# Patient Record
Sex: MALE | Race: WHITE | NOT HISPANIC OR LATINO | Employment: OTHER | URBAN - METROPOLITAN AREA
[De-identification: names, ages, dates, MRNs, and addresses within clinical notes are randomized per-mention and may not be internally consistent; named-entity substitution may affect disease eponyms.]

---

## 2017-01-05 ENCOUNTER — ANTICOAGULATION THERAPY VISIT (OUTPATIENT)
Dept: NURSING | Facility: CLINIC | Age: 72
End: 2017-01-05
Payer: COMMERCIAL

## 2017-01-05 DIAGNOSIS — Z79.01 LONG-TERM (CURRENT) USE OF ANTICOAGULANTS: Primary | ICD-10-CM

## 2017-01-05 LAB — INR POINT OF CARE: 2.3 (ref 0.86–1.14)

## 2017-01-05 PROCEDURE — 99207 ZZC NO CHARGE NURSE ONLY: CPT

## 2017-01-05 PROCEDURE — 36416 COLLJ CAPILLARY BLOOD SPEC: CPT

## 2017-01-05 PROCEDURE — 85610 PROTHROMBIN TIME: CPT | Mod: QW

## 2017-01-05 NOTE — PROGRESS NOTES
ANTICOAGULATION FOLLOW-UP CLINIC VISIT    Patient Name:  Cayetano Lunsford  Date:  1/5/2017  Contact Type:  Face to Face    SUBJECTIVE:     Patient Findings     Positives No Problem Findings           OBJECTIVE    INR PROTIME   Date Value Ref Range Status   01/05/2017 2.3* 0.86 - 1.14 Final       ASSESSMENT / PLAN  INR assessment THER    Recheck INR In: 4 WEEKS    INR Location Clinic      Anticoagulation Summary as of 1/5/2017     INR goal 2.0-3.0   Selected INR 2.3 (1/5/2017)   Maintenance plan 7.5 mg (5 mg x 1.5) on Mon; 5 mg (5 mg x 1) all other days   Full instructions 7.5 mg on Mon; 5 mg all other days   Weekly total 37.5 mg   No change documented Kirstin Meléndez RN   Plan last modified Lorni Wheat RN (11/3/2016)   Next INR check 2/2/2017   Priority INR   Target end date     Indications   Long-term (current) use of anticoagulants [Z79.01] [Z79.01]  Atrial fibrillation  unspecified [I48.91]         Anticoagulation Episode Summary     INR check location     Preferred lab     Send INR reminders to [facility]    Comments       Anticoagulation Care Providers     Provider Role Specialty Phone number    Debbie Lewis MD [facility] [phone]            See the Encounter Report to view Anticoagulation Flowsheet and Dosing Calendar (Go to Encounters tab in chart review, and find the Anticoagulation Therapy Visit)    Patient aware if signs of clotting (pain, tenderness, swelling, color change in leg or arm, SOB) and bleeding occur (blood in stool, urine, large bruising, bleeding gums, nosebleeds) to have INR check sooner. If sx severe report to ER or concerned for stroke call 911. If general questions or concerns arise, call clinic.    Kirstin Meléndez, MANOJ

## 2017-01-09 ENCOUNTER — HOSPITAL ENCOUNTER (OUTPATIENT)
Dept: CT IMAGING | Facility: CLINIC | Age: 72
Discharge: HOME | End: 2017-01-09
Attending: PHYSICIAN ASSISTANT | Admitting: PHYSICIAN ASSISTANT
Payer: COMMERCIAL

## 2017-01-09 DIAGNOSIS — K51.00 ULCERATIVE CHRONIC PANCOLITIS WITHOUT COMPLICATIONS (H): ICD-10-CM

## 2017-01-09 DIAGNOSIS — D50.9 IRON DEFICIENCY ANEMIA, UNSPECIFIED: ICD-10-CM

## 2017-01-09 LAB
CREAT BLD-MCNC: 0.8 MG/DL (ref 0.66–1.25)
GFR SERPL CREATININE-BSD FRML MDRD: >90 ML/MIN/1.7M2

## 2017-01-09 PROCEDURE — 82565 ASSAY OF CREATININE: CPT

## 2017-01-09 PROCEDURE — 25500064 ZZH RX 255 OP 636: Performed by: PHYSICIAN ASSISTANT

## 2017-01-09 PROCEDURE — 25000125 ZZHC RX 250: Performed by: PHYSICIAN ASSISTANT

## 2017-01-09 PROCEDURE — 74177 CT ABD & PELVIS W/CONTRAST: CPT

## 2017-01-09 RX ORDER — IOPAMIDOL 755 MG/ML
101 INJECTION, SOLUTION INTRAVASCULAR ONCE
Status: COMPLETED | OUTPATIENT
Start: 2017-01-09 | End: 2017-01-09

## 2017-01-09 RX ADMIN — IOPAMIDOL 101 ML: 755 INJECTION, SOLUTION INTRAVENOUS at 14:43

## 2017-01-09 RX ADMIN — SODIUM CHLORIDE 69 ML: 9 INJECTION, SOLUTION INTRAVENOUS at 14:44

## 2017-01-10 ENCOUNTER — TRANSFERRED RECORDS (OUTPATIENT)
Dept: HEALTH INFORMATION MANAGEMENT | Facility: CLINIC | Age: 72
End: 2017-01-10

## 2017-01-12 DIAGNOSIS — E11.9 TYPE 2 DIABETES, HBA1C GOAL < 8% (H): Primary | ICD-10-CM

## 2017-01-12 NOTE — TELEPHONE ENCOUNTER
BD Insulin Syringes      Last Written Prescription Date: 8/31/16  Last Fill Quantity: 100,  # refills: 2   Last Office Visit with G, [facility] or [facility] prescribing provider: 9/7/16                                         Next 5 appointments (look out 90 days)     Jan 18, 2017  9:30 AM   Office Visit with Jessica Diaz [facility] ([facility])    [address] [place] [id number] [phone]                      Thank you,  Clary Brown CPhT  On behalf of [facility] [place]

## 2017-01-13 RX ORDER — SYRINGE-NEEDLE,INSULIN,0.5 ML 27GX1/2"
SYRINGE, EMPTY DISPOSABLE MISCELLANEOUS
Qty: 100 EACH | Refills: 2 | Status: SHIPPED | OUTPATIENT
Start: 2017-01-13 | End: 2017-06-05

## 2017-01-13 NOTE — TELEPHONE ENCOUNTER
Prescription approved per [facility] Refill Protocol.    Shayna Roman, PharmD  [facility]  On behalf of [facility]

## 2017-01-18 ENCOUNTER — OFFICE VISIT (OUTPATIENT)
Dept: PHARMACY | Facility: CLINIC | Age: 72
End: 2017-01-18
Payer: COMMERCIAL

## 2017-01-18 VITALS
HEART RATE: 56 BPM | BODY MASS INDEX: 31.18 KG/M2 | DIASTOLIC BLOOD PRESSURE: 69 MMHG | WEIGHT: 205 LBS | SYSTOLIC BLOOD PRESSURE: 124 MMHG

## 2017-01-18 DIAGNOSIS — Z79.4 TYPE 2 DIABETES MELLITUS WITH HYPOGLYCEMIA WITHOUT COMA, WITH LONG-TERM CURRENT USE OF INSULIN (H): Primary | ICD-10-CM

## 2017-01-18 DIAGNOSIS — J44.9 CHRONIC OBSTRUCTIVE PULMONARY DISEASE, UNSPECIFIED (H): ICD-10-CM

## 2017-01-18 DIAGNOSIS — E11.649 TYPE 2 DIABETES MELLITUS WITH HYPOGLYCEMIA WITHOUT COMA, WITH LONG-TERM CURRENT USE OF INSULIN (H): Primary | ICD-10-CM

## 2017-01-18 PROCEDURE — 99605 MTMS BY PHARM NP 15 MIN: CPT | Performed by: PHARMACIST

## 2017-01-18 PROCEDURE — 99607 MTMS BY PHARM ADDL 15 MIN: CPT | Performed by: PHARMACIST

## 2017-01-18 NOTE — Clinical Note
Please see note from today's MTM visit. Slight insulin adjustment and also change his Spiriva from the powdered inhaler to the respimat in hopes he has been effectiveness.   Tanna Diaz, PharmD Medication Therapy Management Pharmacist

## 2017-01-18 NOTE — Clinical Note
My COPD Action Plan   Name: Cayetano Lunsford    YOB: 1945    Date: 1/18/2017    My doctor: Debbie Lewis    My clinic: [address] [address] [phone]   My COPD Medicine:       My Controller Medications: Albuterol (Proair/Ventolin/Proventolin)  Tiotropium (Spiriva)   Albuterol/Ipratropium (Duoneb, Combivent)  Dulera (mometasone/formoterol)     My Rescue Medication:  Albuterol and Albuterol/Ipratropium (DuoNeb/Combivent)     Use of Oxygen:  Oxygen Not Prescribed   My COPD Severity: Severe = FeV1 < 30%-49%        GREEN ZONE       Doing well today      Usual level of activity and exercise    Usual amount of cough and mucus    No shortness of breath    Can think clearly    Sleep well at night    Feel like eating Actions:      Take daily medicines    Use oxygen as prescribed    Follow regular exercise and diet plan    Avoid cigarette smoke and other irritants that harm the lungs             YELLOW ZONE          Having a bad day or flare up      Short of breath more than usual    Change in color or amount of mucus    More coughing or wheezing    Fever or chills    Less energy; trouble completing activities    Trouble thinking or focusing    Using quick relief inhaler or nebulizer more often    Poor sleep; symptoms wake me up    Do not feel like eating Actions:      Take daily medicines    Use quick relief inhaler every 4 hours    If you use oxygen, call you doctor to see if you should adjust your oxygen    Do breathing exercises or other things to help you relax    Let a loved one, friend or neighbor know you are feeling worse    If you have one or more symptoms, consider taking steroids or antibiotics (if they were prescribed)    Call your care team if you have 2 or more symptoms or symptoms don't improve           RED ZONE       Need medical care now      Severe shortness of breath (feel you can't breath)    Not enough breath to do any  activity    Trouble walking or talking    Trouble coughing up mucus or blood in mucus    Frequent coughing    Rescue medicines are not working    Not able to sleep because of breathing    Feel confused or drowsy    Chest pain  Actions:      Call 911 or     Have someone take you to the emergency room if you can't reach your care team        Electronically signed by: Jessica Diaz, January 18, 2017     Annual Reminders:  Meet with Care Team, Flu Shot every Fall and Pneumonia Shot at least once.       [facility] [address] [facility] [address] [place] [address] ([address]

## 2017-01-18 NOTE — PROGRESS NOTES
SUBJECTIVE/OBJECTIVE:                                                    Cayetano Lunsford is a 72 year old male coming in for a follow-up visit for Medication Therapy Management.  He was referred to me from Dr. Debbie Lewis.     Chief Complaint: Follow up from our visit on 12/19/16.  No concerns today  Personal Healthcare Goals: did not discuss today.  Tobacco: No tobacco use   Alcohol: not currently using    Medication Adherence: no issues reported    Diabetes:  Pt currently taking metformin bid, Novolin N 13 units AM before breakfast and 16 units in PM-at dinner., Novolin R-13 units with breakfast, 3 units with lunch and 2 units dinner (decreased from last visit due to hypoglycemia in evening), between 80 and 120, reduce your R insulin by 2 units. If below 70 skip dose. He gives his shots in his arms. He does not have any issues with giving stomach just has been doing arms. He uses less Novolin R if he is going to be active and blood sugars are lower before breakfast.  He is now writing the date on the Novolin R and discarding after 42 days.   SMBG: 3-4 times daily.   Ranges (per pt report):AM fasting: see chart below. Only had 2 lows in the last few weeks and one was avoidable.  Blood sugars seem to be lower before dinner because he has a very light lunch (half sandwich or bowl of soup).  Symptoms of low blood sugar? Fingers and lips tingle, shaky and sweaty. Frequency of hypoglycemia? A few lows since last visit. Treating with small amount of orange juice and candy  Recent symptoms of high blood sugar? none.  Eye exam: up to date  Foot exam: due  Microalbumin is not < 30 mg/g. Pt is taking an ACEi/ARB.  Aspirin: Not taking due to anticoagulation therapy and increased risk of bleeding  Diet/Exercise:  He knows he should get rid of pancakes and sugar cereal but cant find cereal that tastes good with less sugar. At last visit: Reports continues to walk 4-5 days a week, once to twice daily for 20 minutes - but this  varies if it is cold out. He has lost his appetite he states and does not have very large meals. Eats a lot of carbs in the morning, which explains his very high insulin dose compared to other times.  Does not like meats anymore.     Averages: 7 day: 155, 14 day: 154 30 day: 153    Date FBG/ 2hours post Lunch/2hours post Dinner /2hours post    1/18 113 R - 7 units instead of 13      1/17 203 287 184    1/16 173 227 106  R- 1 unit - forgot to give shot and gave later at 10pm without food 54 at 10:42pm - due to giving R and not eating/ 166 1 hr later   1/15 110 R- 7 units 245 136    1/14 184 136 90 R- 1 unit 176 bedtime   1/13 155 211 104 R-1 unit 47 at 10:42 pm and he even had pumpkin pie and cool whip for snack/ at recheck 175 at 2am   1/12 148 166 108 R -1 unit Bedtime 11:30pm was 233 due to pie for after dinner snack   1/11 186 232 82 R - 1 units 77 at 9:43 pm and 73 at 1AM   1/10 134 No check or shot for lunch 186 - higher because no shot at lunch 135 at bedtime   1/9 199 80 - late lunch and fasted after breakfast for CT scan 104 94 at bedtime   1/8 120 236 248    1/7 183 102 212 before dinner and 269 at dinner        Date FBG/ 2hours post Lunch/2hours post Dinner /2hours post Bedtime   12/19 161      12/18 103 233 91 (only 2 units of R) 76   12/17 162 181 186 68   12/16 138/184 207 206 106   12/15 165 238 134 125/40 (knew BG was low woke up at 1 AM)   12/14 141 161 85 (2 units of R) 43/66   12/13 117(11 units of R) 215/116 50, 75 (2 units of R) 132   12/12 167 260 101 (2 units of R) 104   12/11 91 (11 units of R) 172 91 (2 units of R) 217   12/10 142 182 148 93   12/9 161 224 95 (2 U of R)    12/8 191 158 146 89   12/7 176 263 145 55/128   12/6 171 188 180 53/109     COPD: Current medications: Albuterol MDI and (Pt reports using albuterol as needed when out), Tiotropium (Spiriva Handihaler) once daily, Albuterol+Ipratropium Nebs and (Pt reports using albuterol 2-3 times daily depending on activity) and 2 puffs  Mometasone+Formoterol (Dulera) twice daily. Pt rinses their mouth after using steroid inhaler.  He states that sometimes he isn't sure if he gets all the medicine from the Spiriva because the capsule doesn't make that noise.  He thinks all the powder is out of the capsule but cant say for sure.  Pt is not experiencing side effects.   Pt reports the following symptoms: increased SOB upon exertion .  Pt does not have an COPD Action Plan on file.   Has spirometry been completed: Yes     Current labs include:  BP Readings from Last 3 Encounters:   09/07/16 130/76   07/22/16 139/62   06/16/16 140/68     A1C      6.9   10/17/2016  A1C      6.2   7/22/2016  A1C      6.7   3/28/2016  A1C      7.1   11/10/2015  A1C      7.6   5/12/2015    CHOL      128   5/5/2016  TRIG      122   5/5/2016  HDL       47   5/5/2016  LDL       57   5/5/2016    Liver Function Studies -   Recent Labs   Lab Test 10/03/16   02/09/16   1150   PROTTOTAL   --    --   6.7*   ALBUMIN   --    --   3.7   BILITOTAL   --    --   0.3   ALKPHOS   --    --   57   AST  18   < >  15   ALT  13   < >  27    < > = values in this interval not displayed.       Lab Results   Component Value Date    UCRR 91 05/05/2016    MICROL 39 05/05/2016    UMALCR 42.76* 05/05/2016       Last Basic Metabolic Panel:  NA      140   5/5/2016   POTASSIUM      4.6   5/5/2016  CHLORIDE      103   5/5/2016  BUN       14   5/5/2016  CR     0.79   10/3/2016    GFR ESTIMATE   Date Value Ref Range Status   01/09/2017 >90 >60 mL/min/1.7m2 Final   10/03/2016 >60 >60 ml/min/1.73m2 Final   05/05/2016 >90  Non  GFR Calc   >60 mL/min/1.7m2 Final       TSH   Date Value Ref Range Status   10/27/2014 2.90 0.40 - 4.00 mU/L Final     Comment:     Effective 7/30/2014, the reference range for this assay has changed to reflect   new instrumentation/methodology.     ]  /69 mmHg  Pulse 56  Wt 205 lb (92.987 kg)    Most Recent Immunizations   Administered Date(s) Administered      Hepatitis B 08/05/2014     Influenza (H1N1) 01/08/2010     Influenza (High Dose) 3 valent vaccine 09/07/2016     Influenza (IIV3) 09/11/2009     Pneumococcal (PCV 13) 11/10/2015     Pneumococcal 23 valent 07/20/2010     TD (ADULT, 7+) 08/08/2008     TDAP (ADACEL AGES 11-64) 08/05/2014     Zoster vaccine, live 06/13/2008     ASSESSMENT:                                                    Current medications were reviewed today.      Medication Adherence: no issues identified    Diabetes: Needs Improvement. Patient is meeting A1c goal of < 7%. Self monitoring of blood glucose is at goal of fasting  mg/dL but having some hypoglycemia still after dinner and before dinner readings seem to be lower. Pt would benefit from Rapid Acting Insulin (Novolin R) : decrease dose to 2 units at lunch to avoid going too low before dinner and then too low after dinner.  He will continue to adjust his Novolin R dose base on premeal readings ( will decrease dose by 2 units and not give any regular insulin if blood sugar is 80 or less). Due for annual foot exam. Aspirin therapy is not indicated in this patient due to anticoagulant/antiplatelet therapy.     COPD: Needs Improvement. Patient would benefit from the following medication changes: switching from the powder Spiriva to the Respimat mist inhaler to see if he has a better response due to possible improper technique with the powder inhaler.  Demonstrated how to use the new inhaler in clinic today. Completed the COPD Action Plan today.    PLAN:                                                      1. In the future if you miss giving your mixed insulin at dinner and remember later only give the Novolin N.    2. Decrease Novolin R at lunch to 2 units to try to avoid lows before dinner.    3. Try to keep your carb intake similar from meal to meal.    4. Switch from using the Spiriva Handihaler to the new Respimat inhaler.  Let me know if it is not covered.  See if you use the  Duo nebs less after switching.    5. COPD action plan done today.    I spent 60 minutes with this patient today.  All changes were made via collaborative practice agreement with Debbie Lewis. A copy of the visit note was provided to the patient's primary care provider.     Will follow up in 1 month.    The patient was given a summary of these recommendations as an after visit summary.    Tanna Diaz, PharmD  Medication Therapy Management Pharmacist

## 2017-01-18 NOTE — Clinical Note
Switch from spiriva powder inhaler to respimat and adjusted insulin today.  See note for details.   Tanna

## 2017-01-18 NOTE — MR AVS SNAPSHOT
After Visit Summary   1/18/2017    Cayetano Lunsford    MRN: [id number]           Patient Information     Date Of Birth          1945        Visit Information        Provider Department      1/18/2017 9:30 AM Jessica Diaz [facility] MTM        Today's Diagnoses     Type 2 diabetes mellitus with hypoglycemia without coma, with long-term current use of insulin (H)    -  1     Chronic obstructive pulmonary disease, unspecified (H)           Care Instructions    Recommendations from today's MTM visit:                                                      1. In the future if you miss giving your mixed insulin at dinner and remember later only give the Novolin N.    2. Decrease Novolin N at lunch to 2 units to try to avoid lows before dinner.    3. Try to keep your carb intake similar from meal to meal.    4. Switch from using the Spirva Handihaler to the new respimat inhaler.  Let me know if it is not covered.  See if you use the Duonebs less after switching.    Next MTM visit: 2/15/17 at 9:30    To schedule another MTM appointment, please call the clinic directly or you may call the MTM scheduling line at [phone] or toll-free at [phone].     My Clinical Pharmacist's contact information:                                                      It was a pleasure seeing you today!  Please feel free to contact me with any questions or concerns you have.      Tanna Diaz, PharmD  Medication Therapy Management Pharmacist    You may receive a survey about the MTM services you received.  I would appreciate your feedback to help me serve you better in the future. Please fill it out and return it when you can. Your comments will be anonymous.                  Follow-ups after your visit        Your next 10 appointments already scheduled     Feb 02, 2017 10:00 AM   [facility]    [address]  "[facility] [address] [phone]            Feb 15, 2017  9:30 AM   Office Visit with Jessica Diaz RICHY   [facility]    [address] [place] [id number] [phone]           Bring a current list of meds and any records pertaining to this visit.  For Physicals, please bring immunization records and any forms needing to be filled out.  Please arrive 10 minutes early to complete paperwork.              Who to contact     If you have questions or need follow up information about today's clinic visit or your schedule please contact [facility] directly at [phone].  Normal or non-critical lab and imaging results will be communicated to you by MyChart, letter or phone within 4 business days after the clinic has received the results. If you do not hear from us within 7 days, please contact the clinic through MyChart or phone. If you have a critical or abnormal lab result, we will notify you by phone as soon as possible.  Submit refill requests through [employer] or call your pharmacy and they will forward the refill request to us. Please allow 3 business days for your refill to be completed.          Additional Information About Your Visit        [employer] Information     [employer] lets you send messages to your doctor, view your test results, renew your prescriptions, schedule appointments and more. To sign up, go to [email or web address] . Click on \"Log in\" on the left side of the screen, which will take you to the Welcome page. Then click on \"Sign up Now\" on the right side of the page.     You will be asked to enter the access code listed below, as well as some personal information. Please follow the directions to create your username and password.     Your access code is: [email or web address]  Expires: 3/19/2017 10:15 AM     Your access code will  in 90 days. If you need help or a new code, please call your [facility] " clinic or [phone].        Care EveryWhere ID     This is your Care EveryWhere ID. This could be used by other organizations to access your [facility] medical records  [id number]         Blood Pressure from Last 3 Encounters:   09/07/16 130/76   07/22/16 139/62   06/16/16 140/68    Weight from Last 3 Encounters:   09/07/16 206 lb (93.441 kg)   07/22/16 206 lb 8 oz (93.668 kg)   06/16/16 208 lb (94.348 kg)              Today, you had the following     No orders found for display         Today's Medication Changes          These changes are accurate as of: 1/18/17 10:40 AM.  If you have any questions, ask your nurse or doctor.               Start taking these medicines.        Dose/Directions    tiotropium 2.5 MCG/ACT inhalation aerosol   Commonly known as:  SPIRIVA RESPIMAT   Used for:  Chronic obstructive pulmonary disease, unspecified (H)   Replaces:  tiotropium 18 MCG capsule        Dose:  2 puff   Inhale 2 puffs into the lungs daily   Quantity:  4 g   Refills:  1         These medicines have changed or have updated prescriptions.        Dose/Directions    insulin regular 100 UNIT/ML injection   Commonly known as:  NovoLIN R RELION   This may have changed:  additional instructions   Used for:  Type 2 diabetes mellitus with hypoglycemia without coma, with long-term current use of insulin (H)        Inject 13 units with breakfast, 2 units with lunch and 2 units with dinner (when mixing with NPH, draw this insulin up first)   Quantity:  10 mL   Refills:  2       ipratropium - albuterol 0.5 mg/2.5 mg/3 mL 0.5-2.5 (3) MG/3ML neb solution   Commonly known as:  DUONEB   This may have changed:  reasons to take this   Used for:  Chronic obstructive bronchitis (H)        Dose:  3 mL   Inhale 1 vial (3 mLs) into the lungs every 4 hours as needed for shortness of breath / dyspnea   Quantity:  270 mL   Refills:  11         Stop taking these medicines if you haven't already. Please contact your care team if you have  questions.     BREO ELLIPTA 100-25 MCG/INH oral inhaler   Generic drug:  fluticasone-vilanterol           tiotropium 18 MCG capsule   Commonly known as:  SPIRIVA HANDIHALER   Replaced by:  tiotropium 2.5 MCG/ACT inhalation aerosol                Where to get your medicines      These medications were sent to [facility] [address]  [address] [facility] [address]     Phone:  [phone]    - tiotropium 2.5 MCG/ACT inhalation aerosol      These medications were sent to [address]  [address] [place] [address]) [place] [address]     Phone:  [phone]    - insulin regular 100 UNIT/ML injection             Primary Care Provider Office Phone # Fax #    Debbie Lewis [phone]       [facility] [address]  [facility] [address]        Thank you!     Thank you for choosing [facility]  for your care. Our goal is always to provide you with excellent care. Hearing back from our patients is one way we can continue to improve our services. Please take a few minutes to complete the written survey that you may receive in the mail after your visit with us. Thank you!             Your Updated Medication List - Protect others around you: Learn how to safely use, store and throw away your medicines at www.disposemymeds.org.          This list is accurate as of: 1/18/17 10:40 AM.  Always use your most recent med list.                   Brand Name Dispense Instructions for use    albuterol 108 (90 BASE) MCG/ACT Inhaler    PROAIR HFA/PROVENTIL HFA/VENTOLIN HFA    1 Inhaler    Inhale 1-2 puffs into the lungs every 4 hours as needed (for shortness of breath/wheezing)       ASPIRIN NOT PRESCRIBED    INTENTIONAL     Antiplatelet medication not prescribed intentionally due to Current anticoagulant therapy (warfarin/enoxaparin)       azelastine 0.1 % spray    ASTELIN    1  "Bottle    Spray 1-2 sprays into both nostrils 2 times daily       BD ULTRA FINE PEN NEEDLES     100 each    Use to inject insulin twice daily.       blood glucose monitoring lancets     100 Each    Use to test up to four times per day       blood glucose monitoring test strip    no brand specified    360 each    Test 4 times daily or as directed       carbidopa-levodopa  MG per tablet    SINEMET    90 tablet    Take 1 tablet by mouth 3 times daily       FLORAJEN3 Caps     60 capsule    Take 1 capsule by mouth 2 times daily       FOLIC ACID PO      Take 1 mg by mouth daily       insulin  UNIT/ML injection    NovoLIN N RELION    10 mL    Inject 13 units in the morning and 16 units in the evening       insulin regular 100 UNIT/ML injection    NovoLIN R RELION    10 mL    Inject 13 units with breakfast, 2 units with lunch and 2 units with dinner (when mixing with NPH, draw this insulin up first)       insulin syringe-needle U-100 31G X 5/16\" 0.5 ML    BD insulin syringe ultrafine    100 each    Use one syringe 2 daily or as directed.       ipratropium - albuterol 0.5 mg/2.5 mg/3 mL 0.5-2.5 (3) MG/3ML neb solution    DUONEB    270 mL    Inhale 1 vial (3 mLs) into the lungs every 4 hours as needed for shortness of breath / dyspnea       losartan 100 MG tablet    COZAAR    90 tablet    Take 1 tablet (100 mg) by mouth daily for hypertension.       meclizine 25 MG tablet    ANTIVERT    30 tablet    Take 1 tablet (25 mg) by mouth 3 times daily as needed       MELATONIN PO      Take 3 mg by mouth At Bedtime       metFORMIN 1000 MG tablet    GLUCOPHAGE    180 tablet    Take 1 tablet (1,000 mg) by mouth 2 times daily (with meals) with breakfast and dinner.       mometasone-formoterol 100-5 MCG/ACT oral inhaler    DULERA     Inhale 2 puffs into the lungs 2 times daily       order for DME     1 each    Dispense one nebulizer machine with necessary supplies       order for DME     1 each    Equipment being ordered: " Nebulizer machine with mask and tubing       pantoprazole 40 MG EC tablet    PROTONIX     Take 40 mg by mouth daily Started by Dr. Debbie Rico at [facility]       propafenone 150 MG Tabs tablet    RYTHMOL    180 tablet    Take 1 tablet (150 mg) by mouth 2 times daily       propranolol 40 MG tablet    INDERAL    180 tablet    Take 2-3 tablets ( mg) by mouth 2 times daily       simvastatin 80 MG tablet    ZOCOR    30 tablet    Take 0.5 tablets (40 mg) by mouth At Bedtime       sulfaSALAzine 500 MG tablet    AZULFIDINE    90 tablet    TAKE ONE TABLET BY MOUTH THREE TIMES A DAY       tiotropium 2.5 MCG/ACT inhalation aerosol    SPIRIVA RESPIMAT    4 g    Inhale 2 puffs into the lungs daily       warfarin 5 MG tablet    COUMADIN    96 tablet    As directed by Coumadin clinic

## 2017-01-18 NOTE — PATIENT INSTRUCTIONS
Recommendations from today's MTM visit:                                                      1. In the future if you miss giving your mixed insulin at dinner and remember later only give the Novolin N.    2. Decrease Novolin N at lunch to 2 units to try to avoid lows before dinner.    3. Try to keep your carb intake similar from meal to meal.    4. Switch from using the Spirva Handihaler to the new respimat inhaler.  Let me know if it is not covered.  See if you use the Duonebs less after switching.    Next MTM visit: 2/15/17 at 9:30    To schedule another MTM appointment, please call the clinic directly or you may call the MTM scheduling line at [phone] or toll-free at [phone].     My Clinical Pharmacist's contact information:                                                      It was a pleasure seeing you today!  Please feel free to contact me with any questions or concerns you have.      Tanna Diaz, PharmD  Medication Therapy Management Pharmacist    You may receive a survey about the MTM services you received.  I would appreciate your feedback to help me serve you better in the future. Please fill it out and return it when you can. Your comments will be anonymous.

## 2017-02-02 ENCOUNTER — ANTICOAGULATION THERAPY VISIT (OUTPATIENT)
Dept: NURSING | Facility: CLINIC | Age: 72
End: 2017-02-02
Payer: COMMERCIAL

## 2017-02-02 DIAGNOSIS — Z79.01 LONG-TERM (CURRENT) USE OF ANTICOAGULANTS: Primary | ICD-10-CM

## 2017-02-02 LAB — INR POINT OF CARE: 2.1 (ref 0.86–1.14)

## 2017-02-02 PROCEDURE — 36416 COLLJ CAPILLARY BLOOD SPEC: CPT

## 2017-02-02 PROCEDURE — 99207 ZZC NO CHARGE NURSE ONLY: CPT

## 2017-02-02 PROCEDURE — 85610 PROTHROMBIN TIME: CPT | Mod: QW

## 2017-02-14 ENCOUNTER — DOCUMENTATION ONLY (OUTPATIENT)
Dept: OTHER | Facility: CLINIC | Age: 72
End: 2017-02-14

## 2017-02-14 ENCOUNTER — TRANSFERRED RECORDS (OUTPATIENT)
Dept: HEALTH INFORMATION MANAGEMENT | Facility: CLINIC | Age: 72
End: 2017-02-14

## 2017-02-14 DIAGNOSIS — Z71.89 ADVANCED DIRECTIVES, COUNSELING/DISCUSSION: Chronic | ICD-10-CM

## 2017-02-15 ENCOUNTER — OFFICE VISIT (OUTPATIENT)
Dept: PHARMACY | Facility: CLINIC | Age: 72
End: 2017-02-15
Payer: COMMERCIAL

## 2017-02-15 VITALS
SYSTOLIC BLOOD PRESSURE: 109 MMHG | BODY MASS INDEX: 30.56 KG/M2 | HEART RATE: 63 BPM | DIASTOLIC BLOOD PRESSURE: 57 MMHG | WEIGHT: 201 LBS

## 2017-02-15 DIAGNOSIS — E11.649 TYPE 2 DIABETES MELLITUS WITH HYPOGLYCEMIA WITHOUT COMA, WITH LONG-TERM CURRENT USE OF INSULIN (H): Primary | ICD-10-CM

## 2017-02-15 DIAGNOSIS — Z79.4 TYPE 2 DIABETES MELLITUS WITH HYPOGLYCEMIA WITHOUT COMA, WITH LONG-TERM CURRENT USE OF INSULIN (H): Primary | ICD-10-CM

## 2017-02-15 DIAGNOSIS — G25.0 BENIGN FAMILIAL TREMOR: ICD-10-CM

## 2017-02-15 DIAGNOSIS — J44.9 CHRONIC OBSTRUCTIVE PULMONARY DISEASE, UNSPECIFIED (H): ICD-10-CM

## 2017-02-15 PROCEDURE — 99606 MTMS BY PHARM EST 15 MIN: CPT | Performed by: PHARMACIST

## 2017-02-15 PROCEDURE — 99607 MTMS BY PHARM ADDL 15 MIN: CPT | Performed by: PHARMACIST

## 2017-02-15 NOTE — PROGRESS NOTES
SUBJECTIVE/OBJECTIVE:                                                    Cayetano Lunsford is a 72 year old male coming in for a follow-up visit for Medication Therapy Management.  He was referred to me from Dr. Debbie Lewis.     Chief Complaint: Follow up from our visit on 1/18/17.  No concerns today.  Personal Healthcare Goals: He would like to get rid of the tremors but knows it may not be something that will get better.  Tobacco: No tobacco use   Alcohol: not currently using    Medication Adherence: no issues reported    Diabetes:  Pt currently taking metformin bid, Novolin N 13 units AM before breakfast and 16 units in PM-at dinner., Novolin R-13 units with breakfast, 2 units with lunch (decreased from last visit due to hypoglycemia in evening) and 2 units dinner , between 80 and 120, reduce your R insulin by 2 units. If below 70 skip dose. He gives his shots in his arms. He does not have any issues with giving stomach just has been doing arms. He uses less Novolin R if he is going to be active and blood sugars are lower before breakfast.  He is now writing the date on the Novolin R and discarding after 42 days.   SMBG: 3-4 times daily.   Ranges (per pt report): see chart below. Blood sugars seem to be lower before dinner because he has a very light lunch (half sandwich or bowl of soup).  Symptoms of low blood sugar? Fingers and lips tingle, shaky and sweaty. Frequency of hypoglycemia? A few lows since last visit. Treating with small amount of orange juice and candy  Recent symptoms of high blood sugar? none.  Eye exam: up to date  Foot exam: due  Microalbumin is not < 30 mg/g. Pt is taking an ACEi/ARB.  Aspirin: Not taking due to anticoagulation therapy and increased risk of bleeding  Diet/Exercise:  He knows he should get rid of pancakes and sugar cereal but cant find cereal that tastes good with less sugar. At last visit: Reports continues to walk 4-5 days a week, once to twice daily for 20 minutes - but  this varies if it is cold out. He has lost his appetite he states and does not have very large meals. Eats a lot of carbs in the morning, which explains his very high insulin dose compared to other times.  Does not like meats anymore.     Averages: 7 day: 146, 14 day: 145 30 day: 160    Date FBG/ 2hours post Lunch/2hours post Dinner /2hours post Before bed   2/15 199      2/14 115 (R - cut in half gave 7 units) 191 119(R - 1 unit) 152   2/13 143 264 (couple of cookies earlier) then 221 at 3pm 112 at 5:38pm (R- 1 unit) at single serving lasagna (40 gm carbs) and glass of milk. Also had 2 bananas after dinner for a snack 10pm - 58; 10:30pm 70,113 - 11:06pm   2/12 136 60 (no R) half sandwich and chips, may have been a little more active that morning. 64 at 6:37pm (no R) 199   2/11 173 243 (breakfast was cereal with strawberries) 169    2/10 138 178 90 (R - 1 unit)    2/9 123 133 198    2/8 156 160 165/255 (hot chocolate and half sandwich at hockey game)    2/7 151 153 113 (R - 1 unit)    2/6 145 150 174 92   2/5 177 No lunch reading 164    2/4 99 (R - 7 units) 140 118 (R - 1 unit)    2/3 125 127/115 150    2/2 128 129/117 (4 hours after lunch) 116/174    2/1 145 165 104 (R - 1 unit) 82 (drank 4 ounces of juice)   1/31 164 168 162    1/30 100 ( R - 7 units) 197 118 ( R - 1 unit) 111   1/29 167 281 (breakfast 2 small cinnamon rolls) 207  1:04 am was 62 (4 oz orange juice) - unsure of drastic drop   1/28 129 139 182 142   1/27 153 No reading (no R with lunch) 201  223   1/26 135 142 63 ( no R) 160   1/25 145 (cereal for breakfast) 231 144    1/24 183 209 168 98   1/23 194 246 ? 201 (didn't eat as much of an evening snack) 70 (4oz juice)   1/22 163 301 (ate 2 pancakes with syrup for breakfast) 222 208 (ate cake earlier in the day)   1/21 238 (night before had chips and cheese for snack) 249 165    1/20 220 230 (no R because he had eating late breakfast and was worried it would make bs too low) 204    1/19 163 (cereal with  blueberries) 231 183 139                     Last visit:  Date FBG/ 2hours post Lunch/2hours post Dinner /2hours post    1/18 113 R - 7 units instead of 13      1/17 203 287 184    1/16 173 227 106  R- 1 unit - forgot to give shot and gave later at 10pm without food 54 at 10:42pm - due to giving R and not eating/ 166 1 hr later   1/15 110 R- 7 units 245 136    1/14 184 136 90 R- 1 unit 176 bedtime   1/13 155 211 104 R-1 unit 47 at 10:42 pm and he even had pumpkin pie and cool whip for snack/ at recheck 175 at 2am   1/12 148 166 108 R -1 unit Bedtime 11:30pm was 233 due to pie for after dinner snack   1/11 186 232 82 R - 1 units 77 at 9:43 pm and 73 at 1AM   1/10 134 No check or shot for lunch 186 - higher because no shot at lunch 135 at bedtime   1/9 199 80 - late lunch and fasted after breakfast for CT scan 104 94 at bedtime   1/8 120 236 248    1/7 183 102 212 before dinner and 269 at dinner        COPD: Current medications: Albuterol MDI and (Pt reports using albuterol as needed when out), Tiotropium (Spiriva Respimat) once daily - changed from HandiHaler at last visit, Albuterol+Ipratropium Nebs ( Pt reports using 2 times daily now since changing to the Respimat) and 2 puffs Mometasone+Formoterol (Dulera) twice daily. Pt rinses their mouth after using steroid inhaler.    Pt is not experiencing side effects.   Pt reports the following symptoms: increased SOB upon exertion which is unchanged. Stairs are really hard.  Pt does have an COPD Action Plan on file.   Has spirometry been completed: Yes     Tremor:  Current medications include: propranolol  mg twice daily, sinemet  three times daily.  He has not tried primidone but his sister said she didn't like it. He is not sure there is much he can do. He has not seen a neurologist to discuss other options.    Current labs include:  BP Readings from Last 3 Encounters:   01/18/17 124/69   09/07/16 130/76   07/22/16 139/62     Today's Vitals: /57  Pulse  63  Wt 201 lb (91.2 kg)  BMI 30.56 kg/m2     Lab Results   Component Value Date    A1C 6.9 10/17/2016   .  Lab Results   Component Value Date    CHOL 128 05/05/2016     Lab Results   Component Value Date    TRIG 122 05/05/2016     Lab Results   Component Value Date    HDL 47 05/05/2016     Lab Results   Component Value Date    LDL 57 05/05/2016       Liver Function Studies -   Recent Labs   Lab Test 10/03/16   02/09/16   1150   PROTTOTAL   --    --   6.7*   ALBUMIN   --    --   3.7   BILITOTAL   --    --   0.3   ALKPHOS   --    --   57   AST  18   < >  15   ALT  13   < >  27    < > = values in this interval not displayed.       Lab Results   Component Value Date    UCRR 91 05/05/2016    MICROL 39 05/05/2016    UMALCR 42.76 (H) 05/05/2016       Last Basic Metabolic Panel:  Lab Results   Component Value Date     05/05/2016      Lab Results   Component Value Date    POTASSIUM 4.6 05/05/2016     Lab Results   Component Value Date    CHLORIDE 103 05/05/2016     Lab Results   Component Value Date    BUN 14 05/05/2016     Lab Results   Component Value Date    CR 0.79 10/03/2016     GFR Estimate   Date Value Ref Range Status   01/09/2017 >90 >60 mL/min/1.7m2 Final   10/03/2016 >60 >60 ml/min/1.73m2 Final   05/05/2016 >90  Non  GFR Calc   >60 mL/min/1.7m2 Final     GFR Estimate If Black   Date Value Ref Range Status   01/09/2017 >90 >60 mL/min/1.7m2 Final   10/03/2016 >60 >60 ml/min/1.73m2 Final   05/05/2016 >90   GFR Calc   >60 mL/min/1.7m2 Final     TSH   Date Value Ref Range Status   10/27/2014 2.90 0.40 - 4.00 mU/L Final     Comment:     Effective 7/30/2014, the reference range for this assay has changed to reflect   new instrumentation/methodology.     ]    Most Recent Immunizations   Administered Date(s) Administered     Hepatitis B 08/05/2014     Influenza (H1N1) 01/08/2010     Influenza (High Dose) 3 valent vaccine 09/07/2016     Influenza (IIV3) 09/11/2009     Pneumococcal (PCV  13) 11/10/2015     Pneumococcal 23 valent 07/20/2010     TD (ADULT, 7+) 08/08/2008     TDAP (ADACEL AGES 11-64) 08/05/2014     Zoster vaccine, live 06/13/2008     ASSESSMENT:                                                    Current medications were reviewed today as discussed above.      Medication Adherence: no issues identified    Diabetes: Needs Improvement. Patient is meeting A1c goal of < 8%. Self monitoring of blood glucose is not at goal of fasting  mg/dL and post prandial < 180 mg/dL all the time. He continues to have hypoglycemia, mainly in the evening. Pt would benefit from Rapid Acting Insulin (Novolin R) : decrease morning dose by 2 units if he plans to be more active and 2 more if he still gets hypoglycemia after decreasing by 2 units.  He may also be having bedtime/overnight lows due to Novolin N being too high so will decrease evening NPH by 2 units to 14 units. Microalbumin is not at goal < 30 mg/g. Taking an ARB at max dose.. Due for annual foot exam. Aspirin therapy is not indicated in this patient due to anticoagulant/antiplatelet therapy.     COPD: Slightly Improved. Patient would benefit from no changes at this time.     Tremor: Needs Improvement. Patient would benefit from scheduling with neurologist who comes to the clinic Tuesdays and Wednesdays.      PLAN:                                                      1. If you are going to be more active that day cut your morning R down by 2 units and if you still have lows doing that then cut back by 4 units.     2. Decrease Novolin N at dinner to 14 units - see if you have less bedtime/overnight low blood sugars    3. Consider making an appointment with Dr. Yoo to discuss your tremor and see what other options you have.    I spent 60 minutes with this patient today.  All changes were made via collaborative practice agreement with Debbie Lewis. A copy of the visit note was provided to the patient's primary care provider.     Will  follow up in 1 month.    The patient was given a summary of these recommendations as an after visit summary.    Tanna Diaz, JohnnieD  Medication Therapy Management Pharmacist

## 2017-02-15 NOTE — MR AVS SNAPSHOT
After Visit Summary   2/15/2017    Cayetano Lunfsord    MRN: [id number]           Patient Information     Date Of Birth          1945        Visit Information        Provider Department      2/15/2017 9:30 AM Jessica Diaz [facility] MTM        Today's Diagnoses     Type 2 diabetes mellitus with hypoglycemia without coma, with long-term current use of insulin (H)          Care Instructions    Recommendations from today's MTM visit:                                                      1. If you are going to be more active that day cut your morning R down by 2 units and if you still have lows doing that then cut back by 4 units.     2. Decrease Novolin N at dinner to 14 units - see if you have less bedtime/overnight low blood sugars    3. Consider making an appointment with Dr. Yoo to discuss your tremor and see what other options you have.    Next MTM visit: 3/13/17 at 12 I will call you    To schedule another MTM appointment, please call the clinic directly or you may call the MTM scheduling line at [phone] or toll-free at [phone].     My Clinical Pharmacist's contact information:                                                      It was a pleasure seeing you today!  Please feel free to contact me with any questions or concerns you have.      Tanna Diaz, PharmD  Medication Therapy Management Pharmacist  [facility] - Monday and Wednesday 7:30 - 4:00  Phone: [phone] - direct clinic line    You may receive a survey about the MTM services you received.  I would appreciate your feedback to help me serve you better in the future. Please fill it out and return it when you can. Your comments will be anonymous.                  Follow-ups after your visit        Your next 10 appointments already scheduled     Mar 02, 2017 10:15 AM CST   An[facility] Visit with  [facility] CLINIC   [facility]    [address]  "[facility] [place], [place] [address] [phone]            Mar 13, 2017 12:00 PM CDT   TELEMEDICINE with Jessica Diaz [facility] ([facility] [place]    [address] [place] [address] [phone]           Note: this is not an onsite visit; there is no need to come to the facility.              Who to contact     If you have questions or need follow up information about today's clinic visit or your schedule please contact [facility] directly at [phone].  Normal or non-critical lab and imaging results will be communicated to you by MyChart, letter or phone within 4 business days after the clinic has received the results. If you do not hear from us within 7 days, please contact the clinic through MyChart or phone. If you have a critical or abnormal lab result, we will notify you by phone as soon as possible.  Submit refill requests through [employer] or call your pharmacy and they will forward the refill request to us. Please allow 3 business days for your refill to be completed.          Additional Information About Your Visit        MyChart Information     [employer] lets you send messages to your doctor, view your test results, renew your prescriptions, schedule appointments and more. To sign up, go to [email or web address] . Click on \"Log in\" on the left side of the screen, which will take you to the Welcome page. Then click on \"Sign up Now\" on the right side of the page.     You will be asked to enter the access code listed below, as well as some personal information. Please follow the directions to create your username and password.     Your access code is: [email or web address]  Expires: 3/19/2017 10:15 AM     Your access code will  in 90 days. If you need help or a new code, please call your [facility] or [phone].        Care EveryWhere ID     This is your Care EveryWhere ID. This could be used by other organizations to access " your [facility] medical records  [id number]         Blood Pressure from Last 3 Encounters:   01/18/17 124/69   09/07/16 130/76   07/22/16 139/62    Weight from Last 3 Encounters:   01/18/17 205 lb (93 kg)   09/07/16 206 lb (93.4 kg)   07/22/16 206 lb 8 oz (93.7 kg)              Today, you had the following     No orders found for display         Today's Medication Changes          These changes are accurate as of: 2/15/17 10:37 AM.  If you have any questions, ask your nurse or doctor.               These medicines have changed or have updated prescriptions.        Dose/Directions    insulin  UNIT/ML injection   Commonly known as:  NovoLIN N RELION   This may have changed:  additional instructions   Used for:  Type 2 diabetes mellitus with hypoglycemia without coma, with long-term current use of insulin (H)        Inject 13 units in the morning and 14 units in the evening   Quantity:  10 mL   Refills:  2       ipratropium - albuterol 0.5 mg/2.5 mg/3 mL 0.5-2.5 (3) MG/3ML neb solution   Commonly known as:  DUONEB   This may have changed:  reasons to take this   Used for:  Chronic obstructive bronchitis (H)        Dose:  3 mL   Inhale 1 vial (3 mLs) into the lungs every 4 hours as needed for shortness of breath / dyspnea   Quantity:  270 mL   Refills:  11            Where to get your medicines      These medications were sent to [facility] [address] [place] [address]) [place] [address]     Phone:  [phone]     insulin  UNIT/ML injection                Primary Care Provider Office Phone # Fax #    Debbie Lewis [phone]       [facility] [address]  [facility] [address]        Thank you!     Thank you for choosing [facility]  for your care. Our goal is always to provide you with excellent care. Hearing back from our patients is one way we can continue to improve our services.  "Please take a few minutes to complete the written survey that you may receive in the mail after your visit with us. Thank you!             Your Updated Medication List - Protect others around you: Learn how to safely use, store and throw away your medicines at www.disposemymeds.org.          This list is accurate as of: 2/15/17 10:37 AM.  Always use your most recent med list.                   Brand Name Dispense Instructions for use    albuterol 108 (90 BASE) MCG/ACT Inhaler    PROAIR HFA/PROVENTIL HFA/VENTOLIN HFA    1 Inhaler    Inhale 1-2 puffs into the lungs every 4 hours as needed (for shortness of breath/wheezing)       ASPIRIN NOT PRESCRIBED    INTENTIONAL     Reported on 2/15/2017       azelastine 0.1 % spray    ASTELIN    1 Bottle    Spray 1-2 sprays into both nostrils 2 times daily       BD ULTRA FINE PEN NEEDLES     100 each    Use to inject insulin twice daily.       blood glucose monitoring lancets     100 Each    Use to test up to four times per day       blood glucose monitoring test strip    no brand specified    360 each    Test 4 times daily or as directed       carbidopa-levodopa  MG per tablet    SINEMET    90 tablet    Take 1 tablet by mouth 3 times daily       FLORAJEN3 Caps     60 capsule    Take 1 capsule by mouth 2 times daily       FOLIC ACID PO      Take 1 mg by mouth daily       insulin  UNIT/ML injection    NovoLIN N RELION    10 mL    Inject 13 units in the morning and 14 units in the evening       insulin regular 100 UNIT/ML injection    NovoLIN R RELION    10 mL    Inject 13 units with breakfast, 2 units with lunch and 2 units with dinner (when mixing with NPH, draw this insulin up first)       insulin syringe-needle U-100 31G X 5/16\" 0.5 ML    BD insulin syringe ultrafine    100 each    Use one syringe 2 daily or as directed.       ipratropium - albuterol 0.5 mg/2.5 mg/3 mL 0.5-2.5 (3) MG/3ML neb solution    DUONEB    270 mL    Inhale 1 vial (3 mLs) into the lungs every " 4 hours as needed for shortness of breath / dyspnea       losartan 100 MG tablet    COZAAR    90 tablet    Take 1 tablet (100 mg) by mouth daily for hypertension.       meclizine 25 MG tablet    ANTIVERT    30 tablet    Take 1 tablet (25 mg) by mouth 3 times daily as needed       MELATONIN PO      Take 3 mg by mouth At Bedtime       metFORMIN 1000 MG tablet    GLUCOPHAGE    180 tablet    Take 1 tablet (1,000 mg) by mouth 2 times daily (with meals) with breakfast and dinner.       mometasone-formoterol 100-5 MCG/ACT oral inhaler    DULERA     Inhale 2 puffs into the lungs 2 times daily       order for DME     1 each    Dispense one nebulizer machine with necessary supplies       order for DME     1 each    Equipment being ordered: Nebulizer machine with mask and tubing       pantoprazole 40 MG EC tablet    PROTONIX     Take 40 mg by mouth daily Started by Dr. Debbie Rico at [facility]       propafenone 150 MG Tabs tablet    RYTHMOL    180 tablet    Take 1 tablet (150 mg) by mouth 2 times daily       propranolol 40 MG tablet    INDERAL    180 tablet    Take 2-3 tablets ( mg) by mouth 2 times daily       simvastatin 80 MG tablet    ZOCOR    30 tablet    Take 0.5 tablets (40 mg) by mouth At Bedtime       sulfaSALAzine 500 MG tablet    AZULFIDINE    90 tablet    TAKE ONE TABLET BY MOUTH THREE TIMES A DAY       tiotropium 2.5 MCG/ACT inhalation aerosol    SPIRIVA RESPIMAT    4 g    Inhale 2 puffs into the lungs daily       warfarin 5 MG tablet    COUMADIN    96 tablet    As directed by Coumadin clinic

## 2017-02-15 NOTE — PATIENT INSTRUCTIONS
Recommendations from today's MTM visit:                                                      1. If you are going to be more active that day cut your morning R down by 2 units and if you still have lows doing that then cut back by 4 units.     2. Decrease Novolin N at dinner to 14 units - see if you have less bedtime/overnight low blood sugars    3. Consider making an appointment with Dr. Yoo to discuss your tremor and see what other options you have.    Next MTM visit: 3/13/17 at 12 I will call you    To schedule another MTM appointment, please call the clinic directly or you may call the MTM scheduling line at [phone] or toll-free at [phone].     My Clinical Pharmacist's contact information:                                                      It was a pleasure seeing you today!  Please feel free to contact me with any questions or concerns you have.      Tanna Daiz, PharmD  Medication Therapy Management Pharmacist  [facility] - Monday and Wednesday 7:30 - 4:00  Phone: [phone] - direct clinic line    You may receive a survey about the MTM services you received.  I would appreciate your feedback to help me serve you better in the future. Please fill it out and return it when you can. Your comments will be anonymous.

## 2017-02-27 DIAGNOSIS — G25.0 BENIGN FAMILIAL TREMOR: ICD-10-CM

## 2017-02-28 RX ORDER — CARBIDOPA/LEVODOPA 10MG-100MG
TABLET ORAL
Qty: 90 TABLET | Refills: 0 | Status: SHIPPED | OUTPATIENT
Start: 2017-02-28 | End: 2017-03-13

## 2017-03-01 NOTE — TELEPHONE ENCOUNTER
carbidopa-levodopa (SINEMET)  MG per tablet     Last Written Prescription Date: 10/12/2016  Last Fill Quantity: 90, # refills: 3  Last Office Visit with [facility], [facility] or [facility] prescribing provider: 9/7/2016        BP Readings from Last 3 Encounters:   02/15/17 109/57   01/18/17 124/69   09/07/16 130/76       Medication is being filled for 1 time refill only due to:  Patient needs to be seen because Parkisons to be addressed every 6 months in clinic patient is due.     Signed Prescriptions:                        Disp   Refills    carbidopa-levodopa (SINEMET)  MG per*90 tab*0        Sig: TAKE ONE TABLET BY MOUTH THREE TIMES A DAY  Authorizing Provider: MADONNA SPENCE  Ordering User: JARVIS SALAZAR      Routing to  Recdption - please advise F/U office visit for parkinsons    Thank you,  Jarvis Salazar RN

## 2017-03-02 ENCOUNTER — TELEPHONE (OUTPATIENT)
Dept: NURSING | Facility: CLINIC | Age: 72
End: 2017-03-02

## 2017-03-02 ENCOUNTER — ANTICOAGULATION THERAPY VISIT (OUTPATIENT)
Dept: NURSING | Facility: CLINIC | Age: 72
End: 2017-03-02
Payer: COMMERCIAL

## 2017-03-02 DIAGNOSIS — I48.91 ATRIAL FIBRILLATION, UNSPECIFIED TYPE (H): Primary | ICD-10-CM

## 2017-03-02 DIAGNOSIS — Z79.01 LONG-TERM (CURRENT) USE OF ANTICOAGULANTS: ICD-10-CM

## 2017-03-02 LAB — INR POINT OF CARE: 2 (ref 0.86–1.14)

## 2017-03-02 PROCEDURE — 85610 PROTHROMBIN TIME: CPT | Mod: QW

## 2017-03-02 PROCEDURE — 36416 COLLJ CAPILLARY BLOOD SPEC: CPT

## 2017-03-02 PROCEDURE — 99207 ZZC NO CHARGE NURSE ONLY: CPT

## 2017-03-02 NOTE — MR AVS SNAPSHOT
Cayetano Lunsford   3/2/2017 10:15 AM   Anticoagulation Therapy Visit    Description:  72 year old male   Provider:   INR CLINIC   Department:   Nurse           INR as of 3/2/2017     Today's INR 2.0      Anticoagulation Summary as of 3/2/2017     INR goal 2.0-3.0   Today's INR 2.0   Full instructions 7.5 mg on Mon, Fri; 5 mg all other days   Next INR check 3/20/2017    Indications   Long-term (current) use of anticoagulants [Z79.01] [Z79.01]  Atrial fibrillation  unspecified [I48.91]         Your next Anticoagulation Clinic appointment(s)     Mar 20, 2017  9:45 AM CDT   Anticoagulation Visit with  INR CLINIC   [facility] ([facility])    [address] [place] [address] [phone]              Contact Numbers     [facility]  Please call [phone] to cancel and/or reschedule your appointment   Please call [phone] with any problems or questions regarding your therapy.        March 2017 Details    Sun Mon Tue Wed Thu Fri Sat        1               2      5 mg   See details      3      7.5 mg         4      5 mg           5      5 mg         6      7.5 mg         7      5 mg         8      5 mg         9      5 mg         10      7.5 mg         11      5 mg           12      5 mg         13      7.5 mg         14      5 mg         15      5 mg         16      5 mg         17      7.5 mg         18      5 mg           19      5 mg         20            21               22               23               24               25                 26               27               28               29               30               31                 Date Details   03/02 This INR check       Date of next INR:  3/20/2017         How to take your warfarin dose     To take:  5 mg Take 1 of the 5 mg tablets.    To take:  7.5 mg Take 1.5 of the 5 mg tablets.

## 2017-03-02 NOTE — PROGRESS NOTES
ANTICOAGULATION FOLLOW-UP CLINIC VISIT    Patient Name:  Cayetano Lunsford  Date:  3/2/2017  Contact Type:  Face to Face    SUBJECTIVE:     Patient Findings     Positives Activity level change (More active in attempts to pack and move out of current residence by Apil 1st.)           OBJECTIVE    INR Protime   Date Value Ref Range Status   03/02/2017 2.0 (A) 0.86 - 1.14 Final       ASSESSMENT / PLAN  INR assessment THER    Recheck INR In: 3 WEEKS    INR Location Clinic      Anticoagulation Summary as of 3/2/2017     INR goal 2.0-3.0   Today's INR 2.0   Maintenance plan 7.5 mg (5 mg x 1.5) on Mon, Fri; 5 mg (5 mg x 1) all other days   Full instructions 7.5 mg on Mon, Fri; 5 mg all other days   Weekly total 40 mg   Plan last modified Kirstin Meléndez RN (3/2/2017)   Next INR check 3/20/2017   Priority INR   Target end date     Indications   Long-term (current) use of anticoagulants [Z79.01] [Z79.01]  Atrial fibrillation  unspecified [I48.91]         Anticoagulation Episode Summary     INR check location     Preferred lab     Send INR reminders to [facility]    Comments       Anticoagulation Care Providers     Provider Role Specialty Phone number    Debbie Lewis MD [facility] [phone]            See the Encounter Report to view Anticoagulation Flowsheet and Dosing Calendar (Go to Encounters tab in chart review, and find the Anticoagulation Therapy Visit)    Patient advised to trial previous maintenance dose of 40 mg (INR continues to trend down) and recheck in 3 weeks to ensure stable. Patient will call with any s/s of bleeding from now until then.    Patient aware if signs of clotting (pain, tenderness, swelling, color change in leg or arm, SOB) and bleeding occur (blood in stool, urine, large bruising, bleeding gums, nosebleeds) to have INR check sooner. If sx severe report to ER or concerned for stroke call 911. If general questions or concerns arise, call clinic.    Kirstin GOLD  MANOJ Meléndez

## 2017-03-02 NOTE — TELEPHONE ENCOUNTER
Dr Lewis,   Annual order for INR clinic is due. Please complete order or advise if you are no longer primary managing provider.  Goal has been 2-3 for Afib. Ok to close encounter when complete, no need to route back to ACC team unless there are updates.   Thanks! Kirstin Meléndez, BSN, RN

## 2017-03-08 ENCOUNTER — TELEPHONE (OUTPATIENT)
Dept: FAMILY MEDICINE | Facility: CLINIC | Age: 72
End: 2017-03-08

## 2017-03-08 NOTE — TELEPHONE ENCOUNTER
Pt was advised hold today as he thought- reasonable plan. Although his INR on low end last check at 2.0 because INR nurse Kirstin had advised him to bump up weekly dose at last visit he should stay in range. This way he will still have the same weekly dose as advised.  He was advised not to eat any extra greens.   He has next INR check on 3/20.    Lorin Wheat RN BSN  [facility]

## 2017-03-08 NOTE — TELEPHONE ENCOUNTER
Reason for Call:  Other prescription    Detailed comments: Pt states they accidentally took Jantoven 5 mg twice last night instead of once as prescribed. Pt is wondering if they should skip their pill tonight. Please advise.    Phone Number Patient can be reached at: Home number on file [phone] (home)    Best Time: Anytime    Can we leave a detailed message on this number? YES    Call taken on 3/8/2017 at 10:12 AM by iLliana Chu

## 2017-03-13 ENCOUNTER — ALLIED HEALTH/NURSE VISIT (OUTPATIENT)
Dept: PHARMACY | Facility: CLINIC | Age: 72
End: 2017-03-13
Payer: COMMERCIAL

## 2017-03-13 ENCOUNTER — OFFICE VISIT (OUTPATIENT)
Dept: FAMILY MEDICINE | Facility: CLINIC | Age: 72
End: 2017-03-13
Payer: COMMERCIAL

## 2017-03-13 VITALS
TEMPERATURE: 97.5 F | BODY MASS INDEX: 31.66 KG/M2 | HEIGHT: 66 IN | OXYGEN SATURATION: 94 % | RESPIRATION RATE: 12 BRPM | DIASTOLIC BLOOD PRESSURE: 66 MMHG | HEART RATE: 65 BPM | SYSTOLIC BLOOD PRESSURE: 140 MMHG | WEIGHT: 197 LBS

## 2017-03-13 DIAGNOSIS — G25.0 BENIGN FAMILIAL TREMOR: ICD-10-CM

## 2017-03-13 DIAGNOSIS — I48.91 ATRIAL FIBRILLATION, UNSPECIFIED TYPE (H): ICD-10-CM

## 2017-03-13 DIAGNOSIS — Z79.4 TYPE 2 DIABETES MELLITUS WITH HYPOGLYCEMIA WITHOUT COMA, WITH LONG-TERM CURRENT USE OF INSULIN (H): Primary | ICD-10-CM

## 2017-03-13 DIAGNOSIS — I10 HYPERTENSION GOAL BP (BLOOD PRESSURE) < 140/90: ICD-10-CM

## 2017-03-13 DIAGNOSIS — Z79.4 TYPE 2 DIABETES MELLITUS WITH STAGE 1 CHRONIC KIDNEY DISEASE, WITH LONG-TERM CURRENT USE OF INSULIN (H): Primary | ICD-10-CM

## 2017-03-13 DIAGNOSIS — N18.1 TYPE 2 DIABETES MELLITUS WITH STAGE 1 CHRONIC KIDNEY DISEASE, WITH LONG-TERM CURRENT USE OF INSULIN (H): Primary | ICD-10-CM

## 2017-03-13 DIAGNOSIS — E78.5 HYPERLIPIDEMIA LDL GOAL <100: ICD-10-CM

## 2017-03-13 DIAGNOSIS — E11.649 TYPE 2 DIABETES MELLITUS WITH HYPOGLYCEMIA WITHOUT COMA, WITH LONG-TERM CURRENT USE OF INSULIN (H): Primary | ICD-10-CM

## 2017-03-13 DIAGNOSIS — E11.22 TYPE 2 DIABETES MELLITUS WITH STAGE 1 CHRONIC KIDNEY DISEASE, WITH LONG-TERM CURRENT USE OF INSULIN (H): Primary | ICD-10-CM

## 2017-03-13 LAB
ALBUMIN SERPL-MCNC: 3.8 G/DL (ref 3.4–5)
ALP SERPL-CCNC: 60 U/L (ref 40–150)
ALT SERPL W P-5'-P-CCNC: 22 U/L (ref 0–70)
ANION GAP SERPL CALCULATED.3IONS-SCNC: 7 MMOL/L (ref 3–14)
AST SERPL W P-5'-P-CCNC: 20 U/L (ref 0–45)
BILIRUB SERPL-MCNC: 0.4 MG/DL (ref 0.2–1.3)
BUN SERPL-MCNC: 16 MG/DL (ref 7–30)
CALCIUM SERPL-MCNC: 8.7 MG/DL (ref 8.5–10.1)
CHLORIDE SERPL-SCNC: 100 MMOL/L (ref 94–109)
CO2 SERPL-SCNC: 28 MMOL/L (ref 20–32)
CREAT SERPL-MCNC: 0.76 MG/DL (ref 0.66–1.25)
ERYTHROCYTE [DISTWIDTH] IN BLOOD BY AUTOMATED COUNT: 13.9 % (ref 10–15)
GFR SERPL CREATININE-BSD FRML MDRD: ABNORMAL ML/MIN/1.7M2
GLUCOSE SERPL-MCNC: 129 MG/DL (ref 70–99)
HCT VFR BLD AUTO: 39.4 % (ref 40–53)
HGB BLD-MCNC: 12.4 G/DL (ref 13.3–17.7)
MCH RBC QN AUTO: 30.2 PG (ref 26.5–33)
MCHC RBC AUTO-ENTMCNC: 31.5 G/DL (ref 31.5–36.5)
MCV RBC AUTO: 96 FL (ref 78–100)
PLATELET # BLD AUTO: 307 10E9/L (ref 150–450)
POTASSIUM SERPL-SCNC: 4.6 MMOL/L (ref 3.4–5.3)
PROT SERPL-MCNC: 7 G/DL (ref 6.8–8.8)
RBC # BLD AUTO: 4.11 10E12/L (ref 4.4–5.9)
SODIUM SERPL-SCNC: 135 MMOL/L (ref 133–144)
TSH SERPL DL<=0.005 MIU/L-ACNC: 3.25 MU/L (ref 0.4–4)
WBC # BLD AUTO: 7.9 10E9/L (ref 4–11)

## 2017-03-13 PROCEDURE — 85027 COMPLETE CBC AUTOMATED: CPT | Performed by: FAMILY MEDICINE

## 2017-03-13 PROCEDURE — 80053 COMPREHEN METABOLIC PANEL: CPT | Performed by: FAMILY MEDICINE

## 2017-03-13 PROCEDURE — 84443 ASSAY THYROID STIM HORMONE: CPT | Performed by: FAMILY MEDICINE

## 2017-03-13 PROCEDURE — 99606 MTMS BY PHARM EST 15 MIN: CPT | Performed by: PHARMACIST

## 2017-03-13 PROCEDURE — 99207 C FOOT EXAM  NO CHARGE: CPT | Performed by: FAMILY MEDICINE

## 2017-03-13 PROCEDURE — 99214 OFFICE O/P EST MOD 30 MIN: CPT | Performed by: FAMILY MEDICINE

## 2017-03-13 PROCEDURE — 36415 COLL VENOUS BLD VENIPUNCTURE: CPT | Performed by: FAMILY MEDICINE

## 2017-03-13 PROCEDURE — 99607 MTMS BY PHARM ADDL 15 MIN: CPT | Performed by: PHARMACIST

## 2017-03-13 RX ORDER — SIMVASTATIN 80 MG
40 TABLET ORAL AT BEDTIME
Qty: 30 TABLET | Refills: 2 | Status: SHIPPED | OUTPATIENT
Start: 2017-03-13 | End: 2017-11-02

## 2017-03-13 RX ORDER — WARFARIN SODIUM 5 MG/1
TABLET ORAL
Qty: 96 TABLET | Refills: 0 | Status: SHIPPED | OUTPATIENT
Start: 2017-03-13 | End: 2017-04-24

## 2017-03-13 RX ORDER — LOSARTAN POTASSIUM 100 MG/1
100 TABLET ORAL DAILY
Qty: 90 TABLET | Refills: 2 | Status: SHIPPED | OUTPATIENT
Start: 2017-03-13 | End: 2017-11-02

## 2017-03-13 RX ORDER — CARBIDOPA/LEVODOPA 10MG-100MG
1 TABLET ORAL 3 TIMES DAILY
Qty: 90 TABLET | Refills: 0 | Status: SHIPPED | OUTPATIENT
Start: 2017-03-13 | End: 2017-03-15 | Stop reason: ALTCHOICE

## 2017-03-13 NOTE — Clinical Note
Please see MTM note for details. Adjusted insulin today due to hypoglycemia.  Tanna Diaz, PharmD Medication Therapy Management Pharmacist

## 2017-03-13 NOTE — LETTER
[facility]   [address] [place] [address]   [id number]  [phone]    March 30, 2017      Cayetano Lunsford  [address] [address]              Dear Mr. Lunsford,    Your thyroid test was normal.     The testing of your blood sugar, kidney function, liver function and electrolytes was normal.     Your blood counts again show mild anemia, which was the case two months ago. Please schedule a visit with me in clinic to discuss this further.    Results for orders placed or performed in visit on 03/13/17   TSH with free T4 reflex   Result Value Ref Range    TSH 3.25 0.40 - 4.00 mU/L   Comprehensive metabolic panel   Result Value Ref Range    Sodium 135 133 - 144 mmol/L    Potassium 4.6 3.4 - 5.3 mmol/L    Chloride 100 94 - 109 mmol/L    Carbon Dioxide 28 20 - 32 mmol/L    Anion Gap 7 3 - 14 mmol/L    Glucose 129 (H) 70 - 99 mg/dL    Urea Nitrogen 16 7 - 30 mg/dL    Creatinine 0.76 0.66 - 1.25 mg/dL    GFR Estimate >90  Non  GFR Calc   >60 mL/min/1.7m2    GFR Estimate If Black >90   GFR Calc   >60 mL/min/1.7m2    Calcium 8.7 8.5 - 10.1 mg/dL    Bilirubin Total 0.4 0.2 - 1.3 mg/dL    Albumin 3.8 3.4 - 5.0 g/dL    Protein Total 7.0 6.8 - 8.8 g/dL    Alkaline Phosphatase 60 40 - 150 U/L    ALT 22 0 - 70 U/L    AST 20 0 - 45 U/L   CBC with platelets   Result Value Ref Range    WBC 7.9 4.0 - 11.0 10e9/L    RBC Count 4.11 (L) 4.4 - 5.9 10e12/L    Hemoglobin 12.4 (L) 13.3 - 17.7 g/dL    Hematocrit 39.4 (L) 40.0 - 53.0 %    MCV 96 78 - 100 fl    MCH 30.2 26.5 - 33.0 pg    MCHC 31.5 31.5 - 36.5 g/dL    RDW 13.9 10.0 - 15.0 %    Platelet Count 307 150 - 450 10e9/L           Sincerely,    Debbie Lewis MD/nr

## 2017-03-13 NOTE — PATIENT INSTRUCTIONS
Recommendations from today's MTM visit:                                                      1. Decrease Novolin N AM dose to 10 units     2. If you think the albuterol is working continue to use them but as soon as they seem like they are not working you need to replace it with a new one and discard the rest.    Next MTM visit: 3/27/17 at 11am I will call you    To schedule another MTM appointment, please call the clinic directly or you may call the MTM scheduling line at [phone] or toll-free at [phone].     My Clinical Pharmacist's contact information:                                                      It was a pleasure seeing you today!  Please feel free to contact me with any questions or concerns you have.      Tanna Diaz, PharmD  Medication Therapy Management Pharmacist  [facility] - Monday and Wednesday 7:30 - 4:00  Phone: [phone] - direct clinic line    You may receive a survey about the MTM services you received.  I would appreciate your feedback to help me serve you better in the future. Please fill it out and return it when you can. Your comments will be anonymous.

## 2017-03-13 NOTE — PROGRESS NOTES
SUBJECTIVE/OBJECTIVE:                                                    Cayetano Lunsford is a 72 year old male called for a follow-up visit for Medication Therapy Management.  He was referred to me from Dr. Debbie Lewis.     Chief Complaint: Follow up from our visit on 2/15/17.  He is planning to see the neurologist here at the clinic this Wednesday. He is wondering about the albuterol inhaler he has and if he can use them after the expiration date. The expiration date on the inhalers is 3/2016. Can he use these?  Personal Healthcare Goals: Did not discuss today  Tobacco: No tobacco use   Alcohol: not currently using    Medication Adherence: no issues reported    Diabetes:  Pt currently taking metformin bid, Novolin N 13 units AM before breakfast and 14 units in PM-at dinner - decreased by 2 units at last visit, Novolin R-13 units with breakfast (decrease by 2 units if more active), 2 units with lunch (decreased from last visit due to hypoglycemia in evening) and 2 units dinner , between 80 and 120, reduce your R insulin by 2 units. If below 70 skip dose. He gives his shots in his arms. He does not have any issues with giving stomach just has been doing arms.  He is now writing the date on the Novolin R and discarding after 42 days.   SMBG: 3-4 times daily.   Ranges (per pt report): see chart below. Blood sugars seem to be lower before dinner because he has a very light lunch (half sandwich or bowl of soup).  Symptoms of low blood sugar? Fingers and lips tingle, shaky and sweaty. Frequency of hypoglycemia? More lows since last visit mostly due to more activity. Treating with small amount of orange juice  Recent symptoms of high blood sugar? none.  Eye exam: up to date  Foot exam: up to date  Microalbumin is not < 30 mg/g. Pt is taking an ACEi/ARB.  Aspirin: Not taking due to anticoagulation therapy and increased risk of bleeding  Diet/Exercise:  He knows he should get rid of pancakes and sugar cereal but cant find  cereal that tastes good with less sugar. At last visit: Reports continues to walk 4-5 days a week, once to twice daily for 20 minutes - but this varies if it is cold out. He has lost his appetite he states and does not have very large meals. Eats a lot of carbs in the morning, which explains his very high insulin dose compared to other times.  Does not like meats anymore.     Averages: 7 day: 145, 14 day: 142 30 day: 143 - down from last visit but likely due to more lows.    Date FBG/ 2hours post Lunch/2hours post Dinner /2hours post bedtime   3/13 130      3/12 70 when first got up drank 4 ounces orange juice then before breakfast 144 135 60 - drank 4 ounces juice then ate dinner, no R    3/11 151 ate lunch - no shot because out 166    3/10 153 254 - had blueberries and cereal with breakfast 138 139   3/9 167 232 - blueberries with breakfast 158    3/8 130 134 128 123   3/7 204 - 11 units R - more active this day 163 69 - no R and just ate dinner. He thinks he had less to eat that day 69 - drank 4 ounces of orange juice   3/6 126 - R 11 units 113 - R 1 unit 97 - R 1 unit    3/5 198 152 - R 1 because more active 73 - R 1 unit/106 Woke at 2:01 Am and it was 60 - drank 4 ounces of orange juice.   3/4 134 291- at [employer]. May not have given shot 243 - burger and fries with hot chocolate    3/3 164 - R 11 due to increase activity 181/139 155 165   3/2 201 187 114 - R 1 unit 189   3/1 237 - R 11 units more active 160 - R 1 more active 60 - no R, drank 4 ounces orange juice and ate dinner 209   2/29 169 236 - not sure why high, had banana for breakfast with cereal 94 - R 1 Woke up 2:04 AM 36 drank 4 ounces orange juice and pudding then it started coming up 3:07 am 57 - drank 3:13 82, 3:36    2/27 146 - more active but didn't cut R 59 - no R 73     2/26 156 145 140    2/25 155 157 63 -No R    2/24 130 264 - blueberries for breakfast 92 - R 1 157   2/23 166 234 104 - R1  116   2/22 159 196 140    2/21 186 119 97  - R1    2/20 137 241 158 109   2/19 193 172 181    2/18 160 132 289    2/17 125 136 111    2/16 185 190 208 - R 2 units 67 at 11:08 - drank 6 ounces orange juice - thinks he didn't have enough to eat after dinner       Last visit  Date FBG/ 2hours post Lunch/2hours post Dinner /2hours post Before bed   2/15 199 114 - R 1 79 - R 1    2/14 115 (R - cut in half gave 7 units) 191 119(R - 1 unit) 152   2/13 143 264 (couple of cookies earlier) then 221 at 3pm 112 at 5:38pm (R- 1 unit) at single serving lasagna (40 gm carbs) and glass of milk. Also had 2 bananas after dinner for a snack 10pm - 58; 10:30pm 70,113 - 11:06pm   2/12 136 60 (no R) half sandwich and chips, may have been a little more active that morning. 64 at 6:37pm (no R) 199   2/11 173 243 (breakfast was cereal with strawberries) 169    2/10 138 178 90 (R - 1 unit)    2/9 123 133 198    2/8 156 160 165/255 (hot chocolate and half sandwich at hockey game)    2/7 151 153 113 (R - 1 unit)    2/6 145 150 174 92   2/5 177 No lunch reading 164    2/4 99 (R - 7 units) 140 118 (R - 1 unit)    2/3 125 127/115 150    2/2 128 129/117 (4 hours after lunch) 116/174    2/1 145 165 104 (R - 1 unit) 82 (drank 4 ounces of juice)   1/31 164 168 162    1/30 100 ( R - 7 units) 197 118 ( R - 1 unit) 111   1/29 167 281 (breakfast 2 small cinnamon rolls) 207  1:04 am was 62 (4 oz orange juice) - unsure of drastic drop   1/28 129 139 182 142   1/27 153 No reading (no R with lunch) 201  223   1/26 135 142 63 ( no R) 160   1/25 145 (cereal for breakfast) 231 144    1/24 183 209 168 98   1/23 194 246 ? 201 (didn't eat as much of an evening snack) 70 (4oz juice)   1/22 163 301 (ate 2 pancakes with syrup for breakfast) 222 208 (ate cake earlier in the day)   1/21 238 (night before had chips and cheese for snack) 249 165    1/20 220 230 (no R because he had eating late breakfast and was worried it would make bs too low) 204    1/19 163 (cereal with blueberries) 231 183 139                      Current labs include:  BP Readings from Last 3 Encounters:   03/13/17 140/66   02/15/17 109/57   01/18/17 124/69     Today's Vitals: There were no vitals taken for this visit. - phone visit    Lab Results   Component Value Date    A1C 6.9 10/17/2016    A1C 6.2 07/22/2016    A1C 6.7 03/28/2016    A1C 7.1 11/10/2015    A1C 7.6 05/12/2015       Lab Results   Component Value Date    CHOL 128 05/05/2016     Lab Results   Component Value Date    TRIG 122 05/05/2016     Lab Results   Component Value Date    HDL 47 05/05/2016     Lab Results   Component Value Date    LDL 57 05/05/2016       Liver Function Studies -   Recent Labs   Lab Test 10/03/16   02/09/16   1150   PROTTOTAL   --    --   6.7*   ALBUMIN   --    --   3.7   BILITOTAL   --    --   0.3   ALKPHOS   --    --   57   AST  18   < >  15   ALT  13   < >  27    < > = values in this interval not displayed.       Lab Results   Component Value Date    UCRR 91 05/05/2016    MICROL 39 05/05/2016    UMALCR 42.76 (H) 05/05/2016       Last Basic Metabolic Panel:  Lab Results   Component Value Date     05/05/2016      Lab Results   Component Value Date    POTASSIUM 4.6 05/05/2016     Lab Results   Component Value Date    CHLORIDE 103 05/05/2016     Lab Results   Component Value Date    BUN 14 05/05/2016     Lab Results   Component Value Date    CR 0.79 10/03/2016     GFR Estimate   Date Value Ref Range Status   01/09/2017 >90 >60 mL/min/1.7m2 Final   10/03/2016 >60 >60 ml/min/1.73m2 Final   05/05/2016 >90  Non  GFR Calc   >60 mL/min/1.7m2 Final     TSH   Date Value Ref Range Status   10/27/2014 2.90 0.40 - 4.00 mU/L Final     Comment:     Effective 7/30/2014, the reference range for this assay has changed to reflect   new instrumentation/methodology.     ]    Most Recent Immunizations   Administered Date(s) Administered     Hepatitis B 08/05/2014     Influenza (H1N1) 01/08/2010     Influenza (High Dose) 3 valent vaccine 09/07/2016     Influenza  (IIV3) 09/11/2009     Pneumococcal (PCV 13) 11/10/2015     Pneumococcal 23 valent 07/20/2010     TD (ADULT, 7+) 08/08/2008     TDAP (ADACEL AGES 11-64) 08/05/2014     Zoster vaccine, live 06/13/2008     ASSESSMENT:                                                    Current medications were reviewed today as discussed above.      Medication Adherence: no issues identified    Diabetes: Needs Improvement. Patient is meeting A1c goal of < 8%. However, pt is continuing to have frequent lows likely due to increase activity with cleaning out his place to get ready to move and he would benefit from decreasing Novolin N morning dose by 3 units down to 10 units.  I don't think his 1 or 2 units of R at lunch are pushing him low.  The Novolin N has a peak concentration anywhere from 4-12 hours so I think the Novolin N is causing the lose along with increase activity. He will continue to adjust this Novolin R if he is going to have a more active day. Microalbumin is not at goal < 30 mg/g. Taking an ARB at max dose.       PLAN:                                                      1. Decrease Novolin N AM dose to 10 units    2.  If you think the albuterol is working continue to use them but as soon as they seem like they are not working you need to replace it with a new one and discard the rest.    I spent 45 minutes with this patient today.  All changes were made via collaborative practice agreement with Debbie Lewis. A copy of the visit note was provided to the patient's primary care provider.     Will follow up in 2 weeks.    The patient was mailed a summary of these recommendations as an after visit summary.    Tanna Diaz, PharmD  Medication Therapy Management Pharmacist

## 2017-03-13 NOTE — MR AVS SNAPSHOT
After Visit Summary   3/13/2017    Cayetano Lunsford    MRN: [id number]           Patient Information     Date Of Birth          1945        Visit Information        Provider Department      3/13/2017 9:40 AM Debbie Lewis MD [facility]        Today's Diagnoses     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, with long-term current use of insulin (H)    -  1    Hypertension goal BP (blood pressure) < 140/90        Benign familial tremor        Hyperlipidemia LDL goal <100        Atrial fibrillation, unspecified type (H)           Follow-ups after your visit        Your next 10 appointments already scheduled     Mar 15, 2017  9:00 AM CDT   [facility] with Bong Yoo [facility]   [facility]    [address] [place] [id number] [phone]            Mar 20, 2017  9:45 AM CDT   [facility]ticoagulation Visit with  [facility]   [facility]    [address] [place] [id number] [phone]              Who to contact     If you have questions or need follow up information about today's clinic visit or your schedule please contact [facility] directly at [phone].  Normal or non-critical lab and imaging results will be communicated to you by [facility], letter or phone within 4 business days after the clinic has received the results. If you do not hear from us within 7 days, please contact the clinic through [facility] or phone. If you have a critical or abnormal lab result, we will notify you by phone as soon as possible.  Submit refill requests through [employer]) or call your pharmacy and they will forward the refill request to us. Please allow 3 business days for your refill to be completed.          Additional Information About Your Visit        [employer] Information     [employer] lets you send messages to your doctor, view your test  "results, renew your prescriptions, schedule appointments and more. To sign up, go to [email or web address] . Click on \"Log in\" on the left side of the screen, which will take you to the Welcome page. Then click on \"Sign up Now\" on the right side of the page.     You will be asked to enter the access code listed below, as well as some personal information. Please follow the directions to create your username and password.     Your access code is: [email or web address]  Expires: 3/19/2017 11:15 AM     Your access code will  in 90 days. If you need help or a new code, please call your [place] clinic or [phone].        Care EveryWhere ID     This is your Care EveryWhere ID. This could be used by other organizations to access your [place] medical records  [id number]        Your Vitals Were     Pulse Temperature Respirations Height Pulse Oximetry BMI (Body Mass Index)    65 97.5  F (36.4  C) (Oral) 12 5' 6\" (1.676 m) 94% 31.8 kg/m2       Blood Pressure from Last 3 Encounters:   17 140/66   02/15/17 109/57   17 124/69    Weight from Last 3 Encounters:   17 197 lb (89.4 kg)   02/15/17 201 lb (91.2 kg)   17 205 lb (93 kg)              We Performed the Following     CBC with platelets     Comprehensive metabolic panel     FOOT EXAM     TSH with free T4 reflex          Today's Medication Changes          These changes are accurate as of: 3/13/17  1:03 PM.  If you have any questions, ask your nurse or doctor.               These medicines have changed or have updated prescriptions.        Dose/Directions    blood glucose monitoring test strip   Commonly known as:  no brand specified   This may have changed:  additional instructions   Used for:  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, with long-term current use of insulin (H)   Changed by:  Debbie Lewis MD        Test 4 times daily or as directed. Profile Rx: patient will contact pharmacy when needed   Quantity:  360 each "   Refills:  3       carbidopa-levodopa  MG per tablet   Commonly known as:  SINEMET   This may have changed:  See the new instructions.   Used for:  Benign familial tremor   Changed by:  Debbie Lewis MD        Dose:  1 tablet   Take 1 tablet by mouth 3 times daily   Quantity:  90 tablet   Refills:  0       ipratropium - albuterol 0.5 mg/2.5 mg/3 mL 0.5-2.5 (3) MG/3ML neb solution   Commonly known as:  DUONEB   This may have changed:  reasons to take this   Used for:  Chronic obstructive bronchitis (H)        Dose:  3 mL   Inhale 1 vial (3 mLs) into the lungs every 4 hours as needed for shortness of breath / dyspnea   Quantity:  270 mL   Refills:  11       simvastatin 80 MG tablet   Commonly known as:  ZOCOR   This may have changed:  additional instructions   Used for:  Hyperlipidemia LDL goal <100   Changed by:  Debbie Lewis MD        Dose:  40 mg   Take 0.5 tablets (40 mg) by mouth At Bedtime Profile Rx: patient will contact pharmacy when needed   Quantity:  30 tablet   Refills:  2       warfarin 5 MG tablet   Commonly known as:  COUMADIN   This may have changed:  additional instructions   Used for:  Atrial fibrillation, unspecified type (H)   Changed by:  Debbie Lewis MD        As directed by Coumadin clinic. Profile Rx: patient will contact pharmacy when needed   Quantity:  96 tablet   Refills:  0            Where to get your medicines      These medications were sent to [facility] [address]  [address] [facility] [address]     Phone:  [phone]     blood glucose monitoring test strip    carbidopa-levodopa  MG per tablet    losartan 100 MG tablet    metFORMIN 1000 MG tablet    simvastatin 80 MG tablet    warfarin 5 MG tablet                Primary Care Provider Office Phone # Fax #    Debbie Lewis [phone]       [facility] [address]  [facility] [address]        Thank you!     Thank you for choosing  "[facility]  for your care. Our goal is always to provide you with excellent care. Hearing back from our patients is one way we can continue to improve our services. Please take a few minutes to complete the written survey that you may receive in the mail after your visit with us. Thank you!             Your Updated Medication List - Protect others around you: Learn how to safely use, store and throw away your medicines at www.disposemymeds.org.          This list is accurate as of: 3/13/17  1:03 PM.  Always use your most recent med list.                   Brand Name Dispense Instructions for use    albuterol 108 (90 BASE) MCG/ACT Inhaler    PROAIR HFA/PROVENTIL HFA/VENTOLIN HFA    1 Inhaler    Inhale 1-2 puffs into the lungs every 4 hours as needed (for shortness of breath/wheezing)       ASPIRIN NOT PRESCRIBED    INTENTIONAL     Reported on 2/15/2017       azelastine 0.1 % spray    ASTELIN    1 Bottle    Spray 1-2 sprays into both nostrils 2 times daily       BD ULTRA FINE PEN NEEDLES     100 each    Use to inject insulin twice daily.       blood glucose monitoring lancets     100 Each    Use to test up to four times per day       blood glucose monitoring test strip    no brand specified    360 each    Test 4 times daily or as directed. Profile Rx: patient will contact pharmacy when needed       carbidopa-levodopa  MG per tablet    SINEMET    90 tablet    Take 1 tablet by mouth 3 times daily       FLORAJEN3 Caps     60 capsule    Take 1 capsule by mouth 2 times daily       FOLIC ACID PO      Take 1 mg by mouth daily       insulin  UNIT/ML injection    NovoLIN N RELION    10 mL    Inject 13 units in the morning and 14 units in the evening       insulin regular 100 UNIT/ML injection    NovoLIN R RELION    10 mL    Inject 13 units with breakfast, 2 units with lunch and 2 units with dinner (when mixing with NPH, draw this insulin up first)       insulin syringe-needle U-100 31G X 5/16\" 0.5 " ML    BD insulin syringe ultrafine    100 each    Use one syringe 2 daily or as directed.       ipratropium - albuterol 0.5 mg/2.5 mg/3 mL 0.5-2.5 (3) MG/3ML neb solution    DUONEB    270 mL    Inhale 1 vial (3 mLs) into the lungs every 4 hours as needed for shortness of breath / dyspnea       losartan 100 MG tablet    COZAAR    90 tablet    Take 1 tablet (100 mg) by mouth daily for hypertension.       meclizine 25 MG tablet    ANTIVERT    30 tablet    Take 1 tablet (25 mg) by mouth 3 times daily as needed       MELATONIN PO      Take 3 mg by mouth At Bedtime       metFORMIN 1000 MG tablet    GLUCOPHAGE    180 tablet    Take 1 tablet (1,000 mg) by mouth 2 times daily (with meals) with breakfast and dinner.       mometasone-formoterol 100-5 MCG/ACT oral inhaler    DULERA     Inhale 2 puffs into the lungs 2 times daily       order for DME     1 each    Dispense one nebulizer machine with necessary supplies       order for DME     1 each    Equipment being ordered: Nebulizer machine with mask and tubing       pantoprazole 40 MG EC tablet    PROTONIX     Take 40 mg by mouth daily Started by Dr. Debbie Rico at [facility]       propafenone 150 MG Tabs tablet    RYTHMOL    180 tablet    Take 1 tablet (150 mg) by mouth 2 times daily       propranolol 40 MG tablet    INDERAL    180 tablet    Take 2-3 tablets ( mg) by mouth 2 times daily       simvastatin 80 MG tablet    ZOCOR    30 tablet    Take 0.5 tablets (40 mg) by mouth At Bedtime Profile Rx: patient will contact pharmacy when needed       sulfaSALAzine 500 MG tablet    AZULFIDINE    90 tablet    TAKE ONE TABLET BY MOUTH THREE TIMES A DAY       tiotropium 2.5 MCG/ACT inhalation aerosol    SPIRIVA RESPIMAT    4 g    Inhale 2 puffs into the lungs daily       warfarin 5 MG tablet    COUMADIN    96 tablet    As directed by Coumadin clinic. Profile Rx: patient will contact pharmacy when needed

## 2017-03-13 NOTE — Clinical Note
Continues to have hypoglycemia due to increased activity, changed insulin and will fu in a couple weeks  Tanna

## 2017-03-13 NOTE — MR AVS SNAPSHOT
After Visit Summary   3/13/2017    Cayetano Lunsford    MRN: [id number]           Patient Information     Date Of Birth          1945        Visit Information        Provider Department      3/13/2017 12:00 PM Jessica Diaz RPH [facility] MTM        Today's Diagnoses     Type 2 diabetes mellitus with hypoglycemia without coma, with long-term current use of insulin (H)    -  1      Care Instructions    Recommendations from today's MTM visit:                                                      1. Decrease Novolin N AM dose to 10 units     2. If you think the albuterol is working continue to use them but as soon as they seem like they are not working you need to replace it with a new one and discard the rest.    Next MTM visit: 3/27/17 at 11am I will call you    To schedule another MTM appointment, please call the clinic directly or you may call the MTM scheduling line at [phone] or toll-free at [phone].     My Clinical Pharmacist's contact information:                                                      It was a pleasure seeing you today!  Please feel free to contact me with any questions or concerns you have.      Tanna Diaz, PharmD  Medication Therapy Management Pharmacist  [facility] - Monday and Wednesday 7:30 - 4:00  Phone: [phone] - direct clinic line    You may receive a survey about the MT services you received.  I would appreciate your feedback to help me serve you better in the future. Please fill it out and return it when you can. Your comments will be anonymous.                    Follow-ups after your visit        Your next 10 appointments already scheduled     Mar 15, 2017  9:00 AM CDT   New Visit with Bong Yoo MD   [facility] ([facility])    [address] [place] [id number] [phone]            Mar 20, 2017  9:45 AM CDT   Anticoagulation Visit with  [facility] CLINIC  "  [facility] ([facility])    [address] [place] [id number] [phone]              Who to contact     If you have questions or need follow up information about today's clinic visit or your schedule please contact [facility] MT directly at [phone].  Normal or non-critical lab and imaging results will be communicated to you by MyChart, letter or phone within 4 business days after the clinic has received the results. If you do not hear from us within 7 days, please contact the clinic through MyChart or phone. If you have a critical or abnormal lab result, we will notify you by phone as soon as possible.  Submit refill requests through MYFX or call your pharmacy and they will forward the refill request to us. Please allow 3 business days for your refill to be completed.          Additional Information About Your Visit        [employer] Information     MYFX lets you send messages to your doctor, view your test results, renew your prescriptions, schedule appointments and more. To sign up, go to [email or web address] . Click on \"Log in\" on the left side of the screen, which will take you to the Welcome page. Then click on \"Sign up Now\" on the right side of the page.     You will be asked to enter the access code listed below, as well as some personal information. Please follow the directions to create your username and password.     Your access code is: [email or web address]  Expires: 3/19/2017 11:15 AM     Your access code will  in 90 days. If you need help or a new code, please call your [place] clinic or [phone].        Care EveryWhere ID     This is your Care EveryWhere ID. This could be used by other organizations to access your [place] medical records  [id number]         Blood Pressure from Last 3 Encounters:   17 140/66   02/15/17 109/57   17 124/69    Weight from Last 3 Encounters:   17 197 lb (89.4 kg)   02/15/17 " 201 lb (91.2 kg)   01/18/17 205 lb (93 kg)              Today, you had the following     No orders found for display         Today's Medication Changes          These changes are accurate as of: 3/13/17  1:08 PM.  If you have any questions, ask your nurse or doctor.               These medicines have changed or have updated prescriptions.        Dose/Directions    blood glucose monitoring test strip   Commonly known as:  no brand specified   This may have changed:  additional instructions   Used for:  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, with long-term current use of insulin (H)   Changed by:  Debbie Lewis MD        Test 4 times daily or as directed. Profile Rx: patient will contact pharmacy when needed   Quantity:  360 each   Refills:  3       carbidopa-levodopa  MG per tablet   Commonly known as:  SINEMET   This may have changed:  See the new instructions.   Used for:  Benign familial tremor   Changed by:  Debbie Lewis MD        Dose:  1 tablet   Take 1 tablet by mouth 3 times daily   Quantity:  90 tablet   Refills:  0       ipratropium - albuterol 0.5 mg/2.5 mg/3 mL 0.5-2.5 (3) MG/3ML neb solution   Commonly known as:  DUONEB   This may have changed:  reasons to take this   Used for:  Chronic obstructive bronchitis (H)        Dose:  3 mL   Inhale 1 vial (3 mLs) into the lungs every 4 hours as needed for shortness of breath / dyspnea   Quantity:  270 mL   Refills:  11       simvastatin 80 MG tablet   Commonly known as:  ZOCOR   This may have changed:  additional instructions   Used for:  Hyperlipidemia LDL goal <100   Changed by:  Debbie Lewis MD        Dose:  40 mg   Take 0.5 tablets (40 mg) by mouth At Bedtime Profile Rx: patient will contact pharmacy when needed   Quantity:  30 tablet   Refills:  2       warfarin 5 MG tablet   Commonly known as:  COUMADIN   This may have changed:  additional instructions   Used for:  Atrial fibrillation, unspecified type (H)   Changed  by:  Debbie Lewis MD        As directed by Coumadin clinic. Profile Rx: patient will contact pharmacy when needed   Quantity:  96 tablet   Refills:  0            Where to get your medicines      These medications were sent to [place], [place] - [address]  [address], [facility] [address]     Phone:  [phone]     blood glucose monitoring test strip    carbidopa-levodopa  MG per tablet    losartan 100 MG tablet    metFORMIN 1000 MG tablet    simvastatin 80 MG tablet    warfarin 5 MG tablet                Primary Care Provider Office Phone # Fax #    Debbie Lewis [phone]       [facility] [address]  [facility] [address]        Thank you!     Thank you for choosing [facility]  for your care. Our goal is always to provide you with excellent care. Hearing back from our patients is one way we can continue to improve our services. Please take a few minutes to complete the written survey that you may receive in the mail after your visit with us. Thank you!             Your Updated Medication List - Protect others around you: Learn how to safely use, store and throw away your medicines at www.disposemymeds.org.          This list is accurate as of: 3/13/17  1:08 PM.  Always use your most recent med list.                   Brand Name Dispense Instructions for use    albuterol 108 (90 BASE) MCG/ACT Inhaler    PROAIR HFA/PROVENTIL HFA/VENTOLIN HFA    1 Inhaler    Inhale 1-2 puffs into the lungs every 4 hours as needed (for shortness of breath/wheezing)       ASPIRIN NOT PRESCRIBED    INTENTIONAL     Reported on 2/15/2017       azelastine 0.1 % spray    ASTELIN    1 Bottle    Spray 1-2 sprays into both nostrils 2 times daily       BD ULTRA FINE PEN NEEDLES     100 each    Use to inject insulin twice daily.       blood glucose monitoring lancets     100 Each    Use to test up to four times per day       blood glucose  "monitoring test strip    no brand specified    360 each    Test 4 times daily or as directed. Profile Rx: patient will contact pharmacy when needed       carbidopa-levodopa  MG per tablet    SINEMET    90 tablet    Take 1 tablet by mouth 3 times daily       FLORAJEN3 Caps     60 capsule    Take 1 capsule by mouth 2 times daily       FOLIC ACID PO      Take 1 mg by mouth daily       insulin  UNIT/ML injection    NovoLIN N RELION    10 mL    Inject 13 units in the morning and 14 units in the evening       insulin regular 100 UNIT/ML injection    NovoLIN R RELION    10 mL    Inject 13 units with breakfast, 2 units with lunch and 2 units with dinner (when mixing with NPH, draw this insulin up first)       insulin syringe-needle U-100 31G X 5/16\" 0.5 ML    BD insulin syringe ultrafine    100 each    Use one syringe 2 daily or as directed.       ipratropium - albuterol 0.5 mg/2.5 mg/3 mL 0.5-2.5 (3) MG/3ML neb solution    DUONEB    270 mL    Inhale 1 vial (3 mLs) into the lungs every 4 hours as needed for shortness of breath / dyspnea       losartan 100 MG tablet    COZAAR    90 tablet    Take 1 tablet (100 mg) by mouth daily for hypertension.       meclizine 25 MG tablet    ANTIVERT    30 tablet    Take 1 tablet (25 mg) by mouth 3 times daily as needed       MELATONIN PO      Take 3 mg by mouth At Bedtime       metFORMIN 1000 MG tablet    GLUCOPHAGE    180 tablet    Take 1 tablet (1,000 mg) by mouth 2 times daily (with meals) with breakfast and dinner.       mometasone-formoterol 100-5 MCG/ACT oral inhaler    DULERA     Inhale 2 puffs into the lungs 2 times daily       order for DME     1 each    Dispense one nebulizer machine with necessary supplies       order for DME     1 each    Equipment being ordered: Nebulizer machine with mask and tubing       pantoprazole 40 MG EC tablet    PROTONIX     Take 40 mg by mouth daily Started by Dr. Debbie Rico at [facility]       propafenone 150 MG Tabs tablet    RYTHMOL "    180 tablet    Take 1 tablet (150 mg) by mouth 2 times daily       propranolol 40 MG tablet    INDERAL    180 tablet    Take 2-3 tablets ( mg) by mouth 2 times daily       simvastatin 80 MG tablet    ZOCOR    30 tablet    Take 0.5 tablets (40 mg) by mouth At Bedtime Profile Rx: patient will contact pharmacy when needed       sulfaSALAzine 500 MG tablet    AZULFIDINE    90 tablet    TAKE ONE TABLET BY MOUTH THREE TIMES A DAY       tiotropium 2.5 MCG/ACT inhalation aerosol    SPIRIVA RESPIMAT    4 g    Inhale 2 puffs into the lungs daily       warfarin 5 MG tablet    COUMADIN    96 tablet    As directed by Coumadin clinic. Profile Rx: patient will contact pharmacy when needed

## 2017-03-13 NOTE — NURSING NOTE
"Chief Complaint   Patient presents with     Recheck Medication     Siminet     Hypertension     Diabetes       Initial /66 (Cuff Size: Adult Large)  Pulse 65  Temp 97.5  F (36.4  C) (Oral)  Resp 12  Ht 5' 6\" (1.676 m)  Wt 197 lb (89.4 kg)  SpO2 94%  BMI 31.8 kg/m2 Estimated body mass index is 31.8 kg/(m^2) as calculated from the following:    Height as of this encounter: 5' 6\" (1.676 m).    Weight as of this encounter: 197 lb (89.4 kg).  Medication Reconciliation: complete       Amanda Bello MA     "

## 2017-03-13 NOTE — PROGRESS NOTES
SUBJECTIVE:                                                    Cayetano Lunsford is a 72 year old male who presents to clinic today for the following health issues:    Diabetes Follow-up    Patient is checking blood sugars: three times daily.   Blood sugar testing frequency justification: Uncontrolled diabetes  Results are as follows:         am - 130-140         lunchtime - 130-140         suppertime -     Diabetic concerns: None     Symptoms of hypoglycemia (low blood sugar): none     Paresthesias (numbness or burning in feet) or sores: No     Date of last diabetic eye exam: Last August      Hypertension Follow-up      Outpatient blood pressures are being checked at home.      Low Salt Diet: low salt       Amount of exercise or physical activity: 4-5 days/week for an average of 15-30 minutes    Problems taking medications regularly: No    Medication side effects: none  Diet: low salt     Tremor: Cayetano thinks that his tremors haven't improved very much with the Sinemet. It varies from day to day. Some days are very frustrating. He is tolerating the Sinemet well. He has had some trouble sleeping but isn't sure that it is related to the medication. He denies fatigue.    BP: He was shoveling before he came in today so he thinks that's the reason for a higher BP. He denies a change in shortness of breath or irregular chest pain.    Problem list and histories reviewed & adjusted, as indicated.  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 2008- No interventions       Social History   Substance Use Topics     Smoking status: Former Smoker     Packs/day: 1.00     Years: 30.00     Types: Cigarettes     Quit date: 3/19/1992     Smokeless tobacco: Never Used     Alcohol use 0.0 oz/week     0 Standard drinks or equivalent per week      Comment: hardly at all, sometimes at dinner     Family History   Problem Relation Age of Onset     Circulatory Mother      blood clot in leg     DIABETES Mother      type 2     Allergies Mother      Arthritis Mother      GASTROINTESTINAL DISEASE Mother      Neurologic Disorder Mother      Familiar tremors     Neurologic Disorder Father      brain tumor     CEREBROVASCULAR DISEASE Paternal Grandfather      Hypertension Brother      Hypertension Sister      DIABETES Sister      Type 2     Allergies Sister      Lipids Brother      Lipids Sister      Neurologic Disorder Sister      \"     Neurologic Disorder Sister      \"     Neurologic Disorder Sister      \"         Current Outpatient Prescriptions   Medication Sig Dispense Refill     carbidopa-levodopa (SINEMET)  MG per tablet TAKE ONE TABLET BY MOUTH THREE TIMES A DAY 90 tablet 0     insulin NPH (NOVOLIN N RELION) 100 UNIT/ML injection Inject 13 units in the morning and 14 units in the evening 10 mL 2     mometasone-formoterol (DULERA) 100-5 MCG/ACT oral inhaler " "Inhale 2 puffs into the lungs 2 times daily       insulin regular (NOVOLIN R RELION) 100 UNIT/ML injection Inject 13 units with breakfast, 2 units with lunch and 2 units with dinner (when mixing with NPH, draw this insulin up first) 10 mL 2     tiotropium (SPIRIVA RESPIMAT) 2.5 MCG/ACT inhalation aerosol Inhale 2 puffs into the lungs daily 4 g 1     insulin syringe-needle U-100 (BD INSULIN SYRINGE ULTRAFINE) 31G X 5/16\" 0.5 ML Use one syringe 2 daily or as directed. 100 each 2     metFORMIN (GLUCOPHAGE) 1000 MG tablet Take 1 tablet (1,000 mg) by mouth 2 times daily (with meals) with breakfast and dinner. 180 tablet 0     warfarin (COUMADIN) 5 MG tablet As directed by Coumadin clinic 96 tablet 0     simvastatin (ZOCOR) 80 MG tablet Take 0.5 tablets (40 mg) by mouth At Bedtime 30 tablet 2     propafenone (RYTHMOL) 150 MG TABS Take 1 tablet (150 mg) by mouth 2 times daily 180 tablet 3     propranolol (INDERAL) 40 MG tablet Take 2-3 tablets ( mg) by mouth 2 times daily 180 tablet 10     losartan (COZAAR) 100 MG tablet Take 1 tablet (100 mg) by mouth daily for hypertension. 90 tablet 2     blood glucose monitoring (NO BRAND SPECIFIED) test strip Test 4 times daily or as directed 360 each 3     ipratropium - albuterol 0.5 mg/2.5 mg/3 mL (DUONEB) 0.5-2.5 (3) MG/3ML nebulization Inhale 1 vial (3 mLs) into the lungs every 4 hours as needed for shortness of breath / dyspnea (Patient taking differently: Inhale 3 mLs into the lungs every 4 hours as needed for shortness of breath / dyspnea (currently using 2-3 times daily) ) 270 mL 11     FOLIC ACID PO Take 1 mg by mouth daily       Probiotic Product (FLORAJEN3) CAPS Take 1 capsule by mouth 2 times daily 60 capsule 0     order for DME Equipment being ordered: Nebulizer machine with mask and tubing 1 each 0     pantoprazole (PROTONIX) 40 MG enteric coated tablet Take 40 mg by mouth daily Started by Dr. Debbie Rico at [facility]       ASPIRIN NOT PRESCRIBED, INTENTIONAL, " Reported on 2/15/2017       BD ULTRA FINE PEN NEEDLES Use to inject insulin twice daily. 100 each PRN     sulfaSALAzine (AZULFIDINE) 500 MG tablet TAKE ONE TABLET BY MOUTH THREE TIMES A DAY 90 tablet 11     MELATONIN PO Take 3 mg by mouth At Bedtime       meclizine (ANTIVERT) 25 MG tablet Take 1 tablet (25 mg) by mouth 3 times daily as needed 30 tablet 11     albuterol (PROAIR HFA, PROVENTIL HFA, VENTOLIN HFA) 108 (90 BASE) MCG/ACT inhaler Inhale 1-2 puffs into the lungs every 4 hours as needed (for shortness of breath/wheezing) 1 Inhaler 5     azelastine (ASTELIN) 137 MCG/SPRAY nasal spray Spray 1-2 sprays into both nostrils 2 times daily 1 Bottle 1     ORDER FOR DME Dispense one nebulizer machine with necessary supplies 1 each 0     FREESTYLE LANCETS MISC Use to test up to four times per day 100 Each 12     Allergies   Allergen Reactions     Percodan [Oxycodone-Aspirin] Nausea and Vomiting     Recent Labs   Lab Test  01/09/17   1432  10/17/16   0958 10/03/16  07/22/16   0801  05/05/16   0919 04/22/16 03/28/16   1052   02/09/16   1150   05/12/15   0757   10/27/14   1136   04/24/14   1016   04/01/13   0756   A1C   --   6.9*   --   6.2*   --    --   6.7*   --    --    < >  7.6*   < >   --    < >  7.1*   < >  6.5*   LDL   --    --    --    --   57   --    --    --    --    --   46   --    --    --   66   --   53   HDL   --    --    --    --   47   --    --    --    --    --   44   --    --    --   38*   --   41   TRIG   --    --    --    --   122   --    --    --    --    --   255*   --    --    --   220*   --   244*   ALT   --    --   13   --    --   18   --    --   27   < >   --    --    --    < >   --    --   38   CR   --    --   0.79   --   0.70  0.86   --    --   0.74   < >   --    --    --    < >   --    < >  0.62*   GFRESTIMATED  >90   --   >60   --   >90  Non  GFR Calc    >60   --    < >  >90  Non  GFR Calc     < >   --    --    --    < >   --    < >  >90   GFRESTBLACK  >90    "--   >60   --   >90   GFR Calc    >60   --    < >  >90   GFR Calc     < >   --    --    --    < >   --    < >  >90   POTASSIUM   --    --    --    --   4.6   --    --    --   5.2   --    --    --    --    < >   --    < >  3.9   TSH   --    --    --    --    --    --    --    --    --    --    --    --   2.90   --    --    --   6.09*    < > = values in this interval not displayed.      BP Readings from Last 3 Encounters:   03/13/17 140/66   02/15/17 109/57   01/18/17 124/69    Wt Readings from Last 3 Encounters:   03/13/17 89.4 kg (197 lb)   02/15/17 91.2 kg (201 lb)   01/18/17 93 kg (205 lb)                    Reviewed and updated as needed this visit by clinical staff  Tobacco  Allergies  Med Hx  Surg Hx  Fam Hx  Soc Hx      Reviewed and updated as needed this visit by Provider         ROS:  CV: NEGATIVE for chest pain, fatigue  RESP: NEGATIVE for changes in SOB  C: NEGATIVE for fever    This document serves as a record of the services and decisions personally performed and made by Debbie Lewis MD. It was created on her behalf by Wendie Kang, a trained medical scribe. The creation of this document is based on the provider's statements to the medical scribe.  Wendie Kang March 13, 2017 10:10 AM     OBJECTIVE:                                                    /66 (Cuff Size: Adult Large)  Pulse 65  Temp 97.5  F (36.4  C) (Oral)  Resp 12  Ht 1.676 m (5' 6\")  Wt 89.4 kg (197 lb)  SpO2 94%  BMI 31.8 kg/m2   Body mass index is 31.8 kg/(m^2).   GENERAL: healthy, alert and no distress  Diabetic foot exam: normal DP and PT pulses, no trophic changes or ulcerative lesions and normal sensory exam    Diagnostic Test Results:  No results found for this or any previous visit (from the past 24 hour(s)).     ASSESSMENT/PLAN:                                                    1. 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, with long-term current use of insulin " (H)  Foot exam results normal  - FOOT EXAM  - TSH with free T4 reflex  - Comprehensive metabolic panel  - metFORMIN (GLUCOPHAGE) 1000 MG tablet; Take 1 tablet (1,000 mg) by mouth 2 times daily (with meals) with breakfast and dinner.  Dispense: 180 tablet; Refill: 0  - blood glucose monitoring (NO BRAND SPECIFIED) test strip; Test 4 times daily or as directed. Profile Rx: patient will contact pharmacy when needed  Dispense: 360 each; Refill: 3    2. Hypertension goal BP (blood pressure) < 140/90  Controlled  - losartan (COZAAR) 100 MG tablet; Take 1 tablet (100 mg) by mouth daily for hypertension.  Dispense: 90 tablet; Refill: 2    3. Benign familial tremor  He continues to have significant intention tremor with propranolol and with the sinemet that was started by Dr. Fuentes recently. He would like to see if there is anything else that could provide more benefit. He will schedule with Dr. Yoo to discuss this further. Labs done today for medication monitoring.   - Comprehensive metabolic panel  - CBC with platelets  - carbidopa-levodopa (SINEMET)  MG per tablet; Take 1 tablet by mouth 3 times daily  Dispense: 90 tablet; Refill: 0    4. Hyperlipidemia LDL goal <100  - simvastatin (ZOCOR) 80 MG tablet; Take 0.5 tablets (40 mg) by mouth At Bedtime Profile Rx: patient will contact pharmacy when needed  Dispense: 30 tablet; Refill: 2    5. Atrial fibrillation, unspecified type (H)  - warfarin (COUMADIN) 5 MG tablet; As directed by Coumadin clinic. Profile Rx: patient will contact pharmacy when needed  Dispense: 96 tablet; Refill: 0    There are no Patient Instructions on file for this visit.    The information in this document, created by the medical scribe for me, accurately reflects the services I personally performed and the decisions made by me. I have reviewed and approved this document for accuracy prior to leaving the patient care area.  3/13/2017 10:10 AM         Debbie Lewis MD  [facility]  [employer]

## 2017-03-15 ENCOUNTER — TELEPHONE (OUTPATIENT)
Dept: FAMILY MEDICINE | Facility: CLINIC | Age: 72
End: 2017-03-15

## 2017-03-15 ENCOUNTER — OFFICE VISIT (OUTPATIENT)
Dept: NEUROLOGY | Facility: CLINIC | Age: 72
End: 2017-03-15
Payer: COMMERCIAL

## 2017-03-15 VITALS
DIASTOLIC BLOOD PRESSURE: 56 MMHG | SYSTOLIC BLOOD PRESSURE: 135 MMHG | BODY MASS INDEX: 32.1 KG/M2 | HEART RATE: 65 BPM | HEIGHT: 66 IN | TEMPERATURE: 97.7 F | WEIGHT: 199.75 LBS | OXYGEN SATURATION: 98 %

## 2017-03-15 DIAGNOSIS — G25.0 ESSENTIAL TREMOR: Primary | ICD-10-CM

## 2017-03-15 DIAGNOSIS — I48.91 ATRIAL FIBRILLATION, UNSPECIFIED TYPE (H): ICD-10-CM

## 2017-03-15 DIAGNOSIS — E11.42 DIABETIC POLYNEUROPATHY ASSOCIATED WITH TYPE 2 DIABETES MELLITUS (H): ICD-10-CM

## 2017-03-15 PROCEDURE — 99205 OFFICE O/P NEW HI 60 MIN: CPT | Performed by: PSYCHIATRY & NEUROLOGY

## 2017-03-15 RX ORDER — PRIMIDONE 50 MG/1
TABLET ORAL
Qty: 21 TABLET | Refills: 0 | Status: SHIPPED | OUTPATIENT
Start: 2017-03-15 | End: 2017-04-11

## 2017-03-15 NOTE — PATIENT INSTRUCTIONS
AFTER VISIT SUMMARY (AVS):    At today's visit we discussed your diagnosis of essential (familial) tremor and available treatment options. You decided to try Primidone. We reviewed common and serious side effects and possible drug interactions.    The following medications were ordered/changed:  1. Primidone (MYSOLINE) 50 MG tablet: Take 1/2 tablet (25 mg) at bedtime for 2 weeks, then, take 1 tablet at bedtime for 2 weeks.  2. Continue same dose of Propranolol for now.  3. Stop Sinemet (Carbidopa/Levodopa).  4. Check with INR clinic and your PCP to see if Warfarin needs to be adjusted.    Preventive Neurology: Encouraged to stay physically and mentally active with particular emphasis on daily mentally stimulating activities of your choice (such as crosswords, puzzles, sudoku, etc.), stretching exercises, walking, and healthy eating.    Next follow-up appointment is in next 1 month or earlier if needed.    Please do not hesitate to call me with any questions or concerns.    Thanks.

## 2017-03-15 NOTE — NURSING NOTE
"    Chief Complaint   Patient presents with     Consult     tremors       Initial /56 (BP Location: Left arm, Patient Position: Chair, Cuff Size: Adult Large)  Pulse 65  Temp 97.7  F (36.5  C) (Oral)  Ht 1.676 m (5' 6\")  Wt 90.6 kg (199 lb 12 oz)  SpO2 98%  BMI 32.24 kg/m2 Estimated body mass index is 32.24 kg/(m^2) as calculated from the following:    Height as of this encounter: 1.676 m (5' 6\").    Weight as of this encounter: 90.6 kg (199 lb 12 oz).  Medication Reconciliation: complete     COGNITIVE SCREEN  1) Repeat 3 items (Banana, Sunrise, Chair)    2) Clock draw: ABNORMAL   3) 3 item recall: Recalls 2 objects   Results: ABNORMAL clock, 1-2 items recalled: PROBABLE COGNITIVE IMPAIRMENT, **INFORM PROVIDER**    Mini-CogTM Copyright HELIO Caldera. Licensed by the author for use in [facility]; reprinted with permission (marlene@[email or web address]). All rights reserved.        Ana Byers MA          "

## 2017-03-15 NOTE — MR AVS SNAPSHOT
After Visit Summary   3/15/2017    Cayetano Lunsford    MRN: [id number]           Patient Information     Date Of Birth          1945        Visit Information        Provider Department      3/15/2017 9:00 AM Bong Yoo MD [facility]        Today's Diagnoses     Essential tremor    -  1      Care Instructions    AFTER VISIT SUMMARY (AVS):    At today's visit we discussed your diagnosis or essential (familial) tremor and available treatment options. You decided to try Primidone. We reviewed common and serious side effects and possible drug interactions.    The following medications were ordered/changed:  1. Primidone (MYSOLINE) 50 MG tablet: Take 1/2 tablet (25 mg) at bedtime for 2 weeks, then, take 1 tablet at bedtime for 2 weeks.  2. Continue same dose of Propranolol for now.  3. Stop Sinemet (Carbidopa/Levodopa).  4. Check with INR clinic and your PCP to see if Warfarin needs to be adjusted.    Preventive Neurology: Encouraged to stay physically and mentally active with particular emphasis on daily mentally stimulating activities of your choice (such as crosswords, puzzles, sudoku, etc.), stretching exercises, walking, and healthy eating.    Next follow-up appointment is in next 1 month or earlier if needed.    Please do not hesitate to call me with any questions or concerns.    Thanks.          Follow-ups after your visit        Your next 10 appointments already scheduled     Mar 20, 2017  9:45 AM CDT   Anticoagulation Visit with  INR CLINIC   [facility] ([facility])    [address] [place] [id number] [phone]            Mar 27, 2017 11:00 AM CDT   TELEMEDICINE with Jessica Diaz [facility] ([facility])    [address] [place] [id number] [phone]           Note: this is not an onsite visit; there is no need to come to the facility.        "       Who to contact     If you have questions or need follow up information about today's clinic visit or your schedule please contact [facility] directly at [phone].  Normal or non-critical lab and imaging results will be communicated to you by MyChart, letter or phone within 4 business days after the clinic has received the results. If you do not hear from us within 7 days, please contact the clinic through MyChart or phone. If you have a critical or abnormal lab result, we will notify you by phone as soon as possible.  Submit refill requests through [employer] or call your pharmacy and they will forward the refill request to us. Please allow 3 business days for your refill to be completed.          Additional Information About Your Visit        [facility] Information     [employer] lets you send messages to your doctor, view your test results, renew your prescriptions, schedule appointments and more. To sign up, go to [email or web address] . Click on \"Log in\" on the left side of the screen, which will take you to the Welcome page. Then click on \"Sign up Now\" on the right side of the page.     You will be asked to enter the access code listed below, as well as some personal information. Please follow the directions to create your username and password.     Your access code is: [email or web address]  Expires: 3/19/2017 11:15 AM     Your access code will  in 90 days. If you need help or a new code, please call your [place] clinic or [phone].        Care EveryWhere ID     This is your Care EveryWhere ID. This could be used by other organizations to access your [place] medical records  [id number]        Your Vitals Were     Pulse Temperature Height Pulse Oximetry BMI (Body Mass Index)       65 97.7  F (36.5  C) (Oral) 1.676 m (5' 6\") 98% 32.24 kg/m2        Blood Pressure from Last 3 Encounters:   03/15/17 135/56   17 140/66   02/15/17 109/57    Weight from Last 3 Encounters:   03/15/17 90.6 " kg (199 lb 12 oz)   03/13/17 89.4 kg (197 lb)   02/15/17 91.2 kg (201 lb)              Today, you had the following     No orders found for display         Today's Medication Changes          These changes are accurate as of: 3/15/17 10:14 AM.  If you have any questions, ask your nurse or doctor.               Start taking these medicines.        Dose/Directions    primidone 50 MG tablet   Commonly known as:  MYSOLINE   Used for:  Essential tremor   Started by:  Bong Yoo MD        Take 1/2 tablet (25 mg) at bedtime for 2 weeks, 1 tablet at bedtime for 2 weeks at bedtime   Quantity:  21 tablet   Refills:  0         These medicines have changed or have updated prescriptions.        Dose/Directions    ipratropium - albuterol 0.5 mg/2.5 mg/3 mL 0.5-2.5 (3) MG/3ML neb solution   Commonly known as:  DUONEB   This may have changed:  reasons to take this   Used for:  Chronic obstructive bronchitis (H)        Dose:  3 mL   Inhale 1 vial (3 mLs) into the lungs every 4 hours as needed for shortness of breath / dyspnea   Quantity:  270 mL   Refills:  11         Stop taking these medicines if you haven't already. Please contact your care team if you have questions.     carbidopa-levodopa  MG per tablet   Commonly known as:  SINEMET   Stopped by:  Bong Yoo MD                Where to get your medicines      These medications were sent to [facility] [address]  [address] [facility] [address]     Phone:  [phone]     primidone 50 MG tablet                Primary Care Provider Office Phone # Fax #    Debbie Lewis [phone]       [facility] [address]  [facility] [address]        Thank you!     Thank you for choosing [facility]  for your care. Our goal is always to provide you with excellent care. Hearing back from our patients is one way we can continue to improve our services.  "Please take a few minutes to complete the written survey that you may receive in the mail after your visit with us. Thank you!             Your Updated Medication List - Protect others around you: Learn how to safely use, store and throw away your medicines at www.disposemymeds.org.          This list is accurate as of: 3/15/17 10:14 AM.  Always use your most recent med list.                   Brand Name Dispense Instructions for use    albuterol 108 (90 BASE) MCG/ACT Inhaler    PROAIR HFA/PROVENTIL HFA/VENTOLIN HFA    1 Inhaler    Inhale 1-2 puffs into the lungs every 4 hours as needed (for shortness of breath/wheezing)       ASPIRIN NOT PRESCRIBED    INTENTIONAL     Reported on 3/15/2017       azelastine 0.1 % spray    ASTELIN    1 Bottle    Spray 1-2 sprays into both nostrils 2 times daily       BD ULTRA FINE PEN NEEDLES     100 each    Use to inject insulin twice daily.       blood glucose monitoring lancets     100 Each    Use to test up to four times per day       blood glucose monitoring test strip    no brand specified    360 each    Test 4 times daily or as directed. Profile Rx: patient will contact pharmacy when needed       FLORAJEN3 Caps     60 capsule    Take 1 capsule by mouth 2 times daily       FOLIC ACID PO      Take 1 mg by mouth daily       insulin  UNIT/ML injection    NovoLIN N RELION    10 mL    Inject 13 units in the morning and 14 units in the evening       insulin regular 100 UNIT/ML injection    NovoLIN R RELION    10 mL    Inject 13 units with breakfast, 2 units with lunch and 2 units with dinner (when mixing with NPH, draw this insulin up first)       insulin syringe-needle U-100 31G X 5/16\" 0.5 ML    BD insulin syringe ultrafine    100 each    Use one syringe 2 daily or as directed.       ipratropium - albuterol 0.5 mg/2.5 mg/3 mL 0.5-2.5 (3) MG/3ML neb solution    DUONEB    270 mL    Inhale 1 vial (3 mLs) into the lungs every 4 hours as needed for shortness of breath / dyspnea    "    losartan 100 MG tablet    COZAAR    90 tablet    Take 1 tablet (100 mg) by mouth daily for hypertension.       meclizine 25 MG tablet    ANTIVERT    30 tablet    Take 1 tablet (25 mg) by mouth 3 times daily as needed       MELATONIN PO      Take 3 mg by mouth At Bedtime       metFORMIN 1000 MG tablet    GLUCOPHAGE    180 tablet    Take 1 tablet (1,000 mg) by mouth 2 times daily (with meals) with breakfast and dinner.       mometasone-formoterol 100-5 MCG/ACT oral inhaler    DULERA     Inhale 2 puffs into the lungs 2 times daily       order for DME     1 each    Dispense one nebulizer machine with necessary supplies       order for DME     1 each    Equipment being ordered: Nebulizer machine with mask and tubing       pantoprazole 40 MG EC tablet    PROTONIX     Take 40 mg by mouth daily Started by Dr. Debbie Rico at [facility]       primidone 50 MG tablet    MYSOLINE    21 tablet    Take 1/2 tablet (25 mg) at bedtime for 2 weeks, 1 tablet at bedtime for 2 weeks at bedtime       propafenone 150 MG Tabs tablet    RYTHMOL    180 tablet    Take 1 tablet (150 mg) by mouth 2 times daily       propranolol 40 MG tablet    INDERAL    180 tablet    Take 2-3 tablets ( mg) by mouth 2 times daily       simvastatin 80 MG tablet    ZOCOR    30 tablet    Take 0.5 tablets (40 mg) by mouth At Bedtime Profile Rx: patient will contact pharmacy when needed       sulfaSALAzine 500 MG tablet    AZULFIDINE    90 tablet    TAKE ONE TABLET BY MOUTH THREE TIMES A DAY       tiotropium 2.5 MCG/ACT inhalation aerosol    SPIRIVA RESPIMAT    4 g    Inhale 2 puffs into the lungs daily       warfarin 5 MG tablet    COUMADIN    96 tablet    As directed by Coumadin clinic. Profile Rx: patient will contact pharmacy when needed

## 2017-03-15 NOTE — PROGRESS NOTES
INITIAL NEUROLOGY CONSULTATION    DATE OF VISIT: 3/15/2017  CLINIC LOCATION: [facility]  MRN: [id number]  PATIENT NAME: Cayetano Lunsford  YOB: 1945    REFERRING PROVIDER: Debbie Lewis MD, MD     REASON FOR VISIT:   Chief Complaint   Patient presents with     Consult     tremors     HISTORY OF PRESENT ILLNESS:                                                    Mr. Cayetano Lunsford is 72 year old ambidextrous male patient who presents today with bilateral hand tremor.    Per patient's report, bilateral hand tremor started in [id number]. It equally affects both of his arms/hands and gets progressively worse over time. It is not present at rest. It mainly occurs with action and to milder degree with holding hands in certain positions (holding objects or papers). The patient reports difficulty drinking from the cup (he needs to have his half full in order to prevent spilling). He is not able to eat soup with a spoon and has difficulty buttoning his shirt or zipping his pants. It takes him a long time to tie his shoes. The patient also has difficulty with screwdriver. The tremor does not affect any other parts of his body, including his legs or head.    At the same time, patient denies acting out and sleep, micrographia, bradykinesia, postural instability, recent falls, decreased facial expressions, and hypophonia.    Patient has strong family history of similar hand tremors with multiple family members affected, including his mother, sisters, brothers, and extended family members. No family history of Parkinson's disease.    He is currently on propranolol 120 mg twice daily. He also takes Sinemet 10/100 one tablet 3 times daily. He denies any side effects to both medications. He feels that propranolol is somewhat helpful in reducing his tremors because when he is not taking it for 1-2 days (runs out of his prescription), his tremor is notably worse. Sinemet was started last summer (2016),  and he did not notice any effect on his tremor.    Prior neurological history: negative for migraine headaches, stroke, brain neoplasms, seizure disorders, multiple sclerosis, major head injuries, and CNS infections.    Neurologic Review of Systems - no amaurosis, diplopia, abnormal speech, unilateral numbness or weakness. He endorses history of atrial fibrillation (on Warfarin), periodic ear discharge, hearing loss in the right ear, some sinus problems, occasional heartburn, ulcerative colitis, insomnia, shortness of breath and wheezing related to COPD, arthritis, tingling of hands and arms related to diabetes. Otherwise, he denies any other complaints on 14-point comprehensive review of systems.    PAST MEDICAL/SURGICAL HISTORY:                                                    I personally reviewed patient's past medical and surgical history with the patient at today's visit.  Past Medical History   Diagnosis Date     Allergic rhinitis, cause unspecified      Atrial fibrillation (H)      BPH (benign prostatic hyperplasia)      CAD (coronary artery disease)      Chronic airway obstruction, not elsewhere classified      Hyperlipidemia LDL goal <100 5/9/2010     Hypertension goal BP (blood pressure) < 130/80 10/19/2006     Obesity (BMI 30-39.9)      Perforation of tympanic membrane, unspecified      Right TM     Tachycardia, unspecified      Type 2 diabetes, HbA1C goal < 8% (H) 5/2/2010     Unspecified cardiovascular disease      Past Surgical History   Procedure Laterality Date     Surgical history of -   1987     right ear graft     Surgical history of -   1992     right shoulder surgery     Surgical history of -   1979     back surgery     Surgical history of -   1978     vasectomy     Colonoscopy  3/31/2011     Hc removal of nail plate simple single  11/10/2011     Right 2nd Toenail     Coronary angiography adult order  2001 and 2008 2001 at [facility]. 2008- No interventions     MEDICATIONS:                     "                                I personally reviewed patient's medications and allergies with the patient at today's visit.  Current Outpatient Prescriptions on File Prior to Visit:  carbidopa-levodopa (SINEMET)  MG per tablet Take 1 tablet by mouth 3 times daily   losartan (COZAAR) 100 MG tablet Take 1 tablet (100 mg) by mouth daily for hypertension.   metFORMIN (GLUCOPHAGE) 1000 MG tablet Take 1 tablet (1,000 mg) by mouth 2 times daily (with meals) with breakfast and dinner.   warfarin (COUMADIN) 5 MG tablet As directed by Coumadin clinic. Profile Rx: patient will contact pharmacy when needed   simvastatin (ZOCOR) 80 MG tablet Take 0.5 tablets (40 mg) by mouth At Bedtime Profile Rx: patient will contact pharmacy when needed   blood glucose monitoring (NO BRAND SPECIFIED) test strip Test 4 times daily or as directed. Profile Rx: patient will contact pharmacy when needed   insulin NPH (NOVOLIN N RELION) 100 UNIT/ML injection Inject 13 units in the morning and 14 units in the evening   mometasone-formoterol (DULERA) 100-5 MCG/ACT oral inhaler Inhale 2 puffs into the lungs 2 times daily   insulin regular (NOVOLIN R RELION) 100 UNIT/ML injection Inject 13 units with breakfast, 2 units with lunch and 2 units with dinner (when mixing with NPH, draw this insulin up first)   tiotropium (SPIRIVA RESPIMAT) 2.5 MCG/ACT inhalation aerosol Inhale 2 puffs into the lungs daily   insulin syringe-needle U-100 (BD INSULIN SYRINGE ULTRAFINE) 31G X 5/16\" 0.5 ML Use one syringe 2 daily or as directed.   propafenone (RYTHMOL) 150 MG TABS Take 1 tablet (150 mg) by mouth 2 times daily   propranolol (INDERAL) 40 MG tablet Take 2-3 tablets ( mg) by mouth 2 times daily   ipratropium - albuterol 0.5 mg/2.5 mg/3 mL (DUONEB) 0.5-2.5 (3) MG/3ML nebulization Inhale 1 vial (3 mLs) into the lungs every 4 hours as needed for shortness of breath / dyspnea (Patient taking differently: Inhale 3 mLs into the lungs every 4 hours as needed " for shortness of breath / dyspnea (currently using 2-3 times daily) )   FOLIC ACID PO Take 1 mg by mouth daily   Probiotic Product (FLORAJEN3) CAPS Take 1 capsule by mouth 2 times daily   order for DME Equipment being ordered: Nebulizer machine with mask and tubing   pantoprazole (PROTONIX) 40 MG enteric coated tablet Take 40 mg by mouth daily Started by Dr. Debbie Rico at [facility]   BD ULTRA FINE PEN NEEDLES Use to inject insulin twice daily.   sulfaSALAzine (AZULFIDINE) 500 MG tablet TAKE ONE TABLET BY MOUTH THREE TIMES A DAY   MELATONIN PO Take 3 mg by mouth At Bedtime   meclizine (ANTIVERT) 25 MG tablet Take 1 tablet (25 mg) by mouth 3 times daily as needed   albuterol (PROAIR HFA, PROVENTIL HFA, VENTOLIN HFA) 108 (90 BASE) MCG/ACT inhaler Inhale 1-2 puffs into the lungs every 4 hours as needed (for shortness of breath/wheezing)   azelastine (ASTELIN) 137 MCG/SPRAY nasal spray Spray 1-2 sprays into both nostrils 2 times daily   ORDER FOR DME Dispense one nebulizer machine with necessary supplies   FREESTYLE LANCETS MISC Use to test up to four times per day   ASPIRIN NOT PRESCRIBED, INTENTIONAL, Reported on 3/15/2017     No current facility-administered medications on file prior to visit.   ALLERGIES:                                                      Allergies   Allergen Reactions     Percodan [Oxycodone-Aspirin] Nausea and Vomiting     FAMILY/SOCIAL HISTORY:                                                    Family and social history was reviewed with the patient at today's visit. Father had brain tumor. Mother, brothers and sisters had hand tremor.   Problem (# of Occurrences) Relation (Name,Age of Onset)    Allergies (2) Mother, Sister    Arthritis (1) Mother    CEREBROVASCULAR DISEASE (1) Paternal Grandfather    Circulatory (1) Mother: blood clot in leg    DIABETES (2) Mother: type 2, Sister: Type 2    GASTROINTESTINAL DISEASE (1) Mother    Hypertension (2) Brother, Sister    Lipids (2) Brother, Sister     "Neurologic Disorder (5) Mother: Familiar tremors, Father: brain tumor, Sister: \", Sister: \", Sister: \"        , has one grown son, lives alone, former smoker, quit in 1992, denies alcohol and recreational drug use. 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  Social History   Substance Use Topics     Smoking status: Former Smoker     Packs/day: 1.00     Years: 30.00     Types: Cigarettes     Quit date: 3/19/1992     Smokeless tobacco: Never Used     Alcohol use 0.0 oz/week     0 Standard drinks or equivalent per week      Comment: hardly at all, sometimes at dinner     REVIEW OF SYSTEMS:                                                    Patient has completed a [facility] Patient Health History, including a 14-system review which was personally reviewed, and pertinent positives are listed in HPI. He denies any additional problems on the further questioning.    EXAM:                                                    VITAL SIGNS:   Vitals: /56 (BP Location: Left arm, Patient Position: Chair, Cuff Size: Adult Large)  Pulse 65  Temp 97.7  F (36.5  C) (Oral)  Ht 1.676 m (5' 6\")  Wt 90.6 kg (199 lb 12 oz)  SpO2 98%  BMI 32.24 kg/m2    General: pt is in NAD, cooperative.  Skin: normal turgor, moist mucous membranes, no lesions/rashes noticed.  HEENT: ATNC, EOMI, PERRL, white sclera, normal conjunctiva, no nystagmus or ptosis. No carotid bruits bilaterally.  Respiratory: lung sounds clear to auscultation bilaterally, no crackles, wheezes, rhonchi. Symmetric lung excursion, no accessory respiratory muscle use.  Cardiovascular: normal S1/S2, no murmurs/rubs/gallops.  Abdomen: Not distended.  : deferred.    Neurological:  Mental: awake, follows commands, mini-cog is 2/5 with 2/3 on memory recall, no aphasia, mild dysarthria (baseline since childhood). Fund of knowledge is diminished for age.  Cranial Nerves:  CN II: visual acuity - able to accurately count fingers with each eye. Visual fields " intact, fundi: discs sharp, no papilledema and normal vessels bilaterally.  CN III, IV, VI: EOM intact, pupils equal and reactive  CN V: facial sensation nl  CN VII: face symmetric, no facial droop  CN VIII: hearing normal  CN IX: palate elevation symmetric, uvula at midline  CN XI SCM normal, shoulder shrug nl  CN XII: tongue midline  Motor: Strength: 5/5 in all major groups of all extremities. Normal tone. The patient has moderate to severe bilateral postural and action hand tremor of moderate frequency and amplitude. He also completed a spinal sheet that will be scanned into his electronic medical record at the hand of this encounter. No other abnormal movements. No pronator drift b/l.  Reflexes: Triceps, biceps, and brachioradialis reflexes are normal and symmetric, patellar are reduced bilaterally, and achilles reflexes are absent bilaterally. No clonus noted. Toes are down-going b/l.   Sensory: temperature, light touch, pinprick, and vibration symmetrically reduced in his feet. Romberg: positive.  Coordination: FNF and heel-shin tests intact b/l. No dysdiadochokinesia with rapid alternating movements.  Gait:  Normal arm swings and stride length, able to toe and heel walk, but has difficulty with tandem gait.    DATA:     LABS: I personally reviewed the following labs:  Office Visit on 03/13/2017   Component Date Value Ref Range Status     TSH 03/13/2017 3.25  0.40 - 4.00 mU/L Final     Sodium 03/13/2017 135  133 - 144 mmol/L Final     Potassium 03/13/2017 4.6  3.4 - 5.3 mmol/L Final     Chloride 03/13/2017 100  94 - 109 mmol/L Final     Carbon Dioxide 03/13/2017 28  20 - 32 mmol/L Final     Anion Gap 03/13/2017 7  3 - 14 mmol/L Final     Glucose 03/13/2017 129* 70 - 99 mg/dL Final     Urea Nitrogen 03/13/2017 16  7 - 30 mg/dL Final     Creatinine 03/13/2017 0.76  0.66 - 1.25 mg/dL Final     GFR Estimate 03/13/2017   >60 mL/min/1.7m2 Final                    Value:>90  Non  GFR Calc       GFR  Estimate If Black 03/13/2017   >60 mL/min/1.7m2 Final                    Value:>90   GFR Calc       Calcium 03/13/2017 8.7  8.5 - 10.1 mg/dL Final     Bilirubin Total 03/13/2017 0.4  0.2 - 1.3 mg/dL Final     Albumin 03/13/2017 3.8  3.4 - 5.0 g/dL Final     Protein Total 03/13/2017 7.0  6.8 - 8.8 g/dL Final     Alkaline Phosphatase 03/13/2017 60  40 - 150 U/L Final     ALT 03/13/2017 22  0 - 70 U/L Final     AST 03/13/2017 20  0 - 45 U/L Final     WBC 03/13/2017 7.9  4.0 - 11.0 10e9/L Final     RBC Count 03/13/2017 4.11* 4.4 - 5.9 10e12/L Final     Hemoglobin 03/13/2017 12.4* 13.3 - 17.7 g/dL Final     Hematocrit 03/13/2017 39.4* 40.0 - 53.0 % Final     MCV 03/13/2017 96  78 - 100 fl Final     MCH 03/13/2017 30.2  26.5 - 33.0 pg Final     MCHC 03/13/2017 31.5  31.5 - 36.5 g/dL Final     RDW 03/13/2017 13.9  10.0 - 15.0 % Final     Platelet Count 03/13/2017 307  150 - 450 10e9/L Final   Anticoagulation Therapy Visit on 03/02/2017   Component Date Value Ref Range Status     INR Protime 03/02/2017 2.0* 0.86 - 1.14 Final   Anticoagulation Therapy Visit on 02/02/2017   Component Date Value Ref Range Status     INR Protime 02/02/2017 2.1* 0.86 - 1.14 Final     IMAGING: None.    ASSESSMENT and PLAN:      ASSESSMENT: Cayetano Lunsford is a 72 year old ambidextrous male patient with strong family history of essential tremors who presents today with bilateral postural and action hand tremor started in 2009 which is getting progressively worse. Currently, he is on propranolol and Sinemet without noticeable side effects. He feels that his tremor is not optimally treated.    His neurological exam today is notable for bilateral postural and action hand tremor of moderate frequency and amplitude, absent achilles reflexes, and distal symmetric lower extremity sensory loss. There are no parkinsonian features seen.    His clinical presentation is consistent with essential tremor. There are no parkinsonian symptoms  reported and features present during today's visit. For this reason, I do not feel that Sinemet would be beneficial for this patient. I will stop it today.    We thoroughly discussed all available options for essential tremor treatment, including increased dose of propranolol, trial of primidone or second-line medications (gabapentin, Topamax, zonisamide, and others), and surgical interventions (DBS). The patient elected to try primidone. We thoroughly reviewed common and serious side effects (including but not limited to sedation, skin reactions, confusion, decreasing heart rate and breathing, and gastrointestinal side effects) and possible drug interactions (diminished effect of propranolol, losartan, propafenone, and warfarin).    His diabetic polyneuropathy is currently stable. Most recent hemoglobin A1C was 6.9 in October 2016. No additional medications are needed at this time.    DIAGNOSES:    ICD-10-CM    1. Essential tremor G25.0 primidone (MYSOLINE) 50 MG tablet   2. Diabetic polyneuropathy associated with type 2 diabetes mellitus (H) E11.42        PLAN: At today's visit we discussed his diagnosis of essential (familial) tremor and available treatment options.      The following medications were ordered/changed:  1. Primidone (MYSOLINE) 50 MG tablet: Take 1/2 tablet (25 mg) at bedtime for 2 weeks, then, take 1 tablet at bedtime for 2 weeks.  2. Continue same dose of Propranolol for now.  3. Stop Sinemet (Carbidopa/Levodopa).    I recommended to check with INR clinic and his PCP to see if Warfarin dose needs to be adjusted.    Preventive Neurology: Encouraged to stay physically and mentally active with particular emphasis on daily mentally stimulating activities of his choice (such as crosswords, puzzles, sudoku, etc.), stretching exercises, walking, and healthy eating.    Next follow-up appointment is in next 1 month or earlier if needed.    I advised the patient to call me with any questions or  concerns.    Total Time:  60 minutes with > 50% spent counseling the patient on stated above assessment and recommendations, including nature of the diagnosis, needed w/u, proposed plan of treatment, and prognosis.    Bong Yoo MD  / Neurology  [place]  (Chart documentation was completed in part with Dragon voice-recognition software. Even though reviewed, some grammatical, spelling, and word errors may remain.)

## 2017-03-15 NOTE — TELEPHONE ENCOUNTER
Good morning,    I sent a [employer] message yesterday, but never received a response.  For insurance billing and accuracy purposes, we need a new rx for Mr. Lunsford that includes the directions for his Warfarin dosing.    Thank you,  Clary Brown CPhT  On behalf of [facility]

## 2017-03-16 RX ORDER — WARFARIN SODIUM 5 MG/1
TABLET ORAL
Qty: 90 TABLET | Refills: 3 | Status: SHIPPED | OUTPATIENT
Start: 2017-03-16 | End: 2017-06-29

## 2017-03-20 ENCOUNTER — ANTICOAGULATION THERAPY VISIT (OUTPATIENT)
Dept: NURSING | Facility: CLINIC | Age: 72
End: 2017-03-20
Payer: COMMERCIAL

## 2017-03-20 DIAGNOSIS — Z79.01 LONG-TERM (CURRENT) USE OF ANTICOAGULANTS: ICD-10-CM

## 2017-03-20 DIAGNOSIS — I48.91 ATRIAL FIBRILLATION, UNSPECIFIED TYPE (H): ICD-10-CM

## 2017-03-20 LAB — INR POINT OF CARE: 2.1 (ref 0.86–1.14)

## 2017-03-20 PROCEDURE — 99207 ZZC NO CHARGE NURSE ONLY: CPT

## 2017-03-20 PROCEDURE — 85610 PROTHROMBIN TIME: CPT | Mod: QW

## 2017-03-20 PROCEDURE — 36416 COLLJ CAPILLARY BLOOD SPEC: CPT

## 2017-03-20 NOTE — PROGRESS NOTES
"  ANTICOAGULATION FOLLOW-UP CLINIC VISIT    Patient Name:  Cayetano Lunsford  Date:  3/20/2017  Contact Type:  Face to Face    SUBJECTIVE:     Patient Findings     Positives Change in medications (Carbidopa-levodopa dc'd and Neurology team advised Primidone (25 mg daily, dose increases in 2 weeks to 50 mg QD). Per micromedex: \"Concurrent use of PRIMIDONE and WARFARIN may result in decreased anticoagulant effectiveness.\") Missed dose (Skipped 3/3 dose d/t extra on 3/2.) Extra dose (took an extra dose on 3/2).            OBJECTIVE    INR Protime   Date Value Ref Range Status   03/20/2017 2.1 (A) 0.86 - 1.14 Final       ASSESSMENT / PLAN  INR assessment THER    Recheck INR In: 2 WEEKS    INR Location Clinic      Anticoagulation Summary as of 3/20/2017     INR goal 2.0-3.0   Today's INR 2.1   Maintenance plan 7.5 mg (5 mg x 1.5) on Mon, Wed, Fri; 5 mg (5 mg x 1) all other days   Full instructions 7.5 mg on Mon, Wed, Fri; 5 mg all other days   Weekly total 42.5 mg   Plan last modified Kirstin Meléndez RN (3/20/2017)   Next INR check 4/3/2017   Priority INR   Target end date     Indications   Long-term (current) use of anticoagulants [Z79.01] [Z79.01]  Atrial fibrillation (H) [I48.91]         Anticoagulation Episode Summary     INR check location     Preferred lab     Send INR reminders to [facility]    Comments       Anticoagulation Care Providers     Provider Role Specialty Phone number    Debbie Lewis MD [facility] [phone]            See the Encounter Report to view Anticoagulation Flowsheet and Dosing Calendar (Go to Encounters tab in chart review, and find the Anticoagulation Therapy Visit)    Due to possible interactions between meds, patient advised to trial new weekly dose of 42.5 mg and recheck in 2 weeks after next Primidone increase. Monitor closely for any s/s of bleeding.    Patient aware if signs of clotting (pain, tenderness, swelling, color change in leg or arm, SOB) " and bleeding occur (blood in stool, urine, large bruising, bleeding gums, nosebleeds) to have INR check sooner. If sx severe report to ER or concerned for stroke call 911. If general questions or concerns arise, call clinic.    Kirstin Meléndez RN

## 2017-03-20 NOTE — MR AVS SNAPSHOT
Cayetano Lunsford   3/20/2017 9:45 AM   Anticoagulation Therapy Visit    Description:  72 year old male   Provider:   INR CLINIC   Department:   Nurse           INR as of 3/20/2017     Today's INR 2.1      Anticoagulation Summary as of 3/20/2017     INR goal 2.0-3.0   Today's INR 2.1   Full instructions 7.5 mg on Mon, Wed, Fri; 5 mg all other days   Next INR check 4/3/2017    Indications   Long-term (current) use of anticoagulants [Z79.01] [Z79.01]  Atrial fibrillation (H) [I48.91]         Your next Anticoagulation Clinic appointment(s)     Apr 03, 2017 10:00 AM CDT   Anticoagulation Visit with  [facility] CLINIC   [facility]    [address] [place] [id number] [phone]              Contact Numbers     [facility]  Please call [phone] to cancel and/or reschedule your appointment   Please call [phone] with any problems or questions regarding your therapy.        March 2017 Details    Sun Mon Tue Wed Thu Fri Sat        1               2               3               4                 5               6               7               8               9               10               11                 12               13               14               15               16               17               18                 19               20      7.5 mg   See details      21      5 mg         22      7.5 mg         23      5 mg         24      7.5 mg         25      5 mg           26      5 mg         27      7.5 mg         28      5 mg         29      7.5 mg         30      5 mg         31      7.5 mg           Date Details   03/20 This INR check               How to take your warfarin dose     To take:  5 mg Take 1 of the 5 mg tablets.    To take:  7.5 mg Take 1.5 of the 5 mg tablets.           April 2017 Details    Sun Mon Tue Wed Thu Fri Sat           1      5 mg           2      5 mg         3            4               5                6               7               8                 9               10               11               12               13               14               15                 16               17               18               19               20               21               22                 23               24               25               26               27               28               29                 30                      Date Details   No additional details    Date of next INR:  4/3/2017         How to take your warfarin dose     To take:  5 mg Take 1 of the 5 mg tablets.    To take:  7.5 mg Take 1.5 of the 5 mg tablets.

## 2017-03-27 ENCOUNTER — ALLIED HEALTH/NURSE VISIT (OUTPATIENT)
Dept: PHARMACY | Facility: CLINIC | Age: 72
End: 2017-03-27
Payer: COMMERCIAL

## 2017-03-27 DIAGNOSIS — E11.649 TYPE 2 DIABETES MELLITUS WITH HYPOGLYCEMIA WITHOUT COMA, WITH LONG-TERM CURRENT USE OF INSULIN (H): Primary | ICD-10-CM

## 2017-03-27 DIAGNOSIS — Z79.4 TYPE 2 DIABETES MELLITUS WITH HYPOGLYCEMIA WITHOUT COMA, WITH LONG-TERM CURRENT USE OF INSULIN (H): Primary | ICD-10-CM

## 2017-03-27 PROCEDURE — 99607 MTMS BY PHARM ADDL 15 MIN: CPT | Performed by: PHARMACIST

## 2017-03-27 PROCEDURE — 99606 MTMS BY PHARM EST 15 MIN: CPT | Performed by: PHARMACIST

## 2017-03-27 NOTE — MR AVS SNAPSHOT
After Visit Summary   3/27/2017    Cayetano Lunsford    MRN: [id number]           Patient Information     Date Of Birth          1945        Visit Information        Provider Department      3/27/2017 11:00 AM Jessica Diaz [facility]         Follow-ups after your visit        Your next 10 appointments already scheduled     Apr 03, 2017 10:00 AM CDT   [facility] Visit with  [facility]   [facility]    [address] [place] [id number] [phone]            Apr 10, 2017  9:30 AM CDT   TELEMEDICINE with Jessica Diaz [facility] ([facility])    [address] [place] [id number] [phone]           Note: this is not an onsite visit; there is no need to come to the facility.            Apr 18, 2017  9:00 AM CDT   [facility] Visit with Bong Yoo MD   [facility]    [address] [place] [id number] [phone]            Jun 20, 2017 10:20 AM CDT   Office Visit with Debbie Lewis MD   [facility]    [address] [place] [id number] [phone]           Bring a current list of meds and any records pertaining to this visit.  For Physicals, please bring immunization records and any forms needing to be filled out.  Please arrive 10 minutes early to complete paperwork.            Jun 21, 2017 12:45 PM CDT   RETURN 45 with Natanael Dang MD   [facility] [place] ([facility])    [address] [place] [id number] [phone]              Who to contact     If you have questions or need follow up information about today's clinic visit or your schedule please contact [facility] directly at [phone].  Normal or non-critical lab  "and imaging results will be communicated to you by MyChart, letter or phone within 4 business days after the clinic has received the results. If you do not hear from us within 7 days, please contact the clinic through [email or web address] or phone. If you have a critical or abnormal lab result, we will notify you by phone as soon as possible.  Submit refill requests through [employer] or call your pharmacy and they will forward the refill request to us. Please allow 3 business days for your refill to be completed.          Additional Information About Your Visit        [employer]ik[employer] Data Information     [employer] lets you send messages to your doctor, view your test results, renew your prescriptions, schedule appointments and more. To sign up, go to [email or web address]/[employer] . Click on \"Log in\" on the left side of the screen, which will take you to the Welcome page. Then click on \"Sign up Now\" on the right side of the page.     You will be asked to enter the access code listed below, as well as some personal information. Please follow the directions to create your username and password.     Your access code is: [email or web address]  Expires: 2017 10:57 AM     Your access code will  in 90 days. If you need help or a new code, please call your [place] clinic or [phone].        Care EveryWhere ID     This is your Care EveryWhere ID. This could be used by other organizations to access your [place] medical records  [id number]         Blood Pressure from Last 3 Encounters:   03/15/17 135/56   17 140/66   02/15/17 109/57    Weight from Last 3 Encounters:   03/15/17 199 lb 12 oz (90.6 kg)   17 197 lb (89.4 kg)   02/15/17 201 lb (91.2 kg)              Today, you had the following     No orders found for display         Today's Medication Changes          These changes are accurate as of: 3/27/17 11:59 PM.  If you have any questions, ask your nurse or doctor.               These medicines have changed or have updated prescriptions.  "       Dose/Directions    ipratropium - albuterol 0.5 mg/2.5 mg/3 mL 0.5-2.5 (3) MG/3ML neb solution   Commonly known as:  DUONEB   This may have changed:  reasons to take this   Used for:  Chronic obstructive bronchitis (H)        Dose:  3 mL   Inhale 1 vial (3 mLs) into the lungs every 4 hours as needed for shortness of breath / dyspnea   Quantity:  270 mL   Refills:  11                Primary Care Provider Office Phone # Fax #    Debbie Lewis [phone]       [facility] [address]  [facility] [address]        Thank you!     Thank you for choosing [facility]  for your care. Our goal is always to provide you with excellent care. Hearing back from our patients is one way we can continue to improve our services. Please take a few minutes to complete the written survey that you may receive in the mail after your visit with us. Thank you!             Your Updated Medication List - Protect others around you: Learn how to safely use, store and throw away your medicines at www.disposemymeds.org.          This list is accurate as of: 3/27/17 11:59 PM.  Always use your most recent med list.                   Brand Name Dispense Instructions for use    albuterol 108 (90 BASE) MCG/ACT Inhaler    PROAIR HFA/PROVENTIL HFA/VENTOLIN HFA    1 Inhaler    Inhale 1-2 puffs into the lungs every 4 hours as needed (for shortness of breath/wheezing)       ASPIRIN NOT PRESCRIBED    INTENTIONAL     Reported on 3/15/2017       azelastine 0.1 % spray    ASTELIN    1 Bottle    Spray 1-2 sprays into both nostrils 2 times daily       BD ULTRA FINE PEN NEEDLES     100 each    Use to inject insulin twice daily.       blood glucose monitoring lancets     100 Each    Use to test up to four times per day       blood glucose monitoring test strip    no brand specified    360 each    Test 4 times daily or as directed. Profile Rx: patient will contact pharmacy when needed       KAYN3 Caps     60  "capsule    Take 1 capsule by mouth 2 times daily       FOLIC ACID PO      Take 1 mg by mouth daily       insulin  UNIT/ML injection    NovoLIN N RELION    10 mL    Inject 13 units in the morning and 14 units in the evening       insulin regular 100 UNIT/ML injection    NovoLIN R RELION    10 mL    Inject 13 units with breakfast, 2 units with lunch and 2 units with dinner (when mixing with NPH, draw this insulin up first)       insulin syringe-needle U-100 31G X 5/16\" 0.5 ML    BD insulin syringe ultrafine    100 each    Use one syringe 2 daily or as directed.       ipratropium - albuterol 0.5 mg/2.5 mg/3 mL 0.5-2.5 (3) MG/3ML neb solution    DUONEB    270 mL    Inhale 1 vial (3 mLs) into the lungs every 4 hours as needed for shortness of breath / dyspnea       losartan 100 MG tablet    COZAAR    90 tablet    Take 1 tablet (100 mg) by mouth daily for hypertension.       meclizine 25 MG tablet    ANTIVERT    30 tablet    Take 1 tablet (25 mg) by mouth 3 times daily as needed       MELATONIN PO      Take 3 mg by mouth At Bedtime       metFORMIN 1000 MG tablet    GLUCOPHAGE    180 tablet    Take 1 tablet (1,000 mg) by mouth 2 times daily (with meals) with breakfast and dinner.       mometasone-formoterol 100-5 MCG/ACT oral inhaler    DULERA     Inhale 2 puffs into the lungs 2 times daily       order for DME     1 each    Dispense one nebulizer machine with necessary supplies       order for DME     1 each    Equipment being ordered: Nebulizer machine with mask and tubing       pantoprazole 40 MG EC tablet    PROTONIX     Take 40 mg by mouth daily Started by Dr. Debbie Rico at [facility]       primidone 50 MG tablet    MYSOLINE    21 tablet    Take 1/2 tablet (25 mg) at bedtime for 2 weeks, 1 tablet at bedtime for 2 weeks at bedtime       propafenone 150 MG Tabs tablet    RYTHMOL    180 tablet    Take 1 tablet (150 mg) by mouth 2 times daily       propranolol 40 MG tablet    INDERAL    180 tablet    Take 2-3 " tablets ( mg) by mouth 2 times daily       simvastatin 80 MG tablet    ZOCOR    30 tablet    Take 0.5 tablets (40 mg) by mouth At Bedtime Profile Rx: patient will contact pharmacy when needed       sulfaSALAzine 500 MG tablet    AZULFIDINE    90 tablet    TAKE ONE TABLET BY MOUTH THREE TIMES A DAY       tiotropium 2.5 MCG/ACT inhalation aerosol    SPIRIVA RESPIMAT    4 g    Inhale 2 puffs into the lungs daily       * warfarin 5 MG tablet    COUMADIN    96 tablet    As directed by Coumadin clinic. Profile Rx: patient will contact pharmacy when needed       * warfarin 5 MG tablet    COUMADIN    90 tablet    7.5mg M,Th and 5mg ROW As directed by Coumadin clinic       * Notice:  This list has 2 medication(s) that are the same as other medications prescribed for you. Read the directions carefully, and ask your doctor or other care provider to review them with you.

## 2017-03-27 NOTE — Clinical Note
No changes today, see note.  Tanna Diaz, JohnnieD Medication Therapy Management Pharmacist [facility] - Monday and Wednesday 7:30 - 4:00 Phone: [phone] - direct clinic line

## 2017-03-27 NOTE — PROGRESS NOTES
SUBJECTIVE/OBJECTIVE:                                                    Cayetano Lunsford is a 72 year old male called for a follow-up visit for Medication Therapy Management.  He was referred to me from Dr. Debbie Lewis.     Chief Complaint: Follow up from our visit on 3/13/17.  No concerns today.  Personal Healthcare Goals: did not discuss today.  Tobacco: No tobacco use   Alcohol: very little, special occassions.     Medication Adherence: no issues reported    Diabetes:  Pt currently taking metformin bid, Novolin N 10 units AM before breakfast -decreased at last visit and 14 units in PM-at dinner, Novolin R-13 units with breakfast (decrease by 2 units if more active), 2 units with lunch (decreased from last visit due to hypoglycemia in evening) and 2 units dinner , between 80 and 120, reduce your R insulin by 2 units. If below 70 skip dose. He gives his shots in his arms. He does not have any issues with giving stomach just has been doing arms.  He is now writing the date on the Novolin R and discarding after 42 days.   SMBG: 3-4 times daily.   Ranges (per pt report): see chart below. Less lows  Symptoms of low blood sugar? Fingers and lips tingle, shaky and sweaty. Frequency of hypoglycemia? More lows since last visit mostly due to more activity. Treating with small amount of orange juice  Recent symptoms of high blood sugar? none.  Eye exam: up to date  Foot exam: up to date  Microalbumin is not < 30 mg/g. Pt is taking an ACEi/ARB.  Aspirin: Not taking due to anticoagulation therapy and increased risk of bleeding  Diet/Exercise:  No change from last visit: He knows he should get rid of pancakes and sugar cereal but cant find cereal that tastes good with less sugar. At last visit: Reports continues to walk 4-5 days a week, once to twice daily for 20 minutes - but this varies if it is cold out. He has lost his appetite he states and does not have very large meals. Eats a lot of carbs in the morning, which  explains his very high insulin dose compared to other times.  Does not like meats anymore.         Date FBG/ 2hours post Lunch/2hours post Dinner /2hours post    3/27 136      3/26 115 263 ( not sure why, cereal with banana) 196    3/25 199 159 75 - R 1 88   3/24 158 250 ( cereal and blueberries for breakfast) 181 151   3/23 196 - 11 R increased activity planned 176 - 1 R more active 161 9:38pm 53 less of a snack between dinner and bedtime -drank 4 oz OJ ; 9:59 55; 10:58 127   3/22 124 126 143    3/21 151 170 149    3/20 202 170 73 - 1 R    3/19 125 269 (cereal blueberries for breakfast) 135  280 -ate pie and ice cream for dinner   3/18 226 - more active day so 11 u R 179 - 1 R 173    3/17 154 93 - R1 255 (had 2 mixed drinks at lunch) 309   3/16 239 (pie the night before) 264 105 - 1 R 176   3/15 228 128 151 Started primidone   3/14 Early morning (2am) low of 47 - drank 6-8 ounces orange juice then 2:58 am it was 58 then ate pudding at 3:17 it was 59, then 3:32 it was 98, 3:44am 159  205 breakfast 94 - 1 unit R 92 - 1 R        From last visit:  Date FBG/ 2hours post Lunch/2hours post Dinner /2hours post bedtime   3/13 130      3/12 70 when first got up drank 4 ounces orange juice then before breakfast 144 135 60 - drank 4 ounces juice then ate dinner, no R    3/11 151 ate lunch - no shot because out 166    3/10 153 254 - had blueberries and cereal with breakfast 138 139   3/9 167 232 - blueberries with breakfast 158    3/8 130 134 128 123   3/7 204 - 11 units R - more active this day 163 69 - no R and just ate dinner. He thinks he had less to eat that day 69 - drank 4 ounces of orange juice   3/6 126 - R 11 units 113 - R 1 unit 97 - R 1 unit    3/5 198 152 - R 1 because more active 73 - R 1 unit/106 Woke at 2:01 Am and it was 60 - drank 4 ounces of orange juice.   3/4 134 291- at hockey tourn. May not have given shot 243 - burger and fries with hot chocolate    3/3 164 - R 11 due to increase activity 181/139 155 165    3/2 201 187 114 - R 1 unit 189   3/1 237 - R 11 units more active 160 - R 1 more active 60 - no R, drank 4 ounces orange juice and ate dinner 209   2/29 169 236 - not sure why high, had banana for breakfast with cereal 94 - R 1 Woke up 2:04 AM 36 drank 4 ounces orange juice and pudding then it started coming up 3:07 am 57 - drank 3:13 82, 3:36    2/27 146 - more active but didn't cut R 59 - no R 73     2/26 156 145 140    2/25 155 157 63 -No R    2/24 130 264 - blueberries for breakfast 92 - R 1 157   2/23 166 234 104 - R1  116   2/22 159 196 140    2/21 186 119 97 - R1    2/20 137 241 158 109   2/19 193 172 181    2/18 160 132 289    2/17 125 136 111    2/16 185 190 208 - R 2 units 67 at 11:08 - drank 6 ounces orange juice - thinks he didn't have enough to eat after dinner       Essential Tremor:  Current medications include: primidone 25 mg once daily for another week and then will increase to 50 mg once daily after that, started 3/15/17.  Sinemet was also stopped on 3/15/17.  His writing seems better.  He continues propranolol.  He will be following up with Dr. Aly on 4/18/17.      Current labs include:  BP Readings from Last 3 Encounters:   03/15/17 135/56   03/13/17 140/66   02/15/17 109/57     Today's Vitals: There were no vitals taken for this visit. -phone visit    Lab Results   Component Value Date    A1C 6.9 10/17/2016    A1C 6.2 07/22/2016    A1C 6.7 03/28/2016    A1C 7.1 11/10/2015    A1C 7.6 05/12/2015       Lab Results   Component Value Date    CHOL 128 05/05/2016     Lab Results   Component Value Date    TRIG 122 05/05/2016     Lab Results   Component Value Date    HDL 47 05/05/2016     Lab Results   Component Value Date    LDL 57 05/05/2016       Liver Function Studies -   Recent Labs   Lab Test  03/13/17   1050   PROTTOTAL  7.0   ALBUMIN  3.8   BILITOTAL  0.4   ALKPHOS  60   AST  20   ALT  22       Lab Results   Component Value Date    UCRR 91 05/05/2016    MICROL 39 05/05/2016     UMALCR 42.76 (H) 05/05/2016       Last Basic Metabolic Panel:  Lab Results   Component Value Date     03/13/2017      Lab Results   Component Value Date    POTASSIUM 4.6 03/13/2017     Lab Results   Component Value Date    CHLORIDE 100 03/13/2017     Lab Results   Component Value Date    BUN 16 03/13/2017     Lab Results   Component Value Date    CR 0.76 03/13/2017     GFR Estimate   Date Value Ref Range Status   03/13/2017 >90  Non  GFR Calc   >60 mL/min/1.7m2 Final   01/09/2017 >90 >60 mL/min/1.7m2 Final   10/03/2016 >60 >60 ml/min/1.73m2 Final     TSH   Date Value Ref Range Status   03/13/2017 3.25 0.40 - 4.00 mU/L Final   ]    Most Recent Immunizations   Administered Date(s) Administered     Hepatitis B 08/05/2014     Influenza (H1N1) 01/08/2010     Influenza (High Dose) 3 valent vaccine 09/07/2016     Influenza (IIV3) 09/11/2009     Pneumococcal (PCV 13) 11/10/2015     Pneumococcal 23 valent 07/20/2010     TD (ADULT, 7+) 08/08/2008     TDAP Vaccine (Adacel) 08/05/2014     Zoster vaccine, live 06/13/2008     ASSESSMENT:                                                    Current medications were reviewed today as discussed above.      Medication Adherence: no issues identified    Diabetes: Stable. Patient is meeting A1c goal of < 7 or 8%. Self monitoring of blood glucose is not at goal of fasting  mg/dL all the time but occasionally. He is having less hypoglycemia so will keep dose the same and follow up in 2 weeks. Microalbumin is not at goal < 30 mg/g. Taking an ARB at max dose. Aspirin therapy is not indicated in this patient due to anticoagulant/antiplatelet therapy.      PLAN:                                                      1. Keep same dose of insulin, no change today asked him to call if hypoglycemia becomes more frequent.     I spent 30 minutes with this patient today.  All changes were made via collaborative practice agreement with Debbie Lewis A copy of the visit  note was provided to the patient's primary care provider.     Will follow up in 2 weeks.    The patient declined a summary of these recommendations as an after visit summary.    Tanna Diaz, PharmD  Medication Therapy Management Pharmacist

## 2017-03-31 ENCOUNTER — TRANSFERRED RECORDS (OUTPATIENT)
Dept: HEALTH INFORMATION MANAGEMENT | Facility: CLINIC | Age: 72
End: 2017-03-31

## 2017-04-03 ENCOUNTER — ANTICOAGULATION THERAPY VISIT (OUTPATIENT)
Dept: NURSING | Facility: CLINIC | Age: 72
End: 2017-04-03
Payer: COMMERCIAL

## 2017-04-03 DIAGNOSIS — J44.9 CHRONIC OBSTRUCTIVE PULMONARY DISEASE, UNSPECIFIED (H): ICD-10-CM

## 2017-04-03 DIAGNOSIS — Z79.01 LONG-TERM (CURRENT) USE OF ANTICOAGULANTS: ICD-10-CM

## 2017-04-03 DIAGNOSIS — I48.91 ATRIAL FIBRILLATION, UNSPECIFIED TYPE (H): ICD-10-CM

## 2017-04-03 LAB — INR POINT OF CARE: 3 (ref 0.86–1.14)

## 2017-04-03 PROCEDURE — 99207 ZZC NO CHARGE NURSE ONLY: CPT

## 2017-04-03 PROCEDURE — 85610 PROTHROMBIN TIME: CPT | Mod: QW

## 2017-04-03 PROCEDURE — 36416 COLLJ CAPILLARY BLOOD SPEC: CPT

## 2017-04-03 NOTE — MR AVS SNAPSHOT
Cayetano Lunsford   4/3/2017 10:00 AM   Anticoagulation Therapy Visit    Description:  72 year old male   Provider:   INR CLINIC   Department:   Nurse           INR as of 4/3/2017     Today's INR 3.0      Anticoagulation Summary as of 4/3/2017     INR goal 2.0-3.0   Today's INR 3.0   Full instructions 7.5 mg on Mon, Fri; 5 mg all other days   Next INR check 4/20/2017    Indications   Long-term (current) use of anticoagulants [Z79.01] [Z79.01]  Atrial fibrillation (H) [I48.91]         Your next Anticoagulation Clinic appointment(s)     Apr 20, 2017 12:00 PM CDT   Anticoagulation Visit with  [facility] CL[facility]   [facility]    [address] [place] [address] [phone]              Contact Numbers     [facility]  Please call [phone] to cancel and/or reschedule your appointment   Please call [phone] with any problems or questions regarding your therapy.        April 2017 Details    Sun Mon Tue Wed Thu Fri Sat           1                 2               3      7.5 mg   See details      4      5 mg         5      5 mg         6      5 mg         7      7.5 mg         8      5 mg           9      5 mg         10      7.5 mg         11      5 mg         12      5 mg         13      5 mg         14      7.5 mg         15      5 mg           16      5 mg         17      7.5 mg         18      5 mg         19      5 mg         20            21               22                 23               24               25               26               27               28               29                 30                      Date Details   04/03 This INR check       Date of next INR:  4/20/2017         How to take your warfarin dose     To take:  5 mg Take 1 of the 5 mg tablets.    To take:  7.5 mg Take 1.5 of the 5 mg tablets.

## 2017-04-03 NOTE — PROGRESS NOTES
"  ANTICOAGULATION FOLLOW-UP CLINIC VISIT    Patient Name:  Cayetano Lunsford  Date:  4/3/2017  Contact Type:  Face to Face    SUBJECTIVE:     Patient Findings     Positives Change in medications (Primidone increased to 50 mg daily. Per micromedex: \"Concurrent use of PRIMIDONE and WARFARIN may result in decreased anticoagulant effectiveness.\")           OBJECTIVE    INR Protime   Date Value Ref Range Status   04/03/2017 3.0 (A) 0.86 - 1.14 Final       ASSESSMENT / PLAN  INR assessment THER    Recheck INR In: 2 WEEKS    INR Location Clinic      Anticoagulation Summary as of 4/3/2017     INR goal 2.0-3.0   Today's INR 3.0   Maintenance plan 7.5 mg (5 mg x 1.5) on Mon, Fri; 5 mg (5 mg x 1) all other days   Full instructions 7.5 mg on Mon, Fri; 5 mg all other days   Weekly total 40 mg   Plan last modified Kirstin Meléndez RN (4/3/2017)   Next INR check 4/20/2017   Priority INR   Target end date     Indications   Long-term (current) use of anticoagulants [Z79.01] [Z79.01]  Atrial fibrillation (H) [I48.91]         Anticoagulation Episode Summary     INR check location     Preferred lab     Send INR reminders to [facility] CL[facility]IC    Comments       Anticoagulation Care Providers     Provider Role Specialty Phone number    Debbie Lewis MD [facility] [phone]            See the Encounter Report to view Anticoagulation Flowsheet and Dosing Calendar (Go to Encounters tab in chart review, and find the Anticoagulation Therapy Visit)    Patient advised to resume 40 mg weekly dose d/t change in meds and large jump in INR level (2.1>>3.0). Patient advised to monitor closely for any s/s of bleeding.     Patient aware if signs of clotting (pain, tenderness, swelling, color change in leg or arm, SOB) and bleeding occur (blood in stool, urine, large bruising, bleeding gums, nosebleeds) to have INR check sooner. If sx severe report to ER or concerned for stroke call 911. If general questions or concerns arise, " call clinic.    Kirstin Meléndez RN

## 2017-04-03 NOTE — TELEPHONE ENCOUNTER
Medication Detail      Disp Refills Start End SERJIO   tiotropium (SPIRIVA RESPIMAT) 2.5 MCG/ACT inhalation aerosol 4 g 1 1/18/2017  --   Sig: Inhale 2 puffs into the lungs daily        Last Office Visit with [facility] or [facility] prescribing provider:  3/13/17   Future Office Visit:    Next 5 appointments (look out 90 days)     Apr 18, 2017  9:00 AM CDT   Return Visit with Bong Yoo MD   [facility]    [address] [place] [id number] [phone]            Jun 20, 2017 10:20 AM CDT   Office Visit with Debbie Lewis MD   [facility]    [address] [place] [id number] [phone]                   Date of Last Asthma Action Plan Letter:   There are no preventive care reminders to display for this patient.   Asthma Control Test: No flowsheet data found.    Date of Last Spirometry Test:   No results found for this or any previous visit.

## 2017-04-06 RX ORDER — TIOTROPIUM BROMIDE INHALATION SPRAY 3.12 UG/1
SPRAY, METERED RESPIRATORY (INHALATION)
Qty: 4 G | Refills: 1 | Status: SHIPPED | OUTPATIENT
Start: 2017-04-06 | End: 2017-06-26

## 2017-04-10 ENCOUNTER — ALLIED HEALTH/NURSE VISIT (OUTPATIENT)
Dept: PHARMACY | Facility: CLINIC | Age: 72
End: 2017-04-10
Payer: COMMERCIAL

## 2017-04-10 DIAGNOSIS — Z79.4 TYPE 2 DIABETES MELLITUS WITH HYPOGLYCEMIA WITHOUT COMA, WITH LONG-TERM CURRENT USE OF INSULIN (H): Primary | ICD-10-CM

## 2017-04-10 DIAGNOSIS — E11.649 TYPE 2 DIABETES MELLITUS WITH HYPOGLYCEMIA WITHOUT COMA, WITH LONG-TERM CURRENT USE OF INSULIN (H): Primary | ICD-10-CM

## 2017-04-10 PROCEDURE — 99606 MTMS BY PHARM EST 15 MIN: CPT | Performed by: PHARMACIST

## 2017-04-10 PROCEDURE — 99607 MTMS BY PHARM ADDL 15 MIN: CPT | Performed by: PHARMACIST

## 2017-04-10 NOTE — PATIENT INSTRUCTIONS
Recommendations from today's MTM visit:                                                      1. Make sure that if your blood sugars are  in the morning before breakfast AND you are also going to be more active that day to cut your R back by 4 units total.    Next MTM visit: 4/24 at 9:30 I will call you    To schedule another MTM appointment, please call the clinic directly or you may call the MTM scheduling line at [phone] or toll-free at [phone].     My Clinical Pharmacist's contact information:                                                      It was a pleasure seeing you today!  Please feel free to contact me with any questions or concerns you have.      Tanna Diaz, Rebecca  Medication Therapy Management Pharmacist  [facility] - Monday and Wednesday 7:30 - 4:00  Phone: [phone] - direct clinic line    You may receive a survey about the MTM services you received.  I would appreciate your feedback to help me serve you better in the future. Please fill it out and return it when you can. Your comments will be anonymous.

## 2017-04-10 NOTE — MR AVS SNAPSHOT
After Visit Summary   4/10/2017    Cayetano Lunsford    MRN: [id number]           Patient Information     Date Of Birth          1945        Visit Information        Provider Department      4/10/2017 9:30 AM Jessica Diaz RICHY [facility] MTM        Today's Diagnoses     Type 2 diabetes mellitus with hypoglycemia without coma, with long-term current use of insulin (H)    -  1      Care Instructions    Recommendations from today's MTM visit:                                                      1. Make sure that if your blood sugars are  in the morning before breakfast AND you are also going to be more active that day to cut your R back by 4 units total.    Next MTM visit: 4/24 at 9:30 I will call you    To schedule another MTM appointment, please call the clinic directly or you may call the MTM scheduling line at [phone] or toll-free at [phone].     My Clinical Pharmacist's contact information:                                                      It was a pleasure seeing you today!  Please feel free to contact me with any questions or concerns you have.      Tanna Diaz, PharmD  Medication Therapy Management Pharmacist  [facility] - Monday and Wednesday 7:30 - 4:00  Phone: [phone] - direct clinic line    You may receive a survey about the MTM services you received.  I would appreciate your feedback to help me serve you better in the future. Please fill it out and return it when you can. Your comments will be anonymous.                Follow-ups after your visit        Your next 10 appointments already scheduled     Apr 18, 2017  9:00 AM CDT   Return Visit with Bong Yoo MD   [facility] ([facility]    [address] [place] [id number] [phone]            Apr 20, 2017 12:00 PM CDT   Anticoagulation Visit with  [facility]   [facility] ([facility])     [address] [place] [id number] [phone]            Apr 24, 2017  9:30 AM CDT   TELEMEDICINE with Jessica Diaz RPH   [facility] ([facility])    [address] [place] [id number] [phone]           Note: this is not an onsite visit; there is no need to come to the facility.            Jun 20, 2017 10:20 AM CDT   Office Visit with Debbie Lewis MD   [facility] ([facility])    [address] [place] [id number] [phone]           Bring a current list of meds and any records pertaining to this visit.  For Physicals, please bring immunization records and any forms needing to be filled out.  Please arrive 10 minutes early to complete paperwork.            Jun 21, 2017 12:45 PM CDT   RETURN 45 with Natanael Dang MD   [facility] ([facility])    [address] [place] [id number] [phone]              Who to contact     If you have questions or need follow up information about today's clinic visit or your schedule please contact [facility] directly at [phone].  Normal or non-critical lab and imaging results will be communicated to you by Pinguohart, letter or phone within 4 business days after the clinic has received the results. If you do not hear from us within 7 days, please contact the clinic through MyChart or phone. If you have a critical or abnormal lab result, we will notify you by phone as soon as possible.  Submit refill requests through Shopnlist or call your pharmacy and they will forward the refill request to us. Please allow 3 business days for your refill to be completed.          Additional Information About Your Visit        Shopnlist Information     Shopnlist lets you send messages to your doctor, view your test results, renew your prescriptions, schedule appointments and more. To  "sign up, go to [email or web address] . Click on \"Log in\" on the left side of the screen, which will take you to the Welcome page. Then click on \"Sign up Now\" on the right side of the page.     You will be asked to enter the access code listed below, as well as some personal information. Please follow the directions to create your username and password.     Your access code is: [email or web address]  Expires: 2017 10:57 AM     Your access code will  in 90 days. If you need help or a new code, please call your [facility] clinic or [phone].        Care EveryWhere ID     This is your Care EveryWhere ID. This could be used by other organizations to access your [facility] medical records  [id number]         Blood Pressure from Last 3 Encounters:   03/15/17 135/56   17 140/66   02/15/17 109/57    Weight from Last 3 Encounters:   03/15/17 199 lb 12 oz (90.6 kg)   17 197 lb (89.4 kg)   02/15/17 201 lb (91.2 kg)              Today, you had the following     No orders found for display         Today's Medication Changes          These changes are accurate as of: 4/10/17  1:07 PM.  If you have any questions, ask your nurse or doctor.               These medicines have changed or have updated prescriptions.        Dose/Directions    insulin  UNIT/ML injection   Commonly known as:  NovoLIN N RELION   This may have changed:  additional instructions   Used for:  Type 2 diabetes mellitus with hypoglycemia without coma, with long-term current use of insulin (H)        Inject 13 units in the morning and 14 units in the evening   Quantity:  10 mL   Refills:  2       ipratropium - albuterol 0.5 mg/2.5 mg/3 mL 0.5-2.5 (3) MG/3ML neb solution   Commonly known as:  DUONEB   This may have changed:  reasons to take this   Used for:  Chronic obstructive bronchitis (H)        Dose:  3 mL   Inhale 1 vial (3 mLs) into the lungs every 4 hours as needed for shortness of breath / dyspnea   Quantity:  270 mL   Refills:  11 "                Primary Care Provider Office Phone # Fax #    Debbie Lewis [phone]       [facility] [address]  [facility] [address]        Thank you!     Thank you for choosing [facility]  for your care. Our goal is always to provide you with excellent care. Hearing back from our patients is one way we can continue to improve our services. Please take a few minutes to complete the written survey that you may receive in the mail after your visit with us. Thank you!             Your Updated Medication List - Protect others around you: Learn how to safely use, store and throw away your medicines at www.disposemymeds.org.          This list is accurate as of: 4/10/17  1:07 PM.  Always use your most recent med list.                   Brand Name Dispense Instructions for use    albuterol 108 (90 BASE) MCG/ACT Inhaler    PROAIR HFA/PROVENTIL HFA/VENTOLIN HFA    1 Inhaler    Inhale 1-2 puffs into the lungs every 4 hours as needed (for shortness of breath/wheezing)       ASPIRIN NOT PRESCRIBED    INTENTIONAL     Reported on 3/15/2017       azelastine 0.1 % spray    ASTELIN    1 Bottle    Spray 1-2 sprays into both nostrils 2 times daily       BD ULTRA FINE PEN NEEDLES     100 each    Use to inject insulin twice daily.       blood glucose monitoring lancets     100 Each    Use to test up to four times per day       blood glucose monitoring test strip    no brand specified    360 each    Test 4 times daily or as directed. Profile Rx: patient will contact pharmacy when needed       FLORAJEN3 Caps     60 capsule    Take 1 capsule by mouth 2 times daily       FOLIC ACID PO      Take 1 mg by mouth daily       insulin  UNIT/ML injection    NovoLIN N RELION    10 mL    Inject 13 units in the morning and 14 units in the evening       insulin regular 100 UNIT/ML injection    NovoLIN R RELION    10 mL    Inject 13 units with breakfast, 2 units with lunch and 2 units with  "dinner (when mixing with NPH, draw this insulin up first)       insulin syringe-needle U-100 31G X 5/16\" 0.5 ML    BD insulin syringe ultrafine    100 each    Use one syringe 2 daily or as directed.       ipratropium - albuterol 0.5 mg/2.5 mg/3 mL 0.5-2.5 (3) MG/3ML neb solution    DUONEB    270 mL    Inhale 1 vial (3 mLs) into the lungs every 4 hours as needed for shortness of breath / dyspnea       losartan 100 MG tablet    COZAAR    90 tablet    Take 1 tablet (100 mg) by mouth daily for hypertension.       meclizine 25 MG tablet    ANTIVERT    30 tablet    Take 1 tablet (25 mg) by mouth 3 times daily as needed       MELATONIN PO      Take 3 mg by mouth At Bedtime       metFORMIN 1000 MG tablet    GLUCOPHAGE    180 tablet    Take 1 tablet (1,000 mg) by mouth 2 times daily (with meals) with breakfast and dinner.       mometasone-formoterol 100-5 MCG/ACT oral inhaler    DULERA     Inhale 2 puffs into the lungs 2 times daily       order for DME     1 each    Dispense one nebulizer machine with necessary supplies       order for DME     1 each    Equipment being ordered: Nebulizer machine with mask and tubing       pantoprazole 40 MG EC tablet    PROTONIX     Take 40 mg by mouth daily [facility] De[facility]       primidone 50 MG tablet    MYSOLINE    21 tablet    Take 1/2 tablet (25 mg) at bedtime for 2 weeks, 1 tablet at bedtime for 2 weeks at bedtime       propafenone 150 MG Tabs tablet    RYTHMOL    180 tablet    Take 1 tablet (150 mg) by mouth 2 times daily       propranolol 40 MG tablet    INDERAL    180 tablet    Take 2-3 tablets ( mg) by mouth 2 times daily       simvastatin 80 MG tablet    ZOCOR    30 tablet    Take 0.5 tablets (40 mg) by mouth At Bedtime Profile Rx: patient will contact pharmacy when needed       SPIRIVA RESPIMAT 2.5 MCG/ACT inhalation aerosol   Generic drug:  tiotropium     4 g    INHALE 2 PUFFS INTO THE LUNGS DAILY       sulfaSALAzine 500 MG tablet    AZULFIDINE    90 " tablet    TAKE ONE TABLET BY MOUTH THREE TIMES A DAY       * warfarin 5 MG tablet    COUMADIN    96 tablet    As directed by Coumadin clinic. Profile Rx: patient will contact pharmacy when needed       * warfarin 5 MG tablet    COUMADIN    90 tablet    7.5mg M,Th and 5mg ROW As directed by Coumadin clinic       * Notice:  This list has 2 medication(s) that are the same as other medications prescribed for you. Read the directions carefully, and ask your doctor or other care provider to review them with you.

## 2017-04-10 NOTE — PROGRESS NOTES
SUBJECTIVE/OBJECTIVE:                                                    Cayetano Lunsford is a 72 year old male called for a follow-up visit for Medication Therapy Management.  He was referred to me from Dr. Debbie Lewis.     Chief Complaint: Follow up from our visit on 3/27/17.  Wondering if he should cut his R back further at meals if his before meal reading is between  AND he plans to be more active.  Personal Healthcare Goals: Did not discuss today.  Tobacco: No tobacco use   Alcohol: once in a while he will have a drink at dinner, once a month.    Medication Adherence: no issues reported    Diabetes:  Pt currently taking metformin bid, Novolin N 10 units AM before breakfast and 14 units in PM-at dinner, Novolin R-13 units with breakfast (decrease by 2 units if more active), 2 units with lunch and 2 units dinner, between 80 and 120, reduce your R insulin by 2 units. If below 70 skip dose. He gives his shots in his arms. He does not have any issues with giving stomach just has been doing arms.  He is now writing the date on the Novolin R and discarding after 42 days.   SMBG: 3-4 times daily.   Ranges (per pt report): see chart below. About the same number of lows but explainable  Symptoms of low blood sugar? Fingers and lips tingle, shaky and sweaty. Frequency of hypoglycemia? More lows since last visit mostly due to more activity. Treating with small amount of orange juice  Recent symptoms of high blood sugar? none.  Eye exam: up to date  Foot exam: up to date  Microalbumin is not < 30 mg/g. Pt is taking an ACEi/ARB.  Aspirin: Not taking due to anticoagulation therapy and increased risk of bleeding  Diet/Exercise:  No change from last visit: He knows he should get rid of pancakes and sugar cereal but cant find cereal that tastes good with less sugar. At last visit: Reports continues to walk 4-5 days a week, once to twice daily for 20 minutes - but this varies if it is cold out. He has lost his appetite he  states and does not have very large meals. Eats a lot of carbs in the morning, which explains his very high insulin dose compared to other times.  Does not like meats anymore.     Averages 7 day: 131   14 day: 162   30 day: 158    Date FBG/ 2hours post Lunch/2hours post Dinner /2hours post    4/10 118      4/9 193 233 59 - 4 oz orange juice then dinner did not recheck - He was more active in the afternoon but didn't cut back R at lunch or breakfast 11:36pm - 68 4 oz orange juice   4/8 149 109 - R1 89 - R1  11:08pm - 55 4 oz orange juice - more active that day and didn't decrease R in the morning.   4/7 173 (R - 11 because more active) 89 - R1 137 87   4/6 156 (R -11 more active) 182 - R1 more active 124    4/5 195 (R - 11 more activity planned) 176 - R1 more active 151    4/4 172 99 - R1 60 - no R (was more active this day and didn't reduce the R at breakfast)    4/3 202 242 131 112   4/2 239 (blueberries with cereal) 230 242    4/1 230 275 198 (small slice cake before dinner) 219   3/31 169  189 177    3/30 157 no reading or shot he was out 154    3/29 184 - increased primidone to 50 gm  91 - R1    3/28 195 262 163          From Last visit-  Date FBG/ 2hours post Lunch/2hours post Dinner /2hours post    3/27 136 217 185    3/26 115 263 (not sure why, cereal with banana) 196    3/25 199 159 75 - R 1 88   3/24 158 250 ( cereal and blueberries for breakfast) 181 151   3/23 196 - 11 R increased activity planned 176 - 1 R more active 161 9:38pm 53 less of a snack between dinner and bedtime -drank 4 oz OJ ; 9:59 55; 10:58 127   3/22 124 126 143    3/21 151 170 149    3/20 202 170 73 - 1 R    3/19 125 269 (cereal blueberries for breakfast) 135  280 -ate pie and ice cream for dinner   3/18 226 - more active day so 11 u R 179 - 1 R 173    3/17 154 93 - R1 255 (had 2 mixed drinks at lunch) 309   3/16 239 (pie the night before) 264 105 - 1 R 176   3/15 228 128 151 Started primidone   3/14 Early morning (2am) low of 47 -  drank 6-8 ounces orange juice then 2:58 am it was 58 then ate pudding at 3:17 it was 59, then 3:32 it was 98, 3:44am 159  205 breakfast 94 - 1 unit R 92 - 1 R        Current labs include:  BP Readings from Last 3 Encounters:   03/15/17 135/56   03/13/17 140/66   02/15/17 109/57     Today's Vitals: There were no vitals taken for this visit. - phone visit    Lab Results   Component Value Date    A1C 6.9 10/17/2016    A1C 6.2 07/22/2016    A1C 6.7 03/28/2016    A1C 7.1 11/10/2015    A1C 7.6 05/12/2015       Lab Results   Component Value Date    CHOL 128 05/05/2016     Lab Results   Component Value Date    TRIG 122 05/05/2016     Lab Results   Component Value Date    HDL 47 05/05/2016     Lab Results   Component Value Date    LDL 57 05/05/2016       Liver Function Studies -   Recent Labs   Lab Test  03/13/17   1050   PROTTOTAL  7.0   ALBUMIN  3.8   BILITOTAL  0.4   ALKPHOS  60   AST  20   ALT  22       Lab Results   Component Value Date    UCRR 91 05/05/2016    MICROL 39 05/05/2016    UMALCR 42.76 (H) 05/05/2016       Last Basic Metabolic Panel:  Lab Results   Component Value Date     03/13/2017      Lab Results   Component Value Date    POTASSIUM 4.6 03/13/2017     Lab Results   Component Value Date    CHLORIDE 100 03/13/2017     Lab Results   Component Value Date    BUN 16 03/13/2017     Lab Results   Component Value Date    CR 0.76 03/13/2017     GFR Estimate   Date Value Ref Range Status   03/13/2017 >90  Non  GFR Calc   >60 mL/min/1.7m2 Final   01/09/2017 >90 >60 mL/min/1.7m2 Final   10/03/2016 >60 >60 ml/min/1.73m2 Final     TSH   Date Value Ref Range Status   03/13/2017 3.25 0.40 - 4.00 mU/L Final   ]    Most Recent Immunizations   Administered Date(s) Administered     Hepatitis B 08/05/2014     Influenza (H1N1) 01/08/2010     Influenza (High Dose) 3 valent vaccine 09/07/2016     Influenza (IIV3) 09/11/2009     Pneumococcal (PCV 13) 11/10/2015     Pneumococcal 23 valent 07/20/2010     TD  (ADULT, 7+) 08/08/2008     TDAP Vaccine (Adacel) 08/05/2014     Zoster vaccine, live 06/13/2008     ASSESSMENT:                                                    Current medications were reviewed today as discussed above.      Medication Adherence: no issues identified    Diabetes: Stable. Patient is meeting A1c goal of < 7%. Microalbumin is not at goal < 30 mg/g. Taking an ARB at max dose. Aspirin therapy is not indicated in this patient due to anticoagulant/antiplatelet therapy. Discussed reducing Novolin R further if his preprandial reading is between  AND he is going to be more active.  No changes today. There were explainable reasons for his low these past two weeks, see blood glucose chart above.     PLAN:                                                      1. Make sure that if your blood sugars are  in the morning before breakfast and you are also going to be more active that day to cut your R back by 4 units total.    2. Next visit have pt come in as he will be due for A1C, lipid and microalbumin.    I spent 30 minutes with this patient today.  All changes were made via collaborative practice agreement with Debbie Lewis. A copy of the visit note was provided to the patient's primary care provider.     Will follow up in 2 weeks.    The patient was mailed a summary of these recommendations as an after visit summary.    Tanna Diaz, PharmD  Medication Therapy Management Pharmacist

## 2017-04-11 DIAGNOSIS — G25.0 ESSENTIAL TREMOR: ICD-10-CM

## 2017-04-11 NOTE — TELEPHONE ENCOUNTER
primidone (MYSOLINE) 50 MG tablet      Last Written Prescription Date:    Last Fill Quantity: ,   # refills:   Last [facility] Visit with [facility] prescribing provider: 3/13/17  Future Office visit:    Next 5 appointments (look out 90 days)     Apr 18, 2017  9:00 AM CDT   Return Visit with Bong Yoo MD   [facility] ([facility]    [address] [place] [id number] [phone]            Jun 20, 2017 10:20 AM CDT   Office Visit with Debbie Lewis MD   [facility] ([facility]    [address] [place] [id number] [phone]                   Routing refill request to provider for review/approval because:  Drug not on the [facility] refill protocol or controlled substance

## 2017-04-12 RX ORDER — PRIMIDONE 50 MG/1
TABLET ORAL
Qty: 30 TABLET | Refills: 0 | Status: SHIPPED | OUTPATIENT
Start: 2017-04-12 | End: 2017-04-18

## 2017-04-12 NOTE — TELEPHONE ENCOUNTER
This med is not on refill prot.  Ordered by neurology. Routing to ordering provider.  MANOJ Leblanc

## 2017-04-18 ENCOUNTER — OFFICE VISIT (OUTPATIENT)
Dept: NEUROLOGY | Facility: CLINIC | Age: 72
End: 2017-04-18
Payer: COMMERCIAL

## 2017-04-18 VITALS
BODY MASS INDEX: 32.44 KG/M2 | WEIGHT: 201 LBS | SYSTOLIC BLOOD PRESSURE: 118 MMHG | OXYGEN SATURATION: 97 % | RESPIRATION RATE: 16 BRPM | HEART RATE: 72 BPM | DIASTOLIC BLOOD PRESSURE: 58 MMHG | TEMPERATURE: 97.4 F

## 2017-04-18 DIAGNOSIS — G25.0 ESSENTIAL TREMOR: ICD-10-CM

## 2017-04-18 PROCEDURE — 99214 OFFICE O/P EST MOD 30 MIN: CPT | Performed by: PSYCHIATRY & NEUROLOGY

## 2017-04-18 RX ORDER — PRIMIDONE 50 MG/1
TABLET ORAL
Qty: 154 TABLET | Refills: 0 | Status: SHIPPED | OUTPATIENT
Start: 2017-04-18 | End: 2017-06-20

## 2017-04-18 NOTE — PATIENT INSTRUCTIONS
AFTER VISIT SUMMARY (AVS):    At today's visit we discussed your symptoms and plan.    The following medications were changed:  1. Primidone (MYSOLINE) 50 MG tablet: Take 1 tablet twice a day for the next 2 weeks, then go to 1 tablet three times a day.    Next follow-up appointment is in next 2 months or earlier if needed.    Please do not hesitate to call me with any questions or concerns.    Thanks

## 2017-04-18 NOTE — NURSING NOTE
"Chief Complaint   Patient presents with     RECHECK     Tremor       Initial /58 (BP Location: Left arm, Patient Position: Chair, Cuff Size: Adult Large)  Pulse 72  Temp 97.4  F (36.3  C) (Oral)  Resp 16  Wt 91.2 kg (201 lb)  SpO2 97%  BMI 32.44 kg/m2 Estimated body mass index is 32.44 kg/(m^2) as calculated from the following:    Height as of 3/15/17: 1.676 m (5' 6\").    Weight as of this encounter: 91.2 kg (201 lb).  Medication Reconciliation: complete     Catherine Guo CMA      "

## 2017-04-18 NOTE — MR AVS SNAPSHOT
After Visit Summary   4/18/2017    Cayteano Lunsford    MRN: [id number]           Patient Information     Date Of Birth          1945        Visit Information        Provider Department      4/18/2017 9:00 AM Bong Yoo MD [facility]        Today's Diagnoses     Essential tremor          Care Instructions    AFTER VISIT SUMMARY (AVS):    At today's visit we discussed your symptoms and plan.    The following medications were changed:  1. Primidone (MYSOLINE) 50 MG tablet: Take 1 tablet twice a day for the next 2 weeks, then go to 1 tablet three times a day.    Next follow-up appointment is in next 2 months or earlier if needed.    Please do not hesitate to call me with any questions or concerns.    Thanks          Follow-ups after your visit        Follow-up notes from your care team     Return in about 2 months (around 6/18/2017).      Your next 10 appointments already scheduled     Apr 20, 2017 12:00 PM CDT   Anticoagulation Visit with  INR CLINIC   [facility] ([facility])    [address] [place] [id number] [phone]            Apr 24, 2017  9:30 AM CDT   TELEMEDICINE with Jessica Diaz [facility] ([facility])    [address] [place] [id number] [phone]           Note: this is not an onsite visit; there is no need to come to the facility.            Jun 20, 2017  9:00 AM CDT   Return Visit with Bong Yoo MD   [facility] ([facility])    [address] [place] [id number] [phone]            Jun 20, 2017 10:20 AM CDT   Office Visit with Debbie Lewis MD   [facility] ([facility])    [address] [place] [phone]           Bring a current list of meds and any records pertaining to this visit.  For  "Physicals, please bring immunization records and any forms needing to be filled out.  Please arrive 10 minutes early to complete paperwork.            2017 12:45 PM CDT   RETURN 45 with Natanael Dang MD   [facility] ([facility])    [address] [place] [id number] [phone]              Who to contact     If you have questions or need follow up information about today's clinic visit or your schedule please contact [facility] directly at [phone].  Normal or non-critical lab and imaging results will be communicated to you by MyChart, letter or phone within 4 business days after the clinic has received the results. If you do not hear from us within 7 days, please contact the clinic through [employer] or phone. If you have a critical or abnormal lab result, we will notify you by phone as soon as possible.  Submit refill requests through [employer] or call your pharmacy and they will forward the refill request to us. Please allow 3 business days for your refill to be completed.          Additional Information About Your Visit        MyChart Information     [employer] lets you send messages to your doctor, view your test results, renew your prescriptions, schedule appointments and more. To sign up, go to [email or web address] . Click on \"Log in\" on the left side of the screen, which will take you to the Welcome page. Then click on \"Sign up Now\" on the right side of the page.     You will be asked to enter the access code listed below, as well as some personal information. Please follow the directions to create your username and password.     Your access code is: [email or web address]  Expires: 2017 10:57 AM     Your access code will  in 90 days. If you need help or a new code, please call your [facility] clinic or [phone].        Care EveryWhere ID     This is your Care EveryWhere ID. This could be used by other " organizations to access your [facility] medical records  [id number]        Your Vitals Were     Pulse Temperature Respirations Pulse Oximetry BMI (Body Mass Index)       72 97.4  F (36.3  C) (Oral) 16 97% 32.44 kg/m2        Blood Pressure from Last 3 Encounters:   04/18/17 118/58   03/15/17 135/56   03/13/17 140/66    Weight from Last 3 Encounters:   04/18/17 91.2 kg (201 lb)   03/15/17 90.6 kg (199 lb 12 oz)   03/13/17 89.4 kg (197 lb)              Today, you had the following     No orders found for display         Today's Medication Changes          These changes are accurate as of: 4/18/17  9:23 AM.  If you have any questions, ask your nurse or doctor.               These medicines have changed or have updated prescriptions.        Dose/Directions    insulin  UNIT/ML injection   Commonly known as:  NovoLIN N RELION   This may have changed:  additional instructions   Used for:  Type 2 diabetes mellitus with hypoglycemia without coma, with long-term current use of insulin (H)        Inject 13 units in the morning and 14 units in the evening   Quantity:  10 mL   Refills:  2       ipratropium - albuterol 0.5 mg/2.5 mg/3 mL 0.5-2.5 (3) MG/3ML neb solution   Commonly known as:  DUONEB   This may have changed:  reasons to take this   Used for:  Chronic obstructive bronchitis (H)        Dose:  3 mL   Inhale 1 vial (3 mLs) into the lungs every 4 hours as needed for shortness of breath / dyspnea   Quantity:  270 mL   Refills:  11       primidone 50 MG tablet   Commonly known as:  MYSOLINE   This may have changed:  additional instructions   Used for:  Essential tremor   Changed by:  Bong Yoo MD        Take 1 tablet twice a day for the next 2 weeks, then go to 1 tablet three times a day.   Quantity:  154 tablet   Refills:  0            Where to get your medicines      These medications were sent to [facility] [place], [place] - [address]  [address] [facility]  [address]     Phone:  [phone]     primidone 50 MG tablet                Primary Care Provider Office Phone # Fax #    Debbie Lewis [phone]       [facility] [address]  [facility] [address]        Thank you!     Thank you for choosing [facility]  for your care. Our goal is always to provide you with excellent care. Hearing back from our patients is one way we can continue to improve our services. Please take a few minutes to complete the written survey that you may receive in the mail after your visit with us. Thank you!             Your Updated Medication List - Protect others around you: Learn how to safely use, store and throw away your medicines at www.disposemymeds.org.          This list is accurate as of: 4/18/17  9:23 AM.  Always use your most recent med list.                   Brand Name Dispense Instructions for use    albuterol 108 (90 BASE) MCG/ACT Inhaler    PROAIR HFA/PROVENTIL HFA/VENTOLIN HFA    1 Inhaler    Inhale 1-2 puffs into the lungs every 4 hours as needed (for shortness of breath/wheezing)       ASPIRIN NOT PRESCRIBED    INTENTIONAL     Reported on 3/15/2017       azelastine 0.1 % spray    ASTELIN    1 Bottle    Spray 1-2 sprays into both nostrils 2 times daily       BD ULTRA FINE PEN NEEDLES     100 each    Use to inject insulin twice daily.       blood glucose monitoring lancets     100 Each    Use to test up to four times per day       blood glucose monitoring test strip    no brand specified    360 each    Test 4 times daily or as directed. Profile Rx: patient will contact pharmacy when needed       FLORAJEN3 Caps     60 capsule    Take 1 capsule by mouth 2 times daily       FOLIC ACID PO      Take 1 mg by mouth daily       insulin  UNIT/ML injection    NovoLIN N RELION    10 mL    Inject 13 units in the morning and 14 units in the evening       insulin regular 100 UNIT/ML injection    NovoLIN R RELION    10 mL    Inject 13 units  "with breakfast, 2 units with lunch and 2 units with dinner (when mixing with NPH, draw this insulin up first)       insulin syringe-needle U-100 31G X 5/16\" 0.5 ML    BD insulin syringe ultrafine    100 each    Use one syringe 2 daily or as directed.       ipratropium - albuterol 0.5 mg/2.5 mg/3 mL 0.5-2.5 (3) MG/3ML neb solution    DUONEB    270 mL    Inhale 1 vial (3 mLs) into the lungs every 4 hours as needed for shortness of breath / dyspnea       losartan 100 MG tablet    COZAAR    90 tablet    Take 1 tablet (100 mg) by mouth daily for hypertension.       meclizine 25 MG tablet    ANTIVERT    30 tablet    Take 1 tablet (25 mg) by mouth 3 times daily as needed       MELATONIN PO      Take 3 mg by mouth At Bedtime       metFORMIN 1000 MG tablet    GLUCOPHAGE    180 tablet    Take 1 tablet (1,000 mg) by mouth 2 times daily (with meals) with breakfast and dinner.       mometasone-formoterol 100-5 MCG/ACT oral inhaler    DULERA     Inhale 2 puffs into the lungs 2 times daily       order for DME     1 each    Dispense one nebulizer machine with necessary supplies       order for DME     1 each    Equipment being ordered: Nebulizer machine with mask and tubing       pantoprazole 40 MG EC tablet    PROTONIX     Take 40 mg by mouth daily Start[facility]       primidone 50 MG tablet    MYSOLINE    154 tablet    Take 1 tablet twice a day for the next 2 weeks, then go to 1 tablet three times a day.       propafenone 150 MG Tabs tablet    RYTHMOL    180 tablet    Take 1 tablet (150 mg) by mouth 2 times daily       propranolol 40 MG tablet    INDERAL    180 tablet    Take 2-3 tablets ( mg) by mouth 2 times daily       simvastatin 80 MG tablet    ZOCOR    30 tablet    Take 0.5 tablets (40 mg) by mouth At Bedtime Profile Rx: patient will contact pharmacy when needed       SPIRIVA RESPIMAT 2.5 MCG/ACT inhalation aerosol   Generic drug:  tiotropium     4 g    INHALE 2 PUFFS INTO THE LUNGS DAILY    "    sulfaSALAzine 500 MG tablet    AZULFIDINE    90 tablet    TAKE ONE TABLET BY MOUTH THREE TIMES A DAY       * warfarin 5 MG tablet    COUMADIN    96 tablet    As directed by Coumadin clinic. Profile Rx: patient will contact pharmacy when needed       * warfarin 5 MG tablet    COUMADIN    90 tablet    7.5mg M,Th and 5mg ROW As directed by Coumadin clinic       * Notice:  This list has 2 medication(s) that are the same as other medications prescribed for you. Read the directions carefully, and ask your doctor or other care provider to review them with you.

## 2017-04-20 ENCOUNTER — ANTICOAGULATION THERAPY VISIT (OUTPATIENT)
Dept: NURSING | Facility: CLINIC | Age: 72
End: 2017-04-20
Payer: COMMERCIAL

## 2017-04-20 DIAGNOSIS — I48.91 ATRIAL FIBRILLATION, UNSPECIFIED TYPE (H): ICD-10-CM

## 2017-04-20 DIAGNOSIS — Z79.01 LONG-TERM (CURRENT) USE OF ANTICOAGULANTS: ICD-10-CM

## 2017-04-20 LAB — INR POINT OF CARE: 2.5 (ref 0.86–1.14)

## 2017-04-20 PROCEDURE — 99207 ZZC NO CHARGE NURSE ONLY: CPT

## 2017-04-20 PROCEDURE — 85610 PROTHROMBIN TIME: CPT | Mod: QW

## 2017-04-20 PROCEDURE — 36416 COLLJ CAPILLARY BLOOD SPEC: CPT

## 2017-04-20 NOTE — PROGRESS NOTES
ANTICOAGULATION FOLLOW-UP CLINIC VISIT    Patient Name:  Cayetano Lunsford  Date:  4/20/2017  Contact Type:  Face to Face    SUBJECTIVE:     Patient Findings     Positives Change in medications (Primidone increased 4/18 to 50 mg BID for 2 weeks then will increase to three times a day. Known interaction with Warfarin, monitoring INR closely.)    Comments No problem findings today. Patient reports taking 7.5 mg on Thursdays instead of Fridays. Maintenance dose updated to reflect this.            OBJECTIVE    INR Protime   Date Value Ref Range Status   04/20/2017 2.5 (A) 0.86 - 1.14 Final       ASSESSMENT / PLAN  INR assessment THER    Recheck INR In: 10 DAYS    INR Location Clinic      Anticoagulation Summary as of 4/20/2017     INR goal 2.0-3.0   Today's INR 2.5   Maintenance plan 7.5 mg (5 mg x 1.5) on Mon, Thu; 5 mg (5 mg x 1) all other days   Full instructions 7.5 mg on Mon, Thu; 5 mg all other days   Weekly total 40 mg   Plan last modified Kirstin Meléndez RN (4/20/2017)   Next INR check 5/1/2017   Priority INR   Target end date     Indications   Long-term (current) use of anticoagulants [Z79.01] [Z79.01]  Atrial fibrillation (H) [I48.91]         Anticoagulation Episode Summary     INR check location     Preferred lab     Send INR reminders to [facility]    Comments       Anticoagulation Care Providers     Provider Role Specialty Phone number    Debbie Lewis MD [facility] [phone]            See the Encounter Report to view Anticoagulation Flowsheet and Dosing Calendar (Go to Encounters tab in chart review, and find the Anticoagulation Therapy Visit)    Patient aware if signs of clotting (pain, tenderness, swelling, color change in leg or arm, SOB) and bleeding occur (blood in stool, urine, large bruising, bleeding gums, nosebleeds) to have INR check sooner. If sx severe report to ER or concerned for stroke call 911. If general questions or concerns arise, call  clinic.      Kirstin Meléndez RN

## 2017-04-24 ENCOUNTER — ALLIED HEALTH/NURSE VISIT (OUTPATIENT)
Dept: PHARMACY | Facility: CLINIC | Age: 72
End: 2017-04-24
Payer: COMMERCIAL

## 2017-04-24 DIAGNOSIS — E11.649 TYPE 2 DIABETES MELLITUS WITH HYPOGLYCEMIA WITHOUT COMA, WITH LONG-TERM CURRENT USE OF INSULIN (H): Primary | ICD-10-CM

## 2017-04-24 DIAGNOSIS — E78.5 HYPERLIPIDEMIA LDL GOAL <100: ICD-10-CM

## 2017-04-24 DIAGNOSIS — Z79.4 TYPE 2 DIABETES MELLITUS WITH HYPOGLYCEMIA WITHOUT COMA, WITH LONG-TERM CURRENT USE OF INSULIN (H): Primary | ICD-10-CM

## 2017-04-24 PROCEDURE — 99607 MTMS BY PHARM ADDL 15 MIN: CPT | Performed by: PHARMACIST

## 2017-04-24 PROCEDURE — 99606 MTMS BY PHARM EST 15 MIN: CPT | Performed by: PHARMACIST

## 2017-04-24 NOTE — PROGRESS NOTES
SUBJECTIVE/OBJECTIVE:                                                    Cayetano Lunsford is a 72 year old male called for a follow-up visit for Medication Therapy Management.  He was referred to me from Dr. Debbie Lewis.     Chief Complaint: Follow up from our visit on 4/10/17.  Pt reports the increase of primidone has gone fine and a little more sleepy right after he takes it. He has been very active in the last two weeks cleaning out his basement. Activity will start to taper down and he will not be as active.   Personal Healthcare Goals: Did not discuss  Tobacco: No tobacco use   Alcohol: not currently using    Medication Adherence: no issues reported    Diabetes:  Pt currently taking metformin bid, Novolin N 10 units AM before breakfast and 14 units in PM-at dinner, Novolin R-13 units with breakfast (decrease by 2 units if more active), 2 units with lunch and 2 units dinner, between 80 and 120, reduce your R insulin by 2 units. If below 70 skip dose. He gives his shots in his arms. He does not have any issues with giving stomach just has been doing arms.  He is now writing the date on the Novolin R and discarding after 42 days.   SMBG: 3-4 times daily.   Ranges (per pt report): see chart below. About the same number of lows but most are explainable  Symptoms of low blood sugar? Fingers and lips tingle, shaky and sweaty. Frequency of hypoglycemia? More lows since last visit mostly due to more activity. Treating with small amount of orange juice  Recent symptoms of high blood sugar? none.  Eye exam: up to date  Foot exam: up to date  Microalbumin is not < 30 mg/g. Pt is taking an ACEi/ARB.  Aspirin: Not taking due to anticoagulation therapy and increased risk of bleeding  Diet/Exercise:  No change from last visit: He knows he should get rid of pancakes and sugar cereal but cant find cereal that tastes good with less sugar. Activity has been more lately due to cleaning out basement but his will decrease in the  next few days so activity my no longer push him low.     Averages 7 day: 137   14 day: 142   30 day: 154    Date FBG/ 2hours post Lunch/2hours post Dinner /2hours post    4/24/17 90 (10 R)      4/23 122 (9 R - more active) No shot or reading 84 (no R)    4/22 210 (9 R - more active) 202 (R1) 62 - drank orange juice and had lasagna for dinner then at 7:49 - 182    4/21 142 (9 R) more active  213 (R1 - more active) 99 (R1) 56 - 10pm    4/20 192 188 (R1 - more active) 116 (R1)    4/19 176 (9 R) 197 (R1) 126    4/18 102 (10 R) 73 - 4 ounces of juice (No R) 104 (R1) 258 - had lemon cake   4/17 107 (10 R) 66 - No R 166    4/16 121 (R 11) 184 93 (R1)    4/15 190 (R 11) more active 140 (R1 - more active) 146 193   4/14 139 132 232 - big meal at [facility] lunch meal    4/13 163 (R 11) 176 (R1 - more active) 52 - drank orange juice and dinner(no R)    4/12 165 (R11 - more active) 192 (R1) 73 - ate dinner late this night no R, drank orange juice and ate 111   4/11 195 (blueberries with cereal) 196  104 (R1)    4/10 118 145 (R1) 120 (R1) Gave insulin late       From last visit  Date FBG/ 2hours post Lunch/2hours post Dinner /2hours post    4/10 118      4/9 193 233 59 - 4 oz orange juice then dinner did not recheck - He was more active in the afternoon but didn't cut back R at lunch or breakfast 11:36pm - 68 4 oz orange juice   4/8 149 109 - R1 89 - R1  11:08pm - 55 4 oz orange juice - more active that day and didn't decrease R in the morning.   4/7 173 (R - 11 because more active) 89 - R1 137 87   4/6 156 (R -11 more active) 182 - R1 more active 124    4/5 195 (R - 11 more activity planned) 176 - R1 more active 151    4/4 172 99 - R1 60 - no R (was more active this day and didn't reduce the R at breakfast)    4/3 202 242 131 112   4/2 239 (blueberries with cereal) 230 242    4/1 230 275 198 (small slice cake before dinner) 219   3/31 169  189 177    3/30 157 no reading or shot he was out 154    3/29 184 - increased primidone to  50 gm  91 - R1    3/28 195 262 163          Current labs include:  BP Readings from Last 3 Encounters:   04/18/17 118/58   03/15/17 135/56   03/13/17 140/66     Today's Vitals: There were no vitals taken for this visit. - phone visit.    Lab Results   Component Value Date    A1C 6.9 10/17/2016    A1C 6.2 07/22/2016    A1C 6.7 03/28/2016    A1C 7.1 11/10/2015    A1C 7.6 05/12/2015     Lab Results   Component Value Date    CHOL 128 05/05/2016     Lab Results   Component Value Date    TRIG 122 05/05/2016     Lab Results   Component Value Date    HDL 47 05/05/2016     Lab Results   Component Value Date    LDL 57 05/05/2016       Liver Function Studies -   Recent Labs   Lab Test  03/13/17   1050   PROTTOTAL  7.0   ALBUMIN  3.8   BILITOTAL  0.4   ALKPHOS  60   AST  20   ALT  22       Lab Results   Component Value Date    UCRR 91 05/05/2016    MICROL 39 05/05/2016    UMALCR 42.76 (H) 05/05/2016       Last Basic Metabolic Panel:  Lab Results   Component Value Date     03/13/2017      Lab Results   Component Value Date    POTASSIUM 4.6 03/13/2017     Lab Results   Component Value Date    CHLORIDE 100 03/13/2017     Lab Results   Component Value Date    BUN 16 03/13/2017     Lab Results   Component Value Date    CR 0.76 03/13/2017     GFR Estimate   Date Value Ref Range Status   03/13/2017 >90  Non  GFR Calc   >60 mL/min/1.7m2 Final   01/09/2017 >90 >60 mL/min/1.7m2 Final   10/03/2016 >60 >60 ml/min/1.73m2 Final     TSH   Date Value Ref Range Status   03/13/2017 3.25 0.40 - 4.00 mU/L Final   ]    Most Recent Immunizations   Administered Date(s) Administered     Hepatitis B 08/05/2014     Influenza (H1N1) 01/08/2010     Influenza (High Dose) 3 valent vaccine 09/07/2016     Influenza (IIV3) 09/11/2009     Pneumococcal (PCV 13) 11/10/2015     Pneumococcal 23 valent 07/20/2010     TD (ADULT, 7+) 08/08/2008     TDAP Vaccine (Adacel) 08/05/2014     Zoster vaccine, live 06/13/2008     ASSESSMENT:                                                     Current medications were reviewed today as discussed above.      Medication Adherence: no issues identified    Diabetes: Needs Improvement. Patient is meeting A1c goal of < 7%. Self monitoring of blood glucose is at goal of fasting  mg/dL and post prandial < 180 mg/dL most of the time but still having some lows before dinner that may be due to too high of NPH dose in the morning in combination with increased activity. Pt would benefit from decreasing Novolin N AM dose to 9 units as well as remembering to adjust Novolin R based on activity and preprandial readings, see plan. Microalbumin is not at goal < 30 mg/g. Taking an ARB at max dose. Aspirin therapy is not indicated in this patient due to anticoagulant/antiplatelet therapy.      PLAN:                                                      1. Be sure to decrease your R in the morning by 4 units if you are going to be more active AND your blood sugar before breakfast is .    2. Decrease Novolin N to 9 units in the morning.    3. If its been a really active day and your blood sugar before dinner is less than 100 skip the Novolin R.     4. Schedule a lab only visit on 5/5/17 and I will order the lipid, A1C and urine protein test.  Be sure you are fasting for at least 10 hours before your blood draw.    5. Ordered Lipid, A1C and urine albumin and pt will do lab only on or after 5/5/17.    I spent 30 minutes with this patient today.  All changes were made via collaborative practice agreement with Debbie Lewis. A copy of the visit note was provided to the patient's primary care provider.     Will follow up in 2.6 weeks.    The patient was mailed a summary of these recommendations as an after visit summary.    Tanna Diaz, PharmD  Medication Therapy Management Pharmacist

## 2017-04-24 NOTE — Clinical Note
Slight adjustment to Novolin N today, see note for details. Also ordered labs coming due and he will schedule lab only.  Tanna Diaz, PharmD Medication Therapy Management Pharmacist

## 2017-04-24 NOTE — MR AVS SNAPSHOT
After Visit Summary   4/24/2017    Cayetano Lunsford    MRN: [id number]           Patient Information     Date Of Birth          1945        Visit Information        Provider Department      4/24/2017 9:30 AM Jessica Diaz, [facility] MTM        Today's Diagnoses     Hyperlipidemia LDL goal <100    -  1    Type 2 diabetes mellitus with hypoglycemia without coma, with long-term current use of insulin (H)          Care Instructions    Recommendations from today's MTM visit:                                                      1. Be sure to decrease your R in the morning by 4 units if you are going to be more active AND your blood sugar before breakfast is .    2. Decrease Novolin N to 9 units in the morning.    3. If its been a really active day and your blood sugar before dinner is less than 100 skip the Novolin R.     4. Schedule a lab only visit on 5/5/17 and I will order the lipid, A1C and urine protein test.  Be sure you are fasting for at least 10 hours before your blood draw.    Next MTM visit: 5/10/17 at 9:30 I will call you    To schedule another MTM appointment, please call the clinic directly or you may call the MTM scheduling line at [phone] or toll-free at [phone].     My Clinical Pharmacist's contact information:                                                      It was a pleasure seeing you today!  Please feel free to contact me with any questions or concerns you have.      Tanna Diaz, PharmD  Medication Therapy Management Pharmacist  [facility] - Monday and Wednesday 7:30 - 4:00  Phone: [phone] - direct clinic line    You may receive a survey about the MT services you received.  I would appreciate your feedback to help me serve you better in the future. Please fill it out and return it when you can. Your comments will be anonymous.                  Follow-ups after your visit        Your next 10 appointments already scheduled      May 01, 2017 11:30 AM CDT   [facility] with  [facility] CL[facility]IC   [facility]    [address] [place] [id number] [phone]            May 05, 2017 10:00 AM CDT   LAB with  LAB   [facility]    [address] [place] [id number] [phone]           Patient must bring picture ID.  Patient should be prepared to give a urine specimen  Please do not eat 10-12 hours before your appointment if you are coming in fasting for labs on lipids, cholesterol, or glucose (sugar).  Pregnant women should follow their Care Team instructions. Water with medications is okay. Do not drink coffee or other fluids.   If you have concerns about taking  your medications, please ask at office or if scheduling via [employer], send a message by clicking on Secure Messaging, Message Your Care Team.            May 10, 2017  9:30 AM CDT   TELEMEDICINE with Jessica Diaz [facility] ([facility])    [address] [place] [id number] [phone]           Note: this is not an onsite visit; there is no need to come to the facility.            Jun 20, 2017  9:00 AM CDT   [facility] Visit with Bong Yoo MD   [facility]    [address] [place] [address] [phone]            Jun 20, 2017 10:20 AM CDT   [facility] Visit with Debbie Lewis MD   [facility]    [address] [place] [address] [phone]           Bring a current list of meds and any records pertaining to this visit.  For Physicals, please bring immunization records and any forms needing to be filled out.  Please arrive 10 minutes early to complete paperwork.            Jun 21, 2017 12:45 PM CDT   RETURN 45 with Natanael Dang MD   [facility]  [facility] [place] [facility])    [address] [place] [id number] [phone]              Future tests that were ordered for you today     Open Future Orders        Priority Expected Expires Ordered    Hemoglobin A1c Routine  2018    Lipid panel reflex to direct LDL Routine  2018    Albumin Random Urine Quantitative Routine  2018            Who to contact     If you have questions or need follow up information about today's clinic visit or your schedule please contact [facility] MTM directly at [phone].  Normal or non-critical lab and imaging results will be communicated to you by MyChart, letter or phone within 4 business days after the clinic has received the results. If you do not hear from us within 7 days, please contact the clinic through MyChart or phone. If you have a critical or abnormal lab result, we will notify you by phone as soon as possible.  Submit refill requests through [employer] or call your pharmacy and they will forward the refill request to us. Please allow 3 business days for your refill to be completed.          Additional Information About Your Visit        MyChart Information     [employer] lets you send messages to your doctor, view your test results, renew your prescriptions, schedule appointments and more. To sign up, go to [email or web address] . Click on \"Log in\" on the left side of the screen, which will take you to the Welcome page. Then click on \"Sign up Now\" on the right side of the page.     You will be asked to enter the access code listed below, as well as some personal information. Please follow the directions to create your username and password.     Your access code is: [email or web address]  Expires: 2017 10:57 AM     Your access code will  in 90 days. If you need help or a new code, please call your [place] clinic or [phone].        Care EveryWhere ID     This " is your Care EveryWhere ID. This could be used by other organizations to access your [place] medical records  [id number]         Blood Pressure from Last 3 Encounters:   04/18/17 118/58   03/15/17 135/56   03/13/17 140/66    Weight from Last 3 Encounters:   04/18/17 201 lb (91.2 kg)   03/15/17 199 lb 12 oz (90.6 kg)   03/13/17 197 lb (89.4 kg)                 Today's Medication Changes          These changes are accurate as of: 4/24/17  9:56 AM.  If you have any questions, ask your nurse or doctor.               These medicines have changed or have updated prescriptions.        Dose/Directions    insulin  UNIT/ML injection   Commonly known as:  NovoLIN N RELION   This may have changed:  additional instructions   Used for:  Type 2 diabetes mellitus with hypoglycemia without coma, with long-term current use of insulin (H)        Inject 9 units in the morning and 14 units in the evening.   Quantity:  10 mL   Refills:  2       ipratropium - albuterol 0.5 mg/2.5 mg/3 mL 0.5-2.5 (3) MG/3ML neb solution   Commonly known as:  DUONEB   This may have changed:  reasons to take this   Used for:  Chronic obstructive bronchitis (H)        Dose:  3 mL   Inhale 1 vial (3 mLs) into the lungs every 4 hours as needed for shortness of breath / dyspnea   Quantity:  270 mL   Refills:  11            Where to get your medicines      These medications were sent to [facility] [address] [place] [address]) [place] [address]     Phone:  [phone]     insulin  UNIT/ML injection                Primary Care Provider Office Phone # Fax #    Debbie Lewis [phone]       [facility] [address] [facility] [address]        Thank you!     Thank you for choosing [facility]  for your care. Our goal is always to provide you with excellent care. Hearing back from our patients is one way we can continue to  "improve our services. Please take a few minutes to complete the written survey that you may receive in the mail after your visit with us. Thank you!             Your Updated Medication List - Protect others around you: Learn how to safely use, store and throw away your medicines at www.disposemymeds.org.          This list is accurate as of: 4/24/17  9:56 AM.  Always use your most recent med list.                   Brand Name Dispense Instructions for use    albuterol 108 (90 BASE) MCG/ACT Inhaler    PROAIR HFA/PROVENTIL HFA/VENTOLIN HFA    1 Inhaler    Inhale 1-2 puffs into the lungs every 4 hours as needed (for shortness of breath/wheezing)       ASPIRIN NOT PRESCRIBED    INTENTIONAL     Reported on 3/15/2017       azelastine 0.1 % spray    ASTELIN    1 Bottle    Spray 1-2 sprays into both nostrils 2 times daily       BD ULTRA FINE PEN NEEDLES     100 each    Use to inject insulin twice daily.       blood glucose monitoring lancets     100 Each    Use to test up to four times per day       blood glucose monitoring test strip    no brand specified    360 each    Test 4 times daily or as directed. Profile Rx: patient will contact pharmacy when needed       FLORAJEN3 Caps     60 capsule    Take 1 capsule by mouth 2 times daily       FOLIC ACID PO      Take 1 mg by mouth daily       insulin  UNIT/ML injection    NovoLIN N RELION    10 mL    Inject 9 units in the morning and 14 units in the evening.       insulin regular 100 UNIT/ML injection    NovoLIN R RELION    10 mL    Inject 13 units with breakfast, 2 units with lunch and 2 units with dinner (when mixing with NPH, draw this insulin up first)       insulin syringe-needle U-100 31G X 5/16\" 0.5 ML    BD insulin syringe ultrafine    100 each    Use one syringe 2 daily or as directed.       ipratropium - albuterol 0.5 mg/2.5 mg/3 mL 0.5-2.5 (3) MG/3ML neb solution    DUONEB    270 mL    Inhale 1 vial (3 mLs) into the lungs every 4 hours as needed for shortness " of breath / dyspnea       losartan 100 MG tablet    COZAAR    90 tablet    Take 1 tablet (100 mg) by mouth daily for hypertension.       meclizine 25 MG tablet    ANTIVERT    30 tablet    Take 1 tablet (25 mg) by mouth 3 times daily as needed       MELATONIN PO      Take 3 mg by mouth At Bedtime       metFORMIN 1000 MG tablet    GLUCOPHAGE    180 tablet    Take 1 tablet (1,000 mg) by mouth 2 times daily (with meals) with breakfast and dinner.       mometasone-formoterol 100-5 MCG/ACT oral inhaler    DULERA     Inhale 2 puffs into the lungs 2 times daily       order for DME     1 each    Dispense one nebulizer machine with necessary supplies       order for DME     1 each    Equipment being ordered: Nebulizer machine with mask and tubing       pantoprazole 40 MG EC tablet    PROTONIX     Take 40 mg by mouth daily Started by Dr. Debbie Rico at [facility]       primidone 50 MG tablet    MYSOLINE    154 tablet    Take 1 tablet twice a day for the next 2 weeks, then go to 1 tablet three times a day.       propafenone 150 MG Tabs tablet    RYTHMOL    180 tablet    Take 1 tablet (150 mg) by mouth 2 times daily       propranolol 40 MG tablet    INDERAL    180 tablet    Take 2-3 tablets ( mg) by mouth 2 times daily       simvastatin 80 MG tablet    ZOCOR    30 tablet    Take 0.5 tablets (40 mg) by mouth At Bedtime Profile Rx: patient will contact pharmacy when needed       SPIRIVA RESPIMAT 2.5 MCG/ACT inhalation aerosol   Generic drug:  tiotropium     4 g    INHALE 2 PUFFS INTO THE LUNGS DAILY       sulfaSALAzine 500 MG tablet    AZULFIDINE    90 tablet    TAKE ONE TABLET BY MOUTH THREE TIMES A DAY       warfarin 5 MG tablet    COUMADIN    90 tablet    7.5mg M,Th and 5mg ROW As directed by Coumadin clinic

## 2017-05-01 ENCOUNTER — ANTICOAGULATION THERAPY VISIT (OUTPATIENT)
Dept: NURSING | Facility: CLINIC | Age: 72
End: 2017-05-01
Payer: COMMERCIAL

## 2017-05-01 DIAGNOSIS — Z79.01 LONG-TERM (CURRENT) USE OF ANTICOAGULANTS: ICD-10-CM

## 2017-05-01 DIAGNOSIS — I48.91 ATRIAL FIBRILLATION, UNSPECIFIED TYPE (H): ICD-10-CM

## 2017-05-01 LAB — INR POINT OF CARE: 1.8 (ref 0.86–1.14)

## 2017-05-01 PROCEDURE — 36416 COLLJ CAPILLARY BLOOD SPEC: CPT

## 2017-05-01 PROCEDURE — 85610 PROTHROMBIN TIME: CPT | Mod: QW

## 2017-05-01 PROCEDURE — 99207 ZZC NO CHARGE NURSE ONLY: CPT

## 2017-05-01 NOTE — PROGRESS NOTES
"  ANTICOAGULATION FOLLOW-UP CLINIC VISIT    Patient Name:  Cayetano Lunsford  Date:  5/1/2017  Contact Type:  Face to Face    SUBJECTIVE:     Patient Findings     Positives Change in medications (Primidone 50 mg BID increases tomorrow to TID. Per up to date: \"CYP2C9 Inducers (Strong) may increase the metabolism of CYP2C9 Substrates.\" Per micromedex: \"Concurrent use of PRIMIDONE and WARFARIN may result in decreased anticoagulant effectiveness.\")    Comments Patient is confident he missed no doses. Patient denies any history of GI bleeding or rectal bleeding.             OBJECTIVE    INR Protime   Date Value Ref Range Status   05/01/2017 1.8 (A) 0.86 - 1.14 Final       ASSESSMENT / PLAN  INR assessment SUB    Recheck INR In: 1 WEEK    INR Location Clinic      Anticoagulation Summary as of 5/1/2017     INR goal 2.0-3.0   Today's INR 1.8!   Maintenance plan 5 mg (5 mg x 1) on Mon, Wed, Fri; 7.5 mg (5 mg x 1.5) all other days   Full instructions 5/1: 7.5 mg; 5/2: 5 mg; Otherwise 5 mg on Mon, Wed, Fri; 7.5 mg all other days   Weekly total 45 mg   Plan last modified Kirstin Meléndez RN (5/1/2017)   Next INR check 5/8/2017   Priority INR   Target end date     Indications   Long-term (current) use of anticoagulants [Z79.01] [Z79.01]  Atrial fibrillation (H) [I48.91]         Anticoagulation Episode Summary     INR check location     Preferred lab     Send INR reminders to [facility] CLINIC    Comments       Anticoagulation Care Providers     Provider Role Specialty Phone number    Debbie Lewis MD [facility] [phone]            See the Encounter Report to view Anticoagulation Flowsheet and Dosing Calendar (Go to Encounters tab in chart review, and find the Anticoagulation Therapy Visit)    Patient advised 10% increase per protocol given effects of Primidone on warfarin metabolism. Patient will return in one week (as other labs are due) to ensure stable.     Patient aware if signs of clotting (pain, " tenderness, swelling, color change in leg or arm, SOB) and bleeding occur (blood in stool, urine, large bruising, bleeding gums, nosebleeds) to have INR check sooner. If sx severe report to ER or concerned for stroke call 911. If general questions or concerns arise, call clinic.      Kirstin Meléndez RN

## 2017-05-01 NOTE — MR AVS SNAPSHOT
Cayetano Lunsford   5/1/2017 11:30 AM   Anticoagulation Therapy Visit    Description:  72 year old male   Provider:   INR CLINIC   Department:   Nurse           INR as of 5/1/2017     Today's INR 1.8!      Anticoagulation Summary as of 5/1/2017     INR goal 2.0-3.0   Today's INR 1.8!   Full instructions 5/1: 7.5 mg; 5/2: 5 mg; Otherwise 5 mg on Mon, Wed, Fri; 7.5 mg all other days   Next INR check 5/8/2017    Indications   Long-term (current) use of anticoagulants [Z79.01] [Z79.01]  Atrial fibrillation (H) [I48.91]         Your next Anticoagulation Clinic appointment(s)     May 08, 2017 10:30 AM CDT   Anticoagulation Visit with  INR CLINIC   [facility]    [address] [place] [address] [phone]              Contact Numbers     [facility]  Please call [phone] to cancel and/or reschedule your appointment   Please call [phone] with any problems or questions regarding your therapy.        May 2017 Details    Sun Mon Tue Wed Thu Fri Sat      1      7.5 mg   See details      2      5 mg         3      5 mg         4      7.5 mg         5      5 mg         6      7.5 mg           7      7.5 mg         8            9               10               11               12               13                 14               15               16               17               18               19               20                 21               22               23               24               25               26               27                 28               29               30               31                   Date Details   05/01 This INR check       Date of next INR:  5/8/2017         How to take your warfarin dose     To take:  5 mg Take 1 of the 5 mg tablets.    To take:  7.5 mg Take 1.5 of the 5 mg tablets.

## 2017-05-08 ENCOUNTER — ANTICOAGULATION THERAPY VISIT (OUTPATIENT)
Dept: NURSING | Facility: CLINIC | Age: 72
End: 2017-05-08
Payer: COMMERCIAL

## 2017-05-08 DIAGNOSIS — I48.91 ATRIAL FIBRILLATION, UNSPECIFIED TYPE (H): ICD-10-CM

## 2017-05-08 DIAGNOSIS — E78.5 HYPERLIPIDEMIA LDL GOAL <100: ICD-10-CM

## 2017-05-08 DIAGNOSIS — Z79.4 TYPE 2 DIABETES MELLITUS WITH HYPOGLYCEMIA WITHOUT COMA, WITH LONG-TERM CURRENT USE OF INSULIN (H): ICD-10-CM

## 2017-05-08 DIAGNOSIS — E11.649 TYPE 2 DIABETES MELLITUS WITH HYPOGLYCEMIA WITHOUT COMA, WITH LONG-TERM CURRENT USE OF INSULIN (H): ICD-10-CM

## 2017-05-08 DIAGNOSIS — Z79.01 LONG-TERM (CURRENT) USE OF ANTICOAGULANTS: ICD-10-CM

## 2017-05-08 LAB
CHOLEST SERPL-MCNC: 159 MG/DL
HBA1C MFR BLD: 5.8 % (ref 4.3–6)
HDLC SERPL-MCNC: 54 MG/DL
INR POINT OF CARE: 2.8 (ref 0.86–1.14)
LDLC SERPL CALC-MCNC: 83 MG/DL
NONHDLC SERPL-MCNC: 105 MG/DL
TRIGL SERPL-MCNC: 112 MG/DL

## 2017-05-08 PROCEDURE — 99207 ZZC NO CHARGE NURSE ONLY: CPT

## 2017-05-08 PROCEDURE — 83036 HEMOGLOBIN GLYCOSYLATED A1C: CPT | Performed by: FAMILY MEDICINE

## 2017-05-08 PROCEDURE — 80061 LIPID PANEL: CPT | Performed by: FAMILY MEDICINE

## 2017-05-08 PROCEDURE — 36416 COLLJ CAPILLARY BLOOD SPEC: CPT

## 2017-05-08 PROCEDURE — 85610 PROTHROMBIN TIME: CPT | Mod: QW

## 2017-05-08 NOTE — MR AVS SNAPSHOT
Cayetano Lunsford   5/8/2017 10:30 AM   Anticoagulation Therapy Visit    Description:  72 year old male   Provider:   INR CLINIC   Department:   Nurse           INR as of 5/8/2017     Today's INR 2.8      Anticoagulation Summary as of 5/8/2017     INR goal 2.0-3.0   Today's INR 2.8   Full instructions 5 mg on Mon, Wed, Fri; 7.5 mg all other days   Next INR check 5/22/2017    Indications   Long-term (current) use of anticoagulants [Z79.01] [Z79.01]  Atrial fibrillation (H) [I48.91]         Your next Anticoagulation Clinic appointment(s)     May 22, 2017 11:15 AM CDT   Anticoagulation Visit with  INR CLINIC   [facility]    [address] [place] [address] [phone]              Contact Numbers     [facility]  Please call [phone] to cancel and/or reschedule your appointment   Please call [phone] with any problems or questions regarding your therapy.        May 2017 Details    Sun Mon Tue Wed Thu Fri Sat      1               2               3               4               5               6                 7               8      5 mg   See details      9      7.5 mg         10      5 mg         11      7.5 mg         12      5 mg         13      7.5 mg           14      7.5 mg         15      5 mg         16      7.5 mg         17      5 mg         18      7.5 mg         19      5 mg         20      7.5 mg           21      7.5 mg         22            23               24               25               26               27                 28               29               30               31                   Date Details   05/08 This INR check       Date of next INR:  5/22/2017         How to take your warfarin dose     To take:  5 mg Take 1 of the 5 mg tablets.    To take:  7.5 mg Take 1.5 of the 5 mg tablets.

## 2017-05-08 NOTE — PROGRESS NOTES
"  ANTICOAGULATION FOLLOW-UP CLINIC VISIT    Patient Name:  Cayetano Lunsford  Date:  5/8/2017  Contact Type:  Face to Face    SUBJECTIVE:     Patient Findings     Positives Change in medications (Primidone increased to 50 mg TID on 5/2. Per up to date: \"CYP2C9 Inducers (Strong) may increase the metabolism of CYP2C9 Substrates.\" Per micromedex: \"Concurrent use of PRIMIDONE and WARFARIN may result in decreased anticoagulant effectiveness.\")), Inflammation (Pt reports minor bilateral leg soreness. Unclear if d/t recent moving of apartments or d/t a med interaction. Patient will f/u with Tanna CASTELLANO this week and discuss sx. )           OBJECTIVE    INR Protime   Date Value Ref Range Status   05/08/2017 2.8 (A) 0.86 - 1.14 Final       ASSESSMENT / PLAN  INR assessment THER    Recheck INR In: 2 WEEKS    INR Location Clinic      Anticoagulation Summary as of 5/8/2017     INR goal 2.0-3.0   Today's INR 2.8   Maintenance plan 5 mg (5 mg x 1) on Mon, Wed, Fri; 7.5 mg (5 mg x 1.5) all other days   Full instructions 5 mg on Mon, Wed, Fri; 7.5 mg all other days   Weekly total 45 mg   No change documented Kirstin Meléndez RN   Plan last modified Kirstin Meléndez RN (5/1/2017)   Next INR check 5/22/2017   Priority INR   Target end date     Indications   Long-term (current) use of anticoagulants [Z79.01] [Z79.01]  Atrial fibrillation (H) [I48.91]         Anticoagulation Episode Summary     INR check location     Preferred lab     Send INR reminders to [facility] CLINIC    Comments       Anticoagulation Care Providers     Provider Role Specialty Phone number    Debbie Lewis MD [facility] [phone]            See the Encounter Report to view Anticoagulation Flowsheet and Dosing Calendar (Go to Encounters tab in chart review, and find the Anticoagulation Therapy Visit)    Large jump in INR level 1.8>>2.8. Patient advised to continue current weekly dose of 45 mg and monitor very carefully for s/s of bleeding " as INR could continue to climb. Patient instructed to eat greens today and keep diet consistent. Patient verbalized understanding.    Patient aware if signs of clotting (pain, tenderness, swelling, color change in leg or arm, SOB) and bleeding occur (blood in stool, urine, large bruising, bleeding gums, nosebleeds) to have INR check sooner. If sx severe report to ER or concerned for stroke call 911. If general questions or concerns arise, call clinic.     Kirstin Meléndez RN

## 2017-05-10 ENCOUNTER — ALLIED HEALTH/NURSE VISIT (OUTPATIENT)
Dept: PHARMACY | Facility: CLINIC | Age: 72
End: 2017-05-10
Payer: COMMERCIAL

## 2017-05-10 DIAGNOSIS — E11.649 TYPE 2 DIABETES MELLITUS WITH HYPOGLYCEMIA WITHOUT COMA, WITH LONG-TERM CURRENT USE OF INSULIN (H): Primary | ICD-10-CM

## 2017-05-10 DIAGNOSIS — Z79.4 TYPE 2 DIABETES MELLITUS WITH HYPOGLYCEMIA WITHOUT COMA, WITH LONG-TERM CURRENT USE OF INSULIN (H): Primary | ICD-10-CM

## 2017-05-10 PROCEDURE — 99606 MTMS BY PHARM EST 15 MIN: CPT | Performed by: PHARMACIST

## 2017-05-10 PROCEDURE — 99607 MTMS BY PHARM ADDL 15 MIN: CPT | Performed by: PHARMACIST

## 2017-05-10 NOTE — PATIENT INSTRUCTIONS
Recommendations from today's MTM visit:                                                      1. Stop using Novolin R at lunch    2. Decrease Novolin R at breakfast to 9 units.     3. Keep reducing R by 2 units if your blood sugar reading is  and only adjust for activity after a week if you still have low blood sugars.    Next MTM visit: 5/17/17 at 9:30    To schedule another MTM appointment, please call the clinic directly or you may call the MTM scheduling line at [phone] or toll-free at [phone].     My Clinical Pharmacist's contact information:                                                      It was a pleasure seeing you today!  Please feel free to contact me with any questions or concerns you have.      Tanna Diaz, PharmD  Medication Therapy Management Pharmacist  [facility] - Monday and Wednesday 7:30 - 4:00  Phone: [phone] - direct clinic line    You may receive a survey about the MTM services you received.  I would appreciate your feedback to help me serve you better in the future. Please fill it out and return it when you can. Your comments will be anonymous.

## 2017-05-10 NOTE — PROGRESS NOTES
SUBJECTIVE/OBJECTIVE:                                                    Cayetano Lunsford is a 72 year old male called for a follow-up visit for Medication Therapy Management.  He was referred to me from Dr. Debbie Lewis.     Chief Complaint: Follow up from our visit on 4/24/14.  He is still in the process of moving down a level and cleaning his house out.  He has had a few lows since last visit. Reports calf muscle soreness is about the same - been there for a while, cant recall if there before the primidone but thinks it is from more activity. He is now taking primidone 50 mg three times daily and increased this on 5/2/17. No swelling or pain behind the knee.  Personal Healthcare Goals: Did not discuss today.  Tobacco: No tobacco use   Alcohol: not currently using    Medication Adherence: no issues reported    Diabetes:  Pt currently taking metformin bid, Novolin N 9 units AM before breakfast (decreased by 1 unit at last visit) and 14 units in PM-at dinner, Novolin R-13 units with breakfast (decrease by 2 units if more active), 2 units with lunch and 2 units dinner, between 80 and 120, reduce your R insulin by 2 units. If below 70 skip dose. He gives his shots in his arms. He does not have any issues with giving stomach just has been doing arms.  He is now writing the date on the Novolin R and discarding after 42 days.   SMBG: 3-4 times daily.   Ranges (per pt report): see chart below. About the same number of lows but most are explainable  Symptoms of low blood sugar? Fingers and lips tingle, shaky and sweaty. Frequency of hypoglycemia? More lows since last visit mostly due to more activity. Treating with small amount of orange juice  Recent symptoms of high blood sugar? none.  Eye exam: up to date  Foot exam: up to date  Microalbumin is not < 30 mg/g. Pt is taking an ACEi/ARB.  Aspirin: Not taking due to anticoagulation therapy and increased risk of bleeding  Diet/Exercise:  No change from last visit: He knows  he should get rid of pancakes and sugar cereal but cant find cereal that tastes good with less sugar. Activity has been more lately due to cleaning out basement but his will decrease in the next few days so activity my no longer push him low.     Date FBG/ 2hours post Lunch/2hours post Dinner /2hours post Bedtime   5/10 111 (R 9)      5/9 168 (R 9)  89 (No R) 140   5/8 120 (R 10)  76 no R 128   5/7 4AM: sweaty, didn't check but drank some OJ; Breakfast 147  282 (donute and cereal for breakfast) 321   5/6 165 166 130    5/5 151 230 ( R1) 66 (No R, 4oz OJ)    5/4 148 127 ( R1) 168    5/3 159 ( R9) 132 ( R1) later in the afternoon at 4:59 pm it was 57 - drank 4 oz     5/2 105 ( R9) 135 (R1) 109 (R1)    5/1 192 160 92 ( No R)    4/30 196 ( R9) No reading or shot but did eat lunch 127    4/29 206 ( at chips and dip the night before) 216 ( No R) 62 (4 oz OJ and at dinner)    4/28 147 ( R9) 193 ( R1) 163    4/27 162  227 ( R1) 123 120   4/26 244 (R9) 234 ( R1) at cereal and piece of sweet toast for breakfast 5:19 pm 68 drank 4 oz OJ, then 88 before dinner no R then at 9:19 was 92 108   4/25 213 200 (R1) 63 before dinner    4/24 90 ( R10) 105 (R1) 115 ( R)      Last visit  Date FBG/ 2hours post Lunch/2hours post Dinner /2hours post    4/24/17 90 (10 R)      4/23 122 (9 R - more active) No shot or reading 84 (no R)    4/22 210 (9 R - more active) 202 (R1) 62 - drank orange juice and had lasagna for dinner then at 7:49 - 182    4/21 142 (9 R) more active  213 (R1 - more active) 99 (R1) 56 - 10pm    4/20 192 188 (R1 - more active) 116 (R1)    4/19 176 (9 R) 197 (R1) 126    4/18 102 (10 R) 73 - 4 ounces of juice (No R) 104 (R1) 258 - had lemon cake   4/17 107 (10 R) 66 - No R 166    4/16 121 (R 11) 184 93 (R1)    4/15 190 (R 11) more active 140 (R1 - more active) 146 193   4/14 139 132 232 - big meal at [facility] lunch meal    4/13 163 (R 11) 176 (R1 - more active) 52 - drank orange juice and dinner(no R)    4/12 165 (R11 -  more active) 192 (R1) 73 - ate dinner late this night no R, drank orange juice and ate 111   4/11 195 (blueberries with cereal) 196  104 (R1)    4/10 118 145 (R1) 120 (R1) Gave insulin late       Current labs include:  BP Readings from Last 3 Encounters:   04/18/17 118/58   03/15/17 135/56   03/13/17 140/66     Today's Vitals: There were no vitals taken for this visit. - phone visit    Lab Results   Component Value Date    A1C 5.8 05/08/2017    A1C 6.9 10/17/2016    A1C 6.2 07/22/2016    A1C 6.7 03/28/2016    A1C 7.1 11/10/2015       .  Lab Results   Component Value Date    CHOL 159 05/08/2017     Lab Results   Component Value Date    TRIG 112 05/08/2017     Lab Results   Component Value Date    HDL 54 05/08/2017     Lab Results   Component Value Date    LDL 83 05/08/2017       Liver Function Studies -   Recent Labs   Lab Test  03/13/17   1050   PROTTOTAL  7.0   ALBUMIN  3.8   BILITOTAL  0.4   ALKPHOS  60   AST  20   ALT  22       Lab Results   Component Value Date    UCRR 91 05/05/2016    MICROL 39 05/05/2016    UMALCR 42.76 (H) 05/05/2016       Last Basic Metabolic Panel:  Lab Results   Component Value Date     03/13/2017      Lab Results   Component Value Date    POTASSIUM 4.6 03/13/2017     Lab Results   Component Value Date    CHLORIDE 100 03/13/2017     Lab Results   Component Value Date    BUN 16 03/13/2017     Lab Results   Component Value Date    CR 0.76 03/13/2017     GFR Estimate   Date Value Ref Range Status   03/13/2017 >90  Non  GFR Calc   >60 mL/min/1.7m2 Final   01/09/2017 >90 >60 mL/min/1.7m2 Final   10/03/2016 >60 >60 ml/min/1.73m2 Final     TSH   Date Value Ref Range Status   03/13/2017 3.25 0.40 - 4.00 mU/L Final   ]    Most Recent Immunizations   Administered Date(s) Administered     Hepatitis B 08/05/2014     Influenza (H1N1) 01/08/2010     Influenza (High Dose) 3 valent vaccine 09/07/2016     Influenza (IIV3) 09/11/2009     Pneumococcal (PCV 13) 11/10/2015      Pneumococcal 23 valent 07/20/2010     TD (ADULT, 7+) 08/08/2008     TDAP Vaccine (Adacel) 08/05/2014     Zoster vaccine, live 06/13/2008     ASSESSMENT:                                                    Current medications were reviewed today as discussed above.      Medication Adherence: no issues identified    Diabetes: Needs Improvement. Patient is meeting A1c goal of < 7%. He continues to have hypoglycemia. Pt would benefit from Bolus / Rapid Acting Insulin (Novolin N and R) : decrease dose of R to 9 units with breakfast and no units at lunch. Microalbumin is not at goal < 30 mg/g. Taking an ARB at max dose. Aspirin therapy is not indicated in this patient due to anticoagulant/antiplatelet therapy.      PLAN:                                                      1. Stop using Novolin R at lunch    2. Decrease Novolin R at breakfast to 9 units.     3. Keep reducing R by 2 units if your blood sugar reading is  and only adjust for activity after a week if you still have low blood sugars.    I spent 30 minutes with this patient today.  All changes were made via collaborative practice agreement with Debbie Lewis. A copy of the visit note was provided to the patient's primary care provider.     Will follow up in 1 week.    The patient was mailed a summary of these recommendations as an after visit summary.    Tanna Diaz, PharmD  Medication Therapy Management Pharmacist

## 2017-05-10 NOTE — Clinical Note
Insulin dose changed, see note for details.  Tanna Diaz, PharmD Medication Therapy Management Pharmacist

## 2017-05-10 NOTE — MR AVS SNAPSHOT
After Visit Summary   5/10/2017    Cayetano Lunsford    MRN: [id number]           Patient Information     Date Of Birth          1945        Visit Information        Provider Department      5/10/2017 9:30 AM Jessica Diaz RPH [facility]        Today's Diagnoses     Type 2 diabetes mellitus with hypoglycemia without coma, with long-term current use of insulin (H)    -  1      Care Instructions    Recommendations from today's MT visit:                                                      1. Stop using Novolin R at lunch    2. Decrease Novolin R at breakfast to 9 units.     3. Keep reducing R by 2 units if your blood sugar reading is  and only adjust for activity after a week if you still have low blood sugars.    Next MTM visit: 5/17/17 at 9:30    To schedule another MTM appointment, please call the clinic directly or you may call the MTM scheduling line at [phone] or toll-free at [phone].     My Clinical Pharmacist's contact information:                                                      It was a pleasure seeing you today!  Please feel free to contact me with any questions or concerns you have.      Tanna Diaz, PharmD  Medication Therapy Management Pharmacist  [facility] - Monday and Wednesday 7:30 - 4:00  Phone: [phone] - direct clinic line    You may receive a survey about the [facility] services you received.  I would appreciate your feedback to help me serve you better in the future. Please fill it out and return it when you can. Your comments will be anonymous.                  Follow-ups after your visit        Your next 10 appointments already scheduled     May 17, 2017  9:30 AM CDT   TELEMEDICINE with Jessica Diaz RPH   [facility] [place] ([facility])    [address] [place] [id number] [phone]           Note: this is not an onsite visit; there is no need to come to the facility.             May 22, 2017 11:15 AM CDT   Anticoagulation Visit with  INR CLINIC   [facility] ([facility]    [address] [place] [id number] [phone]            Jun 20, 2017  9:00 AM CDT   Return Visit with Bong Yoo MD   [facility] ([facility])    [address] [place] [id number] [phone]            Jun 20, 2017 10:20 AM CDT   Office Visit with Debbie Lewis MD   [facility] ([facility])    [address] [place] [id number] [phone]           Bring a current list of meds and any records pertaining to this visit.  For Physicals, please bring immunization records and any forms needing to be filled out.  Please arrive 10 minutes early to complete paperwork.            Jun 21, 2017 12:45 PM CDT   RETURN 45 with Natanael Dang MD   [facility] [place] ([facility])    [address] [place] [id number] [phone]              Who to contact     If you have questions or need follow up information about today's clinic visit or your schedule please contact [facility] MTM directly at [phone].  Normal or non-critical lab and imaging results will be communicated to you by PhotoTLChart, letter or phone within 4 business days after the clinic has received the results. If you do not hear from us within 7 days, please contact the clinic through PhotoTLChart or phone. If you have a critical or abnormal lab result, we will notify you by phone as soon as possible.  Submit refill requests through [employer] or call your pharmacy and they will forward the refill request to us. Please allow 3 business days for your refill to be completed.          Additional Information About Your Visit        [employer] Information     [employer] lets you send messages to your doctor, view your test results, renew your  "prescriptions, schedule appointments and more. To sign up, go to [email or web address] . Click on \"Log in\" on the left side of the screen, which will take you to the Welcome page. Then click on \"Sign up Now\" on the right side of the page.     You will be asked to enter the access code listed below, as well as some personal information. Please follow the directions to create your username and password.     Your access code is: [email or web address]  Expires: 2017 10:57 AM     Your access code will  in 90 days. If you need help or a new code, please call your [place] clinic or [phone].        Care EveryWhere ID     This is your Care EveryWhere ID. This could be used by other organizations to access your [place] medical records  [id number]         Blood Pressure from Last 3 Encounters:   17 118/58   03/15/17 135/56   17 140/66    Weight from Last 3 Encounters:   17 201 lb (91.2 kg)   03/15/17 199 lb 12 oz (90.6 kg)   17 197 lb (89.4 kg)              Today, you had the following     No orders found for display         Today's Medication Changes          These changes are accurate as of: 5/10/17  4:50 PM.  If you have any questions, ask your nurse or doctor.               These medicines have changed or have updated prescriptions.        Dose/Directions    insulin regular 100 UNIT/ML injection   Commonly known as:  NovoLIN R RELION   This may have changed:  additional instructions   Used for:  Type 2 diabetes mellitus with hypoglycemia without coma, with long-term current use of insulin (H)        Inject 9 units with breakfast and 2 units with dinner (when mixing with NPH, draw this insulin up first)   Quantity:  10 mL   Refills:  2       ipratropium - albuterol 0.5 mg/2.5 mg/3 mL 0.5-2.5 (3) MG/3ML neb solution   Commonly known as:  DUONEB   This may have changed:  reasons to take this   Used for:  Chronic obstructive bronchitis (H)        Dose:  3 mL   Inhale 1 vial (3 mLs) into the " lungs every 4 hours as needed for shortness of breath / dyspnea   Quantity:  270 mL   Refills:  11       primidone 50 MG tablet   Commonly known as:  MYSOLINE   This may have changed:    - how much to take  - when to take this  - additional instructions   Used for:  Essential tremor        Take 1 tablet twice a day for the next 2 weeks, then go to 1 tablet three times a day.   Quantity:  154 tablet   Refills:  0            Where to get your medicines      These medications were sent to [address]) [place] [address]     Phone:  [phone]     insulin regular 100 UNIT/ML injection                Primary Care Provider Office Phone # Fax #    Debbie Lewis [phone]       [facility] [address] [facility] [address]        Thank you!     Thank you for choosing [facility]  for your care. Our goal is always to provide you with excellent care. Hearing back from our patients is one way we can continue to improve our services. Please take a few minutes to complete the written survey that you may receive in the mail after your visit with us. Thank you!             Your Updated Medication List - Protect others around you: Learn how to safely use, store and throw away your medicines at www.disposemymeds.org.          This list is accurate as of: 5/10/17  4:50 PM.  Always use your most recent med list.                   Brand Name Dispense Instructions for use    albuterol 108 (90 BASE) MCG/ACT Inhaler    PROAIR HFA/PROVENTIL HFA/VENTOLIN HFA    1 Inhaler    Inhale 1-2 puffs into the lungs every 4 hours as needed (for shortness of breath/wheezing)       ASPIRIN NOT PRESCRIBED    INTENTIONAL     Reported on 5/10/2017       azelastine 0.1 % spray    ASTELIN    1 Bottle    Spray 1-2 sprays into both nostrils 2 times daily       BD ULTRA FINE PEN NEEDLES     100 each    Use to  "inject insulin twice daily.       blood glucose monitoring lancets     100 Each    Use to test up to four times per day       blood glucose monitoring test strip    no brand specified    360 each    Test 4 times daily or as directed. Profile Rx: patient will contact pharmacy when needed       FLORAJEN3 Caps     60 capsule    Take 1 capsule by mouth 2 times daily       FOLIC ACID PO      Take 1 mg by mouth daily       insulin  UNIT/ML injection    NovoLIN N RELION    10 mL    Inject 9 units in the morning and 14 units in the evening.       insulin regular 100 UNIT/ML injection    NovoLIN R RELION    10 mL    Inject 9 units with breakfast and 2 units with dinner (when mixing with NPH, draw this insulin up first)       insulin syringe-needle U-100 31G X 5/16\" 0.5 ML    BD insulin syringe ultrafine    100 each    Use one syringe 2 daily or as directed.       ipratropium - albuterol 0.5 mg/2.5 mg/3 mL 0.5-2.5 (3) MG/3ML neb solution    DUONEB    270 mL    Inhale 1 vial (3 mLs) into the lungs every 4 hours as needed for shortness of breath / dyspnea       losartan 100 MG tablet    COZAAR    90 tablet    Take 1 tablet (100 mg) by mouth daily for hypertension.       meclizine 25 MG tablet    ANTIVERT    30 tablet    Take 1 tablet (25 mg) by mouth 3 times daily as needed       MELATONIN PO      Take 3 mg by mouth At Bedtime       metFORMIN 1000 MG tablet    GLUCOPHAGE    180 tablet    Take 1 tablet (1,000 mg) by mouth 2 times daily (with meals) with breakfast and dinner.       mometasone-formoterol 100-5 MCG/ACT oral inhaler    DULERA     Inhale 2 puffs into the lungs 2 times daily       order for DME     1 each    Dispense one nebulizer machine with necessary supplies       order for DME     1 each    Equipment being ordered: Nebulizer machine with mask and tubing       pantoprazole 40 MG EC tablet    PROTONIX     Take 40 mg by mouth daily Started by Dr[facility] Debbie Rico at [facility]       primidone 50 MG tablet    " MYSOLINE    154 tablet    Take 1 tablet twice a day for the next 2 weeks, then go to 1 tablet three times a day.       propafenone 150 MG Tabs tablet    RYTHMOL    180 tablet    Take 1 tablet (150 mg) by mouth 2 times daily       propranolol 40 MG tablet    INDERAL    180 tablet    Take 2-3 tablets ( mg) by mouth 2 times daily       simvastatin 80 MG tablet    ZOCOR    30 tablet    Take 0.5 tablets (40 mg) by mouth At Bedtime Profile Rx: patient will contact pharmacy when needed       SPIRIVA RESPIMAT 2.5 MCG/ACT inhalation aerosol   Generic drug:  tiotropium     4 g    INHALE 2 PUFFS INTO THE LUNGS DAILY       sulfaSALAzine 500 MG tablet    AZULFIDINE    90 tablet    TAKE ONE TABLET BY MOUTH THREE TIMES A DAY       warfarin 5 MG tablet    COUMADIN    90 tablet    7.5mg M,Th and 5mg ROW As directed by Coumadin clinic

## 2017-05-17 ENCOUNTER — ALLIED HEALTH/NURSE VISIT (OUTPATIENT)
Dept: PHARMACY | Facility: CLINIC | Age: 72
End: 2017-05-17
Payer: COMMERCIAL

## 2017-05-17 DIAGNOSIS — J44.9 CHRONIC OBSTRUCTIVE PULMONARY DISEASE, UNSPECIFIED (H): ICD-10-CM

## 2017-05-17 DIAGNOSIS — E11.649 TYPE 2 DIABETES MELLITUS WITH HYPOGLYCEMIA WITHOUT COMA, WITH LONG-TERM CURRENT USE OF INSULIN (H): ICD-10-CM

## 2017-05-17 DIAGNOSIS — Z79.4 TYPE 2 DIABETES MELLITUS WITH HYPOGLYCEMIA WITHOUT COMA, WITH LONG-TERM CURRENT USE OF INSULIN (H): ICD-10-CM

## 2017-05-17 DIAGNOSIS — J30.2 SEASONAL ALLERGIC RHINITIS, UNSPECIFIED ALLERGIC RHINITIS TRIGGER: Primary | ICD-10-CM

## 2017-05-17 PROCEDURE — 99607 MTMS BY PHARM ADDL 15 MIN: CPT | Performed by: PHARMACIST

## 2017-05-17 PROCEDURE — 99606 MTMS BY PHARM EST 15 MIN: CPT | Performed by: PHARMACIST

## 2017-05-17 RX ORDER — AZELASTINE 1 MG/ML
1-2 SPRAY, METERED NASAL 2 TIMES DAILY
Qty: 1 BOTTLE | Refills: 11 | Status: SHIPPED | OUTPATIENT
Start: 2017-05-17 | End: 2018-07-23

## 2017-05-17 RX ORDER — DIPHENHYDRAMINE HCL 25 MG
50 CAPSULE ORAL 2 TIMES DAILY PRN
COMMUNITY
End: 2017-05-17 | Stop reason: ALTCHOICE

## 2017-05-17 NOTE — PATIENT INSTRUCTIONS
Recommendations from today's MTM visit:                                                    MTM (medication therapy management) is a service provided by a clinical pharmacist designed to help you get the most of out of your medicines.   Today we reviewed what your medicines are for, how to know if they are working, that your medicines are safe and how to make your medicine regimen as easy as possible.      1. Decrease Novolin N morning to 7 units and keep the rest the same.    2. Stop taking Benadryl     3. Start using the azelastine nasal spray 1-2 sprays each nostril twice daily. May decrease down to 1 spray twice daily if you want and only use when you feel you are having nasal symptoms. I sent a new Rx to the pharmacy.    4. Call and make an appointment with your lung doctor.    Recommended changes for pulmonologist    1. Talk to your [place] lung doctor about these possible changes to your medicines.  Inhaled steroids like the one in Dulera has shown to increase your risk of pneumonia.  Because you have not had any exacerbations in the last year I think it would be worth trying to get you off the inhaled steroid.  My recommendation would be to stop the Dulera and start Anoro instead. With this change you could also stop Spiriva since the Anoro has a long acting albuterol and a long acting anticholinergic like Spiriva in it.  I would call your insurance to see if Anoro is covered.  If Anoro is not covered you could try to just use Spiriva and see how your breathing is. I would also recommend that because you are using Spiriva that the albuterol/ipratropium nebs could be changed to just albuterol nebs. Spiriva is a long acting form of ipratropium.    Next MTM visit: 5/31/17 at 8:30 I will call you    To schedule another MTM appointment, please call the clinic directly or you may call the MTM scheduling line at [phone] or toll-free at [phone].     My Clinical Pharmacist's contact information:                                                       It was a pleasure seeing you today!  Please feel free to contact me with any questions or concerns you have.      Tanna Diaz, Rebecca  Medication Therapy Management Pharmacist  [facility] - Monday and Wednesday 7:30 - 4:00  Phone: [phone] - direct clinic line    You may receive a survey about the MTM services you received.  I would appreciate your feedback to help me serve you better in the future. Please fill it out and return it when you can. Your comments will be anonymous.

## 2017-05-17 NOTE — PROGRESS NOTES
Faxed [place] [facility] this progress note with recommendations.    Tanna Diaz, PharmD  Medication Therapy Management Pharmacist

## 2017-05-17 NOTE — Clinical Note
Please see changes today and recommended changes for pulmonologist. Cayetano is planning to schedule with [place] [facility] ASAP.  Tanna Diaz, PharmD Medication Therapy Management Pharmacist

## 2017-05-17 NOTE — PROGRESS NOTES
SUBJECTIVE/OBJECTIVE:                Cayetano Lunsford is a 72 year old male called for a follow-up visit for Medication Therapy Management.  He was referred to me from Dr. Debbie Lewis.     Chief Complaint: Follow up from our visit on 5/10/17.  Calf muscles are better since he has not been going up and down stairs as much.  Personal Healthcare Goals: Did not discuss today.  Tobacco: No tobacco use   Alcohol: not currently using    Medication Adherence: no issues reported    Diabetes:  Pt currently taking metformin bid, Novolin N 9 units AM before breakfast and 14 units in PM-at dinner, Novolin R-9 units with breakfast - decreased at last visit (decrease by 2 units if more active), no more lunch and 2 units dinner, between 80 and 120, reduce your R insulin by 2 units. If below 70 skip dose. He gives his shots in his arms. He does not have any issues with giving stomach just has been doing arms.  He is now writing the date on the Novolin R and discarding after 42 days.   SMBG: 3-4 times daily.   Ranges (per pt report): see chart below.   Symptoms of low blood sugar? Fingers and lips tingle, shaky and sweaty. Frequency of hypoglycemia? One in the last week. He thinks it was due to having a late dinner. Treating with small amount of orange juice  Recent symptoms of high blood sugar? none.  Eye exam: up to date  Foot exam: up to date  Microalbumin is not < 30 mg/g. Pt is taking an ACEi/ARB.  Aspirin: Not taking due to anticoagulation therapy and increased risk of bleeding  Diet/Exercise:  He repots that his activity has been slowing down. He is done moving now.  He is eating something for lunch.     Ave: 7 day: 128, 14 day: 145, 30 day: 143    Date FBG/ 2hours post Lunch/2hours post Dinner /2hours post    5/17 109 (R7)      5/16 117 (R7)  124 (R1)    5/15 97 (R7)  102 (no R) 142   5/14 141(R9)  Late dinner 62 - drank juice no R and ate dinner    5/13 156 (R9)  76 at 6:04 drank 4 oz juice then at 6:42 150 136   5/12 129  (R9)  230 - unsure why high    5/11 212  5:55 it was 80 - juice then 6:23 it was 121      Last Visit  Date FBG/ 2hours post Lunch/2hours post Dinner /2hours post Bedtime   5/10 111 (R 9)      5/9 168 (R 9)  89 (No R) 140   5/8 120 (R 10)  76 no R 128   5/7 4AM: sweaty, didn't check but drank some OJ; Breakfast 147  282 (donut and cereal for breakfast) 321   5/6 165 166 130    5/5 151 230 ( R1) 66 (No R, 4oz OJ)    5/4 148 127 ( R1) 168    5/3 159 ( R9) 132 ( R1) later in the afternoon at 4:59 pm it was 57 - drank 4 oz     5/2 105 ( R9) 135 (R1) 109 (R1)    5/1 192 160 92 ( No R)    4/30 196 ( R9) No reading or shot but did eat lunch 127    4/29 206 ( at chips and dip the night before) 216 ( No R) 62 (4 oz OJ and at dinner)    4/28 147 ( R9) 193 ( R1) 163    4/27 162  227 ( R1) 123 120   4/26 244 (R9) 234 ( R1) at cereal and piece of sweet toast for breakfast 5:19 pm 68 drank 4 oz OJ, then 88 before dinner no R then at 9:19 was 92 108   4/25 213 200 (R1) 63 before dinner    4/24 90 ( R10) 105 (R1) 115 ( R)        COPD: Current medications: Albuterol MDI and (Pt reports using albuterol 1 times per day lately, nebs twice daily most days), Tiotropium (Spiriva) once daily, Albuterol+Ipratropium Nebs twice daily and Mometasone+Formoterol (Dulera) twice daily. Pt rinses their mouth after using steroid inhaler.    Pt is not experiencing side effects.   Pt reports the following symptoms: increased SOB upon exertion .  Pt does have an COPD Action Plan on file.   Has spirometry been completed: Yes 4/27/16 with FEV1 %pred being 45  Managed by [facility]. The doctor that he was seeing left and he has to establish care with a new doctor. He has not had an exacerbation or hospitalized in the last year.     Allergic rhinitis:  Current medications include: Benadryl 50 mg twice daily everyday for runny nose.  He feels this works well to control symptoms.  He also has azelastine nasal spray that he has not used in a long  time, unsure why.    Current labs include:  BP Readings from Last 3 Encounters:   04/18/17 118/58   03/15/17 135/56   03/13/17 140/66     Today's Vitals: There were no vitals taken for this visit. - phone visit    Lab Results   Component Value Date    A1C 5.8 05/08/2017    A1C 6.9 10/17/2016    A1C 6.2 07/22/2016    A1C 6.7 03/28/2016    A1C 7.1 11/10/2015       Most Recent Immunizations   Administered Date(s) Administered     Hepatitis B 08/05/2014     Influenza (H1N1) 01/08/2010     Influenza (High Dose) 3 valent vaccine 09/07/2016     Influenza (IIV3) 09/11/2009     Pneumococcal (PCV 13) 11/10/2015     Pneumococcal 23 valent 07/20/2010     TD (ADULT, 7+) 08/08/2008     TDAP Vaccine (Adacel) 08/05/2014     Zoster vaccine, live 06/13/2008     ASSESSMENT:              Current medications were reviewed today as discussed above.      Medication Adherence: no issues identified    Diabetes: Needs Improvement. Patient is meeting A1c goal of < 7%. He continues to have hypoglycemia. Pt would benefit from Basal (Novolin N) : decrease dose of N in AM to 7 units. Microalbumin is not at goal < 30 mg/g. Taking an ARB at max dose. Aspirin therapy is not indicated in this patient due to anticoagulant/antiplatelet therapy.     COPD: Needs Improvement. Completed a CAT assessment today and his score was 10. Based on his FEV1 he has severe COPD but minimal symptoms so he falls in Group B per the GOLD guidelines.  Based on these results and no history of exacerbations in the last year, patient would benefit from the following medication changes: stop the Dulera (trying to eliminate inhaled steroid) and Spiriva and start Anoro (LAMA/LABA) as well as change Duonebs to just albuterol as Duonebs is duplicate anticholinergic therapy.    Allergic Rhinitis: Needs Improvement. Patient would benefit from stopping benadryl and using his nasal spray instead. I will send new Rx to pharmacy for Azelastine.     PLAN:                  1. Decreased  Novolin N morning to 7 units and keep the rest the same.    2. Stopped taking Benadryl     3. Started using the azelastine nasal spray 1-2 sprays each nostril twice daily. May decrease down to 1 spray twice daily if you want and only use when you feel you are having nasal symptoms. I sent a new Rx to the pharmacy.      Recommended changes for pulmonologist    1. Inhaled steroids like the one in Dulera has shown to increase risk of pneumonia.  Because he has not had any exacerbations in the last year I think it would be worth trying to get you off the inhaled steroid.  My recommendation would be to stop the Dulera and Spiriva and start Anoro instead. If Anoro is not covered then just try use Spiriva and reassess. I would also recommend changing albuterol/ipratropium nebs just albuterol nebs.    I spent 45 minutes with this patient today.  All changes were made via collaborative practice agreement with Debbie Lewis. A copy of the visit note was provided to the patient's primary care provider.     Will follow up in 2 weeks.    The patient was mailed a summary of these recommendations as an after visit summary.    Tanna Diaz, PharmD  Medication Therapy Management Pharmacist    Medications reconciled during this visit.   Current Outpatient Prescriptions   Medication Sig Dispense Refill     azelastine (ASTELIN) 0.1 % spray Spray 1-2 sprays into both nostrils 2 times daily 1 Bottle 11     insulin NPH (NOVOLIN N RELION) 100 UNIT/ML injection Inject 7 units in the morning and 14 units in the evening. 10 mL 2     insulin regular (NOVOLIN R RELION) 100 UNIT/ML injection Inject 9 units with breakfast and 2 units with dinner (when mixing with NPH, draw this insulin up first) 10 mL 2     primidone (MYSOLINE) 50 MG tablet Take 1 tablet twice a day for the next 2 weeks, then go to 1 tablet three times a day. (Patient taking differently: 50 mg 3 times daily Take 1 tablet twice a day for the next 2 weeks, then go to 1 tablet  "three times a day.) 154 tablet 0     SPIRIVA RESPIMAT 2.5 MCG/ACT inhalation aerosol INHALE 2 PUFFS INTO THE LUNGS DAILY 4 g 1     warfarin (COUMADIN) 5 MG tablet 7.5mg M,Th and 5mg ROW As directed by Coumadin clinic 90 tablet 3     losartan (COZAAR) 100 MG tablet Take 1 tablet (100 mg) by mouth daily for hypertension. 90 tablet 2     metFORMIN (GLUCOPHAGE) 1000 MG tablet Take 1 tablet (1,000 mg) by mouth 2 times daily (with meals) with breakfast and dinner. 180 tablet 0     simvastatin (ZOCOR) 80 MG tablet Take 0.5 tablets (40 mg) by mouth At Bedtime Profile Rx: patient will contact pharmacy when needed 30 tablet 2     blood glucose monitoring (NO BRAND SPECIFIED) test strip Test 4 times daily or as directed. Profile Rx: patient will contact pharmacy when needed 360 each 3     mometasone-formoterol (DULERA) 100-5 MCG/ACT oral inhaler Inhale 2 puffs into the lungs 2 times daily       insulin syringe-needle U-100 (BD INSULIN SYRINGE ULTRAFINE) 31G X 5/16\" 0.5 ML Use one syringe 2 daily or as directed. 100 each 2     propafenone (RYTHMOL) 150 MG TABS Take 1 tablet (150 mg) by mouth 2 times daily 180 tablet 3     propranolol (INDERAL) 40 MG tablet Take 2-3 tablets ( mg) by mouth 2 times daily 180 tablet 10     ipratropium - albuterol 0.5 mg/2.5 mg/3 mL (DUONEB) 0.5-2.5 (3) MG/3ML nebulization Inhale 1 vial (3 mLs) into the lungs every 4 hours as needed for shortness of breath / dyspnea (Patient taking differently: Inhale 3 mLs into the lungs every 4 hours as needed for shortness of breath / dyspnea (currently using 2-3 times daily) ) 270 mL 11     FOLIC ACID PO Take 1 mg by mouth daily       Probiotic Product (FLORAJEN3) CAPS Take 1 capsule by mouth 2 times daily 60 capsule 0     pantoprazole (PROTONIX) 40 MG enteric coated tablet Take 40 mg by mouth daily Started by Dr. Debbie Rico at [facility]       BD ULTRA FINE PEN NEEDLES Use to inject insulin twice daily. 100 each PRN     sulfaSALAzine (AZULFIDINE) 500 MG " tablet TAKE ONE TABLET BY MOUTH THREE TIMES A DAY 90 tablet 11     albuterol (PROAIR HFA, PROVENTIL HFA, VENTOLIN HFA) 108 (90 BASE) MCG/ACT inhaler Inhale 1-2 puffs into the lungs every 4 hours as needed (for shortness of breath/wheezing) 1 Inhaler 5     FREESTYLE LANCETS MISC Use to test up to four times per day 100 Each 12     [DISCONTINUED] insulin NPH (NOVOLIN N RELION) 100 UNIT/ML injection Inject 9 units in the morning and 14 units in the evening. 10 mL 2     order for DME Equipment being ordered: Nebulizer machine with mask and tubing (Patient not taking: Reported on 5/17/2017) 1 each 0     ASPIRIN NOT PRESCRIBED, INTENTIONAL, Reported on 5/17/2017       MELATONIN PO Take 3 mg by mouth At Bedtime Reported on 5/17/2017       meclizine (ANTIVERT) 25 MG tablet Take 1 tablet (25 mg) by mouth 3 times daily as needed (Patient not taking: Reported on 5/17/2017) 30 tablet 11     ORDER FOR DME Dispense one nebulizer machine with necessary supplies (Patient not taking: Reported on 5/17/2017) 1 each 0

## 2017-05-17 NOTE — MR AVS SNAPSHOT
After Visit Summary   5/17/2017    Cayetano Lunsford    MRN: [id number]           Patient Information     Date Of Birth          1945        Visit Information        Provider Department      5/17/2017 9:30 AM Jessica Diaz [facility] MTM        Today's Diagnoses     Seasonal allergic rhinitis, unspecified allergic rhinitis trigger    -  1    Type 2 diabetes mellitus with hypoglycemia without coma, with long-term current use of insulin (H)          Care Instructions    Recommendations from today's MTM visit:                                                    MTM (medication therapy management) is a service provided by a clinical pharmacist designed to help you get the most of out of your medicines.   Today we reviewed what your medicines are for, how to know if they are working, that your medicines are safe and how to make your medicine regimen as easy as possible.      1. Decrease Novolin N morning to 7 units and keep the rest the same.    2. Stop taking Benadryl     3. Start using the azelastine nasal spray 1-2 sprays each nostril twice daily. May decrease down to 1 spray twice daily if you want and only use when you feel you are having nasal symptoms. I sent a new Rx to the pharmacy.    4. Call and make an appointment with your lung doctor.    Recommended changes for pulmonologist    1. Talk to your [facility] doctor about these possible changes to your medicines.  Inhaled steroids like the one in Dulera has shown to increase your risk of pneumonia.  Because you have not had any exacerbations in the last year I think it would be worth trying to get you off the inhaled steroid.  My recommendation would be to stop the Dulera and start Anoro instead. With this change you could also stop Spiriva since the Anoro has a long acting albuterol and a long acting anticholinergic like Spiriva in it.  I would call your insurance to see if Anoro is covered.  If Anoro is not covered you  could try to just use Spiriva and see how your breathing is. I would also recommend that because you are using Spiriva that the albuterol/ipratropium nebs could be changed to just albuterol nebs. Spiriva is a long acting form of ipratropium.    Next MTM visit: 5/31/17 at 8:30 I will call you    To schedule another MTM appointment, please call the clinic directly or you may call the MTM scheduling line at [phone] or toll-free at [phone].     My Clinical Pharmacist's contact information:                                                      It was a pleasure seeing you today!  Please feel free to contact me with any questions or concerns you have.      Tanna Diaz PharmD  Medication Therapy Management Pharmacist  [facility] - Monday and Wednesday 7:30 - 4:00  Phone: [phone] - direct clinic line    You may receive a survey about the MT services you received.  I would appreciate your feedback to help me serve you better in the future. Please fill it out and return it when you can. Your comments will be anonymous.                    Follow-ups after your visit        Your next 10 appointments already scheduled     May 22, 2017 11:15 AM CDT   [facility]    [address] [place] [id number] [phone]            May 31, 2017  8:30 AM CDT   TELEMEDICINE with Jessica Diaz [facility] ([facility]    [address] [place] [address] [phone]           Note: this is not an onsite visit; there is no need to come to the facility.            Jun 20, 2017  9:00 AM CDT   [facility]   [facility]    [address] [place] [id number] [phone]            Jun 20, 2017 10:20 AM CDT   Office Visit with Debbie Lewis MD   [facility]  "[place] ([facility])    [address] [facility] [address] [phone]           Bring a current list of meds and any records pertaining to this visit.  For Physicals, please bring immunization records and any forms needing to be filled out.  Please arrive 10 minutes early to complete paperwork.            Jun 21, 2017 12:45 PM CDT   RETURN 45 with Natanael Dang MD   [facility] [place] ([facility])    [address] [id number]  [facility] [address] [phone]              Who to contact     If you have questions or need follow up information about today's clinic visit or your schedule please contact [facility] MTM directly at [phone].  Normal or non-critical lab and imaging results will be communicated to you by [employer], letter or phone within 4 business days after the clinic has received the results. If you do not hear from us within 7 days, please contact the clinic through MyChart or phone. If you have a critical or abnormal lab result, we will notify you by phone as soon as possible.  Submit refill requests through [employer] or call your pharmacy and they will forward the refill request to us. Please allow 3 business days for your refill to be completed.          Additional Information About Your Visit        MyChart Information     [employer] lets you send messages to your doctor, view your test results, renew your prescriptions, schedule appointments and more. To sign up, go to [email or web address] . Click on \"Log in\" on the left side of the screen, which will take you to the Welcome page. Then click on \"Sign up Now\" on the right side of the page.     You will be asked to enter the access code listed below, as well as some personal information. Please follow the directions to create your username and password.     Your access code is: [email or web address]  Expires: 6/27/2017 10:57 AM     Your access code will "  in 90 days. If you need help or a new code, please call your [facility] clinic or [phone].        Care EveryWhere ID     This is your Care EveryWhere ID. This could be used by other organizations to access your [facility] medical records  [id number]         Blood Pressure from Last 3 Encounters:   17 118/58   03/15/17 135/56   17 140/66    Weight from Last 3 Encounters:   17 201 lb (91.2 kg)   03/15/17 199 lb 12 oz (90.6 kg)   17 197 lb (89.4 kg)              Today, you had the following     No orders found for display         Today's Medication Changes          These changes are accurate as of: 17 10:58 AM.  If you have any questions, ask your nurse or doctor.               These medicines have changed or have updated prescriptions.        Dose/Directions    insulin  UNIT/ML injection   Commonly known as:  NovoLIN N RELION   This may have changed:  additional instructions   Used for:  Type 2 diabetes mellitus with hypoglycemia without coma, with long-term current use of insulin (H)        Inject 7 units in the morning and 14 units in the evening.   Quantity:  10 mL   Refills:  2       ipratropium - albuterol 0.5 mg/2.5 mg/3 mL 0.5-2.5 (3) MG/3ML neb solution   Commonly known as:  DUONEB   This may have changed:  reasons to take this   Used for:  Chronic obstructive bronchitis (H)        Dose:  3 mL   Inhale 1 vial (3 mLs) into the lungs every 4 hours as needed for shortness of breath / dyspnea   Quantity:  270 mL   Refills:  11       primidone 50 MG tablet   Commonly known as:  MYSOLINE   This may have changed:    - how much to take  - when to take this  - additional instructions   Used for:  Essential tremor        Take 1 tablet twice a day for the next 2 weeks, then go to 1 tablet three times a day.   Quantity:  154 tablet   Refills:  0         Stop taking these medicines if you haven't already. Please contact your care team if you have questions.     BENADRYL 25 MG  capsule   Generic drug:  diphenhydrAMINE                Where to get your medicines      These medications were sent to [facility] [address]  [address] [facility] [address]     Phone:  [phone]     azelastine 0.1 % spray         These medications were sent to [facility] [address] [place], [place] [address] [facility]  [address] [place] [address]) [place] [address]     Phone:  [phone]     insulin  UNIT/ML injection                Primary Care Provider Office Phone # Fax #    Debbie Lewis [phone]       [facility] [address]  [facility] [address]        Thank you!     Thank you for choosing [facility]  for your care. Our goal is always to provide you with excellent care. Hearing back from our patients is one way we can continue to improve our services. Please take a few minutes to complete the written survey that you may receive in the mail after your visit with us. Thank you!             Your Updated Medication List - Protect others around you: Learn how to safely use, store and throw away your medicines at www.disposemymeds.org.          This list is accurate as of: 5/17/17 10:58 AM.  Always use your most recent med list.                   Brand Name Dispense Instructions for use    albuterol 108 (90 BASE) MCG/ACT Inhaler    PROAIR HFA/PROVENTIL HFA/VENTOLIN HFA    1 Inhaler    Inhale 1-2 puffs into the lungs every 4 hours as needed (for shortness of breath/wheezing)       ASPIRIN NOT PRESCRIBED    INTENTIONAL     Reported on 5/17/2017       azelastine 0.1 % spray    ASTELIN    1 Bottle    Spray 1-2 sprays into both nostrils 2 times daily       BD ULTRA FINE PEN NEEDLES     100 each    Use to inject insulin twice daily.       blood glucose monitoring lancets     100 Each    Use to test up to four times per day       blood glucose monitoring test strip    no brand specified  "   360 each    Test 4 times daily or as directed. Profile Rx: patient will contact pharmacy when needed       FLORAJEN3 Caps     60 capsule    Take 1 capsule by mouth 2 times daily       FOLIC ACID PO      Take 1 mg by mouth daily       insulin  UNIT/ML injection    NovoLIN N RELION    10 mL    Inject 7 units in the morning and 14 units in the evening.       insulin regular 100 UNIT/ML injection    NovoLIN R RELION    10 mL    Inject 9 units with breakfast and 2 units with dinner (when mixing with NPH, draw this insulin up first)       insulin syringe-needle U-100 31G X 5/16\" 0.5 ML    BD insulin syringe ultrafine    100 each    Use one syringe 2 daily or as directed.       ipratropium - albuterol 0.5 mg/2.5 mg/3 mL 0.5-2.5 (3) MG/3ML neb solution    DUONEB    270 mL    Inhale 1 vial (3 mLs) into the lungs every 4 hours as needed for shortness of breath / dyspnea       losartan 100 MG tablet    COZAAR    90 tablet    Take 1 tablet (100 mg) by mouth daily for hypertension.       meclizine 25 MG tablet    ANTIVERT    30 tablet    Take 1 tablet (25 mg) by mouth 3 times daily as needed       MELATONIN PO      Take 3 mg by mouth At Bedtime Reported on 5/17/2017       metFORMIN 1000 MG tablet    GLUCOPHAGE    180 tablet    Take 1 tablet (1,000 mg) by mouth 2 times daily (with meals) with breakfast and dinner.       mometasone-formoterol 100-5 MCG/ACT oral inhaler    DULERA     Inhale 2 puffs into the lungs 2 times daily       order for DME     1 each    Dispense one nebulizer machine with necessary supplies       order for DME     1 each    Equipment being ordered: Nebulizer machine with mask and tubing       pantoprazole 40 MG EC tablet    PROTONIX     Take 40 mg by mouth daily Started by Dr. Debbie Rico at [facility]       primidone 50 MG tablet    MYSOLINE    154 tablet    Take 1 tablet twice a day for the next 2 weeks, then go to 1 tablet three times a day.       propafenone 150 MG Tabs tablet    RYTHMOL    180 " tablet    Take 1 tablet (150 mg) by mouth 2 times daily       propranolol 40 MG tablet    INDERAL    180 tablet    Take 2-3 tablets ( mg) by mouth 2 times daily       simvastatin 80 MG tablet    ZOCOR    30 tablet    Take 0.5 tablets (40 mg) by mouth At Bedtime Profile Rx: patient will contact pharmacy when needed       SPIRIVA RESPIMAT 2.5 MCG/ACT inhalation aerosol   Generic drug:  tiotropium     4 g    INHALE 2 PUFFS INTO THE LUNGS DAILY       sulfaSALAzine 500 MG tablet    AZULFIDINE    90 tablet    TAKE ONE TABLET BY MOUTH THREE TIMES A DAY       warfarin 5 MG tablet    COUMADIN    90 tablet    7.5mg M,Th and 5mg ROW As directed by Coumadin clinic

## 2017-05-22 ENCOUNTER — ANTICOAGULATION THERAPY VISIT (OUTPATIENT)
Dept: NURSING | Facility: CLINIC | Age: 72
End: 2017-05-22
Payer: COMMERCIAL

## 2017-05-22 DIAGNOSIS — I48.91 ATRIAL FIBRILLATION, UNSPECIFIED TYPE (H): ICD-10-CM

## 2017-05-22 DIAGNOSIS — Z79.01 LONG-TERM (CURRENT) USE OF ANTICOAGULANTS: ICD-10-CM

## 2017-05-22 LAB — INR POINT OF CARE: 2 (ref 0.86–1.14)

## 2017-05-22 PROCEDURE — 85610 PROTHROMBIN TIME: CPT | Mod: QW

## 2017-05-22 PROCEDURE — 99207 ZZC NO CHARGE NURSE ONLY: CPT

## 2017-05-22 PROCEDURE — 36416 COLLJ CAPILLARY BLOOD SPEC: CPT

## 2017-05-22 NOTE — MR AVS SNAPSHOT
Cayetano Lunsford   5/22/2017 11:15 AM   Anticoagulation Therapy Visit    Description:  72 year old male   Provider:   INR CLINIC   Department:   Nurse           INR as of 5/22/2017     Today's INR 2.0      Anticoagulation Summary as of 5/22/2017     INR goal 2.0-3.0   Today's INR 2.0   Full instructions 5 mg on Mon, Wed, Fri; 7.5 mg all other days   Next INR check 6/5/2017    Indications   Long-term (current) use of anticoagulants [Z79.01] [Z79.01]  Atrial fibrillation (H) [I48.91]         Your next Anticoagulation Clinic appointment(s)     Jun 08, 2017  9:45 AM CDT   Anticoagulation Visit with  INR CLINIC   [facility] ([facility])    [address] [place] [id number] [phone]              Contact Numbers     [facility]  Please call [phone] to cancel and/or reschedule your appointment   Please call [phone] with any problems or questions regarding your therapy.        May 2017 Details    Sun Mon Tue Wed Thu Fri Sat      1               2               3               4               5               6                 7               8               9               10               11               12               13                 14               15               16               17               18               19               20                 21               22      5 mg   See details      23      7.5 mg         24      5 mg         25      7.5 mg         26      5 mg         27      7.5 mg           28      7.5 mg         29      5 mg         30      7.5 mg         31      5 mg             Date Details   05/22 This INR check               How to take your warfarin dose     To take:  5 mg Take 1 of the 5 mg tablets.    To take:  7.5 mg Take 1.5 of the 5 mg tablets.           June 2017 Details    Sun Mon Tue Wed Thu Fri Sat         1      7.5 mg         2      5 mg         3      7.5 mg           4      7.5 mg         5             6               7               8               9               10                 11               12               13               14               15               16               17                 18               19               20               21               22               23               24                 25               26               27               28               29               30                 Date Details   No additional details    Date of next INR:  6/5/2017         How to take your warfarin dose     To take:  5 mg Take 1 of the 5 mg tablets.    To take:  7.5 mg Take 1.5 of the 5 mg tablets.

## 2017-05-22 NOTE — PROGRESS NOTES
ANTICOAGULATION FOLLOW-UP CLINIC VISIT    Patient Name:  Cayetano Lunsford  Date:  5/22/2017  Contact Type:  Face to Face    SUBJECTIVE:     Patient Findings     Positives No Problem Findings           OBJECTIVE    INR Protime   Date Value Ref Range Status   05/22/2017 2.0 (A) 0.86 - 1.14 Final       ASSESSMENT / PLAN  INR assessment THER    Recheck INR In: 2 WEEKS    INR Location Clinic      Anticoagulation Summary as of 5/22/2017     INR goal 2.0-3.0   Today's INR 2.0   Maintenance plan 5 mg (5 mg x 1) on Mon, Wed, Fri; 7.5 mg (5 mg x 1.5) all other days   Full instructions 5 mg on Mon, Wed, Fri; 7.5 mg all other days   Weekly total 45 mg   No change documented Estelle Prado   Plan last modified Kirstin Meléndez RN (5/1/2017)   Next INR check 6/5/2017   Priority INR   Target end date     Indications   Long-term (current) use of anticoagulants [Z79.01] [Z79.01]  Atrial fibrillation (H) [I48.91]         Anticoagulation Episode Summary     INR check location     Preferred lab     Send INR reminders to [facility]    Comments       Anticoagulation Care Providers     Provider Role Specialty Phone number    Debbie Lewis MD [facility] [phone]            See the Encounter Report to view Anticoagulation Flowsheet and Dosing Calendar (Go to Encounters tab in chart review, and find the Anticoagulation Therapy Visit)    Patient to be checked in 2 weeks to make sure INR is not trending down.     Patient aware if signs of clotting (pain, tenderness, swelling, color change in leg or arm, SOB) and bleeding occur (blood in stool, urine, large bruising, bleeding gums, nosebleeds) to have INR check sooner. If sx severe report to ER or concerned for stroke call 911. If general questions or concerns arise, call clinic.         Estelle Prado RN

## 2017-05-23 DIAGNOSIS — J44.89 CHRONIC OBSTRUCTIVE BRONCHITIS (H): ICD-10-CM

## 2017-05-24 NOTE — TELEPHONE ENCOUNTER
Medication Detail      Disp Refills Start End SERJIO   ipratropium - albuterol 0.5 mg/2.5 mg/3 mL (DUONEB) 0.5-2.5 (3) MG/3ML nebulization 270 mL 11 5/4/2016  No   Sig: Inhale 1 vial (3 mLs) into the lungs every 4 hours as needed for shortness of breath / dyspnea       Last Office Visit with [facility], [facility] or [facility] prescribing provider: 3/13/17                                         Next 5 appointments (look out 90 days)     Jun 20, 2017  9:00 AM CDT   [facility]    [address] [place] [id number] [phone]            Jun 20, 2017 10:20 AM CDT   Office Visit with Debbie Lewis MD   [facility]    [address] [place] [id number] [phone]

## 2017-05-25 RX ORDER — IPRATROPIUM BROMIDE AND ALBUTEROL SULFATE 2.5; .5 MG/3ML; MG/3ML
SOLUTION RESPIRATORY (INHALATION)
Qty: 270 ML | Refills: 8 | Status: SHIPPED | OUTPATIENT
Start: 2017-05-25 | End: 2018-03-05

## 2017-05-25 NOTE — TELEPHONE ENCOUNTER
Routing refill request to provider for review/approval because:  Drug interaction warning with Propranolol    Covering providers-Please sign if agree.    Thank you!  MARLINE ZhouN, RN

## 2017-05-31 ENCOUNTER — ALLIED HEALTH/NURSE VISIT (OUTPATIENT)
Dept: PHARMACY | Facility: CLINIC | Age: 72
End: 2017-05-31
Payer: COMMERCIAL

## 2017-05-31 DIAGNOSIS — J30.2 SEASONAL ALLERGIC RHINITIS, UNSPECIFIED ALLERGIC RHINITIS TRIGGER: ICD-10-CM

## 2017-05-31 DIAGNOSIS — J44.9 CHRONIC OBSTRUCTIVE PULMONARY DISEASE, UNSPECIFIED (H): ICD-10-CM

## 2017-05-31 DIAGNOSIS — E11.649 TYPE 2 DIABETES MELLITUS WITH HYPOGLYCEMIA WITHOUT COMA, WITH LONG-TERM CURRENT USE OF INSULIN (H): Primary | ICD-10-CM

## 2017-05-31 DIAGNOSIS — Z79.4 TYPE 2 DIABETES MELLITUS WITH HYPOGLYCEMIA WITHOUT COMA, WITH LONG-TERM CURRENT USE OF INSULIN (H): Primary | ICD-10-CM

## 2017-05-31 PROCEDURE — 99606 MTMS BY PHARM EST 15 MIN: CPT | Performed by: PHARMACIST

## 2017-05-31 PROCEDURE — 99607 MTMS BY PHARM ADDL 15 MIN: CPT | Performed by: PHARMACIST

## 2017-05-31 NOTE — Clinical Note
No major changes today, slightly tweaked his Novolin N at dinner if he eats a large meal or has dessert. Let me know if you have any questions.  Tanna Diaz, PharmD Medication Therapy Management Pharmacist

## 2017-05-31 NOTE — MR AVS SNAPSHOT
After Visit Summary   5/31/2017    Cayetano Lunsford    MRN: [id number]           Patient Information     Date Of Birth          1945        Visit Information        Provider Department      5/31/2017 8:30 AM Jessica Diaz [facility] MTM        Today's Diagnoses     Type 2 diabetes mellitus with hypoglycemia without coma, with long-term current use of insulin (H)    -  1    Seasonal allergic rhinitis, unspecified allergic rhinitis trigger        Chronic obstructive pulmonary disease, unspecified (H)          Care Instructions    Recommendations from today's MTM visit:                                                      1. Stay with the 7 units of Novolin R and Novolin N in the morning.     2. If you have a larger carb dinner or dessert you will want to use your normal 2 units of Novolin N even if your blood sugar is between 80 and 120.     3. Call me if you notice your morning blood sugars are more than 200 more frequently or if you start to have more low blood sugars.     Next MTM visit: 7/12/2017 at 8:30 I will call you    To schedule another MTM appointment, please call the clinic directly or you may call the MTM scheduling line at [phone] or toll-free at [phone].     My Clinical Pharmacist's contact information:                                                      It was a pleasure seeing you today!  Please feel free to contact me with any questions or concerns you have.      Tanna Diaz, PharmD  Medication Therapy Management Pharmacist  [facility] - Monday and Wednesday 7:30 - 4:00  Phone: [phone] - direct clinic line    You may receive a survey about the [place] services you received.  I would appreciate your feedback to help me serve you better in the future. Please fill it out and return it when you can. Your comments will be anonymous.                Follow-ups after your visit        Your next 10 appointments already scheduled     Jun 08, 2017   9:45 AM CDT   Anticoagulation Visit with  INR CLINIC   [facility] ([facility])    [address] [place] [id number] [phone]            Jun 20, 2017  9:00 AM CDT   Return Visit with Bong Yoo MD   [facility] ([facility])    [address] [place] [id number] [phone]            Jun 20, 2017 10:20 AM CDT   Office Visit with Debbie Lewis MD   [facility] ([facility])    [address] [place] [phone]           Bring a current list of meds and any records pertaining to this visit.  For Physicals, please bring immunization records and any forms needing to be filled out.  Please arrive 10 minutes early to complete paperwork.            Jun 21, 2017 12:45 PM CDT   RETURN 45 with Natanael Dang MD   [facility] ([facility])    [address] [place] [id number] [phone]            Jul 12, 2017  8:30 AM CDT   TELEMEDICINE with Jessica Diaz RPH   [facility] ([facility])    [address] [place] [phone]           Note: this is not an onsite visit; there is no need to come to the facility.              Who to contact     If you have questions or need follow up information about today's clinic visit or your schedule please contact [facility] directly at [phone].  Normal or non-critical lab and imaging results will be communicated to you by MyChart, letter or phone within 4 business days after the clinic has received the results. If you do not hear from us within 7 days, please contact the clinic through MyChart or phone. If you have a critical or abnormal lab result, we will notify you by phone as soon as possible.  Submit refill requests through MyChart or call your  "pharmacy and they will forward the refill request to us. Please allow 3 business days for your refill to be completed.          Additional Information About Your Visit        MyChart Information     [employer] lets you send messages to your doctor, view your test results, renew your prescriptions, schedule appointments and more. To sign up, go to [email or web address] . Click on \"Log in\" on the left side of the screen, which will take you to the Welcome page. Then click on \"Sign up Now\" on the right side of the page.     You will be asked to enter the access code listed below, as well as some personal information. Please follow the directions to create your username and password.     Your access code is: [email or web address]  Expires: 2017 10:57 AM     Your access code will  in 90 days. If you need help or a new code, please call your [facility] clinic or [phone].        Care EveryWhere ID     This is your Care EveryWhere ID. This could be used by other organizations to access your [facility] medical records  [id number]         Blood Pressure from Last 3 Encounters:   17 118/58   03/15/17 135/56   17 140/66    Weight from Last 3 Encounters:   17 201 lb (91.2 kg)   03/15/17 199 lb 12 oz (90.6 kg)   17 197 lb (89.4 kg)              Today, you had the following     No orders found for display         Today's Medication Changes          These changes are accurate as of: 17  9:56 AM.  If you have any questions, ask your nurse or doctor.               These medicines have changed or have updated prescriptions.        Dose/Directions    insulin regular 100 UNIT/ML injection   Commonly known as:  NovoLIN R RELION   This may have changed:  additional instructions   Used for:  Type 2 diabetes mellitus with hypoglycemia without coma, with long-term current use of insulin (H)        Inject 7 units with breakfast and 2 units with dinner (when mixing with NPH, draw this insulin up first) "   Quantity:  10 mL   Refills:  2       primidone 50 MG tablet   Commonly known as:  MYSOLINE   This may have changed:    - how much to take  - when to take this  - additional instructions   Used for:  Essential tremor        Take 1 tablet twice a day for the next 2 weeks, then go to 1 tablet three times a day.   Quantity:  154 tablet   Refills:  0            Where to get your medicines      These medications were sent to [address], [address]) [place] [address]     Phone:  [phone]     insulin regular 100 UNIT/ML injection                Primary Care Provider Office Phone # Fax #    Debbie Lewis [phone]       [facility] [address] [facility] [address]        Thank you!     Thank you for choosing [facility]  for your care. Our goal is always to provide you with excellent care. Hearing back from our patients is one way we can continue to improve our services. Please take a few minutes to complete the written survey that you may receive in the mail after your visit with us. Thank you!             Your Updated Medication List - Protect others around you: Learn how to safely use, store and throw away your medicines at www.disposemymeds.org.          This list is accurate as of: 5/31/17  9:56 AM.  Always use your most recent med list.                   Brand Name Dispense Instructions for use    albuterol 108 (90 BASE) MCG/ACT Inhaler    PROAIR HFA/PROVENTIL HFA/VENTOLIN HFA    1 Inhaler    Inhale 1-2 puffs into the lungs every 4 hours as needed (for shortness of breath/wheezing)       ASPIRIN NOT PRESCRIBED    INTENTIONAL     Reported on 5/17/2017       azelastine 0.1 % spray    ASTELIN    1 Bottle    Spray 1-2 sprays into both nostrils 2 times daily       BD ULTRA FINE PEN NEEDLES     100 each    Use to inject insulin twice daily.       blood glucose monitoring lancets  "    100 Each    Use to test up to four times per day       blood glucose monitoring test strip    no brand specified    360 each    Test 4 times daily or as directed. Profile Rx: patient will contact pharmacy when needed       FLORAJEN3 Caps     60 capsule    Take 1 capsule by mouth 2 times daily       FOLIC ACID PO      Take 1 mg by mouth daily       insulin  UNIT/ML injection    NovoLIN N RELION    10 mL    Inject 7 units in the morning and 14 units in the evening.       insulin regular 100 UNIT/ML injection    NovoLIN R RELION    10 mL    Inject 7 units with breakfast and 2 units with dinner (when mixing with NPH, draw this insulin up first)       insulin syringe-needle U-100 31G X 5/16\" 0.5 ML    BD insulin syringe ultrafine    100 each    Use one syringe 2 daily or as directed.       ipratropium - albuterol 0.5 mg/2.5 mg/3 mL 0.5-2.5 (3) MG/3ML neb solution    DUONEB    270 mL    NEBULIZE CONTENTS OF ONE VIAL BY MOUTH EVERY 4 HOURS AS NEEDED FOR SHORTNESS OF BREATH /DYSPNEA       losartan 100 MG tablet    COZAAR    90 tablet    Take 1 tablet (100 mg) by mouth daily for hypertension.       meclizine 25 MG tablet    ANTIVERT    30 tablet    Take 1 tablet (25 mg) by mouth 3 times daily as needed       metFORMIN 1000 MG tablet    GLUCOPHAGE    180 tablet    Take 1 tablet (1,000 mg) by mouth 2 times daily (with meals) with breakfast and dinner.       mometasone-formoterol 100-5 MCG/ACT oral inhaler    DULERA     Inhale 2 puffs into the lungs 2 times daily       order for DME     1 each    Dispense one nebulizer machine with necessary supplies       order for DME     1 each    Equipment being ordered: Nebulizer machine with mask and tubing       pantoprazole 40 MG EC tablet    PROTONIX     Take 40 mg by mouth daily Started by Dr. Debbie Rico at [facility]       primidone 50 MG tablet    MYSOLINE    154 tablet    Take 1 tablet twice a day for the next 2 weeks, then go to 1 tablet three times a day.       " propafenone 150 MG Tabs tablet    RYTHMOL    180 tablet    Take 1 tablet (150 mg) by mouth 2 times daily       propranolol 40 MG tablet    INDERAL    180 tablet    Take 2-3 tablets ( mg) by mouth 2 times daily       simvastatin 80 MG tablet    ZOCOR    30 tablet    Take 0.5 tablets (40 mg) by mouth At Bedtime Profile Rx: patient will contact pharmacy when needed       SPIRIVA RESPIMAT 2.5 MCG/ACT inhalation aerosol   Generic drug:  tiotropium     4 g    INHALE 2 PUFFS INTO THE LUNGS DAILY       sulfaSALAzine 500 MG tablet    AZULFIDINE    90 tablet    TAKE ONE TABLET BY MOUTH THREE TIMES A DAY       warfarin 5 MG tablet    COUMADIN    90 tablet    7.5mg M,Th and 5mg ROW As directed by Coumadin clinic

## 2017-05-31 NOTE — PATIENT INSTRUCTIONS
Recommendations from today's MTM visit:                                                      1. Stay with the 7 units of Novolin R and Novolin N in the morning.     2. If you have a larger carb dinner or dessert you will want to use your normal 2 units of Novolin N even if your blood sugar is between 80 and 120.     3. Call me if you notice your morning blood sugars are more than 200 more frequently or if you start to have more low blood sugars.     Next MTM visit: 7/12/2017 at 8:30 I will call you    To schedule another MTM appointment, please call the clinic directly or you may call the MTM scheduling line at [phone] or toll-free at [phone].     My Clinical Pharmacist's contact information:                                                      It was a pleasure seeing you today!  Please feel free to contact me with any questions or concerns you have.      Tanna Diaz, PharmD  Medication Therapy Management Pharmacist  [facility] - Monday and Wednesday 7:30 - 4:00  Phone: [phone] - direct clinic line    You may receive a survey about the MTM services you received.  I would appreciate your feedback to help me serve you better in the future. Please fill it out and return it when you can. Your comments will be anonymous.

## 2017-05-31 NOTE — PROGRESS NOTES
SUBJECTIVE/OBJECTIVE:                Cayetano Lunsford is a 72 year old male called for a follow-up visit for Medication Therapy Management.  He was referred to me from Dr. Debbie fox.     Chief Complaint: Follow up from our visit on 5/17/17.  Wondering if one or two glasses of red wine a day could help with his tremors. I told him that alcohol could help although not recommended for treatment especially with him on warfarin.  Personal Healthcare Goals: Did not discuss today.  Tobacco: No tobacco use   Alcohol: rarely will have a beer    Medication Adherence: no issues reported    Diabetes:  Pt currently taking metformin bid, Novolin N 7 units AM (decreased by 2 units last visit) before breakfast and 14 units in PM-at dinner, Novolin R-9 units with breakfast (mostly using 7 units to see if that helps reduce lows), no more lunch and 2 units dinner, between 80 and 120, reduce your R insulin by 2 units. If below 70 skip dose. He gives his shots in his arms. He does not have any issues with giving stomach just has been doing arms.  He is now writing the date on the Novolin R and discarding after 42 days.   SMBG: 3-4 times daily.   Ranges (per pt report): see chart below.   Symptoms of low blood sugar? Fingers and lips tingle, shaky and sweaty. Frequency of hypoglycemia? Once in the last two weeks. He thinks it was due to having a late dinner. Treating with small amount of orange juice.  Recent symptoms of high blood sugar? none.  Eye exam: up to date  Foot exam: up to date  Microalbumin is not < 30 mg/g. Pt is taking an ACEi/ARB.  Aspirin: Not taking due to anticoagulation therapy and increased risk of bleeding  Diet/Exercise:  He repots that his activity has been slowing down. He is done moving now.  He is eating something for lunch.     Ave: 7 day: 181, 14 day: 164, 30 day: 149    Date FBG/ 2hours post Lunch/2hours post Dinner /2hours post bedtime   5/31/17 140      5/30 177  84 (no R)    5/29 205  206  197   5/28  261 - ate potato salad for dinner and lemonade  199 at 3:40pm before birthday celebration 292   5/27 160  99 (No R)    5/26 202 - unsure why high  99 (No R)    5/25 262 - ate yams, peas, apple pie  113 (No R)    5/24 174  89 (No R)    5/23 126  81 ( No R)    5/22 156  64 (late dinner - drank 4 oz juice and ate)    5/21 158  93 (No R)    5/20 167  163    5/19 122  183    5/18 213  201    5/17   97 (No R)        Last Visit:  Date FBG/ 2hours post Lunch/2hours post Dinner /2hours post    5/17 109 (R7)      5/16 117 (R7)  124 (R1)    5/15 97 (R7)  102 (no R) 142   5/14 141(R9)  Late dinner 62 - drank juice no R and ate dinner    5/13 156 (R9)  76 at 6:04 drank 4 oz juice then at 6:42 150 136   5/12 129 (R9)  230 - unsure why high    5/11 212  5:55 it was 80 - juice then 6:23 it was 121        COPD: Current medications: Albuterol MDI and (Pt reports using albuterol 1 times per day lately, nebs twice daily most days), Tiotropium (Spiriva) once daily, Albuterol+Ipratropium Nebs twice daily and Mometasone+Formoterol (Dulera) twice daily. Pt rinses their mouth after using steroid inhaler.    Pt is not experiencing side effects.   Pt reports the following symptoms: increased SOB upon exertion .  Pt does have an COPD Action Plan on file.   Has spirometry been completed: Yes 4/27/16 with FEV1 %pred being 45  Managed by [facility]. The doctor that he was seeing left and he has to establish care with a new doctor. He has not had an exacerbation or hospitalized in the last year. He scheduled to see a new provider, Dr. Abrams, on July 10th.    Allergic rhinitis:  Current medications include: Azelastine nasal spray 2 sprays in each nostril twice daily.  This seems to work fine, he does notice that about time for his second dose. At last visit stopped the Benadryl 50 mg twice daily. He feels less sleepy and less dry mouth off the Benadryl.      Current labs include:  BP Readings from Last 3 Encounters:   04/18/17 118/58    03/15/17 135/56   03/13/17 140/66     Today's Vitals: There were no vitals taken for this visit. - phone visit    Lab Results   Component Value Date    A1C 5.8 05/08/2017    A1C 6.9 10/17/2016    A1C 6.2 07/22/2016    A1C 6.7 03/28/2016    A1C 7.1 11/10/2015       Lab Results   Component Value Date    CHOL 159 05/08/2017     Lab Results   Component Value Date    TRIG 112 05/08/2017     Lab Results   Component Value Date    HDL 54 05/08/2017     Lab Results   Component Value Date    LDL 83 05/08/2017       Liver Function Studies -   Recent Labs   Lab Test  03/13/17   1050   PROTTOTAL  7.0   ALBUMIN  3.8   BILITOTAL  0.4   ALKPHOS  60   AST  20   ALT  22       Lab Results   Component Value Date    UCRR 91 05/05/2016    MICROL 39 05/05/2016    UMALCR 42.76 (H) 05/05/2016       Last Basic Metabolic Panel:  Lab Results   Component Value Date     03/13/2017      Lab Results   Component Value Date    POTASSIUM 4.6 03/13/2017     Lab Results   Component Value Date    CHLORIDE 100 03/13/2017     Lab Results   Component Value Date    BUN 16 03/13/2017     Lab Results   Component Value Date    CR 0.76 03/13/2017     GFR Estimate   Date Value Ref Range Status   03/13/2017 >90  Non  GFR Calc   >60 mL/min/1.7m2 Final   01/09/2017 >90 >60 mL/min/1.7m2 Final   10/03/2016 >60 >60 ml/min/1.73m2 Final     TSH   Date Value Ref Range Status   03/13/2017 3.25 0.40 - 4.00 mU/L Final   ]    Most Recent Immunizations   Administered Date(s) Administered     Hepatitis B 08/05/2014     Influenza (H1N1) 01/08/2010     Influenza (High Dose) 3 valent vaccine 09/07/2016     Influenza (IIV3) 09/11/2009     Pneumococcal (PCV 13) 11/10/2015     Pneumococcal 23 valent 07/20/2010     TD (ADULT, 7+) 08/08/2008     TDAP Vaccine (Adacel) 08/05/2014     Zoster vaccine, live 06/13/2008       ASSESSMENT:              Current medications were reviewed today as discussed above.      Medication Adherence: no issues  identified    Diabetes: Needs Improvement. Patient is meeting A1c goal of < 7%. Self monitoring of blood glucose is at goal of fasting  mg/dL  and post prandial < 180 mg/dL most of the time unless he eats more carbs at dinner. Recommended he use his Novolin R with dinner even if his blood sugars are between  if he plans to eat a higher carb meal or have dessert. Will keep his Novolin R at breakfast at 7 units. Microalbumin is not at goal < 30 mg/g. Taking an ARB at max dose. Aspirin therapy is not indicated in this patient due to anticoagulant/antiplatelet therapy.     COPD: Stable. Patient would benefit from no changes at this time. Will wait to hear if changes are made at 7/10/17 [facility] appt.    Allergic rhinitis: Stable     PLAN:                  1. Stay with the 7 units of Novolin R and Novolin N in the morning.     2. If you have a larger carb dinner or dessert you will want to use your normal 2 units of Novolin N even if your blood sugar is between 80 and 120.     3. Call me if you notice your morning blood sugars are more than 200 more frequently or if you start to have more low blood sugars.     I spent 30 minutes with this patient today.  All changes were made via collaborative practice agreement with Debbie Lewis. A copy of the visit note was provided to the patient's primary care provider.     Will follow up in 2 months, PCP in 3 weeks.    The patient was mailed a summary of these recommendations as an after visit summary.    Tanna Diaz, PharmD  Medication Therapy Management Pharmacist

## 2017-06-05 DIAGNOSIS — E11.9 TYPE 2 DIABETES, HBA1C GOAL < 8% (H): ICD-10-CM

## 2017-06-07 NOTE — TELEPHONE ENCOUNTER
"insulin syringe-needle U-100 (BD INSULIN SYRINGE ULTRAFINE) 31G X 5/16\" 0.5 ML         Last Written Prescription Date: 01/13/17  Last Fill Quantity: 100, # refills: 2  Last Office Visit with G, P or [facility] prescribing provider:  03/13/17   Next 5 appointments (look out 90 days)     Jun 20, 2017  9:00 AM CDT   Return Visit with Bong Yoo MD   [facility] ([facility]    [address] [place] [id number] [phone]            Jun 20, 2017 10:20 AM CDT   Office Visit with Debbie Lewis MD   [facility] ([facility]    [address] [place] [id number] [phone]                   BP Readings from Last 3 Encounters:   04/18/17 118/58   03/15/17 135/56   03/13/17 140/66     Lab Results   Component Value Date    MICROL 39 05/05/2016     Lab Results   Component Value Date    UMALCR 42.76 05/05/2016     Creatinine   Date Value Ref Range Status   03/13/2017 0.76 0.66 - 1.25 mg/dL Final   ]  GFR Estimate   Date Value Ref Range Status   03/13/2017 >90  Non  GFR Calc   >60 mL/min/1.7m2 Final   01/09/2017 >90 >60 mL/min/1.7m2 Final   10/03/2016 >60 >60 ml/min/1.73m2 Final     GFR Estimate If Black   Date Value Ref Range Status   03/13/2017 >90   GFR Calc   >60 mL/min/1.7m2 Final   01/09/2017 >90 >60 mL/min/1.7m2 Final   10/03/2016 >60 >60 ml/min/1.73m2 Final     Lab Results   Component Value Date    CHOL 159 05/08/2017     Lab Results   Component Value Date    HDL 54 05/08/2017     Lab Results   Component Value Date    LDL 83 05/08/2017     Lab Results   Component Value Date    TRIG 112 05/08/2017     Lab Results   Component Value Date    CHOLHDLRATIO 3.2 05/12/2015     Lab Results   Component Value Date    AST 20 03/13/2017     Lab Results   Component Value Date    ALT 22 03/13/2017     Lab Results   Component Value Date    A1C 5.8 05/08/2017    A1C 6.9 10/17/2016    " A1C 6.2 07/22/2016    A1C 6.7 03/28/2016    A1C 7.1 11/10/2015     Potassium   Date Value Ref Range Status   03/13/2017 4.6 3.4 - 5.3 mmol/L Final

## 2017-06-08 ENCOUNTER — ANTICOAGULATION THERAPY VISIT (OUTPATIENT)
Dept: NURSING | Facility: CLINIC | Age: 72
End: 2017-06-08
Payer: COMMERCIAL

## 2017-06-08 DIAGNOSIS — I48.91 ATRIAL FIBRILLATION, UNSPECIFIED TYPE (H): ICD-10-CM

## 2017-06-08 DIAGNOSIS — Z79.01 LONG-TERM (CURRENT) USE OF ANTICOAGULANTS: ICD-10-CM

## 2017-06-08 LAB — INR POINT OF CARE: 1.8 (ref 0.86–1.14)

## 2017-06-08 PROCEDURE — 99207 ZZC NO CHARGE NURSE ONLY: CPT

## 2017-06-08 PROCEDURE — 85610 PROTHROMBIN TIME: CPT | Mod: QW

## 2017-06-08 PROCEDURE — 36416 COLLJ CAPILLARY BLOOD SPEC: CPT

## 2017-06-08 RX ORDER — SYRINGE-NEEDLE,INSULIN,0.5 ML 31 GX5/16"
SYRINGE, EMPTY DISPOSABLE MISCELLANEOUS
Qty: 100 EACH | Refills: 0 | Status: SHIPPED | OUTPATIENT
Start: 2017-06-08 | End: 2017-07-24

## 2017-06-08 NOTE — TELEPHONE ENCOUNTER
Prescription approved per [facility] [facility].  MICH Jung, BSN, RN

## 2017-06-08 NOTE — PROGRESS NOTES
ANTICOAGULATION FOLLOW-UP CLINIC VISIT    Patient Name:  Cayetano Lunsford  Date:  6/8/2017  Contact Type:  Face to Face    SUBJECTIVE:     Patient Findings     Comments No recent changes. Primidone rx may still be effecting INR stability.            OBJECTIVE    INR Protime   Date Value Ref Range Status   06/08/2017 1.8 (A) 0.86 - 1.14 Final       ASSESSMENT / PLAN  INR assessment SUB    Recheck INR In: 3 WEEKS    INR Location Clinic      Anticoagulation Summary as of 6/8/2017     INR goal 2.0-3.0   Today's INR 1.8!   Maintenance plan 5 mg (5 mg x 1) on Mon, Fri; 7.5 mg (5 mg x 1.5) all other days   Full instructions 6/8: 10 mg; Otherwise 5 mg on Mon, Fri; 7.5 mg all other days   Weekly total 47.5 mg   Plan last modified Kirstin Meléndez RN (6/8/2017)   Next INR check 6/29/2017   Priority INR   Target end date     Indications   Long-term (current) use of anticoagulants [Z79.01] [Z79.01]  Atrial fibrillation (H) [I48.91]         Anticoagulation Episode Summary     INR check location     Preferred lab     Send INR reminders to [facility]    Comments       Anticoagulation Care Providers     Provider Role Specialty Phone number    Debbie Lewis MD [facility] [phone]            See the Encounter Report to view Anticoagulation Flowsheet and Dosing Calendar (Go to Encounters tab in chart review, and find the Anticoagulation Therapy Visit)    Per protocol, patient advised to increase today's warfarin dose by 2.5 mg to account for sub-therapeutic INR level. Then increase weekly dose to 47.5 mg (5% increase) to account for recent low INR trends. Patient instructed to hold green intake today and tomorrow as well. Recheck within 3 weeks to ensure stability.     Patient made aware if signs of clotting (pain, tenderness, swelling, or color change in any extremity) AND/OR bleeding occur (nosebleeds, bleeding gums, bruising, or blood in stool or urine) to notify provider & seek medical  attention. If severe symptoms develop, such as major bleeding, chest pain, shortness of breath, fall, trauma or s/s of stroke, patient to call 911 immediately.     Kirstin Meléndez RN

## 2017-06-08 NOTE — MR AVS SNAPSHOT
Cayetano Lunsford   6/8/2017 9:45 AM   Anticoagulation Therapy Visit    Description:  72 year old male   Provider:   INR CLINIC   Department:   Nurse           INR as of 6/8/2017     Today's INR 1.8!      Anticoagulation Summary as of 6/8/2017     INR goal 2.0-3.0   Today's INR 1.8!   Full instructions 6/8: 10 mg; Otherwise 5 mg on Mon, Fri; 7.5 mg all other days   Next INR check 6/29/2017    Indications   Long-term (current) use of anticoagulants [Z79.01] [Z79.01]  Atrial fibrillation (H) [I48.91]         Your next Anticoagulation Clinic appointment(s)     Jun 29, 2017 11:15 AM CDT   Anticoagulation Visit with  INR CLINIC   [facility] ([facility]    [address] [place] [id number] [phone]              Contact Numbers     [facility]  Please call [phone] to cancel and/or reschedule your appointment   Please call [phone] with any problems or questions regarding your therapy.        June 2017 Details    Sun Mon Tue Wed Thu Fri Sat         1               2               3                 4               5               6               7               8      10 mg   See details      9      5 mg         10      7.5 mg           11      7.5 mg         12      5 mg         13      7.5 mg         14      7.5 mg         15      7.5 mg         16      5 mg         17      7.5 mg           18      7.5 mg         19      5 mg         20      7.5 mg         21      7.5 mg         22      7.5 mg         23      5 mg         24      7.5 mg           25      7.5 mg         26      5 mg         27      7.5 mg         28      7.5 mg         29            30                 Date Details   06/08 This INR check       Date of next INR:  6/29/2017         How to take your warfarin dose     To take:  5 mg Take 1 of the 5 mg tablets.    To take:  7.5 mg Take 1.5 of the 5 mg tablets.    To take:  10 mg Take 2 of the 5 mg tablets.

## 2017-06-20 ENCOUNTER — OFFICE VISIT (OUTPATIENT)
Dept: NEUROLOGY | Facility: CLINIC | Age: 72
End: 2017-06-20
Payer: COMMERCIAL

## 2017-06-20 ENCOUNTER — OFFICE VISIT (OUTPATIENT)
Dept: FAMILY MEDICINE | Facility: CLINIC | Age: 72
End: 2017-06-20
Payer: COMMERCIAL

## 2017-06-20 VITALS
TEMPERATURE: 97.9 F | DIASTOLIC BLOOD PRESSURE: 64 MMHG | SYSTOLIC BLOOD PRESSURE: 140 MMHG | HEART RATE: 67 BPM | BODY MASS INDEX: 32.24 KG/M2 | RESPIRATION RATE: 18 BRPM | WEIGHT: 199.75 LBS | OXYGEN SATURATION: 96 %

## 2017-06-20 VITALS
BODY MASS INDEX: 32.12 KG/M2 | SYSTOLIC BLOOD PRESSURE: 139 MMHG | TEMPERATURE: 98.1 F | WEIGHT: 199 LBS | HEART RATE: 49 BPM | RESPIRATION RATE: 10 BRPM | OXYGEN SATURATION: 95 % | DIASTOLIC BLOOD PRESSURE: 64 MMHG

## 2017-06-20 DIAGNOSIS — J44.9 CHRONIC OBSTRUCTIVE PULMONARY DISEASE, UNSPECIFIED (H): ICD-10-CM

## 2017-06-20 DIAGNOSIS — E11.649 TYPE 2 DIABETES MELLITUS WITH HYPOGLYCEMIA WITHOUT COMA, WITH LONG-TERM CURRENT USE OF INSULIN (H): ICD-10-CM

## 2017-06-20 DIAGNOSIS — E11.22 TYPE 2 DIABETES MELLITUS WITH STAGE 1 CHRONIC KIDNEY DISEASE, WITH LONG-TERM CURRENT USE OF INSULIN (H): Primary | ICD-10-CM

## 2017-06-20 DIAGNOSIS — E78.5 HYPERLIPIDEMIA LDL GOAL <100: ICD-10-CM

## 2017-06-20 DIAGNOSIS — Z79.4 TYPE 2 DIABETES MELLITUS WITH STAGE 1 CHRONIC KIDNEY DISEASE, WITH LONG-TERM CURRENT USE OF INSULIN (H): Primary | ICD-10-CM

## 2017-06-20 DIAGNOSIS — G25.0 BENIGN FAMILIAL TREMOR: ICD-10-CM

## 2017-06-20 DIAGNOSIS — I48.91 ATRIAL FIBRILLATION, UNSPECIFIED TYPE (H): ICD-10-CM

## 2017-06-20 DIAGNOSIS — N18.1 TYPE 2 DIABETES MELLITUS WITH STAGE 1 CHRONIC KIDNEY DISEASE, WITH LONG-TERM CURRENT USE OF INSULIN (H): Primary | ICD-10-CM

## 2017-06-20 DIAGNOSIS — I10 HYPERTENSION GOAL BP (BLOOD PRESSURE) < 140/90: ICD-10-CM

## 2017-06-20 DIAGNOSIS — Z79.4 TYPE 2 DIABETES MELLITUS WITH HYPOGLYCEMIA WITHOUT COMA, WITH LONG-TERM CURRENT USE OF INSULIN (H): ICD-10-CM

## 2017-06-20 DIAGNOSIS — G25.0 ESSENTIAL TREMOR: Primary | ICD-10-CM

## 2017-06-20 PROCEDURE — 82043 UR ALBUMIN QUANTITATIVE: CPT | Performed by: FAMILY MEDICINE

## 2017-06-20 PROCEDURE — 99214 OFFICE O/P EST MOD 30 MIN: CPT | Performed by: FAMILY MEDICINE

## 2017-06-20 PROCEDURE — 99207 C PAF COMPLETED  NO CHARGE: CPT | Performed by: FAMILY MEDICINE

## 2017-06-20 PROCEDURE — 99214 OFFICE O/P EST MOD 30 MIN: CPT | Performed by: PSYCHIATRY & NEUROLOGY

## 2017-06-20 RX ORDER — PROPRANOLOL HYDROCHLORIDE 40 MG/1
80-120 TABLET ORAL 2 TIMES DAILY
Qty: 180 TABLET | Refills: 10 | Status: SHIPPED | OUTPATIENT
Start: 2017-06-20 | End: 2018-05-01

## 2017-06-20 RX ORDER — PRIMIDONE 50 MG/1
TABLET ORAL
Qty: 180 TABLET | Refills: 2 | Status: SHIPPED | OUTPATIENT
Start: 2017-06-20 | End: 2017-09-05

## 2017-06-20 NOTE — LETTER
[facility]   [address] [place] [address]   [id number]  [phone]    June 23, 2017      Cayetano Lunsford  [address] [address]              Dear Cristy Lunsford,    Your lab results are stable. Please let us know if you have any questions.     Results for orders placed or performed in visit on 06/20/17   Albumin Random Urine Quantitative   Result Value Ref Range    Creatinine Urine 40 mg/dL    Albumin Urine mg/L 24 mg/L    Albumin Urine mg/g Cr 61.31 (H) 0 - 17 mg/g Cr           Sincerely,    Debbie Lewis MD/nr

## 2017-06-20 NOTE — PROGRESS NOTES
"  SUBJECTIVE:                                                    Cayetano Lunsford is a 72 year old male who presents to clinic today for the following health issues:    Diabetes Follow-up    Patient is checking blood sugars: twice daily.    Blood sugar testing frequency justification: Uncontrolled diabetes  Results are as follows:         am - 160         suppertime - 149    Diabetic concerns: None     Symptoms of hypoglycemia (low blood sugar): none     Paresthesias (numbness or burning in feet) or sores: No     Date of last diabetic eye exam: August 2016     Hypertension Follow-up      Outpatient blood pressures are not being checked.    Low Salt Diet: no added salt     Clinic on 3/13/17:  \"1. 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, with long-term current use of insulin (H)  Foot exam results normal  - FOOT EXAM  - TSH with free T4 reflex  - Comprehensive metabolic panel  - metFORMIN (GLUCOPHAGE) 1000 MG tablet; Take 1 tablet (1,000 mg) by mouth 2 times daily (with meals) with breakfast and dinner.  Dispense: 180 tablet; Refill: 0  - blood glucose monitoring (NO BRAND SPECIFIED) test strip; Test 4 times daily or as directed. Profile Rx: patient will contact pharmacy when needed  Dispense: 360 each; Refill: 3  2. Hypertension goal BP (blood pressure) < 140/90  Controlled  - losartan (COZAAR) 100 MG tablet; Take 1 tablet (100 mg) by mouth daily for hypertension.  Dispense: 90 tablet; Refill: 2  3. Benign familial tremor  He continues to have significant intention tremor with propranolol and with the sinemet that was started by Dr. Fuentes recently. He would like to see if there is anything else that could provide more benefit. He will schedule with Dr. Yoo to discuss this further. Labs done today for medication monitoring.   - Comprehensive metabolic panel  - CBC with platelets  - carbidopa-levodopa (SINEMET)  MG per tablet; Take 1 tablet by mouth 3 times daily  Dispense: 90 " "tablet; Refill: 0  4. Hyperlipidemia LDL goal <100  - simvastatin (ZOCOR) 80 MG tablet; Take 0.5 tablets (40 mg) by mouth At Bedtime Profile Rx: patient will contact pharmacy when needed  Dispense: 30 tablet; Refill: 2  5. Atrial fibrillation, unspecified type (H)  - warfarin (COUMADIN) 5 MG tablet; As directed by Coumadin clinic. Profile Rx: patient will contact pharmacy when needed  Dispense: 96 tablet; Refill: 0\"    Clinic on 6/20/17 Dr. Yoo:   \"Plan: At today's visit we discussed his symptoms and plan. We will increase primidone. His INR might be affected by this change. I advised the patient to check with his INR nurse if his warfarin needs to be increased.  The following medications were changed: Primidone (MYSOLINE) 50 MG tablet: Take 1 tablet twice a day and 2 tablets in the evening for 1 week, then go to 2 tablets twice a day (morning and evening) and take 1 tablet midday for 1 week, take 2 tablets three times a day, thereafter.  His blood pressure was elevated during today's visit, I recommended the patient to discuss it with his primary care provider.   Next follow-up appointment is in the next 3 months or earlier if needed.\"      Patient saw Dr. Yoo this morning and his primidone dose was increased.    July 12th- phone meeting with Jessica Diaz.   His low BG is generally in the evening prior to dinner. When his readings are high it's because of something he ate.    July 10th- follow up with Pulmonology.       Problem list and histories reviewed & adjusted, as indicated.  Additional history: as documented  Patient Active Problem List   Diagnosis     Ulcerative colitis without complications (HCC)     Atrial fibrillation (H)     Chronic obstructive pulmonary disease, unspecified (H)     Obesity     Eczema     HYPERLIPIDEMIA LDL GOAL <100     Advance Care Planning     Benign familial tremor     Hypertension goal BP (blood pressure) < 140/90     Cramp of limb     BPH (benign prostatic " "hyperplasia)     Pain in joint, lower leg     CAD in native artery     Hard of hearing     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)     Diverticulitis of colon     History of colonic polyps     Redundant colon     Long-term (current) use of anticoagulants [Z79.01]     Past Surgical History:   Procedure Laterality Date     COLONOSCOPY  3/31/2011     CORONARY ANGIOGRAPHY ADULT ORDER  2001 and 2008 2001 at [facility]. 2008- No interventions     HC REMOVAL OF NAIL PLATE SIMPLE SINGLE  11/10/2011    Right 2nd Toenail     SURGICAL HISTORY OF -   1987    right ear graft     SURGICAL HISTORY OF -   1992    right shoulder surgery     SURGICAL HISTORY OF -   1979    back surgery     SURGICAL HISTORY OF -   1978    vasectomy       Social History   Substance Use Topics     Smoking status: Former Smoker     Packs/day: 1.00     Years: 30.00     Types: Cigarettes     Quit date: 3/19/1992     Smokeless tobacco: Never Used     Alcohol use 0.0 oz/week     0 Standard drinks or equivalent per week      Comment: hardly at all, sometimes at dinner     Family History   Problem Relation Age of Onset     Circulatory Mother      blood clot in leg     DIABETES Mother      type 2     Allergies Mother      Arthritis Mother      GASTROINTESTINAL DISEASE Mother      Neurologic Disorder Mother      Familiar tremors     Neurologic Disorder Father      brain tumor     CEREBROVASCULAR DISEASE Paternal Grandfather      Hypertension Brother      Hypertension Sister      DIABETES Sister      Type 2     Allergies Sister      Lipids Brother      Lipids Sister      Neurologic Disorder Sister      \"     Neurologic Disorder Sister      \"     Neurologic Disorder Sister      \"         Current Outpatient Prescriptions   Medication Sig Dispense Refill     primidone (MYSOLINE) 50 MG tablet Take 1 tablet twice a day and 2 tablets in the evening for 1 week, then go to 2 tablets twice a day (morning and evening) and take 1 tablet midday for 1 " "week, take 2 tablets three times a day, thereafter. 180 tablet 2     metFORMIN (GLUCOPHAGE) 1000 MG tablet Take 1 tablet (1,000 mg) by mouth 2 times daily (with meals) with breakfast and dinner. 180 tablet 3     propranolol (INDERAL) 40 MG tablet Take 2-3 tablets ( mg) by mouth 2 times daily 180 tablet 10     B-D INSULIN SYRINGE 31G X 5/16\" 0.5 ML USE 1 SYRINGE TWO TIMES A DAY OR AS DIRECTED 100 each 0     insulin regular (NOVOLIN R RELION) 100 UNIT/ML injection Inject 7 units with breakfast and 2 units with dinner (when mixing with NPH, draw this insulin up first) 10 mL 2     ipratropium - albuterol 0.5 mg/2.5 mg/3 mL (DUONEB) 0.5-2.5 (3) MG/3ML neb solution NEBULIZE CONTENTS OF ONE VIAL BY MOUTH EVERY 4 HOURS AS NEEDED FOR SHORTNESS OF BREATH /DYSPNEA 270 mL 8     azelastine (ASTELIN) 0.1 % spray Spray 1-2 sprays into both nostrils 2 times daily 1 Bottle 11     SPIRIVA RESPIMAT 2.5 MCG/ACT inhalation aerosol INHALE 2 PUFFS INTO THE LUNGS DAILY 4 g 1     warfarin (COUMADIN) 5 MG tablet 7.5mg M,Th and 5mg ROW As directed by Coumadin clinic 90 tablet 3     losartan (COZAAR) 100 MG tablet Take 1 tablet (100 mg) by mouth daily for hypertension. 90 tablet 2     simvastatin (ZOCOR) 80 MG tablet Take 0.5 tablets (40 mg) by mouth At Bedtime Profile Rx: patient will contact pharmacy when needed 30 tablet 2     blood glucose monitoring (NO BRAND SPECIFIED) test strip Test 4 times daily or as directed. Profile Rx: patient will contact pharmacy when needed 360 each 3     mometasone-formoterol (DULERA) 100-5 MCG/ACT oral inhaler Inhale 2 puffs into the lungs 2 times daily       propafenone (RYTHMOL) 150 MG TABS Take 1 tablet (150 mg) by mouth 2 times daily 180 tablet 3     FOLIC ACID PO Take 1 mg by mouth daily       Probiotic Product (FLORAJEN3) CAPS Take 1 capsule by mouth 2 times daily 60 capsule 0     order for DME Equipment being ordered: Nebulizer machine with mask and tubing 1 each 0     pantoprazole (PROTONIX) 40 " MG enteric coated tablet Take 40 mg by mouth daily Started by Dr. Debbie Rico at [facility]       ASPIRIN NOT PRESCRIBED, INTENTIONAL, Reported on 5/17/2017       BD ULTRA FINE PEN NEEDLES Use to inject insulin twice daily. 100 each PRN     sulfaSALAzine (AZULFIDINE) 500 MG tablet TAKE ONE TABLET BY MOUTH THREE TIMES A DAY 90 tablet 11     albuterol (PROAIR HFA, PROVENTIL HFA, VENTOLIN HFA) 108 (90 BASE) MCG/ACT inhaler Inhale 1-2 puffs into the lungs every 4 hours as needed (for shortness of breath/wheezing) 1 Inhaler 5     ORDER FOR DME Dispense one nebulizer machine with necessary supplies 1 each 0     FREESTYLE LANCETS MISC Use to test up to four times per day 100 Each 12     [DISCONTINUED] primidone (MYSOLINE) 50 MG tablet Take 1 tablet twice a day for the next 2 weeks, then go to 1 tablet three times a day. (Patient taking differently: 50 mg 3 times daily Take 1 tablet twice a day for the next 2 weeks, then go to 1 tablet three times a day.) 154 tablet 0     [DISCONTINUED] metFORMIN (GLUCOPHAGE) 1000 MG tablet Take 1 tablet (1,000 mg) by mouth 2 times daily (with meals) with breakfast and dinner. 180 tablet 0     [DISCONTINUED] propranolol (INDERAL) 40 MG tablet Take 2-3 tablets ( mg) by mouth 2 times daily 180 tablet 10     Allergies   Allergen Reactions     Percodan [Oxycodone-Aspirin] Nausea and Vomiting     Recent Labs   Lab Test  05/08/17   1020  03/13/17   1050  01/09/17   1432  10/17/16   0958 10/03/16  07/22/16   0801  05/05/16   0919 04/22/16   05/12/15   0757   10/27/14   1136   A1C  5.8   --    --   6.9*   --   6.2*   --    --    < >  7.6*   < >   --    LDL  83   --    --    --    --    --   57   --    --   46   --    --    HDL  54   --    --    --    --    --   47   --    --   44   --    --    TRIG  112   --    --    --    --    --   122   --    --   255*   --    --    ALT   --   22   --    --   13   --    --   18   < >   --    --    --    CR   --   0.76   --    --   0.79   --   0.70  0.86   <  >   --    --    --    GFRESTIMATED   --   >90  Non  GFR Calc    >90   --   >60   --   >90  Non  GFR Calc    >60   < >   --    --    --    GFRESTBLACK   --   >90   GFR Calc    >90   --   >60   --   >90   GFR Calc    >60   < >   --    --    --    POTASSIUM   --   4.6   --    --    --    --   4.6   --    < >   --    --    --    TSH   --   3.25   --    --    --    --    --    --    --    --    --   2.90    < > = values in this interval not displayed.      BP Readings from Last 3 Encounters:   06/20/17 144/63   06/20/17 140/64   04/18/17 118/58    Wt Readings from Last 3 Encounters:   06/20/17 90.3 kg (199 lb)   06/20/17 90.6 kg (199 lb 12 oz)   04/18/17 91.2 kg (201 lb)        Reviewed and updated as needed this visit by clinical staff  Reviewed and updated as needed this visit by Provider    ROS:  No complaint of chest pain or SOB.    This document serves as a record of the services and decisions personally performed and made by Debbie Lewis MD. It was created on his/her behalf by Camila Vazquez, trained medical scribe. The creation of this document is based the provider's statements to the medical scribes.    Scribe Camila Vazquez, June 20, 2017  OBJECTIVE:                                                    /63  Pulse (!) 49  Temp 98.1  F (36.7  C) (Oral)  Resp 10  Wt 90.3 kg (199 lb)  SpO2 95%  BMI 32.12 kg/m2  Body mass index is 32.12 kg/(m^2).  GENERAL: healthy, alert and no distress    Diagnostic Test Results:  Lab Results   Component Value Date    A1C 5.8 05/08/2017    A1C 6.9 10/17/2016    A1C 6.2 07/22/2016    A1C 6.7 03/28/2016    A1C 7.1 11/10/2015        ASSESSMENT/PLAN:                                                    1. 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, with long-term current use of insulin (H)  Controlled. Pt has appointment with Jessica Diaz on 7/12/17 to adjust medication if needed.  Metformin refilled today. Continues on novolin R   - metFORMIN (GLUCOPHAGE) 1000 MG tablet; Take 1 tablet (1,000 mg) by mouth 2 times daily (with meals) with breakfast and dinner.  Dispense: 180 tablet; Refill: 3    2. Hypertension goal BP (blood pressure) < 140/90  Controlled. Continues on losartan 100 mg daily and take propranolol for tremor 2-3 tablets by mouth twice daily .     3. Hyperlipidemia LDL goal <100  Stable. Continues on simvastatin 80 mg.     4. Atrial fibrillation, unspecified type (H)  Stable. On anticoagulation with warfarin. Continues on propafenone 150mg bid. Also takes propranolol (prescribed for his tremor) Followed by cardiology. Has appointment tomorrow.     5. Benign familial tremor  Stable. Followed by Neuro. Primidone dose increased at appointment with Dr. Yoo this morning.  - propranolol (INDERAL) 40 MG tablet; Take 2-3 tablets ( mg) by mouth 2 times daily  Dispense: 180 tablet; Refill: 10    6. Chronic obstructive pulmonary disease, unspecified (H)  Stable. Pt has follow up with pulmonology on 7/10/17.      Patient Instructions   1. Start checking your blood pressure three times a week and let Jessica Joe know these readings during your phone visit. Goal blood pressure <140/90.  2. Schedule your eye exam for August.  3. See me in December  4. Complete your urine test in the meantime      The information in this document, created by the medical scribe for me, accurately reflects the services I personally performed and the decisions made by me. I have reviewed and approved this document for accuracy. 06/20/17    Debbie Lewis MD  [facility] [place]

## 2017-06-20 NOTE — PATIENT INSTRUCTIONS
AFTER VISIT SUMMARY (AVS):    At today's visit we discussed your symptoms and plan. We will increase your primidone. Your INR might be affected by this change. Please check with your INR nurse if your warfarin needs to be increased.    The following medications were changed: Primidone (MYSOLINE) 50 MG tablet: Take 1 tablet twice a day and 2 tablets in the evening for 1 week, then go to 2 tablets twice a day (morning and evening) and take 1 tablet midday for 1 week, take 2 tablets three times a day, thereafter.    Your blood pressure was elevated during today's visit, please consider discussing with your primary care provider the need to adjust your blood pressure medications.     Next follow-up appointment is in the next 3 months or earlier if needed.    Please do not hesitate to call me with any questions or concerns.    Thanks.

## 2017-06-20 NOTE — PROGRESS NOTES
"ESTABLISHED PATIENT NEUROLOGY NOTE    DATE OF VISIT: 6/20/2017  CLINIC LOCATION: [facility] [place]  MRN: [id number]  PATIENT NAME: Cayetano Lunsford  YOB: 1945    PCP: Debbie Lewis MD.    REASON FOR VISIT:   Chief Complaint   Patient presents with     RECHECK     tremor     SUBJECTIVE:                                                      HISTORY OF PRESENT ILLNESS: Patient is here for follow up regarding essential tremor. Please refer to my initial/other prior notes for further information. He was last seen on 04/18/2017.    The following issues were reviewed today: Current symptoms and plan.    Since the last visit, the patient reports mild improvement of his hand tremor. However, the tremor still interferes with his daily activities. He takes primidone 50 mg 3 times per day and denies any significant side effects. His somnolence has resolved. He also tells me that his Coumadin was increased. He is wondering if daily alcohol consumption will be helpful for his symptoms.    On review of systems, patient endorses no other acute new complaints. Medications, allergies, family and social history were also reviewed. There are no changes reported by patient.    CURRENT MEDICATIONS:   Current Outpatient Prescriptions on File Prior to Visit:  B-D INSULIN SYRINGE 31G X 5/16\" 0.5 ML USE 1 SYRINGE TWO TIMES A DAY OR AS DIRECTED   insulin regular (NOVOLIN R RELION) 100 UNIT/ML injection Inject 7 units with breakfast and 2 units with dinner (when mixing with NPH, draw this insulin up first)   ipratropium - albuterol 0.5 mg/2.5 mg/3 mL (DUONEB) 0.5-2.5 (3) MG/3ML neb solution NEBULIZE CONTENTS OF ONE VIAL BY MOUTH EVERY 4 HOURS AS NEEDED FOR SHORTNESS OF BREATH /DYSPNEA   azelastine (ASTELIN) 0.1 % spray Spray 1-2 sprays into both nostrils 2 times daily   SPIRIVA RESPIMAT 2.5 MCG/ACT inhalation aerosol INHALE 2 PUFFS INTO THE LUNGS DAILY   warfarin (COUMADIN) 5 MG tablet 7.5mg M,Th and 5mg ROW As directed " by Coumadin clinic   losartan (COZAAR) 100 MG tablet Take 1 tablet (100 mg) by mouth daily for hypertension.   metFORMIN (GLUCOPHAGE) 1000 MG tablet Take 1 tablet (1,000 mg) by mouth 2 times daily (with meals) with breakfast and dinner.   simvastatin (ZOCOR) 80 MG tablet Take 0.5 tablets (40 mg) by mouth At Bedtime Profile Rx: patient will contact pharmacy when needed   blood glucose monitoring (NO BRAND SPECIFIED) test strip Test 4 times daily or as directed. Profile Rx: patient will contact pharmacy when needed   mometasone-formoterol (DULERA) 100-5 MCG/ACT oral inhaler Inhale 2 puffs into the lungs 2 times daily   propafenone (RYTHMOL) 150 MG TABS Take 1 tablet (150 mg) by mouth 2 times daily   propranolol (INDERAL) 40 MG tablet Take 2-3 tablets ( mg) by mouth 2 times daily   FOLIC ACID PO Take 1 mg by mouth daily   Probiotic Product (FLORAJEN3) CAPS Take 1 capsule by mouth 2 times daily   order for DME Equipment being ordered: Nebulizer machine with mask and tubing   pantoprazole (PROTONIX) 40 MG enteric coated tablet Take 40 mg by mouth daily Started by Dr. Debbie Rico at [facility]   ASPIRIN NOT PRESCRIBED, INTENTIONAL, Reported on 5/17/2017   BD ULTRA FINE PEN NEEDLES Use to inject insulin twice daily.   sulfaSALAzine (AZULFIDINE) 500 MG tablet TAKE ONE TABLET BY MOUTH THREE TIMES A DAY   albuterol (PROAIR HFA, PROVENTIL HFA, VENTOLIN HFA) 108 (90 BASE) MCG/ACT inhaler Inhale 1-2 puffs into the lungs every 4 hours as needed (for shortness of breath/wheezing)   ORDER FOR DME Dispense one nebulizer machine with necessary supplies   FREESTYLE LANCETS MISC Use to test up to four times per day   [DISCONTINUED] primidone (MYSOLINE) 50 MG tablet Take 1 tablet twice a day for the next 2 weeks, then go to 1 tablet three times a day. (Patient taking differently: 50 mg 3 times daily Take 1 tablet twice a day for the next 2 weeks, then go to 1 tablet three times a day.)     No current facility-administered  medications on file prior to visit.     REVIEW OF SYSTEMS:                                                    10-system review was completed. Pertinent positives are included in HPI. The remainder of ROS is negative.  EXAM:                                                    Physical Exam:   Vitals: /64 (BP Location: Right arm, Patient Position: Chair, Cuff Size: Adult Large)  Pulse 67  Temp 97.9  F (36.6  C) (Tympanic)  Resp 18  Wt 90.6 kg (199 lb 12 oz)  SpO2 96%  BMI 32.24 kg/m2    General: pt is in NAD, cooperative.  Skin: normal turgor, moist mucous membranes, no lesions/rashes noticed.  HEENT: ATNC, white sclera, normal conjunctiva.  Respiratory:Symmetric lung excursion, no accessory respiratory muscle use.  Abdomen: Non distended.  Neurological: awake, cooperative, follows commands, no aphasia noted, cranial nerves II-XII: no ptosis, extraocular motility is full, face is symmetric, mild dysarthria, tongue is midline, equally moves all extremities, the patient has intermittent mild to moderate postural hand tremor of moderate frequency and amplitude, no dysmetria bilaterally, gait is normal.    DATA:     Labs: I personally reviewed the following labs:  Anticoagulation Therapy Visit on 06/08/2017   Component Date Value Ref Range Status     INR Protime 06/08/2017 1.8* 0.86 - 1.14 Final   Anticoagulation Therapy Visit on 05/22/2017   Component Date Value Ref Range Status     INR Protime 05/22/2017 2.0* 0.86 - 1.14 Final   Anticoagulation Therapy Visit on 05/08/2017   Component Date Value Ref Range Status     INR Protime 05/08/2017 2.8* 0.86 - 1.14 Final   Orders Only on 05/08/2017   Component Date Value Ref Range Status     Hemoglobin A1C 05/08/2017 5.8  4.3 - 6.0 % Final     Cholesterol 05/08/2017 159  <200 mg/dL Final     Triglycerides 05/08/2017 112  <150 mg/dL Final    Fasting specimen     HDL Cholesterol 05/08/2017 54  >39 mg/dL Final     LDL Cholesterol Calculated 05/08/2017 83  <100 mg/dL Final     Desirable:       <100 mg/dl     Non HDL Cholesterol 05/08/2017 105  <130 mg/dL Final   Imaging: None.  ASSESSMENT and PLAN:                                                    Assessment: 72-year-old male patient presents for follow-up regarding his essential tremor. He is on primidone 50 mg 3 times per day without significant side effects. He also takes propranolol  mg twice daily. Hand tremors still interfere with his daily activities and he wishes to have better control. We will increase the dose of primidone.    Additionally, I discussed the effect of alcohol on his tremor. It is well known fact that alcohol reduces essential tremor; however, it is not recommended to use it as treatment because of multiple harmful effects and long-term sequelae. In his particular case, it will interfere with primidone, potentiating sedation, and warfarin. I advised the patient against daily alcohol use.    Diagnoses:    ICD-10-CM    1. Essential tremor G25.0 primidone (MYSOLINE) 50 MG tablet       Plan: At today's visit we discussed his symptoms and plan. We will increase primidone. His INR might be affected by this change. I advised the patient to check with his INR nurse if his warfarin needs to be increased.    The following medications were changed: Primidone (MYSOLINE) 50 MG tablet: Take 1 tablet twice a day and 2 tablets in the evening for 1 week, then go to 2 tablets twice a day (morning and evening) and take 1 tablet midday for 1 week, take 2 tablets three times a day, thereafter.    His blood pressure was elevated during today's visit, I recommended the patient to discuss it with his primary care provider.     Next follow-up appointment is in the next 3 months or earlier if needed.    I encouraged the patient to call me with any questions or concerns.    Total Time: 35 minutes with > 50% spent counseling the patient on stated above assessment and recommendations, including nature of the diagnosis, needed w/u,  proposed plan of treatment, and prognosis.    Bong Yoo MD  / Neurology

## 2017-06-20 NOTE — NURSING NOTE
"Chief Complaint   Patient presents with     Diabetes     Hypertension       Initial /63  Pulse (!) 49  Temp 98.1  F (36.7  C) (Oral)  Resp 10  Wt 199 lb (90.3 kg)  SpO2 95%  BMI 32.12 kg/m2 Estimated body mass index is 32.12 kg/(m^2) as calculated from the following:    Height as of 3/15/17: 5' 6\" (1.676 m).    Weight as of this encounter: 199 lb (90.3 kg).  Medication Reconciliation: complete     Amanda Bello MA     "

## 2017-06-20 NOTE — PATIENT INSTRUCTIONS
1. Start checking your blood pressure three times a week and let Jessica Diaz know these readings during your phone visit. Goal blood pressure <140/90.  2. Schedule your eye exam for August.  3. See me in December  4. Complete your urine test in the meantime

## 2017-06-20 NOTE — MR AVS SNAPSHOT
After Visit Summary   6/20/2017    Cayetano Lunsford    MRN: [id number]           Patient Information     Date Of Birth          1945        Visit Information        Provider Department      6/20/2017 9:00 AM Bong Yoo MD [facility]        Today's Diagnoses     Essential tremor    -  1      Care Instructions    AFTER VISIT SUMMARY (AVS):    At today's visit we discussed your symptoms and plan. We will increase your primidone. Your INR might be affected by this change. Please check with your INR nurse if your warfarin needs to be increased.    The following medications were changed: Primidone (MYSOLINE) 50 MG tablet: Take 1 tablet twice a day and 2 tablets in the evening for 1 week, then go to 2 tablets twice a day (morning and evening) and take 1 tablet midday for 1 week, take 2 tablets three times a day, thereafter.    Your blood pressure was elevated during today's visit, please consider discussing with your primary care provider the need to adjust your blood pressure medications.     Next follow-up appointment is in the next 3 months or earlier if needed.    Please do not hesitate to call me with any questions or concerns.    Thanks.            Follow-ups after your visit        Follow-up notes from your care team     Return in about 3 months (around 9/20/2017).      Your next 10 appointments already scheduled     Jun 20, 2017 10:20 AM CDT   Office Visit with Debbie Lewis MD   [facility] ([facility])    [address] [place] [address] [phone]           Bring a current list of meds and any records pertaining to this visit.  For Physicals, please bring immunization records and any forms needing to be filled out.  Please arrive 10 minutes early to complete paperwork.            Jun 21, 2017 12:45 PM CDT   RETURN 45 with Natanael Dang MD   [facility] [place] ([facility]in[facility])    [address]  [facility] [address]   [phone]            Jun 29, 2017 11:15 AM CDT   Anticoagulation Visit with  INR CLINIC   [facility] ([facility])    [address] [place] [address] [phone]            Jul 12, 2017  8:30 AM CDT   TELEMEDICINE with Jessica Diaz RPH   [facility] ([facility])    [address] [place] [address] [phone]           Note: this is not an onsite visit; there is no need to come to the facility.            Sep 05, 2017  9:00 AM CDT   Return Visit with Bong Yoo MD   [facility] ([facility])    [address] [place] [address] [phone]              Who to contact     If you have questions or need follow up information about today's clinic visit or your schedule please contact [facility] directly at [phone].  Normal or non-critical lab and imaging results will be communicated to you by MyChart, letter or phone within 4 business days after the clinic has received the results. If you do not hear from us within 7 days, please contact the clinic through [employer] or phone. If you have a critical or abnormal lab result, we will notify you by phone as soon as possible.  Submit refill requests through [employer]Field or call your pharmacy and they will forward the refill request to us. Please allow 3 business days for your refill to be completed.          Additional Information About Your Visit        DeepField Information     DeepField lets you send messages to your doctor, view your test results, renew your prescriptions, schedule appointments and more. To sign up, go to [email or web address] . Click on \"Log in\" on the left side of the screen, which will take you to the Welcome page. Then click on \"Sign up Now\" on the right side of the page.     You will be " asked to enter the access code listed below, as well as some personal information. Please follow the directions to create your username and password.     Your access code is: [email or web address]  Expires: 2017 10:57 AM     Your access code will  in 90 days. If you need help or a new code, please call your [place] clinic or [phone].        Care EveryWhere ID     This is your Care EveryWhere ID. This could be used by other organizations to access your [place] medical records  [id number]        Your Vitals Were     Pulse Temperature Respirations Pulse Oximetry BMI (Body Mass Index)       67 97.9  F (36.6  C) (Tympanic) 18 96% 32.24 kg/m2        Blood Pressure from Last 3 Encounters:   17 140/64   17 118/58   03/15/17 135/56    Weight from Last 3 Encounters:   17 90.6 kg (199 lb 12 oz)   17 91.2 kg (201 lb)   03/15/17 90.6 kg (199 lb 12 oz)              Today, you had the following     No orders found for display         Today's Medication Changes          These changes are accurate as of: 17  9:38 AM.  If you have any questions, ask your nurse or doctor.               These medicines have changed or have updated prescriptions.        Dose/Directions    primidone 50 MG tablet   Commonly known as:  MYSOLINE   This may have changed:  additional instructions   Used for:  Essential tremor   Changed by:  Bong Yoo MD        Take 1 tablet twice a day and 2 tablets in the evening for 1 week, then go to 2 tablets twice a day (morning and evening) and take 1 tablet midday for 1 week, take 2 tablets three times a day, thereafter.   Quantity:  180 tablet   Refills:  2            Where to get your medicines      Some of these will need a paper prescription and others can be bought over the counter.  Ask your nurse if you have questions.     Bring a paper prescription for each of these medications     primidone 50 MG tablet                Primary Care Provider Office  "Phone # Fax #    Debbie ABHIJIT Lewis [phone]       [facility] [address] [facility] [address]        Thank you!     Thank you for choosing [facility]  for your care. Our goal is always to provide you with excellent care. Hearing back from our patients is one way we can continue to improve our services. Please take a few minutes to complete the written survey that you may receive in the mail after your visit with us. Thank you!             Your Updated Medication List - Protect others around you: Learn how to safely use, store and throw away your medicines at www.disposemymeds.org.          This list is accurate as of: 6/20/17  9:38 AM.  Always use your most recent med list.                   Brand Name Dispense Instructions for use    albuterol 108 (90 BASE) MCG/ACT Inhaler    PROAIR HFA/PROVENTIL HFA/VENTOLIN HFA    1 Inhaler    Inhale 1-2 puffs into the lungs every 4 hours as needed (for shortness of breath/wheezing)       ASPIRIN NOT PRESCRIBED    INTENTIONAL     Reported on 5/17/2017       azelastine 0.1 % spray    ASTELIN    1 Bottle    Spray 1-2 sprays into both nostrils 2 times daily       B-D INSULIN SYRINGE 31G X 5/16\" 0.5 ML   Generic drug:  insulin syringe-needle U-100     100 each    USE 1 SYRINGE TWO TIMES A DAY OR AS DIRECTED       BD ULTRA FINE PEN NEEDLES     100 each    Use to inject insulin twice daily.       blood glucose monitoring lancets     100 Each    Use to test up to four times per day       blood glucose monitoring test strip    no brand specified    360 each    Test 4 times daily or as directed. Profile Rx: patient will contact pharmacy when needed       FLORAJEN3 Caps     60 capsule    Take 1 capsule by mouth 2 times daily       FOLIC ACID PO      Take 1 mg by mouth daily       insulin regular 100 UNIT/ML injection    NovoLIN R RELION    10 mL    Inject 7 units with breakfast and 2 units with dinner (when mixing with NPH, draw this insulin up " first)       ipratropium - albuterol 0.5 mg/2.5 mg/3 mL 0.5-2.5 (3) MG/3ML neb solution    DUONEB    270 mL    NEBULIZE CONTENTS OF ONE VIAL BY MOUTH EVERY 4 HOURS AS NEEDED FOR SHORTNESS OF BREATH /DYSPNEA       losartan 100 MG tablet    COZAAR    90 tablet    Take 1 tablet (100 mg) by mouth daily for hypertension.       metFORMIN 1000 MG tablet    GLUCOPHAGE    180 tablet    Take 1 tablet (1,000 mg) by mouth 2 times daily (with meals) with breakfast and dinner.       mometasone-formoterol 100-5 MCG/ACT oral inhaler    DULERA     Inhale 2 puffs into the lungs 2 times daily       order for DME     1 each    Dispense one nebulizer machine with necessary supplies       order for DME     1 each    Equipment being ordered: Nebulizer machine with mask and tubing       pantoprazole 40 MG EC tablet    PROTONIX     Take 40 mg by mouth daily Started by Dr[facility] Debbie Rico at [facility]       primidone 50 MG tablet    MYSOLINE    180 tablet    Take 1 tablet twice a day and 2 tablets in the evening for 1 week, then go to 2 tablets twice a day (morning and evening) and take 1 tablet midday for 1 week, take 2 tablets three times a day, thereafter.       propafenone 150 MG Tabs tablet    RYTHMOL    180 tablet    Take 1 tablet (150 mg) by mouth 2 times daily       propranolol 40 MG tablet    INDERAL    180 tablet    Take 2-3 tablets ( mg) by mouth 2 times daily       simvastatin 80 MG tablet    ZOCOR    30 tablet    Take 0.5 tablets (40 mg) by mouth At Bedtime Profile Rx: patient will contact pharmacy when needed       SPIRIVA RESPIMAT 2.5 MCG/ACT inhalation aerosol   Generic drug:  tiotropium     4 g    INHALE 2 PUFFS INTO THE LUNGS DAILY       sulfaSALAzine 500 MG tablet    AZULFIDINE    90 tablet    TAKE ONE TABLET BY MOUTH THREE TIMES A DAY       warfarin 5 MG tablet    COUMADIN    90 tablet    7.5mg M,Th and 5mg ROW As directed by Coumadin clinic

## 2017-06-20 NOTE — MR AVS SNAPSHOT
After Visit Summary   6/20/2017    Cayetano Lunsford    MRN: [id number]           Patient Information     Date Of Birth          1945        Visit Information        Provider Department      6/20/2017 10:20 AM Debbie Lewis MD [facility]        Today's Diagnoses     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, with long-term current use of insulin (H)    -  1    Hypertension goal BP (blood pressure) < 140/90        Hyperlipidemia LDL goal <100        Atrial fibrillation, unspecified type (H)        Benign familial tremor        Chronic obstructive pulmonary disease, unspecified (H)          Care Instructions    1. Start checking your blood pressure three times a week and let Jessica Diaz know these readings during your phone visit. Goal blood pressure <140/90.  2. Schedule your eye exam for August.  3. See me in December  4. Complete your urine test in the meantime          Follow-ups after your visit        Your next 10 appointments already scheduled     Jun 21, 2017 12:45 PM CDT   RETURN 45 with Natanael Dang MD   [facility] [place] ([facility])    [address] [place] [address] [phone]            Jun 29, 2017 11:15 AM CDT   Anticoagulation Visit with  INR CLINIC   [facility] ([facility])    [address] [place] [address] [phone]            Jul 12, 2017  8:30 AM CDT   TELEMEDICINE with Jessica Diaz [facility] ([facility])    [address] [place] [address] [phone]           Note: this is not an onsite visit; there is no need to come to the facility.            Sep 05, 2017  9:00 AM CDT   Return Visit with Bong Yoo MD   [facility] ([facility])    [address] [place] [address]  "  [phone]              Who to contact     If you have questions or need follow up information about today's clinic visit or your schedule please contact [facility] directly at [phone].  Normal or non-critical lab and imaging results will be communicated to you by MyChart, letter or phone within 4 business days after the clinic has received the results. If you do not hear from us within 7 days, please contact the clinic through MyChart or phone. If you have a critical or abnormal lab result, we will notify you by phone as soon as possible.  Submit refill requests through OpenX or call your pharmacy and they will forward the refill request to us. Please allow 3 business days for your refill to be completed.          Additional Information About Your Visit        [employer] Information     OpenX lets you send messages to your doctor, view your test results, renew your prescriptions, schedule appointments and more. To sign up, go to [email or web address] . Click on \"Log in\" on the left side of the screen, which will take you to the Welcome page. Then click on \"Sign up Now\" on the right side of the page.     You will be asked to enter the access code listed below, as well as some personal information. Please follow the directions to create your username and password.     Your access code is: [email or web address]  Expires: 2017 10:57 AM     Your access code will  in 90 days. If you need help or a new code, please call your [place] clinic or [phone].        Care EveryWhere ID     This is your Care EveryWhere ID. This could be used by other organizations to access your [place] medical records  [id number]        Your Vitals Were     Pulse Temperature Respirations Pulse Oximetry BMI (Body Mass Index)       49 98.1  F (36.7  C) (Oral) 10 95% 32.12 kg/m2        Blood Pressure from Last 3 Encounters:   17 144/63   17 140/64   17 118/58    Weight from Last 3 Encounters: "   06/20/17 199 lb (90.3 kg)   06/20/17 199 lb 12 oz (90.6 kg)   04/18/17 201 lb (91.2 kg)              Today, you had the following     No orders found for display         Today's Medication Changes          These changes are accurate as of: 6/20/17 10:26 AM.  If you have any questions, ask your nurse or doctor.               These medicines have changed or have updated prescriptions.        Dose/Directions    primidone 50 MG tablet   Commonly known as:  MYSOLINE   This may have changed:  additional instructions   Used for:  Essential tremor   Changed by:  Bong Yoo MD        Take 1 tablet twice a day and 2 tablets in the evening for 1 week, then go to 2 tablets twice a day (morning and evening) and take 1 tablet midday for 1 week, take 2 tablets three times a day, thereafter.   Quantity:  180 tablet   Refills:  2            Where to get your medicines      These medications were sent to [place] [address]  [facility] [address] [address]     Phone:  [phone]     metFORMIN 1000 MG tablet    propranolol 40 MG tablet         Some of these will need a paper prescription and others can be bought over the counter.  Ask your nurse if you have questions.     Bring a paper prescription for each of these medications     primidone 50 MG tablet                Primary Care Provider Office Phone # Fax #    Debbie Lewis [phone]       [place] [address]  [facility] [address]        Thank you!     Thank you for choosing [facility]  for your care. Our goal is always to provide you with excellent care. Hearing back from our patients is one way we can continue to improve our services. Please take a few minutes to complete the written survey that you may receive in the mail after your visit with us. Thank you!             Your Updated Medication List - Protect others around you: Learn how to safely use,  "store and throw away your medicines at www.disposemymeds.org.          This list is accurate as of: 6/20/17 10:26 AM.  Always use your most recent med list.                   Brand Name Dispense Instructions for use    albuterol 108 (90 BASE) MCG/ACT Inhaler    PROAIR HFA/PROVENTIL HFA/VENTOLIN HFA    1 Inhaler    Inhale 1-2 puffs into the lungs every 4 hours as needed (for shortness of breath/wheezing)       ASPIRIN NOT PRESCRIBED    INTENTIONAL     Reported on 5/17/2017       azelastine 0.1 % spray    ASTELIN    1 Bottle    Spray 1-2 sprays into both nostrils 2 times daily       B-D INSULIN SYRINGE 31G X 5/16\" 0.5 ML   Generic drug:  insulin syringe-needle U-100     100 each    USE 1 SYRINGE TWO TIMES A DAY OR AS DIRECTED       BD ULTRA FINE PEN NEEDLES     100 each    Use to inject insulin twice daily.       blood glucose monitoring lancets     100 Each    Use to test up to four times per day       blood glucose monitoring test strip    no brand specified    360 each    Test 4 times daily or as directed. Profile Rx: patient will contact pharmacy when needed       FLORAJEN3 Caps     60 capsule    Take 1 capsule by mouth 2 times daily       FOLIC ACID PO      Take 1 mg by mouth daily       insulin regular 100 UNIT/ML injection    NovoLIN R RELION    10 mL    Inject 7 units with breakfast and 2 units with dinner (when mixing with NPH, draw this insulin up first)       ipratropium - albuterol 0.5 mg/2.5 mg/3 mL 0.5-2.5 (3) MG/3ML neb solution    DUONEB    270 mL    NEBULIZE CONTENTS OF ONE VIAL BY MOUTH EVERY 4 HOURS AS NEEDED FOR SHORTNESS OF BREATH /DYSPNEA       losartan 100 MG tablet    COZAAR    90 tablet    Take 1 tablet (100 mg) by mouth daily for hypertension.       metFORMIN 1000 MG tablet    GLUCOPHAGE    180 tablet    Take 1 tablet (1,000 mg) by mouth 2 times daily (with meals) with breakfast and dinner.       mometasone-formoterol 100-5 MCG/ACT oral inhaler    DULERA     Inhale 2 puffs into the lungs 2 " times daily       order for DME     1 each    Dispense one nebulizer machine with necessary supplies       order for DME     1 each    Equipment being ordered: Nebulizer machine with mask and tubing       pantoprazole 40 MG EC tablet    PROTONIX     Take 40 mg by mouth daily Started by Dr[facility] Debbie Rico at [facility]       primidone 50 MG tablet    MYSOLINE    180 tablet    Take 1 tablet twice a day and 2 tablets in the evening for 1 week, then go to 2 tablets twice a day (morning and evening) and take 1 tablet midday for 1 week, take 2 tablets three times a day, thereafter.       propafenone 150 MG Tabs tablet    RYTHMOL    180 tablet    Take 1 tablet (150 mg) by mouth 2 times daily       propranolol 40 MG tablet    INDERAL    180 tablet    Take 2-3 tablets ( mg) by mouth 2 times daily       simvastatin 80 MG tablet    ZOCOR    30 tablet    Take 0.5 tablets (40 mg) by mouth At Bedtime Profile Rx: patient will contact pharmacy when needed       SPIRIVA RESPIMAT 2.5 MCG/ACT inhalation aerosol   Generic drug:  tiotropium     4 g    INHALE 2 PUFFS INTO THE LUNGS DAILY       sulfaSALAzine 500 MG tablet    AZULFIDINE    90 tablet    TAKE ONE TABLET BY MOUTH THREE TIMES A DAY       warfarin 5 MG tablet    COUMADIN    90 tablet    7.5mg M,Th and 5mg ROW As directed by Coumadin clinic

## 2017-06-20 NOTE — Clinical Note
His blood pressure was mildly elevated at today's visit. Please, consider adjusting his blood pressure medications.

## 2017-06-20 NOTE — NURSING NOTE
"Chief Complaint   Patient presents with     RECHECK     tremor       Initial /64 (BP Location: Right arm, Patient Position: Chair, Cuff Size: Adult Large)  Pulse 67  Temp 97.9  F (36.6  C) (Tympanic)  Resp 18  Wt 90.6 kg (199 lb 12 oz)  SpO2 96%  BMI 32.24 kg/m2 Estimated body mass index is 32.24 kg/(m^2) as calculated from the following:    Height as of 3/15/17: 1.676 m (5' 6\").    Weight as of this encounter: 90.6 kg (199 lb 12 oz).  Medication Reconciliation: get Guo CMA      "

## 2017-06-21 ENCOUNTER — OFFICE VISIT (OUTPATIENT)
Dept: CARDIOLOGY | Facility: CLINIC | Age: 72
End: 2017-06-21
Attending: INTERNAL MEDICINE
Payer: COMMERCIAL

## 2017-06-21 ENCOUNTER — TELEPHONE (OUTPATIENT)
Dept: NURSING | Facility: CLINIC | Age: 72
End: 2017-06-21

## 2017-06-21 VITALS
HEART RATE: 56 BPM | HEIGHT: 66 IN | DIASTOLIC BLOOD PRESSURE: 76 MMHG | WEIGHT: 199 LBS | SYSTOLIC BLOOD PRESSURE: 138 MMHG | BODY MASS INDEX: 31.98 KG/M2

## 2017-06-21 DIAGNOSIS — I48.0 PAROXYSMAL ATRIAL FIBRILLATION (H): ICD-10-CM

## 2017-06-21 LAB
CREAT UR-MCNC: 40 MG/DL
MICROALBUMIN UR-MCNC: 24 MG/L
MICROALBUMIN/CREAT UR: 61.31 MG/G CR (ref 0–17)

## 2017-06-21 PROCEDURE — 99214 OFFICE O/P EST MOD 30 MIN: CPT | Performed by: INTERNAL MEDICINE

## 2017-06-21 RX ORDER — PROPAFENONE HYDROCHLORIDE 150 MG/1
150 TABLET, COATED ORAL 2 TIMES DAILY
Qty: 180 TABLET | Refills: 3 | Status: SHIPPED | OUTPATIENT
Start: 2017-06-21 | End: 2018-06-28

## 2017-06-21 NOTE — MR AVS SNAPSHOT
After Visit Summary   6/21/2017    Cayetano Lunsford    MRN: [id number]           Patient Information     Date Of Birth          1945        Visit Information        Provider Department      6/21/2017 12:45 PM Natanael Dang MD [facility] HEART [facility]        Today's Diagnoses     Paroxysmal atrial fibrillation (H)           Follow-ups after your visit        Additional Services     Follow-Up with Cardiologist                 Your next 10 appointments already scheduled     Jun 29, 2017 11:15 AM CDT   Anticoagulation Visit with  INR CLINIC   [facility] ([facility]    [address] [place] [id number] [phone]            Jul 12, 2017  8:30 AM CDT   TELEMEDICINE with Jessica Diaz [facility] ([facility])    [address] [place] [phone] [phone]           Note: this is not an onsite visit; there is no need to come to the facility.            Sep 05, 2017  9:00 AM CDT   Return Visit with Bong Yoo MD   [facility] ([facility])    [address] [place] [id number] [phone]              Future tests that were ordered for you today     Open Future Orders        Priority Expected Expires Ordered    Follow-Up with Cardiologist Routine 6/22/2018 6/22/2018 6/21/2017            Who to contact     If you have questions or need follow up information about today's clinic visit or your schedule please contact [facility] directly at [phone].  Normal or non-critical lab and imaging results will be communicated to you by MyChart, letter or phone within 4 business days after the clinic has received the results. If you do not hear from us within 7 days, please contact the clinic through MyChart or phone. If you have a critical or abnormal lab  "result, we will notify you by phone as soon as possible.  Submit refill requests through [employer] or call your pharmacy and they will forward the refill request to us. Please allow 3 business days for your refill to be completed.          Additional Information About Your Visit        Publerhart Information     [employer] lets you send messages to your doctor, view your test results, renew your prescriptions, schedule appointments and more. To sign up, go to [email or web address] . Click on \"Log in\" on the left side of the screen, which will take you to the Welcome page. Then click on \"Sign up Now\" on the right side of the page.     You will be asked to enter the access code listed below, as well as some personal information. Please follow the directions to create your username and password.     Your access code is: [email or web address]  Expires: 2017 10:57 AM     Your access code will  in 90 days. If you need help or a new code, please call your [facility] clinic or [phone].        Care EveryWhere ID     This is your Care EveryWhere ID. This could be used by other organizations to access your [facility] medical records  [id number]        Your Vitals Were     Pulse Height BMI (Body Mass Index)             56 1.676 m (5' 6\") 32.12 kg/m2          Blood Pressure from Last 3 Encounters:   17 138/76   17 139/64   17 140/64    Weight from Last 3 Encounters:   17 90.3 kg (199 lb)   17 90.3 kg (199 lb)   17 90.6 kg (199 lb 12 oz)              We Performed the Following     Follow-Up with Cardiologist          Where to get your medicines      These medications were sent to [facility] [place], [place] - [address]  [address] [facility] [address]     Phone:  [phone]     propafenone 150 MG Tabs tablet          Primary Care Provider Office Phone # Fax #    Debbie Lewis [phone]       [facility] [address]  [facility] " "[id number]        Equal Access to Services     [facility] : Hadii efrem sweeney delphine Helms, wageronimoda luqadaha, qaybta kaalmaxavi njmary crystal walter. So [facility] [phone].    ATENCIÓN: Si habla español, tiene a jurado disposición servicios gratuitos de asistencia lingüística. Britta al [phone].    We comply with applicable federal civil rights laws and [place] laws. We do not discriminate on the basis of race, color, national origin, age, disability sex, sexual orientation or gender identity.            Thank you!     Thank you for choosing [facility] [place]  for your care. Our goal is always to provide you with excellent care. Hearing back from our patients is one way we can continue to improve our services. Please take a few minutes to complete the written survey that you may receive in the mail after your visit with us. Thank you!             Your Updated Medication List - Protect others around you: Learn how to safely use, store and throw away your medicines at www.disposemymeds.org.          This list is accurate as of: 6/21/17  1:28 PM.  Always use your most recent med list.                   Brand Name Dispense Instructions for use Diagnosis    albuterol 108 (90 BASE) MCG/ACT Inhaler    PROAIR HFA/PROVENTIL HFA/VENTOLIN HFA    1 Inhaler    Inhale 1-2 puffs into the lungs every 4 hours as needed (for shortness of breath/wheezing)    Chronic airway obstruction, not elsewhere classified       ASPIRIN NOT PRESCRIBED    INTENTIONAL     Reported on 5/17/2017        azelastine 0.1 % spray    ASTELIN    1 Bottle    Spray 1-2 sprays into both nostrils 2 times daily    Seasonal allergic rhinitis, unspecified allergic rhinitis trigger       B-D INSULIN SYRINGE 31G X 5/16\" 0.5 ML   Generic drug:  insulin syringe-needle U-100     100 each    USE 1 SYRINGE TWO TIMES A DAY OR AS DIRECTED    Type 2 diabetes, HbA1C goal < 8% (H)       BD ULTRA FINE PEN NEEDLES     " 100 each    Use to inject insulin twice daily.    Type 2 diabetes, HbA1C goal < 8% (H)       blood glucose monitoring lancets     100 Each    Use to test up to four times per day    Type II or unspecified type diabetes mellitus without mention of complication, not stated as uncontrolled       blood glucose monitoring test strip    no brand specified    360 each    Test 4 times daily or as directed. Profile Rx: patient will contact pharmacy when needed    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, with long-term current use of insulin (H)       FLORAJEN3 Caps     60 capsule    Take 1 capsule by mouth 2 times daily    Acute cystitis without hematuria       FOLIC ACID PO      Take 1 mg by mouth daily        HumuLIN N VIAL 100 UNIT/ML injection   Generic drug:  insulin NPH      Inject Subcutaneous 2 times daily (before meals)        insulin regular 100 UNIT/ML injection    NovoLIN R RELION    10 mL    Inject 7 units with breakfast and 2 units with dinner (when mixing with NPH, draw this insulin up first)    Type 2 diabetes mellitus with hypoglycemia without coma, with long-term current use of insulin (H)       ipratropium - albuterol 0.5 mg/2.5 mg/3 mL 0.5-2.5 (3) MG/3ML neb solution    DUONEB    270 mL    NEBULIZE CONTENTS OF ONE VIAL BY MOUTH EVERY 4 HOURS AS NEEDED FOR SHORTNESS OF BREATH /DYSPNEA    Chronic obstructive bronchitis (H)       losartan 100 MG tablet    COZAAR    90 tablet    Take 1 tablet (100 mg) by mouth daily for hypertension.    Hypertension goal BP (blood pressure) < 140/90       metFORMIN 1000 MG tablet    GLUCOPHAGE    180 tablet    Take 1 tablet (1,000 mg) by mouth 2 times daily (with meals) with breakfast and dinner.    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, with long-term current use of insulin (H)       mometasone-formoterol 100-5 MCG/ACT oral inhaler    DULERA     Inhale 2 puffs into the lungs 2 times daily        order for DME     1 each    Dispense  one nebulizer machine with necessary supplies    Chronic airway obstruction, not elsewhere classified       order for DME     1 each    Equipment being ordered: Nebulizer machine with mask and tubing    Chronic airway obstruction, not elsewhere classified       pantoprazole 40 MG EC tablet    PROTONIX     Take 40 mg by mouth daily Started by Dr. Debbie Rico at [facility]        primidone 50 MG tablet    MYSOLINE    180 tablet    Take 1 tablet twice a day and 2 tablets in the evening for 1 week, then go to 2 tablets twice a day (morning and evening) and take 1 tablet midday for 1 week, take 2 tablets three times a day, thereafter.    Essential tremor       propafenone 150 MG Tabs tablet    RYTHMOL    180 tablet    Take 1 tablet (150 mg) by mouth 2 times daily    Paroxysmal atrial fibrillation (H)       propranolol 40 MG tablet    INDERAL    180 tablet    Take 2-3 tablets ( mg) by mouth 2 times daily    Benign familial tremor       simvastatin 80 MG tablet    ZOCOR    30 tablet    Take 0.5 tablets (40 mg) by mouth At Bedtime Profile Rx: patient will contact pharmacy when needed    Hyperlipidemia LDL goal <100       SPIRIVA RESPIMAT 2.5 MCG/ACT inhalation aerosol   Generic drug:  tiotropium     4 g    INHALE 2 PUFFS INTO THE LUNGS DAILY    Chronic obstructive pulmonary disease, unspecified (H)       sulfaSALAzine 500 MG tablet    AZULFIDINE    90 tablet    TAKE ONE TABLET BY MOUTH THREE TIMES A DAY    Ulcerative colitis, unspecified       warfarin 5 MG tablet    COUMADIN    90 tablet    7.5mg M,Th and 5mg ROW As directed by Coumadin clinic    Atrial fibrillation, unspecified type (H)

## 2017-06-21 NOTE — PROGRESS NOTES
"HPI and Plan:   See dictation  I recommend continuing current treatment plans in the chart.    No orders of the defined types were placed in this encounter.    Orders Placed This Encounter   Medications     insulin NPH (HUMULIN N VIAL) 100 UNIT/ML injection     Sig: Inject Subcutaneous 2 times daily (before meals)     There are no discontinued medications.      No diagnosis found.    CURRENT MEDICATIONS:  Current Outpatient Prescriptions   Medication Sig Dispense Refill     insulin NPH (HUMULIN N VIAL) 100 UNIT/ML injection Inject Subcutaneous 2 times daily (before meals)       primidone (MYSOLINE) 50 MG tablet Take 1 tablet twice a day and 2 tablets in the evening for 1 week, then go to 2 tablets twice a day (morning and evening) and take 1 tablet midday for 1 week, take 2 tablets three times a day, thereafter. 180 tablet 2     metFORMIN (GLUCOPHAGE) 1000 MG tablet Take 1 tablet (1,000 mg) by mouth 2 times daily (with meals) with breakfast and dinner. 180 tablet 3     propranolol (INDERAL) 40 MG tablet Take 2-3 tablets ( mg) by mouth 2 times daily 180 tablet 10     B-D INSULIN SYRINGE 31G X 5/16\" 0.5 ML USE 1 SYRINGE TWO TIMES A DAY OR AS DIRECTED 100 each 0     insulin regular (NOVOLIN R RELION) 100 UNIT/ML injection Inject 7 units with breakfast and 2 units with dinner (when mixing with NPH, draw this insulin up first) 10 mL 2     ipratropium - albuterol 0.5 mg/2.5 mg/3 mL (DUONEB) 0.5-2.5 (3) MG/3ML neb solution NEBULIZE CONTENTS OF ONE VIAL BY MOUTH EVERY 4 HOURS AS NEEDED FOR SHORTNESS OF BREATH /DYSPNEA 270 mL 8     azelastine (ASTELIN) 0.1 % spray Spray 1-2 sprays into both nostrils 2 times daily 1 Bottle 11     SPIRIVA RESPIMAT 2.5 MCG/ACT inhalation aerosol INHALE 2 PUFFS INTO THE LUNGS DAILY 4 g 1     warfarin (COUMADIN) 5 MG tablet 7.5mg M,Th and 5mg ROW As directed by Coumadin clinic 90 tablet 3     losartan (COZAAR) 100 MG tablet Take 1 tablet (100 mg) by mouth daily for hypertension. 90 tablet 2 "     simvastatin (ZOCOR) 80 MG tablet Take 0.5 tablets (40 mg) by mouth At Bedtime Profile Rx: patient will contact pharmacy when needed 30 tablet 2     blood glucose monitoring (NO BRAND SPECIFIED) test strip Test 4 times daily or as directed. Profile Rx: patient will contact pharmacy when needed 360 each 3     mometasone-formoterol (DULERA) 100-5 MCG/ACT oral inhaler Inhale 2 puffs into the lungs 2 times daily       propafenone (RYTHMOL) 150 MG TABS Take 1 tablet (150 mg) by mouth 2 times daily 180 tablet 3     FOLIC ACID PO Take 1 mg by mouth daily       Probiotic Product (FLORAJEN3) CAPS Take 1 capsule by mouth 2 times daily 60 capsule 0     order for DME Equipment being ordered: Nebulizer machine with mask and tubing 1 each 0     pantoprazole (PROTONIX) 40 MG enteric coated tablet Take 40 mg by mouth daily Started by Dr. Debbie Rico at [facility]       ASPIRIN NOT PRESCRIBED, INTENTIONAL, Reported on 5/17/2017       BD ULTRA FINE PEN NEEDLES Use to inject insulin twice daily. 100 each PRN     sulfaSALAzine (AZULFIDINE) 500 MG tablet TAKE ONE TABLET BY MOUTH THREE TIMES A DAY 90 tablet 11     albuterol (PROAIR HFA, PROVENTIL HFA, VENTOLIN HFA) 108 (90 BASE) MCG/ACT inhaler Inhale 1-2 puffs into the lungs every 4 hours as needed (for shortness of breath/wheezing) 1 Inhaler 5     ORDER FOR DME Dispense one nebulizer machine with necessary supplies 1 each 0     FREESTYLE LANCETS MISC Use to test up to four times per day 100 Each 12       ALLERGIES     Allergies   Allergen Reactions     Percodan [Oxycodone-Aspirin] Nausea and Vomiting       PAST MEDICAL HISTORY:  Past Medical History:   Diagnosis Date     Allergic rhinitis, cause unspecified      Atrial fibrillation (H)      BPH (benign prostatic hyperplasia)      CAD (coronary artery disease)      Chronic airway obstruction, not elsewhere classified      Hyperlipidemia LDL goal <100 5/9/2010     Hypertension goal BP (blood pressure) < 130/80 10/19/2006     Obesity  "(BMI 30-39.9)      Perforation of tympanic membrane, unspecified     Right TM     Tachycardia, unspecified      Type 2 diabetes, HbA1C goal < 8% (H) 5/2/2010     Unspecified cardiovascular disease        PAST SURGICAL HISTORY:  Past Surgical History:   Procedure Laterality Date     COLONOSCOPY  3/31/2011     CORONARY ANGIOGRAPHY ADULT ORDER  2001 and 2008 2001 at [facility]. 2008- No interventions     HC REMOVAL OF NAIL PLATE SIMPLE SINGLE  11/10/2011    Right 2nd Toenail     SURGICAL HISTORY OF -   1987    right ear graft     SURGICAL HISTORY OF -   1992    right shoulder surgery     SURGICAL HISTORY OF -   1979    back surgery     SURGICAL HISTORY OF -   1978    vasectomy       FAMILY HISTORY:  Family History   Problem Relation Age of Onset     Circulatory Mother      blood clot in leg     DIABETES Mother      type 2     Allergies Mother      Arthritis Mother      GASTROINTESTINAL DISEASE Mother      Neurologic Disorder Mother      Familiar tremors     Neurologic Disorder Father      brain tumor     CEREBROVASCULAR DISEASE Paternal Grandfather      Hypertension Brother      Hypertension Sister      DIABETES Sister      Type 2     Allergies Sister      Lipids Brother      Lipids Sister      Neurologic Disorder Sister      \"     Neurologic Disorder Sister      \"     Neurologic Disorder Sister      \"       SOCIAL HISTORY:  Social History     Social History     Marital status:      Spouse name: Izabel     Number of children: 2     Years of education: 12     Occupational History            Retired     Social History Main Topics     Smoking status: Former Smoker     Packs/day: 1.00     Years: 30.00     Types: Cigarettes     Quit date: 3/19/1992     Smokeless tobacco: Never Used     Alcohol use 0.0 oz/week     0 Standard drinks or equivalent per week      Comment: hardly at all, sometimes at dinner     Drug use: No     Sexual activity: Yes     Partners: Female     Other Topics Concern     " "Parent/Sibling W/ Cabg, Mi Or Angioplasty Before 65f 55m? No     Caffeine Concern No     No Caffeine     Exercise Yes     20 minutes - Walking- summer 2 x day     Social History Narrative         Review of Systems:  Skin:  Negative       Eyes:  Positive for glasses    ENT:  Negative      Respiratory:  Positive for dyspnea on exertion copd   Cardiovascular:  Negative      Gastroenterology: Negative      Genitourinary:  not assessed      Musculoskeletal:  Positive for arthritis    Neurologic:  Negative      Psychiatric:  Negative      Heme/Lymph/Imm:  Negative      Endocrine:  Positive for diabetes      Physical Exam:  Vitals: /76  Pulse 56  Ht 1.676 m (5' 6\")  Wt 90.3 kg (199 lb)  BMI 32.12 kg/m2    Constitutional:  cooperative;alert and oriented;well developed;well nourished overweight STOUT    Skin:  warm and dry to the touch        Head:  normocephalic        Eyes:  pupils equal and round;sclera white        ENT:  no pallor or cyanosis        Neck:  JVP normal;no carotid bruit        Chest:    prolonged expiration;diminished breath sounds bilaterally        Cardiac: regular rhythm;no murmurs, gallops or rubs detected                  Abdomen:  abdomen soft;no masses;non-tender        Vascular: not assessed this visit                                        Extremities and Back:  no deformities, clubbing, cyanosis, erythema observed   trace          Neurological:  affect appropriate, oriented to time, person and place          Recent Lab Results:  LIPID RESULTS:  Lab Results   Component Value Date    CHOL 159 05/08/2017    HDL 54 05/08/2017    LDL 83 05/08/2017    TRIG 112 05/08/2017    CHOLHDLRATIO 3.2 05/12/2015       LIVER ENZYME RESULTS:  Lab Results   Component Value Date    AST 20 03/13/2017    ALT 22 03/13/2017       CBC RESULTS:  Lab Results   Component Value Date    WBC 7.9 03/13/2017    RBC 4.11 (L) 03/13/2017    HGB 12.4 (L) 03/13/2017    HCT 39.4 (L) 03/13/2017    MCV 96 03/13/2017    MCH 30.2 " 03/13/2017    MCHC 31.5 03/13/2017    RDW 13.9 03/13/2017     03/13/2017       BMP RESULTS:  Lab Results   Component Value Date     03/13/2017    POTASSIUM 4.6 03/13/2017    CHLORIDE 100 03/13/2017    CO2 28 03/13/2017    ANIONGAP 7 03/13/2017     (H) 03/13/2017    BUN 16 03/13/2017    CR 0.76 03/13/2017    GFRESTIMATED >90  Non  GFR Calc   03/13/2017    GFRESTBLACK >90   GFR Calc   03/13/2017    LEBRON 8.7 03/13/2017        A1C RESULTS:  Lab Results   Component Value Date    A1C 5.8 05/08/2017       INR RESULTS:  Lab Results   Component Value Date    INR 1.8 (A) 06/08/2017    INR 2.0 (A) 05/22/2017    INR 3.03 (H) 10/10/2016    INR 2.70 (H) 04/08/2016           CC  Bo Aragon MD   [facility]  [address]  [address]  [place] [address]

## 2017-06-21 NOTE — LETTER
2017             Debbie Lewis MD    [facility]    [address] [place] [address]       RE: Cayetano Lunsford    MRN: [id number]   : 1945      Dear Debbie:       I met Leo Lunsford today, who is 72.  He is a very nice man who was seen by my associate, Dr. Art Aragon, for about 10 years for paroxysmal atrial fibrillation.  Dr. Aragon has subsequently narrowed his practice to lower leg edema, and accordingly I saw Leo in followup for his history of paroxysmal atrial fibrillation.      Mr. Lunsford has done remarkably well on propafenone 150 mg twice a day.  He has not had any palpitations, dizziness or syncope.  He has had no sense of cardiac arrhythmia.      He is on warfarin for stroke prophylaxis with the paroxysmal atrial fibrillation.  He says typically his rhythm feels regular and normal.  Today he is in normal rhythm at 60-70 beats a minute with no irregularities or arrhythmias appreciated.      PAST MEDICAL HISTORY:   1.  Insulin-dependent diabetes mellitus since around the year .   2.  Emphysema/COPD, on multiple medications from a pulmonary standpoint.   3.  Hypertension.   4.  Mixed hyperlipidemia.   5.  Mildly overweight.   6.  Paroxysmal atrial fibrillation.   7.  Previous surgeries in the past, but I will not outline them.      FAMILY HISTORY:  Positive for mother with diabetes and arthritis.  No significant illnesses were noted for the father.      REVIEW OF SYSTEMS:  Currently outlined in Epic.  I reviewed the review of systems with him, and I endorse the findings as listed in Epic, mainly chronic but stable dyspnea on exertion and some mild achiness of his joints.      PHYSICAL EXAMINATION:   GENERAL:  A mildly overweight, pleasant male in no distress.   VITAL SIGNS:  Blood pressure 138/76, heart rate 60 beats a minute and regular.  He weighs 199 pounds, which is remarkably stable.   HEAD:  Normal.   NECK:  He has no neck vein distention  or bruits at 30 degrees.   HEART:  Regular without gallop or murmur.   LUNGS:  Relatively clear with faint expiratory wheezing with forced expiration.   ABDOMEN:  Soft without organomegaly, mass or pain.   EXTREMITIES:  Warm, dry, pink, perfused and free of edema.  He had heavy shoes and socks on, and I did not have him take them off.      Mr. Lunsford seems asymptomatic with regard to his cardiac rhythm.  I continued his propafenone and renewed it.  He showed no evidence of any significant drug side effects.      He does not give any clues to suggest either angina or heart failure, and his lipid profiles have been excellent with an LDL of 83, triglycerides of 112, noted on 05/08/2017.        I reviewed each of his medicines, the implications and benefits.  He is on the bradycardic side with a heart rate of 50-60 beats per minute.  This would be a combination of propafenone and propranolol 40 mg,  mg twice daily.        His blood pressure is normal.  He looks well and feels well.  His lipid profile is normal.  All in all, I addressed mainly treatment of his paroxysmal atrial fibrillation, and I was pleased with his stability.      IMPRESSION:   1.  Seemingly clinically stable tendency to paroxysmal atrial fibrillation, treated with propranolol and more specifically propafenone.   2.  Underlying mild emphysema.   3.  Diabetes mellitus.      PLAN:  As above.      Over 35 minutes was spent doing the consult.  Time was spent reviewing imaging, old records, echoes, EKGs, laboratory work, laboratory data, medications, medication side effects, indications and the multiple medical problems noted above.      Sincerely,      Natanael Dang MD, [facility]

## 2017-06-21 NOTE — TELEPHONE ENCOUNTER
"Routing to Kirstin Meléndez RN for FYI:    Patient called to update ACC team that primidone (MYSOLINE) 50 MG tablet had been increased per Dr. Yoo from office visit on 6/20/2017. ACC team check interactions, Per Up to date: \"CYP2C9 Inducers (Strong) may increase the metabolism of CYP2C9 Substrates.\"     Patient wanted to know if coumadin should be adjusted. After evaluating ACC visit on 6/8/2017, Patient's Maintenance dose was increased to 47.5 mg (5% increase) to account for recent low INR trends. Although Primidone has been shown to increase metabolism and decrease INR results this patient's INR has not been rechecked since the recent increase in dosing. Although an increase in dosing may be warranted, it would be safer to maintain current dosing schedule and  recheck the patient on the 6/29 as scheduled. to see if increase is needed at that time. Primidone will most likely need a couple of weeks to reach a therapeutic level.     Patient in agreement with plan and verbalizes understanding.   Thanks!   Estelle Prado RN    "

## 2017-06-22 NOTE — PROGRESS NOTES
2017             Debbie Lewis MD    [facility]    [address] [place] [address]       RE: Cayetano Lunsford    MRN: [id number]   : 1945      Dear Debbie:       I met Leo Lunsford today, who is 72.  He is a very nice man who was seen by my associate, Dr. Art Aragon, for about 10 years for paroxysmal atrial fibrillation.  Dr. Aragon has subsequently narrowed his practice to lower leg edema, and accordingly I saw Leo in followup for his history of paroxysmal atrial fibrillation.      Mr. Lunsford has done remarkably well on propafenone 150 mg twice a day.  He has not had any palpitations, dizziness or syncope.  He has had no sense of cardiac arrhythmia.      He is on warfarin for stroke prophylaxis with the paroxysmal atrial fibrillation.  He says typically his rhythm feels regular and normal.  Today he is in normal rhythm at 60-70 beats a minute with no irregularities or arrhythmias appreciated.      PAST MEDICAL HISTORY:   1.  Insulin-dependent diabetes mellitus since around the year .   2.  Emphysema/COPD, on multiple medications from a pulmonary standpoint.   3.  Hypertension.   4.  Mixed hyperlipidemia.   5.  Mildly overweight.   6.  Paroxysmal atrial fibrillation.   7.  Previous surgeries in the past, but I will not outline them.      FAMILY HISTORY:  Positive for mother with diabetes and arthritis.  No significant illnesses were noted for the father.      REVIEW OF SYSTEMS:  Currently outlined in Epic.  I reviewed the review of systems with him, and I endorse the findings as listed in Epic, mainly chronic but stable dyspnea on exertion and some mild achiness of his joints.      PHYSICAL EXAMINATION:   GENERAL:  A mildly overweight, pleasant male in no distress.   VITAL SIGNS:  Blood pressure 138/76, heart rate 60 beats a minute and regular.  He weighs 199 pounds, which is remarkably stable.   HEAD:  Normal.   NECK:  He has no neck vein distention  or bruits at 30 degrees.   HEART:  Regular without gallop or murmur.   LUNGS:  Relatively clear with faint expiratory wheezing with forced expiration.   ABDOMEN:  Soft without organomegaly, mass or pain.   EXTREMITIES:  Warm, dry, pink, perfused and free of edema.  He had heavy shoes and socks on, and I did not have him take them off.      Mr. Saunders seems asymptomatic with regard to his cardiac rhythm.  I continued his propafenone and renewed it.  He showed no evidence of any significant drug side effects.      He does not give any clues to suggest either angina or heart failure, and his lipid profiles have been excellent with an LDL of 83, triglycerides of 112, noted on 2017.        I reviewed each of his medicines, the implications and benefits.  He is on the bradycardic side with a heart rate of 50-60 beats per minute.  This would be a combination of propafenone and propranolol 40 mg,  mg twice daily.        His blood pressure is normal.  He looks well and feels well.  His lipid profile is normal.  All in all, I addressed mainly treatment of his paroxysmal atrial fibrillation, and I was pleased with his stability.      IMPRESSION:   1.  Seemingly clinically stable tendency to paroxysmal atrial fibrillation, treated with propranolol and more specifically propafenone.   2.  Underlying mild emphysema.   3.  Diabetes mellitus.      PLAN:  As above.      Over 35 minutes was spent doing the consult.  Time was spent reviewing imaging, old records, echoes, EKGs, laboratory work, laboratory data, medications, medication side effects, indications and the multiple medical problems noted above.      Sincerely,      Cruzito Dang MD, FACC         CRUZITO DANG MD, FACC             D: 2017 13:28   T: 2017 11:21   MT: [email or web address]      Name:     CAROLANN SAUNDERS   MRN:      [id number]        Account:      [id number]   :      1945           Service Date: 2017      Document: [id number]

## 2017-06-22 NOTE — PROGRESS NOTES
"6/21/17 TE UPDATE:     \"Patient called to update ACC team that primidone (MYSOLINE) 50 MG tablet had been increased per Dr. Yoo from office visit on 6/20/2017. ACC team check interactions, Per Up to date: \"CYP2C9 Inducers (Strong) may increase the metabolism of CYP2C9 Substrates.\"      Patient wanted to know if coumadin should be adjusted. After evaluating ACC visit on 6/8/2017, Patient's Maintenance dose was increased to 47.5 mg (5% increase) to account for recent low INR trends. Although Primidone has been shown to increase metabolism and decrease INR results this patient's INR has not been rechecked since the recent increase in dosing. Although an increase in dosing may be warranted, it would be safer to maintain current dosing schedule and  recheck the patient on the 6/29 as scheduled. to see if increase is needed at that time. Primidone will most likely need a couple of weeks to reach a therapeutic level.      Patient in agreement with plan and verbalizes understanding.   Thanks! Estelle Prado RN\"  "

## 2017-06-26 ENCOUNTER — TELEPHONE (OUTPATIENT)
Dept: FAMILY MEDICINE | Facility: CLINIC | Age: 72
End: 2017-06-26

## 2017-06-26 DIAGNOSIS — J44.9 CHRONIC OBSTRUCTIVE PULMONARY DISEASE, UNSPECIFIED (H): ICD-10-CM

## 2017-06-26 NOTE — TELEPHONE ENCOUNTER
Medication Detail      Disp Refills Start End SERJIO   SPIRIVA RESPIMAT 2.5 MCG/ACT inhalation aerosol 4 g 1 4/6/2017  No   Sig: INHALE 2 PUFFS INTO THE LUNGS DAILY        Last Office Visit with G, P or [facility] prescribing provider:  6/20/17   Future Office Visit:    Next 5 appointments (look out 90 days)     Sep 05, 2017  9:00 AM CDT   Return Visit with Bong Yoo MD   [facility] ([facility])    [address] [place] [id number] [phone]                   Date of Last Asthma Action Plan Letter:   There are no preventive care reminders to display for this patient.   Asthma Control Test: No flowsheet data found.    Date of Last Spirometry Test:   No results found for this or any previous visit.

## 2017-06-26 NOTE — TELEPHONE ENCOUNTER
PA needed on Ipratropium-Albuterol 0.5-2.5mg/3ml  Pt insurance is [employer] Part D  Insurance phone number: [phone]  Pt ID number: [id number]  Please let us know when PA is granted/denied.    Brendon Espino  [facility]  [phone]    Thank you.

## 2017-06-27 RX ORDER — TIOTROPIUM BROMIDE INHALATION SPRAY 3.12 UG/1
SPRAY, METERED RESPIRATORY (INHALATION)
Qty: 4 G | Refills: 1 | Status: SHIPPED | OUTPATIENT
Start: 2017-06-27 | End: 2017-07-12

## 2017-06-27 NOTE — TELEPHONE ENCOUNTER
Prescription approved per [facility] Refill Protocol.  Thanks! Estelle Prado RN

## 2017-06-28 NOTE — TELEPHONE ENCOUNTER
See below.     Dr. Lewis- please let us know if you would like to start a PA ?    Inderjit Fernandez MA

## 2017-06-29 ENCOUNTER — ANTICOAGULATION THERAPY VISIT (OUTPATIENT)
Dept: NURSING | Facility: CLINIC | Age: 72
End: 2017-06-29
Payer: COMMERCIAL

## 2017-06-29 DIAGNOSIS — I48.91 ATRIAL FIBRILLATION, UNSPECIFIED TYPE (H): ICD-10-CM

## 2017-06-29 DIAGNOSIS — Z79.01 LONG-TERM (CURRENT) USE OF ANTICOAGULANTS: ICD-10-CM

## 2017-06-29 LAB — INR POINT OF CARE: 2.3 (ref 0.86–1.14)

## 2017-06-29 PROCEDURE — 36416 COLLJ CAPILLARY BLOOD SPEC: CPT

## 2017-06-29 PROCEDURE — 99207 ZZC NO CHARGE NURSE ONLY: CPT

## 2017-06-29 PROCEDURE — 85610 PROTHROMBIN TIME: CPT | Mod: QW

## 2017-06-29 RX ORDER — WARFARIN SODIUM 5 MG/1
TABLET ORAL
Qty: 120 TABLET | Refills: 1 | Status: SHIPPED | OUTPATIENT
Start: 2017-06-29 | End: 2017-11-02

## 2017-06-29 NOTE — MR AVS SNAPSHOT
Cayetano Lunsford   6/29/2017 11:15 AM   Anticoagulation Therapy Visit    Description:  72 year old male   Provider:   INR CLINIC   Department:   Nurse           INR as of 6/29/2017     Today's INR 2.3      Anticoagulation Summary as of 6/29/2017     INR goal 2.0-3.0   Today's INR 2.3   Full instructions 5 mg on Mon, Fri; 7.5 mg all other days   Next INR check 7/12/2017    Indications   Long-term (current) use of anticoagulants [Z79.01] [Z79.01]  Atrial fibrillation (H) [I48.91]         Your next Anticoagulation Clinic appointment(s)     Jul 12, 2017 11:30 AM CDT   Anticoagulation Visit with  INR CLINIC   [facility] ([facility])    [address] [place] [id number] [phone]              Contact Numbers     [facility]  Please call [phone] to cancel and/or reschedule your appointment   Please call [phone] with any problems or questions regarding your therapy.        June 2017 Details    Sun Mon Tue Wed Thu Fri Sat         1               2               3                 4               5               6               7               8               9               10                 11               12               13               14               15               16               17                 18               19               20               21               22               23               24                 25               26               27               28               29      7.5 mg   See details      30      5 mg           Date Details   06/29 This INR check               How to take your warfarin dose     To take:  5 mg Take 1 of the 5 mg tablets.    To take:  7.5 mg Take 1.5 of the 5 mg tablets.           July 2017 Details    Sun Mon Tue Wed Thu Fri Sat           1      7.5 mg           2      7.5 mg         3      5 mg         4      7.5 mg         5      7.5 mg         6      7.5 mg         7      5 mg         8       7.5 mg           9      7.5 mg         10      5 mg         11      7.5 mg         12            13               14               15                 16               17               18               19               20               21               22                 23               24               25               26               27               28               29                 30               31                     Date Details   No additional details    Date of next INR:  7/12/2017         How to take your warfarin dose     To take:  5 mg Take 1 of the 5 mg tablets.    To take:  7.5 mg Take 1.5 of the 5 mg tablets.

## 2017-06-29 NOTE — PROGRESS NOTES
ANTICOAGULATION FOLLOW-UP CLINIC VISIT    Patient Name:  Cayetano Lunsford  Date:  6/29/2017  Contact Type:  Face to Face    SUBJECTIVE:     Patient Findings     Positives Change in medications (Primidone dose continues to taper up: currently at 1 tab BID + 2 tabs at HS. Starting today, pt alvarez be taking 2 tabs BID + 1 tab midday. Again, per micromedex, INR may decrease d/t potential interactions.), Missed doses (Missed 5 mg on 6/16. Likely not affecting today's level.)           OBJECTIVE    INR Protime   Date Value Ref Range Status   06/29/2017 2.3 (A) 0.86 - 1.14 Final       ASSESSMENT / PLAN  INR assessment THER    Recheck INR In: 10 DAYS    INR Location Clinic      Anticoagulation Summary as of 6/29/2017     INR goal 2.0-3.0   Today's INR 2.3   Maintenance plan 5 mg (5 mg x 1) on Mon, Fri; 7.5 mg (5 mg x 1.5) all other days   Full instructions 5 mg on Mon, Fri; 7.5 mg all other days   Weekly total 47.5 mg   No change documented Kirstin Meléndez RN   Plan last modified Kirstin Meléndez RN (6/8/2017)   Next INR check 7/12/2017   Priority INR   Target end date     Indications   Long-term (current) use of anticoagulants [Z79.01] [Z79.01]  Atrial fibrillation (H) [I48.91]         Anticoagulation Episode Summary     INR check location     Preferred lab     Send INR reminders to [facility] CL[facility]    Comments       Anticoagulation Care Providers     Provider Role Specialty Phone number    Debbie Lewis MD [facility] [phone]            See the Encounter Report to view Anticoagulation Flowsheet and Dosing Calendar (Go to Encounters tab in chart review, and find the Anticoagulation Therapy Visit)    No dose change today. Patient advised to continue frequent INR checks until Primidone taper is finished. Primidone goal is 2 tabs (100 mg) TID, which will be initiated on 7/3 if patient continues to tolerate.     Patient made aware if signs of clotting (pain, tenderness, swelling, or color  change in any extremity) AND/OR bleeding occur (nosebleeds, bleeding gums, bruising, or blood in stool or urine) to notify provider & seek medical attention. If severe symptoms develop, such as major bleeding, chest pain, shortness of breath, fall, trauma or s/s of stroke, patient to call 911 immediately.     Kirstin Meléndez RN

## 2017-07-03 NOTE — TELEPHONE ENCOUNTER
Called [facility] turns out that the medication is cover through pt's plan. No need for PA or other alternatives meds. Pt already  their medication  06/29/2017. For future use please use this NDC to get the medication covered :[phone]    Inderjit Fernandez MA

## 2017-07-10 ENCOUNTER — TRANSFERRED RECORDS (OUTPATIENT)
Dept: HEALTH INFORMATION MANAGEMENT | Facility: CLINIC | Age: 72
End: 2017-07-10

## 2017-07-12 ENCOUNTER — ALLIED HEALTH/NURSE VISIT (OUTPATIENT)
Dept: PHARMACY | Facility: CLINIC | Age: 72
End: 2017-07-12
Payer: COMMERCIAL

## 2017-07-12 ENCOUNTER — ANTICOAGULATION THERAPY VISIT (OUTPATIENT)
Dept: NURSING | Facility: CLINIC | Age: 72
End: 2017-07-12
Payer: COMMERCIAL

## 2017-07-12 DIAGNOSIS — I48.91 ATRIAL FIBRILLATION, UNSPECIFIED TYPE (H): ICD-10-CM

## 2017-07-12 DIAGNOSIS — E11.649 TYPE 2 DIABETES MELLITUS WITH HYPOGLYCEMIA WITHOUT COMA, WITH LONG-TERM CURRENT USE OF INSULIN (H): ICD-10-CM

## 2017-07-12 DIAGNOSIS — Z79.01 LONG-TERM (CURRENT) USE OF ANTICOAGULANTS: ICD-10-CM

## 2017-07-12 DIAGNOSIS — J44.9 CHRONIC OBSTRUCTIVE PULMONARY DISEASE, UNSPECIFIED (H): Primary | ICD-10-CM

## 2017-07-12 DIAGNOSIS — Z79.4 TYPE 2 DIABETES MELLITUS WITH HYPOGLYCEMIA WITHOUT COMA, WITH LONG-TERM CURRENT USE OF INSULIN (H): ICD-10-CM

## 2017-07-12 DIAGNOSIS — I10 ESSENTIAL HYPERTENSION WITH GOAL BLOOD PRESSURE LESS THAN 140/90: ICD-10-CM

## 2017-07-12 LAB — INR POINT OF CARE: 1.9 (ref 0.86–1.14)

## 2017-07-12 PROCEDURE — 99607 MTMS BY PHARM ADDL 15 MIN: CPT | Performed by: PHARMACIST

## 2017-07-12 PROCEDURE — 99606 MTMS BY PHARM EST 15 MIN: CPT | Performed by: PHARMACIST

## 2017-07-12 PROCEDURE — 85610 PROTHROMBIN TIME: CPT | Mod: QW

## 2017-07-12 PROCEDURE — 36416 COLLJ CAPILLARY BLOOD SPEC: CPT

## 2017-07-12 PROCEDURE — 99207 ZZC NO CHARGE NURSE ONLY: CPT

## 2017-07-12 NOTE — PROGRESS NOTES
"  ANTICOAGULATION FOLLOW-UP CLINIC VISIT    Patient Name:  Cayetano Lunsford  Date:  7/12/2017  Contact Type:  Face to Face    SUBJECTIVE:     Patient Findings     Positives Change in medications (Primidone increased again on 7/6 to 100 mg (2 tabs) TID. Per micromedex: \"Concurrent use of PRIMIDONE and WARFARIN may result in decreased anticoagulant effectiveness.\"  Inhaler changes: Spiriva & Dulera dc'd, now using Stiolto respimat. ), Inflammation (?? Gout. Left foot pain. Sx are slowly improving. Hx of gout a year ago. Reviewed home treatment since sx are improving. F/u is not.)           OBJECTIVE    INR Protime   Date Value Ref Range Status   07/12/2017 1.9 (A) 0.86 - 1.14 Final       ASSESSMENT / PLAN  INR assessment THER    Recheck INR In: 2 WEEKS    INR Location Clinic      Anticoagulation Summary as of 7/12/2017     INR goal 2.0-3.0   Today's INR 1.9!   Maintenance plan 5 mg (5 mg x 1) on Mon; 7.5 mg (5 mg x 1.5) all other days   Full instructions 5 mg on Mon; 7.5 mg all other days   Weekly total 50 mg   Plan last modified Kirstin Meléndez RN (7/12/2017)   Next INR check 7/27/2017   Priority INR   Target end date     Indications   Long-term (current) use of anticoagulants [Z79.01] [Z79.01]  Atrial fibrillation (H) [I48.91]         Anticoagulation Episode Summary     INR check location     Preferred lab     Send INR reminders to [facility]    Comments       Anticoagulation Care Providers     Provider Role Specialty Phone number    Debbie Lewis MD [facility] [phone]            See the Encounter Report to view Anticoagulation Flowsheet and Dosing Calendar (Go to Encounters tab in chart review, and find the Anticoagulation Therapy Visit)    Patient advised to increase weekly dose to 50 mg given Primidone taper. Patient advised to monitor closely for any s/s of bleeding given continued dose adjustments.     Kirstin Meléndez, MANOJ             "

## 2017-07-12 NOTE — MR AVS SNAPSHOT
Cayetano Lunsford   7/12/2017 11:30 AM   Anticoagulation Therapy Visit    Description:  72 year old male   Provider:   INR CLINIC   Department:   Nurse           INR as of 7/12/2017     Today's INR 1.9!      Anticoagulation Summary as of 7/12/2017     INR goal 2.0-3.0   Today's INR 1.9!   Full instructions 5 mg on Mon; 7.5 mg all other days   Next INR check 7/27/2017    Indications   Long-term (current) use of anticoagulants [Z79.01] [Z79.01]  Atrial fibrillation (H) [I48.91]         Your next Anticoagulation Clinic appointment(s)     Jul 27, 2017  9:15 AM CDT   Anticoagulation Visit with  INR CLINIC   [facility]    [address] [place] [id number] [phone]              Contact Numbers     [facility] [[email or web address]]  Please call [phone] to cancel and/or reschedule your appointment   Please call [phone] with any problems or questions regarding your therapy.        July 2017 Details    Sun Mon Tue Wed Thu Fri Sat           1                 2               3               4               5               6               7               8                 9               10               11               12      7.5 mg   See details      13      7.5 mg         14      7.5 mg         15      7.5 mg           16      7.5 mg         17      5 mg         18      7.5 mg         19      7.5 mg         20      7.5 mg         21      7.5 mg         22      7.5 mg           23      7.5 mg         24      5 mg         25      7.5 mg         26      7.5 mg         27            28               29                 30               31                     Date Details   07/12 This INR check       Date of next INR:  7/27/2017         How to take your warfarin dose     To take:  5 mg Take 1 of the 5 mg tablets.    To take:  7.5 mg Take 1.5 of the 5 mg tablets.

## 2017-07-12 NOTE — PATIENT INSTRUCTIONS
Recommendations from today's MTM visit:                                                      1. Decrease Novolin N in the morning to 6 units and increase the Novolin N in the evening to 15 units.     2. Keep checking your blood pressure at home and if it starts to read over 140 on top and/or over 90 on the bottom.     Next MTM visit: 8/9/17 at 8:30am    To schedule another MTM appointment, please call the clinic directly or you may call the MTM scheduling line at [phone] or toll-free at [phone].     My Clinical Pharmacist's contact information:                                                      It was a pleasure seeing you today!  Please feel free to contact me with any questions or concerns you have.      Tanna Diaz, PharmD  Medication Therapy Management Pharmacist  [facility] - Monday and Wednesday 7:30 - 4:00  Phone: [phone] - direct clinic line    You may receive a survey about the MTM services you received.  I would appreciate your feedback to help me serve you better in the future. Please fill it out and return it when you can. Your comments will be anonymous.

## 2017-07-12 NOTE — PROGRESS NOTES
SUBJECTIVE/OBJECTIVE:                Cayetano Lunsford is a 72 year old male called for a follow-up visit for Medication Therapy Management.  He was referred to me from Dr. Debbie Lewis.     Chief Complaint: Follow up from our visit on 5/31/17.  He saw his lung specialist, his insurance didn't cover Anoro so he put him on Stiolto respimat. He did not change the Duonebs to albuterol which he uses twice daily or three times when humid. No change in breathing since started this new regimen 2 days ago.  Personal Healthcare Goals: did not discuss today  Tobacco: No tobacco use   Alcohol: not currently using    Medication Adherence: no issues reported    Diabetes:  Pt currently taking metformin bid, Novolin N 7 units AM before breakfast and 14 units in PM-at dinner, Novolin R-9 units with breakfast (mostly using 7 units to see if that helps reduce lows), no more lunch and 2 units dinner, between 80 and 120, reduce your R insulin by 2 units. If below 70 skip dose. He gives his shots in his arms. He does not have any issues with giving stomach just has been doing arms.  He is now writing the date on the Novolin R and discarding after 42 days.   SMBG: 3-4 times daily.   Ranges (per pt report): see chart below.   Symptoms of low blood sugar? Fingers and lips tingle, shaky and sweaty. Frequency of hypoglycemia? Once in the last two weeks. He thinks it was due to having a late dinner. Treating with small amount of orange juice.  Recent symptoms of high blood sugar? none.  Eye exam: up to date  Foot exam: up to date  Microalbumin is not < 30 mg/g. Pt is taking an ACEi/ARB.  Aspirin: Not taking due to anticoagulation therapy and increased risk of bleeding  Diet/Exercise:  He repots that his activity has slowed down but about the same as last visit. He is eating something for lunch.     Ave: 7 day: 142, 14 day: 159, 30 day: 151 - lower than last visit    Date FBG/ 2hours post Lunch/2hours post Dinner /2hours post    7/12 147       7/11 116  122    7/10 174  77 - little orange juice before dinner    7/9 113  149    7/8 127  107    7/7 164  103    7/6 192  161    7/5 182  93    7/4 224  118    7/3 226 - had more carbs  109    7/2 196  182    7/1 196  158    6/30 196  133    6/29 221 - had dessert the night before  137    6/28 176  122    6/27 180  65 - at late, drank juice and ate dinner    6/26 177  133      From Last visit:  Date FBG/ 2hours post Lunch/2hours post Dinner /2hours post bedtime   5/31/17 140      5/30 177  84 (no R)    5/29 205  206  197   5/28 261 - ate potato salad for dinner and lemonade  199 at 3:40pm before birthday celebration 292   5/27 160  99 (No R)    5/26 202 - unsure why high  99 (No R)    5/25 262 - ate yams, peas, apple pie  113 (No R)    5/24 174  89 (No R)    5/23 126  81 ( No R)    5/22 156  64 (late dinner - drank 4 oz juice and ate)    5/21 158  93 (No R)    5/20 167  163    5/19 122  183    5/18 213  201    5/17   97 (No R)      COPD: Current medications: Stiolto 2 puffs once daily. This was just changed at lung doctor on 7/10/17. Dulera and Spiriva stopped and Duoneb continued.    Pt is not experiencing side effects.   Pt reports the following symptoms: none.  Pt does have an COPD Action Plan on file.   Has spirometry been completed: Yes 4/27/16 with FEV1 %pred being 45    Hypertension: Current medications include losartan 100 mg daily and propranolol.  Patient does self-monitor BP. Home BP monitoring below.  Patient reports no current medication side effects. Pt want said Dr. Lewis wanted him to report home blood pressure readings. BP readings from home starting from most recent: all but one late afternoon/dinner time  120/60, 62; /60, 59; 124/53, 55; 135/50, 59; 136/66, 63; 128/54, 62; 132/61, 63; 118/62, 67    Current labs include:  BP Readings from Last 3 Encounters:   06/21/17 138/76   06/20/17 139/64   06/20/17 140/64     Today's Vitals: There were no vitals taken for this visit. - phone  visit    Lab Results   Component Value Date    A1C 5.8 05/08/2017   .  Lab Results   Component Value Date    CHOL 159 05/08/2017     Lab Results   Component Value Date    TRIG 112 05/08/2017     Lab Results   Component Value Date    HDL 54 05/08/2017     Lab Results   Component Value Date    LDL 83 05/08/2017       Liver Function Studies -   Recent Labs   Lab Test  03/13/17   1050   PROTTOTAL  7.0   ALBUMIN  3.8   BILITOTAL  0.4   ALKPHOS  60   AST  20   ALT  22       Lab Results   Component Value Date    UCRR 40 06/20/2017    MICROL 24 06/20/2017    UMALCR 61.31 (H) 06/20/2017       Last Basic Metabolic Panel:  Lab Results   Component Value Date     03/13/2017      Lab Results   Component Value Date    POTASSIUM 4.6 03/13/2017     Lab Results   Component Value Date    CHLORIDE 100 03/13/2017     Lab Results   Component Value Date    BUN 16 03/13/2017     Lab Results   Component Value Date    CR 0.76 03/13/2017     GFR Estimate   Date Value Ref Range Status   03/13/2017 >90  Non  GFR Calc   >60 mL/min/1.7m2 Final   01/09/2017 >90 >60 mL/min/1.7m2 Final   10/03/2016 >60 >60 ml/min/1.73m2 Final     TSH   Date Value Ref Range Status   03/13/2017 3.25 0.40 - 4.00 mU/L Final   ]    Most Recent Immunizations   Administered Date(s) Administered     HepB-Peds 08/05/2014     Influenza (H1N1) 01/08/2010     Influenza (High Dose) 3 valent vaccine 09/07/2016     Influenza (IIV3) 09/11/2009     Pneumococcal (PCV 13) 11/10/2015     Pneumococcal 23 valent 07/20/2010     TD (ADULT, 7+) 08/08/2008     TDAP Vaccine (Adacel) 08/05/2014     Zoster vaccine, live 06/13/2008       ASSESSMENT:              Current medications were reviewed today as discussed above.      Medication Adherence: no issues identified    Diabetes: Needs Improvement. Patient is meeting A1c goal of < 7%. Pt would benefit from decreasing Novolin N AM to 6 units and increasing Novolin N PM to 15 units to try to eliminate before evening low and  decrease AM fasting. Microalbumin is not at goal < 30 mg/g. Taking an ARB at max dose. Aspirin therapy is not indicated in this patient due to anticoagulant/antiplatelet therapy.     COPD: Stable. Patient would benefit from no changes at this time. Educated pt to make sure he lets his lung doctor know if he has increase in shortness of breath.    Hypertension: Stable. Patient is meeting BP goal of < 140/90mmHg.   His blood pressures at home seem well controlled, will continue to monitor and asked him to notify us if home readings are above goal.     PLAN:                  1. Decrease Novolin N in the morning to 6 units and increase the Novolin N in the evening to 15 units.     2. Keep checking your blood pressure at home and if it starts to read over 140 on top and/or over 90 on the bottom.     I spent 30 minutes with this patient today.  All changes were made via collaborative practice agreement with Debbie Lewis. A copy of the visit note was provided to the patient's primary care provider.     Will follow up in one month.    The patient was mailed a summary of these recommendations as an after visit summary.    Tanna Diaz, PharmD  Medication Therapy Management Pharmacist

## 2017-07-13 ENCOUNTER — OFFICE VISIT (OUTPATIENT)
Dept: FAMILY MEDICINE | Facility: CLINIC | Age: 72
End: 2017-07-13
Payer: COMMERCIAL

## 2017-07-13 ENCOUNTER — RADIANT APPOINTMENT (OUTPATIENT)
Dept: GENERAL RADIOLOGY | Facility: CLINIC | Age: 72
End: 2017-07-13
Attending: FAMILY MEDICINE
Payer: COMMERCIAL

## 2017-07-13 VITALS
OXYGEN SATURATION: 95 % | HEART RATE: 72 BPM | HEIGHT: 66 IN | TEMPERATURE: 97.7 F | SYSTOLIC BLOOD PRESSURE: 132 MMHG | BODY MASS INDEX: 32.34 KG/M2 | WEIGHT: 201.25 LBS | DIASTOLIC BLOOD PRESSURE: 60 MMHG | RESPIRATION RATE: 16 BRPM

## 2017-07-13 DIAGNOSIS — R60.0 LOWER EXTREMITY EDEMA: ICD-10-CM

## 2017-07-13 DIAGNOSIS — M79.672 LEFT FOOT PAIN: ICD-10-CM

## 2017-07-13 DIAGNOSIS — E11.22 TYPE 2 DIABETES MELLITUS WITH STAGE 1 CHRONIC KIDNEY DISEASE, WITH LONG-TERM CURRENT USE OF INSULIN (H): ICD-10-CM

## 2017-07-13 DIAGNOSIS — N18.1 TYPE 2 DIABETES MELLITUS WITH STAGE 1 CHRONIC KIDNEY DISEASE, WITH LONG-TERM CURRENT USE OF INSULIN (H): ICD-10-CM

## 2017-07-13 DIAGNOSIS — Z79.4 TYPE 2 DIABETES MELLITUS WITH STAGE 1 CHRONIC KIDNEY DISEASE, WITH LONG-TERM CURRENT USE OF INSULIN (H): ICD-10-CM

## 2017-07-13 DIAGNOSIS — M79.672 LEFT FOOT PAIN: Primary | ICD-10-CM

## 2017-07-13 PROCEDURE — 73630 X-RAY EXAM OF FOOT: CPT | Mod: LT

## 2017-07-13 PROCEDURE — 99214 OFFICE O/P EST MOD 30 MIN: CPT | Performed by: FAMILY MEDICINE

## 2017-07-13 RX ORDER — METHYLPREDNISOLONE 4 MG
TABLET, DOSE PACK ORAL
Qty: 21 TABLET | Refills: 0 | Status: SHIPPED | OUTPATIENT
Start: 2017-07-13 | End: 2017-08-09

## 2017-07-13 NOTE — NURSING NOTE
"Chief Complaint   Patient presents with     Arthritis     Gout       Initial /60 (BP Location: Left arm)  Pulse 72  Temp 97.7  F (36.5  C) (Oral)  Resp 16  Ht 5' 6\" (1.676 m)  Wt 201 lb 4 oz (91.3 kg)  SpO2 95%  BMI 32.48 kg/m2 Estimated body mass index is 32.48 kg/(m^2) as calculated from the following:    Height as of this encounter: 5' 6\" (1.676 m).    Weight as of this encounter: 201 lb 4 oz (91.3 kg).  Medication Reconciliation: complete     Marika Harrington, BRAULIO      "

## 2017-07-13 NOTE — PROGRESS NOTES
"UPDATE: patient starting medrol kaur today (6 day course) for foot concern/possible gout flare up. Per micromedex: \"Concurrent use of PREDNISONE and WARFARIN may result in increased risk of bleeding or diminished effects of warfarin.\" Given known interactions with warfarin, patient was advised to resume 5 mg this Friday 7/14 only. Then return to new weekly dose of 50 mg the following week as outlined on calendar. Patient verbalized understanding. MARLINE KingN, RN    "

## 2017-07-13 NOTE — MR AVS SNAPSHOT
After Visit Summary   7/13/2017    Cayetano Lunsford    MRN: [id number]           Patient Information     Date Of Birth          1945        Visit Information        Provider Department      7/13/2017 10:40 AM Debbie Lieberman MD [facility]        Today's Diagnoses     Left foot pain    -  1    Lower extremity edema        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, with long-term current use of insulin (H)          Care Instructions    I'm not sure today if this is gout as your uric acid level last fall was quite low (but that was during an attack of joint pain).  We need to figure out what is causing these episodes of pain.  I'd like to recheck the uric acid level when you are NOT have a joint pain attack.      Schedule a lab-only appointment in 1-2 months when you are NOT having any joint pain.      If the uric acid level is still low this means that you likely do NOT have gout and we'll need to investigate further what is causing these episodes.          Follow-ups after your visit        Your next 10 appointments already scheduled     Jul 27, 2017  9:15 AM CDT   [facility]   [facility]    [address] [place] [id number] [phone]            Aug 09, 2017  8:30 AM CDT   TELEMEDICINE with Jessica Diaz RPH   [facility] ([facility])    [address] [place] [id number] [phone]           Note: this is not an onsite visit; there is no need to come to the facility.            Sep 05, 2017  9:00 AM CDT   Return Visit with Bong Yoo MD   [facility]    [address] [place] [id number] [phone]              Future tests that were ordered for you today     Open Future Orders        Priority Expected Expires Ordered    **Uric acid  "FUTURE 2mo Routine 2017 11/10/2017 2017            Who to contact     If you have questions or need follow up information about today's clinic visit or your schedule please contact [facility] directly at [phone].  Normal or non-critical lab and imaging results will be communicated to you by MyChart, letter or phone within 4 business days after the clinic has received the results. If you do not hear from us within 7 days, please contact the clinic through MyChart or phone. If you have a critical or abnormal lab result, we will notify you by phone as soon as possible.  Submit refill requests through [employer] or call your pharmacy and they will forward the refill request to us. Please allow 3 business days for your refill to be completed.          Additional Information About Your Visit        MyChart Information     [employer] lets you send messages to your doctor, view your test results, renew your prescriptions, schedule appointments and more. To sign up, go to [email or web address] . Click on \"Log in\" on the left side of the screen, which will take you to the Welcome page. Then click on \"Sign up Now\" on the right side of the page.     You will be asked to enter the access code listed below, as well as some personal information. Please follow the directions to create your username and password.     Your access code is: [email or web address]  Expires: 10/10/2017  1:07 PM     Your access code will  in 90 days. If you need help or a new code, please call your [place] clinic or [phone].        Care EveryWhere ID     This is your Care EveryWhere ID. This could be used by other organizations to access your [place] medical records  [id number]        Your Vitals Were     Pulse Temperature Respirations Height Pulse Oximetry BMI (Body Mass Index)    72 97.7  F (36.5  C) (Oral) 16 5' 6\" (1.676 m) 95% 32.48 kg/m2       Blood Pressure from Last 3 Encounters:   17 132/60   17 138/76 "   06/20/17 139/64    Weight from Last 3 Encounters:   07/13/17 201 lb 4 oz (91.3 kg)   06/21/17 199 lb (90.3 kg)   06/20/17 199 lb (90.3 kg)                 Today's Medication Changes          These changes are accurate as of: 7/13/17 11:57 AM.  If you have any questions, ask your nurse or doctor.               Start taking these medicines.        Dose/Directions    methylPREDNISolone 4 MG tablet   Commonly known as:  MEDROL DOSEPAK   Used for:  Left foot pain, Lower extremity edema   Started by:  Debbie Lieberman MD        Follow package instructions   Quantity:  21 tablet   Refills:  0         Stop taking these medicines if you haven't already. Please contact your care team if you have questions.     order for DME   Stopped by:  Debbie Lieberman MD                Where to get your medicines      These medications were sent to [facility] [address]  [address] [facility] [address]     Phone:  [phone]     methylPREDNISolone 4 MG tablet                Primary Care Provider Office Phone # Fax #    Debbie Lewis [phone]       [facility] [address]  [facility] [address]        Equal Access to Services     ZAHIRA ROSS AH: Hadii efrem lynno Soomaali, waaxda luqadaha, qaybta kaalmada adeegyada, erendira walter. So [facility] [phone].    ATENCIÓN: Si habla español, tiene a jurado disposición servicios gratuitos de asistencia lingüística. Llame al [phone].    We comply with applicable federal civil rights laws and [place] laws. We do not discriminate on the basis of race, color, national origin, age, disability sex, sexual orientation or gender identity.            Thank you!     Thank you for choosing [facility]  for your care. Our goal is always to provide you with excellent care. Hearing back from our patients is one way we can continue to improve our services. Please take a few minutes  "to complete the written survey that you may receive in the mail after your visit with us. Thank you!             Your Updated Medication List - Protect others around you: Learn how to safely use, store and throw away your medicines at www.disposemymeds.org.          This list is accurate as of: 7/13/17 11:57 AM.  Always use your most recent med list.                   Brand Name Dispense Instructions for use Diagnosis    albuterol 108 (90 BASE) MCG/ACT Inhaler    PROAIR HFA/PROVENTIL HFA/VENTOLIN HFA    1 Inhaler    Inhale 1-2 puffs into the lungs every 4 hours as needed (for shortness of breath/wheezing)    Chronic airway obstruction, not elsewhere classified       ASPIRIN NOT PRESCRIBED    INTENTIONAL     Reported on 5/17/2017        azelastine 0.1 % spray    ASTELIN    1 Bottle    Spray 1-2 sprays into both nostrils 2 times daily    Seasonal allergic rhinitis, unspecified allergic rhinitis trigger       B-D INSULIN SYRINGE 31G X 5/16\" 0.5 ML   Generic drug:  insulin syringe-needle U-100     100 each    USE 1 SYRINGE TWO TIMES A DAY OR AS DIRECTED    Type 2 diabetes, HbA1C goal < 8% (H)       BD ULTRA FINE PEN NEEDLES     100 each    Use to inject insulin twice daily.    Type 2 diabetes, HbA1C goal < 8% (H)       blood glucose monitoring lancets     100 Each    Use to test up to four times per day    Type II or unspecified type diabetes mellitus without mention of complication, not stated as uncontrolled       blood glucose monitoring test strip    no brand specified    360 each    Test 4 times daily or as directed. Profile Rx: patient will contact pharmacy when needed    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, with long-term current use of insulin (H)       FLORAJEN3 Caps     60 capsule    Take 1 capsule by mouth 2 times daily    Acute cystitis without hematuria       FOLIC ACID PO      Take 1 mg by mouth daily        insulin  UNIT/ML injection    NovoLIN N RELION    3 vial    Inject " 6 units under the skin at breakfast and 15 units at dinner.    Type 2 diabetes mellitus with hypoglycemia without coma, with long-term current use of insulin (H)       insulin regular 100 UNIT/ML injection    NovoLIN R RELION    10 mL    Inject 7 units with breakfast and 2 units with dinner (when mixing with NPH, draw this insulin up first)    Type 2 diabetes mellitus with hypoglycemia without coma, with long-term current use of insulin (H)       ipratropium - albuterol 0.5 mg/2.5 mg/3 mL 0.5-2.5 (3) MG/3ML neb solution    DUONEB    270 mL    NEBULIZE CONTENTS OF ONE VIAL BY MOUTH EVERY 4 HOURS AS NEEDED FOR SHORTNESS OF BREATH /DYSPNEA    Chronic obstructive bronchitis (H)       losartan 100 MG tablet    COZAAR    90 tablet    Take 1 tablet (100 mg) by mouth daily for hypertension.    Hypertension goal BP (blood pressure) < 140/90       metFORMIN 1000 MG tablet    GLUCOPHAGE    180 tablet    Take 1 tablet (1,000 mg) by mouth 2 times daily (with meals) with breakfast and dinner.    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, with long-term current use of insulin (H)       methylPREDNISolone 4 MG tablet    MEDROL DOSEPAK    21 tablet    Follow package instructions    Left foot pain, Lower extremity edema       order for DME     1 each    Equipment being ordered: Nebulizer machine with mask and tubing    Chronic airway obstruction, not elsewhere classified       pantoprazole 40 MG EC tablet    PROTONIX     Take 40 mg by mouth daily Started by Dr. Debbie Rico at [facility]        primidone 50 MG tablet    MYSOLINE    180 tablet    Take 1 tablet twice a day and 2 tablets in the evening for 1 week, then go to 2 tablets twice a day (morning and evening) and take 1 tablet midday for 1 week, take 2 tablets three times a day, thereafter.    Essential tremor       propafenone 150 MG Tabs tablet    RYTHMOL    180 tablet    Take 1 tablet (150 mg) by mouth 2 times daily    Paroxysmal atrial fibrillation (H)        propranolol 40 MG tablet    INDERAL    180 tablet    Take 2-3 tablets ( mg) by mouth 2 times daily    Benign familial tremor       simvastatin 80 MG tablet    ZOCOR    30 tablet    Take 0.5 tablets (40 mg) by mouth At Bedtime Profile Rx: patient will contact pharmacy when needed    Hyperlipidemia LDL goal <100       STIOLTO RESPIMAT 2.5-2.5 MCG/ACT Aers   Generic drug:  tiotropium-olodaterol      Inhale 2 puffs into the lungs daily        sulfaSALAzine 500 MG tablet    AZULFIDINE    90 tablet    TAKE ONE TABLET BY MOUTH THREE TIMES A DAY    Ulcerative colitis, unspecified       warfarin 5 MG tablet    COUMADIN    120 tablet    Take as directed by Coumadin clinic    Atrial fibrillation, unspecified type (H)

## 2017-07-13 NOTE — PROGRESS NOTES
SUBJECTIVE:                                                    Cayetano Lunsford is a 72 year old male who presents to clinic today for the following health issues:      Gout  Duration: Sunday 7/9/17  Description   Location: foot - bilateral  Joint Swelling: YES  Redness: no   Pain intensity:  Moderate-severe  Accompanying signs and symptoms: None  History  Previous history of gout: YES   Trauma to the area: no   Precipitating factors:   Alcohol usage: socially  Diuretic use: no   Recent illness: no   Therapies tried and outcome: None    Awoke Sun am with soreness of lateral left foot.  No injury.  Had developed bilateral foot edema a couple days previously but then the swelling became more pronounced on the left foot.  He felt it was like his prior episode of gout last year.  At that time he had acute monoarthritis of right ankle that responded to medrol dosepak.  However, uric acid level at the time was quite low.    ROS:  CONST: NEGATIVE for fevers.  RESP:  NEGATIVE for shortness of breath  CV:  NEGATIVE for chest pain      Problem list and histories reviewed & adjusted, as indicated.  Additional history: as documented    Past Medical History:   Diagnosis Date     Allergic rhinitis, cause unspecified      Atrial fibrillation (H)      BPH (benign prostatic hyperplasia)      CAD (coronary artery disease)      Chronic airway obstruction, not elsewhere classified      Hyperlipidemia LDL goal <100 5/9/2010     Hypertension goal BP (blood pressure) < 130/80 10/19/2006     Obesity (BMI 30-39.9)      Perforation of tympanic membrane, unspecified     Right TM     Tachycardia, unspecified      Type 2 diabetes, HbA1C goal < 8% (H) 5/2/2010     Unspecified cardiovascular disease      Patient Active Problem List   Diagnosis     Ulcerative colitis without complications (HCC)     Atrial fibrillation (H)     Chronic obstructive pulmonary disease, unspecified (H)     Obesity     Eczema     HYPERLIPIDEMIA LDL GOAL <100      "Advance Care Planning     Benign familial tremor     Hypertension goal BP (blood pressure) < 140/90     Cramp of limb     BPH (benign prostatic hyperplasia)     Pain in joint, lower leg     CAD in native artery     Hard of hearing     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)     Diverticulitis of colon     History of colonic polyps     Redundant colon     Long-term (current) use of anticoagulants [Z79.01]        Reviewed and updated as needed this visit by clinical staff  Tobacco  Allergies  Meds           OBJECTIVE:     /60 (BP Location: Left arm)  Pulse 72  Temp 97.7  F (36.5  C) (Oral)  Resp 16  Ht 5' 6\" (1.676 m)  Wt 201 lb 4 oz (91.3 kg)  SpO2 95%  BMI 32.48 kg/m2  Body mass index is 32.48 kg/(m^2).  GEN:  no apparent distress  LUNGS:  normal respiratory effort, and lungs clear to auscultation bilaterally - no rales, rhonchi or wheezes  CV: regular rate and rhythm, normal S1 S2, no S3 or S4, no murmur, click or rub and bilateral lower extremities with 2+ pitting edema of feet, left somewhat worse than right.  MS: left foot with diffuse swelling and with focal tenderness to palpation and light erythema over 5th MCP.  No tenderness to palpation over left ankle.  He is hesitant to step onto left foot, instead bearing weight on left heel.    SKIN:  Intact and normal to inspection and palpation, no rashes or abnormal-appearing lesions       Diagnostic Test Results:  LEFT FOOT THREE OR MORE VIEWS   7/13/2017 11:59 AM   HISTORY: Pain in left foot. Localized edema.  COMPARISON: None.  IMPRESSION: Hallux valgus deformity noted at the first MTP associated  with mild degenerative change. No acute fracture or dislocation  demonstrated.  URBANO HOWE MD    ASSESSMENT/PLAN:       1. Left foot pain  Unclear etiology.  Discussed that while this could be a second flare of gout the diagnosis is more questionable now given the low uric acid level with his right ankle pain last fall.  I " would like to treat him with Medrol dosepak again and see if he responds as nicely as he did previously.  I did warn him that his blood sugars will temporarily increase but his DM control has been excellent (last A1c = 5.8) so I am not concerned about that.  I would like to recheck a uric acid level when he is not having acute focal joint pain and have instructed him to schedule a lab-only appointment in 1-2 months.  Depending on that result I may refer him to Rheum.  Patient instructed to contact clinic if acute symptoms persist, worsen, or do not resolve as anticipated.    - XR Foot Left G/E 3 Views; Future  - **Uric acid FUTURE 2mo; Future  - methylPREDNISolone (MEDROL DOSEPAK) 4 MG tablet; Follow package instructions  Dispense: 21 tablet; Refill: 0    2. Lower extremity edema  Unclear etiology for the bilateral foot edema.  I recommended elevating legs and notify me if symptoms persist/worsen.  - XR Foot Left G/E 3 Views; Future  - methylPREDNISolone (MEDROL DOSEPAK) 4 MG tablet; Follow package instructions  Dispense: 21 tablet; Refill: 0    3. 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, with long-term current use of insulin (H)  Well-controlled.  He is aware that blood sugars may increase temporarily with systemic steroids.        Debbie Lieberman MD  [facility]

## 2017-07-13 NOTE — PATIENT INSTRUCTIONS
I'm not sure today if this is gout as your uric acid level last fall was quite low (but that was during an attack of joint pain).  We need to figure out what is causing these episodes of pain.  I'd like to recheck the uric acid level when you are NOT have a joint pain attack.      Schedule a lab-only appointment in 1-2 months when you are NOT having any joint pain.      If the uric acid level is still low this means that you likely do NOT have gout and we'll need to investigate further what is causing these episodes.

## 2017-07-24 DIAGNOSIS — E11.9 TYPE 2 DIABETES, HBA1C GOAL < 8% (H): ICD-10-CM

## 2017-07-25 RX ORDER — SYRINGE-NEEDLE,INSULIN,0.5 ML 31 GX5/16"
SYRINGE, EMPTY DISPOSABLE MISCELLANEOUS
Qty: 100 EACH | Refills: 1 | Status: SHIPPED | OUTPATIENT
Start: 2017-07-25 | End: 2017-10-16

## 2017-07-25 NOTE — TELEPHONE ENCOUNTER
"  Prescription approved per [facility] Refill Protocol.  MARLINE ZhouN, RN      B-D INSULIN SYRINGE 31G X 5/16\" 0.5 ML      Last Written Prescription Date: 6/8/17  Last Fill Quantity: 100,  # refills: 0   Last Office Visit with [facility], [facility] or [facility] prescribing provider: 7/13/17 with Dr. Lieberman                                         Next 5 appointments (look out 90 days)     Sep 05, 2017  9:00 AM CDT   Return Visit with Bong Yoo MD   [facility] ([facility])    [address] [place] [address] [phone]                      "

## 2017-07-27 ENCOUNTER — ANTICOAGULATION THERAPY VISIT (OUTPATIENT)
Dept: NURSING | Facility: CLINIC | Age: 72
End: 2017-07-27
Payer: COMMERCIAL

## 2017-07-27 DIAGNOSIS — I48.91 ATRIAL FIBRILLATION, UNSPECIFIED TYPE (H): ICD-10-CM

## 2017-07-27 DIAGNOSIS — Z79.01 LONG-TERM (CURRENT) USE OF ANTICOAGULANTS: ICD-10-CM

## 2017-07-27 LAB — INR POINT OF CARE: 2.5 (ref 0.86–1.14)

## 2017-07-27 PROCEDURE — 36416 COLLJ CAPILLARY BLOOD SPEC: CPT

## 2017-07-27 PROCEDURE — 85610 PROTHROMBIN TIME: CPT | Mod: QW

## 2017-07-27 PROCEDURE — 99207 ZZC NO CHARGE NURSE ONLY: CPT

## 2017-07-27 NOTE — MR AVS SNAPSHOT
Cayetano Lunsford   7/27/2017 9:15 AM   Anticoagulation Therapy Visit    Description:  72 year old male   Provider:   INR CLINIC   Department:   Nurse           INR as of 7/27/2017     Today's INR 2.5      Anticoagulation Summary as of 7/27/2017     INR goal 2.0-3.0   Today's INR 2.5   Full instructions 5 mg on Mon; 7.5 mg all other days   Next INR check 8/10/2017    Indications   Long-term (current) use of anticoagulants [Z79.01] [Z79.01]  Atrial fibrillation (H) [I48.91]         Your next Anticoagulation Clinic appointment(s)     Aug 10, 2017 10:15 AM CDT   Anticoagulation Visit with  [facility] CL[facility]   [facility]    [address] [place] [id number] [phone]              Contact Numbers     [facility]  Please call [phone] to cancel and/or reschedule your appointment   Please call [phone] with any problems or questions regarding your therapy.        July 2017 Details    Sun Mon Tue Wed Thu Fri Sat           1                 2               3               4               5               6               7               8                 9               10               11               12               13               14               15                 16               17               18               19               20               21               22                 23               24               25               26               27      7.5 mg   See details      28      7.5 mg         29      7.5 mg           30      7.5 mg         31      5 mg               Date Details   07/27 This INR check               How to take your warfarin dose     To take:  5 mg Take 1 of the 5 mg tablets.    To take:  7.5 mg Take 1.5 of the 5 mg tablets.           August 2017 Details    Sun Mon Tue Wed Thu Fri Sat       1      7.5 mg         2      7.5 mg         3      7.5 mg         4      7.5 mg         5      7.5 mg           6      7.5  mg         7      5 mg         8      7.5 mg         9      7.5 mg         10            11               12                 13               14               15               16               17               18               19                 20               21               22               23               24               25               26                 27               28               29               30               31                  Date Details   No additional details    Date of next INR:  8/10/2017         How to take your warfarin dose     To take:  5 mg Take 1 of the 5 mg tablets.    To take:  7.5 mg Take 1.5 of the 5 mg tablets.

## 2017-07-27 NOTE — PROGRESS NOTES
ANTICOAGULATION FOLLOW-UP CLINIC VISIT    Patient Name:  Cayetano Lunsford  Date:  7/27/2017  Contact Type:  Face to Face    SUBJECTIVE:     Patient Findings     Positives Change in diet/appetite (Pt was eating differently over the last week & a half while out of town. More fried foods. ), Activity level change (Increased activity while out of town with family.)    Comments Gout sx have resolved. No further med changes.           OBJECTIVE    INR Protime   Date Value Ref Range Status   07/27/2017 2.5 (A) 0.86 - 1.14 Final       ASSESSMENT / PLAN  INR assessment THER    Recheck INR In: 2 WEEKS    INR Location Clinic      Anticoagulation Summary as of 7/27/2017     INR goal 2.0-3.0   Today's INR 2.5   Maintenance plan 5 mg (5 mg x 1) on Mon; 7.5 mg (5 mg x 1.5) all other days   Full instructions 5 mg on Mon; 7.5 mg all other days   Weekly total 50 mg   No change documented Kirstin Meléndez RN   Plan last modified Kirstin Meléndez RN (7/12/2017)   Next INR check 8/10/2017   Priority INR   Target end date     Indications   Long-term (current) use of anticoagulants [Z79.01] [Z79.01]  Atrial fibrillation (H) [I48.91]         Anticoagulation Episode Summary     INR check location     Preferred lab     Send INR reminders to [facility]    Comments       Anticoagulation Care Providers     Provider Role Specialty Phone number    Debbie Lewis MD [facility] [phone]            See the Encounter Report to view Anticoagulation Flowsheet and Dosing Calendar (Go to Encounters tab in chart review, and find the Anticoagulation Therapy Visit)    Patient made aware if signs of clotting (pain, tenderness, swelling, or color change in any extremity) AND/OR bleeding occur (nosebleeds, bleeding gums, bruising, or blood in stool or urine) to notify provider & seek medical attention. If severe symptoms develop, such as major bleeding, chest pain, shortness of breath, fall, trauma or s/s of stroke,  patient to call 911 immediately.       Kirstin Meléndez RN

## 2017-08-08 ENCOUNTER — MEDICAL CORRESPONDENCE (OUTPATIENT)
Dept: HEALTH INFORMATION MANAGEMENT | Facility: CLINIC | Age: 72
End: 2017-08-08

## 2017-08-09 ENCOUNTER — ALLIED HEALTH/NURSE VISIT (OUTPATIENT)
Dept: PHARMACY | Facility: CLINIC | Age: 72
End: 2017-08-09
Payer: COMMERCIAL

## 2017-08-09 DIAGNOSIS — N18.1 TYPE 2 DIABETES MELLITUS WITH STAGE 1 CHRONIC KIDNEY DISEASE, WITH LONG-TERM CURRENT USE OF INSULIN (H): Primary | ICD-10-CM

## 2017-08-09 DIAGNOSIS — Z79.4 TYPE 2 DIABETES MELLITUS WITH STAGE 1 CHRONIC KIDNEY DISEASE, WITH LONG-TERM CURRENT USE OF INSULIN (H): Primary | ICD-10-CM

## 2017-08-09 DIAGNOSIS — E11.22 TYPE 2 DIABETES MELLITUS WITH STAGE 1 CHRONIC KIDNEY DISEASE, WITH LONG-TERM CURRENT USE OF INSULIN (H): Primary | ICD-10-CM

## 2017-08-09 PROCEDURE — 99607 MTMS BY PHARM ADDL 15 MIN: CPT | Performed by: PHARMACIST

## 2017-08-09 PROCEDURE — 99606 MTMS BY PHARM EST 15 MIN: CPT | Performed by: PHARMACIST

## 2017-08-09 NOTE — MR AVS SNAPSHOT
After Visit Summary   8/9/2017    Cayetano Lunsford    MRN: [id number]           Patient Information     Date Of Birth          1945        Visit Information        Provider Department      8/9/2017 8:30 AM Jessica Diaz [facility] MTM        Today's Diagnoses     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, with long-term current use of insulin (H)    -  1      Care Instructions    Recommendations from today's MTM visit:                                                    Today we reviewed what your medicines are for, how to know if they are working, that your medicines are safe and how to make your medicine regimen as easy as possible.     1. Stop using the 2 units of Novolin R at dinner unless your blood sugar is 200 or greater at dinner time. If blood sugar is greater than 200 before dinner, use 2 units of Novolin R.     2. Decrease Novolin N in the AM to 5 units.     3. Continue to check blood sugars before breakfast and dinner as you have been doing.    Next MTM visit: 8/28/17 at 8:30 AM    To schedule another MTM appointment, please call the clinic directly or you may call the MTM scheduling line at [phone] or toll-free at [phone].     My Clinical Pharmacist's contact information:                                                      It was a pleasure seeing you today!  Please feel free to contact me with any questions or concerns you have.      Tanna Diaz, PharmD  Medication Therapy Management Pharmacist  [facility] - Monday and Wednesday 7:30 - 4:00  Phone: [phone] - direct clinic line    You may receive a survey about the MT services you received.  I would appreciate your feedback to help me serve you better in the future. Please fill it out and return it when you can. Your comments will be anonymous.                    Follow-ups after your visit        Your next 10 appointments already scheduled     Aug 10, 2017  1:30 PM CDT    Anticoagulation Visit with  INR CLINIC   [facility] ([facility])    [address] [place] [id number] [phone]            Aug 10, 2017  2:00 PM CDT   LAB with  LAB   [facility] ([facility])    [address] [place] [id number] [phone]           Patient must bring picture ID. Patient should be prepared to give a urine specimen  Please do not eat 10-12 hours before your appointment if you are coming in fasting for labs on lipids, cholesterol, or glucose (sugar). Pregnant women should follow their Care Team instructions. Water with medications is okay. Do not drink coffee or other fluids. If you have concerns about taking  your medications, please ask at office or if scheduling via [employer], send a message by clicking on Secure Messaging, Message Your Care Team.            Aug 28, 2017  8:30 AM CDT   TELEMEDICINE with Jessica Diaz [facility] ([facility])    [address] [place] [address] [phone]           Note: this is not an onsite visit; there is no need to come to the facility.            Sep 05, 2017  9:00 AM CDT   Return Visit with Bong Yoo MD   [facility] ([facility])    [address] [place] [address] [phone]              Who to contact     If you have questions or need follow up information about today's clinic visit or your schedule please contact [facility] directly at [phone].  Normal or non-critical lab and imaging results will be communicated to you by MyChart, letter or phone within 4 business days after the clinic has received the results. If you do not hear from us within 7 days, please contact the clinic through MyChart or phone. If you have a critical or abnormal lab result, we will notify you by phone as soon as  "possible.  Submit refill requests through "[employer] or call your pharmacy and they will forward the refill request to us. Please allow 3 business days for your refill to be completed.          Additional Information About Your Visit        [employer] Information     "[employer] lets you send messages to your doctor, view your test results, renew your prescriptions, schedule appointments and more. To sign up, go to [email or web address] . Click on \"Log in\" on the left side of the screen, which will take you to the Welcome page. Then click on \"Sign up Now\" on the right side of the page.     You will be asked to enter the access code listed below, as well as some personal information. Please follow the directions to create your username and password.     Your access code is: [email or web address]  Expires: 10/10/2017  1:07 PM     Your access code will  in 90 days. If you need help or a new code, please call your [place] clinic or [phone].        Care EveryWhere ID     This is your Care EveryWhere ID. This could be used by other organizations to access your [place] medical records  [id number]         Blood Pressure from Last 3 Encounters:   17 132/60   17 138/76   17 139/64    Weight from Last 3 Encounters:   17 201 lb 4 oz (91.3 kg)   17 199 lb (90.3 kg)   17 199 lb (90.3 kg)              Today, you had the following     No orders found for display       Primary Care Provider Office Phone # Fax #    Debbie Lewis [phone] [address]  [facility] [address]        Equal Access to Services     [facility]: Hadii efrem Helms, wageronimoda lukenyonadaha, qaybta kaalmada erendira avila . So [facility] [phone].    ATENCIÓN: Si habla español, tiene a jurado disposición servicios gratuitos de asistencia lingüística. Llame al [phone].    We comply with applicable federal civil rights laws and [place] laws. We do not " "discriminate on the basis of race, color, national origin, age, disability sex, sexual orientation or gender identity.            Thank you!     Thank you for choosing [facility]  for your care. Our goal is always to provide you with excellent care. Hearing back from our patients is one way we can continue to improve our services. Please take a few minutes to complete the written survey that you may receive in the mail after your visit with us. Thank you!             Your Updated Medication List - Protect others around you: Learn how to safely use, store and throw away your medicines at www.disposemymeds.org.          This list is accurate as of: 8/9/17 12:22 PM.  Always use your most recent med list.                   Brand Name Dispense Instructions for use Diagnosis    albuterol 108 (90 BASE) MCG/ACT Inhaler    PROAIR HFA/PROVENTIL HFA/VENTOLIN HFA    1 Inhaler    Inhale 1-2 puffs into the lungs every 4 hours as needed (for shortness of breath/wheezing)    Chronic airway obstruction, not elsewhere classified       ASPIRIN NOT PRESCRIBED    INTENTIONAL     Reported on 5/17/2017        azelastine 0.1 % spray    ASTELIN    1 Bottle    Spray 1-2 sprays into both nostrils 2 times daily    Seasonal allergic rhinitis, unspecified allergic rhinitis trigger       B-D INSULIN SYRINGE 31G X 5/16\" 0.5 ML   Generic drug:  insulin syringe-needle U-100     100 each    USE 1 SYRINGE TWO TIMES A DAY OR AS DIRECTED    Type 2 diabetes, HbA1C goal < 8% (H)       BD ULTRA FINE PEN NEEDLES     100 each    Use to inject insulin twice daily.    Type 2 diabetes, HbA1C goal < 8% (H)       blood glucose monitoring lancets     100 Each    Use to test up to four times per day    Type II or unspecified type diabetes mellitus without mention of complication, not stated as uncontrolled       blood glucose monitoring test strip    no brand specified    360 each    Test 4 times daily or as directed. Profile Rx: patient will " contact pharmacy when needed    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, with long-term current use of insulin (H)       FLORAJEN3 Caps     60 capsule    Take 1 capsule by mouth 2 times daily    Acute cystitis without hematuria       FOLIC ACID PO      Take 1 mg by mouth daily        insulin  UNIT/ML injection    NovoLIN N RELION    3 vial    Inject 6 units under the skin at breakfast and 15 units at dinner.    Type 2 diabetes mellitus with hypoglycemia without coma, with long-term current use of insulin (H)       insulin regular 100 UNIT/ML injection    NovoLIN R RELION    10 mL    Inject 7 units with breakfast and 2 units with dinner (when mixing with NPH, draw this insulin up first)    Type 2 diabetes mellitus with hypoglycemia without coma, with long-term current use of insulin (H)       ipratropium - albuterol 0.5 mg/2.5 mg/3 mL 0.5-2.5 (3) MG/3ML neb solution    DUONEB    270 mL    NEBULIZE CONTENTS OF ONE VIAL BY MOUTH EVERY 4 HOURS AS NEEDED FOR SHORTNESS OF BREATH /DYSPNEA    Chronic obstructive bronchitis (H)       losartan 100 MG tablet    COZAAR    90 tablet    Take 1 tablet (100 mg) by mouth daily for hypertension.    Hypertension goal BP (blood pressure) < 140/90       metFORMIN 1000 MG tablet    GLUCOPHAGE    180 tablet    Take 1 tablet (1,000 mg) by mouth 2 times daily (with meals) with breakfast and dinner.    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, with long-term current use of insulin (H)       order for DME     1 each    Equipment being ordered: Nebulizer machine with mask and tubing    Chronic airway obstruction, not elsewhere classified       pantoprazole 40 MG EC tablet    PROTONIX     Take 40 mg by mouth daily Started by Dr. Debbie Rico at [facility]        primidone 50 MG tablet    MYSOLINE    180 tablet    Take 1 tablet twice a day and 2 tablets in the evening for 1 week, then go to 2 tablets twice a day (morning and evening) and take 1  tablet midday for 1 week, take 2 tablets three times a day, thereafter.    Essential tremor       propafenone 150 MG Tabs tablet    RYTHMOL    180 tablet    Take 1 tablet (150 mg) by mouth 2 times daily    Paroxysmal atrial fibrillation (H)       propranolol 40 MG tablet    INDERAL    180 tablet    Take 2-3 tablets ( mg) by mouth 2 times daily    Benign familial tremor       simvastatin 80 MG tablet    ZOCOR    30 tablet    Take 0.5 tablets (40 mg) by mouth At Bedtime Profile Rx: patient will contact pharmacy when needed    Hyperlipidemia LDL goal <100       STIOLTO RESPIMAT 2.5-2.5 MCG/ACT Aers   Generic drug:  tiotropium-olodaterol      Inhale 2 puffs into the lungs daily        sulfaSALAzine 500 MG tablet    AZULFIDINE    90 tablet    TAKE ONE TABLET BY MOUTH THREE TIMES A DAY    Ulcerative colitis, unspecified       warfarin 5 MG tablet    COUMADIN    120 tablet    Take as directed by Coumadin clinic    Atrial fibrillation, unspecified type (H)

## 2017-08-09 NOTE — PROGRESS NOTES
SUBJECTIVE/OBJECTIVE:                Cayetano Lunsford is a 72 year old male called for a follow-up visit for Medication Therapy Management.  He was referred to me from Dr. Debbie Lewis.     Chief Complaint: Follow up from our visit on 7/12/17.  Diabetes.  Personal Healthcare Goals: Maintain healthy blood sugars while eliminating lows.  Tobacco: No tobacco use    Alcohol: Less than 1 beverages / week    Medication Adherence: no issues reported    Diabetes:  Pt currently taking metformin bid, Novolin N 6 units AM before breakfast and 15 units in PM-at dinner - changed at last visit, Novolin R-7 units with breakfast, between 80 and 120, reduce your R insulin by 2 units and 2 units of R at dinner. If below 70 skip dose. He gives his shots in his arms. He does not have any issues with giving stomach just has been doing arms.  He is now writing the date on the Novolin R and discarding after 42 days.   SMBG: 3-4 times daily.   Ranges (per pt report): see chart below.   Symptoms of low blood sugar? Fingers and lips tingle, shaky and sweaty. Frequency of hypoglycemia? Usually has them before dinner but had one overnight which was a surprise to him.  He thinks the before dinner lows are potentially due to increased exercise or decreased food intake for lunch. Treating with small amount of orange juice.  Recent symptoms of high blood sugar? none.  Eye exam: up to date  Foot exam: up to date  Microalbumin is not < 30 mg/g. Pt is taking an ACEi/XE22822017/08/28B.  Aspirin: Not taking due to anticoagulation therapy and increased risk of bleeding  Diet/Exercise:  He repots that his activity has slowed down but about the same as last visit. He is eating something for lunch.     Ave: 7 day: 140, 14 day: 160, 30 day: 159 - increased slightly since last visit    Date FBG/ 2hours post Lunch/2hours post Dinner /2hours post    8/9 126      8/8 148  67 -4oz OJ then dinner    8/7 182  99    8/6 162  200    8/5 195  143 -gave 2 units R 4:16am  - 71 - 4 oz OJ, 4:28 AM - 65, 4:46 AM -81,4:57 AM 95   8/4 253 - bowl of ice cream the night before  143    8/3 120  136    8/2 140  178    8/1 225  252 - shortly after eating at block party    7/31 138  132    7/30 182  252 - had cinnamon role for breakfast    7/29 197  182    7/28 221  81 - 4oz OJ then dinner    7/27 162  81 - (R -1 unit with 4 oz OJ) - was more active earlier in the day    7/26 97  75 - not enough to eat for lunch - 4oz orange juice, no R and ate dinner    7/25 142  190    7/24 111  109    7/23 129  138 - lots of travel in a car this day    7/22 172  334    7/21 191  207    7/20 124  270    7/19 117  89    7/18 139  86    7/17 171  98    7/16 116  180    7/15 166  251 -family picnic    7/14 199  96    7/13 149  330 - started prednisone for foot pain    7/12   113        From Last Visit:  Date FBG/ 2hours post Lunch/2hours post Dinner /2hours post    7/12 147      7/11 116  122    7/10 174  77 - little orange juice before dinner    7/9 113  149    7/8 127  107    7/7 164  103    7/6 192  161    7/5 182  93    7/4 224  118    7/3 226 - had more carbs  109    7/2 196  182    7/1 196  158    6/30 196  133    6/29 221 - had dessert the night before  137    6/28 176  122    6/27 180  65 - at late, drank juice and ate dinner    6/26 177  133        Current labs include:  BP Readings from Last 3 Encounters:   07/13/17 132/60   06/21/17 138/76   06/20/17 139/64     Today's Vitals: There were no vitals taken for this visit. - phone visit    Lab Results   Component Value Date    A1C 5.8 05/08/2017    A1C 6.9 10/17/2016    A1C 6.2 07/22/2016    A1C 6.7 03/28/2016    A1C 7.1 11/10/2015     Lab Results   Component Value Date    CHOL 159 05/08/2017     Lab Results   Component Value Date    TRIG 112 05/08/2017     Lab Results   Component Value Date    HDL 54 05/08/2017     Lab Results   Component Value Date    LDL 83 05/08/2017       Liver Function Studies -   Recent Labs   Lab Test  03/13/17   1050   PROTTOTAL   7.0   ALBUMIN  3.8   BILITOTAL  0.4   ALKPHOS  60   AST  20   ALT  22       Lab Results   Component Value Date    UCRR 40 06/20/2017    MICROL 24 06/20/2017    UMALCR 61.31 (H) 06/20/2017       Last Basic Metabolic Panel:  Lab Results   Component Value Date     03/13/2017      Lab Results   Component Value Date    POTASSIUM 4.6 03/13/2017     Lab Results   Component Value Date    CHLORIDE 100 03/13/2017     Lab Results   Component Value Date    BUN 16 03/13/2017     Lab Results   Component Value Date    CR 0.76 03/13/2017     GFR Estimate   Date Value Ref Range Status   03/13/2017 >90  Non  GFR Calc   >60 mL/min/1.7m2 Final   01/09/2017 >90 >60 mL/min/1.7m2 Final   10/03/2016 >60 >60 ml/min/1.73m2 Final     TSH   Date Value Ref Range Status   03/13/2017 3.25 0.40 - 4.00 mU/L Final   ]    Most Recent Immunizations   Administered Date(s) Administered     HepB-Peds 08/05/2014     Influenza (H1N1) 01/08/2010     Influenza (High Dose) 3 valent vaccine 09/07/2016     Influenza (IIV3) 09/11/2009     Pneumococcal (PCV 13) 11/10/2015     Pneumococcal 23 valent 07/20/2010     TD (ADULT, 7+) 08/08/2008     TDAP Vaccine (Adacel) 08/05/2014     Zoster vaccine, live 06/13/2008       ASSESSMENT:              Current medications were reviewed today as discussed above.        Medication Adherence: no issues identified    Diabetes: Needs Improvement. Patient is doing well on his current regimen with no missed doses, but he is experiencing lows, with most of them occurring in the evening and one of them occurring overnight. Patient is able to explain readings greater than 200, either dessert or increased food intake during special occasions. Patient would benefit from a change in insulin regimen to eliminate evening lows. See plan.    Patient is currently on a complicated regimen that requires him to mix Novolin N and R in one syringe and would benefit from a more simplified plan. In the future consider  eliminating the Novolin R and splitting the Novolin N evenly to see if he has good control with less hypoglycemia.       PLAN:                  1. Stop using the 2 units of Novolin R at dinner unless blood sugar is 200 or greater at dinner time.    2. Decrease Novolin N in the AM to 5 units.     Future Considerations  1. Depending on blood sugars during next visit, will consider adjusting Novolin N schedule to 9 units twice daily.     I spent 30 minutes with this patient today. All changes were made via collaborative practice agreement with Debbie Lewis. A copy of the visit note was provided to the patient's primary care provider.     Will follow up with MTM on 8/28/17.    The patient was mailed a summary of these recommendations as an after visit summary.    Rush Rondon, JohnnieD  [facility] Resident     I reviewed this and agree with the plan.  Tanna Diaz, JohnnieD  Medication Therapy Management Pharmacist

## 2017-08-09 NOTE — Clinical Note
Please see note for detail.  Slight adjustment to insulin dose.  Johnnie WrayD Medication Therapy Management Pharmacist [facility] - Monday and Wednesday 7:30 - 4:00 Phone: [phone] - direct clinic line

## 2017-08-09 NOTE — PATIENT INSTRUCTIONS
Recommendations from today's MTM visit:                                                    Today we reviewed what your medicines are for, how to know if they are working, that your medicines are safe and how to make your medicine regimen as easy as possible.     1. Stop using the 2 units of Novolin R at dinner unless your blood sugar is 200 or greater at dinner time. If blood sugar is greater than 200 before dinner, use 2 units of Novolin R.     2. Decrease Novolin N in the AM to 5 units.     3. Continue to check blood sugars before breakfast and dinner as you have been doing.    Next MTM visit: 8/28/17 at 8:30 AM    To schedule another MTM appointment, please call the clinic directly or you may call the MTM scheduling line at [phone] or toll-free at [phone].     My Clinical Pharmacist's contact information:                                                      It was a pleasure seeing you today!  Please feel free to contact me with any questions or concerns you have.      Tanna Diaz, PharmD  Medication Therapy Management Pharmacist  [facility] - Monday and Wednesday 7:30 - 4:00  Phone: [phone] - direct clinic line    You may receive a survey about the MTM services you received.  I would appreciate your feedback to help me serve you better in the future. Please fill it out and return it when you can. Your comments will be anonymous.

## 2017-08-10 ENCOUNTER — TELEPHONE (OUTPATIENT)
Dept: FAMILY MEDICINE | Facility: CLINIC | Age: 72
End: 2017-08-10

## 2017-08-10 ENCOUNTER — ANTICOAGULATION THERAPY VISIT (OUTPATIENT)
Dept: NURSING | Facility: CLINIC | Age: 72
End: 2017-08-10
Payer: COMMERCIAL

## 2017-08-10 ENCOUNTER — TRANSFERRED RECORDS (OUTPATIENT)
Dept: HEALTH INFORMATION MANAGEMENT | Facility: CLINIC | Age: 72
End: 2017-08-10

## 2017-08-10 DIAGNOSIS — M79.672 LEFT FOOT PAIN: ICD-10-CM

## 2017-08-10 DIAGNOSIS — I48.91 ATRIAL FIBRILLATION, UNSPECIFIED TYPE (H): ICD-10-CM

## 2017-08-10 DIAGNOSIS — Z79.01 LONG-TERM (CURRENT) USE OF ANTICOAGULANTS: ICD-10-CM

## 2017-08-10 LAB
INR POINT OF CARE: 1.9 (ref 0.86–1.14)
URATE SERPL-MCNC: 4.8 MG/DL (ref 3.5–7.2)

## 2017-08-10 PROCEDURE — 99207 ZZC NO CHARGE NURSE ONLY: CPT

## 2017-08-10 PROCEDURE — 36416 COLLJ CAPILLARY BLOOD SPEC: CPT

## 2017-08-10 PROCEDURE — 84550 ASSAY OF BLOOD/URIC ACID: CPT | Performed by: FAMILY MEDICINE

## 2017-08-10 PROCEDURE — 85610 PROTHROMBIN TIME: CPT | Mod: QW

## 2017-08-10 NOTE — MR AVS SNAPSHOT
Cayetano Lunsford   8/10/2017 1:30 PM   Anticoagulation Therapy Visit    Description:  72 year old male   Provider:   INR CLINIC   Department:   Nurse           INR as of 8/10/2017     Today's INR 1.9!      Anticoagulation Summary as of 8/10/2017     INR goal 2.0-3.0   Today's INR 1.9!   Full instructions 8/10: 10 mg; Otherwise 5 mg on Mon; 7.5 mg all other days   Next INR check 8/24/2017    Indications   Long-term (current) use of anticoagulants [Z79.01] [Z79.01]  Atrial fibrillation (H) [I48.91]         Your next Anticoagulation Clinic appointment(s)     Aug 24, 2017 11:15 AM CDT   Anticoagulation Visit with  INR CLINIC   [facility]    [address] [place] [id number] [phone]              Contact Numbers     [facility]  Please call [phone] to cancel and/or reschedule your appointment   Please call [phone] with any problems or questions regarding your therapy.        August 2017 Details    Sun Mon Tue Wed Thu Fri Sat       1               2               3               4               5                 6               7               8               9               10      10 mg   See details      11      7.5 mg         12      7.5 mg           13      7.5 mg         14      5 mg         15      7.5 mg         16      7.5 mg         17      7.5 mg         18      7.5 mg         19      7.5 mg           20      7.5 mg         21      5 mg         22      7.5 mg         23      7.5 mg         24            25               26                 27               28               29               30               31                  Date Details   08/10 This INR check       Date of next INR:  8/24/2017         How to take your warfarin dose     To take:  5 mg Take 1 of the 5 mg tablets.    To take:  7.5 mg Take 1.5 of the 5 mg tablets.    To take:  10 mg Take 2 of the 5 mg tablets.

## 2017-08-10 NOTE — TELEPHONE ENCOUNTER
,  --I called and gave patient your msg.  --Cayetano would like to know if you would like him to continue to keep a log and if so when to bring it in.    Bev Tello RN

## 2017-08-10 NOTE — PROGRESS NOTES
ANTICOAGULATION FOLLOW-UP CLINIC VISIT    Patient Name:  Cayetano Lunsford  Date:  8/10/2017  Contact Type:  Face to Face    SUBJECTIVE:     Patient Findings     Positives Change in diet/appetite (Increase in greens (cucumbers, cabbage). ), Missed doses (Missed 8/1 dose. )           OBJECTIVE    INR Protime   Date Value Ref Range Status   08/10/2017 1.9 (A) 0.86 - 1.14 Final       ASSESSMENT / PLAN  INR assessment THER    Recheck INR In: 2 WEEKS    INR Location Clinic      Anticoagulation Summary as of 8/10/2017     INR goal 2.0-3.0   Today's INR 1.9!   Maintenance plan 5 mg (5 mg x 1) on Mon; 7.5 mg (5 mg x 1.5) all other days   Full instructions 8/10: 10 mg; Otherwise 5 mg on Mon; 7.5 mg all other days   Weekly total 50 mg   Plan last modified Kirstin Meléndez RN (7/12/2017)   Next INR check 8/24/2017   Priority INR   Target end date     Indications   Long-term (current) use of anticoagulants [Z79.01] [Z79.01]  Atrial fibrillation (H) [I48.91]         Anticoagulation Episode Summary     INR check location     Preferred lab     Send INR reminders to [facility]    Comments       Anticoagulation Care Providers     Provider Role Specialty Phone number    Debbie Lewis MD [facility] [phone]            See the Encounter Report to view Anticoagulation Flowsheet and Dosing Calendar (Go to Encounters tab in chart review, and find the Anticoagulation Therapy Visit)    Per protocol, patient advised to increase today's warfarin dose by 2.5 mg to account for slightly sub-therapeutic INR level & recent missed dose/change in diet. Patient instructed to hold green intake today as well. Recheck within 2 weeks to ensure stability.     Patient made aware if signs of clotting (pain, tenderness, swelling, or color change in any extremity) AND/OR bleeding occur (nosebleeds, bleeding gums, bruising, or blood in stool or urine) to notify provider & seek medical attention. If severe symptoms develop,  such as major bleeding, chest pain, shortness of breath, fall, trauma or s/s of stroke, patient to call 911 immediately.      Kirstin Meléndez RN

## 2017-08-10 NOTE — TELEPHONE ENCOUNTER
Called patient and reviewed message per below from Dr. Lieberman.    Patient verbalized understanding and in agreement with plan.  MARLINE ZhouN, RN

## 2017-08-10 NOTE — TELEPHONE ENCOUNTER
Please call.  Patient brought in BP log from home BP monitoring.  Looks good!  Thank you.    Debbie Lieberman MD

## 2017-08-10 NOTE — LETTER
Cayetano NORIEGA Jonn  [address] [address]        August 14, 2017          Dear ,    We are writing to inform you of your test results.    Your uric acid level remains low which makes gout quite unlikely as the cause for your severe foot pain episodes.  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  I have put in a referral for you to see Rheumatology at [facility]  [phone].    Resulted Orders   **Uric acid FUTURE 2mo   Result Value Ref Range    Uric Acid 4.8 3.5 - 7.2 mg/dL       If you have any questions or concerns, please call the clinic at the number listed above.       Sincerely,        Debbie Lieberman MD/nr

## 2017-08-11 ENCOUNTER — TELEPHONE (OUTPATIENT)
Dept: FAMILY MEDICINE | Facility: CLINIC | Age: 72
End: 2017-08-11

## 2017-08-11 DIAGNOSIS — M13.179: Primary | ICD-10-CM

## 2017-08-11 NOTE — PROGRESS NOTES
Please send results letter:  Your uric acid level remains low which makes gout quite unlikely as the cause for your severe foot pain episodes.  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  I have put in a referral for you to see Rheumatology at [facility]  [phone].  Debbie Lieberman MD

## 2017-08-24 ENCOUNTER — ANTICOAGULATION THERAPY VISIT (OUTPATIENT)
Dept: NURSING | Facility: CLINIC | Age: 72
End: 2017-08-24
Payer: COMMERCIAL

## 2017-08-24 DIAGNOSIS — I48.91 ATRIAL FIBRILLATION, UNSPECIFIED TYPE (H): ICD-10-CM

## 2017-08-24 DIAGNOSIS — Z79.01 LONG-TERM (CURRENT) USE OF ANTICOAGULANTS: ICD-10-CM

## 2017-08-24 LAB — INR POINT OF CARE: 1.8 (ref 0.86–1.14)

## 2017-08-24 PROCEDURE — 85610 PROTHROMBIN TIME: CPT | Mod: QW

## 2017-08-24 PROCEDURE — 99207 ZZC NO CHARGE NURSE ONLY: CPT

## 2017-08-24 PROCEDURE — 36416 COLLJ CAPILLARY BLOOD SPEC: CPT

## 2017-08-24 NOTE — MR AVS SNAPSHOT
Cayetano Lunsford   8/24/2017 11:15 AM   Anticoagulation Therapy Visit    Description:  72 year old male   Provider:   INR CLINIC   Department:   Nurse           INR as of 8/24/2017     Today's INR 1.8!      Anticoagulation Summary as of 8/24/2017     INR goal 2.0-3.0   Today's INR 1.8!   Full instructions 8/24: 10 mg; Otherwise 7.5 mg every day   Next INR check 9/7/2017    Indications   Long-term (current) use of anticoagulants [Z79.01] [Z79.01]  Atrial fibrillation (H) [I48.91]         Your next Anticoagulation Clinic appointment(s)     Sep 07, 2017  1:15 PM CDT   Anticoagulation Visit with  INR CLINIC   [facility]    [address] [place] [address] [phone]              Contact Numbers     [facility]  Please call [phone] to cancel and/or reschedule your appointment   Please call [phone] with any problems or questions regarding your therapy.        August 2017 Details    Sun Mon Tue Wed Thu Fri Sat       1               2               3               4               5                 6               7               8               9               10               11               12                 13               14               15               16               17               18               19                 20               21               22               23               24      10 mg   See details      25      7.5 mg         26      7.5 mg           27      7.5 mg         28      7.5 mg         29      7.5 mg         30      7.5 mg         31      7.5 mg            Date Details   08/24 This INR check               How to take your warfarin dose     To take:  7.5 mg Take 1.5 of the 5 mg tablets.    To take:  10 mg Take 2 of the 5 mg tablets.           September 2017 Details    Sun Mon Tue Wed Thu Fri Sat          1      7.5 mg         2      7.5 mg           3      7.5 mg         4      7.5 mg         5       7.5 mg         6      7.5 mg         7            8               9                 10               11               12               13               14               15               16                 17               18               19               20               21               22               23                 24               25               26               27               28               29               30                Date Details   No additional details    Date of next INR:  9/7/2017         How to take your warfarin dose     To take:  7.5 mg Take 1.5 of the 5 mg tablets.

## 2017-08-24 NOTE — PROGRESS NOTES
ANTICOAGULATION FOLLOW-UP CLINIC VISIT    Patient Name:  Cayetano Lunsford  Date:  8/24/2017  Contact Type:  Face to Face    SUBJECTIVE:     Patient Findings     Positives Unexplained INR or factor level change (Likely d/t Primidone dose changes over the last few months. Pt is at goal dose. ) Diet changes (Pt reports drinking more cranberry juice.)           OBJECTIVE    INR Protime   Date Value Ref Range Status   08/24/2017 1.8 (A) 0.86 - 1.14 Final       ASSESSMENT / PLAN  INR assessment SUB    Recheck INR In: 2 WEEKS    INR Location Clinic      Anticoagulation Summary as of 8/24/2017     INR goal 2.0-3.0   Today's INR 1.8!   Maintenance plan 7.5 mg (5 mg x 1.5) every day   Full instructions 8/24: 10 mg; Otherwise 7.5 mg every day   Weekly total 52.5 mg   Plan last modified Kirstin Meléndez RN (8/24/2017)   Next INR check 9/7/2017   Priority INR   Target end date     Indications   Long-term (current) use of anticoagulants [Z79.01] [Z79.01]  Atrial fibrillation (H) [I48.91]         Anticoagulation Episode Summary     INR check location     Preferred lab     Send INR reminders to [facility]    Comments       Anticoagulation Care Providers     Provider Role Specialty Phone number    Debbie Lewis MD [facility] [phone]            See the Encounter Report to view Anticoagulation Flowsheet and Dosing Calendar (Go to Encounters tab in chart review, and find the Anticoagulation Therapy Visit)    Per protocol, patient advised to increase today's warfarin dose by 2.5 mg to account for sub-therapeutic INR level. Then increase weekly dose by 5% d/t continued lower INR levels. Recheck within 2 weeks to ensure stability.     Patient made aware if signs of clotting (pain, tenderness, swelling, or color change in any extremity) AND/OR bleeding occur (nosebleeds, bleeding gums, bruising, or blood in stool or urine) to notify provider & seek medical attention. If severe symptoms develop, such  as major bleeding, chest pain, shortness of breath, fall, trauma or s/s of stroke, patient to call 911 immediately.       Kirstin Meléndez RN

## 2017-08-28 ENCOUNTER — ALLIED HEALTH/NURSE VISIT (OUTPATIENT)
Dept: PHARMACY | Facility: CLINIC | Age: 72
End: 2017-08-28
Payer: COMMERCIAL

## 2017-08-28 DIAGNOSIS — E11.649 TYPE 2 DIABETES MELLITUS WITH HYPOGLYCEMIA WITHOUT COMA, WITH LONG-TERM CURRENT USE OF INSULIN (H): ICD-10-CM

## 2017-08-28 DIAGNOSIS — Z79.4 TYPE 2 DIABETES MELLITUS WITH HYPOGLYCEMIA WITHOUT COMA, WITH LONG-TERM CURRENT USE OF INSULIN (H): ICD-10-CM

## 2017-08-28 PROCEDURE — 99606 MTMS BY PHARM EST 15 MIN: CPT | Performed by: PHARMACIST

## 2017-08-28 PROCEDURE — 99607 MTMS BY PHARM ADDL 15 MIN: CPT | Performed by: PHARMACIST

## 2017-08-28 NOTE — MR AVS SNAPSHOT
After Visit Summary   8/28/2017    Cayetano Lunsford    MRN: [id number]           Patient Information     Date Of Birth          1945        Visit Information        Provider Department      8/28/2017 8:30 AM Jessica Diaz [facility] MTM        Today's Diagnoses     Type 2 diabetes mellitus with hypoglycemia without coma, with long-term current use of insulin (H)          Care Instructions    Recommendations from today's MTM visit:                                                      1. If you are going to have a dessert give yourself 2 units with that meal to cover the dessert.     2. Decrease the Novolin R at breakfast to 5 units.    3. Call if your before dinner readings start to be consistently over 150    Next MTM visit: 9/11/17 at 11:30    To schedule another MTM appointment, please call the clinic directly or you may call the MTM scheduling line at [phone] or toll-free at [phone].     My Clinical Pharmacist's contact information:                                                      It was a pleasure seeing you today!  Please feel free to contact me with any questions or concerns you have.      Tanna Diaz, PharmD  Medication Therapy Management Pharmacist  [facility] - Monday and Wednesday 7:30 - 4:00  Phone: [phone] - direct clinic line    You may receive a survey about the [place] services you received.  I would appreciate your feedback to help me serve you better in the future. Please fill it out and return it when you can. Your comments will be anonymous.                    Follow-ups after your visit        Your next 10 appointments already scheduled     Sep 05, 2017  9:00 AM CDT   Return Visit with Bong Yoo MD   [facility] ([facility])    [address] [place] [address] [phone]            Sep 07, 2017  1:15 PM CDT   Anticoagulation Visit with  INR CHARLI   [place]  "[facility] ([facility])    [address] [place] [address] [phone]            Sep 11, 2017 11:30 AM CDT   TELEMEDICINE with Jessica Diaz [facility] ([facility])    [address] [place] [address] [phone]           Note: this is not an onsite visit; there is no need to come to the facility.              Who to contact     If you have questions or need follow up information about today's clinic visit or your schedule please contact [facility] [place] [facility] directly at [phone].  Normal or non-critical lab and imaging results will be communicated to you by [employer], letter or phone within 4 business days after the clinic has received the results. If you do not hear from us within 7 days, please contact the clinic through [employer] or phone. If you have a critical or abnormal lab result, we will notify you by phone as soon as possible.  Submit refill requests through [employer] or call your pharmacy and they will forward the refill request to us. Please allow 3 business days for your refill to be completed.          Additional Information About Your Visit        [employer] Information     [employer] lets you send messages to your doctor, view your test results, renew your prescriptions, schedule appointments and more. To sign up, go to [email or web address] . Click on \"Log in\" on the left side of the screen, which will take you to the Welcome page. Then click on \"Sign up Now\" on the right side of the page.     You will be asked to enter the access code listed below, as well as some personal information. Please follow the directions to create your username and password.     Your access code is: [email or web address]  Expires: 10/10/2017  1:07 PM     Your access code will  in 90 days. If you need help or a new code, please call your [place] clinic or [phone].        Care EveryWhere ID     This is your Care " EveryWhere ID. This could be used by other organizations to access your [facility] medical records  [id number]         Blood Pressure from Last 3 Encounters:   07/13/17 132/60   06/21/17 138/76   06/20/17 139/64    Weight from Last 3 Encounters:   07/13/17 201 lb 4 oz (91.3 kg)   06/21/17 199 lb (90.3 kg)   06/20/17 199 lb (90.3 kg)              Today, you had the following     No orders found for display         Today's Medication Changes          These changes are accurate as of: 8/28/17  9:25 AM.  If you have any questions, ask your nurse or doctor.               These medicines have changed or have updated prescriptions.        Dose/Directions    insulin  UNIT/ML injection   Commonly known as:  NovoLIN N RELION   This may have changed:  additional instructions   Used for:  Type 2 diabetes mellitus with hypoglycemia without coma, with long-term current use of insulin (H)        Inject 6 units under the skin at breakfast and 15 units at dinner.   Quantity:  3 vial   Refills:  3       insulin regular 100 UNIT/ML injection   Commonly known as:  NovoLIN R RELION   This may have changed:  additional instructions   Used for:  Type 2 diabetes mellitus with hypoglycemia without coma, with long-term current use of insulin (H)        Inject 5 units with breakfast and 2 units with dinner (when mixing with NPH, draw this insulin up first)   Quantity:  10 mL   Refills:  2            Where to get your medicines      These medications were sent to [facility] [address] [place] [address]) [place] [address]     Phone:  [phone]     insulin regular 100 UNIT/ML injection                Primary Care Provider Office Phone # Fax #    Debbie Lewis [phone]       [facility] [address] [place] [address]        Equal Access to Services     ZAHIRA ROSS AH: carlos Hernández qaybta kaalmada adeegyada, waxay  "lakshmibetty pugh ah. So [facility] [phone].    ATENCIÓN: Si nereidala kailyn, tiene a jurado disposición servicios gratuitos de asistencia lingüística. Britta al [phone].    We comply with applicable federal civil rights laws and [place] laws. We do not discriminate on the basis of race, color, national origin, age, disability sex, sexual orientation or gender identity.            Thank you!     Thank you for choosing [facility]  for your care. Our goal is always to provide you with excellent care. Hearing back from our patients is one way we can continue to improve our services. Please take a few minutes to complete the written survey that you may receive in the mail after your visit with us. Thank you!             Your Updated Medication List - Protect others around you: Learn how to safely use, store and throw away your medicines at www.disposemymeds.org.          This list is accurate as of: 8/28/17  9:25 AM.  Always use your most recent med list.                   Brand Name Dispense Instructions for use Diagnosis    albuterol 108 (90 BASE) MCG/ACT Inhaler    PROAIR HFA/PROVENTIL HFA/VENTOLIN HFA    1 Inhaler    Inhale 1-2 puffs into the lungs every 4 hours as needed (for shortness of breath/wheezing)    Chronic airway obstruction, not elsewhere classified       ASPIRIN NOT PRESCRIBED    INTENTIONAL     Reported on 5/17/2017        azelastine 0.1 % spray    ASTELIN    1 Bottle    Spray 1-2 sprays into both nostrils 2 times daily    Seasonal allergic rhinitis, unspecified allergic rhinitis trigger       B-D INSULIN SYRINGE 31G X 5/16\" 0.5 ML   Generic drug:  insulin syringe-needle U-100     100 each    USE 1 SYRINGE TWO TIMES A DAY OR AS DIRECTED    Type 2 diabetes, HbA1C goal < 8% (H)       BD ULTRA FINE PEN NEEDLES     100 each    Use to inject insulin twice daily.    Type 2 diabetes, HbA1C goal < 8% (H)       blood glucose monitoring lancets     100 Each    Use to test up to " four times per day    Type II or unspecified type diabetes mellitus without mention of complication, not stated as uncontrolled       blood glucose monitoring test strip    no brand specified    360 each    Test 4 times daily or as directed. Profile Rx: patient will contact pharmacy when needed    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, with long-term current use of insulin (H)       FLORAJEN3 Caps     60 capsule    Take 1 capsule by mouth 2 times daily    Acute cystitis without hematuria       FOLIC ACID PO      Take 1 mg by mouth daily        insulin  UNIT/ML injection    NovoLIN N RELION    3 vial    Inject 6 units under the skin at breakfast and 15 units at dinner.    Type 2 diabetes mellitus with hypoglycemia without coma, with long-term current use of insulin (H)       insulin regular 100 UNIT/ML injection    NovoLIN R RELION    10 mL    Inject 5 units with breakfast and 2 units with dinner (when mixing with NPH, draw this insulin up first)    Type 2 diabetes mellitus with hypoglycemia without coma, with long-term current use of insulin (H)       ipratropium - albuterol 0.5 mg/2.5 mg/3 mL 0.5-2.5 (3) MG/3ML neb solution    DUONEB    270 mL    NEBULIZE CONTENTS OF ONE VIAL BY MOUTH EVERY 4 HOURS AS NEEDED FOR SHORTNESS OF BREATH /DYSPNEA    Chronic obstructive bronchitis (H)       losartan 100 MG tablet    COZAAR    90 tablet    Take 1 tablet (100 mg) by mouth daily for hypertension.    Hypertension goal BP (blood pressure) < 140/90       metFORMIN 1000 MG tablet    GLUCOPHAGE    180 tablet    Take 1 tablet (1,000 mg) by mouth 2 times daily (with meals) with breakfast and dinner.    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, with long-term current use of insulin (H)       order for DME     1 each    Equipment being ordered: Nebulizer machine with mask and tubing    Chronic airway obstruction, not elsewhere classified       pantoprazole 40 MG EC tablet    PROTONIX      Take 40 mg by mouth daily Started by Dr. Debbie Rico at [facility]        primidone 50 MG tablet    MYSOLINE    180 tablet    Take 1 tablet twice a day and 2 tablets in the evening for 1 week, then go to 2 tablets twice a day (morning and evening) and take 1 tablet midday for 1 week, take 2 tablets three times a day, thereafter.    Essential tremor       propafenone 150 MG Tabs tablet    RYTHMOL    180 tablet    Take 1 tablet (150 mg) by mouth 2 times daily    Paroxysmal atrial fibrillation (H)       propranolol 40 MG tablet    INDERAL    180 tablet    Take 2-3 tablets ( mg) by mouth 2 times daily    Benign familial tremor       simvastatin 80 MG tablet    ZOCOR    30 tablet    Take 0.5 tablets (40 mg) by mouth At Bedtime Profile Rx: patient will contact pharmacy when needed    Hyperlipidemia LDL goal <100       STIOLTO RESPIMAT 2.5-2.5 MCG/ACT Aers   Generic drug:  tiotropium-olodaterol      Inhale 2 puffs into the lungs daily        sulfaSALAzine 500 MG tablet    AZULFIDINE    90 tablet    TAKE ONE TABLET BY MOUTH THREE TIMES A DAY    Ulcerative colitis, unspecified       warfarin 5 MG tablet    COUMADIN    120 tablet    Take as directed by Coumadin clinic    Atrial fibrillation, unspecified type (H)

## 2017-08-28 NOTE — PROGRESS NOTES
SUBJECTIVE/OBJECTIVE:                Cayetano Lunsford is a 72 year old male called for a follow-up visit for Medication Therapy Management.  He was referred to me from Dr. Debbie Lewis.     Chief Complaint: Follow up from our visit on 8/9/17.  Reports that he feels he gets a rattle when he breaths more now since changing from Dulera to Stiolto.  Follows up with pulmonologist once a year or sooner if breathing worsens.   Personal Healthcare Goals: did not discuss today.  Tobacco: No tobacco use    Alcohol: not currently using    Medication Adherence: no issues reported    Diabetes:  Pt currently taking metformin bid, Novolin N 5 units AM before breakfast (changed at last visit) and 15 units in PM-at dinner, Novolin R-7 units with breakfast, between 80 and 120, reduce your R insulin by 2 units and 2 units of R at dinner if blood sugars are over 200 - changed at last visit. If below 70 skip dose. He gives his shots in his arms. He does not have any issues with giving stomach just has been doing arms.  He is now writing the date on the Novolin R and discarding after 42 days.   SMBG: 3-4 times daily.   Ranges (per pt report): see chart below.   Symptoms of low blood sugar? Fingers and lips tingle, shaky and sweaty. Frequency of hypoglycemia? Usually has them before dinner but had one overnight which was a surprise to him.  He thinks the before dinner lows are potentially due to increased exercise or decreased food intake for lunch. Treating with small amount of orange juice.  Recent symptoms of high blood sugar? none.  Eye exam: up to date  Foot exam: up to date  Microalbumin is not < 30 mg/g. Pt is taking an ACEi/IU27722017/08/28B.  Aspirin: Not taking due to anticoagulation therapy and increased risk of bleeding  Diet/Exercise:  He repots that his activity has slowed down but about the same as last visit. He is eating something for lunch every day.  Dinner is his largest meal. He will eat a dessert a couple times a week.  Breakfast is usually cereal +/- blueberries or a banana. He sometimes snacks after dinner but it is usually some cheezits. Tries to stay away from high sugar content foods at this time.     Ave: 7 day: 151, 14 day: 152, 30 day: 155 - decreased slightly since last visit    Date FBG/ 2hours post Lunch/2hours post Dinner /2hours post Bedtime (at least 4 hours after meal)   8/28 140      8/27 161  125 205   8/26 157  232 - gave 3 R 193   8/25 143  160    8/24 162  94  228 (has subway for dinner)   8/23 155 Less for lunch this day. 57 at 4:48pm - more active this day then before dinner 83 131   8/22 145  83 207   8/21 166  85 212   8/20 193  226 (ice cream and picnic) -R 3 198   8/19 133  345 - R 3 -birthday cake earlier in day    8/18 142  106    8/17 123  197 148   8/16 142  69 (4 oz OJ then dinner) - unsure why low 111   8/15 130  71 108   8/14 181  102 144   8/13 180  90    8/12 238  162    8/11 216 - DQ the night before  97    8/10 213 - apple pie for dinner night before  115      From Last visit:  Date FBG/ 2hours post Lunch/2hours post Dinner /2hours post bedtime   8/9 126  109    8/8 148  67 -4oz OJ then dinner    8/7 182  99    8/6 162  200    8/5 195  143 -gave 2 units R 4:16am - 71 - 4 oz OJ, 4:28 AM - 65, 4:46 AM -81,4:57 AM 95   8/4 253 - bowl of ice cream the night before  143    8/3 120  136    8/2 140  178    8/1 225  252 - shortly after eating at block party    7/31 138  132    7/30 182  252 - had cinnamon role for breakfast    7/29 197  182    7/28 221  81 - 4oz OJ then dinner    7/27 162  81 - (R -1 unit with 4 oz OJ) - was more active earlier in the day    7/26 97  75 - not enough to eat for lunch - 4oz orange juice, no R and ate dinner    7/25 142  190    7/24 111  109    7/23 129  138 - lots of travel in a car this day    7/22 172  334    7/21 191  207    7/20 124  270    7/19 117  89    7/18 139  86    7/17 171  98    7/16 116  180    7/15 166  251 -family picnic    7/14 199  96    7/13 149  330 -  started prednisone for foot pain    7/12   113        From Last Visit:    Current labs include:  BP Readings from Last 3 Encounters:   07/13/17 132/60   06/21/17 138/76   06/20/17 139/64     Today's Vitals: There were no vitals taken for this visit. - phone visit  Lab Results   Component Value Date    A1C 5.8 05/08/2017    A1C 6.9 10/17/2016    A1C 6.2 07/22/2016    A1C 6.7 03/28/2016    A1C 7.1 11/10/2015     Lab Results   Component Value Date    CHOL 159 05/08/2017     Lab Results   Component Value Date    TRIG 112 05/08/2017     Lab Results   Component Value Date    HDL 54 05/08/2017     Lab Results   Component Value Date    LDL 83 05/08/2017       Liver Function Studies -   Recent Labs   Lab Test  03/13/17   1050   PROTTOTAL  7.0   ALBUMIN  3.8   BILITOTAL  0.4   ALKPHOS  60   AST  20   ALT  22       Lab Results   Component Value Date    UCRR 40 06/20/2017    MICROL 24 06/20/2017    UMALCR 61.31 (H) 06/20/2017       Last Basic Metabolic Panel:  Lab Results   Component Value Date     03/13/2017      Lab Results   Component Value Date    POTASSIUM 4.6 03/13/2017     Lab Results   Component Value Date    CHLORIDE 100 03/13/2017     Lab Results   Component Value Date    BUN 16 03/13/2017     Lab Results   Component Value Date    CR 0.76 03/13/2017     GFR Estimate   Date Value Ref Range Status   03/13/2017 >90  Non  GFR Calc   >60 mL/min/1.7m2 Final   01/09/2017 >90 >60 mL/min/1.7m2 Final   10/03/2016 >60 >60 ml/min/1.73m2 Final     TSH   Date Value Ref Range Status   03/13/2017 3.25 0.40 - 4.00 mU/L Final   ]    Most Recent Immunizations   Administered Date(s) Administered     HepB-Peds 08/05/2014     Influenza (H1N1) 01/08/2010     Influenza (High Dose) 3 valent vaccine 09/07/2016     Influenza (IIV3) 09/11/2009     Pneumococcal (PCV 13) 11/10/2015     Pneumococcal 23 valent 07/20/2010     TD (ADULT, 7+) 08/08/2008     TDAP Vaccine (Adacel) 08/05/2014     Zoster vaccine, live 06/13/2008        ASSESSMENT:              Current medications were reviewed today as discussed above.      Medication Adherence: no issues identified    Diabetes: Needs Improvement. Patient is meeting A1c goal of < 7%. Aspirin therapy is not indicated in this patient due to anticoagulant/antiplatelet therapy. Patient is doing well on his current regimen with no missed doses, but he is experiencing lows, with most of them occurring in the evening before dinner, sometimes he is more active which is the cause and others cant determine cause.  I don't want him to have to eat more to prevent lows so will decrease morning R to 5 units. Patient is able to explain readings greater than 200, either dessert or increased food intake during special occasions.     Patient is currently on a complicated regimen that requires him to mix Novolin N and R in one syringe and would benefit from a more simplified plan. In the future consider eliminating the Novolin R and splitting the Novolin N evenly to see if he has good control with less hypoglycemia.      PLAN:                  1. If you are going to have a dessert give yourself 2 units with that meal to cover the dessert.     2. Decrease the Novolin R at breakfast to 5 units.    I spent 30 minutes with this patient today. All changes were made via collaborative practice agreement with Debbie Lewis. A copy of the visit note was provided to the patient's primary care provider.     Will follow up in 2 weeks.    The patient was mailed a summary of these recommendations as an after visit summary.    Tanna Diaz, PharmD  Medication Therapy Management Pharmacist

## 2017-08-28 NOTE — PATIENT INSTRUCTIONS
Recommendations from today's MTM visit:                                                      1. If you are going to have a dessert give yourself 2 units with that meal to cover the dessert.     2. Decrease the Novolin R at breakfast to 5 units.    3. Call if your before dinner readings start to be consistently over 150    Next MTM visit: 9/11/17 at 11:30    To schedule another MTM appointment, please call the clinic directly or you may call the MTM scheduling line at [phone] or toll-free at [phone].     My Clinical Pharmacist's contact information:                                                      It was a pleasure seeing you today!  Please feel free to contact me with any questions or concerns you have.      Tanna Diaz, PharmD  Medication Therapy Management Pharmacist  [facility] - Monday and Wednesday 7:30 - 4:00  Phone: [phone] - direct clinic line    You may receive a survey about the MTM services you received.  I would appreciate your feedback to help me serve you better in the future. Please fill it out and return it when you can. Your comments will be anonymous.

## 2017-09-05 ENCOUNTER — OFFICE VISIT (OUTPATIENT)
Dept: NEUROLOGY | Facility: CLINIC | Age: 72
End: 2017-09-05
Payer: COMMERCIAL

## 2017-09-05 VITALS
BODY MASS INDEX: 32.52 KG/M2 | TEMPERATURE: 98 F | DIASTOLIC BLOOD PRESSURE: 54 MMHG | SYSTOLIC BLOOD PRESSURE: 110 MMHG | HEART RATE: 72 BPM | WEIGHT: 201.5 LBS | RESPIRATION RATE: 20 BRPM

## 2017-09-05 DIAGNOSIS — G25.0 ESSENTIAL TREMOR: ICD-10-CM

## 2017-09-05 PROCEDURE — 99214 OFFICE O/P EST MOD 30 MIN: CPT | Performed by: PSYCHIATRY & NEUROLOGY

## 2017-09-05 RX ORDER — PRIMIDONE 50 MG/1
TABLET ORAL
Qty: 270 TABLET | Refills: 2 | Status: SHIPPED | OUTPATIENT
Start: 2017-09-05 | End: 2017-12-05

## 2017-09-05 NOTE — NURSING NOTE
"Chief Complaint   Patient presents with     Follow Up For     tremor       Initial /64 (BP Location: Left arm, Patient Position: Chair, Cuff Size: Adult Regular)  Pulse 72  Temp 98  F (36.7  C) (Tympanic)  Resp 20  Wt 91.4 kg (201 lb 8 oz)  BMI 32.52 kg/m2 Estimated body mass index is 32.52 kg/(m^2) as calculated from the following:    Height as of 7/13/17: 1.676 m (5' 6\").    Weight as of this encounter: 91.4 kg (201 lb 8 oz).  Medication Reconciliation: complete     Catherine Guo CMA      "

## 2017-09-05 NOTE — Clinical Note
Patient's blood pressure was elevated again during today's visit. I advised him to discuss it with you. Thanks, Bong Yoo MD

## 2017-09-05 NOTE — MR AVS SNAPSHOT
After Visit Summary   9/5/2017    Cayetano Lunsford    MRN: [id number]           Patient Information     Date Of Birth          1945        Visit Information        Provider Department      9/5/2017 9:00 AM Bong Yoo MD [facility]        Today's Diagnoses     Essential tremor          Care Instructions    AFTER VISIT SUMMARY (AVS):    At today's visit we discussed your symptoms and the plan.    The following medications were changed: Primidone (MYSOLINE) 50 MG tablet: Take 2 tablets twice a day and 3 tablets in the evening for 1 week, then go to 3 tablets twice a day (morning and evening) and take 2 tablets midday for 1 week, take 3 tablets three times a day, thereafter.    In addition, your blood pressure was elevated. Please discuss it with Dr Lewis.    Next follow-up appointment is in next 3 months or earlier if needed.    Please do not hesitate to call me with any questions or concerns.    Thanks.            Follow-ups after your visit        Follow-up notes from your care team     Return in about 3 months (around 12/5/2017).      Your next 10 appointments already scheduled     Sep 07, 2017  1:15 PM CDT   Anticoagulation Visit with  [facility] CL[facility]IC   [facility] ([facility]    [address] [place] [id number] [phone]            Sep 11, 2017 11:30 AM CDT   TELEMEDICINE with Jessica Diaz RPH   [facility] ([facility])    [address] [place] [id number] [phone]           Note: this is not an onsite visit; there is no need to come to the facility.              Who to contact     If you have questions or need follow up information about today's clinic visit or your schedule please contact [facility] directly at [phone].  Normal or non-critical lab and imaging results will be communicated to you by MyChart, letter or phone within 4  "business days after the clinic has received the results. If you do not hear from us within 7 days, please contact the clinic through [employer] or phone. If you have a critical or abnormal lab result, we will notify you by phone as soon as possible.  Submit refill requests through [employer] or call your pharmacy and they will forward the refill request to us. Please allow 3 business days for your refill to be completed.          Additional Information About Your Visit        [employer] Information     [employer] lets you send messages to your doctor, view your test results, renew your prescriptions, schedule appointments and more. To sign up, go to [email or web address] . Click on \"Log in\" on the left side of the screen, which will take you to the Welcome page. Then click on \"Sign up Now\" on the right side of the page.     You will be asked to enter the access code listed below, as well as some personal information. Please follow the directions to create your username and password.     Your access code is: [email or web address]  Expires: 10/10/2017  1:07 PM     Your access code will  in 90 days. If you need help or a new code, please call your [facility] clinic or [phone].        Care EveryWhere ID     This is your Care EveryWhere ID. This could be used by other organizations to access your [facility] medical records  [id number]        Your Vitals Were     Pulse Temperature Respirations BMI (Body Mass Index)          72 98  F (36.7  C) (Tympanic) 20 32.52 kg/m2         Blood Pressure from Last 3 Encounters:   17 144/64   17 132/60   17 138/76    Weight from Last 3 Encounters:   17 91.4 kg (201 lb 8 oz)   17 91.3 kg (201 lb 4 oz)   17 90.3 kg (199 lb)              Today, you had the following     No orders found for display         Today's Medication Changes          These changes are accurate as of: 17  9:16 AM.  If you have any questions, ask your nurse or doctor.               These " medicines have changed or have updated prescriptions.        Dose/Directions    insulin  UNIT/ML injection   Commonly known as:  NovoLIN N RELION   This may have changed:  additional instructions   Used for:  Type 2 diabetes mellitus with hypoglycemia without coma, with long-term current use of insulin (H)        Inject 6 units under the skin at breakfast and 15 units at dinner.   Quantity:  3 vial   Refills:  3       primidone 50 MG tablet   Commonly known as:  MYSOLINE   This may have changed:  additional instructions   Used for:  Essential tremor   Changed by:  Bong Yoo MD        Take 2 tablets twice a day and 3 tablets in the evening for 1 week, then go to 3 tablets twice a day (morning and evening) and take 2 tablets midday for 1 week, take 3 tablets three times a day, thereafter.   Quantity:  270 tablet   Refills:  2            Where to get your medicines      Some of these will need a paper prescription and others can be bought over the counter.  Ask your nurse if you have questions.     Bring a paper prescription for each of these medications     primidone 50 MG tablet                Primary Care Provider Office Phone # Fax #    Debbie Lewis [phone] [address] [facility] [address]        Equal Access to Services     [facility]: Hadii efrem akers Soflorencia, waaxda luqadaha, qaybta kaalmada adestephan, erendira pugh . So [facility] [phone].    ATENCIÓN: Si habla español, tiene a jurado disposición servicios gratuitos de asistencia lingüística. Llame al [phone].    We comply with applicable federal civil rights laws and [place] laws. We do not discriminate on the basis of race, color, national origin, age, disability sex, sexual orientation or gender identity.            Thank you!     Thank you for choosing [facility]  for your care. Our goal is always to provide you with excellent care. Hearing  "back from our patients is one way we can continue to improve our services. Please take a few minutes to complete the written survey that you may receive in the mail after your visit with us. Thank you!             Your Updated Medication List - Protect others around you: Learn how to safely use, store and throw away your medicines at www.disposemymeds.org.          This list is accurate as of: 9/5/17  9:16 AM.  Always use your most recent med list.                   Brand Name Dispense Instructions for use Diagnosis    albuterol 108 (90 BASE) MCG/ACT Inhaler    PROAIR HFA/PROVENTIL HFA/VENTOLIN HFA    1 Inhaler    Inhale 1-2 puffs into the lungs every 4 hours as needed (for shortness of breath/wheezing)    Chronic airway obstruction, not elsewhere classified       ASPIRIN NOT PRESCRIBED    INTENTIONAL     Reported on 5/17/2017        azelastine 0.1 % spray    ASTELIN    1 Bottle    Spray 1-2 sprays into both nostrils 2 times daily    Seasonal allergic rhinitis, unspecified allergic rhinitis trigger       B-D INSULIN SYRINGE 31G X 5/16\" 0.5 ML   Generic drug:  insulin syringe-needle U-100     100 each    USE 1 SYRINGE TWO TIMES A DAY OR AS DIRECTED    Type 2 diabetes, HbA1C goal < 8% (H)       BD ULTRA FINE PEN NEEDLES     100 each    Use to inject insulin twice daily.    Type 2 diabetes, HbA1C goal < 8% (H)       blood glucose monitoring lancets     100 Each    Use to test up to four times per day    Type II or unspecified type diabetes mellitus without mention of complication, not stated as uncontrolled       blood glucose monitoring test strip    no brand specified    360 each    Test 4 times daily or as directed. Profile Rx: patient will contact pharmacy when needed    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, with long-term current use of insulin (H)       FLORAJEN3 Caps     60 capsule    Take 1 capsule by mouth 2 times daily    Acute cystitis without hematuria       FOLIC ACID PO      Take 1 " mg by mouth daily        insulin  UNIT/ML injection    NovoLIN N RELION    3 vial    Inject 6 units under the skin at breakfast and 15 units at dinner.    Type 2 diabetes mellitus with hypoglycemia without coma, with long-term current use of insulin (H)       insulin regular 100 UNIT/ML injection    NovoLIN R RELION    10 mL    Inject 5 units with breakfast and 2 units with dinner (when mixing with NPH, draw this insulin up first)    Type 2 diabetes mellitus with hypoglycemia without coma, with long-term current use of insulin (H)       ipratropium - albuterol 0.5 mg/2.5 mg/3 mL 0.5-2.5 (3) MG/3ML neb solution    DUONEB    270 mL    NEBULIZE CONTENTS OF ONE VIAL BY MOUTH EVERY 4 HOURS AS NEEDED FOR SHORTNESS OF BREATH /DYSPNEA    Chronic obstructive bronchitis (H)       losartan 100 MG tablet    COZAAR    90 tablet    Take 1 tablet (100 mg) by mouth daily for hypertension.    Hypertension goal BP (blood pressure) < 140/90       metFORMIN 1000 MG tablet    GLUCOPHAGE    180 tablet    Take 1 tablet (1,000 mg) by mouth 2 times daily (with meals) with breakfast and dinner.    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, with long-term current use of insulin (H)       order for DME     1 each    Equipment being ordered: Nebulizer machine with mask and tubing    Chronic airway obstruction, not elsewhere classified       pantoprazole 40 MG EC tablet    PROTONIX     Take 40 mg by mouth daily Started by Dr. Debbie Rico at [facility]        primidone 50 MG tablet    MYSOLINE    270 tablet    Take 2 tablets twice a day and 3 tablets in the evening for 1 week, then go to 3 tablets twice a day (morning and evening) and take 2 tablets midday for 1 week, take 3 tablets three times a day, thereafter.    Essential tremor       propafenone 150 MG Tabs tablet    RYTHMOL    180 tablet    Take 1 tablet (150 mg) by mouth 2 times daily    Paroxysmal atrial fibrillation (H)       propranolol 40 MG tablet    INDERAL     180 tablet    Take 2-3 tablets ( mg) by mouth 2 times daily    Benign familial tremor       simvastatin 80 MG tablet    ZOCOR    30 tablet    Take 0.5 tablets (40 mg) by mouth At Bedtime Profile Rx: patient will contact pharmacy when needed    Hyperlipidemia LDL goal <100       STIOLTO RESPIMAT 2.5-2.5 MCG/ACT Aers   Generic drug:  tiotropium-olodaterol      Inhale 2 puffs into the lungs daily        sulfaSALAzine 500 MG tablet    AZULFIDINE    90 tablet    TAKE ONE TABLET BY MOUTH THREE TIMES A DAY    Ulcerative colitis, unspecified       warfarin 5 MG tablet    COUMADIN    120 tablet    Take as directed by Coumadin clinic    Atrial fibrillation, unspecified type (H)

## 2017-09-05 NOTE — PROGRESS NOTES
"ESTABLISHED PATIENT NEUROLOGY NOTE    DATE OF VISIT: 9/5/2017  CLINIC LOCATION: [facility]  MRN: [id number]  PATIENT NAME: Cayetano Lunsford  YOB: 1945    PCP: Debbie Lewis MD.    REASON FOR VISIT:   Chief Complaint   Patient presents with     Follow Up For     tremor     SUBJECTIVE:                                                      HISTORY OF PRESENT ILLNESS: Patient is here for follow up regarding essential tremor. Please refer to my initial/other prior notes for further information. He was last seen on 06/20/2017.    The following issues were reviewed today: Current symptoms and plan.    Since the last visit, the patient reports that primidone works, but does not eliminate his tremor completely. Some days, his tremor is interfering with his everyday activities. He would like to get a better control if possible. He denies any significant side effects. He also denies any new focal neurological symptoms.    On review of systems, patient endorses no other active complaints. Medications, allergies, family and social history were also reviewed. There are no changes reported by patient.    CURRENT MEDICATIONS:   Current Outpatient Prescriptions on File Prior to Visit:  insulin regular (NOVOLIN R RELION) 100 UNIT/ML injection Inject 5 units with breakfast and 2 units with dinner (when mixing with NPH, draw this insulin up first)   B-D INSULIN SYRINGE 31G X 5/16\" 0.5 ML USE 1 SYRINGE TWO TIMES A DAY OR AS DIRECTED   tiotropium-olodaterol (STIOLTO RESPIMAT) 2.5-2.5 MCG/ACT AERS Inhale 2 puffs into the lungs daily   insulin NPH (NOVOLIN N RELION) 100 UNIT/ML injection Inject 6 units under the skin at breakfast and 15 units at dinner. (Patient taking differently: Inject 5 units under the skin at breakfast and 15 units at dinner.)   warfarin (COUMADIN) 5 MG tablet Take as directed by Coumadin clinic   propafenone (RYTHMOL) 150 MG TABS tablet Take 1 tablet (150 mg) by mouth 2 times daily "   metFORMIN (GLUCOPHAGE) 1000 MG tablet Take 1 tablet (1,000 mg) by mouth 2 times daily (with meals) with breakfast and dinner.   propranolol (INDERAL) 40 MG tablet Take 2-3 tablets ( mg) by mouth 2 times daily   ipratropium - albuterol 0.5 mg/2.5 mg/3 mL (DUONEB) 0.5-2.5 (3) MG/3ML neb solution NEBULIZE CONTENTS OF ONE VIAL BY MOUTH EVERY 4 HOURS AS NEEDED FOR SHORTNESS OF BREATH /DYSPNEA   azelastine (ASTELIN) 0.1 % spray Spray 1-2 sprays into both nostrils 2 times daily   losartan (COZAAR) 100 MG tablet Take 1 tablet (100 mg) by mouth daily for hypertension.   simvastatin (ZOCOR) 80 MG tablet Take 0.5 tablets (40 mg) by mouth At Bedtime Profile Rx: patient will contact pharmacy when needed   blood glucose monitoring (NO BRAND SPECIFIED) test strip Test 4 times daily or as directed. Profile Rx: patient will contact pharmacy when needed   FOLIC ACID PO Take 1 mg by mouth daily   Probiotic Product (FLORAJEN3) CAPS Take 1 capsule by mouth 2 times daily   order for DME Equipment being ordered: Nebulizer machine with mask and tubing   pantoprazole (PROTONIX) 40 MG enteric coated tablet Take 40 mg by mouth daily Started by Dr. Debbie Rico at [facility]   ASPIRIN NOT PRESCRIBED, INTENTIONAL, Reported on 5/17/2017   BD ULTRA FINE PEN NEEDLES Use to inject insulin twice daily.   sulfaSALAzine (AZULFIDINE) 500 MG tablet TAKE ONE TABLET BY MOUTH THREE TIMES A DAY   albuterol (PROAIR HFA, PROVENTIL HFA, VENTOLIN HFA) 108 (90 BASE) MCG/ACT inhaler Inhale 1-2 puffs into the lungs every 4 hours as needed (for shortness of breath/wheezing)   FREESTYLE LANCETS MISC Use to test up to four times per day   [DISCONTINUED] primidone (MYSOLINE) 50 MG tablet Take 1 tablet twice a day and 2 tablets in the evening for 1 week, then go to 2 tablets twice a day (morning and evening) and take 1 tablet midday for 1 week, take 2 tablets three times a day, thereafter.     REVIEW OF SYSTEMS:                                                     10-system review was completed. Pertinent positives are included in HPI. The remainder of ROS is negative.  EXAM:                                                    Physical Exam:   Vitals: /64 (BP Location: Left arm, Patient Position: Chair, Cuff Size: Adult Regular)  Pulse 72  Temp 98  F (36.7  C) (Tympanic)  Resp 20  Wt 91.4 kg (201 lb 8 oz)  BMI 32.52 kg/m2    General: pt is in NAD, cooperative.  Skin: normal turgor, moist mucous membranes, no lesions/rashes noticed.  HEENT: ATNC, white sclera, normal conjunctiva.  Respiratory:Symmetric lung excursion, no accessory respiratory muscle use.  Abdomen: Not distended.  Neurological: awake, cooperative, follows commands, no aphasia or dysarthria noted, cranial nerves II-XII: no ptosis, extraocular motility is full, face is symmetric, mild dysarthria, tongue is midline, equally moves all extremities, has mild intermittent postural bilateral hand tremor, no dysmetria, gait is normal.    DATA:     Labs: I personally reviewed the following labs:  Anticoagulation Therapy Visit on 08/24/2017   Component Date Value Ref Range Status     INR Protime 08/24/2017 1.8* 0.86 - 1.14 Final   Orders Only on 08/10/2017   Component Date Value Ref Range Status     Uric Acid 08/10/2017 4.8  3.5 - 7.2 mg/dL Final   Anticoagulation Therapy Visit on 08/10/2017   Component Date Value Ref Range Status     INR Protime 08/10/2017 1.9* 0.86 - 1.14 Final   Anticoagulation Therapy Visit on 07/27/2017   Component Date Value Ref Range Status     INR Protime 07/27/2017 2.5* 0.86 - 1.14 Final   Anticoagulation Therapy Visit on 07/12/2017   Component Date Value Ref Range Status     INR Protime 07/12/2017 1.9* 0.86 - 1.14 Final   Anticoagulation Therapy Visit on 06/29/2017   Component Date Value Ref Range Status     INR Protime 06/29/2017 2.3* 0.86 - 1.14 Final   Office Visit on 06/20/2017   Component Date Value Ref Range Status     Creatinine Urine 06/20/2017 40  mg/dL Final     Albumin  Urine mg/L 06/20/2017 24  mg/L Final     Albumin Urine mg/g Cr 06/20/2017 61.31* 0 - 17 mg/g Cr Final   Anticoagulation Therapy Visit on 06/08/2017   Component Date Value Ref Range Status     INR Protime 06/08/2017 1.8* 0.86 - 1.14 Final   Anticoagulation Therapy Visit on 05/22/2017   Component Date Value Ref Range Status     INR Protime 05/22/2017 2.0* 0.86 - 1.14 Final   Anticoagulation Therapy Visit on 05/08/2017   Component Date Value Ref Range Status     INR Protime 05/08/2017 2.8* 0.86 - 1.14 Final     ASSESSMENT and PLAN:                                                    Assessment: 72-year-old male patient presents for follow-up regarding his essential tremor. He is on primidone 100 mg 3 times daily without significant side effects. He also takes propranolol  mg twice daily. He reports interval improvement of his tremor, but wishes to have even better control. We will gradually increase the dose of primidone further.The patient is aware that it might affect his INR and warfarin dose. He will work closely with INR clinic.    Diagnoses:    ICD-10-CM    1. Essential tremor G25.0 primidone (MYSOLINE) 50 MG tablet     Plan: At today's visit we thoroughly discussed his symptoms and the plan.    The following medications were changed: Primidone (MYSOLINE) 50 MG tablet: Take 2 tablets twice a day and 3 tablets in the evening for 1 week, then go to 3 tablets twice a day (morning and evening) and take 2 tablets midday for 1 week, take 3 tablets three times a day, thereafter.    In addition, his blood pressure was elevated. I advised the patient to discuss it with Dr Lewis.    Next follow-up appointment is in the next 3 months or earlier if needed.    I encouraged the patient to call me with any questions or concerns.    Total Time: 37 minutes with > 50% spent counseling the patient on stated above assessment and recommendations, including nature of the diagnosis, proposed plan of treatment, and prognosis. Extra  time was used to answer patient's questions.    Bong Yoo MD  HP/ Neurology

## 2017-09-05 NOTE — PATIENT INSTRUCTIONS
AFTER VISIT SUMMARY (AVS):    At today's visit we discussed your symptoms and the plan.    The following medications were changed: Primidone (MYSOLINE) 50 MG tablet: Take 2 tablets twice a day and 3 tablets in the evening for 1 week, then go to 3 tablets twice a day (morning and evening) and take 2 tablets midday for 1 week, take 3 tablets three times a day, thereafter.    In addition, your blood pressure was elevated. Please discuss it with Dr Lewis.    Next follow-up appointment is in the next 3 months or earlier if needed.    Please do not hesitate to call me with any questions or concerns.    Thanks.

## 2017-09-07 ENCOUNTER — ANTICOAGULATION THERAPY VISIT (OUTPATIENT)
Dept: NURSING | Facility: CLINIC | Age: 72
End: 2017-09-07
Payer: COMMERCIAL

## 2017-09-07 DIAGNOSIS — Z79.01 LONG-TERM (CURRENT) USE OF ANTICOAGULANTS: ICD-10-CM

## 2017-09-07 DIAGNOSIS — I48.91 ATRIAL FIBRILLATION, UNSPECIFIED TYPE (H): ICD-10-CM

## 2017-09-07 LAB — INR POINT OF CARE: 1.7 (ref 0.86–1.14)

## 2017-09-07 PROCEDURE — 85610 PROTHROMBIN TIME: CPT | Mod: QW

## 2017-09-07 PROCEDURE — 99207 ZZC NO CHARGE NURSE ONLY: CPT

## 2017-09-07 PROCEDURE — 36416 COLLJ CAPILLARY BLOOD SPEC: CPT

## 2017-09-07 NOTE — PROGRESS NOTES
ANTICOAGULATION FOLLOW-UP CLINIC VISIT    Patient Name:  Cayetano Lunsford  Date:  9/7/2017  Contact Type:  Face to Face    SUBJECTIVE:     Patient Findings     Positives Change in medications (Patient reports another Primidone taper plan, currently taking 100 mg AM, Noon and 150 mg PM. GOAL: 150 mg TID. Patient advised tapering instructions over the next three weeks by Dr. Yoo. Frequent INR monitoring required. )           OBJECTIVE    INR Protime   Date Value Ref Range Status   09/07/2017 1.7 (A) 0.86 - 1.14 Final       ASSESSMENT / PLAN  INR assessment THER    Recheck INR In: 1 WEEK    INR Location Clinic      Anticoagulation Summary as of 9/7/2017     INR goal 2.0-3.0   Today's INR 1.7!   Maintenance plan 10 mg (5 mg x 2) on Mon, Thu; 7.5 mg (5 mg x 1.5) all other days   Full instructions 10 mg on Mon, Thu; 7.5 mg all other days   Weekly total 57.5 mg   Plan last modified Kirstin Meléndez RN (9/7/2017)   Next INR check 9/14/2017   Priority INR   Target end date     Indications   Long-term (current) use of anticoagulants [Z79.01] [Z79.01]  Atrial fibrillation (H) [I48.91]         Anticoagulation Episode Summary     INR check location     Preferred lab     Send INR reminders to [facility]    Comments       Anticoagulation Care Providers     Provider Role Specialty Phone number    Debbie Lewis MD [facility] [phone]            See the Encounter Report to view Anticoagulation Flowsheet and Dosing Calendar (Go to Encounters tab in chart review, and find the Anticoagulation Therapy Visit)    Per protocol, patient advised to increase weekly warfarin dose by 10% to account for continued sub-therapeutic INR level & medication changes. Patient instructed to hold green intake today as well. Recheck within 1 weeks to ensure stability.     Patient made aware if signs of clotting (pain, tenderness, swelling, or color change in any extremity) AND/OR bleeding occur (nosebleeds,  bleeding gums, bruising, or blood in stool or urine) to notify provider & seek medical attention. If severe symptoms develop, such as major bleeding, chest pain, shortness of breath, fall, trauma or s/s of stroke, patient to call 911 immediately.       Kirstin Meléndez RN

## 2017-09-07 NOTE — MR AVS SNAPSHOT
Cayetano Lunsford   9/7/2017 1:15 PM   Anticoagulation Therapy Visit    Description:  72 year old male   Provider:   INR CLINIC   Department:   Nurse           INR as of 9/7/2017     Today's INR 1.7!      Anticoagulation Summary as of 9/7/2017     INR goal 2.0-3.0   Today's INR 1.7!   Full instructions 10 mg on Mon, Thu; 7.5 mg all other days   Next INR check 9/14/2017    Indications   Long-term (current) use of anticoagulants [Z79.01] [Z79.01]  Atrial fibrillation (H) [I48.91]         Your next Anticoagulation Clinic appointment(s)     Sep 14, 2017 12:00 PM CDT   Anticoagulation Visit with  INR CLINIC   [facility] ([facility]    [address] [place] [id number] [phone]              Contact Numbers     [facility]  Please call [phone] to cancel and/or reschedule your appointment   Please call [phone] with any problems or questions regarding your therapy.        September 2017 Details    Sun Mon Tue Wed Thu Fri Sat          1               2                 3               4               5               6               7      10 mg   See details      8      7.5 mg         9      7.5 mg           10      7.5 mg         11      10 mg         12      7.5 mg         13      7.5 mg         14            15               16                 17               18               19               20               21               22               23                 24               25               26               27               28               29               30                Date Details   09/07 This INR check       Date of next INR:  9/14/2017         How to take your warfarin dose     To take:  7.5 mg Take 1.5 of the 5 mg tablets.    To take:  10 mg Take 2 of the 5 mg tablets.

## 2017-09-11 ENCOUNTER — ALLIED HEALTH/NURSE VISIT (OUTPATIENT)
Dept: PHARMACY | Facility: CLINIC | Age: 72
End: 2017-09-11
Payer: COMMERCIAL

## 2017-09-11 DIAGNOSIS — E11.649 TYPE 2 DIABETES MELLITUS WITH HYPOGLYCEMIA WITHOUT COMA, WITH LONG-TERM CURRENT USE OF INSULIN (H): Primary | ICD-10-CM

## 2017-09-11 DIAGNOSIS — Z79.4 TYPE 2 DIABETES MELLITUS WITH HYPOGLYCEMIA WITHOUT COMA, WITH LONG-TERM CURRENT USE OF INSULIN (H): Primary | ICD-10-CM

## 2017-09-11 PROCEDURE — 99606 MTMS BY PHARM EST 15 MIN: CPT | Performed by: PHARMACIST

## 2017-09-11 PROCEDURE — 99607 MTMS BY PHARM ADDL 15 MIN: CPT | Performed by: PHARMACIST

## 2017-09-11 NOTE — PATIENT INSTRUCTIONS
Recommendations from today's MTM visit:                                                      1. Always give the insulin in your stomach to avoid any changes in absorption.     2. No insulin changes today.    3. We will refill your insulin at next visit. Remind me.    Next MTM visit: 10/11/17 at 11am    To schedule another MTM appointment, please call the clinic directly or you may call the MTM scheduling line at [phone] or toll-free at [phone].     My Clinical Pharmacist's contact information:                                                      It was a pleasure seeing you today!  Please feel free to contact me with any questions or concerns you have.      Tanna Diaz, PharmD  Medication Therapy Management Pharmacist  [facility] - Monday and Wednesday 7:30 - 4:00  Phone: [phone] - direct clinic line    You may receive a survey about the MTM services you received.  I would appreciate your feedback to help me serve you better in the future. Please fill it out and return it when you can. Your comments will be anonymous.

## 2017-09-11 NOTE — PROGRESS NOTES
SUBJECTIVE/OBJECTIVE:                Cayetano Lunsford is a 72 year old male called for a follow-up visit for Medication Therapy Management.  He was referred to me from Dr. Debbie Lewis.     Chief Complaint: Follow up from our visit on 8/28/17.  He saw the neurologist and he increased his primidone to the 50 mg tablets and he is tapering his dose up.  He has started to notice a difference in his tremor. His INR has been running low and INR nurse has been watching him closely.   He sees his pulmonary doctor on 10/9/17. Some days breathing is better than others.   Personal Healthcare Goals: Did not discuss today.  Tobacco: No tobacco use    Alcohol: not currently using    Medication Adherence: no issues reported    Diabetes:  Pt currently taking metformin bid, Novolin N 5 units AM before breakfast and 15 units in PM-at dinner, Novolin R-5 units with breakfast, between 80 and 120, reduce your R insulin by 2 units and 2 units of R at dinner if blood sugars are over 200 - changed at last visit. If below 70 skip dose. He gives his shots in his arms and stomach but most of the time in the stomach. He does not have any issues with giving stomach just has been doing arms.  He is now writing the date on the Novolin R and discarding after 42 days.   SMBG: 3-4 times daily.   Ranges (per pt report): see chart below.   Symptoms of low blood sugar? Fingers and lips tingle, shaky and sweaty. Frequency of hypoglycemia? Usually has them before dinner but had one overnight which was a surprise to him.  He thinks the before dinner lows are potentially due to increased exercise or decreased food intake for lunch. Treating with small amount of orange juice.  Recent symptoms of high blood sugar? none.  Eye exam: up to date  Foot exam: up to date  Microalbumin is not < 30 mg/g. Pt is taking an ACEi/[id number]/28B.  Aspirin: Not taking due to anticoagulation therapy and increased risk of bleeding  Diet/Exercise:  He repots that his activity  has slowed down but about the same as last visit. He is eating something for lunch every day.  Dinner is his largest meal. He will eat a dessert a couple times a week. Breakfast is usually cereal +/- blueberries or a banana. He sometimes snacks after dinner but it is usually some cheezits. Tries to stay away from high sugar content foods at this time.     Ave: 7 day: 143, 14 day: 142, 30 day: 146- decreased slightly since last visit    Date FBG/ 2hours post Lunch/2hours post Dinner /2hours post bedtime   9/11 126      9/10 145  135    9/9 230 (apple crisp and high carb dinner)  93  113   9/8 140  156 (R-2 units because he at dessert) 101 - he did not eat anything after he checked   9/7 138  210 (lunch was candy bar, chips and diet pop 196   9/6 158  128 74 - 4 oz OJ   9/5 199 - candy bar the day before  95    9/4 154  127 104   9/3 135  107 144   9/2 160  122 125   9/1 160  141    8/31 129  194/118 201 - ate dinner out   8/30 138  99 153   8/29 141  123    8/28   63 - 4 oz OJ then dinner 217 - had baked beans and bratwurst  for dinner.       From last visit  Date FBG/ 2hours post Lunch/2hours post Dinner /2hours post Bedtime (at least 4 hours after meal)   8/28 140      8/27 161  125 205   8/26 157  232 - gave 3 R 193   8/25 143  160    8/24 162  94  228 (has subway for dinner)   8/23 155 Less for lunch this day. 57 at 4:48pm - more active this day then before dinner 83 131   8/22 145  83 207   8/21 166  85 212   8/20 193  226 (ice cream and picnic) -R 3 198   8/19 133  345 - R 3 -birthday cake earlier in day    8/18 142  106    8/17 123  197 148   8/16 142  69 (4 oz OJ then dinner) - unsure why low 111   8/15 130  71 108   8/14 181  102 144   8/13 180  90    8/12 238  162    8/11 216 - DQ the night before  97    8/10 213 - apple pie for dinner night before  115        Current labs include:  BP Readings from Last 3 Encounters:   09/05/17 110/54   07/13/17 132/60   06/21/17 138/76     Today's Vitals: There were no  vitals taken for this visit. - phone visit    Lab Results   Component Value Date    A1C 5.8 05/08/2017    A1C 6.9 10/17/2016    A1C 6.2 07/22/2016    A1C 6.7 03/28/2016    A1C 7.1 11/10/2015     Lab Results   Component Value Date    CHOL 159 05/08/2017     Lab Results   Component Value Date    TRIG 112 05/08/2017     Lab Results   Component Value Date    HDL 54 05/08/2017     Lab Results   Component Value Date    LDL 83 05/08/2017       Liver Function Studies -   Recent Labs   Lab Test  03/13/17   1050   PROTTOTAL  7.0   ALBUMIN  3.8   BILITOTAL  0.4   ALKPHOS  60   AST  20   ALT  22       Lab Results   Component Value Date    UCRR 40 06/20/2017    MICROL 24 06/20/2017    UMALCR 61.31 (H) 06/20/2017       Last Basic Metabolic Panel:  Lab Results   Component Value Date     03/13/2017      Lab Results   Component Value Date    POTASSIUM 4.6 03/13/2017     Lab Results   Component Value Date    CHLORIDE 100 03/13/2017     Lab Results   Component Value Date    BUN 16 03/13/2017     Lab Results   Component Value Date    CR 0.76 03/13/2017     GFR Estimate   Date Value Ref Range Status   03/13/2017 >90  Non  GFR Calc   >60 mL/min/1.7m2 Final   01/09/2017 >90 >60 mL/min/1.7m2 Final   10/03/2016 >60 >60 ml/min/1.73m2 Final     TSH   Date Value Ref Range Status   03/13/2017 3.25 0.40 - 4.00 mU/L Final   ]    Most Recent Immunizations   Administered Date(s) Administered     HepB-Peds 08/05/2014     Influenza (H1N1) 01/08/2010     Influenza (High Dose) 3 valent vaccine 09/07/2016     Influenza (IIV3) 09/11/2009     Pneumococcal (PCV 13) 11/10/2015     Pneumococcal 23 valent 07/20/2010     TD (ADULT, 7+) 08/08/2008     TDAP Vaccine (Adacel) 08/05/2014     Zoster vaccine, live 06/13/2008       ASSESSMENT:              Current medications were reviewed today as discussed above.        Medication Adherence: no issues identified    Diabetes: Stable. Patient is meeting A1c goal of < 7%. Self monitoring of blood  glucose is at goal of fasting  mg/dL and post prandial < 180 mg/dL. Microalbumin is not at goal < 30 mg/g. Taking an ARB at max dose. Discussed being consistent with where he injects insulin to avoid variations in absorbency.       PLAN:                  1. Always give the insulin in your stomach to avoid any changes in absorption.     2. No insulin changes today.    3. We will refill your insulin at next visit. Remind me.    I spent 30 minutes with this patient today. All changes were made via collaborative practice agreement with Debbie Lewis. A copy of the visit note was provided to the patient's primary care provider.     Will follow up in 1 month.    The patient was mailed a summary of these recommendations as an after visit summary.    Tanna Diaz, PharmD  Medication Therapy Management Pharmacist

## 2017-09-11 NOTE — MR AVS SNAPSHOT
After Visit Summary   9/11/2017    Cayetano Lunsford    MRN: [id number]           Patient Information     Date Of Birth          1945        Visit Information        Provider Department      9/11/2017 11:30 AM Jessica Diaz RPH [facility] MTM        Care Instructions    Recommendations from today's MTM visit:                                                      1. Always give the insulin in your stomach to avoid any changes in absorption.     2. No insulin changes today.    3. We will refill your insulin at next visit. Remind me.    Next MTM visit: 10/11/17 at 11am    To schedule another MTM appointment, please call the clinic directly or you may call the MTM scheduling line at [phone] or toll-free at [phone].     My Clinical Pharmacist's contact information:                                                      It was a pleasure seeing you today!  Please feel free to contact me with any questions or concerns you have.      Tanna Diaz PharmD  Medication Therapy Management Pharmacist  [facility] - Monday and Wednesday 7:30 - 4:00  Phone: [phone] - direct clinic line    You may receive a survey about the MT services you received.  I would appreciate your feedback to help me serve you better in the future. Please fill it out and return it when you can. Your comments will be anonymous.                Follow-ups after your visit        Your next 10 appointments already scheduled     Sep 14, 2017 12:00 PM CDT   Anticoagulation Visit with  INR CLINIC   [facility] ([facility])    [address] [place] [id number] [phone]            Oct 11, 2017 11:00 AM CDT   TELEMEDICINE with Jessica Diaz RPH   [facility] ([facility])    [address] [place] [id number] [phone]           Note: this is not an onsite visit; there is no need to come to the facility.  "           Dec 05, 2017  9:00 AM CST   Return Visit with Bong Yoo MD   [facility] ([facility])    [address] [place] [id number] [phone]              Who to contact     If you have questions or need follow up information about today's clinic visit or your schedule please contact [facility] MTM directly at [phone].  Normal or non-critical lab and imaging results will be communicated to you by MyChart, letter or phone within 4 business days after the clinic has received the results. If you do not hear from us within 7 days, please contact the clinic through MyChart or phone. If you have a critical or abnormal lab result, we will notify you by phone as soon as possible.  Submit refill requests through [employer] or call your pharmacy and they will forward the refill request to us. Please allow 3 business days for your refill to be completed.          Additional Information About Your Visit        [email or web address] How To Information     [employer] lets you send messages to your doctor, view your test results, renew your prescriptions, schedule appointments and more. To sign up, go to [email or web address] . Click on \"Log in\" on the left side of the screen, which will take you to the Welcome page. Then click on \"Sign up Now\" on the right side of the page.     You will be asked to enter the access code listed below, as well as some personal information. Please follow the directions to create your username and password.     Your access code is: [email or web address]  Expires: 10/10/2017  1:07 PM     Your access code will  in 90 days. If you need help or a new code, please call your [place] clinic or [phone].        Care EveryWhere ID     This is your Care EveryWhere ID. This could be used by other organizations to access your [place] medical records  [id number]         Blood Pressure from Last 3 Encounters:   17 110/54   17 " 132/60   06/21/17 138/76    Weight from Last 3 Encounters:   09/05/17 201 lb 8 oz (91.4 kg)   07/13/17 201 lb 4 oz (91.3 kg)   06/21/17 199 lb (90.3 kg)              Today, you had the following     No orders found for display         Today's Medication Changes          These changes are accurate as of: 9/11/17 12:14 PM.  If you have any questions, ask your nurse or doctor.               These medicines have changed or have updated prescriptions.        Dose/Directions    insulin  UNIT/ML injection   Commonly known as:  NovoLIN N RELION   This may have changed:  additional instructions   Used for:  Type 2 diabetes mellitus with hypoglycemia without coma, with long-term current use of insulin (H)        Inject 6 units under the skin at breakfast and 15 units at dinner.   Quantity:  3 vial   Refills:  3                Primary Care Provider Office Phone # Fax #    Debbie Lewis [phone] [address] [facility] [address]        Equal Access to Services     ZAHIRA ROSS : Hadii efrem ku hadasho Soomaali, waaxda luqadaha, qaybta kaalmada adeegyada, waxay lakshmiin haydejuan pugh . So [facility] [phone].    ATENCIÓN: Si habla español, tiene a jurado disposición servicios gratuitos de asistencia lingüística. Llame al [phone].    We comply with applicable federal civil rights laws and [place] laws. We do not discriminate on the basis of race, color, national origin, age, disability sex, sexual orientation or gender identity.            Thank you!     Thank you for choosing [facility]  for your care. Our goal is always to provide you with excellent care. Hearing back from our patients is one way we can continue to improve our services. Please take a few minutes to complete the written survey that you may receive in the mail after your visit with us. Thank you!             Your Updated Medication List - Protect others around you: Learn how to safely use, store  "and throw away your medicines at www.disposemymeds.org.          This list is accurate as of: 9/11/17 12:14 PM.  Always use your most recent med list.                   Brand Name Dispense Instructions for use Diagnosis    albuterol 108 (90 BASE) MCG/ACT Inhaler    PROAIR HFA/PROVENTIL HFA/VENTOLIN HFA    1 Inhaler    Inhale 1-2 puffs into the lungs every 4 hours as needed (for shortness of breath/wheezing)    Chronic airway obstruction, not elsewhere classified       ASPIRIN NOT PRESCRIBED    INTENTIONAL     Reported on 5/17/2017        azelastine 0.1 % spray    ASTELIN    1 Bottle    Spray 1-2 sprays into both nostrils 2 times daily    Seasonal allergic rhinitis, unspecified allergic rhinitis trigger       B-D INSULIN SYRINGE 31G X 5/16\" 0.5 ML   Generic drug:  insulin syringe-needle U-100     100 each    USE 1 SYRINGE TWO TIMES A DAY OR AS DIRECTED    Type 2 diabetes, HbA1C goal < 8% (H)       BD ULTRA FINE PEN NEEDLES     100 each    Use to inject insulin twice daily.    Type 2 diabetes, HbA1C goal < 8% (H)       blood glucose monitoring lancets     100 Each    Use to test up to four times per day    Type II or unspecified type diabetes mellitus without mention of complication, not stated as uncontrolled       blood glucose monitoring test strip    no brand specified    360 each    Test 4 times daily or as directed. Profile Rx: patient will contact pharmacy when needed    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, with long-term current use of insulin (H)       FLORAJEN3 Caps     60 capsule    Take 1 capsule by mouth 2 times daily    Acute cystitis without hematuria       FOLIC ACID PO      Take 1 mg by mouth daily        insulin  UNIT/ML injection    NovoLIN N RELION    3 vial    Inject 6 units under the skin at breakfast and 15 units at dinner.    Type 2 diabetes mellitus with hypoglycemia without coma, with long-term current use of insulin (H)       insulin regular 100 UNIT/ML " injection    NovoLIN R RELION    10 mL    Inject 5 units with breakfast and 2 units with dinner (when mixing with NPH, draw this insulin up first)    Type 2 diabetes mellitus with hypoglycemia without coma, with long-term current use of insulin (H)       ipratropium - albuterol 0.5 mg/2.5 mg/3 mL 0.5-2.5 (3) MG/3ML neb solution    DUONEB    270 mL    NEBULIZE CONTENTS OF ONE VIAL BY MOUTH EVERY 4 HOURS AS NEEDED FOR SHORTNESS OF BREATH /DYSPNEA    Chronic obstructive bronchitis (H)       losartan 100 MG tablet    COZAAR    90 tablet    Take 1 tablet (100 mg) by mouth daily for hypertension.    Hypertension goal BP (blood pressure) < 140/90       metFORMIN 1000 MG tablet    GLUCOPHAGE    180 tablet    Take 1 tablet (1,000 mg) by mouth 2 times daily (with meals) with breakfast and dinner.    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, with long-term current use of insulin (H)       order for DME     1 each    Equipment being ordered: Nebulizer machine with mask and tubing    Chronic airway obstruction, not elsewhere classified       pantoprazole 40 MG EC tablet    PROTONIX     Take 40 mg by mouth daily Started by Dr[facility] Debbie Rico at [facility]        primidone 50 MG tablet    MYSOLINE    270 tablet    Take 2 tablets twice a day and 3 tablets in the evening for 1 week, then go to 3 tablets twice a day (morning and evening) and take 2 tablets midday for 1 week, take 3 tablets three times a day, thereafter.    Essential tremor       propafenone 150 MG Tabs tablet    RYTHMOL    180 tablet    Take 1 tablet (150 mg) by mouth 2 times daily    Paroxysmal atrial fibrillation (H)       propranolol 40 MG tablet    INDERAL    180 tablet    Take 2-3 tablets ( mg) by mouth 2 times daily    Benign familial tremor       simvastatin 80 MG tablet    ZOCOR    30 tablet    Take 0.5 tablets (40 mg) by mouth At Bedtime Profile Rx: patient will contact pharmacy when needed    Hyperlipidemia LDL goal <100       STIOLTO  RESPIMAT 2.5-2.5 MCG/ACT Aers   Generic drug:  tiotropium-olodaterol      Inhale 2 puffs into the lungs daily        sulfaSALAzine 500 MG tablet    AZULFIDINE    90 tablet    TAKE ONE TABLET BY MOUTH THREE TIMES A DAY    Ulcerative colitis, unspecified       warfarin 5 MG tablet    COUMADIN    120 tablet    Take as directed by Coumadin clinic    Atrial fibrillation, unspecified type (H)

## 2017-09-13 ENCOUNTER — TRANSFERRED RECORDS (OUTPATIENT)
Dept: HEALTH INFORMATION MANAGEMENT | Facility: CLINIC | Age: 72
End: 2017-09-13

## 2017-09-14 ENCOUNTER — ANTICOAGULATION THERAPY VISIT (OUTPATIENT)
Dept: NURSING | Facility: CLINIC | Age: 72
End: 2017-09-14
Payer: COMMERCIAL

## 2017-09-14 DIAGNOSIS — I48.91 ATRIAL FIBRILLATION, UNSPECIFIED TYPE (H): ICD-10-CM

## 2017-09-14 DIAGNOSIS — Z79.01 LONG-TERM (CURRENT) USE OF ANTICOAGULANTS: ICD-10-CM

## 2017-09-14 LAB — INR POINT OF CARE: 1.8 (ref 0.86–1.14)

## 2017-09-14 PROCEDURE — 85610 PROTHROMBIN TIME: CPT | Mod: QW

## 2017-09-14 PROCEDURE — 99207 ZZC NO CHARGE NURSE ONLY: CPT

## 2017-09-14 PROCEDURE — 36416 COLLJ CAPILLARY BLOOD SPEC: CPT

## 2017-09-14 NOTE — PROGRESS NOTES
"  ANTICOAGULATION FOLLOW-UP CLINIC VISIT    Patient Name:  Cayetano Lunsford  Date:  9/14/2017  Contact Type:  Face to Face    SUBJECTIVE:     Patient Findings     Positives Change in medications (Primidone taper increased on 9/12 to 150 mg AM/PM & 100 mg noon. Next week taper will be complete at 150 mg TID. As previously mentioned, per micromedex, \"Concurrent use of PRIMIDONE and WARFARIN may result in decreased anticoagulant effectiveness.\"), OTC meds (Preparation H used for hemorrhoid.)           OBJECTIVE    INR Protime   Date Value Ref Range Status   09/14/2017 1.8 (A) 0.86 - 1.14 Final       ASSESSMENT / PLAN  INR assessment SUB    Recheck INR In: 6 DAYS    INR Location Clinic      Anticoagulation Summary as of 9/14/2017     INR goal 2.0-3.0   Today's INR 1.8!   Maintenance plan 10 mg (5 mg x 2) on Mon, Thu; 7.5 mg (5 mg x 1.5) all other days   Full instructions 9/15: 10 mg; 9/16: 10 mg; 9/17: 10 mg; Otherwise 10 mg on Mon, Thu; 7.5 mg all other days   Weekly total 57.5 mg   Plan last modified Kirstin Meléndez RN (9/7/2017)   Next INR check 9/20/2017   Priority INR   Target end date     Indications   Long-term (current) use of anticoagulants [Z79.01] [Z79.01]  Atrial fibrillation (H) [I48.91]         Anticoagulation Episode Summary     INR check location     Preferred lab     Send INR reminders to [facility] CLINIC    Comments       Anticoagulation Care Providers     Provider Role Specialty Phone number    Debbie Lewis MD [facility] [phone]            See the Encounter Report to view Anticoagulation Flowsheet and Dosing Calendar (Go to Encounters tab in chart review, and find the Anticoagulation Therapy Visit)    INR climbed only 1 unit with recent 10% increase. Per protocol, patient advised to increase weekly's warfarin dose by another 7.5 mg (13%) to account for continued sub-therapeutic INR level and to adjust for upcoming Primidone increase. Patient instructed to hold green " intake today and tomorrow as well. Recheck within 6 days to ensure INR is climbing, but not too high.     Patient made aware if signs of clotting (pain, tenderness, swelling, or color change in any extremity) AND/OR bleeding occur (nosebleeds, bleeding gums, bruising, or blood in stool or urine) to notify provider & seek medical attention. If severe symptoms develop, such as major bleeding, chest pain, shortness of breath, fall, trauma or s/s of stroke, patient to call 911 immediately.       Kirstin Meléndez RN

## 2017-09-14 NOTE — MR AVS SNAPSHOT
Cayetano Lunsford   9/14/2017 12:00 PM   Anticoagulation Therapy Visit    Description:  72 year old male   Provider:   INR CLINIC   Department:   Nurse           INR as of 9/14/2017     Today's INR 1.8!      Anticoagulation Summary as of 9/14/2017     INR goal 2.0-3.0   Today's INR 1.8!   Full instructions 9/15: 10 mg; 9/16: 10 mg; 9/17: 10 mg; Otherwise 10 mg on Mon, Thu; 7.5 mg all other days   Next INR check 9/20/2017    Indications   Long-term (current) use of anticoagulants [Z79.01] [Z79.01]  Atrial fibrillation (H) [I48.91]         Your next Anticoagulation Clinic appointment(s)     Sep 20, 2017  2:00 PM CDT   [facility]agulation [facility] with  [facility] CL[facility]IC   [facility]    [address] [place] [id number] [phone]              Contact Numbers     [facility]  Please call [phone] to cancel and/or reschedule your appointment   Please call [phone] with any problems or questions regarding your therapy.        September 2017 Details    Sun Mon Tue Wed Thu Fri Sat          1               2                 3               4               5               6               7               8               9                 10               11               12               13               14      10 mg   See details      15      10 mg         16      10 mg           17      10 mg         18      10 mg         19      7.5 mg         20            21               22               23                 24               25               26               27               28               29               30                Date Details   09/14 This INR check       Date of next INR:  9/20/2017         How to take your warfarin dose     To take:  7.5 mg Take 1.5 of the 5 mg tablets.    To take:  10 mg Take 2 of the 5 mg tablets.

## 2017-09-20 ENCOUNTER — ANTICOAGULATION THERAPY VISIT (OUTPATIENT)
Dept: NURSING | Facility: CLINIC | Age: 72
End: 2017-09-20
Payer: COMMERCIAL

## 2017-09-20 DIAGNOSIS — Z79.01 LONG-TERM (CURRENT) USE OF ANTICOAGULANTS: ICD-10-CM

## 2017-09-20 DIAGNOSIS — I48.91 ATRIAL FIBRILLATION, UNSPECIFIED TYPE (H): ICD-10-CM

## 2017-09-20 LAB — INR POINT OF CARE: 3.1 (ref 0.86–1.14)

## 2017-09-20 PROCEDURE — 85610 PROTHROMBIN TIME: CPT | Mod: QW

## 2017-09-20 PROCEDURE — 99207 ZZC NO CHARGE NURSE ONLY: CPT

## 2017-09-20 PROCEDURE — 36416 COLLJ CAPILLARY BLOOD SPEC: CPT

## 2017-09-20 NOTE — MR AVS SNAPSHOT
Cayetano Lunsford   9/20/2017 2:00 PM   Anticoagulation Therapy Visit    Description:  72 year old male   Provider:   INR CLINIC   Department:   Nurse           INR as of 9/20/2017     Today's INR 3.1!      Anticoagulation Summary as of 9/20/2017     INR goal 2.0-3.0   Today's INR 3.1!   Full instructions 10 mg on Mon, Thu, Sat; 7.5 mg all other days   Next INR check 9/28/2017    Indications   Long-term (current) use of anticoagulants [Z79.01] [Z79.01]  Atrial fibrillation (H) [I48.91]         Your next Anticoagulation Clinic appointment(s)     Sep 28, 2017  1:45 PM CDT   Anticoagulation Visit with  INR CLINIC   [facility]    [address] [place] [id number] [phone]              Contact Numbers     [facility]  Please call [phone] to cancel and/or reschedule your appointment   Please call [phone] with any problems or questions regarding your therapy.        September 2017 Details    Sun Mon Tue Wed Thu Fri Sat          1               2                 3               4               5               6               7               8               9                 10               11               12               13               14               15               16                 17               18               19               20      7.5 mg   See details      21      10 mg         22      7.5 mg         23      10 mg           24      7.5 mg         25      10 mg         26      7.5 mg         27      7.5 mg         28            29               30                Date Details   09/20 This INR check       Date of next INR:  9/28/2017         How to take your warfarin dose     To take:  7.5 mg Take 1.5 of the 5 mg tablets.    To take:  10 mg Take 2 of the 5 mg tablets.

## 2017-09-20 NOTE — PROGRESS NOTES
"  ANTICOAGULATION FOLLOW-UP CLINIC VISIT    Patient Name:  Cayetano Lunsford  Date:  9/20/2017  Contact Type:  Face to Face    SUBJECTIVE:     Patient Findings     Positives Change in medications (Primodone increased yesterday to 3 pills TID. Per up to kanchan: \"CYP2C9 Inducers (Strong) may increase the metabolism of CYP2C9 Substrates (High risk with Inducers). \")           OBJECTIVE    INR Protime   Date Value Ref Range Status   09/20/2017 3.1 (A) 0.86 - 1.14 Final       ASSESSMENT / PLAN  INR assessment THER    Recheck INR In: 8 DAYS    INR Location Clinic      Anticoagulation Summary as of 9/20/2017     INR goal 2.0-3.0   Today's INR 3.1!   Maintenance plan 10 mg (5 mg x 2) on Mon, Thu, Sat; 7.5 mg (5 mg x 1.5) all other days   Full instructions 10 mg on Mon, Thu, Sat; 7.5 mg all other days   Weekly total 60 mg   Plan last modified Estelle Prado (9/20/2017)   Next INR check 9/28/2017   Priority INR   Target end date     Indications   Long-term (current) use of anticoagulants [Z79.01] [Z79.01]  Atrial fibrillation (H) [I48.91]         Anticoagulation Episode Summary     INR check location     Preferred lab     Send INR reminders to [facility] CL[facility]    Comments       Anticoagulation Care Providers     Provider Role Specialty Phone number    Debbie Lewis MD [facility] [phone]            See the Encounter Report to view Anticoagulation Flowsheet and Dosing Calendar (Go to Encounters tab in chart review, and find the Anticoagulation Therapy Visit)    Patient Primidone medication increase which historically has caused INR to be sub-therapeutic. Because of this medication causing sub-therapeutic INR and knowing that the med has now been increased to 3 pill TID, ACC team has increased patient maintenance dose by 4.3% in anticipation of this increase causing decrease in INR. Increase conservative due to the inability to know how exactly the Primidone increase will impact patient INR (besides " going off of the recent trends). Patient to return in 8 days to ensure INR stability.    Patient aware if signs of clotting (pain, tenderness, swelling, color change in leg or arm, SOB) and bleeding occur (blood in stool, urine, large bruising, bleeding gums, nosebleeds) to have INR check sooner. If sx severe report to ER or concerned for stroke call 911. If general questions or concerns arise, call clinic.         Estelle Prado RN

## 2017-09-28 ENCOUNTER — TELEPHONE (OUTPATIENT)
Dept: NURSING | Facility: CLINIC | Age: 72
End: 2017-09-28

## 2017-09-28 ENCOUNTER — ANTICOAGULATION THERAPY VISIT (OUTPATIENT)
Dept: NURSING | Facility: CLINIC | Age: 72
End: 2017-09-28
Payer: COMMERCIAL

## 2017-09-28 DIAGNOSIS — I48.91 ATRIAL FIBRILLATION, UNSPECIFIED TYPE (H): ICD-10-CM

## 2017-09-28 DIAGNOSIS — Z79.01 LONG-TERM (CURRENT) USE OF ANTICOAGULANTS: ICD-10-CM

## 2017-09-28 LAB — INR POINT OF CARE: 2.3 (ref 0.86–1.14)

## 2017-09-28 PROCEDURE — 85610 PROTHROMBIN TIME: CPT | Mod: QW

## 2017-09-28 PROCEDURE — 36416 COLLJ CAPILLARY BLOOD SPEC: CPT

## 2017-09-28 PROCEDURE — 99207 ZZC NO CHARGE NURSE ONLY: CPT

## 2017-09-28 NOTE — PROGRESS NOTES
ANTICOAGULATION FOLLOW-UP CLINIC VISIT    Patient Name:  Cayetano Lunsford  Date:  9/28/2017  Contact Type:  Face to Face    SUBJECTIVE:     Patient Findings     Positives No Problem Findings           OBJECTIVE    INR Protime   Date Value Ref Range Status   09/28/2017 2.3 (A) 0.86 - 1.14 Final       ASSESSMENT / PLAN  INR assessment THER    Recheck INR In: 2 WEEKS    INR Location Clinic      Anticoagulation Summary as of 9/28/2017     INR goal 2.0-3.0   Today's INR 2.3   Maintenance plan 10 mg (5 mg x 2) on Mon, Thu, Sat; 7.5 mg (5 mg x 1.5) all other days   Full instructions 10 mg on Mon, Thu, Sat; 7.5 mg all other days   Weekly total 60 mg   No change documented Estelle Prado   Plan last modified Estelle Prado (9/20/2017)   Next INR check 10/12/2017   Priority INR   Target end date     Indications   Long-term (current) use of anticoagulants [Z79.01] [Z79.01]  Atrial fibrillation (H) [I48.91]         Anticoagulation Episode Summary     INR check location     Preferred lab     Send INR reminders to [facility]    Comments       Anticoagulation Care Providers     Provider Role Specialty Phone number    Debbie Lewis MD [facility] [phone]            See the Encounter Report to view Anticoagulation Flowsheet and Dosing Calendar (Go to Encounters tab in chart review, and find the Anticoagulation Therapy Visit)    Patient aware if signs of clotting (pain, tenderness, swelling, color change in leg or arm, SOB) and bleeding occur (blood in stool, urine, large bruising, bleeding gums, nosebleeds) to have INR check sooner. If sx severe report to ER or concerned for stroke call 911. If general questions or concerns arise, call clinic.         Estelle Prado RN

## 2017-09-28 NOTE — MR AVS SNAPSHOT
Cayetano Lunsford   9/28/2017 1:45 PM   Anticoagulation Therapy Visit    Description:  72 year old male   Provider:   INR CLINIC   Department:   Nurse           INR as of 9/28/2017     Today's INR 2.3      Anticoagulation Summary as of 9/28/2017     INR goal 2.0-3.0   Today's INR 2.3   Full instructions 10 mg on Mon, Thu, Sat; 7.5 mg all other days   Next INR check 10/12/2017    Indications   Long-term (current) use of anticoagulants [Z79.01] [Z79.01]  Atrial fibrillation (H) [I48.91]         Your next Anticoagulation Clinic appointment(s)     Oct 12, 2017  1:15 PM CDT   Anticoagulation Visit with  INR CLINIC   [facility]    [address] [place] [address] [phone]              Contact Numbers     [facility]  Please call [phone] to cancel and/or reschedule your appointment   Please call [phone] with any problems or questions regarding your therapy.        September 2017 Details    Sun Mon Tue Wed Thu Fri Sat          1               2                 3               4               5               6               7               8               9                 10               11               12               13               14               15               16                 17               18               19               20               21               22               23                 24               25               26               27               28      10 mg   See details      29      7.5 mg         30      10 mg          Date Details   09/28 This INR check               How to take your warfarin dose     To take:  7.5 mg Take 1.5 of the 5 mg tablets.    To take:  10 mg Take 2 of the 5 mg tablets.           October 2017 Details    Sun Mon Tue Wed Thu Fri Sat     1      7.5 mg         2      10 mg         3      7.5 mg         4      7.5 mg         5      10 mg         6      7.5 mg         7      10 mg            8      7.5 mg         9      10 mg         10      7.5 mg         11      7.5 mg         12            13               14                 15               16               17               18               19               20               21                 22               23               24               25               26               27               28                 29               30               31                    Date Details   No additional details    Date of next INR:  10/12/2017         How to take your warfarin dose     To take:  7.5 mg Take 1.5 of the 5 mg tablets.    To take:  10 mg Take 2 of the 5 mg tablets.

## 2017-10-09 ENCOUNTER — TRANSFERRED RECORDS (OUTPATIENT)
Dept: HEALTH INFORMATION MANAGEMENT | Facility: CLINIC | Age: 72
End: 2017-10-09

## 2017-10-11 ENCOUNTER — ALLIED HEALTH/NURSE VISIT (OUTPATIENT)
Dept: PHARMACY | Facility: CLINIC | Age: 72
End: 2017-10-11
Payer: COMMERCIAL

## 2017-10-11 DIAGNOSIS — Z79.4 TYPE 2 DIABETES MELLITUS WITH HYPOGLYCEMIA WITHOUT COMA, WITH LONG-TERM CURRENT USE OF INSULIN (H): Primary | ICD-10-CM

## 2017-10-11 DIAGNOSIS — E11.649 TYPE 2 DIABETES MELLITUS WITH HYPOGLYCEMIA WITHOUT COMA, WITH LONG-TERM CURRENT USE OF INSULIN (H): Primary | ICD-10-CM

## 2017-10-11 PROCEDURE — 99606 MTMS BY PHARM EST 15 MIN: CPT | Performed by: PHARMACIST

## 2017-10-11 PROCEDURE — 99607 MTMS BY PHARM ADDL 15 MIN: CPT | Performed by: PHARMACIST

## 2017-10-11 NOTE — PROGRESS NOTES
SUBJECTIVE/OBJECTIVE:                Cayetano Lunsford is a 72 year old male called for a follow-up visit for Medication Therapy Management.  He was referred to me from Dr. Debbie Lewis.     Chief Complaint: Follow up from our visit on 9/11/17.  Pt said he was just in to see his pulmonologist on Monday and he said his breathing has improved based on the PFT results.    Tobacco: No tobacco use  Alcohol: not currently using    Medication Adherence: no issues reported    Diabetes:  Pt currently taking metformin bid, Novolin N 5 units AM before breakfast and 15 units in PM-at dinner, Novolin R-5 units with breakfast, between 80 and 120, reduce your R insulin by 2 units and 2 units of R at dinner if blood sugars are over 200. If below 70 skip dose. He gives his shots in his arms and stomach but most of the time in the stomach. He does not have any issues with giving stomach just has been doing arms.  He is now writing the date on the Novolin R and discarding after 42 days.   SMBG: 3-4 times daily.   Ranges (per pt report): see chart below.   Symptoms of low blood sugar? Fingers and lips tingle, shaky and sweaty. Frequency of hypoglycemia? Usually has them before dinner but had one overnight which was a surprise to him.  He thinks the before dinner lows are potentially due to increased exercise or decreased food intake for lunch. Treating with small amount of orange juice.  Recent symptoms of high blood sugar? none.  Eye exam: up to date  Foot exam: up to date  Microalbumin is not < 30 mg/g. Pt is taking an ACEi/ZK57982017/08/28B.  Aspirin: Not taking due to anticoagulation therapy and increased risk of bleeding  Diet/Exercise:  No change from last visit. He repots that his activity has slowed down but about the same as last visit. He is eating something for lunch every day.  Dinner is his largest meal. He will eat a dessert a couple times a week. Breakfast is usually cereal +/- blueberries or a banana. He sometimes snacks  after dinner but it is usually some cheezits. Tries to stay away from high sugar content foods at this time.     Ave: 7 day: 142, 14 day: 146, 30 day: 153 -  Slight increase for 30 day    Date FBG/ 2hours post Lunch/2hours post Dinner /2hours post    9/11 140      9/10 155  273 - sweets at lunch    9/9 108  123    9/8 182  181    9/7 151  107 - had a good amount of carbs. 508 AM low of 50 ??   9/6 143  156    9/5 164  92    9/4 147  112    9/3 134  129    9/2 153  122    9/1 176  106    8/30 176  191    8/29 187 (apple crisp the night before)  111    8/28 112  147    8/27 112   199 (R2)    8/26 148  72 before dinner    8/25 207  88    8/24 171  124      From last visit  Date FBG/ 2hours post Lunch/2hours post Dinner /2hours post bedtime   9/11 126      9/10 145  135    9/9 230 (apple crisp and high carb dinner)  93  113   9/8 140  156 (R-2 units because he at dessert) 101 - he did not eat anything after he checked   9/7 138  210 (lunch was candy bar, chips and diet pop 196   9/6 158  128 74 - 4 oz OJ   9/5 199 - candy bar the day before  95    9/4 154  127 104   9/3 135  107 144   9/2 160  122 125   9/1 160  141    8/31 129  194/118 201 - ate dinner out   8/30 138  99 153   8/29 141  123    8/28   63 - 4 oz OJ then dinner 217 - had baked beans and bratwurst  for dinner.         Current labs include:  BP Readings from Last 3 Encounters:   09/05/17 110/54   07/13/17 132/60   06/21/17 138/76     Today's Vitals: There were no vitals taken for this visit. - phone visit    Lab Results   Component Value Date    A1C 5.8 05/08/2017    A1C 6.9 10/17/2016    A1C 6.2 07/22/2016    A1C 6.7 03/28/2016    A1C 7.1 11/10/2015     Lab Results   Component Value Date    CHOL 159 05/08/2017     Lab Results   Component Value Date    TRIG 112 05/08/2017     Lab Results   Component Value Date    HDL 54 05/08/2017     Lab Results   Component Value Date    LDL 83 05/08/2017       Liver Function Studies -   Recent Labs   Lab Test  03/13/17    1050   PROTTOTAL  7.0   ALBUMIN  3.8   BILITOTAL  0.4   ALKPHOS  60   AST  20   ALT  22       Lab Results   Component Value Date    UCRR 40 06/20/2017    MICROL 24 06/20/2017    UMALCR 61.31 (H) 06/20/2017       Last Basic Metabolic Panel:  Lab Results   Component Value Date     03/13/2017      Lab Results   Component Value Date    POTASSIUM 4.6 03/13/2017     Lab Results   Component Value Date    CHLORIDE 100 03/13/2017     Lab Results   Component Value Date    BUN 16 03/13/2017     Lab Results   Component Value Date    CR 0.76 03/13/2017     GFR Estimate   Date Value Ref Range Status   03/13/2017 >90  Non  GFR Calc   >60 mL/min/1.7m2 Final   01/09/2017 >90 >60 mL/min/1.7m2 Final   10/03/2016 >60 >60 ml/min/1.73m2 Final     GFR Estimate If Black   Date Value Ref Range Status   03/13/2017 >90   GFR Calc   >60 mL/min/1.7m2 Final   01/09/2017 >90 >60 mL/min/1.7m2 Final   10/03/2016 >60 >60 ml/min/1.73m2 Final     TSH   Date Value Ref Range Status   03/13/2017 3.25 0.40 - 4.00 mU/L Final   ]    Most Recent Immunizations   Administered Date(s) Administered     HepB 08/05/2014     Influenza (H1N1) 01/08/2010     Influenza (High Dose) 3 valent vaccine 09/07/2016     Influenza (IIV3) 09/11/2009     Pneumococcal (PCV 13) 11/10/2015     Pneumococcal 23 valent 07/20/2010     TD (ADULT, 7+) 08/08/2008     TDAP Vaccine (Adacel) 08/05/2014     Zoster vaccine, live 06/13/2008       ASSESSMENT:              Current medications were reviewed today as discussed above.     Medication Adherence: no issues identified    Diabetes: Stable. Patient is meeting A1c goal of < 7%. Only one low, will keep dose the same.     PLAN:                  1. If you are going to have a snack before bed you can give 2 units of R with that if you are having at least 30 grams of carbs, especially if your blood sugars are over 200.    2. Schedule an annual exam with Dr. Lewis beginning of November and get you flu shot  and A1C checked.    3. No medicine changes today.    I spent 30 minutes with this patient today. A copy of the visit note was provided to the patient's primary care provider.     Will follow up in 1-2 months.    The patient was mailed a summary of these recommendations as an after visit summary.    Tanna Diaz, PharmD  Medication Therapy Management Pharmacist

## 2017-10-11 NOTE — MR AVS SNAPSHOT
After Visit Summary   10/11/2017    Cayetano Lunsford    MRN: [id number]           Patient Information     Date Of Birth          1945        Visit Information        Provider Department      10/11/2017 11:00 AM Jessica Diaz [facility] MTM        Care Instructions    Recommendations from today's MTM visit:                                                      1. If you are going to have a snack before bed you can give 2 units of R with that if you are having at least 30 grams of carbs, especially if your blood sugars are over 200.    2. Schedule an annual exam with Dr. Lewis beginning of November and get you flu shot and A1C checked.    3. No medicine changes today.    Next MTM visit: 11/29/17 at 9:30 I will call you    To schedule another MTM appointment, please call the clinic directly or you may call the MTM scheduling line at [phone] or toll-free at [phone].     My Clinical Pharmacist's contact information:                                                      It was a pleasure seeing you today!  Please feel free to contact me with any questions or concerns you have.      Tanna Diaz, PharmD  Medication Therapy Management Pharmacist  [facility] - Monday 9:30 - 6:00 and Wednesday 7:30 - 4:00  Phone: [phone] - direct clinic line    You may receive a survey about the MTM services you received.  I would appreciate your feedback to help me serve you better in the future. Please fill it out and return it when you can. Your comments will be anonymous.                    Follow-ups after your visit        Your next 10 appointments already scheduled     Oct 12, 2017  1:15 PM CDT   Anticoagulation Visit with  INR CLINIC   [facility] ([facility])    [address] [place] [id number] [phone]            Nov 02, 2017  9:20 AM CDT   PHYSICAL with Debbie Lewis MD   [facility] ([facility]  "[facility]    [address] [place] [id number] [phone]            Nov 29, 2017  9:30 AM CST   TELEMEDICINE with Jessica Diaz RPH   [facility] ([facility])    [address] [place] [id number] [phone]           Note: this is not an onsite visit; there is no need to come to the facility.            Dec 05, 2017  9:00 AM CST   Return Visit with Bong Yoo MD   [facility] ([facility])    [address] [place] [address] [phone]              Who to contact     If you have questions or need follow up information about today's clinic visit or your schedule please contact [facility] directly at [phone].  Normal or non-critical lab and imaging results will be communicated to you by [employer], letter or phone within 4 business days after the clinic has received the results. If you do not hear from us within 7 days, please contact the clinic through [employer] or phone. If you have a critical or abnormal lab result, we will notify you by phone as soon as possible.  Submit refill requests through [employer] or call your pharmacy and they will forward the refill request to us. Please allow 3 business days for your refill to be completed.          Additional Information About Your Visit        [employer] Information     [employer] lets you send messages to your doctor, view your test results, renew your prescriptions, schedule appointments and more. To sign up, go to [email or web address] . Click on \"Log in\" on the left side of the screen, which will take you to the Welcome page. Then click on \"Sign up Now\" on the right side of the page.     You will be asked to enter the access code listed below, as well as some personal information. Please follow the directions to create your username and password.     Your access code is: [email or web address]  Expires: 1/9/2018  4:18 " PM     Your access code will  in 90 days. If you need help or a new code, please call your [facility] clinic or [phone].        Care EveryWhere ID     This is your Care EveryWhere ID. This could be used by other organizations to access your [facility] medical records  [id number]         Blood Pressure from Last 3 Encounters:   17 110/54   17 132/60   17 138/76    Weight from Last 3 Encounters:   17 201 lb 8 oz (91.4 kg)   17 201 lb 4 oz (91.3 kg)   17 199 lb (90.3 kg)              Today, you had the following     No orders found for display         Today's Medication Changes          These changes are accurate as of: 10/11/17  4:18 PM.  If you have any questions, ask your nurse or doctor.               These medicines have changed or have updated prescriptions.        Dose/Directions    insulin  UNIT/ML injection   Commonly known as:  NovoLIN N RELION   This may have changed:  additional instructions   Used for:  Type 2 diabetes mellitus with hypoglycemia without coma, with long-term current use of insulin (H)        Inject 6 units under the skin at breakfast and 15 units at dinner.   Quantity:  3 vial   Refills:  3                Primary Care Provider Office Phone # Fax #    Debbie Lewis [phone] [address]  [facility] [address]        Equal Access to Services     ZAHIRA ROSS : Hadeverardo akers Soflorencia, waaxda luqadaha, qaybta kaalmaerendira nj. So [facility] [phone].    ATENCIÓN: Si habla español, tiene a jurado disposición servicios gratuitos de asistencia lingüística. Llshannan al [phone].    We comply with applicable federal civil rights laws and [place] laws. We do not discriminate on the basis of race, color, national origin, age, disability, sex, sexual orientation, or gender identity.            Thank you!     Thank you for choosing [facility]  for your  "care. Our goal is always to provide you with excellent care. Hearing back from our patients is one way we can continue to improve our services. Please take a few minutes to complete the written survey that you may receive in the mail after your visit with us. Thank you!             Your Updated Medication List - Protect others around you: Learn how to safely use, store and throw away your medicines at www.disposemymeds.org.          This list is accurate as of: 10/11/17  4:18 PM.  Always use your most recent med list.                   Brand Name Dispense Instructions for use Diagnosis    albuterol 108 (90 BASE) MCG/ACT Inhaler    PROAIR HFA/PROVENTIL HFA/VENTOLIN HFA    1 Inhaler    Inhale 1-2 puffs into the lungs every 4 hours as needed (for shortness of breath/wheezing)    Chronic airway obstruction, not elsewhere classified       ASPIRIN NOT PRESCRIBED    INTENTIONAL     Reported on 5/17/2017        azelastine 0.1 % spray    ASTELIN    1 Bottle    Spray 1-2 sprays into both nostrils 2 times daily    Seasonal allergic rhinitis, unspecified allergic rhinitis trigger       B-D INSULIN SYRINGE 31G X 5/16\" 0.5 ML   Generic drug:  insulin syringe-needle U-100     100 each    USE 1 SYRINGE TWO TIMES A DAY OR AS DIRECTED    Type 2 diabetes, HbA1C goal < 8% (H)       BD ULTRA FINE PEN NEEDLES     100 each    Use to inject insulin twice daily.    Type 2 diabetes, HbA1C goal < 8% (H)       blood glucose monitoring lancets     100 Each    Use to test up to four times per day    Type II or unspecified type diabetes mellitus without mention of complication, not stated as uncontrolled       blood glucose monitoring test strip    no brand specified    360 each    Test 4 times daily or as directed. Profile Rx: patient will contact pharmacy when needed    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, with long-term current use of insulin (H)       FLORAJEN3 Caps     60 capsule    Take 1 capsule by mouth 2 times " daily    Acute cystitis without hematuria       FOLIC ACID PO      Take 1 mg by mouth daily        insulin  UNIT/ML injection    NovoLIN N RELION    3 vial    Inject 6 units under the skin at breakfast and 15 units at dinner.    Type 2 diabetes mellitus with hypoglycemia without coma, with long-term current use of insulin (H)       insulin regular 100 UNIT/ML injection    NovoLIN R RELION    10 mL    Inject 5 units with breakfast and 2 units with dinner (when mixing with NPH, draw this insulin up first)    Type 2 diabetes mellitus with hypoglycemia without coma, with long-term current use of insulin (H)       ipratropium - albuterol 0.5 mg/2.5 mg/3 mL 0.5-2.5 (3) MG/3ML neb solution    DUONEB    270 mL    NEBULIZE CONTENTS OF ONE VIAL BY MOUTH EVERY 4 HOURS AS NEEDED FOR SHORTNESS OF BREATH /DYSPNEA    Chronic obstructive bronchitis (H)       losartan 100 MG tablet    COZAAR    90 tablet    Take 1 tablet (100 mg) by mouth daily for hypertension.    Hypertension goal BP (blood pressure) < 140/90       metFORMIN 1000 MG tablet    GLUCOPHAGE    180 tablet    Take 1 tablet (1,000 mg) by mouth 2 times daily (with meals) with breakfast and dinner.    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, with long-term current use of insulin (H)       order for DME     1 each    Equipment being ordered: Nebulizer machine with mask and tubing    Chronic airway obstruction, not elsewhere classified       pantoprazole 40 MG EC tablet    PROTONIX     Take 40 mg by mouth daily Started by Dr. Debbie Rico at [facility]        primidone 50 MG tablet    MYSOLINE    270 tablet    Take 2 tablets twice a day and 3 tablets in the evening for 1 week, then go to 3 tablets twice a day (morning and evening) and take 2 tablets midday for 1 week, take 3 tablets three times a day, thereafter.    Essential tremor       propafenone 150 MG Tabs tablet    RYTHMOL    180 tablet    Take 1 tablet (150 mg) by mouth 2 times daily     Paroxysmal atrial fibrillation (H)       propranolol 40 MG tablet    INDERAL    180 tablet    Take 2-3 tablets ( mg) by mouth 2 times daily    Benign familial tremor       simvastatin 80 MG tablet    ZOCOR    30 tablet    Take 0.5 tablets (40 mg) by mouth At Bedtime Profile Rx: patient will contact pharmacy when needed    Hyperlipidemia LDL goal <100       STIOLTO RESPIMAT 2.5-2.5 MCG/ACT Aers   Generic drug:  tiotropium-olodaterol      Inhale 2 puffs into the lungs daily        sulfaSALAzine 500 MG tablet    AZULFIDINE    90 tablet    TAKE ONE TABLET BY MOUTH THREE TIMES A DAY    Ulcerative colitis, unspecified       warfarin 5 MG tablet    COUMADIN    120 tablet    Take as directed by Coumadin clinic    Atrial fibrillation, unspecified type (H)

## 2017-10-11 NOTE — PATIENT INSTRUCTIONS
Recommendations from today's MTM visit:                                                      1. If you are going to have a snack before bed you can give 2 units of R with that if you are having at least 30 grams of carbs, especially if your blood sugars are over 200.    2. Schedule an annual exam with Dr. Lewis beginning of November and get you flu shot and A1C checked.    3. No medicine changes today.    Next MTM visit: 11/29/17 at 9:30 I will call you    To schedule another MTM appointment, please call the clinic directly or you may call the MTM scheduling line at [phone] or toll-free at [phone].     My Clinical Pharmacist's contact information:                                                      It was a pleasure seeing you today!  Please feel free to contact me with any questions or concerns you have.      Tanna Diaz, PharmD  Medication Therapy Management Pharmacist  [facility] - Monday 9:30 - 6:00 and Wednesday 7:30 - 4:00  Phone: [phone] - direct clinic line    You may receive a survey about the MTM services you received.  I would appreciate your feedback to help me serve you better in the future. Please fill it out and return it when you can. Your comments will be anonymous.

## 2017-10-12 ENCOUNTER — ANTICOAGULATION THERAPY VISIT (OUTPATIENT)
Dept: NURSING | Facility: CLINIC | Age: 72
End: 2017-10-12
Payer: COMMERCIAL

## 2017-10-12 DIAGNOSIS — Z79.01 LONG-TERM (CURRENT) USE OF ANTICOAGULANTS: ICD-10-CM

## 2017-10-12 DIAGNOSIS — I48.91 ATRIAL FIBRILLATION, UNSPECIFIED TYPE (H): ICD-10-CM

## 2017-10-12 LAB — INR POINT OF CARE: 2.4 (ref 0.86–1.14)

## 2017-10-12 PROCEDURE — 36416 COLLJ CAPILLARY BLOOD SPEC: CPT

## 2017-10-12 PROCEDURE — 99207 ZZC NO CHARGE NURSE ONLY: CPT

## 2017-10-12 PROCEDURE — 85610 PROTHROMBIN TIME: CPT | Mod: QW

## 2017-10-12 NOTE — PROGRESS NOTES
ANTICOAGULATION FOLLOW-UP CLINIC VISIT    Patient Name:  Cayetano Lunsford  Date:  10/12/2017  Contact Type:  Face to Face    SUBJECTIVE:     Patient Findings     Positives No Problem Findings           OBJECTIVE    INR Protime   Date Value Ref Range Status   10/12/2017 2.4 (A) 0.86 - 1.14 Final       ASSESSMENT / PLAN  INR assessment THER    Recheck INR In: 3 WEEKS    INR Location Clinic      Anticoagulation Summary as of 10/12/2017     INR goal 2.0-3.0   Today's INR 2.4   Maintenance plan 10 mg (5 mg x 2) on Mon, Thu, Sat; 7.5 mg (5 mg x 1.5) all other days   Full instructions 10 mg on Mon, Thu, Sat; 7.5 mg all other days   Weekly total 60 mg   No change documented Kirstin Meléndez RN   Plan last modified Estelle Prado (9/20/2017)   Next INR check 11/2/2017   Priority INR   Target end date     Indications   Long-term (current) use of anticoagulants [Z79.01] [Z79.01]  Atrial fibrillation (H) [I48.91]         Anticoagulation Episode Summary     INR check location     Preferred lab     Send INR reminders to [facility]    Comments       Anticoagulation Care Providers     Provider Role Specialty Phone number    Debbie Lewis MD [facility] [phone]            See the Encounter Report to view Anticoagulation Flowsheet and Dosing Calendar (Go to Encounters tab in chart review, and find the Anticoagulation Therapy Visit)    Patient made aware if signs of clotting (pain, tenderness, swelling, or color change in any extremity) AND/OR bleeding occur (nosebleeds, bleeding gums, bruising, or blood in stool or urine) to notify provider & seek medical attention. If severe symptoms develop, such as major bleeding, chest pain, shortness of breath, fall, trauma or s/s of stroke, patient to call 911 immediately.       Kirstin Meléndez, MANOJ

## 2017-10-12 NOTE — MR AVS SNAPSHOT
Cayetano Lunsford   10/12/2017 1:15 PM   Anticoagulation Therapy Visit    Description:  72 year old male   Provider:   INR CLINIC   Department:   Nurse           INR as of 10/12/2017     Today's INR 2.4      Anticoagulation Summary as of 10/12/2017     INR goal 2.0-3.0   Today's INR 2.4   Full instructions 10 mg on Mon, Thu, Sat; 7.5 mg all other days   Next INR check 11/2/2017    Indications   Long-term (current) use of anticoagulants [Z79.01] [Z79.01]  Atrial fibrillation (H) [I48.91]         Your next Anticoagulation Clinic appointment(s)     Nov 02, 2017 10:00 AM CDT   Anticoagulation Visit with  INR CLINIC   [facility] ([facility])    [address] [place] [address] [phone]              Contact Numbers     [facility]  Please call [phone] to cancel and/or reschedule your appointment   Please call [phone] with any problems or questions regarding your therapy.        October 2017 Details    Sun Mon Tue Wed Thu Fri Sat     1               2               3               4               5               6               7                 8               9               10               11               12      10 mg   See details      13      7.5 mg         14      10 mg           15      7.5 mg         16      10 mg         17      7.5 mg         18      7.5 mg         19      10 mg         20      7.5 mg         21      10 mg           22      7.5 mg         23      10 mg         24      7.5 mg         25      7.5 mg         26      10 mg         27      7.5 mg         28      10 mg           29      7.5 mg         30      10 mg         31      7.5 mg              Date Details   10/12 This INR check               How to take your warfarin dose     To take:  7.5 mg Take 1.5 of the 5 mg tablets.    To take:  10 mg Take 2 of the 5 mg tablets.           November 2017 Details    Sun Mon Tue Wed Thu Fri Sat        1      7.5 mg         2             3               4                 5               6               7               8               9               10               11                 12               13               14               15               16               17               18                 19               20               21               22               23               24               25                 26               27               28               29               30                  Date Details   No additional details    Date of next INR:  11/2/2017         How to take your warfarin dose     To take:  7.5 mg Take 1.5 of the 5 mg tablets.    To take:  10 mg Take 2 of the 5 mg tablets.

## 2017-10-16 DIAGNOSIS — E11.9 TYPE 2 DIABETES, HBA1C GOAL < 8% (H): ICD-10-CM

## 2017-10-16 NOTE — TELEPHONE ENCOUNTER
"Medication Detail      Disp Refills Start End SERJIO   B-D INSULIN SYRINGE 31G X 5/16\" 0.5  each 1 7/25/2017  No   Sig: USE 1 SYRINGE TWO TIMES A DAY OR AS DIRECTED       [facility] Office Visit with [facility] prescribing provider: 7/13/17  Future Office visit:    Next 5 appointments (look out 90 days)     Nov 02, 2017  9:20 AM CDT   PHYSICAL with Debbie Lewis MD   [facility] ([facility])    [address] [place] [id number] [phone]            Dec 05, 2017  9:00 AM CST   Return Visit with Bong Yoo MD   [facility] ([facility])    [address] [place] [address] [phone]                   Routing refill request to provider for review/approval because:  Drug not on the [facility] or [facility] refill protocol or controlled substance    "

## 2017-10-18 RX ORDER — SYRINGE-NEEDLE,INSULIN,0.5 ML 31 GX5/16"
SYRINGE, EMPTY DISPOSABLE MISCELLANEOUS
Qty: 100 EACH | Refills: 1 | Status: SHIPPED | OUTPATIENT
Start: 2017-10-18 | End: 2018-01-08

## 2017-11-02 ENCOUNTER — OFFICE VISIT (OUTPATIENT)
Dept: FAMILY MEDICINE | Facility: CLINIC | Age: 72
End: 2017-11-02
Payer: COMMERCIAL

## 2017-11-02 ENCOUNTER — ANTICOAGULATION THERAPY VISIT (OUTPATIENT)
Dept: NURSING | Facility: CLINIC | Age: 72
End: 2017-11-02
Payer: COMMERCIAL

## 2017-11-02 VITALS
SYSTOLIC BLOOD PRESSURE: 144 MMHG | RESPIRATION RATE: 12 BRPM | HEIGHT: 66 IN | OXYGEN SATURATION: 93 % | DIASTOLIC BLOOD PRESSURE: 64 MMHG | BODY MASS INDEX: 31.74 KG/M2 | WEIGHT: 197.5 LBS | HEART RATE: 61 BPM | TEMPERATURE: 98.3 F

## 2017-11-02 DIAGNOSIS — I25.10 CAD IN NATIVE ARTERY: ICD-10-CM

## 2017-11-02 DIAGNOSIS — Z79.4 TYPE 2 DIABETES MELLITUS WITH STAGE 1 CHRONIC KIDNEY DISEASE, WITH LONG-TERM CURRENT USE OF INSULIN (H): ICD-10-CM

## 2017-11-02 DIAGNOSIS — N18.1 TYPE 2 DIABETES MELLITUS WITH STAGE 1 CHRONIC KIDNEY DISEASE, WITH LONG-TERM CURRENT USE OF INSULIN (H): ICD-10-CM

## 2017-11-02 DIAGNOSIS — R30.0 DYSURIA: ICD-10-CM

## 2017-11-02 DIAGNOSIS — Z23 NEED FOR PROPHYLACTIC VACCINATION AND INOCULATION AGAINST INFLUENZA: ICD-10-CM

## 2017-11-02 DIAGNOSIS — Z79.01 LONG-TERM (CURRENT) USE OF ANTICOAGULANTS: ICD-10-CM

## 2017-11-02 DIAGNOSIS — R82.90 NONSPECIFIC FINDING ON EXAMINATION OF URINE: ICD-10-CM

## 2017-11-02 DIAGNOSIS — I48.91 ATRIAL FIBRILLATION, UNSPECIFIED TYPE (H): ICD-10-CM

## 2017-11-02 DIAGNOSIS — J44.9 CHRONIC OBSTRUCTIVE PULMONARY DISEASE, UNSPECIFIED COPD TYPE (H): ICD-10-CM

## 2017-11-02 DIAGNOSIS — E78.5 HYPERLIPIDEMIA LDL GOAL <100: ICD-10-CM

## 2017-11-02 DIAGNOSIS — N30.01 ACUTE CYSTITIS WITH HEMATURIA: ICD-10-CM

## 2017-11-02 DIAGNOSIS — E11.22 TYPE 2 DIABETES MELLITUS WITH STAGE 1 CHRONIC KIDNEY DISEASE, WITH LONG-TERM CURRENT USE OF INSULIN (H): ICD-10-CM

## 2017-11-02 DIAGNOSIS — G25.0 BENIGN FAMILIAL TREMOR: ICD-10-CM

## 2017-11-02 DIAGNOSIS — K51.90 ULCERATIVE COLITIS WITHOUT COMPLICATIONS, UNSPECIFIED LOCATION (H): ICD-10-CM

## 2017-11-02 DIAGNOSIS — H91.90 HEARING LOSS, UNSPECIFIED HEARING LOSS TYPE, UNSPECIFIED LATERALITY: ICD-10-CM

## 2017-11-02 DIAGNOSIS — I10 HYPERTENSION GOAL BP (BLOOD PRESSURE) < 140/90: ICD-10-CM

## 2017-11-02 DIAGNOSIS — Z00.00 MEDICARE ANNUAL WELLNESS VISIT, INITIAL: Primary | ICD-10-CM

## 2017-11-02 LAB
ALBUMIN UR-MCNC: >=300 MG/DL
APPEARANCE UR: CLEAR
BACTERIA #/AREA URNS HPF: ABNORMAL /HPF
BILIRUB UR QL STRIP: NEGATIVE
COLOR UR AUTO: YELLOW
GLUCOSE UR STRIP-MCNC: NEGATIVE MG/DL
HBA1C MFR BLD: 6 % (ref 4.3–6)
HGB UR QL STRIP: ABNORMAL
INR POINT OF CARE: 1.8 (ref 0.86–1.14)
KETONES UR STRIP-MCNC: NEGATIVE MG/DL
LEUKOCYTE ESTERASE UR QL STRIP: ABNORMAL
NITRATE UR QL: POSITIVE
PH UR STRIP: 5.5 PH (ref 5–7)
RBC #/AREA URNS AUTO: ABNORMAL /HPF
SOURCE: ABNORMAL
SP GR UR STRIP: 1.02 (ref 1–1.03)
UROBILINOGEN UR STRIP-ACNC: 0.2 EU/DL (ref 0.2–1)
WBC #/AREA URNS AUTO: >100 /HPF

## 2017-11-02 PROCEDURE — 99213 OFFICE O/P EST LOW 20 MIN: CPT | Mod: 25 | Performed by: FAMILY MEDICINE

## 2017-11-02 PROCEDURE — 87086 URINE CULTURE/COLONY COUNT: CPT | Performed by: FAMILY MEDICINE

## 2017-11-02 PROCEDURE — 83036 HEMOGLOBIN GLYCOSYLATED A1C: CPT | Performed by: FAMILY MEDICINE

## 2017-11-02 PROCEDURE — 81001 URINALYSIS AUTO W/SCOPE: CPT | Performed by: FAMILY MEDICINE

## 2017-11-02 PROCEDURE — 36416 COLLJ CAPILLARY BLOOD SPEC: CPT

## 2017-11-02 PROCEDURE — 99207 ZZC NO CHARGE NURSE ONLY: CPT

## 2017-11-02 PROCEDURE — 85610 PROTHROMBIN TIME: CPT | Mod: QW

## 2017-11-02 PROCEDURE — 90662 IIV NO PRSV INCREASED AG IM: CPT | Performed by: FAMILY MEDICINE

## 2017-11-02 PROCEDURE — G0008 ADMIN INFLUENZA VIRUS VAC: HCPCS | Performed by: FAMILY MEDICINE

## 2017-11-02 PROCEDURE — G0438 PPPS, INITIAL VISIT: HCPCS | Performed by: FAMILY MEDICINE

## 2017-11-02 RX ORDER — SULFAMETHOXAZOLE/TRIMETHOPRIM 800-160 MG
1 TABLET ORAL 2 TIMES DAILY
Qty: 14 TABLET | Refills: 0 | Status: SHIPPED | OUTPATIENT
Start: 2017-11-02 | End: 2017-11-08

## 2017-11-02 RX ORDER — CYANOCOBALAMIN (VITAMIN B-12) 500 MCG
1 TABLET ORAL DAILY
Qty: 90 CAPSULE | Refills: 3 | Status: SHIPPED | OUTPATIENT
Start: 2017-11-02 | End: 2017-11-06

## 2017-11-02 RX ORDER — SIMVASTATIN 80 MG
40 TABLET ORAL AT BEDTIME
Qty: 45 TABLET | Refills: 3 | Status: SHIPPED | OUTPATIENT
Start: 2017-11-02 | End: 2018-11-05

## 2017-11-02 RX ORDER — WARFARIN SODIUM 5 MG/1
TABLET ORAL
Qty: 155 TABLET | Refills: 1 | Status: SHIPPED | OUTPATIENT
Start: 2017-11-02 | End: 2018-04-05

## 2017-11-02 RX ORDER — ALBUTEROL SULFATE 90 UG/1
1-2 AEROSOL, METERED RESPIRATORY (INHALATION) EVERY 4 HOURS PRN
Qty: 1 INHALER | Refills: 5 | Status: SHIPPED | OUTPATIENT
Start: 2017-11-02

## 2017-11-02 RX ORDER — LOSARTAN POTASSIUM 100 MG/1
100 TABLET ORAL DAILY
Qty: 90 TABLET | Refills: 3 | Status: SHIPPED | OUTPATIENT
Start: 2017-11-02 | End: 2019-01-07

## 2017-11-02 NOTE — MR AVS SNAPSHOT
After Visit Summary   11/2/2017    Cayetano Lunsford    MRN: [id number]           Patient Information     Date Of Birth          1945        Visit Information        Provider Department      11/2/2017 9:20 AM Debbie Lewis MD [facility]        Today's Diagnoses Medicare annual wellness visit, initial    -  1    Hypertension goal BP (blood pressure) < 140/90        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, with long-term current use of insulin (H)        Hyperlipidemia LDL goal <100        Ulcerative colitis without complications, unspecified location (H)        Atrial fibrillation, unspecified type (H)        CAD in native artery        Long-term (current) use of anticoagulants [Z79.01]        Chronic obstructive pulmonary disease, unspecified COPD type (H)        Hearing loss, unspecified hearing loss type, unspecified laterality        Benign familial tremor        Dysuria        Acute cystitis with hematuria        Nonspecific finding on examination of urine          Care Instructions    Start the bactrim (antibiotic) for your urinary tract infection. You should take this twice daily for 7 days.   Follow up with INR clinic early next week. The antibiotic can cause an increase in your INR and risk of bleeding.     Preventive Health Recommendations:       Male Ages 65 and over    Yearly exam:             See your health care provider every year in order to  o   Review health changes.   o   Discuss preventive care.    o   Review your medicines if your doctor has prescribed any.    Talk with your health care provider about whether you should have a test to screen for prostate cancer (PSA).    Every 3 years, have a diabetes test (fasting glucose). If you are at risk for diabetes, you should have this test more often.    Every 5 years, have a cholesterol test. Have this test more often if you are at risk for high cholesterol or heart disease.     Every 10  years, have a colonoscopy. Or, have a yearly FIT test (stool test). These exams will check for colon cancer.    Talk to with your health care provider about screening for Abdominal Aortic Aneurysm if you have a family history of AAA or have a history of smoking.  Shots:     Get a flu shot each year.     Get a tetanus shot every 10 years.     Talk to your doctor about your pneumonia vaccines. There are now two you should receive - Pneumovax (PPSV 23) and Prevnar (PCV 13).    Talk to your doctor about a shingles vaccine.     Talk to your doctor about the hepatitis B vaccine.  Nutrition:     Eat at least 5 servings of fruits and vegetables each day.     Eat whole-grain bread, whole-wheat pasta and brown rice instead of white grains and rice.     Talk to your doctor about Calcium and Vitamin D.   Lifestyle    Exercise for at least 150 minutes a week (30 minutes a day, 5 days a week). This will help you control your weight and prevent disease.     Limit alcohol to one drink per day.     No smoking.     Wear sunscreen to prevent skin cancer.     See your dentist every six months for an exam and cleaning.     See your eye doctor every 1 to 2 years to screen for conditions such as glaucoma, macular degeneration and cataracts.          Follow-ups after your visit        Your next 10 appointments already scheduled     Nov 06, 2017 11:15 AM CST   Anticoagulation Visit with  INR CLINIC   [facility] ([facility]    [address] [place] [id number] [phone]            Nov 27, 2017 10:15 AM CST   Anticoagulation Visit with  INR [facility] ([facility])    [address] [place] [id number] [phone]            Nov 29, 2017  9:30 AM CST   TELEMEDICINE with Jessica Diaz [facility] ([facility])    [address] [place] [id number] [phone]           Note:  "this is not an onsite visit; there is no need to come to the facility.            Dec 05, 2017  9:00 AM CST   Return Visit with Bong Yoo MD   [facility]    [address] [place] [id number] [phone]              Who to contact     If you have questions or need follow up information about today's clinic visit or your schedule please contact [facility] directly at [phone].  Normal or non-critical lab and imaging results will be communicated to you by Mobile Multimediahart, letter or phone within 4 business days after the clinic has received the results. If you do not hear from us within 7 days, please contact the clinic through Mobile Multimediahart or phone. If you have a critical or abnormal lab result, we will notify you by phone as soon as possible.  Submit refill requests through Chicory or call your pharmacy and they will forward the refill request to us. Please allow 3 business days for your refill to be completed.          Additional Information About Your Visit        MyChart Information     Chicory lets you send messages to your doctor, view your test results, renew your prescriptions, schedule appointments and more. To sign up, go to [email or web address] . Click on \"Log in\" on the left side of the screen, which will take you to the Welcome page. Then click on \"Sign up Now\" on the right side of the page.     You will be asked to enter the access code listed below, as well as some personal information. Please follow the directions to create your username and password.     Your access code is: [email or web address]  Expires: 2018  4:18 PM     Your access code will  in 90 days. If you need help or a new code, please call your [facility] or [phone].        Care EveryWhere ID     This is your Care EveryWhere ID. This could be used by other organizations to access your [place] medical records  [id number]        Your " "Vitals Were     Pulse Temperature Respirations Height Pulse Oximetry BMI (Body Mass Index)    61 98.3  F (36.8  C) (Oral) 12 5' 6\" (1.676 m) 93% 31.88 kg/m2       Blood Pressure from Last 3 Encounters:   11/02/17 144/64   09/05/17 110/54   07/13/17 132/60    Weight from Last 3 Encounters:   11/02/17 197 lb 8 oz (89.6 kg)   09/05/17 201 lb 8 oz (91.4 kg)   07/13/17 201 lb 4 oz (91.3 kg)              We Performed the Following     Hemoglobin A1c     OFFICE/OUTPT VISIT,EST,LEVL III     UA with Microscopic reflex to Culture     Urine Culture Aerobic Bacterial          Today's Medication Changes          These changes are accurate as of: 11/2/17 10:39 AM.  If you have any questions, ask your nurse or doctor.               Start taking these medicines.        Dose/Directions    sulfamethoxazole-trimethoprim 800-160 MG per tablet   Commonly known as:  BACTRIM DS/SEPTRA DS   Used for:  Acute cystitis with hematuria   Started by:  Debbie Lewis MD        Dose:  1 tablet   Take 1 tablet by mouth 2 times daily for 7 days   Quantity:  14 tablet   Refills:  0         These medicines have changed or have updated prescriptions.        Dose/Directions    folic acid 0.8 MG Caps   This may have changed:  medication strength   Used for:  Ulcerative colitis without complications, unspecified location (H)   Changed by:  Debbie Lewis MD        Dose:  1 mg   Take 1 mg by mouth daily   Quantity:  90 capsule   Refills:  3       insulin  UNIT/ML injection   Commonly known as:  NovoLIN N RELION   This may have changed:  additional instructions   Used for:  Type 2 diabetes mellitus with hypoglycemia without coma, with long-term current use of insulin (H)        Inject 6 units under the skin at breakfast and 15 units at dinner.   Quantity:  3 vial   Refills:  3       warfarin 5 MG tablet   Commonly known as:  COUMADIN   This may have changed:  additional instructions   Used for:  Atrial fibrillation, unspecified type (H)        " Take as directed by Coumadin clinic. Current dose (11/2/17): 7.5 mg on Mon, Wed, Fri + 10 mg all other days.   Quantity:  155 tablet   Refills:  1            Where to get your medicines      These medications were sent to [facility] [place], [place] - [address]  [address], [facility] [address]     Phone:  [phone]     albuterol 108 (90 BASE) MCG/ACT Inhaler    folic acid 0.8 MG Caps    losartan 100 MG tablet    simvastatin 80 MG tablet    sulfamethoxazole-trimethoprim 800-160 MG per tablet    warfarin 5 MG tablet                Primary Care Provider Office Phone # Fax #    Debbie Lewis [phone]       [address]  [facility] [address]        Equal Access to Services     ZAHIRA ROSS : Snow lynno Soflorencia, waaxda luqadaha, qaybta kaalmada adeashkanyacory, erendira pugh . So [facility] [phone].    ATENCIÓN: Si habla español, tiene a jurado disposición servicios gratuitos de asistencia lingüística. Llame al [phone].    We comply with applicable federal civil rights laws and [place] laws. We do not discriminate on the basis of race, color, national origin, age, disability, sex, sexual orientation, or gender identity.            Thank you!     Thank you for choosing [facility]  for your care. Our goal is always to provide you with excellent care. Hearing back from our patients is one way we can continue to improve our services. Please take a few minutes to complete the written survey that you may receive in the mail after your visit with us. Thank you!             Your Updated Medication List - Protect others around you: Learn how to safely use, store and throw away your medicines at www.disposemymeds.org.          This list is accurate as of: 11/2/17 10:39 AM.  Always use your most recent med list.                   Brand Name Dispense Instructions for use Diagnosis    albuterol 108 (90 BASE) MCG/ACT Inhaler     "PROAIR HFA/PROVENTIL HFA/VENTOLIN HFA    1 Inhaler    Inhale 1-2 puffs into the lungs every 4 hours as needed (for shortness of breath/wheezing)    Chronic obstructive pulmonary disease, unspecified COPD type (H)       ASPIRIN NOT PRESCRIBED    INTENTIONAL     Reported on 5/17/2017        azelastine 0.1 % spray    ASTELIN    1 Bottle    Spray 1-2 sprays into both nostrils 2 times daily    Seasonal allergic rhinitis, unspecified allergic rhinitis trigger       B-D INSULIN SYRINGE 31G X 5/16\" 0.5 ML   Generic drug:  insulin syringe-needle U-100     100 each    USE 1 SYRINGE TWO TIMES A DAY OR AS DIRECTED    Type 2 diabetes, HbA1C goal < 8% (H)       BD ULTRA FINE PEN NEEDLES     100 each    Use to inject insulin twice daily.    Type 2 diabetes, HbA1C goal < 8% (H)       blood glucose monitoring lancets     100 Each    Use to test up to four times per day    Type II or unspecified type diabetes mellitus without mention of complication, not stated as uncontrolled       blood glucose monitoring test strip    no brand specified    360 each    Test 4 times daily or as directed. Profile Rx: patient will contact pharmacy when needed    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, with long-term current use of insulin (H)       FLORAJEN3 Caps     60 capsule    Take 1 capsule by mouth 2 times daily    Acute cystitis without hematuria       folic acid 0.8 MG Caps     90 capsule    Take 1 mg by mouth daily    Ulcerative colitis without complications, unspecified location (H)       insulin  UNIT/ML injection    NovoLIN N RELION    3 vial    Inject 6 units under the skin at breakfast and 15 units at dinner.    Type 2 diabetes mellitus with hypoglycemia without coma, with long-term current use of insulin (H)       insulin regular 100 UNIT/ML injection    NovoLIN R RELION    10 mL    Inject 5 units with breakfast and 2 units with dinner (when mixing with NPH, draw this insulin up first)    Type 2 diabetes " mellitus with hypoglycemia without coma, with long-term current use of insulin (H)       ipratropium - albuterol 0.5 mg/2.5 mg/3 mL 0.5-2.5 (3) MG/3ML neb solution    DUONEB    270 mL    NEBULIZE CONTENTS OF ONE VIAL BY MOUTH EVERY 4 HOURS AS NEEDED FOR SHORTNESS OF BREATH /DYSPNEA    Chronic obstructive bronchitis (H)       losartan 100 MG tablet    COZAAR    90 tablet    Take 1 tablet (100 mg) by mouth daily for hypertension.    Hypertension goal BP (blood pressure) < 140/90       metFORMIN 1000 MG tablet    GLUCOPHAGE    180 tablet    Take 1 tablet (1,000 mg) by mouth 2 times daily (with meals) with breakfast and dinner.    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, with long-term current use of insulin (H)       order for DME     1 each    Equipment being ordered: Nebulizer machine with mask and tubing    Chronic airway obstruction, not elsewhere classified       pantoprazole 40 MG EC tablet    PROTONIX     Take 40 mg by mouth daily Started by Dr. Debbie Rico at [facility]        primidone 50 MG tablet    MYSOLINE    270 tablet    Take 2 tablets twice a day and 3 tablets in the evening for 1 week, then go to 3 tablets twice a day (morning and evening) and take 2 tablets midday for 1 week, take 3 tablets three times a day, thereafter.    Essential tremor       propafenone 150 MG Tabs tablet    RYTHMOL    180 tablet    Take 1 tablet (150 mg) by mouth 2 times daily    Paroxysmal atrial fibrillation (H)       propranolol 40 MG tablet    INDERAL    180 tablet    Take 2-3 tablets ( mg) by mouth 2 times daily    Benign familial tremor       simvastatin 80 MG tablet    ZOCOR    45 tablet    Take 0.5 tablets (40 mg) by mouth At Bedtime Profile Rx: patient will contact pharmacy when needed    Hyperlipidemia LDL goal <100       STIOLTO RESPIMAT 2.5-2.5 MCG/ACT Aers   Generic drug:  tiotropium-olodaterol      Inhale 2 puffs into the lungs daily        sulfamethoxazole-trimethoprim 800-160 MG per  tablet    BACTRIM DS/SEPTRA DS    14 tablet    Take 1 tablet by mouth 2 times daily for 7 days    Acute cystitis with hematuria       sulfaSALAzine 500 MG tablet    AZULFIDINE    90 tablet    TAKE ONE TABLET BY MOUTH THREE TIMES A DAY    Ulcerative colitis, unspecified       warfarin 5 MG tablet    COUMADIN    155 tablet    Take as directed by Coumadin clinic. Current dose (11/2/17): 7.5 mg on Mon, Wed, Fri + 10 mg all other days.    Atrial fibrillation, unspecified type (H)

## 2017-11-02 NOTE — PROGRESS NOTES
SUBJECTIVE:   Cayetano Lunsford is a 72 year old male who presents for Preventive Visit.  Are  you in the first 12 months of your Medicare Part B coverage?  No    Healthy Habits:    Do you get at least three servings of calcium containing foods daily (dairy, green leafy vegetables, etc.)? yes    Amount of exercise or daily activities, outside of work: 2 day(s) per week    Problems taking medications regularly No    Medication side effects: No    Have you had an eye exam in the past two years? yes    Do you see a dentist twice per year? no    Do you have sleep apnea, excessive snoring or daytime drowsiness?no    COGNITIVE SCREEN  1) Repeat 3 items (Banana, Sunrise, Chair)    2) Clock draw: NORMAL  3) 3 item recall: Recalls 3 objects  Results: 3 items recalled: COGNITIVE IMPAIRMENT LESS LIKELY  Mini-CogTM Copyright S Verenice. Licensed by the author for use in [facility]; reprinted with permission (marlene@[facility]). All rights reserved.        Urinary symptoms - Last weekend he began having urinary urgency, frequency, and pressure with urination. Denies blood in the urine, redness, swelling, sores, back pain or fevers. His last UTI was over a year ago.    Diabetes - He is seeing Jessica Diaz for medication adjustments. He has had a couple low blood sugars,  as he sometimes forgets to eat lunch. His blood sugar this morning was a little high. He is fasting and had a couple cookies last night around 11 pm.      Breathing - His breathing is stable and he continues to see [facility] for follow up. Last visit was 7/10/17. He is able to take 2- 20 minute walks a day.      Reviewed and updated as needed this visit by clinical staff  Tobacco  Allergies  Meds  Med Hx  Surg Hx  Fam Hx  Soc Hx      Reviewed and updated as needed this visit by Provider        Social History   Substance Use Topics     Smoking status: Former Smoker     Packs/day: 1.00     Years: 30.00     Types: Cigarettes     Quit date: 3/19/1992      Smokeless tobacco: Never Used     Alcohol use 0.0 oz/week     0 Standard drinks or equivalent per week      Comment: hardly at all, sometimes at dinner     The patient does not drink >3 drinks per day nor >7 drinks per week.    Today's PHQ-2 Score:   PHQ-2 ( 1999 [employer]) 11/2/2017 6/20/2017   Q1: Little interest or pleasure in doing things 0 0   Q2: Feeling down, depressed or hopeless 0 0   PHQ-2 Score 0 0     Do you feel safe in your environment - Yes    Do you have a Health Care Directive?: No: Advance care planning reviewed with patient; information given to patient to review.    Current providers sharing in care for this patient include: Patient Care Team:  Debbie Lewis MD as PCP - General (Family Practice)      Hearing impairment: No    Ability to successfully perform activities of daily living: Yes, no assistance needed     Fall risk:  Fallen 2 or more times in the past year?: No  Any fall with injury in the past year?: No    Home safety:  none identified    The following health maintenance items are reviewed in Epic and correct as of today:  Health Maintenance   Topic Date Due     OP ANNUAL INR REFERRAL  02/13/2015     INFLUENZA VACCINE (SYSTEM ASSIGNED)  09/01/2017     A1C Q6 MO  11/08/2017     COPD ACTION PLAN Q1 YR  01/18/2018     FOOT EXAM Q1 YEAR  03/13/2018     CREATININE Q1 YEAR  03/13/2018     FALL RISK ASSESSMENT  03/15/2018     LIPID MONITORING Q1 YEAR  05/08/2018     MICROALBUMIN Q1 YEAR  06/20/2018     EYE EXAM Q1 YEAR  08/10/2018     TSH W/ FREE T4 REFLEX Q2 YEAR  03/13/2019     ADVANCE DIRECTIVE PLANNING Q5 YRS  02/14/2022     TETANUS IMMUNIZATION (SYSTEM ASSIGNED)  08/05/2024     COLON CANCER SCREEN (SYSTEM ASSIGNED)  05/26/2026     SPIROMETRY WITH 1 YR FOLLOW UP  Completed     SPIROMETRY ONETIME  Completed     PNEUMOCOCCAL  Completed     AORTIC ANEURYSM SCREENING (SYSTEM ASSIGNED)  Completed     BP Readings from Last 3 Encounters:   11/02/17 144/64   09/05/17 110/54   07/13/17 132/60     Wt Readings from Last 3 Encounters:   11/02/17 89.6 kg (197 lb 8 oz)   09/05/17 91.4 kg (201 lb 8 oz)   07/13/17 91.3 kg (201 lb 4 oz)         Patient Active Problem List   Diagnosis     Ulcerative colitis without complications (HCC)     Atrial fibrillation (H)     Chronic obstructive pulmonary disease (H)     Obesity     Eczema     HYPERLIPIDEMIA LDL GOAL <100     Advance Care Planning     Benign familial tremor     Hypertension goal BP (blood pressure) < 140/90     Cramp of limb     BPH (benign prostatic hyperplasia)     Pain in joint, lower leg     CAD in native artery     Hard of hearing     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)     Diverticulitis of colon     History of colonic polyps     Redundant colon     Long-term (current) use of anticoagulants [Z79.01]     Past Surgical History:   Procedure Laterality Date     COLONOSCOPY  3/31/2011     CORONARY ANGIOGRAPHY ADULT ORDER  2001 and 2008 2001 at [facility]. 2008- No interventions     HC REMOVAL OF NAIL PLATE SIMPLE SINGLE  11/10/2011    Right 2nd Toenail     SURGICAL HISTORY OF -   1987    right ear graft     SURGICAL HISTORY OF -   1992    right shoulder surgery     SURGICAL HISTORY OF -   1979    back surgery     SURGICAL HISTORY OF -   1978    vasectomy       Social History   Substance Use Topics     Smoking status: Former Smoker     Packs/day: 1.00     Years: 30.00     Types: Cigarettes     Quit date: 3/19/1992     Smokeless tobacco: Never Used     Alcohol use 0.0 oz/week     0 Standard drinks or equivalent per week      Comment: hardly at all, sometimes at dinner     Family History   Problem Relation Age of Onset     Circulatory Mother      blood clot in leg     DIABETES Mother      type 2     Allergies Mother      Arthritis Mother      GASTROINTESTINAL DISEASE Mother      Neurologic Disorder Mother      Familiar tremors     Neurologic Disorder Father      brain tumor     CEREBROVASCULAR DISEASE Paternal Grandfather       "Hypertension Brother      Hypertension Sister      DIABETES Sister      Type 2     Allergies Sister      Lipids Brother      Lipids Sister      Neurologic Disorder Sister      \"     Neurologic Disorder Sister      \"     Neurologic Disorder Sister      \"         Current Outpatient Prescriptions   Medication Sig Dispense Refill     losartan (COZAAR) 100 MG tablet Take 1 tablet (100 mg) by mouth daily for hypertension. 90 tablet 3     simvastatin (ZOCOR) 80 MG tablet Take 0.5 tablets (40 mg) by mouth At Bedtime Profile Rx: patient will contact pharmacy when needed 45 tablet 3     folic acid 0.8 MG CAPS Take 1 mg by mouth daily 90 capsule 3     albuterol (PROAIR HFA/PROVENTIL HFA/VENTOLIN HFA) 108 (90 BASE) MCG/ACT Inhaler Inhale 1-2 puffs into the lungs every 4 hours as needed (for shortness of breath/wheezing) 1 Inhaler 5     B-D INSULIN SYRINGE 31G X 5/16\" 0.5 ML USE 1 SYRINGE TWO TIMES A DAY OR AS DIRECTED 100 each 1     primidone (MYSOLINE) 50 MG tablet Take 2 tablets twice a day and 3 tablets in the evening for 1 week, then go to 3 tablets twice a day (morning and evening) and take 2 tablets midday for 1 week, take 3 tablets three times a day, thereafter. 270 tablet 2     insulin regular (NOVOLIN R RELION) 100 UNIT/ML injection Inject 5 units with breakfast and 2 units with dinner (when mixing with NPH, draw this insulin up first) 10 mL 2     tiotropium-olodaterol (STIOLTO RESPIMAT) 2.5-2.5 MCG/ACT AERS Inhale 2 puffs into the lungs daily       insulin NPH (NOVOLIN N RELION) 100 UNIT/ML injection Inject 6 units under the skin at breakfast and 15 units at dinner. (Patient taking differently: Inject 5 units under the skin at breakfast and 15 units at dinner.) 3 vial 3     warfarin (COUMADIN) 5 MG tablet Take as directed by Coumadin clinic 120 tablet 1     propafenone (RYTHMOL) 150 MG TABS tablet Take 1 tablet (150 mg) by mouth 2 times daily 180 tablet 3     metFORMIN (GLUCOPHAGE) 1000 MG tablet Take 1 tablet (1,000 " mg) by mouth 2 times daily (with meals) with breakfast and dinner. 180 tablet 3     propranolol (INDERAL) 40 MG tablet Take 2-3 tablets ( mg) by mouth 2 times daily 180 tablet 10     ipratropium - albuterol 0.5 mg/2.5 mg/3 mL (DUONEB) 0.5-2.5 (3) MG/3ML neb solution NEBULIZE CONTENTS OF ONE VIAL BY MOUTH EVERY 4 HOURS AS NEEDED FOR SHORTNESS OF BREATH /DYSPNEA 270 mL 8     azelastine (ASTELIN) 0.1 % spray Spray 1-2 sprays into both nostrils 2 times daily 1 Bottle 11     blood glucose monitoring (NO BRAND SPECIFIED) test strip Test 4 times daily or as directed. Profile Rx: patient will contact pharmacy when needed 360 each 3     Probiotic Product (FLORAJEN3) CAPS Take 1 capsule by mouth 2 times daily 60 capsule 0     order for DME Equipment being ordered: Nebulizer machine with mask and tubing 1 each 0     pantoprazole (PROTONIX) 40 MG enteric coated tablet Take 40 mg by mouth daily Started by Dr. Debbie Rico at [facility]       ASPIRIN NOT PRESCRIBED, INTENTIONAL, Reported on 5/17/2017       BD ULTRA FINE PEN NEEDLES Use to inject insulin twice daily. 100 each PRN     sulfaSALAzine (AZULFIDINE) 500 MG tablet TAKE ONE TABLET BY MOUTH THREE TIMES A DAY 90 tablet 11     FREESTYLE LANCETS MISC Use to test up to four times per day 100 Each 12     [DISCONTINUED] losartan (COZAAR) 100 MG tablet Take 1 tablet (100 mg) by mouth daily for hypertension. 90 tablet 2     [DISCONTINUED] simvastatin (ZOCOR) 80 MG tablet Take 0.5 tablets (40 mg) by mouth At Bedtime Profile Rx: patient will contact pharmacy when needed 30 tablet 2     [DISCONTINUED] albuterol (PROAIR HFA, PROVENTIL HFA, VENTOLIN HFA) 108 (90 BASE) MCG/ACT inhaler Inhale 1-2 puffs into the lungs every 4 hours as needed (for shortness of breath/wheezing) 1 Inhaler 5     Allergies   Allergen Reactions     Percodan [Oxycodone-Aspirin] Nausea and Vomiting     Recent Labs   Lab Test  05/08/17   1020  03/13/17   1050  01/09/17   1432  10/17/16   0958 10/03/16   "07/22/16   0801  05/05/16   0919 04/22/16   05/12/15   0757   10/27/14   1136   A1C  5.8   --    --   6.9*   --   6.2*   --    --    < >  7.6*   < >   --    LDL  83   --    --    --    --    --   57   --    --   46   --    --    HDL  54   --    --    --    --    --   47   --    --   44   --    --    TRIG  112   --    --    --    --    --   122   --    --   255*   --    --    ALT   --   22   --    --   13   --    --   18   < >   --    --    --    CR   --   0.76   --    --   0.79   --   0.70  0.86   < >   --    --    --    GFRESTIMATED   --   >90  Non  GFR Calc    >90   --   >60   --   >90  Non  GFR Calc    >60   < >   --    --    --    GFRESTBLACK   --   >90   GFR Calc    >90   --   >60   --   >90   GFR Calc    >60   < >   --    --    --    POTASSIUM   --   4.6   --    --    --    --   4.6   --    < >   --    --    --    TSH   --   3.25   --    --    --    --    --    --    --    --    --   2.90    < > = values in this interval not displayed.      Pneumonia Vaccine: COMPLETED.      ROS:   ROS: 10 point ROS neg other than the symptoms noted above in the HPI.    This document serves as a record of the services and decisions personally performed and made by Debbie Lewis MD. It was created on his/her behalf by Camila Vazquez, trained medical scribe. The creation of this document is based the provider's statements to the medical scribes.    Scribe Camila Vazquez, November 2, 2017  OBJECTIVE:   /64  Pulse 61  Temp 98.3  F (36.8  C) (Oral)  Resp 12  Ht 1.676 m (5' 6\")  Wt 89.6 kg (197 lb 8 oz)  SpO2 93%  BMI 31.88 kg/m2 Estimated body mass index is 31.88 kg/(m^2) as calculated from the following:    Height as of this encounter: 1.676 m (5' 6\").    Weight as of this encounter: 89.6 kg (197 lb 8 oz).  EXAM:   GENERAL: healthy, alert and no distress  EYES: Eyes grossly normal to inspection, PERRL and conjunctivae and sclerae normal  HENT: ear " canals and TM's normal, nose and mouth without ulcers or lesions  NECK: no adenopathy, no asymmetry, masses, or scars and thyroid normal to palpation  RESP: lungs clear to auscultation - no rales, rhonchi or wheezes  CV: regular rate and rhythm, normal S1 S2, no S3 or S4, no murmur, click or rub, no peripheral edema and peripheral pulses strong  ABDOMEN: soft, nontender, no hepatosplenomegaly, no masses and bowel sounds normal  MS: no gross musculoskeletal defects noted, no edema  SKIN: no suspicious lesions or rashes  NEURO: Normal strength and tone, mentation intact and speech normal  PSYCH: mentation appears normal, affect normal/bright    Diagnostic Test Results:  Results for orders placed or performed in visit on 11/02/17 (from the past 24 hour(s))   UA with Microscopic reflex to Culture   Result Value Ref Range    Color Urine Yellow     Appearance Urine Clear     Glucose Urine Negative NEG^Negative mg/dL    Bilirubin Urine Negative NEG^Negative    Ketones Urine Negative NEG^Negative mg/dL    Specific Gravity Urine 1.020 1.003 - 1.035    pH Urine 5.5 5.0 - 7.0 pH    Protein Albumin Urine >=300 (A) NEG^Negative mg/dL    Urobilinogen Urine 0.2 0.2 - 1.0 EU/dL    Nitrite Urine Positive (A) NEG^Negative    Blood Urine Moderate (A) NEG^Negative    Leukocyte Esterase Urine Large (A) NEG^Negative    Source Midstream Urine     WBC Urine >100 (A) OTO2^O - 2 /HPF    RBC Urine 2-5 (A) OTO2^O - 2 /HPF    Bacteria Urine Moderate (A) NEG^Negative /HPF      ASSESSMENT / PLAN:   1. Medicare annual wellness visit, initial  Colonoscopy due next year. Health maintenance utd    2. Hypertension goal BP (blood pressure) < 140/90  Slightly elevated. MA will recheck. Continues on losartan 100 mg daily.   - losartan (COZAAR) 100 MG tablet; Take 1 tablet (100 mg) by mouth daily for hypertension.  Dispense: 90 tablet; Refill: 3    3. 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, with long-term current use of insulin  (H)  Controlled. Continues on Novolin injections and Metformin 1000 mg bid.  - Hemoglobin A1c  Lab Results   Component Value Date    A1C 5.8 05/08/2017    A1C 6.9 10/17/2016    A1C 6.2 07/22/2016    A1C 6.7 03/28/2016    A1C 7.1 11/10/2015     4. Hyperlipidemia LDL goal <100  Stable. Continues on simvastatin 80 mg daily.  - simvastatin (ZOCOR) 80 MG tablet; Take 0.5 tablets (40 mg) by mouth At Bedtime Profile Rx: patient will contact pharmacy when needed  Dispense: 45 tablet; Refill: 3    5. Ulcerative colitis without complications, unspecified location (H)  Stable. Followed by [facility]. Last visit was 9/13/17. Continues on Sulfasalazine 500 mg tid. Colonoscopy due in 2018.   - folic acid 0.8 MG CAPS; Take 1 mg by mouth daily  Dispense: 90 capsule; Refill: 3    6. Atrial fibrillation, unspecified type (H)  Stable. Followed by Cardio. Continues on propafenone 150 mg bid. On coumadin anticoagulation    7. CAD in native artery  Stable. Asymptomatic. ASA not indicated as he is on coumadin.     8. Long-term (current) use of anticoagulants [Z79.01]  Stable.  Continues on Warfarin 5 mg.     9. Chronic obstructive pulmonary disease, unspecified COPD type (H)  Stable. Followed by [facility]. Last appointment was 7/10/17. He continues to walk daily and use his stiolto daily and his albuterol inhaler and duoneb as needed.  - albuterol (PROAIR HFA/PROVENTIL HFA/VENTOLIN HFA) 108 (90 BASE) MCG/ACT Inhaler; Inhale 1-2 puffs into the lungs every 4 hours as needed (for shortness of breath/wheezing)  Dispense: 1 Inhaler; Refill: 5    10. Hearing loss, unspecified hearing loss type, unspecified laterality  unchanged    11. Benign familial tremor  Stable. Followed by Neuro. Continues on propranolol and primidone.     12. Dysuria    - UA with Microscopic reflex to Culture    13. Acute cystitis with hematuria  UA positive for evidence of infection today. He will start Bactrim one tablet bid for seven days. I considered starting nitrofurantoin  "given his previous UC from 6/16 showed enterococcus sensitive to nitrofurantoin, however review of UpToDate recommendations indicated it is not recommended to start nitrofurantoin as it does not reliably achieve adequate tissue concentrations. Will start bactrim and can adjust based on UC result and symptoms. Last renal function 3/13/17 Cr 0.76 and GFR normal, so okay to use bactrim or to use nitrofurantoin if needed.   - sulfamethoxazole-trimethoprim (BACTRIM DS/SEPTRA DS) 800-160 MG per tablet; Take 1 tablet by mouth 2 times daily for 7 days  Dispense: 14 tablet; Refill: 0  - OFFICE/OUTPT VISIT,EST,LEVL III    14. Nonspecific finding on examination of urine     - Urine Culture Aerobic Bacterial      End of Life Planning:  Patient currently has an advanced directive: No.  I have verified the patient's ablity to prepare an advanced directive/make health care decisions.  Literature was provided to assist patient in preparing an advanced directive.    COUNSELING:  Reviewed preventive health counseling, as reflected in patient instructions  Estimated body mass index is 31.88 kg/(m^2) as calculated from the following:    Height as of this encounter: 1.676 m (5' 6\").    Weight as of this encounter: 89.6 kg (197 lb 8 oz).  Weight management plan: Discussed healthy diet and exercise guidelines and patient will follow up in 12 months in clinic to re-evaluate.   reports that he quit smoking about 25 years ago. His smoking use included Cigarettes. He has a 30.00 pack-year smoking history. He has never used smokeless tobacco.    Appropriate preventive services were discussed with this patient, including applicable screening as appropriate for cardiovascular disease, diabetes, osteopenia/osteoporosis, and glaucoma.  As appropriate for age/gender, discussed screening for colorectal cancer, prostate cancer, breast cancer, and cervical cancer. Checklist reviewing preventive services available has been given to the " patient.    Reviewed patients plan of care and provided an AVS. The Basic Care Plan (routine screening as documented in Health Maintenance) for Cayetano meets the Care Plan requirement. This Care Plan has been established and reviewed with the Patient.    Counseling Resources:  ATP IV Guidelines  Pooled Cohorts Equation Calculator  Breast Cancer Risk Calculator  FRAX Risk Assessment  ICSI Preventive Guidelines  Dietary Guidelines for Americans, 2010  USDA's MyPlate  ASA Prophylaxis  Lung CA Screening    The information in this document, created by the medical scribe for me, accurately reflects the services I personally performed and the decisions made by me. I have reviewed and approved this document for accuracy. 11/02/17    Debbie Lewis MD  [facility]      Injectable Influenza Immunization Documentation    1.  Is the person to be vaccinated sick today?   No    2. Does the person to be vaccinated have an allergy to a component   of the vaccine?   No  Egg Allergy Algorithm Link    3. Has the person to be vaccinated ever had a serious reaction   to influenza vaccine in the past?   No    4. Has the person to be vaccinated ever had Guillain-Barré syndrome?   No    Form completed by Amanda Bello MA

## 2017-11-02 NOTE — NURSING NOTE
"Chief Complaint   Patient presents with     Medicare Visit       Initial /64  Pulse 61  Temp 98.3  F (36.8  C) (Oral)  Resp 12  Ht 5' 6\" (1.676 m)  Wt 197 lb 8 oz (89.6 kg)  SpO2 93%  BMI 31.88 kg/m2 Estimated body mass index is 31.88 kg/(m^2) as calculated from the following:    Height as of this encounter: 5' 6\" (1.676 m).    Weight as of this encounter: 197 lb 8 oz (89.6 kg).  Medication Reconciliation: complete     Amanda Bello MA     "

## 2017-11-02 NOTE — PATIENT INSTRUCTIONS
Start the bactrim (antibiotic) for your urinary tract infection. You should take this twice daily for 7 days.   Follow up with INR clinic early next week. The antibiotic can cause an increase in your INR and risk of bleeding.     Preventive Health Recommendations:       Male Ages 65 and over    Yearly exam:             See your health care provider every year in order to  o   Review health changes.   o   Discuss preventive care.    o   Review your medicines if your doctor has prescribed any.    Talk with your health care provider about whether you should have a test to screen for prostate cancer (PSA).    Every 3 years, have a diabetes test (fasting glucose). If you are at risk for diabetes, you should have this test more often.    Every 5 years, have a cholesterol test. Have this test more often if you are at risk for high cholesterol or heart disease.     Every 10 years, have a colonoscopy. Or, have a yearly FIT test (stool test). These exams will check for colon cancer.    Talk to with your health care provider about screening for Abdominal Aortic Aneurysm if you have a family history of AAA or have a history of smoking.  Shots:     Get a flu shot each year.     Get a tetanus shot every 10 years.     Talk to your doctor about your pneumonia vaccines. There are now two you should receive - Pneumovax (PPSV 23) and Prevnar (PCV 13).    Talk to your doctor about a shingles vaccine.     Talk to your doctor about the hepatitis B vaccine.  Nutrition:     Eat at least 5 servings of fruits and vegetables each day.     Eat whole-grain bread, whole-wheat pasta and brown rice instead of white grains and rice.     Talk to your doctor about Calcium and Vitamin D.   Lifestyle    Exercise for at least 150 minutes a week (30 minutes a day, 5 days a week). This will help you control your weight and prevent disease.     Limit alcohol to one drink per day.     No smoking.     Wear sunscreen to prevent skin cancer.     See your  dentist every six months for an exam and cleaning.     See your eye doctor every 1 to 2 years to screen for conditions such as glaucoma, macular degeneration and cataracts.

## 2017-11-02 NOTE — LETTER
November 3, 2017      Cayetano Lunsford  [address] [address]        Dear ,    We are writing to inform you of your test results.    Your hemoglobin A1C looked great!     Your urine showed evidence of urinary tract infection, as we discussed in clinic. If you are not feeling improvement with bactrim over the next few days, please let us know. If you are feeling worse, let us know right away. We will let you know if it looks like you need a different antibiotic based on the urine culture results.     Resulted Orders   Hemoglobin A1c   Result Value Ref Range    Hemoglobin A1C 6.0 4.3 - 6.0 %   UA with Microscopic reflex to Culture   Result Value Ref Range    Color Urine Yellow     Appearance Urine Clear     Glucose Urine Negative NEG^Negative mg/dL    Bilirubin Urine Negative NEG^Negative    Ketones Urine Negative NEG^Negative mg/dL    Specific Gravity Urine 1.020 1.003 - 1.035    pH Urine 5.5 5.0 - 7.0 pH    Protein Albumin Urine >=300 (A) NEG^Negative mg/dL    Urobilinogen Urine 0.2 0.2 - 1.0 EU/dL    Nitrite Urine Positive (A) NEG^Negative    Blood Urine Moderate (A) NEG^Negative    Leukocyte Esterase Urine Large (A) NEG^Negative    Source Midstream Urine     WBC Urine >100 (A) OTO2^O - 2 /HPF    RBC Urine 2-5 (A) OTO2^O - 2 /HPF    Bacteria Urine Moderate (A) NEG^Negative /HPF     If you have any questions or concerns, please call the clinic at the number listed above.   Sincerely,  Debbie Lewis MD/nr

## 2017-11-02 NOTE — PROGRESS NOTES
"  ANTICOAGULATION FOLLOW-UP CLINIC VISIT    Patient Name:  Cayetano Lunsford  Date:  11/2/2017  Contact Type:  Face to Face    SUBJECTIVE:     Patient Findings     Positives Inflammation (UTI sx (urgency and frequency) since last weekend. Pt seeing PCP today.), Antibiotic use or infection (7 day course of Bactrim rx today. Patient advised to monitor for s/s of bleeding closely. Per micromedex: \"Concurrent use of SULFAMETHOXAZOLE and WARFARIN may result in increased warfarin exposure.\")           OBJECTIVE    INR Protime   Date Value Ref Range Status   11/02/2017 1.8 (A) 0.86 - 1.14 Final       ASSESSMENT / PLAN  INR assessment SUB    Recheck INR In: 4 DAYS    INR Location Clinic      Anticoagulation Summary as of 11/2/2017     INR goal 2.0-3.0   Today's INR 1.8!   Maintenance plan 10 mg (5 mg x 2) on Mon, Thu, Sat; 7.5 mg (5 mg x 1.5) all other days   Full instructions 10 mg on Mon, Thu, Sat; 7.5 mg all other days   Weekly total 60 mg   Plan last modified Kirstin Meléndez RN (11/2/2017)   Next INR check 11/6/2017   Priority INR   Target end date     Indications   Long-term (current) use of anticoagulants [Z79.01] [Z79.01]  Atrial fibrillation (H) [I48.91]         Anticoagulation Episode Summary     INR check location     Preferred lab     Send INR reminders to [facility]    Comments       Anticoagulation Care Providers     Provider Role Specialty Phone number    Debbie Lewis MD [facility] [phone]            See the Encounter Report to view Anticoagulation Flowsheet and Dosing Calendar (Go to Encounters tab in chart review, and find the Anticoagulation Therapy Visit)    Per protocol, patient advised no dose change today as Bactrim will likely increase INR level d/t interactions. Patient instructed to hold green intake today only. Recheck in 4 days to ensure stability while on abx treatment.    Patient made aware if signs of clotting (pain, tenderness, swelling, or color change in " any extremity) AND/OR bleeding occur (nosebleeds, bleeding gums, bruising, or blood in stool or urine) to notify provider & seek medical attention. If severe symptoms develop, such as major bleeding, chest pain, shortness of breath, fall, trauma or s/s of stroke, patient to call 911 immediately.       Kirstin Meléndez RN

## 2017-11-02 NOTE — MR AVS SNAPSHOT
Cayetano Lunsford   11/2/2017 10:00 AM   Anticoagulation Therapy Visit    Description:  72 year old male   Provider:   INR CLINIC   Department:   Nurse           INR as of 11/2/2017     Today's INR 1.8!      Anticoagulation Summary as of 11/2/2017     INR goal 2.0-3.0   Today's INR 1.8!   Full instructions 7.5 mg on Mon, Wed, Fri; 10 mg all other days   Next INR check 11/27/2017    Indications   Long-term (current) use of anticoagulants [Z79.01] [Z79.01]  Atrial fibrillation (H) [I48.91]         Your next Anticoagulation Clinic appointment(s)     Nov 02, 2017 10:00 AM CDT   Anticoagulation Visit with  INR CLINIC   [facility] ([facility]    [address] [place] [address] [phone]            Nov 06, 2017 11:15 AM CST   Anticoagulation Visit with  INR CLINIC   [facility] ([facility])    [address] [place] [address] [phone]              Contact Numbers     [facility]  Please call [phone] to cancel and/or reschedule your appointment   Please call [phone] with any problems or questions regarding your therapy.        November 2017 Details    Sun Mon Tue Wed Thu Fri Sat        1               2      10 mg   See details      3      7.5 mg         4      10 mg           5      10 mg         6      7.5 mg         7      10 mg         8      7.5 mg         9      10 mg         10      7.5 mg         11      10 mg           12      10 mg         13      7.5 mg         14      10 mg         15      7.5 mg         16      10 mg         17      7.5 mg         18      10 mg           19      10 mg         20      7.5 mg         21      10 mg         22      7.5 mg         23      10 mg         24      7.5 mg         25      10 mg           26      10 mg         27            28               29               30                  Date Details   11/02 This INR check       Date of next INR:   11/27/2017         How to take your warfarin dose     To take:  7.5 mg Take 1.5 of the 5 mg tablets.    To take:  10 mg Take 2 of the 5 mg tablets.

## 2017-11-03 ENCOUNTER — TELEPHONE (OUTPATIENT)
Dept: FAMILY MEDICINE | Facility: CLINIC | Age: 72
End: 2017-11-03

## 2017-11-03 DIAGNOSIS — K51.90 ULCERATIVE COLITIS WITHOUT COMPLICATIONS, UNSPECIFIED LOCATION (H): ICD-10-CM

## 2017-11-03 LAB
BACTERIA SPEC CULT: NORMAL
SPECIMEN SOURCE: NORMAL

## 2017-11-03 NOTE — TELEPHONE ENCOUNTER
We have a new rx for this patient written for folic acid 0.8 mg but the directions say to take 1mg daily - can you send a new rx to clarify which strength patient should be on?    Thanks so much  Marina Small [facility]   on behalf of [facility]

## 2017-11-06 ENCOUNTER — ANTICOAGULATION THERAPY VISIT (OUTPATIENT)
Dept: NURSING | Facility: CLINIC | Age: 72
End: 2017-11-06
Payer: COMMERCIAL

## 2017-11-06 DIAGNOSIS — Z79.01 LONG-TERM (CURRENT) USE OF ANTICOAGULANTS: ICD-10-CM

## 2017-11-06 DIAGNOSIS — I48.91 ATRIAL FIBRILLATION, UNSPECIFIED TYPE (H): ICD-10-CM

## 2017-11-06 LAB — INR POINT OF CARE: 2.5 (ref 0.86–1.14)

## 2017-11-06 PROCEDURE — 99207 ZZC NO CHARGE NURSE ONLY: CPT

## 2017-11-06 PROCEDURE — 85610 PROTHROMBIN TIME: CPT | Mod: QW

## 2017-11-06 PROCEDURE — 36416 COLLJ CAPILLARY BLOOD SPEC: CPT

## 2017-11-06 RX ORDER — CYANOCOBALAMIN (VITAMIN B-12) 500 MCG
1 TABLET ORAL DAILY
Qty: 90 CAPSULE | Refills: 3 | Status: SHIPPED | OUTPATIENT
Start: 2017-11-06

## 2017-11-06 NOTE — MR AVS SNAPSHOT
Cayetano Lunsford   11/6/2017 11:15 AM   Anticoagulation Therapy Visit    Description:  72 year old male   Provider:   INR CLINIC   Department:   Nurse           INR as of 11/6/2017     Today's INR 2.5      Anticoagulation Summary as of 11/6/2017     INR goal 2.0-3.0   Today's INR 2.5   Full instructions 10 mg on Mon, Thu, Sat; 7.5 mg all other days   Next INR check 11/22/2017    Indications   Long-term (current) use of anticoagulants [Z79.01] [Z79.01]  Atrial fibrillation (H) [I48.91]         Your next Anticoagulation Clinic appointment(s)     Nov 22, 2017 12:00 PM CST   [facility]    [address] [place] [address] [phone]            Nov 27, 2017 10:15 AM CST   [facility]agulation Visit with  [facility]   [facility]    [address] [place] [address] [phone]              Contact Numbers     [facility]  Please call [phone] to cancel and/or reschedule your appointment   Please call [phone] with any problems or questions regarding your therapy.        November 2017 Details    Sun Mon Tue Wed Thu Fri Sat        1               2               3               4                 5               6      10 mg   See details      7      7.5 mg         8      7.5 mg         9      10 mg         10      7.5 mg         11      10 mg           12      7.5 mg         13      10 mg         14      7.5 mg         15      7.5 mg         16      10 mg         17      7.5 mg         18      10 mg           19      7.5 mg         20      10 mg         21      7.5 mg         22            23               24               25                 26               27               28               29               30                  Date Details   11/06 This INR check       Date of next INR:  11/22/2017         How to take your warfarin dose      To take:  7.5 mg Take 1.5 of the 5 mg tablets.    To take:  10 mg Take 2 of the 5 mg tablets.

## 2017-11-06 NOTE — PROGRESS NOTES
ANTICOAGULATION FOLLOW-UP CLINIC VISIT    Patient Name:  Cayetano Lunsford  Date:  11/6/2017  Contact Type:  Face to Face    SUBJECTIVE:     Patient Findings     Positives Inflammation (UTI sx improving. Pt reports minor urine frequency continued. Writer advised pt to f/u with PCP if sx are not completely resolved when abx therapy is complete. ), Antibiotic use or infection (Bactrim abx use. 7 day course finishes 11/8.)           OBJECTIVE    INR Protime   Date Value Ref Range Status   11/06/2017 2.5 (A) 0.86 - 1.14 Final       ASSESSMENT / PLAN  INR assessment THER    Recheck INR In: 2 WEEKS    INR Location Clinic      Anticoagulation Summary as of 11/6/2017     INR goal 2.0-3.0   Today's INR 2.5   Maintenance plan 10 mg (5 mg x 2) on Mon, Thu, Sat; 7.5 mg (5 mg x 1.5) all other days   Full instructions 10 mg on Mon, Thu, Sat; 7.5 mg all other days   Weekly total 60 mg   No change documented Kirstin Meléndez RN   Plan last modified Kirstin Meléndez RN (11/2/2017)   Next INR check 11/22/2017   Priority INR   Target end date     Indications   Long-term (current) use of anticoagulants [Z79.01] [Z79.01]  Atrial fibrillation (H) [I48.91]         Anticoagulation Episode Summary     INR check location     Preferred lab     Send INR reminders to [facility]    Comments       Anticoagulation Care Providers     Provider Role Specialty Phone number    Debbie Lewis MD [facility] [phone]            See the Encounter Report to view Anticoagulation Flowsheet and Dosing Calendar (Go to Encounters tab in chart review, and find the Anticoagulation Therapy Visit)    Patient made aware if signs of clotting (pain, tenderness, swelling, or color change in any extremity) AND/OR bleeding occur (nosebleeds, bleeding gums, bruising, or blood in stool or urine) to notify provider & seek medical attention. If severe symptoms develop, such as major bleeding, chest pain, shortness of breath, fall, trauma  or s/s of stroke, patient to call 911 immediately.     Kirstin Meléndez RN

## 2017-11-07 ENCOUNTER — TELEPHONE (OUTPATIENT)
Dept: FAMILY MEDICINE | Facility: CLINIC | Age: 72
End: 2017-11-07

## 2017-11-07 DIAGNOSIS — N30.01 ACUTE CYSTITIS WITH HEMATURIA: Primary | ICD-10-CM

## 2017-11-07 NOTE — TELEPHONE ENCOUNTER
RN -- Please have him come in for a visit, recheck urine. Last UA strongly suggestive of UTI. UC with mixed johanna as below. For previous UTI in 6/2016, was treated with amoxicillin for enterococcus. Debbie Lewis M.D.             Exam Information   Exam Date Exam Time Accession # Results    11/2/17 10:36 AM [id number]    Component Results   Component Collected Lab   Specimen Description 11/02/2017 10:36    Urine   Culture Micro 11/02/2017 10:36    >100,000 colonies/mL   mixed urogenital johanna

## 2017-11-07 NOTE — TELEPHONE ENCOUNTER
Reason for Call:  Other UTI    Detailed comments: Pt states they have 1 day of antibiotics left, but still have UTI symptoms. Pt wondering if antibiotics should be extended.    Phone Number Patient can be reached at: Home number on file [phone] (home)    Best Time: Anytime    Can we leave a detailed message on this number? YES    Call taken on 11/7/2017 at 2:32 PM by Liliana Chu

## 2017-11-07 NOTE — TELEPHONE ENCOUNTER
Dr. Lewis,   Patient still experiencing urgency, hesitancy and pressure with urination. Patient prescribed Bactrim on 11/2 and antibiotics completed tomorrow. Patient confirms improvement of symptoms but those listed above are still persisting. Patient denies frequency, burning, pain or fever.     Patient wondering if he should come in for a follow up? Should symptoms be completely cleared up by now?    Please advise.     Thanks! Estelle Prado RN

## 2017-11-08 ENCOUNTER — OFFICE VISIT (OUTPATIENT)
Dept: FAMILY MEDICINE | Facility: CLINIC | Age: 72
End: 2017-11-08
Payer: COMMERCIAL

## 2017-11-08 VITALS
TEMPERATURE: 97.5 F | HEIGHT: 66 IN | DIASTOLIC BLOOD PRESSURE: 60 MMHG | HEART RATE: 64 BPM | RESPIRATION RATE: 20 BRPM | SYSTOLIC BLOOD PRESSURE: 100 MMHG | WEIGHT: 201.25 LBS | BODY MASS INDEX: 32.34 KG/M2

## 2017-11-08 DIAGNOSIS — N40.1 BENIGN PROSTATIC HYPERPLASIA WITH URINARY FREQUENCY: Primary | ICD-10-CM

## 2017-11-08 DIAGNOSIS — R82.90 NONSPECIFIC FINDING ON EXAMINATION OF URINE: ICD-10-CM

## 2017-11-08 DIAGNOSIS — R35.0 BENIGN PROSTATIC HYPERPLASIA WITH URINARY FREQUENCY: Primary | ICD-10-CM

## 2017-11-08 LAB
ALBUMIN UR-MCNC: 100 MG/DL
APPEARANCE UR: CLEAR
BACTERIA #/AREA URNS HPF: ABNORMAL /HPF
BILIRUB UR QL STRIP: NEGATIVE
COLOR UR AUTO: YELLOW
GLUCOSE UR STRIP-MCNC: 500 MG/DL
HGB UR QL STRIP: ABNORMAL
KETONES UR STRIP-MCNC: NEGATIVE MG/DL
LEUKOCYTE ESTERASE UR QL STRIP: ABNORMAL
NITRATE UR QL: NEGATIVE
NON-SQ EPI CELLS #/AREA URNS LPF: ABNORMAL /LPF
PH UR STRIP: 6.5 PH (ref 5–7)
RBC #/AREA URNS AUTO: ABNORMAL /HPF
SOURCE: ABNORMAL
SP GR UR STRIP: 1.02 (ref 1–1.03)
UROBILINOGEN UR STRIP-ACNC: 0.2 EU/DL (ref 0.2–1)
WBC #/AREA URNS AUTO: ABNORMAL /HPF

## 2017-11-08 PROCEDURE — 99213 OFFICE O/P EST LOW 20 MIN: CPT | Performed by: FAMILY MEDICINE

## 2017-11-08 PROCEDURE — 87086 URINE CULTURE/COLONY COUNT: CPT | Performed by: FAMILY MEDICINE

## 2017-11-08 PROCEDURE — 81001 URINALYSIS AUTO W/SCOPE: CPT | Performed by: FAMILY MEDICINE

## 2017-11-08 RX ORDER — TAMSULOSIN HYDROCHLORIDE 0.4 MG/1
0.4 CAPSULE ORAL DAILY
Qty: 30 CAPSULE | Refills: 0 | Status: SHIPPED | OUTPATIENT
Start: 2017-11-08 | End: 2017-11-27

## 2017-11-08 NOTE — LETTER
November 13, 2017      Cayetano Lunsford  [address] [address]      Dear ,    We are writing to inform you of your test results.    Your urine did not show any infection as we talked and expected. Try prostate medication as we talked and I am hoping that helps you.    Resulted Orders   UA reflex to Microscopic and Culture   Result Value Ref Range    Color Urine Yellow     Appearance Urine Clear     Glucose Urine 500 (A) NEG^Negative mg/dL    Bilirubin Urine Negative NEG^Negative    Ketones Urine Negative NEG^Negative mg/dL    Specific Gravity Urine 1.020 1.003 - 1.035    Blood Urine Small (A) NEG^Negative    pH Urine 6.5 5.0 - 7.0 pH    Protein Albumin Urine 100 (A) NEG^Negative mg/dL    Urobilinogen Urine 0.2 0.2 - 1.0 EU/dL    Nitrite Urine Negative NEG^Negative    Leukocyte Esterase Urine Small (A) NEG^Negative    Source Midstream Urine    Urine Microscopic   Result Value Ref Range    WBC Urine 25-50 (A) OTO2^O - 2 /HPF    RBC Urine 5-10 (A) OTO2^O - 2 /HPF    Squamous Epithelial /LPF Urine Few FEW^Few /LPF    Bacteria Urine Few (A) NEG^Negative /HPF   Urine Culture Aerobic Bacterial   Result Value Ref Range    Specimen Description Midstream Urine     Culture Micro No growth    If you have any questions or concerns, please call the clinic at the number listed above.   Sincerely,  Gray Rosario MD/nr

## 2017-11-08 NOTE — NURSING NOTE
"Chief Complaint   Patient presents with     UTI       Initial /60 (Cuff Size: Adult Large)  Pulse 64  Temp 97.5  F (36.4  C) (Oral)  Resp 20  Ht 5' 6\" (1.676 m)  Wt 201 lb 4 oz (91.3 kg)  BMI 32.48 kg/m2 Estimated body mass index is 32.48 kg/(m^2) as calculated from the following:    Height as of this encounter: 5' 6\" (1.676 m).    Weight as of this encounter: 201 lb 4 oz (91.3 kg).  Medication Reconciliation: complete     Kirti Islas, CMA      "

## 2017-11-08 NOTE — MR AVS SNAPSHOT
After Visit Summary   11/8/2017    Cayetano Lunsford    MRN: [id number]           Patient Information     Date Of Birth          1945        Visit Information        Provider Department      11/8/2017 1:00 PM Gray Rosario MD [facility]        Today's Diagnoses     Benign prostatic hyperplasia with urinary frequency    -  1    Nonspecific finding on examination of urine           Follow-ups after your visit        Your next 10 appointments already scheduled     Nov 22, 2017 12:00 PM CST   An[facility] Visit with  [facility] CL[facility]IC   [facility]    [address] [place] [address] [phone]            Nov 29, 2017  9:30 AM CST   [facility]LE[facility]ICINE with [facility] ([facility])    [address] [place] [address] [phone]           Note: this is not an onsite visit; there is no need to come to the facility.            Dec 05, 2017  9:00 AM CST   Return Visit with Bong Yoo MD   [facility]    [address] [place] [address] [phone]              Who to contact     If you have questions or need follow up information about today's clinic visit or your schedule please contact [facility] directly at [phone].  Normal or non-critical lab and imaging results will be communicated to you by MyChart, letter or phone within 4 business days after the clinic has received the results. If you do not hear from us within 7 days, please contact the clinic through MyChart or phone. If you have a critical or abnormal lab result, we will notify you by phone as soon as possible.  Submit refill requests through [employer] or call your pharmacy and they will forward the refill request to us. Please allow 3 business days for your refill to be completed.           "Additional Information About Your Visit        MyChart Information     [employer] lets you send messages to your doctor, view your test results, renew your prescriptions, schedule appointments and more. To sign up, go to [email or web address] . Click on \"Log in\" on the left side of the screen, which will take you to the Welcome page. Then click on \"Sign up Now\" on the right side of the page.     You will be asked to enter the access code listed below, as well as some personal information. Please follow the directions to create your username and password.     Your access code is: [email or web address]  Expires: 2018  3:18 PM     Your access code will  in 90 days. If you need help or a new code, please call your [facility] clinic or [phone].        Care EveryWhere ID     This is your Care EveryWhere ID. This could be used by other organizations to access your [facility] medical records  [id number]        Your Vitals Were     Pulse Temperature Respirations Height BMI (Body Mass Index)       64 97.5  F (36.4  C) (Oral) 20 5' 6\" (1.676 m) 32.48 kg/m2        Blood Pressure from Last 3 Encounters:   17 100/60   17 144/64   17 110/54    Weight from Last 3 Encounters:   17 201 lb 4 oz (91.3 kg)   17 197 lb 8 oz (89.6 kg)   17 201 lb 8 oz (91.4 kg)              We Performed the Following     UA reflex to Microscopic and Culture     Urine Culture Aerobic Bacterial     Urine Microscopic          Today's Medication Changes          These changes are accurate as of: 17 11:59 PM.  If you have any questions, ask your nurse or doctor.               Start taking these medicines.        Dose/Directions    tamsulosin 0.4 MG capsule   Commonly known as:  FLOMAX   Used for:  Benign prostatic hyperplasia with urinary frequency   Started by:  Gray Rosario MD        Dose:  0.4 mg   Take 1 capsule (0.4 mg) by mouth daily   Quantity:  30 capsule   Refills:  0         These medicines " have changed or have updated prescriptions.        Dose/Directions    insulin  UNIT/ML injection   Commonly known as:  NovoLIN N RELION   This may have changed:  additional instructions   Used for:  Type 2 diabetes mellitus with hypoglycemia without coma, with long-term current use of insulin (H)        Inject 6 units under the skin at breakfast and 15 units at dinner.   Quantity:  3 vial   Refills:  3         Stop taking these medicines if you haven't already. Please contact your care team if you have questions.     sulfamethoxazole-trimethoprim 800-160 MG per tablet   Commonly known as:  BACTRIM DS/SEPTRA DS   Stopped by:  Gray Rosario MD                Where to get your medicines      These medications were sent to [facility] [address]  [address] [facility] [address]     Phone:  [phone]     tamsulosin 0.4 MG capsule                Primary Care Provider Office Phone # Fax #    Debbie Lewis [phone] [address]  [facility] [address]        Equal Access to Services     [facility]: Hadii efrem ku hadasho Soomaali, waaxda luqadaha, qaybta kaalmada adeegyacory, erendira rojas haydejuan pugh . So [facility] [phone].    ATENCIÓN: Si habla español, tiene a jurdao disposición servicios gratuitos de asistencia lingüística. Llame al [phone].    We comply with applicable federal civil rights laws and [place] laws. We do not discriminate on the basis of race, color, national origin, age, disability, sex, sexual orientation, or gender identity.            Thank you!     Thank you for choosing [facility]  for your care. Our goal is always to provide you with excellent care. Hearing back from our patients is one way we can continue to improve our services. Please take a few minutes to complete the written survey that you may receive in the mail after your visit with us. Thank you!             Your  "Updated Medication List - Protect others around you: Learn how to safely use, store and throw away your medicines at www.disposemymeds.org.          This list is accurate as of: 11/8/17 11:59 PM.  Always use your most recent med list.                   Brand Name Dispense Instructions for use Diagnosis    albuterol 108 (90 BASE) MCG/ACT Inhaler    PROAIR HFA/PROVENTIL HFA/VENTOLIN HFA    1 Inhaler    Inhale 1-2 puffs into the lungs every 4 hours as needed (for shortness of breath/wheezing)    Chronic obstructive pulmonary disease, unspecified COPD type (H)       ASPIRIN NOT PRESCRIBED    INTENTIONAL     Reported on 5/17/2017        azelastine 0.1 % spray    ASTELIN    1 Bottle    Spray 1-2 sprays into both nostrils 2 times daily    Seasonal allergic rhinitis, unspecified allergic rhinitis trigger       B-D INSULIN SYRINGE 31G X 5/16\" 0.5 ML   Generic drug:  insulin syringe-needle U-100     100 each    USE 1 SYRINGE TWO TIMES A DAY OR AS DIRECTED    Type 2 diabetes, HbA1C goal < 8% (H)       BD ULTRA FINE PEN NEEDLES     100 each    Use to inject insulin twice daily.    Type 2 diabetes, HbA1C goal < 8% (H)       blood glucose monitoring lancets     100 Each    Use to test up to four times per day    Type II or unspecified type diabetes mellitus without mention of complication, not stated as uncontrolled       blood glucose monitoring test strip    no brand specified    360 each    Test 4 times daily or as directed. Profile Rx: patient will contact pharmacy when needed    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, with long-term current use of insulin (H)       FLORAJEN3 Caps     60 capsule    Take 1 capsule by mouth 2 times daily    Acute cystitis without hematuria       folic acid 0.8 MG Caps     90 capsule    Take 1 tablet by mouth daily    Ulcerative colitis without complications, unspecified location (H)       insulin  UNIT/ML injection    NovoLIN N RELION    3 vial    Inject 6 units " under the skin at breakfast and 15 units at dinner.    Type 2 diabetes mellitus with hypoglycemia without coma, with long-term current use of insulin (H)       insulin regular 100 UNIT/ML injection    NovoLIN R RELION    10 mL    Inject 5 units with breakfast and 2 units with dinner (when mixing with NPH, draw this insulin up first)    Type 2 diabetes mellitus with hypoglycemia without coma, with long-term current use of insulin (H)       ipratropium - albuterol 0.5 mg/2.5 mg/3 mL 0.5-2.5 (3) MG/3ML neb solution    DUONEB    270 mL    NEBULIZE CONTENTS OF ONE VIAL BY MOUTH EVERY 4 HOURS AS NEEDED FOR SHORTNESS OF BREATH /DYSPNEA    Chronic obstructive bronchitis (H)       losartan 100 MG tablet    COZAAR    90 tablet    Take 1 tablet (100 mg) by mouth daily for hypertension.    Hypertension goal BP (blood pressure) < 140/90       metFORMIN 1000 MG tablet    GLUCOPHAGE    180 tablet    Take 1 tablet (1,000 mg) by mouth 2 times daily (with meals) with breakfast and dinner.    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, with long-term current use of insulin (H)       order for DME     1 each    Equipment being ordered: Nebulizer machine with mask and tubing    Chronic airway obstruction, not elsewhere classified       pantoprazole 40 MG EC tablet    PROTONIX     Take 40 mg by mouth daily Started by Dr. Debbie Rico at [facility]        primidone 50 MG tablet    MYSOLINE    270 tablet    Take 2 tablets twice a day and 3 tablets in the evening for 1 week, then go to 3 tablets twice a day (morning and evening) and take 2 tablets midday for 1 week, take 3 tablets three times a day, thereafter.    Essential tremor       propafenone 150 MG Tabs tablet    RYTHMOL    180 tablet    Take 1 tablet (150 mg) by mouth 2 times daily    Paroxysmal atrial fibrillation (H)       propranolol 40 MG tablet    INDERAL    180 tablet    Take 2-3 tablets ( mg) by mouth 2 times daily    Benign familial tremor        simvastatin 80 MG tablet    ZOCOR    45 tablet    Take 0.5 tablets (40 mg) by mouth At Bedtime Profile Rx: patient will contact pharmacy when needed    Hyperlipidemia LDL goal <100       STIOLTO RESPIMAT 2.5-2.5 MCG/ACT Aers   Generic drug:  tiotropium-olodaterol      Inhale 2 puffs into the lungs daily        sulfaSALAzine 500 MG tablet    AZULFIDINE    90 tablet    TAKE ONE TABLET BY MOUTH THREE TIMES A DAY    Ulcerative colitis, unspecified       tamsulosin 0.4 MG capsule    FLOMAX    30 capsule    Take 1 capsule (0.4 mg) by mouth daily    Benign prostatic hyperplasia with urinary frequency       warfarin 5 MG tablet    COUMADIN    155 tablet    Take as directed by Coumadin clinic. Current dose (11/2/17): 7.5 mg on Mon, Wed, Fri + 10 mg all other days.    Atrial fibrillation, unspecified type (H)

## 2017-11-08 NOTE — PROGRESS NOTES
SUBJECTIVE:   Cayetano Lunsford is a 72 year old male who presents to clinic today for the following health issues:      Genitourinary symptoms      Duration: 11/2/2017    Description:  dysuria, hesitancy, back pain and pressure    Intensity:  mild, moderate    Accompanying signs and symptoms (fever/discharge/nausea/vomiting/back or abdominal pain):  Left side back pain     History (frequent UTI's/kidney stones/prostate problems): None  Sexually active: no     Precipitating or alleviating factors: None    Therapies tried and outcome: course of antibiotics - bactrim    Outcome: helpful    Frequency improved a lot. Still sometime urgency and sometime hard time passing urine. No burning.  No blood in urine.   Was given bactrim recently. Culture showed mixed johanna.   No fever.   Will take last bactrim tonight.     Blood sugars are fine.     Problem list and histories reviewed & adjusted, as indicated.  Additional history: as documented    Labs reviewed in EPIC    Reviewed and updated as needed this visit by clinical staffTobacco  Allergies  Meds  Med Hx  Surg Hx  Fam Hx  Soc Hx      Reviewed and updated as needed this visit by Provider           Social History     Social History     Marital status:      Spouse name: Izabel     Number of children: 2     Years of education: 12     Occupational History            Retired     Social History Main Topics     Smoking status: Former Smoker     Packs/day: 1.00     Years: 30.00     Types: Cigarettes     Quit date: 3/19/1992     Smokeless tobacco: Never Used     Alcohol use 0.0 oz/week     0 Standard drinks or equivalent per week      Comment: hardly at all, sometimes at dinner     Drug use: No     Sexual activity: Yes     Partners: Female     Other Topics Concern     Parent/Sibling W/ Cabg, Mi Or Angioplasty Before 65f 55m? No     Caffeine Concern No     No Caffeine     Exercise Yes     20 minutes - Walking- summer 2 x day     Social  "History Narrative     Allergies   Allergen Reactions     Percodan [Oxycodone-Aspirin] Nausea and Vomiting     Patient Active Problem List   Diagnosis     Ulcerative colitis without complications (HCC)     Atrial fibrillation (H)     Chronic obstructive pulmonary disease (H)     Obesity     Eczema     HYPERLIPIDEMIA LDL GOAL <100     Advance Care Planning     Benign familial tremor     Hypertension goal BP (blood pressure) < 140/90     Cramp of limb     BPH (benign prostatic hyperplasia)     Pain in joint, lower leg     CAD in native artery     Hard of hearing     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    Review of system: for general, respiratory, CVS, GI and psychiatry negative except for noted above.     EXAM:  /60 (Cuff Size: Adult Large)  Pulse 64  Temp 97.5  F (36.4  C) (Oral)  Resp 20  Ht 5' 6\" (1.676 m)  Wt 201 lb 4 oz (91.3 kg)  BMI 32.48 kg/m2  Constitutional: healthy, alert and no distress   Psychiatric: mentation appears normal and affect normal/bright   - mild diffuse prostate enlargement without any palpable nodules. Non tender.     ASSESSMENT / PLAN:  (N40.1,  R35.0) Benign prostatic hyperplasia with urinary frequency  (primary encounter diagnosis)  Comment: I suspect main cause of his above symptoms. He could have prostatitis but he has non tender prostate. Complete antibiotics course and I think we can hold off on further antibiotics for now as his UA is negative today. Will start flomax and if that fails, consider adding proscar and if that fails - see urologist. He agreed. Side effects of flomax discussed.   Plan: UA reflex to Microscopic and Culture,         tamsulosin (FLOMAX) 0.4 MG capsule, Urine         Microscopic             (R82.90) " Nonspecific finding on examination of urine  Comment:    Plan: Urine Culture Aerobic Bacterial

## 2017-11-09 LAB
BACTERIA SPEC CULT: NO GROWTH
SPECIMEN SOURCE: NORMAL

## 2017-11-22 ENCOUNTER — ANTICOAGULATION THERAPY VISIT (OUTPATIENT)
Dept: NURSING | Facility: CLINIC | Age: 72
End: 2017-11-22
Payer: COMMERCIAL

## 2017-11-22 DIAGNOSIS — Z79.01 LONG-TERM (CURRENT) USE OF ANTICOAGULANTS: ICD-10-CM

## 2017-11-22 DIAGNOSIS — I48.91 ATRIAL FIBRILLATION, UNSPECIFIED TYPE (H): ICD-10-CM

## 2017-11-22 LAB — INR POINT OF CARE: 2 (ref 0.86–1.14)

## 2017-11-22 PROCEDURE — 36416 COLLJ CAPILLARY BLOOD SPEC: CPT

## 2017-11-22 PROCEDURE — 85610 PROTHROMBIN TIME: CPT | Mod: QW

## 2017-11-22 PROCEDURE — 99207 ZZC NO CHARGE NURSE ONLY: CPT

## 2017-11-22 NOTE — PROGRESS NOTES
ANTICOAGULATION FOLLOW-UP CLINIC VISIT    Patient Name:  Cayetano Lunsford  Date:  11/22/2017  Contact Type:  Face to Face    SUBJECTIVE:     Patient Findings     Positives Change in medications (Flomax started 11/8. No direct interactions w/ Warfarin therapy per micromedex. ), Change in diet/appetite (Increase protein d/t fish intake. ), Inflammation (Improved urinary sx. Writer will update Dr. Rosario on progress.)           OBJECTIVE    INR Protime   Date Value Ref Range Status   11/22/2017 2.0 (A) 0.86 - 1.14 Final       ASSESSMENT / PLAN  INR assessment THER    Recheck INR In: 2 WEEKS    INR Location Clinic      Anticoagulation Summary as of 11/22/2017     INR goal 2.0-3.0   Today's INR 2.0   Maintenance plan 10 mg (5 mg x 2) on Mon, Thu, Sat; 7.5 mg (5 mg x 1.5) all other days   Full instructions 10 mg on Mon, Thu, Sat; 7.5 mg all other days   Weekly total 60 mg   No change documented Kirstin Meléndez RN   Plan last modified Kirstin Meléndez RN (11/2/2017)   Next INR check 12/7/2017   Priority INR   Target end date     Indications   Long-term (current) use of anticoagulants [Z79.01] [Z79.01]  Atrial fibrillation (H) [I48.91]         Anticoagulation Episode Summary     INR check location     Preferred lab     Send INR reminders to [facility]    Comments       Anticoagulation Care Providers     Provider Role Specialty Phone number    Debbie Lewis MD [facility] [phone]            See the Encounter Report to view Anticoagulation Flowsheet and Dosing Calendar (Go to Encounters tab in chart review, and find the Anticoagulation Therapy Visit)    Resolving inflammation and increase in protein could both be factors in today's slightly lower INR level. Patient advised to continue current maintenance dose and recheck in two weeks after Neurology f/u appt.     Patient made aware if signs of clotting (pain, tenderness, swelling, or color change in any extremity) AND/OR bleeding  occur (nosebleeds, bleeding gums, bruising, or blood in stool or urine) to notify provider & seek medical attention. If severe symptoms develop, such as major bleeding, chest pain, shortness of breath, fall, trauma or s/s of stroke, patient to call 911 immediately.       Kirstin Meléndez RN

## 2017-11-22 NOTE — MR AVS SNAPSHOT
Cayetano Lunsford   11/22/2017 12:00 PM   Anticoagulation Therapy Visit    Description:  72 year old male   Provider:   INR CLINIC   Department:   Nurse           INR as of 11/22/2017     Today's INR 2.0      Anticoagulation Summary as of 11/22/2017     INR goal 2.0-3.0   Today's INR 2.0   Full instructions 10 mg on Mon, Thu, Sat; 7.5 mg all other days   Next INR check 12/7/2017    Indications   Long-term (current) use of anticoagulants [Z79.01] [Z79.01]  Atrial fibrillation (H) [I48.91]         Your next Anticoagulation Clinic appointment(s)     Dec 07, 2017 11:00 AM CST   Anticoagulation Visit with  INR CLINIC   [facility] ([facility])    [address] [place] [id number] [phone]              Contact Numbers     [facility]  Please call [phone] to cancel and/or reschedule your appointment   Please call [phone] with any problems or questions regarding your therapy.        November 2017 Details    Sun Mon Tue Wed Thu Fri Sat        1               2               3               4                 5               6               7               8               9               10               11                 12               13               14               15               16               17               18                 19               20               21               22      7.5 mg   See details      23      10 mg         24      7.5 mg         25      10 mg           26      7.5 mg         27      10 mg         28      7.5 mg         29      7.5 mg         30      10 mg            Date Details   11/22 This INR check               How to take your warfarin dose     To take:  7.5 mg Take 1.5 of the 5 mg tablets.    To take:  10 mg Take 2 of the 5 mg tablets.           December 2017 Details    Sun Mon Tue Wed Thu Fri Sat          1      7.5 mg         2      10 mg           3      7.5 mg         4      10 mg         5      7.5 mg          6      7.5 mg         7            8               9                 10               11               12               13               14               15               16                 17               18               19               20               21               22               23                 24               25               26               27               28               29               30                 31                      Date Details   No additional details    Date of next INR:  12/7/2017         How to take your warfarin dose     To take:  7.5 mg Take 1.5 of the 5 mg tablets.    To take:  10 mg Take 2 of the 5 mg tablets.

## 2017-11-27 DIAGNOSIS — N40.1 BENIGN PROSTATIC HYPERPLASIA WITH URINARY FREQUENCY: ICD-10-CM

## 2017-11-27 DIAGNOSIS — R35.0 BENIGN PROSTATIC HYPERPLASIA WITH URINARY FREQUENCY: ICD-10-CM

## 2017-11-27 RX ORDER — TAMSULOSIN HYDROCHLORIDE 0.4 MG/1
0.4 CAPSULE ORAL DAILY
Qty: 90 CAPSULE | Refills: 3 | Status: SHIPPED | OUTPATIENT
Start: 2017-11-27 | End: 2019-02-25

## 2017-11-29 ENCOUNTER — ALLIED HEALTH/NURSE VISIT (OUTPATIENT)
Dept: PHARMACY | Facility: CLINIC | Age: 72
End: 2017-11-29
Payer: COMMERCIAL

## 2017-11-29 DIAGNOSIS — E11.649 TYPE 2 DIABETES MELLITUS WITH HYPOGLYCEMIA WITHOUT COMA, WITH LONG-TERM CURRENT USE OF INSULIN (H): ICD-10-CM

## 2017-11-29 DIAGNOSIS — Z79.4 TYPE 2 DIABETES MELLITUS WITH HYPOGLYCEMIA WITHOUT COMA, WITH LONG-TERM CURRENT USE OF INSULIN (H): ICD-10-CM

## 2017-11-29 DIAGNOSIS — E11.22 TYPE 2 DIABETES MELLITUS WITH STAGE 1 CHRONIC KIDNEY DISEASE, WITH LONG-TERM CURRENT USE OF INSULIN (H): Primary | ICD-10-CM

## 2017-11-29 DIAGNOSIS — N18.1 TYPE 2 DIABETES MELLITUS WITH STAGE 1 CHRONIC KIDNEY DISEASE, WITH LONG-TERM CURRENT USE OF INSULIN (H): Primary | ICD-10-CM

## 2017-11-29 DIAGNOSIS — Z79.4 TYPE 2 DIABETES MELLITUS WITH STAGE 1 CHRONIC KIDNEY DISEASE, WITH LONG-TERM CURRENT USE OF INSULIN (H): Primary | ICD-10-CM

## 2017-11-29 PROCEDURE — 99606 MTMS BY PHARM EST 15 MIN: CPT | Performed by: PHARMACIST

## 2017-11-29 PROCEDURE — 99607 MTMS BY PHARM ADDL 15 MIN: CPT | Performed by: PHARMACIST

## 2017-11-29 NOTE — PATIENT INSTRUCTIONS
Recommendations from today's MTM visit:                                                      1. No insulin changes today. Keep checking like you have been doing.    2. If you have a high blood sugar over 200 before dinner and only eat a salad only give 1 unit of R to prevent a low.    Next MTM visit: 12/27/17 at 10am - I will call you    To schedule another MTM appointment, please call the clinic directly or you may call the MTM scheduling line at [phone] or toll-free at [phone].     My Clinical Pharmacist's contact information:                                                      It was a pleasure seeing you today!  Please feel free to contact me with any questions or concerns you have.      Tanna Diaz, PharmD  Medication Therapy Management Pharmacist  [facility] - Monday 9:30 - 6:00 and Wednesday 7:30 - 4:00  Phone: [phone] - direct clinic line    You may receive a survey about the MTM services you received.  I would appreciate your feedback to help me serve you better in the future. Please fill it out and return it when you can. Your comments will be anonymous.

## 2017-11-29 NOTE — PROGRESS NOTES
SUBJECTIVE/OBJECTIVE:                Cayetano Lunsford is a 72 year old male called for a follow-up visit for Medication Therapy Management.  He was referred to me from Dr. Debbie Lewis.     Chief Complaint: Follow up from our visit on 10/11/17.  No concerns today    Tobacco: No tobacco use    Alcohol: not currently using    Medication Adherence: no issues reported  Diabetes:  Pt currently taking metformin bid, Novolin N 5 units AM before breakfast and 15 units in PM-at dinner, Novolin R-5 units with breakfast, between 80 and 120, reduce your R insulin by 2 units and 2 units of R at dinner if blood sugars are over 200. If below 70 skip dose. He gives his shots in his arms and stomach but most of the time in the stomach. He does not have any issues with giving stomach just has been doing arms.  He is now writing the date on the Novolin R and discarding after 42 days.   SMBG: 3-4 times daily.   Ranges (per pt report): see chart below.   Symptoms of low blood sugar? Fingers and lips tingle, shaky and sweaty. Frequency of hypoglycemia? Usually has them before dinner but had one overnight which was a surprise to him.  He thinks the before dinner lows are potentially due to increased exercise or decreased food intake for lunch. Treating with small amount of orange juice.  Recent symptoms of high blood sugar? none.  Eye exam: up to date  Foot exam: up to date  Microalbumin is not < 30 mg/g. Pt is taking an ACEi/ARB not at max dose.  Aspirin: Not taking due to anticoagulation therapy and increased risk of bleeding  Diet/Exercise:  No change from last visit. He repots that his activity has slowed down but about the same as last visit. He is eating something for lunch every day.  Dinner is his largest meal. He will eat a dessert a couple times a week. Breakfast is usually cereal +/- blueberries or a banana. He sometimes snacks after dinner but it is usually some cheezits. Tries to stay away from high sugar content foods at  this time.     Ave: 7 day: 175, 14 day: 163, 30 day: 154 -  Increase in all from last visit - feels it is all the good things he has been eating.    Date FBG/ 2hours post Lunch/2hours post Dinner /2hours post Bedtime (4 or 5 hours after dinner)   17 99       238 - 7 R  145 93    182  90 204 - 2 R    231 - apple pie the night before  313 - had two pancakes for bf 186    124  116     152  174     144  260 - thanksgiving     176  86 - 4 oz OJ then dinner     132  277 (had cake and ice cream earlier at bday party) - gave 2 R 76 - 4 oz OJ - had salad for dinner    241 - R 7  97 129    149  277 - 2 R 148    189  123     163  120     125  194 93   11/15 162  141 - braut and baked beans for dinner 207 - 2 R with small snack    100  137 98    154  123 179    171  90 75 - 4 oz OJ    184  201 - six inch sub 216 - 2 R with small snack   11/10 140  255 - went out for lunch 146    150  130 107     181  75 - 4 oz OJ and then dinner 222 - 2 R with snack    101  207 - 2 R     139 3:30 pm 65 - 4 oz OJ. Was more active that day and small lunch 7 PM - 69 - 4 OJ plus dinner 268 - 2 R with snack (crackers and cheese)    168  107 147    255  98 169   11/3 143  115  179      10/25 he had a couple more lows: Dinner readin only ate salad (gave 3 R) 11:17pm 67 - 4 oz OJ then 1AM was 73 - 4 more oz OJ  Ill on 10/27: 55 3:58 AM on 10/28 - drank OJ and a couple of cookies    From last visit:  Date FBG/ 2hours post Lunch/2hours post Dinner /2hours post     140      9/10 155  273 - sweets at lunch     108  123     182  181     151  107 - had a good amount of carbs. 508 AM low of 50 ??    143  156     164  92     147  112    9/3 134  129     153  122     176  106     176  191     187 (apple crisp the night before)  111     112  147     112   199 (R2)     148  72 before dinner      207  88    8/24 171  124        Current labs include:  BP Readings from Last 3 Encounters:   11/08/17 100/60   11/02/17 144/64   09/05/17 110/54     Today's Vitals: There were no vitals taken for this visit. - phone visit    Lab Results   Component Value Date    A1C 6.0 11/02/2017    A1C 5.8 05/08/2017    A1C 6.9 10/17/2016    A1C 6.2 07/22/2016    A1C 6.7 03/28/2016       Lab Results   Component Value Date    CHOL 159 05/08/2017     Lab Results   Component Value Date    TRIG 112 05/08/2017     Lab Results   Component Value Date    HDL 54 05/08/2017     Lab Results   Component Value Date    LDL 83 05/08/2017       Liver Function Studies -   Recent Labs   Lab Test  03/13/17   1050   PROTTOTAL  7.0   ALBUMIN  3.8   BILITOTAL  0.4   ALKPHOS  60   AST  20   ALT  22       Lab Results   Component Value Date    UCRR 40 06/20/2017    MICROL 24 06/20/2017    UMALCR 61.31 (H) 06/20/2017       Last Basic Metabolic Panel:  Lab Results   Component Value Date     03/13/2017      Lab Results   Component Value Date    POTASSIUM 4.6 03/13/2017     Lab Results   Component Value Date    CHLORIDE 100 03/13/2017     Lab Results   Component Value Date    BUN 16 03/13/2017     Lab Results   Component Value Date    CR 0.76 03/13/2017     GFR Estimate   Date Value Ref Range Status   03/13/2017 >90  Non  GFR Calc   >60 mL/min/1.7m2 Final   01/09/2017 >90 >60 mL/min/1.7m2 Final   10/03/2016 >60 >60 ml/min/1.73m2 Final     TSH   Date Value Ref Range Status   03/13/2017 3.25 0.40 - 4.00 mU/L Final   ]    Most Recent Immunizations   Administered Date(s) Administered     HepB 08/05/2014     Influenza (H1N1) 01/08/2010     Influenza (High Dose) 3 valent vaccine 11/02/2017     Influenza (IIV3) PF 09/11/2009     Pneumococcal (PCV 13) 11/10/2015     Pneumococcal 23 valent 07/20/2010     TD (ADULT, 7+) 08/08/2008     TDAP Vaccine (Adacel) 08/05/2014     Zoster vaccine, live 06/13/2008       ASSESSMENT:              Current  medications were reviewed today as discussed above.      Medication Adherence: no issues identified    Diabetes: Stable. Patient is meeting A1c goal of < 7%. Only one low that is unexplainable, will keep dose the same but have him be more conservative with the Novolin R if he only eats a salad for the meal.    PLAN:                1. No insulin changes today. Keep checking like you have been doing.    2. If you have a high blood sugar over 200 before dinner and only eat a salad only give 1 unit of R to prevent a low.    I spent 45 minutes with this patient today. All changes were made via collaborative practice agreement with Debbie Lewis A copy of the visit note was provided to the patient's primary care provider.     Will follow up in 1 month.    The patient was mailed a summary of these recommendations as an after visit summary.    Tanna Diaz, PharmD  Medication Therapy Management Pharmacist

## 2017-11-29 NOTE — MR AVS SNAPSHOT
After Visit Summary   11/29/2017    Cayetano Lunsford    MRN: [id number]           Patient Information     Date Of Birth          1945        Visit Information        Provider Department      11/29/2017 9:30 AM Jessica Diaz [facility] MTM        Today's Diagnoses     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, with long-term current use of insulin (H)    -  1    Type 2 diabetes mellitus with hypoglycemia without coma, with long-term current use of insulin (H)          Care Instructions    Recommendations from today's MTM visit:                                                      1. No insulin changes today. Keep checking like you have been doing.    2. If you have a high blood sugar over 200 before dinner and only eat a salad only give 1 unit of R to prevent a low.    Next MTM visit: 12/27/17 at 10am - I will call you    To schedule another MTM appointment, please call the clinic directly or you may call the MTM scheduling line at [phone] or toll-free at [phone].     My Clinical Pharmacist's contact information:                                                      It was a pleasure seeing you today!  Please feel free to contact me with any questions or concerns you have.      Tanna Diaz, PharmD  Medication Therapy Management Pharmacist  [facility] - Monday 9:30 - 6:00 and Wednesday 7:30 - 4:00  Phone: [phone] - direct clinic line    You may receive a survey about the MT services you received.  I would appreciate your feedback to help me serve you better in the future. Please fill it out and return it when you can. Your comments will be anonymous.                Follow-ups after your visit        Your next 10 appointments already scheduled     Dec 05, 2017  9:00 AM CST   [facility]    [address] [place] [address]  "  [phone]            Dec 07, 2017 11:00 AM CST   Anticoagulation Visit with  INR CLINIC   [facility] ([facility])    [address] [place] [address] [phone]            Dec 27, 2017 10:00 AM CST   TELEMEDICINE with Jessica Diaz [facility] ([facility])    [address] [place] [address] [phone]           Note: this is not an onsite visit; there is no need to come to the facility.              Who to contact     If you have questions or need follow up information about today's clinic visit or your schedule please contact [facility] directly at [phone].  Normal or non-critical lab and imaging results will be communicated to you by MyChart, letter or phone within 4 business days after the clinic has received the results. If you do not hear from us within 7 days, please contact the clinic through MyChart or phone. If you have a critical or abnormal lab result, we will notify you by phone as soon as possible.  Submit refill requests through MeetDoctor or call your pharmacy and they will forward the refill request to us. Please allow 3 business days for your refill to be completed.          Additional Information About Your Visit        MyChart Information     MeetDoctor lets you send messages to your doctor, view your test results, renew your prescriptions, schedule appointments and more. To sign up, go to [email or web address] . Click on \"Log in\" on the left side of the screen, which will take you to the Welcome page. Then click on \"Sign up Now\" on the right side of the page.     You will be asked to enter the access code listed below, as well as some personal information. Please follow the directions to create your username and password.     Your access code is: [email or web address]  Expires: 2018  3:18 PM     Your access code will  in 90 days. If you need help or a new " code, please call your [place] clinic or [phone].        Care EveryWhere ID     This is your Care EveryWhere ID. This could be used by other organizations to access your [place] medical records  [id number]         Blood Pressure from Last 3 Encounters:   11/08/17 100/60   11/02/17 144/64   09/05/17 110/54    Weight from Last 3 Encounters:   11/08/17 201 lb 4 oz (91.3 kg)   11/02/17 197 lb 8 oz (89.6 kg)   09/05/17 201 lb 8 oz (91.4 kg)              Today, you had the following     No orders found for display         Today's Medication Changes          These changes are accurate as of: 11/29/17 10:58 AM.  If you have any questions, ask your nurse or doctor.               These medicines have changed or have updated prescriptions.        Dose/Directions    insulin  UNIT/ML injection   Commonly known as:  NovoLIN N RELION   This may have changed:  additional instructions   Used for:  Type 2 diabetes mellitus with hypoglycemia without coma, with long-term current use of insulin (H)        Inject 5 units under the skin at breakfast and 15 units at dinner.   Quantity:  3 vial   Refills:  3            Where to get your medicines      These medications were sent to [facility] [address] [place] [address]) [place] [address]     Phone:  [phone]     insulin  UNIT/ML injection                Primary Care Provider Office Phone # Fax #    Debbie Lewis [phone]       [facility] [place] [facility] [address]        Equal Access to Services     EMERY ROSS : Hadii aad ku hadasho Soomaali, waaxda luqadaha, qaybta kaalmada kristyyacory, erendiar pugh . So [facility] [phone].    ATENCIÓN: Si habla español, tiene a jurado disposición servicios gratuitos de asistencia lingüística. Llame al [phone].    We comply with applicable federal civil rights laws and [place] laws. We do not  "discriminate on the basis of race, color, national origin, age, disability, sex, sexual orientation, or gender identity.            Thank you!     Thank you for choosing [facility]  for your care. Our goal is always to provide you with excellent care. Hearing back from our patients is one way we can continue to improve our services. Please take a few minutes to complete the written survey that you may receive in the mail after your visit with us. Thank you!             Your Updated Medication List - Protect others around you: Learn how to safely use, store and throw away your medicines at www.disposemymeds.org.          This list is accurate as of: 11/29/17 10:58 AM.  Always use your most recent med list.                   Brand Name Dispense Instructions for use Diagnosis    albuterol 108 (90 BASE) MCG/ACT Inhaler    PROAIR HFA/PROVENTIL HFA/VENTOLIN HFA    1 Inhaler    Inhale 1-2 puffs into the lungs every 4 hours as needed (for shortness of breath/wheezing)    Chronic obstructive pulmonary disease, unspecified COPD type (H)       ASPIRIN NOT PRESCRIBED    INTENTIONAL     Reported on 5/17/2017        azelastine 0.1 % spray    ASTELIN    1 Bottle    Spray 1-2 sprays into both nostrils 2 times daily    Seasonal allergic rhinitis, unspecified allergic rhinitis trigger       B-D INSULIN SYRINGE 31G X 5/16\" 0.5 ML   Generic drug:  insulin syringe-needle U-100     100 each    USE 1 SYRINGE TWO TIMES A DAY OR AS DIRECTED    Type 2 diabetes, HbA1C goal < 8% (H)       BD ULTRA FINE PEN NEEDLES     100 each    Use to inject insulin twice daily.    Type 2 diabetes, HbA1C goal < 8% (H)       blood glucose monitoring lancets     100 Each    Use to test up to four times per day    Type II or unspecified type diabetes mellitus without mention of complication, not stated as uncontrolled       blood glucose monitoring test strip    no brand specified    360 each    Test 4 times daily or as directed. Profile Rx: " patient will contact pharmacy when needed    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, with long-term current use of insulin (H)       FLORAJEN3 Caps     60 capsule    Take 1 capsule by mouth 2 times daily    Acute cystitis without hematuria       folic acid 0.8 MG Caps     90 capsule    Take 1 tablet by mouth daily    Ulcerative colitis without complications, unspecified location (H)       insulin  UNIT/ML injection    NovoLIN N RELION    3 vial    Inject 5 units under the skin at breakfast and 15 units at dinner.    Type 2 diabetes mellitus with hypoglycemia without coma, with long-term current use of insulin (H)       insulin regular 100 UNIT/ML injection    NovoLIN R RELION    10 mL    Inject 5 units with breakfast and 2 units with dinner (when mixing with NPH, draw this insulin up first)    Type 2 diabetes mellitus with hypoglycemia without coma, with long-term current use of insulin (H)       ipratropium - albuterol 0.5 mg/2.5 mg/3 mL 0.5-2.5 (3) MG/3ML neb solution    DUONEB    270 mL    NEBULIZE CONTENTS OF ONE VIAL BY MOUTH EVERY 4 HOURS AS NEEDED FOR SHORTNESS OF BREATH /DYSPNEA    Chronic obstructive bronchitis (H)       losartan 100 MG tablet    COZAAR    90 tablet    Take 1 tablet (100 mg) by mouth daily for hypertension.    Hypertension goal BP (blood pressure) < 140/90       metFORMIN 1000 MG tablet    GLUCOPHAGE    180 tablet    Take 1 tablet (1,000 mg) by mouth 2 times daily (with meals) with breakfast and dinner.    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, with long-term current use of insulin (H)       order for DME     1 each    Equipment being ordered: Nebulizer machine with mask and tubing    Chronic airway obstruction, not elsewhere classified       pantoprazole 40 MG EC tablet    PROTONIX     Take 40 mg by mouth daily Started by Dr. Debbie Rico at [place] [facility]        primidone 50 MG tablet    MYSOLINE    270 tablet    Take 2 tablets twice a day and  3 tablets in the evening for 1 week, then go to 3 tablets twice a day (morning and evening) and take 2 tablets midday for 1 week, take 3 tablets three times a day, thereafter.    Essential tremor       propafenone 150 MG Tabs tablet    RYTHMOL    180 tablet    Take 1 tablet (150 mg) by mouth 2 times daily    Paroxysmal atrial fibrillation (H)       propranolol 40 MG tablet    INDERAL    180 tablet    Take 2-3 tablets ( mg) by mouth 2 times daily    Benign familial tremor       simvastatin 80 MG tablet    ZOCOR    45 tablet    Take 0.5 tablets (40 mg) by mouth At Bedtime Profile Rx: patient will contact pharmacy when needed    Hyperlipidemia LDL goal <100       STIOLTO RESPIMAT 2.5-2.5 MCG/ACT Aers   Generic drug:  tiotropium-olodaterol      Inhale 2 puffs into the lungs daily        sulfaSALAzine 500 MG tablet    AZULFIDINE    90 tablet    TAKE ONE TABLET BY MOUTH THREE TIMES A DAY    Ulcerative colitis, unspecified       tamsulosin 0.4 MG capsule    FLOMAX    90 capsule    Take 1 capsule (0.4 mg) by mouth daily    Benign prostatic hyperplasia with urinary frequency       warfarin 5 MG tablet    COUMADIN    155 tablet    Take as directed by Coumadin clinic. Current dose (11/2/17): 7.5 mg on Mon, Wed, Fri + 10 mg all other days.    Atrial fibrillation, unspecified type (H)

## 2017-12-05 ENCOUNTER — OFFICE VISIT (OUTPATIENT)
Dept: NEUROLOGY | Facility: CLINIC | Age: 72
End: 2017-12-05
Payer: COMMERCIAL

## 2017-12-05 VITALS
HEIGHT: 66 IN | TEMPERATURE: 97.6 F | BODY MASS INDEX: 32.5 KG/M2 | WEIGHT: 202.25 LBS | DIASTOLIC BLOOD PRESSURE: 58 MMHG | HEART RATE: 78 BPM | OXYGEN SATURATION: 91 % | SYSTOLIC BLOOD PRESSURE: 116 MMHG | RESPIRATION RATE: 18 BRPM

## 2017-12-05 DIAGNOSIS — G25.0 ESSENTIAL TREMOR: ICD-10-CM

## 2017-12-05 PROCEDURE — 99213 OFFICE O/P EST LOW 20 MIN: CPT | Performed by: PSYCHIATRY & NEUROLOGY

## 2017-12-05 RX ORDER — PRIMIDONE 50 MG/1
150 TABLET ORAL 3 TIMES DAILY
Qty: 270 TABLET | Refills: 2 | Status: SHIPPED | OUTPATIENT
Start: 2017-12-05 | End: 2018-03-06

## 2017-12-05 NOTE — MR AVS SNAPSHOT
After Visit Summary   12/5/2017    Cayetano Lunsford    MRN: [id number]           Patient Information     Date Of Birth          1945        Visit Information        Provider Department      12/5/2017 9:00 AM Bong Yoo MD [facility]        Today's Diagnoses     Essential tremor          Care Instructions    AFTER VISIT SUMMARY (AVS):    At today's visit we discussed your current symptoms, treatment options, and plan. Please, discuss with your primary care doctor increase in Propranolol dose to 160 mg in morning and 120 mg in the evening every day. You also need to discuss your blood pressure control.    The following medications were refilled: Primidone (MYSOLINE) 50 MG tablet: Take 3 tablets (150 mg) by mouth 3 times daily.    Next follow-up appointment is in the next 3 months or earlier if needed.    Please do not hesitate to call me with any questions or concerns.    Thanks.            Follow-ups after your visit        Follow-up notes from your care team     Return in about 3 months (around 3/5/2018).      Your next 10 appointments already scheduled     Dec 07, 2017 11:00 AM CST   Anticoagulation Visit with  INR CLINIC   [facility] ([facility])    [address] [place] [address] [phone]            Dec 27, 2017 10:00 AM CST   TELEMEDICINE with Jessica Diaz [facility] ([facility])    [address] [place] [address] [phone]           Note: this is not an onsite visit; there is no need to come to the facility.            Mar 06, 2018  9:30 AM CST   Return Visit with Bong Yoo MD   [facility] ([facility])    [address] [place] [address] [phone]              Who to contact     If you have questions or need follow up information about today's clinic visit or  "your schedule please contact [facility] directly at [phone].  Normal or non-critical lab and imaging results will be communicated to you by MyChart, letter or phone within 4 business days after the clinic has received the results. If you do not hear from us within 7 days, please contact the clinic through [facility] or phone. If you have a critical or abnormal lab result, we will notify you by phone as soon as possible.  Submit refill requests through R17 or call your pharmacy and they will forward the refill request to us. Please allow 3 business days for your refill to be completed.          Additional Information About Your Visit        [employer] Resources Information     R17 lets you send messages to your doctor, view your test results, renew your prescriptions, schedule appointments and more. To sign up, go to [email or web address] . Click on \"Log in\" on the left side of the screen, which will take you to the Welcome page. Then click on \"Sign up Now\" on the right side of the page.     You will be asked to enter the access code listed below, as well as some personal information. Please follow the directions to create your username and password.     Your access code is: [email or web address]  Expires: 2018  3:18 PM     Your access code will  in 90 days. If you need help or a new code, please call your [place] clinic or [phone].        Care EveryWhere ID     This is your Care EveryWhere ID. This could be used by other organizations to access your [place] medical records  [id number]        Your Vitals Were     Pulse Temperature Respirations Height Pulse Oximetry BMI (Body Mass Index)    78 97.6  F (36.4  C) (Oral) 18 1.676 m (5' 6\") 91% 32.64 kg/m2       Blood Pressure from Last 3 Encounters:   17 150/72   17 100/60   17 144/64    Weight from Last 3 Encounters:   17 91.7 kg (202 lb 4 oz)   17 91.3 kg (201 lb 4 oz)   17 89.6 kg (197 lb 8 oz)            "   Today, you had the following     No orders found for display         Today's Medication Changes          These changes are accurate as of: 12/5/17  9:36 AM.  If you have any questions, ask your nurse or doctor.               These medicines have changed or have updated prescriptions.        Dose/Directions    primidone 50 MG tablet   Commonly known as:  MYSOLINE   This may have changed:    - how much to take  - how to take this  - when to take this  - additional instructions   Used for:  Essential tremor   Changed by:  Bong Yoo MD        Dose:  150 mg   Take 3 tablets (150 mg) by mouth 3 times daily   Quantity:  270 tablet   Refills:  2            Where to get your medicines      These medications were sent to [facility] [place], [place] - [address]  [address] [facility] [address]     Phone:  [phone]     primidone 50 MG tablet                Primary Care Provider Office Phone # Fax #    Debbie Lewis [phone]       [address]  [facility] [address]        Equal Access to Services     [facility]: Hadii efrem sweeney hadasho Soomaali, waaxda luqadaha, qaybta kaalmada adeegyacory, erendira pugh . So [facility] [phone].    ATENCIÓN: Si habla español, tiene a jurado disposición servicios gratuitos de asistencia lingüística. Llame al [phone].    We comply with applicable federal civil rights laws and [place] laws. We do not discriminate on the basis of race, color, national origin, age, disability, sex, sexual orientation, or gender identity.            Thank you!     Thank you for choosing [facility]  for your care. Our goal is always to provide you with excellent care. Hearing back from our patients is one way we can continue to improve our services. Please take a few minutes to complete the written survey that you may receive in the mail after your visit with us. Thank you!             Your  "Updated Medication List - Protect others around you: Learn how to safely use, store and throw away your medicines at www.disposemymeds.org.          This list is accurate as of: 12/5/17  9:36 AM.  Always use your most recent med list.                   Brand Name Dispense Instructions for use Diagnosis    albuterol 108 (90 BASE) MCG/ACT Inhaler    PROAIR HFA/PROVENTIL HFA/VENTOLIN HFA    1 Inhaler    Inhale 1-2 puffs into the lungs every 4 hours as needed (for shortness of breath/wheezing)    Chronic obstructive pulmonary disease, unspecified COPD type (H)       ASPIRIN NOT PRESCRIBED    INTENTIONAL     Reported on 5/17/2017        azelastine 0.1 % spray    ASTELIN    1 Bottle    Spray 1-2 sprays into both nostrils 2 times daily    Seasonal allergic rhinitis, unspecified allergic rhinitis trigger       B-D INSULIN SYRINGE 31G X 5/16\" 0.5 ML   Generic drug:  insulin syringe-needle U-100     100 each    USE 1 SYRINGE TWO TIMES A DAY OR AS DIRECTED    Type 2 diabetes, HbA1C goal < 8% (H)       BD ULTRA FINE PEN NEEDLES     100 each    Use to inject insulin twice daily.    Type 2 diabetes, HbA1C goal < 8% (H)       blood glucose monitoring lancets     100 Each    Use to test up to four times per day    Type II or unspecified type diabetes mellitus without mention of complication, not stated as uncontrolled       blood glucose monitoring test strip    no brand specified    360 each    Test 4 times daily or as directed. Profile Rx: patient will contact pharmacy when needed    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, with long-term current use of insulin (H)       FLORAJEN3 Caps     60 capsule    Take 1 capsule by mouth 2 times daily    Acute cystitis without hematuria       folic acid 0.8 MG Caps     90 capsule    Take 1 tablet by mouth daily    Ulcerative colitis without complications, unspecified location (H)       insulin  UNIT/ML injection    NovoLIN N RELION    3 vial    Inject 5 units " under the skin at breakfast and 15 units at dinner.    Type 2 diabetes mellitus with hypoglycemia without coma, with long-term current use of insulin (H)       insulin regular 100 UNIT/ML injection    NovoLIN R RELION    10 mL    Inject 5 units with breakfast and 2 units with dinner (when mixing with NPH, draw this insulin up first)    Type 2 diabetes mellitus with hypoglycemia without coma, with long-term current use of insulin (H)       ipratropium - albuterol 0.5 mg/2.5 mg/3 mL 0.5-2.5 (3) MG/3ML neb solution    DUONEB    270 mL    NEBULIZE CONTENTS OF ONE VIAL BY MOUTH EVERY 4 HOURS AS NEEDED FOR SHORTNESS OF BREATH /DYSPNEA    Chronic obstructive bronchitis (H)       losartan 100 MG tablet    COZAAR    90 tablet    Take 1 tablet (100 mg) by mouth daily for hypertension.    Hypertension goal BP (blood pressure) < 140/90       metFORMIN 1000 MG tablet    GLUCOPHAGE    180 tablet    Take 1 tablet (1,000 mg) by mouth 2 times daily (with meals) with breakfast and dinner.    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, with long-term current use of insulin (H)       order for DME     1 each    Equipment being ordered: Nebulizer machine with mask and tubing    Chronic airway obstruction, not elsewhere classified       pantoprazole 40 MG EC tablet    PROTONIX     Take 40 mg by mouth daily Started by [facility] De[facility] at [facility]        primidone 50 MG tablet    MYSOLINE    270 tablet    Take 3 tablets (150 mg) by mouth 3 times daily    Essential tremor       propafenone 150 MG Tabs tablet    RYTHMOL    180 tablet    Take 1 tablet (150 mg) by mouth 2 times daily    Paroxysmal atrial fibrillation (H)       propranolol 40 MG tablet    INDERAL    180 tablet    Take 2-3 tablets ( mg) by mouth 2 times daily    Benign familial tremor       simvastatin 80 MG tablet    ZOCOR    45 tablet    Take 0.5 tablets (40 mg) by mouth At Bedtime Profile Rx: patient will contact pharmacy when needed    Hyperlipidemia  LDL goal <100       STIOLTO RESPIMAT 2.5-2.5 MCG/ACT Aers   Generic drug:  tiotropium-olodaterol      Inhale 2 puffs into the lungs daily        sulfaSALAzine 500 MG tablet    AZULFIDINE    90 tablet    TAKE ONE TABLET BY MOUTH THREE TIMES A DAY    Ulcerative colitis, unspecified       tamsulosin 0.4 MG capsule    FLOMAX    90 capsule    Take 1 capsule (0.4 mg) by mouth daily    Benign prostatic hyperplasia with urinary frequency       warfarin 5 MG tablet    COUMADIN    155 tablet    Take as directed by Coumadin clinic. Current dose (11/2/17): 7.5 mg on Mon, Wed, Fri + 10 mg all other days.    Atrial fibrillation, unspecified type (H)

## 2017-12-05 NOTE — PATIENT INSTRUCTIONS
AFTER VISIT SUMMARY (AVS):    At today's visit we discussed your current symptoms, treatment options, and plan. Please, discuss with your primary care doctor increase in Propranolol dose to 160 mg in morning and 120 mg in the evening every day. You also need to discuss your blood pressure control.    The following medications were refilled: Primidone (MYSOLINE) 50 MG tablet: Take 3 tablets (150 mg) by mouth 3 times daily.    Next follow-up appointment is in the next 3 months or earlier if needed.    Please do not hesitate to call me with any questions or concerns.    Thanks.

## 2017-12-05 NOTE — PROGRESS NOTES
ESTABLISHED PATIENT NEUROLOGY NOTE    DATE OF VISIT: 12/5/2017  CLINIC LOCATION: [facility]  MRN: [id number]  PATIENT NAME: Cayetano Lunsford  YOB: 1945    PCP: Debbie Lewis MD, MD    REASON FOR VISIT:   Chief Complaint   Patient presents with     Follow Up For     tremor     SUBJECTIVE:                                                      HISTORY OF PRESENT ILLNESS: Patient is here for follow up regarding bilateral hand tremor felt to be consistent with essential tremor. He was seen last on 09/05/2017. At that time, he reported worsening of his tremor, and his primidone dose was increased. Please refer to my initial/other prior notes for further information.    Since the last visit, the patient reports worsening of his bilateral hand tremor. He has difficulty eating (spoon, cup) and dressing (buttoning, zipping). He takes 450 mg of primidone and 240 mg of propranolol daily. He denies any significant side effects. He also denies any new focal neurological symptoms.    On review of systems, patient endorses no other active complaints. Medications, allergies, family and social history were also reviewed. There are no changes reported by patient.    CURRENT MEDICATIONS:   Current Outpatient Prescriptions on File Prior to Visit:  insulin NPH (NOVOLIN N RELION) 100 UNIT/ML injection Inject 5 units under the skin at breakfast and 15 units at dinner.   tamsulosin (FLOMAX) 0.4 MG capsule Take 1 capsule (0.4 mg) by mouth daily   folic acid 0.8 MG CAPS Take 1 tablet by mouth daily   losartan (COZAAR) 100 MG tablet Take 1 tablet (100 mg) by mouth daily for hypertension.   simvastatin (ZOCOR) 80 MG tablet Take 0.5 tablets (40 mg) by mouth At Bedtime Profile Rx: patient will contact pharmacy when needed   albuterol (PROAIR HFA/PROVENTIL HFA/VENTOLIN HFA) 108 (90 BASE) MCG/ACT Inhaler Inhale 1-2 puffs into the lungs every 4 hours as needed (for shortness of breath/wheezing)   warfarin (COUMADIN) 5  "MG tablet Take as directed by Coumadin clinic. Current dose (11/2/17): 7.5 mg on Mon, Wed, Fri + 10 mg all other days.   B-D INSULIN SYRINGE 31G X 5/16\" 0.5 ML USE 1 SYRINGE TWO TIMES A DAY OR AS DIRECTED   insulin regular (NOVOLIN R RELION) 100 UNIT/ML injection Inject 5 units with breakfast and 2 units with dinner (when mixing with NPH, draw this insulin up first)   tiotropium-olodaterol (STIOLTO RESPIMAT) 2.5-2.5 MCG/ACT AERS Inhale 2 puffs into the lungs daily   propafenone (RYTHMOL) 150 MG TABS tablet Take 1 tablet (150 mg) by mouth 2 times daily   metFORMIN (GLUCOPHAGE) 1000 MG tablet Take 1 tablet (1,000 mg) by mouth 2 times daily (with meals) with breakfast and dinner.   propranolol (INDERAL) 40 MG tablet Take 2-3 tablets ( mg) by mouth 2 times daily   ipratropium - albuterol 0.5 mg/2.5 mg/3 mL (DUONEB) 0.5-2.5 (3) MG/3ML neb solution NEBULIZE CONTENTS OF ONE VIAL BY MOUTH EVERY 4 HOURS AS NEEDED FOR SHORTNESS OF BREATH /DYSPNEA   azelastine (ASTELIN) 0.1 % spray Spray 1-2 sprays into both nostrils 2 times daily   blood glucose monitoring (NO BRAND SPECIFIED) test strip Test 4 times daily or as directed. Profile Rx: patient will contact pharmacy when needed   Probiotic Product (FLORAJEN3) CAPS Take 1 capsule by mouth 2 times daily   order for DME Equipment being ordered: Nebulizer machine with mask and tubing   pantoprazole (PROTONIX) 40 MG enteric coated tablet Take 40 mg by mouth daily Started by Dr. Debbie Rico at [facility]   ASPIRIN NOT PRESCRIBED, INTENTIONAL, Reported on 5/17/2017   BD ULTRA FINE PEN NEEDLES Use to inject insulin twice daily.   sulfaSALAzine (AZULFIDINE) 500 MG tablet TAKE ONE TABLET BY MOUTH THREE TIMES A DAY   FREESTYLE LANCETS MISC Use to test up to four times per day     REVIEW OF SYSTEMS:                                                    10-system review was completed. Pertinent positives are included in HPI. The remainder of ROS is negative.  EXAM:                          " "                          Physical Exam:   Vitals: /72 (BP Location: Right arm, Patient Position: Sitting, Cuff Size: Adult Large)  Pulse 78  Temp 97.6  F (36.4  C) (Oral)  Resp 18  Ht 1.676 m (5' 6\")  Wt 91.7 kg (202 lb 4 oz)  SpO2 91%  BMI 32.64 kg/m2    Blood pressure recheck: /58 (BP Location: Right arm, Patient Position: Sitting, Cuff Size: Adult Large)  Pulse 78  Temp 97.6  F (36.4  C) (Oral)  Resp 18  Ht 1.676 m (5' 6\")  Wt 91.7 kg (202 lb 4 oz)  SpO2 91%  BMI 32.64 kg/m2    General: pt is in NAD, cooperative.  Skin: normal turgor, moist mucous membranes, no lesions/rashes noticed.  HEENT: ATNC, white sclera, normal conjunctiva.  Respiratory: Symmetric lung excursion, no accessory respiratory muscle use.  Abdomen: Non distended.  Neurological: awake, cooperative, follows commands, no aphasia, mild dysarthria is noted, cranial nerves II-XII: no ptosis, extraocular motility is full, face is symmetric, tongue is midline, equally moves all extremities, has mild to moderate intermittent postural and action bilateral hand tremor, no dysmetria bilaterally, casual gait is normal.    DATA:     Labs: I personally reviewed the following labs:  Anticoagulation Therapy Visit on 11/22/2017   Component Date Value Ref Range Status     INR Protime 11/22/2017 2.0* 0.86 - 1.14 Final     ASSESSMENT and PLAN:                                                    Assessment: 72-year-old male patient presents for a follow-up of his essential tremor. He reports interval worsening of the hand tremor on the current regimen of 240 mg of propranolol and 450 mg of primidone daily. He denies any significant side effects. We discussed in detail the available treatment options, including surgical. The patient was not interested in later. We decided to increase propranolol first, followed by increase in primidone later if the first medication adjustment does not work.    Diagnoses:    ICD-10-CM    1. Essential tremor " G25.0 primidone (MYSOLINE) 50 MG tablet     Plan: At today's visit we discussed patient's current symptoms, treatment options, and plan. I recommended the patient to discuss with his primary care doctor, Dr. Lewis, if she would agree with an increase in Propranolol dose to 160 mg in morning and 120 mg in the evening every day as a first step. If not or it is not effective, will increase primidone dose at the next follow-up visit.    Blood pressure was elevated during today's visit. I advised the patient to discuss it with his primary care provider.    The following medications were refilled: Primidone (MYSOLINE) 50 MG tablet: Take 3 tablets (150 mg) by mouth 3 times daily.    Next follow-up appointment is in the next 3 months or earlier if needed.    I advised the patient to call me with any questions or concerns.    Total Time: 26 minutes with > 50% spent counseling the patient on stated above assessment and recommendations, including current symptoms, available treatment options, proposed plan of treatment, and prognosis. The additional time was used to answer patient's questions.    Bong Yoo MD  / Neurology

## 2017-12-05 NOTE — Clinical Note
Patient's blood pressure was elevated during today's visit. It came down on recheck. I advised him to discuss it further with you. Thanks, Bong Yoo MD

## 2017-12-07 ENCOUNTER — ANTICOAGULATION THERAPY VISIT (OUTPATIENT)
Dept: NURSING | Facility: CLINIC | Age: 72
End: 2017-12-07
Payer: COMMERCIAL

## 2017-12-07 DIAGNOSIS — I48.91 ATRIAL FIBRILLATION, UNSPECIFIED TYPE (H): ICD-10-CM

## 2017-12-07 DIAGNOSIS — Z79.01 LONG-TERM (CURRENT) USE OF ANTICOAGULANTS: ICD-10-CM

## 2017-12-07 LAB — INR POINT OF CARE: 2.1 (ref 0.86–1.14)

## 2017-12-07 PROCEDURE — 85610 PROTHROMBIN TIME: CPT | Mod: QW

## 2017-12-07 PROCEDURE — 36416 COLLJ CAPILLARY BLOOD SPEC: CPT

## 2017-12-07 PROCEDURE — 99207 ZZC NO CHARGE NURSE ONLY: CPT

## 2017-12-07 NOTE — PROGRESS NOTES
ANTICOAGULATION FOLLOW-UP CLINIC VISIT    Patient Name:  Cayetano Lunsford  Date:  12/7/2017  Contact Type:  Face to Face    SUBJECTIVE:     Patient Findings     Positives No Problem Findings           OBJECTIVE    INR Protime   Date Value Ref Range Status   12/07/2017 2.1 (A) 0.86 - 1.14 Final       ASSESSMENT / PLAN  INR assessment THER    Recheck INR In: 4 WEEKS    INR Location Clinic      Anticoagulation Summary as of 12/7/2017     INR goal 2.0-3.0   Today's INR 2.1   Maintenance plan 10 mg (5 mg x 2) on Mon, Thu, Sat; 7.5 mg (5 mg x 1.5) all other days   Full instructions 10 mg on Mon, Thu, Sat; 7.5 mg all other days   Weekly total 60 mg   No change documented Estelle Prado   Plan last modified Kirstin Meléndez RN (11/2/2017)   Next INR check 1/4/2018   Priority INR   Target end date     Indications   Long-term (current) use of anticoagulants [Z79.01] [Z79.01]  Atrial fibrillation (H) [I48.91]         Anticoagulation Episode Summary     INR check location     Preferred lab     Send INR reminders to  ANTICO CLINIC    Comments       Anticoagulation Care Providers     Provider Role Specialty Phone number    Debbie Lewis MD [facility] [phone]            See the Encounter Report to view Anticoagulation Flowsheet and Dosing Calendar (Go to Encounters tab in chart review, and find the Anticoagulation Therapy Visit)    Patient aware if signs of clotting (pain, tenderness, swelling, color change in leg or arm, SOB) and bleeding occur (blood in stool, urine, large bruising, bleeding gums, nosebleeds) to have INR check sooner. If sx severe report to ER or concerned for stroke call 911. If general questions or concerns arise, call clinic.         Estelle Prado RN

## 2017-12-07 NOTE — MR AVS SNAPSHOT
Cayetano Lunsford   12/7/2017 11:00 AM   Anticoagulation Therapy Visit    Description:  72 year old male   Provider:   INR CLINIC   Department:   Nurse           INR as of 12/7/2017     Today's INR 2.1      Anticoagulation Summary as of 12/7/2017     INR goal 2.0-3.0   Today's INR 2.1   Full instructions 10 mg on Mon, Thu, Sat; 7.5 mg all other days   Next INR check 1/4/2018    Indications   Long-term (current) use of anticoagulants [Z79.01] [Z79.01]  Atrial fibrillation (H) [I48.91]         Your next Anticoagulation Clinic appointment(s)     Jan 04, 2018 11:00 AM CST   Anticoagulation Visit with  INR CLINIC   [facility] ([facility])    [address] [place] [id number] [phone]              Contact Numbers     [facility]  Please call [phone] to cancel and/or reschedule your appointment   Please call [phone] with any problems or questions regarding your therapy.        December 2017 Details    Sun Mon Tue Wed Thu Fri Sat          1               2                 3               4               5               6               7      10 mg   See details      8      7.5 mg         9      10 mg           10      7.5 mg         11      10 mg         12      7.5 mg         13      7.5 mg         14      10 mg         15      7.5 mg         16      10 mg           17      7.5 mg         18      10 mg         19      7.5 mg         20      7.5 mg         21      10 mg         22      7.5 mg         23      10 mg           24      7.5 mg         25      10 mg         26      7.5 mg         27      7.5 mg         28      10 mg         29      7.5 mg         30      10 mg           31      7.5 mg                Date Details   12/07 This INR check               How to take your warfarin dose     To take:  7.5 mg Take 1.5 of the 5 mg tablets.    To take:  10 mg Take 2 of the 5 mg tablets.           January 2018 Details    Sun Mon Tue Wed Thu Fri Sat       1      10 mg         2      7.5 mg         3      7.5 mg         4            5               6                 7               8               9               10               11               12               13                 14               15               16               17               18               19               20                 21               22               23               24               25               26               27                 28               29               30               31                   Date Details   No additional details    Date of next INR:  1/4/2018         How to take your warfarin dose     To take:  7.5 mg Take 1.5 of the 5 mg tablets.    To take:  10 mg Take 2 of the 5 mg tablets.

## 2017-12-27 ENCOUNTER — ALLIED HEALTH/NURSE VISIT (OUTPATIENT)
Dept: PHARMACY | Facility: CLINIC | Age: 72
End: 2017-12-27
Payer: COMMERCIAL

## 2017-12-27 DIAGNOSIS — J44.9 CHRONIC OBSTRUCTIVE PULMONARY DISEASE, UNSPECIFIED COPD TYPE (H): ICD-10-CM

## 2017-12-27 DIAGNOSIS — E11.649 TYPE 2 DIABETES MELLITUS WITH HYPOGLYCEMIA WITHOUT COMA, WITH LONG-TERM CURRENT USE OF INSULIN (H): Primary | ICD-10-CM

## 2017-12-27 DIAGNOSIS — Z79.4 TYPE 2 DIABETES MELLITUS WITH HYPOGLYCEMIA WITHOUT COMA, WITH LONG-TERM CURRENT USE OF INSULIN (H): Primary | ICD-10-CM

## 2017-12-27 PROCEDURE — 99606 MTMS BY PHARM EST 15 MIN: CPT | Performed by: PHARMACIST

## 2017-12-27 PROCEDURE — 99607 MTMS BY PHARM ADDL 15 MIN: CPT | Performed by: PHARMACIST

## 2017-12-27 RX ORDER — NEBULIZER ACCESSORIES
KIT MISCELLANEOUS
Qty: 1 KIT | Refills: 0 | Status: SHIPPED | OUTPATIENT
Start: 2017-12-27 | End: 2021-05-06

## 2017-12-27 NOTE — MR AVS SNAPSHOT
After Visit Summary   12/27/2017    Cayetano Lunsford    MRN: [id number]           Patient Information     Date Of Birth          1945        Visit Information        Provider Department      12/27/2017 10:00 AM Jessica Diaz RPH [facility]        Today's Diagnoses     Chronic obstructive pulmonary disease, unspecified COPD type (H)    -  1    Type 2 diabetes mellitus with hypoglycemia without coma, with long-term current use of insulin (H)          Care Instructions    Recommendations from today's MTM visit:                                                      1. Get a new nebulizer machine.     2. Stay on the current dose of insulin.    3. Call me with any lows.    Next MTM visit: 2/21/18 at 10AM I will call you    To schedule another MTM appointment, please call the clinic directly or you may call the [place] scheduling line at [phone] or toll-free at [phone].     My Clinical Pharmacist's contact information:                                                      It was a pleasure seeing you today!  Please feel free to contact me with any questions or concerns you have.      Tanna Diaz, PharmD  Medication Therapy Management Pharmacist  [facility] - Monday 9:30 - 6:00 and Wednesday 7:30 - 4:00  Phone: [phone] - direct clinic line    You may receive a survey about the [facility] services you received.  I would appreciate your feedback to help me serve you better in the future. Please fill it out and return it when you can. Your comments will be anonymous.                    Follow-ups after your visit        Your next 10 appointments already scheduled     Jan 04, 2018 11:00 AM CST   Anticoagulation Visit with  INR CLINIC   [facility]    [address] [place] [id number] [phone]            Feb 21, 2018 10:00 AM CST   TELEMEDICINE with Jessica Diaz RPH   [facility] ([place]  "[facility])    [address] [place] [address] [phone]           Note: this is not an onsite visit; there is no need to come to the facility.            Mar 06, 2018  9:30 AM CST   Return Visit with Bong Yoo MD   [facility]    [address] [place] [id number] [phone]              Who to contact     If you have questions or need follow up information about today's clinic visit or your schedule please contact [facility] [place] directly at [phone].  Normal or non-critical lab and imaging results will be communicated to you by [employer], letter or phone within 4 business days after the clinic has received the results. If you do not hear from us within 7 days, please contact the clinic through [employer] or phone. If you have a critical or abnormal lab result, we will notify you by phone as soon as possible.  Submit refill requests through [employer] or call your pharmacy and they will forward the refill request to us. Please allow 3 business days for your refill to be completed.          Additional Information About Your Visit        [employer] Information     [employer] lets you send messages to your doctor, view your test results, renew your prescriptions, schedule appointments and more. To sign up, go to [email or web address] . Click on \"Log in\" on the left side of the screen, which will take you to the Welcome page. Then click on \"Sign up Now\" on the right side of the page.     You will be asked to enter the access code listed below, as well as some personal information. Please follow the directions to create your username and password.     Your access code is: [email or web address]  Expires: 2018  3:18 PM     Your access code will  in 90 days. If you need help or a new code, please call your [facility] clinic or [phone].        Care EveryWhere ID     This is your Care EveryWhere ID. This " could be used by other organizations to access your [facility] medical records  [id number]         Blood Pressure from Last 3 Encounters:   12/05/17 116/58   11/08/17 100/60   11/02/17 144/64    Weight from Last 3 Encounters:   12/05/17 202 lb 4 oz (91.7 kg)   11/08/17 201 lb 4 oz (91.3 kg)   11/02/17 197 lb 8 oz (89.6 kg)              Today, you had the following     No orders found for display         Today's Medication Changes          These changes are accurate as of: 12/27/17  6:56 PM.  If you have any questions, ask your nurse or doctor.               Start taking these medicines.        Dose/Directions    nebulizer/tubing/mouthpiece Kit   Used for:  Chronic obstructive pulmonary disease, unspecified COPD type (H)        Use to nebulize medications for COPD   Quantity:  1 kit   Refills:  0            Where to get your medicines      These medications were sent to [address] [address]     Phone:  [phone]     insulin regular 100 UNIT/ML injection         Some of these will need a paper prescription and others can be bought over the counter.  Ask your nurse if you have questions.     Bring a paper prescription for each of these medications     nebulizer/tubing/mouthpiece Kit                Primary Care Provider Office Phone # Fax #    Debbie Lewis [phone]       [address]  [facility] [address]        Equal Access to Services     ZAHIRA ROSS : Hadii efrem lynno Soflorencia, waaxda luqadaha, qaybta kaalmada adeegyada, wax crystal walter. So [facility] [phone].    ATENCIÓN: Si habla español, tiene a jurado disposición servicios gratuitos de asistencia lingüística. Llame al [phone].    We comply with applicable federal civil rights laws and [place] laws. We do not discriminate on the basis of race, color, national origin, age, disability, sex,  "sexual orientation, or gender identity.            Thank you!     Thank you for choosing [facility]  for your care. Our goal is always to provide you with excellent care. Hearing back from our patients is one way we can continue to improve our services. Please take a few minutes to complete the written survey that you may receive in the mail after your visit with us. Thank you!             Your Updated Medication List - Protect others around you: Learn how to safely use, store and throw away your medicines at www.disposemymeds.org.          This list is accurate as of: 12/27/17  6:56 PM.  Always use your most recent med list.                   Brand Name Dispense Instructions for use Diagnosis    albuterol 108 (90 BASE) MCG/ACT Inhaler    PROAIR HFA/PROVENTIL HFA/VENTOLIN HFA    1 Inhaler    Inhale 1-2 puffs into the lungs every 4 hours as needed (for shortness of breath/wheezing)    Chronic obstructive pulmonary disease, unspecified COPD type (H)       ASPIRIN NOT PRESCRIBED    INTENTIONAL     Reported on 5/17/2017        azelastine 0.1 % spray    ASTELIN    1 Bottle    Spray 1-2 sprays into both nostrils 2 times daily    Seasonal allergic rhinitis, unspecified allergic rhinitis trigger       B-D INSULIN SYRINGE 31G X 5/16\" 0.5 ML   Generic drug:  insulin syringe-needle U-100     100 each    USE 1 SYRINGE TWO TIMES A DAY OR AS DIRECTED    Type 2 diabetes, HbA1C goal < 8% (H)       BD ULTRA FINE PEN NEEDLES     100 each    Use to inject insulin twice daily.    Type 2 diabetes, HbA1C goal < 8% (H)       blood glucose monitoring lancets     100 Each    Use to test up to four times per day    Type II or unspecified type diabetes mellitus without mention of complication, not stated as uncontrolled       blood glucose monitoring test strip    no brand specified    360 each    Test 4 times daily or as directed. Profile Rx: patient will contact pharmacy when needed    Type 2 diabetes mellitus with stage 1 " chronic kidney disease, with long-term current use of insulin (H)       FLORAJEN3 Caps     60 capsule    Take 1 capsule by mouth 2 times daily    Acute cystitis without hematuria       folic acid 0.8 MG Caps     90 capsule    Take 1 tablet by mouth daily    Ulcerative colitis without complications, unspecified location (H)       insulin  UNIT/ML injection    NovoLIN N RELION    3 vial    Inject 5 units under the skin at breakfast and 15 units at dinner.    Type 2 diabetes mellitus with hypoglycemia without coma, with long-term current use of insulin (H)       insulin regular 100 UNIT/ML injection    NovoLIN R RELION    30 mL    Inject 5 units with breakfast and 2 units with dinner (when mixing with NPH, draw this insulin up first)    Type 2 diabetes mellitus with hypoglycemia without coma, with long-term current use of insulin (H)       ipratropium - albuterol 0.5 mg/2.5 mg/3 mL 0.5-2.5 (3) MG/3ML neb solution    DUONEB    270 mL    NEBULIZE CONTENTS OF ONE VIAL BY MOUTH EVERY 4 HOURS AS NEEDED FOR SHORTNESS OF BREATH /DYSPNEA    Chronic obstructive bronchitis (H)       losartan 100 MG tablet    COZAAR    90 tablet    Take 1 tablet (100 mg) by mouth daily for hypertension.    Hypertension goal BP (blood pressure) < 140/90       metFORMIN 1000 MG tablet    GLUCOPHAGE    180 tablet    Take 1 tablet (1,000 mg) by mouth 2 times daily (with meals) with breakfast and dinner.    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, with long-term current use of insulin (H)       nebulizer/tubing/mouthpiece Kit     1 kit    Use to nebulize medications for COPD    Chronic obstructive pulmonary disease, unspecified COPD type (H)       order for DME     1 each    Equipment being ordered: Nebulizer machine with mask and tubing    Chronic airway obstruction, not elsewhere classified       pantoprazole 40 MG EC tablet    PROTONIX     Take 40 mg by mouth daily Started by Dr. Debbie Rico at [facility]        primidone  50 MG tablet    MYSOLINE    270 tablet    Take 3 tablets (150 mg) by mouth 3 times daily    Essential tremor       propafenone 150 MG Tabs tablet    RYTHMOL    180 tablet    Take 1 tablet (150 mg) by mouth 2 times daily    Paroxysmal atrial fibrillation (H)       propranolol 40 MG tablet    INDERAL    180 tablet    Take 2-3 tablets ( mg) by mouth 2 times daily    Benign familial tremor       simvastatin 80 MG tablet    ZOCOR    45 tablet    Take 0.5 tablets (40 mg) by mouth At Bedtime Profile Rx: patient will contact pharmacy when needed    Hyperlipidemia LDL goal <100       STIOLTO RESPIMAT 2.5-2.5 MCG/ACT Aers   Generic drug:  tiotropium-olodaterol      Inhale 2 puffs into the lungs daily        sulfaSALAzine 500 MG tablet    AZULFIDINE    90 tablet    TAKE ONE TABLET BY MOUTH THREE TIMES A DAY    Ulcerative colitis, unspecified       tamsulosin 0.4 MG capsule    FLOMAX    90 capsule    Take 1 capsule (0.4 mg) by mouth daily    Benign prostatic hyperplasia with urinary frequency       warfarin 5 MG tablet    COUMADIN    155 tablet    Take as directed by Coumadin clinic. Current dose (11/2/17): 7.5 mg on Mon, Wed, Fri + 10 mg all other days.    Atrial fibrillation, unspecified type (H)

## 2017-12-27 NOTE — PROGRESS NOTES
SUBJECTIVE/OBJECTIVE:                Cayetano Lunsford is a 72 year old male called for a follow-up visit for Medication Therapy Management.  He was referred to me from Dr. Debbie Lewis.     Chief Complaint: Follow up from our visit on 11/29/17.  Pt reports his nebulizer machine is not working right and he needs a new one.    Tobacco: No tobacco use    Alcohol: not currently using    Medication Adherence: no issues reported    Diabetes:  Pt currently taking metformin bid, Novolin N 5 units AM before breakfast and 15 units in PM-at dinner, Novolin R-5 units with breakfast, between 80 and 120, reduce your R insulin by 2 units and 2 units of R at dinner if blood sugars are over 200. If below 70 skip dose. He gives his shots in his arms and stomach but most of the time in the arms.  He is now writing the date on the Novolin R and discarding after 42 days.   SMBG: 3-4 times daily. Ranges (per pt report): see chart below.   Symptoms of low blood sugar? Fingers and lips tingle, shaky and sweaty. Frequency of hypoglycemia? Usually has them before dinner but had one overnight which was a surprise to him.  He thinks the before dinner lows are potentially due to increased exercise or decreased food intake for lunch. Treating with small amount of orange juice.  Recent symptoms of high blood sugar? none.  Eye exam: up to date  Foot exam: up to date  Microalbumin is not < 30 mg/g. Pt is taking an ACEi/ARB not at max dose.  Aspirin: Not taking due to anticoagulation therapy and increased risk of bleeding  Diet/Exercise:  No change from last visit. He repots that his activity has slowed down but about the same as last visit. He is eating something for lunch every day.  Dinner is his largest meal. He will eat a dessert a couple times a week. Breakfast is usually cereal +/- blueberries or a banana. He sometimes snacks after dinner but it is usually some cheezits. Tries to stay away from high sugar content foods at this time.     Ave:  7 day: 189, 14 day: 157, 30 day: 157 -  Increase slightly from last time but lots of treats for the holiday.    Date FBG/ 2hours post Lunch/2hours post Dinner /2hours post Before bed (4 or 5 hours after dinner)   12/27/17 205      12/26/17 161  100 176   12/25/17 164  333 (R2) 211   12/24 213 (R6)  212 (R2)    12/23 177  123    12/22 122  148    12/21 221 juan jose cookies the night before (R7)  172 (R1 - high carb meal)    12/20 176  162    12/19 84  92 144   12/18 126  77 (4 oz juice then dinner) 83   12/17 137  55 (lighter lunch - 5 oz OJ) 151   12/16 144  230 (R2) 122   12/15 132  149    12/14 175  84     12/13 114  124    12/12 157  158 119   12/11 191  133 160   12/10 210 (R7)  144 163   12/9 209 - chips and dip the night before (R7)  103      From last visit  Date FBG/ 2hours post Lunch/2hours post Dinner /2hours post Bedtime (4 or 5 hours after dinner)   11/29/17 99      11/28 238 - 7 R  145 93   11/27 182  90 204 - 2 R   11/26 231 - apple pie the night before  313 - had two pancakes for bf 186   11/25 124  116    11/24 152  174    11/23 144  260 - thanksgiving    11/22 176  86 - 4 oz OJ then dinner    11/21 132  277 (had cake and ice cream earlier at bday party) - gave 2 R 76 - 4 oz OJ - had salad for dinner   11/20 241 - R 7  97 129   11/19 149  277 - 2 R 148   11/18 189  123    11/17 163  120    11/16 125  194 93   11/15 162  141 - braut and baked beans for dinner 207 - 2 R with small snack   11/14 100  137 98   11/13 154  123 179   11/12 171  90 75 - 4 oz OJ   11/11 184  201 - six inch sub 216 - 2 R with small snack   11/10 140  255 - went out for lunch 146   11/9 150  130 107   11/8  181  75 - 4 oz OJ and then dinner 222 - 2 R with snack   11/7 101  207 - 2 R    11/6 139 3:30 pm 65 - 4 oz OJ. Was more active that day and small lunch 7 PM - 69 - 4 OJ plus dinner 268 - 2 R with snack (crackers and cheese)   11/5 168  107 147   11/4 255  98 169   11/3 143  115  179      10/25 he had a couple more lows:  Dinner readin only ate salad (gave 3 R) 11:17pm 67 - 4 oz OJ then 1AM was 73 - 4 more oz OJ  Ill on 10/27: 55 3:58 AM on 10/28 - drank OJ and a couple of cookies    Current labs include:  BP Readings from Last 3 Encounters:   17 116/58   17 100/60   17 144/64     Today's Vitals: There were no vitals taken for this visit. - phone visit  Lab Results   Component Value Date    A1C 6.0 2017    A1C 5.8 2017    A1C 6.9 10/17/2016    A1C 6.2 2016    A1C 6.7 2016     Lab Results   Component Value Date    CHOL 159 2017     Lab Results   Component Value Date    TRIG 112 2017     Lab Results   Component Value Date    HDL 54 2017     Lab Results   Component Value Date    LDL 83 2017       Liver Function Studies -   Recent Labs   Lab Test  17   1050   PROTTOTAL  7.0   ALBUMIN  3.8   BILITOTAL  0.4   ALKPHOS  60   AST  20   ALT  22       Lab Results   Component Value Date    UCRR 40 2017    MICROL 24 2017    UMALCR 61.31 (H) 2017       Last Basic Metabolic Panel:  Lab Results   Component Value Date     2017      Lab Results   Component Value Date    POTASSIUM 4.6 2017     Lab Results   Component Value Date    CHLORIDE 100 2017     Lab Results   Component Value Date    BUN 16 2017     Lab Results   Component Value Date    CR 0.76 2017     GFR Estimate   Date Value Ref Range Status   2017 >90  Non  GFR Calc   >60 mL/min/1.7m2 Final   2017 >90 >60 mL/min/1.7m2 Final   10/03/2016 >60 >60 ml/min/1.73m2 Final     TSH   Date Value Ref Range Status   2017 3.25 0.40 - 4.00 mU/L Final   ]    Most Recent Immunizations   Administered Date(s) Administered     HepB 2014     Influenza (H1N1) 2010     Influenza (High Dose) 3 valent vaccine 2017     Influenza (IIV3) PF 2009     Pneumo Conj 13-V (2010&after) 11/10/2015     Pneumococcal 23 valent 2010     TD  (ADULT, 7+) 08/08/2008     TDAP Vaccine (Adacel) 08/05/2014     Zoster vaccine, live 06/13/2008       ASSESSMENT:              Current medications were reviewed today as discussed above.        Medication Adherence: no issues identified    Diabetes: Stable. Patient is meeting A1c goal of < 7%. Self monitoring of blood glucose is not at goal of fasting  mg/dL and post prandial < 180 mg/dL. Patient's A1c is currently at goal, value 6.0% drawn on 11/02/17. Patient is still experiencing fasting blood sugars that are higher than desired and postprandial values that are also higher than desired, with some infrequent low values. Could consider asking the patient to monitor lunch time values in the future to assess if he is experiencing hypoglycemia during the day. Denies any signs or symptoms of blood glucose values going too low, but may be able to explain A1c value that is not consistent with blood glucose meter readings. Decided to not make any changes to his insulin regimen at this time as he has had less hypoglycemia. Do not want to risk hypoglycemia, and would like to work on eliminating low values before working on tighter blood glucose control.        PLAN:                  1. Get a new nebulizer machine.     2. Stay on the current dose of insulin.    3. Call me with any lows.    I spent 30 minutes with this patient today. All changes were made via collaborative practice agreement with Debbie Lewis. A copy of the visit note was provided to the patient's primary care provider.     Will follow up in two months.    The patient will be mailed a summary of these recommendations as an after visit summary.    Kim Sommers, Rebecca  PGY-1 Ambulatory Care Practice Resident    I concur with the note as dictated above which reflects our joint assessment and plan.    Tanna Diaz, JohnnieD  Medication Therapy Management Pharmacist    Tanna Diaz, JohnnieD  Medication Therapy Management Pharmacist

## 2017-12-28 NOTE — PATIENT INSTRUCTIONS
Recommendations from today's MTM visit:                                                      1. Get a new nebulizer machine.     2. Stay on the current dose of insulin.    3. Call me with any lows.    Next MTM visit: 2/21/18 at 10AM I will call you    To schedule another MTM appointment, please call the clinic directly or you may call the MTM scheduling line at [phone] or toll-free at [phone].     My Clinical Pharmacist's contact information:                                                      It was a pleasure seeing you today!  Please feel free to contact me with any questions or concerns you have.      Tanna Diaz, PharmD  Medication Therapy Management Pharmacist  [facility] - Monday 9:30 - 6:00 and Wednesday 7:30 - 4:00  Phone: [phone] - direct clinic line    You may receive a survey about the MTM services you received.  I would appreciate your feedback to help me serve you better in the future. Please fill it out and return it when you can. Your comments will be anonymous.

## 2018-01-04 ENCOUNTER — ANTICOAGULATION THERAPY VISIT (OUTPATIENT)
Dept: NURSING | Facility: CLINIC | Age: 73
End: 2018-01-04
Payer: COMMERCIAL

## 2018-01-04 DIAGNOSIS — I48.91 ATRIAL FIBRILLATION, UNSPECIFIED TYPE (H): ICD-10-CM

## 2018-01-04 DIAGNOSIS — Z79.01 LONG-TERM (CURRENT) USE OF ANTICOAGULANTS: ICD-10-CM

## 2018-01-04 LAB — INR POINT OF CARE: 1.9 (ref 0.86–1.14)

## 2018-01-04 PROCEDURE — 85610 PROTHROMBIN TIME: CPT | Mod: QW

## 2018-01-04 PROCEDURE — 99207 ZZC NO CHARGE NURSE ONLY: CPT

## 2018-01-04 PROCEDURE — 36416 COLLJ CAPILLARY BLOOD SPEC: CPT

## 2018-01-04 NOTE — PROGRESS NOTES
ANTICOAGULATION FOLLOW-UP CLINIC VISIT    Patient Name:  Cayetano Lunsford  Date:  1/4/2018  Contact Type:  Face to Face    SUBJECTIVE:     Patient Findings     Positives Change in diet/appetite (Increase in food intake over the last few weeks d/t holidays.), Activity level change (Less active d/t cold weather.), Missed doses (7.5 mg on Sunday 12/24. )           OBJECTIVE    INR Protime   Date Value Ref Range Status   01/04/2018 1.9 (A) 0.86 - 1.14 Final       ASSESSMENT / PLAN  INR assessment THER    Recheck INR In: 2 WEEKS    INR Location Clinic      Anticoagulation Summary as of 1/4/2018     INR goal 2.0-3.0   Today's INR 1.9!   Maintenance plan 10 mg (5 mg x 2) on Mon, Thu, Sat; 7.5 mg (5 mg x 1.5) all other days   Full instructions 10 mg on Mon, Thu, Sat; 7.5 mg all other days   Weekly total 60 mg   No change documented Kirstin Meléndez RN   Plan last modified Kirstin Meléndez RN (11/2/2017)   Next INR check 1/18/2018   Priority INR   Target end date     Indications   Long-term (current) use of anticoagulants [Z79.01] [Z79.01]  Atrial fibrillation (H) [I48.91]         Anticoagulation Episode Summary     INR check location     Preferred lab     Send INR reminders to [facility]    Comments       Anticoagulation Care Providers     Provider Role Specialty Phone number    Debbie Lewis MD [facility] [phone]            See the Encounter Report to view Anticoagulation Flowsheet and Dosing Calendar (Go to Encounters tab in chart review, and find the Anticoagulation Therapy Visit)    No change. Recheck sooner to see if INR remains low.     Patient made aware if signs of clotting (pain, tenderness, swelling, or color change in any extremity) AND/OR bleeding occur (nosebleeds, bleeding gums, bruising, or blood in stool or urine) to notify provider & seek medical attention. If severe symptoms develop, such as major bleeding, chest pain, shortness of breath, fall, trauma or s/s of  stroke, patient to call 911 immediately.       Kirstin Meléndez RN

## 2018-01-04 NOTE — MR AVS SNAPSHOT
Cayetano Lunsford   1/4/2018 11:00 AM   Anticoagulation Therapy Visit    Description:  73 year old male   Provider:   INR CLINIC   Department:   Nurse           INR as of 1/4/2018     Today's INR 1.9!      Anticoagulation Summary as of 1/4/2018     INR goal 2.0-3.0   Today's INR 1.9!   Full instructions 10 mg on Mon, Thu, Sat; 7.5 mg all other days   Next INR check 1/18/2018    Indications   Long-term (current) use of anticoagulants [Z79.01] [Z79.01]  Atrial fibrillation (H) [I48.91]         Your next Anticoagulation Clinic appointment(s)     Jan 18, 2018 11:00 AM CST   Anticoagulation Visit with  INR CLINIC   [facility]    [address] [place] [id number] [phone]              Contact Numbers     [facility]  Please call [phone] to cancel and/or reschedule your appointment   Please call [phone] with any problems or questions regarding your therapy.        January 2018 Details    Sun Mon Tue Wed Thu Fri Sat      1               2               3               4      10 mg   See details      5      7.5 mg         6      10 mg           7      7.5 mg         8      10 mg         9      7.5 mg         10      7.5 mg         11      10 mg         12      7.5 mg         13      10 mg           14      7.5 mg         15      10 mg         16      7.5 mg         17      7.5 mg         18            19               20                 21               22               23               24               25               26               27                 28               29               30               31                   Date Details   01/04 This INR check       Date of next INR:  1/18/2018         How to take your warfarin dose     To take:  7.5 mg Take 1.5 of the 5 mg tablets.    To take:  10 mg Take 2 of the 5 mg tablets.

## 2018-01-08 DIAGNOSIS — E11.9 TYPE 2 DIABETES, HBA1C GOAL < 8% (H): ICD-10-CM

## 2018-01-08 NOTE — TELEPHONE ENCOUNTER
"Requested Prescriptions   Pending Prescriptions Disp Refills     B-D INSULIN SYRINGE 31G X 5/16\" 0.5 ML [Pharmacy Med Name: BD INS SYR UF 1/2CC SHORT]  Last Written Prescription Date:  10/18/2017  Last Fill Quantity: 100,  # refills: 1   Last Office Visit with [facility], [facility] or [facility] prescribing provider:  11/8/2017   Future Office Visit:    Next 5 appointments (look out 90 days)     Mar 06, 2018  9:30 AM CST   Return Visit with Bong Yoo MD   [facility] ([facility])    [address] [place] [id number] [phone]                100 each 1     Sig: USE 1 SYRINGE TWO TIMES A DAY OR AS DIRECTED    Diabetic Supplies Protocol Passed    1/8/2018 10:13 AM       Passed - Patient is 18 years of age or older       Passed - Patient has had appt within past 6 mos    IV to PO - Antibiotics     None                      "

## 2018-01-10 RX ORDER — SYRINGE-NEEDLE,INSULIN,0.5 ML 31 GX5/16"
SYRINGE, EMPTY DISPOSABLE MISCELLANEOUS
Qty: 100 EACH | Refills: 1 | Status: SHIPPED | OUTPATIENT
Start: 2018-01-10 | End: 2018-02-21

## 2018-01-11 NOTE — TELEPHONE ENCOUNTER
"Prescription approved per [facility] Refill Protocol.    Signed Prescriptions:                        Disp   Refills    B-D INSULIN SYRINGE 31G X 5/16\" 0.5 ML     100 ea*1        Sig: USE 1 SYRINGE TWO TIMES A DAY OR AS DIRECTED  Authorizing Provider: AGNES FERNANDEZ  Ordering User: JARVIS SALAZAR      Closing encounter - no further actions needed at this time    Jarvis Salazar RN    "

## 2018-01-18 ENCOUNTER — ANTICOAGULATION THERAPY VISIT (OUTPATIENT)
Dept: NURSING | Facility: CLINIC | Age: 73
End: 2018-01-18
Payer: COMMERCIAL

## 2018-01-18 DIAGNOSIS — I48.91 ATRIAL FIBRILLATION, UNSPECIFIED TYPE (H): ICD-10-CM

## 2018-01-18 DIAGNOSIS — Z79.01 LONG-TERM (CURRENT) USE OF ANTICOAGULANTS: ICD-10-CM

## 2018-01-18 LAB — INR POINT OF CARE: 3.5 (ref 0.86–1.14)

## 2018-01-18 PROCEDURE — 85610 PROTHROMBIN TIME: CPT | Mod: QW

## 2018-01-18 PROCEDURE — 36416 COLLJ CAPILLARY BLOOD SPEC: CPT

## 2018-01-18 PROCEDURE — 99207 ZZC NO CHARGE NURSE ONLY: CPT

## 2018-01-18 NOTE — PROGRESS NOTES
ANTICOAGULATION FOLLOW-UP CLINIC VISIT    Patient Name:  Cayetano Lunsford  Date:  1/18/2018  Contact Type:  Face to Face    SUBJECTIVE:     Patient Findings     Positives Change in diet/appetite (Pt reports declining appetite over the last few months. ), Inflammation (Pt fell one week ago onto right shoulder and hip. Minimal injury, no LOC or trauma to head. Shoulder & neck was sore for several days. Sx now completely resolved.), Activity level change (Tylenol (<1g/day) used from 1/10- 1/13 for pain control. )    Comments Patient denies ANY s/s of BLEEDING!           OBJECTIVE    INR Protime   Date Value Ref Range Status   01/18/2018 3.5 (A) 0.86 - 1.14 Final       ASSESSMENT / PLAN  INR assessment SUPRA    Recheck INR In: 2 WEEKS    INR Location Clinic      Anticoagulation Summary as of 1/18/2018     INR goal 2.0-3.0   Today's INR 3.5!   Maintenance plan 10 mg (5 mg x 2) on Mon, Thu, Sat; 7.5 mg (5 mg x 1.5) all other days   Full instructions 1/18: 2.5 mg; Otherwise 10 mg on Mon, Thu, Sat; 7.5 mg all other days   Weekly total 60 mg   Plan last modified Kirstin Meléndez RN (11/2/2017)   Next INR check 2/1/2018   Priority INR   Target end date     Indications   Long-term (current) use of anticoagulants [Z79.01] [Z79.01]  Atrial fibrillation (H) [I48.91]         Anticoagulation Episode Summary     INR check location     Preferred lab     Send INR reminders to [facility]    Comments       Anticoagulation Care Providers     Provider Role Specialty Phone number    Debbie Lewis MD [facility] [phone]            See the Encounter Report to view Anticoagulation Flowsheet and Dosing Calendar (Go to Encounters tab in chart review, and find the Anticoagulation Therapy Visit)    Per protocol, patient advised to decrease today's warfarin dose by 7.5 mg to account for supra-therapeutic INR level. Patient instructed to increase green intake today and tomorrow as well. Recheck within 2 weeks  to ensure INR stability. Patient advised to use extra caution with activities to prevent injuries and falls.     Patient made aware if signs of clotting (pain, tenderness, swelling, or color change in any extremity) AND/OR bleeding occur (nosebleeds, bleeding gums, bruising, or blood in stool or urine) to notify provider & seek medical attention. If severe symptoms develop, such as major bleeding, chest pain, shortness of breath, fall, trauma or s/s of stroke, patient to call 911 immediately.       Kirstin Meléndez RN

## 2018-01-18 NOTE — MR AVS SNAPSHOT
Cayetano Lunsford   1/18/2018 11:00 AM   Anticoagulation Therapy Visit    Description:  73 year old male   Provider:   INR CLINIC   Department:   Nurse           INR as of 1/18/2018     Today's INR 3.5!      Anticoagulation Summary as of 1/18/2018     INR goal 2.0-3.0   Today's INR 3.5!   Full instructions 1/18: 2.5 mg; Otherwise 10 mg on Mon, Thu, Sat; 7.5 mg all other days   Next INR check 2/1/2018    Indications   Long-term (current) use of anticoagulants [Z79.01] [Z79.01]  Atrial fibrillation (H) [I48.91]         Instructions    Make sure to get in some broccoli today and tomorrow!         Your next Anticoagulation Clinic appointment(s)     Feb 01, 2018 11:15 AM CST   Anticoagulation Visit with  INR CLINIC   [facility] ([facility])    [address] [place] [id number] [phone]              Contact Numbers     [facility]  Please call [phone] to cancel and/or reschedule your appointment   Please call [phone] with any problems or questions regarding your therapy.        January 2018 Details    Sun Mon Tue Wed Thu Fri Sat      1               2               3               4               5               6                 7               8               9               10               11               12               13                 14               15               16               17               18      2.5 mg   See details      19      7.5 mg         20      10 mg           21      7.5 mg         22      10 mg         23      7.5 mg         24      7.5 mg         25      10 mg         26      7.5 mg         27      10 mg           28      7.5 mg         29      10 mg         30      7.5 mg         31      7.5 mg             Date Details   01/18 This INR check               How to take your warfarin dose     To take:  2.5 mg Take 0.5 of a 5 mg tablet.    To take:  7.5 mg Take 1.5 of the 5 mg tablets.    To take:  10 mg Take 2 of  the 5 mg tablets.           February 2018 Details    Sun Mon Tue Wed Thu Fri Sat         1            2               3                 4               5               6               7               8               9               10                 11               12               13               14               15               16               17                 18               19               20               21               22               23               24                 25               26               27               28                   Date Details   No additional details    Date of next INR:  2/1/2018         How to take your warfarin dose     To take:  10 mg Take 2 of the 5 mg tablets.

## 2018-02-01 ENCOUNTER — ANTICOAGULATION THERAPY VISIT (OUTPATIENT)
Dept: NURSING | Facility: CLINIC | Age: 73
End: 2018-02-01
Payer: COMMERCIAL

## 2018-02-01 DIAGNOSIS — Z79.01 LONG-TERM (CURRENT) USE OF ANTICOAGULANTS: ICD-10-CM

## 2018-02-01 DIAGNOSIS — I48.91 ATRIAL FIBRILLATION, UNSPECIFIED TYPE (H): ICD-10-CM

## 2018-02-01 LAB — INR POINT OF CARE: 2.1 (ref 0.86–1.14)

## 2018-02-01 PROCEDURE — 36416 COLLJ CAPILLARY BLOOD SPEC: CPT

## 2018-02-01 PROCEDURE — 85610 PROTHROMBIN TIME: CPT | Mod: QW

## 2018-02-01 PROCEDURE — 99207 ZZC NO CHARGE NURSE ONLY: CPT

## 2018-02-01 NOTE — PROGRESS NOTES
ANTICOAGULATION FOLLOW-UP CLINIC VISIT    Patient Name:  Cayetano Lunsford  Date:  2/1/2018  Contact Type:  Face to Face    SUBJECTIVE:     Patient Findings     Positives No Problem Findings           OBJECTIVE    INR Protime   Date Value Ref Range Status   02/01/2018 2.1 (A) 0.86 - 1.14 Final       ASSESSMENT / PLAN  INR assessment THER    Recheck INR In: 3 WEEKS    INR Location Clinic      Anticoagulation Summary as of 2/1/2018     INR goal 2.0-3.0   Today's INR 2.1   Maintenance plan 10 mg (5 mg x 2) on Mon, Thu, Sat; 7.5 mg (5 mg x 1.5) all other days   Full instructions 10 mg on Mon, Thu, Sat; 7.5 mg all other days   Weekly total 60 mg   No change documented Kirstin Meléndez RN   Plan last modified Kirstin Meléndez RN (11/2/2017)   Next INR check 2/21/2018   Priority INR   Target end date     Indications   Long-term (current) use of anticoagulants [Z79.01] [Z79.01]  Atrial fibrillation (H) [I48.91]         Anticoagulation Episode Summary     INR check location     Preferred lab     Send INR reminders to [facility]    Comments       Anticoagulation Care Providers     Provider Role Specialty Phone number    Debbie Lewis MD [facility] [phone]            See the Encounter Report to view Anticoagulation Flowsheet and Dosing Calendar (Go to Encounters tab in chart review, and find the Anticoagulation Therapy Visit)    Patient made aware if signs of clotting (pain, tenderness, swelling, or color change in any extremity) AND/OR bleeding occur (nosebleeds, bleeding gums, bruising, or blood in stool or urine) to notify provider & seek medical attention. If severe symptoms develop, such as major bleeding, chest pain, shortness of breath, fall, trauma or s/s of stroke, patient to call 911 immediately.       Kirstin Meléndez RN

## 2018-02-01 NOTE — MR AVS SNAPSHOT
Cayetano Lunsford   2/1/2018 11:15 AM   Anticoagulation Therapy Visit    Description:  73 year old male   Provider:   INR CLINIC   Department:   Nurse           INR as of 2/1/2018     Today's INR 2.1      Anticoagulation Summary as of 2/1/2018     INR goal 2.0-3.0   Today's INR 2.1   Full instructions 10 mg on Mon, Thu, Sat; 7.5 mg all other days   Next INR check 2/21/2018    Indications   Long-term (current) use of anticoagulants [Z79.01] [Z79.01]  Atrial fibrillation (H) [I48.91]         Your next Anticoagulation Clinic appointment(s)     Feb 21, 2018 11:30 AM CST   Anticoagulation Visit with  INR CLINIC   [facility] ([facility])    [address] [place] [id number] [phone]              Contact Numbers     [facility]  Please call [phone] to cancel and/or reschedule your appointment   Please call [phone] with any problems or questions regarding your therapy.        February 2018 Details    Sun Mon Tue Wed Thu Fri Sat         1      10 mg   See details      2      7.5 mg         3      10 mg           4      7.5 mg         5      10 mg         6      7.5 mg         7      7.5 mg         8      10 mg         9      7.5 mg         10      10 mg           11      7.5 mg         12      10 mg         13      7.5 mg         14      7.5 mg         15      10 mg         16      7.5 mg         17      10 mg           18      7.5 mg         19      10 mg         20      7.5 mg         21            22               23               24                 25               26               27               28                   Date Details   02/01 This INR check       Date of next INR:  2/21/2018         How to take your warfarin dose     To take:  7.5 mg Take 1.5 of the 5 mg tablets.    To take:  10 mg Take 2 of the 5 mg tablets.

## 2018-02-02 ENCOUNTER — TRANSFERRED RECORDS (OUTPATIENT)
Dept: HEALTH INFORMATION MANAGEMENT | Facility: CLINIC | Age: 73
End: 2018-02-02

## 2018-02-21 ENCOUNTER — ALLIED HEALTH/NURSE VISIT (OUTPATIENT)
Dept: PHARMACY | Facility: CLINIC | Age: 73
End: 2018-02-21
Payer: COMMERCIAL

## 2018-02-21 DIAGNOSIS — N18.1 TYPE 2 DIABETES MELLITUS WITH STAGE 1 CHRONIC KIDNEY DISEASE, WITH LONG-TERM CURRENT USE OF INSULIN (H): Primary | ICD-10-CM

## 2018-02-21 DIAGNOSIS — J44.9 CHRONIC OBSTRUCTIVE PULMONARY DISEASE, UNSPECIFIED COPD TYPE (H): ICD-10-CM

## 2018-02-21 DIAGNOSIS — Z79.4 TYPE 2 DIABETES MELLITUS WITH STAGE 1 CHRONIC KIDNEY DISEASE, WITH LONG-TERM CURRENT USE OF INSULIN (H): Primary | ICD-10-CM

## 2018-02-21 DIAGNOSIS — E11.22 TYPE 2 DIABETES MELLITUS WITH STAGE 1 CHRONIC KIDNEY DISEASE, WITH LONG-TERM CURRENT USE OF INSULIN (H): Primary | ICD-10-CM

## 2018-02-21 PROCEDURE — 99607 MTMS BY PHARM ADDL 15 MIN: CPT | Performed by: PHARMACIST

## 2018-02-21 PROCEDURE — 99606 MTMS BY PHARM EST 15 MIN: CPT | Performed by: PHARMACIST

## 2018-02-21 RX ORDER — SYRINGE-NEEDLE,INSULIN,0.5 ML 27GX1/2"
SYRINGE, EMPTY DISPOSABLE MISCELLANEOUS
Qty: 100 EACH | Refills: 11 | Status: SHIPPED | OUTPATIENT
Start: 2018-02-21 | End: 2018-04-23

## 2018-02-21 NOTE — PROGRESS NOTES
SUBJECTIVE/OBJECTIVE:                Cayetano Lunsford is a 73 year old male called for a follow-up visit for Medication Therapy Management.  He was referred to me from Dr. Debbie Lewis.     Chief Complaint: Follow up from our visit on 12/27/17.  Needs a refill on his syringes.    Tobacco: No tobacco use  Alcohol: not currently using    Medication Adherence: pt report he has been having trouble with the tamsulosin since he has to wait 30 minutes after meals but been better lately because he is setting it out.    Diabetes:  Pt currently taking metformin bid, Novolin N 5 units AM before breakfast and 15 units in PM-at dinner, Novolin R-5 units with breakfast, between 80 and 120, reduce your R insulin by 2 units and 2 units of R at dinner if blood sugars are over 200. If below 70 skip dose. He gives his shots in his arms and stomach but most of the time in the arms.  He is now writing the date on the Novolin R and discarding after 42 days.   SMBG: 3-4 times daily. Ranges (per pt report): see chart below.   Symptoms of low blood sugar? Fingers and lips tingle, shaky and sweaty. Frequency of hypoglycemia? Usually has them before dinner.  He thinks the before dinner lows are potentially due to increased exercise or decreased food intake for lunch. Treating with small amount of orange juice.  Recent symptoms of high blood sugar? none.  Eye exam: up to date  Foot exam: up to date  Microalbumin is not < 30 mg/g. Pt is taking an ACEi/ARB not at max dose.  Aspirin: Not taking due to anticoagulation therapy and increased risk of bleeding  Diet/Exercise:  No change from last visit. He repots that his activity has slowed down but about the same as last visit. He is eating something for lunch every day.  Dinner is his largest meal. He will eat a dessert a couple times a week. Breakfast is usually cereal +/- blueberries or a banana. He sometimes snacks after dinner but it is usually some cheezits. Tries to stay away from high sugar  content foods at this time.     Ave: 7 day: 140, 14 day: 134, 30 day: 145    Date FBG/ 2hours post Lunch/2hours post Dinner /2hours post Before bed (5 hours after dinner   2/21/18 98      2/20 153  120 (no R) 82   2/19 148  140 163   2/18 116  306 (2 R) - ate out  132   2/17 219 ( 6 R) had chips and dip the night before  76 before dinner at 5:12 had 3 oz OJ then at 6:06pm 151  81 at 10:51 pm, ate a single serve pudding cups, then at 11:39pm 97   2/16 100  138 198 (1 R with snack)   2/15 199 (R 6)  81 167   2/14 153  60 (no R) not enough lunch 71 - didn't feel it, 3-4 oz OJ    2/13 105 ( May or may not have used R)  65 - 3-4 oz OJ then 30 minutes later 87. Make active earlier in the day. 116   2/12 128  114 155   2/11 144  84 143   2/10 132  160 198 - had pizza and beer(2 R with snack)   2/09 97  194 (2 R) - had a couple of fun size candy bars 168   2/8 123  219 (2 R) 113   2/7 189  120 92   2/6 132  222 (2R)/181 118   2/5 153  110 174   2/4 155  201 (2 R) 116   2/3 130  162 89 - ate something before bed   2/2 213 (7 R)  142    2/1 128  5:08pm it was 78, 3-4 oz OJ then 90 212 (2 R and ate snack)    1/31 165  213 (2 R) ate apple pie in afternoon 105     From last visit:    Ave: 7 day: 189, 14 day: 157, 30 day: 157 -  Increase slightly from last time but lots of treats for the holiday.    Date FBG/ 2hours post Lunch/2hours post Dinner /2hours post Before bed (4 or 5 hours after dinner)   12/27/17 205      12/26/17 161  100 176   12/25/17 164  333 (R2) 211   12/24 213 (R6)  212 (R2)    12/23 177  123    12/22 122  148    12/21 221 juan jose cookies the night before (R7)  172 (R1 - high carb meal)    12/20 176  162    12/19 84  92 144   12/18 126  77 (4 oz juice then dinner) 83   12/17 137  55 (lighter lunch - 5 oz OJ) 151   12/16 144  230 (R2) 122   12/15 132  149    12/14 175  84     12/13 114  124    12/12 157  158 119   12/11 191  133 160   12/10 210 (R7)  144 163   12/9 209 - chips and dip the night before (R7)  103         COPD: Current medications: Stiolto 2 puffs daily, Albuterol MDI, and (Pt reports using albuterol 2 times per day) and Albuterol+Ipratropium Nebs and (Pt reports using 3 times per day) 3 times daily.     Pt is not experiencing side effects.   Pt reports the following symptoms: some wheezing he can feel in his chest.  Pt does not have an COPD Action Plan on file.   Has spirometry been completed: Yes     Current labs include:  Today's Vitals: There were no vitals taken for this visit. - phone visit  BP Readings from Last 3 Encounters:   12/05/17 116/58   11/08/17 100/60   11/02/17 144/64     Lab Results   Component Value Date    A1C 6.0 11/02/2017    A1C 5.8 05/08/2017    A1C 6.9 10/17/2016    A1C 6.2 07/22/2016    A1C 6.7 03/28/2016     Lab Results   Component Value Date    CHOL 159 05/08/2017     Lab Results   Component Value Date    TRIG 112 05/08/2017     Lab Results   Component Value Date    HDL 54 05/08/2017     Lab Results   Component Value Date    LDL 83 05/08/2017       Liver Function Studies -   Recent Labs   Lab Test  03/13/17   1050   PROTTOTAL  7.0   ALBUMIN  3.8   BILITOTAL  0.4   ALKPHOS  60   AST  20   ALT  22       Lab Results   Component Value Date    UCRR 40 06/20/2017    MICROL 24 06/20/2017    UMALCR 61.31 (H) 06/20/2017       Last Basic Metabolic Panel:  Lab Results   Component Value Date     03/13/2017      Lab Results   Component Value Date    POTASSIUM 4.6 03/13/2017     Lab Results   Component Value Date    CHLORIDE 100 03/13/2017     Lab Results   Component Value Date    BUN 16 03/13/2017     Lab Results   Component Value Date    CR 0.76 03/13/2017     GFR Estimate   Date Value Ref Range Status   03/13/2017 >90  Non  GFR Calc   >60 mL/min/1.7m2 Final   01/09/2017 >90 >60 mL/min/1.7m2 Final   10/03/2016 >60 >60 ml/min/1.73m2 Final       Most Recent Immunizations   Administered Date(s) Administered     HepB 08/05/2014     Influenza (H1N1) 01/08/2010     Influenza  (High Dose) 3 valent vaccine 11/02/2017     Influenza (IIV3) PF 09/11/2009     Pneumo Conj 13-V (2010&after) 11/10/2015     Pneumococcal 23 valent 07/20/2010     TD (ADULT, 7+) 08/08/2008     TDAP Vaccine (Adacel) 08/05/2014     Zoster vaccine, live 06/13/2008       ASSESSMENT:              Current medications were reviewed today as discussed above.      Medication Adherence: no issues identified    Diabetes: Stable. Patient is meeting A1c goal of < 7%. Asked him to start checking postprandial readings to see if we need to reduce breakfast Novolin R.  May need to consider reducing Novolin R or Novolin N at next visit if he continues to have hypoglycemia before dinner. Microalbumin is not at goal < 30 mg/g. Taking an ARB at max dose.. Due for annual foot exam. Aspirin therapy is not indicated in this patient due to anticoagulant/antiplatelet therapy.     COPD: Needs Improvement. Patient would benefit from the following medication changes: See plan.  With frequent use of albuterol he may benefit from inhale steroid. Will route to [facility] doctor for review.      PLAN:                  1. Start checking a few 2 hour after breakfast readings.    2. No medicine changes today.     3. Sent refills for syringes.    For Dr. Abrasm to review  1. Patient may benefit from being on an inhaled steroid (budesonide 0.5 mg twice daily) to help with preventing shortness of breathing. Being on both Stiolto and Duonebs is a duplicate in therapy because the Stiolto has a long acting anticholinergic medicine and Duonebs has a short acting anticholinergic medicine that works the same way. Consider switching to plain albuterol nebs that can use as needed.      I spent 60 minutes with this patient today. All changes were made via collaborative practice agreement with Debbie Lewis. A copy of the visit note was provided to the patient's primary care provider.     Will follow up in 2 months.  Asked him to schedule with PCP for follow up as  he will be due for labs at that time.    The patient was mailed a summary of these recommendations as an after visit summary.    Tanna Diaz, PharmD  Medication Therapy Management Pharmacist

## 2018-02-21 NOTE — PATIENT INSTRUCTIONS
Recommendations from today's MTM visit:                                                      1. Start checking a few 2 hour after breakfast readings.     2. No medicine changes today.      3. Sent refills for syringes.     For you to review with Dr. Abrams at [facility]  1. You may benefit from being on an inhaled steroid (budesonide 0.5 mg twice daily) to help with preventing shortness of breathing. Being on both Stiolto and Duonebs is a duplicate in therapy because the Stiolto has a long acting anticholinergic medicine and Duonebs has a short acting anticholinergic medicine that works the same way. Consider switching to plain albuterol nebs that can use as needed.      Next MTM visit: 4/23/18 at 10 AM telephone visit.    To schedule another MTM appointment, please call the clinic directly or you may call the MTM scheduling line at [phone] or toll-free at [phone].     My Clinical Pharmacist's contact information:                                                      It was a pleasure seeing you today!  Please feel free to contact me with any questions or concerns you have.      Tanna Diaz, PharmD  Medication Therapy Management Pharmacist  [facility] - Monday 9:30 - 6:00 and Wednesday 7:30 - 4:00  Phone: [phone] - direct clinic line    You may receive a survey about the MTM services you received.  I would appreciate your feedback to help me serve you better in the future. Please fill it out and return it when you can. Your comments will be anonymous.

## 2018-02-21 NOTE — MR AVS SNAPSHOT
After Visit Summary   2/21/2018    Cayetano Lunsford    MRN: [id number]           Patient Information     Date Of Birth          1945        Visit Information        Provider Department      2/21/2018 10:30 AM Jessica Diaz, [facility] MTM        Today's Diagnoses     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, with long-term current use of insulin (H)    -  1    Chronic obstructive pulmonary disease, unspecified COPD type (H)          Care Instructions    Recommendations from today's MTM visit:                                                      1. Start checking a few 2 hour after breakfast readings.     2. No medicine changes today.      3. Sent refills for syringes.     For you to review with Dr. Abrams at [facility]  1. You may benefit from being on an inhaled steroid (budesonide 0.5 mg twice daily) to help with preventing shortness of breathing. Being on both Stiolto and Duonebs is a duplicate in therapy because the Stiolto has a long acting anticholinergic medicine and Duonebs has a short acting anticholinergic medicine that works the same way. Consider switching to plain albuterol nebs that can use as needed.      Next MTM visit: 4/23/18 at 10 AM telephone visit.    To schedule another MTM appointment, please call the clinic directly or you may call the MTM scheduling line at [phone] or toll-free at [phone].     My Clinical Pharmacist's contact information:                                                      It was a pleasure seeing you today!  Please feel free to contact me with any questions or concerns you have.      Tanna Diaz, PharmD  Medication Therapy Management Pharmacist  [facility] - Monday 9:30 - 6:00 and Wednesday 7:30 - 4:00  Phone: [phone] - direct clinic line    You may receive a survey about the MT services you received.  I would appreciate your feedback to help me serve you better in the future. Please fill it  "out and return it when you can. Your comments will be anonymous.              Follow-ups after your visit        Your next 10 appointments already scheduled     Feb 26, 2018 12:30 PM CST   Anticoagulation Visit with  [facility]   [facility] ([facility]    [address] [place] [id number] [phone]            Mar 06, 2018  9:30 AM CST   Return Visit with Bong Yoo MD   [facility] ([facility])    [address] [place] [id number] [phone]            Apr 23, 2018 10:00 AM CDT   TELEMEDICINE with Jessica Diaz RPH   [facility] ([facility])    [address] [place] [id number] [phone]           Note: this is not an onsite visit; there is no need to come to the facility.              Who to contact     If you have questions or need follow up information about today's clinic visit or your schedule please contact [facility] directly at [phone].  Normal or non-critical lab and imaging results will be communicated to you by MyChart, letter or phone within 4 business days after the clinic has received the results. If you do not hear from us within 7 days, please contact the clinic through MyChart or phone. If you have a critical or abnormal lab result, we will notify you by phone as soon as possible.  Submit refill requests through [employer] or call your pharmacy and they will forward the refill request to us. Please allow 3 business days for your refill to be completed.          Additional Information About Your Visit        [employer] Information     [employer] lets you send messages to your doctor, view your test results, renew your prescriptions, schedule appointments and more. To sign up, go to [email or web address] . Click on \"Log in\" on the left side of the screen, which will take you to the Welcome page. Then click " "on \"Sign up Now\" on the right side of the page.     You will be asked to enter the access code listed below, as well as some personal information. Please follow the directions to create your username and password.     Your access code is: [email or web address]  Expires: 2018  1:19 PM     Your access code will  in 90 days. If you need help or a new code, please call your [place] clinic or [phone].        Care EveryWhere ID     This is your Care EveryWhere ID. This could be used by other organizations to access your [place] medical records  [id number]         Blood Pressure from Last 3 Encounters:   17 116/58   17 100/60   17 144/64    Weight from Last 3 Encounters:   17 202 lb 4 oz (91.7 kg)   17 201 lb 4 oz (91.3 kg)   17 197 lb 8 oz (89.6 kg)              Today, you had the following     No orders found for display         Today's Medication Changes          These changes are accurate as of 18  1:19 PM.  If you have any questions, ask your nurse or doctor.               These medicines have changed or have updated prescriptions.        Dose/Directions    insulin syringe-needle U-100 31G X 5/16\" 0.5 ML   Commonly known as:  B-D INSULIN SYRINGE   This may have changed:  See the new instructions.   Used for:  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, with long-term current use of insulin (H)        Use 2 syringes daily or as directed.   Quantity:  100 each   Refills:  11            Where to get your medicines      These medications were sent to [facility] [place], [place] - [address]  [address] [facility] [address]     Phone:  [phone]     insulin syringe-needle U-100 31G X 5/16\" 0.5 ML                Primary Care Provider Office Phone # Fax #    Debbie Lewis [phone]       [address]  [facility] [address]        Equal Access to Services     ZAHIRA ROSS AH: Snow Helms, " wageronimoda fritz, qaybta kasilvia avila, erendira bowersaadev ah. So [facility] [phone].    ATENCIÓN: Si natanael avina, tiene a jurado disposición servicios gratuitos de asistencia lingüística. Britta al [phone].    We comply with applicable federal civil rights laws and [place] laws. We do not discriminate on the basis of race, color, national origin, age, disability, sex, sexual orientation, or gender identity.            Thank you!     Thank you for choosing [facility]  for your care. Our goal is always to provide you with excellent care. Hearing back from our patients is one way we can continue to improve our services. Please take a few minutes to complete the written survey that you may receive in the mail after your visit with us. Thank you!             Your Updated Medication List - Protect others around you: Learn how to safely use, store and throw away your medicines at www.disposemymeds.org.          This list is accurate as of 2/21/18  1:19 PM.  Always use your most recent med list.                   Brand Name Dispense Instructions for use Diagnosis    albuterol 108 (90 BASE) MCG/ACT Inhaler    PROAIR HFA/PROVENTIL HFA/VENTOLIN HFA    1 Inhaler    Inhale 1-2 puffs into the lungs every 4 hours as needed (for shortness of breath/wheezing)    Chronic obstructive pulmonary disease, unspecified COPD type (H)       ASPIRIN NOT PRESCRIBED    INTENTIONAL     Reported on 5/17/2017        azelastine 0.1 % spray    ASTELIN    1 Bottle    Spray 1-2 sprays into both nostrils 2 times daily    Seasonal allergic rhinitis, unspecified allergic rhinitis trigger       BD ULTRA FINE PEN NEEDLES     100 each    Use to inject insulin twice daily.    Type 2 diabetes, HbA1C goal < 8% (H)       blood glucose monitoring lancets     100 Each    Use to test up to four times per day    Type II or unspecified type diabetes mellitus without mention of complication, not stated as uncontrolled     "   blood glucose monitoring test strip    no brand specified    360 each    Test 4 times daily or as directed. Profile Rx: patient will contact pharmacy when needed    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, with long-term current use of insulin (H)       FLORAJEN3 Caps     60 capsule    Take 1 capsule by mouth 2 times daily    Acute cystitis without hematuria       folic acid 0.8 MG Caps     90 capsule    Take 1 tablet by mouth daily    Ulcerative colitis without complications, unspecified location (H)       insulin  UNIT/ML injection    NovoLIN N RELION    3 vial    Inject 5 units under the skin at breakfast and 15 units at dinner.    Type 2 diabetes mellitus with hypoglycemia without coma, with long-term current use of insulin (H)       insulin regular 100 UNIT/ML injection    NovoLIN R RELION    30 mL    Inject 5 units with breakfast and 2 units with dinner (when mixing with NPH, draw this insulin up first)    Type 2 diabetes mellitus with hypoglycemia without coma, with long-term current use of insulin (H)       insulin syringe-needle U-100 31G X 5/16\" 0.5 ML    B-D INSULIN SYRINGE    100 each    Use 2 syringes daily or as directed.    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, with long-term current use of insulin (H)       ipratropium - albuterol 0.5 mg/2.5 mg/3 mL 0.5-2.5 (3) MG/3ML neb solution    DUONEB    270 mL    NEBULIZE CONTENTS OF ONE VIAL BY MOUTH EVERY 4 HOURS AS NEEDED FOR SHORTNESS OF BREATH /DYSPNEA    Chronic obstructive bronchitis (H)       losartan 100 MG tablet    COZAAR    90 tablet    Take 1 tablet (100 mg) by mouth daily for hypertension.    Hypertension goal BP (blood pressure) < 140/90       metFORMIN 1000 MG tablet    GLUCOPHAGE    180 tablet    Take 1 tablet (1,000 mg) by mouth 2 times daily (with meals) with breakfast and dinner.    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, with long-term current use of insulin (H) "       nebulizer/tubing/mouthpiece Kit     1 kit    Use to nebulize medications for COPD    Chronic obstructive pulmonary disease, unspecified COPD type (H)       pantoprazole 40 MG EC tablet    PROTONIX     Take 40 mg by mouth daily Started by Dr. Debbie Rico at [facility]        primidone 50 MG tablet    MYSOLINE    270 tablet    Take 3 tablets (150 mg) by mouth 3 times daily    Essential tremor       propafenone 150 MG Tabs tablet    RYTHMOL    180 tablet    Take 1 tablet (150 mg) by mouth 2 times daily    Paroxysmal atrial fibrillation (H)       propranolol 40 MG tablet    INDERAL    180 tablet    Take 2-3 tablets ( mg) by mouth 2 times daily    Benign familial tremor       simvastatin 80 MG tablet    ZOCOR    45 tablet    Take 0.5 tablets (40 mg) by mouth At Bedtime Profile Rx: patient will contact pharmacy when needed    Hyperlipidemia LDL goal <100       STIOLTO RESPIMAT 2.5-2.5 MCG/ACT Aers   Generic drug:  tiotropium-olodaterol      Inhale 2 puffs into the lungs daily        sulfaSALAzine 500 MG tablet    AZULFIDINE    90 tablet    TAKE ONE TABLET BY MOUTH THREE TIMES A DAY    Ulcerative colitis, unspecified       tamsulosin 0.4 MG capsule    FLOMAX    90 capsule    Take 1 capsule (0.4 mg) by mouth daily    Benign prostatic hyperplasia with urinary frequency       warfarin 5 MG tablet    COUMADIN    155 tablet    Take as directed by Coumadin clinic. Current dose (11/2/17): 7.5 mg on Mon, Wed, Fri + 10 mg all other days.    Atrial fibrillation, unspecified type (H)

## 2018-02-26 ENCOUNTER — ANTICOAGULATION THERAPY VISIT (OUTPATIENT)
Dept: NURSING | Facility: CLINIC | Age: 73
End: 2018-02-26
Payer: COMMERCIAL

## 2018-02-26 DIAGNOSIS — Z79.01 LONG-TERM (CURRENT) USE OF ANTICOAGULANTS: ICD-10-CM

## 2018-02-26 DIAGNOSIS — I48.91 ATRIAL FIBRILLATION, UNSPECIFIED TYPE (H): ICD-10-CM

## 2018-02-26 LAB — INR POINT OF CARE: 2.9 (ref 0.86–1.14)

## 2018-02-26 PROCEDURE — 36416 COLLJ CAPILLARY BLOOD SPEC: CPT

## 2018-02-26 PROCEDURE — 99207 ZZC NO CHARGE NURSE ONLY: CPT

## 2018-02-26 PROCEDURE — 85610 PROTHROMBIN TIME: CPT | Mod: QW

## 2018-02-26 NOTE — MR AVS SNAPSHOT
Cayetano Lunsford   2/26/2018 12:30 PM   Anticoagulation Therapy Visit    Description:  73 year old male   Provider:   [facility] CLIN[facility]   Department:   Nurse           INR as of 2/26/2018     Today's INR 2.9      Anticoagulation Summary as of 2/26/2018     INR goal 2.0-3.0   Today's INR 2.9   Full instructions 10 mg on Mon, Thu, Sat; 7.5 mg all other days   Next INR check 3/19/2018    Indications   Long-term (current) use of anticoagulants [Z79.01] [Z79.01]  Atrial fibrillation (H) [I48.91]         Your next Anticoagulation Clinic appointment(s)     Mar 19, 2018 11:15 AM CDT   Anticoagulation Visit with  [facility] CLINIC   [facility] ([facility]    [address] [place] [id number] [phone]              Contact Numbers     [facility]  Please call [phone] to cancel and/or reschedule your appointment   Please call [phone] with any problems or questions regarding your therapy.        February 2018 Details    Sun Mon Tue Wed Thu Fri Sat         1               2               3                 4               5               6               7               8               9               10                 11               12               13               14               15               16               17                 18               19               20               21               22               23               24                 25               26      10 mg   See details      27      7.5 mg         28      7.5 mg             Date Details   02/26 This INR check               How to take your warfarin dose     To take:  7.5 mg Take 1.5 of the 5 mg tablets.    To take:  10 mg Take 2 of the 5 mg tablets.           March 2018 Details    Sun Mon Tue Wed Thu Fri Sat         1      10 mg         2      7.5 mg         3      10 mg           4      7.5 mg         5      10 mg         6      7.5 mg         7      7.5 mg         8      10 mg          9      7.5 mg         10      10 mg           11      7.5 mg         12      10 mg         13      7.5 mg         14      7.5 mg         15      10 mg         16      7.5 mg         17      10 mg           18      7.5 mg         19            20               21               22               23               24                 25               26               27               28               29               30               31                Date Details   No additional details    Date of next INR:  3/19/2018         How to take your warfarin dose     To take:  7.5 mg Take 1.5 of the 5 mg tablets.    To take:  10 mg Take 2 of the 5 mg tablets.

## 2018-02-26 NOTE — PROGRESS NOTES
ANTICOAGULATION FOLLOW-UP CLINIC VISIT    Patient Name:  Cayetano Lunsford  Date:  2/26/2018  Contact Type:  Face to Face    SUBJECTIVE:     Patient Findings     Positives No Problem Findings           OBJECTIVE    INR Protime   Date Value Ref Range Status   02/26/2018 2.9 (A) 0.86 - 1.14 Final       ASSESSMENT / PLAN  INR assessment THER    Recheck INR In: 3 WEEKS    INR Location Clinic      Anticoagulation Summary as of 2/26/2018     INR goal 2.0-3.0   Today's INR 2.9   Maintenance plan 10 mg (5 mg x 2) on Mon, Thu, Sat; 7.5 mg (5 mg x 1.5) all other days   Full instructions 10 mg on Mon, Thu, Sat; 7.5 mg all other days   Weekly total 60 mg   No change documented Kirstin Meléndez RN   Plan last modified Kirstin Meléndez RN (11/2/2017)   Next INR check 3/19/2018   Priority INR   Target end date     Indications   Long-term (current) use of anticoagulants [Z79.01] [Z79.01]  Atrial fibrillation (H) [I48.91]         Anticoagulation Episode Summary     INR check location     Preferred lab     Send INR reminders to [facility]    Comments       Anticoagulation Care Providers     Provider Role Specialty Phone number    Debbie Lewis MD [facility] [phone]            See the Encounter Report to view Anticoagulation Flowsheet and Dosing Calendar (Go to Encounters tab in chart review, and find the Anticoagulation Therapy Visit)    Patient made aware if signs of clotting (pain, tenderness, swelling, or color change in any extremity) AND/OR bleeding occur (nosebleeds, bleeding gums, bruising, or blood in stool or urine) to notify provider & seek medical attention. If severe symptoms develop, such as major bleeding, chest pain, shortness of breath, fall, trauma or s/s of stroke, patient to call 911 immediately.       Kirstin Meléndez RN

## 2018-03-02 ENCOUNTER — TELEPHONE (OUTPATIENT)
Dept: FAMILY MEDICINE | Facility: CLINIC | Age: 73
End: 2018-03-02

## 2018-03-02 DIAGNOSIS — I48.91 ATRIAL FIBRILLATION, UNSPECIFIED TYPE (H): ICD-10-CM

## 2018-03-02 DIAGNOSIS — Z79.01 LONG-TERM (CURRENT) USE OF ANTICOAGULANTS: Primary | ICD-10-CM

## 2018-03-05 DIAGNOSIS — J44.89 CHRONIC OBSTRUCTIVE BRONCHITIS (H): ICD-10-CM

## 2018-03-06 ENCOUNTER — OFFICE VISIT (OUTPATIENT)
Dept: NEUROLOGY | Facility: CLINIC | Age: 73
End: 2018-03-06
Payer: COMMERCIAL

## 2018-03-06 VITALS
HEIGHT: 66 IN | WEIGHT: 202 LBS | HEART RATE: 72 BPM | SYSTOLIC BLOOD PRESSURE: 122 MMHG | BODY MASS INDEX: 32.47 KG/M2 | DIASTOLIC BLOOD PRESSURE: 60 MMHG | OXYGEN SATURATION: 90 % | TEMPERATURE: 97.8 F | RESPIRATION RATE: 18 BRPM

## 2018-03-06 DIAGNOSIS — G25.0 ESSENTIAL TREMOR: ICD-10-CM

## 2018-03-06 DIAGNOSIS — G25.0 BENIGN FAMILIAL TREMOR: Primary | ICD-10-CM

## 2018-03-06 PROCEDURE — 99214 OFFICE O/P EST MOD 30 MIN: CPT | Performed by: PSYCHIATRY & NEUROLOGY

## 2018-03-06 RX ORDER — PRIMIDONE 50 MG/1
150 TABLET ORAL 3 TIMES DAILY
Qty: 270 TABLET | Refills: 2 | Status: SHIPPED | OUTPATIENT
Start: 2018-03-06 | End: 2018-06-06

## 2018-03-06 NOTE — PATIENT INSTRUCTIONS
AFTER VISIT SUMMARY (AVS):    At today's visit we discussed current symptoms and the plan.  Please discuss with Dr. Lewis if your dose of propranolol could be further increased to 160 mg in the morning and 120 mg in the afternoon (total daily dose of 280 mg).  We will not change the dose of primidone for now, but might do it in the future.    The following medications were refilled: Primidone (MYSOLINE) 50 MG tablet: Take 3 tablets (150 mg) by mouth 3 times daily.    Next follow-up appointment is in the next 3 months or earlier if needed.    Please do not hesitate to call me with any questions or concerns.    Thanks.

## 2018-03-06 NOTE — TELEPHONE ENCOUNTER
"Requested Prescriptions   Pending Prescriptions Disp Refills     ipratropium - albuterol 0.5 mg/2.5 mg/3 mL (DUONEB) 0.5-2.5 (3) MG/3ML neb solution [Pharmacy Med Name: IPRATROPIUM-ALBUTEROL 0.5-2.5 SOLN]  Last Written Prescription Date:  5/25/17  Last Fill Quantity: 270 ML,  # refills: 8   Last office visit: 11/8/2017 with prescribing provider:  LESLIE   Future Office Visit:   Next 5 appointments (look out 90 days)     Mar 06, 2018  9:30 AM CST   Return Visit with Bong Yoo MD   [facility] ([facility])    [address] [place] [id number] [phone]                  270 mL 8     Sig: NEBULIZE CONTENTS OF ONE VIAL BY MOUTH EVERY 4 HOURS AS NEEDED FOR SHORTNESS OF BREATH OR DYSPNEA    Short-Acting Beta Agonist Inhalers Protocol  Failed    3/5/2018 12:29 PM       Failed - Asthma control assessment score within normal limits in last 6 months    Please review ACT score.  No flowsheet data found.            Passed - Patient is age 12 or older       Passed - Recent (6 mo) or future (30 days) visit within the authorizing provider's specialty    Patient had office visit in the last 6 months or has a visit in the next 30 days with authorizing provider.  See \"Patient Info\" tab in inbasket, or \"Choose Columns\" in Meds & Orders section of the refill encounter.              "

## 2018-03-06 NOTE — MR AVS SNAPSHOT
After Visit Summary   3/6/2018    Cayetano Lunsford    MRN: [id number]           Patient Information     Date Of Birth          1945        Visit Information        Provider Department      3/6/2018 9:30 AM Bong Yoo MD [facility]        Today's Diagnoses     Benign familial tremor    -  1    Essential tremor          Care Instructions    AFTER VISIT SUMMARY (AVS):    At today's visit we discussed current symptoms and the plan.  Please discuss with Dr. Lewis if your dose of propranolol could be further increased to 160 mg in the morning and 120 mg in the afternoon (total daily dose of 280 mg).  We will not change the dose of primidone for now, but might do it in the future.    The following medications were refilled: Primidone (MYSOLINE) 50 MG tablet: Take 3 tablets (150 mg) by mouth 3 times daily.    Next follow-up appointment is in the next 3 months or earlier if needed.    Please do not hesitate to call me with any questions or concerns.    Thanks.            Follow-ups after your visit        Follow-up notes from your care team     Return in about 3 months (around 6/6/2018).      Your next 10 appointments already scheduled     Mar 19, 2018 11:15 AM CDT   Anticoagulation Visit with  INR CLINIC   [facility] ([facility]    [address] [place] [id number] [phone]            Apr 23, 2018 10:00 AM CDT   TELEMEDICINE with Jessica Diaz [facility] ([facility])    [address] [place] [id number] [phone]           Note: this is not an onsite visit; there is no need to come to the facility.            Jun 06, 2018  9:00 AM CDT   Return Visit with Bong Yoo MD   [facility] ([facility])    [address] [place] [address] [phone]              Who to contact     If you  "have questions or need follow up information about today's clinic visit or your schedule please contact [facility] directly at [phone].  Normal or non-critical lab and imaging results will be communicated to you by MyChart, letter or phone within 4 business days after the clinic has received the results. If you do not hear from us within 7 days, please contact the clinic through [facility] or phone. If you have a critical or abnormal lab result, we will notify you by phone as soon as possible.  Submit refill requests through [facility] [employer] [facility] or call your pharmacy and they will forward the refill request to us. Please allow 3 business days for your refill to be completed.          Additional Information About Your Visit        [facility] Information     Your [facility] lets you send messages to your doctor, view your test results, renew your prescriptions, schedule appointments and more. To sign up, go to [email or web address] . Click on \"Log in\" on the left side of the screen, which will take you to the Welcome page. Then click on \"Sign up Now\" on the right side of the page.     You will be asked to enter the access code listed below, as well as some personal information. Please follow the directions to create your username and password.     Your access code is: [email or web address]  Expires: 2018  1:19 PM     Your access code will  in 90 days. If you need help or a new code, please call your [facility] clinic or [phone].        Care EveryWhere ID     This is your Care EveryWhere ID. This could be used by other organizations to access your [facility] medical records  [id number]        Your Vitals Were     Pulse Temperature Respirations Height Pulse Oximetry BMI (Body Mass Index)    72 97.8  F (36.6  C) (Oral) 18 1.676 m (5' 6\") 90% 32.6 kg/m2       Blood Pressure from Last 3 Encounters:   18 122/60   17 116/58   17 100/60    Weight from Last 3 Encounters:   18 91.6 kg (202 lb)   17 " 91.7 kg (202 lb 4 oz)   11/08/17 91.3 kg (201 lb 4 oz)              Today, you had the following     No orders found for display         Where to get your medicines      These medications were sent to [facility] [place], [place] - [address]  [address], [facility] [address]     Phone:  [phone]     primidone 50 MG tablet          Primary Care Provider Office Phone # Fax #    Debbie Lewis [phone]       [address]  [facility] [address]        Equal Access to Services     [facility]: Hadii aad ku hadasho Soomaali, waaxda luqadaha, qaybta kaalmada adeashkanyada, erendira pugh . So [facility] [phone].    ATENCIÓN: Si habla español, tiene a jurado disposición servicios gratuitos de asistencia lingüística. Llame al [phone].    We comply with applicable federal civil rights laws and [place] laws. We do not discriminate on the basis of race, color, national origin, age, disability, sex, sexual orientation, or gender identity.            Thank you!     Thank you for choosing [facility]  for your care. Our goal is always to provide you with excellent care. Hearing back from our patients is one way we can continue to improve our services. Please take a few minutes to complete the written survey that you may receive in the mail after your visit with us. Thank you!             Your Updated Medication List - Protect others around you: Learn how to safely use, store and throw away your medicines at www.disposemymeds.org.          This list is accurate as of 3/6/18  9:43 AM.  Always use your most recent med list.                   Brand Name Dispense Instructions for use Diagnosis    albuterol 108 (90 BASE) MCG/ACT Inhaler    PROAIR HFA/PROVENTIL HFA/VENTOLIN HFA    1 Inhaler    Inhale 1-2 puffs into the lungs every 4 hours as needed (for shortness of breath/wheezing)    Chronic obstructive pulmonary disease, unspecified COPD type  "(H)       ASPIRIN NOT PRESCRIBED    INTENTIONAL     Reported on 5/17/2017        azelastine 0.1 % spray    ASTELIN    1 Bottle    Spray 1-2 sprays into both nostrils 2 times daily    Seasonal allergic rhinitis, unspecified allergic rhinitis trigger       BD ULTRA FINE PEN NEEDLES     100 each    Use to inject insulin twice daily.    Type 2 diabetes, HbA1C goal < 8% (H)       blood glucose monitoring lancets     100 Each    Use to test up to four times per day    Type II or unspecified type diabetes mellitus without mention of complication, not stated as uncontrolled       blood glucose monitoring test strip    no brand specified    360 each    Test 4 times daily or as directed. Profile Rx: patient will contact pharmacy when needed    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, with long-term current use of insulin (H)       FLORAJEN3 Caps     60 capsule    Take 1 capsule by mouth 2 times daily    Acute cystitis without hematuria       folic acid 0.8 MG Caps     90 capsule    Take 1 tablet by mouth daily    Ulcerative colitis without complications, unspecified location (H)       insulin  UNIT/ML injection    NovoLIN N RELION    3 vial    Inject 5 units under the skin at breakfast and 15 units at dinner.    Type 2 diabetes mellitus with hypoglycemia without coma, with long-term current use of insulin (H)       insulin regular 100 UNIT/ML injection    NovoLIN R RELION    30 mL    Inject 5 units with breakfast and 2 units with dinner (when mixing with NPH, draw this insulin up first)    Type 2 diabetes mellitus with hypoglycemia without coma, with long-term current use of insulin (H)       insulin syringe-needle U-100 31G X 5/16\" 0.5 ML    B-D INSULIN SYRINGE    100 each    Use 2 syringes daily or as directed.    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, with long-term current use of insulin (H)       ipratropium - albuterol 0.5 mg/2.5 mg/3 mL 0.5-2.5 (3) MG/3ML neb solution "    DUONEB    270 mL    NEBULIZE CONTENTS OF ONE VIAL BY MOUTH EVERY 4 HOURS AS NEEDED FOR SHORTNESS OF BREATH /DYSPNEA    Chronic obstructive bronchitis (H)       losartan 100 MG tablet    COZAAR    90 tablet    Take 1 tablet (100 mg) by mouth daily for hypertension.    Hypertension goal BP (blood pressure) < 140/90       metFORMIN 1000 MG tablet    GLUCOPHAGE    180 tablet    Take 1 tablet (1,000 mg) by mouth 2 times daily (with meals) with breakfast and dinner.    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, with long-term current use of insulin (H)       nebulizer/tubing/mouthpiece Kit     1 kit    Use to nebulize medications for COPD    Chronic obstructive pulmonary disease, unspecified COPD type (H)       pantoprazole 40 MG EC tablet    PROTONIX     Take 40 mg by mouth daily Started by Dr. Debbie Rico at [facility]        primidone 50 MG tablet    MYSOLINE    270 tablet    Take 3 tablets (150 mg) by mouth 3 times daily    Essential tremor       propafenone 150 MG Tabs tablet    RYTHMOL    180 tablet    Take 1 tablet (150 mg) by mouth 2 times daily    Paroxysmal atrial fibrillation (H)       propranolol 40 MG tablet    INDERAL    180 tablet    Take 2-3 tablets ( mg) by mouth 2 times daily    Benign familial tremor       simvastatin 80 MG tablet    ZOCOR    45 tablet    Take 0.5 tablets (40 mg) by mouth At Bedtime Profile Rx: patient will contact pharmacy when needed    Hyperlipidemia LDL goal <100       STIOLTO RESPIMAT 2.5-2.5 MCG/ACT Aers   Generic drug:  tiotropium-olodaterol      Inhale 2 puffs into the lungs daily        sulfaSALAzine 500 MG tablet    AZULFIDINE    90 tablet    TAKE ONE TABLET BY MOUTH THREE TIMES A DAY    Ulcerative colitis, unspecified       tamsulosin 0.4 MG capsule    FLOMAX    90 capsule    Take 1 capsule (0.4 mg) by mouth daily    Benign prostatic hyperplasia with urinary frequency       warfarin 5 MG tablet    COUMADIN    155 tablet    Take as directed by  Coumadin clinic. Current dose (11/2/17): 7.5 mg on Mon, Wed, Fri + 10 mg all other days.    Atrial fibrillation, unspecified type (H)

## 2018-03-06 NOTE — PROGRESS NOTES
"ESTABLISHED PATIENT NEUROLOGY NOTE    DATE OF VISIT: 3/6/2018  CLINIC LOCATION: [facility]  MRN: [id number]  PATIENT NAME: Cayetano Lunsford  YOB: 1945    PCP: Debbie Lewis MD.    REASON FOR VISIT:   Chief Complaint   Patient presents with     Follow Up For     tremor     SUBJECTIVE:                                                      HISTORY OF PRESENT ILLNESS: Patient is here for follow up regarding his essential tremor.  The patient was last seen on 12/05/2017.  At that time, I advised the patient to discuss with his primary care provider the increase of propranolol dose from 240 to 280 mg daily.  Daily dose of 450 mg of primidone was not changed.  Please refer to my initial/other prior notes for further information.    Since the last visit, the patient reports that his hand tremor is stable.  He forgot to ask Dr. Lewis regarding increasing his propranolol dose.  Continues to take 240 mg of propranolol and 450 mg of primidone daily.  He denies any significant side effects.  He also denies interval development of new focal neurological symptoms.    On review of systems, patient endorses no additional active complaints. Medications, allergies, family and social history were also reviewed. There are no changes reported by patient.    CURRENT MEDICATIONS:   Current Outpatient Prescriptions   Medication     insulin syringe-needle U-100 (B-D INSULIN SYRINGE) 31G X 5/16\" 0.5 ML     insulin regular (NOVOLIN R RELION) 100 UNIT/ML injection     Respiratory Therapy Supplies (NEBULIZER/TUBING/MOUTHPIECE) KIT     primidone (MYSOLINE) 50 MG tablet     insulin NPH (NOVOLIN N RELION) 100 UNIT/ML injection     tamsulosin (FLOMAX) 0.4 MG capsule     folic acid 0.8 MG CAPS     losartan (COZAAR) 100 MG tablet     simvastatin (ZOCOR) 80 MG tablet     albuterol (PROAIR HFA/PROVENTIL HFA/VENTOLIN HFA) 108 (90 BASE) MCG/ACT Inhaler     warfarin (COUMADIN) 5 MG tablet     tiotropium-olodaterol (STIOLTO " "RESPIMAT) 2.5-2.5 MCG/ACT AERS     propafenone (RYTHMOL) 150 MG TABS tablet     metFORMIN (GLUCOPHAGE) 1000 MG tablet     propranolol (INDERAL) 40 MG tablet     ipratropium - albuterol 0.5 mg/2.5 mg/3 mL (DUONEB) 0.5-2.5 (3) MG/3ML neb solution     azelastine (ASTELIN) 0.1 % spray     blood glucose monitoring (NO BRAND SPECIFIED) test strip     Probiotic Product (FLORAJEN3) CAPS     pantoprazole (PROTONIX) 40 MG enteric coated tablet     sulfaSALAzine (AZULFIDINE) 500 MG tablet     FREESTYLE LANCETS MISC     ASPIRIN NOT PRESCRIBED, INTENTIONAL,     BD ULTRA FINE PEN NEEDLES     REVIEW OF SYSTEMS:                                                    10-system review was completed. Pertinent positives are included in HPI. The remainder of ROS is negative.  EXAM:                                                    Physical Exam:   Vitals: /60 (BP Location: Right arm, Patient Position: Sitting, Cuff Size: Adult Large)  Pulse 72  Temp 97.8  F (36.6  C) (Oral)  Resp 18  Ht 1.676 m (5' 6\")  Wt 91.6 kg (202 lb)  SpO2 90%  BMI 32.6 kg/m2    General: pt is in NAD, cooperative.  Skin: normal turgor, moist mucous membranes, no lesions/rashes noticed.  HEENT: ATNC, white sclera, normal conjunctiva.  Respiratory: Symmetric lung excursion, no accessory respiratory muscle use.  Abdomen: Non distended.  Neurological: awake, cooperative, follows commands, mild dysarthria, no aphasia, cranial nerves II-XII: no ptosis, extraocular motility is full, face is symmetric, tongue is midline, equally moves all extremities, mild to moderate intermittent postural and action bilateral hand tremor, no dysmetria bilaterally, casual gait is normal.    DATA:     Labs: I personally reviewed the following labs:  Anticoagulation Therapy Visit on 02/26/2018   Component Date Value Ref Range Status     INR Protime 02/26/2018 2.9* 0.86 - 1.14 Final   Anticoagulation Therapy Visit on 02/01/2018   Component Date Value Ref Range Status     INR " Protime 02/01/2018 2.1* 0.86 - 1.14 Final   Anticoagulation Therapy Visit on 01/18/2018   Component Date Value Ref Range Status     INR Protime 01/18/2018 3.5* 0.86 - 1.14 Final   Anticoagulation Therapy Visit on 01/04/2018   Component Date Value Ref Range Status     INR Protime 01/04/2018 1.9* 0.86 - 1.14 Final     ASSESSMENT and PLAN:                                                    Assessment: 73-year-old male patient presents for follow-up of his essential tremor.  He is on 240 mg of propranolol and 450 mg of primidone daily.  He denies any significant side effects.  His tremor is stable, but he wishes if possible to have a better control.  He forgot to ask his primary care provider regarding increasing the dose of propranolol.  He will do it at the next follow-up visit.  If his tremor does not significantly change with propranolol dose increase, we might consider increasing his primidone.    Diagnoses:    ICD-10-CM    1. Benign familial tremor G25.0    2. Essential tremor G25.0 primidone (MYSOLINE) 50 MG tablet     Plan: At today's visit we thoroughly discussed current symptoms and the plan.  I advised the patient to discuss with Dr. Lewis if his dose of propranolol could be further increased to 160 mg in the morning and 120 mg in the afternoon (total daily dose of 280 mg).  We will not change the dose of primidone for now, but might do it in the future.    The following medications were refilled: Primidone (MYSOLINE) 50 MG tablet: Take 3 tablets (150 mg) by mouth 3 times daily.    Next follow-up appointment is in the next 3 months or earlier if needed.    I encouraged the patient to call me with any questions or concerns.    Total Time: 26 minutes with > 50% spent counseling the patient on stated above assessment and recommendations, including current symptoms and proposed plan of treatment.    Bong Yoo MD  HP/ Neurology

## 2018-03-08 RX ORDER — IPRATROPIUM BROMIDE AND ALBUTEROL SULFATE 2.5; .5 MG/3ML; MG/3ML
SOLUTION RESPIRATORY (INHALATION)
Qty: 270 ML | Refills: 8 | Status: SHIPPED | OUTPATIENT
Start: 2018-03-08 | End: 2019-09-09

## 2018-03-08 NOTE — TELEPHONE ENCOUNTER
Routing refill request to provider for review/approval because:  Diagnosis not on the [facility] refill protocol Chronic obstructive bronchitis (H) [J44.9]       Thank you,  Sadie Martinez RN

## 2018-03-19 ENCOUNTER — ANTICOAGULATION THERAPY VISIT (OUTPATIENT)
Dept: NURSING | Facility: CLINIC | Age: 73
End: 2018-03-19
Payer: COMMERCIAL

## 2018-03-19 DIAGNOSIS — I48.91 ATRIAL FIBRILLATION, UNSPECIFIED TYPE (H): ICD-10-CM

## 2018-03-19 DIAGNOSIS — Z79.01 LONG-TERM (CURRENT) USE OF ANTICOAGULANTS: ICD-10-CM

## 2018-03-19 LAB — INR POINT OF CARE: 2.3 (ref 0.86–1.14)

## 2018-03-19 PROCEDURE — 85610 PROTHROMBIN TIME: CPT | Mod: QW

## 2018-03-19 PROCEDURE — 99207 ZZC NO CHARGE NURSE ONLY: CPT

## 2018-03-19 PROCEDURE — 36416 COLLJ CAPILLARY BLOOD SPEC: CPT

## 2018-03-19 NOTE — MR AVS SNAPSHOT
Cayetano Lunsford   3/19/2018 11:15 AM   Anticoagulation Therapy Visit    Description:  73 year old male   Provider:   INR CLINIC   Department:   Nurse           INR as of 3/19/2018     Today's INR 2.3      Anticoagulation Summary as of 3/19/2018     INR goal 2.0-3.0   Today's INR 2.3   Full instructions 10 mg on Mon, Thu, Sat; 7.5 mg all other days   Next INR check 4/16/2018    Indications   Long-term (current) use of anticoagulants [Z79.01] [Z79.01]  Atrial fibrillation (H) [I48.91]         Your next Anticoagulation Clinic appointment(s)     Mar 19, 2018 11:15 AM CDT   Anticoagulation Visit with  INR CLINIC   [facility]    [address] [place] [address] [phone]            Apr 16, 2018 11:15 AM CDT   Anticoagulation Visit with  INR CLINIC   [facility] ([facility])    [address] [place] [address] [phone]              Contact Numbers     [facility]  Please call [phone] to cancel and/or reschedule your appointment   Please call [phone] with any problems or questions regarding your therapy.        March 2018 Details    Sun Mon Tue Wed Thu Fri Sat         1               2               3                 4               5               6               7               8               9               10                 11               12               13               14               15               16               17                 18               19      10 mg   See details      20      7.5 mg         21      7.5 mg         22      10 mg         23      7.5 mg         24      10 mg           25      7.5 mg         26      10 mg         27      7.5 mg         28      7.5 mg         29      10 mg         30      7.5 mg         31      10 mg          Date Details   03/19 This INR check               How to take your warfarin dose     To take:  7.5 mg Take 1.5 of  the 5 mg tablets.    To take:  10 mg Take 2 of the 5 mg tablets.           April 2018 Details    Sun Mon Tue Wed Thu Fri Sat     1      7.5 mg         2      10 mg         3      7.5 mg         4      7.5 mg         5      10 mg         6      7.5 mg         7      10 mg           8      7.5 mg         9      10 mg         10      7.5 mg         11      7.5 mg         12      10 mg         13      7.5 mg         14      10 mg           15      7.5 mg         16            17               18               19               20               21                 22               23               24               25               26               27               28                 29               30                     Date Details   No additional details    Date of next INR:  4/16/2018         How to take your warfarin dose     To take:  7.5 mg Take 1.5 of the 5 mg tablets.    To take:  10 mg Take 2 of the 5 mg tablets.

## 2018-03-19 NOTE — PROGRESS NOTES
ANTICOAGULATION FOLLOW-UP CLINIC VISIT    Patient Name:  Cayetano Lunsford  Date:  3/19/2018  Contact Type:  Face to Face    SUBJECTIVE:     Patient Findings     Positives No Problem Findings           OBJECTIVE    INR Protime   Date Value Ref Range Status   03/19/2018 2.3 (A) 0.86 - 1.14 Final       ASSESSMENT / PLAN  INR assessment THER    Recheck INR In: 4 WEEKS    INR Location Clinic      Anticoagulation Summary as of 3/19/2018     INR goal 2.0-3.0   Today's INR 2.3   Maintenance plan 10 mg (5 mg x 2) on Mon, Thu, Sat; 7.5 mg (5 mg x 1.5) all other days   Full instructions 10 mg on Mon, Thu, Sat; 7.5 mg all other days   Weekly total 60 mg   No change documented Estelle Prado   Plan last modified Kirstin Meléndez RN (11/2/2017)   Next INR check 4/16/2018   Priority INR   Target end date     Indications   Long-term (current) use of anticoagulants [Z79.01] [Z79.01]  Atrial fibrillation (H) [I48.91]         Anticoagulation Episode Summary     INR check location     Preferred lab     Send INR reminders to [facility]    Comments       Anticoagulation Care Providers     Provider Role Specialty Phone number    Debbie Lewis MD [facility] [phone]            See the Encounter Report to view Anticoagulation Flowsheet and Dosing Calendar (Go to Encounters tab in chart review, and find the Anticoagulation Therapy Visit)    Patient aware if signs of clotting (pain, tenderness, swelling, color change in leg or arm, SOB) and bleeding occur (blood in stool, urine, large bruising, bleeding gums, nosebleeds) to have INR check sooner. If sx severe report to ER or concerned for stroke call 911. If general questions or concerns arise, call clinic.         Estelle Prado RN

## 2018-03-20 ENCOUNTER — TRANSFERRED RECORDS (OUTPATIENT)
Dept: HEALTH INFORMATION MANAGEMENT | Facility: CLINIC | Age: 73
End: 2018-03-20

## 2018-03-27 ENCOUNTER — TELEPHONE (OUTPATIENT)
Dept: FAMILY MEDICINE | Facility: CLINIC | Age: 73
End: 2018-03-27

## 2018-03-27 NOTE — TELEPHONE ENCOUNTER
Writer informed [facility] of Dr. Lewis's recommendations: 4 day hold approved w/o bridging. Writer requested [facility] representative to remind patient to notify [facility] when procedure is scheduled and to check INR 1-2 days prior. MARLINE KingN, RN

## 2018-03-27 NOTE — TELEPHONE ENCOUNTER
He does not need Lovenox bridging for his colonoscopy based on his indication for anticoagulation with coumadin and [id number] score. Thanks, Debbie Lewis M.D.

## 2018-03-27 NOTE — TELEPHONE ENCOUNTER
Reason for Call: Request for an order or referral:    Order or referral being requested: Hold coumadin    Date needed: as soon as possible    Has the patient been seen by the PCP for this problem? YES    Additional comments: Celina from [facility] is requesting for orders to hold coumadin for 4 days for procedure. Please let them know if they need to bridge. Please give [facility] a call and can give orders to any . [facility] ph. [phone]. Thanks!    Phone number Patient can be reached at:  Home number on file [phone] (home)    Best Time:  anytime    Can we leave a detailed message on this number?  Not Applicable    Call taken on 3/27/2018 at 8:14 AM by Padmini Hua

## 2018-03-27 NOTE — TELEPHONE ENCOUNTER
Dr. Lewis please review,    Procedure: colonoscopy  Orders: 4 day hold of Coumadin  [id number] score: +3 (age, HTN, DM)  FYI: AC therapy for Afib. No bridging required on 10/1/14 colonoscopy or 5/15/12 right myringoplasty surgery.    Do you recommend Lovenox bridging pre or post procedure at this time?   Kirstin Meléndez, MARLINEN, RN

## 2018-04-02 ENCOUNTER — OFFICE VISIT (OUTPATIENT)
Dept: FAMILY MEDICINE | Facility: CLINIC | Age: 73
End: 2018-04-02
Payer: COMMERCIAL

## 2018-04-02 VITALS
HEART RATE: 60 BPM | WEIGHT: 196.5 LBS | RESPIRATION RATE: 16 BRPM | OXYGEN SATURATION: 95 % | TEMPERATURE: 97.7 F | DIASTOLIC BLOOD PRESSURE: 80 MMHG | SYSTOLIC BLOOD PRESSURE: 126 MMHG | BODY MASS INDEX: 31.72 KG/M2

## 2018-04-02 DIAGNOSIS — Z79.01 LONG-TERM (CURRENT) USE OF ANTICOAGULANTS: ICD-10-CM

## 2018-04-02 DIAGNOSIS — J44.1 CHRONIC OBSTRUCTIVE PULMONARY DISEASE WITH ACUTE EXACERBATION (H): Primary | ICD-10-CM

## 2018-04-02 DIAGNOSIS — E11.9 TYPE 2 DIABETES, HBA1C GOAL < 8% (H): ICD-10-CM

## 2018-04-02 DIAGNOSIS — R05.9 COUGH: ICD-10-CM

## 2018-04-02 PROCEDURE — 99214 OFFICE O/P EST MOD 30 MIN: CPT | Performed by: FAMILY MEDICINE

## 2018-04-02 RX ORDER — PREDNISONE 50 MG/1
50 TABLET ORAL DAILY
Qty: 5 TABLET | Refills: 0 | Status: SHIPPED | OUTPATIENT
Start: 2018-04-02 | End: 2018-04-07

## 2018-04-02 RX ORDER — AZITHROMYCIN 250 MG/1
TABLET, FILM COATED ORAL
Qty: 6 TABLET | Refills: 0 | Status: SHIPPED | OUTPATIENT
Start: 2018-04-02 | End: 2018-04-23

## 2018-04-02 NOTE — MR AVS SNAPSHOT
After Visit Summary   4/2/2018    Cayetano Lunsford    MRN: [id number]           Patient Information     Date Of Birth          1945        Visit Information        Provider Department      4/2/2018 3:20 PM Gray Rosario MD [facility]        Today's Diagnoses     Chronic obstructive pulmonary disease with acute exacerbation (H)    -  1    Cough        Long-term (current) use of anticoagulants [Z79.01]           Follow-ups after your visit        Your next 10 appointments already scheduled     Apr 05, 2018  3:15 PM CDT   Anticoagulation Visit with  INR CLINIC   [facility] ([facility]    [address] [place] [id number] [phone]            Apr 16, 2018 11:15 AM CDT   Anticoagulation Visit with  INR CLINIC   [facility] ([facility])    [address] [place] [id number] [phone]            Apr 23, 2018 10:00 AM CDT   TELEMEDICINE with Jessica Diaz [facility] ([facility])    [address] [place] [id number] [phone]           Note: this is not an onsite visit; there is no need to come to the facility.            May 25, 2018   Procedure with Juan Simon DO   [facility] [place] [facility] ([facility])    [address]  [place] [address]   [phone]           [facility] is located at [address] [place]            Jun 06, 2018  9:00 AM CDT   Return Visit with Bong Yoo MD   [facility] ([facility])    [address] [place] [id number] [phone]              Who to contact     If you have questions or need follow up information about today's clinic visit or your schedule please contact [facility] directly at [phone].  Normal or non-critical lab and imaging  "results will be communicated to you by MyChart, letter or phone within 4 business days after the clinic has received the results. If you do not hear from us within 7 days, please contact the clinic through [employer] or phone. If you have a critical or abnormal lab result, we will notify you by phone as soon as possible.  Submit refill requests through [employer] or call your pharmacy and they will forward the refill request to us. Please allow 3 business days for your refill to be completed.          Additional Information About Your Visit        [employer] Information     [employer] lets you send messages to your doctor, view your test results, renew your prescriptions, schedule appointments and more. To sign up, go to [email or web address] . Click on \"Log in\" on the left side of the screen, which will take you to the Welcome page. Then click on \"Sign up Now\" on the right side of the page.     You will be asked to enter the access code listed below, as well as some personal information. Please follow the directions to create your username and password.     Your access code is: [email or web address]  Expires: 2018  2:19 PM     Your access code will  in 90 days. If you need help or a new code, please call your [place] clinic or [phone].        Care EveryWhere ID     This is your Care EveryWhere ID. This could be used by other organizations to access your [place] medical records  [id number]        Your Vitals Were     Pulse Temperature Respirations Pulse Oximetry BMI (Body Mass Index)       60 97.7  F (36.5  C) (Oral) 16 95% 31.72 kg/m2        Blood Pressure from Last 3 Encounters:   18 126/80   18 122/60   17 116/58    Weight from Last 3 Encounters:   18 196 lb 8 oz (89.1 kg)   18 202 lb (91.6 kg)   17 202 lb 4 oz (91.7 kg)              Today, you had the following     No orders found for display         Today's Medication Changes          These changes are accurate as of 18 " 11:59 PM.  If you have any questions, ask your nurse or doctor.               Start taking these medicines.        Dose/Directions    azithromycin 250 MG tablet   Commonly known as:  ZITHROMAX   Used for:  Chronic obstructive pulmonary disease with acute exacerbation (H), Cough   Started by:  Gray Rosario MD        Two tablets first day, then one tablet daily for four days.   Quantity:  6 tablet   Refills:  0       predniSONE 50 MG tablet   Commonly known as:  DELTASONE   Used for:  Chronic obstructive pulmonary disease with acute exacerbation (H), Cough   Started by:  Gray Rosario MD        Dose:  50 mg   Take 1 tablet (50 mg) by mouth daily for 5 days   Quantity:  5 tablet   Refills:  0            Where to get your medicines      These medications were sent to [facility] [address]  [address] [address]     Phone:  [phone]     azithromycin 250 MG tablet    predniSONE 50 MG tablet                Primary Care Provider Office Phone # Fax #    Debbie Lewis [phone]       [address]  [facility] [address]        Equal Access to Services     [facility]: Hadii efrem akers Soflorencia, waaxda ludavid, qaybta kaalarslan avila, erendira walter. So [facility] [phone].    ATENCIÓN: Si habla español, tiene a jurado disposición servicios gratuitos de asistencia lingüística. Britta al [phone].    We comply with applicable federal civil rights laws and [place] laws. We do not discriminate on the basis of race, color, national origin, age, disability, sex, sexual orientation, or gender identity.            Thank you!     Thank you for choosing [facility]  for your care. Our goal is always to provide you with excellent care. Hearing back from our patients is one way we can continue to improve our services. Please take a few minutes to complete the written survey that you  may receive in the mail after your visit with us. Thank you!             Your Updated Medication List - Protect others around you: Learn how to safely use, store and throw away your medicines at www.disposemymeds.org.          This list is accurate as of 4/2/18 11:59 PM.  Always use your most recent med list.                   Brand Name Dispense Instructions for use Diagnosis    albuterol 108 (90 BASE) MCG/ACT Inhaler    PROAIR HFA/PROVENTIL HFA/VENTOLIN HFA    1 Inhaler    Inhale 1-2 puffs into the lungs every 4 hours as needed (for shortness of breath/wheezing)    Chronic obstructive pulmonary disease, unspecified COPD type (H)       ASPIRIN NOT PRESCRIBED    INTENTIONAL     Reported on 5/17/2017        azelastine 0.1 % spray    ASTELIN    1 Bottle    Spray 1-2 sprays into both nostrils 2 times daily    Seasonal allergic rhinitis, unspecified allergic rhinitis trigger       azithromycin 250 MG tablet    ZITHROMAX    6 tablet    Two tablets first day, then one tablet daily for four days.    Chronic obstructive pulmonary disease with acute exacerbation (H), Cough       BD ULTRA FINE PEN NEEDLES     100 each    Use to inject insulin twice daily.    Type 2 diabetes, HbA1C goal < 8% (H)       blood glucose monitoring lancets     100 Each    Use to test up to four times per day    Type II or unspecified type diabetes mellitus without mention of complication, not stated as uncontrolled       blood glucose monitoring test strip    no brand specified    360 each    Test 4 times daily or as directed. Profile Rx: patient will contact pharmacy when needed    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, with long-term current use of insulin (H)       FLORAJEN3 Caps     60 capsule    Take 1 capsule by mouth 2 times daily    Acute cystitis without hematuria       folic acid 0.8 MG Caps     90 capsule    Take 1 tablet by mouth daily    Ulcerative colitis without complications, unspecified location (H)        "insulin  UNIT/ML injection    NovoLIN N RELION    3 vial    Inject 5 units under the skin at breakfast and 15 units at dinner.    Type 2 diabetes mellitus with hypoglycemia without coma, with long-term current use of insulin (H)       insulin regular 100 UNIT/ML injection    NovoLIN R RELION    30 mL    Inject 5 units with breakfast and 2 units with dinner (when mixing with NPH, draw this insulin up first)    Type 2 diabetes mellitus with hypoglycemia without coma, with long-term current use of insulin (H)       insulin syringe-needle U-100 31G X 5/16\" 0.5 ML    B-D INSULIN SYRINGE    100 each    Use 2 syringes daily or as directed.    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, with long-term current use of insulin (H)       ipratropium - albuterol 0.5 mg/2.5 mg/3 mL 0.5-2.5 (3) MG/3ML neb solution    DUONEB    270 mL    NEBULIZE CONTENTS OF ONE VIAL BY MOUTH EVERY 4 HOURS AS NEEDED FOR SHORTNESS OF BREATH OR DYSPNEA    Chronic obstructive bronchitis (H)       losartan 100 MG tablet    COZAAR    90 tablet    Take 1 tablet (100 mg) by mouth daily for hypertension.    Hypertension goal BP (blood pressure) < 140/90       metFORMIN 1000 MG tablet    GLUCOPHAGE    180 tablet    Take 1 tablet (1,000 mg) by mouth 2 times daily (with meals) with breakfast and dinner.    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, with long-term current use of insulin (H)       nebulizer/tubing/mouthpiece Kit     1 kit    Use to nebulize medications for COPD    Chronic obstructive pulmonary disease, unspecified COPD type (H)       pantoprazole 40 MG EC tablet    PROTONIX     Take 40 mg by mouth daily Started by Dr. Debbie Rico at [facility]        predniSONE 50 MG tablet    DELTASONE    5 tablet    Take 1 tablet (50 mg) by mouth daily for 5 days    Chronic obstructive pulmonary disease with acute exacerbation (H), Cough       primidone 50 MG tablet    MYSOLINE    270 tablet    Take 3 tablets (150 " mg) by mouth 3 times daily    Essential tremor       propafenone 150 MG Tabs tablet    RYTHMOL    180 tablet    Take 1 tablet (150 mg) by mouth 2 times daily    Paroxysmal atrial fibrillation (H)       propranolol 40 MG tablet    INDERAL    180 tablet    Take 2-3 tablets ( mg) by mouth 2 times daily    Benign familial tremor       simvastatin 80 MG tablet    ZOCOR    45 tablet    Take 0.5 tablets (40 mg) by mouth At Bedtime Profile Rx: patient will contact pharmacy when needed    Hyperlipidemia LDL goal <100       STIOLTO RESPIMAT 2.5-2.5 MCG/ACT Aers   Generic drug:  tiotropium-olodaterol      Inhale 2 puffs into the lungs daily        sulfaSALAzine 500 MG tablet    AZULFIDINE    90 tablet    TAKE ONE TABLET BY MOUTH THREE TIMES A DAY    Ulcerative colitis, unspecified       tamsulosin 0.4 MG capsule    FLOMAX    90 capsule    Take 1 capsule (0.4 mg) by mouth daily    Benign prostatic hyperplasia with urinary frequency       warfarin 5 MG tablet    COUMADIN    155 tablet    Take as directed by Coumadin clinic. Current dose (11/2/17): 7.5 mg on Mon, Wed, Fri + 10 mg all other days.    Atrial fibrillation, unspecified type (H)

## 2018-04-02 NOTE — TELEPHONE ENCOUNTER
"Requested Prescriptions   Pending Prescriptions Disp Refills     B-D INSULIN SYRINGE 31G X 5/16\" 0.5 ML [Pharmacy Med Name: BD INS SYR UF 1/2CC SHORT]  Last Written Prescription Date:  2/21/2018  Last Fill Quantity: 100,  # refills: 11   Last Office Visit: 4/2/2018   Future Office Visit:    Next 5 appointments (look out 90 days)     Jun 06, 2018  9:00 AM CDT   Return Visit with Bong Yoo MD   [facility] ([facility])    [address] [place] [id number] [phone]                100 each 1     Sig: USE 1 SYRINGE TWO TIMES A DAY OR AS DIRECTED    Diabetic Supplies Protocol Passed    4/2/2018 10:53 AM       Passed - Patient is 18 years of age or older       Passed - Recent (6 mo) or future (30 days) visit within the authorizing provider's specialty    Patient had office visit in the last 6 months or has a visit in the next 30 days with authorizing provider.  See \"Patient Info\" tab in inbasket, or \"Choose Columns\" in Meds & Orders section of the refill encounter.              "

## 2018-04-02 NOTE — PROGRESS NOTES
SUBJECTIVE:   Cayetano Lunsford is a 73 year old male who presents to clinic today for the following health issues:      Acute Illness   Acute illness concerns: cold  Onset: 9 days     Fever: no    Chills/Sweats: YES- chills and sweats at night    Headache (location?): YES- intermittent temporal    Sinus Pressure:YES- facial pain    Conjunctivitis:  no    Ear Pain: YES: bilateral -plugged     Rhinorrhea: YES    Congestion: YES    Sore Throat: no     Cough: YES-non-productive    Wheeze: YES    Decreased Appetite: YES    Nausea: no    Vomiting: no    Diarrhea:  no    Dysuria/Freq.: no    Fatigue/Achiness: YES- achiness    Sick/Strep Exposure: no     Therapies Tried and outcome: nyquil and vicks and lemon and honey drops       Started with sore throat and moved down in chest. Having cough. Having ribs and chest pain from coughing.   Now having runny nose and head congestion, sinus congestion.   Now cough is not as bad. No fever. Some chills and sweats. Took nyquil at that time.   Has copd.   Using inhaler more frequent. Using duoneb.   Seeing lung specialist next week.     Problem list and histories reviewed & adjusted, as indicated.  Additional history: as documented    Labs reviewed in EPIC    Reviewed and updated as needed this visit by clinical staff       Reviewed and updated as needed this visit by Provider           Social History     Social History     Marital status:      Spouse name: Izabel     Number of children: 2     Years of education: 12     Occupational History            Retired     Social History Main Topics     Smoking status: Former Smoker     Packs/day: 1.00     Years: 30.00     Types: Cigarettes     Quit date: 3/19/1992     Smokeless tobacco: Never Used     Alcohol use 0.0 oz/week     0 Standard drinks or equivalent per week      Comment: hardly at all, sometimes at dinner     Drug use: No     Sexual activity: Yes     Partners: Female     Other Topics Concern      Parent/Sibling W/ Cabg, Mi Or Angioplasty Before 65f 55m? No     Caffeine Concern No     No Caffeine     Exercise Yes     20 minutes - Walking- summer 2 x day     Social History Narrative     Allergies   Allergen Reactions     Percodan [Oxycodone-Aspirin] Nausea and Vomiting     Patient Active Problem List   Diagnosis     Ulcerative colitis without complications (HCC)     Atrial fibrillation (H)     Chronic obstructive pulmonary disease (H)     Obesity     Eczema     HYPERLIPIDEMIA LDL GOAL <100     Advance Care Planning     Benign familial tremor     Hypertension goal BP (blood pressure) < 140/90     Cramp of limb     BPH (benign prostatic hyperplasia)     Pain in joint, lower leg     CAD in native artery     Hard of hearing     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    Review of system: for general, respiratory, CVS, GI and psychiatry negative except for noted above.     EXAM:  /80 (Cuff Size: Adult Large)  Pulse 60  Temp 97.7  F (36.5  C) (Oral)  Resp 16  Wt 196 lb 8 oz (89.1 kg)  SpO2 95%  BMI 31.72 kg/m2  Constitutional: healthy, alert and no distress   Psychiatric: mentation appears normal and affect normal/bright  Respiratory: Mild diffuse expiratory wheezing both lung fields, no crackles, no tachypnea.       ASSESSMENT / PLAN:  (J44.1) Chronic obstructive pulmonary disease with acute exacerbation (H)  (primary encounter diagnosis)  Comment: We discussed about using prednisone and azithromycin for his symptoms.He is currently having flare of his COPD.   We discussed side effects of both medications.  Including raising a blood sugar - he has diabetes  Plan: predniSONE (DELTASONE) 50 MG tablet,         azithromycin (ZITHROMAX) 250 MG tablet            (R05)  Cough  Comment:   Plan: predniSONE (DELTASONE) 50 MG tablet,         azithromycin (ZITHROMAX) 250 MG tablet            (Z79.01) Long-term (current) use of anticoagulants [Z79.01]  Comment:  he is on Coumadin and azithromycin would interfere with Coumadin.  Plan: He is scheduled to see INR nurse in few weeks but we will change his appointments to this week.    We discussed when to seek follow-up appointment and what symptoms to watch for.

## 2018-04-05 ENCOUNTER — ANTICOAGULATION THERAPY VISIT (OUTPATIENT)
Dept: NURSING | Facility: CLINIC | Age: 73
End: 2018-04-05
Payer: COMMERCIAL

## 2018-04-05 DIAGNOSIS — Z79.01 LONG-TERM (CURRENT) USE OF ANTICOAGULANTS: ICD-10-CM

## 2018-04-05 DIAGNOSIS — I48.91 ATRIAL FIBRILLATION, UNSPECIFIED TYPE (H): ICD-10-CM

## 2018-04-05 LAB — INR POINT OF CARE: 3.6 (ref 0.86–1.14)

## 2018-04-05 PROCEDURE — 85610 PROTHROMBIN TIME: CPT | Mod: QW

## 2018-04-05 PROCEDURE — 99207 ZZC NO CHARGE NURSE ONLY: CPT

## 2018-04-05 PROCEDURE — 36416 COLLJ CAPILLARY BLOOD SPEC: CPT

## 2018-04-05 RX ORDER — WARFARIN SODIUM 5 MG/1
TABLET ORAL
Qty: 150 TABLET | Refills: 1 | Status: SHIPPED | OUTPATIENT
Start: 2018-04-05 | End: 2018-09-27

## 2018-04-05 RX ORDER — SYRINGE-NEEDLE,INSULIN,0.5 ML 31 GX5/16"
SYRINGE, EMPTY DISPOSABLE MISCELLANEOUS
Qty: 100 EACH | Refills: 1 | Status: SHIPPED | OUTPATIENT
Start: 2018-04-05 | End: 2018-07-02

## 2018-04-05 NOTE — TELEPHONE ENCOUNTER
Prescription approved per [facility] Refill Protocol.  Paola Madrid RN

## 2018-04-05 NOTE — MR AVS SNAPSHOT
Cayetano Lunsford   4/5/2018 3:15 PM   Anticoagulation Therapy Visit    Description:  73 year old male   Provider:   INR CLINIC   Department:   Nurse           INR as of 4/5/2018     Today's INR 3.6!      Anticoagulation Summary as of 4/5/2018     INR goal 2.0-3.0   Today's INR 3.6!   Full instructions 4/5: 5 mg; Otherwise 10 mg on Mon, Thu, Sat; 7.5 mg all other days   Next INR check 4/16/2018    Indications   Long-term (current) use of anticoagulants [Z79.01] [Z79.01]  Atrial fibrillation (H) [I48.91]         Your next Anticoagulation Clinic appointment(s)     Apr 16, 2018 11:15 AM CDT   Anticoagulation Visit with  INR CLINIC   [facility] ([facility])    [address] [place] [phone] [phone]              Contact Numbers     [facility]  Please call [phone] to cancel and/or reschedule your appointment   Please call [phone] with any problems or questions regarding your therapy.        April 2018 Details    Sun Mon Tue Wed Thu Fri Sat     1               2               3               4               5      5 mg   See details      6      7.5 mg         7      10 mg           8      7.5 mg         9      10 mg         10      7.5 mg         11      7.5 mg         12      10 mg         13      7.5 mg         14      10 mg           15      7.5 mg         16            17               18               19               20               21                 22               23               24               25               26               27               28                 29               30                     Date Details   04/05 This INR check       Date of next INR:  4/16/2018         How to take your warfarin dose     To take:  5 mg Take 1 of the 5 mg tablets.    To take:  7.5 mg Take 1.5 of the 5 mg tablets.    To take:  10 mg Take 2 of the 5 mg tablets.

## 2018-04-05 NOTE — PROGRESS NOTES
"  ANTICOAGULATION FOLLOW-UP CLINIC VISIT    Patient Name:  Cayetano Lunsford  Date:  4/5/2018  Contact Type:  Face to Face    SUBJECTIVE:     Patient Findings     Positives Change in medications (Two doses left of Prednisone treatment. Per up to date, \"Summary Corticosteroids (Systemic) may enhance the anticoagulant effect of Warfarin.\"), Inflammation (URI sx improving. Pt denies SOB.), Antibiotic use or infection (Two more days of Zpak treatment. Per up to date, \"Summary Macrolide Antibiotics may increase the serum concentration of Vitamin K Antagonists.\")           OBJECTIVE    INR Protime   Date Value Ref Range Status   04/05/2018 3.6 (A) 0.86 - 1.14 Final       ASSESSMENT / PLAN  INR assessment SUPRA    Recheck INR In: 10 DAYS    INR Location Clinic      Anticoagulation Summary as of 4/5/2018     INR goal 2.0-3.0   Today's INR 3.6!   Maintenance plan 10 mg (5 mg x 2) on Mon, Thu, Sat; 7.5 mg (5 mg x 1.5) all other days   Full instructions 4/5: 5 mg; Otherwise 10 mg on Mon, Thu, Sat; 7.5 mg all other days   Weekly total 60 mg   Plan last modified Kirstin Meléndez RN (11/2/2017)   Next INR check 4/16/2018   Priority INR   Target end date     Indications   Long-term (current) use of anticoagulants [Z79.01] [Z79.01]  Atrial fibrillation (H) [I48.91]         Anticoagulation Episode Summary     INR check location     Preferred lab     Send INR reminders to [facility]    Comments       Anticoagulation Care Providers     Provider Role Specialty Phone number    Debbie Lewis MD [facility] [phone]            See the Encounter Report to view Anticoagulation Flowsheet and Dosing Calendar (Go to Encounters tab in chart review, and find the Anticoagulation Therapy Visit)    Per protocol, patient advised to decrease today's warfarin dose by 5 mg to account for supra-therapeutic INR level. Patient instructed to increase green intake today and tomorrow as well. Recheck within 2 weeks to ensure " INR stability. Patient advised to use extra caution with activities to prevent injuries and falls.      Patient made aware if signs of clotting (pain, tenderness, swelling, or color change in any extremity) AND/OR bleeding occur (nosebleeds, bleeding gums, bruising, or blood in stool or urine) to notify provider & seek medical attention. If severe symptoms develop, such as major bleeding, chest pain, shortness of breath, fall, trauma or s/s of stroke, patient to call 911 immediately.       Kirstin Meléndez RN

## 2018-04-11 ENCOUNTER — TELEPHONE (OUTPATIENT)
Dept: PHARMACY | Facility: CLINIC | Age: 73
End: 2018-04-11

## 2018-04-11 NOTE — TELEPHONE ENCOUNTER
Patient called saying that his neurologist had asked him to discuss increasing his propranolol in the morning to 4 capsules (160 mg) with his PCP. He called me to see what I thought. The max maintenance dose for tremor is 320 mg/day in divided doses. Looking at his his blood pressure and pulse he would likely be ok with a 40 mg increase in the morning. He would want to monitor his blood pressure and pulse at home to make sure it is not getting too low.  He is due for some labs and a follow up with Dr. Lewis. I recommended he schedule with her at the beginning of May and discuss with her.     He also mentions that his one touch lancing device broke and needs a new one.  I found one that I can give to him and will leave it at the  for him to .    Tanna Diaz, PharmD  Medication Therapy Management Pharmacist

## 2018-04-13 ENCOUNTER — TRANSFERRED RECORDS (OUTPATIENT)
Dept: HEALTH INFORMATION MANAGEMENT | Facility: CLINIC | Age: 73
End: 2018-04-13

## 2018-04-16 ENCOUNTER — ANTICOAGULATION THERAPY VISIT (OUTPATIENT)
Dept: NURSING | Facility: CLINIC | Age: 73
End: 2018-04-16
Payer: COMMERCIAL

## 2018-04-16 DIAGNOSIS — I48.91 ATRIAL FIBRILLATION, UNSPECIFIED TYPE (H): ICD-10-CM

## 2018-04-16 DIAGNOSIS — Z79.01 LONG-TERM (CURRENT) USE OF ANTICOAGULANTS: ICD-10-CM

## 2018-04-16 LAB
INR POINT OF CARE: 2.4 (ref 0.86–1.14)
INR PPP: 2.4

## 2018-04-16 PROCEDURE — 36416 COLLJ CAPILLARY BLOOD SPEC: CPT

## 2018-04-16 PROCEDURE — 85610 PROTHROMBIN TIME: CPT | Mod: QW

## 2018-04-16 PROCEDURE — 99207 ZZC NO CHARGE NURSE ONLY: CPT

## 2018-04-16 NOTE — TELEPHONE ENCOUNTER
Colonoscopy procedure scheduled for 5/25. Patient will start Wafarin hold on 5/21-5/24. Pre-colonoscopy INR check scheduled for 5/24. Kirstin Meléndez, MARLINEN, RN

## 2018-04-16 NOTE — MR AVS SNAPSHOT
Cayetano Lunsford   4/16/2018 11:15 AM   Anticoagulation Therapy Visit    Description:  73 year old male   Provider:   INR CLINIC   Department:   Nurse           INR as of 4/16/2018     Today's INR 2.4      Anticoagulation Summary as of 4/16/2018     INR goal 2.0-3.0   Today's INR 2.4   Full instructions 10 mg on Mon, Thu, Sat; 7.5 mg all other days   Next INR check 5/7/2018    Indications   Long-term (current) use of anticoagulants [Z79.01] [Z79.01]  Atrial fibrillation (H) [I48.91]         Your next Anticoagulation Clinic appointment(s)     May 07, 2018 11:45 AM CDT   Anticoagulation Visit with  INR CLINIC   [facility] ([facility]    [address] [place] [id number] [phone]            May 24, 2018 11:00 AM CDT   Anticoagulation Visit with  INR CLINIC   [facility] ([facility])    [address] [place] [address] [phone]              Contact Numbers     [facility]  Please call [phone] to cancel and/or reschedule your appointment   Please call [phone] with any problems or questions regarding your therapy.        April 2018 Details    Sun Mon Tue Wed Thu Fri Sat     1               2               3               4               5               6               7                 8               9               10               11               12               13               14                 15               16      10 mg   See details      17      7.5 mg         18      7.5 mg         19      10 mg         20      7.5 mg         21      10 mg           22      7.5 mg         23      10 mg         24      7.5 mg         25      7.5 mg         26      10 mg         27      7.5 mg         28      10 mg           29      7.5 mg         30      10 mg               Date Details   04/16 This INR check               How to take your warfarin dose     To take:  7.5 mg Take 1.5 of the 5 mg  tablets.    To take:  10 mg Take 2 of the 5 mg tablets.           May 2018 Details    Sun Mon Tue Wed Thu Fri Sat       1      7.5 mg         2      7.5 mg         3      10 mg         4      7.5 mg         5      10 mg           6      7.5 mg         7            8               9               10               11               12                 13               14               15               16               17               18               19                 20               21               22               23               24               25               26                 27               28               29               30               31                  Date Details   No additional details    Date of next INR:  5/7/2018         How to take your warfarin dose     To take:  7.5 mg Take 1.5 of the 5 mg tablets.    To take:  10 mg Take 2 of the 5 mg tablets.

## 2018-04-16 NOTE — PROGRESS NOTES
ANTICOAGULATION FOLLOW-UP CLINIC VISIT    Patient Name:  Cayetano Lunsford  Date:  4/16/2018  Contact Type:  Face to Face    SUBJECTIVE:     Patient Findings     Positives No Problem Findings    Comments Colonoscopy scheduled for 5/25/18. 5 day hold required. Patient has f/u appt with PCP on 5/1/18. TE sent to PCP for coordination. Standing orders placed and patient has pre-colonoscopy INR appt on 5/24.            OBJECTIVE    INR   Date Value Ref Range Status   04/16/2018 2.4  Final       ASSESSMENT / PLAN  INR assessment THER    Recheck INR In: 3 WEEKS    INR Location Clinic      Anticoagulation Summary as of 4/16/2018     INR goal 2.0-3.0   Today's INR 2.4   Maintenance plan 10 mg (5 mg x 2) on Mon, Thu, Sat; 7.5 mg (5 mg x 1.5) all other days   Full instructions 10 mg on Mon, Thu, Sat; 7.5 mg all other days   Weekly total 60 mg   No change documented Kirstin Meléndez RN   Plan last modified Kirstin Meléndez RN (11/2/2017)   Next INR check 5/7/2018   Priority INR   Target end date     Indications   Long-term (current) use of anticoagulants [Z79.01] [Z79.01]  Atrial fibrillation (H) [I48.91]         Anticoagulation Episode Summary     INR check location     Preferred lab     Send INR reminders to [facility]    Comments       Anticoagulation Care Providers     Provider Role Specialty Phone number    Debbie Lewis MD [facility] [phone]            See the Encounter Report to view Anticoagulation Flowsheet and Dosing Calendar (Go to Encounters tab in chart review, and find the Anticoagulation Therapy Visit)    Patient made aware if signs of clotting (pain, tenderness, swelling, or color change in any extremity) AND/OR bleeding occur (nosebleeds, bleeding gums, bruising, or blood in stool or urine) to notify provider & seek medical attention. If severe symptoms develop, such as major bleeding, chest pain, shortness of breath, fall, trauma or s/s of stroke, patient to call [phone]  immediately.       Kirstin Meléndez RN

## 2018-04-23 ENCOUNTER — ALLIED HEALTH/NURSE VISIT (OUTPATIENT)
Dept: PHARMACY | Facility: CLINIC | Age: 73
End: 2018-04-23
Payer: COMMERCIAL

## 2018-04-23 DIAGNOSIS — J31.0 CHRONIC RHINITIS: ICD-10-CM

## 2018-04-23 DIAGNOSIS — Z79.4 TYPE 2 DIABETES MELLITUS WITH STAGE 1 CHRONIC KIDNEY DISEASE, WITH LONG-TERM CURRENT USE OF INSULIN (H): Primary | ICD-10-CM

## 2018-04-23 DIAGNOSIS — I10 ESSENTIAL HYPERTENSION WITH GOAL BLOOD PRESSURE LESS THAN 140/90: ICD-10-CM

## 2018-04-23 DIAGNOSIS — G25.0 BENIGN FAMILIAL TREMOR: ICD-10-CM

## 2018-04-23 DIAGNOSIS — N18.1 TYPE 2 DIABETES MELLITUS WITH STAGE 1 CHRONIC KIDNEY DISEASE, WITH LONG-TERM CURRENT USE OF INSULIN (H): Primary | ICD-10-CM

## 2018-04-23 DIAGNOSIS — I48.91 ATRIAL FIBRILLATION, UNSPECIFIED TYPE (H): ICD-10-CM

## 2018-04-23 DIAGNOSIS — J44.9 CHRONIC OBSTRUCTIVE PULMONARY DISEASE, UNSPECIFIED COPD TYPE (H): ICD-10-CM

## 2018-04-23 DIAGNOSIS — K51.90 ULCERATIVE COLITIS WITHOUT COMPLICATIONS, UNSPECIFIED LOCATION (H): ICD-10-CM

## 2018-04-23 DIAGNOSIS — E11.22 TYPE 2 DIABETES MELLITUS WITH STAGE 1 CHRONIC KIDNEY DISEASE, WITH LONG-TERM CURRENT USE OF INSULIN (H): Primary | ICD-10-CM

## 2018-04-23 PROCEDURE — 99606 MTMS BY PHARM EST 15 MIN: CPT | Performed by: PHARMACIST

## 2018-04-23 PROCEDURE — 99607 MTMS BY PHARM ADDL 15 MIN: CPT | Performed by: PHARMACIST

## 2018-04-23 NOTE — MR AVS SNAPSHOT
After Visit Summary   4/23/2018    Cayetano Lunsford    MRN: [id number]           Patient Information     Date Of Birth          1945        Visit Information        Provider Department      4/23/2018 10:00 AM Jessica Diaz RPH [facility] MTM        Care Instructions    Recommendations from today's MTM visit:                                                      1. Try taking loratadine 10 mg once daily in place of benadryl. It is safer for you.    2. If Dr. Lewis does say it is ok to increase the propranolol just watch your blood pressure and any worsening in breathing.    3. No medicine changes today.    Next MTM visit: 7/23/18 at 10am I will call you    To schedule another MTM appointment, please call the clinic directly or you may call the MTM scheduling line at [phone] or toll-free at [phone].     My Clinical Pharmacist's contact information:                                                      It was a pleasure seeing you today!  Please feel free to contact me with any questions or concerns you have.      Tanna Diaz, PharmD  Medication Therapy Management Pharmacist  [facility] - Monday 9:30 - 6:00 and Wednesday 7:30 - 4:00  Phone: [phone] - direct clinic line    You may receive a survey about the MT services you received.  I would appreciate your feedback to help me serve you better in the future. Please fill it out and return it when you can. Your comments will be anonymous.                    Follow-ups after your visit        Your next 10 appointments already scheduled     May 01, 2018  7:40 AM CDT   Pre-Op physical with Debbie Lewis MD   [facility]    [address] [place] [address] [phone]            May 01, 2018  8:30 AM CDT   Anticoagulation Visit with  INR CLINIC   [facility] ([facility])    [address] [place] [address]    [phone]            May 07, 2018 11:45 AM CDT   [facility]    [address] [id number] [phone]            May 24, 2018 11:00 AM CDT   [facility]agulation Visit with  [facility] CLINIC   [facility]    [address] [place] [id number] [phone]            May 25, 2018   Procedure with Juan Simon DO   [facility] ([facility])    [address]  [facility] [address] [phone]           [facility] is located at [address] [place]            Jun 06, 2018  9:00 AM CDT   Return Visit with Bong Yoo MD   [facility]    [address] [place] [id number] [phone]            Jun 28, 2018  9:45 AM CDT   Return Visit with Natanael Dang MD   [facility] ([facility])    [address] [facility] [address]  [facility] [address] [phone] OPT 2            Jul 23, 2018 10:00 AM CDT   TELEMEDICINE with Jessica Diaz RPH   [facility] ([facility])    [address] [place] [id number] [phone]           Note: this is not an onsite visit; there is no need to come to the facility.              Who to contact     If you have questions or need follow up information about today's clinic visit or your schedule please contact [facility] directly at [phone].  Normal or non-critical lab and imaging results will be communicated to you by MyChart, letter or phone within 4 business days after the clinic has received the results. If you do not hear from us within 7 days, please contact the clinic through MyChart or phone. If you have a critical or abnormal lab result, we will notify you by  "phone as soon as possible.  Submit refill requests through [email or web address] or call your pharmacy and they will forward the refill request to us. Please allow 3 business days for your refill to be completed.          Additional Information About Your Visit        [facility] Information     [email or web address] lets you send messages to your doctor, view your test results, renew your prescriptions, schedule appointments and more. To sign up, go to [email or web address] . Click on \"Log in\" on the left side of the screen, which will take you to the Welcome page. Then click on \"Sign up Now\" on the right side of the page.     You will be asked to enter the access code listed below, as well as some personal information. Please follow the directions to create your username and password.     Your access code is: [email or web address]  Expires: 2018  2:19 PM     Your access code will  in 90 days. If you need help or a new code, please call your [facility] clinic or [phone].        Care EveryWhere ID     This is your Care EveryWhere ID. This could be used by other organizations to access your [facility] medical records  [id number]         Blood Pressure from Last 3 Encounters:   18 126/80   18 122/60   17 116/58    Weight from Last 3 Encounters:   18 196 lb 8 oz (89.1 kg)   18 202 lb (91.6 kg)   17 202 lb 4 oz (91.7 kg)              Today, you had the following     No orders found for display       Primary Care Provider Office Phone # Fax #    Debbie Lewis [phone] [address]  [facility] [address]        Equal Access to Services     [facility] : Hadii efrem Helms, marlonda fritz, qaybta kaalmada erendira avila. So [facility] [phone].    ATENCIÓN: Si habla español, tiene a jurado disposición servicios gratuitos de asistencia lingüística. Llame al [phone].    We comply with applicable federal civil rights laws and [place] " "laws. We do not discriminate on the basis of race, color, national origin, age, disability, sex, sexual orientation, or gender identity.            Thank you!     Thank you for choosing [facility]  for your care. Our goal is always to provide you with excellent care. Hearing back from our patients is one way we can continue to improve our services. Please take a few minutes to complete the written survey that you may receive in the mail after your visit with us. Thank you!             Your Updated Medication List - Protect others around you: Learn how to safely use, store and throw away your medicines at www.disposemymeds.org.          This list is accurate as of 4/23/18 10:54 AM.  Always use your most recent med list.                   Brand Name Dispense Instructions for use Diagnosis    albuterol 108 (90 Base) MCG/ACT Inhaler    PROAIR HFA/PROVENTIL HFA/VENTOLIN HFA    1 Inhaler    Inhale 1-2 puffs into the lungs every 4 hours as needed (for shortness of breath/wheezing)    Chronic obstructive pulmonary disease, unspecified COPD type (H)       ASPIRIN NOT PRESCRIBED    INTENTIONAL     Reported on 5/17/2017        azelastine 0.1 % spray    ASTELIN    1 Bottle    Spray 1-2 sprays into both nostrils 2 times daily    Seasonal allergic rhinitis, unspecified allergic rhinitis trigger       B-D INSULIN SYRINGE 31G X 5/16\" 0.5 ML   Generic drug:  insulin syringe-needle U-100     100 each    USE 1 SYRINGE TWO TIMES A DAY OR AS DIRECTED    Type 2 diabetes, HbA1C goal < 8% (H)       BD ULTRA FINE PEN NEEDLES     100 each    Use to inject insulin twice daily.    Type 2 diabetes, HbA1C goal < 8% (H)       blood glucose monitoring lancets     100 Each    Use to test up to four times per day    Type II or unspecified type diabetes mellitus without mention of complication, not stated as uncontrolled       blood glucose monitoring test strip    no brand specified    360 each    Test 4 times daily or as directed. " Profile Rx: patient will contact pharmacy when needed    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, with long-term current use of insulin (H)       FLORAJEN3 Caps     60 capsule    Take 1 capsule by mouth 2 times daily    Acute cystitis without hematuria       folic acid 0.8 MG Caps     90 capsule    Take 1 tablet by mouth daily    Ulcerative colitis without complications, unspecified location (H)       insulin  UNIT/ML injection    NovoLIN N RELION    3 vial    Inject 5 units under the skin at breakfast and 15 units at dinner.    Type 2 diabetes mellitus with hypoglycemia without coma, with long-term current use of insulin (H)       insulin regular 100 UNIT/ML injection    NovoLIN R RELION    30 mL    Inject 5 units with breakfast and 2 units with dinner (when mixing with NPH, draw this insulin up first)    Type 2 diabetes mellitus with hypoglycemia without coma, with long-term current use of insulin (H)       ipratropium - albuterol 0.5 mg/2.5 mg/3 mL 0.5-2.5 (3) MG/3ML neb solution    DUONEB    270 mL    NEBULIZE CONTENTS OF ONE VIAL BY MOUTH EVERY 4 HOURS AS NEEDED FOR SHORTNESS OF BREATH OR DYSPNEA    Chronic obstructive bronchitis (H)       losartan 100 MG tablet    COZAAR    90 tablet    Take 1 tablet (100 mg) by mouth daily for hypertension.    Hypertension goal BP (blood pressure) < 140/90       metFORMIN 1000 MG tablet    GLUCOPHAGE    180 tablet    Take 1 tablet (1,000 mg) by mouth 2 times daily (with meals) with breakfast and dinner.    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, with long-term current use of insulin (H)       nebulizer/tubing/mouthpiece Kit     1 kit    Use to nebulize medications for COPD    Chronic obstructive pulmonary disease, unspecified COPD type (H)       pantoprazole 40 MG EC tablet    PROTONIX     Take 40 mg by mouth daily Started by Dr. Debbie Rico at [facility]        primidone 50 MG tablet    MYSOLINE    270 tablet    Take 3 tablets  (150 mg) by mouth 3 times daily    Essential tremor       propafenone 150 MG Tabs tablet    RYTHMOL    180 tablet    Take 1 tablet (150 mg) by mouth 2 times daily    Paroxysmal atrial fibrillation (H)       propranolol 40 MG tablet    INDERAL    180 tablet    Take 2-3 tablets ( mg) by mouth 2 times daily    Benign familial tremor       simvastatin 80 MG tablet    ZOCOR    45 tablet    Take 0.5 tablets (40 mg) by mouth At Bedtime Profile Rx: patient will contact pharmacy when needed    Hyperlipidemia LDL goal <100       STIOLTO RESPIMAT 2.5-2.5 MCG/ACT Aers   Generic drug:  tiotropium-olodaterol      Inhale 2 puffs into the lungs daily        sulfaSALAzine 500 MG tablet    AZULFIDINE    90 tablet    TAKE ONE TABLET BY MOUTH THREE TIMES A DAY    Ulcerative colitis, unspecified       tamsulosin 0.4 MG capsule    FLOMAX    90 capsule    Take 1 capsule (0.4 mg) by mouth daily    Benign prostatic hyperplasia with urinary frequency       warfarin 5 MG tablet    COUMADIN    150 tablet    Take as directed by Coumadin clinic. Current dose (4/5/18): 10 mg on Mon, Thu, Sat + 7.5 mg all other days    Atrial fibrillation, unspecified type (H)

## 2018-04-23 NOTE — PATIENT INSTRUCTIONS
Recommendations from today's MTM visit:                                                      1. Try taking loratadine 10 mg once daily in place of benadryl. It is safer for you.    2. If Dr. Lewis does say it is ok to increase the propranolol just watch your blood pressure and any worsening in breathing.    3. No medicine changes today.    Next MTM visit: 7/23/18 at 10am I will call you    To schedule another MTM appointment, please call the clinic directly or you may call the MTM scheduling line at [phone] or toll-free at [phone].     My Clinical Pharmacist's contact information:                                                      It was a pleasure seeing you today!  Please feel free to contact me with any questions or concerns you have.      Tanna Diaz, PharmD  Medication Therapy Management Pharmacist  [facility] - Monday 9:30 - 6:00 and Wednesday 7:30 - 4:00  Phone: [phone] - direct clinic line    You may receive a survey about the MTM services you received.  I would appreciate your feedback to help me serve you better in the future. Please fill it out and return it when you can. Your comments will be anonymous.

## 2018-04-23 NOTE — PATIENT INSTRUCTIONS
Recommendations from today's MTM visit:                                                      1. Be sure to decrease your R in the morning by 4 units if you are going to be more active AND your blood sugar before breakfast is .    2. Decrease Novolin N to 9 units in the morning.    3. If its been a really active day and your blood sugar before dinner is less than 100 skip the Novolin R.     4. Schedule a lab only visit on 5/5/17 and I will order the lipid, A1C and urine protein test.  Be sure you are fasting for at least 10 hours before your blood draw.    Next MTM visit: 5/10/17 at 9:30 I will call you    To schedule another MTM appointment, please call the clinic directly or you may call the MTM scheduling line at [phone] or toll-free at [phone].     My Clinical Pharmacist's contact information:                                                      It was a pleasure seeing you today!  Please feel free to contact me with any questions or concerns you have.      Tanna Diaz, PharmD  Medication Therapy Management Pharmacist  [facility] - Monday and Wednesday 7:30 - 4:00  Phone: [phone] - direct clinic line    You may receive a survey about the MTM services you received.  I would appreciate your feedback to help me serve you better in the future. Please fill it out and return it when you can. Your comments will be anonymous.            
NECK PAIN

## 2018-04-23 NOTE — LETTER
My COPD Action Plan     Name: Cayetano Lunsford    YOB: 1945   Date: 4/23/2018    My doctor: Jessica Diaz [facility]   My clinic: [address] [address] [phone]  My Controller Medicine: Albuterol/Ipratropium (Duoneb, Combivent)  Olodaterol/tiotropium (Stiolto Respimat)   Dose: 2 puffs once daily Stiolto and Duonebs 4 times daily     My Rescue Medicine: Albuterol (Proair/Ventolin/Proventil) inhaler   Dose: 1-2 puffs every 4-6 hours as needed     My Flare Up Medicine: none   Dose:      My COPD Severity: Severe = FeV1 < 30%-49%      Use of Oxygen: Oxygen Not Prescribed      Make sure you've had your pneumonia   vaccines.          GREEN ZONE       Doing well today      Usual level of activity and exercise    Usual amount of cough and mucus    No shortness of breath    Usual level of health (thinking clearly, sleeping well, feel like eating) Actions:      Take daily medicines    Use oxygen as prescribed    Follow regular exercise and diet plan    Avoid cigarette smoke and other irritants that harm the lungs           YELLOW ZONE          Having a bad day or flare up      Short of breath more than usual    A lot more sputum (mucus) than usual    Sputum looks yellow, green, tan, brown or bloody    More coughing or wheezing    Fever or chills    Less energy; trouble completing activities    Trouble thinking or focusing    Using quick relief inhaler or nebulizer more often    Poor sleep; symptoms wake me up    Do not feel like eating Actions:      Get plenty of rest    Take daily medicines    Use quick relief inhaler every 4-6 hours    If you use oxygen, call you doctor to see if you should adjust your oxygen    Do breathing exercises or other things to help you relax    Let a loved one, friend or neighbor know you are feeling worse    Call your care team if you have 2 or more symptoms.  Start taking steroids or antibiotics if directed by your  care team           RED ZONE       Need medical care now      Severe shortness of breath (feel you can't breathe)    Fever, chills    Not enough breath to do any activity    Trouble coughing up mucus, walking or talking    Blood in mucus    Frequent coughing   Rescue medicines are not working    Not able to sleep because of breathing    Feel confused or drowsy    Chest pain    Actions:      Call your health care team.  If you cannot reach your care team, call 911 or go to the emergency room.        Annual Reminders:  Meet with Care Team, Flu Shot every Fall  Pharmacy:    [facility] [place], [place] - [address] [place] [address] PHARMACY [facility] ([place], [place] - [address] [place] [address]

## 2018-04-30 NOTE — TELEPHONE ENCOUNTER
Dr Lewis,   Annual order for INR clinic is due. Please complete order or advise.   Goal has been 2-3 for Afib. Compliance: Great. INR: Fairly stable.  Ok to close encounter when complete, unless changes to therapy are advised.  Kirstin Meléndez, BSN, RN

## 2018-05-01 ENCOUNTER — OFFICE VISIT (OUTPATIENT)
Dept: FAMILY MEDICINE | Facility: CLINIC | Age: 73
End: 2018-05-01
Payer: COMMERCIAL

## 2018-05-01 ENCOUNTER — ANTICOAGULATION THERAPY VISIT (OUTPATIENT)
Dept: NURSING | Facility: CLINIC | Age: 73
End: 2018-05-01
Payer: COMMERCIAL

## 2018-05-01 VITALS
DIASTOLIC BLOOD PRESSURE: 56 MMHG | BODY MASS INDEX: 32.04 KG/M2 | HEART RATE: 60 BPM | WEIGHT: 198.5 LBS | RESPIRATION RATE: 22 BRPM | SYSTOLIC BLOOD PRESSURE: 144 MMHG | TEMPERATURE: 97.4 F

## 2018-05-01 DIAGNOSIS — Z79.4 TYPE 2 DIABETES MELLITUS WITH STAGE 1 CHRONIC KIDNEY DISEASE, WITH LONG-TERM CURRENT USE OF INSULIN (H): ICD-10-CM

## 2018-05-01 DIAGNOSIS — Z86.0100 HISTORY OF COLONIC POLYPS: ICD-10-CM

## 2018-05-01 DIAGNOSIS — G25.0 BENIGN FAMILIAL TREMOR: ICD-10-CM

## 2018-05-01 DIAGNOSIS — Z01.818 PREOP GENERAL PHYSICAL EXAM: ICD-10-CM

## 2018-05-01 DIAGNOSIS — N40.0 BENIGN PROSTATIC HYPERPLASIA, UNSPECIFIED WHETHER LOWER URINARY TRACT SYMPTOMS PRESENT: ICD-10-CM

## 2018-05-01 DIAGNOSIS — J44.1 CHRONIC OBSTRUCTIVE PULMONARY DISEASE WITH ACUTE EXACERBATION (H): ICD-10-CM

## 2018-05-01 DIAGNOSIS — I10 HYPERTENSION GOAL BP (BLOOD PRESSURE) < 140/90: ICD-10-CM

## 2018-05-01 DIAGNOSIS — H91.90 HEARING LOSS, UNSPECIFIED HEARING LOSS TYPE, UNSPECIFIED LATERALITY: ICD-10-CM

## 2018-05-01 DIAGNOSIS — I48.91 ATRIAL FIBRILLATION, UNSPECIFIED TYPE (H): ICD-10-CM

## 2018-05-01 DIAGNOSIS — N18.1 TYPE 2 DIABETES MELLITUS WITH STAGE 1 CHRONIC KIDNEY DISEASE, WITH LONG-TERM CURRENT USE OF INSULIN (H): ICD-10-CM

## 2018-05-01 DIAGNOSIS — I25.10 CAD IN NATIVE ARTERY: ICD-10-CM

## 2018-05-01 DIAGNOSIS — E78.5 HYPERLIPIDEMIA LDL GOAL <100: ICD-10-CM

## 2018-05-01 DIAGNOSIS — K51.90 ULCERATIVE COLITIS WITHOUT COMPLICATIONS, UNSPECIFIED LOCATION (H): ICD-10-CM

## 2018-05-01 DIAGNOSIS — Z01.818 PRE-OP EXAM: Primary | ICD-10-CM

## 2018-05-01 DIAGNOSIS — Z79.01 LONG-TERM (CURRENT) USE OF ANTICOAGULANTS: ICD-10-CM

## 2018-05-01 DIAGNOSIS — E11.22 TYPE 2 DIABETES MELLITUS WITH STAGE 1 CHRONIC KIDNEY DISEASE, WITH LONG-TERM CURRENT USE OF INSULIN (H): ICD-10-CM

## 2018-05-01 LAB
ANION GAP SERPL CALCULATED.3IONS-SCNC: 10 MMOL/L (ref 3–14)
BUN SERPL-MCNC: 13 MG/DL (ref 7–30)
CALCIUM SERPL-MCNC: 8.8 MG/DL (ref 8.5–10.1)
CHLORIDE SERPL-SCNC: 103 MMOL/L (ref 94–109)
CHOLEST SERPL-MCNC: 163 MG/DL
CO2 SERPL-SCNC: 26 MMOL/L (ref 20–32)
CREAT SERPL-MCNC: 0.77 MG/DL (ref 0.66–1.25)
CREAT UR-MCNC: 73 MG/DL
GFR SERPL CREATININE-BSD FRML MDRD: >90 ML/MIN/1.7M2
GLUCOSE SERPL-MCNC: 113 MG/DL (ref 70–99)
HBA1C MFR BLD: 5.3 % (ref 0–5.6)
HDLC SERPL-MCNC: 42 MG/DL
INR PPP: 3.2 (ref 0.86–1.14)
LDLC SERPL CALC-MCNC: 89 MG/DL
MICROALBUMIN UR-MCNC: 40 MG/L
MICROALBUMIN/CREAT UR: 55.49 MG/G CR (ref 0–17)
NONHDLC SERPL-MCNC: 121 MG/DL
POTASSIUM SERPL-SCNC: 4.2 MMOL/L (ref 3.4–5.3)
SODIUM SERPL-SCNC: 139 MMOL/L (ref 133–144)
TRIGL SERPL-MCNC: 159 MG/DL

## 2018-05-01 PROCEDURE — 80061 LIPID PANEL: CPT | Performed by: FAMILY MEDICINE

## 2018-05-01 PROCEDURE — 99207 ZZC NO CHARGE NURSE ONLY: CPT

## 2018-05-01 PROCEDURE — 99214 OFFICE O/P EST MOD 30 MIN: CPT | Performed by: FAMILY MEDICINE

## 2018-05-01 PROCEDURE — 80048 BASIC METABOLIC PNL TOTAL CA: CPT | Performed by: FAMILY MEDICINE

## 2018-05-01 PROCEDURE — 82043 UR ALBUMIN QUANTITATIVE: CPT | Performed by: FAMILY MEDICINE

## 2018-05-01 PROCEDURE — 85610 PROTHROMBIN TIME: CPT | Performed by: FAMILY MEDICINE

## 2018-05-01 PROCEDURE — 36415 COLL VENOUS BLD VENIPUNCTURE: CPT | Performed by: FAMILY MEDICINE

## 2018-05-01 PROCEDURE — 83036 HEMOGLOBIN GLYCOSYLATED A1C: CPT | Performed by: FAMILY MEDICINE

## 2018-05-01 RX ORDER — PROPRANOLOL HYDROCHLORIDE 40 MG/1
80-120 TABLET ORAL 2 TIMES DAILY
Qty: 180 TABLET | Refills: 10 | Status: SHIPPED | OUTPATIENT
Start: 2018-05-01 | End: 2019-04-22

## 2018-05-01 NOTE — Clinical Note
Kevyn Cisse, Can you call and make/review medication recommendations with Cayetano for his upcoming colonoscopy? I recommended holding his morning metformin and insulin on the day of the procedure. He also has a plan for holding coumadin for 4 days prior to his procedure. Please let him know what his NPH dose should be before and on the day of his colonoscopy. Thanks! --Debbie

## 2018-05-01 NOTE — PATIENT INSTRUCTIONS
Before Your Surgery      Call your surgeon if there is any change in your health. This includes signs of a cold or flu (such as a sore throat, runny nose, cough, rash or fever).    Do not smoke, drink alcohol or take over the counter medicine (unless your surgeon or primary care doctor tells you to) for the 24 hours before and after surgery.    If you take prescribed drugs: Follow your doctor s orders about which medicines to take and which to stop until after surgery.    Eating and drinking prior to surgery: follow the instructions from your surgeon    Take a shower or bath the night before surgery. Use the soap your surgeon gave you to gently clean your skin. If you do not have soap from your surgeon, use your regular soap. Do not shave or scrub the surgery site.  Wear clean pajamas and have clean sheets on your bed.     Before Your Surgery      Call your surgeon if there is any change in your health. This includes signs of a cold or flu (such as a sore throat, runny nose, cough, rash or fever).    Do not smoke, drink alcohol or take over the counter medicine (unless your surgeon or primary care doctor tells you to) for the 24 hours before and after surgery.    If you take prescribed drugs: Follow your doctor s orders about which medicines to take and which to stop until after surgery.    Eating and drinking prior to surgery: follow the instructions from your surgeon    Take a shower or bath the night before surgery. Use the soap your surgeon gave you to gently clean your skin. If you do not have soap from your surgeon, use your regular soap. Do not shave or scrub the surgery site.  Wear clean pajamas and have clean sheets on your bed.     1) Four days prior to your procedure, stop your coumadin as planned with anticoagulation clinic.   2) On the morning of your procedure, do not take your metformin.   3) On the morning of your procedure, do not take your insulin.   4) Jessica Diaz, Pharm D, will be calling  you to review your insulin instructions prior to your procedure.

## 2018-05-01 NOTE — LETTER
May 2, 2018      Cayetano Lunsford  [address] [address]        Dear ,    We are writing to inform you of your test results.    Your lab results are stable. Please let us know if you have any questions.     Resulted Orders   Albumin Random Urine Quantitative with Creat Ratio   Result Value Ref Range    Creatinine Urine 73 mg/dL    Albumin Urine mg/L 40 mg/L    Albumin Urine mg/g Cr 55.49 (H) 0 - 17 mg/g Cr   Lipid panel reflex to direct LDL Fasting   Result Value Ref Range    Cholesterol 163 <200 mg/dL    Triglycerides 159 (H) <150 mg/dL      Comment:      Borderline high:  150-199 mg/dl  High:             200-499 mg/dl  Very high:       >499 mg/dl  Non Fasting      HDL Cholesterol 42 >39 mg/dL    LDL Cholesterol Calculated 89 <100 mg/dL      Comment:      Desirable:       <100 mg/dl    Non HDL Cholesterol 121 <130 mg/dL   Hemoglobin A1c   Result Value Ref Range    Hemoglobin A1C 5.3 0 - 5.6 %      Comment:      Normal <5.7% Prediabetes 5.7-6.4%  Diabetes 6.5% or higher - adopted from ADA   consensus guidelines.     Basic metabolic panel   Result Value Ref Range    Sodium 139 133 - 144 mmol/L    Potassium 4.2 3.4 - 5.3 mmol/L    Chloride 103 94 - 109 mmol/L    Carbon Dioxide 26 20 - 32 mmol/L    Anion Gap 10 3 - 14 mmol/L    Glucose 113 (H) 70 - 99 mg/dL      Comment:      Non Fasting    Urea Nitrogen 13 7 - 30 mg/dL    Creatinine 0.77 0.66 - 1.25 mg/dL    GFR Estimate >90 >60 mL/min/1.7m2      Comment:      Non  GFR Calc    GFR Estimate If Black >90 >60 mL/min/1.7m2      Comment:       GFR Calc    Calcium 8.8 8.5 - 10.1 mg/dL       If you have any questions or concerns, please call the clinic at the number listed above.       Sincerely,        Debbie Lewis MD/nr

## 2018-05-01 NOTE — PROGRESS NOTES
[facility]  [address] [place] [id number] [phone]  Dept: [phone]    PRE-OP EVALUATION:  Today's date: 2018    Cayetano Lunsford (: 1945) presents for pre-operative evaluation assessment as requested by Juan Finnegan, .  He requires evaluation and anesthesia risk assessment prior to undergoing surgery/procedure for treatment of Colonoscopy .    Proposed Surgery/ Procedure: Colonoscopy   Date of Surgery/ Procedure: 2018  Time of Surgery/ Procedure: 11am  Hospital/Surgical Facility: [facility]  Fax number for surgical facility: [phone]  Primary Physician: Debbie Lewis  Type of Anesthesia Anticipated: General    Patient has a Health Care Directive or Living Will:  NO    1. NO - Do you have a history of heart attack, stroke, stent, bypass or surgery on an artery in the head, neck, heart or legs?  2. NO - Do you ever have any pain or discomfort in your chest?  3. NO - Do you have a history of  Heart Failure?  4. YES - ARE YOUR TROUBLED BY SHORTNESS OF BREATH WHEN WALKING ON THE LEVEL, UP A SLIGHT HILL OR AT NIGHT? Stable COPD  5. NO - Do you currently have a cold, bronchitis or other respiratory infection?  6. NO - Do you have a cough, shortness of breath or wheezing?  7. NO - Do you sometimes get pains in the calves of your legs when you walk?  8. NO - Do you or anyone in your family have previous history of blood clots?  9. NO - Do you or does anyone in your family have a serious bleeding problem such as prolonged bleeding following surgeries or cuts?  10. NO - Have you ever had problems with anemia or been told to take iron pills?  11. NO - Have you had any abnormal blood loss such as black, tarry or bloody stools, or abnormal vaginal bleeding?  12. NO - Have you ever had a blood transfusion?  13. NO - Have you or any of your relatives ever had problems with anesthesia?  14. NO - Do you have sleep apnea, excessive  snoring or daytime drowsiness?  15. NO - Do you have any prosthetic heart valves?  16. NO - Do you have prosthetic joints?  17. NO - Is there any chance that you may be pregnant? n/a    HPI:     HPI related to upcoming procedure: Cayetano Lunsford presents for pre-operative evaluation and anesthesia risk assessment as requested by Juan Finnegan, D.OCristy  prior to undergoing surgery/procedure for treatment of Colonoscopy. He has ulcerative colitis and a history of colon polyps.    Patient reports feeling well.      Ulcerative colitis -- has been stable. He is followed by GI and is on sulfasalazine.     COPD -- has been stable. Followed by pulmonology. He is on stiolto respimat.     DIABETES - Patient has a longstanding history of DiabetesType Type II . Patient is being treated with diet, oral agents and insulin injections and denies significant side effects. Control has been good. Complicating factors include but are not limited to: hypertension, hyperlipidemia and CAD/PVD.                                                                                                                              .  HYPERLIPIDEMIA - Patient has a long history of significant Hyperlipidemia requiring medication for treatment with recent good control. Patient reports no problems or side effects with the medication.                                                                                                                                                       .  HYPERTENSION - Patient has longstanding history of HTN , currently denies any symptoms referable to elevated blood pressure. Specifically denies chest pain, palpitations, dyspnea, orthopnea, PND or peripheral edema. Blood pressure readings have been in normal range. Current medication regimen is as listed below. Patient denies any side effects of medication.          Atrial fibrillation -- on chronic anticoagulation with coumadin and has plan with anticoagulation clinic  for holding his coumadin prior to colonoscopy without bridging.     CAD -- stable                                                                                                                                                                                     .  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) 10/05/2015     Priority: Medium     Hard of hearing 07/31/2015     Priority: Medium     CAD in native artery      Priority: Medium     Pain in joint, lower leg 10/17/2013     Priority: Medium     Cramp of limb 11/01/2011     Priority: Medium     BPH (benign prostatic hyperplasia) 11/01/2011     Priority: Medium     Hypertension goal BP (blood pressure) < 140/90 06/16/2011     Priority: Medium     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 He was accompanied by no one. [facility] Cho[employer] information provided and resources reviewed. He currently wishes to give additional consideration to ACP  He currently has the following questions or concerns about Advance Care Planning: none known.  Confirmed/documented legally designated decision maker(s).  Added by Tessa Randhawa RN, Advance Care Planning Liaison.  Advance Care Planning 2/10/11:  I gave the Health care Directive form to patient .  Patient will fill out the form and then he will make an appointment  with me to review.  Ofelia Martin RN, Chronic Care Coordinator          HYPERLIPIDEMIA LDL GOAL <100 05/09/2010     Priority: Medium     Eczema 09/24/2009     Priority: Medium     Obesity 11/04/2008     Priority: Medium     Chronic obstructive pulmonary disease (H) 05/05/2006     Priority: Medium             Atrial fibrillation (H) 01/06/2006     Priority: Medium     Ulcerative colitis without complications (HCC) 11/26/2004     Priority: Medium     Diet controlled. Colonoscopy q3 years           Past Medical History:   Diagnosis Date     Allergic rhinitis, cause unspecified      Atrial fibrillation (H)      BPH (benign prostatic hyperplasia)      CAD (coronary artery disease)      Chronic airway obstruction, not elsewhere classified      Hyperlipidemia LDL goal <100 5/9/2010     Hypertension goal BP (blood pressure) < 130/80 10/19/2006     Obesity (BMI 30-39.9)      Perforation of tympanic membrane, unspecified     Right TM     Tachycardia, unspecified      Type 2 diabetes, HbA1C goal < 8% (H) 5/2/2010     Unspecified cardiovascular disease      Past Surgical History:   Procedure Laterality Date     COLONOSCOPY  3/31/2011     CORONARY ANGIOGRAPHY ADULT ORDER  2001 and 2008 2001 at [facility]. 2008- No interventions     HC REMOVAL OF NAIL PLATE SIMPLE SINGLE  11/10/2011    Right 2nd Toenail     SURGICAL HISTORY OF -   1987    right ear graft     SURGICAL HISTORY OF -   1992    right shoulder surgery     SURGICAL HISTORY OF -   1979    back surgery     SURGICAL HISTORY OF -   1978    vasectomy     Current Outpatient Prescriptions   Medication Sig Dispense Refill     albuterol (PROAIR HFA/PROVENTIL HFA/VENTOLIN HFA) 108 (90 BASE) MCG/ACT Inhaler Inhale 1-2 puffs into the lungs every 4 hours as needed (for shortness of breath/wheezing) 1 Inhaler 5     ASPIRIN NOT PRESCRIBED, INTENTIONAL, Reported on 5/17/2017       azelastine (ASTELIN) 0.1 % spray Spray 1-2 sprays into both nostrils 2 times daily 1 Bottle 11     B-D INSULIN SYRINGE 31G X  "5/16\" 0.5 ML USE 1 SYRINGE TWO TIMES A DAY OR AS DIRECTED 100 each 1     BD ULTRA FINE PEN NEEDLES Use to inject insulin twice daily. 100 each PRN     blood glucose monitoring (NO BRAND SPECIFIED) test strip Test 4 times daily or as directed. Profile Rx: patient will contact pharmacy when needed 360 each 3     folic acid 0.8 MG CAPS Take 1 tablet by mouth daily 90 capsule 3     FREESTYLE LANCETS MISC Use to test up to four times per day 100 Each 12     insulin NPH (NOVOLIN N RELION) 100 UNIT/ML injection Inject 5 units under the skin at breakfast and 15 units at dinner. 3 vial 3     insulin regular (NOVOLIN R RELION) 100 UNIT/ML injection Inject 5 units with breakfast and 2 units with dinner (when mixing with NPH, draw this insulin up first) 30 mL 2     ipratropium - albuterol 0.5 mg/2.5 mg/3 mL (DUONEB) 0.5-2.5 (3) MG/3ML neb solution NEBULIZE CONTENTS OF ONE VIAL BY MOUTH EVERY 4 HOURS AS NEEDED FOR SHORTNESS OF BREATH OR DYSPNEA 270 mL 8     losartan (COZAAR) 100 MG tablet Take 1 tablet (100 mg) by mouth daily for hypertension. 90 tablet 3     metFORMIN (GLUCOPHAGE) 1000 MG tablet Take 1 tablet (1,000 mg) by mouth 2 times daily (with meals) with breakfast and dinner. 180 tablet 3     pantoprazole (PROTONIX) 40 MG enteric coated tablet Take 40 mg by mouth daily Started by Dr. Debbie Rico at [facility]       primidone (MYSOLINE) 50 MG tablet Take 3 tablets (150 mg) by mouth 3 times daily 270 tablet 2     Probiotic Product (FLORAJEN3) CAPS Take 1 capsule by mouth 2 times daily 60 capsule 0     propafenone (RYTHMOL) 150 MG TABS tablet Take 1 tablet (150 mg) by mouth 2 times daily 180 tablet 3     propranolol (INDERAL) 40 MG tablet Take 2-3 tablets ( mg) by mouth 2 times daily 180 tablet 10     Respiratory Therapy Supplies (NEBULIZER/TUBING/MOUTHPIECE) KIT Use to nebulize medications for COPD 1 kit 0     simvastatin (ZOCOR) 80 MG tablet Take 0.5 tablets (40 mg) by mouth At Bedtime Profile Rx: patient will contact " pharmacy when needed 45 tablet 3     sulfaSALAzine (AZULFIDINE) 500 MG tablet TAKE ONE TABLET BY MOUTH THREE TIMES A DAY 90 tablet 11     tamsulosin (FLOMAX) 0.4 MG capsule Take 1 capsule (0.4 mg) by mouth daily 90 capsule 3     tiotropium-olodaterol (STIOLTO RESPIMAT) 2.5-2.5 MCG/ACT AERS Inhale 2 puffs into the lungs daily       warfarin (COUMADIN) 5 MG tablet Take as directed by Coumadin clinic. Current dose (4/5/18): 10 mg on Mon, Thu, Sat + 7.5 mg all other days 150 tablet 1     [DISCONTINUED] propranolol (INDERAL) 40 MG tablet Take 2-3 tablets ( mg) by mouth 2 times daily 180 tablet 10     OTC products: None, except as noted above    Allergies   Allergen Reactions     Percodan [Oxycodone-Aspirin] Nausea and Vomiting      Latex Allergy: NO    Social History   Substance Use Topics     Smoking status: Former Smoker     Packs/day: 1.00     Years: 30.00     Types: Cigarettes     Quit date: 3/19/1992     Smokeless tobacco: Never Used     Alcohol use 0.0 oz/week     0 Standard drinks or equivalent per week      Comment: hardly at all, sometimes at dinner     History   Drug Use No       REVIEW OF SYSTEMS:   ROS: 10 point ROS neg other than the symptoms noted above in the HPI.    This document serves as a record of the services and decisions personally performed and made by Debbie Lewis MD. It was created on his/her behalf by Kezia Davila, trained medical scribe. The creation of this document is based the provider's statements to the medical scribes.    Scribe Kezia Davila, May 1, 2018  EXAM:   /56 (BP Location: Left arm, Patient Position: Chair, Cuff Size: Adult Large)  Pulse 60  Temp 97.4  F (36.3  C) (Oral)  Resp 22  Wt 90 kg (198 lb 8 oz)  BMI 32.04 kg/m2       GENERAL APPEARANCE: healthy, alert and no distress     EYES: EOMI,  PERRL     HENT: ear canals and TM's normal and nose and mouth without ulcers or lesions     NECK: no adenopathy, no asymmetry, masses, or scars and thyroid normal to  palpation     RESP: lungs clear to auscultation - no rales, rhonchi or wheezes     CV: regular rates and rhythm, normal S1 S2, no S3 or S4 and no murmur, click or rub     ABDOMEN:  soft, nontender, no HSM or masses and bowel sounds normal     MS: extremities normal- no gross deformities noted, no evidence of inflammation in joints      SKIN: no suspicious lesions or rashes     NEURO: Grossly normal strength and tone, sensory exam grossly normal     PSYCH: mentation appears normal. and affect normal/bright     LYMPHATICS: No cervical adenopathy    DIAGNOSTICS:       Labs Resulted Today:   Results for orders placed or performed in visit on 05/01/18   Hemoglobin A1c   Result Value Ref Range    Hemoglobin A1C 5.3 0 - 5.6 %   INR   Result Value Ref Range    INR 3.20 (H) 0.86 - 1.14       Recent Labs   Lab Test 04/16/18 11/02/17   1104   05/08/17   1020   03/13/17   1050  10/03/16   09/07/16   1301   05/05/16   0919   HGB   --    --    --    --    --    --   12.4*   --    --    --   12.7*   --    --    PLT   --    --    --    --    --    --   307   --    --    --   286   --    --    INR  2.4  2.4*   < >   --    < >   --    < >   --    < >   --    < >   --    < >   --    NA   --    --    --    --    --    --   135   --    --    --    --    --   140   POTASSIUM   --    --    --    --    --    --   4.6   --    --    --    --    --   4.6   CR   --    --    --    --    --    --   0.76   --   0.79   --    --    --   0.70   A1C   --    --   6.0   --   5.8   --    --    < >   --    --    --    < >   --     < > = values in this interval not displayed.      IMPRESSION:   Reason for surgery/procedure: history of colon polyps, ulcerative colitis   Diagnosis/reason for consult: preop for colonoscopy for above     The proposed surgical procedure is considered LOW risk.    REVISED CARDIAC RISK INDEX  The patient has the following serious cardiovascular risks for perioperative complications such as (MI, PE, VFib and 3  AV  Block):  Coronary Artery Disease (MI, positive stress test, angina, Qs on EKG)  Diabetes Mellitus (on Insulin)  INTERPRETATION: 2 risks: Class III (moderate risk - 6.6% complication rate)    The patient has the following additional risks for perioperative complications:  No identified additional risks      ICD-10-CM    1. Pre-op exam Z01.818    2. Ulcerative colitis without complications, unspecified location (H) K51.90    3. History of colonic polyps Z86.010    4. 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, with long-term current use of insulin (H) E11.22 Albumin Random Urine Quantitative with Creat Ratio    N18.1 Hemoglobin A1c    Z79.4 Basic metabolic panel   5. Hypertension goal BP (blood pressure) < 140/90 I10    6. Hyperlipidemia LDL goal <100 E78.5 Lipid panel reflex to direct LDL Fasting   7. Atrial fibrillation, unspecified type (H) I48.91    8. Chronic obstructive pulmonary disease with acute exacerbation (H) J44.1    9. CAD in native artery I25.10    10. Long-term (current) use of anticoagulants [Z79.01] Z79.01 INR   11. Benign familial tremor G25.0 propranolol (INDERAL) 40 MG tablet   12. Benign prostatic hyperplasia, unspecified whether lower urinary tract symptoms present N40.0    13. Hearing loss, unspecified hearing loss type, unspecified laterality H91.90    14. Preop general physical exam Z01.818          RECOMMENDATIONS:     --Consult hospital rounder / IM to assist post-op medical management if needed    Pulmonary Risk  Incentive spirometry post op  Respiratory Therapy (Respiratory Care IP Consult)  post op  NG tube decompression if abdominal distension or significant vomiting       --Patient is to take all scheduled medications on the day of surgery EXCEPT for modifications listed below.    Diabetes Medication Use  Medications will be reviewed with MTM prior to his procedure.   -----Hold usual oral and non-insulin diabetic meds (e.g. Metformin, Actos, Glipizide) while NPO.    -----Take 80% of long acting insulin (e.g. Lantus, NPH) while NPO (fasting)  -----Hold mixed insulins (e.g. Insulin 70/30) while NPO (fasting)  -----Hold short acting insulin (e.g. Novolog, Humalog) while NPO (fasting)  -----Use usual sliding scale correction of insulin while NPO (fasting)      Anticoagulant or Antiplatelet Medication Use  WARFARIN: Thromboembolic risk is low (e.g. Single VTE >12 months ago, A fib and CHADS<3 without prior CVA/TIA) and warfarin may be discontinued in the perioperative period  He has a plan to stop his coumadin 4 days prior to procedure and will continue follow up with anticoagulation clinic.        APPROVAL GIVEN to proceed with proposed procedure, without further diagnostic evaluation       The information in this document, created by the medical scribe for me, accurately reflects the services I personally performed and the decisions made by me. I have reviewed and approved this document for accuracy. 05/01/18    Signed Electronically by: Debbie Lewis MD    Copy of this evaluation report is provided to requesting physician.    [facility] Preop Guidelines    Revised Cardiac Risk Index

## 2018-05-01 NOTE — MR AVS SNAPSHOT
After Visit Summary   5/1/2018    Cayetano Lunsford    MRN: [id number]           Patient Information     Date Of Birth          1945        Visit Information        Provider Department      5/1/2018 7:40 AM Debbie Lewis MD [facility]        Today's Diagnoses     Pre-op exam    -  1    Ulcerative colitis without complications, unspecified location (H)        History of colonic polyps        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, with long-term current use of insulin (H)        Hypertension goal BP (blood pressure) < 140/90        Hyperlipidemia LDL goal <100        Atrial fibrillation, unspecified type (H)        Chronic obstructive pulmonary disease with acute exacerbation (H)        CAD in native artery        Long-term (current) use of anticoagulants [Z79.01]        Benign familial tremor        Benign prostatic hyperplasia, unspecified whether lower urinary tract symptoms present        Hearing loss, unspecified hearing loss type, unspecified laterality        Preop general physical exam          Care Instructions      Before Your Surgery      Call your surgeon if there is any change in your health. This includes signs of a cold or flu (such as a sore throat, runny nose, cough, rash or fever).    Do not smoke, drink alcohol or take over the counter medicine (unless your surgeon or primary care doctor tells you to) for the 24 hours before and after surgery.    If you take prescribed drugs: Follow your doctor s orders about which medicines to take and which to stop until after surgery.    Eating and drinking prior to surgery: follow the instructions from your surgeon    Take a shower or bath the night before surgery. Use the soap your surgeon gave you to gently clean your skin. If you do not have soap from your surgeon, use your regular soap. Do not shave or scrub the surgery site.  Wear clean pajamas and have clean sheets on your bed.     Before Your  Surgery      Call your surgeon if there is any change in your health. This includes signs of a cold or flu (such as a sore throat, runny nose, cough, rash or fever).    Do not smoke, drink alcohol or take over the counter medicine (unless your surgeon or primary care doctor tells you to) for the 24 hours before and after surgery.    If you take prescribed drugs: Follow your doctor s orders about which medicines to take and which to stop until after surgery.    Eating and drinking prior to surgery: follow the instructions from your surgeon    Take a shower or bath the night before surgery. Use the soap your surgeon gave you to gently clean your skin. If you do not have soap from your surgeon, use your regular soap. Do not shave or scrub the surgery site.  Wear clean pajamas and have clean sheets on your bed.     1) Four days prior to your procedure, stop your coumadin as planned with anticoagulation clinic.   2) On the morning of your procedure, do not take your metformin.   3) On the morning of your procedure, do not take your insulin.   4) Jessica Diaz, Pharm D, will be calling you to review your insulin instructions prior to your procedure.              Follow-ups after your visit        Your next 10 appointments already scheduled     May 24, 2018 11:00 AM CDT   Anticoagulation Visit with  INR CLINIC   [facility] ([facility])    [address] [place] [id number] [phone]            May 25, 2018   Procedure with Juan Simon DO   [facility] ([facility])    [address]  [place] [address]   [phone]           [facility] is located at [address] [place]            Jun 06, 2018  9:00 AM CDT   Return Visit with Bong Yoo MD   [facility] ([facility])    [address] [place] [id number] [phone]            Jun 28,  "  9:45 AM CDT   Return Visit with Natanael Dang MD   [facility] ([facility])    [address] [facility] [address]  [facility] [id number] [phone] OPT 2            2018 10:00 AM CDT   TELEMEDICINE with Jessica Diaz RPH   [facility] ([facility])    [address] [place] [address] [phone]           Note: this is not an onsite visit; there is no need to come to the facility.              Who to contact     If you have questions or need follow up information about today's clinic visit or your schedule please contact [facility] directly at [phone].  Normal or non-critical lab and imaging results will be communicated to you by Vestmarkhart, letter or phone within 4 business days after the clinic has received the results. If you do not hear from us within 7 days, please contact the clinic through MyChart or phone. If you have a critical or abnormal lab result, we will notify you by phone as soon as possible.  Submit refill requests through [employer] or call your pharmacy and they will forward the refill request to us. Please allow 3 business days for your refill to be completed.          Additional Information About Your Visit        My[employer] Information     [employer] lets you send messages to your doctor, view your test results, renew your prescriptions, schedule appointments and more. To sign up, go to [email or web address] . Click on \"Log in\" on the left side of the screen, which will take you to the Welcome page. Then click on \"Sign up Now\" on the right side of the page.     You will be asked to enter the access code listed below, as well as some personal information. Please follow the directions to create your username and password.     Your access code is: [email or web address]  Expires: 2018  2:19 PM     Your access code will  in 90 days. If you need help or a new code, please " call your [facility] clinic or [phone].        Care EveryWhere ID     This is your Care EveryWhere ID. This could be used by other organizations to access your [facility] medical records  [id number]        Your Vitals Were     Pulse Temperature Respirations BMI (Body Mass Index)          60 97.4  F (36.3  C) (Oral) 22 32.04 kg/m2         Blood Pressure from Last 3 Encounters:   05/01/18 152/61   04/02/18 126/80   03/06/18 122/60    Weight from Last 3 Encounters:   05/01/18 198 lb 8 oz (90 kg)   04/02/18 196 lb 8 oz (89.1 kg)   03/06/18 202 lb (91.6 kg)              We Performed the Following     Albumin Random Urine Quantitative with Creat Ratio     Basic metabolic panel     Hemoglobin A1c     INR     Lipid panel reflex to direct LDL Fasting          Where to get your medicines      These medications were sent to [facility] [address]  [address], [facility] [address]     Phone:  [phone]     propranolol 40 MG tablet          Primary Care Provider Office Phone # Fax #    Debbie Lewis [phone]       [address]  [facility] [address]        Equal Access to Services     ZAHIRA ROSS : Hadii efrem sweeney hadasho Soomaali, waaxda luqadaha, qaybta kaalmada adeegyada, erendira walter. So [facility] [phone].    ATENCIÓN: Si habla español, tiene a jurado disposición servicios gratuitos de asistencia lingüística. Llame al [phone].    We comply with applicable federal civil rights laws and [place] laws. We do not discriminate on the basis of race, color, national origin, age, disability, sex, sexual orientation, or gender identity.            Thank you!     Thank you for choosing [facility]  for your care. Our goal is always to provide you with excellent care. Hearing back from our patients is one way we can continue to improve our services. Please take a few minutes to complete the written survey that you may  "receive in the mail after your visit with us. Thank you!             Your Updated Medication List - Protect others around you: Learn how to safely use, store and throw away your medicines at www.disposemymeds.org.          This list is accurate as of 5/1/18  8:45 AM.  Always use your most recent med list.                   Brand Name Dispense Instructions for use Diagnosis    albuterol 108 (90 Base) MCG/ACT Inhaler    PROAIR HFA/PROVENTIL HFA/VENTOLIN HFA    1 Inhaler    Inhale 1-2 puffs into the lungs every 4 hours as needed (for shortness of breath/wheezing)    Chronic obstructive pulmonary disease, unspecified COPD type (H)       ASPIRIN NOT PRESCRIBED    INTENTIONAL     Reported on 5/17/2017        azelastine 0.1 % spray    ASTELIN    1 Bottle    Spray 1-2 sprays into both nostrils 2 times daily    Seasonal allergic rhinitis, unspecified allergic rhinitis trigger       B-D INSULIN SYRINGE 31G X 5/16\" 0.5 ML   Generic drug:  insulin syringe-needle U-100     100 each    USE 1 SYRINGE TWO TIMES A DAY OR AS DIRECTED    Type 2 diabetes, HbA1C goal < 8% (H)       BD ULTRA FINE PEN NEEDLES     100 each    Use to inject insulin twice daily.    Type 2 diabetes, HbA1C goal < 8% (H)       blood glucose monitoring lancets     100 Each    Use to test up to four times per day    Type II or unspecified type diabetes mellitus without mention of complication, not stated as uncontrolled       blood glucose monitoring test strip    no brand specified    360 each    Test 4 times daily or as directed. Profile Rx: patient will contact pharmacy when needed    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, with long-term current use of insulin (H)       FLORAJEN3 Caps     60 capsule    Take 1 capsule by mouth 2 times daily    Acute cystitis without hematuria       folic acid 0.8 MG Caps     90 capsule    Take 1 tablet by mouth daily    Ulcerative colitis without complications, unspecified location (H)       insulin NPH " 100 UNIT/ML injection    NovoLIN N RELION    3 vial    Inject 5 units under the skin at breakfast and 15 units at dinner.    Type 2 diabetes mellitus with hypoglycemia without coma, with long-term current use of insulin (H)       insulin regular 100 UNIT/ML injection    NovoLIN R RELION    30 mL    Inject 5 units with breakfast and 2 units with dinner (when mixing with NPH, draw this insulin up first)    Type 2 diabetes mellitus with hypoglycemia without coma, with long-term current use of insulin (H)       ipratropium - albuterol 0.5 mg/2.5 mg/3 mL 0.5-2.5 (3) MG/3ML neb solution    DUONEB    270 mL    NEBULIZE CONTENTS OF ONE VIAL BY MOUTH EVERY 4 HOURS AS NEEDED FOR SHORTNESS OF BREATH OR DYSPNEA    Chronic obstructive bronchitis (H)       losartan 100 MG tablet    COZAAR    90 tablet    Take 1 tablet (100 mg) by mouth daily for hypertension.    Hypertension goal BP (blood pressure) < 140/90       metFORMIN 1000 MG tablet    GLUCOPHAGE    180 tablet    Take 1 tablet (1,000 mg) by mouth 2 times daily (with meals) with breakfast and dinner.    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, with long-term current use of insulin (H)       nebulizer/tubing/mouthpiece Kit     1 kit    Use to nebulize medications for COPD    Chronic obstructive pulmonary disease, unspecified COPD type (H)       pantoprazole 40 MG EC tablet    PROTONIX     Take 40 mg by mouth daily Started by Dr. Debbie Rico at [facility]        primidone 50 MG tablet    MYSOLINE    270 tablet    Take 3 tablets (150 mg) by mouth 3 times daily    Essential tremor       propafenone 150 MG Tabs tablet    RYTHMOL    180 tablet    Take 1 tablet (150 mg) by mouth 2 times daily    Paroxysmal atrial fibrillation (H)       propranolol 40 MG tablet    INDERAL    180 tablet    Take 2-3 tablets ( mg) by mouth 2 times daily    Benign familial tremor       simvastatin 80 MG tablet    ZOCOR    45 tablet    Take 0.5 tablets (40 mg) by mouth At  Bedtime Profile Rx: patient will contact pharmacy when needed    Hyperlipidemia LDL goal <100       STIOLTO RESPIMAT 2.5-2.5 MCG/ACT Aers   Generic drug:  tiotropium-olodaterol      Inhale 2 puffs into the lungs daily        sulfaSALAzine 500 MG tablet    AZULFIDINE    90 tablet    TAKE ONE TABLET BY MOUTH THREE TIMES A DAY    Ulcerative colitis, unspecified       tamsulosin 0.4 MG capsule    FLOMAX    90 capsule    Take 1 capsule (0.4 mg) by mouth daily    Benign prostatic hyperplasia with urinary frequency       warfarin 5 MG tablet    COUMADIN    150 tablet    Take as directed by Coumadin clinic. Current dose (4/5/18): 10 mg on Mon, Thu, Sat + 7.5 mg all other days    Atrial fibrillation, unspecified type (H)

## 2018-05-01 NOTE — MR AVS SNAPSHOT
Cayetano Lunsford   5/1/2018 8:30 AM   Anticoagulation Therapy Visit    Description:  73 year old male   Provider:   INR CLINIC   Department:  [facility]           INR as of 5/1/2018     Today's INR 3.20!      Anticoagulation Summary as of 5/1/2018     INR goal 2.0-3.0   Today's INR 3.20!   Full instructions 5/3: 7.5 mg; 5/21: Hold; 5/22: Hold; 5/23: Hold; 5/24: Hold; Otherwise 10 mg on Mon, Thu, Sat; 7.5 mg all other days   Next INR check 5/24/2018    Indications   Long-term (current) use of anticoagulants [Z79.01] [Z79.01]  Atrial fibrillation (H) [I48.91]         Your next Anticoagulation Clinic appointment(s)     May 24, 2018 11:00 AM CDT   Anticoagulation Visit with  INR CLINIC   [facility] ([facility]    [address] [place] [address] [phone]              Contact Numbers     [facility]  Please call [phone] to cancel and/or reschedule your appointment   Please call [phone] with any problems or questions regarding your therapy.        May 2018 Details    Sun Mon Tue Wed Thu Fri Sat       1      7.5 mg   See details      2      7.5 mg         3      7.5 mg         4      7.5 mg         5      10 mg           6      7.5 mg         7      10 mg         8      7.5 mg         9      7.5 mg         10      10 mg         11      7.5 mg         12      10 mg           13      7.5 mg         14      10 mg         15      7.5 mg         16      7.5 mg         17      10 mg         18      7.5 mg         19      10 mg           20      7.5 mg         21      Hold         22      Hold         23      Hold         24            25               26                 27               28               29               30               31                  Date Details   05/01 This INR check       Date of next INR:  5/24/2018         How to take your warfarin dose     To take:  7.5 mg Take 1.5 of the 5 mg tablets.    To take:  10 mg Take 2 of the 5  mg tablets.    Hold Do not take your warfarin dose. See the Details table to the right for additional instructions.

## 2018-05-01 NOTE — PROGRESS NOTES
ANTICOAGULATION FOLLOW-UP CLINIC VISIT    Patient Name:  Cayetano Lunsford  Date:  5/1/2018  Contact Type:  Telephone    SUBJECTIVE:     Patient Findings     Positives Change in diet/appetite (Pt reports having broccoli late last week, however, this is not a great change in his diet. ), Dental/Other procedures (Colonoscopy procedure scheduled for 5/25. No Lovenox bridging required per PCP. See TE dated 3/27/18. ), Intentional hold of therapy (Start 4 day hold on 5/21-5/24.), Activity level change (Increased in activity d/t warmer weather.), Unexplained INR or factor level change           OBJECTIVE    INR   Date Value Ref Range Status   05/01/2018 3.20 (H) 0.86 - 1.14 Final     Comment:     This test is intended for monitoring Coumadin therapy.  Results are not   accurate in patients with prolonged INR due to factor deficiency.         ASSESSMENT / PLAN  INR assessment THER    Recheck INR In: 3 WEEKS    INR Location Clinic      Anticoagulation Summary as of 5/1/2018     INR goal 2.0-3.0   Today's INR 3.20!   Maintenance plan 10 mg (5 mg x 2) on Mon, Thu, Sat; 7.5 mg (5 mg x 1.5) all other days   Full instructions 5/3: 7.5 mg; 5/21: Hold; 5/22: Hold; 5/23: Hold; 5/24: Hold; Otherwise 10 mg on Mon, Thu, Sat; 7.5 mg all other days   Weekly total 60 mg   Plan last modified Kirstin Meléndez RN (11/2/2017)   Next INR check 5/24/2018   Priority INR   Target end date     Indications   Long-term (current) use of anticoagulants [Z79.01] [Z79.01]  Atrial fibrillation (H) [I48.91]         Anticoagulation Episode Summary     INR check location     Preferred lab     Send INR reminders to [facility]    Comments       Anticoagulation Care Providers     Provider Role Specialty Phone number    Debbie Lewis MD [facility] [phone]            See the Encounter Report to view Anticoagulation Flowsheet and Dosing Calendar (Go to Encounters tab in chart review, and find the Anticoagulation Therapy  Visit)    Per protocol, patient advised to decrease this week's dose by 2.5 mg to account for supra-therapeutic INR level and labile levels. Patient instructed to increase green intake today and tomorrow as well. Recheck within 3 weeks prior to colonoscopy procedure.    Patient made aware if signs of clotting (pain, tenderness, swelling, or color change in any extremity) AND/OR bleeding occur (nosebleeds, bleeding gums, bruising, or blood in stool or urine) to notify provider & seek medical attention. If severe symptoms develop, such as major bleeding, chest pain, shortness of breath, fall, trauma or s/s of stroke, patient to call 911 immediately.       Kirstin Meléndez RN

## 2018-05-02 ENCOUNTER — TELEPHONE (OUTPATIENT)
Dept: PHARMACY | Facility: CLINIC | Age: 73
End: 2018-05-02

## 2018-05-11 NOTE — PATIENT INSTRUCTIONS
UPDATE 5/11/18:  Patient called today stating he accidentally took today's dose (of 7.5 mg) yesterday, should he take 10 mg tonight? Writer approved this plan and encouraged patient to monitor closely for s/s of bleeding. Writer offered to reschedule next INR appt, but patient was comfortable waiting until 5/24. Kirstin Meléndez, MARLINEN, RN

## 2018-05-18 ENCOUNTER — OFFICE VISIT (OUTPATIENT)
Dept: FAMILY MEDICINE | Facility: CLINIC | Age: 73
End: 2018-05-18
Payer: COMMERCIAL

## 2018-05-18 VITALS
TEMPERATURE: 97.6 F | OXYGEN SATURATION: 97 % | SYSTOLIC BLOOD PRESSURE: 118 MMHG | DIASTOLIC BLOOD PRESSURE: 71 MMHG | BODY MASS INDEX: 31.72 KG/M2 | RESPIRATION RATE: 16 BRPM | WEIGHT: 196.5 LBS | HEART RATE: 54 BPM

## 2018-05-18 DIAGNOSIS — L30.9 ECZEMA, UNSPECIFIED TYPE: ICD-10-CM

## 2018-05-18 DIAGNOSIS — J34.89 DRY NARES: Primary | ICD-10-CM

## 2018-05-18 PROCEDURE — 99213 OFFICE O/P EST LOW 20 MIN: CPT | Performed by: PHYSICIAN ASSISTANT

## 2018-05-18 NOTE — MR AVS SNAPSHOT
After Visit Summary   5/18/2018    Cayetano Lunsford    MRN: [id number]           Patient Information     Date Of Birth          1945        Visit Information        Provider Department      5/18/2018 1:20 PM Shiela Guerrero PA-C [facility]        Care Instructions    Ok to decrease ASTELIN prescription nasal spray to 1 spray two times a day.  Continue with NASOGEL as needed to daily.  Add over the counter AQUAPHOR more local to outside/just inside nose.    NO need for topical antibiotic at this time.    Return to clinic with any worsening or changes in symptoms and follow up with PCP for routine care.             Follow-ups after your visit        Your next 10 appointments already scheduled     May 24, 2018 11:00 AM CDT   Anticoagulation Visit with  INR CLINIC   [facility] ([facility])    [address] [place] [id number] [phone]            May 25, 2018   Procedure with Juan Simon DO   [facility] ([facility])    [address] [place] [address]   [phone]           [facility] is located at [address] [place] [address]            Jun 06, 2018  9:00 AM CDT   Return Visit with Bong Yoo MD   [facility] ([facility])    [address] [place] [id number] [phone]            Jun 28, 2018  9:45 AM CDT   Return Visit with Natanael Dang MD   [facility] ([facility])    [address] [place] [address] [address]  [facility] [address]   [phone] OPT 2            Jul 23, 2018 10:00 AM CDT   TELEMEDICINE with Jessica Diaz RICHY   [facility] ([facility])    [address] [place] [id number] [phone]           Note: this is not an onsite visit; there is no need to come to the facility.             "  Who to contact     If you have questions or need follow up information about today's clinic visit or your schedule please contact [facility] directly at [phone].  Normal or non-critical lab and imaging results will be communicated to you by MyChart, letter or phone within 4 business days after the clinic has received the results. If you do not hear from us within 7 days, please contact the clinic through MyChart or phone. If you have a critical or abnormal lab result, we will notify you by phone as soon as possible.  Submit refill requests through [employer] or call your pharmacy and they will forward the refill request to us. Please allow 3 business days for your refill to be completed.          Additional Information About Your Visit        [facility] Information     [employer] lets you send messages to your doctor, view your test results, renew your prescriptions, schedule appointments and more. To sign up, go to [email or web address] . Click on \"Log in\" on the left side of the screen, which will take you to the Welcome page. Then click on \"Sign up Now\" on the right side of the page.     You will be asked to enter the access code listed below, as well as some personal information. Please follow the directions to create your username and password.     Your access code is: [email or web address]  Expires: 2018  2:19 PM     Your access code will  in 90 days. If you need help or a new code, please call your [facility] clinic or [phone].        Care EveryWhere ID     This is your Care EveryWhere ID. This could be used by other organizations to access your [facility] medical records  [id number]        Your Vitals Were     Pulse Temperature Respirations Pulse Oximetry BMI (Body Mass Index)       54 97.6  F (36.4  C) (Oral) 16 97% 31.72 kg/m2        Blood Pressure from Last 3 Encounters:   18 155/79   18 144/56   18 126/80    Weight from Last 3 Encounters:   18 196 lb 8 oz (89.1 " kg)   05/01/18 198 lb 8 oz (90 kg)   04/02/18 196 lb 8 oz (89.1 kg)              Today, you had the following     No orders found for display       Primary Care Provider Office Phone # Fax #    Debbie Lewis [phone] [address]  [facility] [address]        Equal Access to Services     [facility]: Hadii aad ku hadasho Soomaali, waaxda luqadaha, qaybta kaalmada adeegyada, waxay idiin hayaan adeeg kharash la'aan . So [facility] [phone].    ATENCIÓN: Si habla español, tiene a jurado disposición servicios gratuitos de asistencia lingüística. Llame al [phone].    We comply with applicable federal civil rights laws and [place] laws. We do not discriminate on the basis of race, color, national origin, age, disability, sex, sexual orientation, or gender identity.            Thank you!     Thank you for choosing [facility]  for your care. Our goal is always to provide you with excellent care. Hearing back from our patients is one way we can continue to improve our services. Please take a few minutes to complete the written survey that you may receive in the mail after your visit with us. Thank you!             Your Updated Medication List - Protect others around you: Learn how to safely use, store and throw away your medicines at www.disposemymeds.org.          This list is accurate as of 5/18/18  1:36 PM.  Always use your most recent med list.                   Brand Name Dispense Instructions for use Diagnosis    albuterol 108 (90 Base) MCG/ACT Inhaler    PROAIR HFA/PROVENTIL HFA/VENTOLIN HFA    1 Inhaler    Inhale 1-2 puffs into the lungs every 4 hours as needed (for shortness of breath/wheezing)    Chronic obstructive pulmonary disease, unspecified COPD type (H)       ASPIRIN NOT PRESCRIBED    INTENTIONAL     Reported on 5/17/2017        azelastine 0.1 % spray    ASTELIN    1 Bottle    Spray 1-2 sprays into both nostrils 2 times daily    Seasonal allergic rhinitis,  "unspecified allergic rhinitis trigger       B-D INSULIN SYRINGE 31G X 5/16\" 0.5 ML   Generic drug:  insulin syringe-needle U-100     100 each    USE 1 SYRINGE TWO TIMES A DAY OR AS DIRECTED    Type 2 diabetes, HbA1C goal < 8% (H)       BD ULTRA FINE PEN NEEDLES     100 each    Use to inject insulin twice daily.    Type 2 diabetes, HbA1C goal < 8% (H)       blood glucose monitoring lancets     100 Each    Use to test up to four times per day    Type II or unspecified type diabetes mellitus without mention of complication, not stated as uncontrolled       blood glucose monitoring test strip    no brand specified    360 each    Test 4 times daily or as directed. Profile Rx: patient will contact pharmacy when needed    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, with long-term current use of insulin (H)       FLORAJEN3 Caps     60 capsule    Take 1 capsule by mouth 2 times daily    Acute cystitis without hematuria       folic acid 0.8 MG Caps     90 capsule    Take 1 tablet by mouth daily    Ulcerative colitis without complications, unspecified location (H)       insulin  UNIT/ML injection    NovoLIN N RELION    3 vial    Inject 5 units under the skin at breakfast and 15 units at dinner.    Type 2 diabetes mellitus with hypoglycemia without coma, with long-term current use of insulin (H)       insulin regular 100 UNIT/ML injection    NovoLIN R RELION    30 mL    Inject 5 units with breakfast and 2 units with dinner (when mixing with NPH, draw this insulin up first)    Type 2 diabetes mellitus with hypoglycemia without coma, with long-term current use of insulin (H)       ipratropium - albuterol 0.5 mg/2.5 mg/3 mL 0.5-2.5 (3) MG/3ML neb solution    DUONEB    270 mL    NEBULIZE CONTENTS OF ONE VIAL BY MOUTH EVERY 4 HOURS AS NEEDED FOR SHORTNESS OF BREATH OR DYSPNEA    Chronic obstructive bronchitis (H)       losartan 100 MG tablet    COZAAR    90 tablet    Take 1 tablet (100 mg) by mouth daily for " hypertension.    Hypertension goal BP (blood pressure) < 140/90       metFORMIN 1000 MG tablet    GLUCOPHAGE    180 tablet    Take 1 tablet (1,000 mg) by mouth 2 times daily (with meals) with breakfast and dinner.    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, with long-term current use of insulin (H)       nebulizer/tubing/mouthpiece Kit     1 kit    Use to nebulize medications for COPD    Chronic obstructive pulmonary disease, unspecified COPD type (H)       pantoprazole 40 MG EC tablet    PROTONIX     Take 40 mg by mouth daily Started by Dr[facility] at [facility]        primidone 50 MG tablet    MYSOLINE    270 tablet    Take 3 tablets (150 mg) by mouth 3 times daily    Essential tremor       propafenone 150 MG Tabs tablet    RYTHMOL    180 tablet    Take 1 tablet (150 mg) by mouth 2 times daily    Paroxysmal atrial fibrillation (H)       propranolol 40 MG tablet    INDERAL    180 tablet    Take 2-3 tablets ( mg) by mouth 2 times daily    Benign familial tremor       simvastatin 80 MG tablet    ZOCOR    45 tablet    Take 0.5 tablets (40 mg) by mouth At Bedtime Profile Rx: patient will contact pharmacy when needed    Hyperlipidemia LDL goal <100       STIOLTO RESPIMAT 2.5-2.5 MCG/ACT Aers   Generic drug:  tiotropium-olodaterol      Inhale 2 puffs into the lungs daily        sulfaSALAzine 500 MG tablet    AZULFIDINE    90 tablet    TAKE ONE TABLET BY MOUTH THREE TIMES A DAY    Ulcerative colitis, unspecified       tamsulosin 0.4 MG capsule    FLOMAX    90 capsule    Take 1 capsule (0.4 mg) by mouth daily    Benign prostatic hyperplasia with urinary frequency       warfarin 5 MG tablet    COUMADIN    150 tablet    Take as directed by Coumadin clinic. Current dose (4/5/18): 10 mg on Mon, Thu, Sat + 7.5 mg all other days    Atrial fibrillation, unspecified type (H)

## 2018-05-18 NOTE — PATIENT INSTRUCTIONS
Ok to decrease ASTELIN prescription nasal spray to 1 spray two times a day.  Continue with NASOGEL as needed to daily.  Add over the counter AQUAPHOR more local to outside/just inside nose.    NO need for topical antibiotic at this time.    Return to clinic with any worsening or changes in symptoms and follow up with PCP for routine care.

## 2018-05-18 NOTE — PROGRESS NOTES
SUBJECTIVE:   Cayetano Lunsford is a 73 year old male who presents to clinic today for the following health issues:    NOSE ISSUE      Duration: x 1 week    Description (location/character/radiation): Left nostril is crusty and has some blood on tissue    Intensity:  moderate    Accompanying signs and symptoms: none    History (similar episodes/previous evaluation): None    Precipitating or alleviating factors: Possibly from nasal spray    Therapies tried and outcome: Nasogel - using it a few times a day for the past week - (right side improved, left side not as much)    Patient has afib and is on warfarin/coumadin.    He uses Astelin, an antihistamine nasal spray, typically 2 sprays two times a day, has recently decreased per MTM.           Problem list and histories reviewed & adjusted, as indicated.  Additional history: as documented    Patient Active Problem List   Diagnosis     Ulcerative colitis without complications (HCC)     Atrial fibrillation (H)     Chronic obstructive pulmonary disease (H)     Obesity     Eczema     HYPERLIPIDEMIA LDL GOAL <100     Advance Care Planning     Benign familial tremor     Hypertension goal BP (blood pressure) < 140/90     Cramp of limb     BPH (benign prostatic hyperplasia)     Pain in joint, lower leg     CAD in native artery     Hard of hearing     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)     Diverticulitis of colon     History of colonic polyps     Redundant colon     Long-term (current) use of anticoagulants [Z79.01]     Past Surgical History:   Procedure Laterality Date     COLONOSCOPY  3/31/2011     CORONARY ANGIOGRAPHY ADULT ORDER  2001 and 2008 2001 at [facility]. 2008- No interventions     HC REMOVAL OF NAIL PLATE SIMPLE SINGLE  11/10/2011    Right 2nd Toenail     SURGICAL HISTORY OF -   1987    right ear graft     SURGICAL HISTORY OF -   1992    right shoulder surgery     SURGICAL HISTORY OF -   1979    back surgery     SURGICAL HISTORY OF -    "1978    vasectomy       Social History   Substance Use Topics     Smoking status: Former Smoker     Packs/day: 1.00     Years: 30.00     Types: Cigarettes     Quit date: 3/19/1992     Smokeless tobacco: Never Used     Alcohol use 0.0 oz/week     0 Standard drinks or equivalent per week      Comment: hardly at all, sometimes at dinner     Family History   Problem Relation Age of Onset     Circulatory Mother      blood clot in leg     DIABETES Mother      type 2     Allergies Mother      Arthritis Mother      GASTROINTESTINAL DISEASE Mother      Neurologic Disorder Mother      Familiar tremors     Neurologic Disorder Father      brain tumor     CEREBROVASCULAR DISEASE Paternal Grandfather      Hypertension Brother      Hypertension Sister      DIABETES Sister      Type 2     Allergies Sister      Lipids Brother      Lipids Sister      Neurologic Disorder Sister      \"     Neurologic Disorder Sister      \"     Neurologic Disorder Sister      \"         Current Outpatient Prescriptions   Medication Sig Dispense Refill     albuterol (PROAIR HFA/PROVENTIL HFA/VENTOLIN HFA) 108 (90 BASE) MCG/ACT Inhaler Inhale 1-2 puffs into the lungs every 4 hours as needed (for shortness of breath/wheezing) 1 Inhaler 5     ASPIRIN NOT PRESCRIBED, INTENTIONAL, Reported on 5/17/2017       azelastine (ASTELIN) 0.1 % spray Spray 1-2 sprays into both nostrils 2 times daily 1 Bottle 11     B-D INSULIN SYRINGE 31G X 5/16\" 0.5 ML USE 1 SYRINGE TWO TIMES A DAY OR AS DIRECTED 100 each 1     BD ULTRA FINE PEN NEEDLES Use to inject insulin twice daily. 100 each PRN     blood glucose monitoring (NO BRAND SPECIFIED) test strip Test 4 times daily or as directed. Profile Rx: patient will contact pharmacy when needed 360 each 3     folic acid 0.8 MG CAPS Take 1 tablet by mouth daily 90 capsule 3     FREESTYLE LANCETS MISC Use to test up to four times per day 100 Each 12     insulin NPH (NOVOLIN N RELION) 100 UNIT/ML injection Inject 5 units under the skin " at breakfast and 15 units at dinner. 3 vial 3     insulin regular (NOVOLIN R RELION) 100 UNIT/ML injection Inject 5 units with breakfast and 2 units with dinner (when mixing with NPH, draw this insulin up first) 30 mL 2     ipratropium - albuterol 0.5 mg/2.5 mg/3 mL (DUONEB) 0.5-2.5 (3) MG/3ML neb solution NEBULIZE CONTENTS OF ONE VIAL BY MOUTH EVERY 4 HOURS AS NEEDED FOR SHORTNESS OF BREATH OR DYSPNEA 270 mL 8     losartan (COZAAR) 100 MG tablet Take 1 tablet (100 mg) by mouth daily for hypertension. 90 tablet 3     metFORMIN (GLUCOPHAGE) 1000 MG tablet Take 1 tablet (1,000 mg) by mouth 2 times daily (with meals) with breakfast and dinner. 180 tablet 3     pantoprazole (PROTONIX) 40 MG enteric coated tablet Take 40 mg by mouth daily Started by Dr. Debbie Rico at [facility]       primidone (MYSOLINE) 50 MG tablet Take 3 tablets (150 mg) by mouth 3 times daily 270 tablet 2     Probiotic Product (FLORAJEN3) CAPS Take 1 capsule by mouth 2 times daily 60 capsule 0     propafenone (RYTHMOL) 150 MG TABS tablet Take 1 tablet (150 mg) by mouth 2 times daily 180 tablet 3     propranolol (INDERAL) 40 MG tablet Take 2-3 tablets ( mg) by mouth 2 times daily 180 tablet 10     Respiratory Therapy Supplies (NEBULIZER/TUBING/MOUTHPIECE) KIT Use to nebulize medications for COPD 1 kit 0     simvastatin (ZOCOR) 80 MG tablet Take 0.5 tablets (40 mg) by mouth At Bedtime Profile Rx: patient will contact pharmacy when needed 45 tablet 3     sulfaSALAzine (AZULFIDINE) 500 MG tablet TAKE ONE TABLET BY MOUTH THREE TIMES A DAY 90 tablet 11     tamsulosin (FLOMAX) 0.4 MG capsule Take 1 capsule (0.4 mg) by mouth daily 90 capsule 3     tiotropium-olodaterol (STIOLTO RESPIMAT) 2.5-2.5 MCG/ACT AERS Inhale 2 puffs into the lungs daily       warfarin (COUMADIN) 5 MG tablet Take as directed by Coumadin clinic. Current dose (4/5/18): 10 mg on Mon, Thu, Sat + 7.5 mg all other days 150 tablet 1     Allergies   Allergen Reactions     Percodan  [Oxycodone-Aspirin] Nausea and Vomiting       Reviewed and updated as needed this visit by clinical staff  Tobacco  Allergies  Meds  Problems  Med Hx  Surg Hx  Fam Hx  Soc Hx        Reviewed and updated as needed this visit by Provider  Allergies  Meds  Problems         ROS:  Constitutional, HEENT, cardiovascular, pulmonary, GI, , musculoskeletal, neuro, skin, endocrine and psych systems are negative, except as otherwise noted.    OBJECTIVE:     /71  Pulse 54  Temp 97.6  F (36.4  C) (Oral)  Resp 16  Wt 196 lb 8 oz (89.1 kg)  SpO2 97%  BMI 31.72 kg/m2  Body mass index is 31.72 kg/(m^2).  GENERAL: healthy, alert and no distress  EYES: Eyes grossly normal to inspection, PERRL and conjunctivae and sclerae normal  HENT: ear canals and TM's normal, nose and mouth without ulcers or lesions  NECK: no adenopathy, no asymmetry, masses, or scars and thyroid normal to palpation  RESP: lungs clear to auscultation - no rales, rhonchi or wheezes  CV: regular rate and rhythm, normal S1 S2, no S3 or S4, no murmur, click or rub, no peripheral edema and peripheral pulses strong  SKIN: small erythematous patch with slight superficial fissure at base of left nostril/nare.    Diagnostic Test Results:  none     ASSESSMENT/PLAN:       ICD-10-CM    1. Dry nares J34.89    2. Eczema, unspecified type L30.9        Patient Instructions   Ok to decrease ASTELIN prescription nasal spray to 1 spray two times a day.  Continue with NASOGEL as needed to daily.  Add over the counter AQUAPHOR more local to outside/just inside nose.    NO need for topical antibiotic at this time.    Return to clinic with any worsening or changes in symptoms and follow up with PCP for routine care.         Shiela Guerrero PA-C  [facility]

## 2018-05-24 ENCOUNTER — ANTICOAGULATION THERAPY VISIT (OUTPATIENT)
Dept: NURSING | Facility: CLINIC | Age: 73
End: 2018-05-24
Payer: COMMERCIAL

## 2018-05-24 DIAGNOSIS — Z79.01 LONG-TERM (CURRENT) USE OF ANTICOAGULANTS: ICD-10-CM

## 2018-05-24 DIAGNOSIS — I48.91 ATRIAL FIBRILLATION, UNSPECIFIED TYPE (H): ICD-10-CM

## 2018-05-24 LAB — INR POINT OF CARE: 1.2 (ref 0.86–1.14)

## 2018-05-24 PROCEDURE — 36416 COLLJ CAPILLARY BLOOD SPEC: CPT

## 2018-05-24 PROCEDURE — 85610 PROTHROMBIN TIME: CPT | Mod: QW

## 2018-05-24 PROCEDURE — 99207 ZZC NO CHARGE NURSE ONLY: CPT

## 2018-05-24 NOTE — MR AVS SNAPSHOT
Cayetano Lunsford   5/24/2018 11:00 AM   Anticoagulation Therapy Visit    Description:  73 year old male   Provider:   INR CLINIC   Department:   Nurse           INR as of 5/24/2018     Today's INR 1.2!      Anticoagulation Summary as of 5/24/2018     INR goal 2.0-3.0   Today's INR 1.2!   Full warfarin instructions 5/24: Hold; 5/25: 10 mg; 5/27: 10 mg; 5/28: 7.5 mg; Otherwise 10 mg on Mon, Thu, Sat; 7.5 mg all other days   Next INR check 5/30/2018    Indications   Long-term (current) use of anticoagulants [Z79.01] [Z79.01]  Atrial fibrillation (H) [I48.91]         Your next Anticoagulation Clinic appointment(s)     May 30, 2018 11:15 AM CDT   Anticoagulation Visit with  INR CLINIC   [facility] ([facility])    [address] [place] [address] [phone]              Contact Numbers     [facility]  Please call [phone] to cancel and/or reschedule your appointment   Please call [phone] with any problems or questions regarding your therapy.        May 2018 Details    Sun Mon Tue Wed Thu Fri Sat       1               2               3               4               5                 6               7               8               9               10               11               12                 13               14               15               16               17               18               19                 20               21               22               23               24      Hold   See details      25      10 mg         26      10 mg           27      10 mg         28      7.5 mg         29      7.5 mg         30            31                  Date Details   05/24 This INR check       Date of next INR:  5/30/2018         How to take your warfarin dose     To take:  7.5 mg Take 1.5 of the 5 mg tablets.    To take:  10 mg Take 2 of the 5 mg tablets.    Hold Do not take your warfarin dose. See the Details table to the right for  additional instructions.

## 2018-05-24 NOTE — PROGRESS NOTES
ANTICOAGULATION FOLLOW-UP CLINIC VISIT    Patient Name:  Cayetano Lunsford  Date:  5/24/2018  Contact Type:  Face to Face    SUBJECTIVE:     Patient Findings     Positives Change in diet/appetite (Limited diet d/t GI prep.), Dental/Other procedures (Colonoscopy procedure tomorrow. GI team to advise when pt can safely restart Warfarin after procedure. ), Intentional hold of therapy (4 day hold started 5/21.)           OBJECTIVE    INR Protime   Date Value Ref Range Status   05/24/2018 1.2 (A) 0.86 - 1.14 Final       ASSESSMENT / PLAN  INR assessment SUB    Recheck INR In: 6 DAYS    INR Location Clinic      Anticoagulation Summary as of 5/24/2018     INR goal 2.0-3.0   Today's INR 1.2!   Warfarin maintenance plan 10 mg (5 mg x 2) on Mon, Thu, Sat; 7.5 mg (5 mg x 1.5) all other days   Full warfarin instructions 5/24: Hold; 5/25: 10 mg; 5/27: 10 mg; 5/28: 7.5 mg; Otherwise 10 mg on Mon, Thu, Sat; 7.5 mg all other days   Weekly warfarin total 60 mg   Plan last modified Kirstin Meléndez RN (11/2/2017)   Next INR check 5/30/2018   Priority INR   Target end date     Indications   Long-term (current) use of anticoagulants [Z79.01] [Z79.01]  Atrial fibrillation (H) [I48.91]         Anticoagulation Episode Summary     INR check location     Preferred lab     Send INR reminders to [facility]    Comments       Anticoagulation Care Providers     Provider Role Specialty Phone number    Debibe Lewis MD [facility] [phone]            See the Encounter Report to view Anticoagulation Flowsheet and Dosing Calendar (Go to Encounters tab in chart review, and find the Anticoagulation Therapy Visit)    Writer provided patient with post-procedure dosing plan: slight increase advised for 2 days in order to get patient back into therapeutic range as fast as safely possible. Patient understands to start dosing plan when approved by GI team.    Patient made aware if signs of clotting (pain, tenderness,  swelling, or color change in any extremity) AND/OR bleeding occur (nosebleeds, bleeding gums, bruising, or blood in stool or urine) to notify provider & seek medical attention. If severe symptoms develop, such as major bleeding, chest pain, shortness of breath, fall, trauma or s/s of stroke, patient to call 911 immediately.       Kirstin Meléndez RN

## 2018-05-25 ENCOUNTER — ANESTHESIA EVENT (OUTPATIENT)
Dept: GASTROENTEROLOGY | Facility: CLINIC | Age: 73
End: 2018-05-25
Payer: COMMERCIAL

## 2018-05-25 ENCOUNTER — HOSPITAL ENCOUNTER (OUTPATIENT)
Facility: CLINIC | Age: 73
Discharge: HOME | End: 2018-05-25
Attending: INTERNAL MEDICINE | Admitting: INTERNAL MEDICINE
Payer: COMMERCIAL

## 2018-05-25 ENCOUNTER — TRANSFERRED RECORDS (OUTPATIENT)
Dept: HEALTH INFORMATION MANAGEMENT | Facility: CLINIC | Age: 73
End: 2018-05-25

## 2018-05-25 ENCOUNTER — ANESTHESIA (OUTPATIENT)
Dept: GASTROENTEROLOGY | Facility: CLINIC | Age: 73
End: 2018-05-25
Payer: COMMERCIAL

## 2018-05-25 ENCOUNTER — SURGERY (OUTPATIENT)
Age: 73
End: 2018-05-25

## 2018-05-25 VITALS
RESPIRATION RATE: 20 BRPM | WEIGHT: 196 LBS | BODY MASS INDEX: 29.7 KG/M2 | DIASTOLIC BLOOD PRESSURE: 89 MMHG | SYSTOLIC BLOOD PRESSURE: 117 MMHG | OXYGEN SATURATION: 94 % | HEIGHT: 68 IN

## 2018-05-25 LAB
COLONOSCOPY: NORMAL
GLUCOSE BLDC GLUCOMTR-MCNC: 147 MG/DL (ref 70–99)

## 2018-05-25 PROCEDURE — 88305 TISSUE EXAM BY PATHOLOGIST: CPT | Performed by: INTERNAL MEDICINE

## 2018-05-25 PROCEDURE — 37000008 ZZH ANESTHESIA TECHNICAL FEE, 1ST 30 MIN: Performed by: INTERNAL MEDICINE

## 2018-05-25 PROCEDURE — 45380 COLONOSCOPY AND BIOPSY: CPT | Performed by: INTERNAL MEDICINE

## 2018-05-25 PROCEDURE — 45385 COLONOSCOPY W/LESION REMOVAL: CPT | Performed by: INTERNAL MEDICINE

## 2018-05-25 PROCEDURE — 40000010 ZZH STATISTIC ANES STAT CODE-CRNA PER MINUTE: Performed by: INTERNAL MEDICINE

## 2018-05-25 PROCEDURE — 37000009 ZZH ANESTHESIA TECHNICAL FEE, EACH ADDTL 15 MIN: Performed by: INTERNAL MEDICINE

## 2018-05-25 PROCEDURE — 82962 GLUCOSE BLOOD TEST: CPT

## 2018-05-25 PROCEDURE — 25000128 H RX IP 250 OP 636: Performed by: NURSE ANESTHETIST, CERTIFIED REGISTERED

## 2018-05-25 PROCEDURE — 88305 TISSUE EXAM BY PATHOLOGIST: CPT | Mod: 26 | Performed by: INTERNAL MEDICINE

## 2018-05-25 PROCEDURE — 25000125 ZZHC RX 250: Performed by: NURSE ANESTHETIST, CERTIFIED REGISTERED

## 2018-05-25 RX ORDER — PROPOFOL 10 MG/ML
INJECTION, EMULSION INTRAVENOUS PRN
Status: DISCONTINUED | OUTPATIENT
Start: 2018-05-25 | End: 2018-05-25

## 2018-05-25 RX ORDER — PROPOFOL 10 MG/ML
INJECTION, EMULSION INTRAVENOUS CONTINUOUS PRN
Status: DISCONTINUED | OUTPATIENT
Start: 2018-05-25 | End: 2018-05-25

## 2018-05-25 RX ORDER — SODIUM CHLORIDE, SODIUM LACTATE, POTASSIUM CHLORIDE, CALCIUM CHLORIDE 600; 310; 30; 20 MG/100ML; MG/100ML; MG/100ML; MG/100ML
INJECTION, SOLUTION INTRAVENOUS CONTINUOUS PRN
Status: DISCONTINUED | OUTPATIENT
Start: 2018-05-25 | End: 2018-05-25

## 2018-05-25 RX ORDER — LIDOCAINE HYDROCHLORIDE 20 MG/ML
INJECTION, SOLUTION INFILTRATION; PERINEURAL PRN
Status: DISCONTINUED | OUTPATIENT
Start: 2018-05-25 | End: 2018-05-25

## 2018-05-25 RX ADMIN — LIDOCAINE HYDROCHLORIDE 60 MG: 20 INJECTION, SOLUTION INFILTRATION; PERINEURAL at 11:12

## 2018-05-25 RX ADMIN — PROPOFOL 150 MCG/KG/MIN: 10 INJECTION, EMULSION INTRAVENOUS at 11:12

## 2018-05-25 RX ADMIN — SODIUM CHLORIDE, POTASSIUM CHLORIDE, SODIUM LACTATE AND CALCIUM CHLORIDE: 600; 310; 30; 20 INJECTION, SOLUTION INTRAVENOUS at 11:09

## 2018-05-25 RX ADMIN — MIDAZOLAM 1 MG: 1 INJECTION INTRAMUSCULAR; INTRAVENOUS at 11:10

## 2018-05-25 RX ADMIN — MIDAZOLAM 1 MG: 1 INJECTION INTRAMUSCULAR; INTRAVENOUS at 11:09

## 2018-05-25 RX ADMIN — PROPOFOL 20 MG: 10 INJECTION, EMULSION INTRAVENOUS at 11:13

## 2018-05-25 ASSESSMENT — COPD QUESTIONNAIRES
CAT_SEVERITY: MILD
COPD: 1

## 2018-05-25 ASSESSMENT — ENCOUNTER SYMPTOMS: DYSRHYTHMIAS: 1

## 2018-05-25 ASSESSMENT — LIFESTYLE VARIABLES: TOBACCO_USE: 1

## 2018-05-25 NOTE — ANESTHESIA PREPROCEDURE EVALUATION
Anesthesia Evaluation     .             ROS/MED HX    ENT/Pulmonary:     (+)tobacco use, Past use mild COPD, , . .   (-) sleep apnea   Neurologic:       Cardiovascular:     (+) hypertension--CAD, --. Taking blood thinners : . . . :. dysrhythmias a-fib, .       METS/Exercise Tolerance:     Hematologic:         Musculoskeletal:         GI/Hepatic:     (+) GERD Asymptomatic on medication, Inflammatory bowel disease,       Renal/Genitourinary:     (+) chronic renal disease, type: CRI, Pt does not require dialysis, BPH,       Endo:     (+) type II DM Using insulin Obesity, .      Psychiatric:         Infectious Disease:         Malignancy:         Other:                     Physical Exam  Normal systems: pulmonary    Airway   Mallampati: II  TM distance: >3 FB  Neck ROM: full    Dental   (+) upper dentures and lower dentures    Cardiovascular   Rhythm and rate: irregular      Pulmonary                     Anesthesia Plan      History & Physical Review  History and physical reviewed and following examination; no interval change.    ASA Status:  3 .    NPO Status:  > 8 hours    Plan for MAC Reason for MAC:  Deep or markedly invasive procedure (G8)  PONV prophylaxis:  Ondansetron (or other 5HT-3)       Postoperative Care      Consents  Anesthetic plan, risks, benefits and alternatives discussed with:  Patient..                          .

## 2018-05-25 NOTE — ANESTHESIA POSTPROCEDURE EVALUATION
Patient: Cayetano Lunsford    Procedure(s):  COLONOSCOPY (MAC)  - Wound Class: II-Clean Contaminated    Diagnosis:ULCERATIVE PANCOLITIS   Diagnosis Additional Information: No value filed.    Anesthesia Type:  MAC    Note:  Anesthesia Post Evaluation    Patient location during evaluation: PACU  Patient participation: Able to fully participate in evaluation  Level of consciousness: awake and alert  Pain management: adequate  Airway patency: patent  Cardiovascular status: acceptable  Respiratory status: acceptable and unassisted  Hydration status: acceptable  PONV: none             Last vitals:  Vitals:    05/25/18 1022 05/25/18 1200 05/25/18 1210   BP: 150/90 132/85 117/89   Resp: 16 24 20   SpO2: 95% 95% 94%         Electronically Signed By: Jose De Jesus Rankin MD  May 25, 2018  1:07 PM

## 2018-05-25 NOTE — ANESTHESIA CARE TRANSFER NOTE
Patient: Cayetano Lunsford    Procedure(s):  COLONOSCOPY (MAC)  - Wound Class: II-Clean Contaminated    Diagnosis: ULCERATIVE PANCOLITIS   Diagnosis Additional Information: No value filed.    Anesthesia Type:   MAC     Note:  Airway :Room Air  Patient transferred to:PACU  Comments: Transferred to PACU, spontaneous RR, on room air.  Monitors and alarms on and functioning, VSS, patient awake and comfortable.  Report to  PACU RN.Handoff Report: Identifed the Patient, Identified the Reponsible Provider, Reviewed the pertinent medical history, Discussed the surgical course, Reviewed Intra-OP anesthesia mangement and issues during anesthesia, Set expectations for post-procedure period and Allowed opportunity for questions and acknowledgement of understanding      Vitals: (Last set prior to Anesthesia Care Transfer)    CRNA VITALS  5/25/2018 1112 - 5/25/2018 1148      5/25/2018             Pulse: (!)  49    Ht Rate: (!)  47    SpO2: 98 %    Resp Rate (set): 10                Electronically Signed By: RAFFI Rees CRNA  May 25, 2018  11:48 AM

## 2018-05-29 LAB — COPATH REPORT: NORMAL

## 2018-05-30 ENCOUNTER — ANTICOAGULATION THERAPY VISIT (OUTPATIENT)
Dept: NURSING | Facility: CLINIC | Age: 73
End: 2018-05-30
Payer: COMMERCIAL

## 2018-05-30 DIAGNOSIS — Z79.01 LONG-TERM (CURRENT) USE OF ANTICOAGULANTS: ICD-10-CM

## 2018-05-30 DIAGNOSIS — I48.91 ATRIAL FIBRILLATION, UNSPECIFIED TYPE (H): ICD-10-CM

## 2018-05-30 LAB — INR POINT OF CARE: 2.8 (ref 0.86–1.14)

## 2018-05-30 PROCEDURE — 99207 ZZC NO CHARGE NURSE ONLY: CPT

## 2018-05-30 PROCEDURE — 36416 COLLJ CAPILLARY BLOOD SPEC: CPT

## 2018-05-30 PROCEDURE — 85610 PROTHROMBIN TIME: CPT | Mod: QW

## 2018-05-30 NOTE — PROGRESS NOTES
ANTICOAGULATION FOLLOW-UP CLINIC VISIT    Patient Name:  Cayetano Lunsford  Date:  5/30/2018  Contact Type:  Face to Face    SUBJECTIVE:     Patient Findings     Positives Dental/Other procedures (Pt tolerated colonoscopy well. Two polyps removed. GI rec repeat procedure within 2 years. ), No Problem Findings (Pt was able to resume Warfarin the evening of Colonosocpy procedure, no major bleeding occured. )           OBJECTIVE    INR Protime   Date Value Ref Range Status   05/30/2018 2.8 (A) 0.86 - 1.14 Final       ASSESSMENT / PLAN  INR assessment THER    Recheck INR In: 1 WEEK    INR Location Clinic      Anticoagulation Summary as of 5/30/2018     INR goal 2.0-3.0   Today's INR 2.8   Warfarin maintenance plan 10 mg (5 mg x 2) on Mon, Thu, Sat; 7.5 mg (5 mg x 1.5) all other days   Full warfarin instructions 10 mg on Mon, Thu, Sat; 7.5 mg all other days   Weekly warfarin total 60 mg   No change documented Kirstin Meléndez RN   Plan last modified Kirstin Meléndez RN (11/2/2017)   Next INR check 6/6/2018   Priority INR   Target end date     Indications   Long-term (current) use of anticoagulants [Z79.01] [Z79.01]  Atrial fibrillation (H) [I48.91]         Anticoagulation Episode Summary     INR check location     Preferred lab     Send INR reminders to [facility]    Comments       Anticoagulation Care Providers     Provider Role Specialty Phone number    Debbie Lewis MD [facility] [phone]            See the Encounter Report to view Anticoagulation Flowsheet and Dosing Calendar (Go to Encounters tab in chart review, and find the Anticoagulation Therapy Visit)    Patient made aware if signs of clotting (pain, tenderness, swelling, or color change in any extremity) AND/OR bleeding occur (nosebleeds, bleeding gums, bruising, or blood in stool or urine) to notify provider & seek medical attention. If severe symptoms develop, such as major bleeding, chest pain, shortness of breath,  fall, trauma or s/s of stroke, patient to call 911 immediately.       Kirstin Meléndez RN

## 2018-05-30 NOTE — MR AVS SNAPSHOT
Cayetano Lunsford   5/30/2018 11:15 AM   Anticoagulation Therapy Visit    Description:  73 year old male   Provider:   INR CLINIC   Department:   Nurse           INR as of 5/30/2018     Today's INR 2.8      Anticoagulation Summary as of 5/30/2018     INR goal 2.0-3.0   Today's INR 2.8   Full warfarin instructions 10 mg on Mon, Thu, Sat; 7.5 mg all other days   Next INR check 6/6/2018    Indications   Long-term (current) use of anticoagulants [Z79.01] [Z79.01]  Atrial fibrillation (H) [I48.91]         Your next Anticoagulation Clinic appointment(s)     Jun 06, 2018 10:00 AM CDT   Anticoagulation Visit with  INR CLINIC   [facility] ([facility])    [address] [place] [id number] [phone]              Contact Numbers     [facility]  Please call [phone] to cancel and/or reschedule your appointment   Please call [phone] with any problems or questions regarding your therapy.        May 2018 Details    Sun Mon Tue Wed Thu Fri Sat       1               2               3               4               5                 6               7               8               9               10               11               12                 13               14               15               16               17               18               19                 20               21               22               23               24               25               26                 27               28               29               30      7.5 mg   See details      31      10 mg            Date Details   05/30 This INR check               How to take your warfarin dose     To take:  7.5 mg Take 1.5 of the 5 mg tablets.    To take:  10 mg Take 2 of the 5 mg tablets.           June 2018 Details    Sun Mon Tue Wed Thu Fri Sat          1      7.5 mg         2      10 mg           3      7.5 mg         4      10 mg         5      7.5 mg         6            7                8               9                 10               11               12               13               14               15               16                 17               18               19               20               21               22               23                 24               25               26               27               28               29               30                Date Details   No additional details    Date of next INR:  6/6/2018         How to take your warfarin dose     To take:  7.5 mg Take 1.5 of the 5 mg tablets.    To take:  10 mg Take 2 of the 5 mg tablets.

## 2018-06-06 ENCOUNTER — ANTICOAGULATION THERAPY VISIT (OUTPATIENT)
Dept: NURSING | Facility: CLINIC | Age: 73
End: 2018-06-06
Payer: COMMERCIAL

## 2018-06-06 ENCOUNTER — OFFICE VISIT (OUTPATIENT)
Dept: NEUROLOGY | Facility: CLINIC | Age: 73
End: 2018-06-06
Payer: COMMERCIAL

## 2018-06-06 VITALS
TEMPERATURE: 97.6 F | BODY MASS INDEX: 29.5 KG/M2 | RESPIRATION RATE: 12 BRPM | DIASTOLIC BLOOD PRESSURE: 66 MMHG | SYSTOLIC BLOOD PRESSURE: 126 MMHG | WEIGHT: 194 LBS | HEART RATE: 60 BPM | OXYGEN SATURATION: 90 %

## 2018-06-06 DIAGNOSIS — G25.0 BENIGN FAMILIAL TREMOR: Primary | ICD-10-CM

## 2018-06-06 DIAGNOSIS — Z79.01 LONG-TERM (CURRENT) USE OF ANTICOAGULANTS: ICD-10-CM

## 2018-06-06 DIAGNOSIS — I48.91 ATRIAL FIBRILLATION, UNSPECIFIED TYPE (H): ICD-10-CM

## 2018-06-06 LAB — INR POINT OF CARE: 2.7 (ref 0.86–1.14)

## 2018-06-06 PROCEDURE — 85610 PROTHROMBIN TIME: CPT | Mod: QW

## 2018-06-06 PROCEDURE — 99207 ZZC NO CHARGE NURSE ONLY: CPT

## 2018-06-06 PROCEDURE — 99215 OFFICE O/P EST HI 40 MIN: CPT | Performed by: PSYCHIATRY & NEUROLOGY

## 2018-06-06 PROCEDURE — 36416 COLLJ CAPILLARY BLOOD SPEC: CPT

## 2018-06-06 RX ORDER — PRIMIDONE 50 MG/1
TABLET ORAL
Qty: 300 TABLET | Refills: 2 | Status: SHIPPED | OUTPATIENT
Start: 2018-06-06 | End: 2018-09-05

## 2018-06-06 NOTE — PROGRESS NOTES
"ESTABLISHED PATIENT NEUROLOGY NOTE    DATE OF VISIT: 6/6/2018  CLINIC LOCATION: [facility]  MRN: [id number]  PATIENT NAME: Cayetano Lunsford  YOB: 1945    PCP: Debbie Lewis MD.    REASON FOR VISIT:   Chief Complaint   Patient presents with     Follow Up For     tremors ae still the same.     SUBJECTIVE:                                                      HISTORY OF PRESENT ILLNESS: Patient is here for follow up regarding essential tremor.  He was last seen on 03/06/2018.  At that time, no medication changes were made.  Please refer to my initial/other prior notes for further information.    Since the last visit, the patient reports no significant changes in his tremor.  Some days are better, and some days are worse.  Writing is a challenge.  The patient uses the other hand to stabilize his tremor while writing.  He is on propranolol 120 mg twice daily and primidone 450 mg daily without noticeable side effects.  He denies interval development of new focal neurological symptoms.  I reach out to patient's primary care provider who would be okay with increasing the dose of propranolol if needed as long as he does not have significant bradycardia (pulse less than 50).    On review of systems, patient endorses recent colonoscopy, but no active new complaints. Medications, allergies, family and social history were also reviewed. There are no changes reported by patient.    CURRENT MEDICATIONS:   Current Outpatient Prescriptions   Medication     albuterol (PROAIR HFA/PROVENTIL HFA/VENTOLIN HFA) 108 (90 BASE) MCG/ACT Inhaler     ASPIRIN NOT PRESCRIBED, INTENTIONAL,     azelastine (ASTELIN) 0.1 % spray     B-D INSULIN SYRINGE 31G X 5/16\" 0.5 ML     BD ULTRA FINE PEN NEEDLES     blood glucose monitoring (NO BRAND SPECIFIED) test strip     folic acid 0.8 MG CAPS     FREESTYLE LANCETS MISC     insulin NPH (NOVOLIN N RELION) 100 UNIT/ML injection     insulin regular (NOVOLIN R RELION) 100 UNIT/ML " injection     ipratropium - albuterol 0.5 mg/2.5 mg/3 mL (DUONEB) 0.5-2.5 (3) MG/3ML neb solution     losartan (COZAAR) 100 MG tablet     metFORMIN (GLUCOPHAGE) 1000 MG tablet     pantoprazole (PROTONIX) 40 MG enteric coated tablet     primidone (MYSOLINE) 50 MG tablet     Probiotic Product (FLORAJEN3) CAPS     propafenone (RYTHMOL) 150 MG TABS tablet     propranolol (INDERAL) 40 MG tablet     Respiratory Therapy Supplies (NEBULIZER/TUBING/MOUTHPIECE) KIT     simvastatin (ZOCOR) 80 MG tablet     sulfaSALAzine (AZULFIDINE) 500 MG tablet     tamsulosin (FLOMAX) 0.4 MG capsule     tiotropium-olodaterol (STIOLTO RESPIMAT) 2.5-2.5 MCG/ACT AERS     warfarin (COUMADIN) 5 MG tablet     REVIEW OF SYSTEMS:                                                    10-system review was completed. Pertinent positives are included in HPI. The remainder of ROS is negative.  EXAM:                                                    Physical Exam:   Vitals: /66 (BP Location: Left arm, Patient Position: Sitting, Cuff Size: Adult Regular)  Pulse 60  Temp 97.6  F (36.4  C) (Oral)  Resp 12  Wt 88 kg (194 lb)  SpO2 90%  BMI 29.5 kg/m2    General: pt is in NAD, cooperative.  Skin: normal turgor, moist mucous membranes, no lesions/rashes noticed.  HEENT: ATNC, white sclera, normal conjunctiva.  Respiratory: Symmetric lung excursion, no accessory respiratory muscle use.  Abdomen: Non distended.  Neurological: awake, cooperative, follows commands, mild dysarthria noted, cranial nerves II-XII: no ptosis, extraocular motility is full, face is symmetric, tongue is midline, equally moves all extremities, intermittent mild to moderate postural and action bilateral hand tremor, no dysmetria bilaterally, casual gait is normal.  DATA:     Labs: I personally reviewed the following labs:  Anticoagulation Therapy Visit on 05/30/2018   Component Date Value Ref Range Status     INR Protime 05/30/2018 2.8* 0.86 - 1.14 Final   Anticoagulation Therapy  Visit on 05/24/2018   Component Date Value Ref Range Status     INR Protime 05/24/2018 1.2* 0.86 - 1.14 Final   Office Visit on 05/01/2018   Component Date Value Ref Range Status     Creatinine Urine 05/01/2018 73  mg/dL Final     Albumin Urine mg/L 05/01/2018 40  mg/L Final     Albumin Urine mg/g Cr 05/01/2018 55.49* 0 - 17 mg/g Cr Final     Cholesterol 05/01/2018 163  <200 mg/dL Final     Triglycerides 05/01/2018 159* <150 mg/dL Final    Comment: Borderline high:  150-199 mg/dl  High:             200-499 mg/dl  Very high:       >499 mg/dl  Non Fasting       HDL Cholesterol 05/01/2018 42  >39 mg/dL Final     LDL Cholesterol Calculated 05/01/2018 89  <100 mg/dL Final    Desirable:       <100 mg/dl     Non HDL Cholesterol 05/01/2018 121  <130 mg/dL Final     Hemoglobin A1C 05/01/2018 5.3  0 - 5.6 % Final    Comment: Normal <5.7% Prediabetes 5.7-6.4%  Diabetes 6.5% or higher - adopted from ADA   consensus guidelines.       Sodium 05/01/2018 139  133 - 144 mmol/L Final     Potassium 05/01/2018 4.2  3.4 - 5.3 mmol/L Final     Chloride 05/01/2018 103  94 - 109 mmol/L Final     Carbon Dioxide 05/01/2018 26  20 - 32 mmol/L Final     Anion Gap 05/01/2018 10  3 - 14 mmol/L Final     Glucose 05/01/2018 113* 70 - 99 mg/dL Final    Non Fasting     Urea Nitrogen 05/01/2018 13  7 - 30 mg/dL Final     Creatinine 05/01/2018 0.77  0.66 - 1.25 mg/dL Final     GFR Estimate 05/01/2018 >90  >60 mL/min/1.7m2 Final    Non  GFR Calc     GFR Estimate If Black 05/01/2018 >90  >60 mL/min/1.7m2 Final    African American GFR Calc     Calcium 05/01/2018 8.8  8.5 - 10.1 mg/dL Final     ASSESSMENT and PLAN:                                                    Assessment: 73-year-old male patient with essential tremor presents for follow-up.  He reports that his symptoms are stable.  He is on 120 mg of propranolol twice daily and 450 mg of primidone daily without noticeable side effects.  He would like to have a better control of his  hand tremor because it affects his writing.  We reviewed in detail available options of increasing propranolol versus increasing primidone.  We decided to increase primidone gradually to 500 mg daily.  The rest of our discussion and the plan are summarized below.    Diagnoses:    ICD-10-CM    1. 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    The following medications were changed/discussed:  1. Primidone (MYSOLINE) 50 MG tablet: I counseled the patient to take 3 tablets 2 times a day and 3.5 tablets at the bedtime for 6 days, then increase the dose to 3 tablets 2 times a day and 4 tablets at the bedtime.  I advised the patient that his warfarin dose might need to be adjusted, and INR should be followed closely.  Side effects were discussed.  I recommended the patient contact my clinic if he develops any of intolerable side effects.  2.  He will continue propranolol without changes.    Next follow-up appointment is in the next 3 months or earlier if needed.    Encouraged the patient to call me with any questions or concerns.    Total Time: 41 minutes with > 50% spent counseling the patient on stated above assessment and recommendations, including review of current symptoms, available treatment options, and proposed plan.  Extra time was needed to review treatment options and answer questions regarding the plan.    Bong Yoo MD  / Neurology

## 2018-06-06 NOTE — MR AVS SNAPSHOT
After Visit Summary   6/6/2018    Cayetano Lunsford    MRN: [id number]           Patient Information     Date Of Birth          1945        Visit Information        Provider Department      6/6/2018 9:00 AM Bong Yoo MD [facility]        Today's Diagnoses     Benign familial tremor    -  1      Care Instructions    AFTER VISIT SUMMARY (AVS):    At today's visit we discussed current symptoms, available treatment options, and the plan, which includes:  No orders of the defined types were placed in this encounter.    The following medications were ordered/refilled:  No prescriptions requested or ordered in this encounter    Next follow-up appointment is in the next 3 months or earlier if needed.    Please do not hesitate to call me with any questions or concerns.    Thanks.           Follow-ups after your visit        Follow-up notes from your care team     Return in about 3 months (around 9/6/2018).      Your next 10 appointments already scheduled     Jun 06, 2018 10:00 AM CDT   Anticoagulation Visit with  INR CLINIC   [facility] ([facility])    [address] [place] [address] [phone]            Jun 28, 2018  9:45 AM CDT   Return Visit with Natanael Dang MD   [facility] ([facility])    [address] [place] [address] [address]  [facility] [id number] [phone] OPT 2            Jul 23, 2018 10:00 AM CDT   TELEMEDICINE with Jessica Diaz RPH   [facility] ([facility])    [address] [place] [address] [phone]           Note: this is not an onsite visit; there is no need to come to the facility.            Sep 05, 2018 10:30 AM CDT   Return Visit with Bong Yoo MD   [facility] ([facility])    [address] [place] [address]    [phone]              Who to contact     If you have questions or need follow up information about today's clinic visit or your schedule please contact [facility] directly at [phone].  Normal or non-critical lab and imaging results will be communicated to you by MyChart, letter or phone within 4 business days after the clinic has received the results. If you do not hear from us within 7 days, please contact the clinic through MyChart or phone. If you have a critical or abnormal lab result, we will notify you by phone as soon as possible.  Submit refill requests through [employer] or call your pharmacy and they will forward the refill request to us. Please allow 3 business days for your refill to be completed.          Additional Information About Your Visit        Care EveryWhere ID     This is your Care EveryWhere ID. This could be used by other organizations to access your [place] medical records  [id number]        Your Vitals Were     Pulse Temperature Respirations Pulse Oximetry BMI (Body Mass Index)       60 97.6  F (36.4  C) (Oral) 12 90% 29.5 kg/m2        Blood Pressure from Last 3 Encounters:   06/06/18 126/66   05/25/18 117/89   05/18/18 118/71    Weight from Last 3 Encounters:   06/06/18 88 kg (194 lb)   05/25/18 88.9 kg (196 lb)   05/18/18 89.1 kg (196 lb 8 oz)              Today, you had the following     No orders found for display         Today's Medication Changes          These changes are accurate as of 6/6/18  9:18 AM.  If you have any questions, ask your nurse or doctor.               These medicines have changed or have updated prescriptions.        Dose/Directions    primidone 50 MG tablet   Commonly known as:  MYSOLINE   This may have changed:    - how much to take  - how to take this  - when to take this  - additional instructions   Used for:  Benign familial tremor   Changed by:  Bong Yoo MD        3 tablets 2 times a day and 3.5 tablets at  the bedtime for 6 days, then increase to 3 tablets 2 times a day and 4 tablets at the bedtime.   Quantity:  300 tablet   Refills:  2            Where to get your medicines      These medications were sent to [facility] [place], [place] - [address]  [address], [facility] [address]     Phone:  [phone]     primidone 50 MG tablet                Primary Care Provider Office Phone # Fax #    Debbie Lewis [phone]       [address]  [facility] [address]        Equal Access to Services     [facility]: Hadii aad ku hadasho Soomaali, waaxda luqadaha, qaybta kaalmada adeegyacory, erendira pugh . So [facility] [phone].    ATENCIÓN: Si habla español, tiene a jurado disposición servicios gratuitos de asistencia lingüística. [facility] [phone].    We comply with applicable federal civil rights laws and [place] laws. We do not discriminate on the basis of race, color, national origin, age, disability, sex, sexual orientation, or gender identity.            Thank you!     Thank you for choosing [facility]  for your care. Our goal is always to provide you with excellent care. Hearing back from our patients is one way we can continue to improve our services. Please take a few minutes to complete the written survey that you may receive in the mail after your visit with us. Thank you!             Your Updated Medication List - Protect others around you: Learn how to safely use, store and throw away your medicines at www.disposemymeds.org.          This list is accurate as of 6/6/18  9:18 AM.  Always use your most recent med list.                   Brand Name Dispense Instructions for use Diagnosis    albuterol 108 (90 Base) MCG/ACT Inhaler    PROAIR HFA/PROVENTIL HFA/VENTOLIN HFA    1 Inhaler    Inhale 1-2 puffs into the lungs every 4 hours as needed (for shortness of breath/wheezing)    Chronic obstructive pulmonary disease,  "unspecified COPD type (H)       ASPIRIN NOT PRESCRIBED    INTENTIONAL     Reported on 5/17/2017        azelastine 0.1 % spray    ASTELIN    1 Bottle    Spray 1-2 sprays into both nostrils 2 times daily    Seasonal allergic rhinitis, unspecified allergic rhinitis trigger       B-D INSULIN SYRINGE 31G X 5/16\" 0.5 ML   Generic drug:  insulin syringe-needle U-100     100 each    USE 1 SYRINGE TWO TIMES A DAY OR AS DIRECTED    Type 2 diabetes, HbA1C goal < 8% (H)       BD ULTRA FINE PEN NEEDLES     100 each    Use to inject insulin twice daily.    Type 2 diabetes, HbA1C goal < 8% (H)       blood glucose monitoring lancets     100 Each    Use to test up to four times per day    Type II or unspecified type diabetes mellitus without mention of complication, not stated as uncontrolled       blood glucose monitoring test strip    no brand specified    360 each    Test 4 times daily or as directed. Profile Rx: patient will contact pharmacy when needed    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, with long-term current use of insulin (H)       FLORAJEN3 Caps     60 capsule    Take 1 capsule by mouth 2 times daily    Acute cystitis without hematuria       folic acid 0.8 MG Caps     90 capsule    Take 1 tablet by mouth daily    Ulcerative colitis without complications, unspecified location (H)       insulin  UNIT/ML injection    NovoLIN N RELION    3 vial    Inject 5 units under the skin at breakfast and 15 units at dinner.    Type 2 diabetes mellitus with hypoglycemia without coma, with long-term current use of insulin (H)       insulin regular 100 UNIT/ML injection    NovoLIN R RELION    30 mL    Inject 5 units with breakfast and 2 units with dinner (when mixing with NPH, draw this insulin up first)    Type 2 diabetes mellitus with hypoglycemia without coma, with long-term current use of insulin (H)       ipratropium - albuterol 0.5 mg/2.5 mg/3 mL 0.5-2.5 (3) MG/3ML neb solution    DUONEB    270 mL    " NEBULIZE CONTENTS OF ONE VIAL BY MOUTH EVERY 4 HOURS AS NEEDED FOR SHORTNESS OF BREATH OR DYSPNEA    Chronic obstructive bronchitis (H)       losartan 100 MG tablet    COZAAR    90 tablet    Take 1 tablet (100 mg) by mouth daily for hypertension.    Hypertension goal BP (blood pressure) < 140/90       metFORMIN 1000 MG tablet    GLUCOPHAGE    180 tablet    Take 1 tablet (1,000 mg) by mouth 2 times daily (with meals) with breakfast and dinner.    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, with long-term current use of insulin (H)       nebulizer/tubing/mouthpiece Kit     1 kit    Use to nebulize medications for COPD    Chronic obstructive pulmonary disease, unspecified COPD type (H)       pantoprazole 40 MG EC tablet    PROTONIX     Take 40 mg by mouth daily Started by Dr. Debbie Rico at [facility]        primidone 50 MG tablet    MYSOLINE    300 tablet    3 tablets 2 times a day and 3.5 tablets at the bedtime for 6 days, then increase to 3 tablets 2 times a day and 4 tablets at the bedtime.    Benign familial tremor       propafenone 150 MG Tabs tablet    RYTHMOL    180 tablet    Take 1 tablet (150 mg) by mouth 2 times daily    Paroxysmal atrial fibrillation (H)       propranolol 40 MG tablet    INDERAL    180 tablet    Take 2-3 tablets ( mg) by mouth 2 times daily    Benign familial tremor       simvastatin 80 MG tablet    ZOCOR    45 tablet    Take 0.5 tablets (40 mg) by mouth At Bedtime Profile Rx: patient will contact pharmacy when needed    Hyperlipidemia LDL goal <100       STIOLTO RESPIMAT 2.5-2.5 MCG/ACT Aers   Generic drug:  tiotropium-olodaterol      Inhale 2 puffs into the lungs daily        sulfaSALAzine 500 MG tablet    AZULFIDINE    90 tablet    TAKE ONE TABLET BY MOUTH THREE TIMES A DAY    Ulcerative colitis, unspecified       tamsulosin 0.4 MG capsule    FLOMAX    90 capsule    Take 1 capsule (0.4 mg) by mouth daily    Benign prostatic hyperplasia with urinary frequency        warfarin 5 MG tablet    COUMADIN    150 tablet    Take as directed by Coumadin clinic. Current dose (4/5/18): 10 mg on Mon, Thu, Sat + 7.5 mg all other days    Atrial fibrillation, unspecified type (H)

## 2018-06-06 NOTE — MR AVS SNAPSHOT
Cayetano Lunsford   6/6/2018 10:00 AM   Anticoagulation Therapy Visit    Description:  73 year old male   Provider:   INR CLINIC   Department:   Nurse           INR as of 6/6/2018     Today's INR       Anticoagulation Summary as of 6/6/2018     INR goal 2.0-3.0   Today's INR    Full warfarin instructions 10 mg on Mon, Thu, Sat; 7.5 mg all other days   Next INR check 6/20/2018    Indications   Long-term (current) use of anticoagulants [Z79.01] [Z79.01]  Atrial fibrillation (H) [I48.91]         Your next Anticoagulation Clinic appointment(s)     Jun 20, 2018 10:00 AM CDT   Anticoagulation Visit with  INR CLINIC   [facility]    [address] [place] [address] [phone]              Contact Numbers     [facility]  Please call [phone] to cancel and/or reschedule your appointment   Please call [phone] with any problems or questions regarding your therapy.        June 2018 Details    Sun Mon Tue Wed Thu Fri Sat          1               2                 3               4               5               6      7.5 mg   See details      7      10 mg         8      7.5 mg         9      10 mg           10      7.5 mg         11      10 mg         12      7.5 mg         13      7.5 mg         14      10 mg         15      7.5 mg         16      10 mg           17      7.5 mg         18      10 mg         19      7.5 mg         20            21               22               23                 24               25               26               27               28               29               30                Date Details   06/06 This INR check       Date of next INR:  6/20/2018         How to take your warfarin dose     To take:  7.5 mg Take 1.5 of the 5 mg tablets.    To take:  10 mg Take 2 of the 5 mg tablets.

## 2018-06-06 NOTE — PROGRESS NOTES
"  ANTICOAGULATION FOLLOW-UP CLINIC VISIT    Patient Name:  Cayetano Lunsford  Date:  6/6/2018  Contact Type:  Face to Face    SUBJECTIVE:     Patient Findings     Positives Change in medications (Primidone increased at Neuro ov today. Per up to  date-Monitor for decreased therapeutic effects of oral anticoagulants if a barbiturate is initiated/dose increased (anticoagulant dose increases of 30% to 60% may be needed, depending on INR or PT results).)    Comments Colonoscopy completed on 5/25 findings: \"One 4 to 5 mm polyp in the ascending colon,                             removed with a cold snare. Resected and retrieved.                             - The entire examined colon is normal, Hernandez score                             0. Biopsied.   Recommendation:           - Await biopsy results.                             - Follow up in clinic in the next 6-12 months,                             sooner if symptoms warrant.                             - Repeat colonoscopy in 2 years.\"           OBJECTIVE    INR Protime   Date Value Ref Range Status   06/06/2018 2.7 (A) 0.86 - 1.14 Final       ASSESSMENT / PLAN  INR assessment THER    Recheck INR In: 2 WEEKS    INR Location Clinic      Anticoagulation Summary as of 6/6/2018     INR goal 2.0-3.0   Today's INR 2.7   Warfarin maintenance plan 10 mg (5 mg x 2) on Mon, Thu, Sat; 7.5 mg (5 mg x 1.5) all other days   Full warfarin instructions 10 mg on Mon, Thu, Sat; 7.5 mg all other days   Weekly warfarin total 60 mg   No change documented Estelle Prado   Plan last modified Kirstin Meléndez RN (11/2/2017)   Next INR check 6/20/2018   Priority INR   Target end date     Indications   Long-term (current) use of anticoagulants [Z79.01] [Z79.01]  Atrial fibrillation (H) [I48.91]         Anticoagulation Episode Summary     INR check location     Preferred lab     Send INR reminders to [facility]    Comments       Anticoagulation Care Providers     Provider Role Specialty " Phone number    Debbie Lewis MD [facility] [phone]            See the Encounter Report to view Anticoagulation Flowsheet and Dosing Calendar (Go to Encounters tab in chart review, and find the Anticoagulation Therapy Visit)    ACC team to recheck INR sooner to monitor possible effects of Primidone increase on INR.     Patient aware if signs of clotting (pain, tenderness, swelling, color change in leg or arm, SOB) and bleeding occur (blood in stool, urine, large bruising, bleeding gums, nosebleeds) to have INR check sooner. If sx severe report to ER or concerned for stroke call 911. If general questions or concerns arise, call clinic.         Estelle Prado RN

## 2018-06-06 NOTE — PATIENT INSTRUCTIONS
AFTER VISIT SUMMARY (AVS):    At today's visit we discussed current symptoms, available treatment options, and the plan.    The following medications were changed/discussed:  1. Primidone (MYSOLINE) 50 MG tablet: 3 tablets 2 times a day and 3.5 tablets at the bedtime for 6 days, then increase to 3 tablets 2 times a day and 4 tablets at the bedtime.  Keep in mind that your warfarin dose might need to be adjusted, and INR should be followed closely.  Side effects were discussed.  Please contact my clinic if you develop any of intolerable side effects.  2.  Continue propranolol without changes.    Next follow-up appointment is in the next 3 months or earlier if needed.    Please do not hesitate to call me with any questions or concerns.    Thanks.

## 2018-06-18 DIAGNOSIS — N18.1 TYPE 2 DIABETES MELLITUS WITH STAGE 1 CHRONIC KIDNEY DISEASE, WITH LONG-TERM CURRENT USE OF INSULIN (H): ICD-10-CM

## 2018-06-18 DIAGNOSIS — Z79.4 TYPE 2 DIABETES MELLITUS WITH STAGE 1 CHRONIC KIDNEY DISEASE, WITH LONG-TERM CURRENT USE OF INSULIN (H): ICD-10-CM

## 2018-06-18 DIAGNOSIS — E11.22 TYPE 2 DIABETES MELLITUS WITH STAGE 1 CHRONIC KIDNEY DISEASE, WITH LONG-TERM CURRENT USE OF INSULIN (H): ICD-10-CM

## 2018-06-18 NOTE — TELEPHONE ENCOUNTER
Requested Prescriptions   Pending Prescriptions Disp Refills     metFORMIN (GLUCOPHAGE) 1000 MG tablet [Pharmacy Med Name: METFORMIN HCL 1000MG TABS]  Last Written Prescription Date:  6/20/2017  Last Fill Quantity: 180 TABLET,  # refills: 3   Last Office Visit: 5/18/2018   Future Office Visit:    Next 5 appointments (look out 90 days)     Jun 28, 2018  9:45 AM CDT   Return Visit with Natanael Dang MD   [facility] ([facility])    [address] [facility] [address]  [facility] [address] [phone] OPT 2            Sep 05, 2018 10:30 AM CDT   Return Visit with Bong Yoo MD   [facility] ([facility])    [address] [place] [address] [phone]                180 tablet 3     Sig: TAKE ONE TABLET BY MOUTH TWICE A DAY WITH BREAKFAST AND DINNER    Biguanide Agents Passed    6/18/2018  9:30 AM       Passed - Blood pressure less than 140/90 in past 6 months    BP Readings from Last 3 Encounters:   06/06/18 126/66   05/25/18 117/89   05/18/18 118/71          Passed - Patient has documented LDL within the past 12 mos.    Recent Labs   Lab Test  05/01/18   0736   LDL  89          Passed - Patient has had a Microalbumin in the past 12 mos.    Recent Labs   Lab Test  05/01/18   0736   MICROL  40   UMALCR  55.49*          Passed - Patient is age 10 or older       Passed - Patient has documented A1c within the specified period of time.    If HgbA1C is 8 or greater, it needs to be on file within the past 3 months.  If less than 8, must be on file within the past 6 months.     Recent Labs   Lab Test  05/01/18   0736   A1C  5.3          Passed - Patient's CR is NOT>1.4 OR Patient's EGFR is NOT<45 within past 12 mos.    Recent Labs   Lab Test  05/01/18   0736   GFRESTIMATED  >90   GFRESTBLACK  >90     Recent Labs   Lab Test  05/01/18   0736   CR  0.77          Passed - Patient does NOT have a diagnosis of CHF.  "      Passed - Recent (6 mo) or future (30 days) visit within the authorizing provider's specialty    Patient had office visit in the last 6 months or has a visit in the next 30 days with authorizing provider or within the authorizing provider's specialty.  See \"Patient Info\" tab in inbasket, or \"Choose Columns\" in Meds & Orders section of the refill encounter.         _________________________________________________________________________________________________________________________       ONETOUCH ULTRA test strip [Pharmacy Med Name: ONETOUCH ULTRA BLUE  STRP]  Last Written Prescription Date:  3/13/2017  Last Fill Quantity: 360 ,  # refills: 3   Last Office Visit: 5/18/2018   Future Office Visit:    Next 5 appointments (look out 90 days)     Jun 28, 2018  9:45 AM CDT   Return Visit with Natanael Dang MD   [facility] ([facility])    [address] [id number] [phone] OPT 2            Sep 05, 2018 10:30 AM CDT   Return Visit with Bong Yoo MD   [facility] ([facility])    [address] [place] [address] [phone]                400 each 3     Sig: USE TO TEST BLOOD SUGAR FOUR TIMES A DAY OR AS DIRECTED    Diabetic Supplies Protocol Passed    6/18/2018  9:30 AM       Passed - Patient is 18 years of age or older       Passed - Recent (6 mo) or future (30 days) visit within the authorizing provider's specialty    Patient had office visit in the last 6 months or has a visit in the next 30 days with authorizing provider.  See \"Patient Info\" tab in inbasket, or \"Choose Columns\" in Meds & Orders section of the refill encounter.              "

## 2018-06-20 ENCOUNTER — ANTICOAGULATION THERAPY VISIT (OUTPATIENT)
Dept: NURSING | Facility: CLINIC | Age: 73
End: 2018-06-20
Payer: COMMERCIAL

## 2018-06-20 DIAGNOSIS — I48.91 ATRIAL FIBRILLATION, UNSPECIFIED TYPE (H): ICD-10-CM

## 2018-06-20 DIAGNOSIS — Z79.01 LONG-TERM (CURRENT) USE OF ANTICOAGULANTS: ICD-10-CM

## 2018-06-20 LAB — INR POINT OF CARE: 2.7 (ref 0.86–1.14)

## 2018-06-20 PROCEDURE — 36416 COLLJ CAPILLARY BLOOD SPEC: CPT

## 2018-06-20 PROCEDURE — 85610 PROTHROMBIN TIME: CPT | Mod: QW

## 2018-06-20 PROCEDURE — 99207 ZZC NO CHARGE NURSE ONLY: CPT

## 2018-06-20 NOTE — PROGRESS NOTES
ANTICOAGULATION FOLLOW-UP CLINIC VISIT    Patient Name:  Cayetano Lunsford  Date:  6/20/2018  Contact Type:  Face to Face    SUBJECTIVE:     Patient Findings     Positives No Problem Findings    Comments Patient is moving to [place] within the next couple of weeks.            OBJECTIVE    INR Protime   Date Value Ref Range Status   06/20/2018 2.7 (A) 0.86 - 1.14 Final       ASSESSMENT / PLAN  INR assessment THER    Recheck INR In: 3 WEEKS    INR Location Clinic      Anticoagulation Summary as of 6/20/2018     INR goal 2.0-3.0   Today's INR 2.7   Warfarin maintenance plan 10 mg (5 mg x 2) on Mon, Thu, Sat; 7.5 mg (5 mg x 1.5) all other days   Full warfarin instructions 10 mg on Mon, Thu, Sat; 7.5 mg all other days   Weekly warfarin total 60 mg   No change documented Estelle Prado   Plan last modified Kirstin Meléndez RN (11/2/2017)   Next INR check 7/16/2018   Priority INR   Target end date     Indications   Long-term (current) use of anticoagulants [Z79.01] [Z79.01]  Atrial fibrillation (H) [I48.91]         Anticoagulation Episode Summary     INR check location     Preferred lab     Send INR reminders to  [facility]    Comments       Anticoagulation Care Providers     Provider Role Specialty Phone number    Debbie Lewis MD [facility] [phone]            See the Encounter Report to view Anticoagulation Flowsheet and Dosing Calendar (Go to Encounters tab in chart review, and find the Anticoagulation Therapy Visit)    Patient aware if signs of clotting (pain, tenderness, swelling, color change in leg or arm, SOB) and bleeding occur (blood in stool, urine, large bruising, bleeding gums, nosebleeds) to have INR check sooner. If sx severe report to ER or concerned for stroke call 911. If general questions or concerns arise, call clinic.         Estelle Prado RN

## 2018-06-20 NOTE — MR AVS SNAPSHOT
Cayetano Lunsford   6/20/2018 10:00 AM   Anticoagulation Therapy Visit    Description:  73 year old male   Provider:   INR CLINIC   Department:   Nurse           INR as of 6/20/2018     Today's INR 2.7      Anticoagulation Summary as of 6/20/2018     INR goal 2.0-3.0   Today's INR 2.7   Full warfarin instructions 10 mg on Mon, Thu, Sat; 7.5 mg all other days   Next INR check 7/16/2018    Indications   Long-term (current) use of anticoagulants [Z79.01] [Z79.01]  Atrial fibrillation (H) [I48.91]         Your next Anticoagulation Clinic appointment(s)     Jul 16, 2018 11:30 AM CDT   Anticoagulation Visit with  INR CLINIC   [facility] ([facility])    [address] [place] [address] [phone]              Contact Numbers     [facility]  Please call [phone] to cancel and/or reschedule your appointment   Please call [phone] with any problems or questions regarding your therapy.        June 2018 Details    Sun Mon Tue Wed Thu Fri Sat          1               2                 3               4               5               6               7               8               9                 10               11               12               13               14               15               16                 17               18               19               20      7.5 mg   See details      21      10 mg         22      7.5 mg         23      10 mg           24      7.5 mg         25      10 mg         26      7.5 mg         27      7.5 mg         28      10 mg         29      7.5 mg         30      10 mg          Date Details   06/20 This INR check               How to take your warfarin dose     To take:  7.5 mg Take 1.5 of the 5 mg tablets.    To take:  10 mg Take 2 of the 5 mg tablets.           July 2018 Details    Sun Mon Tue Wed Thu Fri Sat     1      7.5 mg         2      10 mg         3      7.5 mg         4      7.5 mg         5      10  mg         6      7.5 mg         7      10 mg           8      7.5 mg         9      10 mg         10      7.5 mg         11      7.5 mg         12      10 mg         13      7.5 mg         14      10 mg           15      7.5 mg         16            17               18               19               20               21                 22               23               24               25               26               27               28                 29               30               31                    Date Details   No additional details    Date of next INR:  7/16/2018         How to take your warfarin dose     To take:  7.5 mg Take 1.5 of the 5 mg tablets.    To take:  10 mg Take 2 of the 5 mg tablets.

## 2018-06-22 NOTE — TELEPHONE ENCOUNTER
Patient came in to check on this he states that he is out. Please address asap and send to our pharmacy.

## 2018-06-28 ENCOUNTER — OFFICE VISIT (OUTPATIENT)
Dept: CARDIOLOGY | Facility: CLINIC | Age: 73
End: 2018-06-28
Payer: COMMERCIAL

## 2018-06-28 VITALS
WEIGHT: 190.1 LBS | HEART RATE: 56 BPM | SYSTOLIC BLOOD PRESSURE: 106 MMHG | DIASTOLIC BLOOD PRESSURE: 48 MMHG | BODY MASS INDEX: 30.55 KG/M2 | HEIGHT: 66 IN

## 2018-06-28 DIAGNOSIS — I25.10 CAD IN NATIVE ARTERY: ICD-10-CM

## 2018-06-28 DIAGNOSIS — I48.0 PAROXYSMAL ATRIAL FIBRILLATION (H): ICD-10-CM

## 2018-06-28 DIAGNOSIS — I10 HYPERTENSION GOAL BP (BLOOD PRESSURE) < 140/90: ICD-10-CM

## 2018-06-28 DIAGNOSIS — I48.91 ATRIAL FIBRILLATION, UNSPECIFIED TYPE (H): Primary | ICD-10-CM

## 2018-06-28 DIAGNOSIS — E78.5 HYPERLIPIDEMIA LDL GOAL <100: ICD-10-CM

## 2018-06-28 PROCEDURE — 99214 OFFICE O/P EST MOD 30 MIN: CPT | Performed by: INTERNAL MEDICINE

## 2018-06-28 RX ORDER — PROPAFENONE HYDROCHLORIDE 150 MG/1
150 TABLET, COATED ORAL 2 TIMES DAILY
Qty: 180 TABLET | Refills: 3 | Status: SHIPPED | OUTPATIENT
Start: 2018-06-28 | End: 2019-07-29

## 2018-06-28 NOTE — LETTER
"6/28/2018    Debbie Lewis MD, MD  [address]  [facility] [address]    RE: Cayetano LEANN Lunsford       Dear Colleague,    I had the pleasure of seeing Cayetano Lunsford in the [facility].    HPI and Plan:   See dictation        No orders of the defined types were placed in this encounter.    No orders of the defined types were placed in this encounter.    There are no discontinued medications.      Encounter Diagnoses   Name Primary?     Atrial fibrillation, unspecified type (H) Yes     Hyperlipidemia LDL goal <100      Hypertension goal BP (blood pressure) < 140/90      CAD in native artery        CURRENT MEDICATIONS:  Current Outpatient Prescriptions   Medication Sig Dispense Refill     albuterol (PROAIR HFA/PROVENTIL HFA/VENTOLIN HFA) 108 (90 BASE) MCG/ACT Inhaler Inhale 1-2 puffs into the lungs every 4 hours as needed (for shortness of breath/wheezing) 1 Inhaler 5     azelastine (ASTELIN) 0.1 % spray Spray 1-2 sprays into both nostrils 2 times daily 1 Bottle 11     B-D INSULIN SYRINGE 31G X 5/16\" 0.5 ML USE 1 SYRINGE TWO TIMES A DAY OR AS DIRECTED 100 each 1     BD ULTRA FINE PEN NEEDLES Use to inject insulin twice daily. 100 each PRN     folic acid 0.8 MG CAPS Take 1 tablet by mouth daily 90 capsule 3     FREESTYLE LANCETS MISC Use to test up to four times per day 100 Each 12     insulin NPH (NOVOLIN N RELION) 100 UNIT/ML injection Inject 5 units under the skin at breakfast and 15 units at dinner. 3 vial 3     insulin regular (NOVOLIN R RELION) 100 UNIT/ML injection Inject 5 units with breakfast and 2 units with dinner (when mixing with NPH, draw this insulin up first) 30 mL 2     ipratropium - albuterol 0.5 mg/2.5 mg/3 mL (DUONEB) 0.5-2.5 (3) MG/3ML neb solution NEBULIZE CONTENTS OF ONE VIAL BY MOUTH EVERY 4 HOURS AS NEEDED FOR SHORTNESS OF BREATH OR DYSPNEA 270 mL 8     losartan (COZAAR) 100 MG tablet Take 1 tablet (100 mg) by mouth daily for hypertension. 90 tablet 3     " metFORMIN (GLUCOPHAGE) 1000 MG tablet TAKE ONE TABLET BY MOUTH TWICE A DAY WITH BREAKFAST AND DINNER 180 tablet 1     ONETOUCH ULTRA test strip USE TO TEST BLOOD SUGAR FOUR TIMES A DAY OR AS DIRECTED 400 each 1     pantoprazole (PROTONIX) 40 MG enteric coated tablet Take 40 mg by mouth daily Started by Dr. Debbie Rico at [facility]       primidone (MYSOLINE) 50 MG tablet 3 tablets 2 times a day and 3.5 tablets at the bedtime for 6 days, then increase to 3 tablets 2 times a day and 4 tablets at the bedtime. (Patient taking differently: 3 tablets 2 times a day and 4 tablets at the bedtime.) 300 tablet 2     Probiotic Product (FLORAJEN3) CAPS Take 1 capsule by mouth 2 times daily 60 capsule 0     propafenone (RYTHMOL) 150 MG TABS tablet Take 1 tablet (150 mg) by mouth 2 times daily 180 tablet 3     propranolol (INDERAL) 40 MG tablet Take 2-3 tablets ( mg) by mouth 2 times daily 180 tablet 10     Respiratory Therapy Supplies (NEBULIZER/TUBING/MOUTHPIECE) KIT Use to nebulize medications for COPD 1 kit 0     simvastatin (ZOCOR) 80 MG tablet Take 0.5 tablets (40 mg) by mouth At Bedtime Profile Rx: patient will contact pharmacy when needed 45 tablet 3     sulfaSALAzine (AZULFIDINE) 500 MG tablet TAKE ONE TABLET BY MOUTH THREE TIMES A DAY 90 tablet 11     tamsulosin (FLOMAX) 0.4 MG capsule Take 1 capsule (0.4 mg) by mouth daily 90 capsule 3     tiotropium-olodaterol (STIOLTO RESPIMAT) 2.5-2.5 MCG/ACT AERS Inhale 2 puffs into the lungs daily       warfarin (COUMADIN) 5 MG tablet Take as directed by Coumadin clinic. Current dose (4/5/18): 10 mg on Mon, Thu, Sat + 7.5 mg all other days 150 tablet 1     ASPIRIN NOT PRESCRIBED, INTENTIONAL, Reported on 5/17/2017         ALLERGIES     Allergies   Allergen Reactions     Percodan [Oxycodone-Aspirin] Nausea and Vomiting       PAST MEDICAL HISTORY:  Past Medical History:   Diagnosis Date     Allergic rhinitis, cause unspecified      Atrial fibrillation (H)      BPH (benign  "prostatic hyperplasia)      CAD (coronary artery disease)      Chronic airway obstruction, not elsewhere classified      Hyperlipidemia LDL goal <100 5/9/2010     Hypertension goal BP (blood pressure) < 130/80 10/19/2006     Obesity (BMI 30-39.9)      Perforation of tympanic membrane, unspecified     Right TM     Tachycardia, unspecified      Type 2 diabetes, HbA1C goal < 8% (H) 5/2/2010     Unspecified cardiovascular disease        PAST SURGICAL HISTORY:  Past Surgical History:   Procedure Laterality Date     CARDIAC SURGERY       COLONOSCOPY  3/31/2011     COLONOSCOPY N/A 5/25/2018    Procedure: COMBINED COLONOSCOPY, SINGLE OR MULTIPLE BIOPSY/POLYPECTOMY BY BIOPSY;;  Surgeon: Juan Simon DO;  Location:  GI     CORONARY ANGIOGRAPHY ADULT ORDER  2001 and 2008 2001 at [facility]. 2008- No interventions     HC REMOVAL OF NAIL PLATE SIMPLE SINGLE  11/10/2011    Right 2nd Toenail     HERNIA REPAIR      left inguinal     SURGICAL HISTORY OF -   1987    right ear graft     SURGICAL HISTORY OF -   1992    right shoulder surgery     SURGICAL HISTORY OF -   1979    back surgery     SURGICAL HISTORY OF -   1978    vasectomy       FAMILY HISTORY:  Family History   Problem Relation Age of Onset     Circulatory Mother      blood clot in leg     Diabetes Mother      type 2     Allergies Mother      Arthritis Mother      GASTROINTESTINAL DISEASE Mother      Neurologic Disorder Mother      Familiar tremors     Neurologic Disorder Father      brain tumor     Cerebrovascular Disease Paternal Grandfather      Hypertension Brother      Hypertension Sister      Diabetes Sister      Type 2     Allergies Sister      Lipids Brother      Lipids Sister      Neurologic Disorder Sister      \"     Neurologic Disorder Sister      \"     Neurologic Disorder Sister      \"       SOCIAL HISTORY:  Social History     Social History     Marital status:      Spouse name: Izabel     Number of children: 2     Years of education: 12 " "    Occupational History            Retired     Social History Main Topics     Smoking status: Former Smoker     Packs/day: 1.00     Years: 30.00     Types: Cigarettes     Quit date: 3/19/1992     Smokeless tobacco: Never Used     Alcohol use 0.0 oz/week     0 Standard drinks or equivalent per week      Comment: hardly at all, sometimes at dinner     Drug use: No     Sexual activity: Yes     Partners: Female     Other Topics Concern     Parent/Sibling W/ Cabg, Mi Or Angioplasty Before 65f 55m? No     Caffeine Concern No     No Caffeine     Exercise Yes     20 minutes - Walking- summer 2 x day     Social History Narrative         Review of Systems:  Skin:  Negative       Eyes:  Positive for glasses    ENT:  Negative      Respiratory:  Positive for dyspnea on exertion     Cardiovascular:  Negative      Gastroenterology: Negative      Genitourinary:  Negative      Musculoskeletal:  Positive for arthritis    Neurologic:  Negative      Psychiatric:  Negative      Heme/Lymph/Imm:  Positive for allergies seasonal  Endocrine:  Positive for diabetes      Physical Exam:  Vitals: /48  Pulse 56  Ht 1.676 m (5' 6\")  Wt 86.2 kg (190 lb 1.6 oz)  BMI 30.68 kg/m2    Constitutional:  cooperative, alert and oriented, well developed, well nourished, in no acute distress overweight mildly    Skin:  warm and dry to the touch          Head:  normocephalic        Eyes:  pupils equal and round;sclera white        Lymph:      ENT:  no pallor or cyanosis        Neck:  JVP normal;no carotid bruit        Respiratory:    prolonged expiration;diminished breath sounds bilaterally       Cardiac: regular rhythm;no murmurs, gallops or rubs detected                not assessed this visit                                        GI:  abdomen soft;no masses;non-tender        Extremities and Muscular Skeletal:  no deformities, clubbing, cyanosis, erythema observed   trace          Neurological:  affect appropriate    "     Psych:  oriented to time, person and place        Recent Lab Results:  LIPID RESULTS:  Lab Results   Component Value Date    CHOL 163 05/01/2018    HDL 42 05/01/2018    LDL 89 05/01/2018    TRIG 159 (H) 05/01/2018    CHOLHDLRATIO 3.2 05/12/2015       LIVER ENZYME RESULTS:  Lab Results   Component Value Date    AST 20 03/13/2017    ALT 22 03/13/2017       CBC RESULTS:  Lab Results   Component Value Date    WBC 7.9 03/13/2017    RBC 4.11 (L) 03/13/2017    HGB 12.4 (L) 03/13/2017    HCT 39.4 (L) 03/13/2017    MCV 96 03/13/2017    MCH 30.2 03/13/2017    MCHC 31.5 03/13/2017    RDW 13.9 03/13/2017     03/13/2017       BMP RESULTS:  Lab Results   Component Value Date     05/01/2018    POTASSIUM 4.2 05/01/2018    CHLORIDE 103 05/01/2018    CO2 26 05/01/2018    ANIONGAP 10 05/01/2018     (H) 05/01/2018    BUN 13 05/01/2018    CR 0.77 05/01/2018    GFRESTIMATED >90 05/01/2018    GFRESTBLACK >90 05/01/2018    LEBRON 8.8 05/01/2018        A1C RESULTS:  Lab Results   Component Value Date    A1C 5.3 05/01/2018       INR RESULTS:  Lab Results   Component Value Date    INR 2.7 (A) 06/20/2018    INR 2.7 (A) 06/06/2018    INR 3.20 (H) 05/01/2018    INR 2.4 04/16/2018           CC  Debbie Lewis MD  [address] [place] [address]                    Thank you for allowing me to participate in the care of your patient.      Sincerely,     CRUZITO ALVARADO MD     [facility]    cc:   Debbie Lewis MD  [address] [place] [address]

## 2018-06-28 NOTE — PROGRESS NOTES
Service Date: 06/28/2018      HISTORY OF PRESENT ILLNESS:  A quick note on Cayetano Lunsford.  He is a very sweet 73-year-old man who has had paroxysmal atrial fibrillation that was first noted in 2007.  Since that time he has essentially been asymptomatic and is clinically in sinus rhythm while on propafenone 150 mg b.i.d.  He is also on warfarin for stroke prophylaxis.  Happily, he has not had any obvious signs of complications with the propafenone denying chest pain, palpitations, shortness of breath, dizziness or syncope.        He recently moved into an [facility] senior facility for efficiency reasons, and he has appreciated the change.  He is short of breath on exertion, but he relates that to age, being overweight and some emphysema/COPD.      He has a history of ulcerative colitis, but that has been relatively quiet.        He has had chronic diabetes and hyperlipidemia, both have been nicely controlled medically.      REVIEW OF SYSTEMS:  Outlined in Epic.  I endorse the findings, but basically it is only positive for shortness of breath on exertion.      SOCIAL HISTORY:  He lives alone.  He drives a car.  He is retired.  He does not smoke.  He is a little overweight, and he is limited by his breathing so he cannot really push himself in terms of calorie-burning activities.      CURRENT MEDICATIONS:  His current medication program is rather extensive.  Purely from a cardiac standpoint, he is on:   1.  Warfarin to an INR of 2.0-3.0, which has been very stable.   2.  Losartan 100 mg a day.   3.  Propafenone 150 mg b.i.d.   4.  Simvastatin 80 mg at bedtime with an LDL at goal.        He is not on aspirin, as we have not had to treat him for significant arterial disease and he is on warfarin.      He also takes Flomax, Azulfidine, Inderal 80 mg b.i.d., probiotic, Mysoline daily, Protonix daily, metformin, albuterol p.r.n., insulin, folic acid.      PHYSICAL EXAMINATION:   GENERAL:  Reveals a slightly rotund  gentleman with a blood pressure of 106/50, heart rate 60 and regular.  He weighs 190 pounds.   HEAD:  Normal.   NECK:  He has no neck vein distention or bruit at 30 degrees.   HEART:  Regular without gallop or murmur.   LUNGS:  Clear.   ABDOMEN:  Soft, rotund, nontender without guarding or mass.   EXTREMITIES:  Free of edema.      I had a long discussion about the options of stopping propafenone and seeing how he does.  He says he has been so very happy not to be troubled by the arrhythmia that after discussion we agreed that he would stay on it.  If I make any choices when I treat atrial fibrillation, I tend overtreat it because people are so much happier when they have a quiet stable rhythm.  I reviewed the possibility of stopping propafenone should he go into fixed but controlled atrial fibrillation.  He understands.      Warfarin, I think is appropriate.  He has not had any complications with it, and his INRs have been happily very stable.  I hope that continues.  I did not get into the novel anticoagulant issue.      IMPRESSION:   1.  Remarkably stable normal sinus rhythm clinically.   2.  Paroxysmal atrial fibrillation.   3.  Warfarin anticoagulation.   4.  Treated hypertension.   5.  Treated hyperlipidemia.   6.  Treated diabetes mellitus.      PLAN:  I renewed his propafenone.  I encouraged him to stay on warfarin.  We agreed to get together in a year.  If he has problems, let me know.  Warm regards.      Thanks for the privilege.      cc:   [place] [address]         CRUZITO ALVARADO MD, [facility]             D: 2018   T: 2018   MT: YEMI      Name:     CAROLANN SAUNDERS   MRN:      [id number]        Account:      [id number]   :      1945           Service Date: 2018      Document: [id number]

## 2018-06-28 NOTE — MR AVS SNAPSHOT
After Visit Summary   6/28/2018    Cayetano Lunsford    MRN: [id number]           Patient Information     Date Of Birth          1945        Visit Information        Provider Department      6/28/2018 9:45 AM Natanael Dang MD [facility]        Today's Diagnoses     Atrial fibrillation, unspecified type (H)    -  1    Hyperlipidemia LDL goal <100        Hypertension goal BP (blood pressure) < 140/90        CAD in native artery        Paroxysmal atrial fibrillation (H)           Follow-ups after your visit        Your next 10 appointments already scheduled     Jul 16, 2018 11:30 AM CDT   Anticoagulation Visit with  INR CLINIC   [facility] ([facility])    [address] [place] [id number] [phone]            Jul 23, 2018 10:00 AM CDT   TELEMEDICINE with Jessica Diaz [facility] ([facility])    [address] [place] [address] [phone]           Note: this is not an onsite visit; there is no need to come to the facility.            Sep 05, 2018 10:30 AM CDT   Return Visit with Bong Yoo MD   [facility] ([facility])    [address] [place] [address] [phone]              Who to contact     If you have questions or need follow up information about today's clinic visit or your schedule please contact [facility] directly at [phone].  Normal or non-critical lab and imaging results will be communicated to you by MyChart, letter or phone within 4 business days after the clinic has received the results. If you do not hear from us within 7 days, please contact the clinic through MyChart or phone. If you have a critical or abnormal lab result, we will notify you by phone as soon as possible.  Submit refill requests through [employer] or  "call your pharmacy and they will forward the refill request to us. Please allow 3 business days for your refill to be completed.          Additional Information About Your Visit        Care EveryWhere ID     This is your Care EveryWhere ID. This could be used by other organizations to access your [facility] medical records  [id number]        Your Vitals Were     Pulse Height BMI (Body Mass Index)             56 1.676 m (5' 6\") 30.68 kg/m2          Blood Pressure from Last 3 Encounters:   06/28/18 106/48   06/06/18 126/66   05/25/18 117/89    Weight from Last 3 Encounters:   06/28/18 86.2 kg (190 lb 1.6 oz)   06/06/18 88 kg (194 lb)   05/25/18 88.9 kg (196 lb)              Today, you had the following     No orders found for display         Today's Medication Changes          These changes are accurate as of 6/28/18 10:16 AM.  If you have any questions, ask your nurse or doctor.               These medicines have changed or have updated prescriptions.        Dose/Directions    primidone 50 MG tablet   Commonly known as:  MYSOLINE   This may have changed:  additional instructions   Used for:  Benign familial tremor        3 tablets 2 times a day and 3.5 tablets at the bedtime for 6 days, then increase to 3 tablets 2 times a day and 4 tablets at the bedtime.   Quantity:  300 tablet   Refills:  2            Where to get your medicines      These medications were sent to [facility] [address]  [address], [facility] [address]     Phone:  [phone]     propafenone 150 MG Tabs tablet                Primary Care Provider Office Phone # Fax #    Debbie Lewis [phone]       [address]  [facility] [address]        Equal Access to Services     [facility] : Snow Helms, carlos hu, erendira burnette. So [facility] [phone].    ATENCIÓN: Si habla español, tiene a jurado disposición " "servicios gratuitos de asistencia lingüística. Britta felix [phone].    We comply with applicable federal civil rights laws and [place] laws. We do not discriminate on the basis of race, color, national origin, age, disability, sex, sexual orientation, or gender identity.            Thank you!     Thank you for choosing [facility]  for your care. Our goal is always to provide you with excellent care. Hearing back from our patients is one way we can continue to improve our services. Please take a few minutes to complete the written survey that you may receive in the mail after your visit with us. Thank you!             Your Updated Medication List - Protect others around you: Learn how to safely use, store and throw away your medicines at www.disposemymeds.org.          This list is accurate as of 6/28/18 10:16 AM.  Always use your most recent med list.                   Brand Name Dispense Instructions for use Diagnosis    albuterol 108 (90 Base) MCG/ACT Inhaler    PROAIR HFA/PROVENTIL HFA/VENTOLIN HFA    1 Inhaler    Inhale 1-2 puffs into the lungs every 4 hours as needed (for shortness of breath/wheezing)    Chronic obstructive pulmonary disease, unspecified COPD type (H)       ASPIRIN NOT PRESCRIBED    INTENTIONAL     Reported on 5/17/2017        azelastine 0.1 % spray    ASTELIN    1 Bottle    Spray 1-2 sprays into both nostrils 2 times daily    Seasonal allergic rhinitis, unspecified allergic rhinitis trigger       B-D INSULIN SYRINGE 31G X 5/16\" 0.5 ML   Generic drug:  insulin syringe-needle U-100     100 each    USE 1 SYRINGE TWO TIMES A DAY OR AS DIRECTED    Type 2 diabetes, HbA1C goal < 8% (H)       BD ULTRA FINE PEN NEEDLES     100 each    Use to inject insulin twice daily.    Type 2 diabetes, HbA1C goal < 8% (H)       blood glucose monitoring lancets     100 Each    Use to test up to four times per day    Type II or unspecified type diabetes mellitus without " mention of complication, not stated as uncontrolled       FLORAJEN3 Caps     60 capsule    Take 1 capsule by mouth 2 times daily    Acute cystitis without hematuria       folic acid 0.8 MG Caps     90 capsule    Take 1 tablet by mouth daily    Ulcerative colitis without complications, unspecified location (H)       insulin  UNIT/ML injection    NovoLIN N RELION    3 vial    Inject 5 units under the skin at breakfast and 15 units at dinner.    Type 2 diabetes mellitus with hypoglycemia without coma, with long-term current use of insulin (H)       insulin regular 100 UNIT/ML injection    NovoLIN R RELION    30 mL    Inject 5 units with breakfast and 2 units with dinner (when mixing with NPH, draw this insulin up first)    Type 2 diabetes mellitus with hypoglycemia without coma, with long-term current use of insulin (H)       ipratropium - albuterol 0.5 mg/2.5 mg/3 mL 0.5-2.5 (3) MG/3ML neb solution    DUONEB    270 mL    NEBULIZE CONTENTS OF ONE VIAL BY MOUTH EVERY 4 HOURS AS NEEDED FOR SHORTNESS OF BREATH OR DYSPNEA    Chronic obstructive bronchitis (H)       losartan 100 MG tablet    COZAAR    90 tablet    Take 1 tablet (100 mg) by mouth daily for hypertension.    Hypertension goal BP (blood pressure) < 140/90       metFORMIN 1000 MG tablet    GLUCOPHAGE    180 tablet    TAKE ONE TABLET BY MOUTH TWICE A DAY WITH BREAKFAST AND DINNER    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, with long-term current use of insulin (H)       nebulizer/tubing/mouthpiece Kit     1 kit    Use to nebulize medications for COPD    Chronic obstructive pulmonary disease, unspecified COPD type (H)       ONETOUCH ULTRA test strip   Generic drug:  blood glucose monitoring     400 each    USE TO TEST BLOOD SUGAR FOUR TIMES A DAY OR AS DIRECTED    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, with long-term current use of insulin (H)       pantoprazole 40 MG EC tablet    PROTONIX     Take 40 mg by mouth  daily Started by Dr. Debbie Rico at [facility]        primidone 50 MG tablet    MYSOLINE    300 tablet    3 tablets 2 times a day and 3.5 tablets at the bedtime for 6 days, then increase to 3 tablets 2 times a day and 4 tablets at the bedtime.    Benign familial tremor       propafenone 150 MG Tabs tablet    RYTHMOL    180 tablet    Take 1 tablet (150 mg) by mouth 2 times daily    Paroxysmal atrial fibrillation (H)       propranolol 40 MG tablet    INDERAL    180 tablet    Take 2-3 tablets ( mg) by mouth 2 times daily    Benign familial tremor       simvastatin 80 MG tablet    ZOCOR    45 tablet    Take 0.5 tablets (40 mg) by mouth At Bedtime Profile Rx: patient will contact pharmacy when needed    Hyperlipidemia LDL goal <100       STIOLTO RESPIMAT 2.5-2.5 MCG/ACT Aers   Generic drug:  tiotropium-olodaterol      Inhale 2 puffs into the lungs daily        sulfaSALAzine 500 MG tablet    AZULFIDINE    90 tablet    TAKE ONE TABLET BY MOUTH THREE TIMES A DAY    Ulcerative colitis, unspecified       tamsulosin 0.4 MG capsule    FLOMAX    90 capsule    Take 1 capsule (0.4 mg) by mouth daily    Benign prostatic hyperplasia with urinary frequency       warfarin 5 MG tablet    COUMADIN    150 tablet    Take as directed by Coumadin clinic. Current dose (4/5/18): 10 mg on Mon, Thu, Sat + 7.5 mg all other days    Atrial fibrillation, unspecified type (H)

## 2018-06-28 NOTE — PROGRESS NOTES
"HPI and Plan:   See dictation        No orders of the defined types were placed in this encounter.    No orders of the defined types were placed in this encounter.    There are no discontinued medications.      Encounter Diagnoses   Name Primary?     Atrial fibrillation, unspecified type (H) Yes     Hyperlipidemia LDL goal <100      Hypertension goal BP (blood pressure) < 140/90      CAD in native artery        CURRENT MEDICATIONS:  Current Outpatient Prescriptions   Medication Sig Dispense Refill     albuterol (PROAIR HFA/PROVENTIL HFA/VENTOLIN HFA) 108 (90 BASE) MCG/ACT Inhaler Inhale 1-2 puffs into the lungs every 4 hours as needed (for shortness of breath/wheezing) 1 Inhaler 5     azelastine (ASTELIN) 0.1 % spray Spray 1-2 sprays into both nostrils 2 times daily 1 Bottle 11     B-D INSULIN SYRINGE 31G X 5/16\" 0.5 ML USE 1 SYRINGE TWO TIMES A DAY OR AS DIRECTED 100 each 1     BD ULTRA FINE PEN NEEDLES Use to inject insulin twice daily. 100 each PRN     folic acid 0.8 MG CAPS Take 1 tablet by mouth daily 90 capsule 3     FREESTYLE LANCETS MISC Use to test up to four times per day 100 Each 12     insulin NPH (NOVOLIN N RELION) 100 UNIT/ML injection Inject 5 units under the skin at breakfast and 15 units at dinner. 3 vial 3     insulin regular (NOVOLIN R RELION) 100 UNIT/ML injection Inject 5 units with breakfast and 2 units with dinner (when mixing with NPH, draw this insulin up first) 30 mL 2     ipratropium - albuterol 0.5 mg/2.5 mg/3 mL (DUONEB) 0.5-2.5 (3) MG/3ML neb solution NEBULIZE CONTENTS OF ONE VIAL BY MOUTH EVERY 4 HOURS AS NEEDED FOR SHORTNESS OF BREATH OR DYSPNEA 270 mL 8     losartan (COZAAR) 100 MG tablet Take 1 tablet (100 mg) by mouth daily for hypertension. 90 tablet 3     metFORMIN (GLUCOPHAGE) 1000 MG tablet TAKE ONE TABLET BY MOUTH TWICE A DAY WITH BREAKFAST AND DINNER 180 tablet 1     ONETOUCH ULTRA test strip USE TO TEST BLOOD SUGAR FOUR TIMES A DAY OR AS DIRECTED 400 each 1     pantoprazole " (PROTONIX) 40 MG enteric coated tablet Take 40 mg by mouth daily Started by Dr. Debbie Rico at [facility]       primidone (MYSOLINE) 50 MG tablet 3 tablets 2 times a day and 3.5 tablets at the bedtime for 6 days, then increase to 3 tablets 2 times a day and 4 tablets at the bedtime. (Patient taking differently: 3 tablets 2 times a day and 4 tablets at the bedtime.) 300 tablet 2     Probiotic Product (FLORAJEN3) CAPS Take 1 capsule by mouth 2 times daily 60 capsule 0     propafenone (RYTHMOL) 150 MG TABS tablet Take 1 tablet (150 mg) by mouth 2 times daily 180 tablet 3     propranolol (INDERAL) 40 MG tablet Take 2-3 tablets ( mg) by mouth 2 times daily 180 tablet 10     Respiratory Therapy Supplies (NEBULIZER/TUBING/MOUTHPIECE) KIT Use to nebulize medications for COPD 1 kit 0     simvastatin (ZOCOR) 80 MG tablet Take 0.5 tablets (40 mg) by mouth At Bedtime Profile Rx: patient will contact pharmacy when needed 45 tablet 3     sulfaSALAzine (AZULFIDINE) 500 MG tablet TAKE ONE TABLET BY MOUTH THREE TIMES A DAY 90 tablet 11     tamsulosin (FLOMAX) 0.4 MG capsule Take 1 capsule (0.4 mg) by mouth daily 90 capsule 3     tiotropium-olodaterol (STIOLTO RESPIMAT) 2.5-2.5 MCG/ACT AERS Inhale 2 puffs into the lungs daily       warfarin (COUMADIN) 5 MG tablet Take as directed by Coumadin clinic. Current dose (4/5/18): 10 mg on Mon, Thu, Sat + 7.5 mg all other days 150 tablet 1     ASPIRIN NOT PRESCRIBED, INTENTIONAL, Reported on 5/17/2017         ALLERGIES     Allergies   Allergen Reactions     Percodan [Oxycodone-Aspirin] Nausea and Vomiting       PAST MEDICAL HISTORY:  Past Medical History:   Diagnosis Date     Allergic rhinitis, cause unspecified      Atrial fibrillation (H)      BPH (benign prostatic hyperplasia)      CAD (coronary artery disease)      Chronic airway obstruction, not elsewhere classified      Hyperlipidemia LDL goal <100 5/9/2010     Hypertension goal BP (blood pressure) < 130/80 10/19/2006     Obesity  "(BMI 30-39.9)      Perforation of tympanic membrane, unspecified     Right TM     Tachycardia, unspecified      Type 2 diabetes, HbA1C goal < 8% (H) 5/2/2010     Unspecified cardiovascular disease        PAST SURGICAL HISTORY:  Past Surgical History:   Procedure Laterality Date     CARDIAC SURGERY       COLONOSCOPY  3/31/2011     COLONOSCOPY N/A 5/25/2018    Procedure: COMBINED COLONOSCOPY, SINGLE OR MULTIPLE BIOPSY/POLYPECTOMY BY BIOPSY;;  Surgeon: Juan Simon DO;  Location:  GI     CORONARY ANGIOGRAPHY ADULT ORDER  2001 and 2008 2001 at [facility]. 2008- No interventions     HC REMOVAL OF NAIL PLATE SIMPLE SINGLE  11/10/2011    Right 2nd Toenail     HERNIA REPAIR      left inguinal     SURGICAL HISTORY OF -   1987    right ear graft     SURGICAL HISTORY OF -   1992    right shoulder surgery     SURGICAL HISTORY OF -   1979    back surgery     SURGICAL HISTORY OF -   1978    vasectomy       FAMILY HISTORY:  Family History   Problem Relation Age of Onset     Circulatory Mother      blood clot in leg     Diabetes Mother      type 2     Allergies Mother      Arthritis Mother      GASTROINTESTINAL DISEASE Mother      Neurologic Disorder Mother      Familiar tremors     Neurologic Disorder Father      brain tumor     Cerebrovascular Disease Paternal Grandfather      Hypertension Brother      Hypertension Sister      Diabetes Sister      Type 2     Allergies Sister      Lipids Brother      Lipids Sister      Neurologic Disorder Sister      \"     Neurologic Disorder Sister      \"     Neurologic Disorder Sister      \"       SOCIAL HISTORY:  Social History     Social History     Marital status:      Spouse name: Izabel     Number of children: 2     Years of education: 12     Occupational History            Retired     Social History Main Topics     Smoking status: Former Smoker     Packs/day: 1.00     Years: 30.00     Types: Cigarettes     Quit date: 3/19/1992     Smokeless " "tobacco: Never Used     Alcohol use 0.0 oz/week     0 Standard drinks or equivalent per week      Comment: hardly at all, sometimes at dinner     Drug use: No     Sexual activity: Yes     Partners: Female     Other Topics Concern     Parent/Sibling W/ Cabg, Mi Or Angioplasty Before 65f 55m? No     Caffeine Concern No     No Caffeine     Exercise Yes     20 minutes - Walking- summer 2 x day     Social History Narrative         Review of Systems:  Skin:  Negative       Eyes:  Positive for glasses    ENT:  Negative      Respiratory:  Positive for dyspnea on exertion     Cardiovascular:  Negative      Gastroenterology: Negative      Genitourinary:  Negative      Musculoskeletal:  Positive for arthritis    Neurologic:  Negative      Psychiatric:  Negative      Heme/Lymph/Imm:  Positive for allergies seasonal  Endocrine:  Positive for diabetes      Physical Exam:  Vitals: /48  Pulse 56  Ht 1.676 m (5' 6\")  Wt 86.2 kg (190 lb 1.6 oz)  BMI 30.68 kg/m2    Constitutional:  cooperative, alert and oriented, well developed, well nourished, in no acute distress overweight mildly    Skin:  warm and dry to the touch          Head:  normocephalic        Eyes:  pupils equal and round;sclera white        Lymph:      ENT:  no pallor or cyanosis        Neck:  JVP normal;no carotid bruit        Respiratory:    prolonged expiration;diminished breath sounds bilaterally       Cardiac: regular rhythm;no murmurs, gallops or rubs detected                not assessed this visit                                        GI:  abdomen soft;no masses;non-tender        Extremities and Muscular Skeletal:  no deformities, clubbing, cyanosis, erythema observed   trace          Neurological:  affect appropriate        Psych:  oriented to time, person and place        Recent Lab Results:  LIPID RESULTS:  Lab Results   Component Value Date    CHOL 163 05/01/2018    HDL 42 05/01/2018    LDL 89 05/01/2018    TRIG 159 (H) 05/01/2018    CHOLHDLRATIO " 3.2 05/12/2015       LIVER ENZYME RESULTS:  Lab Results   Component Value Date    AST 20 03/13/2017    ALT 22 03/13/2017       CBC RESULTS:  Lab Results   Component Value Date    WBC 7.9 03/13/2017    RBC 4.11 (L) 03/13/2017    HGB 12.4 (L) 03/13/2017    HCT 39.4 (L) 03/13/2017    MCV 96 03/13/2017    MCH 30.2 03/13/2017    MCHC 31.5 03/13/2017    RDW 13.9 03/13/2017     03/13/2017       BMP RESULTS:  Lab Results   Component Value Date     05/01/2018    POTASSIUM 4.2 05/01/2018    CHLORIDE 103 05/01/2018    CO2 26 05/01/2018    ANIONGAP 10 05/01/2018     (H) 05/01/2018    BUN 13 05/01/2018    CR 0.77 05/01/2018    GFRESTIMATED >90 05/01/2018    GFRESTBLACK >90 05/01/2018    LEBRON 8.8 05/01/2018        A1C RESULTS:  Lab Results   Component Value Date    A1C 5.3 05/01/2018       INR RESULTS:  Lab Results   Component Value Date    INR 2.7 (A) 06/20/2018    INR 2.7 (A) 06/06/2018    INR 3.20 (H) 05/01/2018    INR 2.4 04/16/2018           CC  Debbie Lewis MD  [address] [place] [address]

## 2018-06-28 NOTE — LETTER
6/28/2018      Debbie Lewis MD, MD  [address]  [facility] [address]      RE: Cayetano Lunsford       Dear Colleague,    I had the pleasure of seeing Cayetano Lunsford in the [facility].    Service Date: 06/28/2018      HISTORY OF PRESENT ILLNESS:  A quick note on Cayetano Lunsford.  He is a very sweet 73-year-old man who has had paroxysmal atrial fibrillation that was first noted in 2007.  Since that time he has essentially been asymptomatic and is clinically in sinus rhythm while on propafenone 150 mg b.i.d.  He is also on warfarin for stroke prophylaxis.  Happily, he has not had any obvious signs of complications with the propafenone denying chest pain, palpitations, shortness of breath, dizziness or syncope.        He recently moved into an [facility] facility for efficiency reasons, and he has appreciated the change.  He is short of breath on exertion, but he relates that to age, being overweight and some emphysema/COPD.      He has a history of ulcerative colitis, but that has been relatively quiet.        He has had chronic diabetes and hyperlipidemia, both have been nicely controlled medically.      REVIEW OF SYSTEMS:  Outlined in Epic.  I endorse the findings, but basically it is only positive for shortness of breath on exertion.      SOCIAL HISTORY:  He lives alone.  He drives a car.  He is retired.  He does not smoke.  He is a little overweight, and he is limited by his breathing so he cannot really push himself in terms of calorie-burning activities.      CURRENT MEDICATIONS:  His current medication program is rather extensive.  Purely from a cardiac standpoint, he is on:   1.  Warfarin to an INR of 2.0-3.0, which has been very stable.   2.  Losartan 100 mg a day.   3.  Propafenone 150 mg b.i.d.   4.  Simvastatin 80 mg at bedtime with an LDL at goal.        He is not on aspirin, as we have not had to treat him for significant arterial disease and he is on  warfarin.      He also takes Flomax, Azulfidine, Inderal 80 mg b.i.d., probiotic, Mysoline daily, Protonix daily, metformin, albuterol p.r.n., insulin, folic acid.      PHYSICAL EXAMINATION:   GENERAL:  Reveals a slightly rotund gentleman with a blood pressure of 106/50, heart rate 60 and regular.  He weighs 190 pounds.   HEAD:  Normal.   NECK:  He has no neck vein distention or bruit at 30 degrees.   HEART:  Regular without gallop or murmur.   LUNGS:  Clear.   ABDOMEN:  Soft, rotund, nontender without guarding or mass.   EXTREMITIES:  Free of edema.      I had a long discussion about the options of stopping propafenone and seeing how he does.  He says he has been so very happy not to be troubled by the arrhythmia that after discussion we agreed that he would stay on it.  If I make any choices when I treat atrial fibrillation, I tend overtreat it because people are so much happier when they have a quiet stable rhythm.  I reviewed the possibility of stopping propafenone should he go into fixed but controlled atrial fibrillation.  He understands.      Warfarin, I think is appropriate.  He has not had any complications with it, and his INRs have been happily very stable.  I hope that continues.  I did not get into the novel anticoagulant issue.      IMPRESSION:   1.  Remarkably stable normal sinus rhythm clinically.   2.  Paroxysmal atrial fibrillation.   3.  Warfarin anticoagulation.   4.  Treated hypertension.   5.  Treated hyperlipidemia.   6.  Treated diabetes mellitus.      PLAN:  I renewed his propafenone.  I encouraged him to stay on warfarin.  We agreed to get together in a year.  If he has problems, let me know.  Warm regards.      Thanks for the privilege.      cc:   [place] [address]         CRUZITO ALVARADO MD, [facility]             D: 06/28/2018   T: 06/28/2018   MT: [id number]      Name:     CAROLANN SAUNDERS   MRN:      [id number]    "     Account:      [id number]   :      1945           Service Date: 2018      Document: [id number]         Outpatient Encounter Prescriptions as of 2018   Medication Sig Dispense Refill     albuterol (PROAIR HFA/PROVENTIL HFA/VENTOLIN HFA) 108 (90 BASE) MCG/ACT Inhaler Inhale 1-2 puffs into the lungs every 4 hours as needed (for shortness of breath/wheezing) 1 Inhaler 5     azelastine (ASTELIN) 0.1 % spray Spray 1-2 sprays into both nostrils 2 times daily 1 Bottle 11     B-D INSULIN SYRINGE 31G X 5/16\" 0.5 ML USE 1 SYRINGE TWO TIMES A DAY OR AS DIRECTED 100 each 1     BD ULTRA FINE PEN NEEDLES Use to inject insulin twice daily. 100 each PRN     folic acid 0.8 MG CAPS Take 1 tablet by mouth daily 90 capsule 3     FREESTYLE LANCETS MISC Use to test up to four times per day 100 Each 12     insulin NPH (NOVOLIN N RELION) 100 UNIT/ML injection Inject 5 units under the skin at breakfast and 15 units at dinner. 3 vial 3     insulin regular (NOVOLIN R RELION) 100 UNIT/ML injection Inject 5 units with breakfast and 2 units with dinner (when mixing with NPH, draw this insulin up first) 30 mL 2     ipratropium - albuterol 0.5 mg/2.5 mg/3 mL (DUONEB) 0.5-2.5 (3) MG/3ML neb solution NEBULIZE CONTENTS OF ONE VIAL BY MOUTH EVERY 4 HOURS AS NEEDED FOR SHORTNESS OF BREATH OR DYSPNEA 270 mL 8     losartan (COZAAR) 100 MG tablet Take 1 tablet (100 mg) by mouth daily for hypertension. 90 tablet 3     metFORMIN (GLUCOPHAGE) 1000 MG tablet TAKE ONE TABLET BY MOUTH TWICE A DAY WITH BREAKFAST AND DINNER 180 tablet 1     ONETOUCH ULTRA test strip USE TO TEST BLOOD SUGAR FOUR TIMES A DAY OR AS DIRECTED 400 each 1     pantoprazole (PROTONIX) 40 MG enteric coated tablet Take 40 mg by mouth daily Started by Dr. Debbie Rico at [facility]       primidone (MYSOLINE) 50 MG tablet 3 tablets 2 times a day and 3.5 tablets at the bedtime for 6 days, then increase to 3 tablets 2 times a day and 4 tablets at the bedtime. (Patient taking " differently: 3 tablets 2 times a day and 4 tablets at the bedtime.) 300 tablet 2     Probiotic Product (FLORAJEN3) CAPS Take 1 capsule by mouth 2 times daily 60 capsule 0     propafenone (RYTHMOL) 150 MG TABS tablet Take 1 tablet (150 mg) by mouth 2 times daily 180 tablet 3     propranolol (INDERAL) 40 MG tablet Take 2-3 tablets ( mg) by mouth 2 times daily 180 tablet 10     Respiratory Therapy Supplies (NEBULIZER/TUBING/MOUTHPIECE) KIT Use to nebulize medications for COPD 1 kit 0     simvastatin (ZOCOR) 80 MG tablet Take 0.5 tablets (40 mg) by mouth At Bedtime Profile Rx: patient will contact pharmacy when needed 45 tablet 3     sulfaSALAzine (AZULFIDINE) 500 MG tablet TAKE ONE TABLET BY MOUTH THREE TIMES A DAY 90 tablet 11     tamsulosin (FLOMAX) 0.4 MG capsule Take 1 capsule (0.4 mg) by mouth daily 90 capsule 3     tiotropium-olodaterol (STIOLTO RESPIMAT) 2.5-2.5 MCG/ACT AERS Inhale 2 puffs into the lungs daily       warfarin (COUMADIN) 5 MG tablet Take as directed by Coumadin clinic. Current dose (4/5/18): 10 mg on Mon, Thu, Sat + 7.5 mg all other days 150 tablet 1     ASPIRIN NOT PRESCRIBED, INTENTIONAL, Reported on 5/17/2017       [DISCONTINUED] propafenone (RYTHMOL) 150 MG TABS tablet Take 1 tablet (150 mg) by mouth 2 times daily 180 tablet 3     No facility-administered encounter medications on file as of 6/28/2018.        Again, thank you for allowing me to participate in the care of your patient.      Sincerely,    CRUZITO ALVARADO MD     [facility]

## 2018-07-02 DIAGNOSIS — E11.9 TYPE 2 DIABETES, HBA1C GOAL < 8% (H): ICD-10-CM

## 2018-07-02 NOTE — TELEPHONE ENCOUNTER
"Requested Prescriptions   Pending Prescriptions Disp Refills     B-D INSULIN SYRINGE 31G X 5/16\" 0.5 ML [Pharmacy Med Name: BD INS SYR UF 1/2CC SHORT]  Last Written Prescription Date:  4/5/2018  Last Fill Quantity: 100,  # refills: 1   Last Office Visit: 5/18/2018   Future Office Visit:    Next 5 appointments (look out 90 days)     Sep 05, 2018 10:30 AM CDT   [facility]   [facility]    [address] [place] [id number] [phone]                100 each 1     Sig: USE 1 SYRINGE TWO TIMES A DAY OR AS DIRECTED    Diabetic Supplies Protocol Passed    7/2/2018  1:23 PM       Passed - Patient is 18 years of age or older       Passed - Recent (6 mo) or future (30 days) visit within the authorizing provider's specialty    Patient had office visit in the last 6 months or has a visit in the next 30 days with authorizing provider.  See \"Patient Info\" tab in inbasket, or \"Choose Columns\" in Meds & Orders section of the refill encounter.              "

## 2018-07-09 RX ORDER — SYRINGE-NEEDLE,INSULIN,0.5 ML 31 GX5/16"
SYRINGE, EMPTY DISPOSABLE MISCELLANEOUS
Qty: 100 EACH | Refills: 2 | Status: SHIPPED | OUTPATIENT
Start: 2018-07-09 | End: 2018-11-19

## 2018-07-09 NOTE — TELEPHONE ENCOUNTER
Prescription approved per [facility] Refill Protocol.    Frances Cunha RN   [facility]

## 2018-07-16 ENCOUNTER — ANTICOAGULATION THERAPY VISIT (OUTPATIENT)
Dept: NURSING | Facility: CLINIC | Age: 73
End: 2018-07-16
Payer: COMMERCIAL

## 2018-07-16 DIAGNOSIS — Z79.01 LONG-TERM (CURRENT) USE OF ANTICOAGULANTS: ICD-10-CM

## 2018-07-16 DIAGNOSIS — I48.91 ATRIAL FIBRILLATION, UNSPECIFIED TYPE (H): ICD-10-CM

## 2018-07-16 LAB — INR POINT OF CARE: 3.6 (ref 0.86–1.14)

## 2018-07-16 PROCEDURE — 85610 PROTHROMBIN TIME: CPT | Mod: QW

## 2018-07-16 PROCEDURE — 36416 COLLJ CAPILLARY BLOOD SPEC: CPT

## 2018-07-16 PROCEDURE — 99207 ZZC NO CHARGE NURSE ONLY: CPT

## 2018-07-16 NOTE — PROGRESS NOTES
ANTICOAGULATION FOLLOW-UP CLINIC VISIT    Patient Name:  Cayetano Lunsford  Date:  7/16/2018  Contact Type:  Face to Face    SUBJECTIVE:     Patient Findings     Positives Change in medications (As discussed with Tanna Diaz, pt has stopped azelastine (ASTELIN) 0.1 % spray d/t excessive dryness and returning nasal scabs. ), Change in diet/appetite (Continued lower appetite and less greens in the last week or two per pt. ), OTC meds (Tylenol prn for shoulder discomfort.), Inflammation (Flare up at right shoulder (previous rotator cuff surgery site), significant soreness for two days.), Activity level change (Less active with recent hot weather.)           OBJECTIVE    INR Protime   Date Value Ref Range Status   07/16/2018 3.6 (A) 0.86 - 1.14 Final       ASSESSMENT / PLAN  INR assessment SUPRA    Recheck INR In: 2 WEEKS    INR Location Clinic      Anticoagulation Summary as of 7/16/2018     INR goal 2.0-3.0   Today's INR 3.6!   Warfarin maintenance plan 10 mg (5 mg x 2) on Mon, Thu; 7.5 mg (5 mg x 1.5) all other days   Full warfarin instructions 7/16: 7.5 mg; Otherwise 10 mg on Mon, Thu; 7.5 mg all other days   Weekly warfarin total 57.5 mg   Plan last modified Kirstin Meléndez RN (7/16/2018)   Next INR check 7/30/2018   Priority INR   Target end date     Indications   Long-term (current) use of anticoagulants [Z79.01] [Z79.01]  Atrial fibrillation (H) [I48.91]         Anticoagulation Episode Summary     INR check location     Preferred lab     Send INR reminders to [facility]    Comments       Anticoagulation Care Providers     Provider Role Specialty Phone number    Debbie Lewis MD [facility] [phone]            See the Encounter Report to view Anticoagulation Flowsheet and Dosing Calendar (Go to Encounters tab in chart review, and find the Anticoagulation Therapy Visit)    Per protocol, patient advised to decrease today's warfarin dose by 2.5 mg to account for supra-therapeutic  INR level. Patient instructed to increase green intake today and tomorrow as well. Then reduce weekly dose by another 2.5 mg given ongoing lower appetite. Recheck within 2 weeks to ensure INR stability.       **[facility] and will be very close to the [facility]. Patient set up for initial appt with JAZZ Camargo RN on 7/30 and patient will establish care with a new provider in the coming months. Dr. Lewis made aware and said goodbye and well wishes to patient as well.        Patient made aware if signs of clotting (pain, tenderness, swelling, or color change in any extremity) AND/OR bleeding occur (nosebleeds, bleeding gums, bruising, or blood in stool or urine) to notify provider & seek medical attention. If severe symptoms develop, such as major bleeding, chest pain, shortness of breath, fall, trauma or s/s of stroke, patient to call 911 immediately.       Kirstin Meléndez RN

## 2018-07-16 NOTE — MR AVS SNAPSHOT
Cayetano Lunsford   7/16/2018 11:30 AM   Anticoagulation Therapy Visit    Description:  73 year old male   Provider:   INR CLINIC   Department:   Nurse           INR as of 7/16/2018     Today's INR 3.6!      Anticoagulation Summary as of 7/16/2018     INR goal 2.0-3.0   Today's INR 3.6!   Full warfarin instructions 7/16: 7.5 mg; Otherwise 10 mg on Mon, Thu; 7.5 mg all other days   Next INR check 7/30/2018    Indications   Long-term (current) use of anticoagulants [Z79.01] [Z79.01]  Atrial fibrillation (H) [I48.91]         Contact Numbers     [facility]  Please call [phone] to cancel and/or reschedule your appointment   Please call [phone] with any problems or questions regarding your therapy.        July 2018 Details    Sun Mon Tue Wed Thu Fri Sat     1               2               3               4               5               6               7                 8               9               10               11               12               13               14                 15               16      7.5 mg   See details      17      7.5 mg         18      7.5 mg         19      10 mg         20      7.5 mg         21      7.5 mg           22      7.5 mg         23      10 mg         24      7.5 mg         25      7.5 mg         26      10 mg         27      7.5 mg         28      7.5 mg           29      7.5 mg         30            31                    Date Details   07/16 This INR check       Date of next INR:  7/30/2018         How to take your warfarin dose     To take:  7.5 mg Take 1.5 of the 5 mg tablets.    To take:  10 mg Take 2 of the 5 mg tablets.

## 2018-07-23 ENCOUNTER — ALLIED HEALTH/NURSE VISIT (OUTPATIENT)
Dept: PHARMACY | Facility: CLINIC | Age: 73
End: 2018-07-23
Payer: COMMERCIAL

## 2018-07-23 DIAGNOSIS — N18.1 TYPE 2 DIABETES MELLITUS WITH STAGE 1 CHRONIC KIDNEY DISEASE, WITH LONG-TERM CURRENT USE OF INSULIN (H): Primary | ICD-10-CM

## 2018-07-23 DIAGNOSIS — E11.22 TYPE 2 DIABETES MELLITUS WITH STAGE 1 CHRONIC KIDNEY DISEASE, WITH LONG-TERM CURRENT USE OF INSULIN (H): Primary | ICD-10-CM

## 2018-07-23 DIAGNOSIS — Z79.4 TYPE 2 DIABETES MELLITUS WITH STAGE 1 CHRONIC KIDNEY DISEASE, WITH LONG-TERM CURRENT USE OF INSULIN (H): Primary | ICD-10-CM

## 2018-07-23 PROCEDURE — 99606 MTMS BY PHARM EST 15 MIN: CPT | Performed by: PHARMACIST

## 2018-07-23 NOTE — MR AVS SNAPSHOT
After Visit Summary   7/23/2018    Cayetano Lunsford    MRN: [id number]           Patient Information     Date Of Birth          1945        Visit Information        Provider Department      7/23/2018 10:00 AM Jessica Diaz RPH [facility] MT        Today's Diagnoses     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, with long-term current use of insulin (H)    -  1      Care Instructions    Recommendations from today's MT visit:                                                      1. No changes today.    Next MTM visit: 8/6/18    To schedule another MT appointment, please call the clinic directly or you may call the MT scheduling line at [phone] or toll-free at [phone].     My Clinical Pharmacist's contact information:                                                      It was a pleasure seeing you today!  Please feel free to contact me with any questions or concerns you have.      Tanna Diaz PharmD  Medication Therapy Management Pharmacist  [facility] - Monday 9:30 - 6:00 and Wednesday 7:30 - 4:00  Phone: [phone] - direct clinic line                  Follow-ups after your visit        Your next 10 appointments already scheduled     Aug 06, 2018 11:00 AM CDT   TELEMEDICINE with Jessica Diaz RPH   [facility]    [address] [place] [address] [phone]           Note: this is not an onsite visit; there is no need to come to the facility.            Aug 20, 2018 11:15 AM CDT   [facility]    [address] [facility] [address] [phone]            Sep 05, 2018 10:30 AM CDT   Return Visit with Bong Yoo MD   [facility]    [address] [place] [id number]    [phone]              Who to contact     If you have questions or need follow up information about today's clinic visit or your schedule please contact [facility] MT directly at [phone].  Normal or non-critical lab and imaging results will be communicated to you by MyChart, letter or phone within 4 business days after the clinic has received the results. If you do not hear from us within 7 days, please contact the clinic through MyChart or phone. If you have a critical or abnormal lab result, we will notify you by phone as soon as possible.  Submit refill requests through [employer] or call your pharmacy and they will forward the refill request to us. Please allow 3 business days for your refill to be completed.          Additional Information About Your Visit        Care EveryWhere ID     This is your Care EveryWhere ID. This could be used by other organizations to access your [facility] medical records  [id number]         Blood Pressure from Last 3 Encounters:   06/28/18 106/48   06/06/18 126/66   05/25/18 117/89    Weight from Last 3 Encounters:   06/28/18 190 lb 1.6 oz (86.2 kg)   06/06/18 194 lb (88 kg)   05/25/18 196 lb (88.9 kg)              Today, you had the following     No orders found for display         Today's Medication Changes          These changes are accurate as of 7/23/18 11:59 PM.  If you have any questions, ask your nurse or doctor.               These medicines have changed or have updated prescriptions.        Dose/Directions    primidone 50 MG tablet   Commonly known as:  MYSOLINE   This may have changed:  additional instructions   Used for:  Benign familial tremor        3 tablets 2 times a day and 3.5 tablets at the bedtime for 6 days, then increase to 3 tablets 2 times a day and 4 tablets at the bedtime.   Quantity:  300 tablet   Refills:  2                Primary Care Provider Office Phone # Fax #    Debbie Lewis [phone] [address]  "[address]  [facility] [address]        Equal Access to Services     ZAHIRA ROSS : Hadii aad ku hadhamiltonyobani Sopakoali, wageronimoda luqadaha, qaybta kaalmaerendira nj. So [facility] [phone].    ATENCIÓN: Si habla español, tiene a jurado disposición servicios gratuitos de asistencia lingüística. Daviame al [phone].    We comply with applicable federal civil rights laws and [place] laws. We do not discriminate on the basis of race, color, national origin, age, disability, sex, sexual orientation, or gender identity.            Thank you!     Thank you for choosing [facility]  for your care. Our goal is always to provide you with excellent care. Hearing back from our patients is one way we can continue to improve our services. Please take a few minutes to complete the written survey that you may receive in the mail after your visit with us. Thank you!             Your Updated Medication List - Protect others around you: Learn how to safely use, store and throw away your medicines at www.disposemymeds.org.          This list is accurate as of 7/23/18 11:59 PM.  Always use your most recent med list.                   Brand Name Dispense Instructions for use Diagnosis    albuterol 108 (90 Base) MCG/ACT Inhaler    PROAIR HFA/PROVENTIL HFA/VENTOLIN HFA    1 Inhaler    Inhale 1-2 puffs into the lungs every 4 hours as needed (for shortness of breath/wheezing)    Chronic obstructive pulmonary disease, unspecified COPD type (H)       ASPIRIN NOT PRESCRIBED    INTENTIONAL     Reported on 5/17/2017        B-D INSULIN SYRINGE 31G X 5/16\" 0.5 ML   Generic drug:  insulin syringe-needle U-100     100 each    USE 1 SYRINGE TWO TIMES A DAY OR AS DIRECTED    Type 2 diabetes, HbA1C goal < 8% (H)       BD ULTRA FINE PEN NEEDLES     100 each    Use to inject insulin twice daily.    Type 2 diabetes, HbA1C goal < 8% (H)       blood glucose monitoring lancets     100 Each    Use to test up to four " times per day    Type II or unspecified type diabetes mellitus without mention of complication, not stated as uncontrolled       FLORAJEN3 Caps     60 capsule    Take 1 capsule by mouth 2 times daily    Acute cystitis without hematuria       folic acid 0.8 MG Caps     90 capsule    Take 1 tablet by mouth daily    Ulcerative colitis without complications, unspecified location (H)       insulin  UNIT/ML injection    NovoLIN N RELION    3 vial    Inject 5 units under the skin at breakfast and 15 units at dinner.    Type 2 diabetes mellitus with hypoglycemia without coma, with long-term current use of insulin (H)       insulin regular 100 UNIT/ML injection    NovoLIN R RELION    30 mL    Inject 5 units with breakfast and 2 units with dinner (when mixing with NPH, draw this insulin up first)    Type 2 diabetes mellitus with hypoglycemia without coma, with long-term current use of insulin (H)       ipratropium - albuterol 0.5 mg/2.5 mg/3 mL 0.5-2.5 (3) MG/3ML neb solution    DUONEB    270 mL    NEBULIZE CONTENTS OF ONE VIAL BY MOUTH EVERY 4 HOURS AS NEEDED FOR SHORTNESS OF BREATH OR DYSPNEA    Chronic obstructive bronchitis (H)       losartan 100 MG tablet    COZAAR    90 tablet    Take 1 tablet (100 mg) by mouth daily for hypertension.    Hypertension goal BP (blood pressure) < 140/90       metFORMIN 1000 MG tablet    GLUCOPHAGE    180 tablet    TAKE ONE TABLET BY MOUTH TWICE A DAY WITH BREAKFAST AND DINNER    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, with long-term current use of insulin (H)       nebulizer/tubing/mouthpiece Kit     1 kit    Use to nebulize medications for COPD    Chronic obstructive pulmonary disease, unspecified COPD type (H)       ONETOUCH ULTRA test strip   Generic drug:  blood glucose monitoring     400 each    USE TO TEST BLOOD SUGAR FOUR TIMES A DAY OR AS DIRECTED    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, with long-term current use of insulin (H)        pantoprazole 40 MG EC tablet    PROTONIX     Take 40 mg by mouth daily Started by Dr[facility] Debbie Rico [facility]        primidone 50 MG tablet    MYSOLINE    300 tablet    3 tablets 2 times a day and 3.5 tablets at the bedtime for 6 days, then increase to 3 tablets 2 times a day and 4 tablets at the bedtime.    Benign familial tremor       propafenone 150 MG Tabs tablet    RYTHMOL    180 tablet    Take 1 tablet (150 mg) by mouth 2 times daily    Paroxysmal atrial fibrillation (H)       propranolol 40 MG tablet    INDERAL    180 tablet    Take 2-3 tablets ( mg) by mouth 2 times daily    Benign familial tremor       simvastatin 80 MG tablet    ZOCOR    45 tablet    Take 0.5 tablets (40 mg) by mouth At Bedtime Profile Rx: patient will contact pharmacy when needed    Hyperlipidemia LDL goal <100       STIOLTO RESPIMAT 2.5-2.5 MCG/ACT Aers   Generic drug:  tiotropium-olodaterol      Inhale 2 puffs into the lungs daily        sulfaSALAzine 500 MG tablet    AZULFIDINE    90 tablet    TAKE ONE TABLET BY MOUTH THREE TIMES A DAY    Ulcerative colitis, unspecified       tamsulosin 0.4 MG capsule    FLOMAX    90 capsule    Take 1 capsule (0.4 mg) by mouth daily    Benign prostatic hyperplasia with urinary frequency       warfarin 5 MG tablet    COUMADIN    150 tablet    Take as directed by Coumadin clinic. Current dose (4/5/18): 10 mg on Mon, Thu, Sat + 7.5 mg all other days    Atrial fibrillation, unspecified type (H)

## 2018-07-23 NOTE — PROGRESS NOTES
SUBJECTIVE/OBJECTIVE:                Cayetano Lunsford is a 73 year old male called for a follow-up visit for Medication Therapy Management.  He was referred to me from Dr. Debbie Lewis.     Chief Complaint: Follow up from our visit on 4/23/18.  Some lows but he has been moving around more due to him moving to a senior center.    Tobacco: No tobacco use  Alcohol: not currently using    Medication Adherence/Access:  no issues reported    Diabetes:  Pt currently taking metformin bid, Novolin N 5 units AM before breakfast and 15 units in PM-at dinner, Novolin R-5 units with breakfast, between 80 and 120, reduce your R insulin by 2 units and 2 units of R at dinner if blood sugars are over 200. If below 70 skip dose. He gives his shots in his arms and stomach but most of the time in the arms.  He is now writing the date on the Novolin R and discarding after 42 days.   SMBG: 3-4 times daily. Ranges (per pt report): Did not review in detail today.  Symptoms of low blood sugar? Fingers and lips tingle, shaky and sweaty. Frequency of hypoglycemia? Has not had one in a while - reports last one being July 7th it was down to 48 before dinner and July 16th was 60 early in the morning. He also had some in June (3 readings in the 60's). Treating with small amount of orange juice. He thinks these are due to the more activity of packing and unpacking.  Recent symptoms of high blood sugar? none.  Eye exam: up to date  Foot exam: up to date  Microalbumin is not < 30 mg/g. Pt is taking an ACEi/ARB not at max dose.  Aspirin: Not taking due to anticoagulation therapy and increased risk of bleeding  Diet/Exercise:  No change from last visit. He repots he has been busy packing and unpa.  Diet is about the same from last time. He is eating something for lunch every day.  Dinner is his largest meal. He will eat a dessert a couple times a week. Breakfast is usually cereal +/- blueberries or a banana. He sometimes snacks after dinner but it is  usually some cheezits. Tries to stay away from high sugar content foods at this time.     Ave: 7 day ave:148; 14 day: 141; 30 day: 147    From last Visit:  Date FBG/ 2hours post Lunch/2hours post Dinner /2hours post Before bed   4/23/18 100/102      4/22 80/107  88 92   4/21 147/161  208 (2 R) 124   4/20 200/172 (R 7)  105 173   4/19 87/105 (no R)  134 123   4/18 178/161  209 (2 R) 75 (3 oz juice)   4/17 127/130  111 103   4/16 149/132  103 182   4/15 120/122  174 140   4/14 104  151 178   4/13 120/107  108 108   4/12 163  174 188   4/11 153  187 160   4/10 176  152 125   4/9 201 (R 7)  151 164   4/8 169  130    4/7 144  206 (2 R) birthday party this day 141     Ave:  7 day: 130, 14 day: 138, 30 day: 150    Lab Results   Component Value Date    A1C 5.3 05/01/2018    A1C 6.0 11/02/2017    A1C 5.8 05/08/2017    A1C 6.9 10/17/2016    A1C 6.2 07/22/2016     Lab Results   Component Value Date    UMALCR 55.49 (H) 05/01/2018     GFR Estimate   Date Value Ref Range Status   05/01/2018 >90 >60 mL/min/1.7m2 Final     Comment:     Non  GFR Calc     Creatinine   Date Value Ref Range Status   05/01/2018 0.77 0.66 - 1.25 mg/dL Final     Today's Vitals: There were no vitals taken for this visit. - phone visit      ASSESSMENT:              Current medications were reviewed today as discussed above.      Medication Adherence: excellent, no issues identified    Diabetes: Stable with a few lows. Patient is meeting A1c goal of < 7%. Pt would benefit from no changes at this time but will have him check his blood sugars.  Hoping his blood sugars will level back out after he gets settled in his new place. Microalbumin is not at goal < 30 mg/g. Taking an ARB at max dose. Aspirin therapy is safe and appropriate.     PLAN:                  1. No changes today.    2. Continue to monitor and asked that the patient call with more frequent lows.    I spent 15 minutes with this patient today. A copy of the visit note was provided  to the patient's primary care provider.     Will follow up in 2 weeks.    The patient declined a summary of these recommendations as an after visit summary.    Tanna Diaz PharmD  Medication Therapy Management Pharmacist    Chart documentation was completed in part with Dragon voice-recognition software. Even though reviewed, some grammatical, spelling, and word errors may remain.

## 2018-07-30 ENCOUNTER — ANTICOAGULATION THERAPY VISIT (OUTPATIENT)
Dept: NURSING | Facility: CLINIC | Age: 73
End: 2018-07-30
Payer: COMMERCIAL

## 2018-07-30 DIAGNOSIS — I48.91 ATRIAL FIBRILLATION, UNSPECIFIED TYPE (H): ICD-10-CM

## 2018-07-30 DIAGNOSIS — Z79.01 LONG-TERM (CURRENT) USE OF ANTICOAGULANTS: ICD-10-CM

## 2018-07-30 LAB — INR POINT OF CARE: 2.3 (ref 0.86–1.14)

## 2018-07-30 PROCEDURE — 99207 ZZC NO CHARGE NURSE ONLY: CPT

## 2018-07-30 PROCEDURE — 36416 COLLJ CAPILLARY BLOOD SPEC: CPT

## 2018-07-30 PROCEDURE — 85610 PROTHROMBIN TIME: CPT | Mod: QW

## 2018-07-30 NOTE — PROGRESS NOTES
ANTICOAGULATION FOLLOW-UP CLINIC VISIT    Patient Name:  Cayetano Lunsford  Date:  7/30/2018  Contact Type:  Face to Face    SUBJECTIVE:     Patient Findings     Positives No Problem Findings    Comments Patient moved to  recently, transferring his INR care to AV INR and eventually he will choose a PCP at , as well.           OBJECTIVE    INR Protime   Date Value Ref Range Status   07/30/2018 2.3 (A) 0.86 - 1.14 Final       ASSESSMENT / PLAN  INR assessment THER    Recheck INR In: 3 WEEKS    INR Location Clinic      Anticoagulation Summary as of 7/30/2018     INR goal 2.0-3.0   Today's INR 2.3   Warfarin maintenance plan 10 mg (5 mg x 2) on Mon, Thu; 7.5 mg (5 mg x 1.5) all other days   Full warfarin instructions 10 mg on Mon, Thu; 7.5 mg all other days   Weekly warfarin total 57.5 mg   No change documented Tatyana Akers RN   Plan last modified Kirstin Meléndez RN (7/16/2018)   Next INR check 8/20/2018   Priority INR   Target end date     Indications   Long-term (current) use of anticoagulants [Z79.01] [Z79.01]  Atrial fibrillation (H) [I48.91]         Anticoagulation Episode Summary     INR check location     Preferred lab     Send INR reminders to  ANTICOAG CLINIC    Comments [place]. Prefers AVS printed. Transferred to [facility] 7/30/18. Dr. Lewis previous PCP.      Anticoagulation Care Providers     Provider Role Specialty Phone number    Debbie Lewis MD [facility] [phone]            See the Encounter Report to view Anticoagulation Flowsheet and Dosing Calendar (Go to Encounters tab in chart review, and find the Anticoagulation Therapy Visit)        Tatyana Akers, RN

## 2018-07-30 NOTE — MR AVS SNAPSHOT
Cayetano Lunsford   7/30/2018 11:00 AM   Anticoagulation Therapy Visit    Description:  73 year old male   Provider:  [facility]ATION [facility]   Department:  Cr Nurse           INR as of 7/30/2018     Today's INR 2.3      Anticoagulation Summary as of 7/30/2018     INR goal 2.0-3.0   Today's INR 2.3   Full warfarin instructions 10 mg on Mon, Thu; 7.5 mg all other days   Next INR check 8/20/2018    Indications   Long-term (current) use of anticoagulants [Z79.01] [Z79.01]  Atrial fibrillation (H) [I48.91]         Your next Anticoagulation Clinic appointment(s)     Aug 20, 2018 11:15 AM CDT   [facility]ticoagulation [facility]  [facility]    [address] [place] [address] [phone]              Contact Numbers     Clinic Number:         July 2018 Details    Sun Mon Tue Wed Thu Fri Sat     1               2               3               4               5               6               7                 8               9               10               11               12               13               14                 15               16               17               18               19               20               21                 22               23               24               25               26               27               28                 29               30      10 mg   See details      31      7.5 mg              Date Details   07/30 This INR check               How to take your warfarin dose     To take:  7.5 mg Take 1.5 of the 5 mg tablets.    To take:  10 mg Take 2 of the 5 mg tablets.           August 2018 Details    Sun Mon Tue Wed Thu Fri Sat        1      7.5 mg         2      10 mg         3      7.5 mg         4      7.5 mg           5      7.5 mg         6      10 mg         7      7.5 mg         8      7.5 mg         9      10 mg         10      7.5 mg         11      7.5 mg           12       7.5 mg         13      10 mg         14      7.5 mg         15      7.5 mg         16      10 mg         17      7.5 mg         18      7.5 mg           19      7.5 mg         20            21               22               23               24               25                 26               27               28               29               30               31                 Date Details   No additional details    Date of next INR:  8/20/2018         How to take your warfarin dose     To take:  7.5 mg Take 1.5 of the 5 mg tablets.    To take:  10 mg Take 2 of the 5 mg tablets.

## 2018-08-06 ENCOUNTER — ALLIED HEALTH/NURSE VISIT (OUTPATIENT)
Dept: PHARMACY | Facility: CLINIC | Age: 73
End: 2018-08-06
Payer: COMMERCIAL

## 2018-08-06 DIAGNOSIS — E11.22 TYPE 2 DIABETES MELLITUS WITH STAGE 1 CHRONIC KIDNEY DISEASE, WITH LONG-TERM CURRENT USE OF INSULIN (H): Primary | ICD-10-CM

## 2018-08-06 DIAGNOSIS — N18.1 TYPE 2 DIABETES MELLITUS WITH STAGE 1 CHRONIC KIDNEY DISEASE, WITH LONG-TERM CURRENT USE OF INSULIN (H): Primary | ICD-10-CM

## 2018-08-06 DIAGNOSIS — Z79.4 TYPE 2 DIABETES MELLITUS WITH STAGE 1 CHRONIC KIDNEY DISEASE, WITH LONG-TERM CURRENT USE OF INSULIN (H): Primary | ICD-10-CM

## 2018-08-06 PROCEDURE — 99606 MTMS BY PHARM EST 15 MIN: CPT | Performed by: PHARMACIST

## 2018-08-06 NOTE — MR AVS SNAPSHOT
After Visit Summary   8/6/2018    Cayetano Lunsford    MRN: [id number]           Patient Information     Date Of Birth          1945        Visit Information        Provider Department      8/6/2018 11:00 AM Jessica Diaz RPH [facility]        Care Instructions    Recommendations from today's MTM visit:                                                       1. Keep your insulin doses the same. Call me if you start to have more low blood sugars less than 70.    Next MTM visit: 11/5/18 at 11am    To schedule another MTM appointment, please call the clinic directly or you may call the MTM scheduling line at [phone] or toll-free at [phone].     My Clinical Pharmacist's contact information:                                                      It was a pleasure seeing you today!  Please feel free to contact me with any questions or concerns you have.      Tanna Diaz PharmD  Medication Therapy Management Pharmacist    You may receive a survey about the MTM services you received.  I would appreciate your feedback to help me serve you better in the future. Please fill it out and return it when you can. Your comments will be anonymous.                    Follow-ups after your visit        Your next 10 appointments already scheduled     Aug 20, 2018 11:15 AM CDT   Anticoagulation [facility] with [facility]   [facility]    [id number] [place] [id number] [phone]            Sep 05, 2018 10:30 AM CDT   Return Visit with Bong Yoo MD   [facility] ([facility])    [address] [place] [address] [phone]            Nov 05, 2018 11:00 AM CST   TELEMEDICINE with Jessica Diaz RPH   [facility] ([facility])    [address] [place] [address] [phone]            Note: this is not an onsite visit; there is no need to come to the facility.              Who to contact     If you have questions or need follow up information about today's clinic visit or your schedule please contact [facility] directly at [phone].  Normal or non-critical lab and imaging results will be communicated to you by MyChart, letter or phone within 4 business days after the clinic has received the results. If you do not hear from us within 7 days, please contact the clinic through MyChart or phone. If you have a critical or abnormal lab result, we will notify you by phone as soon as possible.  Submit refill requests through [employer] or call your pharmacy and they will forward the refill request to us. Please allow 3 business days for your refill to be completed.          Additional Information About Your Visit        Care EveryWhere ID     This is your Care EveryWhere ID. This could be used by other organizations to access your [facility] medical records  [id number]         Blood Pressure from Last 3 Encounters:   06/28/18 106/48   06/06/18 126/66   05/25/18 117/89    Weight from Last 3 Encounters:   06/28/18 190 lb 1.6 oz (86.2 kg)   06/06/18 194 lb (88 kg)   05/25/18 196 lb (88.9 kg)              Today, you had the following     No orders found for display         Today's Medication Changes          These changes are accurate as of 8/6/18 11:41 AM.  If you have any questions, ask your nurse or doctor.               These medicines have changed or have updated prescriptions.        Dose/Directions    primidone 50 MG tablet   Commonly known as:  MYSOLINE   This may have changed:  additional instructions   Used for:  Benign familial tremor        3 tablets 2 times a day and 3.5 tablets at the bedtime for 6 days, then increase to 3 tablets 2 times a day and 4 tablets at the bedtime.   Quantity:  300 tablet   Refills:  2                Primary Care Provider Office Phone # Fax #     "Debbie Lewis [phone]       [address]  [facility] [address]        Equal Access to Services     ZAHIRA ROSS : Hadii efrem sweeney delphine Helms, wageronimoda noryqjonathan, qaminita kaarielda austin, erendira jordandev saba. So [facility] [phone].    ATENCIÓN: Si habla español, tiene a jurado disposición servicios gratuitos de asistencia lingüística. Llame al [phone].    We comply with applicable federal civil rights laws and [place] laws. We do not discriminate on the basis of race, color, national origin, age, disability, sex, sexual orientation, or gender identity.            Thank you!     Thank you for choosing [facility]  for your care. Our goal is always to provide you with excellent care. Hearing back from our patients is one way we can continue to improve our services. Please take a few minutes to complete the written survey that you may receive in the mail after your visit with us. Thank you!             Your Updated Medication List - Protect others around you: Learn how to safely use, store and throw away your medicines at www.disposemymeds.org.          This list is accurate as of 8/6/18 11:41 AM.  Always use your most recent med list.                   Brand Name Dispense Instructions for use Diagnosis    albuterol 108 (90 Base) MCG/ACT Inhaler    PROAIR HFA/PROVENTIL HFA/VENTOLIN HFA    1 Inhaler    Inhale 1-2 puffs into the lungs every 4 hours as needed (for shortness of breath/wheezing)    Chronic obstructive pulmonary disease, unspecified COPD type (H)       ASPIRIN NOT PRESCRIBED    INTENTIONAL     Reported on 5/17/2017        B-D INSULIN SYRINGE 31G X 5/16\" 0.5 ML   Generic drug:  insulin syringe-needle U-100     100 each    USE 1 SYRINGE TWO TIMES A DAY OR AS DIRECTED    Type 2 diabetes, HbA1C goal < 8% (H)       BD ULTRA FINE PEN NEEDLES     100 each    Use to inject insulin twice daily.    Type 2 diabetes, HbA1C goal < 8% (H)       blood " glucose monitoring lancets     100 Each    Use to test up to four times per day    Type II or unspecified type diabetes mellitus without mention of complication, not stated as uncontrolled       FLORAJEN3 Caps     60 capsule    Take 1 capsule by mouth 2 times daily    Acute cystitis without hematuria       folic acid 0.8 MG Caps     90 capsule    Take 1 tablet by mouth daily    Ulcerative colitis without complications, unspecified location (H)       insulin  UNIT/ML injection    NovoLIN N RELION    3 vial    Inject 5 units under the skin at breakfast and 15 units at dinner.    Type 2 diabetes mellitus with hypoglycemia without coma, with long-term current use of insulin (H)       insulin regular 100 UNIT/ML injection    NovoLIN R RELION    30 mL    Inject 5 units with breakfast and 2 units with dinner (when mixing with NPH, draw this insulin up first)    Type 2 diabetes mellitus with hypoglycemia without coma, with long-term current use of insulin (H)       ipratropium - albuterol 0.5 mg/2.5 mg/3 mL 0.5-2.5 (3) MG/3ML neb solution    DUONEB    270 mL    NEBULIZE CONTENTS OF ONE VIAL BY MOUTH EVERY 4 HOURS AS NEEDED FOR SHORTNESS OF BREATH OR DYSPNEA    Chronic obstructive bronchitis (H)       losartan 100 MG tablet    COZAAR    90 tablet    Take 1 tablet (100 mg) by mouth daily for hypertension.    Hypertension goal BP (blood pressure) < 140/90       metFORMIN 1000 MG tablet    GLUCOPHAGE    180 tablet    TAKE ONE TABLET BY MOUTH TWICE A DAY WITH BREAKFAST AND DINNER    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, with long-term current use of insulin (H)       nebulizer/tubing/mouthpiece Kit     1 kit    Use to nebulize medications for COPD    Chronic obstructive pulmonary disease, unspecified COPD type (H)       ONETOUCH ULTRA test strip   Generic drug:  blood glucose monitoring     400 each    USE TO TEST BLOOD SUGAR FOUR TIMES A DAY OR AS DIRECTED    Type 2 diabetes mellitus with stage 1  chronic kidney disease, with long-term current use of insulin (H)       pantoprazole 40 MG EC tablet    PROTONIX     Take 40 mg by mouth daily Started by Dr[facility] Debbie Rico at [facility]        primidone 50 MG tablet    MYSOLINE    300 tablet    3 tablets 2 times a day and 3.5 tablets at the bedtime for 6 days, then increase to 3 tablets 2 times a day and 4 tablets at the bedtime.    Benign familial tremor       propafenone 150 MG Tabs tablet    RYTHMOL    180 tablet    Take 1 tablet (150 mg) by mouth 2 times daily    Paroxysmal atrial fibrillation (H)       propranolol 40 MG tablet    INDERAL    180 tablet    Take 2-3 tablets ( mg) by mouth 2 times daily    Benign familial tremor       simvastatin 80 MG tablet    ZOCOR    45 tablet    Take 0.5 tablets (40 mg) by mouth At Bedtime Profile Rx: patient will contact pharmacy when needed    Hyperlipidemia LDL goal <100       STIOLTO RESPIMAT 2.5-2.5 MCG/ACT Aers   Generic drug:  tiotropium-olodaterol      Inhale 2 puffs into the lungs daily        sulfaSALAzine 500 MG tablet    AZULFIDINE    90 tablet    TAKE ONE TABLET BY MOUTH THREE TIMES A DAY    Ulcerative colitis, unspecified       tamsulosin 0.4 MG capsule    FLOMAX    90 capsule    Take 1 capsule (0.4 mg) by mouth daily    Benign prostatic hyperplasia with urinary frequency       warfarin 5 MG tablet    COUMADIN    150 tablet    Take as directed by Coumadin clinic. Current dose (4/5/18): 10 mg on Mon, Thu, Sat + 7.5 mg all other days    Atrial fibrillation, unspecified type (H)

## 2018-08-06 NOTE — PROGRESS NOTES
SUBJECTIVE/OBJECTIVE:                Cayetano Lunsford is a 73 year old male called for a follow-up visit for Medication Therapy Management.  He was referred to me from Dr. Debbie Lewis.     Chief Complaint: Follow up from our visit on 7/23/18.  No concerns today.    Tobacco: No tobacco use  Alcohol: not currently using    Medication Adherence/Access:  no issues reported    Diabetes:  Pt currently taking metformin bid, Novolin N 5 units AM before breakfast and 15 units in PM-at dinner, Novolin R-5 units with breakfast, between 80 and 120, reduce your R insulin by 2 units and 2 units of R at dinner if blood sugars are over 200. If below 70 skip dose. He gives his shots in his arms and stomach but most of the time in the arms.  He is now writing the date on the Novolin R and discarding after 42 days.   SMBG: 3-4 times daily. Ranges (per pt report): see chart below  Symptoms of low blood sugar? Fingers and lips tingle, shaky and sweaty. Frequency of hypoglycemia? Once less than 70 in the last 2 weeks. Treating with small amount of orange juice. He thinks these are due to the more activity of packing and unpacking.  Recent symptoms of high blood sugar? none.  Eye exam: up to date - has next appointment next Monday.  Foot exam:  due  Microalbumin is not < 30 mg/g. Pt is taking an ACEi/ARB not at max dose.  Aspirin: Not taking due to anticoagulation therapy and increased risk of bleeding  Diet/Exercise:  No change from last visit. He repots he has been busy packing and unpa.  Diet is about the same from last time. He is eating something for lunch every day.  Dinner is his largest meal. He will eat a dessert a couple times a week. Breakfast is usually cereal +/- blueberries or a banana. He sometimes snacks after dinner but it is usually some cheezits. Tries to stay away from high sugar content foods at this time.     Ave: 7 day ave:155; 14 day: 145; 30 day: 145    Date FBG/ 2hours post Lunch/2hours post Dinner /2hours post     8/6/18 179      8/5/18 188 - apple pie night before  184 103 - ate some pretzels   8/4/18 121  112 122   8/3 186  83 101   8/2 247 - ate more carbs for dinner night before  97 97   8/1 133  82 174   7/31 188  108 237 - high carb dinner 2 units of R and ate something   7/30 110  104 158   7/29 192 - munch on something the night before  101 69 - 4 oz OJ   7/28 131   176 - BBQ for dinner   7/27 168  309 - 2 units R - ate DQ chocolate malt 152   7/26 170  77 - drank 3 oz OJ then recheck 114 137   7/25 169  85 130   7/24 134  107 71 - drank some juice   7/23         Lab Results   Component Value Date    A1C 5.3 05/01/2018    A1C 6.0 11/02/2017    A1C 5.8 05/08/2017    A1C 6.9 10/17/2016    A1C 6.2 07/22/2016     Creatinine   Date Value Ref Range Status   05/01/2018 0.77 0.66 - 1.25 mg/dL Final     GFR Estimate   Date Value Ref Range Status   05/01/2018 >90 >60 mL/min/1.7m2 Final     Comment:     Non  GFR Calc     Lab Results   Component Value Date    UMALCR 55.49 (H) 05/01/2018     Today's Vitals: There were no vitals taken for this visit. - telephone visit.     BP Readings from Last 3 Encounters:   06/28/18 106/48   06/06/18 126/66   05/25/18 117/89       ASSESSMENT:              Current medications were reviewed today as discussed above.      Medication Adherence: good, no issues identified    Diabetes: Stable. Patient is meeting A1c goal of < 7%. Due for annual foot exam. Microalbumin is not at goal < 30 mg/g. Taking an ARB at max dose. Aspirin therapy is not indicated in this patient due to anticoagulant/antiplatelet therapy.      PLAN:                  1. Keep your insulin doses the same. Call me if you start to have more low blood sugars less than 70.    I spent 15 minutes with this patient today. A copy of the visit note was provided to the patient's primary care provider.     Will follow up in 3 months.    The patient was mailed a summary of these recommendations as an after visit  summary.    Tanna Diaz PharmD  Medication Therapy Management Pharmacist    Chart documentation was completed in part with Dragon voice-recognition software. Even though reviewed, some grammatical, spelling, and word errors may remain.

## 2018-08-20 ENCOUNTER — ANTICOAGULATION THERAPY VISIT (OUTPATIENT)
Dept: NURSING | Facility: CLINIC | Age: 73
End: 2018-08-20
Payer: COMMERCIAL

## 2018-08-20 DIAGNOSIS — I48.91 ATRIAL FIBRILLATION, UNSPECIFIED TYPE (H): ICD-10-CM

## 2018-08-20 DIAGNOSIS — Z79.01 LONG-TERM (CURRENT) USE OF ANTICOAGULANTS: ICD-10-CM

## 2018-08-20 LAB — INR POINT OF CARE: 1.8 (ref 0.86–1.14)

## 2018-08-20 PROCEDURE — 99207 ZZC NO CHARGE NURSE ONLY: CPT

## 2018-08-20 PROCEDURE — 36416 COLLJ CAPILLARY BLOOD SPEC: CPT

## 2018-08-20 PROCEDURE — 85610 PROTHROMBIN TIME: CPT | Mod: QW

## 2018-08-20 NOTE — MR AVS SNAPSHOT
Cayetano Lunsford   8/20/2018 11:15 AM   Anticoagulation Therapy Visit    Description:  73 year old male   Provider:  [facility] ANTICOAGULATION CLINIC   Department:  Cr Nurse           INR as of 8/20/2018     Today's INR 1.8!      Anticoagulation Summary as of 8/20/2018     INR goal 2.0-3.0   Today's INR 1.8!   Full warfarin instructions 8/20: 15 mg; Otherwise 10 mg on Mon, Thu; 7.5 mg all other days   Next INR check 9/6/2018    Indications   Long-term (current) use of anticoagulants [Z79.01] [Z79.01]  Atrial fibrillation (H) [I48.91]         Your next Anticoagulation Clinic appointment(s)     Aug 20, 2018 11:15 AM CDT   Anticoagulation Visit with [facility]   [facility]    [address] [facility] [address]   [phone]            Sep 06, 2018 11:15 AM CDT   Anticoagulation Visit with [facility] ANTICOAGULATION CLINIC   [facility]    [address] [facility] [address]   [phone]              Contact Numbers     Clinic Number:         August 2018 Details    Sun Mon Tue Wed Thu Fri Sat        1               2               3               4                 5               6               7               8               9               10               11                 12               13               14               15               16               17               18                 19               20      15 mg   See details      21      7.5 mg         22      7.5 mg         23      10 mg         24      7.5 mg         25      7.5 mg           26      7.5 mg         27      10 mg         28      7.5 mg         29      7.5 mg         30      10 mg         31      7.5 mg           Date Details   08/20 This INR check               How to take your warfarin dose     To take:  7.5 mg Take 1.5 of the 5 mg tablets.    To take:  10 mg Take 2 of the 5 mg tablets.    To  take:  15 mg Take 3 of the 5 mg tablets.           September 2018 Details    Sun Mon Tue Wed Thu Fri Sat           1      7.5 mg           2      7.5 mg         3      10 mg         4      7.5 mg         5      7.5 mg         6            7               8                 9               10               11               12               13               14               15                 16               17               18               19               20               21               22                 23               24               25               26               27               28               29                 30                      Date Details   No additional details    Date of next INR:  9/6/2018         How to take your warfarin dose     To take:  7.5 mg Take 1.5 of the 5 mg tablets.    To take:  10 mg Take 2 of the 5 mg tablets.

## 2018-08-20 NOTE — PROGRESS NOTES
ANTICOAGULATION FOLLOW-UP CLINIC VISIT    Patient Name:  Cayetano Lunsford  Date:  8/20/2018  Contact Type:  Face to Face    SUBJECTIVE:     Patient Findings     Positives Change in diet/appetite (Ate a whole bag of broccoli. I encouraged consistency and small portions.)           OBJECTIVE    INR Protime   Date Value Ref Range Status   08/20/2018 1.8 (A) 0.86 - 1.14 Final       ASSESSMENT / PLAN  INR assessment SUB    Recheck INR In: 2 WEEKS    INR Location Clinic      Anticoagulation Summary as of 8/20/2018     INR goal 2.0-3.0   Today's INR 1.8!   Warfarin maintenance plan 10 mg (5 mg x 2) on Mon, Thu; 7.5 mg (5 mg x 1.5) all other days   Full warfarin instructions 8/20: 15 mg; Otherwise 10 mg on Mon, Thu; 7.5 mg all other days   Weekly warfarin total 57.5 mg   Plan last modified Kirstin Meléndez RN (7/16/2018)   Next INR check 9/6/2018   Priority INR   Target end date     Indications   Long-term (current) use of anticoagulants [Z79.01] [Z79.01]  Atrial fibrillation (H) [I48.91]         Anticoagulation Episode Summary     INR check location     Preferred lab     Send INR reminders to [facility]    Comments Teller. Prefers AVS printed. Transferred to [facility] 7/30/18. Dr. Lewis previous PCP.      Anticoagulation Care Providers     Provider Role Specialty Phone number    Debbie Lewis MD [facility] [phone]            See the Encounter Report to view Anticoagulation Flowsheet and Dosing Calendar (Go to Encounters tab in chart review, and find the Anticoagulation Therapy Visit)        Tatyana Akers, RN

## 2018-09-05 ENCOUNTER — OFFICE VISIT (OUTPATIENT)
Dept: NEUROLOGY | Facility: CLINIC | Age: 73
End: 2018-09-05
Payer: COMMERCIAL

## 2018-09-05 VITALS
RESPIRATION RATE: 16 BRPM | BODY MASS INDEX: 31.31 KG/M2 | SYSTOLIC BLOOD PRESSURE: 132 MMHG | TEMPERATURE: 98.1 F | OXYGEN SATURATION: 95 % | WEIGHT: 194 LBS | HEART RATE: 57 BPM | DIASTOLIC BLOOD PRESSURE: 66 MMHG

## 2018-09-05 DIAGNOSIS — G25.0 BENIGN FAMILIAL TREMOR: ICD-10-CM

## 2018-09-05 PROCEDURE — 99215 OFFICE O/P EST HI 40 MIN: CPT | Performed by: PSYCHIATRY & NEUROLOGY

## 2018-09-05 RX ORDER — PRIMIDONE 50 MG/1
TABLET ORAL
Qty: 900 TABLET | Refills: 1 | Status: SHIPPED | OUTPATIENT
Start: 2018-09-05 | End: 2019-03-06

## 2018-09-05 NOTE — PATIENT INSTRUCTIONS
AFTER VISIT SUMMARY (AVS):    At today's visit we thoroughly discussed your current symptoms, treatment options, and the treatment plan. We decided not to make any changes to your current medications.    Next follow-up appointment is in the next 6 months or earlier if needed.    Please do not hesitate to call me with any questions or concerns.    Thanks.

## 2018-09-05 NOTE — PROGRESS NOTES
ESTABLISHED PATIENT NEUROLOGY NOTE    DATE OF VISIT: 9/5/2018  CLINIC LOCATION: [facility]  MRN: [id number]  PATIENT NAME: Cayetano Lunsford  YOB: 1945    PCP: Debbie Lewis MD, MD    REASON FOR VISIT:   Chief Complaint   Patient presents with     Follow Up For     tremor-still the same.     SUBJECTIVE:                                                      HISTORY OF PRESENT ILLNESS: Patient is here for follow up regarding bilateral hand tremor.  The patient was last seen on 06/06/2018.  At that time, his primidone dose was gradually increased to 500 mg daily.  Please refer to my initial/other prior notes for further information.    Since the last visit, the patient denies any significant changes in his symptoms.  He is not sure if increase in primidone dose was helpful.  He denies any noticeable side effects.  He is also on propranolol 120 mg twice daily.  He feels that on some days his tremor is slightly better, but then it worsens again on other days.  Right hand is slightly more affected than the left.  It is hard for him to eat with a fork compared to a spoon.  No other notable changes or interval development of new neurological symptoms.    On review of systems, patient endorses no additional active complaints. Medications, allergies, family and social history were also reviewed. There are no changes reported by patient.  REVIEW OF SYSTEMS:                                                    10-system review was completed. Pertinent positives are included in HPI. The remainder of ROS is negative.  EXAM:                                                    Physical Exam:   Vitals: /66 (BP Location: Left arm, Patient Position: Sitting, Cuff Size: Adult Regular)  Pulse 57  Temp 98.1  F (36.7  C) (Oral)  Resp 16  Wt 88 kg (194 lb)  SpO2 95%  BMI 31.31 kg/m2    General: pt is in NAD, cooperative.  Skin: normal turgor, moist mucous membranes, no lesions/rashes noticed.  HEENT:  ATNC, white sclera, normal conjunctiva.  Respiratory: Symmetric lung excursion, no accessory respiratory muscle use.  Abdomen: Non distended.  Neurological: awake, cooperative, follows commands, no aphasia or dysarthria noted, cranial nerves II-XII: no ptosis, extraocular motility is full, face is symmetric, hearing is reduced bilaterally, tongue is midline, equally moves all extremities, intermittent mild to moderate postural and action bilateral hand tremor, casual gait is normal.  ASSESSMENT and PLAN:                                                    Assessment: 73-year-old male patient with essential tremor presents for follow-up.  He is on 120 mg of propranolol twice daily and 500 mg of primidone daily without noticeable side effects.  He did not notice significant benefit after slight increase in primidone dose.  We reviewed in detail available treatment options.  On one hand, given the stability of his symptoms we could continue the same doses of the medications without changes.  On the other hand, we could further increase primidone (up to 750 mg daily based on tolerance), though higher doses are associated with increased chance of side effects without additional treatment benefit.  We could also consider increasing propranolol, but the patient is hesitant to do so because of his bradycardia.  Alternatively, we could add low dose of gabapentin or topiramate to his regimen.  After prolonged discussion, the patient decided to continue his current medications without any changes.  His primidone was refilled.    Diagnoses:    ICD-10-CM    1. 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 We decided not to make any changes to his current medications at this time.    Next follow-up appointment is in the next 6 months or earlier if needed.    I advised the patient to contact me  with any questions or concerns.    Total Time: 40 minutes with > 50% spent counseling the patient on stated above assessment and recommendations, including review of current symptoms, available treatment options, and the proposed plan.    Bong Yoo MD  HP/ Neurology

## 2018-09-05 NOTE — MR AVS SNAPSHOT
After Visit Summary   9/5/2018    Cayetano Lunsford    MRN: [id number]           Patient Information     Date Of Birth          1945        Visit Information        Provider Department      9/5/2018 10:30 AM Bong Yoo MD [facility][note 3]        Today's Diagnoses     Benign familial tremor          Care Instructions    AFTER VISIT SUMMARY (AVS):    At today's visit we thoroughly discussed your current symptoms, treatment options, and the treatment plan. We decided not to make any changes to your current medications.    Next follow-up appointment is in the next 6 months or earlier if needed.    Please do not hesitate to call me with any questions or concerns.    Thanks.            Follow-ups after your visit        Follow-up notes from your care team     Return in about 6 months (around 3/5/2019).      Your next 10 appointments already scheduled     Sep 06, 2018 11:15 AM CDT   [facility]    [address] [place] [address] [phone]            Nov 05, 2018 11:00 AM CST   TELEMEDICINE with Jessica Diaz [facility] ([facility][note 3])    [address] [place] [address] [phone]           Note: this is not an onsite visit; there is no need to come to the facility.            Mar 06, 2019 10:30 AM CST   Return Visit with Bong Yoo MD   [facility][note 3] ([facility][note 3])    [address] [place] [address] [phone]              Who to contact     If you have questions or need follow up information about today's clinic visit or your schedule please contact [facility] directly at [phone].  Normal or non-critical lab and imaging results will be communicated to you by MyChart, letter or phone within 4  business days after the clinic has received the results. If you do not hear from us within 7 days, please contact the clinic through [employer] or phone. If you have a critical or abnormal lab result, we will notify you by phone as soon as possible.  Submit refill requests through [employer] or call your pharmacy and they will forward the refill request to us. Please allow 3 business days for your refill to be completed.          Additional Information About Your Visit        Care EveryWhere ID     This is your Care EveryWhere ID. This could be used by other organizations to access your [facility] medical records  [id number]        Your Vitals Were     Pulse Temperature Respirations Pulse Oximetry BMI (Body Mass Index)       57 98.1  F (36.7  C) (Oral) 16 95% 31.31 kg/m2        Blood Pressure from Last 3 Encounters:   09/05/18 132/66   06/28/18 106/48   06/06/18 126/66    Weight from Last 3 Encounters:   09/05/18 88 kg (194 lb)   06/28/18 86.2 kg (190 lb 1.6 oz)   06/06/18 88 kg (194 lb)              Today, you had the following     No orders found for display         Today's Medication Changes          These changes are accurate as of 9/5/18 11:06 AM.  If you have any questions, ask your nurse or doctor.               These medicines have changed or have updated prescriptions.        Dose/Directions    primidone 50 MG tablet   Commonly known as:  MYSOLINE   This may have changed:  additional instructions   Used for:  Benign familial tremor   Changed by:  Bong Yoo MD        3 tablets 2 times a day and 4 tablets at the bedtime.   Quantity:  900 tablet   Refills:  1            Where to get your medicines      These medications were sent to [facility] [address]  [address] [facility] [address]     Phone:  [phone]     primidone 50 MG tablet                Primary Care Provider Office Phone # Fax #    Debbie Lewis [phone]  "      [address]  [facility] [address]        Equal Access to Services     DAVIANEMERY VANDANA : Hadii efrem ku hadwhit Sopakoali, waaxda luqadaha, qaybta kaarielda elizabethranchocory, erendira rojas jacobdev yuenbillymaira walter. So [facility] [phone].    ATENCIÓN: Si habla español, tiene a jurado disposición servicios gratuitos de asistencia lingüística. Llame al [phone].    We comply with applicable federal civil rights laws and [place] laws. We do not discriminate on the basis of race, color, national origin, age, disability, sex, sexual orientation, or gender identity.            Thank you!     Thank you for choosing [facility]  for your care. Our goal is always to provide you with excellent care. Hearing back from our patients is one way we can continue to improve our services. Please take a few minutes to complete the written survey that you may receive in the mail after your visit with us. Thank you!             Your Updated Medication List - Protect others around you: Learn how to safely use, store and throw away your medicines at www.disposemymeds.org.          This list is accurate as of 9/5/18 11:06 AM.  Always use your most recent med list.                   Brand Name Dispense Instructions for use Diagnosis    albuterol 108 (90 Base) MCG/ACT inhaler    PROAIR HFA/PROVENTIL HFA/VENTOLIN HFA    1 Inhaler    Inhale 1-2 puffs into the lungs every 4 hours as needed (for shortness of breath/wheezing)    Chronic obstructive pulmonary disease, unspecified COPD type (H)       ASPIRIN NOT PRESCRIBED    INTENTIONAL     Reported on 5/17/2017        B-D INSULIN SYRINGE 31G X 5/16\" 0.5 ML   Generic drug:  insulin syringe-needle U-100     100 each    USE 1 SYRINGE TWO TIMES A DAY OR AS DIRECTED    Type 2 diabetes, HbA1C goal < 8% (H)       BD ULTRA FINE PEN NEEDLES     100 each    Use to inject insulin twice daily.    Type 2 diabetes, HbA1C goal < 8% (H)       blood glucose monitoring lancets     100 Each    Use to " test up to four times per day    Type II or unspecified type diabetes mellitus without mention of complication, not stated as uncontrolled       FLORAJEN3 Caps     60 capsule    Take 1 capsule by mouth 2 times daily    Acute cystitis without hematuria       folic acid 0.8 MG Caps     90 capsule    Take 1 tablet by mouth daily    Ulcerative colitis without complications, unspecified location (H)       insulin  UNIT/ML injection    NovoLIN N RELION    3 vial    Inject 5 units under the skin at breakfast and 15 units at dinner.    Type 2 diabetes mellitus with hypoglycemia without coma, with long-term current use of insulin (H)       insulin regular 100 UNIT/ML injection    NovoLIN R RELION    30 mL    Inject 5 units with breakfast and 2 units with dinner (when mixing with NPH, draw this insulin up first)    Type 2 diabetes mellitus with hypoglycemia without coma, with long-term current use of insulin (H)       ipratropium - albuterol 0.5 mg/2.5 mg/3 mL 0.5-2.5 (3) MG/3ML neb solution    DUONEB    270 mL    NEBULIZE CONTENTS OF ONE VIAL BY MOUTH EVERY 4 HOURS AS NEEDED FOR SHORTNESS OF BREATH OR DYSPNEA    Chronic obstructive bronchitis (H)       losartan 100 MG tablet    COZAAR    90 tablet    Take 1 tablet (100 mg) by mouth daily for hypertension.    Hypertension goal BP (blood pressure) < 140/90       metFORMIN 1000 MG tablet    GLUCOPHAGE    180 tablet    TAKE ONE TABLET BY MOUTH TWICE A DAY WITH BREAKFAST AND DINNER    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, with long-term current use of insulin (H)       nebulizer/tubing/mouthpiece Kit     1 kit    Use to nebulize medications for COPD    Chronic obstructive pulmonary disease, unspecified COPD type (H)       ONETOUCH ULTRA test strip   Generic drug:  blood glucose monitoring     400 each    USE TO TEST BLOOD SUGAR FOUR TIMES A DAY OR AS DIRECTED    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, with long-term current use  of insulin (H)       pantoprazole 40 MG EC tablet    PROTONIX     Take 40 mg by mouth daily Started by Dr[facility] Debbie Rico at [facility]        primidone 50 MG tablet    MYSOLINE    900 tablet    3 tablets 2 times a day and 4 tablets at the bedtime.    Benign familial tremor       propafenone 150 MG Tabs tablet    RYTHMOL    180 tablet    Take 1 tablet (150 mg) by mouth 2 times daily    Paroxysmal atrial fibrillation (H)       propranolol 40 MG tablet    INDERAL    180 tablet    Take 2-3 tablets ( mg) by mouth 2 times daily    Benign familial tremor       simvastatin 80 MG tablet    ZOCOR    45 tablet    Take 0.5 tablets (40 mg) by mouth At Bedtime Profile Rx: patient will contact pharmacy when needed    Hyperlipidemia LDL goal <100       STIOLTO RESPIMAT 2.5-2.5 MCG/ACT Aers   Generic drug:  tiotropium-olodaterol      Inhale 2 puffs into the lungs daily        sulfaSALAzine 500 MG tablet    AZULFIDINE    90 tablet    TAKE ONE TABLET BY MOUTH THREE TIMES A DAY    Ulcerative colitis, unspecified       tamsulosin 0.4 MG capsule    FLOMAX    90 capsule    Take 1 capsule (0.4 mg) by mouth daily    Benign prostatic hyperplasia with urinary frequency       warfarin 5 MG tablet    COUMADIN    150 tablet    Take as directed by Coumadin clinic. Current dose (4/5/18): 10 mg on Mon, Thu, Sat + 7.5 mg all other days    Atrial fibrillation, unspecified type (H)

## 2018-09-05 NOTE — LETTER
9/5/2018         RE: Cayetano Lunsford  [address]  [place] [address]        Dear Colleague,    Thank you for referring your patient, Cayetano Lunsford, to the [facility]. Please see a copy of my visit note below.    ESTABLISHED PATIENT NEUROLOGY NOTE    DATE OF VISIT: 9/5/2018  CLINIC LOCATION: [facility]  MRN: [id number]  PATIENT NAME: Cayetano Lunsford  YOB: 1945    PCP: Debbie Lewis MD, MD    REASON FOR VISIT:   Chief Complaint   Patient presents with     Follow Up For     tremor-still the same.     SUBJECTIVE:                                                      HISTORY OF PRESENT ILLNESS: Patient is here for follow up regarding bilateral hand tremor.  The patient was last seen on 06/06/2018.  At that time, his primidone dose was gradually increased to 500 mg daily.  Please refer to my initial/other prior notes for further information.    Since the last visit, the patient denies any significant changes in his symptoms.  He is not sure if increase in primidone dose was helpful.  He denies any noticeable side effects.  He is also on propranolol 120 mg twice daily.  He feels that on some days his tremor is slightly better, but then it worsens again on other days.  Right hand is slightly more affected than the left.  It is hard for him to eat with a fork compared to a spoon.  No other notable changes or interval development of new neurological symptoms.    On review of systems, patient endorses no additional active complaints. Medications, allergies, family and social history were also reviewed. There are no changes reported by patient.  REVIEW OF SYSTEMS:                                                    10-system review was completed. Pertinent positives are included in HPI. The remainder of ROS is negative.  EXAM:                                                    Physical Exam:   Vitals: /66 (BP Location: Left arm, Patient Position:  Sitting, Cuff Size: Adult Regular)  Pulse 57  Temp 98.1  F (36.7  C) (Oral)  Resp 16  Wt 88 kg (194 lb)  SpO2 95%  BMI 31.31 kg/m2    General: pt is in NAD, cooperative.  Skin: normal turgor, moist mucous membranes, no lesions/rashes noticed.  HEENT: ATNC, white sclera, normal conjunctiva.  Respiratory: Symmetric lung excursion, no accessory respiratory muscle use.  Abdomen: Non distended.  Neurological: awake, cooperative, follows commands, no aphasia or dysarthria noted, cranial nerves II-XII: no ptosis, extraocular motility is full, face is symmetric, hearing is reduced bilaterally, tongue is midline, equally moves all extremities, intermittent mild to moderate postural and action bilateral hand tremor, casual gait is normal.  ASSESSMENT and PLAN:                                                    Assessment: 73-year-old male patient with essential tremor presents for follow-up.  He is on 120 mg of propranolol twice daily and 500 mg of primidone daily without noticeable side effects.  He did not notice significant benefit after slight increase in primidone dose.  We reviewed in detail available treatment options.  On one hand, given the stability of his symptoms we could continue the same doses of the medications without changes.  On the other hand, we could further increase primidone (up to 750 mg daily based on tolerance), though higher doses are associated with increased chance of side effects without additional treatment benefit.  We could also consider increasing propranolol, but the patient is hesitant to do so because of his bradycardia.  Alternatively, we could add low dose of gabapentin or topiramate to his regimen.  After prolonged discussion, the patient decided to continue his current medications without any changes.  His primidone was refilled.    Diagnoses:    ICD-10-CM    1. 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 We decided not to make any changes to his current medications at this time.    Next follow-up appointment is in the next 6 months or earlier if needed.    I advised the patient to contact me with any questions or concerns.    Total Time: 40 minutes with > 50% spent counseling the patient on stated above assessment and recommendations, including review of current symptoms, available treatment options, and the proposed plan.    Bong Yoo MD  / Neurology         Again, thank you for allowing me to participate in the care of your patient.        Sincerely,        Bong Yoo MD

## 2018-09-06 ENCOUNTER — ANTICOAGULATION THERAPY VISIT (OUTPATIENT)
Dept: NURSING | Facility: CLINIC | Age: 73
End: 2018-09-06
Payer: COMMERCIAL

## 2018-09-06 DIAGNOSIS — Z79.01 LONG-TERM (CURRENT) USE OF ANTICOAGULANTS: ICD-10-CM

## 2018-09-06 DIAGNOSIS — I48.91 ATRIAL FIBRILLATION, UNSPECIFIED TYPE (H): ICD-10-CM

## 2018-09-06 LAB — INR POINT OF CARE: 2.2 (ref 0.86–1.14)

## 2018-09-06 PROCEDURE — 36416 COLLJ CAPILLARY BLOOD SPEC: CPT

## 2018-09-06 PROCEDURE — 85610 PROTHROMBIN TIME: CPT | Mod: QW

## 2018-09-06 PROCEDURE — 99207 ZZC NO CHARGE NURSE ONLY: CPT

## 2018-09-06 NOTE — PROGRESS NOTES
ANTICOAGULATION FOLLOW-UP CLINIC VISIT    Patient Name:  Cayetano Lunsford  Date:  9/6/2018  Contact Type:  Face to Face    SUBJECTIVE:     Patient Findings     Positives Missed doses (Missed 7.5mg on 08/26. I encouraged pt to make up for any missed doses in the future.)           OBJECTIVE    INR Protime   Date Value Ref Range Status   09/06/2018 2.2 (A) 0.86 - 1.14 Final       ASSESSMENT / PLAN  INR assessment THER    Recheck INR In: 3 WEEKS    INR Location Clinic      Anticoagulation Summary as of 9/6/2018     INR goal 2.0-3.0   Today's INR 2.2   Warfarin maintenance plan 10 mg (5 mg x 2) on Mon, Thu; 7.5 mg (5 mg x 1.5) all other days   Full warfarin instructions 10 mg on Mon, Thu; 7.5 mg all other days   Weekly warfarin total 57.5 mg   No change documented Tatyana Akers RN   Plan last modified Kirstin Meléndez RN (7/16/2018)   Next INR check 9/27/2018   Priority INR   Target end date     Indications   Long-term (current) use of anticoagulants [Z79.01] [Z79.01]  Atrial fibrillation (H) [I48.91]         Anticoagulation Episode Summary     INR check location     Preferred lab     Send INR reminders to [facility]    Comments [place]. Prefers AVS printed. Transferred to [facility] 7/30/18. Dr. Lewis previous PCP.      Anticoagulation Care Providers     Provider Role Specialty Phone number    Debbie Lewis MD [facility] [phone]            See the Encounter Report to view Anticoagulation Flowsheet and Dosing Calendar (Go to Encounters tab in chart review, and find the Anticoagulation Therapy Visit)        Tatyana Akers, RN

## 2018-09-06 NOTE — MR AVS SNAPSHOT
Cayetano Lunsford   9/6/2018 11:15 AM   Anticoagulation Therapy Visit    Description:  73 year old male   Provider:  [facility]   Department:  Cr Nurse           INR as of 9/6/2018     Today's INR 2.2      Anticoagulation Summary as of 9/6/2018     INR goal 2.0-3.0   Today's INR 2.2   Full warfarin instructions 10 mg on Mon, Thu; 7.5 mg all other days   Next INR check 9/27/2018    Indications   Long-term (current) use of anticoagulants [Z79.01] [Z79.01]  Atrial fibrillation (H) [I48.91]         Your next Anticoagulation Clinic appointment(s)     Sep 27, 2018 11:15 AM CDT   [facility]    [address] [place] [address] [phone]              Contact Numbers     Clinic Number:         September 2018 Details    Sun Mon Tue Wed Thu Fri Sat           1                 2               3               4               5               6      10 mg   See details      7      7.5 mg         8      7.5 mg           9      7.5 mg         10      10 mg         11      7.5 mg         12      7.5 mg         13      10 mg         14      7.5 mg         15      7.5 mg           16      7.5 mg         17      10 mg         18      7.5 mg         19      7.5 mg         20      10 mg         21      7.5 mg         22      7.5 mg           23      7.5 mg         24      10 mg         25      7.5 mg         26      7.5 mg         27            28               29                 30                      Date Details   09/06 This INR check       Date of next INR:  9/27/2018         How to take your warfarin dose     To take:  7.5 mg Take 1.5 of the 5 mg tablets.    To take:  10 mg Take 2 of the 5 mg tablets.

## 2018-09-25 ENCOUNTER — TRANSFERRED RECORDS (OUTPATIENT)
Dept: HEALTH INFORMATION MANAGEMENT | Facility: CLINIC | Age: 73
End: 2018-09-25

## 2018-09-27 ENCOUNTER — ANTICOAGULATION THERAPY VISIT (OUTPATIENT)
Dept: NURSING | Facility: CLINIC | Age: 73
End: 2018-09-27
Payer: COMMERCIAL

## 2018-09-27 DIAGNOSIS — I48.91 ATRIAL FIBRILLATION, UNSPECIFIED TYPE (H): ICD-10-CM

## 2018-09-27 DIAGNOSIS — Z79.01 LONG-TERM (CURRENT) USE OF ANTICOAGULANTS: ICD-10-CM

## 2018-09-27 LAB — INR POINT OF CARE: 2.5 (ref 0.86–1.14)

## 2018-09-27 PROCEDURE — 36416 COLLJ CAPILLARY BLOOD SPEC: CPT

## 2018-09-27 PROCEDURE — 99207 ZZC NO CHARGE NURSE ONLY: CPT

## 2018-09-27 PROCEDURE — 85610 PROTHROMBIN TIME: CPT | Mod: QW

## 2018-09-27 RX ORDER — WARFARIN SODIUM 5 MG/1
TABLET ORAL
Qty: 150 TABLET | Refills: 1 | COMMUNITY
Start: 2018-09-27 | End: 2018-12-13

## 2018-09-27 NOTE — PROGRESS NOTES
ANTICOAGULATION FOLLOW-UP CLINIC VISIT    Patient Name:  Cayetano Lunsford  Date:  9/27/2018  Contact Type:  Face to Face    SUBJECTIVE:     Patient Findings     Positives No Problem Findings           OBJECTIVE    INR Protime   Date Value Ref Range Status   09/27/2018 2.5 (A) 0.86 - 1.14 Final       ASSESSMENT / PLAN  INR assessment THER    Recheck INR In: 3 WEEKS    INR Location Clinic      Anticoagulation Summary as of 9/27/2018     INR goal 2.0-3.0   Today's INR 2.5   Warfarin maintenance plan 10 mg (5 mg x 2) on Mon, Thu; 7.5 mg (5 mg x 1.5) all other days   Full warfarin instructions 10 mg on Mon, Thu; 7.5 mg all other days   Weekly warfarin total 57.5 mg   No change documented Tatyana Akers RN   Plan last modified Kirstin Meléndez RN (7/16/2018)   Next INR check 10/18/2018   Priority INR   Target end date     Indications   Long-term (current) use of anticoagulants [Z79.01] [Z79.01]  Atrial fibrillation (H) [I48.91]         Anticoagulation Episode Summary     INR check location     Preferred lab     Send INR reminders to [facility]    Comments [place]. Prefers AVS printed. Transferred to [facility] 7/30/18. Dr. Lewis previous PCP.      Anticoagulation Care Providers     Provider Role Specialty Phone number    Debbie Lewis MD [facility] [phone]            See the Encounter Report to view Anticoagulation Flowsheet and Dosing Calendar (Go to Encounters tab in chart review, and find the Anticoagulation Therapy Visit)        Tatyana Akers, RN

## 2018-09-27 NOTE — MR AVS SNAPSHOT
Cayetano Lunsford   9/27/2018 11:15 AM   Anticoagulation Therapy Visit    Description:  73 year old male   Provider:  [facility]   Department:  Cr Nurse           INR as of 9/27/2018     Today's INR 2.5      Anticoagulation Summary as of 9/27/2018     INR goal 2.0-3.0   Today's INR 2.5   Full warfarin instructions 10 mg on Mon, Thu; 7.5 mg all other days   Next INR check 10/18/2018    Indications   Long-term (current) use of anticoagulants [Z79.01] [Z79.01]  Atrial fibrillation (H) [I48.91]         Your next Anticoagulation Clinic appointment(s)     Sep 27, 2018 11:15 AM CDT   Anticoagulation Visit with [facility]   [facility]    [address] [facility] [address]   [phone]            Oct 18, 2018 11:30 AM CDT   Anticoagulation Visit with [facility] CL[facility]   [facility] ([facility]    [address] [facility] [address]   [phone]              Contact Numbers     Clinic Number:         September 2018 Details    Sun Mon Tue Wed Thu Fri Sat           1                 2               3               4               5               6               7               8                 9               10               11               12               13               14               15                 16               17               18               19               20               21               22                 23               24               25               26               27      10 mg   See details      28      7.5 mg         29      7.5 mg           30      7.5 mg                Date Details   09/27 This INR check               How to take your warfarin dose     To take:  7.5 mg Take 1.5 of the 5 mg tablets.    To take:  10 mg Take 2 of the 5 mg tablets.           October 2018 Details    Sun Mon Tue Wed Thu Fri Sat      1      10 mg          2      7.5 mg         3      7.5 mg         4      10 mg         5      7.5 mg         6      7.5 mg           7      7.5 mg         8      10 mg         9      7.5 mg         10      7.5 mg         11      10 mg         12      7.5 mg         13      7.5 mg           14      7.5 mg         15      10 mg         16      7.5 mg         17      7.5 mg         18            19               20                 21               22               23               24               25               26               27                 28               29               30               31                   Date Details   No additional details    Date of next INR:  10/18/2018         How to take your warfarin dose     To take:  7.5 mg Take 1.5 of the 5 mg tablets.    To take:  10 mg Take 2 of the 5 mg tablets.

## 2018-10-19 ENCOUNTER — ANTICOAGULATION THERAPY VISIT (OUTPATIENT)
Dept: NURSING | Facility: CLINIC | Age: 73
End: 2018-10-19
Payer: COMMERCIAL

## 2018-10-19 DIAGNOSIS — I48.91 ATRIAL FIBRILLATION, UNSPECIFIED TYPE (H): ICD-10-CM

## 2018-10-19 LAB — INR POINT OF CARE: 2 (ref 0.86–1.14)

## 2018-10-19 PROCEDURE — 36416 COLLJ CAPILLARY BLOOD SPEC: CPT

## 2018-10-19 PROCEDURE — 99207 ZZC NO CHARGE NURSE ONLY: CPT

## 2018-10-19 PROCEDURE — 85610 PROTHROMBIN TIME: CPT | Mod: QW

## 2018-10-19 NOTE — MR AVS SNAPSHOT
Cayetano Lunsford   10/19/2018 9:15 AM   Anticoagulation Therapy Visit    Description:  73 year old male   Provider:   ANTICOAGULATION CLINIC   Department:  Cr Nurse           INR as of 10/19/2018     Today's INR 2.0      Anticoagulation Summary as of 10/19/2018     INR goal 2.0-3.0   Today's INR 2.0   Full warfarin instructions 10 mg on Mon, Thu; 7.5 mg all other days   Next INR check 11/16/2018    Indications   Long-term (current) use of anticoagulants [Z79.01] [Z79.01]  Atrial fibrillation (H) [I48.91]         Your next Anticoagulation Clinic appointment(s)     Oct 19, 2018  9:15 AM CDT   Anticoagulation Visit with  ANTICOAGULATION CLINIC   [facility] ([facility])    [address] [facility] [address]   [phone]            Nov 16, 2018 11:30 AM CST   Anticoagulation Visit with  ANTICOAGULATION CLINIC   [facility] ([facility])    [address] [facility] [address]   [phone]              Contact Numbers     Clinic Number:         October 2018 Details    Sun Mon Tue Wed Thu Fri Sat      1               2               3               4               5               6                 7               8               9               10               11               12               13                 14               15               16               17               18               19      7.5 mg   See details      20      7.5 mg           21      7.5 mg         22      10 mg         23      7.5 mg         24      7.5 mg         25      10 mg         26      7.5 mg         27      7.5 mg           28      7.5 mg         29      10 mg         30      7.5 mg         31      7.5 mg             Date Details   10/19 This INR check               How to take your warfarin dose     To take:  7.5 mg Take 1.5 of the 5 mg tablets.    To take:  10 mg Take 2 of the 5 mg tablets.           November  2018 Details    Sun Mon Tue Wed Thu Fri Sat         1      10 mg         2      7.5 mg         3      7.5 mg           4      7.5 mg         5      10 mg         6      7.5 mg         7      7.5 mg         8      10 mg         9      7.5 mg         10      7.5 mg           11      7.5 mg         12      10 mg         13      7.5 mg         14      7.5 mg         15      10 mg         16            17                 18               19               20               21               22               23               24                 25               26               27               28               29               30                 Date Details   No additional details    Date of next INR:  11/16/2018         How to take your warfarin dose     To take:  7.5 mg Take 1.5 of the 5 mg tablets.    To take:  10 mg Take 2 of the 5 mg tablets.

## 2018-10-19 NOTE — PROGRESS NOTES
ANTICOAGULATION FOLLOW-UP CLINIC VISIT    Patient Name:  Cayetano Lunsford  Date:  10/19/2018  Contact Type:  Face to Face    SUBJECTIVE:     Patient Findings     Positives No Problem Findings           OBJECTIVE    INR Protime   Date Value Ref Range Status   10/19/2018 2.0 (A) 0.86 - 1.14 Final       ASSESSMENT / PLAN  INR assessment THER    Recheck INR In: 4 WEEKS    INR Location Clinic      Anticoagulation Summary as of 10/19/2018     INR goal 2.0-3.0   Today's INR 2.0   Warfarin maintenance plan 10 mg (5 mg x 2) on Mon, Thu; 7.5 mg (5 mg x 1.5) all other days   Full warfarin instructions 10 mg on Mon, Thu; 7.5 mg all other days   Weekly warfarin total 57.5 mg   No change documented Tatyana Akers RN   Plan last modified Kirstin Meléndez RN (7/16/2018)   Next INR check 11/16/2018   Priority INR   Target end date     Indications   Long-term (current) use of anticoagulants [Z79.01] [Z79.01]  Atrial fibrillation (H) [I48.91]         Anticoagulation Episode Summary     INR check location     Preferred lab     Send INR reminders to [facility]    Comments Tribe. Prefers AVS printed. Transferred to [facility] 7/30/18. Dr. Lewis previous PCP.      Anticoagulation Care Providers     Provider Role Specialty Phone number    Debbie Lewis MD [facility] [phone]            See the Encounter Report to view Anticoagulation Flowsheet and Dosing Calendar (Go to Encounters tab in chart review, and find the Anticoagulation Therapy Visit)        Tatyana Akers, RN

## 2018-10-31 ENCOUNTER — OFFICE VISIT (OUTPATIENT)
Dept: FAMILY MEDICINE | Facility: CLINIC | Age: 73
End: 2018-10-31
Payer: COMMERCIAL

## 2018-10-31 VITALS
DIASTOLIC BLOOD PRESSURE: 70 MMHG | HEART RATE: 58 BPM | BODY MASS INDEX: 31.47 KG/M2 | SYSTOLIC BLOOD PRESSURE: 120 MMHG | OXYGEN SATURATION: 93 % | WEIGHT: 195 LBS | RESPIRATION RATE: 12 BRPM | TEMPERATURE: 98.1 F

## 2018-10-31 DIAGNOSIS — K51.90 ULCERATIVE COLITIS WITHOUT COMPLICATIONS, UNSPECIFIED LOCATION (H): ICD-10-CM

## 2018-10-31 DIAGNOSIS — N18.1 TYPE 2 DIABETES MELLITUS WITH STAGE 1 CHRONIC KIDNEY DISEASE, WITH LONG-TERM CURRENT USE OF INSULIN (H): ICD-10-CM

## 2018-10-31 DIAGNOSIS — Z79.4 TYPE 2 DIABETES MELLITUS WITH STAGE 1 CHRONIC KIDNEY DISEASE, WITH LONG-TERM CURRENT USE OF INSULIN (H): ICD-10-CM

## 2018-10-31 DIAGNOSIS — E11.22 TYPE 2 DIABETES MELLITUS WITH STAGE 1 CHRONIC KIDNEY DISEASE, WITH LONG-TERM CURRENT USE OF INSULIN (H): ICD-10-CM

## 2018-10-31 DIAGNOSIS — I48.91 ATRIAL FIBRILLATION, UNSPECIFIED TYPE (H): Primary | ICD-10-CM

## 2018-10-31 DIAGNOSIS — M21.612 BUNION, LEFT: ICD-10-CM

## 2018-10-31 DIAGNOSIS — Z23 NEED FOR PROPHYLACTIC VACCINATION AND INOCULATION AGAINST INFLUENZA: ICD-10-CM

## 2018-10-31 DIAGNOSIS — I10 HYPERTENSION GOAL BP (BLOOD PRESSURE) < 140/90: ICD-10-CM

## 2018-10-31 DIAGNOSIS — E66.01 MORBID OBESITY (H): ICD-10-CM

## 2018-10-31 LAB — HBA1C MFR BLD: 5.5 % (ref 0–5.6)

## 2018-10-31 PROCEDURE — 99214 OFFICE O/P EST MOD 30 MIN: CPT | Performed by: FAMILY MEDICINE

## 2018-10-31 PROCEDURE — 83036 HEMOGLOBIN GLYCOSYLATED A1C: CPT | Performed by: FAMILY MEDICINE

## 2018-10-31 PROCEDURE — 36415 COLL VENOUS BLD VENIPUNCTURE: CPT | Performed by: FAMILY MEDICINE

## 2018-10-31 NOTE — LETTER
November 2, 2018      Cayetano NORIEGA Jonn  [address]   [facility] [address]        Dear ,    We are writing to inform you of your test results. I think this is a good results.      Resulted Orders   Hemoglobin A1c   Result Value Ref Range    Hemoglobin A1C 5.5 0 - 5.6 %      Comment:      Normal <5.7% Prediabetes 5.7-6.4%  Diabetes 6.5% or higher - adopted from ADA   consensus guidelines.         If you have any questions or concerns, please call the clinic at the number listed above.       Sincerely,        Dmitri Andres MD

## 2018-10-31 NOTE — PROGRESS NOTES
SUBJECTIVE:   Cayetano Lunsford is a 73 year old male who presents to clinic today for the following health issues:    Body mass index is 31.47 kg/(m^2).    Diabetes Follow-up and COPD [facility]    Patient is checking blood sugars: three times daily.   Blood sugar testing frequency justification: Type 2 diabetes  Results are as follows:         am - 154         suppertime - 109         bedtime - 150    Diabetic concerns: None     Symptoms of hypoglycemia (low blood sugar): fingers tingle     Paresthesias (numbness or burning in feet) or sores: No     Date of last diabetic eye exam: August 2018    BP Readings from Last 2 Encounters:   09/05/18 132/66   06/28/18 106/48     Hemoglobin A1C (%)   Date Value   05/01/2018 5.3   11/02/2017 6.0     LDL Cholesterol Calculated (mg/dL)   Date Value   05/01/2018 89   05/08/2017 83       Diabetes Management Resources    Amount of exercise or physical activity: walking     Problems taking medications regularly: No    Medication side effects: none    Diet: regular (no restrictions)    Past Medical History:   Diagnosis Date     Allergic rhinitis, cause unspecified      Atrial fibrillation (H)      BPH (benign prostatic hyperplasia)      CAD (coronary artery disease)      Chronic airway obstruction, not elsewhere classified      Hyperlipidemia LDL goal <100 5/9/2010     Hypertension goal BP (blood pressure) < 130/80 10/19/2006     Obesity (BMI 30-39.9)      Perforation of tympanic membrane, unspecified     Right TM     Tachycardia, unspecified      Type 2 diabetes, HbA1C goal < 8% (H) 5/2/2010     Unspecified cardiovascular disease        Past Surgical History:   Procedure Laterality Date     CARDIAC SURGERY       COLONOSCOPY  3/31/2011     COLONOSCOPY N/A 5/25/2018    Procedure: COMBINED COLONOSCOPY, SINGLE OR MULTIPLE BIOPSY/POLYPECTOMY BY BIOPSY;;  Surgeon: Juan Simon DO;  Location:  GI     CORONARY ANGIOGRAPHY ADULT ORDER  2001 and 2008 2001 at [facility]. 2008-  "No interventions     HC REMOVAL OF NAIL PLATE SIMPLE SINGLE  11/10/2011    Right 2nd Toenail     HERNIA REPAIR      left inguinal     SURGICAL HISTORY OF -   1987    right ear graft     SURGICAL HISTORY OF -   1992    right shoulder surgery     SURGICAL HISTORY OF -   1979    back surgery     SURGICAL HISTORY OF -   1978    vasectomy       Family History   Problem Relation Age of Onset     Circulatory Mother      blood clot in leg     Diabetes Mother      type 2     Allergies Mother      Arthritis Mother      GASTROINTESTINAL DISEASE Mother      Neurologic Disorder Mother      Familiar tremors     Neurologic Disorder Father      brain tumor     Cerebrovascular Disease Paternal Grandfather      Hypertension Brother      Hypertension Sister      Diabetes Sister      Type 2     Allergies Sister      Lipids Brother      Lipids Sister      Neurologic Disorder Sister      \"     Neurologic Disorder Sister      \"     Neurologic Disorder Sister      \"       Social History   Substance Use Topics     Smoking status: Former Smoker     Packs/day: 1.00     Years: 30.00     Types: Cigarettes     Quit date: 3/19/1992     Smokeless tobacco: Never Used     Alcohol use 0.0 oz/week     0 Standard drinks or equivalent per week      Comment: hardly at all, sometimes at dinner     On exam the vital signs are stable  Weight is Body mass index is 31.47 kg/(m^2).   Eyes show stephanie  No neck masses or thyromegaly.Ear nose and throat shows normal   No bruits, murmers, rubs or extrasounds. No cardiomegaly or chest wall tenderness. Lungs clear, no abdominal masses or organomegaly. No CVA tenderness.  Skin eval no rash   No hernias, good range of motion neck, back and extremities. No abnormal skin lesions. Normal genitalia. Good peripheral pulses. No adenopathy.  Normal gait and stance. Neck is supple.  Back exam shows good rom     (I48.91) Atrial fibrillation, unspecified type (H)  (primary encounter diagnosis)  Comment:   Plan: stable vitals, " "feels well    (K51.90) Ulcerative colitis without complications, unspecified location (H)  Comment: no flareups for years   Plan: Colorectal follows him, negative last endo    (I10) Hypertension goal BP (blood pressure) < 140/90  Comment:   Plan:   Current Outpatient Prescriptions   Medication     albuterol (PROAIR HFA/PROVENTIL HFA/VENTOLIN HFA) 108 (90 BASE) MCG/ACT Inhaler     ASPIRIN NOT PRESCRIBED, INTENTIONAL,     BD ULTRA FINE PEN NEEDLES     folic acid 0.8 MG CAPS     FREESTYLE LANCETS MISC     insulin NPH (NOVOLIN N RELION) 100 UNIT/ML injection     insulin regular (NOVOLIN R RELION) 100 UNIT/ML injection     ipratropium - albuterol 0.5 mg/2.5 mg/3 mL (DUONEB) 0.5-2.5 (3) MG/3ML neb solution     losartan (COZAAR) 100 MG tablet     metFORMIN (GLUCOPHAGE) 1000 MG tablet     ONETOUCH ULTRA test strip     pantoprazole (PROTONIX) 40 MG enteric coated tablet     primidone (MYSOLINE) 50 MG tablet     Probiotic Product (FLORAJEN3) CAPS     propafenone (RYTHMOL) 150 MG TABS tablet     propranolol (INDERAL) 40 MG tablet     Respiratory Therapy Supplies (NEBULIZER/TUBING/MOUTHPIECE) KIT     sulfaSALAzine (AZULFIDINE) 500 MG tablet     tamsulosin (FLOMAX) 0.4 MG capsule     tiotropium-olodaterol (STIOLTO RESPIMAT) 2.5-2.5 MCG/ACT AERS     warfarin (COUMADIN) 5 MG tablet     B-D INSULIN SYRINGE 31G X 5/16\" 0.5 ML miscellaneous     simvastatin (ZOCOR) 80 MG tablet     No current facility-administered medications for this visit.          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

## 2018-10-31 NOTE — MR AVS SNAPSHOT
After Visit Summary   10/31/2018    Cayetano Lunsford    MRN: [id number]           Patient Information     Date Of Birth          1945        Visit Information        Provider Department      10/31/2018 11:00 AM Dmitri Andres MD [facility]        Today's Diagnoses     Atrial fibrillation, unspecified type (H)    -  1    Ulcerative colitis without complications, unspecified location (H)        Hypertension goal BP (blood pressure) < 140/90        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, with long-term current use of insulin (H)        Bunion, left        BMI OVER 30 WITH DM         Need for prophylactic vaccination and inoculation against influenza           Follow-ups after your visit        Follow-up notes from your care team     Return in about 3 months (around 1/31/2019) for Lab Work, Routine Visit Pharm D, Routine Visit.      Your next 10 appointments already scheduled     Dec 14, 2018 11:00 AM CST   Anticoagulation Visit with [facility] ANTICOAGULATION CLINIC   [facility] ([facility])    [id number] [place] [address] [phone]            Feb 08, 2019 11:00 AM CST   TELEMEDICINE with Cheko Pereira [facility] ([facility])    [address] [place] [address] [phone]           Note: this is not an onsite visit; there is no need to come to the facility.            Mar 06, 2019 10:30 AM CST   Return Visit with Bong Yoo MD   [facility] ([facility])    [address] [place] [address] [phone]              Who to contact     If you have questions or need follow up information about today's clinic visit or your schedule please contact [facility] directly at [phone].  Normal or non-critical lab and imaging results will be communicated  to you by MyChart, letter or phone within 4 business days after the clinic has received the results. If you do not hear from us within 7 days, please contact the clinic through MyChart or phone. If you have a critical or abnormal lab result, we will notify you by phone as soon as possible.  Submit refill requests through [employer] or call your pharmacy and they will forward the refill request to us. Please allow 3 business days for your refill to be completed.          Additional Information About Your Visit        Care EveryWhere ID     This is your Care EveryWhere ID. This could be used by other organizations to access your [facility] medical records  [id number]        Your Vitals Were     Pulse Temperature Respirations Pulse Oximetry BMI (Body Mass Index)       58 98.1  F (36.7  C) (Oral) 12 93% 31.47 kg/m2        Blood Pressure from Last 3 Encounters:   10/31/18 120/70   09/05/18 132/66   06/28/18 106/48    Weight from Last 3 Encounters:   10/31/18 195 lb (88.5 kg)   09/05/18 194 lb (88 kg)   06/28/18 190 lb 1.6 oz (86.2 kg)              We Performed the Following     Hemoglobin A1c        Primary Care Provider Office Phone # Fax #    Debbie Lewis [phone]       [address]  [facility] [address]        Equal Access to Services     EMERY ROSS : Hadii efrem ku hadasho Soomaali, waaxda luqadaha, qaybta kaalmada adeegyada, erendira pugh . So [facility] [phone].    ATENCIÓN: Si habla español, tiene a jurado disposición servicios gratuitos de asistencia lingüística. Llame al [phone].    We comply with applicable federal civil rights laws and [place] laws. We do not discriminate on the basis of race, color, national origin, age, disability, sex, sexual orientation, or gender identity.            Thank you!     Thank you for choosing [facility]  for your care. Our goal is always to provide you with excellent care. Hearing back from our patients is  one way we can continue to improve our services. Please take a few minutes to complete the written survey that you may receive in the mail after your visit with us. Thank you!             Your Updated Medication List - Protect others around you: Learn how to safely use, store and throw away your medicines at www.disposemymeds.org.          This list is accurate as of 10/31/18 11:59 PM.  Always use your most recent med list.                   Brand Name Dispense Instructions for use Diagnosis    albuterol 108 (90 Base) MCG/ACT inhaler    PROAIR HFA/PROVENTIL HFA/VENTOLIN HFA    1 Inhaler    Inhale 1-2 puffs into the lungs every 4 hours as needed (for shortness of breath/wheezing)    Chronic obstructive pulmonary disease, unspecified COPD type (H)       ASPIRIN NOT PRESCRIBED    INTENTIONAL     Reported on 5/17/2017        BD ULTRA FINE PEN NEEDLES     100 each    Use to inject insulin twice daily.    Type 2 diabetes, HbA1C goal < 8% (H)       blood glucose monitoring lancets     100 Each    Use to test up to four times per day    Type II or unspecified type diabetes mellitus without mention of complication, not stated as uncontrolled       FLORAJEN3 Caps     60 capsule    Take 1 capsule by mouth 2 times daily    Acute cystitis without hematuria       folic acid 0.8 MG Caps     90 capsule    Take 1 tablet by mouth daily    Ulcerative colitis without complications, unspecified location (H)       insulin  UNIT/ML vial    NovoLIN N RELION    3 vial    Inject 5 units under the skin at breakfast and 15 units at dinner.    Type 2 diabetes mellitus with hypoglycemia without coma, with long-term current use of insulin (H)       insulin regular 100 UNIT/ML vial    NovoLIN R RELION    30 mL    Inject 5 units with breakfast and 2 units with dinner (when mixing with NPH, draw this insulin up first)    Type 2 diabetes mellitus with hypoglycemia without coma, with long-term current use of insulin (H)       ipratropium -  albuterol 0.5 mg/2.5 mg/3 mL 0.5-2.5 (3) MG/3ML neb solution    DUONEB    270 mL    NEBULIZE CONTENTS OF ONE VIAL BY MOUTH EVERY 4 HOURS AS NEEDED FOR SHORTNESS OF BREATH OR DYSPNEA    Chronic obstructive bronchitis (H)       losartan 100 MG tablet    COZAAR    90 tablet    Take 1 tablet (100 mg) by mouth daily for hypertension.    Hypertension goal BP (blood pressure) < 140/90       metFORMIN 1000 MG tablet    GLUCOPHAGE    180 tablet    TAKE ONE TABLET BY MOUTH TWICE A DAY WITH BREAKFAST AND DINNER    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, with long-term current use of insulin (H)       nebulizer/tubing/mouthpiece Kit     1 kit    Use to nebulize medications for COPD    Chronic obstructive pulmonary disease, unspecified COPD type (H)       ONETOUCH ULTRA test strip   Generic drug:  blood glucose monitoring     400 each    USE TO TEST BLOOD SUGAR FOUR TIMES A DAY OR AS DIRECTED    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, with long-term current use of insulin (H)       pantoprazole 40 MG EC tablet    PROTONIX     Take 40 mg by mouth daily Started by Dr[facility] Debbie Rico at [facility]        primidone 50 MG tablet    MYSOLINE    900 tablet    3 tablets 2 times a day and 4 tablets at the bedtime.    Benign familial tremor       propafenone 150 MG Tabs tablet    RYTHMOL    180 tablet    Take 1 tablet (150 mg) by mouth 2 times daily    Paroxysmal atrial fibrillation (H)       propranolol 40 MG tablet    INDERAL    180 tablet    Take 2-3 tablets ( mg) by mouth 2 times daily    Benign familial tremor       STIOLTO RESPIMAT 2.5-2.5 MCG/ACT Aers   Generic drug:  tiotropium-olodaterol      Inhale 2 puffs into the lungs daily        sulfaSALAzine 500 MG tablet    AZULFIDINE    90 tablet    TAKE ONE TABLET BY MOUTH THREE TIMES A DAY    Ulcerative colitis, unspecified       tamsulosin 0.4 MG capsule    FLOMAX    90 capsule    Take 1 capsule (0.4 mg) by mouth daily    Benign prostatic  hyperplasia with urinary frequency       warfarin 5 MG tablet    COUMADIN    150 tablet    Take as directed by Coumadin clinic-- 10 mg on Mon & Th / Take 7.5 mg all other days    Atrial fibrillation, unspecified type (H)

## 2018-10-31 NOTE — PROGRESS NOTES

## 2018-11-05 ENCOUNTER — ALLIED HEALTH/NURSE VISIT (OUTPATIENT)
Dept: PHARMACY | Facility: CLINIC | Age: 73
End: 2018-11-05
Payer: COMMERCIAL

## 2018-11-05 DIAGNOSIS — Z79.4 TYPE 2 DIABETES MELLITUS WITH STAGE 1 CHRONIC KIDNEY DISEASE, WITH LONG-TERM CURRENT USE OF INSULIN (H): Primary | ICD-10-CM

## 2018-11-05 DIAGNOSIS — E78.5 HYPERLIPIDEMIA LDL GOAL <100: ICD-10-CM

## 2018-11-05 DIAGNOSIS — N18.1 TYPE 2 DIABETES MELLITUS WITH STAGE 1 CHRONIC KIDNEY DISEASE, WITH LONG-TERM CURRENT USE OF INSULIN (H): Primary | ICD-10-CM

## 2018-11-05 DIAGNOSIS — E11.22 TYPE 2 DIABETES MELLITUS WITH STAGE 1 CHRONIC KIDNEY DISEASE, WITH LONG-TERM CURRENT USE OF INSULIN (H): Primary | ICD-10-CM

## 2018-11-05 PROCEDURE — 99606 MTMS BY PHARM EST 15 MIN: CPT | Performed by: PHARMACIST

## 2018-11-05 NOTE — TELEPHONE ENCOUNTER
"Requested Prescriptions   Pending Prescriptions Disp Refills     simvastatin (ZOCOR) 80 MG tablet [Pharmacy Med Name: SIMVASTATIN 80MG TABS]  Last Written Prescription Date:  11/2/2017  Last Fill Quantity: 45 tablet,  # refills: 3   Last Office Visit: 10/31/2018   Future Office Visit:      45 tablet 0     Sig: TAKE ONE-HALF TABLET BY MOUTH AT BEDTIME    Statins Protocol Passed    11/5/2018  9:39 AM       Passed - LDL on file in past 12 months    Recent Labs   Lab Test  05/01/18   0736   LDL  89          Passed - No abnormal creatine kinase in past 12 months    Recent Labs   Lab Test  01/29/14   1357   CKT  68           Passed - Recent (12 mo) or future (30 days) visit within the authorizing provider's specialty    Patient had office visit in the last 12 months or has a visit in the next 30 days with authorizing provider or within the authorizing provider's specialty.  See \"Patient Info\" tab in inbasket, or \"Choose Columns\" in Meds & Orders section of the refill encounter.           Passed - Patient is age 18 or older          "

## 2018-11-05 NOTE — PROGRESS NOTES
SUBJECTIVE/OBJECTIVE:                Cayetano Lunsford is a 73 year old male called for a follow-up visit for Medication Therapy Management.  He was referred to me from anuel Lewis.     Chief Complaint: Follow up from our visit on 8/6/18.  No concerns today. Tremors are stable.    Tobacco: No tobacco use  Alcohol: not currently using    Medication Adherence/Access:  no issues reported    Diabetes:  Pt currently taking metformin bid, Novolin N 5 units AM before breakfast and 15 units in PM-at dinner, Novolin R-5 units with breakfast and 2 units of R at dinner.   SMBG: 3-4 times daily. Ranges (per pt report): see chart below  Symptoms of low blood sugar? Fingers and lips tingle, shaky and sweaty. Frequency of hypoglycemia? Once less than 70 in the last 2 weeks. Treating with small amount of orange juice.   Recent symptoms of high blood sugar? none.  Eye exam: up to date   Foot exam:  due  Microalbumin is not < 30 mg/g. Pt is taking an ACEi/ARB not at max dose.  Aspirin: Not taking due to anticoagulation therapy and increased risk of bleeding    Ave: 7 day ave: 134; 14 day: 132; 30 day: 135    Date FBG Dinner  Bedtime   11/5/18 127     11/4/18 104 97 155   11/3/18 150 80 167   11/2 128 112 118   11/1 128 98 151   10/31 145 186 had apple crisp 135   10/30 154 109 150   10/29 112 92 111     Hemoglobin A1C   Date Value Ref Range Status   10/31/2018 5.5 0 - 5.6 % Final     Comment:     Normal <5.7% Prediabetes 5.7-6.4%  Diabetes 6.5% or higher - adopted from ADA   consensus guidelines.       Today's Vitals: There were no vitals taken for this visit. - telephone visit.     BP Readings from Last 3 Encounters:   10/31/18 120/70   09/05/18 132/66   06/28/18 106/48     ASSESSMENT:              Current medications were reviewed today as discussed above.      Medication Adherence: good, no issues identified    Diabetes: A1c continues to be lower, slightly higher than last A1c lab - blood sugars are at goal. Patient had  hypoglycemia once in 2 weeks.       PLAN:                  1. Continue current drug therapy.    I spent 15 minutes with this patient today. All changes were made via collaborative practice agreement with Debbie Lewis. A copy of the visit note was provided to the patient's primary care provider.     Will follow up in 3 months.    The patient declined a summary of these recommendations as an after visit summary.    Dara Stratton, PharmD  Medication Therapy Management

## 2018-11-05 NOTE — PATIENT INSTRUCTIONS
1. Continue current drug therapy.    Your next phone visit is in 3 months.    Keep up the good work!!      Dara Stratton, PharmD  [phone] in clinic on Tuesday and Wednesday    You may receive a survey about the MT services you received.  I would appreciate your feedback to help me serve you better in the future. Please fill it out and return it when you can. Your comments will be anonymous.

## 2018-11-06 RX ORDER — SIMVASTATIN 80 MG
TABLET ORAL
Qty: 45 TABLET | Refills: 1 | Status: SHIPPED | OUTPATIENT
Start: 2018-11-06 | End: 2019-04-29

## 2018-11-06 NOTE — TELEPHONE ENCOUNTER
Prescription approved per [facility] Refill Protocol.    Signed Prescriptions:                        Disp   Refills    simvastatin (ZOCOR) 80 MG tablet           45 tab*1        Sig: TAKE ONE-HALF TABLET BY MOUTH AT BEDTIME  Authorizing Provider: AGNES FERNANDEZ  Ordering User: JARVIS SALAZAR      Closing encounter - no further actions needed at this time    Jarvis Salazar RN

## 2018-11-16 ENCOUNTER — ANTICOAGULATION THERAPY VISIT (OUTPATIENT)
Dept: NURSING | Facility: CLINIC | Age: 73
End: 2018-11-16
Payer: COMMERCIAL

## 2018-11-16 DIAGNOSIS — I48.91 ATRIAL FIBRILLATION, UNSPECIFIED TYPE (H): ICD-10-CM

## 2018-11-16 LAB — INR POINT OF CARE: 2.3 (ref 0.86–1.14)

## 2018-11-16 PROCEDURE — 99207 ZZC NO CHARGE NURSE ONLY: CPT

## 2018-11-16 PROCEDURE — 36416 COLLJ CAPILLARY BLOOD SPEC: CPT

## 2018-11-16 PROCEDURE — 85610 PROTHROMBIN TIME: CPT | Mod: QW

## 2018-11-16 NOTE — MR AVS SNAPSHOT
Cayetano Lunsford   11/16/2018 11:30 AM   Anticoagulation Therapy Visit    Description:  73 year old male   Provider:  [facility]   Department:  Cr Nurse           INR as of 11/16/2018     Today's INR 2.3      Anticoagulation Summary as of 11/16/2018     INR goal 2.0-3.0   Today's INR 2.3   Full warfarin instructions 10 mg on Mon, Thu; 7.5 mg all other days   Next INR check 12/14/2018    Indications   Long-term (current) use of anticoagulants [Z79.01] [Z79.01]  Atrial fibrillation (H) [I48.91]         Your next Anticoagulation Clinic appointment(s)     Nov 16, 2018 11:30 AM CST   Anticoagulation Visit with [facility]   [facility]    [address] [address]   [phone]            Dec 14, 2018 11:00 AM CST   Anticoagulation Visit with [facility] CL[facility]   [facility] ([facility])    [address] [address]   [phone]              Contact Numbers     Clinic Number:         November 2018 Details    Sun Mon Tue Wed Thu Fri Sat         1               2               3                 4               5               6               7               8               9               10                 11               12               13               14               15               16      7.5 mg   See details      17      7.5 mg           18      7.5 mg         19      10 mg         20      7.5 mg         21      7.5 mg         22      10 mg         23      7.5 mg         24      7.5 mg           25      7.5 mg         26      10 mg         27      7.5 mg         28      7.5 mg         29      10 mg         30      7.5 mg           Date Details   11/16 This INR check               How to take your warfarin dose     To take:  7.5 mg Take 1.5 of the 5 mg tablets.    To take:  10 mg Take 2 of the 5 mg tablets.           December 2018  Details    Sun Mon Tue Wed Thu Fri Sat           1      7.5 mg           2      7.5 mg         3      10 mg         4      7.5 mg         5      7.5 mg         6      10 mg         7      7.5 mg         8      7.5 mg           9      7.5 mg         10      10 mg         11      7.5 mg         12      7.5 mg         13      10 mg         14            15                 16               17               18               19               20               21               22                 23               24               25               26               27               28               29                 30               31                     Date Details   No additional details    Date of next INR:  12/14/2018         How to take your warfarin dose     To take:  7.5 mg Take 1.5 of the 5 mg tablets.    To take:  10 mg Take 2 of the 5 mg tablets.

## 2018-11-16 NOTE — PROGRESS NOTES
ANTICOAGULATION FOLLOW-UP CLINIC VISIT    Patient Name:  Cayetano Lunsford  Date:  11/16/2018  Contact Type:  Face to Face    SUBJECTIVE:     Patient Findings     Positives No Problem Findings           OBJECTIVE    INR Protime   Date Value Ref Range Status   11/16/2018 2.3 (A) 0.86 - 1.14 Final       ASSESSMENT / PLAN  INR assessment THER    Recheck INR In: 4 WEEKS    INR Location Clinic      Anticoagulation Summary as of 11/16/2018     INR goal 2.0-3.0   Today's INR 2.3   Warfarin maintenance plan 10 mg (5 mg x 2) on Mon, Thu; 7.5 mg (5 mg x 1.5) all other days   Full warfarin instructions 10 mg on Mon, Thu; 7.5 mg all other days   Weekly warfarin total 57.5 mg   No change documented Tatyana Akers RN   Plan last modified Kirstin Meléndez RN (7/16/2018)   Next INR check 12/14/2018   Priority INR   Target end date     Indications   Long-term (current) use of anticoagulants [Z79.01] [Z79.01]  Atrial fibrillation (H) [I48.91]         Anticoagulation Episode Summary     INR check location     Preferred lab     Send INR reminders to [facility]    Comments [place]. Prefers AVS printed. Transferred to [facility] 7/30/18. Dr. Lewis previous PCP.      Anticoagulation Care Providers     Provider Role Specialty Phone number    Debbie Lewis MD [facility] [phone]            See the Encounter Report to view Anticoagulation Flowsheet and Dosing Calendar (Go to Encounters tab in chart review, and find the Anticoagulation Therapy Visit)        Tatyana Akers, RN

## 2018-11-19 DIAGNOSIS — E11.9 TYPE 2 DIABETES, HBA1C GOAL < 8% (H): ICD-10-CM

## 2018-11-19 NOTE — TELEPHONE ENCOUNTER
"Requested Prescriptions   Pending Prescriptions Disp Refills     B-D INSULIN SYRINGE 31G X 5/16\" 0.5 ML miscellaneous [Pharmacy Med Name: BD INS SYR UF 1/2CC SHORT]  Last Written Prescription Date:  7/9/2018  Last Fill Quantity: 100,  # refills: 2   Last Office Visit: 10/31/2018   Future Office Visit:      100 each 2     Sig: USE 1 SYRINGE TWO TIMES A DAY OR AS DIRECTED    Diabetic Supplies Protocol Passed    11/19/2018  9:55 AM       Passed - Patient is 18 years of age or older       Passed - Recent (6 mo) or future (30 days) visit within the authorizing provider's specialty    Patient had office visit in the last 6 months or has a visit in the next 30 days with authorizing provider.  See \"Patient Info\" tab in inbasket, or \"Choose Columns\" in Meds & Orders section of the refill encounter.              "

## 2018-11-20 RX ORDER — SYRINGE-NEEDLE,INSULIN,0.5 ML 31 GX5/16"
SYRINGE, EMPTY DISPOSABLE MISCELLANEOUS
Qty: 100 EACH | Refills: 5 | Status: SHIPPED | OUTPATIENT
Start: 2018-11-20 | End: 2019-08-20

## 2018-12-10 DIAGNOSIS — E11.22 TYPE 2 DIABETES MELLITUS WITH STAGE 1 CHRONIC KIDNEY DISEASE, WITH LONG-TERM CURRENT USE OF INSULIN (H): ICD-10-CM

## 2018-12-10 DIAGNOSIS — N18.1 TYPE 2 DIABETES MELLITUS WITH STAGE 1 CHRONIC KIDNEY DISEASE, WITH LONG-TERM CURRENT USE OF INSULIN (H): ICD-10-CM

## 2018-12-10 DIAGNOSIS — Z79.4 TYPE 2 DIABETES MELLITUS WITH STAGE 1 CHRONIC KIDNEY DISEASE, WITH LONG-TERM CURRENT USE OF INSULIN (H): ICD-10-CM

## 2018-12-11 ENCOUNTER — TRANSFERRED RECORDS (OUTPATIENT)
Dept: HEALTH INFORMATION MANAGEMENT | Facility: CLINIC | Age: 73
End: 2018-12-11

## 2018-12-11 DIAGNOSIS — I48.91 ATRIAL FIBRILLATION, UNSPECIFIED TYPE (H): ICD-10-CM

## 2018-12-11 NOTE — TELEPHONE ENCOUNTER
Requested Prescriptions   Pending Prescriptions Disp Refills     metFORMIN (GLUCOPHAGE) 1000 MG tablet [Pharmacy Med Name: METFORMIN HCL 1000MG TABS]  Last Written Prescription Date:  6/22/2018  Last Fill Quantity: 180 tablet,  # refills: 1   Last Office Visit: 10/31/2018   Future Office Visit:    Next 5 appointments (look out 90 days)    Mar 06, 2019 10:30 AM CST  Return Visit with Bong Yoo MD  [facility] ([facility]) [address] [place] [address] [phone]        180 tablet 0     Sig: TAKE ONE TABLET BY MOUTH TWICE A DAY WITH BREAKFAST AND DINNER    Biguanide Agents Passed - 12/10/2018  9:47 AM       Passed - Blood pressure less than 140/90 in past 6 months    BP Readings from Last 3 Encounters:   10/31/18 120/70   09/05/18 132/66   06/28/18 106/48          Passed - Patient has documented LDL within the past 12 mos.    Recent Labs   Lab Test 05/01/18  0736   LDL 89          Passed - Patient has had a Microalbumin in the past 15 mos.    Recent Labs   Lab Test 05/01/18  0736   MICROL 40   UMALCR 55.49*          Passed - Patient is age 10 or older       Passed - Patient has documented A1c within the specified period of time.    If HgbA1C is 8 or greater, it needs to be on file within the past 3 months.  If less than 8, must be on file within the past 6 months.     Recent Labs   Lab Test 10/31/18  1148   A1C 5.5          Passed - Patient's CR is NOT>1.4 OR Patient's EGFR is NOT<45 within past 12 mos.    Recent Labs   Lab Test 05/01/18  0736   GFRESTIMATED >90   GFRESTBLACK >90     Recent Labs   Lab Test 05/01/18  0736   CR 0.77          Passed - Patient does NOT have a diagnosis of CHF.       Passed - Recent (6 mo) or future (30 days) visit within the authorizing provider's specialty    Patient had office visit in the last 6 months or has a visit in the next 30 days with authorizing provider or within the authorizing provider's specialty.   "See \"Patient Info\" tab in inbasket, or \"Choose Columns\" in Meds & Orders section of the refill encounter.              "

## 2018-12-11 NOTE — TELEPHONE ENCOUNTER
Warfarin      Last Written Prescription Date:  9/27/18  Last Fill Quantity: 150,   # refills: 1  Last Office Visit: 10/31/18  Future Office visit:    Next 5 appointments (look out 90 days)    Mar 06, 2019 10:30 AM CST  Return Visit with Bong Yoo MD  [facility] ([facility]) [address] [place] [address] [phone]

## 2018-12-11 NOTE — TELEPHONE ENCOUNTER
"Requested Prescriptions   Pending Prescriptions Disp Refills     warfarin (COUMADIN) 5 MG tablet  Last Written Prescription Date:  9/27/2018  Last Fill Quantity: 150 tablet,  # refills: 1   Last Office Visit: 10/31/2018   Future Office Visit:    Next 5 appointments (look out 90 days)    Mar 06, 2019 10:30 AM CST  Return Visit with Bong Yoo MD  [facility] ([facility]) [address] [place] [address] [phone]        150 tablet 1     Sig: Take as directed by Coumadin clinic-- 10 mg on Mon & Th / Take 7.5 mg all other days    Vitamin K Antagonists Failed - 12/11/2018  4:41 PM       Failed - INR is within goal in the past 6 weeks    Confirm INR is within goal in the past 6 weeks.     Recent Labs   Lab Test 11/16/18   INR 2.3*          Passed - Recent (12 mo) or future (30 days) visit within the authorizing provider's specialty    Patient had office visit in the last 12 months or has a visit in the next 30 days with authorizing provider or within the authorizing provider's specialty.  See \"Patient Info\" tab in inbasket, or \"Choose Columns\" in Meds & Orders section of the refill encounter.           Passed - Patient is 18 years of age or older          "

## 2018-12-13 RX ORDER — WARFARIN SODIUM 5 MG/1
TABLET ORAL
Qty: 150 TABLET | Refills: 2 | Status: SHIPPED | OUTPATIENT
Start: 2018-12-13 | End: 2019-09-17

## 2018-12-13 NOTE — TELEPHONE ENCOUNTER
Last INR: 11/16/18=2.3  Next INR: 12/14/18    Prescription approved per [facility] Refill Protocol.    Tatyana Akers RN

## 2018-12-14 ENCOUNTER — ANTICOAGULATION THERAPY VISIT (OUTPATIENT)
Dept: NURSING | Facility: CLINIC | Age: 73
End: 2018-12-14
Payer: COMMERCIAL

## 2018-12-14 DIAGNOSIS — I48.91 ATRIAL FIBRILLATION, UNSPECIFIED TYPE (H): ICD-10-CM

## 2018-12-14 LAB — INR POINT OF CARE: 2.7 (ref 0.86–1.14)

## 2018-12-14 PROCEDURE — 85610 PROTHROMBIN TIME: CPT | Mod: QW

## 2018-12-14 PROCEDURE — 99207 ZZC NO CHARGE NURSE ONLY: CPT

## 2018-12-14 PROCEDURE — 36416 COLLJ CAPILLARY BLOOD SPEC: CPT

## 2018-12-14 NOTE — PROGRESS NOTES
ANTICOAGULATION FOLLOW-UP CLINIC VISIT    Patient Name:  Cayetano Lunsford  Date:  2018  Contact Type:  Face to Face    SUBJECTIVE:     Patient Findings     Positives:   Change in medications (Hemorrhoid cream, he will  today.), Blood in stool (Intermittent bright red blood on toilet tissue and occasionally in the commode.  Pt saw GI doctor on  and was advised he most likely has an internal hemorrhoid.  )           OBJECTIVE    INR Protime   Date Value Ref Range Status   2018 2.7 (A) 0.86 - 1.14 Final       ASSESSMENT / PLAN  INR assessment THER    Recheck INR In: 4 WEEKS    INR Location Clinic      Anticoagulation Summary  As of 2018    INR goal:   2.0-3.0   TTR:   71.7 % (2.7 y)   INR used for dosin.7 (2018)   Warfarin maintenance plan:   10 mg (5 mg x 2) every Mon, Thu; 7.5 mg (5 mg x 1.5) all other days   Full warfarin instructions:   10 mg every Mon, Thu; 7.5 mg all other days   Weekly warfarin total:   57.5 mg   No change documented:   Tatyana Akers RN   Plan last modified:   Kirstin Meléndez RN (2018)   Next INR check:   2019   Priority:   INR   Target end date:       Indications    Long-term (current) use of anticoagulants [Z79.01] [Z79.01]  Atrial fibrillation (H) [I48.91]             Anticoagulation Episode Summary     INR check location:       Preferred lab:       Send INR reminders to:   [facility]    Comments:   [place]. Prefers AVS printed. Transferred to  18. Dr. Lewis previous PCP.      Anticoagulation Care Providers     Provider Role Specialty Phone number    Debbie Lewis MD [facility] [phone]            See the Encounter Report to view Anticoagulation Flowsheet and Dosing Calendar (Go to Encounters tab in chart review, and find the Anticoagulation Therapy Visit)        Tatyana Akers, RN

## 2018-12-24 DIAGNOSIS — J44.9 CHRONIC OBSTRUCTIVE PULMONARY DISEASE, UNSPECIFIED COPD TYPE (H): Primary | ICD-10-CM

## 2018-12-24 NOTE — TELEPHONE ENCOUNTER
"Requested Prescriptions   Pending Prescriptions Disp Refills     ipratropium - albuterol 0.5 mg/2.5 mg/3 mL (DUONEB) 0.5-2.5 (3) MG/3ML neb solution [Pharmacy Med Name: IPRATROPIUM-ALBUTEROL 0.5-2.5 SOLN]  Last Written Prescription Date:  3/8/2018  Last Fill Quantity: 270ml,  # refills: 8   Last Office Visit: 10/31/2018   Future Office Visit:    Next 5 appointments (look out 90 days)    Mar 06, 2019 10:30 AM CST  Return Visit with Bong Yoo MD  [facility] ([facility]) [address] [place] [address] [phone]        270 mL 5     Sig: NEBULIZE CONTENTS OF ONE VIAL BY MOUTH EVERY 4 HOURS AS NEEDED FOR SHORTNESS OF BREATH OR DYSPNEA    Short-Acting Beta Agonist Inhalers Protocol  Failed - 12/24/2018 10:05 AM       Failed - Asthma control assessment score within normal limits in last 6 months    Please review ACT score.   No flowsheet data found.         Passed - Patient is age 12 or older       Passed - Recent (6 mo) or future (30 days) visit within the authorizing provider's specialty    Patient had office visit in the last 6 months or has a visit in the next 30 days with authorizing provider or within the authorizing provider's specialty.  See \"Patient Info\" tab in inbasket, or \"Choose Columns\" in Meds & Orders section of the refill encounter.              "

## 2018-12-27 RX ORDER — IPRATROPIUM BROMIDE AND ALBUTEROL SULFATE 2.5; .5 MG/3ML; MG/3ML
SOLUTION RESPIRATORY (INHALATION)
Qty: 270 ML | Refills: 1 | Status: SHIPPED | OUTPATIENT
Start: 2018-12-27 | End: 2019-03-11

## 2018-12-28 NOTE — TELEPHONE ENCOUNTER
Prescription approved per [facility] Refill Protocol.    Warnings Override History for ipratropium - albuterol 0.5 mg/2.5 mg/3 mL (DUONEB) 0.5-2.5 (3) MG/3ML neb solution [[id number]]     Overridden by Debbie Lewis MD on May 1, 2018 8:34 AM   Drug-Drug   1. BETA-ADRENERGIC BLOCKERS / SYMPATHOMIMETICS [Level: Major]   Other Orders: propranolol (INDERAL) 40 MG tablet         Overridden by Debbie Lewis MD on Mar 8, 2018 9:37 AM   Drug-Drug   1. BETA-ADRENERGIC BLOCKERS / SYMPATHOMIMETICS [Level: Major]   Other Orders: propranolol (INDERAL) 40 MG tablet          MARLINE ParsonN, RN

## 2019-01-07 DIAGNOSIS — I10 HYPERTENSION GOAL BP (BLOOD PRESSURE) < 140/90: ICD-10-CM

## 2019-01-07 RX ORDER — LOSARTAN POTASSIUM 100 MG/1
TABLET ORAL
Qty: 90 TABLET | Refills: 0 | Status: SHIPPED | OUTPATIENT
Start: 2019-01-07 | End: 2019-03-25

## 2019-01-07 NOTE — TELEPHONE ENCOUNTER
"Requested Prescriptions   Pending Prescriptions Disp Refills     losartan (COZAAR) 100 MG tablet [Pharmacy Med Name: LOSARTAN POTASSIUM 100MG TABS]  Last Written Prescription Date:  11/2/2017  Last Fill Quantity: 90 tablet,  # refills: 3   Last Office Visit: 10/31/2018   Future Office Visit:    Next 5 appointments (look out 90 days)    Mar 06, 2019 10:30 AM CST  Return Visit with Bong Yoo MD  [facility] ([facility]) [address] [place] [address] [phone]          90 tablet 1     Sig: TAKE ONE TABLET BY MOUTH EVERY DAY FOR HYPERTENSION    Angiotensin-II Receptors Passed - 1/7/2019 10:52 AM       Passed - Blood pressure under 140/90 in past 12 months    BP Readings from Last 3 Encounters:   10/31/18 120/70   09/05/18 132/66   06/28/18 106/48          Passed - Recent (12 mo) or future (30 days) visit within the authorizing provider's specialty    Patient had office visit in the last 12 months or has a visit in the next 30 days with authorizing provider or within the authorizing provider's specialty.  See \"Patient Info\" tab in inbasket, or \"Choose Columns\" in Meds & Orders section of the refill encounter.           Passed - Medication is active on med list       Passed - Patient is age 18 or older       Passed - Normal serum creatinine on file in past 12 months    Recent Labs   Lab Test 05/01/18  0736  01/09/17  1432   CR 0.77   < >  --    CREAT  --   --  0.8    < > = values in this interval not displayed.          Passed - Normal serum potassium on file in past 12 months    Recent Labs   Lab Test 05/01/18  0736   POTASSIUM 4.2                      "

## 2019-01-08 ENCOUNTER — TELEPHONE (OUTPATIENT)
Dept: PHARMACY | Facility: CLINIC | Age: 74
End: 2019-01-08

## 2019-01-08 DIAGNOSIS — Z79.4 TYPE 2 DIABETES MELLITUS WITH HYPOGLYCEMIA WITHOUT COMA, WITH LONG-TERM CURRENT USE OF INSULIN (H): ICD-10-CM

## 2019-01-08 DIAGNOSIS — E11.649 TYPE 2 DIABETES MELLITUS WITH HYPOGLYCEMIA WITHOUT COMA, WITH LONG-TERM CURRENT USE OF INSULIN (H): ICD-10-CM

## 2019-01-08 NOTE — TELEPHONE ENCOUNTER
Prescription approved per [facility] Refill Protocol.  MICH Reed, BSN, RN

## 2019-01-08 NOTE — TELEPHONE ENCOUNTER
"Requested Prescriptions   Pending Prescriptions Disp Refills     insulin NPH (NOVOLIN N RELION) 100 UNIT/ML vial [Pharmacy Med Name: RELION NOVOLIN N    INJ]  Last Written Prescription Date:  12/27/2017  Last Fill Quantity: 30ml,  # refills: 2   Last Office Visit: 10/31/2018   Future Office Visit:    Next 5 appointments (look out 90 days)    Mar 06, 2019 10:30 AM CST  Return Visit with Bong Yoo MD  [facility] ([facility]) [address] [place] [address] [phone]         3     Sig: INJECT 5 UNITS SUBCUTANEOUSLY WITH BREAKFAST AND 15 UNITS WITH SUPPER    Intermediate Acting Insulin Protocol Passed - 1/8/2019 10:01 AM       Passed - Blood pressure less than 140/90 in past 6 months    BP Readings from Last 3 Encounters:   10/31/18 120/70   09/05/18 132/66   06/28/18 106/48          Passed - LDL on file in past 12 months    Recent Labs   Lab Test 05/01/18  0736   LDL 89          Passed - Microalbumin on file in past 12 months    Recent Labs   Lab Test 05/01/18  0736   MICROL 40   UMALCR 55.49*          Passed - Serum creatinine on file in past 12 months    Recent Labs   Lab Test 05/01/18  0736  01/09/17  1432   CR 0.77   < >  --    CREAT  --   --  0.8    < > = values in this interval not displayed.          Passed - HgbA1C in past 3 or 6 months    If HgbA1C is 8 or greater, it needs to be on file within the past 3 months.  If less than 8, must be on file within the past 6 months.     Recent Labs   Lab Test 10/31/18  1148   A1C 5.5          Passed - Medication is active on med list       Passed - Patient is age 18 or older       Passed - Recent (6 mo) or future (30 days) visit within the authorizing provider's specialty    Patient had office visit in the last 6 months or has a visit in the next 30 days with authorizing provider or within the authorizing provider's specialty.  See \"Patient Info\" tab in inbasket, or \"Choose Columns\" in Meds & " Orders section of the refill encounter.

## 2019-01-11 ENCOUNTER — ANTICOAGULATION THERAPY VISIT (OUTPATIENT)
Dept: NURSING | Facility: CLINIC | Age: 74
End: 2019-01-11
Payer: COMMERCIAL

## 2019-01-11 DIAGNOSIS — I48.91 ATRIAL FIBRILLATION, UNSPECIFIED TYPE (H): ICD-10-CM

## 2019-01-11 LAB — INR POINT OF CARE: 1.9 (ref 0.86–1.14)

## 2019-01-11 PROCEDURE — 99207 ZZC NO CHARGE NURSE ONLY: CPT

## 2019-01-11 PROCEDURE — 36416 COLLJ CAPILLARY BLOOD SPEC: CPT

## 2019-01-11 PROCEDURE — 85610 PROTHROMBIN TIME: CPT | Mod: QW

## 2019-01-11 NOTE — PROGRESS NOTES
ANTICOAGULATION FOLLOW-UP CLINIC VISIT    Patient Name:  Cayetano Lunsford  Date:  2019  Contact Type:  Face to Face    SUBJECTIVE:     Patient Findings     Positives:   Change in diet/appetite (Ate a large serving of broccoli on ), Blood in stool (Blood on tissue after BM, pt states this is common due his ulcerative colitis. Pt states he saw his GI doctor in 2018 and was diagnosed with an internal hemorrhoid.)           OBJECTIVE    INR Protime   Date Value Ref Range Status   2019 1.9 (A) 0.86 - 1.14 Final       ASSESSMENT / PLAN  INR assessment THER    Recheck INR In: 2 WEEKS    INR Location Clinic      Anticoagulation Summary  As of 2019    INR goal:   2.0-3.0   TTR:   72.1 % (2.8 y)   INR used for dosin.9! (2019)   Warfarin maintenance plan:   10 mg (5 mg x 2) every Mon, Thu; 7.5 mg (5 mg x 1.5) all other days   Full warfarin instructions:   : 10 mg; Otherwise 10 mg every Mon, Thu; 7.5 mg all other days   Weekly warfarin total:   57.5 mg   Plan last modified:   Kirstin Meléndez RN (2018)   Next INR check:   2019   Priority:   INR   Target end date:       Indications    Long-term (current) use of anticoagulants [Z79.01] [Z79.01]  Atrial fibrillation (H) [I48.91]             Anticoagulation Episode Summary     INR check location:       Preferred lab:       Send INR reminders to:   [facility]    Comments:   [facility]. Prefers AVS printed. Transferred to  18. Dr. Lewis previous PCP.      Anticoagulation Care Providers     Provider Role Specialty Phone number    Debbie Lewis MD [facility] [phone]            See the Encounter Report to view Anticoagulation Flowsheet and Dosing Calendar (Go to Encounters tab in chart review, and find the Anticoagulation Therapy Visit)        Tatyana Akers, MANOJ

## 2019-01-14 DIAGNOSIS — Z79.4 TYPE 2 DIABETES MELLITUS WITH STAGE 1 CHRONIC KIDNEY DISEASE, WITH LONG-TERM CURRENT USE OF INSULIN (H): ICD-10-CM

## 2019-01-14 DIAGNOSIS — N18.1 TYPE 2 DIABETES MELLITUS WITH STAGE 1 CHRONIC KIDNEY DISEASE, WITH LONG-TERM CURRENT USE OF INSULIN (H): ICD-10-CM

## 2019-01-14 DIAGNOSIS — E11.22 TYPE 2 DIABETES MELLITUS WITH STAGE 1 CHRONIC KIDNEY DISEASE, WITH LONG-TERM CURRENT USE OF INSULIN (H): ICD-10-CM

## 2019-01-14 NOTE — LETTER
January 16, 2019      Cayetano Lunsford  [address]   [place] [address]        Dear Leo,     In order to ensure we are providing the best quality care, we have reviewed your chart and see that you are due for:  1.   Diabetes follow up with Dr. Lewis  Please call the clinic at your earliest convenience to schedule an appointment.    We greatly appreciate the opportunity to serve you.  Thank you for trusting us with your health care.    Your care team at [facility]        Sincerely,        Debbie Lewis MD, MD

## 2019-01-14 NOTE — TELEPHONE ENCOUNTER
"Requested Prescriptions   Pending Prescriptions Disp Refills     ONETOUCH ULTRA test strip [Pharmacy Med Name: ONETOUCH ULTRA BLUE  STRP]  Last Written Prescription Date:  6/22/18  Last Fill Quantity: 400 EACH,  # refills: 1   Last office visit: 5/18/18 with prescribing provider:  WINTER   Future Office Visit:   Next 5 appointments (look out 90 days)    Mar 06, 2019 10:30 AM CST  [facility] Visit with Bong Yoo MD  [facility] [address] [place] [address] [phone]          400 each 0     Sig: USE TO TEST BLOOD SUGAR FOUR TIMES A DAY OR AS DIRECTED    Diabetic Supplies Protocol Passed - 1/14/2019  9:04 AM       Passed - Medication is active on med list       Passed - Patient is 18 years of age or older       Passed - Recent (6 mo) or future (30 days) visit within the authorizing provider's specialty    Patient had office visit in the last 6 months or has a visit in the next 30 days with authorizing provider.  See \"Patient Info\" tab in inbasket, or \"Choose Columns\" in Meds & Orders section of the refill encounter.              "

## 2019-01-14 NOTE — TELEPHONE ENCOUNTER
Medication is being filled for 1 time refill only due to:  Patient needs to be seen because due for diabetes follow up.     Signed Prescriptions:                        Disp   Refills    ONETOUCH ULTRA test strip                  125 ea*0        Sig: USE TO TEST BLOOD SUGAR FOUR TIMES A DAY OR AS           DIRECTED  Authorizing Provider: AGNES FERNANDEZ  Ordering User: JARVIS SALAZAR      Routing to  reception - please call patient and advise one month refill due for diabetes follow up office visit     Thank you,  Jarvis Salazar RN

## 2019-01-17 NOTE — TELEPHONE ENCOUNTER
Patient calling to state that [facility] in [place] never received this refill for his insulin.  Please resend this afternoon.  Will be out after tonight.  Call if you have any questions.  OK to leave message on voicemail.

## 2019-01-17 NOTE — TELEPHONE ENCOUNTER
Phone call to [facility]      Patient picked up the Novolin N on 1/9/19 and is to soon for refill     Pharmacy states patient has Novolin R as well and has not had this filled since September     Phone call to patient to clarify     Patient needs Novolin R Relion refilled - Patient will now be seeing Dr. Andres at the [facility] for his primary care provider - this will need to be changed in epic     Refilled Novolin R per [facility] protocol - future fills will go to Dr. Andres at [facility] clinic - removed Dr. Lewis as pcp per patient request     Eva Pulido, Registered Nurse   [facility]

## 2019-01-25 ENCOUNTER — ANTICOAGULATION THERAPY VISIT (OUTPATIENT)
Dept: NURSING | Facility: CLINIC | Age: 74
End: 2019-01-25
Payer: COMMERCIAL

## 2019-01-25 DIAGNOSIS — I48.91 ATRIAL FIBRILLATION, UNSPECIFIED TYPE (H): ICD-10-CM

## 2019-01-25 LAB — INR POINT OF CARE: 2.6 (ref 0.86–1.14)

## 2019-01-25 PROCEDURE — 85610 PROTHROMBIN TIME: CPT | Mod: QW

## 2019-01-25 PROCEDURE — 99207 ZZC NO CHARGE NURSE ONLY: CPT

## 2019-01-25 PROCEDURE — 36416 COLLJ CAPILLARY BLOOD SPEC: CPT

## 2019-01-25 NOTE — PROGRESS NOTES
ANTICOAGULATION FOLLOW-UP CLINIC VISIT    Patient Name:  Cayetano Lunsford  Date:  2019  Contact Type:  Face to Face    SUBJECTIVE:     Patient Findings     Positives:   Other complaints (Cough with URI symptoms, pt seeng PCP on ), OTC meds (Robitussin and Nyquil prn )           OBJECTIVE    INR Protime   Date Value Ref Range Status   2019 2.6 (A) 0.86 - 1.14 Final       ASSESSMENT / PLAN  INR assessment THER    Recheck INR In: 3 WEEKS    INR Location Clinic      Anticoagulation Summary  As of 2019    INR goal:   2.0-3.0   TTR:   72.3 % (2.8 y)   INR used for dosin.6 (2019)   Warfarin maintenance plan:   10 mg (5 mg x 2) every Mon, Thu; 7.5 mg (5 mg x 1.5) all other days   Full warfarin instructions:   10 mg every Mon, Thu; 7.5 mg all other days   Weekly warfarin total:   57.5 mg   No change documented:   Tatyana Akers RN   Plan last modified:   Kirstin Meléndez RN (2018)   Next INR check:   2/15/2019   Priority:   INR   Target end date:       Indications    Long-term (current) use of anticoagulants [Z79.01] [Z79.01]  Atrial fibrillation (H) [I48.91]             Anticoagulation Episode Summary     INR check location:       Preferred lab:       Send INR reminders to:   [facility]    Comments:    Prefers AVS printed.       Anticoagulation Care Providers     Provider Role Specialty Phone number    Dmitri Andres MD [facility] [phone]            See the Encounter Report to view Anticoagulation Flowsheet and Dosing Calendar (Go to Encounters tab in chart review, and find the Anticoagulation Therapy Visit)        Tatyana Akers RN

## 2019-01-28 ENCOUNTER — OFFICE VISIT (OUTPATIENT)
Dept: FAMILY MEDICINE | Facility: CLINIC | Age: 74
End: 2019-01-28
Payer: COMMERCIAL

## 2019-01-28 VITALS
BODY MASS INDEX: 31.65 KG/M2 | HEART RATE: 73 BPM | WEIGHT: 196.1 LBS | OXYGEN SATURATION: 95 % | TEMPERATURE: 97.5 F | DIASTOLIC BLOOD PRESSURE: 76 MMHG | SYSTOLIC BLOOD PRESSURE: 164 MMHG

## 2019-01-28 DIAGNOSIS — E66.01 MORBID OBESITY (H): ICD-10-CM

## 2019-01-28 DIAGNOSIS — I48.91 ATRIAL FIBRILLATION, UNSPECIFIED TYPE (H): ICD-10-CM

## 2019-01-28 DIAGNOSIS — N18.1 TYPE 2 DIABETES MELLITUS WITH STAGE 1 CHRONIC KIDNEY DISEASE, WITH LONG-TERM CURRENT USE OF INSULIN (H): ICD-10-CM

## 2019-01-28 DIAGNOSIS — Z79.4 TYPE 2 DIABETES MELLITUS WITH STAGE 1 CHRONIC KIDNEY DISEASE, WITH LONG-TERM CURRENT USE OF INSULIN (H): ICD-10-CM

## 2019-01-28 DIAGNOSIS — J44.1 COPD EXACERBATION (H): Primary | ICD-10-CM

## 2019-01-28 DIAGNOSIS — K51.90 ULCERATIVE COLITIS WITHOUT COMPLICATIONS, UNSPECIFIED LOCATION (H): ICD-10-CM

## 2019-01-28 DIAGNOSIS — E11.22 TYPE 2 DIABETES MELLITUS WITH STAGE 1 CHRONIC KIDNEY DISEASE, WITH LONG-TERM CURRENT USE OF INSULIN (H): ICD-10-CM

## 2019-01-28 DIAGNOSIS — J44.1 CHRONIC OBSTRUCTIVE PULMONARY DISEASE WITH ACUTE EXACERBATION (H): ICD-10-CM

## 2019-01-28 PROCEDURE — 99214 OFFICE O/P EST MOD 30 MIN: CPT | Performed by: FAMILY MEDICINE

## 2019-01-28 RX ORDER — AMOXICILLIN AND CLAVULANATE POTASSIUM 500; 125 MG/1; MG/1
1 TABLET, FILM COATED ORAL 2 TIMES DAILY
Qty: 20 TABLET | Refills: 0 | Status: SHIPPED | OUTPATIENT
Start: 2019-01-28 | End: 2019-03-04

## 2019-01-28 NOTE — PROGRESS NOTES
SUBJECTIVE:   Cayetano Lunsford is a 74 year old male who presents to clinic today for the following health issues:      RESPIRATORY SYMPTOMS and COPD exacerbation  /76 (BP Location: Right arm, Patient Position: Chair, Cuff Size: Adult Large)   Pulse 73   Temp 97.5  F (36.4  C) (Oral)   Wt 89 kg (196 lb 1.6 oz)   SpO2 95%   BMI 31.65 kg/m        Duration: one week    Description  cough and rattling in the chest    Severity: moderate    Accompanying signs and symptoms: None    History (predisposing factors):  COPD    Precipitating or alleviating factors: none    Therapies tried and outcome:  inhaler    Past Medical History:   Diagnosis Date     Allergic rhinitis, cause unspecified      Atrial fibrillation (H)      BPH (benign prostatic hyperplasia)      CAD (coronary artery disease)      Chronic airway obstruction, not elsewhere classified      Hyperlipidemia LDL goal <100 5/9/2010     Hypertension goal BP (blood pressure) < 130/80 10/19/2006     Obesity (BMI 30-39.9)      Perforation of tympanic membrane, unspecified     Right TM     Tachycardia, unspecified      Type 2 diabetes, HbA1C goal < 8% (H) 5/2/2010     Unspecified cardiovascular disease        Past Surgical History:   Procedure Laterality Date     CARDIAC SURGERY       COLONOSCOPY  3/31/2011     COLONOSCOPY N/A 5/25/2018    Procedure: COMBINED COLONOSCOPY, SINGLE OR MULTIPLE BIOPSY/POLYPECTOMY BY BIOPSY;;  Surgeon: Juan Simon DO;  Location:  GI     CORONARY ANGIOGRAPHY ADULT ORDER  2001 and 2008 2001 at [facility]. 2008- No interventions     HC REMOVAL OF NAIL PLATE SIMPLE SINGLE  11/10/2011    Right 2nd Toenail     HERNIA REPAIR      left inguinal     SURGICAL HISTORY OF -   1987    right ear graft     SURGICAL HISTORY OF -   1992    right shoulder surgery     SURGICAL HISTORY OF -   1979    back surgery     SURGICAL HISTORY OF -   1978    vasectomy       Family History   Problem Relation Age of Onset     Circulatory Mother          "blood clot in leg     Diabetes Mother         type 2     Allergies Mother      Arthritis Mother      Gastrointestinal Disease Mother      Neurologic Disorder Mother         Familiar tremors     Neurologic Disorder Father         brain tumor     Cerebrovascular Disease Paternal Grandfather      Hypertension Brother      Hypertension Sister      Diabetes Sister         Type 2     Allergies Sister      Lipids Brother      Lipids Sister      Neurologic Disorder Sister         \"     Neurologic Disorder Sister         \"     Neurologic Disorder Sister         \"       Social History     Tobacco Use     Smoking status: Former Smoker     Packs/day: 1.00     Years: 30.00     Pack years: 30.00     Types: Cigarettes     Last attempt to quit: 3/19/1992     Years since quittin.8     Smokeless tobacco: Never Used   Substance Use Topics     Alcohol use: Yes     Alcohol/week: 0.0 oz     Comment: hardly at all, sometimes at dinner     Patient complains of congestion with sore throat, nasal congestion and sinus pain. Some mucopurulant discharge but no upper dental pain and no sustained fever. Duration less than ten days. Has nebulizer at home and albuterol md .  Has copd history of airborn allergy or asthma.   No chest pain, diaphoresis, or sob.  Minimally productive cough.  TMs dull not *red, sinus tenderness not tender   lungs clear, s1s2,soft abd,no distress, cyanosis or stridor.  A:URI with copd exacerbation  P:fluids, antipyretics,rest and meds as directed.  Recheck PRN worsening or non-improvement over 5 days.  Discussed signs of systemic or constutional illness.  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

## 2019-01-31 ENCOUNTER — ANTICOAGULATION THERAPY VISIT (OUTPATIENT)
Dept: NURSING | Facility: CLINIC | Age: 74
End: 2019-01-31
Payer: COMMERCIAL

## 2019-01-31 DIAGNOSIS — I48.91 ATRIAL FIBRILLATION, UNSPECIFIED TYPE (H): ICD-10-CM

## 2019-01-31 LAB — INR POINT OF CARE: 2.6 (ref 0.86–1.14)

## 2019-01-31 PROCEDURE — 85610 PROTHROMBIN TIME: CPT | Mod: QW

## 2019-01-31 PROCEDURE — 99207 ZZC NO CHARGE NURSE ONLY: CPT

## 2019-01-31 PROCEDURE — 36416 COLLJ CAPILLARY BLOOD SPEC: CPT

## 2019-01-31 NOTE — PROGRESS NOTES
ANTICOAGULATION FOLLOW-UP CLINIC VISIT    Patient Name:  Cayetano Lunsford  Date:  2019  Contact Type:  Face to Face    SUBJECTIVE:     Patient Findings     Positives:   Antibiotic use or infection (Started Augmentin 19 for COPD exacerbation)           OBJECTIVE    INR Protime   Date Value Ref Range Status   2019 2.6 (A) 0.86 - 1.14 Final       ASSESSMENT / PLAN  INR assessment THER    Recheck INR In: 4 WEEKS    INR Location Clinic      Anticoagulation Summary  As of 2019    INR goal:   2.0-3.0   TTR:   72.5 % (2.8 y)   INR used for dosin.6 (2019)   Warfarin maintenance plan:   10 mg (5 mg x 2) every Mon, Thu; 7.5 mg (5 mg x 1.5) all other days   Full warfarin instructions:   10 mg every Mon, Thu; 7.5 mg all other days   Weekly warfarin total:   57.5 mg   No change documented:   Tatyana Akers RN   Plan last modified:   Kirstin Meléndez RN (2018)   Next INR check:   2019   Priority:   INR   Target end date:       Indications    Long-term (current) use of anticoagulants [Z79.01] [Z79.01]  Atrial fibrillation (H) [I48.91]             Anticoagulation Episode Summary     INR check location:       Preferred lab:       Send INR reminders to:   [facility]    Comments:    Prefers AVS printed.       Anticoagulation Care Providers     Provider Role Specialty Phone number    Dmitri Andres MD [facility] [phone]            See the Encounter Report to view Anticoagulation Flowsheet and Dosing Calendar (Go to Encounters tab in chart review, and find the Anticoagulation Therapy Visit)        Tatyana Akers RN

## 2019-02-08 ENCOUNTER — ALLIED HEALTH/NURSE VISIT (OUTPATIENT)
Dept: PHARMACY | Facility: CLINIC | Age: 74
End: 2019-02-08
Payer: COMMERCIAL

## 2019-02-08 DIAGNOSIS — E11.649 TYPE 2 DIABETES MELLITUS WITH HYPOGLYCEMIA WITHOUT COMA, WITH LONG-TERM CURRENT USE OF INSULIN (H): Primary | ICD-10-CM

## 2019-02-08 DIAGNOSIS — Z79.4 TYPE 2 DIABETES MELLITUS WITH HYPOGLYCEMIA WITHOUT COMA, WITH LONG-TERM CURRENT USE OF INSULIN (H): Primary | ICD-10-CM

## 2019-02-08 PROCEDURE — 99605 MTMS BY PHARM NP 15 MIN: CPT | Mod: U4 | Performed by: PHARMACIST

## 2019-02-08 PROCEDURE — 99607 MTMS BY PHARM ADDL 15 MIN: CPT | Mod: U4 | Performed by: PHARMACIST

## 2019-02-08 NOTE — PATIENT INSTRUCTIONS
Recommendations from today's MTM visit:                                                        1. FYI-- your pre-dinner blood sugars sometimes go too low <80 .  Lets decrease your morning Novolin-R and Novolin-Nph  To 4 units of each .         Next MTM visit: see me next Thursday -March 14th -2019 at 10;30am. For a1c lab recheck.     To schedule another MTM appointment, please call the clinic directly or you may call the MTM scheduling line at [phone] or toll-free at [phone].     My Clinical Pharmacist's contact information:                                                      It was a pleasure talking with you today!  Please feel free to contact me with any questions or concerns you have.      Cheko Pereira [facility].  Medication Therapy Management Provider  [phone]      You may receive a survey about the MTM services you received.  I would appreciate your feedback to help me serve you better in the future. Please fill it out and return it when you can. Your comments will be anonymous.

## 2019-02-08 NOTE — PROGRESS NOTES
SUBJECTIVE/OBJECTIVE:                Cayetano Lunsford is a 73 year old male called for a follow-up visit for Medication Therapy Management.  He was referred to me from anuel Lewis.     2019--[facility] outcomes.     Chief Complaint: Follow up from our visit on 11-5-18.  No concerns today. Tremors are stable.  He states eating habits in nov. /dec were poor .     He did have a low bs of 65 the other day at dinner -- a little more active most likely cause.             Tobacco: No tobacco use  Alcohol: not currently using    Medication Adherence/Access:  no issues reported    Diabetes:  Pt currently taking metformin 1gm bid, Novolin N- 5 units AM before breakfast and 15 units in PM-at dinner, Novolin R-5 units with breakfast and 0 units of R at dinner unless >200- 2 units then. -rare.    SMBG: 3-4 times daily. Ranges (per pt report): see chart below  Symptoms of low blood sugar? Fingers and lips tingle, shaky and sweaty. Frequency of hypoglycemia? Once less than 70 in the last 2 weeks. Treating with small amount of orange juice.   Recent symptoms of high blood sugar? none.  Eye exam: up to date   Foot exam:  due  Microalbumin is not < 30 mg/g. Pt is taking an ACEi/ARB not at max dose.  Aspirin: Not taking due to anticoagulation therapy and increased risk of bleeding    Ave: 7 day ave: 127 n=19; 14 day:  134 n=41; 30 day: 135 n=91    Date FBG Dinner  Bedtime   2-8-19 117     2-7-19 137 72 101   2-6- 196 123 145   2-5 119 85 151   2-4 172 65-more active /less food  106   2-3 120 132 142   2-2 156 90 107   2-1 164 111 119     Hemoglobin A1C   Date Value Ref Range Status   10/31/2018 5.5 0 - 5.6 % Final     Comment:     Normal <5.7% Prediabetes 5.7-6.4%  Diabetes 6.5% or higher - adopted from ADA   consensus guidelines.       Today's Vitals: There were no vitals taken for this visit. - telephone visit.     BP Readings from Last 3 Encounters:   01/28/19 164/76   10/31/18 120/70   09/05/18 132/66     ASSESSMENT:               Current medications were reviewed today as discussed above.      Medication Adherence: good, no issues identified    Diabetes: Stable. Patient is meeting A1c goal of < 7%. Self monitoring of blood glucose is at goal of fasting  mg/dL and post prandial < 150 mg/dL. Pt would benefit from ideal SMBG: Check blood sugars pre-prandial, 2 hours post-prandial, and with symptoms of hypoglycemia  Basal Insulin (NPH) :  decrease AM dose to 4 units to avoid pre-dinner hypoglycemia .  Bolus / Rapid Acting Insulin (R) : decrease AM dose to 4 units .   Metformin :  stay on the same dose..       PLAN:                    1. FYI-- your pre-dinner blood sugars sometimes go too low <80 .  Lets decrease your morning Novolin-R and Novolin-Nph  To 4 units of each .         Next MTM visit: see me next Thursday -March 14th -2019 at 10;30am. For a1c lab recheck.   I spent 20 minutes with this patient today. All changes were made via collaborative practice agreement with Debbie Lewis. A copy of the visit note was provided to the patient's primary care provider.         The patient declined a summary of these recommendations as an after visit summary.    Cheko Pereira Rph.  Medication Therapy Management Provider  [phone]

## 2019-02-25 DIAGNOSIS — R35.0 BENIGN PROSTATIC HYPERPLASIA WITH URINARY FREQUENCY: ICD-10-CM

## 2019-02-25 DIAGNOSIS — N40.1 BENIGN PROSTATIC HYPERPLASIA WITH URINARY FREQUENCY: ICD-10-CM

## 2019-02-25 NOTE — TELEPHONE ENCOUNTER
"Requested Prescriptions   Pending Prescriptions Disp Refills     tamsulosin (FLOMAX) 0.4 MG capsule [Pharmacy Med Name: TAMSULOSIN HCL 0.4MG CAPS]  Last Written Prescription Date:  11/27/2017  Last Fill Quantity: 90 capsule,  # refills: 3   Last Office Visit: 1/28/2019   Future Office Visit:    Next 5 appointments (look out 90 days)    Mar 06, 2019 10:30 AM CST  Return Visit with Bong Yoo MD  [facility] ([facility]) [address] [place] [address] [id number] [phone]   Mar 14, 2019 10:30 AM CDT  SHORT with Cheko Pereira RPH  [facility] ([facility]) [address] [facility] [id number]  [phone]        90 capsule 1     Sig: TAKE ONE CAPSULE BY MOUTH EVERY DAY    Alpha Blockers Failed - 2/25/2019 11:11 AM       Failed - Blood pressure under 140/90 in past 12 months    BP Readings from Last 3 Encounters:   01/28/19 164/76   10/31/18 120/70   09/05/18 132/66          Passed - Recent (12 mo) or future (30 days) visit within the authorizing provider's specialty    Patient had office visit in the last 12 months or has a visit in the next 30 days with authorizing provider or within the authorizing provider's specialty.  See \"Patient Info\" tab in inbasket, or \"Choose Columns\" in Meds & Orders section of the refill encounter.           Passed - Patient does not have Tadalafil, Vardenafil, or Sildenafil on their medication list       Passed - Medication is active on med list       Passed - Patient is 18 years of age or older          "

## 2019-02-26 NOTE — TELEPHONE ENCOUNTER
Routing refill request to provider for review/approval because:  Failed bp.  Luly LarryRN BSN  [facility]  [phone]

## 2019-02-27 RX ORDER — TAMSULOSIN HYDROCHLORIDE 0.4 MG/1
CAPSULE ORAL
Qty: 90 CAPSULE | Refills: 1 | Status: SHIPPED | OUTPATIENT
Start: 2019-02-27 | End: 2019-09-16

## 2019-03-04 ENCOUNTER — ANTICOAGULATION THERAPY VISIT (OUTPATIENT)
Dept: NURSING | Facility: CLINIC | Age: 74
End: 2019-03-04
Payer: COMMERCIAL

## 2019-03-04 DIAGNOSIS — I48.91 ATRIAL FIBRILLATION, UNSPECIFIED TYPE (H): ICD-10-CM

## 2019-03-04 LAB — INR POINT OF CARE: 3.4 (ref 0.86–1.14)

## 2019-03-04 PROCEDURE — 85610 PROTHROMBIN TIME: CPT | Mod: QW

## 2019-03-04 PROCEDURE — 99207 ZZC NO CHARGE NURSE ONLY: CPT

## 2019-03-04 PROCEDURE — 36416 COLLJ CAPILLARY BLOOD SPEC: CPT

## 2019-03-04 NOTE — PROGRESS NOTES
ANTICOAGULATION FOLLOW-UP CLINIC VISIT    Patient Name:  Cayetano Lunsford  Date:  3/4/2019  Contact Type:  Face to Face    SUBJECTIVE:     Patient Findings     Positives:   Change in diet/appetite (1 serving of greens in the past week, pt will try to eat 2-3 servings per week.), Unexplained INR or factor level change           OBJECTIVE    INR Protime   Date Value Ref Range Status   03/04/2019 3.4 (A) 0.86 - 1.14 Final       ASSESSMENT / PLAN  INR assessment SUPRA    Recheck INR In: 8 DAYS    INR Location Clinic      Anticoagulation Summary  As of 3/4/2019    INR goal:   2.0-3.0   TTR:   71.8 % (2.9 y)   INR used for dosing:   3.4! (3/4/2019)   Warfarin maintenance plan:   10 mg (5 mg x 2) every Mon, Thu; 7.5 mg (5 mg x 1.5) all other days   Full warfarin instructions:   10 mg every Mon, Thu; 7.5 mg all other days   Weekly warfarin total:   57.5 mg   No change documented:   Tatyana Akers RN   Plan last modified:   Kirstin Meléndez RN (7/16/2018)   Next INR check:   3/12/2019   Priority:   INR   Target end date:       Indications    Long-term (current) use of anticoagulants [Z79.01] [Z79.01]  Atrial fibrillation (H) [I48.91]             Anticoagulation Episode Summary     INR check location:       Preferred lab:       Send INR reminders to:   [facility]    Comments:    Prefers AVS printed.       Anticoagulation Care Providers     Provider Role Specialty Phone number    Dmitri Andres MD [facility] [phone]            See the Encounter Report to view Anticoagulation Flowsheet and Dosing Calendar (Go to Encounters tab in chart review, and find the Anticoagulation Therapy Visit)        Tatyana Akers, RN

## 2019-03-05 NOTE — PROGRESS NOTES
ESTABLISHED PATIENT NEUROLOGY NOTE    DATE OF VISIT: 3/6/2019  CLINIC LOCATION: [facility]  MRN: [id number]  PATIENT NAME: Cayetano Lunsford  YOB: 1945    PCP: Dmitri Andres MD    REASON FOR VISIT:   Chief Complaint   Patient presents with     Tremors     no side effects. Tremor is doing well     SUBJECTIVE:                                                      HISTORY OF PRESENT ILLNESS: Patient is here for follow up regarding essential tremor.  His last visit was on 09/05/2018.  No medication changes were made at that time.  Please refer to my initial/other prior notes for further information.    Since the last visit, the patient reports that his hand tremor is stable.  His daughter brought him weighted silverware and a pen, which he finds helpful.  He is on propranolol 120 mg twice daily and 150/150/200 mg of primidone daily without noticeable side effects.  He denies interval development of new focal neurological symptoms.    On review of systems, patient endorses no additional active complaints. Medications, allergies, family and social history were also reviewed. There are no changes reported by patient.  REVIEW OF SYSTEMS:                                                    10-system review was completed. Pertinent positives are included in HPI. The remainder of ROS is negative.  EXAM:                                                    Physical Exam:   Vitals: /78 (BP Location: Left arm, Patient Position: Sitting, Cuff Size: Adult Regular)   Pulse 63   Temp 97.3  F (36.3  C) (Oral)   Resp 14   Wt 89.4 kg (197 lb)   SpO2 95%   BMI 31.80 kg/m      General: pt is in NAD, cooperative.  Skin: normal turgor, moist mucous membranes, no lesions/rashes noticed.  HEENT: ATNC, white sclera, normal conjunctiva.  Respiratory: Symmetric lung excursion, no accessory respiratory muscle use.  Abdomen: Non distended.  Neurological: awake, cooperative, follows commands, no  significant exam changes compared to the last visit.  ASSESSMENT and PLAN:                                                    Assessment: 74-year-old male patient with essential tremor presents for follow-up.  He takes 120 mg of propranolol twice daily and 500 mg of primidone daily without noticeable side effects.  His tremor remains stable on the current regimen.  We discussed additional future treatment options, including adding low-dose of gabapentin or topiramate versus increasing further his primidone dose.  Increasing propranolol would be less preferred given the increased chance of bradycardia.    Diagnoses:    ICD-10-CM    1. 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  We will not make any medication changes.  I refilled his primidone.    Next follow-up appointment is in the next 6 months or earlier if needed.    Total Time: 25 minutes with > 50% spent counseling the patient on stated above assessment and recommendations.    Bong Yoo MD  / Neurology

## 2019-03-06 ENCOUNTER — OFFICE VISIT (OUTPATIENT)
Dept: NEUROLOGY | Facility: CLINIC | Age: 74
End: 2019-03-06
Payer: COMMERCIAL

## 2019-03-06 VITALS
BODY MASS INDEX: 31.8 KG/M2 | SYSTOLIC BLOOD PRESSURE: 138 MMHG | DIASTOLIC BLOOD PRESSURE: 78 MMHG | WEIGHT: 197 LBS | TEMPERATURE: 97.3 F | HEART RATE: 63 BPM | OXYGEN SATURATION: 95 % | RESPIRATION RATE: 14 BRPM

## 2019-03-06 DIAGNOSIS — G25.0 BENIGN FAMILIAL TREMOR: ICD-10-CM

## 2019-03-06 PROCEDURE — 99214 OFFICE O/P EST MOD 30 MIN: CPT | Performed by: PSYCHIATRY & NEUROLOGY

## 2019-03-06 RX ORDER — PRIMIDONE 50 MG/1
TABLET ORAL
Qty: 900 TABLET | Refills: 1 | Status: SHIPPED | OUTPATIENT
Start: 2019-03-06 | End: 2019-09-10

## 2019-03-06 NOTE — LETTER
3/6/2019         RE: Cayetano Lunsford  [address]  [place] [id number]        Dear Colleague,    Thank you for referring your patient, Cayetano Lunsford, to the [facility]. Please see a copy of my visit note below.    ESTABLISHED PATIENT NEUROLOGY NOTE    DATE OF VISIT: 3/6/2019  CLINIC LOCATION: [facility]  MRN: [id number]  PATIENT NAME: Cayetano Lunsford  YOB: 1945    PCP: Dmitri Andres MD    REASON FOR VISIT:   Chief Complaint   Patient presents with     Tremors     no side effects. Tremor is doing well     SUBJECTIVE:                                                      HISTORY OF PRESENT ILLNESS: Patient is here for follow up regarding essential tremor.  His last visit was on 09/05/2018.  No medication changes were made at that time.  Please refer to my initial/other prior notes for further information.    Since the last visit, the patient reports that his hand tremor is stable.  His daughter brought him weighted silverware and a pen, which he finds helpful.  He is on propranolol 120 mg twice daily and 150/150/200 mg of primidone daily without noticeable side effects.  He denies interval development of new focal neurological symptoms.    On review of systems, patient endorses no additional active complaints. Medications, allergies, family and social history were also reviewed. There are no changes reported by patient.  REVIEW OF SYSTEMS:                                                    10-system review was completed. Pertinent positives are included in HPI. The remainder of ROS is negative.  EXAM:                                                    Physical Exam:   Vitals: /78 (BP Location: Left arm, Patient Position: Sitting, Cuff Size: Adult Regular)   Pulse 63   Temp 97.3  F (36.3  C) (Oral)   Resp 14   Wt 89.4 kg (197 lb)   SpO2 95%   BMI 31.80 kg/m       General: pt is in NAD, cooperative.  Skin: normal turgor,  moist mucous membranes, no lesions/rashes noticed.  HEENT: ATNC, white sclera, normal conjunctiva.  Respiratory: Symmetric lung excursion, no accessory respiratory muscle use.  Abdomen: Non distended.  Neurological: awake, cooperative, follows commands, no significant exam changes compared to the last visit.  ASSESSMENT and PLAN:                                                    Assessment: 74-year-old male patient with essential tremor presents for follow-up.  He takes 120 mg of propranolol twice daily and 500 mg of primidone daily without noticeable side effects.  His tremor remains stable on the current regimen.  We discussed additional future treatment options, including adding low-dose of gabapentin or topiramate versus increasing further his primidone dose.  Increasing propranolol would be less preferred given the increased chance of bradycardia.    Diagnoses:    ICD-10-CM    1. 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  We will not make any medication changes.  I refilled his primidone.    Next follow-up appointment is in the next 6 months or earlier if needed.    Total Time: 25 minutes with > 50% spent counseling the patient on stated above assessment and recommendations.    Bong Yoo MD  / Neurology         Again, thank you for allowing me to participate in the care of your patient.        Sincerely,        Bong Yoo MD

## 2019-03-06 NOTE — PATIENT INSTRUCTIONS
AFTER VISIT SUMMARY (AVS):    At today's visit we thoroughly discussed current symptoms, available treatment options, and the plan.  We will not make any medication changes.  I refilled your primidone.    Next follow-up appointment is in the next 6 months or earlier if needed.    Please do not hesitate to call me with any questions or concerns.    Thanks.

## 2019-03-10 NOTE — PROGRESS NOTES
SUBJECTIVE/OBJECTIVE:                Cayetano Lunsford is a 74 year old male coming in for a follow-up visit(2-8-19) for Medication Therapy Management.  He was referred to me from previous pcp Dr. Lewis.     2019--cece [facility] outcomes.     Chief Complaint:   Here for A1c lab recheck, bs meter review. He wants to know if he can correct a high bedtime blood sugar ?               Tobacco: No tobacco use  Alcohol: not currently using    Medication Adherence/Access:  no issues reported    Diabetes:  Pt currently taking metformin 1gm bid, Novolin N- 4 units AM before breakfast and 15 units in PM-at dinner, Novolin 4 units with breakfast and 0 units of R at dinner unless >200- 2 units then. -rare.    SMBG: 3-4 times daily. Ranges (per pt report): see chart below  Symptoms of low blood sugar? Fingers and lips tingle, shaky and sweaty. Frequency of hypoglycemia? Twice less than 70 in the last 4 weeks. Treating with small amount of orange juice.   Recent symptoms of high blood sugar? none.  Eye exam: up to date   Foot exam:  due  Microalbumin is not < 30 mg/g. Pt is taking an ACEi/ARB not at max dose.  Aspirin: Not taking due to anticoagulation therapy and increased risk of bleeding    7 days avg. = 127, 14 days=134, 30 days = 139.           He notes when he had the 53 bs --he stated not enough to eat at dinner that night .               Ave: 7 day ave: 127 n=19; 14 day:  134 n=41; 30 day: 135 n=91    Date FBG Dinner  Bedtime   2-8-19 117     2-7-19 137 72 101   2-6- 196 123 145   2-5 119 85 151   2-4 172 65-more active /less food  106   2-3 120 132 142   2-2 156 90 107   2-1 164 111 119     Lab Results   Component Value Date    A1C 5.3 03/11/2019    A1C 5.5 10/31/2018    A1C 5.3 05/01/2018    A1C 6.0 11/02/2017    A1C 5.8 05/08/2017       COPD: Current medications: Short-Acting Bronchodilator: Albuterol MDI and pt reports using 2 times per day (am and pm ) but cold weather triggers more use.Ipratropium/Albuterol Nebs and pt  "reports using 3 times per day .Seeing [facility]n. [facility] today for f/up.   LAMA/LABA- Stiolto Respimat 2 puff(s) once daily.     Pt is not experiencing side effects.   Pt reports the following symptoms: none.  Pt does have an COPD Action Plan on file.   Has spirometry been completed: Yes   PIF was completed today: No              Hypertension: Current medications include Losartan 100mg ./day .  Patient does not self-monitor BP.  Patient reports no current medication side effects.    BP Readings from Last 3 Encounters:   03/11/19 136/60   03/06/19 138/78   01/28/19 164/76           BP Readings from Last 1 Encounters:   03/11/19 136/60     Pulse Readings from Last 1 Encounters:   03/11/19 84     Wt Readings from Last 1 Encounters:   03/11/19 196 lb 8 oz (89.1 kg)     Ht Readings from Last 1 Encounters:   06/28/18 5' 6\" (1.676 m)     Estimated body mass index is 31.72 kg/m  as calculated from the following:    Height as of 6/28/18: 5' 6\" (1.676 m).    Weight as of this encounter: 196 lb 8 oz (89.1 kg).    Temp Readings from Last 1 Encounters:   03/06/19 97.3  F (36.3  C) (Oral)           ASSESSMENT:              Current medications were reviewed today as discussed above.      Medication Adherence: good, no issues identified    Diabetes: Stable. Patient is meeting A1c goal of < 7%. Self monitoring of blood glucose is at goal of fasting  mg/dL and post prandial < 150 mg/dL. Pt would benefit from ideal SMBG: Check blood sugars pre-prandial, 2 hours post-prandial, and with symptoms of hypoglycemia  Basal Insulin (NPH) :  Decrease PM dose to 12 units and call if anymore bs's <70.   Bolus / Rapid Acting Insulin (R) : stay on the same dose. BUT donot inject at hs to correct a high bs.   Metformin :  stay on the same dose.  Updated Hemoglobin A1c-5.3% -excellent.     COPD: Stable. Patient would benefit from : no med changes today .      Hypertension: Stable. Patient is meeting BP goal of < 140/90mmHg.        PLAN:              "       1. A1c today = 5.3% --fantastic!     fyi--#1 Goal for you is NO blood sugars <70.   Lets decrease your dinner time NPH dose back to 12 units .  Also --Donot take any R insulin at dinner or bedtime  Even if your >200.         Next MTM visit:  See me in 6 months(September 9th -2019 at 9:30 AM) for med review and A1c lab recheck . Call me if any blood sugars <70 so we can adjust your insulin doses.     I spent 40 minutes with this patient today. All changes were made via collaborative practice agreement with .Dmitri Andres   A copy of the visit note was provided to the patient's primary care provider.         The patient declined a summary of these recommendations as an after visit summary.    Cheko Pereira Rph.  Medication Therapy Management Provider  [phone]

## 2019-03-11 ENCOUNTER — OFFICE VISIT (OUTPATIENT)
Dept: PHARMACY | Facility: CLINIC | Age: 74
End: 2019-03-11
Payer: COMMERCIAL

## 2019-03-11 ENCOUNTER — TRANSFERRED RECORDS (OUTPATIENT)
Dept: HEALTH INFORMATION MANAGEMENT | Facility: CLINIC | Age: 74
End: 2019-03-11

## 2019-03-11 VITALS
BODY MASS INDEX: 31.72 KG/M2 | SYSTOLIC BLOOD PRESSURE: 136 MMHG | DIASTOLIC BLOOD PRESSURE: 60 MMHG | HEART RATE: 84 BPM | OXYGEN SATURATION: 92 % | WEIGHT: 196.5 LBS

## 2019-03-11 DIAGNOSIS — E11.22 TYPE 2 DIABETES MELLITUS WITH STAGE 1 CHRONIC KIDNEY DISEASE, WITH LONG-TERM CURRENT USE OF INSULIN (H): ICD-10-CM

## 2019-03-11 DIAGNOSIS — Z79.4 TYPE 2 DIABETES MELLITUS WITH STAGE 1 CHRONIC KIDNEY DISEASE, WITH LONG-TERM CURRENT USE OF INSULIN (H): ICD-10-CM

## 2019-03-11 DIAGNOSIS — E11.22 TYPE 2 DIABETES MELLITUS WITH STAGE 1 CHRONIC KIDNEY DISEASE, WITH LONG-TERM CURRENT USE OF INSULIN (H): Primary | ICD-10-CM

## 2019-03-11 DIAGNOSIS — J44.9 CHRONIC OBSTRUCTIVE PULMONARY DISEASE, UNSPECIFIED COPD TYPE (H): ICD-10-CM

## 2019-03-11 DIAGNOSIS — Z79.4 TYPE 2 DIABETES MELLITUS WITH STAGE 1 CHRONIC KIDNEY DISEASE, WITH LONG-TERM CURRENT USE OF INSULIN (H): Primary | ICD-10-CM

## 2019-03-11 DIAGNOSIS — N18.1 TYPE 2 DIABETES MELLITUS WITH STAGE 1 CHRONIC KIDNEY DISEASE, WITH LONG-TERM CURRENT USE OF INSULIN (H): ICD-10-CM

## 2019-03-11 DIAGNOSIS — I10 ESSENTIAL HYPERTENSION WITH GOAL BLOOD PRESSURE LESS THAN 140/90: ICD-10-CM

## 2019-03-11 DIAGNOSIS — N18.1 TYPE 2 DIABETES MELLITUS WITH STAGE 1 CHRONIC KIDNEY DISEASE, WITH LONG-TERM CURRENT USE OF INSULIN (H): Primary | ICD-10-CM

## 2019-03-11 LAB — HBA1C MFR BLD: 5.3 % (ref 0–5.6)

## 2019-03-11 PROCEDURE — 36415 COLL VENOUS BLD VENIPUNCTURE: CPT | Performed by: FAMILY MEDICINE

## 2019-03-11 PROCEDURE — 83036 HEMOGLOBIN GLYCOSYLATED A1C: CPT | Performed by: FAMILY MEDICINE

## 2019-03-11 PROCEDURE — 99207 ZZC NO CHARGE LOS: CPT | Performed by: PHARMACIST

## 2019-03-11 NOTE — PATIENT INSTRUCTIONS
Recommendations from today's MTM visit:                                                        1. A1c today = 5.3% --fantastic!     fyi--#1 Goal for you is NO blood sugars <70.   Lets decrease your dinner time NPH dose back to 12 units .  Also --Donot take any R insulin at dinner or bedtime  Even if your >200.         Next MTM visit:  See me in 6 months(September 9th -2019 at 9:30 AM) for med review and A1c lab recheck . Call me if any blood sugars <70 so we can adjust your insulin doses.     To schedule another MTM appointment, please call the clinic directly or you may call the MTM scheduling line at [phone] or toll-free at [phone].     My Clinical Pharmacist's contact information:                                                      It was a pleasure talking with you today!  Please feel free to contact me with any questions or concerns you have.      Cheko Pereira [facility].  Medication Therapy Management Provider  [phone]      You may receive a survey about the MTM services you received by email and/or US Mail.  I would appreciate your feedback to help me serve you better in the future. Your comments will be anonymous.

## 2019-03-11 NOTE — Clinical Note
All is good with rich -a1c 5.3% .Cheko Pereira Rph.Medication Therapy Management [phone]

## 2019-03-12 ENCOUNTER — TELEPHONE (OUTPATIENT)
Dept: FAMILY MEDICINE | Facility: CLINIC | Age: 74
End: 2019-03-12

## 2019-03-12 ENCOUNTER — ANTICOAGULATION THERAPY VISIT (OUTPATIENT)
Dept: NURSING | Facility: CLINIC | Age: 74
End: 2019-03-12
Payer: COMMERCIAL

## 2019-03-12 DIAGNOSIS — I48.91 ATRIAL FIBRILLATION, UNSPECIFIED TYPE (H): Primary | ICD-10-CM

## 2019-03-12 DIAGNOSIS — I48.91 ATRIAL FIBRILLATION, UNSPECIFIED TYPE (H): ICD-10-CM

## 2019-03-12 DIAGNOSIS — Z79.01 LONG TERM CURRENT USE OF ANTICOAGULANTS WITH INR GOAL OF 2.0-3.0: ICD-10-CM

## 2019-03-12 LAB — INR POINT OF CARE: 3 (ref 0.86–1.14)

## 2019-03-12 PROCEDURE — 85610 PROTHROMBIN TIME: CPT | Mod: QW

## 2019-03-12 PROCEDURE — 36416 COLLJ CAPILLARY BLOOD SPEC: CPT

## 2019-03-12 PROCEDURE — 99207 ZZC NO CHARGE NURSE ONLY: CPT

## 2019-03-12 NOTE — TELEPHONE ENCOUNTER
"Requested Prescriptions   Pending Prescriptions Disp Refills     ONETOUCH ULTRA test strip [Pharmacy Med Name: ONETOUCH ULTRA BLUE  STRP] 125 each 0     Sig: USE TO TEST BLOOD SUGARS FOUR TIMES A DAY OR AS DIRECTED (NEED TO BE SEEN IN CLINIC FOR FURTHER REFILLS)    Diabetic Supplies Protocol Passed - 3/11/2019  8:59 AM       Passed - Medication is active on med list       Passed - Patient is 18 years of age or older       Passed - Recent (6 mo) or future (30 days) visit within the authorizing provider's specialty    Patient had office visit in the last 6 months or has a visit in the next 30 days with authorizing provider.  See \"Patient Info\" tab in inbasket, or \"Choose Columns\" in Meds & Orders section of the refill encounter.            Patient changed PCP to Mckenna and last discussed diabetes with him     Refused Prescriptions:                       Disp   Refills    ONETOUCH ULTRA test strip [Pharmacy Med Na*0.01 e*0        Sig: USE TO TEST BLOOD SUGARS FOUR TIMES A DAY OR AS           DIRECTED (NEED TO BE SEEN IN CLINIC FOR FURTHER           REFILLS)  Refused By: JARVIS SALAZAR  Reason for Refusal: Patient no longer under provider care      Closing encounter - no further actions needed at this time    Jarvis Salazar RN    "

## 2019-03-12 NOTE — PROGRESS NOTES
ANTICOAGULATION FOLLOW-UP CLINIC VISIT    Patient Name:  Cayetano Lunsford  Date:  3/12/2019  Contact Type:  Face to Face    SUBJECTIVE:     Patient Findings     Positives:   Change in medications (Insulin dose decreased), Change in diet/appetite (Eating less red meat and trying to eat 2 servings of greens per week. Pt's INR hovering at high end so he will try to eat 3 servings per week.)    Comments:   Patient saw [facility]Ph[facility] on 03/12 for DM check--A1C is < 6!  Patient saw Neurologist on 03/06/19--NO change in Propranolol or Primidone dose.           OBJECTIVE    INR Protime   Date Value Ref Range Status   03/12/2019 3.0 (A) 0.86 - 1.14 Final       ASSESSMENT / PLAN  INR assessment THER    Recheck INR In: 3 WEEKS    INR Location Clinic      Anticoagulation Summary  As of 3/12/2019    INR goal:   2.0-3.0   TTR:   71.3 % (2.9 y)   INR used for dosing:   3.0 (3/12/2019)   Warfarin maintenance plan:   10 mg (5 mg x 2) every Mon, Thu; 7.5 mg (5 mg x 1.5) all other days   Full warfarin instructions:   10 mg every Mon, Thu; 7.5 mg all other days   Weekly warfarin total:   57.5 mg   No change documented:   Tatyana Akers RN   Plan last modified:   Kirstin Meléndez RN (7/16/2018)   Next INR check:   4/2/2019   Priority:   INR   Target end date:       Indications    Long-term (current) use of anticoagulants [Z79.01] [Z79.01]  Atrial fibrillation (H) [I48.91]             Anticoagulation Episode Summary     INR check location:       Preferred lab:       Send INR reminders to:   [facility]    Comments:    Prefers AVS printed.       Anticoagulation Care Providers     Provider Role Specialty Phone number    Dmitri Andres MD [facility] [phone]            See the Encounter Report to view Anticoagulation Flowsheet and Dosing Calendar (Go to Encounters tab in chart review, and find the Anticoagulation Therapy Visit)        Tatyana Akers RN

## 2019-03-12 NOTE — TELEPHONE ENCOUNTER
Has the patient previously taken warfarin? yes  If yes, for what indication? Atrial Fibrillation    Does the patient have any of the following indications for a higher range of 2.5-3.5:    Mitral position mechanical valve? no    Yovani-Shiley, Ball and Cage or Monoleaflet valve (regardless of position) no    Other (if yes, please explain) no      New insurance and medicare reimbursement rules require that all of our INR patients have an INR Clinic referral order in Epic, and that it be renewed annually.     We have entered this initial order for you and your signature is required once you review it.       You may close this encounter once signed.     Thank you,     Tatyana Akers RN

## 2019-03-12 NOTE — TELEPHONE ENCOUNTER
Looks like pt has changed PCP to Dr. Andres; would you like to continue therapy?    Thanks!  Jolynn Wynn, Pharmacy [facility]  [phone]

## 2019-03-18 DIAGNOSIS — J44.9 CHRONIC OBSTRUCTIVE PULMONARY DISEASE, UNSPECIFIED COPD TYPE (H): Primary | ICD-10-CM

## 2019-03-18 NOTE — TELEPHONE ENCOUNTER
"Requested Prescriptions   Pending Prescriptions Disp Refills     ipratropium - albuterol 0.5 mg/2.5 mg/3 mL (DUONEB) 0.5-2.5 (3) MG/3ML neb solution [Pharmacy Med Name: IPRATROPIUM-ALBUTEROL 0.5-2.5 SOLN]  Last Written Prescription Date:  3/8/2018  Last Fill Quantity: 270ml,  # refills: 8   Last Office Visit: 1/28/2019   Future Office Visit:      360 mL 1     Sig: NEBULIZE CONTENTS OF ONE VIAL BY MOUTH EVERY 4 HOURS AS NEEDED FOR SHORTNESS OF BREATH OR DYSPNEA    Short-Acting Beta Agonist Inhalers Protocol  Failed - 3/18/2019  8:58 AM       Failed - Asthma control assessment score within normal limits in last 6 months    Please review ACT score.   No flowsheet data found.         Passed - Patient is age 12 or older       Passed - Medication is active on med list       Passed - Recent (6 mo) or future (30 days) visit within the authorizing provider's specialty    Patient had office visit in the last 6 months or has a visit in the next 30 days with authorizing provider or within the authorizing provider's specialty.  See \"Patient Info\" tab in inbasket, or \"Choose Columns\" in Meds & Orders section of the refill encounter.              "

## 2019-03-20 RX ORDER — IPRATROPIUM BROMIDE AND ALBUTEROL SULFATE 2.5; .5 MG/3ML; MG/3ML
SOLUTION RESPIRATORY (INHALATION)
Qty: 360 ML | Refills: 1 | Status: SHIPPED | OUTPATIENT
Start: 2019-03-20 | End: 2019-06-10

## 2019-03-25 DIAGNOSIS — I10 HYPERTENSION GOAL BP (BLOOD PRESSURE) < 140/90: ICD-10-CM

## 2019-03-27 RX ORDER — LOSARTAN POTASSIUM 100 MG/1
TABLET ORAL
Qty: 90 TABLET | Refills: 0 | Status: SHIPPED | OUTPATIENT
Start: 2019-03-27 | End: 2019-06-24

## 2019-03-27 NOTE — TELEPHONE ENCOUNTER
Prescription approved per [facility] Refill Protocol.    Signed Prescriptions:                        Disp   Refills    losartan (COZAAR) 100 MG tablet            90 tab*0        Sig: TAKE ONE TABLET BY MOUTH ONCE DAILY FOR HYPERTENSION  Authorizing Provider: AGNES FERNANDEZ  Ordering User: JARVIS SALAZAR      Closing encounter - no further actions needed at this time    Jarvis Salazar RN

## 2019-03-27 NOTE — TELEPHONE ENCOUNTER
"Requested Prescriptions   Pending Prescriptions Disp Refills     losartan (COZAAR) 100 MG tablet [Pharmacy Med Name: LOSARTAN POTASSIUM 100MG TABS]  Last Written Prescription Date:  1/7/2019  Last Fill Quantity: 90 tablet,  # refills: 0   Last Office Visit: 1/28/2019   Future Office Visit:      90 tablet 0     Sig: TAKE ONE TABLET BY MOUTH ONCE DAILY FOR HYPERTENSION    Angiotensin-II Receptors Passed - 3/25/2019 10:26 AM       Passed - Blood pressure under 140/90 in past 12 months    BP Readings from Last 3 Encounters:   03/11/19 136/60   03/06/19 138/78   01/28/19 164/76          Passed - Recent (12 mo) or future (30 days) visit within the authorizing provider's specialty    Patient had office visit in the last 12 months or has a visit in the next 30 days with authorizing provider or within the authorizing provider's specialty.  See \"Patient Info\" tab in inbasket, or \"Choose Columns\" in Meds & Orders section of the refill encounter.           Passed - Medication is active on med list       Passed - Patient is age 18 or older       Passed - Normal serum creatinine on file in past 12 months    Recent Labs   Lab Test 05/01/18  0736  01/09/17  1432   CR 0.77   < >  --    CREAT  --   --  0.8    < > = values in this interval not displayed.          Passed - Normal serum potassium on file in past 12 months    Recent Labs   Lab Test 05/01/18  0736   POTASSIUM 4.2                      "

## 2019-04-02 ENCOUNTER — ANTICOAGULATION THERAPY VISIT (OUTPATIENT)
Dept: NURSING | Facility: CLINIC | Age: 74
End: 2019-04-02
Payer: COMMERCIAL

## 2019-04-02 DIAGNOSIS — I48.91 ATRIAL FIBRILLATION, UNSPECIFIED TYPE (H): ICD-10-CM

## 2019-04-02 LAB — INR POINT OF CARE: 2.4 (ref 0.86–1.14)

## 2019-04-02 PROCEDURE — 36416 COLLJ CAPILLARY BLOOD SPEC: CPT

## 2019-04-02 PROCEDURE — 99207 ZZC NO CHARGE NURSE ONLY: CPT

## 2019-04-02 PROCEDURE — 85610 PROTHROMBIN TIME: CPT | Mod: QW

## 2019-04-02 NOTE — PROGRESS NOTES
ANTICOAGULATION FOLLOW-UP CLINIC VISIT    Patient Name:  Cayetano Lunsford  Date:  2019  Contact Type:  Face to Face    SUBJECTIVE:     Patient Findings     Comments:   The patient was assessed for diet, medication, and activity level changes, missed or extra doses, bruising or bleeding, with no problem findings.             OBJECTIVE    INR Protime   Date Value Ref Range Status   2019 2.4 (A) 0.86 - 1.14 Final       ASSESSMENT / PLAN  INR assessment THER    Recheck INR In: 4 WEEKS    INR Location Clinic      Anticoagulation Summary  As of 2019    INR goal:   2.0-3.0   TTR:   71.8 % (3 y)   INR used for dosin.4 (2019)   Warfarin maintenance plan:   10 mg (5 mg x 2) every Mon, Thu; 7.5 mg (5 mg x 1.5) all other days   Full warfarin instructions:   10 mg every Mon, Thu; 7.5 mg all other days   Weekly warfarin total:   57.5 mg   No change documented:   Tatyana Akers RN   Plan last modified:   Kirstin Meléndez RN (2018)   Next INR check:   2019   Priority:   INR   Target end date:       Indications    Long-term (current) use of anticoagulants [Z79.01] [Z79.01]  Atrial fibrillation (H) [I48.91]             Anticoagulation Episode Summary     INR check location:       Preferred lab:       Send INR reminders to:   [facility]    Comments:    Prefers AVS printed.       Anticoagulation Care Providers     Provider Role Specialty Phone number    Dmitri Andres MD [facility] [phone]            See the Encounter Report to view Anticoagulation Flowsheet and Dosing Calendar (Go to Encounters tab in chart review, and find the Anticoagulation Therapy Visit)        Tatyana Akers RN

## 2019-04-22 DIAGNOSIS — G25.0 BENIGN FAMILIAL TREMOR: ICD-10-CM

## 2019-04-22 NOTE — TELEPHONE ENCOUNTER
"Requested Prescriptions   Pending Prescriptions Disp Refills     propranolol (INDERAL) 40 MG tablet [Pharmacy Med Name: PROPRANOLOL HCL 40MG TABS] 180 tablet 8     Sig: TAKE TWO TO THREE TABLETS BY MOUTH TWO TIMES A DAY  Last Written Prescription Date:  5/1/2018  Last Fill Quantity: 180 tablet,  # refills: 10   Last Office Visit: 1/28/2019   Future Office Visit:            Beta-Blockers Protocol Passed - 4/22/2019 10:23 AM        Passed - Blood pressure under 140/90 in past 12 months     BP Readings from Last 3 Encounters:   03/11/19 136/60   03/06/19 138/78   01/28/19 164/76           Passed - Patient is age 6 or older        Passed - Recent (12 mo) or future (30 days) visit within the authorizing provider's specialty     Patient had office visit in the last 12 months or has a visit in the next 30 days with authorizing provider or within the authorizing provider's specialty.  See \"Patient Info\" tab in inbasket, or \"Choose Columns\" in Meds & Orders section of the refill encounter.            Passed - Medication is active on med list          "

## 2019-04-23 RX ORDER — PROPRANOLOL HYDROCHLORIDE 40 MG/1
TABLET ORAL
Qty: 180 TABLET | Refills: 8 | Status: SHIPPED | OUTPATIENT
Start: 2019-04-23 | End: 2020-01-08

## 2019-04-23 NOTE — TELEPHONE ENCOUNTER
Per chart review, patient's PCP is now Dr. Andres.    Routing to patient's PCP.    Refill request received by [facility].    Thank you!  MICH Reed BSN, RN

## 2019-04-30 ENCOUNTER — ANTICOAGULATION THERAPY VISIT (OUTPATIENT)
Dept: NURSING | Facility: CLINIC | Age: 74
End: 2019-04-30
Payer: COMMERCIAL

## 2019-04-30 DIAGNOSIS — I48.91 ATRIAL FIBRILLATION, UNSPECIFIED TYPE (H): ICD-10-CM

## 2019-04-30 LAB — INR POINT OF CARE: 3.1 (ref 0.86–1.14)

## 2019-04-30 PROCEDURE — 36416 COLLJ CAPILLARY BLOOD SPEC: CPT

## 2019-04-30 PROCEDURE — 85610 PROTHROMBIN TIME: CPT | Mod: QW

## 2019-04-30 PROCEDURE — 99207 ZZC NO CHARGE NURSE ONLY: CPT

## 2019-04-30 NOTE — PROGRESS NOTES
ANTICOAGULATION FOLLOW-UP CLINIC VISIT    Patient Name:  Cayetano Lunsford  Date:  4/30/2019  Contact Type:  Face to Face    SUBJECTIVE:     Patient Findings     Positives:   Change in diet/appetite (Has had 2 servings of greens in the past week, pt will try for 3 servings per week.)           OBJECTIVE    INR Protime   Date Value Ref Range Status   04/30/2019 3.1 (A) 0.86 - 1.14 Final       ASSESSMENT / PLAN  INR assessment THER    Recheck INR In: 2 WEEKS    INR Location Clinic      Anticoagulation Summary  As of 4/30/2019    INR goal:   2.0-3.0   TTR:   72.2 % (3.1 y)   INR used for dosing:   3.1! (4/30/2019)   Warfarin maintenance plan:   10 mg (5 mg x 2) every Mon, Thu; 7.5 mg (5 mg x 1.5) all other days   Full warfarin instructions:   10 mg every Mon, Thu; 7.5 mg all other days   Weekly warfarin total:   57.5 mg   No change documented:   Tatyana Akers RN   Plan last modified:   Kirstin Meléndez RN (7/16/2018)   Next INR check:   5/14/2019   Priority:   INR   Target end date:       Indications    Long-term (current) use of anticoagulants [Z79.01] [Z79.01]  Atrial fibrillation (H) [I48.91]             Anticoagulation Episode Summary     INR check location:       Preferred lab:       Send INR reminders to:    [facility]    Comments:    Prefers AVS printed.       Anticoagulation Care Providers     Provider Role Specialty Phone number    Dmitri Andres MD [facility] [phone]            See the Encounter Report to view Anticoagulation Flowsheet and Dosing Calendar (Go to Encounters tab in chart review, and find the Anticoagulation Therapy Visit)        Tatyana Akers, MANOJ

## 2019-05-14 ENCOUNTER — ANTICOAGULATION THERAPY VISIT (OUTPATIENT)
Dept: NURSING | Facility: CLINIC | Age: 74
End: 2019-05-14
Payer: COMMERCIAL

## 2019-05-14 DIAGNOSIS — I48.91 ATRIAL FIBRILLATION, UNSPECIFIED TYPE (H): ICD-10-CM

## 2019-05-14 LAB — INR POINT OF CARE: 2.5 (ref 0.86–1.14)

## 2019-05-14 PROCEDURE — 36416 COLLJ CAPILLARY BLOOD SPEC: CPT

## 2019-05-14 PROCEDURE — 99207 ZZC NO CHARGE NURSE ONLY: CPT

## 2019-05-14 PROCEDURE — 85610 PROTHROMBIN TIME: CPT | Mod: QW

## 2019-05-14 NOTE — PROGRESS NOTES
ANTICOAGULATION FOLLOW-UP CLINIC VISIT    Patient Name:  Cayetano Lunsford  Date:  2019  Contact Type:  Face to Face    SUBJECTIVE:  Patient Findings     Comments:   The patient was assessed for diet, medication, and activity level changes, missed or extra doses, bruising or bleeding, with no problem findings.          Clinical Outcomes     Negatives:   Major bleeding event, Thromboembolic event, Anticoagulation-related hospital admission, Anticoagulation-related ED visit, Anticoagulation-related fatality    Comments:   The patient was assessed for diet, medication, and activity level changes, missed or extra doses, bruising or bleeding, with no problem findings.             OBJECTIVE    INR Protime   Date Value Ref Range Status   2019 2.5 (A) 0.86 - 1.14 Final       ASSESSMENT / PLAN  INR assessment THER    Recheck INR In: 4 WEEKS    INR Location Clinic      Anticoagulation Summary  As of 2019    INR goal:   2.0-3.0   TTR:   72.3 % (3.1 y)   INR used for dosin.5 (2019)   Warfarin maintenance plan:   10 mg (5 mg x 2) every Mon, Thu; 7.5 mg (5 mg x 1.5) all other days   Full warfarin instructions:   10 mg every Mon, Thu; 7.5 mg all other days   Weekly warfarin total:   57.5 mg   No change documented:   Tatyana Akers RN   Plan last modified:   Kirstin Meléndez RN (2018)   Next INR check:   2019   Priority:   INR   Target end date:       Indications    Long-term (current) use of anticoagulants [Z79.01] [Z79.01]  Atrial fibrillation (H) [I48.91]             Anticoagulation Episode Summary     INR check location:       Preferred lab:       Send INR reminders to:   [facility]    Comments:    Prefers AVS printed.       Anticoagulation Care Providers     Provider Role Specialty Phone number    Dmitri Andres MD [facility] [phone]            See the Encounter Report to view Anticoagulation Flowsheet and Dosing Calendar (Go to Encounters tab in  chart review, and find the Anticoagulation Therapy Visit)        Tatyana Akers RN

## 2019-06-10 DIAGNOSIS — J44.9 CHRONIC OBSTRUCTIVE PULMONARY DISEASE, UNSPECIFIED COPD TYPE (H): ICD-10-CM

## 2019-06-10 DIAGNOSIS — N18.1 TYPE 2 DIABETES MELLITUS WITH STAGE 1 CHRONIC KIDNEY DISEASE, WITH LONG-TERM CURRENT USE OF INSULIN (H): ICD-10-CM

## 2019-06-10 DIAGNOSIS — E11.22 TYPE 2 DIABETES MELLITUS WITH STAGE 1 CHRONIC KIDNEY DISEASE, WITH LONG-TERM CURRENT USE OF INSULIN (H): ICD-10-CM

## 2019-06-10 DIAGNOSIS — Z79.4 TYPE 2 DIABETES MELLITUS WITH STAGE 1 CHRONIC KIDNEY DISEASE, WITH LONG-TERM CURRENT USE OF INSULIN (H): ICD-10-CM

## 2019-06-10 NOTE — TELEPHONE ENCOUNTER
"Requested Prescriptions   Pending Prescriptions Disp Refills     ipratropium - albuterol 0.5 mg/2.5 mg/3 mL (DUONEB) 0.5-2.5 (3) MG/3ML neb solution [Pharmacy Med Name: IPRATROPIUM-ALBUTEROL 0.5-2.5 SOLN]  Last Written Prescription Date:  3/20/2019  Last Fill Quantity: 360 mL,  # refills: 1   Last office visit: 1/28/2019 with prescribing provider:  Mckenna   Future Office Visit:   Next 5 appointments (look out 90 days)    Sep 04, 2019 11:00 AM CDT  [facility] [address] [place] [id number] [phone]          360 mL 1     Sig: NEBULIZE CONTENTS OF ONE VIAL EVERY 4 HOURS AS NEEDED FOR SHORTNESS OF BREATH OR DYSPNEA       Short-Acting Beta Agonist Inhalers Protocol  Failed - 6/10/2019  9:12 AM        Failed - Asthma control assessment score within normal limits in last 6 months     No flowsheet data found.            Passed - Patient is age 12 or older        Passed - Medication is active on med list        Passed - Recent (6 mo) or future (30 days) visit within the authorizing provider's specialty     Patient had office visit in the last 6 months or has a visit in the next 30 days with authorizing provider or within the authorizing provider's specialty.  See \"Patient Info\" tab in inbasket, or \"Choose Columns\" in Meds & Orders section of the refill encounter.              "

## 2019-06-11 ENCOUNTER — ANTICOAGULATION THERAPY VISIT (OUTPATIENT)
Dept: NURSING | Facility: CLINIC | Age: 74
End: 2019-06-11
Payer: COMMERCIAL

## 2019-06-11 DIAGNOSIS — I48.91 ATRIAL FIBRILLATION, UNSPECIFIED TYPE (H): ICD-10-CM

## 2019-06-11 LAB — INR POINT OF CARE: 3.2 (ref 0.86–1.14)

## 2019-06-11 PROCEDURE — 85610 PROTHROMBIN TIME: CPT | Mod: QW

## 2019-06-11 PROCEDURE — 36416 COLLJ CAPILLARY BLOOD SPEC: CPT

## 2019-06-11 NOTE — PROGRESS NOTES
ANTICOAGULATION FOLLOW-UP CLINIC VISIT    Patient Name:  Cayetano Lunsford  Date:  6/11/2019  Contact Type:  Face to Face    SUBJECTIVE:  Patient Findings     Positives:   Missed doses (Missed 7.5mg on 06/05), Change in diet/appetite (Less greens in the past week, pt will resume today.)        Clinical Outcomes     Negatives:   Major bleeding event, Thromboembolic event, Anticoagulation-related hospital admission, Anticoagulation-related ED visit, Anticoagulation-related fatality           OBJECTIVE    INR Protime   Date Value Ref Range Status   06/11/2019 3.2 (A) 0.86 - 1.14 Final       ASSESSMENT / PLAN  INR assessment SUPRA    Recheck INR In: 2 WEEKS    INR Location Clinic      Anticoagulation Summary  As of 6/11/2019    INR goal:   2.0-3.0   TTR:   72.3 % (3.2 y)   INR used for dosing:   3.2! (6/11/2019)   Warfarin maintenance plan:   10 mg (5 mg x 2) every Mon, Thu; 7.5 mg (5 mg x 1.5) all other days   Full warfarin instructions:   10 mg every Mon, Thu; 7.5 mg all other days   Weekly warfarin total:   57.5 mg   No change documented:   Tatyana Akers RN   Plan last modified:   Kirstin Meléndez RN (7/16/2018)   Next INR check:   6/25/2019   Priority:   INR   Target end date:       Indications    Long-term (current) use of anticoagulants [Z79.01] [Z79.01]  Atrial fibrillation (H) [I48.91]             Anticoagulation Episode Summary     INR check location:       Preferred lab:       Send INR reminders to:    [facility]    Comments:    Prefers AVS printed.       Anticoagulation Care Providers     Provider Role Specialty Phone number    Dmitri Andres MD [facility] [phone]            See the Encounter Report to view Anticoagulation Flowsheet and Dosing Calendar (Go to Encounters tab in chart review, and find the Anticoagulation Therapy Visit)        Tatyana Akers, RN

## 2019-06-11 NOTE — TELEPHONE ENCOUNTER
Requested Prescriptions   Pending Prescriptions Disp Refills     metFORMIN (GLUCOPHAGE) 1000 MG tablet [Pharmacy Med Name: METFORMIN HCL 1000MG TABS]  Last Written Prescription Date:  12/12/2018  Last Fill Quantity: 180 tablet,  # refills: 1   Last Office Visit: 1/28/2019   Future Office Visit:    Next 5 appointments (look out 90 days)    Sep 04, 2019 11:00 AM CDT  Return Visit with Bong Yoo MD  [facility] ([facility]) [address] [place] [id number] [phone]   Sep 09, 2019  9:30 AM CDT  SHORT with Cheko Pereira RPH  [facility] ([facility]) [address] [facility] [id number] [phone]          180 tablet 1     Sig: TAKE ONE TABLET BY MOUTH TWICE A DAY WITH BREAKFAST AND DINNER       Biguanide Agents Failed - 6/10/2019  9:10 AM        Failed - Patient has documented LDL within the past 12 mos.     Recent Labs   Lab Test 05/01/18  0736   LDL 89           Failed - Patient's CR is NOT>1.4 OR Patient's EGFR is NOT<45 within past 12 mos.     Recent Labs   Lab Test 05/01/18  0736   GFRESTIMATED >90   GFRESTBLACK >90       Recent Labs   Lab Test 05/01/18  0736   CR 0.77           Passed - Blood pressure less than 140/90 in past 6 months     BP Readings from Last 3 Encounters:   03/11/19 136/60   03/06/19 138/78   01/28/19 164/76             Passed - Patient has had a Microalbumin in the past 15 mos.     Recent Labs   Lab Test 05/01/18  0736   MICROL 40   UMALCR 55.49*           Passed - Patient is age 10 or older        Passed - Patient has documented A1c within the specified period of time.     If HgbA1C is 8 or greater, it needs to be on file within the past 3 months.  If less than 8, must be on file within the past 6 months.     Recent Labs   Lab Test 03/11/19  0930   A1C 5.3           Passed - Patient does NOT have a diagnosis of CHF.        Passed - Medication is active on med list        " Passed - Recent (6 mo) or future (30 days) visit within the authorizing provider's specialty     Patient had office visit in the last 6 months or has a visit in the next 30 days with authorizing provider or within the authorizing provider's specialty.  See \"Patient Info\" tab in inbasket, or \"Choose Columns\" in Meds & Orders section of the refill encounter.              "

## 2019-06-12 RX ORDER — IPRATROPIUM BROMIDE AND ALBUTEROL SULFATE 2.5; .5 MG/3ML; MG/3ML
SOLUTION RESPIRATORY (INHALATION)
Qty: 360 ML | Refills: 3 | Status: SHIPPED | OUTPATIENT
Start: 2019-06-12 | End: 2019-12-02

## 2019-06-12 NOTE — TELEPHONE ENCOUNTER
Duoneb  Prescription approved per [facility] Refill Protocol.    Ashley [place], RN, BSN

## 2019-06-13 NOTE — TELEPHONE ENCOUNTER
I am routing to new primary provider/clinic - Dmitri Andres MD.   --Plan in last office visit 1/28/19 with  was to RTC in 3 weeks.  --Overdue for annual labs.    Cristóbal ARANDA  [facility].

## 2019-06-21 ENCOUNTER — OFFICE VISIT (OUTPATIENT)
Dept: CARDIOLOGY | Facility: CLINIC | Age: 74
End: 2019-06-21
Attending: INTERNAL MEDICINE
Payer: COMMERCIAL

## 2019-06-21 VITALS
WEIGHT: 200 LBS | SYSTOLIC BLOOD PRESSURE: 120 MMHG | BODY MASS INDEX: 30.31 KG/M2 | DIASTOLIC BLOOD PRESSURE: 58 MMHG | HEART RATE: 80 BPM | HEIGHT: 68 IN

## 2019-06-21 DIAGNOSIS — I48.91 ATRIAL FIBRILLATION, UNSPECIFIED TYPE (H): ICD-10-CM

## 2019-06-21 DIAGNOSIS — E78.5 HYPERLIPIDEMIA LDL GOAL <100: ICD-10-CM

## 2019-06-21 DIAGNOSIS — I25.10 CAD IN NATIVE ARTERY: ICD-10-CM

## 2019-06-21 DIAGNOSIS — I10 HYPERTENSION GOAL BP (BLOOD PRESSURE) < 140/90: ICD-10-CM

## 2019-06-21 DIAGNOSIS — I48.0 PAROXYSMAL ATRIAL FIBRILLATION (H): ICD-10-CM

## 2019-06-21 PROCEDURE — 93000 ELECTROCARDIOGRAM COMPLETE: CPT | Performed by: INTERNAL MEDICINE

## 2019-06-21 PROCEDURE — 99214 OFFICE O/P EST MOD 30 MIN: CPT | Performed by: INTERNAL MEDICINE

## 2019-06-21 ASSESSMENT — MIFFLIN-ST. JEOR: SCORE: 1616.69

## 2019-06-21 NOTE — PROGRESS NOTES
Service Date: 06/21/2019      REASON FOR VISIT:  Atrial fibrillation.      HISTORY OF PRESENT ILLNESS:  This is a very delightful 74-year-old gentleman who is a patient of Dr. Aragon in the past and then was seen by Dr. 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        The patient does not have any major cardiovascular issues except for paroxysmal atrial fibrillation in 2007, which has been since maintained on propranolol and propafenone.  He has not had any further recurrences, takes warfarin.  Last year, he was seen by Dr. Dang for routine followup and was doing very well.  It looks like there is a mention of CAD in his chart, but his coronary angiography from 2008 showed minimal luminal irregularities without clinical evidence of CAD.        He denies any heart failure anginal symptoms and is here for routine followup.  Overall, he continues to feel well without syncope, presyncope.  He says he has slowed down a little bit because of COPD, but no classic symptoms of heart failure or angina.      PHYSICAL EXAMINATION:   VITAL SIGNS:  Blood pressure is 120/58 with a pulse of 80.   GENERAL:  Alert and oriented x3, in no acute distress.   NECK:  Supple.  JVP is normal.   LUNGS:  Clear to auscultation bilaterally.   CARDIAC:  Heart sounds are regular with a soft ejection systolic murmur without rubs or gallops.   EXTREMITIES:  Warm without edema.   ABDOMEN:  Soft, nontender, nondistended.      ASSESSMENT AND PLAN:  Mr. Lunsford is a 74-year-old gentleman with history of atrial fibrillation in 2007 since being maintained on propafenone and propranolol and warfarin.  Clinically, asymptomatic, doing well except for mild shortness of breath which he attributes to COPD.  Given that he is on a class I antiarrhythmic agent, we will go ahead and get an echocardiogram and a nuclear Lexiscan stress test just to ensure he does not have any contraindications to  continuing propafenone therapy.  Of course while he is on propafenone, I would not recommend stopping his beta blocker.        Otherwise, I will see him back in a year.  If of course, his testing looks abnormal or anything needs to be addressed on that, we will see him sooner.  Continue following up with PCP and please do not hesitate to contact me with questions.        cc:      Dmitri Andres MD    [facility]   [id number] [place] [id number]         SULY BOSE MD             D: 2019   T: 2019   MT: CRISTOPHER      Name:     CAROLANN SAUNDERS   MRN:      [id number]        Account:      [id number]   :      1945           Service Date: 2019      Document: [id number]

## 2019-06-21 NOTE — LETTER
6/21/2019      Dmitri Andres MD  [address] [facility] [address]      RE: Cayetano Lunsford       Dear Colleague,    I had the pleasure of seeing Cayetano Lunsford in the [facility].    Service Date: 06/21/2019      REASON FOR VISIT:  Atrial fibrillation.      HISTORY OF PRESENT ILLNESS:  This is a very delightful 74-year-old gentleman who is a patient of Dr. Aragon in the past and then was seen by Dr. 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        The patient does not have any major cardiovascular issues except for paroxysmal atrial fibrillation in 2007, which has been since maintained on propranolol and propafenone.  He has not had any further recurrences, takes warfarin.  Last year, he was seen by Dr. Dang for routine followup and was doing very well.  It looks like there is a mention of CAD in his chart, but his coronary angiography from 2008 showed minimal luminal irregularities without clinical evidence of CAD.        He denies any heart failure anginal symptoms and is here for routine followup.  Overall, he continues to feel well without syncope, presyncope.  He says he has slowed down a little bit because of COPD, but no classic symptoms of heart failure or angina.      PHYSICAL EXAMINATION:   VITAL SIGNS:  Blood pressure is 120/58 with a pulse of 80.   GENERAL:  Alert and oriented x3, in no acute distress.   NECK:  Supple.  JVP is normal.   LUNGS:  Clear to auscultation bilaterally.   CARDIAC:  Heart sounds are regular with a soft ejection systolic murmur without rubs or gallops.   EXTREMITIES:  Warm without edema.   ABDOMEN:  Soft, nontender, nondistended.      ASSESSMENT AND PLAN:  Mr. Lunsford is a 74-year-old gentleman with history of atrial fibrillation in 2007 since being maintained on propafenone and propranolol and warfarin.  Clinically, asymptomatic, doing well except for mild  "shortness of breath which he attributes to COPD.  Given that he is on a class I antiarrhythmic agent, we will go ahead and get an echocardiogram and a nuclear Lexiscan stress test just to ensure he does not have any contraindications to continuing propafenone therapy.  Of course while he is on propafenone, I would not recommend stopping his beta blocker.        Otherwise, I will see him back in a year.  If of course, his testing looks abnormal or anything needs to be addressed on that, we will see him sooner.  Continue following up with PCP and please do not hesitate to contact me with questions.        cc:      Dmitri Andres MD    [facility]   [address] [place] [address]         SULY BOSE MD             D: 2019   T: 2019   MT: CRISTOPHER      Name:     CAROLANN SAUNDRES   MRN:      [id number]        Account:      [id number]   :      1945           Service Date: 2019      Document: [id number]         Outpatient Encounter Medications as of 2019   Medication Sig Dispense Refill     albuterol (PROAIR HFA/PROVENTIL HFA/VENTOLIN HFA) 108 (90 BASE) MCG/ACT Inhaler Inhale 1-2 puffs into the lungs every 4 hours as needed (for shortness of breath/wheezing) 1 Inhaler 5     B-D INSULIN SYRINGE 31G X 5/16\" 0.5 ML miscellaneous USE 1 SYRINGE TWO TIMES A DAY OR AS DIRECTED 100 each 5     BD ULTRA FINE PEN NEEDLES Use to inject insulin twice daily. 100 each PRN     blood glucose (NO BRAND SPECIFIED) test strip Use to test blood sugar 4 times daily or as directed. 120 each 11     folic acid 0.8 MG CAPS Take 1 tablet by mouth daily 90 capsule 3     FREESTYLE LANCETS MISC Use to test up to four times per day 100 Each 12     insulin NPH (NOVOLIN N RELION) 100 UNIT/ML vial INJECT 5 UNITS SUBCUTANEOUSLY WITH BREAKFAST AND 15 UNITS WITH SUPPER 3 vial 3     insulin regular (NOVOLIN R RELION) 100 UNIT/ML vial Inject 5 units with breakfast and 2 units with dinner if blood sugars " over 200 (when mixing with NPH, draw this insulin up first) 30 mL 1     ipratropium - albuterol 0.5 mg/2.5 mg/3 mL (DUONEB) 0.5-2.5 (3) MG/3ML neb solution NEBULIZE CONTENTS OF ONE VIAL BY MOUTH EVERY 4 HOURS AS NEEDED FOR SHORTNESS OF BREATH OR DYSPNEA 270 mL 8     metFORMIN (GLUCOPHAGE) 1000 MG tablet TAKE ONE TABLET BY MOUTH TWICE A DAY WITH BREAKFAST AND DINNER 180 tablet 1     ONETOUCH ULTRA test strip USE TO TEST BLOOD SUGAR FOUR TIMES A DAY OR AS DIRECTED 125 each 0     pantoprazole (PROTONIX) 40 MG enteric coated tablet Take 40 mg by mouth daily Started by Dr. Debbie Rico at [facility]       primidone (MYSOLINE) 50 MG tablet 3 tablets 2 times a day and 4 tablets at the bedtime. 900 tablet 1     Probiotic Product (FLORAJEN3) CAPS Take 1 capsule by mouth 2 times daily 60 capsule 0     propafenone (RYTHMOL) 150 MG TABS tablet Take 1 tablet (150 mg) by mouth 2 times daily 180 tablet 3     propranolol (INDERAL) 40 MG tablet TAKE TWO TO THREE TABLETS BY MOUTH TWO TIMES A  tablet 8     Respiratory Therapy Supplies (NEBULIZER/TUBING/MOUTHPIECE) KIT Use to nebulize medications for COPD 1 kit 0     simvastatin (ZOCOR) 80 MG tablet TAKE ONE-HALF TABLET BY MOUTH AT BEDTIME 90 tablet 0     sulfaSALAzine (AZULFIDINE) 500 MG tablet TAKE ONE TABLET BY MOUTH THREE TIMES A DAY 90 tablet 11     tamsulosin (FLOMAX) 0.4 MG capsule TAKE ONE CAPSULE BY MOUTH EVERY DAY 90 capsule 1     tiotropium-olodaterol (STIOLTO RESPIMAT) 2.5-2.5 MCG/ACT AERS Inhale 2 puffs into the lungs daily       warfarin (COUMADIN) 5 MG tablet Take as directed by Coumadin clinic-- 10 mg on Mon & Th / Take 7.5 mg all other days 150 tablet 2     [DISCONTINUED] losartan (COZAAR) 100 MG tablet TAKE ONE TABLET BY MOUTH ONCE DAILY FOR HYPERTENSION 90 tablet 0     ASPIRIN NOT PRESCRIBED, INTENTIONAL, Reported on 5/17/2017       ipratropium - albuterol 0.5 mg/2.5 mg/3 mL (DUONEB) 0.5-2.5 (3) MG/3ML neb solution NEBULIZE CONTENTS OF ONE VIAL EVERY 4 HOURS AS  NEEDED FOR SHORTNESS OF BREATH OR DYSPNEA 360 mL 3     No facility-administered encounter medications on file as of 6/21/2019.        Again, thank you for allowing me to participate in the care of your patient.      Sincerely,    Daniel Marroquin MD     [facility]

## 2019-06-21 NOTE — PROGRESS NOTES
"HPI and Plan:   See dictation [id number]    Orders Placed This Encounter   Procedures     NM Lexiscan stress test (nuc card)     Follow-Up with Cardiologist     EKG 12-lead complete w/read - Clinics (performed today)     Echocardiogram Complete     No orders of the defined types were placed in this encounter.    There are no discontinued medications.      Encounter Diagnoses   Name Primary?     Atrial fibrillation, unspecified type (H)      Hyperlipidemia LDL goal <100      Hypertension goal BP (blood pressure) < 140/90      CAD in native artery      Paroxysmal atrial fibrillation (H)        CURRENT MEDICATIONS:  Current Outpatient Medications   Medication Sig Dispense Refill     albuterol (PROAIR HFA/PROVENTIL HFA/VENTOLIN HFA) 108 (90 BASE) MCG/ACT Inhaler Inhale 1-2 puffs into the lungs every 4 hours as needed (for shortness of breath/wheezing) 1 Inhaler 5     B-D INSULIN SYRINGE 31G X 5/16\" 0.5 ML miscellaneous USE 1 SYRINGE TWO TIMES A DAY OR AS DIRECTED 100 each 5     BD ULTRA FINE PEN NEEDLES Use to inject insulin twice daily. 100 each PRN     blood glucose (NO BRAND SPECIFIED) test strip Use to test blood sugar 4 times daily or as directed. 120 each 11     folic acid 0.8 MG CAPS Take 1 tablet by mouth daily 90 capsule 3     FREESTYLE LANCETS MISC Use to test up to four times per day 100 Each 12     insulin NPH (NOVOLIN N RELION) 100 UNIT/ML vial INJECT 5 UNITS SUBCUTANEOUSLY WITH BREAKFAST AND 15 UNITS WITH SUPPER 3 vial 3     insulin regular (NOVOLIN R RELION) 100 UNIT/ML vial Inject 5 units with breakfast and 2 units with dinner if blood sugars over 200 (when mixing with NPH, draw this insulin up first) 30 mL 1     ipratropium - albuterol 0.5 mg/2.5 mg/3 mL (DUONEB) 0.5-2.5 (3) MG/3ML neb solution NEBULIZE CONTENTS OF ONE VIAL BY MOUTH EVERY 4 HOURS AS NEEDED FOR SHORTNESS OF BREATH OR DYSPNEA 270 mL 8     losartan (COZAAR) 100 MG tablet TAKE ONE TABLET BY MOUTH ONCE DAILY FOR HYPERTENSION 90 tablet 0     " metFORMIN (GLUCOPHAGE) 1000 MG tablet TAKE ONE TABLET BY MOUTH TWICE A DAY WITH BREAKFAST AND DINNER 180 tablet 1     ONETOUCH ULTRA test strip USE TO TEST BLOOD SUGAR FOUR TIMES A DAY OR AS DIRECTED 125 each 0     pantoprazole (PROTONIX) 40 MG enteric coated tablet Take 40 mg by mouth daily Started by Dr. Debbie Rico at [facility]       primidone (MYSOLINE) 50 MG tablet 3 tablets 2 times a day and 4 tablets at the bedtime. 900 tablet 1     Probiotic Product (FLORAJEN3) CAPS Take 1 capsule by mouth 2 times daily 60 capsule 0     propafenone (RYTHMOL) 150 MG TABS tablet Take 1 tablet (150 mg) by mouth 2 times daily 180 tablet 3     propranolol (INDERAL) 40 MG tablet TAKE TWO TO THREE TABLETS BY MOUTH TWO TIMES A  tablet 8     Respiratory Therapy Supplies (NEBULIZER/TUBING/MOUTHPIECE) KIT Use to nebulize medications for COPD 1 kit 0     simvastatin (ZOCOR) 80 MG tablet TAKE ONE-HALF TABLET BY MOUTH AT BEDTIME 90 tablet 0     sulfaSALAzine (AZULFIDINE) 500 MG tablet TAKE ONE TABLET BY MOUTH THREE TIMES A DAY 90 tablet 11     tamsulosin (FLOMAX) 0.4 MG capsule TAKE ONE CAPSULE BY MOUTH EVERY DAY 90 capsule 1     tiotropium-olodaterol (STIOLTO RESPIMAT) 2.5-2.5 MCG/ACT AERS Inhale 2 puffs into the lungs daily       warfarin (COUMADIN) 5 MG tablet Take as directed by Coumadin clinic-- 10 mg on Mon & Th / Take 7.5 mg all other days 150 tablet 2     ASPIRIN NOT PRESCRIBED, INTENTIONAL, Reported on 5/17/2017       ipratropium - albuterol 0.5 mg/2.5 mg/3 mL (DUONEB) 0.5-2.5 (3) MG/3ML neb solution NEBULIZE CONTENTS OF ONE VIAL EVERY 4 HOURS AS NEEDED FOR SHORTNESS OF BREATH OR DYSPNEA 360 mL 3       ALLERGIES     Allergies   Allergen Reactions     Percodan [Oxycodone-Aspirin] Nausea and Vomiting       PAST MEDICAL HISTORY:  Past Medical History:   Diagnosis Date     Allergic rhinitis, cause unspecified      Atrial fibrillation (H)      BPH (benign prostatic hyperplasia)      CAD (coronary artery disease)      Chronic  "airway obstruction, not elsewhere classified      Hyperlipidemia LDL goal <100 5/9/2010     Hypertension goal BP (blood pressure) < 130/80 10/19/2006     Obesity (BMI 30-39.9)      Perforation of tympanic membrane, unspecified     Right TM     Tachycardia, unspecified      Type 2 diabetes, HbA1C goal < 8% (H) 5/2/2010     Unspecified cardiovascular disease        PAST SURGICAL HISTORY:  Past Surgical History:   Procedure Laterality Date     CARDIAC SURGERY       COLONOSCOPY  3/31/2011     COLONOSCOPY N/A 5/25/2018    Procedure: COMBINED COLONOSCOPY, SINGLE OR MULTIPLE BIOPSY/POLYPECTOMY BY BIOPSY;;  Surgeon: Juan Simon DO;  Location:  GI     CORONARY ANGIOGRAPHY ADULT ORDER  2001 and 2008 2001 at [facility]. 2008- No interventions     HC REMOVAL OF NAIL PLATE SIMPLE SINGLE  11/10/2011    Right 2nd Toenail     HERNIA REPAIR      left inguinal     SURGICAL HISTORY OF -   1987    right ear graft     SURGICAL HISTORY OF -   1992    right shoulder surgery     SURGICAL HISTORY OF -   1979    back surgery     SURGICAL HISTORY OF -   1978    vasectomy       FAMILY HISTORY:  Family History   Problem Relation Age of Onset     Circulatory Mother         blood clot in leg     Diabetes Mother         type 2     Allergies Mother      Arthritis Mother      Gastrointestinal Disease Mother      Neurologic Disorder Mother         Familiar tremors     Neurologic Disorder Father         brain tumor     Cerebrovascular Disease Paternal Grandfather      Hypertension Brother      Hypertension Sister      Diabetes Sister         Type 2     Allergies Sister      Lipids Brother      Lipids Sister      Neurologic Disorder Sister         \"     Neurologic Disorder Sister         \"     Neurologic Disorder Sister         \"       SOCIAL HISTORY:  Social History     Socioeconomic History     Marital status:      Spouse name: Izabel     Number of children: 2     Years of education: 12     Highest education level: None   Occupational " History     Occupation:      Employer: RETIRED   Social Needs     Financial resource strain: None     Food insecurity:     Worry: None     Inability: None     Transportation needs:     Medical: None     Non-medical: None   Tobacco Use     Smoking status: Former Smoker     Packs/day: 1.00     Years: 30.00     Pack years: 30.00     Types: Cigarettes     Last attempt to quit: 3/19/1992     Years since quittin.2     Smokeless tobacco: Never Used   Substance and Sexual Activity     Alcohol use: Yes     Alcohol/week: 0.0 oz     Comment: hardly at all, sometimes at dinner     Drug use: No     Sexual activity: Yes     Partners: Female   Lifestyle     Physical activity:     Days per week: None     Minutes per session: None     Stress: None   Relationships     Social connections:     Talks on phone: None     Gets together: None     Attends Synagogue service: None     Active member of club or organization: None     Attends meetings of clubs or organizations: None     Relationship status: None     Intimate partner violence:     Fear of current or ex partner: None     Emotionally abused: None     Physically abused: None     Forced sexual activity: None   Other Topics Concern     Parent/sibling w/ CABG, MI or angioplasty before 65F 55M? No      Service Not Asked     Blood Transfusions Not Asked     Caffeine Concern No     Comment: No Caffeine     Occupational Exposure Not Asked     Hobby Hazards Not Asked     Sleep Concern Not Asked     Stress Concern Not Asked     Weight Concern Not Asked     Special Diet Not Asked     Back Care Not Asked     Exercise Yes     Comment: 20 minutes - Walking- summer 2 x day     Bike Helmet Not Asked     Seat Belt Not Asked     Self-Exams Not Asked   Social History Narrative     None       Review of Systems:  Skin:  Negative for     Eyes:  Positive for glasses  ENT:  Positive for hearing loss  Respiratory:  Positive for dyspnea on  "exertion;wheezing  Cardiovascular:  Negative    Gastroenterology: Negative for    Genitourinary:  not assessed    Musculoskeletal:  Positive for back pain  Neurologic:  Negative for    Psychiatric:  Negative for    Heme/Lymph/Imm:  Positive for allergies  Endocrine:  Positive for diabetes    Physical Exam:  Vitals: /58   Pulse 80   Ht 1.727 m (5' 8\")   Wt 90.7 kg (200 lb)   BMI 30.41 kg/m      Recent Lab Results:  LIPID RESULTS:  Lab Results   Component Value Date    CHOL 163 05/01/2018    HDL 42 05/01/2018    LDL 89 05/01/2018    TRIG 159 (H) 05/01/2018    CHOLHDLRATIO 3.2 05/12/2015       LIVER ENZYME RESULTS:  Lab Results   Component Value Date    AST 20 03/13/2017    ALT 22 03/13/2017       CBC RESULTS:  Lab Results   Component Value Date    WBC 7.9 03/13/2017    RBC 4.11 (L) 03/13/2017    HGB 12.4 (L) 03/13/2017    HCT 39.4 (L) 03/13/2017    MCV 96 03/13/2017    MCH 30.2 03/13/2017    MCHC 31.5 03/13/2017    RDW 13.9 03/13/2017     03/13/2017       BMP RESULTS:  Lab Results   Component Value Date     05/01/2018    POTASSIUM 4.2 05/01/2018    CHLORIDE 103 05/01/2018    CO2 26 05/01/2018    ANIONGAP 10 05/01/2018     (H) 05/01/2018    BUN 13 05/01/2018    CR 0.77 05/01/2018    GFRESTIMATED >90 05/01/2018    GFRESTBLACK >90 05/01/2018    LEBRON 8.8 05/01/2018        A1C RESULTS:  Lab Results   Component Value Date    A1C 5.3 03/11/2019       INR RESULTS:  Lab Results   Component Value Date    INR 3.2 (A) 06/11/2019    INR 2.5 (A) 05/14/2019    INR 3.20 (H) 05/01/2018    INR 2.4 04/16/2018           CC  Natanael Dang MD  [address]  [place] [id number]                  "

## 2019-06-21 NOTE — LETTER
"6/21/2019    Dmitri Andres MD  [address]  [facility] [address]    RE: Cayetano Lunsford       Dear Colleague,    I had the pleasure of seeing Cayetano Lunsford in the [facility].    HPI and Plan:   See dictation [id number]    Orders Placed This Encounter   Procedures     NM Lexiscan stress test (nuc card)     Follow-Up with Cardiologist     EKG 12-lead complete w/read - Clinics (performed today)     Echocardiogram Complete     No orders of the defined types were placed in this encounter.    There are no discontinued medications.      Encounter Diagnoses   Name Primary?     Atrial fibrillation, unspecified type (H)      Hyperlipidemia LDL goal <100      Hypertension goal BP (blood pressure) < 140/90      CAD in native artery      Paroxysmal atrial fibrillation (H)        CURRENT MEDICATIONS:  Current Outpatient Medications   Medication Sig Dispense Refill     albuterol (PROAIR HFA/PROVENTIL HFA/VENTOLIN HFA) 108 (90 BASE) MCG/ACT Inhaler Inhale 1-2 puffs into the lungs every 4 hours as needed (for shortness of breath/wheezing) 1 Inhaler 5     B-D INSULIN SYRINGE 31G X 5/16\" 0.5 ML miscellaneous USE 1 SYRINGE TWO TIMES A DAY OR AS DIRECTED 100 each 5     BD ULTRA FINE PEN NEEDLES Use to inject insulin twice daily. 100 each PRN     blood glucose (NO BRAND SPECIFIED) test strip Use to test blood sugar 4 times daily or as directed. 120 each 11     folic acid 0.8 MG CAPS Take 1 tablet by mouth daily 90 capsule 3     FREESTYLE LANCETS MISC Use to test up to four times per day 100 Each 12     insulin NPH (NOVOLIN N RELION) 100 UNIT/ML vial INJECT 5 UNITS SUBCUTANEOUSLY WITH BREAKFAST AND 15 UNITS WITH SUPPER 3 vial 3     insulin regular (NOVOLIN R RELION) 100 UNIT/ML vial Inject 5 units with breakfast and 2 units with dinner if blood sugars over 200 (when mixing with NPH, draw this insulin up first) 30 mL 1     ipratropium - albuterol 0.5 mg/2.5 mg/3 mL (DUONEB) 0.5-2.5 (3) MG/3ML " neb solution NEBULIZE CONTENTS OF ONE VIAL BY MOUTH EVERY 4 HOURS AS NEEDED FOR SHORTNESS OF BREATH OR DYSPNEA 270 mL 8     losartan (COZAAR) 100 MG tablet TAKE ONE TABLET BY MOUTH ONCE DAILY FOR HYPERTENSION 90 tablet 0     metFORMIN (GLUCOPHAGE) 1000 MG tablet TAKE ONE TABLET BY MOUTH TWICE A DAY WITH BREAKFAST AND DINNER 180 tablet 1     ONETOUCH ULTRA test strip USE TO TEST BLOOD SUGAR FOUR TIMES A DAY OR AS DIRECTED 125 each 0     pantoprazole (PROTONIX) 40 MG enteric coated tablet Take 40 mg by mouth daily Started by Dr. Debbie Rico at [facility]       primidone (MYSOLINE) 50 MG tablet 3 tablets 2 times a day and 4 tablets at the bedtime. 900 tablet 1     Probiotic Product (FLORAJEN3) CAPS Take 1 capsule by mouth 2 times daily 60 capsule 0     propafenone (RYTHMOL) 150 MG TABS tablet Take 1 tablet (150 mg) by mouth 2 times daily 180 tablet 3     propranolol (INDERAL) 40 MG tablet TAKE TWO TO THREE TABLETS BY MOUTH TWO TIMES A  tablet 8     Respiratory Therapy Supplies (NEBULIZER/TUBING/MOUTHPIECE) KIT Use to nebulize medications for COPD 1 kit 0     simvastatin (ZOCOR) 80 MG tablet TAKE ONE-HALF TABLET BY MOUTH AT BEDTIME 90 tablet 0     sulfaSALAzine (AZULFIDINE) 500 MG tablet TAKE ONE TABLET BY MOUTH THREE TIMES A DAY 90 tablet 11     tamsulosin (FLOMAX) 0.4 MG capsule TAKE ONE CAPSULE BY MOUTH EVERY DAY 90 capsule 1     tiotropium-olodaterol (STIOLTO RESPIMAT) 2.5-2.5 MCG/ACT AERS Inhale 2 puffs into the lungs daily       warfarin (COUMADIN) 5 MG tablet Take as directed by Coumadin clinic-- 10 mg on Mon & Th / Take 7.5 mg all other days 150 tablet 2     ASPIRIN NOT PRESCRIBED, INTENTIONAL, Reported on 5/17/2017       ipratropium - albuterol 0.5 mg/2.5 mg/3 mL (DUONEB) 0.5-2.5 (3) MG/3ML neb solution NEBULIZE CONTENTS OF ONE VIAL EVERY 4 HOURS AS NEEDED FOR SHORTNESS OF BREATH OR DYSPNEA 360 mL 3       ALLERGIES     Allergies   Allergen Reactions     Percodan [Oxycodone-Aspirin] Nausea and Vomiting  "      PAST MEDICAL HISTORY:  Past Medical History:   Diagnosis Date     Allergic rhinitis, cause unspecified      Atrial fibrillation (H)      BPH (benign prostatic hyperplasia)      CAD (coronary artery disease)      Chronic airway obstruction, not elsewhere classified      Hyperlipidemia LDL goal <100 5/9/2010     Hypertension goal BP (blood pressure) < 130/80 10/19/2006     Obesity (BMI 30-39.9)      Perforation of tympanic membrane, unspecified     Right TM     Tachycardia, unspecified      Type 2 diabetes, HbA1C goal < 8% (H) 5/2/2010     Unspecified cardiovascular disease        PAST SURGICAL HISTORY:  Past Surgical History:   Procedure Laterality Date     CARDIAC SURGERY       COLONOSCOPY  3/31/2011     COLONOSCOPY N/A 5/25/2018    Procedure: COMBINED COLONOSCOPY, SINGLE OR MULTIPLE BIOPSY/POLYPECTOMY BY BIOPSY;;  Surgeon: Juan Simon DO;  Location:  GI     CORONARY ANGIOGRAPHY ADULT ORDER  2001 and 2008 2001 at [facility]. 2008- No interventions     HC REMOVAL OF NAIL PLATE SIMPLE SINGLE  11/10/2011    Right 2nd Toenail     HERNIA REPAIR      left inguinal     SURGICAL HISTORY OF -   1987    right ear graft     SURGICAL HISTORY OF -   1992    right shoulder surgery     SURGICAL HISTORY OF -   1979    back surgery     SURGICAL HISTORY OF -   1978    vasectomy       FAMILY HISTORY:  Family History   Problem Relation Age of Onset     Circulatory Mother         blood clot in leg     Diabetes Mother         type 2     Allergies Mother      Arthritis Mother      Gastrointestinal Disease Mother      Neurologic Disorder Mother         Familiar tremors     Neurologic Disorder Father         brain tumor     Cerebrovascular Disease Paternal Grandfather      Hypertension Brother      Hypertension Sister      Diabetes Sister         Type 2     Allergies Sister      Lipids Brother      Lipids Sister      Neurologic Disorder Sister         \"     Neurologic Disorder Sister         \"     Neurologic Disorder Sister  " "       \"       SOCIAL HISTORY:  Social History     Socioeconomic History     Marital status:      Spouse name: Izabel     Number of children: 2     Years of education: 12     Highest education level: None   Occupational History     Occupation:      Employer: RETIRED   Social Needs     Financial resource strain: None     Food insecurity:     Worry: None     Inability: None     Transportation needs:     Medical: None     Non-medical: None   Tobacco Use     Smoking status: Former Smoker     Packs/day: 1.00     Years: 30.00     Pack years: 30.00     Types: Cigarettes     Last attempt to quit: 3/19/1992     Years since quittin.2     Smokeless tobacco: Never Used   Substance and Sexual Activity     Alcohol use: Yes     Alcohol/week: 0.0 oz     Comment: hardly at all, sometimes at dinner     Drug use: No     Sexual activity: Yes     Partners: Female   Lifestyle     Physical activity:     Days per week: None     Minutes per session: None     Stress: None   Relationships     Social connections:     Talks on phone: None     Gets together: None     Attends Bahai service: None     Active member of club or organization: None     Attends meetings of clubs or organizations: None     Relationship status: None     Intimate partner violence:     Fear of current or ex partner: None     Emotionally abused: None     Physically abused: None     Forced sexual activity: None   Other Topics Concern     Parent/sibling w/ CABG, MI or angioplasty before 65F 55M? No      Service Not Asked     Blood Transfusions Not Asked     Caffeine Concern No     Comment: No Caffeine     Occupational Exposure Not Asked     Hobby Hazards Not Asked     Sleep Concern Not Asked     Stress Concern Not Asked     Weight Concern Not Asked     Special Diet Not Asked     Back Care Not Asked     Exercise Yes     Comment: 20 minutes - Walking- summer 2 x day     Bike Helmet Not Asked     Seat Belt Not Asked     " "Self-Exams Not Asked   Social History Narrative     None       Review of Systems:  Skin:  Negative for     Eyes:  Positive for glasses  ENT:  Positive for hearing loss  Respiratory:  Positive for dyspnea on exertion;wheezing  Cardiovascular:  Negative    Gastroenterology: Negative for    Genitourinary:  not assessed    Musculoskeletal:  Positive for back pain  Neurologic:  Negative for    Psychiatric:  Negative for    Heme/Lymph/Imm:  Positive for allergies  Endocrine:  Positive for diabetes    Physical Exam:  Vitals: /58   Pulse 80   Ht 1.727 m (5' 8\")   Wt 90.7 kg (200 lb)   BMI 30.41 kg/m       Recent Lab Results:  LIPID RESULTS:  Lab Results   Component Value Date    CHOL 163 05/01/2018    HDL 42 05/01/2018    LDL 89 05/01/2018    TRIG 159 (H) 05/01/2018    CHOLHDLRATIO 3.2 05/12/2015       LIVER ENZYME RESULTS:  Lab Results   Component Value Date    AST 20 03/13/2017    ALT 22 03/13/2017       CBC RESULTS:  Lab Results   Component Value Date    WBC 7.9 03/13/2017    RBC 4.11 (L) 03/13/2017    HGB 12.4 (L) 03/13/2017    HCT 39.4 (L) 03/13/2017    MCV 96 03/13/2017    MCH 30.2 03/13/2017    MCHC 31.5 03/13/2017    RDW 13.9 03/13/2017     03/13/2017       BMP RESULTS:  Lab Results   Component Value Date     05/01/2018    POTASSIUM 4.2 05/01/2018    CHLORIDE 103 05/01/2018    CO2 26 05/01/2018    ANIONGAP 10 05/01/2018     (H) 05/01/2018    BUN 13 05/01/2018    CR 0.77 05/01/2018    GFRESTIMATED >90 05/01/2018    GFRESTBLACK >90 05/01/2018    LEBRON 8.8 05/01/2018        A1C RESULTS:  Lab Results   Component Value Date    A1C 5.3 03/11/2019       INR RESULTS:  Lab Results   Component Value Date    INR 3.2 (A) 06/11/2019    INR 2.5 (A) 05/14/2019    INR 3.20 (H) 05/01/2018    INR 2.4 04/16/2018           CC  Natanael Dang MD  [address]  [place] [id number]                    Thank you for allowing me to participate in the care of your patient.      Sincerely,     Daniel" MD Cara     [facility] [place]    cc:   Natanael Dang MD  [address]  [place] [address]

## 2019-06-24 ENCOUNTER — CARE COORDINATION (OUTPATIENT)
Dept: CARDIOLOGY | Facility: CLINIC | Age: 74
End: 2019-06-24

## 2019-06-24 DIAGNOSIS — I10 HYPERTENSION GOAL BP (BLOOD PRESSURE) < 140/90: ICD-10-CM

## 2019-06-24 NOTE — PROGRESS NOTES
Pt left message asking if he needs to hold blood thinner for stress test. I left message for pt to let him know he doesn't need to hold. Po ARANDA June 24, 2019, 10:40 AM

## 2019-06-24 NOTE — TELEPHONE ENCOUNTER
"Requested Prescriptions   Pending Prescriptions Disp Refills     losartan (COZAAR) 100 MG tablet [Pharmacy Med Name: LOSARTAN POTASSIUM 100MG TABS] 90 tablet 0     Sig: TAKE ONE TABLET BY MOUTH ONCE DAILY FOR HYPERTENSION       Angiotensin-II Receptors Failed - 6/24/2019  9:44 AM        Failed - Recent (12 mo) or future (30 days) visit within the authorizing provider's specialty     Patient had office visit in the last 12 months or has a visit in the next 30 days with authorizing provider or within the authorizing provider's specialty.  See \"Patient Info\" tab in inbasket, or \"Choose Columns\" in Meds & Orders section of the refill encounter.              Failed - Normal serum creatinine on file in past 12 months     Recent Labs   Lab Test 05/01/18  0736  01/09/17  1432   CR 0.77   < >  --    CREAT  --   --  0.8    < > = values in this interval not displayed.             Failed - Normal serum potassium on file in past 12 months     Recent Labs   Lab Test 05/01/18  0736   POTASSIUM 4.2                    Passed - Blood pressure under 140/90 in past 12 months     BP Readings from Last 3 Encounters:   06/21/19 120/58   03/11/19 136/60   03/06/19 138/78                 Passed - Medication is active on med list        Passed - Patient is age 18 or older          "

## 2019-06-25 ENCOUNTER — ANTICOAGULATION THERAPY VISIT (OUTPATIENT)
Dept: NURSING | Facility: CLINIC | Age: 74
End: 2019-06-25
Payer: COMMERCIAL

## 2019-06-25 ENCOUNTER — CARE COORDINATION (OUTPATIENT)
Dept: CARDIOLOGY | Facility: CLINIC | Age: 74
End: 2019-06-25

## 2019-06-25 DIAGNOSIS — I48.91 ATRIAL FIBRILLATION, UNSPECIFIED TYPE (H): ICD-10-CM

## 2019-06-25 DIAGNOSIS — Z79.01 LONG TERM CURRENT USE OF ANTICOAGULANT THERAPY: ICD-10-CM

## 2019-06-25 LAB — INR POINT OF CARE: 3.6 (ref 0.86–1.14)

## 2019-06-25 PROCEDURE — 99207 ZZC NO CHARGE NURSE ONLY: CPT

## 2019-06-25 PROCEDURE — 36416 COLLJ CAPILLARY BLOOD SPEC: CPT

## 2019-06-25 PROCEDURE — 85610 PROTHROMBIN TIME: CPT | Mod: QW

## 2019-06-25 RX ORDER — LOSARTAN POTASSIUM 100 MG/1
TABLET ORAL
Qty: 30 TABLET | Refills: 0 | Status: SHIPPED | OUTPATIENT
Start: 2019-06-25 | End: 2019-07-18

## 2019-06-25 NOTE — TELEPHONE ENCOUNTER
Routing refill request to provider for review/approval because:  Labs not current:    Potassium   Date Value Ref Range Status   05/01/2018 4.2 3.4 - 5.3 mmol/L Final     BP Readings from Last 3 Encounters:   06/21/19 120/58   03/11/19 136/60   03/06/19 138/78

## 2019-06-25 NOTE — TELEPHONE ENCOUNTER
Medication is being filled for 1 time refill only due to:  Patient needs to be seen because it has been more than one year since last visit.     Signed Prescriptions:                        Disp   Refills    losartan (COZAAR) 100 MG tablet            30 tab*0        Sig: TAKE ONE TABLET BY MOUTH ONCE DAILY FOR HYPERTENSION  Authorizing Provider: AGNES FERNANDEZ  Ordering User: JARVIS SALAZAR       Reception - has patient changed PCP's? - one month due for office visit with  provider he sees or needs to discuss future refills with current PCP listed

## 2019-06-25 NOTE — PROGRESS NOTES
Pt called to find out if he needs to hold his propanolol for lexiscan nuclear stress test tomorrow. I reviewed order and pt does not need to hold. Pt updated and had no further questions. Po ARANDA June 25, 2019, 2:53 PM

## 2019-06-25 NOTE — PROGRESS NOTES
ANTICOAGULATION FOLLOW-UP CLINIC VISIT    Patient Name:  Cayetano Lunsford  Date:  6/25/2019  Contact Type:  Face to Face    SUBJECTIVE:  Patient Findings     Positives:   Upcoming invasive procedure (Cardiac stress test scheduled for 06/26 and cardiac echo on 07/01)    Comments:   Patient denies any identifiable changes that caused the supra-therapeutic INR.   2nd supra INR so will continue to monitor closely.        Clinical Outcomes     Negatives:   Major bleeding event, Thromboembolic event, Anticoagulation-related hospital admission, Anticoagulation-related ED visit, Anticoagulation-related fatality    Comments:   Patient denies any identifiable changes that caused the supra-therapeutic INR.   2nd supra INR so will continue to monitor closely.           OBJECTIVE    INR Protime   Date Value Ref Range Status   06/25/2019 3.6 (A) 0.86 - 1.14 Final       ASSESSMENT / PLAN  INR assessment SUPRA    Recheck INR In: 2 WEEKS    INR Location Clinic      Anticoagulation Summary  As of 6/25/2019    INR goal:   2.0-3.0   TTR:   71.4 % (3.2 y)   INR used for dosing:   3.6! (6/25/2019)   Warfarin maintenance plan:   10 mg (5 mg x 2) every Mon, Thu; 7.5 mg (5 mg x 1.5) all other days   Full warfarin instructions:   6/25: 5 mg; Otherwise 10 mg every Mon, Thu; 7.5 mg all other days   Weekly warfarin total:   57.5 mg   Plan last modified:   Kirstin Meléndez RN (7/16/2018)   Next INR check:   7/9/2019   Priority:   INR   Target end date:       Indications    Long-term (current) use of anticoagulants [Z79.01] [Z79.01]  Atrial fibrillation (H) [I48.91]             Anticoagulation Episode Summary     INR check location:       Preferred lab:       Send INR reminders to:   [employer]    Comments:    Prefers AVS printed.       Anticoagulation Care Providers     Provider Role Specialty Phone number    Dmitri Andres MD [facility] [phone]            See the Encounter Report to view  Anticoagulation Flowsheet and Dosing Calendar (Go to Encounters tab in chart review, and find the Anticoagulation Therapy Visit)        Tatyana Akers RN

## 2019-06-26 ENCOUNTER — HOSPITAL ENCOUNTER (OUTPATIENT)
Dept: NUCLEAR MEDICINE | Facility: CLINIC | Age: 74
Setting detail: NUCLEAR MEDICINE
End: 2019-06-26
Attending: INTERNAL MEDICINE
Payer: COMMERCIAL

## 2019-06-26 ENCOUNTER — HOSPITAL ENCOUNTER (OUTPATIENT)
Dept: CARDIOLOGY | Facility: CLINIC | Age: 74
Discharge: HOME | End: 2019-06-26
Attending: INTERNAL MEDICINE | Admitting: INTERNAL MEDICINE
Payer: COMMERCIAL

## 2019-06-26 VITALS — SYSTOLIC BLOOD PRESSURE: 158 MMHG | DIASTOLIC BLOOD PRESSURE: 78 MMHG

## 2019-06-26 DIAGNOSIS — I48.0 PAROXYSMAL ATRIAL FIBRILLATION (H): ICD-10-CM

## 2019-06-26 DIAGNOSIS — I10 HYPERTENSION GOAL BP (BLOOD PRESSURE) < 140/90: ICD-10-CM

## 2019-06-26 DIAGNOSIS — I25.10 CAD IN NATIVE ARTERY: ICD-10-CM

## 2019-06-26 DIAGNOSIS — I48.91 ATRIAL FIBRILLATION, UNSPECIFIED TYPE (H): ICD-10-CM

## 2019-06-26 DIAGNOSIS — E78.5 HYPERLIPIDEMIA LDL GOAL <100: ICD-10-CM

## 2019-06-26 PROCEDURE — 78452 HT MUSCLE IMAGE SPECT MULT: CPT

## 2019-06-26 PROCEDURE — 34300033 ZZH RX 343: Performed by: INTERNAL MEDICINE

## 2019-06-26 PROCEDURE — 93017 CV STRESS TEST TRACING ONLY: CPT

## 2019-06-26 PROCEDURE — A9502 TC99M TETROFOSMIN: HCPCS | Performed by: INTERNAL MEDICINE

## 2019-06-26 PROCEDURE — 93018 CV STRESS TEST I&R ONLY: CPT | Performed by: INTERNAL MEDICINE

## 2019-06-26 PROCEDURE — 93016 CV STRESS TEST SUPVJ ONLY: CPT | Performed by: INTERNAL MEDICINE

## 2019-06-26 PROCEDURE — 25000128 H RX IP 250 OP 636: Performed by: INTERNAL MEDICINE

## 2019-06-26 PROCEDURE — 78452 HT MUSCLE IMAGE SPECT MULT: CPT | Mod: 26 | Performed by: INTERNAL MEDICINE

## 2019-06-26 RX ORDER — REGADENOSON 0.08 MG/ML
0.4 INJECTION, SOLUTION INTRAVENOUS ONCE
Status: COMPLETED | OUTPATIENT
Start: 2019-06-26 | End: 2019-06-26

## 2019-06-26 RX ORDER — REGADENOSON 0.08 MG/ML
INJECTION, SOLUTION INTRAVENOUS
Status: DISCONTINUED
Start: 2019-06-26 | End: 2019-06-27 | Stop reason: HOSPADM

## 2019-06-26 RX ADMIN — TETROFOSMIN 10.4 MCI.: 1.38 INJECTION, POWDER, LYOPHILIZED, FOR SOLUTION INTRAVENOUS at 12:11

## 2019-06-26 RX ADMIN — TETROFOSMIN 32 MCI.: 1.38 INJECTION, POWDER, LYOPHILIZED, FOR SOLUTION INTRAVENOUS at 13:40

## 2019-06-26 RX ADMIN — REGADENOSON 0.4 MG: 0.08 INJECTION, SOLUTION INTRAVENOUS at 13:38

## 2019-06-26 NOTE — PROGRESS NOTES
Pre-procedure:  Are you having any pain or shortness of breath (prior to starting)? denies  Initial vital signs: /78, HR 69, RR 20  Allergies reviewed: yes percodan   Rhythm: Sinus  Medications taken within 48 hours of procedure: denies   Any nitrates within the last 48 hours:no  Last Caffeine: 3 days  Lung sounds: CTA, no wheezing, crackles or rtx but dminished  Health History (COPD, Asthma, etc): copd but forgot to bring inhalder           Procedure: Lexiscan  Reaction/symptoms after receiving Vaishali injection: Shortness of breath  Intensity of Pain: none  Rhythm: sinus  1. Vital Signs:/66, HR 85, RR 20  2. Vital Signs:/68, HR 86, RR 20     Reversal agent: cola    Post:   Resolution of symptoms?: YES  Vital signs: /70, HR 83, RR 20  Rhythm: sinus  Walk: NO  Comment: baseline post  Return to Radiology

## 2019-06-26 NOTE — TELEPHONE ENCOUNTER
Patient called back stating he has switched to the [facility] and has told the pharmacy to switch all prescriptions to Dr Flowers    Patient will contact the pharmacy again to address this

## 2019-06-27 ENCOUNTER — CARE COORDINATION (OUTPATIENT)
Dept: CARDIOLOGY | Facility: CLINIC | Age: 74
End: 2019-06-27

## 2019-06-27 NOTE — PROGRESS NOTES
Call to pt to review Nuclear stress MPI results from 6/26/19. Reviewed that the report suggests no evidence of ischemia or concerning EKG findings so he can continue on propafenone (RBBB also noted on EKG done with 6/21/19 OV). Pt has Echo scheduled for 7/1/19. Will call pt with results of the Echo once received. Renetta Saldivar RN on 6/27/2019 at 2:14 PM      GATED MYOCARDIAL PERFUSION SCINTIGRAPHY WITH INTRAVENOUS PHARMACOLOGIC  VASODILATATION LEXISCAN -ONE DAY STUDY      6/26/2019 2:42 PM  CAROLANN SAUNDERS  74 years  Male  1945.  Impression  1.  Myocardial perfusion imaging using single isotope technique  demonstrated normal myocardial perfusion.   2. Gated images demonstrated normal wall motion.  The left ventricular  systolic function is normal with a calculated ejection fraction of  75%.    EKG Findings  The resting EKG demonstrated sinus rhythm with right bundle branch  block. The stress EKG demonstrated no significant ST segment changes.     Tomographic Findings  Overall, the study quality is adequate. Body mass index 30.41 . On the  stress images, no significant perfusion defects were noted. On the  rest images, myocardial perfusion appeared normal . Gated images  demonstrated normal wall motion. The left ventricular ejection  fraction was calculated to be 75%. TID was absent.     LYNN MARSHALL MD

## 2019-06-27 NOTE — PATIENT INSTRUCTIONS
Patient did miss 7.5mg on 06/23; however, he added that missed dose into 06/24 Warfarin dose so unclear why INR was supra on 06/25.    Tatyana Akers RN

## 2019-07-01 ENCOUNTER — HOSPITAL ENCOUNTER (OUTPATIENT)
Dept: CARDIOLOGY | Facility: CLINIC | Age: 74
Discharge: HOME | End: 2019-07-01
Attending: INTERNAL MEDICINE | Admitting: INTERNAL MEDICINE
Payer: COMMERCIAL

## 2019-07-01 DIAGNOSIS — I48.0 PAROXYSMAL ATRIAL FIBRILLATION (H): ICD-10-CM

## 2019-07-01 DIAGNOSIS — E78.5 HYPERLIPIDEMIA LDL GOAL <100: ICD-10-CM

## 2019-07-01 DIAGNOSIS — I48.91 ATRIAL FIBRILLATION, UNSPECIFIED TYPE (H): ICD-10-CM

## 2019-07-01 DIAGNOSIS — I10 HYPERTENSION GOAL BP (BLOOD PRESSURE) < 140/90: ICD-10-CM

## 2019-07-01 DIAGNOSIS — I25.10 CAD IN NATIVE ARTERY: ICD-10-CM

## 2019-07-01 PROCEDURE — 93306 TTE W/DOPPLER COMPLETE: CPT | Mod: 26 | Performed by: INTERNAL MEDICINE

## 2019-07-01 PROCEDURE — 25500064 ZZH RX 255 OP 636: Performed by: INTERNAL MEDICINE

## 2019-07-01 PROCEDURE — 40000264 ECHOCARDIOGRAM COMPLETE

## 2019-07-01 RX ADMIN — HUMAN ALBUMIN MICROSPHERES AND PERFLUTREN 3 ML: 10; .22 INJECTION, SOLUTION INTRAVENOUS at 13:04

## 2019-07-09 ENCOUNTER — ANTICOAGULATION THERAPY VISIT (OUTPATIENT)
Dept: NURSING | Facility: CLINIC | Age: 74
End: 2019-07-09
Payer: COMMERCIAL

## 2019-07-09 DIAGNOSIS — Z79.01 LONG TERM CURRENT USE OF ANTICOAGULANT THERAPY: ICD-10-CM

## 2019-07-09 DIAGNOSIS — I48.91 ATRIAL FIBRILLATION, UNSPECIFIED TYPE (H): ICD-10-CM

## 2019-07-09 LAB — INR POINT OF CARE: 2.7 (ref 0.86–1.14)

## 2019-07-09 PROCEDURE — 99207 ZZC NO CHARGE NURSE ONLY: CPT

## 2019-07-09 PROCEDURE — 85610 PROTHROMBIN TIME: CPT | Mod: QW

## 2019-07-09 PROCEDURE — 36416 COLLJ CAPILLARY BLOOD SPEC: CPT

## 2019-07-09 NOTE — PROGRESS NOTES
ANTICOAGULATION FOLLOW-UP CLINIC VISIT    Patient Name:  Cayetano Lunsford  Date:  2019  Contact Type:  Face to Face    SUBJECTIVE:  Patient Findings     Positives:   Change in health (Pt states cardiac echo and stress test results were normal.), Change in diet/appetite (Eating greens 3 times per week.)        Clinical Outcomes     Negatives:   Major bleeding event, Thromboembolic event, Anticoagulation-related hospital admission, Anticoagulation-related ED visit, Anticoagulation-related fatality           OBJECTIVE    INR Protime   Date Value Ref Range Status   2019 2.7 (A) 0.86 - 1.14 Final       ASSESSMENT / PLAN  INR assessment THER    Recheck INR In: 3 WEEKS    INR Location Clinic      Anticoagulation Summary  As of 2019    INR goal:   2.0-3.0   TTR:   71.0 % (3.2 y)   INR used for dosin.7 (2019)   Warfarin maintenance plan:   10 mg (5 mg x 2) every Mon, Thu; 7.5 mg (5 mg x 1.5) all other days   Full warfarin instructions:   10 mg every Mon, Thu; 7.5 mg all other days   Weekly warfarin total:   57.5 mg   No change documented:   Tatyana Akers RN   Plan last modified:   Kirstin Meléndez RN (2018)   Next INR check:   2019   Priority:   INR   Target end date:       Indications    Long-term (current) use of anticoagulants [Z79.01] [Z79.01]  Atrial fibrillation (H) [I48.91]             Anticoagulation Episode Summary     INR check location:       Preferred lab:       Send INR reminders to:   [facility]    Comments:    Prefers AVS printed.       Anticoagulation Care Providers     Provider Role Specialty Phone number    Dmitri Andres MD [facility] [phone]            See the Encounter Report to view Anticoagulation Flowsheet and Dosing Calendar (Go to Encounters tab in chart review, and find the Anticoagulation Therapy Visit)        Tatyana Akers RN

## 2019-07-18 ENCOUNTER — OFFICE VISIT (OUTPATIENT)
Dept: FAMILY MEDICINE | Facility: CLINIC | Age: 74
End: 2019-07-18
Payer: COMMERCIAL

## 2019-07-18 VITALS
TEMPERATURE: 97.6 F | SYSTOLIC BLOOD PRESSURE: 134 MMHG | HEART RATE: 76 BPM | RESPIRATION RATE: 18 BRPM | DIASTOLIC BLOOD PRESSURE: 62 MMHG | WEIGHT: 199 LBS | BODY MASS INDEX: 30.26 KG/M2

## 2019-07-18 DIAGNOSIS — I10 HYPERTENSION GOAL BP (BLOOD PRESSURE) < 140/90: ICD-10-CM

## 2019-07-18 DIAGNOSIS — E11.9 TYPE 2 DIABETES, HBA1C GOAL < 7% (H): Primary | ICD-10-CM

## 2019-07-18 DIAGNOSIS — M20.11 HALLUX VALGUS, ACQUIRED, RIGHT: ICD-10-CM

## 2019-07-18 PROCEDURE — 99214 OFFICE O/P EST MOD 30 MIN: CPT | Performed by: FAMILY MEDICINE

## 2019-07-18 RX ORDER — LOSARTAN POTASSIUM 100 MG/1
TABLET ORAL
Qty: 90 TABLET | Refills: 1 | Status: SHIPPED | OUTPATIENT
Start: 2019-07-18 | End: 2020-01-15

## 2019-07-18 NOTE — PROGRESS NOTES
Subjective     Cayetano Lunsford is a 74 year old male who presents to clinic today for the following health issues:    HPI   Long history cardiac issues, he follows with our Consulting Pharmacist and has done well with his Diabetes Melllitus  Diabetes Follow-up  His insulin needs vary 4 R and 4 N am then 12 N  In the evening  Plus R for correction only     How often are you checking your blood sugar? Three times daily    What time of day are you checking your blood sugars (select all that apply)?  Before meals and at bed time    Have you had any blood sugars above 200?  No    Have you had any blood sugars below 70?  No    What symptoms do you notice when your blood sugar is low?  Shaky and Dizzy at times-rare    What concerns do you have today about your diabetes? None     Do you have any of these symptoms? (Select all that apply)  No numbness or tingling in feet.  No redness, sores or blisters on feet.  No complaints of excessive thirst.  No reports of blurry vision.  No significant changes to weight.     Have you had a diabetic eye exam in the last 12 months? Yes- Date of last eye exam: 08/2018    BP Readings from Last 2 Encounters:   07/18/19 134/62   06/26/19 158/78     Hemoglobin A1C (%)   Date Value   03/11/2019 5.3   10/31/2018 5.5     LDL Cholesterol Calculated (mg/dL)   Date Value   05/01/2018 89   05/08/2017 83       Diabetes Management Resources    Hypertension Follow-up      Do you check your blood pressure regularly outside of the clinic? No     Are you following a low salt diet? Yes    Are your blood pressures ever more than 140 on the top number (systolic) OR more   than 90 on the bottom number (diastolic), for example 140/90? unknown    BP Readings from Last 3 Encounters:   07/18/19 134/62   06/26/19 158/78   06/21/19 120/58    Wt Readings from Last 3 Encounters:   07/18/19 90.3 kg (199 lb)   06/21/19 90.7 kg (200 lb)   03/11/19 89.1 kg (196 lb 8 oz)            Past Medical History:   Diagnosis Date  "    Allergic rhinitis, cause unspecified      Atrial fibrillation (H)      BPH (benign prostatic hyperplasia)      CAD (coronary artery disease)      Chronic airway obstruction, not elsewhere classified      Hyperlipidemia LDL goal <100 5/9/2010     Hypertension goal BP (blood pressure) < 130/80 10/19/2006     Obesity (BMI 30-39.9)      Perforation of tympanic membrane, unspecified     Right TM     Tachycardia, unspecified      Type 2 diabetes, HbA1C goal < 8% (H) 5/2/2010     Unspecified cardiovascular disease        Past Surgical History:   Procedure Laterality Date     CARDIAC SURGERY       COLONOSCOPY  3/31/2011     COLONOSCOPY N/A 5/25/2018    Procedure: COMBINED COLONOSCOPY, SINGLE OR MULTIPLE BIOPSY/POLYPECTOMY BY BIOPSY;;  Surgeon: Juan Simon DO;  Location:  GI     CORONARY ANGIOGRAPHY ADULT ORDER  2001 and 2008 2001 at [facility]. 2008- No interventions     HC REMOVAL OF NAIL PLATE SIMPLE SINGLE  11/10/2011    Right 2nd Toenail     HERNIA REPAIR      left inguinal     SURGICAL HISTORY OF -   1987    right ear graft     SURGICAL HISTORY OF -   1992    right shoulder surgery     SURGICAL HISTORY OF -   1979    back surgery     SURGICAL HISTORY OF -   1978    vasectomy       Family History   Problem Relation Age of Onset     Circulatory Mother         blood clot in leg     Diabetes Mother         type 2     Allergies Mother      Arthritis Mother      Gastrointestinal Disease Mother      Neurologic Disorder Mother         Familiar tremors     Neurologic Disorder Father         brain tumor     Cerebrovascular Disease Paternal Grandfather      Hypertension Brother      Hypertension Sister      Diabetes Sister         Type 2     Allergies Sister      Lipids Brother      Lipids Sister      Neurologic Disorder Sister         \"     Neurologic Disorder Sister         \"     Neurologic Disorder Sister         \"       Social History     Tobacco Use     Smoking status: Former Smoker     Packs/day: 1.00     " Years: 30.00     Pack years: 30.00     Types: Cigarettes     Last attempt to quit: 3/19/1992     Years since quittin.3     Smokeless tobacco: Never Used   Substance Use Topics     Alcohol use: Yes     Alcohol/week: 0.0 oz     Comment: hardly at all, sometimes at dinner               Reviewed and updated as needed this visit by Provider         Review of Systems  Had cardiac echo and stress in follow up and both were good   ROS COMP: Constitutional, HEENT, cardiovascular, pulmonary, GI, , musculoskeletal, neuro, skin, endocrine and psych systems are negative, except as otherwise noted.      Objective    /62 (BP Location: Right arm, Patient Position: Chair, Cuff Size: Adult Large)   Pulse 76   Resp 18   Wt 90.3 kg (199 lb)   BMI 30.26 kg/m    Body mass index is 30.26 kg/m .  Physical Exam   GENERAL: healthy, alert and no distress  EYES: Eyes grossly normal to inspection, PERRL and conjunctivae and sclerae normal  HENT: ear canals and TM's normal, nose and mouth without ulcers or lesions  NECK: no adenopathy, no asymmetry, masses, or scars and thyroid normal to palpation  RESP: lungs clear to auscultation - no rales, rhonchi or wheezes  CV: regular rate and rhythm, normal S1 S2, no S3 or S4, no murmur, click or rub, no peripheral edema and peripheral pulses strong  ABDOMEN: soft, nontender, no hepatosplenomegaly, no masses and bowel sounds normal  MS: no gross musculoskeletal defects noted, no edema  SKIN: no suspicious lesions or rashes  NEURO: Normal strength and tone, mentation intact and speech normal  PSYCH: mentation appears normal, affect normal/bright    Diagnostic Test Results:  Labs reviewed in Epic        Assessment & Plan     (E11.9) Type 2 diabetes, HbA1c goal < 7% (H)  (primary encounter diagnosis)  Comment:   Plan: Comprehensive metabolic panel, Hemoglobin A1c            (I10) Hypertension goal BP (blood pressure) < 140/90  Comment:   Plan: losartan (COZAAR) 100 MG tablet, Comprehensive  "        metabolic panel, Hemoglobin A1c, Lipid panel         reflex to direct LDL Fasting            (M20.11) Hallux valgus, acquired, right  Comment:   Plan: PODIATRY/FOOT & ANKLE SURGERY REFERRAL        Longstanding , bilateral, wonders about options, pain isn't disabling as of yet         BMI:   Estimated body mass index is 30.26 kg/m  as calculated from the following:    Height as of 6/21/19: 1.727 m (5' 8\").    Weight as of this encounter: 90.3 kg (199 lb).     .      Work on weight loss, see Podiatry consider options        Dmitri Andres MD  [facility]    "

## 2019-07-23 NOTE — ADDENDUM NOTE
Addended by: CASSANDRA TREJO on: 6/8/2018 02:02 PM     Modules accepted: Orders     Patient was notified that their INR result was 1.8 on .  Provided instructions to take 2 mg that evening.  Will continue 2mg po qhs.  Recheck INR 7-25.   Patient was instructed to contact us with any unusual bleeding, bruising, any changes in medications, diet or health status and if there are any other questions or concerns.   Patient verbalized understanding of above.  Anticoagulation Summary  As of 2019    INR goal:   2.0-3.0   TTR:   50.0 % (1 d)   INR used for dosin.8! (2019)   Warfarin maintenance plan:   2 mg (2 mg x 1) every day   Weekly warfarin total:   14 mg   Plan last modified:   Adeola Coelho RN (2019)   Next INR check:   2019   Target end date:   2019         Anticoagulation Episode Summary     INR check location:       Preferred lab:       Send INR reminders to:   [facility] N[facility]SE MESSAGE POOL    Comments:

## 2019-07-26 ENCOUNTER — TRANSFERRED RECORDS (OUTPATIENT)
Dept: HEALTH INFORMATION MANAGEMENT | Facility: CLINIC | Age: 74
End: 2019-07-26

## 2019-07-29 DIAGNOSIS — I48.0 PAROXYSMAL ATRIAL FIBRILLATION (H): ICD-10-CM

## 2019-07-29 RX ORDER — PROPAFENONE HYDROCHLORIDE 150 MG/1
150 TABLET, COATED ORAL 2 TIMES DAILY
Qty: 180 TABLET | Refills: 3 | Status: SHIPPED | OUTPATIENT
Start: 2019-07-29 | End: 2020-07-16

## 2019-07-30 ENCOUNTER — ANTICOAGULATION THERAPY VISIT (OUTPATIENT)
Dept: NURSING | Facility: CLINIC | Age: 74
End: 2019-07-30
Payer: COMMERCIAL

## 2019-07-30 DIAGNOSIS — Z79.01 LONG TERM CURRENT USE OF ANTICOAGULANT THERAPY: ICD-10-CM

## 2019-07-30 DIAGNOSIS — I48.91 ATRIAL FIBRILLATION, UNSPECIFIED TYPE (H): ICD-10-CM

## 2019-07-30 LAB — INR POINT OF CARE: 3 (ref 0.86–1.14)

## 2019-07-30 PROCEDURE — 36416 COLLJ CAPILLARY BLOOD SPEC: CPT

## 2019-07-30 PROCEDURE — 99207 ZZC NO CHARGE NURSE ONLY: CPT

## 2019-07-30 PROCEDURE — 85610 PROTHROMBIN TIME: CPT | Mod: QW

## 2019-07-30 NOTE — PROGRESS NOTES
ANTICOAGULATION FOLLOW-UP CLINIC VISIT    Patient Name:  Cayetano Lunsford  Date:  7/30/2019  Contact Type:  Face to Face    SUBJECTIVE:  Patient Findings     Positives:   Change in diet/appetite (Less greens while traveling over the weekend.  Pt trying to eat 3 servings per week.)        Clinical Outcomes     Negatives:   Major bleeding event, Thromboembolic event, Anticoagulation-related hospital admission, Anticoagulation-related ED visit, Anticoagulation-related fatality           OBJECTIVE    INR Protime   Date Value Ref Range Status   07/30/2019 3.0 (A) 0.86 - 1.14 Final       ASSESSMENT / PLAN  INR assessment THER    Recheck INR In: 4 WEEKS    INR Location Clinic      Anticoagulation Summary  As of 7/30/2019    INR goal:   2.0-3.0   TTR:   71.5 % (3.3 y)   INR used for dosing:   3.0 (7/30/2019)   Warfarin maintenance plan:   10 mg (5 mg x 2) every Mon, Thu; 7.5 mg (5 mg x 1.5) all other days   Full warfarin instructions:   10 mg every Mon, Thu; 7.5 mg all other days   Weekly warfarin total:   57.5 mg   No change documented:   Tatyana Akers RN   Plan last modified:   Kirstin Meléndez RN (7/16/2018)   Next INR check:   8/27/2019   Priority:   INR   Target end date:       Indications    Long-term (current) use of anticoagulants [Z79.01] [Z79.01]  Atrial fibrillation (H) [I48.91]             Anticoagulation Episode Summary     INR check location:       Preferred lab:       Send INR reminders to:   [facility]    Comments:    Prefers AVS printed.       Anticoagulation Care Providers     Provider Role Specialty Phone number    Dmitri Andres MD [facility] [phone]            See the Encounter Report to view Anticoagulation Flowsheet and Dosing Calendar (Go to Encounters tab in chart review, and find the Anticoagulation Therapy Visit)        Tatyana Akers RN

## 2019-08-06 ENCOUNTER — ANCILLARY PROCEDURE (OUTPATIENT)
Dept: GENERAL RADIOLOGY | Facility: CLINIC | Age: 74
End: 2019-08-06
Attending: PODIATRIST
Payer: COMMERCIAL

## 2019-08-06 ENCOUNTER — OFFICE VISIT (OUTPATIENT)
Dept: PODIATRY | Facility: CLINIC | Age: 74
End: 2019-08-06
Payer: COMMERCIAL

## 2019-08-06 VITALS
WEIGHT: 199 LBS | DIASTOLIC BLOOD PRESSURE: 66 MMHG | HEIGHT: 68 IN | BODY MASS INDEX: 30.16 KG/M2 | SYSTOLIC BLOOD PRESSURE: 144 MMHG

## 2019-08-06 DIAGNOSIS — M21.42 PES PLANUS OF BOTH FEET: ICD-10-CM

## 2019-08-06 DIAGNOSIS — M20.11 HAV (HALLUX ABDUCTO VALGUS), RIGHT: ICD-10-CM

## 2019-08-06 DIAGNOSIS — M79.671 BILATERAL FOOT PAIN: ICD-10-CM

## 2019-08-06 DIAGNOSIS — M20.12 HAV (HALLUX ABDUCTO VALGUS), LEFT: ICD-10-CM

## 2019-08-06 DIAGNOSIS — M79.672 BILATERAL FOOT PAIN: ICD-10-CM

## 2019-08-06 DIAGNOSIS — M79.671 BILATERAL FOOT PAIN: Primary | ICD-10-CM

## 2019-08-06 DIAGNOSIS — M79.672 BILATERAL FOOT PAIN: Primary | ICD-10-CM

## 2019-08-06 DIAGNOSIS — M21.41 PES PLANUS OF BOTH FEET: ICD-10-CM

## 2019-08-06 PROCEDURE — 99203 OFFICE O/P NEW LOW 30 MIN: CPT | Performed by: PODIATRIST

## 2019-08-06 PROCEDURE — 73630 X-RAY EXAM OF FOOT: CPT | Mod: LT

## 2019-08-06 ASSESSMENT — MIFFLIN-ST. JEOR: SCORE: 1612.16

## 2019-08-06 NOTE — PROGRESS NOTES
PATIENT HISTORY:  Dr. Andres requested I see this patient for their foot issue.  Cayetano Lunsford is a 74 year old male who presents to clinic for bunions both feet. He notes he has had them forever. Not painful unless in too tight of shoes. Wondering if he needs surgery.     Review of Systems:  Patient denies fever, chills, rash, wound, stiffness, limping, numbness, weakness, heart burn, blood in stool, chest pain with activity, calf pain when walking, shortness of breath with activity, chronic cough, easy bleeding/bruising, swelling of ankles, excessive thirst, fatigue, depression, anxiety.       PAST MEDICAL HISTORY:   Past Medical History:   Diagnosis Date     Allergic rhinitis, cause unspecified      Atrial fibrillation (H)      BPH (benign prostatic hyperplasia)      CAD (coronary artery disease)      Chronic airway obstruction, not elsewhere classified      Hyperlipidemia LDL goal <100 5/9/2010     Hypertension goal BP (blood pressure) < 130/80 10/19/2006     Obesity (BMI 30-39.9)      Perforation of tympanic membrane, unspecified     Right TM     Tachycardia, unspecified      Type 2 diabetes, HbA1C goal < 8% (H) 5/2/2010     Unspecified cardiovascular disease         PAST SURGICAL HISTORY:   Past Surgical History:   Procedure Laterality Date     CARDIAC SURGERY       COLONOSCOPY  3/31/2011     COLONOSCOPY N/A 5/25/2018    Procedure: COMBINED COLONOSCOPY, SINGLE OR MULTIPLE BIOPSY/POLYPECTOMY BY BIOPSY;;  Surgeon: Juan Siomn DO;  Location:  GI     CORONARY ANGIOGRAPHY ADULT ORDER  2001 and 2008 2001 at [facility]. 2008- No interventions     HC REMOVAL OF NAIL PLATE SIMPLE SINGLE  11/10/2011    Right 2nd Toenail     HERNIA REPAIR      left inguinal     SURGICAL HISTORY OF -   1987    right ear graft     SURGICAL HISTORY OF -   1992    right shoulder surgery     SURGICAL HISTORY OF -   1979    back surgery     SURGICAL HISTORY OF -   1978    vasectomy        MEDICATIONS:   Current Outpatient  "Medications:      albuterol (PROAIR HFA/PROVENTIL HFA/VENTOLIN HFA) 108 (90 BASE) MCG/ACT Inhaler, Inhale 1-2 puffs into the lungs every 4 hours as needed (for shortness of breath/wheezing), Disp: 1 Inhaler, Rfl: 5     ASPIRIN NOT PRESCRIBED, INTENTIONAL,, Reported on 5/17/2017, Disp: , Rfl:      B-D INSULIN SYRINGE 31G X 5/16\" 0.5 ML miscellaneous, USE 1 SYRINGE TWO TIMES A DAY OR AS DIRECTED, Disp: 100 each, Rfl: 5     BD ULTRA FINE PEN NEEDLES, Use to inject insulin twice daily., Disp: 100 each, Rfl: PRN     blood glucose (NO BRAND SPECIFIED) test strip, Use to test blood sugar 4 times daily or as directed., Disp: 120 each, Rfl: 11     folic acid 0.8 MG CAPS, Take 1 tablet by mouth daily, Disp: 90 capsule, Rfl: 3     FREESTYLE LANCETS MISC, Use to test up to four times per day, Disp: 100 Each, Rfl: 12     insulin NPH (NOVOLIN N RELION) 100 UNIT/ML vial, INJECT 5 UNITS SUBCUTANEOUSLY WITH BREAKFAST AND 15 UNITS WITH SUPPER, Disp: 3 vial, Rfl: 3     insulin regular (NOVOLIN R RELION) 100 UNIT/ML vial, Inject 5 units with breakfast and 2 units with dinner if blood sugars over 200 (when mixing with NPH, draw this insulin up first), Disp: 30 mL, Rfl: 1     ipratropium - albuterol 0.5 mg/2.5 mg/3 mL (DUONEB) 0.5-2.5 (3) MG/3ML neb solution, NEBULIZE CONTENTS OF ONE VIAL EVERY 4 HOURS AS NEEDED FOR SHORTNESS OF BREATH OR DYSPNEA, Disp: 360 mL, Rfl: 3     ipratropium - albuterol 0.5 mg/2.5 mg/3 mL (DUONEB) 0.5-2.5 (3) MG/3ML neb solution, NEBULIZE CONTENTS OF ONE VIAL BY MOUTH EVERY 4 HOURS AS NEEDED FOR SHORTNESS OF BREATH OR DYSPNEA, Disp: 270 mL, Rfl: 8     losartan (COZAAR) 100 MG tablet, TAKE ONE TABLET BY MOUTH ONCE DAILY FOR HYPERTENSION, Disp: 90 tablet, Rfl: 1     metFORMIN (GLUCOPHAGE) 1000 MG tablet, TAKE ONE TABLET BY MOUTH TWICE A DAY WITH BREAKFAST AND DINNER, Disp: 180 tablet, Rfl: 1     ONETOUCH ULTRA test strip, USE TO TEST BLOOD SUGAR FOUR TIMES A DAY OR AS DIRECTED, Disp: 125 each, Rfl: 0     " pantoprazole (PROTONIX) 40 MG enteric coated tablet, Take 40 mg by mouth daily Started by Dr. Debbie Rico at [facility], Disp: , Rfl:      primidone (MYSOLINE) 50 MG tablet, 3 tablets 2 times a day and 4 tablets at the bedtime., Disp: 900 tablet, Rfl: 1     Probiotic Product (FLORAJEN3) CAPS, Take 1 capsule by mouth 2 times daily, Disp: 60 capsule, Rfl: 0     propafenone (RYTHMOL) 150 MG TABS tablet, Take 1 tablet (150 mg) by mouth 2 times daily, Disp: 180 tablet, Rfl: 3     propranolol (INDERAL) 40 MG tablet, TAKE TWO TO THREE TABLETS BY MOUTH TWO TIMES A DAY, Disp: 180 tablet, Rfl: 8     Respiratory Therapy Supplies (NEBULIZER/TUBING/MOUTHPIECE) KIT, Use to nebulize medications for COPD, Disp: 1 kit, Rfl: 0     simvastatin (ZOCOR) 80 MG tablet, TAKE ONE-HALF TABLET BY MOUTH AT BEDTIME, Disp: 90 tablet, Rfl: 0     sulfaSALAzine (AZULFIDINE) 500 MG tablet, TAKE ONE TABLET BY MOUTH THREE TIMES A DAY, Disp: 90 tablet, Rfl: 11     tamsulosin (FLOMAX) 0.4 MG capsule, TAKE ONE CAPSULE BY MOUTH EVERY DAY, Disp: 90 capsule, Rfl: 1     tiotropium-olodaterol (STIOLTO RESPIMAT) 2.5-2.5 MCG/ACT AERS, Inhale 2 puffs into the lungs daily, Disp: , Rfl:      warfarin (COUMADIN) 5 MG tablet, Take as directed by Coumadin clinic-- 10 mg on Mon & Th / Take 7.5 mg all other days, Disp: 150 tablet, Rfl: 2     ALLERGIES:    Allergies   Allergen Reactions     Percodan [Oxycodone-Aspirin] Nausea and Vomiting        SOCIAL HISTORY:   Social History     Socioeconomic History     Marital status:      Spouse name: Izabel     Number of children: 2     Years of education: 12     Highest education level: Not on file   Occupational History     Occupation:      Employer: RETIRED   Social Needs     Financial resource strain: Not on file     Food insecurity:     Worry: Not on file     Inability: Not on file     Transportation needs:     Medical: Not on file     Non-medical: Not on file   Tobacco Use     Smoking  status: Former Smoker     Packs/day: 1.00     Years: 30.00     Pack years: 30.00     Types: Cigarettes     Last attempt to quit: 3/19/1992     Years since quittin.4     Smokeless tobacco: Never Used   Substance and Sexual Activity     Alcohol use: Yes     Alcohol/week: 0.0 oz     Comment: hardly at all, sometimes at dinner     Drug use: No     Sexual activity: Yes     Partners: Female   Lifestyle     Physical activity:     Days per week: Not on file     Minutes per session: Not on file     Stress: Not on file   Relationships     Social connections:     Talks on phone: Not on file     Gets together: Not on file     Attends [employer] service: Not on file     Active member of club or organization: Not on file     Attends meetings of clubs or organizations: Not on file     Relationship status: Not on file     Intimate partner violence:     Fear of current or ex partner: Not on file     Emotionally abused: Not on file     Physically abused: Not on file     Forced sexual activity: Not on file   Other Topics Concern     Parent/sibling w/ CABG, MI or angioplasty before 65F 55M? No      Service Not Asked     Blood Transfusions Not Asked     Caffeine Concern No     Comment: No Caffeine     Occupational Exposure Not Asked     Hobby Hazards Not Asked     Sleep Concern Not Asked     Stress Concern Not Asked     Weight Concern Not Asked     Special Diet Not Asked     Back Care Not Asked     Exercise Yes     Comment: 20 minutes - Walking- summer 2 x day     Bike Helmet Not Asked     Seat Belt Not Asked     Self-Exams Not Asked   Social History Narrative     Not on file        FAMILY HISTORY:   Family History   Problem Relation Age of Onset     Circulatory Mother         blood clot in leg     Diabetes Mother         type 2     Allergies Mother      Arthritis Mother      Gastrointestinal Disease Mother      Neurologic Disorder Mother         Familiar tremors     Neurologic Disorder Father         brain tumor      "Cerebrovascular Disease Paternal Grandfather      Hypertension Brother      Hypertension Sister      Diabetes Sister         Type 2     Allergies Sister      Lipids Brother      Lipids Sister      Neurologic Disorder Sister         \"     Neurologic Disorder Sister         \"     Neurologic Disorder Sister         \"        EXAM:Vitals: BP (!) 144/66   Ht 1.727 m (5' 8\")   Wt 90.3 kg (199 lb)   BMI 30.26 kg/m    BMI= Body mass index is 30.26 kg/m .    General appearance: Patient is alert and fully cooperative with history & exam.  No sign of distress is noted during the visit.     Psychiatric: Affect is pleasant & appropriate.  Patient appears motivated to improve health.     Respiratory: Breathing is regular & unlabored while sitting.     HEENT: Hearing is intact to spoken word.  Speech is clear.  No gross evidence of visual impairment that would impact ambulation.     Dermatologic: Skin is intact to both lower extremities without significant lesions, rash or abrasion.  No paronychia or evidence of soft tissue infection is noted.     Vascular: DP & PT pulses are intact & regular bilaterally.  No significant edema or varicosities noted.  CFT and skin temperature is normal to both lower extremities.     Neurologic: Lower extremity sensation is intact to light touch.  No evidence of weakness or contracture in the lower extremities.  No evidence of neuropathy.     Musculoskeletal: Patient is ambulatory without assistive device or brace.  Lateral deviation of the halluxes with prominent 1st metatarsal heads medially bilaterally.     Radiographs:  bilateral xrays - increase in 1st and 2nd intermetatarsal angle bilateral with tibial sesamoid position of 6 for both feet. Minimal degenerative changes to midfeet.      ASSESSMENT:    Bilateral foot pain  Pes planus of both feet  Hav (hallux abducto valgus), left  Hav (hallux abducto valgus), right     PLAN:  Reviewed patient's chart in [facility]. Reviewed xrays. Reviewed and " discussed causes of bunion with patient.  Explained that it is in large part due to a patients foot type and how they walk.   Discussed treatment options with patient including orthotics, splints, pads, and shoe gear.  We discussed that sometimes cortisone injections can help with the pain or physical therapy treatments such as ultrasound to help with pain but does not fix the problem.  Discussed that this is normally a structural issue in the foot and if conservative therapy doesn't work, surgery is considered.  Distal osteotomy versus fusion.  With a distal osteotomy procedure, patient is normally minimally weight bearing in a cam boot for 6 weeks.  With fusion, patient is normally non weight bearing for 6 weeks.  With any surgery, patient needs to take the first 2 weeks off work for icing and elevating and could possible due seated work only for the remaining 4 weeks after that.  We discussed risks of surgery including infection, neuritis, non union, need for further surgery.    Based on xrays, he would need midfoot fusion to correct bunions. Talked about risks including infection, numbness, continued pain, non union, need for further surgery, blood loss, blood clotting. You will scar.    He is not interested in surgery. Will continue with wider shoes.        Iza Biswas DPM, Podiatry/Foot and Ankle Surgery    Weight management plan: Patient was referred to their PCP to discuss a diet and exercise plan.    Recommended to Cayetano Lunsford to follow up with Primary Care provider regarding elevated blood pressure.

## 2019-08-06 NOTE — LETTER
8/6/2019         RE: Cayetano Lunsford  [address]  [facility] [address]        Dear Colleague,    Thank you for referring your patient, Cayetano Lunsford, to the [facility]. Please see a copy of my visit note below.    PATIENT HISTORY:  Dr. Andres requested I see this patient for their foot issue.  Cayetano Lunsford is a 74 year old male who presents to clinic for bunions both feet. He notes he has had them forever. Not painful unless in too tight of shoes. Wondering if he needs surgery.     Review of Systems:  Patient denies fever, chills, rash, wound, stiffness, limping, numbness, weakness, heart burn, blood in stool, chest pain with activity, calf pain when walking, shortness of breath with activity, chronic cough, easy bleeding/bruising, swelling of ankles, excessive thirst, fatigue, depression, anxiety.       PAST MEDICAL HISTORY:   Past Medical History:   Diagnosis Date     Allergic rhinitis, cause unspecified      Atrial fibrillation (H)      BPH (benign prostatic hyperplasia)      CAD (coronary artery disease)      Chronic airway obstruction, not elsewhere classified      Hyperlipidemia LDL goal <100 5/9/2010     Hypertension goal BP (blood pressure) < 130/80 10/19/2006     Obesity (BMI 30-39.9)      Perforation of tympanic membrane, unspecified     Right TM     Tachycardia, unspecified      Type 2 diabetes, HbA1C goal < 8% (H) 5/2/2010     Unspecified cardiovascular disease         PAST SURGICAL HISTORY:   Past Surgical History:   Procedure Laterality Date     CARDIAC SURGERY       COLONOSCOPY  3/31/2011     COLONOSCOPY N/A 5/25/2018    Procedure: COMBINED COLONOSCOPY, SINGLE OR MULTIPLE BIOPSY/POLYPECTOMY BY BIOPSY;;  Surgeon: Juan Simon DO;  Location:  GI     CORONARY ANGIOGRAPHY ADULT ORDER  2001 and 2008 2001 at [facility]. 2008- No interventions     HC REMOVAL OF NAIL PLATE SIMPLE SINGLE  11/10/2011    Right 2nd Toenail     HERNIA REPAIR      left  "inguinal     SURGICAL HISTORY OF -   1987    right ear graft     SURGICAL HISTORY OF -   1992    right shoulder surgery     SURGICAL HISTORY OF -   1979    back surgery     SURGICAL HISTORY OF -   1978    vasectomy        MEDICATIONS:   Current Outpatient Medications:      albuterol (PROAIR HFA/PROVENTIL HFA/VENTOLIN HFA) 108 (90 BASE) MCG/ACT Inhaler, Inhale 1-2 puffs into the lungs every 4 hours as needed (for shortness of breath/wheezing), Disp: 1 Inhaler, Rfl: 5     ASPIRIN NOT PRESCRIBED, INTENTIONAL,, Reported on 5/17/2017, Disp: , Rfl:      B-D INSULIN SYRINGE 31G X 5/16\" 0.5 ML miscellaneous, USE 1 SYRINGE TWO TIMES A DAY OR AS DIRECTED, Disp: 100 each, Rfl: 5     BD ULTRA FINE PEN NEEDLES, Use to inject insulin twice daily., Disp: 100 each, Rfl: PRN     blood glucose (NO BRAND SPECIFIED) test strip, Use to test blood sugar 4 times daily or as directed., Disp: 120 each, Rfl: 11     folic acid 0.8 MG CAPS, Take 1 tablet by mouth daily, Disp: 90 capsule, Rfl: 3     FREESTYLE LANCETS MISC, Use to test up to four times per day, Disp: 100 Each, Rfl: 12     insulin NPH (NOVOLIN N RELION) 100 UNIT/ML vial, INJECT 5 UNITS SUBCUTANEOUSLY WITH BREAKFAST AND 15 UNITS WITH SUPPER, Disp: 3 vial, Rfl: 3     insulin regular (NOVOLIN R RELION) 100 UNIT/ML vial, Inject 5 units with breakfast and 2 units with dinner if blood sugars over 200 (when mixing with NPH, draw this insulin up first), Disp: 30 mL, Rfl: 1     ipratropium - albuterol 0.5 mg/2.5 mg/3 mL (DUONEB) 0.5-2.5 (3) MG/3ML neb solution, NEBULIZE CONTENTS OF ONE VIAL EVERY 4 HOURS AS NEEDED FOR SHORTNESS OF BREATH OR DYSPNEA, Disp: 360 mL, Rfl: 3     ipratropium - albuterol 0.5 mg/2.5 mg/3 mL (DUONEB) 0.5-2.5 (3) MG/3ML neb solution, NEBULIZE CONTENTS OF ONE VIAL BY MOUTH EVERY 4 HOURS AS NEEDED FOR SHORTNESS OF BREATH OR DYSPNEA, Disp: 270 mL, Rfl: 8     losartan (COZAAR) 100 MG tablet, TAKE ONE TABLET BY MOUTH ONCE DAILY FOR HYPERTENSION, Disp: 90 tablet, Rfl: " 1     metFORMIN (GLUCOPHAGE) 1000 MG tablet, TAKE ONE TABLET BY MOUTH TWICE A DAY WITH BREAKFAST AND DINNER, Disp: 180 tablet, Rfl: 1     ONETOUCH ULTRA test strip, USE TO TEST BLOOD SUGAR FOUR TIMES A DAY OR AS DIRECTED, Disp: 125 each, Rfl: 0     pantoprazole (PROTONIX) 40 MG enteric coated tablet, Take 40 mg by mouth daily Started by Dr. Debbie Rico at [facility], Disp: , Rfl:      primidone (MYSOLINE) 50 MG tablet, 3 tablets 2 times a day and 4 tablets at the bedtime., Disp: 900 tablet, Rfl: 1     Probiotic Product (FLORAJEN3) CAPS, Take 1 capsule by mouth 2 times daily, Disp: 60 capsule, Rfl: 0     propafenone (RYTHMOL) 150 MG TABS tablet, Take 1 tablet (150 mg) by mouth 2 times daily, Disp: 180 tablet, Rfl: 3     propranolol (INDERAL) 40 MG tablet, TAKE TWO TO THREE TABLETS BY MOUTH TWO TIMES A DAY, Disp: 180 tablet, Rfl: 8     Respiratory Therapy Supplies (NEBULIZER/TUBING/MOUTHPIECE) KIT, Use to nebulize medications for COPD, Disp: 1 kit, Rfl: 0     simvastatin (ZOCOR) 80 MG tablet, TAKE ONE-HALF TABLET BY MOUTH AT BEDTIME, Disp: 90 tablet, Rfl: 0     sulfaSALAzine (AZULFIDINE) 500 MG tablet, TAKE ONE TABLET BY MOUTH THREE TIMES A DAY, Disp: 90 tablet, Rfl: 11     tamsulosin (FLOMAX) 0.4 MG capsule, TAKE ONE CAPSULE BY MOUTH EVERY DAY, Disp: 90 capsule, Rfl: 1     tiotropium-olodaterol (STIOLTO RESPIMAT) 2.5-2.5 MCG/ACT AERS, Inhale 2 puffs into the lungs daily, Disp: , Rfl:      warfarin (COUMADIN) 5 MG tablet, Take as directed by Coumadin clinic-- 10 mg on Mon & Th / Take 7.5 mg all other days, Disp: 150 tablet, Rfl: 2     ALLERGIES:    Allergies   Allergen Reactions     Percodan [Oxycodone-Aspirin] Nausea and Vomiting        SOCIAL HISTORY:   Social History     Socioeconomic History     Marital status:      Spouse name: Izabel     Number of children: 2     Years of education: 12     Highest education level: Not on file   Occupational History     Occupation:      Employer:  RETIRED   Social Needs     Financial resource strain: Not on file     Food insecurity:     Worry: Not on file     Inability: Not on file     Transportation needs:     Medical: Not on file     Non-medical: Not on file   Tobacco Use     Smoking status: Former Smoker     Packs/day: 1.00     Years: 30.00     Pack years: 30.00     Types: Cigarettes     Last attempt to quit: 3/19/1992     Years since quittin.4     Smokeless tobacco: Never Used   Substance and Sexual Activity     Alcohol use: Yes     Alcohol/week: 0.0 oz     Comment: hardly at all, sometimes at dinner     Drug use: No     Sexual activity: Yes     Partners: Female   Lifestyle     Physical activity:     Days per week: Not on file     Minutes per session: Not on file     Stress: Not on file   Relationships     Social connections:     Talks on phone: Not on file     Gets together: Not on file     Attends Church service: Not on file     Active member of club or organization: Not on file     Attends meetings of clubs or organizations: Not on file     Relationship status: Not on file     Intimate partner violence:     Fear of current or ex partner: Not on file     Emotionally abused: Not on file     Physically abused: Not on file     Forced sexual activity: Not on file   Other Topics Concern     Parent/sibling w/ CABG, MI or angioplasty before 65F 55M? No      Service Not Asked     Blood Transfusions Not Asked     Caffeine Concern No     Comment: No Caffeine     Occupational Exposure Not Asked     Hobby Hazards Not Asked     Sleep Concern Not Asked     Stress Concern Not Asked     Weight Concern Not Asked     Special Diet Not Asked     Back Care Not Asked     Exercise Yes     Comment: 20 minutes - Walking- summer 2 x day     Bike Helmet Not Asked     Seat Belt Not Asked     Self-Exams Not Asked   Social History Narrative     Not on file        FAMILY HISTORY:   Family History   Problem Relation Age of Onset     Circulatory Mother         blood  "clot in leg     Diabetes Mother         type 2     Allergies Mother      Arthritis Mother      Gastrointestinal Disease Mother      Neurologic Disorder Mother         Familiar tremors     Neurologic Disorder Father         brain tumor     Cerebrovascular Disease Paternal Grandfather      Hypertension Brother      Hypertension Sister      Diabetes Sister         Type 2     Allergies Sister      Lipids Brother      Lipids Sister      Neurologic Disorder Sister         \"     Neurologic Disorder Sister         \"     Neurologic Disorder Sister         \"        EXAM:Vitals: BP (!) 144/66   Ht 1.727 m (5' 8\")   Wt 90.3 kg (199 lb)   BMI 30.26 kg/m     BMI= Body mass index is 30.26 kg/m .    General appearance: Patient is alert and fully cooperative with history & exam.  No sign of distress is noted during the visit.     Psychiatric: Affect is pleasant & appropriate.  Patient appears motivated to improve health.     Respiratory: Breathing is regular & unlabored while sitting.     HEENT: Hearing is intact to spoken word.  Speech is clear.  No gross evidence of visual impairment that would impact ambulation.     Dermatologic: Skin is intact to both lower extremities without significant lesions, rash or abrasion.  No paronychia or evidence of soft tissue infection is noted.     Vascular: DP & PT pulses are intact & regular bilaterally.  No significant edema or varicosities noted.  CFT and skin temperature is normal to both lower extremities.     Neurologic: Lower extremity sensation is intact to light touch.  No evidence of weakness or contracture in the lower extremities.  No evidence of neuropathy.     Musculoskeletal: Patient is ambulatory without assistive device or brace.  Lateral deviation of the halluxes with prominent 1st metatarsal heads medially bilaterally.     Radiographs:  bilateral xrays - increase in 1st and 2nd intermetatarsal angle bilateral with tibial sesamoid position of 6 for both feet. Minimal " degenerative changes to midfeet.      ASSESSMENT:    Bilateral foot pain  Pes planus of both feet  Hav (hallux abducto valgus), left  Hav (hallux abducto valgus), right     PLAN:  Reviewed patient's chart in [facility]. Reviewed xrays. Reviewed and discussed causes of bunion with patient.  Explained that it is in large part due to a patients foot type and how they walk.   Discussed treatment options with patient including orthotics, splints, pads, and shoe gear.  We discussed that sometimes cortisone injections can help with the pain or physical therapy treatments such as ultrasound to help with pain but does not fix the problem.  Discussed that this is normally a structural issue in the foot and if conservative therapy doesn't work, surgery is considered.  Distal osteotomy versus fusion.  With a distal osteotomy procedure, patient is normally minimally weight bearing in a cam boot for 6 weeks.  With fusion, patient is normally non weight bearing for 6 weeks.  With any surgery, patient needs to take the first 2 weeks off work for icing and elevating and could possible due seated work only for the remaining 4 weeks after that.  We discussed risks of surgery including infection, neuritis, non union, need for further surgery.    Based on xrays, he would need midfoot fusion to correct bunions. Talked about risks including infection, numbness, continued pain, non union, need for further surgery, blood loss, blood clotting. You will scar.    He is not interested in surgery. Will continue with wider shoes.        Iza Biswas DPM, Podiatry/Foot and Ankle Surgery    Weight management plan: Patient was referred to their PCP to discuss a diet and exercise plan.    Recommended to Cayetano Lunsford to follow up with Primary Care provider regarding elevated blood pressure.        Again, thank you for allowing me to participate in the care of your patient.        Sincerely,        Iza Biswas DPM, Podiatry/Foot and Ankle  Surgery

## 2019-08-12 ENCOUNTER — TELEPHONE (OUTPATIENT)
Dept: FAMILY MEDICINE | Facility: CLINIC | Age: 74
End: 2019-08-12

## 2019-08-12 NOTE — TELEPHONE ENCOUNTER
[facility] informed.  I will call patient on 08/13 with holding instructions.    Tatyana Akers RN

## 2019-08-12 NOTE — TELEPHONE ENCOUNTER
Leo would like to schedule a colonoscopy,  he will need to hold Warfarin x 5 days.    Patient is currently on Warfarin for A.Fib, NO history of clotting.    The bridging guidelines have changed.  The current CHEST guidelines suggest considering bridging for only those with high thrombotic risk.    While the patient is off of Warfarin, would you recommend Lovenox bridging?  If so, would you like the prophylactic dose (40mg QD) or the therapeutic dose (1mg/kg BID)?    ChadsVasc = 5 (there should be a place in the chart that calculates Chads2 but I have not been able to locate it).  HasBled = 2      Thank you,  Tatyana Akers RN

## 2019-08-13 NOTE — TELEPHONE ENCOUNTER
I spoke to patient, I informed him of 5 day Warfarin hold prior to colonoscopy.  I requested patient call me back when he gets a date for the procedure so I can give him specific HOLD and restart instructions.    Patient verbalized understanding.    Tatyana Akers RN

## 2019-08-19 ENCOUNTER — TRANSFERRED RECORDS (OUTPATIENT)
Dept: HEALTH INFORMATION MANAGEMENT | Facility: CLINIC | Age: 74
End: 2019-08-19
Payer: COMMERCIAL

## 2019-08-19 LAB — RETINOPATHY: NEGATIVE

## 2019-08-20 DIAGNOSIS — E11.9 TYPE 2 DIABETES, HBA1C GOAL < 8% (H): ICD-10-CM

## 2019-08-20 NOTE — TELEPHONE ENCOUNTER
"Requested Prescriptions   Pending Prescriptions Disp Refills     BD INSULIN SYRINGE U/F 31G X 5/16\" 0.5 ML miscellaneous [Pharmacy Med Name: BD INS SYR UF 1/2CC SHORT] 100 each 5     Sig: USE 1 SYRINGE TWO TIMES A DAY OR AS DIRECTED  Last Written Prescription Date:  11/20/2018  Last Fill Quantity: 100,  # refills: 5   Last Office Visit: 7/18/2019   Future Office Visit:    Next 5 appointments (look out 90 days)    Aug 28, 2019  2:45 PM CDT  Pre-Operative Physical with Dmitri Andres MD, CR EXAM ROOM 29  [facility] ([facility]) [address] [place] [id number]  [phone]   Sep 04, 2019 11:00 AM CDT  Return Visit with Bong Yoo MD  [facility] ([facility]) [address] [place] [id number] [phone]   Sep 09, 2019  9:30 AM CDT  SHORT with Cheko Pereira RPH  [facility] ([facility]) [address] [place] [id number]  [phone]              Diabetic Supplies Protocol Passed - 8/20/2019 10:50 AM        Passed - Medication is active on med list        Passed - Patient is 18 years of age or older        Passed - Recent (6 mo) or future (30 days) visit within the authorizing provider's specialty     Patient had office visit in the last 6 months or has a visit in the next 30 days with authorizing provider.  See \"Patient Info\" tab in inbasket, or \"Choose Columns\" in Meds & Orders section of the refill encounter.              "

## 2019-08-21 RX ORDER — PEN NEEDLE, DIABETIC 29 G X1/2"
NEEDLE, DISPOSABLE MISCELLANEOUS
Qty: 100 EACH | Refills: 5 | Status: SHIPPED | OUTPATIENT
Start: 2019-08-21 | End: 2020-05-15

## 2019-08-21 NOTE — TELEPHONE ENCOUNTER
LOV: 7/18/2019    Diabetic supplies    Prescription approved per [facility] Refill Protocol.  Thanks! Estelle Prado RN

## 2019-08-26 NOTE — TELEPHONE ENCOUNTER
Colonoscopy scheduled for 09/06/19.  Patient scheduled for INR in clinic on 08/27, I will review Warfarin hold instructions with him then.    Hold: 09/01-09/05/19    Tatyana Akers RN

## 2019-08-27 ENCOUNTER — ANTICOAGULATION THERAPY VISIT (OUTPATIENT)
Dept: NURSING | Facility: CLINIC | Age: 74
End: 2019-08-27
Payer: COMMERCIAL

## 2019-08-27 DIAGNOSIS — I48.91 ATRIAL FIBRILLATION, UNSPECIFIED TYPE (H): ICD-10-CM

## 2019-08-27 DIAGNOSIS — Z79.01 LONG TERM CURRENT USE OF ANTICOAGULANT THERAPY: ICD-10-CM

## 2019-08-27 LAB — INR POINT OF CARE: 2.7 (ref 0.86–1.14)

## 2019-08-27 PROCEDURE — 85610 PROTHROMBIN TIME: CPT | Mod: QW

## 2019-08-27 PROCEDURE — 36416 COLLJ CAPILLARY BLOOD SPEC: CPT

## 2019-08-27 PROCEDURE — 99207 ZZC NO CHARGE NURSE ONLY: CPT

## 2019-08-27 NOTE — PROGRESS NOTES
ANTICOAGULATION FOLLOW-UP CLINIC VISIT    Patient Name:  Cayetano Lunsford  Date:  2019  Contact Type:  Face to Face    SUBJECTIVE:  Patient Findings     Positives:   Upcoming invasive procedure (Colonoscopy scheduled for 19--writtne holding orders reviewed and given to pt, he will HOLD Warfarin -19 without Lovenox bridging per PCP.), Missed doses (Missed 7.5mg on , pt did not realize this until .  Pt also missed Primidone and Simvastatin on ), Change in diet/appetite (Eating 3 greens per week.)        Clinical Outcomes     Negatives:   Major bleeding event, Thromboembolic event, Anticoagulation-related hospital admission, Anticoagulation-related ED visit, Anticoagulation-related fatality           OBJECTIVE    INR Protime   Date Value Ref Range Status   2019 2.7 (A) 0.86 - 1.14 Final       ASSESSMENT / PLAN  INR assessment THER    Recheck INR In: 3 WEEKS    INR Location Clinic      Anticoagulation Summary  As of 2019    INR goal:   2.0-3.0   TTR:   72.1 % (3.4 y)   INR used for dosin.7 (2019)   Warfarin maintenance plan:   10 mg (5 mg x 2) every Mon, Thu; 7.5 mg (5 mg x 1.5) all other days   Full warfarin instructions:   : Hold; 2: Hold; 3: Hold; 4: Hold; 5: Hold; 6: 10 mg; 7: 10 mg; Otherwise 10 mg every Mon, Thu; 7.5 mg all other days   Weekly warfarin total:   57.5 mg   Plan last modified:   Kirstin Meléndez RN (2018)   Next INR check:   2019   Priority:   INR   Target end date:       Indications    Long-term (current) use of anticoagulants [Z79.01] [Z79.01]  Atrial fibrillation (H) [I48.91]             Anticoagulation Episode Summary     INR check location:       Preferred lab:       Send INR reminders to:   [facility]    Comments:    Prefers AVS printed.       Anticoagulation Care Providers     Provider Role Specialty Phone number    Dmitri Andres MD [facility] [phone]             See the Encounter Report to view Anticoagulation Flowsheet and Dosing Calendar (Go to Encounters tab in chart review, and find the Anticoagulation Therapy Visit)        Tatyana Akers RN

## 2019-08-28 ENCOUNTER — OFFICE VISIT (OUTPATIENT)
Dept: FAMILY MEDICINE | Facility: CLINIC | Age: 74
End: 2019-08-28
Payer: COMMERCIAL

## 2019-08-28 VITALS
WEIGHT: 200.6 LBS | SYSTOLIC BLOOD PRESSURE: 144 MMHG | HEART RATE: 66 BPM | DIASTOLIC BLOOD PRESSURE: 78 MMHG | TEMPERATURE: 97.9 F | BODY MASS INDEX: 30.5 KG/M2

## 2019-08-28 DIAGNOSIS — K51.811 OTHER ULCERATIVE COLITIS WITH RECTAL BLEEDING (H): ICD-10-CM

## 2019-08-28 DIAGNOSIS — Z01.818 PREOP GENERAL PHYSICAL EXAM: Primary | ICD-10-CM

## 2019-08-28 DIAGNOSIS — I48.0 PAROXYSMAL ATRIAL FIBRILLATION (H): ICD-10-CM

## 2019-08-28 PROCEDURE — 99214 OFFICE O/P EST MOD 30 MIN: CPT | Performed by: FAMILY MEDICINE

## 2019-08-28 NOTE — PROGRESS NOTES
[facility]  [address] [facility] [address]  [phone]  Dept: [phone]    PRE-OP EVALUATION:  Today's date: 2019    Cayetano Lunsford (: 1945) presents for pre-operative evaluation assessment as requested by Dr. Mims.  He requires evaluation and anesthesia risk assessment prior to undergoing surgery/procedure for treatment of the colon .    Proposed Surgery/ Procedure: colonoscopy  Date of Surgery/ Procedure: 19  Time of Surgery/ Procedure: 10:30 am  Hospital/Surgical Facility: [facility]  [phone]  Fax number for surgical facility: Endoscopy  Primary Physician: Dmitri Andres  Type of Anesthesia Anticipated: Choice    Patient has a Health Care Directive or Living Will:  NO    1. NO - Do you have a history of heart attack, stroke, stent, bypass or surgery on an artery in the head, neck, heart or legs?  2. NO - Do you ever have any pain or discomfort in your chest?  3. NO - Do you have a history of  Heart Failure?  4. YES - ARE YOUR TROUBLED BY SHORTNESS OF BREATH WHEN WALKING ON THE LEVEL, UP A SLIGHT HILL OR AT NIGHT?   5. NO - Do you currently have a cold, bronchitis or other respiratory infection?  6. NO - Do you have a cough, shortness of breath or wheezing?  7. NO - Do you sometimes get pains in the calves of your legs when you walk?  8. NO - Do you or anyone in your family have previous history of blood clots?  9. NO - Do you or does anyone in your family have a serious bleeding problem such as prolonged bleeding following surgeries or cuts?  10. NO - Have you ever had problems with anemia or been told to take iron pills?  11. NO - Have you had any abnormal blood loss such as black, tarry or bloody stools, or abnormal vaginal bleeding?  12. NO - Have you ever had a blood transfusion?  13. NO - Have you or any of your relatives ever had problems with anesthesia?  14. NO - Do you have sleep apnea, excessive snoring or daytime  drowsiness?  15. NO - Do you have any prosthetic heart valves?  16. NO - Do you have prosthetic joints?  17. NO - Is there any chance that you may be pregnant?      HPI:     HPI related to upcoming procedure: rectal bleeding and hx ulcerative colitis       A-FIB - Patient has a longstanding history of chronic A-fib currently on rate control. Current treatment regimen includes Warfarin for stroke prevention and denies significant symptoms of lightheadedness, palpitations or dyspnea.     COPD - Patient has a longstanding history of moderate-severe COPD . Patient has been doing well overall noting SOB and continues on medication regimen consisting of aerosol  without adverse reactions or side effects.      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) 10/05/2015     Priority: Medium     Hard of hearing 07/31/2015     Priority: Medium     CAD in native artery      Priority: Medium     Pain in joint, lower leg 10/17/2013     Priority: Medium     Cramp of limb 11/01/2011     Priority: Medium     BPH (benign prostatic hyperplasia) 11/01/2011     Priority: Medium     Hypertension goal BP (blood pressure) < 140/90 06/16/2011     Priority: Medium     Benign familial tremor 03/24/2011     Priority: Medium     Takes propranolol       Advance Care Planning 02/10/2011     Priority: Medium     Advance Care Planning 2/14/2017: ACP Facilitation Session:    Cayetano Lunsford presented for ACP Facilitation session at a group session. He was accompanied by no one. [facility] Choices  information provided and resources reviewed. He currently wishes to give additional consideration to ACP  He currently has the following questions or concerns about Advance Care Planning: none known.  Confirmed/documented legally designated decision maker(s).  Added by Tessa Randhawa RN, Advance Care Planning Liaison.  Advance Care Planning 2/10/11:  I gave the Health care Directive form to patient .  Patient will fill out the form and then he will make an appointment  with me to review. Ofelia Martin RN, Chronic Care Coordinator          HYPERLIPIDEMIA LDL GOAL <100 05/09/2010     Priority: Medium     Eczema 09/24/2009     Priority: Medium     BMI OVER 30 WITH DM  11/04/2008     Priority: Medium     Chronic obstructive pulmonary disease (H) 05/05/2006     Priority: Medium             Atrial fibrillation (H) 01/06/2006     Priority: Medium     Ulcerative colitis without complications (HCC) 11/26/2004     Priority: Medium     Diet controlled. Colonoscopy q3 years           Past Medical History:   Diagnosis Date     Allergic rhinitis, cause unspecified      Atrial fibrillation (H)      BPH (benign prostatic hyperplasia)      CAD (coronary artery disease)      Chronic airway obstruction, not elsewhere classified      Hyperlipidemia LDL goal <100 5/9/2010     Hypertension goal BP (blood pressure) < 130/80 10/19/2006     Obesity (BMI 30-39.9)      Perforation of tympanic membrane, unspecified     Right TM     Tachycardia, unspecified      Type 2 diabetes, HbA1C goal < 8% (H) 5/2/2010     Unspecified cardiovascular disease      Past Surgical History:   Procedure Laterality Date     CARDIAC SURGERY       COLONOSCOPY  3/31/2011     COLONOSCOPY N/A 5/25/2018    Procedure: COMBINED COLONOSCOPY, SINGLE OR MULTIPLE BIOPSY/POLYPECTOMY BY BIOPSY;;  Surgeon: Juan Simon DO;  Location:  GI     CORONARY ANGIOGRAPHY ADULT ORDER  2001 and 2008 2001 at [facility]. 2008- No interventions     HC REMOVAL OF NAIL PLATE SIMPLE  "SINGLE  11/10/2011    Right 2nd Toenail     HERNIA REPAIR      left inguinal     SURGICAL HISTORY OF -   1987    right ear graft     SURGICAL HISTORY OF -   1992    right shoulder surgery     SURGICAL HISTORY OF -   1979    back surgery     SURGICAL HISTORY OF -   1978    vasectomy     Current Outpatient Medications   Medication Sig Dispense Refill     albuterol (PROAIR HFA/PROVENTIL HFA/VENTOLIN HFA) 108 (90 BASE) MCG/ACT Inhaler Inhale 1-2 puffs into the lungs every 4 hours as needed (for shortness of breath/wheezing) 1 Inhaler 5     ASPIRIN NOT PRESCRIBED, INTENTIONAL, Reported on 5/17/2017       BD INSULIN SYRINGE U/F 31G X 5/16\" 0.5 ML miscellaneous USE 1 SYRINGE TWO TIMES A DAY OR AS DIRECTED 100 each 5     BD ULTRA FINE PEN NEEDLES Use to inject insulin twice daily. 100 each PRN     blood glucose (NO BRAND SPECIFIED) test strip Use to test blood sugar 4 times daily or as directed. 120 each 11     folic acid 0.8 MG CAPS Take 1 tablet by mouth daily 90 capsule 3     FREESTYLE LANCETS MISC Use to test up to four times per day 100 Each 12     insulin NPH (NOVOLIN N RELION) 100 UNIT/ML vial INJECT 5 UNITS SUBCUTANEOUSLY WITH BREAKFAST AND 15 UNITS WITH SUPPER 3 vial 3     insulin regular (NOVOLIN R RELION) 100 UNIT/ML vial Inject 5 units with breakfast and 2 units with dinner if blood sugars over 200 (when mixing with NPH, draw this insulin up first) 30 mL 1     ipratropium - albuterol 0.5 mg/2.5 mg/3 mL (DUONEB) 0.5-2.5 (3) MG/3ML neb solution NEBULIZE CONTENTS OF ONE VIAL EVERY 4 HOURS AS NEEDED FOR SHORTNESS OF BREATH OR DYSPNEA 360 mL 3     ipratropium - albuterol 0.5 mg/2.5 mg/3 mL (DUONEB) 0.5-2.5 (3) MG/3ML neb solution NEBULIZE CONTENTS OF ONE VIAL BY MOUTH EVERY 4 HOURS AS NEEDED FOR SHORTNESS OF BREATH OR DYSPNEA 270 mL 8     losartan (COZAAR) 100 MG tablet TAKE ONE TABLET BY MOUTH ONCE DAILY FOR HYPERTENSION 90 tablet 1     metFORMIN (GLUCOPHAGE) 1000 MG tablet TAKE ONE TABLET BY MOUTH TWICE A DAY WITH " BREAKFAST AND DINNER 180 tablet 1     ONETOUCH ULTRA test strip USE TO TEST BLOOD SUGAR FOUR TIMES A DAY OR AS DIRECTED 125 each 0     pantoprazole (PROTONIX) 40 MG enteric coated tablet Take 40 mg by mouth daily Started by Dr. Debbie Rico at [facility]       primidone (MYSOLINE) 50 MG tablet 3 tablets 2 times a day and 4 tablets at the bedtime. 900 tablet 1     Probiotic Product (FLORAJEN3) CAPS Take 1 capsule by mouth 2 times daily 60 capsule 0     propafenone (RYTHMOL) 150 MG TABS tablet Take 1 tablet (150 mg) by mouth 2 times daily 180 tablet 3     propranolol (INDERAL) 40 MG tablet TAKE TWO TO THREE TABLETS BY MOUTH TWO TIMES A  tablet 8     Respiratory Therapy Supplies (NEBULIZER/TUBING/MOUTHPIECE) KIT Use to nebulize medications for COPD 1 kit 0     simvastatin (ZOCOR) 80 MG tablet TAKE ONE-HALF TABLET BY MOUTH AT BEDTIME 90 tablet 0     sulfaSALAzine (AZULFIDINE) 500 MG tablet TAKE ONE TABLET BY MOUTH THREE TIMES A DAY 90 tablet 11     tamsulosin (FLOMAX) 0.4 MG capsule TAKE ONE CAPSULE BY MOUTH EVERY DAY 90 capsule 1     tiotropium-olodaterol (STIOLTO RESPIMAT) 2.5-2.5 MCG/ACT AERS Inhale 2 puffs into the lungs daily       warfarin (COUMADIN) 5 MG tablet Take as directed by Coumadin clinic-- 10 mg on Mon &  / Take 7.5 mg all other days 150 tablet 2     OTC products: None, except as noted above    Allergies   Allergen Reactions     Percodan [Oxycodone-Aspirin] Nausea and Vomiting      Latex Allergy: NO    Social History     Tobacco Use     Smoking status: Former Smoker     Packs/day: 1.00     Years: 30.00     Pack years: 30.00     Types: Cigarettes     Last attempt to quit: 3/19/1992     Years since quittin.4     Smokeless tobacco: Never Used   Substance Use Topics     Alcohol use: Yes     Alcohol/week: 0.0 oz     Comment: hardly at all, sometimes at dinner     History   Drug Use No       REVIEW OF SYSTEMS:   Constitutional, neuro, ENT, endocrine, pulmonary, cardiac, gastrointestinal,  genitourinary, musculoskeletal, integument and psychiatric systems are negative, except as otherwise noted.    EXAM:   BP (!) 144/78 (BP Location: Right arm, Patient Position: Chair, Cuff Size: Adult Large)   Pulse 66   Temp 97.9  F (36.6  C) (Oral)   Wt 91 kg (200 lb 9.6 oz)   BMI 30.50 kg/m        GENERAL APPEARANCE: healthy, alert and no distress     EYES: EOMI,  PERRL     HENT: ear canals and TM's normal and nose and mouth without ulcers or lesions     NECK: no adenopathy, no asymmetry, masses, or scars and thyroid normal to palpation     RESP: lungs clear to auscultation - no rales, rhonchi or wheezes     CV: regular rates and rhythm, normal S1 S2, no S3 or S4 and no murmur, click or rub     ABDOMEN:  soft, nontender, no HSM or masses and bowel sounds normal     MS: extremities normal- no gross deformities noted, no evidence of inflammation in joints, FROM in all extremities.     SKIN: no suspicious lesions or rashes     NEURO: Normal strength and tone, sensory exam grossly normal, mentation intact and speech normal     PSYCH: mentation appears normal. and affect normal/bright     LYMPHATICS: No cervical adenopathy    DIAGNOSTICS:   EKG: appears normal, NSR, normal axis, normal intervals, no acute ST/T changes c/w ischemia, no LVH by voltage criteria, unchanged from previous tracings    Recent Labs   Lab Test 08/27/19 07/30/19 03/11/19  0930  10/31/18  1148  05/01/18  0736  03/13/17  1050  09/07/16  1301   HGB  --   --   --   --   --   --   --   --   --  12.4*  --  12.7*   PLT  --   --   --   --   --   --   --   --   --  307  --  286   INR 2.7* 3.0*   < >  --    < >  --    < >  --    < >  --    < >  --    NA  --   --   --   --   --   --   --  139  --  135  --   --    POTASSIUM  --   --   --   --   --   --   --  4.2  --  4.6  --   --    CR  --   --   --   --   --   --   --  0.77  --  0.76   < >  --    A1C  --   --   --  5.3  --  5.5  --  5.3   < >  --    < >  --     < > = values in this interval not  displayed.        IMPRESSION:   EKG: imcomplete RBBB    The proposed surgical procedure is considered LOW risk.    REVISED CARDIAC RISK INDEX  The patient has the following serious cardiovascular risks for perioperative complications such as (MI, PE, VFib and 3  AV Block):  No serious cardiac risks  INTERPRETATION: 1 risks: Class II (low risk - 0.9% complication rate)    The patient has the following additional risks for perioperative complications:  No identified additional risks  Copd history of hypoxemia BP (!) 160/70 (BP Location: Right arm, Patient Position: Chair, Cuff Size: Adult Large)   Pulse 66   Temp 97.9  F (36.6  C) (Oral)   Wt 91 kg (200 lb 9.6 oz)   BMI 30.50 kg/m          ICD-10-CM    1. Preop general physical exam Z01.818        RECOMMENDATIONS:     Hemoglobin at [facility] 7/30/2019 was 11.4, creatinine was also FILED IN EPIC  (Z01.818) Preop general physical exam  (primary encounter diagnosis)  Comment:   Plan: fit for procedure    (I48.0) Paroxysmal atrial fibrillation (H)  Comment: episodic, EKG and Lexiscan this year  Plan:     (K51.811) Other ulcerative colitis with rectal bleeding (H)  Comment:   Plan: blood on the paper, no large bleed.                 Signed Electronically by: Dmitri Andres MD    Copy of this evaluation report is provided to requesting physician.    [place] Preop Guidelines    Revised Cardiac Risk Index

## 2019-09-05 ENCOUNTER — TELEPHONE (OUTPATIENT)
Dept: PHARMACY | Facility: CLINIC | Age: 74
End: 2019-09-05

## 2019-09-05 NOTE — TELEPHONE ENCOUNTER
9-5-19      Pt. Calls --he has questions about colonoscopy --what to do with his insulins?    Today is his clear liquid day . He injects 12 units NPH at night  and 4 NPH with breakfast and 4 units R insulin at b-fast only.    bs this am it was 150, hes been drinking a lot of gatorade to clean him out .    He has been taking metformin twice daily today .     bs at 4;45pm today = 148.     Colonoscopy is at 10;30am .  Friday morning.     Plan:    1.  Lets plan on reducing Pm NPH dose to 6 units tonight , check blood sugar tomorrow am - if >200 -- inject 3 units NPH  But skip all R insulin. And also skip NPH if bs <200.   Ok to take AM pills like metformin, etc.     Cheko Pereira [facility].  Medication Therapy Management Provider  [phone]

## 2019-09-06 ENCOUNTER — ANESTHESIA EVENT (OUTPATIENT)
Dept: GASTROENTEROLOGY | Facility: CLINIC | Age: 74
End: 2019-09-06
Payer: COMMERCIAL

## 2019-09-06 ENCOUNTER — HOSPITAL ENCOUNTER (OUTPATIENT)
Facility: CLINIC | Age: 74
Discharge: HOME | End: 2019-09-06
Attending: INTERNAL MEDICINE | Admitting: INTERNAL MEDICINE
Payer: COMMERCIAL

## 2019-09-06 ENCOUNTER — ANESTHESIA (OUTPATIENT)
Dept: GASTROENTEROLOGY | Facility: CLINIC | Age: 74
End: 2019-09-06
Payer: COMMERCIAL

## 2019-09-06 VITALS
BODY MASS INDEX: 30.31 KG/M2 | HEIGHT: 68 IN | WEIGHT: 200 LBS | DIASTOLIC BLOOD PRESSURE: 61 MMHG | SYSTOLIC BLOOD PRESSURE: 153 MMHG | HEART RATE: 69 BPM | RESPIRATION RATE: 16 BRPM | OXYGEN SATURATION: 95 %

## 2019-09-06 LAB
COLONOSCOPY: NORMAL
GLUCOSE BLDC GLUCOMTR-MCNC: 157 MG/DL (ref 70–99)

## 2019-09-06 PROCEDURE — 37000008 ZZH ANESTHESIA TECHNICAL FEE, 1ST 30 MIN: Performed by: INTERNAL MEDICINE

## 2019-09-06 PROCEDURE — 37000009 ZZH ANESTHESIA TECHNICAL FEE, EACH ADDTL 15 MIN: Performed by: INTERNAL MEDICINE

## 2019-09-06 PROCEDURE — 88305 TISSUE EXAM BY PATHOLOGIST: CPT | Mod: 26 | Performed by: INTERNAL MEDICINE

## 2019-09-06 PROCEDURE — 82962 GLUCOSE BLOOD TEST: CPT

## 2019-09-06 PROCEDURE — 25800030 ZZH RX IP 258 OP 636: Performed by: NURSE ANESTHETIST, CERTIFIED REGISTERED

## 2019-09-06 PROCEDURE — 25000128 H RX IP 250 OP 636: Performed by: NURSE ANESTHETIST, CERTIFIED REGISTERED

## 2019-09-06 PROCEDURE — 40000010 ZZH STATISTIC ANES STAT CODE-CRNA PER MINUTE: Performed by: INTERNAL MEDICINE

## 2019-09-06 PROCEDURE — 45380 COLONOSCOPY AND BIOPSY: CPT | Mod: PT | Performed by: INTERNAL MEDICINE

## 2019-09-06 PROCEDURE — 25000125 ZZHC RX 250: Performed by: NURSE ANESTHETIST, CERTIFIED REGISTERED

## 2019-09-06 PROCEDURE — 88305 TISSUE EXAM BY PATHOLOGIST: CPT | Performed by: INTERNAL MEDICINE

## 2019-09-06 RX ORDER — PROPOFOL 10 MG/ML
INJECTION, EMULSION INTRAVENOUS PRN
Status: DISCONTINUED | OUTPATIENT
Start: 2019-09-06 | End: 2019-09-06

## 2019-09-06 RX ORDER — ONDANSETRON 2 MG/ML
4 INJECTION INTRAMUSCULAR; INTRAVENOUS
Status: DISCONTINUED | OUTPATIENT
Start: 2019-09-06 | End: 2019-09-06 | Stop reason: HOSPADM

## 2019-09-06 RX ORDER — LIDOCAINE HYDROCHLORIDE 20 MG/ML
INJECTION, SOLUTION INFILTRATION; PERINEURAL PRN
Status: DISCONTINUED | OUTPATIENT
Start: 2019-09-06 | End: 2019-09-06

## 2019-09-06 RX ORDER — SODIUM CHLORIDE, SODIUM LACTATE, POTASSIUM CHLORIDE, CALCIUM CHLORIDE 600; 310; 30; 20 MG/100ML; MG/100ML; MG/100ML; MG/100ML
INJECTION, SOLUTION INTRAVENOUS CONTINUOUS PRN
Status: DISCONTINUED | OUTPATIENT
Start: 2019-09-06 | End: 2019-09-06

## 2019-09-06 RX ORDER — LIDOCAINE 40 MG/G
CREAM TOPICAL
Status: DISCONTINUED | OUTPATIENT
Start: 2019-09-06 | End: 2019-09-06 | Stop reason: HOSPADM

## 2019-09-06 RX ORDER — ONDANSETRON 4 MG/1
4 TABLET, ORALLY DISINTEGRATING ORAL EVERY 6 HOURS PRN
Status: DISCONTINUED | OUTPATIENT
Start: 2019-09-06 | End: 2019-09-06 | Stop reason: HOSPADM

## 2019-09-06 RX ORDER — FLUMAZENIL 0.1 MG/ML
0.2 INJECTION, SOLUTION INTRAVENOUS
Status: DISCONTINUED | OUTPATIENT
Start: 2019-09-06 | End: 2019-09-06 | Stop reason: HOSPADM

## 2019-09-06 RX ORDER — ONDANSETRON 2 MG/ML
4 INJECTION INTRAMUSCULAR; INTRAVENOUS EVERY 6 HOURS PRN
Status: DISCONTINUED | OUTPATIENT
Start: 2019-09-06 | End: 2019-09-06 | Stop reason: HOSPADM

## 2019-09-06 RX ORDER — NALOXONE HYDROCHLORIDE 0.4 MG/ML
.1-.4 INJECTION, SOLUTION INTRAMUSCULAR; INTRAVENOUS; SUBCUTANEOUS
Status: DISCONTINUED | OUTPATIENT
Start: 2019-09-06 | End: 2019-09-06 | Stop reason: HOSPADM

## 2019-09-06 RX ADMIN — PROPOFOL 20 MG: 10 INJECTION, EMULSION INTRAVENOUS at 10:59

## 2019-09-06 RX ADMIN — DEXMEDETOMIDINE HYDROCHLORIDE 8 MCG: 100 INJECTION, SOLUTION INTRAVENOUS at 10:48

## 2019-09-06 RX ADMIN — PROPOFOL 10 MG: 10 INJECTION, EMULSION INTRAVENOUS at 11:08

## 2019-09-06 RX ADMIN — PROPOFOL 10 MG: 10 INJECTION, EMULSION INTRAVENOUS at 10:54

## 2019-09-06 RX ADMIN — LIDOCAINE HYDROCHLORIDE 50 MG: 20 INJECTION, SOLUTION INFILTRATION; PERINEURAL at 10:48

## 2019-09-06 RX ADMIN — DEXMEDETOMIDINE HYDROCHLORIDE 4 MCG: 100 INJECTION, SOLUTION INTRAVENOUS at 10:55

## 2019-09-06 RX ADMIN — PROPOFOL 20 MG: 10 INJECTION, EMULSION INTRAVENOUS at 11:02

## 2019-09-06 RX ADMIN — PROPOFOL 20 MG: 10 INJECTION, EMULSION INTRAVENOUS at 10:55

## 2019-09-06 RX ADMIN — SODIUM CHLORIDE, POTASSIUM CHLORIDE, SODIUM LACTATE AND CALCIUM CHLORIDE: 600; 310; 30; 20 INJECTION, SOLUTION INTRAVENOUS at 10:44

## 2019-09-06 RX ADMIN — PROPOFOL 10 MG: 10 INJECTION, EMULSION INTRAVENOUS at 11:07

## 2019-09-06 RX ADMIN — PROPOFOL 40 MG: 10 INJECTION, EMULSION INTRAVENOUS at 10:48

## 2019-09-06 RX ADMIN — PROPOFOL 10 MG: 10 INJECTION, EMULSION INTRAVENOUS at 11:05

## 2019-09-06 ASSESSMENT — LIFESTYLE VARIABLES: TOBACCO_USE: 0

## 2019-09-06 ASSESSMENT — ENCOUNTER SYMPTOMS: DYSRHYTHMIAS: 1

## 2019-09-06 ASSESSMENT — MIFFLIN-ST. JEOR: SCORE: 1616.69

## 2019-09-06 ASSESSMENT — COPD QUESTIONNAIRES: COPD: 1

## 2019-09-06 NOTE — PROGRESS NOTES
"ESTABLISHED PATIENT NEUROLOGY NOTE    DATE OF VISIT: 9/10/2019  CLINIC LOCATION: [facility]  MRN: [id number]  PATIENT NAME: Cayetano Lunsford  YOB: 1945    PCP: Dmitri Andres MD    REASON FOR VISIT:   Chief Complaint   Patient presents with     Follow Up     Pt states that the right hand is a little worse now other than that about the same.      SUBJECTIVE:                                                      HISTORY OF PRESENT ILLNESS: Patient is here for follow up regarding essential tremor.  He was last seen on 03/6/2019.  At that time no medication changes were made due to reported stability of the tremor. Please refer to my initial/other prior notes for further information.    Since the last visit, the patient reports that overall his hand tremor is stable, though possibly slightly worse in the right hand.  He is on propranolol 120 mg twice daily and 150/150/200 mg of primidone daily without noticeable side effects.  He wonders if the dose of primidone could be increased.  He denies interval development of new neurological symptoms.    On review of systems, patient endorses no additional active complaints.  He reports that diabetes remains under good control with recent hemoglobin A1C of 5.5.  Medications, allergies, family and social history were also reviewed. There are no changes reported by patient.  REVIEW OF SYSTEMS:                                                    10-system review was completed. Pertinent positives are included in HPI. The remainder of ROS is negative.  EXAM:                                                    Physical Exam:   Vitals: BP (!) 150/68 (BP Location: Right arm, Patient Position: Sitting, Cuff Size: Adult Regular)   Pulse 76   Temp 97.5  F (36.4  C) (Oral)   Ht 1.727 m (5' 8\")   Wt 90.7 kg (200 lb)   SpO2 95%   BMI 30.41 kg/m    Recheck blood pressure: /64 (BP Location: Left arm, Patient Position: Sitting, Cuff Size: Adult " "Regular)   Pulse 76   Temp 97.5  F (36.4  C) (Oral)   Ht 1.727 m (5' 8\")   Wt 90.7 kg (200 lb)   SpO2 95%   BMI 30.41 kg/m  .  General: pt is in NAD, cooperative.  Skin: normal turgor, moist mucous membranes, no lesions/rashes noticed.  HEENT: ATNC, white sclera, normal conjunctiva.  Respiratory: Symmetric lung excursion, no accessory respiratory muscle use.  Abdomen: Non distended.  Neurological: awake, cooperative, follows commands, mild dysarthria (chronic), no aphasia noted, cranial nerves II-XII: no ptosis, extraocular motility is full, face is symmetric, tongue is midline, equally moves all extremities, intermittent mild to moderate postural and action bilateral hand tremor, no dysmetria bilaterally, casual gait is normal.  ASSESSMENT AND PLAN:                                                    Assessment: 74-year-old male patient with essential tremor presents for follow-up.  He reports that his symptoms are stable, but right hand tremor is slightly worse.  He is on 120 mg of propranolol twice daily and 500 mg of primidone daily without noticeable side effects.  He wonders if dose of primidone could be further increased.    We had a detailed discussion regarding current symptoms, available treatment options, and the plan.  We reviewed additional treatment options that include low-dose of gabapentin or topiramate versus increasing the primidone dose up to 750 mg daily.  We might also consider increasing propranolol dose, but it should be done as caution due to possibility of bradycardia, especially in light of concurrent diabetes mellitus.  We decided to increase the dose of primidone.    Rich to follow up with Primary Care provider regarding elevated blood pressure.     Diagnoses:    ICD-10-CM    1. 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 We decided to increase the dose " of Primidone and continue Propranolol without changes.    Next follow-up appointment is in the next 6 months or earlier if needed.    Total Time: 26 minutes with > 50% spent counseling the patient on stated above assessment and recommendations.    Bong Yoo MD  HP/ Neurology

## 2019-09-06 NOTE — ANESTHESIA POSTPROCEDURE EVALUATION
Patient: Cayetano Lunsford    Procedure(s):  COLONOSCOPY, WITH POLYPECTOMY AND BIOPSY    Diagnosis:Unknown  Diagnosis Additional Information: No value filed.    Anesthesia Type:  MAC    Note:  Anesthesia Post Evaluation    Patient location during evaluation: PACU  Patient participation: Able to fully participate in evaluation  Level of consciousness: awake and alert  Pain management: adequate  Airway patency: patent  Cardiovascular status: acceptable and stable  Respiratory status: acceptable, spontaneous ventilation, unassisted and nonlabored ventilation  Hydration status: acceptable  PONV: none             Last vitals:  Vitals:    09/06/19 1130 09/06/19 1140 09/06/19 1150   BP: 126/66 (!) 148/68 (!) 153/61   Pulse: 68 67 69   Resp:      SpO2: 94% 93% 95%         Electronically Signed By: Lux Roberts MD  September 6, 2019  1:42 PM

## 2019-09-06 NOTE — ANESTHESIA CARE TRANSFER NOTE
Patient: Cayetano Lunsford    Procedure(s):  COLONOSCOPY, WITH POLYPECTOMY AND BIOPSY    Diagnosis: Unknown  Diagnosis Additional Information: No value filed.    Anesthesia Type:   MAC     Note:  Airway :Face Mask  Patient transferred to:PACU  Comments: To endo PACU on 8L/min O2 via face mask, VSS in recovery. Left in care of RNHandoff Report: Identifed the Patient, Identified the Reponsible Provider, Reviewed the pertinent medical history, Discussed the surgical course, Reviewed Intra-OP anesthesia mangement and issues during anesthesia, Set expectations for post-procedure period and Allowed opportunity for questions and acknowledgement of understanding      Vitals: (Last set prior to Anesthesia Care Transfer)    CRNA VITALS  9/6/2019 1043 - 9/6/2019 1119      9/6/2019             NIBP:  100/55    Pulse:  61    NIBP Mean:  76    Ht Rate:  61    SpO2:  99 %    Resp Rate (set):  10                Electronically Signed By: RAFFI Dillon CRNA  September 6, 2019  11:19 AM

## 2019-09-06 NOTE — PROCEDURES
"PRE-PROCEDURE H&P    CHIEF COMPLAINT / REASON FOR PROCEDURE:  UC surveillance, occ. Rectal bleeding    PERTINENT HISTORY :    Past Medical History:   Diagnosis Date     Allergic rhinitis, cause unspecified      Atrial fibrillation (H)      BPH (benign prostatic hyperplasia)      CAD (coronary artery disease)      Chronic airway obstruction, not elsewhere classified      Hyperlipidemia LDL goal <100 5/9/2010     Hypertension goal BP (blood pressure) < 130/80 10/19/2006     Obesity (BMI 30-39.9)      Perforation of tympanic membrane, unspecified     Right TM     Tachycardia, unspecified      Type 2 diabetes, HbA1C goal < 8% (H) 5/2/2010     Unspecified cardiovascular disease       Past Surgical History:   Procedure Laterality Date     CARDIAC SURGERY       COLONOSCOPY  3/31/2011     COLONOSCOPY N/A 5/25/2018    Procedure: COMBINED COLONOSCOPY, SINGLE OR MULTIPLE BIOPSY/POLYPECTOMY BY BIOPSY;;  Surgeon: Juan Simon DO;  Location:  GI     CORONARY ANGIOGRAPHY ADULT ORDER  2001 and 2008 2001 at [facility]. 2008- No interventions     HC REMOVAL OF NAIL PLATE SIMPLE SINGLE  11/10/2011    Right 2nd Toenail     HERNIA REPAIR      left inguinal     SURGICAL HISTORY OF -   1987    right ear graft     SURGICAL HISTORY OF -   1992    right shoulder surgery     SURGICAL HISTORY OF -   1979    back surgery     SURGICAL HISTORY OF -   1978    vasectomy         Bleeding tendencies:  occasional    Relevant Family History:  NONE     Relevant Social History:  NONE      A relevant review of systems was performed and was negative    Current symptoms include: none    ALLERGIES/SENSITIVITIES:   Allergies   Allergen Reactions     Percodan [Oxycodone-Aspirin] Nausea and Vomiting       CURRENT MEDICATIONS:   Prior to Admission Medications   Prescriptions Last Dose Informant Patient Reported? Taking?   ASPIRIN NOT PRESCRIBED, INTENTIONAL,   Yes No   Sig: Reported on 5/17/2017   BD INSULIN SYRINGE U/F 31G X 5/16\" 0.5 ML miscellaneous   " No No   Sig: USE 1 SYRINGE TWO TIMES A DAY OR AS DIRECTED   BD ULTRA FINE PEN NEEDLES   No No   Sig: Use to inject insulin twice daily.   FREESTYLE LANCETS MISC   No No   Sig: Use to test up to four times per day   ONETOUCH ULTRA test strip   No No   Sig: USE TO TEST BLOOD SUGAR FOUR TIMES A DAY OR AS DIRECTED   Probiotic Product (FLORAJEN3) CAPS 9/5/2019 at Unknown time  No Yes   Sig: Take 1 capsule by mouth 2 times daily   Respiratory Therapy Supplies (NEBULIZER/TUBING/MOUTHPIECE) KIT   No No   Sig: Use to nebulize medications for COPD   albuterol (PROAIR HFA/PROVENTIL HFA/VENTOLIN HFA) 108 (90 BASE) MCG/ACT Inhaler 9/6/2019 at Unknown time  No Yes   Sig: Inhale 1-2 puffs into the lungs every 4 hours as needed (for shortness of breath/wheezing)   blood glucose (NO BRAND SPECIFIED) test strip   No No   Sig: Use to test blood sugar 4 times daily or as directed.   folic acid 0.8 MG CAPS 9/6/2019 at Unknown time  No Yes   Sig: Take 1 tablet by mouth daily   insulin NPH (NOVOLIN N RELION) 100 UNIT/ML vial 9/5/2019 at Unknown time  No Yes   Sig: INJECT 5 UNITS SUBCUTANEOUSLY WITH BREAKFAST AND 15 UNITS WITH SUPPER   insulin regular (NOVOLIN R RELION) 100 UNIT/ML vial 9/5/2019 at Unknown time  No Yes   Sig: Inject 5 units with breakfast and 2 units with dinner if blood sugars over 200 (when mixing with NPH, draw this insulin up first)   ipratropium - albuterol 0.5 mg/2.5 mg/3 mL (DUONEB) 0.5-2.5 (3) MG/3ML neb solution 9/6/2019 at Unknown time  No Yes   Sig: NEBULIZE CONTENTS OF ONE VIAL BY MOUTH EVERY 4 HOURS AS NEEDED FOR SHORTNESS OF BREATH OR DYSPNEA   ipratropium - albuterol 0.5 mg/2.5 mg/3 mL (DUONEB) 0.5-2.5 (3) MG/3ML neb solution 9/6/2019 at Unknown time  No Yes   Sig: NEBULIZE CONTENTS OF ONE VIAL EVERY 4 HOURS AS NEEDED FOR SHORTNESS OF BREATH OR DYSPNEA   losartan (COZAAR) 100 MG tablet 9/6/2019 at Unknown time  No Yes   Sig: TAKE ONE TABLET BY MOUTH ONCE DAILY FOR HYPERTENSION   metFORMIN (GLUCOPHAGE) 1000  MG tablet 9/6/2019 at Unknown time  No Yes   Sig: TAKE ONE TABLET BY MOUTH TWICE A DAY WITH BREAKFAST AND DINNER   pantoprazole (PROTONIX) 40 MG enteric coated tablet 9/6/2019 at Unknown time  Yes Yes   Sig: Take 40 mg by mouth daily Started by Dr. Debbie Rico at [facility]   primidone (MYSOLINE) 50 MG tablet 9/6/2019 at Unknown time  No Yes   Sig: 3 tablets 2 times a day and 4 tablets at the bedtime.   propafenone (RYTHMOL) 150 MG TABS tablet 9/6/2019 at Unknown time  No Yes   Sig: Take 1 tablet (150 mg) by mouth 2 times daily   propranolol (INDERAL) 40 MG tablet 9/6/2019 at Unknown time  No Yes   Sig: TAKE TWO TO THREE TABLETS BY MOUTH TWO TIMES A DAY   simvastatin (ZOCOR) 80 MG tablet 9/5/2019 at Unknown time  No Yes   Sig: TAKE ONE-HALF TABLET BY MOUTH AT BEDTIME   sulfaSALAzine (AZULFIDINE) 500 MG tablet 9/6/2019 at Unknown time  No Yes   Sig: TAKE ONE TABLET BY MOUTH THREE TIMES A DAY   tamsulosin (FLOMAX) 0.4 MG capsule 9/5/2019 at Unknown time  No Yes   Sig: TAKE ONE CAPSULE BY MOUTH EVERY DAY   tiotropium-olodaterol (STIOLTO RESPIMAT) 2.5-2.5 MCG/ACT AERS 9/6/2019 at Unknown time  Yes Yes   Sig: Inhale 2 puffs into the lungs daily   warfarin (COUMADIN) 5 MG tablet 8/31/2019  No No   Sig: Take as directed by Coumadin clinic-- 10 mg on Mon & Th / Take 7.5 mg all other days      Facility-Administered Medications: None        PRE-SEDATION ASSESSMENT:    Lung Exam:  normal  Heart Exam:  normal  Airway Exam: normal  Previous reaction to anesthesia/sedation:   No  Sedation plan based on assessment: MAC  ASA Classification:  3 - Severe systemic disease, but not incapacitating    Comments: patient has UC, due for surveillance, also intermittent bleeding possibly hemorrhoidal. Risks of the procedure explained.    IMPRESSION:  Ulcerative colitis with occasional blood in stool    PLAN:  Colonoscopy with likely biopsy and polypectomy     Paradise Mims MD, MD  [facility]  Office: [phone]

## 2019-09-06 NOTE — ANESTHESIA PREPROCEDURE EVALUATION
Anesthesia Pre-Procedure Evaluation    Patient: Cayetano Lunsford   MRN: [id number] : 1945          Preoperative Diagnosis: Unknown    Procedure(s):  COLONOSCOPY    Past Medical History:   Diagnosis Date     Allergic rhinitis, cause unspecified      Atrial fibrillation (H)      BPH (benign prostatic hyperplasia)      CAD (coronary artery disease)      Chronic airway obstruction, not elsewhere classified      Hyperlipidemia LDL goal <100 2010     Hypertension goal BP (blood pressure) < 130/80 10/19/2006     Obesity (BMI 30-39.9)      Perforation of tympanic membrane, unspecified     Right TM     Tachycardia, unspecified      Type 2 diabetes, HbA1C goal < 8% (H) 2010     Unspecified cardiovascular disease      Past Surgical History:   Procedure Laterality Date     CARDIAC SURGERY       COLONOSCOPY  3/31/2011     COLONOSCOPY N/A 2018    Procedure: COMBINED COLONOSCOPY, SINGLE OR MULTIPLE BIOPSY/POLYPECTOMY BY BIOPSY;;  Surgeon: Juan Simon DO;  Location:  GI     CORONARY ANGIOGRAPHY ADULT ORDER   and 2008 at [facility]. 2008- No interventions     HC REMOVAL OF NAIL PLATE SIMPLE SINGLE  11/10/2011    Right 2nd Toenail     HERNIA REPAIR      left inguinal     SURGICAL HISTORY OF -       right ear graft     SURGICAL HISTORY OF -       right shoulder surgery     SURGICAL HISTORY OF -       back surgery     SURGICAL HISTORY OF -       vasectomy       Anesthesia Evaluation     .             ROS/MED HX    ENT/Pulmonary:     (+)COPD, , . .   (-) tobacco use   Neurologic:       Cardiovascular:     (+) Dyslipidemia, hypertension--CAD, --. : . . . :. dysrhythmias a-fib, .       METS/Exercise Tolerance:     Hematologic:         Musculoskeletal:         GI/Hepatic:         Renal/Genitourinary:     (+) chronic renal disease,       Endo:     (+) type II DM Using insulin - not using insulin pump Obesity, .   (-) Type I DM   Psychiatric:         Infectious Disease:        "  Malignancy:         Other:                          Physical Exam      Airway   Mallampati: II  TM distance: >3 FB  Neck ROM: limited    Dental   (+) upper dentures and lower dentures    Cardiovascular   Rhythm and rate: regular  (-) no murmur    Pulmonary    breath sounds clear to auscultation            Lab Results   Component Value Date    WBC 7.9 03/13/2017    HGB 12.4 (L) 03/13/2017    HCT 39.4 (L) 03/13/2017     03/13/2017    SED 12 03/24/2011     05/01/2018    POTASSIUM 4.2 05/01/2018    CHLORIDE 103 05/01/2018    CO2 26 05/01/2018    BUN 13 05/01/2018    CR 0.77 05/01/2018     (H) 05/01/2018    LEBRON 8.8 05/01/2018    PHOS 3.8 12/29/2005    MAG 2.0 12/29/2005    ALBUMIN 3.8 03/13/2017    PROTTOTAL 7.0 03/13/2017    ALT 22 03/13/2017    AST 20 03/13/2017    GGT 14 02/09/2016    ALKPHOS 60 03/13/2017    BILITOTAL 0.4 03/13/2017    PTT 29 04/30/2008    INR 2.7 (A) 08/27/2019    TSH 3.25 03/13/2017    T4 0.96 04/01/2013       Preop Vitals  BP Readings from Last 3 Encounters:   09/06/19 (!) 176/107   08/28/19 (!) 144/78   08/06/19 (!) 144/66    Pulse Readings from Last 3 Encounters:   08/28/19 66   07/18/19 76   06/21/19 80      Resp Readings from Last 3 Encounters:   09/06/19 16   07/18/19 18   03/06/19 14    SpO2 Readings from Last 3 Encounters:   09/06/19 95%   03/11/19 92%   03/06/19 95%      Temp Readings from Last 1 Encounters:   08/28/19 36.6  C (97.9  F) (Oral)    Ht Readings from Last 1 Encounters:   09/06/19 1.727 m (5' 8\")      Wt Readings from Last 1 Encounters:   09/06/19 90.7 kg (200 lb)    Estimated body mass index is 30.41 kg/m  as calculated from the following:    Height as of this encounter: 1.727 m (5' 8\").    Weight as of this encounter: 90.7 kg (200 lb).       Anesthesia Plan      History & Physical Review  History and physical reviewed and following examination; no interval change.    ASA Status:  3 .    NPO Status:  > 8 hours    Plan for MAC with Intravenous induction. " Maintenance will be TIVA.  Reason for MAC:  Difficulty with conscious sedation (QS)         Postoperative Care      Consents  Anesthetic plan, risks, benefits and alternatives discussed with:  Patient.  Use of blood products discussed: No .   .                 Lux Roberts MD

## 2019-09-08 NOTE — PROGRESS NOTES
SUBJECTIVE/OBJECTIVE:                Cayetano Lunsford is a 74 year old male coming in for a follow-up visit(3-11-19) for Medication Therapy Management.  He was referred to me from previous pcp Dr. Lewis.     2019--cece [facility] outcomes.     Chief Complaint:   Here for yearly labs .     1 small polyp from last week colonoscopy.         Tobacco: No tobacco use  Alcohol: not currently using    Medication Adherence/Access:  no issues reported    Diabetes:  Pt currently taking metformin 1gm bid, Novolin N- 4 units AM before breakfast and 12 units in PM-at dinner, Novolin-R 4 units with breakfast and 0 units of R at dinner unless >200- 2 units then. -rare.    Usually has a bedtime snack-light  To hold him overnight .   SMBG: 3-4 times daily. Ranges (per pt report): see chart below  Symptoms of low blood sugar? Fingers and lips tingle, shaky and sweaty. Frequency of hypoglycemia? Twice less than 70 in the last 4 weeks. Treating with small amount of orange juice.   Recent symptoms of high blood sugar? none.  Eye exam: up to date   Foot exam:  due  Microalbumin is not < 30 mg/g. Pt is taking an ACEi/ARB not at max dose.  Aspirin: Not taking due to anticoagulation therapy and increased risk of bleeding      7 days =133, 14 days =139, 30 days =139.     Date FBG/ 2hours post Lunch/2hours post Dinner /2hours post    9-9-19 156  8am      9-8 161  9am 117 622pm 137 1132pm    9-7 201 815am 161 629pm 129 11pm    9-6 130 814am 186 2pm 115 610pm    9-6- colonoscopy prep day 116 631am  92 225am  85 211am  61 149am    9-5 150 834am 148 446pm                Lab Results   Component Value Date    A1C 5.5 09/09/2019    A1C 5.3 03/11/2019    A1C 5.5 10/31/2018    A1C 5.3 05/01/2018    A1C 6.0 11/02/2017           COPD: Current medications: Short-Acting Bronchodilator: Albuterol MDI and pt reports using the duo-nebs 3 times per day (am, mid day,and pm-he feels better when he does this tid  ) but cold weather triggers more use.  LAMA/LABA-  "Stiolto Respimat 2 puff(s) once daily.     Pt is not experiencing side effects.   Pt reports the following symptoms: none.  Pt does have an COPD Action Plan on file.   Has spirometry been completed: Yes   PIF was completed today: No    Oxygen 92% today in clinic.       Hypertension: Current medications include Losartan 100mg ./day .  Patient does not self-monitor BP.  Patient reports no current medication side effects.    BP Readings from Last 3 Encounters:   09/09/19 (!) 142/62   09/06/19 (!) 153/61   08/28/19 (!) 144/78       BP Readings from Last 1 Encounters:   09/09/19 (!) 142/62     Pulse Readings from Last 1 Encounters:   09/09/19 63     Wt Readings from Last 1 Encounters:   09/09/19 90.3 kg (199 lb)     Ht Readings from Last 1 Encounters:   09/06/19 1.727 m (5' 8\")     Estimated body mass index is 30.26 kg/m  as calculated from the following:    Height as of 9/6/19: 1.727 m (5' 8\").    Weight as of this encounter: 90.3 kg (199 lb).    Temp Readings from Last 1 Encounters:   09/09/19 98.4  F (36.9  C) (Oral)           ASSESSMENT:              Current medications were reviewed today as discussed above.      Medication Adherence: good, no issues identified    Diabetes: Stable. Patient is meeting A1c goal of < 7%. Self monitoring of blood glucose is at goal of fasting  mg/dL and post prandial < 150 mg/dL. Pt would benefit from ideal SMBG: Check blood sugars pre-prandial, 2 hours post-prandial, and with symptoms of hypoglycemia  Basal Insulin (NPH) :  Decrease PM dose to 10 units and call if anymore bs's <70.   Bolus / Rapid Acting Insulin (R) : Decrease am dose to 2 units , donot use to correct any high bs's now.   Metformin :  stay on the same dose.  Updated Hemoglobin A1c-5.5% -excellent.     COPD: Stable. Patient would benefit from : no med changes today .      Hypertension: Stable. Patient is meeting BP goal of < 140/90mmHg.        PLAN:                    1. A1c today is 5.5 % --congrats excellent " control.    Stay on metformin twice daily , due to low blood sugars before dinner --lets reduce your morning NPh insulin to 2 units and Novolin-R -2 units with breakfast , Pm NPh--reduce to 10 units with dinner. donot use extra R insulin to correct a higher blood sugar.    Call me if any blood sugars <70.         It was great to speak with you today.  I value your experience and would be very thankful for your time with providing feedback on our clinic survey. You may receive a survey via email or text message in the next few days.     Next MTM visit:  See me in 3 months - Monday 12-9-19 at 11;10am.  Please bring blood sugar meter to the visit.     I spent 30 minutes with this patient today. All changes were made via collaborative practice agreement with .Dmitri Andres   A copy of the visit note was provided to the patient's primary care provider.         The patient declined a summary of these recommendations as an after visit summary.    Cheko Pereira Rph.  Medication Therapy Management Provider  [phone]

## 2019-09-09 ENCOUNTER — OFFICE VISIT (OUTPATIENT)
Dept: PHARMACY | Facility: CLINIC | Age: 74
End: 2019-09-09
Payer: COMMERCIAL

## 2019-09-09 VITALS
DIASTOLIC BLOOD PRESSURE: 62 MMHG | TEMPERATURE: 98.4 F | OXYGEN SATURATION: 92 % | WEIGHT: 199 LBS | SYSTOLIC BLOOD PRESSURE: 142 MMHG | RESPIRATION RATE: 14 BRPM | HEART RATE: 63 BPM | BODY MASS INDEX: 30.26 KG/M2

## 2019-09-09 DIAGNOSIS — E11.22 TYPE 2 DIABETES MELLITUS WITH STAGE 1 CHRONIC KIDNEY DISEASE, WITH LONG-TERM CURRENT USE OF INSULIN (H): Primary | ICD-10-CM

## 2019-09-09 DIAGNOSIS — J44.9 CHRONIC OBSTRUCTIVE PULMONARY DISEASE, UNSPECIFIED COPD TYPE (H): ICD-10-CM

## 2019-09-09 DIAGNOSIS — N18.1 TYPE 2 DIABETES MELLITUS WITH STAGE 1 CHRONIC KIDNEY DISEASE, WITH LONG-TERM CURRENT USE OF INSULIN (H): ICD-10-CM

## 2019-09-09 DIAGNOSIS — Z79.4 TYPE 2 DIABETES MELLITUS WITH STAGE 1 CHRONIC KIDNEY DISEASE, WITH LONG-TERM CURRENT USE OF INSULIN (H): ICD-10-CM

## 2019-09-09 DIAGNOSIS — E11.9 TYPE 2 DIABETES, HBA1C GOAL < 7% (H): ICD-10-CM

## 2019-09-09 DIAGNOSIS — I10 ESSENTIAL HYPERTENSION WITH GOAL BLOOD PRESSURE LESS THAN 140/90: ICD-10-CM

## 2019-09-09 DIAGNOSIS — I10 HYPERTENSION GOAL BP (BLOOD PRESSURE) < 140/90: ICD-10-CM

## 2019-09-09 DIAGNOSIS — Z79.4 TYPE 2 DIABETES MELLITUS WITH STAGE 1 CHRONIC KIDNEY DISEASE, WITH LONG-TERM CURRENT USE OF INSULIN (H): Primary | ICD-10-CM

## 2019-09-09 DIAGNOSIS — E11.22 TYPE 2 DIABETES MELLITUS WITH STAGE 1 CHRONIC KIDNEY DISEASE, WITH LONG-TERM CURRENT USE OF INSULIN (H): ICD-10-CM

## 2019-09-09 DIAGNOSIS — N18.1 TYPE 2 DIABETES MELLITUS WITH STAGE 1 CHRONIC KIDNEY DISEASE, WITH LONG-TERM CURRENT USE OF INSULIN (H): Primary | ICD-10-CM

## 2019-09-09 LAB
ALBUMIN SERPL-MCNC: 3.5 G/DL (ref 3.4–5)
ALP SERPL-CCNC: 73 U/L (ref 40–150)
ALT SERPL W P-5'-P-CCNC: 34 U/L (ref 0–70)
ANION GAP SERPL CALCULATED.3IONS-SCNC: 6 MMOL/L (ref 3–14)
AST SERPL W P-5'-P-CCNC: 32 U/L (ref 0–45)
BILIRUB SERPL-MCNC: 0.2 MG/DL (ref 0.2–1.3)
BUN SERPL-MCNC: 17 MG/DL (ref 7–30)
CALCIUM SERPL-MCNC: 8.2 MG/DL (ref 8.5–10.1)
CHLORIDE SERPL-SCNC: 103 MMOL/L (ref 94–109)
CHOLEST SERPL-MCNC: 159 MG/DL
CO2 SERPL-SCNC: 27 MMOL/L (ref 20–32)
COPATH REPORT: NORMAL
CREAT SERPL-MCNC: 0.7 MG/DL (ref 0.66–1.25)
CREAT UR-MCNC: 38 MG/DL
GFR SERPL CREATININE-BSD FRML MDRD: >90 ML/MIN/{1.73_M2}
GLUCOSE SERPL-MCNC: 170 MG/DL (ref 70–99)
HBA1C MFR BLD: 5.5 % (ref 0–5.6)
HDLC SERPL-MCNC: 38 MG/DL
LDLC SERPL CALC-MCNC: 68 MG/DL
MICROALBUMIN UR-MCNC: 25 MG/L
MICROALBUMIN/CREAT UR: 65.97 MG/G CR (ref 0–17)
NONHDLC SERPL-MCNC: 121 MG/DL
POTASSIUM SERPL-SCNC: 4.8 MMOL/L (ref 3.4–5.3)
PROT SERPL-MCNC: 6.7 G/DL (ref 6.8–8.8)
SODIUM SERPL-SCNC: 136 MMOL/L (ref 133–144)
T4 FREE SERPL-MCNC: 0.91 NG/DL (ref 0.76–1.46)
TRIGL SERPL-MCNC: 264 MG/DL
TSH SERPL DL<=0.005 MIU/L-ACNC: 4.48 MU/L (ref 0.4–4)

## 2019-09-09 PROCEDURE — 82043 UR ALBUMIN QUANTITATIVE: CPT | Performed by: FAMILY MEDICINE

## 2019-09-09 PROCEDURE — 83036 HEMOGLOBIN GLYCOSYLATED A1C: CPT | Performed by: FAMILY MEDICINE

## 2019-09-09 PROCEDURE — 84443 ASSAY THYROID STIM HORMONE: CPT | Performed by: FAMILY MEDICINE

## 2019-09-09 PROCEDURE — 36415 COLL VENOUS BLD VENIPUNCTURE: CPT | Performed by: FAMILY MEDICINE

## 2019-09-09 PROCEDURE — 84439 ASSAY OF FREE THYROXINE: CPT | Performed by: FAMILY MEDICINE

## 2019-09-09 PROCEDURE — 80053 COMPREHEN METABOLIC PANEL: CPT | Performed by: FAMILY MEDICINE

## 2019-09-09 PROCEDURE — 80061 LIPID PANEL: CPT | Performed by: FAMILY MEDICINE

## 2019-09-09 PROCEDURE — 99207 ZZC NO CHARGE LOS: CPT | Performed by: PHARMACIST

## 2019-09-09 NOTE — PATIENT INSTRUCTIONS
Recommendations from today's MTM visit:                                                      1. A1c today is 5.5 % --congrats excellent control.    Stay on metformin twice daily , due to low blood sugars before dinner --lets reduce your morning NPh insulin to 2 units and Novolin-R -2 units with breakfast , Pm NPh--reduce to 10 units with dinner. donot use extra R insulin to correct a higher blood sugar.    Call me if any blood sugars <70.         It was great to speak with you today.  I value your experience and would be very thankful for your time with providing feedback on our clinic survey. You may receive a survey via email or text message in the next few days.     Next MTM visit:  See me in 3 months - Monday 12-9-19 at 11;10am.  Please bring blood sugar meter to the visit.     To schedule another MTM appointment, please call the clinic directly or you may call the MTM scheduling line at [phone] or toll-free at [phone].     My Clinical Pharmacist's contact information:                                                      It was a pleasure talking with you today!  Please feel free to contact me with any questions or concerns you have.      Cheko Pereira Rph.  Medication Therapy Management Provider  [phone]

## 2019-09-10 ENCOUNTER — TELEPHONE (OUTPATIENT)
Dept: FAMILY MEDICINE | Facility: CLINIC | Age: 74
End: 2019-09-10

## 2019-09-10 ENCOUNTER — OFFICE VISIT (OUTPATIENT)
Dept: NEUROLOGY | Facility: CLINIC | Age: 74
End: 2019-09-10
Payer: COMMERCIAL

## 2019-09-10 VITALS
SYSTOLIC BLOOD PRESSURE: 129 MMHG | TEMPERATURE: 97.5 F | BODY MASS INDEX: 30.31 KG/M2 | WEIGHT: 200 LBS | HEIGHT: 68 IN | OXYGEN SATURATION: 95 % | HEART RATE: 76 BPM | DIASTOLIC BLOOD PRESSURE: 64 MMHG

## 2019-09-10 DIAGNOSIS — G25.0 BENIGN FAMILIAL TREMOR: Primary | ICD-10-CM

## 2019-09-10 PROCEDURE — 99214 OFFICE O/P EST MOD 30 MIN: CPT | Performed by: PSYCHIATRY & NEUROLOGY

## 2019-09-10 RX ORDER — PRIMIDONE 50 MG/1
TABLET ORAL
Qty: 990 TABLET | Refills: 1 | Status: SHIPPED | OUTPATIENT
Start: 2019-09-10 | End: 2019-11-18

## 2019-09-10 ASSESSMENT — MIFFLIN-ST. JEOR: SCORE: 1616.69

## 2019-09-10 NOTE — TELEPHONE ENCOUNTER
Pt returned call    Pt had eye exam done August 19, 2019 at [facility] by Dr Masha Sherwood RN, BS  Clinical Nurse Triage.

## 2019-09-10 NOTE — TELEPHONE ENCOUNTER
Summary:    Patient is due/failing the following:   Eye exam    Action needed:   Eye exam-per pts chart patient is up to date-can we get records or date and place when patient had last and route to abstract.    Type of outreach:    routed to panel pool    Questions for provider review:    None                                                                                                                                    BRUCE Guthrie       Chart routed to Care Team .

## 2019-09-10 NOTE — LETTER
9/10/2019         RE: Cayetano Lunsford  [address]  [facility] [address]        Dear Colleague,    Thank you for referring your patient, Cayetano Lunsford, to the [facility]. Please see a copy of my visit note below.    ESTABLISHED PATIENT NEUROLOGY NOTE    DATE OF VISIT: 9/10/2019  CLINIC LOCATION: [facility]  MRN: [id number]  PATIENT NAME: Cayetano Lunsford  YOB: 1945    PCP: Dmitri Andres MD    REASON FOR VISIT:   Chief Complaint   Patient presents with     Follow Up     Pt states that the right hand is a little worse now other than that about the same.      SUBJECTIVE:                                                      HISTORY OF PRESENT ILLNESS: Patient is here for follow up regarding essential tremor.  He was last seen on 03/6/2019.  At that time no medication changes were made due to reported stability of the tremor. Please refer to my initial/other prior notes for further information.    Since the last visit, the patient reports that overall his hand tremor is stable, though possibly slightly worse in the right hand.  He is on propranolol 120 mg twice daily and 150/150/200 mg of primidone daily without noticeable side effects.  He wonders if the dose of primidone could be increased.  He denies interval development of new neurological symptoms.    On review of systems, patient endorses no additional active complaints.  He reports that diabetes remains under good control with recent hemoglobin A1C of 5.5.  Medications, allergies, family and social history were also reviewed. There are no changes reported by patient.  REVIEW OF SYSTEMS:                                                    10-system review was completed. Pertinent positives are included in HPI. The remainder of ROS is negative.  EXAM:                                                    Physical Exam:   Vitals: BP (!) 150/68 (BP Location: Right arm, Patient Position:  "Sitting, Cuff Size: Adult Regular)   Pulse 76   Temp 97.5  F (36.4  C) (Oral)   Ht 1.727 m (5' 8\")   Wt 90.7 kg (200 lb)   SpO2 95%   BMI 30.41 kg/m     Recheck blood pressure: /64 (BP Location: Left arm, Patient Position: Sitting, Cuff Size: Adult Regular)   Pulse 76   Temp 97.5  F (36.4  C) (Oral)   Ht 1.727 m (5' 8\")   Wt 90.7 kg (200 lb)   SpO2 95%   BMI 30.41 kg/m   .  General: pt is in NAD, cooperative.  Skin: normal turgor, moist mucous membranes, no lesions/rashes noticed.  HEENT: ATNC, white sclera, normal conjunctiva.  Respiratory: Symmetric lung excursion, no accessory respiratory muscle use.  Abdomen: Non distended.  Neurological: awake, cooperative, follows commands, mild dysarthria (chronic), no aphasia noted, cranial nerves II-XII: no ptosis, extraocular motility is full, face is symmetric, tongue is midline, equally moves all extremities, intermittent mild to moderate postural and action bilateral hand tremor, no dysmetria bilaterally, casual gait is normal.  ASSESSMENT AND PLAN:                                                    Assessment: 74-year-old male patient with essential tremor presents for follow-up.  He reports that his symptoms are stable, but right hand tremor is slightly worse.  He is on 120 mg of propranolol twice daily and 500 mg of primidone daily without noticeable side effects.  He wonders if dose of primidone could be further increased.    We had a detailed discussion regarding current symptoms, available treatment options, and the plan.  We reviewed additional treatment options that include low-dose of gabapentin or topiramate versus increasing the primidone dose up to 750 mg daily.  We might also consider increasing propranolol dose, but it should be done as caution due to possibility of bradycardia, especially in light of concurrent diabetes mellitus.  We decided to increase the dose of primidone.    Rich to follow up with Primary Care provider regarding " elevated blood pressure.     Diagnoses:    ICD-10-CM    1. 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 We decided to increase the dose of Primidone and continue Propranolol without changes.    Next follow-up appointment is in the next 6 months or earlier if needed.    Total Time: 26 minutes with > 50% spent counseling the patient on stated above assessment and recommendations.    Bong Yoo MD  / Neurology      Again, thank you for allowing me to participate in the care of your patient.        Sincerely,        Bong Yoo MD

## 2019-09-10 NOTE — PATIENT INSTRUCTIONS
AFTER VISIT SUMMARY (AVS):    At today's visit we thoroughly reviewed current symptoms, available treatment options, and the plan. We decided to increase the dose of Primidone and continue Propranolol without changes.    Next follow-up appointment is in the next 6 months or earlier if needed.    Please do not hesitate to call me with any questions or concerns.    Thanks.

## 2019-09-16 DIAGNOSIS — I48.91 ATRIAL FIBRILLATION, UNSPECIFIED TYPE (H): ICD-10-CM

## 2019-09-16 DIAGNOSIS — R35.0 BENIGN PROSTATIC HYPERPLASIA WITH URINARY FREQUENCY: ICD-10-CM

## 2019-09-16 DIAGNOSIS — N40.1 BENIGN PROSTATIC HYPERPLASIA WITH URINARY FREQUENCY: ICD-10-CM

## 2019-09-16 NOTE — TELEPHONE ENCOUNTER
PT SEE'S DR. ANDRES NOW; (no longer Dr. Debbie Lewis) please send refill request to Dr. Dmitri Andres in AV.    Thanks!  Jolynn Wynn, Pharmacy [facility]  [phone]

## 2019-09-16 NOTE — TELEPHONE ENCOUNTER
"Requested Prescriptions   Pending Prescriptions Disp Refills     tamsulosin (FLOMAX) 0.4 MG capsule [Pharmacy Med Name: TAMSULOSIN HCL 0.4MG CAPS] 90 capsule 1     Sig: TAKE ONE CAPSULE BY MOUTH EVERY DAY  Last Written Prescription Date:  2/27/2019  Last Fill Quantity: 90 capsule,  # refills: 1   Last Office Visit: 8/28/2019   Future Office Visit:    Next 5 appointments (look out 90 days)    Dec 09, 2019 11:10 AM CST  Pharmacist visit with Cheko Pereira RPH, CR EXAM ROOM 33  [facility]) [address] [facility] [address]  [phone]              Alpha Blockers Failed - 9/16/2019  2:22 PM        Failed - Recent (12 mo) or future (30 days) visit within the authorizing provider's specialty     Patient had office visit in the last 12 months or has a visit in the next 30 days with authorizing provider or within the authorizing provider's specialty.  See \"Patient Info\" tab in inbasket, or \"Choose Columns\" in Meds & Orders section of the refill encounter.            Passed - Blood pressure under 140/90 in past 12 months     BP Readings from Last 3 Encounters:   09/10/19 129/64   09/09/19 (!) 142/62   09/06/19 (!) 153/61           Passed - Patient does not have Tadalafil, Vardenafil, or Sildenafil on their medication list        Passed - Medication is active on med list        Passed - Patient is 18 years of age or older     _________________________________________________________________       warfarin ANTICOAGULANT (COUMADIN) 5 MG tablet    Last Written Prescription Date:  12/13/2018  Last Fill Quantity: 150 tablet,   # refills: 2  Last Office Visit: 8/28/2019  Future Office visit:    Next 5 appointments (look out 90 days)    Dec 09, 2019 11:10 AM CST  Pharmacist visit with Cheko Pereira RPH, CR EXAM ROOM 33  [facility] ([facility]) [address] [facility] [place] [address] [phone]         Routing refill " request to provider for review/approval because:  Drug not on the [facility] or [facility] refill protocol or controlled substance

## 2019-09-17 RX ORDER — TAMSULOSIN HYDROCHLORIDE 0.4 MG/1
CAPSULE ORAL
Qty: 90 CAPSULE | Refills: 1 | Status: SHIPPED | OUTPATIENT
Start: 2019-09-17 | End: 2020-03-09

## 2019-09-17 RX ORDER — WARFARIN SODIUM 5 MG/1
TABLET ORAL
Qty: 150 TABLET | Refills: 1 | Status: SHIPPED | OUTPATIENT
Start: 2019-09-17 | End: 2019-09-19

## 2019-09-17 NOTE — TELEPHONE ENCOUNTER
LOV with Dr. Andres 7/18/2019   51 y/o M w/ recent CVA, hyperlipidemia , iv drug abuse came in with weakness and diarrhea.      - Weakness.    Deconditioning.  secondary to persistent diarrhea and deconditioning due to recent stroke .   PT/OT   continue with iv fluids.   tolerating po lactose free diet.      -Diarrhea, unspecified type.  Plan: Follow up Stool studies   C.diff -neg    HIV negative.  Continue with  IV fluids .   - continue with   imodium  and lomitil   Gi follow up appreciated. Wednesday planned for colonoscopy.     # Herpes Zoster right upper back - T1 / T2 dermatome  - sp precautions  -  continue with Valacyclovir.     # Hyperlipemia.   Plan: continue wih statin.     #CVA (cerebral vascular accident).  Plan: continue with statin and ASA .  Continue with supportive care.  Needs ELIAZAR.     #CKD (chronic kidney disease), stage IV.  Cr slightly improved.    with CT imaging showing left atrophic kidney.    rhabdomyolysis -recently in last admission last month  + hemodynamic injury from cocaine abuse.   -monitor creatinine trend and urine output  -avoid nephrotoxins, ace/arb, imaging contrast.   Renal following the patient    - Thrush in the throat.    Plan: Continue with fluconazole.     - Headache.   Plan: Likely tension headache - continue with Tylenol PRN.    - Prophylactic measure.    Plan: DVT - px- Heparin s/q.

## 2019-09-19 ENCOUNTER — ANTICOAGULATION THERAPY VISIT (OUTPATIENT)
Dept: NURSING | Facility: CLINIC | Age: 74
End: 2019-09-19
Payer: COMMERCIAL

## 2019-09-19 DIAGNOSIS — I48.0 PAROXYSMAL ATRIAL FIBRILLATION (H): ICD-10-CM

## 2019-09-19 DIAGNOSIS — I48.91 ATRIAL FIBRILLATION, UNSPECIFIED TYPE (H): ICD-10-CM

## 2019-09-19 DIAGNOSIS — Z79.01 LONG TERM CURRENT USE OF ANTICOAGULANT THERAPY: ICD-10-CM

## 2019-09-19 DIAGNOSIS — Z79.01 LONG TERM CURRENT USE OF ANTICOAGULANTS WITH INR GOAL OF 2.0-3.0: Primary | ICD-10-CM

## 2019-09-19 LAB — INR POINT OF CARE: 1.9 (ref 0.86–1.14)

## 2019-09-19 PROCEDURE — 36416 COLLJ CAPILLARY BLOOD SPEC: CPT

## 2019-09-19 PROCEDURE — 85610 PROTHROMBIN TIME: CPT | Mod: QW

## 2019-09-19 PROCEDURE — 99207 ZZC NO CHARGE NURSE ONLY: CPT

## 2019-09-19 RX ORDER — WARFARIN SODIUM 5 MG/1
TABLET ORAL
Qty: 150 TABLET | Refills: 1 | Status: SHIPPED | OUTPATIENT
Start: 2019-09-19 | End: 2019-10-24

## 2019-09-19 NOTE — PROGRESS NOTES
"ANTICOAGULATION FOLLOW-UP CLINIC VISIT    Patient Name:  Cayetano Lunsford  Date:  2019  Contact Type:  Face to Face    SUBJECTIVE:  Patient Findings     Positives:   Change in health (Pt states 1 \"small\" polyp was removed with colonoscopy on ), Missed doses (Intentional hold - for colonoscopy), Change in medications (Primidone dose was increased on 09/10 and again on --see med list.  )    Comments:   I increased patient's weekly Warfarin dose 8% since Primidone dose has been increased x 2.  Per Micromedex:Concurrent use of PRIMIDONE and WARFARIN may result in decreased anticoagulant effectiveness.         Clinical Outcomes     Negatives:   Major bleeding event, Thromboembolic event, Anticoagulation-related hospital admission, Anticoagulation-related ED visit, Anticoagulation-related fatality    Comments:   I increased patient's weekly Warfarin dose 8% since Primidone dose has been increased x 2.  Per Micromedex:Concurrent use of PRIMIDONE and WARFARIN may result in decreased anticoagulant effectiveness.            OBJECTIVE    INR Protime   Date Value Ref Range Status   2019 1.9 (A) 0.86 - 1.14 Final       ASSESSMENT / PLAN  INR assessment SUB    Recheck INR In: 1 WEEK    INR Location Clinic      Anticoagulation Summary  As of 2019    INR goal:   2.0-3.0   TTR:   72.4 % (3.4 y)   INR used for dosin.9! (2019)   Warfarin maintenance plan:   7.5 mg (5 mg x 1.5) every Tue, Thu, Sat; 10 mg (5 mg x 2) all other days   Full warfarin instructions:   : 10 mg; : 7.5 mg; : 10 mg; : 7.5 mg; Otherwise 7.5 mg every Tue, Thu, Sat; 10 mg all other days   Weekly warfarin total:   62.5 mg   Plan last modified:   Tatyana Akers RN (2019)   Next INR check:   2019   Priority:   INR   Target end date:       Indications    Long-term (current) use of anticoagulants [Z79.01] [Z79.01]  Atrial fibrillation (H) [I48.91]             Anticoagulation Episode Summary     INR " check location:       Preferred lab:       Send INR reminders to:   [employer] [place]    Comments:    Prefers AVS printed.       Anticoagulation Care Providers     Provider Role Specialty Phone number    Dmitri Andres MD [facility] [phone]            See the Encounter Report to view Anticoagulation Flowsheet and Dosing Calendar (Go to Encounters tab in chart review, and find the Anticoagulation Therapy Visit)        Tatyana Akers RN

## 2019-09-26 ENCOUNTER — ANTICOAGULATION THERAPY VISIT (OUTPATIENT)
Dept: NURSING | Facility: CLINIC | Age: 74
End: 2019-09-26
Payer: COMMERCIAL

## 2019-09-26 DIAGNOSIS — I48.0 PAROXYSMAL ATRIAL FIBRILLATION (H): ICD-10-CM

## 2019-09-26 DIAGNOSIS — Z79.01 LONG TERM CURRENT USE OF ANTICOAGULANTS WITH INR GOAL OF 2.0-3.0: ICD-10-CM

## 2019-09-26 DIAGNOSIS — Z79.01 LONG TERM CURRENT USE OF ANTICOAGULANT THERAPY: ICD-10-CM

## 2019-09-26 LAB
CAPILLARY BLOOD COLLECTION: NORMAL
INR PPP: 2.3 (ref 0.86–1.14)

## 2019-09-26 PROCEDURE — 36416 COLLJ CAPILLARY BLOOD SPEC: CPT | Performed by: FAMILY MEDICINE

## 2019-09-26 PROCEDURE — 99207 ZZC NO CHARGE NURSE ONLY: CPT

## 2019-09-26 PROCEDURE — 85610 PROTHROMBIN TIME: CPT | Performed by: FAMILY MEDICINE

## 2019-09-26 NOTE — PROGRESS NOTES
ANTICOAGULATION FOLLOW-UP CLINIC VISIT    Patient Name:  Cayetano Lunsford  Date:  2019  Contact Type:  Face to Face--patient went to lab for POC INR due to my schedule being full; however, I saw patient in hallway so I scheduled his next INR and gave him the printed Warfarin calendar.    SUBJECTIVE:  Patient Findings     Comments:   The patient was assessed for diet, medication, and activity level changes, missed or extra doses, bruising or bleeding, with no problem findings.          Clinical Outcomes     Negatives:   Major bleeding event, Thromboembolic event, Anticoagulation-related hospital admission, Anticoagulation-related ED visit, Anticoagulation-related fatality    Comments:   The patient was assessed for diet, medication, and activity level changes, missed or extra doses, bruising or bleeding, with no problem findings.             OBJECTIVE    INR   Date Value Ref Range Status   2019 2.30 (H) 0.86 - 1.14 Final       ASSESSMENT / PLAN  INR assessment THER    Recheck INR In: 2 WEEKS    INR Location Clinic      Anticoagulation Summary  As of 2019    INR goal:   2.0-3.0   TTR:   72.4 % (3.5 y)   INR used for dosin.30 (2019)   Warfarin maintenance plan:   7.5 mg (5 mg x 1.5) every Tue, Thu, Sat; 10 mg (5 mg x 2) all other days   Full warfarin instructions:   7.5 mg every Tue, Thu, Sat; 10 mg all other days   Weekly warfarin total:   62.5 mg   No change documented:   Tatyana Akers, RN   Plan last modified:   Tatyana Akers RN (2019)   Next INR check:   10/10/2019   Priority:   INR   Target end date:       Indications    Long-term (current) use of anticoagulants [Z79.01] [Z79.01]  Atrial fibrillation (H) [I48.91]             Anticoagulation Episode Summary     INR check location:       Preferred lab:       Send INR reminders to:   [facility] [place]    Comments:    Prefers AVS printed.       Anticoagulation Care Providers     Provider Role Specialty Phone number    Mckenna  Dmitri Palmer MD [facility] [phone]            See the Encounter Report to view Anticoagulation Flowsheet and Dosing Calendar (Go to Encounters tab in chart review, and find the Anticoagulation Therapy Visit)        Tatyana Akers RN

## 2019-10-07 DIAGNOSIS — Z79.4 TYPE 2 DIABETES MELLITUS WITH HYPOGLYCEMIA WITHOUT COMA, WITH LONG-TERM CURRENT USE OF INSULIN (H): ICD-10-CM

## 2019-10-07 DIAGNOSIS — E11.649 TYPE 2 DIABETES MELLITUS WITH HYPOGLYCEMIA WITHOUT COMA, WITH LONG-TERM CURRENT USE OF INSULIN (H): ICD-10-CM

## 2019-10-07 NOTE — TELEPHONE ENCOUNTER
RELION NOVOLIN R INJ      Last Written Prescription Date:  1/17/2019  Last Fill Quantity: 30ml,   # refills: 1  Last Office Visit: 8/28/2019  Future Office visit:    Next 5 appointments (look out 90 days)    Dec 09, 2019 11:10 AM CST  Pharmacist visit with Cheko Pereira RPH, CR EXAM ROOM 33  [facility] ([facility]) [address] [place] [address]  [phone]           Routing refill request to provider for review/approval because:  Drug not on the [facility], [facility] or  [facility] refill protocol or controlled substance

## 2019-10-10 ENCOUNTER — ANTICOAGULATION THERAPY VISIT (OUTPATIENT)
Dept: NURSING | Facility: CLINIC | Age: 74
End: 2019-10-10
Payer: COMMERCIAL

## 2019-10-10 DIAGNOSIS — I48.0 PAROXYSMAL ATRIAL FIBRILLATION (H): ICD-10-CM

## 2019-10-10 DIAGNOSIS — Z79.01 LONG TERM CURRENT USE OF ANTICOAGULANT THERAPY: ICD-10-CM

## 2019-10-10 LAB — INR POINT OF CARE: 3.2 (ref 0.86–1.14)

## 2019-10-10 PROCEDURE — 36416 COLLJ CAPILLARY BLOOD SPEC: CPT

## 2019-10-10 PROCEDURE — 85610 PROTHROMBIN TIME: CPT | Mod: QW

## 2019-10-10 PROCEDURE — 99207 ZZC NO CHARGE NURSE ONLY: CPT

## 2019-10-10 NOTE — PROGRESS NOTES
ANTICOAGULATION FOLLOW-UP CLINIC VISIT    Patient Name:  Cayetano Lunsford  Date:  10/10/2019  Contact Type:  Face to Face    SUBJECTIVE:  Patient Findings     Positives:   Change in activity (Possibly less active)        Clinical Outcomes     Negatives:   Major bleeding event, Thromboembolic event, Anticoagulation-related hospital admission, Anticoagulation-related ED visit, Anticoagulation-related fatality           OBJECTIVE    INR Protime   Date Value Ref Range Status   10/10/2019 3.2 (A) 0.86 - 1.14 Final       ASSESSMENT / PLAN  INR assessment SUPRA    Recheck INR In: 2 WEEKS    INR Location Clinic      Anticoagulation Summary  As of 10/10/2019    INR goal:   2.0-3.0   TTR:   72.5 % (3.5 y)   INR used for dosing:   3.2! (10/10/2019)   Warfarin maintenance plan:   7.5 mg (5 mg x 1.5) every Tue, Thu, Sat; 10 mg (5 mg x 2) all other days   Full warfarin instructions:   7.5 mg every Tue, Thu, Sat; 10 mg all other days   Weekly warfarin total:   62.5 mg   No change documented:   Tatyana Akers RN   Plan last modified:   Tatyana Akers RN (9/19/2019)   Next INR check:   10/24/2019   Priority:   INR   Target end date:       Indications    Long-term (current) use of anticoagulants [Z79.01] [Z79.01]  Atrial fibrillation (H) [I48.91]             Anticoagulation Episode Summary     INR check location:       Preferred lab:       Send INR reminders to:   [employer] [place]    Comments:    Prefers AVS printed.       Anticoagulation Care Providers     Provider Role Specialty Phone number    Dmitri Andres MD [facility] [phone]            See the Encounter Report to view Anticoagulation Flowsheet and Dosing Calendar (Go to Encounters tab in chart review, and find the Anticoagulation Therapy Visit)        Tatyana Akers RN

## 2019-10-24 ENCOUNTER — ANTICOAGULATION THERAPY VISIT (OUTPATIENT)
Dept: NURSING | Facility: CLINIC | Age: 74
End: 2019-10-24
Payer: COMMERCIAL

## 2019-10-24 DIAGNOSIS — Z79.01 LONG TERM CURRENT USE OF ANTICOAGULANT THERAPY: ICD-10-CM

## 2019-10-24 DIAGNOSIS — I48.0 PAROXYSMAL ATRIAL FIBRILLATION (H): ICD-10-CM

## 2019-10-24 DIAGNOSIS — Z79.01 LONG TERM CURRENT USE OF ANTICOAGULANTS WITH INR GOAL OF 2.0-3.0: ICD-10-CM

## 2019-10-24 LAB — INR POINT OF CARE: 3.5 (ref 0.86–1.14)

## 2019-10-24 PROCEDURE — 99207 ZZC NO CHARGE NURSE ONLY: CPT

## 2019-10-24 PROCEDURE — 85610 PROTHROMBIN TIME: CPT | Mod: QW

## 2019-10-24 PROCEDURE — 36416 COLLJ CAPILLARY BLOOD SPEC: CPT

## 2019-10-24 RX ORDER — WARFARIN SODIUM 5 MG/1
TABLET ORAL
Qty: 150 TABLET | Refills: 1 | Status: SHIPPED | OUTPATIENT
Start: 2019-10-24 | End: 2019-11-18

## 2019-10-24 NOTE — PROGRESS NOTES
ANTICOAGULATION FOLLOW-UP CLINIC VISIT    Patient Name:  Cayetano Lunsford  Date:  10/24/2019  Contact Type:  Face to Face    SUBJECTIVE:  Patient Findings     Positives:   Change in health (Rhinorrhea which pt contributes to seasonal allergies; however, he states OTC antihistamine keeps symptoms at bay.), Change in activity (Walks the hallway in his apartment building twice per day; however, he feels he is more active in the summer.), Change in diet/appetite (Eating 4 greens per week.)    Comments:   2nd supra-therapeutic INR so I decreased patient's weekly Warfarin dose 1 step=4%.        Clinical Outcomes     Negatives:   Major bleeding event, Thromboembolic event, Anticoagulation-related hospital admission, Anticoagulation-related ED visit, Anticoagulation-related fatality    Comments:   2nd supra-therapeutic INR so I decreased patient's weekly Warfarin dose 1 step=4%.           OBJECTIVE    INR Protime   Date Value Ref Range Status   10/24/2019 3.5 (A) 0.86 - 1.14 Final       ASSESSMENT / PLAN  INR assessment SUPRA    Recheck INR In: 2 WEEKS    INR Location Clinic      Anticoagulation Summary  As of 10/24/2019    INR goal:   2.0-3.0   TTR:   71.7 % (3.5 y)   INR used for dosing:   3.5! (10/24/2019)   Warfarin maintenance plan:   10 mg (5 mg x 2) every Mon, Wed, Fri; 7.5 mg (5 mg x 1.5) all other days   Full warfarin instructions:   10 mg every Mon, Wed, Fri; 7.5 mg all other days   Weekly warfarin total:   60 mg   Plan last modified:   Tatyana Akers RN (10/24/2019)   Next INR check:   11/7/2019   Priority:   INR   Target end date:       Indications    Long-term (current) use of anticoagulants [Z79.01] [Z79.01]  Atrial fibrillation (H) [I48.91]             Anticoagulation Episode Summary     INR check location:       Preferred lab:       Send INR reminders to:   [facility] [place]    Comments:    Prefers AVS printed.       Anticoagulation Care Providers     Provider Role Specialty Phone number    Mckenna  Dmitri Palmer MD [facility] [phone]            See the Encounter Report to view Anticoagulation Flowsheet and Dosing Calendar (Go to Encounters tab in chart review, and find the Anticoagulation Therapy Visit)        Tatyana Akers RN

## 2019-10-28 ENCOUNTER — TELEPHONE (OUTPATIENT)
Dept: NEUROLOGY | Facility: CLINIC | Age: 74
End: 2019-10-28

## 2019-10-28 NOTE — TELEPHONE ENCOUNTER
I talked to pt.  He is taking 550 mg of primidone daily. 4 tabs in am, 3 tabs with dinner, and 4 tabs at HS.    PT felt tremors worsened after 2.5 weeks on the med. Started mid sept.  Eating can be a challenge d/t tremors.    Pt asked- can you go too high on dose?  Should pt back off on med to see if it helps tremors?    MANOJ Leblanc

## 2019-10-28 NOTE — TELEPHONE ENCOUNTER
Reason for Call:  Other call back and prescription - primidone (MYSOLINE) 50 MG tablet    Detailed comments: Patient would like a call back to discuss medication. Patient states that his tremors have gotten worse. Please advise, thank you!    Phone Number Patient can be reached at: Home number on file [phone] (home)    Best Time: anytime    Can we leave a detailed message on this number? YES    Call taken on 10/28/2019 at 1:26 PM by Padmini Hua

## 2019-10-29 NOTE — TELEPHONE ENCOUNTER
This was routing comment.    Please advise the patient to reduce his primidone dose to 3.5 tablet in am while staying on 3 tablets with dinner and 4 tablets at HS for 1 week. Then reduce it to 3 tablets in am and dinner, and 4 tablets at HS.   Thanks,   Bong Yoo MD         This will decrease pt from 550 mg daily to 525 daily for one week to 500 daily. (Pt seems to like the total daily dose. We had an extensive conversation about his daily dose.)    I left message for pt to call [facility] clinic triage to discuss this med change.  MANOJ Leblanc

## 2019-11-04 DIAGNOSIS — E78.5 HYPERLIPIDEMIA LDL GOAL <100: ICD-10-CM

## 2019-11-04 RX ORDER — SIMVASTATIN 80 MG
TABLET ORAL
Qty: 90 TABLET | Refills: 2 | Status: SHIPPED | OUTPATIENT
Start: 2019-11-04 | End: 2020-12-02

## 2019-11-04 NOTE — TELEPHONE ENCOUNTER
"Requested Prescriptions   Pending Prescriptions Disp Refills     simvastatin (ZOCOR) 80 MG tablet [Pharmacy Med Name: SIMVASTATIN 80MG TABS]  Last Written Prescription Date:  5/2/2019  Last Fill Quantity: 90 tablet,  # refills: 0   Last office visit: 8/28/2019 with prescribing provider:  Mckenna   Future Office Visit:   Next 5 appointments (look out 90 days)    Dec 09, 2019 11:10 AM CST  Pharmacist visit with Cheko Pereira RPH, CR EXAM ROOM 33  [facility] [address] [place] [address]  [phone]          90 tablet 0     Sig: TAKE ONE-HALF TABLET BY MOUTH EVERY NIGHT AT BEDTIME       Statins Protocol Passed - 11/4/2019 11:14 AM        Passed - LDL on file in past 12 months     Recent Labs   Lab Test 09/09/19  0947   LDL 68             Passed - No abnormal creatine kinase in past 12 months     Recent Labs   Lab Test 01/29/14  1357   CKT 68                Passed - Recent (12 mo) or future (30 days) visit within the authorizing provider's specialty     Patient has had an office visit with the authorizing provider or a provider within the authorizing providers department within the previous 12 mos or has a future within next 30 days. See \"Patient Info\" tab in inbasket, or \"Choose Columns\" in Meds & Orders section of the refill encounter.              Passed - Medication is active on med list        Passed - Patient is age 18 or older          " coffee

## 2019-11-04 NOTE — TELEPHONE ENCOUNTER
Prescription approved per [facility], [facility] or [facility] refill protocol.  Mora GARCIA - Registered Nurse  [facility]  Acute and Diagnostic Services

## 2019-11-07 ENCOUNTER — ANTICOAGULATION THERAPY VISIT (OUTPATIENT)
Dept: NURSING | Facility: CLINIC | Age: 74
End: 2019-11-07
Payer: COMMERCIAL

## 2019-11-07 DIAGNOSIS — I48.91 ATRIAL FIBRILLATION (H): ICD-10-CM

## 2019-11-07 DIAGNOSIS — Z79.01 LONG TERM CURRENT USE OF ANTICOAGULANT THERAPY: ICD-10-CM

## 2019-11-07 LAB — INR POINT OF CARE: 3.7 (ref 0.86–1.14)

## 2019-11-07 PROCEDURE — 85610 PROTHROMBIN TIME: CPT | Mod: QW

## 2019-11-07 PROCEDURE — 99207 ZZC NO CHARGE NURSE ONLY: CPT

## 2019-11-07 PROCEDURE — 36416 COLLJ CAPILLARY BLOOD SPEC: CPT

## 2019-11-07 NOTE — PROGRESS NOTES
ANTICOAGULATION FOLLOW-UP CLINIC VISIT    Patient Name:  Cayetano Lunsford  Date:  11/7/2019  Contact Type:  Face to Face    SUBJECTIVE:  Patient Findings     Positives:   Change in medications (Primidone now at 500mg/day)    Comments:   Assessed for S/S bleeding, clotting, medication, diet, health, activity and alcohol changes.          Clinical Outcomes     Negatives:   Major bleeding event, Thromboembolic event, Anticoagulation-related hospital admission, Anticoagulation-related ED visit, Anticoagulation-related fatality    Comments:   Assessed for S/S bleeding, clotting, medication, diet, health, activity and alcohol changes.             OBJECTIVE    INR Protime   Date Value Ref Range Status   11/07/2019 3.7 (A) 0.86 - 1.14 Final       ASSESSMENT / PLAN  INR assessment SUPRA    Recheck INR In: 10 DAYS    INR Location Clinic      Anticoagulation Summary  As of 11/7/2019    INR goal:   2.0-3.0   TTR:   70.9 % (3.6 y)   INR used for dosing:   3.7! (11/7/2019)   Warfarin maintenance plan:   7.5 mg (5 mg x 1.5) every day   Full warfarin instructions:   11/7: 5 mg; Otherwise 7.5 mg every day   Weekly warfarin total:   52.5 mg   Plan last modified:   Diana Cuevas [facility] (11/7/2019)   Next INR check:   11/18/2019   Priority:   INR   Target end date:       Indications    Long-term (current) use of anticoagulants [Z79.01] [Z79.01]  Atrial fibrillation (H) [I48.91]             Anticoagulation Episode Summary     INR check location:       Preferred lab:       Send INR reminders to:   [facility] [employer] [place]    Comments:    Prefers AVS printed.       Anticoagulation Care Providers     Provider Role Specialty Phone number    Dmitri Andres MD [facility] [phone]            See the Encounter Report to view Anticoagulation Flowsheet and Dosing Calendar (Go to Encounters tab in chart review, and find the Anticoagulation Therapy Visit)    Discussed that continued weaning of Primidone dose will be  a challenge that diet alone will not be able overcome due to drug interaction.  Will decrease ~12% since climbed with dose decrease (due to drug interaction effect).  Now has dropped Primidone to 500mg/day.  Will also do a dose adjustment today of a partial hold.  Encouraged to continue greens, can drink a serving of V8 instead of greens if desired.      Diana Cuevas, [facility]

## 2019-11-18 ENCOUNTER — ANTICOAGULATION THERAPY VISIT (OUTPATIENT)
Dept: NURSING | Facility: CLINIC | Age: 74
End: 2019-11-18
Payer: COMMERCIAL

## 2019-11-18 DIAGNOSIS — I48.91 ATRIAL FIBRILLATION (H): ICD-10-CM

## 2019-11-18 DIAGNOSIS — Z79.01 LONG TERM CURRENT USE OF ANTICOAGULANT THERAPY: ICD-10-CM

## 2019-11-18 DIAGNOSIS — Z79.01 LONG TERM CURRENT USE OF ANTICOAGULANTS WITH INR GOAL OF 2.0-3.0: ICD-10-CM

## 2019-11-18 DIAGNOSIS — I48.0 PAROXYSMAL ATRIAL FIBRILLATION (H): ICD-10-CM

## 2019-11-18 DIAGNOSIS — G25.0 BENIGN FAMILIAL TREMOR: ICD-10-CM

## 2019-11-18 LAB — INR POINT OF CARE: 2.8 (ref 0.86–1.14)

## 2019-11-18 PROCEDURE — 36416 COLLJ CAPILLARY BLOOD SPEC: CPT

## 2019-11-18 PROCEDURE — 99207 ZZC NO CHARGE NURSE ONLY: CPT

## 2019-11-18 PROCEDURE — 85610 PROTHROMBIN TIME: CPT | Mod: QW

## 2019-11-18 RX ORDER — WARFARIN SODIUM 5 MG/1
TABLET ORAL
Qty: 126 TABLET | Refills: 1 | Status: SHIPPED | OUTPATIENT
Start: 2019-11-18 | End: 2020-05-18

## 2019-11-18 RX ORDER — PRIMIDONE 50 MG/1
TABLET ORAL
Qty: 990 TABLET | Refills: 1 | COMMUNITY
Start: 2019-11-18 | End: 2020-03-07

## 2019-11-18 NOTE — PROGRESS NOTES
ANTICOAGULATION FOLLOW-UP CLINIC VISIT    Patient Name:  Cayetano Lunsford  Date:  2019  Contact Type:  Face to Face    SUBJECTIVE:  Patient Findings     Positives:   Change in medications (Primidone decreased to 500mg per day effective 19), Change in diet/appetite (Eating greens 3 days per week and drinking 8oz. of V8 a few times per week.)        Clinical Outcomes     Negatives:   Major bleeding event, Thromboembolic event, Anticoagulation-related hospital admission, Anticoagulation-related ED visit, Anticoagulation-related fatality           OBJECTIVE    INR Protime   Date Value Ref Range Status   2019 2.8 (A) 0.86 - 1.14 Final       ASSESSMENT / PLAN  INR assessment THER    Recheck INR In: 2 WEEKS    INR Location Clinic      Anticoagulation Summary  As of 2019    INR goal:   2.0-3.0   TTR:   70.5 % (3.6 y)   INR used for dosin.8 (2019)   Warfarin maintenance plan:   7.5 mg (5 mg x 1.5) every day   Full warfarin instructions:   7.5 mg every day   Weekly warfarin total:   52.5 mg   No change documented:   Tatyana Akers RN   Plan last modified:   Diana Cuevas [facility] (2019)   Next INR check:   2019   Priority:   Maintenance   Target end date:       Indications    Long-term (current) use of anticoagulants [Z79.01] [Z79.01]  Atrial fibrillation (H) [I48.91]             Anticoagulation Episode Summary     INR check location:       Preferred lab:       Send INR reminders to:   [facility] [place]    Comments:    Prefers AVS printed.       Anticoagulation Care Providers     Provider Role Specialty Phone number    Dmitri Andres MD [facility] [phone]            See the Encounter Report to view Anticoagulation Flowsheet and Dosing Calendar (Go to Encounters tab in chart review, and find the Anticoagulation Therapy Visit)        Tatyana Akers RN

## 2019-12-02 ENCOUNTER — ANTICOAGULATION THERAPY VISIT (OUTPATIENT)
Dept: NURSING | Facility: CLINIC | Age: 74
End: 2019-12-02
Payer: COMMERCIAL

## 2019-12-02 DIAGNOSIS — J44.9 CHRONIC OBSTRUCTIVE PULMONARY DISEASE, UNSPECIFIED COPD TYPE (H): ICD-10-CM

## 2019-12-02 DIAGNOSIS — Z79.01 LONG TERM CURRENT USE OF ANTICOAGULANT THERAPY: ICD-10-CM

## 2019-12-02 DIAGNOSIS — I48.91 ATRIAL FIBRILLATION (H): ICD-10-CM

## 2019-12-02 LAB — INR POINT OF CARE: 2.2 (ref 0.86–1.14)

## 2019-12-02 PROCEDURE — 99207 ZZC NO CHARGE NURSE ONLY: CPT

## 2019-12-02 PROCEDURE — 85610 PROTHROMBIN TIME: CPT | Mod: QW

## 2019-12-02 PROCEDURE — 36416 COLLJ CAPILLARY BLOOD SPEC: CPT

## 2019-12-02 NOTE — PROGRESS NOTES
ANTICOAGULATION FOLLOW-UP CLINIC VISIT    Patient Name:  Cayetano Lunsford  Date:  2019  Contact Type:  Face to Face    SUBJECTIVE:  Patient Findings     Comments:   The patient was assessed for diet, medication, and activity level changes, missed or extra doses, bruising or bleeding, with no problem findings.          Clinical Outcomes     Negatives:   Major bleeding event, Thromboembolic event, Anticoagulation-related hospital admission, Anticoagulation-related ED visit, Anticoagulation-related fatality    Comments:   The patient was assessed for diet, medication, and activity level changes, missed or extra doses, bruising or bleeding, with no problem findings.             OBJECTIVE    INR Protime   Date Value Ref Range Status   2019 2.2 (A) 0.86 - 1.14 Final       ASSESSMENT / PLAN  INR assessment THER    Recheck INR In: 3 WEEKS    INR Location Clinic      Anticoagulation Summary  As of 2019    INR goal:   2.0-3.0   TTR:   69.5 % (1 y)   INR used for dosin.2 (2019)   Warfarin maintenance plan:   7.5 mg (5 mg x 1.5) every day   Full warfarin instructions:   7.5 mg every day   Weekly warfarin total:   52.5 mg   No change documented:   Tatyana Akers RN   Plan last modified:   Diana Cuevas [facility] (2019)   Next INR check:   2019   Priority:   Maintenance   Target end date:       Indications    Long-term (current) use of anticoagulants [Z79.01] [Z79.01]  Atrial fibrillation (H) [I48.91]             Anticoagulation Episode Summary     INR check location:       Preferred lab:       Send INR reminders to:   [facility] [place]    Comments:    Prefers AVS printed.       Anticoagulation Care Providers     Provider Role Specialty Phone number    Dmitri Andres MD [facility] [phone]            See the Encounter Report to view Anticoagulation Flowsheet and Dosing Calendar (Go to Encounters tab in chart review, and find the Anticoagulation Therapy  Visit)        Tatyana Akers RN

## 2019-12-02 NOTE — PATIENT INSTRUCTIONS
Patient aware to watch his greens intake, including V8 consumption, and to keep portion sizes small.    Tatyana Akers RN

## 2019-12-03 RX ORDER — IPRATROPIUM BROMIDE AND ALBUTEROL SULFATE 2.5; .5 MG/3ML; MG/3ML
SOLUTION RESPIRATORY (INHALATION)
Qty: 360 ML | Refills: 1 | Status: SHIPPED | OUTPATIENT
Start: 2019-12-03 | End: 2020-03-03

## 2019-12-03 NOTE — TELEPHONE ENCOUNTER
"Requested Prescriptions   Pending Prescriptions Disp Refills     ipratropium - albuterol 0.5 mg/2.5 mg/3 mL (DUONEB) 0.5-2.5 (3) MG/3ML neb solution [Pharmacy Med Name: IPRATROPIUM-ALBUTEROL 0.5-2.5 SOLN] 360 mL 3     Sig: NEBULIZE CONTENTS OF ONE VIAL EVERY 4 HOURS AS NEEDED FOR SHORTNESS OF BREATH OR DYSPNEA       Short-Acting Beta Agonist Inhalers Protocol  Failed - 12/2/2019 10:21 AM        Failed - Asthma control assessment score within normal limits in last 6 months     Please review ACT score.           Passed - Patient is age 12 or older        Passed - Medication is active on med list        Passed - Recent (6 mo) or future (30 days) visit within the authorizing provider's specialty     Patient had office visit in the last 6 months or has a visit in the next 30 days with authorizing provider or within the authorizing provider's specialty.  See \"Patient Info\" tab in inbasket, or \"Choose Columns\" in Meds & Orders section of the refill encounter.              "

## 2019-12-06 NOTE — PROGRESS NOTES
SUBJECTIVE/OBJECTIVE:                Cayetano Lunsford is a 74 year old male coming in for a follow-up visit(9-9-19) for Medication Therapy Management.  He was referred to me from previous pcp Dr. Lewis.     2019--[facility] outcomes.     Chief Complaint:  a1c lab recheck today .          Tobacco: No tobacco use  Alcohol: not currently using    Medication Adherence/Access:  no issues reported    Diabetes:  Pt currently taking metformin 1gm bid, Novolin N- 2 units AM before breakfast and 10 units in PM-at dinner, Novolin-R -2 units with breakfast and 0 units of R at dinner unless >200- 2 units then. -rare.    Usually has a bedtime snack-light  To hold him overnight .   SMBG: 3-4 times daily. Ranges (per pt report): see chart below  Symptoms of low blood sugar? Fingers and lips tingle, shaky and sweaty. Frequency of hypoglycemia? Twice less than 70 in the last 4 weeks. Treating with small amount of orange juice.   Recent symptoms of high blood sugar? none.  Eye exam: up to date   Foot exam:  due  Microalbumin is not < 30 mg/g. Pt is taking an ACEi/ARB not at max dose.  Aspirin: Not taking due to anticoagulation therapy and increased risk of bleeding    7 days = 151 n=19, 14 days = 156, 30 days = 155 n=58.    Date FBG/ 2hours post Lunch/2hours post Dinner /2hours post    12-9-19 135 833am      12-8 177 821am 92 638pm 210 11;32pm    12-7 107 8;10am 148 543pm 151 11;35pm    12-6- 140 830am 117 620pm 124 11;17pm                  78  Is lowest bs's last 30 days . 11-23-19 before bedtime.               Lab Results   Component Value Date    A1C 6.1 12/09/2019    A1C 5.5 09/09/2019    A1C 5.3 03/11/2019    A1C 5.5 10/31/2018    A1C 5.3 05/01/2018         COPD: Current medications: Short-Acting Bronchodilator: Albuterol MDI and pt reports using the duo-nebs 3 times per day (am, mid day,and pm-he feels better when he does this tid  ) but cold weather triggers more use.  LAMA/LABA- Stiolto Respimat 2 puff(s) once daily.     Pt is  "not experiencing side effects.   Pt reports the following symptoms: none.  Pt does have an COPD Action Plan on file.   Has spirometry been completed: Yes   PIF was completed today: No    Oxygen 91% today in clinic.       Hypertension: Current medications include Losartan 100mg ./day .  Patient does not self-monitor BP.  Patient reports no current medication side effects.  BP Readings from Last 3 Encounters:   12/09/19 136/68   09/10/19 129/64   09/09/19 (!) 142/62       Hyperlipidemia: Current therapy includes : 1/2 tab of Simvastatin 80mg once daily.  Pt reports no significant myalgias or other side effects.  The 10-year ASCVD risk score (Kaitlin CONTRERAS Jr., et al., 2013) is: 51.8%    Values used to calculate the score:      Age: 74 years      Sex: Male      Is Non- : No      Diabetic: Yes      Tobacco smoker: No      Systolic Blood Pressure: 136 mmHg      Is BP treated: Yes      HDL Cholesterol: 38 mg/dL      Total Cholesterol: 159 mg/dL  Recent Labs   Lab Test 09/09/19  0947 05/01/18  0736  05/12/15  0757 04/24/14  1016   CHOL 159 163   < > 141 148   HDL 38* 42   < > 44 38*   LDL 68 89   < > 46 66   TRIG 264* 159*   < > 255* 220*   CHOLHDLRATIO  --   --   --  3.2 3.9    < > = values in this interval not displayed.           BP Readings from Last 1 Encounters:   12/09/19 136/68     Pulse Readings from Last 1 Encounters:   12/09/19 61     Wt Readings from Last 1 Encounters:   12/09/19 201 lb 1.6 oz (91.2 kg)     Ht Readings from Last 1 Encounters:   09/10/19 5' 8\" (1.727 m)     Estimated body mass index is 30.58 kg/m  as calculated from the following:    Height as of 9/10/19: 5' 8\" (1.727 m).    Weight as of this encounter: 201 lb 1.6 oz (91.2 kg).    Temp Readings from Last 1 Encounters:   09/10/19 97.5  F (36.4  C) (Oral)           ASSESSMENT:              Current medications were reviewed today as discussed above.      Medication Adherence: good, no issues identified    Diabetes: Stable. Patient " is meeting A1c goal of < 7%. Self monitoring of blood glucose is at goal of fasting  mg/dL and post prandial < 150 mg/dL. Pt would benefit from ideal SMBG: Check blood sugars pre-prandial, 2 hours post-prandial, and with symptoms of hypoglycemia  Basal Insulin (NPH) : stay on same dose , change insulin bottle q 30 days .  Bolus / Rapid Acting Insulin (R) : stay on same dose , change insulin bottle q 30 days.   Metformin :  stay on the same dose.  Updated Hemoglobin A1c-6.1% -excellent control!    COPD: Stable. Patient would benefit from : no med changes today .      Hypertension: Stable. Patient is meeting BP goal of < 140/90mmHg.      Hyperlipidemia: Stable. Pt is on moderate intensity statin which is indicated based on 2019 ACC/AHA guidelines for lipid management.      PLAN:                    1. A1c = 6.1% --excellent --with no low blood sugars <75 --that is our goal!  Remember--change your insulin vials every 30 days .         It was great to speak with you today.  I value your experience and would be very thankful for your time with providing feedback on our clinic survey. You may receive a survey via email or text message in the next few days.     Next MTM visit:  See Dr. Andres in April/may 2020--see me in the fall 2020- 11-9-2020 at 11am.     I spent 30 minutes with this patient today. All changes were made via collaborative practice agreement with Dmitri Koehler   A copy of the visit note was provided to the patient's primary care provider.         The patient declined a summary of these recommendations as an after visit summary.    Cheko Pereira [facility].  Medication Therapy Management Provider  [phone]

## 2019-12-09 ENCOUNTER — OFFICE VISIT (OUTPATIENT)
Dept: PHARMACY | Facility: CLINIC | Age: 74
End: 2019-12-09
Payer: COMMERCIAL

## 2019-12-09 VITALS
DIASTOLIC BLOOD PRESSURE: 68 MMHG | WEIGHT: 201.1 LBS | BODY MASS INDEX: 30.58 KG/M2 | HEART RATE: 61 BPM | OXYGEN SATURATION: 91 % | SYSTOLIC BLOOD PRESSURE: 136 MMHG

## 2019-12-09 DIAGNOSIS — I10 ESSENTIAL HYPERTENSION WITH GOAL BLOOD PRESSURE LESS THAN 140/90: ICD-10-CM

## 2019-12-09 DIAGNOSIS — E78.5 HYPERLIPIDEMIA LDL GOAL <100: ICD-10-CM

## 2019-12-09 DIAGNOSIS — E11.649 TYPE 2 DIABETES MELLITUS WITH HYPOGLYCEMIA WITHOUT COMA, WITH LONG-TERM CURRENT USE OF INSULIN (H): Primary | ICD-10-CM

## 2019-12-09 DIAGNOSIS — Z79.4 TYPE 2 DIABETES MELLITUS WITH HYPOGLYCEMIA WITHOUT COMA, WITH LONG-TERM CURRENT USE OF INSULIN (H): Primary | ICD-10-CM

## 2019-12-09 DIAGNOSIS — Z79.4 TYPE 2 DIABETES MELLITUS WITH STAGE 1 CHRONIC KIDNEY DISEASE, WITH LONG-TERM CURRENT USE OF INSULIN (H): ICD-10-CM

## 2019-12-09 DIAGNOSIS — N18.1 TYPE 2 DIABETES MELLITUS WITH STAGE 1 CHRONIC KIDNEY DISEASE, WITH LONG-TERM CURRENT USE OF INSULIN (H): ICD-10-CM

## 2019-12-09 DIAGNOSIS — J44.9 CHRONIC OBSTRUCTIVE PULMONARY DISEASE, UNSPECIFIED COPD TYPE (H): ICD-10-CM

## 2019-12-09 DIAGNOSIS — E11.22 TYPE 2 DIABETES MELLITUS WITH STAGE 1 CHRONIC KIDNEY DISEASE, WITH LONG-TERM CURRENT USE OF INSULIN (H): ICD-10-CM

## 2019-12-09 DIAGNOSIS — Z79.4 TYPE 2 DIABETES MELLITUS WITH HYPOGLYCEMIA WITHOUT COMA, WITH LONG-TERM CURRENT USE OF INSULIN (H): ICD-10-CM

## 2019-12-09 DIAGNOSIS — E11.649 TYPE 2 DIABETES MELLITUS WITH HYPOGLYCEMIA WITHOUT COMA, WITH LONG-TERM CURRENT USE OF INSULIN (H): ICD-10-CM

## 2019-12-09 LAB — HBA1C MFR BLD: 6.1 % (ref 0–5.6)

## 2019-12-09 PROCEDURE — 99207 ZZC NO CHARGE LOS: CPT | Performed by: PHARMACIST

## 2019-12-09 PROCEDURE — 83036 HEMOGLOBIN GLYCOSYLATED A1C: CPT | Performed by: FAMILY MEDICINE

## 2019-12-09 PROCEDURE — 36415 COLL VENOUS BLD VENIPUNCTURE: CPT | Performed by: FAMILY MEDICINE

## 2019-12-09 NOTE — PATIENT INSTRUCTIONS
Recommendations from today's MTM visit:                                                      1. A1c = 6.1% --excellent --with no low blood sugars <75 --that is our goal!  Remember--change your insulin vials every 30 days .         It was great to speak with you today.  I value your experience and would be very thankful for your time with providing feedback on our clinic survey. You may receive a survey via email or text message in the next few days.     Next MTM visit:  See Dr. Andres in April/may 2020--see me in the fall 2020- 11-9-2020 at 11am.     To schedule another MTM appointment, please call the clinic directly or you may call the MTM scheduling line at [phone] or toll-free at [phone].     My Clinical Pharmacist's contact information:                                                      It was a pleasure talking with you today!  Please feel free to contact me with any questions or concerns you have.      Cheko Pereira [facility].  Medication Therapy Management Provider  [phone]

## 2019-12-11 NOTE — TELEPHONE ENCOUNTER
"Prescription approved per [facility] [place].  Requested Prescriptions   Pending Prescriptions Disp Refills     metFORMIN (GLUCOPHAGE) 1000 MG tablet [Pharmacy Med Name: METFORMIN HCL 1000MG TABS] 180 tablet 1     Sig: TAKE ONE TABLET BY MOUTH TWICE A DAY WITH BREAKFAST AND DINNER       Biguanide Agents Passed - 12/11/2019 11:32 AM        Passed - Blood pressure less than 140/90 in past 6 months     BP Readings from Last 3 Encounters:   12/09/19 136/68   09/10/19 129/64   09/09/19 (!) 142/62                 Passed - Patient has documented LDL within the past 12 mos.     Recent Labs   Lab Test 09/09/19  0947   LDL 68             Passed - Patient has had a Microalbumin in the past 15 mos.     Recent Labs   Lab Test 09/09/19  0948   MICROL 25   UMALCR 65.97*             Passed - Patient is age 10 or older        Passed - Patient has documented A1c within the specified period of time.     If HgbA1C is 8 or greater, it needs to be on file within the past 3 months.  If less than 8, must be on file within the past 6 months.     Recent Labs   Lab Test 12/09/19  1115   A1C 6.1*             Passed - Patient's CR is NOT>1.4 OR Patient's EGFR is NOT<45 within past 12 mos.     Recent Labs   Lab Test 09/09/19  0947   GFRESTIMATED >90   GFRESTBLACK >90       Recent Labs   Lab Test 09/09/19  0947   CR 0.70             Passed - Patient does NOT have a diagnosis of CHF.        Passed - Medication is active on med list        Passed - Recent (6 mo) or future (30 days) visit within the authorizing provider's specialty     Patient had office visit in the last 6 months or has a visit in the next 30 days with authorizing provider or within the authorizing provider's specialty.  See \"Patient Info\" tab in inbasket, or \"Choose Columns\" in Meds & Orders section of the refill encounter.            Candelaria Pa RN on 12/11/2019 at 12:13 PM    "

## 2019-12-19 ENCOUNTER — ANTICOAGULATION THERAPY VISIT (OUTPATIENT)
Dept: NURSING | Facility: CLINIC | Age: 74
End: 2019-12-19
Payer: COMMERCIAL

## 2019-12-19 DIAGNOSIS — I48.91 ATRIAL FIBRILLATION (H): ICD-10-CM

## 2019-12-19 DIAGNOSIS — Z79.01 LONG TERM CURRENT USE OF ANTICOAGULANT THERAPY: ICD-10-CM

## 2019-12-19 LAB — INR POINT OF CARE: 3.3 (ref 0.86–1.14)

## 2019-12-19 PROCEDURE — 85610 PROTHROMBIN TIME: CPT | Mod: QW

## 2019-12-19 PROCEDURE — 36416 COLLJ CAPILLARY BLOOD SPEC: CPT

## 2019-12-19 PROCEDURE — 99207 ZZC NO CHARGE NURSE ONLY: CPT

## 2019-12-19 NOTE — PROGRESS NOTES
ANTICOAGULATION FOLLOW-UP CLINIC VISIT    Patient Name:  Cayetano Lunsford  Date:  12/19/2019  Contact Type:  Face to Face    SUBJECTIVE:  Patient Findings     Positives:   Change in medications (Primidone dose decreased in November)    Comments:   Patient saw Rebecca Still on 12/09, no medication changes or concerns noted.  Patient denies any identifiable changes that caused the supra-therapeutic INR. If next INR supra, as well, I will decrease weekly Warfarin dose again.  We may be seeing late affect of Primidone dose decrease from November, 2019.          Clinical Outcomes     Negatives:   Major bleeding event, Thromboembolic event, Anticoagulation-related hospital admission, Anticoagulation-related ED visit, Anticoagulation-related fatality    Comments:   Patient saw Rebecca Still on 12/09, no medication changes or concerns noted.  Patient denies any identifiable changes that caused the supra-therapeutic INR. If next INR supra, as well, I will decrease weekly Warfarin dose again.  We may be seeing late affect of Primidone dose decrease from November, 2019.             OBJECTIVE    INR Protime   Date Value Ref Range Status   12/19/2019 3.3 (A) 0.86 - 1.14 Final       ASSESSMENT / PLAN  INR assessment SUPRA    Recheck INR In: 2 WEEKS    INR Location Clinic      Anticoagulation Summary  As of 12/19/2019    INR goal:   2.0-3.0   TTR:   68.2 % (1 y)   INR used for dosing:   3.3! (12/19/2019)   Warfarin maintenance plan:   7.5 mg (5 mg x 1.5) every day   Full warfarin instructions:   7.5 mg every day   Weekly warfarin total:   52.5 mg   No change documented:   Tatyana Akers RN   Plan last modified:   Diana Cuevas [facility] (11/7/2019)   Next INR check:   1/2/2020   Priority:   Maintenance   Target end date:       Indications    Long-term (current) use of anticoagulants [Z79.01] [Z79.01]  Atrial fibrillation (H) [I48.91]             Anticoagulation Episode Summary     INR check location:       Preferred lab:        Send INR reminders to:   [facility]    Comments:    Prefers AVS printed.       Anticoagulation Care Providers     Provider Role Specialty Phone number    Dmitri Andres MD [facility] [phone]            See the Encounter Report to view Anticoagulation Flowsheet and Dosing Calendar (Go to Encounters tab in chart review, and find the Anticoagulation Therapy Visit)        Tatyana Akers RN

## 2020-01-02 ENCOUNTER — ANTICOAGULATION THERAPY VISIT (OUTPATIENT)
Dept: NURSING | Facility: CLINIC | Age: 75
End: 2020-01-02
Payer: COMMERCIAL

## 2020-01-02 DIAGNOSIS — I48.91 ATRIAL FIBRILLATION (H): ICD-10-CM

## 2020-01-02 DIAGNOSIS — Z79.01 LONG TERM CURRENT USE OF ANTICOAGULANT THERAPY: ICD-10-CM

## 2020-01-02 LAB — INR POINT OF CARE: 1.9 (ref 0.86–1.14)

## 2020-01-02 PROCEDURE — 85610 PROTHROMBIN TIME: CPT | Mod: QW

## 2020-01-02 PROCEDURE — 36416 COLLJ CAPILLARY BLOOD SPEC: CPT

## 2020-01-02 PROCEDURE — 99207 ZZC NO CHARGE NURSE ONLY: CPT

## 2020-01-02 NOTE — PROGRESS NOTES
ANTICOAGULATION FOLLOW-UP CLINIC VISIT    Patient Name:  Cayetano Lunsford  Date:  2020  Contact Type:  Face to Face    SUBJECTIVE:  Patient Findings     Positives:   Change in diet/appetite (2-3 8 oz. glasses of V8 per week and about 3 servings of greens per week.)    Comments:   Patient denies any identifiable changes that caused the supra-therapeutic INR.         Clinical Outcomes     Negatives:   Major bleeding event, Thromboembolic event, Anticoagulation-related hospital admission, Anticoagulation-related ED visit, Anticoagulation-related fatality    Comments:   Patient denies any identifiable changes that caused the supra-therapeutic INR.            OBJECTIVE    INR Protime   Date Value Ref Range Status   2020 1.9 (A) 0.86 - 1.14 Final       ASSESSMENT / PLAN  INR assessment SUB    Recheck INR In: 2 WEEKS    INR Location Clinic      Anticoagulation Summary  As of 2020    INR goal:   2.0-3.0   TTR:   67.1 % (1 y)   INR used for dosin.9! (2020)   Warfarin maintenance plan:   7.5 mg (5 mg x 1.5) every day   Full warfarin instructions:   : 10 mg; Otherwise 7.5 mg every day   Weekly warfarin total:   52.5 mg   Plan last modified:   Diana Cuevas [facility] (2019)   Next INR check:   2020   Priority:   High   Target end date:       Indications    Long-term (current) use of anticoagulants [Z79.01] [Z79.01]  Atrial fibrillation (H) [I48.91]             Anticoagulation Episode Summary     INR check location:       Preferred lab:       Send INR reminders to:   [facility]    Comments:    Prefers AVS printed.       Anticoagulation Care Providers     Provider Role Specialty Phone number    Dmitri Andres MD [facility] [phone]            See the Encounter Report to view Anticoagulation Flowsheet and Dosing Calendar (Go to Encounters tab in chart review, and find the Anticoagulation Therapy Visit)        Tatyana Akers RN

## 2020-01-08 DIAGNOSIS — G25.0 BENIGN FAMILIAL TREMOR: ICD-10-CM

## 2020-01-08 RX ORDER — PROPRANOLOL HYDROCHLORIDE 40 MG/1
TABLET ORAL
Qty: 180 TABLET | Refills: 2 | Status: SHIPPED | OUTPATIENT
Start: 2020-01-08 | End: 2020-04-01

## 2020-01-08 NOTE — TELEPHONE ENCOUNTER
"propranolol (INDERAL) 40 MG tablet  Last Written Prescription Date:  4/23/19  Last Fill Quantity: 180,  # refills: 8   Last office visit: 8/28/19 with prescribing provider:  LUDMILA Andres   Future Office Visit:   Next 5 appointments (look out 90 days)    Mar 10, 2020 10:30 AM CDT  [facility] Visit with Bong Yoo MD  [facility] [address] [place] [id number] [phone]         Requested Prescriptions   Pending Prescriptions Disp Refills     propranolol (INDERAL) 40 MG tablet [Pharmacy Med Name: PROPRANOLOL HCL 40MG TABS] 180 tablet 8     Sig: TAKE TWO TO THREE TABLETS BY MOUTH TWICE A DAY       Beta-Blockers Protocol Passed - 1/8/2020 12:00 PM        Passed - Blood pressure under 140/90 in past 12 months     BP Readings from Last 3 Encounters:   12/09/19 136/68   09/10/19 129/64   09/09/19 (!) 142/62                 Passed - Patient is age 6 or older        Passed - Recent (12 mo) or future (30 days) visit within the authorizing provider's specialty     Patient has had an office visit with the authorizing provider or a provider within the authorizing providers department within the previous 12 mos or has a future within next 30 days. See \"Patient Info\" tab in inbasket, or \"Choose Columns\" in Meds & Orders section of the refill encounter.              Passed - Medication is active on med list        Routing refill request to provider for review/approval because:  Drug interaction warning          "

## 2020-01-15 DIAGNOSIS — I10 HYPERTENSION GOAL BP (BLOOD PRESSURE) < 140/90: ICD-10-CM

## 2020-01-15 RX ORDER — LOSARTAN POTASSIUM 100 MG/1
TABLET ORAL
Qty: 90 TABLET | Refills: 1 | Status: SHIPPED | OUTPATIENT
Start: 2020-01-15 | End: 2020-07-06

## 2020-01-15 NOTE — TELEPHONE ENCOUNTER
"Requested Prescriptions   Pending Prescriptions Disp Refills     losartan (COZAAR) 100 MG tablet [Pharmacy Med Name: LOSARTAN POTASSIUM 100MG TABS] 90 tablet 1     Sig: TAKE ONE TABLET BY MOUTH ONCE DAILY       Angiotensin-II Receptors Passed - 1/15/2020  5:02 AM        Passed - Last blood pressure under 140/90 in past 12 months     BP Readings from Last 3 Encounters:   12/09/19 136/68   09/10/19 129/64   09/09/19 (!) 142/62                 Passed - Recent (12 mo) or future (30 days) visit within the authorizing provider's specialty     Patient has had an office visit with the authorizing provider or a provider within the authorizing providers department within the previous 12 mos or has a future within next 30 days. See \"Patient Info\" tab in inbasket, or \"Choose Columns\" in Meds & Orders section of the refill encounter.              Passed - Medication is active on med list        Passed - Patient is age 18 or older        Passed - Normal serum creatinine on file in past 12 months     Recent Labs   Lab Test 09/09/19  0947  01/09/17  1432   CR 0.70   < >  --    CREAT  --   --  0.8    < > = values in this interval not displayed.             Passed - Normal serum potassium on file in past 12 months     Recent Labs   Lab Test 09/09/19  0947   POTASSIUM 4.8                  Seen by MTM on 12/9/19.  Prescription approved per [facility] Refill Protocol.  Candelaria Pa RN on 1/15/2020 at 3:27 PM      "

## 2020-01-16 ENCOUNTER — ANTICOAGULATION THERAPY VISIT (OUTPATIENT)
Dept: NURSING | Facility: CLINIC | Age: 75
End: 2020-01-16
Payer: COMMERCIAL

## 2020-01-16 DIAGNOSIS — Z79.01 LONG TERM CURRENT USE OF ANTICOAGULANT THERAPY: ICD-10-CM

## 2020-01-16 DIAGNOSIS — I48.91 ATRIAL FIBRILLATION (H): ICD-10-CM

## 2020-01-16 LAB — INR POINT OF CARE: 3.1 (ref 0.86–1.14)

## 2020-01-16 PROCEDURE — 85610 PROTHROMBIN TIME: CPT | Mod: QW

## 2020-01-16 PROCEDURE — 99207 ZZC NO CHARGE NURSE ONLY: CPT

## 2020-01-16 PROCEDURE — 36416 COLLJ CAPILLARY BLOOD SPEC: CPT

## 2020-01-16 NOTE — PROGRESS NOTES
ANTICOAGULATION FOLLOW-UP CLINIC VISIT    Patient Name:  Cayetano Lunsford  Date:  1/16/2020  Contact Type:  Face to Face    SUBJECTIVE:  Patient Findings     Positives:   Change in health (Had URI x 1 week, no cough or fever), Change in medications (Was taking Nyquil x 1 week, last dose was on 01/13), Change in diet/appetite (Drank V8 last week but did not eat many greens while he was ill, pt will resume greens today.)        Clinical Outcomes     Negatives:   Major bleeding event, Thromboembolic event, Anticoagulation-related hospital admission, Anticoagulation-related ED visit, Anticoagulation-related fatality           OBJECTIVE    INR Protime   Date Value Ref Range Status   01/16/2020 3.1 (A) 0.86 - 1.14 Final       ASSESSMENT / PLAN  INR assessment SUPRA    Recheck INR In: 2 WEEKS    INR Location Clinic      Anticoagulation Summary  As of 1/16/2020    INR goal:   2.0-3.0   TTR:   68.0 % (1 y)   INR used for dosing:   3.1! (1/16/2020)   Warfarin maintenance plan:   7.5 mg (5 mg x 1.5) every day   Full warfarin instructions:   7.5 mg every day   Weekly warfarin total:   52.5 mg   No change documented:   Tatyana Akers RN   Plan last modified:   Diana Cuevas [facility] (11/7/2019)   Next INR check:   1/30/2020   Priority:   High   Target end date:       Indications    Long-term (current) use of anticoagulants [Z79.01] [Z79.01]  Atrial fibrillation (H) [I48.91]             Anticoagulation Episode Summary     INR check location:       Preferred lab:       Send INR reminders to:   [facility]    Comments:    Prefers AVS printed.       Anticoagulation Care Providers     Provider Role Specialty Phone number    Dmitri Andres MD [facility] [phone]            See the Encounter Report to view Anticoagulation Flowsheet and Dosing Calendar (Go to Encounters tab in chart review, and find the Anticoagulation Therapy Visit)        Tatyana Akers RN

## 2020-01-30 ENCOUNTER — ANTICOAGULATION THERAPY VISIT (OUTPATIENT)
Dept: NURSING | Facility: CLINIC | Age: 75
End: 2020-01-30
Payer: COMMERCIAL

## 2020-01-30 DIAGNOSIS — Z79.01 LONG TERM CURRENT USE OF ANTICOAGULANT THERAPY: ICD-10-CM

## 2020-01-30 DIAGNOSIS — I48.91 ATRIAL FIBRILLATION (H): ICD-10-CM

## 2020-01-30 LAB — INR POINT OF CARE: 2.8 (ref 0.86–1.14)

## 2020-01-30 PROCEDURE — 85610 PROTHROMBIN TIME: CPT | Mod: QW

## 2020-01-30 PROCEDURE — 36416 COLLJ CAPILLARY BLOOD SPEC: CPT

## 2020-01-30 PROCEDURE — 99207 ZZC NO CHARGE NURSE ONLY: CPT

## 2020-01-30 NOTE — PROGRESS NOTES
ANTICOAGULATION FOLLOW-UP CLINIC VISIT    Patient Name:  Cayetano Lunsford  Date:  2020  Contact Type:  Face to Face    SUBJECTIVE:  Patient Findings     Positives:   Change in diet/appetite (Drinking can of V8 a few times per week and has eaten greens every other day.)        Clinical Outcomes     Negatives:   Major bleeding event, Thromboembolic event, Anticoagulation-related hospital admission, Anticoagulation-related ED visit, Anticoagulation-related fatality           OBJECTIVE    INR Protime   Date Value Ref Range Status   2020 2.8 (A) 0.86 - 1.14 Final       ASSESSMENT / PLAN  INR assessment THER    Recheck INR In: 3 WEEKS    INR Location Clinic      Anticoagulation Summary  As of 2020    INR goal:   2.0-3.0   TTR:   66.7 % (1 y)   INR used for dosin.8 (2020)   Warfarin maintenance plan:   7.5 mg (5 mg x 1.5) every day   Full warfarin instructions:   7.5 mg every day   Weekly warfarin total:   52.5 mg   No change documented:   Tatyana Akers RN   Plan last modified:   Diana Cuevas [facility] (2019)   Next INR check:   2020   Priority:   High   Target end date:       Indications    Long-term (current) use of anticoagulants [Z79.01] [Z79.01]  Atrial fibrillation (H) [I48.91]             Anticoagulation Episode Summary     INR check location:       Preferred lab:       Send INR reminders to:   [facility]    Comments:    Prefers AVS printed.       Anticoagulation Care Providers     Provider Role Specialty Phone number    Dmitri Andres MD [facility] [phone]            See the Encounter Report to view Anticoagulation Flowsheet and Dosing Calendar (Go to Encounters tab in chart review, and find the Anticoagulation Therapy Visit)        Tatyana Akers RN

## 2020-02-05 ENCOUNTER — TRANSFERRED RECORDS (OUTPATIENT)
Dept: HEALTH INFORMATION MANAGEMENT | Facility: CLINIC | Age: 75
End: 2020-02-05

## 2020-02-05 LAB
ALT SERPL-CCNC: 38 U/L (ref 0–78)
AST SERPL-CCNC: 26 U/L (ref 0–37)
CREAT SERPL-MCNC: 0.92 MG/DL (ref 0.7–1.3)

## 2020-02-12 DIAGNOSIS — E11.649 TYPE 2 DIABETES MELLITUS WITH HYPOGLYCEMIA WITHOUT COMA, WITH LONG-TERM CURRENT USE OF INSULIN (H): ICD-10-CM

## 2020-02-12 DIAGNOSIS — Z79.4 TYPE 2 DIABETES MELLITUS WITH HYPOGLYCEMIA WITHOUT COMA, WITH LONG-TERM CURRENT USE OF INSULIN (H): ICD-10-CM

## 2020-02-12 NOTE — TELEPHONE ENCOUNTER
Requested Prescriptions   Pending Prescriptions Disp Refills     insulin NPH (NOVOLIN N RELION) 100 UNIT/ML vial [Pharmacy Med Name: NovoLIN N ReliOn 100 UNIT/ML Subcutaneous Suspension] 30 mL 0     Sig: INJECT 5 UNITS SUBCUTANEOUSLY WITH BREAKFAST AND  15  UNITS  WITH  SUPPER  Last Written Prescription Date:  10/10/2019  Last Fill Quantity: 30ml,  # refills: 1   Last Office Visit: 8/28/2019   Future Office Visit:    Next 5 appointments (look out 90 days)    Mar 10, 2020 10:30 AM CDT  [facility] [address] [place] [id number] [phone]              Intermediate Acting Insulin Protocol Passed - 2/12/2020 12:08 PM        Passed - Blood pressure less than 140/90 in past 6 months     BP Readings from Last 3 Encounters:   12/09/19 136/68   09/10/19 129/64   09/09/19 (!) 142/62           Passed - LDL on file in past 12 months     Recent Labs   Lab Test 09/09/19  0947   LDL 68           Passed - Microalbumin on file in past 12 months     Recent Labs   Lab Test 09/09/19  0948   MICROL 25   UMALCR 65.97*           Passed - Serum creatinine on file in past 12 months     Recent Labs   Lab Test 09/09/19  0947  01/09/17  1432   CR 0.70   < >  --    CREAT  --   --  0.8    < > = values in this interval not displayed.           Passed - HgbA1C in past 3 or 6 months     If HgbA1C is 8 or greater, it needs to be on file within the past 3 months.  If less than 8, must be on file within the past 6 months.     Recent Labs   Lab Test 12/09/19  1115   A1C 6.1*           Passed - Medication is active on med list        Passed - Patient is age 18 or older        Passed - Recent (6 mo) or future (30 days) visit within the authorizing provider's specialty     Patient had office visit in the last 6 months or has a visit in the next 30 days with authorizing provider or within the authorizing provider's specialty.  See  "\"Patient Info\" tab in inbasket, or \"Choose Columns\" in Meds & Orders section of the refill encounter.              "

## 2020-02-13 DIAGNOSIS — Z79.4 TYPE 2 DIABETES MELLITUS WITH HYPOGLYCEMIA WITHOUT COMA, WITH LONG-TERM CURRENT USE OF INSULIN (H): ICD-10-CM

## 2020-02-13 DIAGNOSIS — E11.649 TYPE 2 DIABETES MELLITUS WITH HYPOGLYCEMIA WITHOUT COMA, WITH LONG-TERM CURRENT USE OF INSULIN (H): ICD-10-CM

## 2020-02-17 NOTE — TELEPHONE ENCOUNTER
Fax from [facility]. Dr. Lewis's name in [place] crossed out on the fax and re-addressed to Dr. Andres.

## 2020-02-17 NOTE — TELEPHONE ENCOUNTER
Prescription approved per [facility] Refill Protocol.  Tatyana Drake RN

## 2020-02-20 ENCOUNTER — ANTICOAGULATION THERAPY VISIT (OUTPATIENT)
Dept: NURSING | Facility: CLINIC | Age: 75
End: 2020-02-20
Payer: COMMERCIAL

## 2020-02-20 DIAGNOSIS — I48.91 ATRIAL FIBRILLATION (H): ICD-10-CM

## 2020-02-20 DIAGNOSIS — Z79.01 LONG TERM CURRENT USE OF ANTICOAGULANT THERAPY: ICD-10-CM

## 2020-02-20 LAB — INR POINT OF CARE: 3 (ref 0.86–1.14)

## 2020-02-20 PROCEDURE — 99207 ZZC NO CHARGE NURSE ONLY: CPT

## 2020-02-20 PROCEDURE — 36416 COLLJ CAPILLARY BLOOD SPEC: CPT

## 2020-02-20 PROCEDURE — 85610 PROTHROMBIN TIME: CPT | Mod: QW

## 2020-02-20 NOTE — PROGRESS NOTES
ANTICOAGULATION FOLLOW-UP CLINIC VISIT    Patient Name:  Cayetano Lunsford  Date:  2/20/2020  Contact Type:  Face to Face    SUBJECTIVE:  Patient Findings     Positives:   Missed doses (Missed on 02/05, pt did not discover his mistake until 02/08)        Clinical Outcomes     Negatives:   Major bleeding event, Thromboembolic event, Anticoagulation-related hospital admission, Anticoagulation-related ED visit, Anticoagulation-related fatality           OBJECTIVE    INR Protime   Date Value Ref Range Status   02/20/2020 3.0 (A) 0.86 - 1.14 Final       ASSESSMENT / PLAN  INR assessment THER    Recheck INR In: 4 WEEKS    INR Location Clinic      Anticoagulation Summary  As of 2/20/2020    INR goal:   2.0-3.0   TTR:   67.8 % (1 y)   INR used for dosing:   3.0 (2/20/2020)   Warfarin maintenance plan:   7.5 mg (5 mg x 1.5) every day   Full warfarin instructions:   7.5 mg every day   Weekly warfarin total:   52.5 mg   Plan last modified:   Tatyana Akers RN (2/20/2020)   Next INR check:   3/19/2020   Priority:   Maintenance   Target end date:       Indications    Long-term (current) use of anticoagulants [Z79.01] [Z79.01]  Atrial fibrillation (H) [I48.91]             Anticoagulation Episode Summary     INR check location:       Preferred lab:       Send INR reminders to:   [facility]    Comments:    Prefers AVS printed.       Anticoagulation Care Providers     Provider Role Specialty Phone number    Dmitri Andres MD [facility] [phone]            See the Encounter Report to view Anticoagulation Flowsheet and Dosing Calendar (Go to Encounters tab in chart review, and find the Anticoagulation Therapy Visit)        Tatyana Akers RN

## 2020-03-02 ENCOUNTER — TELEPHONE (OUTPATIENT)
Dept: FAMILY MEDICINE | Facility: CLINIC | Age: 75
End: 2020-03-02

## 2020-03-02 DIAGNOSIS — I48.11 LONGSTANDING PERSISTENT ATRIAL FIBRILLATION (H): ICD-10-CM

## 2020-03-02 DIAGNOSIS — I48.91 ATRIAL FIBRILLATION (H): Primary | ICD-10-CM

## 2020-03-02 NOTE — TELEPHONE ENCOUNTER
Has the patient previously taken warfarin? yes  If yes, for what indication? Atrial Fibrillation    Does the patient have any of the following indications for a higher range of 2.5-3.5:    Mitral position mechanical valve? no    Yovani-Shiley, Ball and Cage or Monoleaflet valve (regardless of position) no    Other (if yes, please explain) no      Thank you,  Tatyana Akers RN

## 2020-03-03 DIAGNOSIS — J44.9 CHRONIC OBSTRUCTIVE PULMONARY DISEASE, UNSPECIFIED COPD TYPE (H): ICD-10-CM

## 2020-03-03 RX ORDER — IPRATROPIUM BROMIDE AND ALBUTEROL SULFATE 2.5; .5 MG/3ML; MG/3ML
SOLUTION RESPIRATORY (INHALATION)
Qty: 360 ML | Refills: 1 | Status: SHIPPED | OUTPATIENT
Start: 2020-03-03 | End: 2020-05-19

## 2020-03-03 NOTE — TELEPHONE ENCOUNTER
Routing refill request to provider for review/approval because:  Drug interaction warning  Dusty Munoz RN, BSN

## 2020-03-07 NOTE — PROGRESS NOTES
ESTABLISHED PATIENT NEUROLOGY NOTE    DATE OF VISIT: 3/10/2020  CLINIC LOCATION: [facility]  MRN: [id number]  PATIENT NAME: Cayetano Lunsford  YOB: 1945    PCP: Dmitri Andres MD    REASON FOR VISIT:   Chief Complaint   Patient presents with     Follow Up     tremor- stable      SUBJECTIVE:                                                      HISTORY OF PRESENT ILLNESS: Patient is here for follow up regarding essential tremor.  The last visit was on 9/10/2019.  At that time we increased his dose of primidone due to perceived mild worsening. Please refer to my initial/other prior notes for further information.    Since the last visit, the patient reports that his symptoms are stable.  He called the clinic in October reporting that his tremor worsened several weeks after he increased the dose of primidone, and I advised him to go back to his usual dose of 150/150/200 mg of primidone, which is his previous well-tolerated dose.  He also takes propranolol 120 mg twice daily without noticeable side effects.  Denies interval development of new focal neurological symptoms.    On review of systems, patient endorses no other active complaints. Medications, allergies, family and social history were also reviewed. There are no changes reported by patient.  REVIEW OF SYSTEMS:                                                    10-system review was completed. Pertinent positives are included in HPI. The remainder of ROS is negative.  EXAM:                                                    Physical Exam:   Vitals: /60 (BP Location: Left arm, Patient Position: Sitting, Cuff Size: Adult Regular)   Pulse 56   Temp 97.6  F (36.4  C) (Oral)   Wt 91.2 kg (201 lb)   SpO2 90%   BMI 30.56 kg/m      General: pt is in NAD, cooperative.  Skin: normal turgor, moist mucous membranes, no lesions/rashes noticed.  HEENT: ATNC, white sclera, normal conjunctiva.  Respiratory: Symmetric lung excursion,  no accessory respiratory muscle use.  Abdomen: Non distended.  Neurological: awake, cooperative, follows commands, no exam changes compared to the previous visit.  ASSESSMENT AND PLAN:                                                    Assessment: 75-year-old male patient with essential tremor presents for follow-up.  He is on 120 mg of propranolol twice daily and 500 mg of primidone daily without noticeable side effects.  He reports that his tremor is stable.    We had a detailed discussion with the patient regarding his symptoms, treatment options, and the proposed plan.  He is on combination of propranolol and primidone, but was not able to tolerate higher doses of primidone.  The patient wondered if 4 tablets of primidone could be taken in the morning as a first dose instead of evening dose, and we discussed that it would be okay.  I will leave his prescription as is for now to see if he tolerates this change, but in the future we will change the signature to reflect that he takes 4 tablets in the morning, not in the evening.    The other option would be to add low-dose of gabapentin or topiramate.  The patient tells me that his brother is taking propranolol, which is controlling the symptoms well, but we discussed that I do not think that we should further increase his propranolol due to side effect of bradycardia and hypotension.  We also discussed occupational therapy role to help his tremor.    Diagnoses:    ICD-10-CM    1. Benign familial tremor  G25.0 primidone (MYSOLINE) 50 MG tablet     Plan:  At today's visit we thoroughly discussed current symptoms, available treatment options, and the plan.    We decided not to make any medication changes, though discussed that he could take 4 tablets in the morning instead of evening.    We discussed occupational therapy option, but the patient did not want to pursue it at the present time.    Next follow-up appointment is in the next 6 months or earlier if  needed.    Total Time: 27 minutes with > 50% spent counseling the patient on stated above assessment and recommendations.    Bong Yoo MD  HP/HW Neurology

## 2020-03-07 NOTE — PATIENT INSTRUCTIONS
AFTER VISIT SUMMARY (AVS):    At today's visit we thoroughly discussed current symptoms, available treatment options, and the plan.    We decided not to make any medication changes.    We discussed occupational therapy option, please let me know if you are willing to do it in the future.    Next follow-up appointment is in the next 6 months or earlier if needed.    Please do not hesitate to call me with any questions or concerns.    Thanks.

## 2020-03-08 DIAGNOSIS — R35.0 BENIGN PROSTATIC HYPERPLASIA WITH URINARY FREQUENCY: ICD-10-CM

## 2020-03-08 DIAGNOSIS — N40.1 BENIGN PROSTATIC HYPERPLASIA WITH URINARY FREQUENCY: ICD-10-CM

## 2020-03-09 RX ORDER — TAMSULOSIN HYDROCHLORIDE 0.4 MG/1
CAPSULE ORAL
Qty: 90 CAPSULE | Refills: 1 | Status: SHIPPED | OUTPATIENT
Start: 2020-03-09 | End: 2020-12-10

## 2020-03-09 NOTE — TELEPHONE ENCOUNTER
"Flomax  Last Written Prescription Date:  09/17/2019  Last Fill Quantity: 90,  # refills: 1   Last office visit:  08/28/2019 with prescribing provider:  Mckenna   Future Office Visit:   Next 5 appointments (look out 90 days)    Mar 10, 2020 10:30 AM CDT  Return Visit with Bong Yoo MD  [facility] ([facility]) [address] [place] [id number] [phone]      Prescription approved per [facility] Refill Protocol.    Requested Prescriptions   Pending Prescriptions Disp Refills     tamsulosin (FLOMAX) 0.4 MG capsule [Pharmacy Med Name: TAMSULOSIN HCL 0.4MG CAPS] 90 capsule 1     Sig: TAKE ONE CAPSULE BY MOUTH ONCE DAILY       Alpha Blockers Passed - 3/8/2020  5:03 AM        Passed - Blood pressure under 140/90 in past 12 months     BP Readings from Last 3 Encounters:   12/09/19 136/68   09/10/19 129/64   09/09/19 (!) 142/62           Passed - Recent (12 mo) or future (30 days) visit within the authorizing provider's specialty     Patient has had an office visit with the authorizing provider or a provider within the authorizing providers department within the previous 12 mos or has a future within next 30 days. See \"Patient Info\" tab in inbasket, or \"Choose Columns\" in Meds & Orders section of the refill encounter.          Passed - Patient does not have Tadalafil, Vardenafil, or Sildenafil on their medication list        Passed - Medication is active on med list        Passed - Patient is 18 years of age or older         Kristen Miles RN   "

## 2020-03-10 ENCOUNTER — OFFICE VISIT (OUTPATIENT)
Dept: NEUROLOGY | Facility: CLINIC | Age: 75
End: 2020-03-10
Payer: COMMERCIAL

## 2020-03-10 VITALS
SYSTOLIC BLOOD PRESSURE: 124 MMHG | DIASTOLIC BLOOD PRESSURE: 60 MMHG | OXYGEN SATURATION: 90 % | BODY MASS INDEX: 30.56 KG/M2 | HEART RATE: 56 BPM | WEIGHT: 201 LBS | TEMPERATURE: 97.6 F

## 2020-03-10 DIAGNOSIS — G25.0 BENIGN FAMILIAL TREMOR: Primary | ICD-10-CM

## 2020-03-10 PROCEDURE — 99214 OFFICE O/P EST MOD 30 MIN: CPT | Performed by: PSYCHIATRY & NEUROLOGY

## 2020-03-10 RX ORDER — PRIMIDONE 50 MG/1
TABLET ORAL
Qty: 910 TABLET | Refills: 1 | Status: SHIPPED | OUTPATIENT
Start: 2020-03-10 | End: 2020-09-04

## 2020-03-10 NOTE — LETTER
3/10/2020         RE: Cayetano Lunsford  [address]  [place] [address]        Dear Colleague,    Thank you for referring your patient, Cayetano Lunsford, to the [facility]. Please see a copy of my visit note below.    ESTABLISHED PATIENT NEUROLOGY NOTE    DATE OF VISIT: 3/10/2020  CLINIC LOCATION: [facility]  MRN: [id number]  PATIENT NAME: Cayetano Lunsford  YOB: 1945    PCP: Dmitri Andres MD    REASON FOR VISIT:   Chief Complaint   Patient presents with     Follow Up     tremor- stable      SUBJECTIVE:                                                      HISTORY OF PRESENT ILLNESS: Patient is here for follow up regarding essential tremor.  The last visit was on 9/10/2019.  At that time we increased his dose of primidone due to perceived mild worsening. Please refer to my initial/other prior notes for further information.    Since the last visit, the patient reports that his symptoms are stable.  He called the clinic in October reporting that his tremor worsened several weeks after he increased the dose of primidone, and I advised him to go back to his usual dose of 150/150/200 mg of primidone, which is his previous well-tolerated dose.  He also takes propranolol 120 mg twice daily without noticeable side effects.  Denies interval development of new focal neurological symptoms.    On review of systems, patient endorses no other active complaints. Medications, allergies, family and social history were also reviewed. There are no changes reported by patient.  REVIEW OF SYSTEMS:                                                    10-system review was completed. Pertinent positives are included in HPI. The remainder of ROS is negative.  EXAM:                                                    Physical Exam:   Vitals: /60 (BP Location: Left arm, Patient Position: Sitting, Cuff Size: Adult Regular)   Pulse 56   Temp 97.6  F (36.4  C)  (Oral)   Wt 91.2 kg (201 lb)   SpO2 90%   BMI 30.56 kg/m      General: pt is in NAD, cooperative.  Skin: normal turgor, moist mucous membranes, no lesions/rashes noticed.  HEENT: ATNC, white sclera, normal conjunctiva.  Respiratory: Symmetric lung excursion, no accessory respiratory muscle use.  Abdomen: Non distended.  Neurological: awake, cooperative, follows commands, no exam changes compared to the previous visit.  ASSESSMENT AND PLAN:                                                    Assessment: 75-year-old male patient with essential tremor presents for follow-up.  He is on 120 mg of propranolol twice daily and 500 mg of primidone daily without noticeable side effects.  He reports that his tremor is stable.    We had a detailed discussion with the patient regarding his symptoms, treatment options, and the proposed plan.  He is on combination of propranolol and primidone, but was not able to tolerate higher doses of primidone.  The patient wondered if 4 tablets of primidone could be taken in the morning as a first dose instead of evening dose, and we discussed that it would be okay.  I will leave his prescription as is for now to see if he tolerates this change, but in the future we will change the signature to reflect that he takes 4 tablets in the morning, not in the evening.    The other option would be to add low-dose of gabapentin or topiramate.  The patient tells me that his brother is taking propranolol, which is controlling the symptoms well, but we discussed that I do not think that we should further increase his propranolol due to side effect of bradycardia and hypotension.  We also discussed occupational therapy role to help his tremor.    Diagnoses:    ICD-10-CM    1. Benign familial tremor  G25.0 primidone (MYSOLINE) 50 MG tablet     Plan:  At today's visit we thoroughly discussed current symptoms, available treatment options, and the plan.    We decided not to make any medication changes,  though discussed that he could take 4 tablets in the morning instead of evening.    We discussed occupational therapy option, but the patient did not want to pursue it at the present time.    Next follow-up appointment is in the next 6 months or earlier if needed.    Total Time: 27 minutes with > 50% spent counseling the patient on stated above assessment and recommendations.    Bong Yoo MD  / Neurology      Again, thank you for allowing me to participate in the care of your patient.        Sincerely,        Bong Yoo MD

## 2020-03-15 DIAGNOSIS — Z79.4 TYPE 2 DIABETES MELLITUS WITH STAGE 1 CHRONIC KIDNEY DISEASE, WITH LONG-TERM CURRENT USE OF INSULIN (H): ICD-10-CM

## 2020-03-15 DIAGNOSIS — N18.1 TYPE 2 DIABETES MELLITUS WITH STAGE 1 CHRONIC KIDNEY DISEASE, WITH LONG-TERM CURRENT USE OF INSULIN (H): ICD-10-CM

## 2020-03-15 DIAGNOSIS — E11.22 TYPE 2 DIABETES MELLITUS WITH STAGE 1 CHRONIC KIDNEY DISEASE, WITH LONG-TERM CURRENT USE OF INSULIN (H): ICD-10-CM

## 2020-03-18 ENCOUNTER — TELEPHONE (OUTPATIENT)
Dept: FAMILY MEDICINE | Facility: CLINIC | Age: 75
End: 2020-03-18

## 2020-03-18 DIAGNOSIS — Z79.01 LONG TERM CURRENT USE OF ANTICOAGULANT THERAPY: ICD-10-CM

## 2020-03-18 DIAGNOSIS — I48.91 ATRIAL FIBRILLATION (H): Primary | ICD-10-CM

## 2020-03-18 DIAGNOSIS — I48.11 LONGSTANDING PERSISTENT ATRIAL FIBRILLATION (H): ICD-10-CM

## 2020-03-18 DIAGNOSIS — I48.91 ATRIAL FIBRILLATION (H): ICD-10-CM

## 2020-03-18 LAB
CAPILLARY BLOOD COLLECTION: NORMAL
INR PPP: 3.9 (ref 0.86–1.14)

## 2020-03-18 PROCEDURE — 85610 PROTHROMBIN TIME: CPT | Performed by: FAMILY MEDICINE

## 2020-03-18 PROCEDURE — 36416 COLLJ CAPILLARY BLOOD SPEC: CPT | Performed by: FAMILY MEDICINE

## 2020-03-18 NOTE — TELEPHONE ENCOUNTER
INR nurse not available, moving to lab only, orders placed.    Leo will come 03/18/2020 for lab.    .Diana Cuevsa, PharmD BCACP  Anticoagulation Clinical Pharmacist

## 2020-03-18 NOTE — TELEPHONE ENCOUNTER
ANTICOAGULATION MANAGEMENT     Patient Name:  Cayetano Lunsford  Date:  3/18/2020    ASSESSMENT /SUBJECTIVE:      Today's INR result of 3.90 is supratherapeutic. Goal INR of 2.0-3.0      Warfarin dose taken: Warfarin taken as previously instructed    Diet: Decreased greens/vitamin K intake may be affecting INR    Medication changes/ interactions: No new medications/supplements affecting INR    Previous INR: Therapeutic     S/S of bleeding or thromboembolism: No    New injury or illness:  No    Upcoming surgery, procedure or cardioversion:  No    Additional findings: none      PLAN:    Spoke with Cayetano regarding INR result and instructed:     Warfarin Dosing Instructions: hold tonight then continue your current warfarin dose of 7.5 daily    Instructed patient to follow up no later than: 2 weeks  ACC RN visit scheduled    Education provided: Yes: monitor for bleeding, affect of greens on INR      Rich verbalizes understanding and agrees to warfarin dosing plan.    Instructed to call the Anticoagulation Clinic for any changes, questions or concerns. (#[phone])        OBJECTIVE:  INR   Date Value Ref Range Status   03/18/2020 3.90 (H) 0.86 - 1.14 Final     Comment:     This test is intended for monitoring Coumadin therapy.  Results are not   accurate in patients with prolonged INR due to factor deficiency.               Anticoagulation Summary  As of 3/18/2020    INR goal:   2.0-3.0   TTR:   65.8 % (1 y)   INR used for dosing:   3.90! (3/18/2020)   Warfarin maintenance plan:   7.5 mg (5 mg x 1.5) every day   Full warfarin instructions:   3/18: Hold; Otherwise 7.5 mg every day   Weekly warfarin total:   52.5 mg   Plan last modified:   Tatyana Akers RN (2/20/2020)   Next INR check:   4/1/2020   Priority:   Maintenance   Target end date:   Indefinite    Indications    Long-term (current) use of anticoagulants [Z79.01] [Z79.01]  Atrial fibrillation (H) [I48.91]  Longstanding persistent atrial fibrillation  [I48.11]             Anticoagulation Episode Summary     INR check location:       Preferred lab:       [facility] [place]    Comments:    Prefers AVS printed.       Anticoagulation Care Providers     Provider Role Specialty Phone number    Dmitri Andres MD Referring [facility] [phone]

## 2020-04-01 DIAGNOSIS — G25.0 BENIGN FAMILIAL TREMOR: ICD-10-CM

## 2020-04-01 RX ORDER — PROPRANOLOL HYDROCHLORIDE 40 MG/1
TABLET ORAL
Qty: 180 TABLET | Refills: 2 | Status: SHIPPED | OUTPATIENT
Start: 2020-04-01 | End: 2020-06-19

## 2020-04-01 NOTE — TELEPHONE ENCOUNTER
"Routing refill request to provider to review approval because:  Has interactions, need to sign off    Last Written Prescription Date:  1/8/20   Last Fill Quantity: 180 (1 month supply),  # refills: 2   Last office visit: 12/9/2019 with prescribing provider:     Future Office Visit:    Requested Prescriptions   Pending Prescriptions Disp Refills     propranolol (INDERAL) 40 MG tablet [Pharmacy Med Name: PROPRANOLOL HCL 40MG TABS] 180 tablet 2     Sig: TAKE TWO TO THREE TABLETS BY MOUTH TWO TIMES A DAY       Beta-Blockers Protocol Passed - 4/1/2020  5:02 AM        Passed - Blood pressure under 140/90 in past 12 months     BP Readings from Last 3 Encounters:   03/10/20 124/60   12/09/19 136/68   09/10/19 129/64                 Passed - Patient is age 6 or older        Passed - Recent (12 mo) or future (30 days) visit within the authorizing provider's specialty     Patient has had an office visit with the authorizing provider or a provider within the authorizing providers department within the previous 12 mos or has a future within next 30 days. See \"Patient Info\" tab in inbasket, or \"Choose Columns\" in Meds & Orders section of the refill encounter.              Passed - Medication is active on med list           Nany Dudley RN, BSN  Message handled by Nurse Triage.      "

## 2020-04-03 ENCOUNTER — ANTICOAGULATION THERAPY VISIT (OUTPATIENT)
Dept: NURSING | Facility: CLINIC | Age: 75
End: 2020-04-03

## 2020-04-03 ENCOUNTER — TELEPHONE (OUTPATIENT)
Dept: FAMILY MEDICINE | Facility: CLINIC | Age: 75
End: 2020-04-03

## 2020-04-03 DIAGNOSIS — I48.91 ATRIAL FIBRILLATION (H): ICD-10-CM

## 2020-04-03 DIAGNOSIS — I48.11 LONGSTANDING PERSISTENT ATRIAL FIBRILLATION (H): ICD-10-CM

## 2020-04-03 DIAGNOSIS — Z53.9 ERRONEOUS ENCOUNTER--DISREGARD: Primary | ICD-10-CM

## 2020-04-03 DIAGNOSIS — Z79.01 LONG TERM CURRENT USE OF ANTICOAGULANT THERAPY: ICD-10-CM

## 2020-04-03 LAB
CAPILLARY BLOOD COLLECTION: NORMAL
INR POINT OF CARE: 2.7 (ref 0.86–1.14)
INR PPP: 2.7 (ref 0.86–1.14)

## 2020-04-03 PROCEDURE — 85610 PROTHROMBIN TIME: CPT | Performed by: FAMILY MEDICINE

## 2020-04-03 PROCEDURE — 36416 COLLJ CAPILLARY BLOOD SPEC: CPT | Performed by: FAMILY MEDICINE

## 2020-04-03 PROCEDURE — 99207 ZZC NO CHARGE NURSE ONLY: CPT

## 2020-04-13 DIAGNOSIS — Z79.01 LONG TERM CURRENT USE OF ANTICOAGULANTS WITH INR GOAL OF 2.0-3.0: ICD-10-CM

## 2020-04-13 DIAGNOSIS — I48.91 ATRIAL FIBRILLATION (H): Primary | ICD-10-CM

## 2020-05-01 ENCOUNTER — ANTICOAGULATION THERAPY VISIT (OUTPATIENT)
Dept: NURSING | Facility: CLINIC | Age: 75
End: 2020-05-01

## 2020-05-01 DIAGNOSIS — I48.91 ATRIAL FIBRILLATION (H): ICD-10-CM

## 2020-05-01 DIAGNOSIS — Z79.01 LONG TERM CURRENT USE OF ANTICOAGULANTS WITH INR GOAL OF 2.0-3.0: ICD-10-CM

## 2020-05-01 DIAGNOSIS — I48.11 LONGSTANDING PERSISTENT ATRIAL FIBRILLATION (H): ICD-10-CM

## 2020-05-01 DIAGNOSIS — Z79.01 LONG TERM CURRENT USE OF ANTICOAGULANT THERAPY: ICD-10-CM

## 2020-05-01 LAB
CAPILLARY BLOOD COLLECTION: NORMAL
INR PPP: 4 (ref 0.86–1.14)

## 2020-05-01 PROCEDURE — 85610 PROTHROMBIN TIME: CPT | Performed by: FAMILY MEDICINE

## 2020-05-01 PROCEDURE — 36416 COLLJ CAPILLARY BLOOD SPEC: CPT | Performed by: FAMILY MEDICINE

## 2020-05-01 PROCEDURE — 99207 ZZC NO CHARGE NURSE ONLY: CPT

## 2020-05-01 NOTE — PROGRESS NOTES
Anticoagulation Management    Unable to reach Rich today.    Today's INR result of 4.0 is supratherapeutic (goal INR of 2.0-3.0).  Result received from: Clinic Lab    Follow up required to confirm warfarin dose taken and assess for changes. Any change in Primidone dose?    Left message to call Tatyana to discuss      Anticoagulation clinic to follow up    Tatyana Akers RN    ANTICOAGULATION FOLLOW-UP CLINIC VISIT    Patient Name:  Cayetano Lunsford  Date:  2020  Contact Type:  Telephone    SUBJECTIVE:  Patient Findings     Positives:   Change in activity (Started going for walks periodically), Change in diet/appetite (Less greens recently due to running out of groceries.  Pt went shopping so he will resume 3 servings of greens per week.)        Clinical Outcomes     Negatives:   Major bleeding event, Thromboembolic event, Anticoagulation-related hospital admission, Anticoagulation-related ED visit, Anticoagulation-related fatality           OBJECTIVE    INR   Date Value Ref Range Status   2020 4.00 (H) 0.86 - 1.14 Final     Comment:     This test is intended for monitoring Coumadin therapy.  Results are not   accurate in patients with prolonged INR due to factor deficiency.         ASSESSMENT / PLAN  INR assessment SUPRA    Recheck INR In: 10 DAYS    INR Location Clinic      Anticoagulation Summary  As of 2020    INR goal:   2.0-3.0   TTR:   58.2 % (1 y)   INR used for dosin.00! (2020)   Warfarin maintenance plan:   7.5 mg (5 mg x 1.5) every day   Full warfarin instructions:   : Hold; Otherwise 7.5 mg every day   Weekly warfarin total:   52.5 mg   Plan last modified:   Tatyana Akers RN (2020)   Next INR check:   2020   Priority:   Maintenance   Target end date:   Indefinite    Indications    Long-term (current) use of anticoagulants [Z79.01] [Z79.01]  Atrial fibrillation (H) [I48.91]  Longstanding persistent atrial fibrillation [I48.11]             Anticoagulation Episode Summary      INR check location:       Preferred lab:       Send IN[facility] reminders [facility]:   [facility]    Comments:    Prefers AVS printed.       Anticoagulation Care Providers     Provider Role Specialty Phone number    Dmitri Andres MD Referring [facility] [phone]            See the Encounter Report to view Anticoagulation Flowsheet and Dosing Calendar (Go to Encounters tab in chart review, and find the Anticoagulation Therapy Visit)        Tatyana Akers RN

## 2020-05-11 ENCOUNTER — ANTICOAGULATION THERAPY VISIT (OUTPATIENT)
Dept: NURSING | Facility: CLINIC | Age: 75
End: 2020-05-11

## 2020-05-11 DIAGNOSIS — Z79.01 LONG TERM CURRENT USE OF ANTICOAGULANTS WITH INR GOAL OF 2.0-3.0: ICD-10-CM

## 2020-05-11 DIAGNOSIS — I48.91 ATRIAL FIBRILLATION (H): ICD-10-CM

## 2020-05-11 DIAGNOSIS — Z79.01 LONG TERM CURRENT USE OF ANTICOAGULANT THERAPY: ICD-10-CM

## 2020-05-11 DIAGNOSIS — I48.11 LONGSTANDING PERSISTENT ATRIAL FIBRILLATION (H): ICD-10-CM

## 2020-05-11 LAB
CAPILLARY BLOOD COLLECTION: NORMAL
INR PPP: 2.7 (ref 0.86–1.14)

## 2020-05-11 PROCEDURE — 85610 PROTHROMBIN TIME: CPT | Performed by: FAMILY MEDICINE

## 2020-05-11 PROCEDURE — 99207 ZZC NO CHARGE NURSE ONLY: CPT

## 2020-05-11 PROCEDURE — 36416 COLLJ CAPILLARY BLOOD SPEC: CPT | Performed by: FAMILY MEDICINE

## 2020-05-11 NOTE — PROGRESS NOTES
ANTICOAGULATION FOLLOW-UP CLINIC VISIT    Patient Name:  Cayetano Lunsford  Date:  2020  Contact Type:  Telephone    SUBJECTIVE:  Patient Findings     Comments:   The patient was assessed for diet, medication, and activity level changes, missed or extra doses, bruising or bleeding, with no problem findings.          Clinical Outcomes     Negatives:   Major bleeding event, Thromboembolic event, Anticoagulation-related hospital admission, Anticoagulation-related ED visit, Anticoagulation-related fatality    Comments:   The patient was assessed for diet, medication, and activity level changes, missed or extra doses, bruising or bleeding, with no problem findings.             OBJECTIVE    Recent labs: (last 7 days)     20  1319   INR 2.70*       ASSESSMENT / PLAN  INR assessment THER    Recheck INR In: 3 WEEKS    INR Location Clinic      Anticoagulation Summary  As of 2020    INR goal:   2.0-3.0   TTR:   56.2 % (1 y)   INR used for dosin.70 (2020)   Warfarin maintenance plan:   7.5 mg (5 mg x 1.5) every day   Full warfarin instructions:   7.5 mg every day   Weekly warfarin total:   52.5 mg   No change documented:   Tatyana Akers RN   Plan last modified:   Tatyana Akers RN (2020)   Next INR check:   2020   Priority:   High   Target end date:   Indefinite    Indications    Long-term (current) use of anticoagulants [Z79.01] [Z79.01]  Atrial fibrillation (H) [I48.91]  Longstanding persistent atrial fibrillation [I48.11]             Anticoagulation Episode Summary     INR check location:       Preferred lab:       Send INR reminders to:   [facility] [place]    Comments:    Prefers AVS printed.       Anticoagulation Care Providers     Provider Role Specialty Phone number    Dmitri Andres MD Referring [facility] [phone]            See the Encounter Report to view Anticoagulation Flowsheet and Dosing Calendar (Go to Encounters tab in chart review, and find the  Anticoagulation Therapy Visit)        Tatyana Akers RN

## 2020-05-12 ENCOUNTER — TRANSFERRED RECORDS (OUTPATIENT)
Dept: HEALTH INFORMATION MANAGEMENT | Facility: CLINIC | Age: 75
End: 2020-05-12

## 2020-05-16 DIAGNOSIS — Z79.01 LONG TERM CURRENT USE OF ANTICOAGULANTS WITH INR GOAL OF 2.0-3.0: ICD-10-CM

## 2020-05-16 DIAGNOSIS — I48.0 PAROXYSMAL ATRIAL FIBRILLATION (H): ICD-10-CM

## 2020-05-18 DIAGNOSIS — J44.9 CHRONIC OBSTRUCTIVE PULMONARY DISEASE, UNSPECIFIED COPD TYPE (H): ICD-10-CM

## 2020-05-18 RX ORDER — WARFARIN SODIUM 5 MG/1
TABLET ORAL
Qty: 126 TABLET | Refills: 1 | Status: SHIPPED | OUTPATIENT
Start: 2020-05-18 | End: 2020-08-17

## 2020-05-18 NOTE — TELEPHONE ENCOUNTER
Last INR: 05/11/20=2.7  Next INR: 06/01/20    Prescription approved per [facility] Refill Protocol.    Tatyana Akers RN

## 2020-05-19 RX ORDER — IPRATROPIUM BROMIDE AND ALBUTEROL SULFATE 2.5; .5 MG/3ML; MG/3ML
SOLUTION RESPIRATORY (INHALATION)
Qty: 360 ML | Refills: 1 | Status: SHIPPED | OUTPATIENT
Start: 2020-05-19 | End: 2020-08-26

## 2020-05-19 NOTE — TELEPHONE ENCOUNTER
Routing refill request to provider for review/approval because:  Labs not current:  Pt needs ACT on file    Debbie Conner RN

## 2020-05-26 DIAGNOSIS — Z79.4 TYPE 2 DIABETES MELLITUS WITH STAGE 1 CHRONIC KIDNEY DISEASE, WITH LONG-TERM CURRENT USE OF INSULIN (H): ICD-10-CM

## 2020-05-26 DIAGNOSIS — N18.1 TYPE 2 DIABETES MELLITUS WITH STAGE 1 CHRONIC KIDNEY DISEASE, WITH LONG-TERM CURRENT USE OF INSULIN (H): ICD-10-CM

## 2020-05-26 DIAGNOSIS — E11.22 TYPE 2 DIABETES MELLITUS WITH STAGE 1 CHRONIC KIDNEY DISEASE, WITH LONG-TERM CURRENT USE OF INSULIN (H): ICD-10-CM

## 2020-05-26 NOTE — TELEPHONE ENCOUNTER
Prescription approved per [facility] Refill Protocol.  Paulino Caal RN

## 2020-06-01 ENCOUNTER — ANTICOAGULATION THERAPY VISIT (OUTPATIENT)
Dept: NURSING | Facility: CLINIC | Age: 75
End: 2020-06-01

## 2020-06-01 DIAGNOSIS — I48.91 ATRIAL FIBRILLATION (H): ICD-10-CM

## 2020-06-01 DIAGNOSIS — I48.11 LONGSTANDING PERSISTENT ATRIAL FIBRILLATION (H): ICD-10-CM

## 2020-06-01 DIAGNOSIS — Z79.01 LONG TERM CURRENT USE OF ANTICOAGULANT THERAPY: ICD-10-CM

## 2020-06-01 DIAGNOSIS — Z79.01 LONG TERM CURRENT USE OF ANTICOAGULANTS WITH INR GOAL OF 2.0-3.0: ICD-10-CM

## 2020-06-01 LAB
CAPILLARY BLOOD COLLECTION: NORMAL
INR PPP: 2.9 (ref 0.86–1.14)

## 2020-06-01 PROCEDURE — 99207 ZZC NO CHARGE NURSE ONLY: CPT

## 2020-06-01 PROCEDURE — 85610 PROTHROMBIN TIME: CPT | Performed by: FAMILY MEDICINE

## 2020-06-01 PROCEDURE — 36416 COLLJ CAPILLARY BLOOD SPEC: CPT | Performed by: FAMILY MEDICINE

## 2020-06-01 NOTE — PROGRESS NOTES
ANTICOAGULATION FOLLOW-UP CLINIC VISIT    Patient Name:  Cayetano Lunsford  Date:  2020  Contact Type:  Telephone    SUBJECTIVE:  Patient Findings     Comments:   The patient was assessed for diet, medication, and activity level changes, missed or extra doses, bruising or bleeding, with no problem findings.          Clinical Outcomes     Negatives:   Major bleeding event, Thromboembolic event, Anticoagulation-related hospital admission, Anticoagulation-related ED visit, Anticoagulation-related fatality    Comments:   The patient was assessed for diet, medication, and activity level changes, missed or extra doses, bruising or bleeding, with no problem findings.             OBJECTIVE    Recent labs: (last 7 days)     20  1334   INR 2.90*       ASSESSMENT / PLAN  INR assessment THER    Recheck INR In: 4 WEEKS    INR Location Clinic      Anticoagulation Summary  As of 2020    INR goal:   2.0-3.0   TTR:   56.2 % (1 y)   INR used for dosin.90 (2020)   Warfarin maintenance plan:   7.5 mg (5 mg x 1.5) every day   Full warfarin instructions:   7.5 mg every day   Weekly warfarin total:   52.5 mg   No change documented:   Tatyana Akers RN   Plan last modified:   Tatyana Akers RN (2020)   Next INR check:   2020   Priority:   Maintenance   Target end date:   Indefinite    Indications    Long-term (current) use of anticoagulants [Z79.01] [Z79.01]  Atrial fibrillation (H) [I48.91]  Longstanding persistent atrial fibrillation [I48.11]             Anticoagulation Episode Summary     INR check location:       Preferred lab:       Send INR reminders to:   [facility] [place]    Comments:    Prefers AVS printed.       Anticoagulation Care Providers     Provider Role Specialty Phone number    Dmitri Andres MD Referring [facility] [phone]            See the Encounter Report to view Anticoagulation Flowsheet and Dosing Calendar (Go to Encounters tab in chart review, and find the  Anticoagulation Therapy Visit)        Tatyana Akers RN

## 2020-06-26 ENCOUNTER — ANTICOAGULATION THERAPY VISIT (OUTPATIENT)
Dept: NURSING | Facility: CLINIC | Age: 75
End: 2020-06-26

## 2020-06-26 DIAGNOSIS — I48.91 ATRIAL FIBRILLATION (H): ICD-10-CM

## 2020-06-26 DIAGNOSIS — Z79.01 LONG TERM CURRENT USE OF ANTICOAGULANTS WITH INR GOAL OF 2.0-3.0: ICD-10-CM

## 2020-06-26 DIAGNOSIS — I48.11 LONGSTANDING PERSISTENT ATRIAL FIBRILLATION (H): ICD-10-CM

## 2020-06-26 DIAGNOSIS — Z79.01 LONG TERM CURRENT USE OF ANTICOAGULANT THERAPY: ICD-10-CM

## 2020-06-26 LAB
CAPILLARY BLOOD COLLECTION: NORMAL
INR PPP: 2 (ref 0.86–1.14)

## 2020-06-26 PROCEDURE — 36416 COLLJ CAPILLARY BLOOD SPEC: CPT | Performed by: FAMILY MEDICINE

## 2020-06-26 PROCEDURE — 99207 ZZC NO CHARGE NURSE ONLY: CPT

## 2020-06-26 PROCEDURE — 85610 PROTHROMBIN TIME: CPT | Performed by: FAMILY MEDICINE

## 2020-06-26 NOTE — PROGRESS NOTES
ANTICOAGULATION FOLLOW-UP CLINIC VISIT    Patient Name:  Cayetano Lunsford  Date:  2020  Contact Type:  Telephone    SUBJECTIVE:  Patient Findings     Positives:   Change in diet/appetite (Staying with 3 servings of greens per week and but did drink 2 10oz glasses of V8 a few times last week (pt typically drinks only 1 glass of V8 a few days per week).  I emphasized the importance of consistency, pt verbalized understanding.)        Clinical Outcomes     Negatives:   Major bleeding event, Thromboembolic event, Anticoagulation-related hospital admission, Anticoagulation-related ED visit, Anticoagulation-related fatality           OBJECTIVE    Recent labs: (last 7 days)     20  1301   INR 2.00*       ASSESSMENT / PLAN  INR assessment THER    Recheck INR In: 4 WEEKS    INR Location Clinic      Anticoagulation Summary  As of 2020    INR goal:   2.0-3.0   TTR:   62.8 % (1 y)   INR used for dosin.00 (2020)   Warfarin maintenance plan:   7.5 mg (5 mg x 1.5) every day   Full warfarin instructions:   7.5 mg every day   Weekly warfarin total:   52.5 mg   No change documented:   Tatyana Akers, RN   Plan last modified:   Tatyana Akers RN (2020)   Next INR check:   2020   Priority:   Maintenance   Target end date:   Indefinite    Indications    Long-term (current) use of anticoagulants [Z79.01] [Z79.01]  Atrial fibrillation (H) [I48.91]  Longstanding persistent atrial fibrillation (H) [I48.11]             Anticoagulation Episode Summary     INR check location:       Preferred lab:       Send INR reminders to:   [facility]    Comments:    Prefers AVS printed.       Anticoagulation Care Providers     Provider Role Specialty Phone number    Dmitri Andres MD Referring [facility] [phone]            See the Encounter Report to view Anticoagulation Flowsheet and Dosing Calendar (Go to Encounters tab in chart review, and find the Anticoagulation Therapy  Visit)        Tatyana Akers RN

## 2020-06-30 DIAGNOSIS — E11.9 TYPE 2 DIABETES, HBA1C GOAL < 8% (H): ICD-10-CM

## 2020-06-30 RX ORDER — SYRINGE-NEEDLE,INSULIN,0.5 ML 27GX1/2"
SYRINGE, EMPTY DISPOSABLE MISCELLANEOUS
Qty: 100 EACH | Refills: 0 | Status: SHIPPED | OUTPATIENT
Start: 2020-06-30 | End: 2020-08-13

## 2020-06-30 NOTE — TELEPHONE ENCOUNTER
Patient does not have enough syringes to get him through until his appointment.  Would you be willing to give 1 refill on these?    Thanks,  Clara Davidson CPhT  [facility]  [phone]

## 2020-07-01 RX ORDER — PEN NEEDLE, DIABETIC 29 G X1/2"
NEEDLE, DISPOSABLE MISCELLANEOUS
Qty: 100 EACH | Refills: 0 | OUTPATIENT
Start: 2020-07-01

## 2020-07-05 DIAGNOSIS — I10 HYPERTENSION GOAL BP (BLOOD PRESSURE) < 140/90: ICD-10-CM

## 2020-07-06 RX ORDER — LOSARTAN POTASSIUM 100 MG/1
TABLET ORAL
Qty: 90 TABLET | Refills: 1 | Status: SHIPPED | OUTPATIENT
Start: 2020-07-06 | End: 2020-12-02

## 2020-07-06 NOTE — TELEPHONE ENCOUNTER
Prescription approved per [facility] Refill Protocol.  Dusty Munoz RN, BSN

## 2020-07-07 ENCOUNTER — OFFICE VISIT (OUTPATIENT)
Dept: FAMILY MEDICINE | Facility: CLINIC | Age: 75
End: 2020-07-07
Payer: COMMERCIAL

## 2020-07-07 VITALS
OXYGEN SATURATION: 95 % | WEIGHT: 193.4 LBS | HEART RATE: 68 BPM | BODY MASS INDEX: 29.41 KG/M2 | SYSTOLIC BLOOD PRESSURE: 136 MMHG | DIASTOLIC BLOOD PRESSURE: 72 MMHG | TEMPERATURE: 97.5 F

## 2020-07-07 DIAGNOSIS — E11.69 TYPE 2 DIABETES MELLITUS WITH OTHER SPECIFIED COMPLICATION, WITH LONG-TERM CURRENT USE OF INSULIN (H): ICD-10-CM

## 2020-07-07 DIAGNOSIS — J44.9 CHRONIC OBSTRUCTIVE PULMONARY DISEASE, UNSPECIFIED COPD TYPE (H): Primary | ICD-10-CM

## 2020-07-07 DIAGNOSIS — K51.90 ULCERATIVE COLITIS WITHOUT COMPLICATIONS, UNSPECIFIED LOCATION (H): ICD-10-CM

## 2020-07-07 DIAGNOSIS — Z79.4 TYPE 2 DIABETES MELLITUS WITH OTHER SPECIFIED COMPLICATION, WITH LONG-TERM CURRENT USE OF INSULIN (H): ICD-10-CM

## 2020-07-07 LAB — HBA1C MFR BLD: 5.8 % (ref 0–5.6)

## 2020-07-07 PROCEDURE — 36415 COLL VENOUS BLD VENIPUNCTURE: CPT | Performed by: FAMILY MEDICINE

## 2020-07-07 PROCEDURE — 83036 HEMOGLOBIN GLYCOSYLATED A1C: CPT | Performed by: FAMILY MEDICINE

## 2020-07-07 PROCEDURE — 80053 COMPREHEN METABOLIC PANEL: CPT | Performed by: FAMILY MEDICINE

## 2020-07-07 PROCEDURE — 87389 HIV-1 AG W/HIV-1&-2 AB AG IA: CPT | Performed by: FAMILY MEDICINE

## 2020-07-07 PROCEDURE — 99214 OFFICE O/P EST MOD 30 MIN: CPT | Performed by: FAMILY MEDICINE

## 2020-07-07 NOTE — PROGRESS NOTES
"Subjective     Cayetano Lunsford is a 75 year old male who presents to clinic today for the following health issues:    HPI       Patient is here for a medication follow up.      BP Readings from Last 3 Encounters:   07/07/20 136/72   03/10/20 124/60   12/09/19 136/68    Wt Readings from Last 3 Encounters:   07/07/20 87.7 kg (193 lb 6.4 oz)   03/10/20 91.2 kg (201 lb)   12/09/19 91.2 kg (201 lb 1.6 oz)                    Reviewed and updated as needed this visit by Provider         Review of Systems   Constitutional, HEENT, cardiovascular, pulmonary, GI, , musculoskeletal, neuro, skin, endocrine and psych systems are negative, except as otherwise noted.      Objective    There were no vitals taken for this visit.  There is no height or weight on file to calculate BMI.  Physical Exam   GENERAL: healthy, alert and no distress  EYES: Eyes grossly normal to inspection, PERRL and conjunctivae and sclerae normal  HENT: ear canals and TM's normal, nose and mouth without ulcers or lesions  NECK: no adenopathy, no asymmetry, masses, or scars and thyroid normal to palpation  RESP: lungs clear to auscultation - no rales, rhonchi or wheezes  CV: regular rate and rhythm, normal S1 S2, no S3 or S4, no murmur, click or rub, no peripheral edema and peripheral pulses strong  ABDOMEN: soft, nontender, no hepatosplenomegaly, no masses and bowel sounds normal  MS: no gross musculoskeletal defects noted, no edema  SKIN: no suspicious lesions or rashes  NEURO: Normal strength and tone, mentation intact and speech normal  PSYCH: mentation appears normal, affect normal/bright    Diagnostic Test Results:  Labs reviewed in Epic        Assessment & Plan     There are no diagnoses linked to this encounter.     BMI:   Estimated body mass index is 29.41 kg/m  as calculated from the following:    Height as of 9/10/19: 1.727 m (5' 8\").    Weight as of this encounter: 87.7 kg (193 lb 6.4 oz).           Regular exercise  Lives in senior " apartment      No follow-ups on file.    Dimtri Andres MD  [facility]

## 2020-07-08 LAB
ALBUMIN SERPL-MCNC: 3.4 G/DL (ref 3.4–5)
ALP SERPL-CCNC: 69 U/L (ref 40–150)
ALT SERPL W P-5'-P-CCNC: 43 U/L (ref 0–70)
ANION GAP SERPL CALCULATED.3IONS-SCNC: 5 MMOL/L (ref 3–14)
AST SERPL W P-5'-P-CCNC: 25 U/L (ref 0–45)
BILIRUB SERPL-MCNC: 0.3 MG/DL (ref 0.2–1.3)
BUN SERPL-MCNC: 12 MG/DL (ref 7–30)
CALCIUM SERPL-MCNC: 8 MG/DL (ref 8.5–10.1)
CHLORIDE SERPL-SCNC: 102 MMOL/L (ref 94–109)
CO2 SERPL-SCNC: 27 MMOL/L (ref 20–32)
CREAT SERPL-MCNC: 0.66 MG/DL (ref 0.66–1.25)
GFR SERPL CREATININE-BSD FRML MDRD: >90 ML/MIN/{1.73_M2}
GLUCOSE SERPL-MCNC: 227 MG/DL (ref 70–99)
HIV 1+2 AB+HIV1 P24 AG SERPL QL IA: NONREACTIVE
POTASSIUM SERPL-SCNC: 4.8 MMOL/L (ref 3.4–5.3)
PROT SERPL-MCNC: 6.6 G/DL (ref 6.8–8.8)
SODIUM SERPL-SCNC: 134 MMOL/L (ref 133–144)

## 2020-07-16 DIAGNOSIS — I48.0 PAROXYSMAL ATRIAL FIBRILLATION (H): ICD-10-CM

## 2020-07-16 RX ORDER — PROPAFENONE HYDROCHLORIDE 150 MG/1
150 TABLET, COATED ORAL 2 TIMES DAILY
Qty: 180 TABLET | Refills: 0 | Status: SHIPPED | OUTPATIENT
Start: 2020-07-16 | End: 2020-10-08

## 2020-07-24 ENCOUNTER — ANTICOAGULATION THERAPY VISIT (OUTPATIENT)
Dept: NURSING | Facility: CLINIC | Age: 75
End: 2020-07-24

## 2020-07-24 DIAGNOSIS — Z79.01 LONG TERM CURRENT USE OF ANTICOAGULANTS WITH INR GOAL OF 2.0-3.0: ICD-10-CM

## 2020-07-24 DIAGNOSIS — I48.11 LONGSTANDING PERSISTENT ATRIAL FIBRILLATION (H): ICD-10-CM

## 2020-07-24 DIAGNOSIS — I48.91 ATRIAL FIBRILLATION (H): ICD-10-CM

## 2020-07-24 DIAGNOSIS — Z79.01 LONG TERM CURRENT USE OF ANTICOAGULANT THERAPY: ICD-10-CM

## 2020-07-24 LAB
CAPILLARY BLOOD COLLECTION: NORMAL
INR PPP: 3.1 (ref 0.86–1.14)

## 2020-07-24 PROCEDURE — 85610 PROTHROMBIN TIME: CPT | Performed by: FAMILY MEDICINE

## 2020-07-24 PROCEDURE — 36416 COLLJ CAPILLARY BLOOD SPEC: CPT | Performed by: FAMILY MEDICINE

## 2020-07-24 PROCEDURE — 99207 ZZC NO CHARGE NURSE ONLY: CPT

## 2020-08-04 ENCOUNTER — TRANSFERRED RECORDS (OUTPATIENT)
Dept: HEALTH INFORMATION MANAGEMENT | Facility: CLINIC | Age: 75
End: 2020-08-04

## 2020-08-07 ENCOUNTER — ANTICOAGULATION THERAPY VISIT (OUTPATIENT)
Dept: NURSING | Facility: CLINIC | Age: 75
End: 2020-08-07

## 2020-08-07 DIAGNOSIS — Z79.01 LONG TERM CURRENT USE OF ANTICOAGULANT THERAPY: ICD-10-CM

## 2020-08-07 DIAGNOSIS — I48.91 ATRIAL FIBRILLATION (H): ICD-10-CM

## 2020-08-07 DIAGNOSIS — Z79.01 LONG TERM CURRENT USE OF ANTICOAGULANTS WITH INR GOAL OF 2.0-3.0: ICD-10-CM

## 2020-08-07 DIAGNOSIS — I48.11 LONGSTANDING PERSISTENT ATRIAL FIBRILLATION (H): ICD-10-CM

## 2020-08-07 LAB
CAPILLARY BLOOD COLLECTION: NORMAL
INR PPP: 3.5 (ref 0.86–1.14)

## 2020-08-07 PROCEDURE — 99207 ZZC NO CHARGE NURSE ONLY: CPT

## 2020-08-07 PROCEDURE — 36416 COLLJ CAPILLARY BLOOD SPEC: CPT | Performed by: FAMILY MEDICINE

## 2020-08-07 PROCEDURE — 85610 PROTHROMBIN TIME: CPT | Performed by: FAMILY MEDICINE

## 2020-08-07 NOTE — PROGRESS NOTES
ANTICOAGULATION FOLLOW-UP CLINIC VISIT    Patient Name:  Cayetano Lunsford  Date:  8/7/2020  Contact Type:  Telephone    SUBJECTIVE:  Patient Findings     Positives:   Change in health (Intermittent back pain, this is chronic and NOT a new symptom), Change in diet/appetite (NO change, has been eating 3 greens per week--green beans or broccoli, sometimes salad)    Comments:   Patient denies any identifiable changes that caused the supra-therapeutic INR.   2nd supra INR so I decreased patient's warfarin maintenance dose ~ 5%          Clinical Outcomes     Negatives:   Major bleeding event, Thromboembolic event, Anticoagulation-related hospital admission, Anticoagulation-related ED visit, Anticoagulation-related fatality    Comments:   Patient denies any identifiable changes that caused the supra-therapeutic INR.   2nd supra INR so I decreased patient's warfarin maintenance dose ~ 5%             OBJECTIVE    Recent labs: (last 7 days)     08/07/20  1338   INR 3.50*       ASSESSMENT / PLAN  INR assessment SUPRA    Recheck INR In: 10 DAYS    INR Location Clinic      Anticoagulation Summary  As of 8/7/2020    INR goal:   2.0-3.0   TTR:   60.3 % (1 y)   INR used for dosing:   3.50! (8/7/2020)   Warfarin maintenance plan:   5 mg (5 mg x 1) every Fri; 7.5 mg (5 mg x 1.5) all other days   Full warfarin instructions:   5 mg every Fri; 7.5 mg all other days   Weekly warfarin total:   50 mg   Plan last modified:   Tatyana Akers RN (8/7/2020)   Next INR check:   8/17/2020   Priority:   High   Target end date:   Indefinite    Indications    Long-term (current) use of anticoagulants [Z79.01] [Z79.01]  Atrial fibrillation (H) [I48.91]  Longstanding persistent atrial fibrillation (H) [I48.11]             Anticoagulation Episode Summary     INR check location:       Preferred lab:       Send INR reminders to:   [facility]    Comments:    Prefers AVS printed.       Anticoagulation Care Providers     Provider Role Specialty  Phone number    Dmitri Andres MD Referring Family Practice [phone]            See the Encounter Report to view Anticoagulation Flowsheet and Dosing Calendar (Go to Encounters tab in chart review, and find the Anticoagulation Therapy Visit)        Tatyana Akers RN

## 2020-08-14 DIAGNOSIS — E11.9 TYPE 2 DIABETES, HBA1C GOAL < 8% (H): ICD-10-CM

## 2020-08-14 RX ORDER — PEN NEEDLE, DIABETIC 29 G X1/2"
NEEDLE, DISPOSABLE MISCELLANEOUS
Qty: 100 EACH | Refills: 0 | OUTPATIENT
Start: 2020-08-14

## 2020-08-17 ENCOUNTER — ANTICOAGULATION THERAPY VISIT (OUTPATIENT)
Dept: NURSING | Facility: CLINIC | Age: 75
End: 2020-08-17

## 2020-08-17 DIAGNOSIS — Z79.01 LONG TERM CURRENT USE OF ANTICOAGULANT THERAPY: ICD-10-CM

## 2020-08-17 DIAGNOSIS — Z79.01 LONG TERM CURRENT USE OF ANTICOAGULANTS WITH INR GOAL OF 2.0-3.0: ICD-10-CM

## 2020-08-17 DIAGNOSIS — I48.11 LONGSTANDING PERSISTENT ATRIAL FIBRILLATION (H): ICD-10-CM

## 2020-08-17 DIAGNOSIS — I48.91 ATRIAL FIBRILLATION (H): ICD-10-CM

## 2020-08-17 DIAGNOSIS — I48.0 PAROXYSMAL ATRIAL FIBRILLATION (H): ICD-10-CM

## 2020-08-17 LAB
CAPILLARY BLOOD COLLECTION: NORMAL
INR PPP: 2.4 (ref 0.86–1.14)

## 2020-08-17 PROCEDURE — 85610 PROTHROMBIN TIME: CPT | Performed by: FAMILY MEDICINE

## 2020-08-17 PROCEDURE — 36416 COLLJ CAPILLARY BLOOD SPEC: CPT | Performed by: FAMILY MEDICINE

## 2020-08-17 PROCEDURE — 99207 ZZC NO CHARGE NURSE ONLY: CPT

## 2020-08-17 RX ORDER — WARFARIN SODIUM 5 MG/1
TABLET ORAL
Qty: 126 TABLET | Refills: 1
Start: 2020-08-17 | End: 2020-10-27

## 2020-08-17 NOTE — PROGRESS NOTES
ANTICOAGULATION FOLLOW-UP CLINIC VISIT    Patient Name:  Cayetano Lunsford  Date:  2020  Contact Type:  Telephone    SUBJECTIVE:  Patient Findings     Positives:   Change in diet/appetite (NO change, will continue with 3 greens per week)    Comments:   The patient was assessed for diet, medication, and activity level changes, missed or extra doses, bruising or bleeding, with no problem findings.          Clinical Outcomes     Negatives:   Major bleeding event, Thromboembolic event, Anticoagulation-related hospital admission, Anticoagulation-related ED visit, Anticoagulation-related fatality    Comments:   The patient was assessed for diet, medication, and activity level changes, missed or extra doses, bruising or bleeding, with no problem findings.             OBJECTIVE    Recent labs: (last 7 days)     20  1349   INR 2.40*       ASSESSMENT / PLAN  INR assessment THER    Recheck INR In: 2 WEEKS    INR Location Clinic      Anticoagulation Summary  As of 2020    INR goal:   2.0-3.0   TTR:   59.0 % (1 y)   INR used for dosin.40 (2020)   Warfarin maintenance plan:   5 mg (5 mg x 1) every Fri; 7.5 mg (5 mg x 1.5) all other days   Full warfarin instructions:   5 mg every Fri; 7.5 mg all other days   Weekly warfarin total:   50 mg   Plan last modified:   Tatyana Akers RN (2020)   Next INR check:   2020   Priority:   High   Target end date:   Indefinite    Indications    Long-term (current) use of anticoagulants [Z79.01] [Z79.01]  Atrial fibrillation (H) [I48.91]  Longstanding persistent atrial fibrillation (H) [I48.11]             Anticoagulation Episode Summary     INR check location:       Preferred lab:       Send INR reminders to:   [facility]    Comments:    Prefers AVS printed.       Anticoagulation Care Providers     Provider Role Specialty Phone number    Dmitri Andres MD Referring [facility] [phone]            See the Encounter Report to view  Anticoagulation Flowsheet and Dosing Calendar (Go to Encounters tab in chart review, and find the Anticoagulation Therapy Visit)        Tatyana Akers RN

## 2020-08-24 DIAGNOSIS — J44.9 CHRONIC OBSTRUCTIVE PULMONARY DISEASE, UNSPECIFIED COPD TYPE (H): ICD-10-CM

## 2020-08-26 RX ORDER — IPRATROPIUM BROMIDE AND ALBUTEROL SULFATE 2.5; .5 MG/3ML; MG/3ML
SOLUTION RESPIRATORY (INHALATION)
Qty: 360 ML | Refills: 1 | Status: SHIPPED | OUTPATIENT
Start: 2020-08-26 | End: 2020-11-19

## 2020-08-26 NOTE — TELEPHONE ENCOUNTER
Routing refill request to provider for review/approval because:  Labs not current:  Pt needs ACT on file     Arline Luis RN Flex

## 2020-08-27 ENCOUNTER — OFFICE VISIT (OUTPATIENT)
Dept: CARDIOLOGY | Facility: CLINIC | Age: 75
End: 2020-08-27
Payer: COMMERCIAL

## 2020-08-27 VITALS
BODY MASS INDEX: 29.3 KG/M2 | WEIGHT: 193.3 LBS | SYSTOLIC BLOOD PRESSURE: 136 MMHG | HEART RATE: 64 BPM | DIASTOLIC BLOOD PRESSURE: 64 MMHG | HEIGHT: 68 IN

## 2020-08-27 DIAGNOSIS — I48.11 LONGSTANDING PERSISTENT ATRIAL FIBRILLATION (H): Primary | ICD-10-CM

## 2020-08-27 PROCEDURE — 99214 OFFICE O/P EST MOD 30 MIN: CPT | Performed by: INTERNAL MEDICINE

## 2020-08-27 ASSESSMENT — MIFFLIN-ST. JEOR: SCORE: 1581.3

## 2020-08-27 NOTE — LETTER
8/27/2020      Dmitri Andres MD  [id number] [address] [facility] [address]      RE: Cayetano Lunsford       Dear Colleague,    I had the pleasure of seeing Cayetano Lunsford in the [facility].    Service Date: 08/27/2020      HISTORY OF PRESENT ILLNESS:  A very pleasant 75-year-old gentleman who transferred care to me in 2019 after Dr. Dang retired from practice.  He has a history of paroxysmal atrial fibrillation on anticoagulation with Coumadin and rhythm control strategy with propafenone and propranolol for many years.  No history of obstructive coronary disease.  No MI, but he is reporting.  No heart failure symptoms.  Last nuclear stress test and echocardiogram showed normal LV function and no ischemia or infarction.  He is here for routine 1-year followup.        Denies any cardiovascular symptoms.  No chest pain, shortness of breath.  No bleeding problems.  No edema, orthopnea or PND.      PHYSICAL EXAMINATION:   VITAL SIGNS:  Blood pressure is 136/64 with a pulse of 64 and regular.   GENERAL:  Alert and oriented x3, in no acute distress.   NECK:  Supple.  JVP is normal.   LUNGS:  Clear.   CARDIAC:  Sounds are regular, very soft systolic murmur, if any, without any rubs or gallops.   EXTREMITIES:  Warm without edema.   PSYCHIATRIC:  Appropriate affect.      ASSESSMENT AND PLAN:  Mr. Cayetano Lunsford is a 75-year-old gentleman with a history of longstanding persistent atrial fibrillation on a rhythm control strategy with propafenone and propranolol.  He has a history of diabetes and hypertension as well.  He is on anticoagulation and seems to be doing okay without any bleeding problems.  No cardiac symptoms at this point.  I recommend conservative management.        I will see him back in 1 year in clinic for routine followup, sooner if anything changes.  If he has fluctuations with his INR, which are bothersome for him, I have given him the option of switching him to  "newer oral anticoagulant agent; however, at this point he wants to remain on warfarin.      cc:      Dmitri Andres MD    [facility]   [address] [place] [address]         SULY BOSE MD             D: 2020   T: 2020   MT: CRISTOPHER      Name:     CAROLANN SAUNDERS   MRN:      [id number]        Account:      [id number]   :      1945           Service Date: 2020      Document: [id number]         Outpatient Encounter Medications as of 2020   Medication Sig Dispense Refill     albuterol (PROAIR HFA/PROVENTIL HFA/VENTOLIN HFA) 108 (90 BASE) MCG/ACT Inhaler Inhale 1-2 puffs into the lungs every 4 hours as needed (for shortness of breath/wheezing) 1 Inhaler 5     BD INSULIN SYRINGE U/F 31G X 5/16\" 0.5 ML miscellaneous USE 1 SYRINGE TWO TIMES A DAY OR AS DIRECTED 100 each 3     BD ULTRA FINE PEN NEEDLES Use to inject insulin twice daily. 100 each PRN     blood glucose (ONETOUCH ULTRA) test strip USE TO TEST BLOOD SUGARS FOUR TIMES A DAY OR AS DIRECTED 120 each 6     folic acid 0.8 MG CAPS Take 1 tablet by mouth daily 90 capsule 3     FREESTYLE LANCETS MISC Use to test up to four times per day 100 Each 12     insulin NPH (NOVOLIN N RELION) 100 UNIT/ML vial INJECT 5 UNITS SUBCUTANEOUSLY WITH BREAKFAST AND  15  UNITS  WITH  SUPPER 30 mL 0     insulin regular (NOVOLIN R RELION) 100 UNIT/ML vial INJECT 5 UNITS WITH BREAKFAST AND 2 UNITS WITH DINNER  IF BLOOD SUGARS OVER 200 (WHEN MIXING  WITH NPH DRAW THIS INSULIN UP FIRST) 30 mL 1     ipratropium - albuterol 0.5 mg/2.5 mg/3 mL (DUONEB) 0.5-2.5 (3) MG/3ML neb solution NEBULIZE CONTENTS OF ONE VIAL EVERY 4 HOURS AS NEEDED FOR SHORTNESS OF BREATH OR DYSPNEA 360 mL 1     losartan (COZAAR) 100 MG tablet TAKE ONE TABLET BY MOUTH ONCE DAILY 90 tablet 1     metFORMIN (GLUCOPHAGE) 1000 MG tablet TAKE ONE TABLET BY MOUTH TWICE A DAY WITH BREAKFAST AND DINNER 180 tablet 1     ONETOUCH ULTRA test strip USE TO TEST BLOOD SUGAR FOUR " TIMES A DAY OR AS DIRECTED 125 each 0     pantoprazole (PROTONIX) 40 MG enteric coated tablet Take 40 mg by mouth daily Started by Dr. Debbie Rico at [facility]       primidone (MYSOLINE) 50 MG tablet 3 tablets in am, 3 tablets midday, and 4 tablets at bedtime 910 tablet 1     Probiotic Product (FLORAJEN3) CAPS Take 1 capsule by mouth 2 times daily 60 capsule 0     propafenone (RYTHMOL) 150 MG TABS tablet Take 1 tablet (150 mg) by mouth 2 times daily 180 tablet 0     propranolol (INDERAL) 40 MG tablet TAKE TWO TO THREE TABLETS BY MOUTH TWICE A  tablet 2     Respiratory Therapy Supplies (NEBULIZER/TUBING/MOUTHPIECE) KIT Use to nebulize medications for COPD 1 kit 0     simvastatin (ZOCOR) 80 MG tablet TAKE ONE-HALF TABLET BY MOUTH EVERY NIGHT AT BEDTIME 90 tablet 2     sulfaSALAzine (AZULFIDINE) 500 MG tablet TAKE ONE TABLET BY MOUTH THREE TIMES A DAY 90 tablet 11     tamsulosin (FLOMAX) 0.4 MG capsule TAKE ONE CAPSULE BY MOUTH ONCE DAILY 90 capsule 1     tiotropium-olodaterol (STIOLTO RESPIMAT) 2.5-2.5 MCG/ACT AERS Inhale 2 puffs into the lungs daily       warfarin ANTICOAGULANT (JANTOVEN ANTICOAGULANT) 5 MG tablet Take 5mg every Friday / Take 7.5mg all other days OR AS DIRECTED BY INR CLINIC 126 tablet 1     ASPIRIN NOT PRESCRIBED, INTENTIONAL, Reported on 5/17/2017       No facility-administered encounter medications on file as of 8/27/2020.        Again, thank you for allowing me to participate in the care of your patient.      Sincerely,    Daniel Marroquin MD     [facility]

## 2020-08-27 NOTE — LETTER
"8/27/2020    Dmitri Andres MD  [address] [facility] [address]    RE: Cayetano Lunsford       Dear Colleague,    I had the pleasure of seeing Cayetano Lunsford in the [facility].    HPI and Plan:   See dictation [id number]    No orders of the defined types were placed in this encounter.    No orders of the defined types were placed in this encounter.    There are no discontinued medications.      No diagnosis found.    CURRENT MEDICATIONS:  Current Outpatient Medications   Medication Sig Dispense Refill     albuterol (PROAIR HFA/PROVENTIL HFA/VENTOLIN HFA) 108 (90 BASE) MCG/ACT Inhaler Inhale 1-2 puffs into the lungs every 4 hours as needed (for shortness of breath/wheezing) 1 Inhaler 5     BD INSULIN SYRINGE U/F 31G X 5/16\" 0.5 ML miscellaneous USE 1 SYRINGE TWO TIMES A DAY OR AS DIRECTED 100 each 3     BD ULTRA FINE PEN NEEDLES Use to inject insulin twice daily. 100 each PRN     blood glucose (ONETOUCH ULTRA) test strip USE TO TEST BLOOD SUGARS FOUR TIMES A DAY OR AS DIRECTED 120 each 6     folic acid 0.8 MG CAPS Take 1 tablet by mouth daily 90 capsule 3     FREESTYLE LANCETS MISC Use to test up to four times per day 100 Each 12     insulin NPH (NOVOLIN N RELION) 100 UNIT/ML vial INJECT 5 UNITS SUBCUTANEOUSLY WITH BREAKFAST AND  15  UNITS  WITH  SUPPER 30 mL 0     insulin regular (NOVOLIN R RELION) 100 UNIT/ML vial INJECT 5 UNITS WITH BREAKFAST AND 2 UNITS WITH DINNER  IF BLOOD SUGARS OVER 200 (WHEN MIXING  WITH NPH DRAW THIS INSULIN UP FIRST) 30 mL 1     ipratropium - albuterol 0.5 mg/2.5 mg/3 mL (DUONEB) 0.5-2.5 (3) MG/3ML neb solution NEBULIZE CONTENTS OF ONE VIAL EVERY 4 HOURS AS NEEDED FOR SHORTNESS OF BREATH OR DYSPNEA 360 mL 1     losartan (COZAAR) 100 MG tablet TAKE ONE TABLET BY MOUTH ONCE DAILY 90 tablet 1     metFORMIN (GLUCOPHAGE) 1000 MG tablet TAKE ONE TABLET BY MOUTH TWICE A DAY WITH BREAKFAST AND DINNER 180 tablet 1     ONETOUCH ULTRA test strip USE TO " TEST BLOOD SUGAR FOUR TIMES A DAY OR AS DIRECTED 125 each 0     pantoprazole (PROTONIX) 40 MG enteric coated tablet Take 40 mg by mouth daily Started by Dr. Debbie Rico at [facility]       primidone (MYSOLINE) 50 MG tablet 3 tablets in am, 3 tablets midday, and 4 tablets at bedtime 910 tablet 1     Probiotic Product (FLORAJEN3) CAPS Take 1 capsule by mouth 2 times daily 60 capsule 0     propafenone (RYTHMOL) 150 MG TABS tablet Take 1 tablet (150 mg) by mouth 2 times daily 180 tablet 0     propranolol (INDERAL) 40 MG tablet TAKE TWO TO THREE TABLETS BY MOUTH TWICE A  tablet 2     Respiratory Therapy Supplies (NEBULIZER/TUBING/MOUTHPIECE) KIT Use to nebulize medications for COPD 1 kit 0     simvastatin (ZOCOR) 80 MG tablet TAKE ONE-HALF TABLET BY MOUTH EVERY NIGHT AT BEDTIME 90 tablet 2     sulfaSALAzine (AZULFIDINE) 500 MG tablet TAKE ONE TABLET BY MOUTH THREE TIMES A DAY 90 tablet 11     tamsulosin (FLOMAX) 0.4 MG capsule TAKE ONE CAPSULE BY MOUTH ONCE DAILY 90 capsule 1     tiotropium-olodaterol (STIOLTO RESPIMAT) 2.5-2.5 MCG/ACT AERS Inhale 2 puffs into the lungs daily       warfarin ANTICOAGULANT (JANTOVEN ANTICOAGULANT) 5 MG tablet Take 5mg every Friday / Take 7.5mg all other days OR AS DIRECTED BY INR CLINIC 126 tablet 1     ASPIRIN NOT PRESCRIBED, INTENTIONAL, Reported on 5/17/2017         ALLERGIES     Allergies   Allergen Reactions     Percodan [Oxycodone-Aspirin] Nausea and Vomiting       PAST MEDICAL HISTORY:  Past Medical History:   Diagnosis Date     Allergic rhinitis, cause unspecified      Atrial fibrillation (H)      BPH (benign prostatic hyperplasia)      Chronic airway obstruction, not elsewhere classified      COPD (chronic obstructive pulmonary disease) (H)      Hyperlipidemia LDL goal <100 5/9/2010     Hypertension goal BP (blood pressure) < 130/80 10/19/2006     Obesity (BMI 30-39.9)      Perforation of tympanic membrane, unspecified     Right TM     Tachycardia, unspecified      Type 2  diabetes, HbA1C goal < 8% (H) 5/2/2010     Ulcerative colitis (H)      Unspecified cardiovascular disease        PAST SURGICAL HISTORY:  Past Surgical History:   Procedure Laterality Date     CARDIAC SURGERY       COLONOSCOPY  3/31/2011     COLONOSCOPY N/A 5/25/2018    Procedure: COMBINED COLONOSCOPY, SINGLE OR MULTIPLE BIOPSY/POLYPECTOMY BY BIOPSY;;  Surgeon: Juan Simon DO;  Location:  GI     COLONOSCOPY N/A 9/6/2019    Procedure: COLONOSCOPY, WITH POLYPECTOMY AND BIOPSY;  Surgeon: Paradise Mims MD;  Location:  GI     CORONARY ANGIOGRAPHY ADULT ORDER  2001 and 2008 2001 at [facility]. 2008- No interventions     HC REMOVAL OF NAIL PLATE SIMPLE SINGLE  11/10/2011    Right 2nd Toenail     HERNIA REPAIR      left inguinal     SURGICAL HISTORY OF -   1987    right ear graft     SURGICAL HISTORY OF -   1992    right shoulder surgery     SURGICAL HISTORY OF -   1979    back surgery     SURGICAL HISTORY OF -   1978    vasectomy       FAMILY HISTORY:  Family History   Problem Relation Age of Onset     Circulatory Mother         blood clot in leg     Diabetes Mother         type 2     Allergies Mother      Arthritis Mother      Gastrointestinal Disease Mother      Neurologic Disorder Mother         Familiar tremors     Neurologic Disorder Father         brain tumor     Cerebrovascular Disease Paternal Grandfather      Hypertension Brother      Hypertension Sister      Diabetes Sister         Type 2     Allergies Sister      Lipids Brother      Lipids Sister        SOCIAL HISTORY:  Social History     Socioeconomic History     Marital status:      Spouse name: Izabel     Number of children: 2     Years of education: 12     Highest education level: None   Occupational History     Occupation:      Employer: RETIRED   Social Needs     Financial resource strain: None     Food insecurity     Worry: None     Inability: None     Transportation needs     Medical: None      Non-medical: None   Tobacco Use     Smoking status: Former Smoker     Packs/day: 1.00     Years: 30.00     Pack years: 30.00     Types: Cigarettes     Last attempt to quit: 3/19/1992     Years since quittin.4     Smokeless tobacco: Never Used   Substance and Sexual Activity     Alcohol use: Not Currently     Alcohol/week: 0.0 standard drinks     Drug use: No     Sexual activity: Yes     Partners: Female   Lifestyle     Physical activity     Days per week: None     Minutes per session: None     Stress: None   Relationships     Social connections     Talks on phone: None     Gets together: None     Attends [employer] service: None     Active member of club or organization: None     Attends meetings of clubs or organizations: None     Relationship status: None     Intimate partner violence     Fear of current or ex partner: None     Emotionally abused: None     Physically abused: None     Forced sexual activity: None   Other Topics Concern     Parent/sibling w/ CABG, MI or angioplasty before 65F 55M? No      Service Not Asked     Blood Transfusions Not Asked     Caffeine Concern No     Comment: No Caffeine     Occupational Exposure Not Asked     Hobby Hazards Not Asked     Sleep Concern Not Asked     Stress Concern Not Asked     Weight Concern Not Asked     Special Diet Not Asked     Back Care Not Asked     Exercise Yes     Comment: 20 minutes - Walking- summer 2 x day     Bike Helmet Not Asked     Seat Belt Not Asked     Self-Exams Not Asked   Social History Narrative     None       Review of Systems:  Skin:  Negative     Eyes:  Positive for glasses  ENT:  Positive for hearing loss;vertigo  Respiratory:  Positive for dyspnea on exertion;wheezing  Cardiovascular:    Positive for;edema  Gastroenterology: Negative    Genitourinary:  Negative    Musculoskeletal:  Positive for back pain  Neurologic:  Negative    Psychiatric:  Negative    Heme/Lymph/Imm:  Positive for allergies  Endocrine:  Positive for  "diabetes    Physical Exam:  Vitals: /64   Pulse 64   Ht 1.727 m (5' 8\")   Wt 87.7 kg (193 lb 4.8 oz)   BMI 29.39 kg/m      Recent Lab Results:  LIPID RESULTS:  Lab Results   Component Value Date    CHOL 159 09/09/2019    HDL 38 (L) 09/09/2019    LDL 68 09/09/2019    TRIG 264 (H) 09/09/2019    CHOLHDLRATIO 3.2 05/12/2015       LIVER ENZYME RESULTS:  Lab Results   Component Value Date    AST 25 07/07/2020    ALT 43 07/07/2020       CBC RESULTS:  Lab Results   Component Value Date    WBC 7.9 03/13/2017    RBC 4.11 (L) 03/13/2017    HGB 12.4 (L) 03/13/2017    HCT 39.4 (L) 03/13/2017    MCV 96 03/13/2017    MCH 30.2 03/13/2017    MCHC 31.5 03/13/2017    RDW 13.9 03/13/2017     03/13/2017       BMP RESULTS:  Lab Results   Component Value Date     07/07/2020    POTASSIUM 4.8 07/07/2020    CHLORIDE 102 07/07/2020    CO2 27 07/07/2020    ANIONGAP 5 07/07/2020     (H) 07/07/2020    BUN 12 07/07/2020    CR 0.66 07/07/2020    GFRESTIMATED >90 07/07/2020    GFRESTBLACK >90 07/07/2020    LEBRON 8.0 (L) 07/07/2020        A1C RESULTS:  Lab Results   Component Value Date    A1C 5.8 (H) 07/07/2020       INR RESULTS:  Lab Results   Component Value Date    INR 2.40 (H) 08/17/2020    INR 3.50 (H) 08/07/2020           CC  No referring provider defined for this encounter.                    Thank you for allowing me to participate in the care of your patient.      Sincerely,     Daniel Marroquin MD     [facility]    cc:   No referring provider defined for this encounter.        "

## 2020-08-27 NOTE — PROGRESS NOTES
"HPI and Plan:   See dictation [id number]    No orders of the defined types were placed in this encounter.    No orders of the defined types were placed in this encounter.    There are no discontinued medications.      No diagnosis found.    CURRENT MEDICATIONS:  Current Outpatient Medications   Medication Sig Dispense Refill     albuterol (PROAIR HFA/PROVENTIL HFA/VENTOLIN HFA) 108 (90 BASE) MCG/ACT Inhaler Inhale 1-2 puffs into the lungs every 4 hours as needed (for shortness of breath/wheezing) 1 Inhaler 5     BD INSULIN SYRINGE U/F 31G X 5/16\" 0.5 ML miscellaneous USE 1 SYRINGE TWO TIMES A DAY OR AS DIRECTED 100 each 3     BD ULTRA FINE PEN NEEDLES Use to inject insulin twice daily. 100 each PRN     blood glucose (ONETOUCH ULTRA) test strip USE TO TEST BLOOD SUGARS FOUR TIMES A DAY OR AS DIRECTED 120 each 6     folic acid 0.8 MG CAPS Take 1 tablet by mouth daily 90 capsule 3     FREESTYLE LANCETS MISC Use to test up to four times per day 100 Each 12     insulin NPH (NOVOLIN N RELION) 100 UNIT/ML vial INJECT 5 UNITS SUBCUTANEOUSLY WITH BREAKFAST AND  15  UNITS  WITH  SUPPER 30 mL 0     insulin regular (NOVOLIN R RELION) 100 UNIT/ML vial INJECT 5 UNITS WITH BREAKFAST AND 2 UNITS WITH DINNER  IF BLOOD SUGARS OVER 200 (WHEN MIXING  WITH NPH DRAW THIS INSULIN UP FIRST) 30 mL 1     ipratropium - albuterol 0.5 mg/2.5 mg/3 mL (DUONEB) 0.5-2.5 (3) MG/3ML neb solution NEBULIZE CONTENTS OF ONE VIAL EVERY 4 HOURS AS NEEDED FOR SHORTNESS OF BREATH OR DYSPNEA 360 mL 1     losartan (COZAAR) 100 MG tablet TAKE ONE TABLET BY MOUTH ONCE DAILY 90 tablet 1     metFORMIN (GLUCOPHAGE) 1000 MG tablet TAKE ONE TABLET BY MOUTH TWICE A DAY WITH BREAKFAST AND DINNER 180 tablet 1     ONETOUCH ULTRA test strip USE TO TEST BLOOD SUGAR FOUR TIMES A DAY OR AS DIRECTED 125 each 0     pantoprazole (PROTONIX) 40 MG enteric coated tablet Take 40 mg by mouth daily Started by Dr. Debbie Rico at [facility]       primidone (MYSOLINE) 50 MG tablet 3 tablets " in am, 3 tablets midday, and 4 tablets at bedtime 910 tablet 1     Probiotic Product (FLORAJEN3) CAPS Take 1 capsule by mouth 2 times daily 60 capsule 0     propafenone (RYTHMOL) 150 MG TABS tablet Take 1 tablet (150 mg) by mouth 2 times daily 180 tablet 0     propranolol (INDERAL) 40 MG tablet TAKE TWO TO THREE TABLETS BY MOUTH TWICE A  tablet 2     Respiratory Therapy Supplies (NEBULIZER/TUBING/MOUTHPIECE) KIT Use to nebulize medications for COPD 1 kit 0     simvastatin (ZOCOR) 80 MG tablet TAKE ONE-HALF TABLET BY MOUTH EVERY NIGHT AT BEDTIME 90 tablet 2     sulfaSALAzine (AZULFIDINE) 500 MG tablet TAKE ONE TABLET BY MOUTH THREE TIMES A DAY 90 tablet 11     tamsulosin (FLOMAX) 0.4 MG capsule TAKE ONE CAPSULE BY MOUTH ONCE DAILY 90 capsule 1     tiotropium-olodaterol (STIOLTO RESPIMAT) 2.5-2.5 MCG/ACT AERS Inhale 2 puffs into the lungs daily       warfarin ANTICOAGULANT (JANTOVEN ANTICOAGULANT) 5 MG tablet Take 5mg every Friday / Take 7.5mg all other days OR AS DIRECTED BY INR CLINIC 126 tablet 1     ASPIRIN NOT PRESCRIBED, INTENTIONAL, Reported on 5/17/2017         ALLERGIES     Allergies   Allergen Reactions     Percodan [Oxycodone-Aspirin] Nausea and Vomiting       PAST MEDICAL HISTORY:  Past Medical History:   Diagnosis Date     Allergic rhinitis, cause unspecified      Atrial fibrillation (H)      BPH (benign prostatic hyperplasia)      Chronic airway obstruction, not elsewhere classified      COPD (chronic obstructive pulmonary disease) (H)      Hyperlipidemia LDL goal <100 5/9/2010     Hypertension goal BP (blood pressure) < 130/80 10/19/2006     Obesity (BMI 30-39.9)      Perforation of tympanic membrane, unspecified     Right TM     Tachycardia, unspecified      Type 2 diabetes, HbA1C goal < 8% (H) 5/2/2010     Ulcerative colitis (H)      Unspecified cardiovascular disease        PAST SURGICAL HISTORY:  Past Surgical History:   Procedure Laterality Date     CARDIAC SURGERY       COLONOSCOPY   3/31/2011     COLONOSCOPY N/A 2018    Procedure: COMBINED COLONOSCOPY, SINGLE OR MULTIPLE BIOPSY/POLYPECTOMY BY BIOPSY;;  Surgeon: Juan Simon DO;  Location:  GI     COLONOSCOPY N/A 2019    Procedure: COLONOSCOPY, WITH POLYPECTOMY AND BIOPSY;  Surgeon: Paradise Mims MD;  Location:  GI     CORONARY ANGIOGRAPHY ADULT ORDER   and 2008 at [facility]. - No interventions     HC REMOVAL OF NAIL PLATE SIMPLE SINGLE  11/10/2011    Right 2nd Toenail     HERNIA REPAIR      left inguinal     SURGICAL HISTORY OF -       right ear graft     SURGICAL HISTORY OF -       right shoulder surgery     SURGICAL HISTORY OF -       back surgery     SURGICAL HISTORY OF -       vasectomy       FAMILY HISTORY:  Family History   Problem Relation Age of Onset     Circulatory Mother         blood clot in leg     Diabetes Mother         type 2     Allergies Mother      Arthritis Mother      Gastrointestinal Disease Mother      Neurologic Disorder Mother         Familiar tremors     Neurologic Disorder Father         brain tumor     Cerebrovascular Disease Paternal Grandfather      Hypertension Brother      Hypertension Sister      Diabetes Sister         Type 2     Allergies Sister      Lipids Brother      Lipids Sister        SOCIAL HISTORY:  Social History     Socioeconomic History     Marital status:      Spouse name: Izabel     Number of children: 2     Years of education: 12     Highest education level: None   Occupational History     Occupation:      Employer: RETIRED   Social Needs     Financial resource strain: None     Food insecurity     Worry: None     Inability: None     Transportation needs     Medical: None     Non-medical: None   Tobacco Use     Smoking status: Former Smoker     Packs/day: 1.00     Years: 30.00     Pack years: 30.00     Types: Cigarettes     Last attempt to quit: 3/19/1992     Years since quittin.4     Smokeless  "tobacco: Never Used   Substance and Sexual Activity     Alcohol use: Not Currently     Alcohol/week: 0.0 standard drinks     Drug use: No     Sexual activity: Yes     Partners: Female   Lifestyle     Physical activity     Days per week: None     Minutes per session: None     Stress: None   Relationships     Social connections     Talks on phone: None     Gets together: None     Attends [employer] service: None     Active member of club or organization: None     Attends meetings of clubs or organizations: None     Relationship status: None     Intimate partner violence     Fear of current or ex partner: None     Emotionally abused: None     Physically abused: None     Forced sexual activity: None   Other Topics Concern     Parent/sibling w/ CABG, MI or angioplasty before 65F 55M? No      Service Not Asked     Blood Transfusions Not Asked     Caffeine Concern No     Comment: No Caffeine     Occupational Exposure Not Asked     Hobby Hazards Not Asked     Sleep Concern Not Asked     Stress Concern Not Asked     Weight Concern Not Asked     Special Diet Not Asked     Back Care Not Asked     Exercise Yes     Comment: 20 minutes - Walking- summer 2 x day     Bike Helmet Not Asked     Seat Belt Not Asked     Self-Exams Not Asked   Social History Narrative     None       Review of Systems:  Skin:  Negative     Eyes:  Positive for glasses  ENT:  Positive for hearing loss;vertigo  Respiratory:  Positive for dyspnea on exertion;wheezing  Cardiovascular:    Positive for;edema  Gastroenterology: Negative    Genitourinary:  Negative    Musculoskeletal:  Positive for back pain  Neurologic:  Negative    Psychiatric:  Negative    Heme/Lymph/Imm:  Positive for allergies  Endocrine:  Positive for diabetes    Physical Exam:  Vitals: /64   Pulse 64   Ht 1.727 m (5' 8\")   Wt 87.7 kg (193 lb 4.8 oz)   BMI 29.39 kg/m      Recent Lab Results:  LIPID RESULTS:  Lab Results   Component Value Date    CHOL 159 09/09/2019    HDL " 38 (L) 09/09/2019    LDL 68 09/09/2019    TRIG 264 (H) 09/09/2019    CHOLHDLRATIO 3.2 05/12/2015       LIVER ENZYME RESULTS:  Lab Results   Component Value Date    AST 25 07/07/2020    ALT 43 07/07/2020       CBC RESULTS:  Lab Results   Component Value Date    WBC 7.9 03/13/2017    RBC 4.11 (L) 03/13/2017    HGB 12.4 (L) 03/13/2017    HCT 39.4 (L) 03/13/2017    MCV 96 03/13/2017    MCH 30.2 03/13/2017    MCHC 31.5 03/13/2017    RDW 13.9 03/13/2017     03/13/2017       BMP RESULTS:  Lab Results   Component Value Date     07/07/2020    POTASSIUM 4.8 07/07/2020    CHLORIDE 102 07/07/2020    CO2 27 07/07/2020    ANIONGAP 5 07/07/2020     (H) 07/07/2020    BUN 12 07/07/2020    CR 0.66 07/07/2020    GFRESTIMATED >90 07/07/2020    GFRESTBLACK >90 07/07/2020    LEBRON 8.0 (L) 07/07/2020        A1C RESULTS:  Lab Results   Component Value Date    A1C 5.8 (H) 07/07/2020       INR RESULTS:  Lab Results   Component Value Date    INR 2.40 (H) 08/17/2020    INR 3.50 (H) 08/07/2020           CC  No referring provider defined for this encounter.

## 2020-08-27 NOTE — PROGRESS NOTES
Service Date: 08/27/2020      HISTORY OF PRESENT ILLNESS:  A very pleasant 75-year-old gentleman who transferred care to me in 2019 after Dr. Dang retired from practice.  He has a history of paroxysmal atrial fibrillation on anticoagulation with Coumadin and rhythm control strategy with propafenone and propranolol for many years.  No history of obstructive coronary disease.  No MI, but he is reporting.  No heart failure symptoms.  Last nuclear stress test and echocardiogram showed normal LV function and no ischemia or infarction.  He is here for routine 1-year followup.        Denies any cardiovascular symptoms.  No chest pain, shortness of breath.  No bleeding problems.  No edema, orthopnea or PND.      PHYSICAL EXAMINATION:   VITAL SIGNS:  Blood pressure is 136/64 with a pulse of 64 and regular.   GENERAL:  Alert and oriented x3, in no acute distress.   NECK:  Supple.  JVP is normal.   LUNGS:  Clear.   CARDIAC:  Sounds are regular, very soft systolic murmur, if any, without any rubs or gallops.   EXTREMITIES:  Warm without edema.   PSYCHIATRIC:  Appropriate affect.      ASSESSMENT AND PLAN:  Mr. Cayetano Lunsford is a 75-year-old gentleman with a history of longstanding persistent atrial fibrillation on a rhythm control strategy with propafenone and propranolol.  He has a history of diabetes and hypertension as well.  He is on anticoagulation and seems to be doing okay without any bleeding problems.  No cardiac symptoms at this point.  I recommend conservative management.        I will see him back in 1 year in clinic for routine followup, sooner if anything changes.  If he has fluctuations with his INR, which are bothersome for him, I have given him the option of switching him to newer oral anticoagulant agent; however, at this point he wants to remain on warfarin.      cc:      Dmitri Andres MD    [facility]   [address] [place] [address]         SULY BOSE MD             D:  2020   T: 2020   MT: CRISTOPHER      Name:     CAROLANN SAUNDERS   MRN:      [id number]        Account:      [id number]   :      1945           Service Date: 2020      Document: [id number]

## 2020-08-31 ENCOUNTER — ANTICOAGULATION THERAPY VISIT (OUTPATIENT)
Dept: NURSING | Facility: CLINIC | Age: 75
End: 2020-08-31

## 2020-08-31 DIAGNOSIS — I48.11 LONGSTANDING PERSISTENT ATRIAL FIBRILLATION (H): ICD-10-CM

## 2020-08-31 DIAGNOSIS — I48.91 ATRIAL FIBRILLATION (H): ICD-10-CM

## 2020-08-31 DIAGNOSIS — Z79.01 LONG TERM CURRENT USE OF ANTICOAGULANTS WITH INR GOAL OF 2.0-3.0: ICD-10-CM

## 2020-08-31 DIAGNOSIS — Z79.01 LONG TERM CURRENT USE OF ANTICOAGULANT THERAPY: ICD-10-CM

## 2020-08-31 LAB
CAPILLARY BLOOD COLLECTION: NORMAL
INR PPP: 3.6 (ref 0.86–1.14)

## 2020-08-31 PROCEDURE — 85610 PROTHROMBIN TIME: CPT | Performed by: FAMILY MEDICINE

## 2020-08-31 PROCEDURE — 36416 COLLJ CAPILLARY BLOOD SPEC: CPT | Performed by: FAMILY MEDICINE

## 2020-08-31 PROCEDURE — 99207 ZZC NO CHARGE NURSE ONLY: CPT

## 2020-08-31 NOTE — PROGRESS NOTES
ANTICOAGULATION FOLLOW-UP CLINIC VISIT    Patient Name:  Cayetano Lunsford  Date:  8/31/2020  Contact Type:  Telephone    SUBJECTIVE:  Patient Findings     Positives:   Change in health (08/27 visit with cardiology, cardiologist mentioned DOAC to pt but Leo is not interested at this time, no other changes or concerns noted.), Change in diet/appetite (Still eating 3 greens per week but did cut back on V8, he had been drinking 8 ounces several days per week)    Comments:   Patient agrees to resume V8 versus decreasing his weekly warfarin dose again        Clinical Outcomes     Negatives:   Major bleeding event, Thromboembolic event, Anticoagulation-related hospital admission, Anticoagulation-related ED visit, Anticoagulation-related fatality    Comments:   Patient agrees to resume V8 versus decreasing his weekly warfarin dose again           OBJECTIVE    Recent labs: (last 7 days)     08/31/20  1339   INR 3.60*       ASSESSMENT / PLAN  INR assessment SUPRA    Recheck INR In: 2 WEEKS    INR Location Clinic      Anticoagulation Summary  As of 8/31/2020    INR goal:   2.0-3.0   TTR:   57.1 % (1 y)   INR used for dosing:   3.60! (8/31/2020)   Warfarin maintenance plan:   5 mg (5 mg x 1) every Fri; 7.5 mg (5 mg x 1.5) all other days   Full warfarin instructions:   5 mg every Fri; 7.5 mg all other days   Weekly warfarin total:   50 mg   Plan last modified:   Tatyana Akers RN (8/31/2020)   Next INR check:   9/14/2020   Priority:   High   Target end date:   Indefinite    Indications    Long-term (current) use of anticoagulants [Z79.01] [Z79.01]  Atrial fibrillation (H) [I48.91]  Longstanding persistent atrial fibrillation (H) [I48.11]             Anticoagulation Episode Summary     INR check location:       Preferred lab:       Send INR reminders to:   [facility]    Comments:    Prefers AVS printed.       Anticoagulation Care Providers     Provider Role Specialty Phone number    Dmitri Andres MD  Referring Family Practice [phone]            See the Encounter Report to view Anticoagulation Flowsheet and Dosing Calendar (Go to Encounters tab in chart review, and find the Anticoagulation Therapy Visit)        Tatyana Akers RN

## 2020-09-04 DIAGNOSIS — G25.0 BENIGN FAMILIAL TREMOR: ICD-10-CM

## 2020-09-04 RX ORDER — PRIMIDONE 50 MG/1
TABLET ORAL
Qty: 910 TABLET | Refills: 1 | Status: SHIPPED | OUTPATIENT
Start: 2020-09-04 | End: 2020-09-07

## 2020-09-07 DIAGNOSIS — G25.0 BENIGN FAMILIAL TREMOR: ICD-10-CM

## 2020-09-07 NOTE — PATIENT INSTRUCTIONS
AFTER VISIT SUMMARY (AVS):    At today's visit we thoroughly discussed current symptoms, available treatment options, and the plan.    We decided not to make any medication changes.    Next follow-up appointment is in the next 6 months or earlier if needed.    Please do not hesitate to call me with any questions or concerns.    Thanks.

## 2020-09-08 ENCOUNTER — VIRTUAL VISIT (OUTPATIENT)
Dept: NEUROLOGY | Facility: CLINIC | Age: 75
End: 2020-09-08
Payer: COMMERCIAL

## 2020-09-08 DIAGNOSIS — G25.0 BENIGN FAMILIAL TREMOR: Primary | ICD-10-CM

## 2020-09-08 PROCEDURE — 99212 OFFICE O/P EST SF 10 MIN: CPT | Mod: 95 | Performed by: PSYCHIATRY & NEUROLOGY

## 2020-09-08 RX ORDER — PROPRANOLOL HYDROCHLORIDE 40 MG/1
TABLET ORAL
Qty: 180 TABLET | Refills: 2 | Status: SHIPPED | OUTPATIENT
Start: 2020-09-08 | End: 2020-12-03

## 2020-09-08 RX ORDER — PRIMIDONE 50 MG/1
TABLET ORAL
Qty: 910 TABLET | Refills: 1 | COMMUNITY
Start: 2020-09-07 | End: 2021-03-09

## 2020-09-08 NOTE — TELEPHONE ENCOUNTER
Prescription approved per [facility] according to the [facility] Refill Protocol.    Liu Espinoza RN

## 2020-09-08 NOTE — LETTER
"    9/8/2020         RE: Cayetano Lunsford  [address]  [facility] [address]        Dear Colleague,    Thank you for referring your patient, Cayetano Lunsford, to the [facility]. Please see a copy of my visit note below.    TELEPHONE ENCOUNTER NOTE    DATE OF VISIT: 9/8/2020  CLINIC LOCATION: [facility]  MRN: [id number]  PATIENT NAME: Cayetano Lunsford  YOB: 1945  PCP: Dmitri Andres MD    Cayetano Lunsford is a 75 year old male who is being evaluated via a billable telephone visit due to COVID-19 precautions.      The patient has been notified of following:     \"This telephone visit will be conducted via a call between you and your physician/provider. We have found that certain health care needs can be provided without the need for a physical exam.  This service lets us provide the care you need with a short phone conversation.  If a prescription is necessary we can send it directly to your pharmacy.  If lab work is needed we can place an order for that and you can then stop by our lab to have the test done at a later time.    If during the course of the call the physician/provider feels a telephone visit is not appropriate, you will not be charged for this service.\"     Cayetano Lunsford complains of    Chief Complaint   Patient presents with     Follow Up     tremor-some days are worse      I have reviewed and updated the patient's Past Medical History, Social History, Family History and Medication List.    ALLERGIES  Percodan [oxycodone-aspirin]    The consent for this service was obtained by:  Netta Cornejo ([place] signature)    Additional provider notes:    REASON FOR VISIT:   Chief Complaint   Patient presents with     Follow Up     tremor-some days are worse      SUBJECTIVE:                                                      HISTORY OF PRESENT ILLNESS: This telephone encounter is regarding essential tremor.  The last visit " was on 3/10/2020.  No medication changes were made.  Please refer to my initial/other prior notes for further information.    Since the last visit, the patient reports that his tremor is stable. Right hand is slightly worse than the left. Coffee/chocolate make it worse.  He is on 200/150/150 mg of primidone daily and 120 mg of propranolol twice daily without noticeable side effects.  He takes 200 mg of primidone in the morning instead of evening and feels that this change is beneficial.  He denies interval development of new focal neurological symptoms.    On review of systems, patient endorses no other active complaints.  ASSESSMENT AND PLAN:                                                    Assessment: 75 year old male patient, who due to COVID-19 precautions is evaluated via a telephone visit for essential tremor.  We had a detailed discussion with the patient regarding his current symptoms, available treatment options, and the plan.  He was not able to tolerate increased dose of primidone.  We reviewed that potentially we could increase propranolol dose, but should be very closely monitoring side effects of bradycardia and hypotension.  He will discuss it with his primary care provider.  He also reviewed that topiramate or gabapentin are other less effective options (these 2 medications in combination with primidone might affect the level of alertness).    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    We decided not to make any medication changes.    Next follow-up appointment is in the next 6 months or earlier if needed.    I have reviewed the note as documented above.  This accurately captures the substance of my conversation with the patient.    Phone call contact time: 9 minutes.    Bong Yoo MD  / Neurology.      Again, thank you for allowing me to participate  in the care of your patient.        Sincerely,        Bong Yoo MD

## 2020-09-14 ENCOUNTER — ANTICOAGULATION THERAPY VISIT (OUTPATIENT)
Dept: NURSING | Facility: CLINIC | Age: 75
End: 2020-09-14

## 2020-09-14 DIAGNOSIS — I48.11 LONGSTANDING PERSISTENT ATRIAL FIBRILLATION (H): ICD-10-CM

## 2020-09-14 DIAGNOSIS — Z79.01 LONG TERM CURRENT USE OF ANTICOAGULANT THERAPY: ICD-10-CM

## 2020-09-14 DIAGNOSIS — Z79.01 LONG TERM CURRENT USE OF ANTICOAGULANTS WITH INR GOAL OF 2.0-3.0: ICD-10-CM

## 2020-09-14 DIAGNOSIS — I48.91 ATRIAL FIBRILLATION (H): ICD-10-CM

## 2020-09-14 LAB
CAPILLARY BLOOD COLLECTION: NORMAL
INR PPP: 2.9 (ref 0.86–1.14)

## 2020-09-14 PROCEDURE — 85610 PROTHROMBIN TIME: CPT | Performed by: FAMILY MEDICINE

## 2020-09-14 PROCEDURE — 36416 COLLJ CAPILLARY BLOOD SPEC: CPT | Performed by: FAMILY MEDICINE

## 2020-09-14 PROCEDURE — 99207 ZZC NO CHARGE NURSE ONLY: CPT

## 2020-09-14 NOTE — PROGRESS NOTES
ANTICOAGULATION FOLLOW-UP CLINIC VISIT    Patient Name:  Cayetano Lunsford  Date:  2020  Contact Type:  Telephone    SUBJECTIVE:  Patient Findings     Comments:   The patient was assessed for diet, medication, and activity level changes, missed or extra doses, bruising or bleeding, with no problem findings.          Clinical Outcomes     Negatives:   Major bleeding event, Thromboembolic event, Anticoagulation-related hospital admission, Anticoagulation-related ED visit, Anticoagulation-related fatality    Comments:   The patient was assessed for diet, medication, and activity level changes, missed or extra doses, bruising or bleeding, with no problem findings.             OBJECTIVE    Recent labs: (last 7 days)     20  1258   INR 2.90*       ASSESSMENT / PLAN  INR assessment THER    Recheck INR In: 2 WEEKS    INR Location Clinic      Anticoagulation Summary  As of 2020    INR goal:   2.0-3.0   TTR:   53.8 % (1 y)   INR used for dosin.90 (2020)   Warfarin maintenance plan:   5 mg (5 mg x 1) every Fri; 7.5 mg (5 mg x 1.5) all other days   Full warfarin instructions:   5 mg every Fri; 7.5 mg all other days   Weekly warfarin total:   50 mg   Plan last modified:   Tatyana Akers RN (2020)   Next INR check:   2020   Priority:   High   Target end date:   Indefinite    Indications    Long-term (current) use of anticoagulants [Z79.01] [Z79.01]  Atrial fibrillation (H) [I48.91]  Longstanding persistent atrial fibrillation (H) [I48.11]             Anticoagulation Episode Summary     INR check location:       Preferred lab:       Send INR reminders to:   [facility]    Comments:    Prefers AVS printed.       Anticoagulation Care Providers     Provider Role Specialty Phone number    Dmitri Andres MD Referring [facility] [phone]            See the Encounter Report to view Anticoagulation Flowsheet and Dosing Calendar (Go to Encounters tab in chart review, and find  the Anticoagulation Therapy Visit)        Tatyana Akers RN

## 2020-09-28 ENCOUNTER — ANTICOAGULATION THERAPY VISIT (OUTPATIENT)
Dept: NURSING | Facility: CLINIC | Age: 75
End: 2020-09-28

## 2020-09-28 DIAGNOSIS — I48.11 LONGSTANDING PERSISTENT ATRIAL FIBRILLATION (H): ICD-10-CM

## 2020-09-28 DIAGNOSIS — Z79.01 LONG TERM CURRENT USE OF ANTICOAGULANT THERAPY: ICD-10-CM

## 2020-09-28 DIAGNOSIS — I48.91 ATRIAL FIBRILLATION (H): ICD-10-CM

## 2020-09-28 DIAGNOSIS — Z79.01 LONG TERM CURRENT USE OF ANTICOAGULANTS WITH INR GOAL OF 2.0-3.0: ICD-10-CM

## 2020-09-28 LAB
CAPILLARY BLOOD COLLECTION: NORMAL
INR PPP: 2.6 (ref 0.86–1.14)

## 2020-09-28 PROCEDURE — 99207 ZZC NO CHARGE NURSE ONLY: CPT

## 2020-09-28 PROCEDURE — 36416 COLLJ CAPILLARY BLOOD SPEC: CPT | Performed by: FAMILY MEDICINE

## 2020-09-28 PROCEDURE — 85610 PROTHROMBIN TIME: CPT | Performed by: FAMILY MEDICINE

## 2020-09-28 NOTE — PROGRESS NOTES
Anticoagulation Management    Unable to reach Cayetano today.    Today's INR result of 2.6 is therapeutic (goal INR of 2.0-3.0).  Result received from: Clinic Lab    Follow up required to confirm warfarin dose taken and assess for changes    Pt to call  INR at [phone]; if no answer then call Central INR at [phone]. Left message to continue current dose of warfarin 7.5 mg tonight.    Anticoagulation clinic to follow up    Mora Berg RN    ANTICOAGULATION FOLLOW-UP CLINIC VISIT    Patient Name:  Cayetano Lunsford  Date:  9/28/2020  Contact Type:  Telephone    SUBJECTIVE:  Patient Findings     Comments:   Patient returned call. The patient was assessed for diet, medication, and activity level changes, missed or extra doses, bruising or bleeding, with no problem findings. He continues with a green veggie about 3 days per week and V8 several days per week. Reviewed maintenance warfarin dosing with patient. Patient will remain on the same dose until next INR check. No other questions or concerns. Scheduled next lab-only INR in 4 weeks. Offered to mail printed AVS but patient declined.  Mora GIL RN  Anticoagulation Clinic  [place]          Clinical Outcomes     Negatives:   Major bleeding event, Thromboembolic event, Anticoagulation-related hospital admission, Anticoagulation-related ED visit, Anticoagulation-related fatality    Comments:   Patient returned call. The patient was assessed for diet, medication, and activity level changes, missed or extra doses, bruising or bleeding, with no problem findings. He continues with a green veggie about 3 days per week and V8 several days per week. Reviewed maintenance warfarin dosing with patient. Patient will remain on the same dose until next INR check. No other questions or concerns. Scheduled next lab-only INR in 4 weeks. Offered to mail printed AVS but patient declined.  Mora GIL RN  Anticoagulation Clinic  [place]             OBJECTIVE    Recent  labs: (last 7 days)     20  1106   INR 2.60*       ASSESSMENT / PLAN  INR assessment THER    Recheck INR In: 4 WEEKS    INR Location Clinic      Anticoagulation Summary  As of 2020    INR goal:   2.0-3.0   TTR:   55.1 % (1 y)   INR used for dosin.60 (2020)   Warfarin maintenance plan:   5 mg (5 mg x 1) every Fri; 7.5 mg (5 mg x 1.5) all other days   Full warfarin instructions:   5 mg every Fri; 7.5 mg all other days   Weekly warfarin total:   50 mg   No change documented:   Mora Berg RN   Plan last modified:   Tatyana Akers RN (2020)   Next INR check:   10/26/2020   Priority:   Maintenance   Target end date:   Indefinite    Indications    Long-term (current) use of anticoagulants [Z79.01] [Z79.01]  Atrial fibrillation (H) [I48.91]  Longstanding persistent atrial fibrillation (H) [I48.11]             Anticoagulation Episode Summary     INR check location:       Preferred lab:       Send INR reminders to:   [facility]    Comments:    Prefers AVS printed.       Anticoagulation Care Providers     Provider Role Specialty Phone number    Dmitri Andres MD Referring [facility] [phone]            See the Encounter Report to view Anticoagulation Flowsheet and Dosing Calendar (Go to Encounters tab in chart review, and find the Anticoagulation Therapy Visit)    Mora Berg, RN

## 2020-10-08 DIAGNOSIS — I48.0 PAROXYSMAL ATRIAL FIBRILLATION (H): ICD-10-CM

## 2020-10-08 RX ORDER — PROPAFENONE HYDROCHLORIDE 150 MG/1
150 TABLET, COATED ORAL 2 TIMES DAILY
Qty: 180 TABLET | Refills: 2 | Status: SHIPPED | OUTPATIENT
Start: 2020-10-08 | End: 2021-07-15

## 2020-10-26 ENCOUNTER — ANTICOAGULATION THERAPY VISIT (OUTPATIENT)
Dept: NURSING | Facility: CLINIC | Age: 75
End: 2020-10-26

## 2020-10-26 DIAGNOSIS — Z79.01 LONG TERM CURRENT USE OF ANTICOAGULANTS WITH INR GOAL OF 2.0-3.0: ICD-10-CM

## 2020-10-26 DIAGNOSIS — I48.11 LONGSTANDING PERSISTENT ATRIAL FIBRILLATION (H): ICD-10-CM

## 2020-10-26 DIAGNOSIS — I48.91 ATRIAL FIBRILLATION (H): ICD-10-CM

## 2020-10-26 DIAGNOSIS — Z79.01 LONG TERM CURRENT USE OF ANTICOAGULANT THERAPY: ICD-10-CM

## 2020-10-26 LAB
CAPILLARY BLOOD COLLECTION: NORMAL
INR PPP: 3.3 (ref 0.86–1.14)

## 2020-10-26 PROCEDURE — 85610 PROTHROMBIN TIME: CPT | Performed by: FAMILY MEDICINE

## 2020-10-26 PROCEDURE — 36416 COLLJ CAPILLARY BLOOD SPEC: CPT | Performed by: FAMILY MEDICINE

## 2020-10-26 PROCEDURE — 99207 PR NO CHARGE NURSE ONLY: CPT

## 2020-10-26 NOTE — PROGRESS NOTES
"ANTICOAGULATION FOLLOW-UP CLINIC VISIT    Patient Name:  Cayetano Lunsford  Date:  10/26/2020  Contact Type:  Telephone    SUBJECTIVE:  Patient Findings     Positives:  Change in health, Change in diet/appetite    Comments:  Patient returned call. The patient was assessed for medication, and activity level changes, missed or extra doses, bruising or bleeding, with no problem findings. He states he had a couple days he didn't feel well last week and he was eating pretty \"light\". He usually eats about 3servings of green veggies/week and in between has a glass of V8 juice. He has already returned this regular intake and plans to continue. He understands he can double up on his greens over the next couple of days to help INR back to therapeutic range, but should then just continue with his regular intake. Recheck INR in 2 weeks when he goes into clinic for MTM visit.  Mora GIL RN  Anticoagulation Clinic  [place]          Clinical Outcomes     Negatives:  Major bleeding event, Thromboembolic event, Anticoagulation-related hospital admission, Anticoagulation-related ED visit, Anticoagulation-related fatality    Comments:  Patient returned call. The patient was assessed for medication, and activity level changes, missed or extra doses, bruising or bleeding, with no problem findings. He states he had a couple days he didn't feel well last week and he was eating pretty \"light\". He usually eats about 3servings of green veggies/week and in between has a glass of V8 juice. He has already returned this regular intake and plans to continue. He understands he can double up on his greens over the next couple of days to help INR back to therapeutic range, but should then just continue with his regular intake. Recheck INR in 2 weeks when he goes into clinic for MTM visit.  Mora GIL RN  Anticoagulation Clinic  [place]             OBJECTIVE    Recent labs: (last 7 days)     10/26/20  1303   INR 3.30*       ASSESSMENT / " PLAN  INR assessment SUPRA    Recheck INR In: 2 WEEKS    INR Location Clinic      Anticoagulation Summary  As of 10/26/2020    INR goal:  2.0-3.0   TTR:  57.3 % (1 y)   INR used for dosing:  3.30 (10/26/2020)   Warfarin maintenance plan:  5 mg (5 mg x 1) every Fri; 7.5 mg (5 mg x 1.5) all other days   Full warfarin instructions:  5 mg every Fri; 7.5 mg all other days   Weekly warfarin total:  50 mg   No change documented:  Mora Berg RN   Plan last modified:  Tatyana Akers RN (9/14/2020)   Next INR check:  11/9/2020   Priority:  Maintenance   Target end date:  Indefinite    Indications    Long-term (current) use of anticoagulants [Z79.01] [Z79.01]  Atrial fibrillation (H) [I48.91]  Longstanding persistent atrial fibrillation (H) [I48.11]             Anticoagulation Episode Summary     INR check location:      Preferred lab:      Send INR reminders to:  [facility]    Comments:   Prefers AVS printed.       Anticoagulation Care Providers     Provider Role Specialty Phone number    Dmitri Andres MD Referring [facility] [phone]            See the Encounter Report to view Anticoagulation Flowsheet and Dosing Calendar (Go to Encounters tab in chart review, and find the Anticoagulation Therapy Visit)    Mroa Berg, MANOJ

## 2020-10-26 NOTE — PROGRESS NOTES
Anticoagulation Management    Unable to reach Rich today.    Today's INR result of 3.3 is supratherapeutic (goal INR of 2.0-3.0).  Result received from: Clinic Lab    Follow up required to confirm warfarin dose taken and assess for changes    Left VM to call Rodger with transfer to Tatyana at: [phone]      Anticoagulation clinic to follow up    Tatyana Akers RN

## 2020-10-27 DIAGNOSIS — I48.0 PAROXYSMAL ATRIAL FIBRILLATION (H): ICD-10-CM

## 2020-10-27 DIAGNOSIS — Z79.01 LONG TERM CURRENT USE OF ANTICOAGULANTS WITH INR GOAL OF 2.0-3.0: ICD-10-CM

## 2020-10-27 RX ORDER — WARFARIN SODIUM 5 MG/1
TABLET ORAL
Qty: 130 TABLET | Refills: 1 | Status: SHIPPED | OUTPATIENT
Start: 2020-10-27 | End: 2020-12-02

## 2020-10-27 NOTE — TELEPHONE ENCOUNTER
Medication requested:   warfarin 5 mg tablets    Last Written Prescription Date: 5/18/2020  Last Fill Qty: 126, # refills: 1  Last Office Visit with [facility], P or [facility] prescribing provider: 7/7/2020     Lab Results   Component Value Date    INR 3.30 10/26/2020    INR 2.60 09/28/2020     Refilled per nurse protocol standing orders.  Kriss Bernstein RN

## 2020-11-05 NOTE — PROGRESS NOTES
SUBJECTIVE/OBJECTIVE:                Cayetano Lunsford is a 75 year old male coming in for a follow-up visit (12-9-19) for Medication Therapy Management.  He was referred to me from previous pcp Dr. Lewis.       Chief Complaint:  a1c lab recheck, medication review, INR lab.      Tobacco: No tobacco use  Alcohol: not currently using    Medication Adherence/Access:  no issues reported    Diabetes:  Pt currently taking metformin 1gm bid, Novolin N-- 2 units at breakfast and 10 units in PM-at dinner, Novolin-R -2 units with breakfast and 0 units of R at dinner unless >200- 2 units then. -rare.    Usually has a bedtime snack-light  To hold him overnight .   SMBG: 3-4 times daily. Ranges (per pt report): see chart below  Symptoms of low blood sugar? Fingers and lips tingle, shaky and sweaty. Frequency of hypoglycemia? 3 low readings in last 4 weeks, 48 on 10/3, drank orange juice and then got 59. Usually treat with small amount of orange juice.   Recent symptoms of high blood sugar? none.  Eye exam: due   Foot exam:  due  Microalbumin is not < 30 mg/g. Pt is taking an ACEi/ARB not at max dose.  Aspirin: Not taking due to anticoagulation therapy and increased risk of bleeding    7 days = 139 n=19, 14 days = 151 n=42, 30 days = 156 n=95.    Date FBG/ 2hours post Lunch/2hours post Dinner /2hours post Bedtime   11-9 151@0821      11-8 106@0845  150@1825 109   11-7 108@0808  228@1812 113   11-6 144@0807  151@1831 84   11-5 150@0837  176@1816 186   11-4 101@0850  163@1816 111                Lab Results   Component Value Date    A1C 5.9 11/09/2020    A1C 5.8 07/07/2020    A1C 6.1 12/09/2019    A1C 5.5 09/09/2019    A1C 5.3 03/11/2019           COPD: Current medications: Short-Acting Bronchodilator: Albuterol MDI and pt reports using the duo-nebs 3 times per day (am, mid day,and pm-he feels better when he does this tid  ) but cold weather triggers more use.  LAMA/LABA- Stiolto Respimat 2 puff(s) once daily.     Pt is not  "experiencing side effects.   Pt reports the following symptoms: none.  Pt does have an COPD Action Plan on file.   Has spirometry been completed: Yes   PIF was completed today: No    Oxygen 90% today in clinic.       Hypertension: Current medications include Losartan 100mg ./day .  Patient does not self-monitor BP.  Patient reports no current medication side effects.  BP Readings from Last 3 Encounters:   11/09/20 122/55   08/27/20 136/64   07/07/20 136/72       Hyperlipidemia: Current therapy includes : 1/2 tab of Simvastatin 80mg once daily.  Pt reports no significant myalgias or other side effects.  The 10-year ASCVD risk score (Kaitlin CONTRERAS Jr., et al., 2013) is: 47.5%    Values used to calculate the score:      Age: 75 years      Sex: Male      Is Non- : No      Diabetic: Yes      Tobacco smoker: No      Systolic Blood Pressure: 122 mmHg      Is BP treated: Yes      HDL Cholesterol: 38 mg/dL      Total Cholesterol: 159 mg/dL  Recent Labs   Lab Test 09/09/19  0947 05/01/18  0736 05/12/15  0757 05/12/15  0757 04/24/14  1016   CHOL 159 163   < > 141 148   HDL 38* 42   < > 44 38*   LDL 68 89   < > 46 66   TRIG 264* 159*   < > 255* 220*   CHOLHDLRATIO  --   --   --  3.2 3.9    < > = values in this interval not displayed.     Fasting lipids today .     Vitamin D3: level has not been drawn.  Baseline lab today .    Vitamin B12: level has not been drawn --baseline lab today .               BP Readings from Last 1 Encounters:   11/09/20 122/55     Pulse Readings from Last 1 Encounters:   11/09/20 67     Wt Readings from Last 1 Encounters:   11/09/20 189 lb 3.2 oz (85.8 kg)     Ht Readings from Last 1 Encounters:   08/27/20 5' 8\" (1.727 m)     Estimated body mass index is 29.39 kg/m  as calculated from the following:    Height as of 8/27/20: 5' 8\" (1.727 m).    Weight as of 8/27/20: 193 lb 4.8 oz (87.7 kg).    Temp Readings from Last 1 Encounters:   07/07/20 97.5  F (36.4  C) (Oral) "           ASSESSMENT:              Current medications were reviewed today as discussed above.      Medication Adherence: good, no issues identified    Diabetes: Stable. Patient is meeting A1c goal of < 7%. Self monitoring of blood glucose is at goal of fasting  mg/dL and post prandial < 150 mg/dL. Pt would benefit from ideal SMBG: Check blood sugars pre-prandial, 2 hours post-prandial, and with symptoms of hypoglycemia  Basal Insulin (NPH) : Due to occasional hypoglycemia attacks, reduce dinner insulin from 10 units to 5 units, change insulin bottle q 30 days .  Bolus / Rapid Acting Insulin (R) : stay on same dose , change insulin bottle q 30 days.   Metformin :  stay on the same dose.  A1c today 5.9%, good control.       COPD: Stable. Patient would benefit from : no med changes today .      Hypertension: Stable. Patient is meeting BP goal of < 140/90mmHg.      Hyperlipidemia: Stable. Pt is on moderate intensity statin which is indicated based on 2019 ACC/AHA guidelines for lipid management.      Vitamin D3: baseline lab result is pending.    Vitamin B12: baseline lab result is pending.      PLAN:                    1. FYI - Your A1c today is 5.9%. Good job on getting your weight down!  2. FYI - Your clinic oxygen level today was 90%, this is at the lowest we consider acceptable. Make sure you use your nebulizer and inhalers as directed everyday.   2. Reduce your dinner time insulin N to 5 Units at dinner (OK to inject after the meal), continue taking your other current medications as is.  3. For sake of safety, NO blood sugar lower than 70.  4. Look for a phone call from Tatyana Akers for instructions on your warfarin (your INR today was 3.6, your goal is 2-3).       It was great to speak with you today.  I value your experience and would be very thankful for your time with providing feedback on our clinic survey. You may receive a survey via email or text message in the next few days.     Next MTM visit:  Follow-up with Diana Johnson on 5/5/2021 @1:00 PM.     I spent 30 minutes with this patient today. All changes were made via collaborative practice agreement with .Dmitri Andres   A copy of the visit note was provided to the patient's primary care provider.         The patient declined a summary of these recommendations as an after visit summary.    Cheko Pereira Rph.  Medication Therapy Management Provider  [phone]    Columba Hernandez, PharmD4

## 2020-11-07 DIAGNOSIS — Z79.4 TYPE 2 DIABETES MELLITUS WITH STAGE 1 CHRONIC KIDNEY DISEASE, WITH LONG-TERM CURRENT USE OF INSULIN (H): ICD-10-CM

## 2020-11-07 DIAGNOSIS — E11.22 TYPE 2 DIABETES MELLITUS WITH STAGE 1 CHRONIC KIDNEY DISEASE, WITH LONG-TERM CURRENT USE OF INSULIN (H): ICD-10-CM

## 2020-11-07 DIAGNOSIS — N18.1 TYPE 2 DIABETES MELLITUS WITH STAGE 1 CHRONIC KIDNEY DISEASE, WITH LONG-TERM CURRENT USE OF INSULIN (H): ICD-10-CM

## 2020-11-09 ENCOUNTER — ANTICOAGULATION THERAPY VISIT (OUTPATIENT)
Dept: NURSING | Facility: CLINIC | Age: 75
End: 2020-11-09

## 2020-11-09 ENCOUNTER — OFFICE VISIT (OUTPATIENT)
Dept: PHARMACY | Facility: CLINIC | Age: 75
End: 2020-11-09
Payer: COMMERCIAL

## 2020-11-09 VITALS
OXYGEN SATURATION: 90 % | SYSTOLIC BLOOD PRESSURE: 122 MMHG | DIASTOLIC BLOOD PRESSURE: 55 MMHG | WEIGHT: 189.2 LBS | BODY MASS INDEX: 28.77 KG/M2 | HEART RATE: 67 BPM

## 2020-11-09 DIAGNOSIS — E53.8 VITAMIN B12 DEFICIENCY (NON ANEMIC): ICD-10-CM

## 2020-11-09 DIAGNOSIS — Z79.4 TYPE 2 DIABETES MELLITUS WITH HYPOGLYCEMIA WITHOUT COMA, WITH LONG-TERM CURRENT USE OF INSULIN (H): Primary | ICD-10-CM

## 2020-11-09 DIAGNOSIS — E55.9 VITAMIN D DEFICIENCY: ICD-10-CM

## 2020-11-09 DIAGNOSIS — E66.01 MORBID OBESITY (H): ICD-10-CM

## 2020-11-09 DIAGNOSIS — E78.5 HYPERLIPIDEMIA LDL GOAL <100: ICD-10-CM

## 2020-11-09 DIAGNOSIS — E11.649 TYPE 2 DIABETES MELLITUS WITH HYPOGLYCEMIA WITHOUT COMA, WITH LONG-TERM CURRENT USE OF INSULIN (H): ICD-10-CM

## 2020-11-09 DIAGNOSIS — E11.649 TYPE 2 DIABETES MELLITUS WITH HYPOGLYCEMIA WITHOUT COMA, WITH LONG-TERM CURRENT USE OF INSULIN (H): Primary | ICD-10-CM

## 2020-11-09 DIAGNOSIS — Z79.01 LONG TERM CURRENT USE OF ANTICOAGULANT THERAPY: ICD-10-CM

## 2020-11-09 DIAGNOSIS — I48.91 ATRIAL FIBRILLATION (H): ICD-10-CM

## 2020-11-09 DIAGNOSIS — I48.11 LONGSTANDING PERSISTENT ATRIAL FIBRILLATION (H): ICD-10-CM

## 2020-11-09 DIAGNOSIS — Z79.01 LONG TERM CURRENT USE OF ANTICOAGULANTS WITH INR GOAL OF 2.0-3.0: ICD-10-CM

## 2020-11-09 DIAGNOSIS — Z79.4 TYPE 2 DIABETES MELLITUS WITH HYPOGLYCEMIA WITHOUT COMA, WITH LONG-TERM CURRENT USE OF INSULIN (H): ICD-10-CM

## 2020-11-09 DIAGNOSIS — J44.9 CHRONIC OBSTRUCTIVE PULMONARY DISEASE, UNSPECIFIED COPD TYPE (H): ICD-10-CM

## 2020-11-09 DIAGNOSIS — I10 HYPERTENSION GOAL BP (BLOOD PRESSURE) < 140/90: ICD-10-CM

## 2020-11-09 LAB
HBA1C MFR BLD: 5.9 % (ref 0–5.6)
INR PPP: 3.6 (ref 0.86–1.14)
VIT B12 SERPL-MCNC: 374 PG/ML (ref 193–986)

## 2020-11-09 PROCEDURE — 80053 COMPREHEN METABOLIC PANEL: CPT | Performed by: FAMILY MEDICINE

## 2020-11-09 PROCEDURE — 99207 PR NO CHARGE NURSE ONLY: CPT

## 2020-11-09 PROCEDURE — 36415 COLL VENOUS BLD VENIPUNCTURE: CPT | Performed by: FAMILY MEDICINE

## 2020-11-09 PROCEDURE — 82607 VITAMIN B-12: CPT | Performed by: FAMILY MEDICINE

## 2020-11-09 PROCEDURE — 85610 PROTHROMBIN TIME: CPT | Performed by: FAMILY MEDICINE

## 2020-11-09 PROCEDURE — 83036 HEMOGLOBIN GLYCOSYLATED A1C: CPT | Performed by: FAMILY MEDICINE

## 2020-11-09 PROCEDURE — 99207 PR NO CHARGE LOS: CPT | Performed by: PHARMACIST

## 2020-11-09 PROCEDURE — 82043 UR ALBUMIN QUANTITATIVE: CPT | Performed by: FAMILY MEDICINE

## 2020-11-09 PROCEDURE — 82306 VITAMIN D 25 HYDROXY: CPT | Performed by: FAMILY MEDICINE

## 2020-11-09 PROCEDURE — 84443 ASSAY THYROID STIM HORMONE: CPT | Performed by: FAMILY MEDICINE

## 2020-11-09 PROCEDURE — 80061 LIPID PANEL: CPT | Performed by: FAMILY MEDICINE

## 2020-11-09 RX ORDER — MAGNESIUM 200 MG
1000 TABLET ORAL DAILY
COMMUNITY
End: 2020-11-09

## 2020-11-09 RX ORDER — FAMOTIDINE 20 MG
TABLET ORAL
COMMUNITY
End: 2020-11-09

## 2020-11-09 NOTE — PATIENT INSTRUCTIONS
Recommendations from today's MTM visit:                                                      1. FYI - Your A1c today is 5.9%. Good job on getting your weight down!  2. FYI - Your clinic oxygen level today was 90%, this is at the lowest we consider acceptable. Make sure you use your nebulizer and inhalers as directed everyday.   2. Reduce your dinner time insulin N to 5 Units at dinner (OK to inject after the meal), continue taking your other current medications as is.  3. For sake of safety, NO blood sugar lower than 70.  4. Look for a phone call from Tatyana Akers for instructions on your warfarin (your INR today was 3.6, your goal is 2-3).       It was great to speak with you today.  I value your experience and would be very thankful for your time with providing feedback on our clinic survey. You may receive a survey via email or text message in the next few days.     Next MTM visit: Follow-up with Diana Johnson on 5/5/2021 @1:00 PM.     To schedule another MTM appointment, please call the clinic directly or you may call the MTM scheduling line at [phone] or toll-free at [phone].     My Clinical Pharmacist's contact information:                                                      It was a pleasure talking with you today!  Please feel free to contact me with any questions or concerns you have.     Cheko Pereira noris.  Medication Therapy Management Provider  [phone]    Columba Hernandez, PharmD4

## 2020-11-09 NOTE — PROGRESS NOTES
"ANTICOAGULATION FOLLOW-UP CLINIC VISIT    Patient Name:  Cayetano Lunsford  Date:  11/9/2020  Contact Type:  Telephone    SUBJECTIVE:  Patient Findings     Positives:  Change in activity (Less active, not doing much walking), Change in medications, Change in diet/appetite (NO change, pt eating 2-3 greens per week and drinking V8 on the \"off\" days)    Comments:  2nd supra INR so I reduced warfarin maintenance dose 5%, AVS mailed.        Clinical Outcomes     Negatives:  Major bleeding event, Thromboembolic event, Anticoagulation-related hospital admission, Anticoagulation-related ED visit, Anticoagulation-related fatality    Comments:  2nd supra INR so I reduced warfarin maintenance dose 5%, AVS mailed.           OBJECTIVE    Recent labs: (last 7 days)     11/09/20  1108   INR 3.60*       ASSESSMENT / PLAN  INR assessment SUPRA    Recheck INR In: 10 DAYS    INR Location Clinic      Anticoagulation Summary  As of 11/9/2020    INR goal:  2.0-3.0   TTR:  57.3 % (1 y)   INR used for dosing:  3.60 (11/9/2020)   Warfarin maintenance plan:  5 mg (5 mg x 1) every Mon, Fri; 7.5 mg (5 mg x 1.5) all other days   Full warfarin instructions:  5 mg every Mon, Fri; 7.5 mg all other days   Weekly warfarin total:  47.5 mg   Plan last modified:  Tatyana Akers RN (11/9/2020)   Next INR check:  11/19/2020   Priority:  High   Target end date:  Indefinite    Indications    Long-term (current) use of anticoagulants [Z79.01] [Z79.01]  Atrial fibrillation (H) [I48.91]  Longstanding persistent atrial fibrillation (H) [I48.11]             Anticoagulation Episode Summary     INR check location:      Preferred lab:      Send INR reminders to:  [facility]    Comments:   Prefers AVS printed.       Anticoagulation Care Providers     Provider Role Specialty Phone number    Dmitri Andres MD Referring Family Medicine [phone]            See the Encounter Report to view Anticoagulation Flowsheet and Dosing Calendar (Go to " Encounters tab in chart review, and find the Anticoagulation Therapy Visit)        Tatyana Akers RN

## 2020-11-09 NOTE — TELEPHONE ENCOUNTER
Prescription approved per [facility] Refill Protocol.  Dusty Munoz RN, BSN

## 2020-11-10 LAB
ALBUMIN SERPL-MCNC: 3.3 G/DL (ref 3.4–5)
ALP SERPL-CCNC: 84 U/L (ref 40–150)
ALT SERPL W P-5'-P-CCNC: 32 U/L (ref 0–70)
ANION GAP SERPL CALCULATED.3IONS-SCNC: 5 MMOL/L (ref 3–14)
AST SERPL W P-5'-P-CCNC: 32 U/L (ref 0–45)
BILIRUB SERPL-MCNC: 0.4 MG/DL (ref 0.2–1.3)
BUN SERPL-MCNC: 16 MG/DL (ref 7–30)
CALCIUM SERPL-MCNC: 8 MG/DL (ref 8.5–10.1)
CHLORIDE SERPL-SCNC: 99 MMOL/L (ref 94–109)
CHOLEST SERPL-MCNC: 140 MG/DL
CO2 SERPL-SCNC: 29 MMOL/L (ref 20–32)
CREAT SERPL-MCNC: 0.72 MG/DL (ref 0.66–1.25)
CREAT UR-MCNC: 85 MG/DL
DEPRECATED CALCIDIOL+CALCIFEROL SERPL-MC: 21 UG/L (ref 20–75)
GFR SERPL CREATININE-BSD FRML MDRD: >90 ML/MIN/{1.73_M2}
GLUCOSE SERPL-MCNC: 222 MG/DL (ref 70–99)
HDLC SERPL-MCNC: 30 MG/DL
LDLC SERPL CALC-MCNC: 59 MG/DL
MICROALBUMIN UR-MCNC: 44 MG/L
MICROALBUMIN/CREAT UR: 51.83 MG/G CR (ref 0–17)
NONHDLC SERPL-MCNC: 110 MG/DL
POTASSIUM SERPL-SCNC: 5.1 MMOL/L (ref 3.4–5.3)
PROT SERPL-MCNC: 6.5 G/DL (ref 6.8–8.8)
SODIUM SERPL-SCNC: 133 MMOL/L (ref 133–144)
TRIGL SERPL-MCNC: 253 MG/DL
TSH SERPL DL<=0.005 MIU/L-ACNC: 3.62 MU/L (ref 0.4–4)

## 2020-11-11 NOTE — PROGRESS NOTES
11-    mtm called and discussed his calcium level was 8.0 and vitamin D is 21 --lets have rich start OTC-- calcium citrate 630mg/vitamin -d -800 units --take 1 tablet twice daily.    Recheck labs with giovanni in may -2020.      Cheko Pereira [facility].  Medication Therapy Management Provider  [phone]

## 2020-11-12 DIAGNOSIS — N18.1 TYPE 2 DIABETES MELLITUS WITH STAGE 1 CHRONIC KIDNEY DISEASE, WITH LONG-TERM CURRENT USE OF INSULIN (H): ICD-10-CM

## 2020-11-12 DIAGNOSIS — Z79.4 TYPE 2 DIABETES MELLITUS WITH STAGE 1 CHRONIC KIDNEY DISEASE, WITH LONG-TERM CURRENT USE OF INSULIN (H): ICD-10-CM

## 2020-11-12 DIAGNOSIS — E11.22 TYPE 2 DIABETES MELLITUS WITH STAGE 1 CHRONIC KIDNEY DISEASE, WITH LONG-TERM CURRENT USE OF INSULIN (H): ICD-10-CM

## 2020-11-12 RX ORDER — BLOOD SUGAR DIAGNOSTIC
STRIP MISCELLANEOUS
Qty: 120 EACH | Refills: 0 | Status: SHIPPED | OUTPATIENT
Start: 2020-11-12 | End: 2021-12-23

## 2020-11-12 NOTE — TELEPHONE ENCOUNTER
Medication is being filled for 1 time refill only due to:  Patient needs to be seen because DM visit .  Routed to TC for scheduling  Prescription approved per [facility] Refill Protocol.  Nany Dudley RN, BSN  Message handled by CLINIC NURSE.

## 2020-11-19 ENCOUNTER — ANTICOAGULATION THERAPY VISIT (OUTPATIENT)
Dept: FAMILY MEDICINE | Facility: CLINIC | Age: 75
End: 2020-11-19

## 2020-11-19 DIAGNOSIS — I48.91 ATRIAL FIBRILLATION (H): ICD-10-CM

## 2020-11-19 DIAGNOSIS — Z79.01 LONG TERM CURRENT USE OF ANTICOAGULANT THERAPY: ICD-10-CM

## 2020-11-19 DIAGNOSIS — J44.9 CHRONIC OBSTRUCTIVE PULMONARY DISEASE, UNSPECIFIED COPD TYPE (H): ICD-10-CM

## 2020-11-19 DIAGNOSIS — I48.11 LONGSTANDING PERSISTENT ATRIAL FIBRILLATION (H): ICD-10-CM

## 2020-11-19 DIAGNOSIS — Z79.01 LONG TERM CURRENT USE OF ANTICOAGULANTS WITH INR GOAL OF 2.0-3.0: ICD-10-CM

## 2020-11-19 LAB
CAPILLARY BLOOD COLLECTION: NORMAL
INR PPP: 3.8 (ref 0.86–1.14)

## 2020-11-19 PROCEDURE — 36416 COLLJ CAPILLARY BLOOD SPEC: CPT | Performed by: FAMILY MEDICINE

## 2020-11-19 PROCEDURE — 85610 PROTHROMBIN TIME: CPT | Performed by: FAMILY MEDICINE

## 2020-11-19 RX ORDER — IPRATROPIUM BROMIDE AND ALBUTEROL SULFATE 2.5; .5 MG/3ML; MG/3ML
SOLUTION RESPIRATORY (INHALATION)
Qty: 360 ML | Refills: 0 | Status: SHIPPED | OUTPATIENT
Start: 2020-11-19 | End: 2021-01-14

## 2020-11-19 NOTE — PROGRESS NOTES
Anticoagulation Management    Unable to reach Rich today.    Today's INR result of 3.8 is supratherapeutic (goal INR of 2.0-3.0).  Result received from: Clinic Lab    Follow up required to discuss out of range INR     Left message to take reduced dose of warfarin, 2.5 mg tonight.       Anticoagulation clinic to follow up    Morteza Loza RN

## 2020-11-19 NOTE — PROGRESS NOTES
ANTICOAGULATION MANAGEMENT     Patient Name:  Cayeatno Lunsford  Date:  11/19/2020    ASSESSMENT /SUBJECTIVE:    Today's INR result of 3.8 is supratherapeutic. Goal INR of 2.0-3.0      Warfarin dose taken: Warfarin taken as instructed    Diet: No new diet changes affecting INR    Medication changes/ interactions: No new medications/supplements affecting INR    Previous INR: Supratherapeutic     S/S of bleeding or thromboembolism: No    New injury or illness: No    Upcoming surgery, procedure or cardioversion: No    Additional findings: None      PLAN:    Telephone call with Cayetano regarding INR result and instructed:     Warfarin Dosing Instructions: 2.5 mg tonight then change your warfarin dose to 5 mg every Mon, Wed, Fro; 7.5 mg all other days    Instructed patient to follow up no later than: 12/2/20  Lab visit scheduled    Education provided: Target INR goal and significance of current INR result      Rich verbalizes understanding and agrees to warfarin dosing plan.    Instructed to call the Anticoagulation Clinic for any changes, questions or concerns. (#[phone])        Morteza Loza RN      OBJECTIVE:  Recent labs: (last 7 days)     11/19/20  1402   INR 3.80*         No question data found.  Anticoagulation Summary  As of 11/19/2020    INR goal:  2.0-3.0   TTR:  56.1 % (1 y)   INR used for dosing:  3.80 (11/19/2020)   Warfarin maintenance plan:  5 mg (5 mg x 1) every Mon, Wed, Fri; 7.5 mg (5 mg x 1.5) all other days   Full warfarin instructions:  11/19: 2.5 mg; Otherwise 5 mg every Mon, Wed, Fri; 7.5 mg all other days   Weekly warfarin total:  45 mg   Plan last modified:  Morteza Loza, RN (11/19/2020)   Next INR check:  11/26/2020   Priority:  High   Target end date:  Indefinite    Indications    Long-term (current) use of anticoagulants [Z79.01] [Z79.01]  Atrial fibrillation (H) [I48.91]  Longstanding persistent atrial fibrillation (H) [I48.11]             Anticoagulation Episode Summary     INR check  location:      Preferred lab:      Send INR reminders to:  [facility]    Comments:   Prefers AVS printed.       Anticoagulation Care Providers     Provider Role Specialty Phone number    Dmitri Andres MD Referring Family Medicine [phone]

## 2020-11-19 NOTE — TELEPHONE ENCOUNTER
Routing refill request to provider for review/approval because:  Drug interaction warning    Mindi Agrawal, RN

## 2020-11-25 DIAGNOSIS — Z79.4 TYPE 2 DIABETES MELLITUS WITH HYPOGLYCEMIA WITHOUT COMA, WITH LONG-TERM CURRENT USE OF INSULIN (H): ICD-10-CM

## 2020-11-25 DIAGNOSIS — E11.649 TYPE 2 DIABETES MELLITUS WITH HYPOGLYCEMIA WITHOUT COMA, WITH LONG-TERM CURRENT USE OF INSULIN (H): ICD-10-CM

## 2020-11-25 NOTE — TELEPHONE ENCOUNTER
Pt looking for a refill of Novolin N vials; there are no refills are current pharmacy ([facility]) and pt would like to  here at [facility] now    Thanks!  Jolynn Wynn, Pharmacy [facility]  [phone]

## 2020-11-27 RX ORDER — HUMAN INSULIN 100 [IU]/ML
INJECTION, SUSPENSION SUBCUTANEOUS
Qty: 30 ML | Refills: 0 | Status: SHIPPED | OUTPATIENT
Start: 2020-11-27 | End: 2021-02-11

## 2020-11-27 NOTE — TELEPHONE ENCOUNTER
Routing refill request to provider for review/approval because:  Pharmacy requesting alternate. Please review.     Alecia Bernal RN   [facility] -- Triage Nurse

## 2020-12-02 ENCOUNTER — OFFICE VISIT (OUTPATIENT)
Dept: FAMILY MEDICINE | Facility: CLINIC | Age: 75
End: 2020-12-02
Payer: COMMERCIAL

## 2020-12-02 ENCOUNTER — ANTICOAGULATION THERAPY VISIT (OUTPATIENT)
Dept: ANTICOAGULATION | Facility: CLINIC | Age: 75
End: 2020-12-02

## 2020-12-02 VITALS
TEMPERATURE: 97.5 F | OXYGEN SATURATION: 95 % | DIASTOLIC BLOOD PRESSURE: 56 MMHG | WEIGHT: 189.1 LBS | SYSTOLIC BLOOD PRESSURE: 142 MMHG | BODY MASS INDEX: 28.75 KG/M2 | HEART RATE: 71 BPM

## 2020-12-02 DIAGNOSIS — I48.11 LONGSTANDING PERSISTENT ATRIAL FIBRILLATION (H): ICD-10-CM

## 2020-12-02 DIAGNOSIS — I48.0 PAROXYSMAL ATRIAL FIBRILLATION (H): ICD-10-CM

## 2020-12-02 DIAGNOSIS — I10 HYPERTENSION GOAL BP (BLOOD PRESSURE) < 140/90: ICD-10-CM

## 2020-12-02 DIAGNOSIS — Z79.01 LONG TERM CURRENT USE OF ANTICOAGULANTS WITH INR GOAL OF 2.0-3.0: ICD-10-CM

## 2020-12-02 DIAGNOSIS — Z79.4 TYPE 2 DIABETES MELLITUS WITH STAGE 1 CHRONIC KIDNEY DISEASE, WITH LONG-TERM CURRENT USE OF INSULIN (H): ICD-10-CM

## 2020-12-02 DIAGNOSIS — E78.5 HYPERLIPIDEMIA LDL GOAL <100: ICD-10-CM

## 2020-12-02 DIAGNOSIS — N18.1 TYPE 2 DIABETES MELLITUS WITH STAGE 1 CHRONIC KIDNEY DISEASE, WITH LONG-TERM CURRENT USE OF INSULIN (H): ICD-10-CM

## 2020-12-02 DIAGNOSIS — Z79.01 LONG TERM CURRENT USE OF ANTICOAGULANT THERAPY: ICD-10-CM

## 2020-12-02 DIAGNOSIS — E11.22 TYPE 2 DIABETES MELLITUS WITH STAGE 1 CHRONIC KIDNEY DISEASE, WITH LONG-TERM CURRENT USE OF INSULIN (H): ICD-10-CM

## 2020-12-02 LAB
CAPILLARY BLOOD COLLECTION: NORMAL
INR PPP: 3.8 (ref 0.86–1.14)

## 2020-12-02 PROCEDURE — 85610 PROTHROMBIN TIME: CPT | Performed by: FAMILY MEDICINE

## 2020-12-02 PROCEDURE — 36416 COLLJ CAPILLARY BLOOD SPEC: CPT | Performed by: FAMILY MEDICINE

## 2020-12-02 PROCEDURE — 99207 PR NO CHARGE NURSE ONLY: CPT

## 2020-12-02 PROCEDURE — 99397 PER PM REEVAL EST PAT 65+ YR: CPT | Performed by: FAMILY MEDICINE

## 2020-12-02 RX ORDER — SIMVASTATIN 80 MG
TABLET ORAL
Qty: 90 TABLET | Refills: 2 | Status: SHIPPED | OUTPATIENT
Start: 2020-12-02 | End: 2020-12-03

## 2020-12-02 RX ORDER — WARFARIN SODIUM 5 MG/1
TABLET ORAL
Qty: 130 TABLET | Refills: 1 | Status: SHIPPED | OUTPATIENT
Start: 2020-12-02 | End: 2021-03-22

## 2020-12-02 RX ORDER — LOSARTAN POTASSIUM 100 MG/1
100 TABLET ORAL DAILY
Qty: 90 TABLET | Refills: 3 | Status: SHIPPED | OUTPATIENT
Start: 2020-12-02 | End: 2021-10-18

## 2020-12-02 NOTE — PROGRESS NOTES
ANTICOAGULATION FOLLOW-UP CLINIC VISIT    Patient Name:  Cayetano Lunsford  Date:  12/2/2020  Contact Type:  Telephone/ Patient returned call, he thinks he may have had a change in his diet, he denies any other changes. Orders discussed with the patient, he agrees with the plan. Lab INR appointment scheduled on 12/16/20.     SUBJECTIVE:  Patient Findings     Positives:  Change in diet/appetite (Patient reports he does not think he had enough green veggies, will try to increase this. )    Comments:  The patient was assessed for diet, medication, and activity level changes, missed or extra doses, bruising or bleeding, with no problem findings. INR has been supratherapeutic the last couple INR checks. Will consider decreasing weekly warfarin maintenance dose if next INR is elevated.         Clinical Outcomes     Comments:  The patient was assessed for diet, medication, and activity level changes, missed or extra doses, bruising or bleeding, with no problem findings. INR has been supratherapeutic the last couple INR checks. Will consider decreasing weekly warfarin maintenance dose if next INR is elevated.            OBJECTIVE    Recent labs: (last 7 days)     12/02/20  1418   INR 3.80*       ASSESSMENT / PLAN  INR assessment SUPRA    Recheck INR In: 2 WEEKS    INR Location Outside lab      Anticoagulation Summary  As of 12/2/2020    INR goal:  2.0-3.0   TTR:  52.5 % (1 y)   INR used for dosing:  3.80 (12/2/2020)   Warfarin maintenance plan:  5 mg (5 mg x 1) every Mon, Wed, Fri; 7.5 mg (5 mg x 1.5) all other days   Full warfarin instructions:  12/2: 2.5 mg; Otherwise 5 mg every Mon, Wed, Fri; 7.5 mg all other days   Weekly warfarin total:  45 mg   Plan last modified:  Morteza Loza RN (11/19/2020)   Next INR check:  12/16/2020   Priority:  High   Target end date:  Indefinite    Indications    Long-term (current) use of anticoagulants [Z79.01] [Z79.01]  Atrial fibrillation (H) [I48.91]  Longstanding persistent atrial  fibrillation (H) [I48.11]             Anticoagulation Episode Summary     INR check location:      Preferred lab:      Send INR reminders to:  [facility]    Comments:   Prefers AVS printed.       Anticoagulation Care Providers     Provider Role Specialty Phone number    Dmitri Andres MD Referring Family Medicine [phone]            See the Encounter Report to view Anticoagulation Flowsheet and Dosing Calendar (Go to Encounters tab in chart review, and find the Anticoagulation Therapy Visit)    Dosage adjustment made based on physician directed care plan.    Dayanara Cuevas RN

## 2020-12-02 NOTE — PROGRESS NOTES
Subjective     Cayetano Lunsford is a 75 year old male who presents to clinic today for the following health issues: his INR has been labile recently Needs a new Optometrist: refer Nakul, review and renew all his meds     HPI         Patient is here for a medication check and needs some refills on file. Medicare Wellness exam   Longtime diabetes, balance not what it was:  Additional fall risk     Review of Systems   Constitutional, HEENT, cardiovascular, pulmonary, GI, , musculoskeletal, neuro, skin, endocrine and psych systems are negative, except as otherwise noted.      Objective    Physical Exam   BP (!) 156/56 (BP Location: Right arm, Patient Position: Chair, Cuff Size: Adult Large)   Pulse 71   Temp 97.5  F (36.4  C) (Oral)   Wt 85.8 kg (189 lb 1.6 oz)   SpO2 95%   BMI 28.75 kg/m    Sees [facility] and warafarin clinic   142/56 repeat  GENERAL: healthy, alert and no distress  EYES: Eyes grossly normal to inspection, PERRL and conjunctivae and sclerae normal  HENT: ear canals and TM's normal, nose and mouth without ulcers or lesions  NECK: no adenopathy, no asymmetry, masses, or scars and thyroid normal to palpation  RESP: lungs clear to auscultation - no rales, rhonchi or wheezes  CV: regular rate and rhythm, normal S1 S2, no S3 or S4, no murmur, click or rub, no peripheral edema and peripheral pulses strong  ABDOMEN: soft, nontender, no hepatosplenomegaly, no masses and bowel sounds normal  MS: no gross musculoskeletal defects noted, no edema  SKIN: no suspicious lesions or rashes  NEURO: Normal strength and tone, mentation intact and speech normal  PSYCH: mentation appears normal, affect normal/bright            Assessment & Plan     Paroxysmal atrial fibrillation (H)    - warfarin ANTICOAGULANT (JANTOVEN ANTICOAGULANT) 5 MG tablet; TAKE ONE TABLET (5MG) ON FRIDAYS; ONE AND ONE-HALF TABLETS (7.5MG) BY MOUTH ALL OTHER DAYS OR AS DIRECTED BY INR CLINIC    Long term current use of anticoagulants with  INR goal of 2.0-3.0  - warfarin ANTICOAGULANT (JANTOVEN ANTICOAGULANT) 5 MG tablet; TAKE ONE TABLET (5MG) ON FRIDAYS; ONE AND ONE-HALF TABLETS (7.5MG) BY MOUTH ALL OTHER DAYS OR AS DIRECTED BY INR CLINIC    Hyperlipidemia LDL goal <100  meds effective   - simvastatin (ZOCOR) 80 MG tablet; TAKE ONE-HALF TABLET BY MOUTH EVERY NIGHT AT BEDTIME    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, with long-term current use of insulin (H)    - metFORMIN (GLUCOPHAGE) 1000 MG tablet; TAKE ONE TABLET BY MOUTH TWICE A DAY WITH BREAKFAST  AND DINNER  - blood glucose (NO BRAND SPECIFIED) test strip; Use to test blood sugar 3 times daily or as directed.    Hypertension goal BP (blood pressure) < 140/90  systollic labile   - losartan (COZAAR) 100 MG tablet; Take 1 tablet (100 mg) by mouth daily        Work on weight loss  Regular exercise  Fall precautions     See MTM in the next 6 months   Dmitri Andres MD  [facility]

## 2020-12-02 NOTE — PROGRESS NOTES
Left message to call [phone]. Please transfer call to Dayanara at [phone].    Anticoagulation Management    Unable to reach Rich today.    Today's INR result of 3.8 is supratherapeutic (goal INR of 2.0-3.0).  Result received from: Clinic Lab    Follow up required to confirm warfarin dose taken and assess for changes    Left message to take reduced dose of warfarin, 2.5 mg tonight.      Anticoagulation clinic to follow up    Dayanara Cuevas RN

## 2020-12-03 RX ORDER — SIMVASTATIN 80 MG
TABLET ORAL
Qty: 90 TABLET | Refills: 2 | Status: SHIPPED | OUTPATIENT
Start: 2020-12-03 | End: 2021-10-18

## 2020-12-03 NOTE — PROGRESS NOTES
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  Nany Dudley RN, BSN  Message handled by CLINIC NURSE.

## 2020-12-16 ENCOUNTER — ANTICOAGULATION THERAPY VISIT (OUTPATIENT)
Dept: ANTICOAGULATION | Facility: CLINIC | Age: 75
End: 2020-12-16

## 2020-12-16 DIAGNOSIS — I48.91 ATRIAL FIBRILLATION (H): ICD-10-CM

## 2020-12-16 DIAGNOSIS — Z79.01 LONG TERM CURRENT USE OF ANTICOAGULANTS WITH INR GOAL OF 2.0-3.0: ICD-10-CM

## 2020-12-16 DIAGNOSIS — Z79.01 LONG TERM CURRENT USE OF ANTICOAGULANT THERAPY: ICD-10-CM

## 2020-12-16 DIAGNOSIS — I48.11 LONGSTANDING PERSISTENT ATRIAL FIBRILLATION (H): ICD-10-CM

## 2020-12-16 LAB
CAPILLARY BLOOD COLLECTION: NORMAL
INR PPP: 3.2 (ref 0.86–1.14)

## 2020-12-16 PROCEDURE — 36416 COLLJ CAPILLARY BLOOD SPEC: CPT | Performed by: FAMILY MEDICINE

## 2020-12-16 PROCEDURE — 99207 PR NO CHARGE NURSE ONLY: CPT

## 2020-12-16 PROCEDURE — 85610 PROTHROMBIN TIME: CPT | Performed by: FAMILY MEDICINE

## 2020-12-16 NOTE — PROGRESS NOTES
Patient returned call, can be reached at [phone].    Deborah Gooden RN   [facility]  [place], [place], [place]

## 2020-12-16 NOTE — PROGRESS NOTES
Anticoagulation Management    Unable to reach Cayetano today.    Today's INR result of 3.2 is supratherapeutic (goal INR of 2.0-3.0).  Result received from: Clinic Lab    Follow up required to confirm warfarin dose taken and assess for changes    Left message to continue current dose of warfarin 5 mg tonight.     INR continues to be consistently elevated, would consider maintenance dose adjustment if patient has not had any changes since his last check. (Maybe 5 mg Mon, Wed, Fri, Sa and 7.5 mg Tu, Th, Su=5.6% decrease).      Anticoagulation clinic to follow up    Estelle Starr RN    ANTICOAGULATION FOLLOW-UP CLINIC VISIT    Patient Name:  Cayetano Lunsford  Date:  12/16/2020  Contact Type:  Telephone/ discussed with patient    SUBJECTIVE:  Patient Findings     Comments:  Patient has been having frozen green beans or broccoli about 3 times a week.  On the days that he has not had the veggies, he has V8 juice.  Recommended him to continue this regimen and lowered warfarin maintenance dose by 5.6%.  The patient was assessed for medication, and activity level changes, missed or extra doses, bruising or bleeding, with no problem findings.            Clinical Outcomes     Negatives:  Major bleeding event, Thromboembolic event, Anticoagulation-related hospital admission, Anticoagulation-related ED visit, Anticoagulation-related fatality    Comments:  Patient has been having frozen green beans or broccoli about 3 times a week.  On the days that he has not had the veggies, he has V8 juice.  Recommended him to continue this regimen and lowered warfarin maintenance dose by 5.6%.  The patient was assessed for medication, and activity level changes, missed or extra doses, bruising or bleeding, with no problem findings.               OBJECTIVE    Recent labs: (last 7 days)     12/16/20  1308   INR 3.20*       ASSESSMENT / PLAN  INR assessment SUPRA    Recheck INR In: 2 WEEKS    INR Location Clinic      Anticoagulation Summary  As  of 12/16/2020    INR goal:  2.0-3.0   TTR:  49.4 % (1 y)   INR used for dosing:  3.20 (12/16/2020)   Warfarin maintenance plan:  7.5 mg (5 mg x 1.5) every Sun, Tue, Thu; 5 mg (5 mg x 1) all other days   Full warfarin instructions:  7.5 mg every Sun, Tue, Thu; 5 mg all other days   Weekly warfarin total:  42.5 mg   Plan last modified:  Estelle Starr RN (12/16/2020)   Next INR check:  12/30/2020   Priority:  High   Target end date:  Indefinite    Indications    Long-term (current) use of anticoagulants [Z79.01] [Z79.01]  Atrial fibrillation (H) [I48.91]  Longstanding persistent atrial fibrillation (H) [I48.11]             Anticoagulation Episode Summary     INR check location:      Preferred lab:      Send INR reminders to:  [facility]    Comments:   Prefers AVS printed.       Anticoagulation Care Providers     Provider Role Specialty Phone number    Dmitri Andres MD Referring Family Medicine [phone]            See the Encounter Report to view Anticoagulation Flowsheet and Dosing Calendar (Go to Encounters tab in chart review, and find the Anticoagulation Therapy Visit)    Dosage adjustment made based on physician directed care plan.    Estelle Starr RN

## 2020-12-30 ENCOUNTER — ANTICOAGULATION THERAPY VISIT (OUTPATIENT)
Dept: ANTICOAGULATION | Facility: CLINIC | Age: 75
End: 2020-12-30

## 2020-12-30 DIAGNOSIS — Z79.01 LONG TERM CURRENT USE OF ANTICOAGULANTS WITH INR GOAL OF 2.0-3.0: ICD-10-CM

## 2020-12-30 DIAGNOSIS — Z79.01 LONG TERM CURRENT USE OF ANTICOAGULANT THERAPY: ICD-10-CM

## 2020-12-30 DIAGNOSIS — I48.11 LONGSTANDING PERSISTENT ATRIAL FIBRILLATION (H): ICD-10-CM

## 2020-12-30 LAB
CAPILLARY BLOOD COLLECTION: NORMAL
INR PPP: 3.2 (ref 0.86–1.14)

## 2020-12-30 PROCEDURE — 36416 COLLJ CAPILLARY BLOOD SPEC: CPT | Performed by: FAMILY MEDICINE

## 2020-12-30 PROCEDURE — 85610 PROTHROMBIN TIME: CPT | Performed by: FAMILY MEDICINE

## 2020-12-30 PROCEDURE — 99207 PR NO CHARGE NURSE ONLY: CPT

## 2020-12-30 NOTE — PROGRESS NOTES
ANTICOAGULATION FOLLOW-UP CLINIC VISIT    Patient Name:  Cayetano Lunsford  Date:  12/30/2020  Contact Type:  Telephone/ Called patient, he reports diet change, he denies any other changes. Orders discussed with the patient, he agrees with the plan. Lab INR appointment scheduled on 1/13/21.    SUBJECTIVE:  Patient Findings     Positives:  Change in diet/appetite (Patient thinks he had less green veggies. )    Comments:  The patient was assessed for diet and activity level changes, missed or extra doses, bruising or bleeding, with no problem findings. Warfarin dose was decreased 12/16/20 and INR stayed the same. Will decrease warfarin maintenance dose again by 5.9% (2.5 mg).        Clinical Outcomes     Comments:  The patient was assessed for diet and activity level changes, missed or extra doses, bruising or bleeding, with no problem findings. Warfarin dose was decreased 12/16/20 and INR stayed the same. Will decrease warfarin maintenance dose again by 5.9% (2.5 mg).           OBJECTIVE    Recent labs: (last 7 days)     12/30/20  1525   INR 3.20*       ASSESSMENT / PLAN  INR assessment SUPRA    Recheck INR In: 2 WEEKS    INR Location Outside lab      Anticoagulation Summary  As of 12/30/2020    INR goal:  2.0-3.0   TTR:  46.9 % (1 y)   INR used for dosing:  3.20 (12/30/2020)   Warfarin maintenance plan:  7.5 mg (5 mg x 1.5) every Sun, Thu; 5 mg (5 mg x 1) all other days   Full warfarin instructions:  7.5 mg every Sun, Thu; 5 mg all other days   Weekly warfarin total:  40 mg   Plan last modified:  Dayanara Cuevas RN (12/30/2020)   Next INR check:  1/13/2021   Priority:  High   Target end date:  Indefinite    Indications    Long-term (current) use of anticoagulants [Z79.01] [Z79.01]  Atrial fibrillation (H) [I48.91]  Longstanding persistent atrial fibrillation (H) [I48.11]             Anticoagulation Episode Summary     INR check location:      Preferred lab:      Send INR reminders to:  [facility]     Comments:   Prefers AVS printed.       Anticoagulation Care Providers     Provider Role Specialty Phone number    Dmitri Andres MD Referring Family Medicine [phone]            See the Encounter Report to view Anticoagulation Flowsheet and Dosing Calendar (Go to Encounters tab in chart review, and find the Anticoagulation Therapy Visit)    Dosage adjustment made based on physician directed care plan.    Dayanara Cuevas RN

## 2021-01-13 ENCOUNTER — ANTICOAGULATION THERAPY VISIT (OUTPATIENT)
Dept: ANTICOAGULATION | Facility: CLINIC | Age: 76
End: 2021-01-13

## 2021-01-13 DIAGNOSIS — I48.91 ATRIAL FIBRILLATION (H): ICD-10-CM

## 2021-01-13 DIAGNOSIS — Z79.01 LONG TERM CURRENT USE OF ANTICOAGULANTS WITH INR GOAL OF 2.0-3.0: ICD-10-CM

## 2021-01-13 DIAGNOSIS — I48.11 LONGSTANDING PERSISTENT ATRIAL FIBRILLATION (H): ICD-10-CM

## 2021-01-13 DIAGNOSIS — Z79.01 LONG TERM CURRENT USE OF ANTICOAGULANT THERAPY: ICD-10-CM

## 2021-01-13 LAB — INR PPP: 2.2 (ref 0.86–1.14)

## 2021-01-13 PROCEDURE — 99207 PR NO CHARGE NURSE ONLY: CPT

## 2021-01-13 PROCEDURE — 36416 COLLJ CAPILLARY BLOOD SPEC: CPT | Performed by: FAMILY MEDICINE

## 2021-01-13 PROCEDURE — 85610 PROTHROMBIN TIME: CPT | Performed by: FAMILY MEDICINE

## 2021-01-13 NOTE — PROGRESS NOTES
ANTICOAGULATION FOLLOW-UP CLINIC VISIT    Patient Name:  Cayetano Lunsford  Date:  2021  Contact Type:  Telephone/ discussed with patient    SUBJECTIVE:  Patient Findings     Comments:  The patient was assessed for diet, medication, and activity level changes, missed or extra doses, bruising or bleeding, with no problem findings.          Clinical Outcomes     Negatives:  Major bleeding event, Thromboembolic event, Anticoagulation-related hospital admission, Anticoagulation-related ED visit, Anticoagulation-related fatality    Comments:  The patient was assessed for diet, medication, and activity level changes, missed or extra doses, bruising or bleeding, with no problem findings.             OBJECTIVE    Recent labs: (last 7 days)     21  1332   INR 2.20*       ASSESSMENT / PLAN  INR assessment THER    Recheck INR In: 3 WEEKS    INR Location Clinic      Anticoagulation Summary  As of 2021    INR goal:  2.0-3.0   TTR:  46.8 % (1 y)   INR used for dosin.20 (2021)   Warfarin maintenance plan:  7.5 mg (5 mg x 1.5) every Sun, Thu; 5 mg (5 mg x 1) all other days   Full warfarin instructions:  7.5 mg every Sun, Thu; 5 mg all other days   Weekly warfarin total:  40 mg   No change documented:  Estelle Starr RN   Plan last modified:  Dayanara Cuevas RN (2020)   Next INR check:  2/3/2021   Priority:  High   Target end date:  Indefinite    Indications    Long-term (current) use of anticoagulants [Z79.01] [Z79.01]  Atrial fibrillation (H) [I48.91]  Longstanding persistent atrial fibrillation (H) [I48.11]             Anticoagulation Episode Summary     INR check location:      Preferred lab:      Send INR reminders to:  [facility] [employer] [facility] [place]    Comments:   Prefers AVS printed.       Anticoagulation Care Providers     Provider Role Specialty Phone number    Dmitri Andres MD Referring Family Medicine [phone]            See the Encounter Report to view Anticoagulation Flowsheet and  Dosing Calendar (Go to Encounters tab in chart review, and find the Anticoagulation Therapy Visit)    Dosage adjustment made based on physician directed care plan.    Estelle Starr RN

## 2021-02-02 ENCOUNTER — TRANSFERRED RECORDS (OUTPATIENT)
Dept: HEALTH INFORMATION MANAGEMENT | Facility: CLINIC | Age: 76
End: 2021-02-02

## 2021-02-02 LAB
ALT SERPL-CCNC: 32 U/L (ref 0–78)
AST SERPL-CCNC: 26 U/L (ref 0–37)
CREAT SERPL-MCNC: 0.91 MG/DL (ref 0.7–1.3)
GLUCOSE SERPL-MCNC: 214 MG/DL (ref 74–100)
POTASSIUM SERPL-SCNC: 5.4 MMOL/L (ref 3.5–5.1)

## 2021-02-03 ENCOUNTER — ANTICOAGULATION THERAPY VISIT (OUTPATIENT)
Dept: ANTICOAGULATION | Facility: CLINIC | Age: 76
End: 2021-02-03

## 2021-02-03 DIAGNOSIS — Z79.01 LONG TERM CURRENT USE OF ANTICOAGULANTS WITH INR GOAL OF 2.0-3.0: ICD-10-CM

## 2021-02-03 DIAGNOSIS — I48.11 LONGSTANDING PERSISTENT ATRIAL FIBRILLATION (H): ICD-10-CM

## 2021-02-03 DIAGNOSIS — I48.91 ATRIAL FIBRILLATION (H): ICD-10-CM

## 2021-02-03 DIAGNOSIS — Z79.01 LONG TERM CURRENT USE OF ANTICOAGULANT THERAPY: ICD-10-CM

## 2021-02-03 LAB
CAPILLARY BLOOD COLLECTION: NORMAL
INR PPP: 3.1 (ref 0.86–1.14)

## 2021-02-03 PROCEDURE — 85610 PROTHROMBIN TIME: CPT | Performed by: FAMILY MEDICINE

## 2021-02-03 PROCEDURE — 99207 PR NO CHARGE NURSE ONLY: CPT

## 2021-02-03 PROCEDURE — 36416 COLLJ CAPILLARY BLOOD SPEC: CPT | Performed by: FAMILY MEDICINE

## 2021-02-03 NOTE — PROGRESS NOTES
ANTICOAGULATION FOLLOW-UP CLINIC VISIT    Patient Name:  Cayetano Lunsford  Date:  2/3/2021  Contact Type:  Telephone/ discussed with patient    SUBJECTIVE:  Patient Findings     Comments:  The patient was assessed for medication, and activity level changes, missed or extra doses, bruising or bleeding, with no problem findings.          Clinical Outcomes     Negatives:  Major bleeding event, Thromboembolic event, Anticoagulation-related hospital admission, Anticoagulation-related ED visit, Anticoagulation-related fatality    Comments:  The patient was assessed for medication, and activity level changes, missed or extra doses, bruising or bleeding, with no problem findings.             OBJECTIVE    Recent labs: (last 7 days)     02/03/21  1335   INR 3.10*       ASSESSMENT / PLAN  INR assessment SUPRA    Recheck INR In: 2 WEEKS    INR Location Clinic      Anticoagulation Summary  As of 2/3/2021    INR goal:  2.0-3.0   TTR:  47.7 % (1 y)   INR used for dosing:  3.10 (2/3/2021)   Warfarin maintenance plan:  7.5 mg (5 mg x 1.5) every Sun, Thu; 5 mg (5 mg x 1) all other days   Full warfarin instructions:  7.5 mg every Sun, Thu; 5 mg all other days   Weekly warfarin total:  40 mg   No change documented:  Estelle Starr RN   Plan last modified:  Dayanara Cuevas, RN (12/30/2020)   Next INR check:  2/18/2021   Priority:  High   Target end date:  Indefinite    Indications    Long-term (current) use of anticoagulants [Z79.01] [Z79.01]  Atrial fibrillation (H) [I48.91]  Longstanding persistent atrial fibrillation (H) [I48.11]             Anticoagulation Episode Summary     INR check location:      Preferred lab:      Send INR reminders to:  [facility]    Comments:   Prefers AVS printed.       Anticoagulation Care Providers     Provider Role Specialty Phone number    Dmitri Andres MD Referring Family Medicine [phone]            See the Encounter Report to view Anticoagulation Flowsheet and Dosing  Calendar (Go to Encounters tab in chart review, and find the Anticoagulation Therapy Visit)    Dosage adjustment made based on physician directed care plan.    Estelle Starr RN

## 2021-02-12 DIAGNOSIS — Z79.4 TYPE 2 DIABETES MELLITUS WITH HYPOGLYCEMIA WITHOUT COMA, WITH LONG-TERM CURRENT USE OF INSULIN (H): ICD-10-CM

## 2021-02-12 DIAGNOSIS — E11.649 TYPE 2 DIABETES MELLITUS WITH HYPOGLYCEMIA WITHOUT COMA, WITH LONG-TERM CURRENT USE OF INSULIN (H): ICD-10-CM

## 2021-02-12 RX ORDER — HUMAN INSULIN 100 [IU]/ML
INJECTION, SUSPENSION SUBCUTANEOUS
Refills: 0 | OUTPATIENT
Start: 2021-02-12

## 2021-02-12 NOTE — TELEPHONE ENCOUNTER
Patient given refill of medication on 2/11/21. Refusing refill request and closing encounter. Rayna Estrella RN on 2/12/2021 at 9:44 AM

## 2021-02-13 DIAGNOSIS — Z79.4 TYPE 2 DIABETES MELLITUS WITH HYPOGLYCEMIA WITHOUT COMA, WITH LONG-TERM CURRENT USE OF INSULIN (H): ICD-10-CM

## 2021-02-13 DIAGNOSIS — E11.649 TYPE 2 DIABETES MELLITUS WITH HYPOGLYCEMIA WITHOUT COMA, WITH LONG-TERM CURRENT USE OF INSULIN (H): ICD-10-CM

## 2021-02-15 RX ORDER — HUMAN INSULIN 100 [IU]/ML
INJECTION, SUSPENSION SUBCUTANEOUS
Qty: 30 ML | Refills: 0 | Status: SHIPPED | OUTPATIENT
Start: 2021-02-15 | End: 2021-05-06

## 2021-02-15 NOTE — TELEPHONE ENCOUNTER
Routing refill request to provider for review/approval because:  Cynthia given x1 and patient did not follow up, please advise  Drug interaction warning    Rayna Estrella RN on 2/15/2021 at 11:23 AM

## 2021-02-18 ENCOUNTER — DOCUMENTATION ONLY (OUTPATIENT)
Dept: NURSING | Facility: CLINIC | Age: 76
End: 2021-02-18

## 2021-02-18 ENCOUNTER — ANTICOAGULATION THERAPY VISIT (OUTPATIENT)
Dept: NURSING | Facility: CLINIC | Age: 76
End: 2021-02-18

## 2021-02-18 DIAGNOSIS — Z79.01 LONG TERM CURRENT USE OF ANTICOAGULANTS WITH INR GOAL OF 2.0-3.0: ICD-10-CM

## 2021-02-18 DIAGNOSIS — I48.11 LONGSTANDING PERSISTENT ATRIAL FIBRILLATION (H): ICD-10-CM

## 2021-02-18 DIAGNOSIS — I48.91 ATRIAL FIBRILLATION (H): ICD-10-CM

## 2021-02-18 DIAGNOSIS — Z79.01 LONG TERM CURRENT USE OF ANTICOAGULANT THERAPY: ICD-10-CM

## 2021-02-18 DIAGNOSIS — I48.91 ATRIAL FIBRILLATION (H): Primary | ICD-10-CM

## 2021-02-18 LAB
CAPILLARY BLOOD COLLECTION: NORMAL
INR PPP: 1.8 (ref 0.86–1.14)

## 2021-02-18 PROCEDURE — 36416 COLLJ CAPILLARY BLOOD SPEC: CPT | Performed by: FAMILY MEDICINE

## 2021-02-18 PROCEDURE — 85610 PROTHROMBIN TIME: CPT | Performed by: FAMILY MEDICINE

## 2021-02-18 PROCEDURE — 99207 PR NO CHARGE NURSE ONLY: CPT

## 2021-02-18 NOTE — PROGRESS NOTES
ANTICOAGULATION FOLLOW-UP CLINIC VISIT    Patient Name:  Cayetano Lunsford  Date:  2021  Contact Type:  Telephone    SUBJECTIVE:  Patient Findings     Positives:  Change in health (Pt states she has been losing weight slowly since Covid started.), Change in diet/appetite (Decreased appetite overall but vitamin K intake has NOT changed, pt still eating 3 greens per week and V8 juice on the other days during the week (not on weekends).)    Comments:  Warfarin maintenance dose was reduced 5-6% on ,,, and 20, will need to continue to monitor INR closely as INR is now sub-therapeutic.  Patient denies any identifiable changes that caused the sub-therapeutic INR.   Patient will get his 1st Covid-19 vaccine on 21  Annual INR referral renewal routed to PCP          Clinical Outcomes     Negatives:  Major bleeding event, Thromboembolic event, Anticoagulation-related hospital admission, Anticoagulation-related ED visit, Anticoagulation-related fatality    Comments:  Warfarin maintenance dose was reduced 5-6% on ,,, and 20, will need to continue to monitor INR closely as INR is now sub-therapeutic.  Patient denies any identifiable changes that caused the sub-therapeutic INR.   Patient will get his 1st Covid-19 vaccine on 21  Annual INR referral renewal routed to PCP             OBJECTIVE    Recent labs: (last 7 days)     21  1332   INR 1.80*       ASSESSMENT / PLAN  INR assessment SUB    Recheck INR In: 2 WEEKS    INR Location Clinic      Anticoagulation Summary  As of 2021    INR goal:  2.0-3.0   TTR:  46.8 % (1 y)   INR used for dosin.80 (2021)   Warfarin maintenance plan:  7.5 mg (5 mg x 1.5) every Sun, Thu; 5 mg (5 mg x 1) all other days   Full warfarin instructions:  : 10 mg; Otherwise 7.5 mg every Sun, Thu; 5 mg all other days   Weekly warfarin total:  40 mg   Plan last modified:  Tatyana Akers RN (2021)   Next INR check:  3/4/2021    Priority:  High   Target end date:  Indefinite    Indications    Long-term (current) use of anticoagulants [Z79.01] [Z79.01]  Atrial fibrillation (H) [I48.91]  Longstanding persistent atrial fibrillation (H) [I48.11]             Anticoagulation Episode Summary     INR check location:      Preferred lab:      Send INR reminders to:  [facility] [employer] [place]    Comments:   Prefers AVS printed.       Anticoagulation Care Providers     Provider Role Specialty Phone number    Dmitri Andres MD Referring Family Medicine [phone]            See the Encounter Report to view Anticoagulation Flowsheet and Dosing Calendar (Go to Encounters tab in chart review, and find the Anticoagulation Therapy Visit)        Tatyana Akers RN

## 2021-02-18 NOTE — PROGRESS NOTES
ANTICOAGULATION MANAGEMENT      Cayetano Lunsford due for annual renewal of referral to anticoagulation monitoring. Order pended for your review and signature.      ANTICOAGULATION SUMMARY      Warfarin indication(s)     Atrial fibrillation    Heart valve present?  NO       Current goal range   INR: 2.0-3.0     Goal appropriate for indication? Yes, INR 2-3 appropriate for hx of DVT, PE, hypercoagulable state, Afib, LVAD, or bileaflet AVR without risk factors     Current duration of therapy Indefinite/long term therapy   Time in Therapeutic Range (TTR)  (Goal > 60%) 47 %       Office visit with referring provider's group within last year yes on 12/02/2020       Tatyana Akers RN

## 2021-02-19 ENCOUNTER — IMMUNIZATION (OUTPATIENT)
Dept: NURSING | Facility: CLINIC | Age: 76
End: 2021-02-19
Payer: COMMERCIAL

## 2021-02-19 PROCEDURE — 0001A PR COVID VAC PFIZER DIL RECON 30 MCG/0.3 ML IM: CPT

## 2021-02-19 PROCEDURE — 91300 PR COVID VAC PFIZER DIL RECON 30 MCG/0.3 ML IM: CPT

## 2021-02-25 ENCOUNTER — TRANSFERRED RECORDS (OUTPATIENT)
Dept: HEALTH INFORMATION MANAGEMENT | Facility: CLINIC | Age: 76
End: 2021-02-25

## 2021-02-25 LAB — RETINOPATHY: NEGATIVE

## 2021-02-26 DIAGNOSIS — G25.0 BENIGN FAMILIAL TREMOR: ICD-10-CM

## 2021-02-27 NOTE — TELEPHONE ENCOUNTER
Requested Prescriptions   Pending Prescriptions Disp Refills     primidone (MYSOLINE) 50 MG tablet  Last Written Prescription Date:    Last Fill Quantity: 910,  # refills: 0  Last office visit: Visit date not found with prescribing provider:    Future Office Visit:   Next 5 appointments (look out 90 days)    Mar 09, 2021  1:00 PM  Return Visit with Bong Yoo MD  [facility] [place] ([facility] ) [address] [address]  [phone]   May 05, 2021  1:00 PM  Pharmacist Visit with [facility] ([facility] ) [address] [facility] [id number] [phone]                913 tablet 1     Si tablets in am, 3 tablets dinner, and 3 tablets at bedtime       There is no refill protocol information for this order

## 2021-03-03 DIAGNOSIS — G25.0 BENIGN FAMILIAL TREMOR: ICD-10-CM

## 2021-03-03 RX ORDER — PRIMIDONE 50 MG/1
TABLET ORAL
Qty: 910 TABLET | Refills: 3 | OUTPATIENT
Start: 2021-03-03

## 2021-03-03 NOTE — TELEPHONE ENCOUNTER
Patient states that he does have enough medication to get him to his appointment on 03/09/2021.     Marcus Lal, CMA on 3/3/2021 at 9:46 AM

## 2021-03-04 RX ORDER — PROPRANOLOL HYDROCHLORIDE 40 MG/1
TABLET ORAL
Qty: 180 TABLET | Refills: 2 | Status: SHIPPED | OUTPATIENT
Start: 2021-03-04 | End: 2021-05-24

## 2021-03-04 NOTE — TELEPHONE ENCOUNTER
Routing refill request to provider for review/approval because:  Drug interaction warning  Labs out of range:    Blood pressure under 140/90 in past 12 months        BP Readings from Last 3 Encounters:   12/02/20 (!) 142/56   11/09/20 122/55   08/27/20 136/64           Arline Luis RN Flex

## 2021-03-09 ENCOUNTER — OFFICE VISIT (OUTPATIENT)
Dept: NEUROLOGY | Facility: CLINIC | Age: 76
End: 2021-03-09
Attending: PSYCHIATRY & NEUROLOGY
Payer: COMMERCIAL

## 2021-03-09 VITALS
OXYGEN SATURATION: 95 % | SYSTOLIC BLOOD PRESSURE: 105 MMHG | TEMPERATURE: 98.3 F | WEIGHT: 182 LBS | BODY MASS INDEX: 27.58 KG/M2 | DIASTOLIC BLOOD PRESSURE: 63 MMHG | HEART RATE: 63 BPM | HEIGHT: 68 IN

## 2021-03-09 DIAGNOSIS — G25.0 BENIGN FAMILIAL TREMOR: Primary | ICD-10-CM

## 2021-03-09 PROCEDURE — G0463 HOSPITAL OUTPT CLINIC VISIT: HCPCS

## 2021-03-09 PROCEDURE — 99214 OFFICE O/P EST MOD 30 MIN: CPT | Performed by: PSYCHIATRY & NEUROLOGY

## 2021-03-09 RX ORDER — PRIMIDONE 50 MG/1
TABLET ORAL
Qty: 1001 TABLET | Refills: 1 | Status: SHIPPED | OUTPATIENT
Start: 2021-03-09 | End: 2021-09-10

## 2021-03-09 ASSESSMENT — MIFFLIN-ST. JEOR: SCORE: 1525.05

## 2021-03-09 NOTE — PROGRESS NOTES
"ESTABLISHED PATIENT NEUROLOGY NOTE    DATE OF VISIT: 3/9/2021  CLINIC LOCATION: [facility]  MRN: [id number]  PATIENT NAME: Cayetano Lunsford  YOB: 1945    PCP: Dmitri Andres MD    REASON FOR VISIT:   Chief Complaint   Patient presents with     Tremors     SUBJECTIVE:                                                      HISTORY OF PRESENT ILLNESS: Patient is here to follow up regarding essential tremor.  Last visit was done virtually (telephone) on 9/8/2020 due to COVID-19 precautions.  No medication changes were made.  Please refer to my initial/other prior notes for further information.    Since the last visit, the patient reports that his tremor is worsening, but fluctuates on day-to-day basis.  Right hand is slightly worse than left.  It affects his ability to write and eat/drink.  He is on 200/150/150 mg of primidone daily and 120 mg of propranolol twice daily without noticeable side effects.  No interval development of new focal neurological symptoms.  Several of his family members also have various degrees of tremor.    On review of systems, patient endorses no additional active complaints. Medications, allergies, family and social history were also reviewed. There are no changes reported by patient.  REVIEW OF SYSTEMS:                                                    10-system review was completed. Pertinent positives are included in HPI. The remainder of ROS is negative.  EXAM:                                                    Physical Exam:   Vitals: BP (!) 163/89 (BP Location: Left arm, Patient Position: Sitting, Cuff Size: Adult Large)   Pulse 63   Temp 98.3  F (36.8  C) (Oral)   Ht 1.727 m (5' 8\")   Wt 82.6 kg (182 lb)   SpO2 95%   BMI 27.67 kg/m    Repeat vitals: /63 (BP Location: Left arm, Patient Position: Sitting, Cuff Size: Adult Regular)   Pulse 63   Temp 98.3  F (36.8  C) (Oral)   Ht 1.727 m (5' 8\")   Wt 82.6 kg (182 lb)   SpO2 " 95%   BMI 27.67 kg/m  .  General: pt is in NAD, cooperative.  Skin: normal turgor, moist mucous membranes, no lesions/rashes noticed.  HEENT: ATNC, white sclera, normal conjunctiva.  Respiratory: Symmetric lung excursion, no accessory respiratory muscle use.  Abdomen: Non distended.  Neurological: awake, cooperative, follows commands, no aphasia, moderate dysarthria, cranial nerves II-XII: no ptosis, face is symmetric, tongue is midline, equally moves all extremities, intermittent moderate postural and action bilateral hand tremor, the patient completed tremor spiral worksheet that will be scanned into his chart, no dysmetria bilaterally, casual gait is normal.  ASSESSMENT AND PLAN:                                                    Assessment: 76-year-old male patient with essential tremor presents for follow-up.  He reports tremor worsening on current therapy with primidone and propranolol.  Historically, he was not able to tolerate increased dose of primidone due to increased somnolence, but is willing to try it again.  We also discussed that increasing propranolol might lead to bradycardia and hypotension.  We discussed increasing propranolol dose with his primary care provider, but he was concerned regarding possibility of these side effects.  We reviewed alternative options of topiramate and gabapentin and decided to increase slightly the dose of primidone.  The updated prescription was sent to the pharmacy.    Rich to follow up with Primary Care provider regarding elevated blood pressure.     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    We decided to increase his primidone dose. Updated prescription was sent to his pharmacy.  The patient was advised to contact my clinic with any intolerable side effects (as discussed during today's visit).  I also reviewed with him that  increasing the dose of Primidone might require increase in dose of Warfarin.    Next follow-up appointment is in the next 6 months or earlier if needed.    Total Time: 31 minutes spent on the date of the encounter doing chart review, history and exam, documentation and further activities as noted above.    Bong Yoo MD  [facility] Neurology  (Chart documentation was completed in part with Dragon voice-recognition software. Even though reviewed, some grammatical, spelling, and word errors may remain.)

## 2021-03-09 NOTE — PATIENT INSTRUCTIONS
AFTER VISIT SUMMARY (AVS):    At today's visit we thoroughly discussed current symptoms, available treatment options, and the plan.    We decided to increase your primidone dose. Updated prescription was sent to your pharmacy.  Please contact my clinic with any intolerable side effects (as discussed during today's visit). Please keep in mind that increasing the dose of Primidone might require increase in dose of Warfarin.    Next follow-up appointment is in the next 6 months or earlier if needed.    Please do not hesitate to call me with any questions or concerns.    Thanks.

## 2021-03-09 NOTE — LETTER
3/9/2021         RE: Cayetano Lunsford  [id number]  [facility] [address]        Dear Colleague,    Thank you for referring your patient, Cayetano Lunsford, to the [facility] [place]. Please see a copy of my visit note below.    ESTABLISHED PATIENT NEUROLOGY NOTE    DATE OF VISIT: 3/9/2021  CLINIC LOCATION: [facility]  MRN: [id number]  PATIENT NAME: Cayetano Lunsford  YOB: 1945    PCP: Dmitri Andres MD    REASON FOR VISIT:   Chief Complaint   Patient presents with     Tremors     SUBJECTIVE:                                                      HISTORY OF PRESENT ILLNESS: Patient is here to follow up regarding essential tremor.  Last visit was done virtually (telephone) on 9/8/2020 due to COVID-19 precautions.  No medication changes were made.  Please refer to my initial/other prior notes for further information.    Since the last visit, the patient reports that his tremor is worsening, but fluctuates on day-to-day basis.  Right hand is slightly worse than left.  It affects his ability to write and eat/drink.  He is on 200/150/150 mg of primidone daily and 120 mg of propranolol twice daily without noticeable side effects.  No interval development of new focal neurological symptoms.  Several of his family members also have various degrees of tremor.    On review of systems, patient endorses no additional active complaints. Medications, allergies, family and social history were also reviewed. There are no changes reported by patient.  REVIEW OF SYSTEMS:                                                    10-system review was completed. Pertinent positives are included in HPI. The remainder of ROS is negative.  EXAM:                                                    Physical Exam:   Vitals: BP (!) 163/89 (BP Location: Left arm, Patient Position: Sitting, Cuff Size: Adult Large)   Pulse 63   Temp 98.3  F (36.8  C) (Oral)    "Ht 1.727 m (5' 8\")   Wt 82.6 kg (182 lb)   SpO2 95%   BMI 27.67 kg/m    Repeat vitals: /63 (BP Location: Left arm, Patient Position: Sitting, Cuff Size: Adult Regular)   Pulse 63   Temp 98.3  F (36.8  C) (Oral)   Ht 1.727 m (5' 8\")   Wt 82.6 kg (182 lb)   SpO2 95%   BMI 27.67 kg/m  .  General: pt is in NAD, cooperative.  Skin: normal turgor, moist mucous membranes, no lesions/rashes noticed.  HEENT: ATNC, white sclera, normal conjunctiva.  Respiratory: Symmetric lung excursion, no accessory respiratory muscle use.  Abdomen: Non distended.  Neurological: awake, cooperative, follows commands, no aphasia, moderate dysarthria, cranial nerves II-XII: no ptosis, face is symmetric, tongue is midline, equally moves all extremities, intermittent moderate postural and action bilateral hand tremor, the patient completed tremor spiral worksheet that will be scanned into his chart, no dysmetria bilaterally, casual gait is normal.  ASSESSMENT AND PLAN:                                                    Assessment: 76-year-old male patient with essential tremor presents for follow-up.  He reports tremor worsening on current therapy with primidone and propranolol.  Historically, he was not able to tolerate increased dose of primidone due to increased somnolence, but is willing to try it again.  We also discussed that increasing propranolol might lead to bradycardia and hypotension.  We discussed increasing propranolol dose with his primary care provider, but he was concerned regarding possibility of these side effects.  We reviewed alternative options of topiramate and gabapentin and decided to increase slightly the dose of primidone.  The updated prescription was sent to the pharmacy.    Leo to follow up with Primary Care provider regarding elevated blood pressure.     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    We decided to increase his primidone dose. Updated prescription was sent to his pharmacy.  The patient was advised to contact my clinic with any intolerable side effects (as discussed during today's visit).  I also reviewed with him that increasing the dose of Primidone might require increase in dose of Warfarin.    Next follow-up appointment is in the next 6 months or earlier if needed.    Total Time: 31 minutes spent on the date of the encounter doing chart review, history and exam, documentation and further activities as noted above.    Bong Yoo MD  [facility] Neurology  (Chart documentation was completed in part with Dragon voice-recognition software. Even though reviewed, some grammatical, spelling, and word errors may remain.)      Again, thank you for allowing me to participate in the care of your patient.        Sincerely,        Bong Yoo MD

## 2021-03-12 ENCOUNTER — IMMUNIZATION (OUTPATIENT)
Dept: NURSING | Facility: CLINIC | Age: 76
End: 2021-03-12
Attending: INTERNAL MEDICINE
Payer: COMMERCIAL

## 2021-03-12 PROCEDURE — 91300 PR COVID VAC PFIZER DIL RECON 30 MCG/0.3 ML IM: CPT

## 2021-03-12 PROCEDURE — 0002A PR COVID VAC PFIZER DIL RECON 30 MCG/0.3 ML IM: CPT

## 2021-03-15 ENCOUNTER — ANTICOAGULATION THERAPY VISIT (OUTPATIENT)
Dept: NURSING | Facility: CLINIC | Age: 76
End: 2021-03-15

## 2021-03-15 DIAGNOSIS — I48.91 ATRIAL FIBRILLATION (H): ICD-10-CM

## 2021-03-15 DIAGNOSIS — Z79.01 LONG TERM CURRENT USE OF ANTICOAGULANTS WITH INR GOAL OF 2.0-3.0: ICD-10-CM

## 2021-03-15 DIAGNOSIS — Z79.01 LONG TERM CURRENT USE OF ANTICOAGULANT THERAPY: ICD-10-CM

## 2021-03-15 DIAGNOSIS — I48.11 LONGSTANDING PERSISTENT ATRIAL FIBRILLATION (H): ICD-10-CM

## 2021-03-15 LAB
CAPILLARY BLOOD COLLECTION: NORMAL
INR PPP: 2.4 (ref 0.86–1.14)

## 2021-03-15 PROCEDURE — 99207 PR NO CHARGE NURSE ONLY: CPT

## 2021-03-15 PROCEDURE — 85610 PROTHROMBIN TIME: CPT | Performed by: FAMILY MEDICINE

## 2021-03-15 PROCEDURE — 36416 COLLJ CAPILLARY BLOOD SPEC: CPT | Performed by: FAMILY MEDICINE

## 2021-03-15 NOTE — PROGRESS NOTES
Anticoagulation Management    Unable to reach Rich today.    Today's INR result of 2.4 is therapeutic (goal INR of 2.0-3.0).  Result received from: Clinic Lab    Follow up required to Discuss monitoring INR closely due to increase in Primidone dose    Left VM to continue SAME dose and to call Rodger with transfer to [facility] at: [phone]      Anticoagulation clinic to follow up    Tatyana Akers RN    ANTICOAGULATION FOLLOW-UP CLINIC VISIT    Patient Name:  Cayetano Lunsford  Date:  3/15/2021  Contact Type:  Telephone--Spoke to patient      SUBJECTIVE:  Patient Findings     Positives:  Change in health (Pt saw Neurologist on 21 for worsening tremors. Pt got his 2nd Covid-19 vaccine on 21), Change in medications (Neurologist increased Primidone dose effective 21--see med list; however, pt did not start the increased dose until 21 We will need to monitor INR very closely due to this med change.)    Comments:  Concurrent use of PRIMIDONE and WARFARIN may result in decreased anticoagulant effectiveness.         Clinical Outcomes     Negatives:  Major bleeding event, Thromboembolic event, Anticoagulation-related hospital admission, Anticoagulation-related ED visit, Anticoagulation-related fatality    Comments:  Concurrent use of PRIMIDONE and WARFARIN may result in decreased anticoagulant effectiveness.            OBJECTIVE    Recent labs: (last 7 days)     03/15/21  1252   INR 2.40*       ASSESSMENT / PLAN  INR assessment THER    Recheck INR In: 1 WEEK    INR Location Clinic      Anticoagulation Summary  As of 3/15/2021    INR goal:  2.0-3.0   TTR:  51.1 % (1 y)   INR used for dosin.40 (3/15/2021)   Warfarin maintenance plan:  7.5 mg (5 mg x 1.5) every Sun, Thu; 5 mg (5 mg x 1) all other days   Full warfarin instructions:  7.5 mg every Sun, Thu; 5 mg all other days   Weekly warfarin total:  40 mg   Plan last modified:  Tatyana Akers RN (3/15/2021)   Next INR check:  3/22/2021   Priority:   High   Target end date:  Indefinite    Indications    Long-term (current) use of anticoagulants [Z79.01] [Z79.01]  Atrial fibrillation (H) [I48.91]  Longstanding persistent atrial fibrillation (H) [I48.11]  Long term current use of anticoagulants with INR goal of 2.0-3.0 [Z79.01]             Anticoagulation Episode Summary     INR check location:      Preferred lab:      Send INR reminders to:  [facility] [place]    Comments:   Prefers AVS printed.       Anticoagulation Care Providers     Provider Role Specialty Phone number    Dmitri Andres MD Referring Family Medicine [phone]            See the Encounter Report to view Anticoagulation Flowsheet and Dosing Calendar (Go to Encounters tab in chart review, and find the Anticoagulation Therapy Visit)        Tatyana Akers RN

## 2021-03-22 ENCOUNTER — ANTICOAGULATION THERAPY VISIT (OUTPATIENT)
Dept: NURSING | Facility: CLINIC | Age: 76
End: 2021-03-22

## 2021-03-22 DIAGNOSIS — Z79.01 LONG TERM CURRENT USE OF ANTICOAGULANTS WITH INR GOAL OF 2.0-3.0: ICD-10-CM

## 2021-03-22 DIAGNOSIS — I48.0 PAROXYSMAL ATRIAL FIBRILLATION (H): ICD-10-CM

## 2021-03-22 DIAGNOSIS — I48.11 LONGSTANDING PERSISTENT ATRIAL FIBRILLATION (H): ICD-10-CM

## 2021-03-22 DIAGNOSIS — Z79.01 LONG TERM CURRENT USE OF ANTICOAGULANT THERAPY: ICD-10-CM

## 2021-03-22 DIAGNOSIS — I48.91 ATRIAL FIBRILLATION (H): ICD-10-CM

## 2021-03-22 LAB
CAPILLARY BLOOD COLLECTION: NORMAL
INR PPP: 2.4 (ref 0.86–1.14)

## 2021-03-22 PROCEDURE — 85610 PROTHROMBIN TIME: CPT | Performed by: FAMILY MEDICINE

## 2021-03-22 PROCEDURE — 36416 COLLJ CAPILLARY BLOOD SPEC: CPT | Performed by: FAMILY MEDICINE

## 2021-03-22 PROCEDURE — 99207 PR NO CHARGE NURSE ONLY: CPT

## 2021-03-22 RX ORDER — WARFARIN SODIUM 5 MG/1
TABLET ORAL
Qty: 96 TABLET | Refills: 1 | Status: SHIPPED | OUTPATIENT
Start: 2021-03-22 | End: 2021-09-01

## 2021-03-22 NOTE — PROGRESS NOTES
ANTICOAGULATION FOLLOW-UP CLINIC VISIT    Patient Name:  Cayetano Lunsford  Date:  3/22/2021  Contact Type:  Telephone    SUBJECTIVE:  Patient Findings     Comments:  The patient was assessed for diet, medication, and activity level changes, missed or extra doses, bruising or bleeding, with no problem findings.  Will continue to monitor INR closely due to Primidone dose increase that patient started on 3/14/21.          Clinical Outcomes     Negatives:  Major bleeding event, Thromboembolic event, Anticoagulation-related hospital admission, Anticoagulation-related ED visit, Anticoagulation-related fatality    Comments:  The patient was assessed for diet, medication, and activity level changes, missed or extra doses, bruising or bleeding, with no problem findings.  Will continue to monitor INR closely due to Primidone dose increase that patient started on 3/14/21.             OBJECTIVE    Recent labs: (last 7 days)     21  1355   INR 2.40*       ASSESSMENT / PLAN  INR assessment THER    Recheck INR In: 10 DAYS    INR Location Clinic      Anticoagulation Summary  As of 3/22/2021    INR goal:  2.0-3.0   TTR:  53.0 % (1 y)   INR used for dosin.40 (3/22/2021)   Warfarin maintenance plan:  7.5 mg (5 mg x 1.5) every Sun, Thu; 5 mg (5 mg x 1) all other days   Full warfarin instructions:  7.5 mg every Sun, Thu; 5 mg all other days   Weekly warfarin total:  40 mg   Plan last modified:  Tatyana Akers RN (3/22/2021)   Next INR check:  2021   Priority:  High   Target end date:  Indefinite    Indications    Long-term (current) use of anticoagulants [Z79.01] [Z79.01]  Atrial fibrillation (H) [I48.91]  Longstanding persistent atrial fibrillation (H) [I48.11]  Long term current use of anticoagulants with INR goal of 2.0-3.0 [Z79.01]             Anticoagulation Episode Summary     INR check location:      Preferred lab:      Send INR reminders to:  [facility] [facility] [place]    Comments:   Prefers AVS printed.        Anticoagulation Care Providers     Provider Role Specialty Phone number    Dmitri Andres MD Referring Family Medicine [phone]            See the Encounter Report to view Anticoagulation Flowsheet and Dosing Calendar (Go to Encounters tab in chart review, and find the Anticoagulation Therapy Visit)        Tatyana Akers RN

## 2021-04-01 ENCOUNTER — ANTICOAGULATION THERAPY VISIT (OUTPATIENT)
Dept: NURSING | Facility: CLINIC | Age: 76
End: 2021-04-01

## 2021-04-01 DIAGNOSIS — Z79.01 LONG TERM CURRENT USE OF ANTICOAGULANT THERAPY: ICD-10-CM

## 2021-04-01 DIAGNOSIS — Z79.01 LONG TERM CURRENT USE OF ANTICOAGULANTS WITH INR GOAL OF 2.0-3.0: ICD-10-CM

## 2021-04-01 DIAGNOSIS — I48.91 ATRIAL FIBRILLATION (H): ICD-10-CM

## 2021-04-01 DIAGNOSIS — I48.11 LONGSTANDING PERSISTENT ATRIAL FIBRILLATION (H): ICD-10-CM

## 2021-04-01 LAB
CAPILLARY BLOOD COLLECTION: NORMAL
INR PPP: 2.3 (ref 0.86–1.14)

## 2021-04-01 PROCEDURE — 85610 PROTHROMBIN TIME: CPT | Performed by: FAMILY MEDICINE

## 2021-04-01 PROCEDURE — 36416 COLLJ CAPILLARY BLOOD SPEC: CPT | Performed by: FAMILY MEDICINE

## 2021-04-01 PROCEDURE — 99207 PR NO CHARGE NURSE ONLY: CPT

## 2021-04-01 NOTE — PROGRESS NOTES
"ANTICOAGULATION FOLLOW-UP CLINIC VISIT    Patient Name:  Cayetano Lunsford  Date:  2021  Contact Type:  Telephone    SUBJECTIVE:  Patient Findings     Positives:  Change in health (Pt states the increase in Primidone \"maybe\" has improved his hand tremors but states it's hard to tell.  Pt also states that occasionally his feet swell up; however, he denies SOB and will keep track of when this occurs.), Change in medications (Pt started increased Primidone dose on 21)        Clinical Outcomes     Negatives:  Major bleeding event, Thromboembolic event, Anticoagulation-related hospital admission, Anticoagulation-related ED visit, Anticoagulation-related fatality           OBJECTIVE    Recent labs: (last 7 days)     21  1251   INR 2.30*       ASSESSMENT / PLAN  INR assessment THER    Recheck INR In: 2 WEEKS    INR Location Clinic      Anticoagulation Summary  As of 2021    INR goal:  2.0-3.0   TTR:  55.2 % (1 y)   INR used for dosin.30 (2021)   Warfarin maintenance plan:  7.5 mg (5 mg x 1.5) every Sun, Thu; 5 mg (5 mg x 1) all other days   Full warfarin instructions:  7.5 mg every Sun, Thu; 5 mg all other days   Weekly warfarin total:  40 mg   Plan last modified:  Tatyana Akers RN (2021)   Next INR check:  4/15/2021   Priority:  High   Target end date:  Indefinite    Indications    Long-term (current) use of anticoagulants [Z79.01] [Z79.01]  Atrial fibrillation (H) [I48.91]  Longstanding persistent atrial fibrillation (H) [I48.11]  Long term current use of anticoagulants with INR goal of 2.0-3.0 [Z79.01]             Anticoagulation Episode Summary     INR check location:      Preferred lab:      Send INR reminders to:  [facility] [place]    Comments:   Prefers AVS printed.       Anticoagulation Care Providers     Provider Role Specialty Phone number    Dmitri Andres MD Referring Family Medicine [phone]            See the Encounter Report to view Anticoagulation Flowsheet " and Dosing Calendar (Go to Encounters tab in chart review, and find the Anticoagulation Therapy Visit)        Tatyana Akers RN

## 2021-04-15 ENCOUNTER — ANTICOAGULATION THERAPY VISIT (OUTPATIENT)
Dept: NURSING | Facility: CLINIC | Age: 76
End: 2021-04-15

## 2021-04-15 DIAGNOSIS — I48.11 LONGSTANDING PERSISTENT ATRIAL FIBRILLATION (H): ICD-10-CM

## 2021-04-15 DIAGNOSIS — Z79.01 LONG TERM CURRENT USE OF ANTICOAGULANTS WITH INR GOAL OF 2.0-3.0: ICD-10-CM

## 2021-04-15 DIAGNOSIS — I48.91 ATRIAL FIBRILLATION (H): ICD-10-CM

## 2021-04-15 DIAGNOSIS — Z79.01 LONG TERM CURRENT USE OF ANTICOAGULANT THERAPY: ICD-10-CM

## 2021-04-15 LAB
CAPILLARY BLOOD COLLECTION: NORMAL
INR PPP: 3.1 (ref 0.86–1.14)

## 2021-04-15 PROCEDURE — 36416 COLLJ CAPILLARY BLOOD SPEC: CPT | Performed by: FAMILY MEDICINE

## 2021-04-15 PROCEDURE — 99207 PR NO CHARGE NURSE ONLY: CPT

## 2021-04-15 PROCEDURE — 85610 PROTHROMBIN TIME: CPT | Performed by: FAMILY MEDICINE

## 2021-04-15 NOTE — PROGRESS NOTES
"ANTICOAGULATION FOLLOW-UP CLINIC VISIT    Patient Name:  Cayetano Lunsford  Date:  4/15/2021  Contact Type:  Telephone    SUBJECTIVE:  Patient Findings     Positives:  Change in health (Pt states he didn't feel well x 2 days over the weekend, he states he felt \"off\" but did not have vomiting or diarrhea.), Change in diet/appetite (Decreased appetite while feeling ill over the weekend, pt has resumed his usual diet and agrees to eat greens tonight.)        Clinical Outcomes     Negatives:  Major bleeding event, Thromboembolic event, Anticoagulation-related hospital admission, Anticoagulation-related ED visit, Anticoagulation-related fatality           OBJECTIVE    Recent labs: (last 7 days)     04/15/21  1246   INR 3.10*       ASSESSMENT / PLAN  INR assessment SUPRA    Recheck INR In: 2 WEEKS    INR Location Clinic      Anticoagulation Summary  As of 4/15/2021    INR goal:  2.0-3.0   TTR:  56.2 % (1 y)   INR used for dosing:  3.10 (4/15/2021)   Warfarin maintenance plan:  7.5 mg (5 mg x 1.5) every Sun, Thu; 5 mg (5 mg x 1) all other days   Full warfarin instructions:  7.5 mg every Sun, Thu; 5 mg all other days   Weekly warfarin total:  40 mg   Plan last modified:  Tatyana Akers RN (4/15/2021)   Next INR check:  4/29/2021   Priority:  High   Target end date:  Indefinite    Indications    Long-term (current) use of anticoagulants [Z79.01] [Z79.01]  Atrial fibrillation (H) [I48.91]  Longstanding persistent atrial fibrillation (H) [I48.11]  Long term current use of anticoagulants with INR goal of 2.0-3.0 [Z79.01]             Anticoagulation Episode Summary     INR check location:      Preferred lab:      Send INR reminders to:  [facility] [facility] [place]    Comments:   Prefers AVS printed.       Anticoagulation Care Providers     Provider Role Specialty Phone number    Dmitri Andres MD Referring Family Medicine [phone]            See the Encounter Report to view Anticoagulation Flowsheet and Dosing Calendar (Go " to Encounters tab in chart review, and find the Anticoagulation Therapy Visit)        Tatyana Akers RN

## 2021-04-29 ENCOUNTER — ANTICOAGULATION THERAPY VISIT (OUTPATIENT)
Dept: NURSING | Facility: CLINIC | Age: 76
End: 2021-04-29

## 2021-04-29 DIAGNOSIS — Z79.01 LONG TERM CURRENT USE OF ANTICOAGULANTS WITH INR GOAL OF 2.0-3.0: ICD-10-CM

## 2021-04-29 DIAGNOSIS — I48.91 ATRIAL FIBRILLATION (H): ICD-10-CM

## 2021-04-29 DIAGNOSIS — I48.11 LONGSTANDING PERSISTENT ATRIAL FIBRILLATION (H): ICD-10-CM

## 2021-04-29 DIAGNOSIS — Z79.01 LONG TERM CURRENT USE OF ANTICOAGULANT THERAPY: ICD-10-CM

## 2021-04-29 LAB
CAPILLARY BLOOD COLLECTION: NORMAL
INR PPP: 2.1 (ref 0.86–1.14)

## 2021-04-29 PROCEDURE — 85610 PROTHROMBIN TIME: CPT | Performed by: FAMILY MEDICINE

## 2021-04-29 PROCEDURE — 36416 COLLJ CAPILLARY BLOOD SPEC: CPT | Performed by: FAMILY MEDICINE

## 2021-04-29 PROCEDURE — 99207 PR NO CHARGE NURSE ONLY: CPT

## 2021-04-29 NOTE — PROGRESS NOTES
Patient left a VM returning a call. Please call back when able.  Thank you.  Paola Madrid RN  Anticoagulation Nurse - [facility]

## 2021-04-29 NOTE — PROGRESS NOTES
"Anticoagulation Management    Unable to reach Rich today x 2    Today's INR result of 2.1 is therapeutic (goal INR of 2.0-3.0).  Result received from: Clinic Lab    Follow up required to confirm warfarin dose taken and assess for changes    Left VM to call Rodger with transfer to [facility] at: [phone]      Anticoagulation clinic to follow up    Tatyana Akers RN    ANTICOAGULATION FOLLOW-UP CLINIC VISIT    Patient Name:  Cayetano Lunsford  Date:  2021  Contact Type:  Telephone-Spoke to patient      SUBJECTIVE:  Patient Findings     Positives:  Signs/symptoms of bleeding ( 1 abrasion on forehead and 1 bloody nose after hitting his head on shower stall about 1 week ago, pt states his nose bled about 5 minutes with applied pressure.), Change in health (Intermittent swelling in feet, pt denies any change in SOB (uses nebulizer and inhaler for COPD) or fatigue.  Also, pt fell about 1 week ago, he hit his forehead on the shower stall--he did have 2 \"bumps\" that have resolved.)    Comments:  Patient denies N/V, headache for visual changes from fall last week.        Clinical Outcomes     Negatives:  Major bleeding event, Thromboembolic event, Anticoagulation-related hospital admission, Anticoagulation-related ED visit, Anticoagulation-related fatality    Comments:  Patient denies N/V, headache for visual changes from fall last week.           OBJECTIVE    Recent labs: (last 7 days)     21  1139   INR 2.10*       ASSESSMENT / PLAN  INR assessment THER    Recheck INR In: 3 WEEKS    INR Location Clinic      Anticoagulation Summary  As of 2021    INR goal:  2.0-3.0   TTR:  59.6 % (1 y)   INR used for dosin.10 (2021)   Warfarin maintenance plan:  7.5 mg (5 mg x 1.5) every Sun, Thu; 5 mg (5 mg x 1) all other days   Full warfarin instructions:  7.5 mg every Sun, Thu; 5 mg all other days   Weekly warfarin total:  40 mg   Plan last modified:  Tatyana Akers RN (2021)   Next INR check:  2021 "   Priority:  Maintenance   Target end date:  Indefinite    Indications    Long-term (current) use of anticoagulants [Z79.01] [Z79.01]  Atrial fibrillation (H) [I48.91]  Longstanding persistent atrial fibrillation (H) [I48.11]  Long term current use of anticoagulants with INR goal of 2.0-3.0 [Z79.01]             Anticoagulation Episode Summary     INR check location:      Preferred lab:      Send INR reminders to:  [facility] [place]    Comments:   Prefers AVS printed.       Anticoagulation Care Providers     Provider Role Specialty Phone number    Dmitri Andres MD Referring Family Medicine [phone]            See the Encounter Report to view Anticoagulation Flowsheet and Dosing Calendar (Go to Encounters tab in chart review, and find the Anticoagulation Therapy Visit)        Tatyana Akers RN

## 2021-05-03 NOTE — Clinical Note
Adjusted insulin again today.  See note for details.  Tanna
See note for details of visit today. Thanks  Johnnie WrayD Medication Therapy Management Pharmacist [facility] - Monday and Wednesday 7:30 - 4:00 Phone: [phone] - direct clinic line 
30

## 2021-05-06 ENCOUNTER — OFFICE VISIT (OUTPATIENT)
Dept: PHARMACY | Facility: CLINIC | Age: 76
End: 2021-05-06
Payer: COMMERCIAL

## 2021-05-06 VITALS — DIASTOLIC BLOOD PRESSURE: 52 MMHG | WEIGHT: 183.5 LBS | SYSTOLIC BLOOD PRESSURE: 124 MMHG | BODY MASS INDEX: 27.9 KG/M2

## 2021-05-06 DIAGNOSIS — I10 HYPERTENSION GOAL BP (BLOOD PRESSURE) < 140/90: ICD-10-CM

## 2021-05-06 DIAGNOSIS — G25.0 BENIGN FAMILIAL TREMOR: ICD-10-CM

## 2021-05-06 DIAGNOSIS — R35.0 BENIGN PROSTATIC HYPERPLASIA WITH URINARY FREQUENCY: ICD-10-CM

## 2021-05-06 DIAGNOSIS — E55.9 VITAMIN D DEFICIENCY: ICD-10-CM

## 2021-05-06 DIAGNOSIS — K21.9 GASTROESOPHAGEAL REFLUX DISEASE, UNSPECIFIED WHETHER ESOPHAGITIS PRESENT: ICD-10-CM

## 2021-05-06 DIAGNOSIS — K51.90 ULCERATIVE COLITIS WITHOUT COMPLICATIONS, UNSPECIFIED LOCATION (H): ICD-10-CM

## 2021-05-06 DIAGNOSIS — J44.9 CHRONIC OBSTRUCTIVE PULMONARY DISEASE, UNSPECIFIED COPD TYPE (H): ICD-10-CM

## 2021-05-06 DIAGNOSIS — E11.649 TYPE 2 DIABETES MELLITUS WITH HYPOGLYCEMIA WITHOUT COMA, WITH LONG-TERM CURRENT USE OF INSULIN (H): Primary | ICD-10-CM

## 2021-05-06 DIAGNOSIS — E78.5 HYPERLIPIDEMIA LDL GOAL <100: ICD-10-CM

## 2021-05-06 DIAGNOSIS — N40.1 BENIGN PROSTATIC HYPERPLASIA WITH URINARY FREQUENCY: ICD-10-CM

## 2021-05-06 DIAGNOSIS — Z79.4 TYPE 2 DIABETES MELLITUS WITH HYPOGLYCEMIA WITHOUT COMA, WITH LONG-TERM CURRENT USE OF INSULIN (H): Primary | ICD-10-CM

## 2021-05-06 PROCEDURE — 99605 MTMS BY PHARM NP 15 MIN: CPT | Performed by: PHARMACIST

## 2021-05-06 PROCEDURE — 99607 MTMS BY PHARM ADDL 15 MIN: CPT | Performed by: PHARMACIST

## 2021-05-06 NOTE — LETTER
"        Date: 2021    Cayetano Lunsford  [address]   [facility] [address]    Dear Mr. Lunsford,    Thank you for talking with me on 21 about your health and medications. Medicare s MTM (Medication Therapy Management) program helps you understand your medications and use them safely.      This letter includes an action plan (Medication Action Plan) and medication list (Personal Medication List). The action plan has steps you should take to help you get the best results from your medications. The medication list will help you keep track of your medications and how to use them the right way.       Have your action plan and medication list with you when you talk with your doctors, pharmacists, and other healthcare providers in your care team.     Ask your doctors, pharmacists, and other healthcare providers to update the action plan and medication list at every visit.     Take your medication list with you if you go to the hospital or emergency room.     Give a copy of the action plan and medication list to your family or caregivers.     If you want to talk about this letter or any of the papers with it, please call   [phone].We look forward to working with you, your doctors, and other healthcare providers to help you stay healthy through the [facility] MTM program.    Sincerely,  Diana Johnson [facility]    Enclosed: Medication Action Plan and Personal Medication List    MEDICATION ACTION PLAN FOR Cayetano Lunsford,  1945     This action plan will help you get the best results from your medications if you:   1. Read \"What we talked about.\"   2. Take the steps listed in the \"What I need to do\" boxes.   3. Fill in \"What I did and when I did it.\"   4. Fill in \"My follow-up plan\" and \"Questions I want to ask.\"     Have this action plan with you when you talk with your doctors, pharmacists, and other healthcare providers in your care team. Share this with your family or caregivers too.  DATE " PREPARED: 2021  What we talked about: stopping insulin R                                                  What I need to do: Stop taking insulin regular.     What I did and when I did it:                                              What we talked about: vitamin D lab                                                  What I need to do: Will recheck your vitamin D lab in 2 weeks with your other labs. If low again, we will restart a vitamin D supplement.     What I did and when I did it:                                               My follow-up plan:                 Questions I want to ask:              If you have any questions about your action plan, call Diana Johnson [facility], Phone: [phone] , Monday-Friday 8-4:30pm.           PERSONAL MEDICATION LIST FOR Cayetano Lunsford,  1945     This medication list was made for you after we talked. We also used information from your doctor's chart.      Use blank rows to add new medications. Then fill in the dates you started using them.    Cross out medications when you no longer use them. Then write the date and why you stopped using them.    Ask your doctors, pharmacists, and other healthcare providers to update this list at every visit. Keep this list up-to-date with:       Prescription medications    Over the counter drugs     Herbals    Vitamins    Minerals      If you go to the hospital or emergency room, take this list with you. Share this with your family or caregivers too.     DATE PREPARED: 2021  Allergies or side effects: Percodan [oxycodone-aspirin]     Medication:  ALBUTEROL SULFATE  (90 BASE) MCG/ACT IN AERS      How I use it:  Inhale 1-2 puffs into the lungs every 4 hours as needed (for shortness of breath/wheezing)      Why I use it: Chronic obstructive pulmonary disease, unspecified COPD type (H)    Prescriber:  Debbie Lewis MD      Date I started using it:       Date I stopped using it:         Why I stopped using it:             Medication:  FLORAJEN3 PO CAPS      How I use it:  Take 1 capsule by mouth 2 times daily      Why I use it: Acute cystitis without hematuria    Prescriber:  Shiela Guerrero PA-C      Date I started using it:       Date I stopped using it:         Why I stopped using it:            Medication:  FOLIC ACID 0.8 MG PO CAPS      How I use it:  Take 1 tablet by mouth daily      Why I use it: Ulcerative colitis without complications, unspecified location (H)    Prescriber:  Debbie Lewis MD      Date I started using it:       Date I stopped using it:         Why I stopped using it:            Medication:  INSULIN ISOPHANE HUMAN VIAL 100 UNIT/ML SC SUSP      How I use it:  Inject 2-5 Units Subcutaneous 2 times daily      Why I use it:  diabetes    Prescriber:  Patient Reported      Date I started using it:       Date I stopped using it:         Why I stopped using it:            Medication:  IPRATROPIUM-ALBUTEROL 0.5-2.5 (3) MG/3ML IN SOLN      How I use it:  NEBULIZE CONTENTS OF ONE VIAL EVERY 4 HOURS AS NEEDED FOR SHORTNESS OF BREATH OR DYSPNEA      Why I use it: Chronic obstructive pulmonary disease, unspecified COPD type (H)    Prescriber:  Dmitri Andres MD      Date I started using it:       Date I stopped using it:         Why I stopped using it:            Medication:  LOSARTAN POTASSIUM 100 MG PO TABS      How I use it:  Take 1 tablet (100 mg) by mouth daily      Why I use it: Hypertension goal BP (blood pressure) < 140/90    Prescriber:  Dmitri Andres MD      Date I started using it:       Date I stopped using it:         Why I stopped using it:            Medication:  METFORMIN HCL 1000 MG PO TABS      How I use it:  TAKE ONE TABLET BY MOUTH TWICE A DAY WITH BREAKFAST  AND DINNER      Why I use it: 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, with long-term current use of insulin (H)    Prescriber:  Dmitri Andres MD      Date I started using it:       Date I  stopped using it:         Why I stopped using it:            Medication:  PANTOPRAZOLE SODIUM 40 MG PO TBEC      How I use it:  Take 40 mg by mouth daily      Why I use it:  GERD    Prescriber:  Patient Reported      Date I started using it:       Date I stopped using it:         Why I stopped using it:            Medication:  PRIMIDONE 50 MG PO TABS      How I use it:  4 tablets in am, 4 tablets afternoon, and 3 tablets every evening      Why I use it: Benign familial tremor    Prescriber:  Bong Yoo MD      Date I started using it:       Date I stopped using it:         Why I stopped using it:            Medication:  PROPAFENONE  MG PO TABS      How I use it:  Take 1 tablet (150 mg) by mouth 2 times daily      Why I use it: Paroxysmal atrial fibrillation (H)    Prescriber:  Daniel Marroquin MD      Date I started using it:       Date I stopped using it:         Why I stopped using it:            Medication:  PROPRANOLOL HCL 40 MG PO TABS      How I use it:  TAKE THREE TABLETS BY MOUTH TWO TIMES A DAY      Why I use it: Benign familial tremor    Prescriber:  Dmitri Andres MD      Date I started using it:       Date I stopped using it:         Why I stopped using it:            Medication:  SIMVASTATIN 80 MG PO TABS      How I use it:  TAKE ONE-HALF TABLET BY MOUTH EVERY NIGHT AT BEDTIME      Why I use it: Hyperlipidemia LDL goal <100    Prescriber:  Dmitri Andres MD      Date I started using it:       Date I stopped using it:         Why I stopped using it:            Medication:  SULFASALAZINE 500 MG PO TABS      How I use it:  TAKE ONE TABLET BY MOUTH THREE TIMES A DAY      Why I use it: Ulcerative colitis, unspecified    Prescriber:  Debbie Lewis MD      Date I started using it:       Date I stopped using it:         Why I stopped using it:            Medication:  TAMSULOSIN HCL 0.4 MG PO CAPS      How I use it:  TAKE ONE CAPSULE BY MOUTH ONCE DAILY      Why I use  it: Benign prostatic hyperplasia with urinary frequency    Prescriber:  Dmitri Andres MD      Date I started using it:       Date I stopped using it:         Why I stopped using it:            Medication:  TIOTROPIUM BROMIDE-OLODATEROL 2.5-2.5 MCG/ACT IN AERS      How I use it:  Inhale 2 puffs into the lungs daily      Why I use it:  COPD    Prescriber:  Patient Reported      Date I started using it:       Date I stopped using it:         Why I stopped using it:            Medication:  WARFARIN SODIUM 5 MG PO TABS      How I use it:  Take 7.5mg every Sun & Thur / Take 5mg all other days OR as instructed by INR clinic      Why I use it: Paroxysmal atrial fibrillation (H); Long term current use of anticoagulants with INR goal of 2.0-3.0    Prescriber:  Dmitri Andres MD      Date I started using it:       Date I stopped using it:         Why I stopped using it:            Medication:         How I use it:         Why I use it:      Prescriber:         Date I started using it:       Date I stopped using it:         Why I stopped using it:            Medication:         How I use it:         Why I use it:      Prescriber:         Date I started using it:       Date I stopped using it:         Why I stopped using it:            Medication:         How I use it:         Why I use it:      Prescriber:         Date I started using it:       Date I stopped using it:         Why I stopped using it:              Other Information:     If you have any questions about your medication list, call Diana Johnson [facility], Phone: [phone] , Monday-Friday 8-4:30pm.    According to the Paperwork Reduction Act of 1995, no persons are required to respond to a collection of information unless it displays a valid OMB control number. The valid OMB number for this information collection is [id number]. The time required to complete this information collection is estimated to average 40 minutes per response, including the  time to review instructions, searching existing data resources, gather the data needed, and complete and review the information collection. If you have any comments concerning the accuracy of the time estimate(s) or suggestions for improving this form, please write to: CMS, Attn: HERBER Reports Clearance Officer, [address] [place] [address], [place], [place] [address].

## 2021-05-06 NOTE — PROGRESS NOTES
Medication Therapy Management (MTM) Encounter    ASSESSMENT:                            Medication Adherence/Access: No issues identified    Type 2 Diabetes: Patient is meeting A1c goal of < 7%. Self monitoring of blood glucose is usually at goal of fasting  mg/dL and post prandial < 180 mg/dL. Because blood glucose are very well controlled and A1c well below goal, recommend discontinuing insulin R.     Hypertension/Afib: stable  Hyperlipidemia: stable  COPD: stable  BPH: stable  UC: stable  GERD: stable  Tremors: Following neurologist.   Supplements: Recommend rechecking vitamin D and if still low, will restart vitamin D.     PLAN:                            1. Stop insulin regular.     Follow-up: Return in about 6 months (around 2021) for Medication Therapy Management Pharmacist.      SUBJECTIVE/OBJECTIVE:                          Cayetano Lunsford is a 76 year old male coming in for a follow-up visit. He was referred to me from Dr. Andres.  Today's visit is a follow-up MTM visit from  with Cheko Pereira Rph.     Reason for visit: blood glucose review, need vitamin D?    Tobacco: He reports that he quit smoking about 29 years ago. His smoking use included cigarettes. He has a 30.00 pack-year smoking history. He has never used smokeless tobacco.  Alcohol: not currently using  Past Medical History: Reviewed in chart      Medication Adherence/Access: no issues reported    Type 2 Diabetes:  Currently taking metformin 1000mg twice daily, NPH 2 units AM and 5 units PM, and insulin regular 2 units with breakfast (usually no dinner dose). Patient is not experiencing side effects.  Blood sugar monitorin-3 time(s) daily. Ranges (patient reported):   Date FBG/ 2hours post Before dinner Bedtime     137     5/5 164 137 99   5/4 143 172 156   5/3 154 120 117   5/2 103 182 155   5/1 165 89 139    171 147 93     Symptoms of low blood sugar? shaky, weak about once a month  Symptoms of high blood sugar?  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   Aspirin: Not taking due to warfarin  Statin: Yes: simvastatin   ACEi/ARB: Yes: losartan.   Urine Albumin:   Lab Results   Component Value Date    UMALCR 51.83 (H) 11/09/2020      Lab Results   Component Value Date    A1C 5.9 11/09/2020    A1C 5.8 07/07/2020    A1C 6.1 12/09/2019    A1C 5.5 09/09/2019    A1C 5.3 03/11/2019       Hypertension/Afib: Current medications include warfarin 7.5mg Sun,Thu and 5mg all other days, propranolol 120mg two times daily, propafenone 150mg twice daily, and losartan 100mg daily.  Patient does not self-monitor blood pressure.  Patient reports no current medication side effects. Did hit head recently when he got up from going to the bathroom. No dizziness usually.  BP Readings from Last 3 Encounters:   05/06/21 124/52   03/09/21 105/63   12/02/20 (!) 142/56     Lab Results   Component Value Date    INR 2.10 04/29/2021    INR 3.10 04/15/2021    INR 2.30 04/01/2021    INR 2.40 03/22/2021    INR 2.40 03/15/2021       Hyperlipidemia: Current therapy includes simvastatin 40mg daily.  Patient reports no significant myalgias or other side effects.  The 10-year ASCVD risk score ([place] CONTRERAS Jr., et al., 2013) is: 52.2%    Values used to calculate the score:      Age: 76 years      Sex: Male      Is Non- : No      Diabetic: Yes      Tobacco smoker: No      Systolic Blood Pressure: 124 mmHg      Is BP treated: Yes      HDL Cholesterol: 30 mg/dL      Total Cholesterol: 140 mg/dL  Recent Labs   Lab Test 11/09/20  1108 09/09/19  0947 05/12/15  0757 05/12/15  0757 04/24/14  1016   CHOL 140 159   < > 141 148   HDL 30* 38*   < > 44 38*   LDL 59 68   < > 46 66   TRIG 253* 264*   < > 255* 220*   CHOLHDLRATIO  --   --   --  3.2 3.9    < > = values in this interval not displayed.       COPD: Current medications: albuterol MDI as needed (every night), Duonebs as needed (uses " "three times daily), LAMA/LABA- Stiolto Respimat 2 puff(s) once daily.     Patient is not experiencing side effects.   Patient reports the following symptoms: none.  Patient does not have an COPD Action Plan on file.     BPH: Patient taking tamsulosin 0.4mg daily and reports no issues.     UC: Patient taking sulfasalazine 1000mg AM and 500 mg daily, probiotic daily, and folic acid 0.8mg daily. No issues reported. Follows gastroenterologist.     GERD: Current medications include: Protonix (pantoprazole) 40mg once daily. Pt reports no current symptoms.  Patient feels that current regimen is effective. \"Best medicine for his heartburn and acid.\"    Tremors: Patient taking primidone 200mg AM, 200mg PM and 150 mg bedtime. Does also take propranolol 120mg twice daily and reports no issues. Finds these do work for him. Follows neurologist.     Supplements: Patient wondering about vitamin D. No longer taking calcium citrate/vit D. Drinks milk every day, gal/week. Greens 3 times a week. Only took calcium and vitamin D for a couple months.   Vitamin D Deficiency Screening Results:  Lab Results   Component Value Date    VITDT 21 11/09/2020       Today's Vitals: /52   Wt 183 lb 8 oz (83.2 kg)   BMI 27.90 kg/m    ----------------      I spent 45 minutes with this patient today. All changes were made via collaborative practice agreement with Dmitri Andres. A copy of the visit note was provided to the patient's primary care provider.    The patient was given a summary of these recommendations.     Diana Johnson, PharmD  Medication Therapy Management Provider, [facility]  Pager: [phone]        Medication Therapy Recommendations  Type 2 diabetes mellitus with hypoglycemia without coma, with long-term current use of insulin (H)    Rationale: Nonmedication therapy more appropriate - Unnecessary medication therapy - Indication   Recommendation: Discontinue Medication - INSULIN REGULAR - " Stop insulin regular.   Status: Accepted per CPA

## 2021-05-06 NOTE — PATIENT INSTRUCTIONS
Recommendations from today's MTM visit:                                                       1. Stop taking insulin R. You can use as needed if blood glucose >200 mg/dL, may use 2 units. No low blood sugars below 80!!      Follow-up: Return in about 6 months (around 11/6/2021) for Medication Therapy Management Pharmacist.    It was great to speak with you today.  I value your experience and would be very thankful for your time with providing feedback on our clinic survey. You may receive a survey via email or text message in the next few days.     To schedule another MTM appointment, please call the clinic directly or you may call the MTM scheduling line at [phone] or toll-free at [phone].     My Clinical Pharmacist's contact information:                                                      Please feel free to contact me with any questions or concerns you have.      Diana Johnson, PharmD  Medication Therapy Management Provider, [facility]  [phone]

## 2021-05-06 NOTE — LETTER
My COPD Action Plan     Name: Cayetano Lunsford    YOB: 1945   Date: 5/6/2021    My doctor: Diana Johnson [facility]   My clinic: [address] [address] [phone]  My Controller Medicine: Olodaterol/tiotropium (Stiolto Respimat)   Dose: 2 puffs every day       My Rescue Medicine: Albuterol (Proair/Ventolin/Proventil) inhaler   Dose: as needed      My COPD Severity: Moderate = FeV1 < 79% -50%      Use of Oxygen: Oxygen Not Prescribed      Make sure you've had your pneumonia   vaccines.          GREEN ZONE       Doing well today      Usual level of activity and exercise    Usual amount of cough and mucus    No shortness of breath    Usual level of health (thinking clearly, sleeping well, feel like eating) Actions:      Take daily medicines    Use oxygen as prescribed    Follow regular exercise and diet plan    Avoid cigarette smoke and other irritants that harm the lungs           YELLOW ZONE          Having a bad day or flare up      Short of breath more than usual    A lot more sputum (mucus) than usual    Sputum looks yellow, green, tan, brown or bloody    More coughing or wheezing    Fever or chills    Less energy; trouble completing activities    Trouble thinking or focusing    Using quick relief inhaler or nebulizer more often    Poor sleep; symptoms wake me up    Do not feel like eating Actions:      Get plenty of rest    Take daily medicines    Use quick relief inhaler every 4 hours    If you use oxygen, call you doctor to see if you should adjust your oxygen    Do breathing exercises or other things to help you relax    Let a loved one, friend or neighbor know you are feeling worse    Call your care team if you have 2 or more symptoms.  Start taking steroids or antibiotics if directed by your care team           RED ZONE       Need medical care now      Severe shortness of breath (feel you can't breathe)    Fever, chills    Not  enough breath to do any activity    Trouble coughing up mucus, walking or talking    Blood in mucus    Frequent coughing   Rescue medicines are not working    Not able to sleep because of breathing    Feel confused or drowsy    Chest pain    Actions:      Call your health care team.  If you cannot reach your care team, call 911 or go to the emergency room.        Annual Reminders:  Meet with Care Team, Flu Shot every Fall  Pharmacy:    [place], [place] - [address] [facility] [address] [place] [address] [address] [place] [address] PHARMACY [address] [place] [address] [place] [address]

## 2021-05-11 ENCOUNTER — TELEPHONE (OUTPATIENT)
Dept: FAMILY MEDICINE | Facility: CLINIC | Age: 76
End: 2021-05-11

## 2021-05-11 DIAGNOSIS — E11.649 TYPE 2 DIABETES MELLITUS WITH HYPOGLYCEMIA WITHOUT COMA, WITH LONG-TERM CURRENT USE OF INSULIN (H): Primary | ICD-10-CM

## 2021-05-11 DIAGNOSIS — Z79.4 TYPE 2 DIABETES MELLITUS WITH HYPOGLYCEMIA WITHOUT COMA, WITH LONG-TERM CURRENT USE OF INSULIN (H): Primary | ICD-10-CM

## 2021-05-11 NOTE — TELEPHONE ENCOUNTER
Patient last had an appointment with Diana Johnson and was told to keep Novolin R as a backup for if his blood glucose numbers are above 200.  His current vial is .    If approved, please send a new prescription for the Novolin R.    Thanks!  Clara Davidson Santos  [facility]  [phone]

## 2021-05-20 DIAGNOSIS — E11.22 TYPE 2 DIABETES MELLITUS WITH STAGE 1 CHRONIC KIDNEY DISEASE, WITH LONG-TERM CURRENT USE OF INSULIN (H): ICD-10-CM

## 2021-05-20 DIAGNOSIS — Z79.01 LONG TERM CURRENT USE OF ANTICOAGULANTS WITH INR GOAL OF 2.0-3.0: ICD-10-CM

## 2021-05-20 DIAGNOSIS — Z79.4 TYPE 2 DIABETES MELLITUS WITH HYPOGLYCEMIA WITHOUT COMA, WITH LONG-TERM CURRENT USE OF INSULIN (H): ICD-10-CM

## 2021-05-20 DIAGNOSIS — I48.11 LONGSTANDING PERSISTENT ATRIAL FIBRILLATION (H): ICD-10-CM

## 2021-05-20 DIAGNOSIS — E11.649 TYPE 2 DIABETES MELLITUS WITH HYPOGLYCEMIA WITHOUT COMA, WITH LONG-TERM CURRENT USE OF INSULIN (H): ICD-10-CM

## 2021-05-20 DIAGNOSIS — Z79.4 TYPE 2 DIABETES MELLITUS WITH STAGE 1 CHRONIC KIDNEY DISEASE, WITH LONG-TERM CURRENT USE OF INSULIN (H): ICD-10-CM

## 2021-05-20 DIAGNOSIS — E55.9 VITAMIN D DEFICIENCY: ICD-10-CM

## 2021-05-20 DIAGNOSIS — N18.1 TYPE 2 DIABETES MELLITUS WITH STAGE 1 CHRONIC KIDNEY DISEASE, WITH LONG-TERM CURRENT USE OF INSULIN (H): ICD-10-CM

## 2021-05-20 LAB — HBA1C MFR BLD: 5.3 % (ref 0–5.6)

## 2021-05-20 PROCEDURE — 36415 COLL VENOUS BLD VENIPUNCTURE: CPT | Performed by: FAMILY MEDICINE

## 2021-05-20 PROCEDURE — 83036 HEMOGLOBIN GLYCOSYLATED A1C: CPT | Performed by: FAMILY MEDICINE

## 2021-05-20 PROCEDURE — 85610 PROTHROMBIN TIME: CPT | Performed by: FAMILY MEDICINE

## 2021-05-20 PROCEDURE — 82306 VITAMIN D 25 HYDROXY: CPT | Performed by: FAMILY MEDICINE

## 2021-05-21 ENCOUNTER — ANTICOAGULATION THERAPY VISIT (OUTPATIENT)
Dept: FAMILY MEDICINE | Facility: CLINIC | Age: 76
End: 2021-05-21
Payer: COMMERCIAL

## 2021-05-21 ENCOUNTER — NURSE TRIAGE (OUTPATIENT)
Dept: NURSING | Facility: CLINIC | Age: 76
End: 2021-05-21

## 2021-05-21 DIAGNOSIS — Z79.01 LONG TERM CURRENT USE OF ANTICOAGULANTS WITH INR GOAL OF 2.0-3.0: ICD-10-CM

## 2021-05-21 DIAGNOSIS — Z79.01 LONG TERM CURRENT USE OF ANTICOAGULANT THERAPY: ICD-10-CM

## 2021-05-21 DIAGNOSIS — I48.11 LONGSTANDING PERSISTENT ATRIAL FIBRILLATION (H): ICD-10-CM

## 2021-05-21 DIAGNOSIS — I48.91 ATRIAL FIBRILLATION (H): ICD-10-CM

## 2021-05-21 LAB
DEPRECATED CALCIDIOL+CALCIFEROL SERPL-MC: 20 UG/L (ref 20–75)
INR PPP: 1.8 (ref 0.86–1.14)

## 2021-05-21 PROCEDURE — 99207 PR NO CHARGE NURSE ONLY: CPT | Performed by: FAMILY MEDICINE

## 2021-05-21 NOTE — TELEPHONE ENCOUNTER
Spoke to patient by phone.  Result managed by [facility]. See 5/21/2021 Anticoagulation encounter.    [facility] ([facility]

## 2021-05-21 NOTE — PROGRESS NOTES
ANTICOAGULATION FOLLOW-UP     Patient Name:  Cayetano Lunsford  Date:  2021  Contact Type:  Telephone    SUBJECTIVE:  Patient Findings     Comments:  Reviewed previous warfarin dosing with patient.  He took warfarin as instructed.  No missed doses,   No increased intake of green vegetables,   No medication changes,   No bleeding/bruising,   No signs/symptoms of a clot.  Patient denies any identifiable changes that caused the INR to be out of therapeutic range.    INR was subtherapeutic yesterday.  Instructed him to call us if he hasn't heard from us by 5 pm the same day he has his INR done.  Lab did not result his POCT INR until today   Patient will take 10 mg today.   Follow up in 2 weeks.     AVS calendar mailed to home address.          Clinical Outcomes     Comments:  Reviewed previous warfarin dosing with patient.  He took warfarin as instructed.  No missed doses,   No increased intake of green vegetables,   No medication changes,   No bleeding/bruising,   No signs/symptoms of a clot.  Patient denies any identifiable changes that caused the INR to be out of therapeutic range.    INR was subtherapeutic yesterday.  Instructed him to call us if he hasn't heard from us by 5 pm the same day he has his INR done.  Lab did not result his POCT INR until today   Patient will take 10 mg today.   Follow up in 2 weeks.     AVS calendar mailed to home address.             OBJECTIVE    Recent labs: (last 7 days)     21  1332   INR 1.80*       ASSESSMENT / PLAN  INR assessment SUB    Recheck INR In: 2 WEEKS    INR Location Clinic lab     Anticoagulation Summary  As of 2021    INR goal:  2.0-3.0   TTR:  58.5 % (1 y)   INR used for dosin.80 (2021)   Warfarin maintenance plan:  7.5 mg (5 mg x 1.5) every Sun, Thu; 5 mg (5 mg x 1) all other days   Full warfarin instructions:  : 10 mg; Otherwise 7.5 mg every Sun, Thu; 5 mg all other days   Weekly warfarin total:  40 mg   Plan last modified:  Tatyana Akers  MANOJ LEOS (4/29/2021)   Next INR check:  6/3/2021   Priority:  Maintenance   Target end date:  Indefinite    Indications    Long-term (current) use of anticoagulants [Z79.01] [Z79.01]  Atrial fibrillation (H) [I48.91]  Longstanding persistent atrial fibrillation (H) [I48.11]  Long term current use of anticoagulants with INR goal of 2.0-3.0 [Z79.01]             Anticoagulation Episode Summary     INR check location:      Preferred lab:      Send INR reminders to:  [email or web address] [place]    Comments:   Prefers AVS printed.       Anticoagulation Care Providers     Provider Role Specialty Phone number    Dmitri Andres MD Referring Family Medicine [phone]            See the Encounter Report to view Anticoagulation Flowsheet and Dosing Calendar (Go to Encounters tab in chart review, and find the Anticoagulation Therapy Visit)        Kriss Bernstein RN

## 2021-05-21 NOTE — TELEPHONE ENCOUNTER
"INR reading as \"In process\" from 1:32 pm yesterday.  Called [facility] lab.  They will put in result.    Kriss Bernstein RN    "

## 2021-05-21 NOTE — TELEPHONE ENCOUNTER
Had his labs drawn yesterday.   INR was 1.8 and has not received any dosing instructions.   NO results in the chart.     Please call patient.     Pauline Sage RN on 5/21/2021 at 1:40 PM      Reason for Disposition    Caller requesting lab results    Protocols used: PCP CALL - NO TRIAGE-A-

## 2021-05-22 DIAGNOSIS — G25.0 BENIGN FAMILIAL TREMOR: ICD-10-CM

## 2021-05-24 RX ORDER — PROPRANOLOL HYDROCHLORIDE 40 MG/1
TABLET ORAL
Qty: 180 TABLET | Refills: 2 | Status: SHIPPED | OUTPATIENT
Start: 2021-05-24 | End: 2021-08-12

## 2021-05-25 RX ORDER — VITAMIN B COMPLEX
1 TABLET ORAL DAILY
COMMUNITY
End: 2021-06-15

## 2021-06-03 ENCOUNTER — ANTICOAGULATION THERAPY VISIT (OUTPATIENT)
Dept: NURSING | Facility: CLINIC | Age: 76
End: 2021-06-03

## 2021-06-03 DIAGNOSIS — I48.91 ATRIAL FIBRILLATION (H): ICD-10-CM

## 2021-06-03 DIAGNOSIS — Z79.01 LONG TERM CURRENT USE OF ANTICOAGULANTS WITH INR GOAL OF 2.0-3.0: ICD-10-CM

## 2021-06-03 DIAGNOSIS — I48.11 LONGSTANDING PERSISTENT ATRIAL FIBRILLATION (H): ICD-10-CM

## 2021-06-03 DIAGNOSIS — Z79.01 LONG TERM CURRENT USE OF ANTICOAGULANT THERAPY: ICD-10-CM

## 2021-06-03 LAB
CAPILLARY BLOOD COLLECTION: NORMAL
INR PPP: 1.6 (ref 0.86–1.14)

## 2021-06-03 PROCEDURE — 85610 PROTHROMBIN TIME: CPT | Performed by: FAMILY MEDICINE

## 2021-06-03 PROCEDURE — 99207 PR NO CHARGE NURSE ONLY: CPT

## 2021-06-03 PROCEDURE — 36416 COLLJ CAPILLARY BLOOD SPEC: CPT | Performed by: FAMILY MEDICINE

## 2021-06-03 NOTE — PROGRESS NOTES
ANTICOAGULATION FOLLOW-UP CLINIC VISIT    Patient Name:  Cayetano Lunsford  Date:  6/3/2021  Contact Type:  Telephone    SUBJECTIVE:  Patient Findings     Positives:  Change in activity (No real change, pt walks outside only when weather permits and is not too hot.), Extra doses (Booster dose given on 21 due to sub INR on 21.), Change in medications (Regular insulin was discontinued by MTM PharmD on 21 and vitamin D was resumed.  Pt believes he is taking 2000 units of D3 QD.), Change in diet/appetite (NO change, eating greens every MWF and 2 glasses of V8 on the other days.)    Comments:  Patient denies any identifiable changes that caused the sub-therapeutic INR.   INR has been trending down since the Primidone was increased on 21 (with the exception of 3.1 on 4/15/21) so warfarin maintenance dose was increased .  Patient has appointment with PCP on 06/15 so next INR will be then, as well.          Clinical Outcomes     Negatives:  Major bleeding event, Thromboembolic event, Anticoagulation-related hospital admission, Anticoagulation-related ED visit, Anticoagulation-related fatality    Comments:  Patient denies any identifiable changes that caused the sub-therapeutic INR.   INR has been trending down since the Primidone was increased on 21 (with the exception of 3.1 on 4/15/21) so warfarin maintenance dose was increased .  Patient has appointment with PCP on 06/15 so next INR will be then, as well.             OBJECTIVE    Recent labs: (last 7 days)     21  1120   INR 1.60*       ASSESSMENT / PLAN  INR assessment SUB    Recheck INR In: 10 DAYS    INR Location Clinic      Anticoagulation Summary  As of 6/3/2021    INR goal:  2.0-3.0   TTR:  54.8 % (1 y)   INR used for dosin.60 (6/3/2021)   Warfarin maintenance plan:  5 mg (5 mg x 1) every Mon, Wed, Fri; 7.5 mg (5 mg x 1.5) all other days   Full warfarin instructions:  6/3: 10 mg; Otherwise 5 mg every Mon, Wed, Fri; 7.5 mg  all other days   Weekly warfarin total:  45 mg   Plan last modified:  Tatyana Akers RN (6/3/2021)   Next INR check:  6/15/2021   Priority:  High   Target end date:  Indefinite    Indications    Long-term (current) use of anticoagulants [Z79.01] [Z79.01]  Atrial fibrillation (H) [I48.91]  Longstanding persistent atrial fibrillation (H) [I48.11]  Long term current use of anticoagulants with INR goal of 2.0-3.0 [Z79.01]             Anticoagulation Episode Summary     INR check location:      Preferred lab:      Send INR reminders to:  [facility]    Comments:   Prefers AVS printed.       Anticoagulation Care Providers     Provider Role Specialty Phone number    Dmitri Andres MD Referring Family Medicine [phone]            See the Encounter Report to view Anticoagulation Flowsheet and Dosing Calendar (Go to Encounters tab in chart review, and find the Anticoagulation Therapy Visit)        Tatyana Akers RN

## 2021-06-15 ENCOUNTER — ANTICOAGULATION THERAPY VISIT (OUTPATIENT)
Dept: NURSING | Facility: CLINIC | Age: 76
End: 2021-06-15

## 2021-06-15 DIAGNOSIS — I48.91 ATRIAL FIBRILLATION (H): ICD-10-CM

## 2021-06-15 DIAGNOSIS — Z79.01 LONG TERM CURRENT USE OF ANTICOAGULANTS WITH INR GOAL OF 2.0-3.0: ICD-10-CM

## 2021-06-15 DIAGNOSIS — I48.11 LONGSTANDING PERSISTENT ATRIAL FIBRILLATION (H): ICD-10-CM

## 2021-06-15 DIAGNOSIS — Z79.01 LONG TERM CURRENT USE OF ANTICOAGULANT THERAPY: ICD-10-CM

## 2021-06-15 LAB
CAPILLARY BLOOD COLLECTION: NORMAL
INR PPP: 3.7 (ref 0.86–1.14)

## 2021-06-15 PROCEDURE — 36416 COLLJ CAPILLARY BLOOD SPEC: CPT | Performed by: FAMILY MEDICINE

## 2021-06-15 PROCEDURE — 99207 PR NO CHARGE NURSE ONLY: CPT

## 2021-06-15 PROCEDURE — 85610 PROTHROMBIN TIME: CPT | Performed by: FAMILY MEDICINE

## 2021-06-15 RX ORDER — VITAMIN B COMPLEX
2 TABLET ORAL DAILY
COMMUNITY
Start: 2021-06-15

## 2021-06-15 NOTE — PROGRESS NOTES
3:14 PM    Patient left a VM at 3:04. Please call back at [phone].    Tito Carter RN, BSN, PHN  Anticoagulation Clinic   [phone]

## 2021-06-15 NOTE — PROGRESS NOTES
Anticoagulation Management    Unable to reach Rich today.    Today's INR result of 3.7 is supratherapeutic (goal INR of 2.0-3.0).  Result received from: Clinic Lab    Follow up required to confirm warfarin dose taken and assess for changes    Left message to take reduced dose of warfarin, 5 mg tonight.   Left VM to call Rodger with transfer to [facility] at: [phone]        Anticoagulation clinic to follow up    Tatyana Akers RN    ANTICOAGULATION FOLLOW-UP CLINIC VISIT    Patient Name:  Cayetano Lunsford  Date:  6/15/2021  Contact Type:  Telephone    SUBJECTIVE:  Patient Findings     Positives:  Change in medications (Pt informed me that he has been taking 2000 units of D3--med list updated)    Comments:  Patient denies any identifiable changes that caused the supra-therapeutic INR so warfarin maintenance dose was reduced ~6%.  (warfarin maintenance dose was increased 12.5% on 06/03/21).          Clinical Outcomes     Negatives:  Major bleeding event, Thromboembolic event, Anticoagulation-related hospital admission, Anticoagulation-related ED visit, Anticoagulation-related fatality    Comments:  Patient denies any identifiable changes that caused the supra-therapeutic INR so warfarin maintenance dose was reduced ~6%.  (warfarin maintenance dose was increased 12.5% on 06/03/21).             OBJECTIVE    Recent labs: (last 7 days)     06/15/21  1026   INR 3.70*       ASSESSMENT / PLAN  INR assessment SUPRA    Recheck INR In: 10 DAYS    INR Location Clinic      Anticoagulation Summary  As of 6/15/2021    INR goal:  2.0-3.0   TTR:  53.1 % (1 y)   INR used for dosing:  3.70 (6/15/2021)   Warfarin maintenance plan:  7.5 mg (5 mg x 1.5) every Sun, Wed, Fri; 5 mg (5 mg x 1) all other days   Full warfarin instructions:  6/15: 5 mg; Otherwise 7.5 mg every Sun, Wed, Fri; 5 mg all other days   Weekly warfarin total:  42.5 mg   Plan last modified:  Tatyana Akers RN (6/15/2021)   Next INR check:  6/25/2021   Priority:  High    Target end date:  Indefinite    Indications    Long-term (current) use of anticoagulants [Z79.01] [Z79.01]  Atrial fibrillation (H) [I48.91]  Longstanding persistent atrial fibrillation (H) [I48.11]  Long term current use of anticoagulants with INR goal of 2.0-3.0 [Z79.01]             Anticoagulation Episode Summary     INR check location:      Preferred lab:      Send INR reminders to:  [employer] [facility] [employer] [facility]    Comments:   Prefers AVS printed.       Anticoagulation Care Providers     Provider Role Specialty Phone number    Dmitri Andres MD Referring Family Medicine [phone]            See the Encounter Report to view Anticoagulation Flowsheet and Dosing Calendar (Go to Encounters tab in chart review, and find the Anticoagulation Therapy Visit)        Tatyana Akers RN

## 2021-06-16 ENCOUNTER — TRANSFERRED RECORDS (OUTPATIENT)
Dept: HEALTH INFORMATION MANAGEMENT | Facility: CLINIC | Age: 76
End: 2021-06-16

## 2021-06-19 DIAGNOSIS — Z79.4 TYPE 2 DIABETES MELLITUS WITH HYPOGLYCEMIA WITHOUT COMA, WITH LONG-TERM CURRENT USE OF INSULIN (H): ICD-10-CM

## 2021-06-19 DIAGNOSIS — E11.649 TYPE 2 DIABETES MELLITUS WITH HYPOGLYCEMIA WITHOUT COMA, WITH LONG-TERM CURRENT USE OF INSULIN (H): ICD-10-CM

## 2021-06-21 RX ORDER — HUMAN INSULIN 100 [IU]/ML
INJECTION, SOLUTION SUBCUTANEOUS
Qty: 10 ML | Refills: 3 | Status: SHIPPED | OUTPATIENT
Start: 2021-06-21 | End: 2022-02-15

## 2021-06-25 ENCOUNTER — ANTICOAGULATION THERAPY VISIT (OUTPATIENT)
Dept: NURSING | Facility: CLINIC | Age: 76
End: 2021-06-25

## 2021-06-25 DIAGNOSIS — Z79.01 LONG TERM CURRENT USE OF ANTICOAGULANT THERAPY: ICD-10-CM

## 2021-06-25 DIAGNOSIS — Z79.01 LONG TERM CURRENT USE OF ANTICOAGULANTS WITH INR GOAL OF 2.0-3.0: ICD-10-CM

## 2021-06-25 DIAGNOSIS — I48.91 ATRIAL FIBRILLATION (H): ICD-10-CM

## 2021-06-25 DIAGNOSIS — I48.11 LONGSTANDING PERSISTENT ATRIAL FIBRILLATION (H): ICD-10-CM

## 2021-06-25 LAB — INR PPP: 2.3 (ref 0.86–1.14)

## 2021-06-25 PROCEDURE — 36416 COLLJ CAPILLARY BLOOD SPEC: CPT | Performed by: FAMILY MEDICINE

## 2021-06-25 PROCEDURE — 85610 PROTHROMBIN TIME: CPT | Performed by: FAMILY MEDICINE

## 2021-06-25 NOTE — PROGRESS NOTES
ANTICOAGULATION MANAGEMENT     Cayetano Lunsford 76 year old male is on warfarin with therapeutic INR result. (Goal INR 2.0-3.0)    Recent labs: (last 7 days)     06/25/21  1333   INR 2.30*       ASSESSMENT     Source(s): Patient/Caregiver Call       Warfarin doses taken: Intentional partial hold on 06/15/21 and MD dose was decreased 6%.    Diet: No new diet changes identified    New illness, injury, or hospitalization: No    Medication/supplement changes: None noted    Signs or symptoms of bleeding or clotting: No    Previous INR: Supratherapeutic    Additional findings: None     PLAN     Recommended plan for no diet, medication or health factor changes affecting INR     Dosing Instructions: Continue your current warfarin dose with next INR in 2 weeks       Summary  As of 6/25/2021    Full warfarin instructions:  7.5 mg every Sun, Wed, Fri; 5 mg all other days   Next INR check:  7/5/2021             Telephone call with Cayetano whom verbalizes understanding and agrees to plan    Check at provider office visit on 7/9/21     Education provided: None required    Plan made per [facility] anticoagulation protocol    Tatyana Akers, RN  Anticoagulation Clinic  6/25/2021    _______________________________________________________________________     Anticoagulation Episode Summary     Current INR goal:  2.0-3.0   TTR:  51.7 % (1 y)   Target end date:  Indefinite   Send INR reminders to:  [facility]    Indications    Long-term (current) use of anticoagulants [Z79.01] [Z79.01]  Atrial fibrillation (H) [I48.91]  Longstanding persistent atrial fibrillation (H) [I48.11]  Long term current use of anticoagulants with INR goal of 2.0-3.0 [Z79.01]           Comments:   Prefers AVS printed.          Anticoagulation Care Providers     Provider Role Specialty Phone number    Dmitri Andres MD Referring Family Medicine [phone]

## 2021-07-06 DIAGNOSIS — I48.0 PAROXYSMAL ATRIAL FIBRILLATION (H): ICD-10-CM

## 2021-07-06 RX ORDER — PROPAFENONE HYDROCHLORIDE 150 MG/1
150 TABLET, COATED ORAL 2 TIMES DAILY
Qty: 180 TABLET | Refills: 2 | OUTPATIENT
Start: 2021-07-06

## 2021-07-09 ENCOUNTER — OFFICE VISIT (OUTPATIENT)
Dept: FAMILY MEDICINE | Facility: CLINIC | Age: 76
End: 2021-07-09
Payer: COMMERCIAL

## 2021-07-09 ENCOUNTER — ANTICOAGULATION THERAPY VISIT (OUTPATIENT)
Dept: NURSING | Facility: CLINIC | Age: 76
End: 2021-07-09

## 2021-07-09 VITALS
HEART RATE: 68 BPM | SYSTOLIC BLOOD PRESSURE: 122 MMHG | OXYGEN SATURATION: 92 % | WEIGHT: 175.9 LBS | DIASTOLIC BLOOD PRESSURE: 58 MMHG | HEIGHT: 66 IN | BODY MASS INDEX: 28.27 KG/M2 | TEMPERATURE: 98.2 F

## 2021-07-09 DIAGNOSIS — I48.11 LONGSTANDING PERSISTENT ATRIAL FIBRILLATION (H): ICD-10-CM

## 2021-07-09 DIAGNOSIS — Z79.4 TYPE 2 DIABETES MELLITUS WITH STAGE 1 CHRONIC KIDNEY DISEASE, WITH LONG-TERM CURRENT USE OF INSULIN (H): ICD-10-CM

## 2021-07-09 DIAGNOSIS — N18.1 TYPE 2 DIABETES MELLITUS WITH STAGE 1 CHRONIC KIDNEY DISEASE, WITH LONG-TERM CURRENT USE OF INSULIN (H): ICD-10-CM

## 2021-07-09 DIAGNOSIS — Z00.00 ENCOUNTER FOR MEDICARE ANNUAL WELLNESS EXAM: Primary | ICD-10-CM

## 2021-07-09 DIAGNOSIS — Z79.01 LONG TERM CURRENT USE OF ANTICOAGULANTS WITH INR GOAL OF 2.0-3.0: ICD-10-CM

## 2021-07-09 DIAGNOSIS — Z11.59 NEED FOR HEPATITIS C SCREENING TEST: ICD-10-CM

## 2021-07-09 DIAGNOSIS — E11.22 TYPE 2 DIABETES MELLITUS WITH STAGE 1 CHRONIC KIDNEY DISEASE, WITH LONG-TERM CURRENT USE OF INSULIN (H): ICD-10-CM

## 2021-07-09 DIAGNOSIS — I48.91 ATRIAL FIBRILLATION (H): ICD-10-CM

## 2021-07-09 DIAGNOSIS — Z79.01 LONG TERM CURRENT USE OF ANTICOAGULANT THERAPY: ICD-10-CM

## 2021-07-09 LAB
ANION GAP SERPL CALCULATED.3IONS-SCNC: <1 MMOL/L (ref 3–14)
BUN SERPL-MCNC: 15 MG/DL (ref 7–30)
CALCIUM SERPL-MCNC: 8.9 MG/DL (ref 8.5–10.1)
CHLORIDE SERPL-SCNC: 105 MMOL/L (ref 94–109)
CO2 SERPL-SCNC: 32 MMOL/L (ref 20–32)
CREAT SERPL-MCNC: 0.76 MG/DL (ref 0.66–1.25)
GFR SERPL CREATININE-BSD FRML MDRD: 88 ML/MIN/{1.73_M2}
GLUCOSE SERPL-MCNC: 144 MG/DL (ref 70–99)
HBA1C MFR BLD: 5.4 % (ref 0–5.6)
INR PPP: 1.7 (ref 0.86–1.14)
POTASSIUM SERPL-SCNC: 5.5 MMOL/L (ref 3.4–5.3)
SODIUM SERPL-SCNC: 137 MMOL/L (ref 133–144)

## 2021-07-09 PROCEDURE — 36416 COLLJ CAPILLARY BLOOD SPEC: CPT | Performed by: FAMILY MEDICINE

## 2021-07-09 PROCEDURE — 83036 HEMOGLOBIN GLYCOSYLATED A1C: CPT | Performed by: FAMILY MEDICINE

## 2021-07-09 PROCEDURE — 80048 BASIC METABOLIC PNL TOTAL CA: CPT | Performed by: FAMILY MEDICINE

## 2021-07-09 PROCEDURE — 85610 PROTHROMBIN TIME: CPT | Performed by: FAMILY MEDICINE

## 2021-07-09 PROCEDURE — 99397 PER PM REEVAL EST PAT 65+ YR: CPT | Performed by: FAMILY MEDICINE

## 2021-07-09 PROCEDURE — 86803 HEPATITIS C AB TEST: CPT | Performed by: FAMILY MEDICINE

## 2021-07-09 ASSESSMENT — MIFFLIN-ST. JEOR: SCORE: 1467.88

## 2021-07-09 NOTE — PROGRESS NOTES
ANTICOAGULATION MANAGEMENT     Cayetano Lunsford 76 year old male is on warfarin with subtherapeutic INR result. (Goal INR 2.0-3.0)    Recent labs: (last 7 days)     07/09/21  1244   INR 1.70*       ASSESSMENT     Source(s): Patient/Caregiver Call       Warfarin doses taken: Warfarin taken as instructed    Diet: Increased greens/vitamin K in diet; plans to resume previous intake    New illness, injury, or hospitalization: No    Medication/supplement changes: None noted    Signs or symptoms of bleeding or clotting: No    Previous INR: Therapeutic last visit; previously outside of goal range    Additional findings: increased activity over the holiday weekend     PLAN     Recommended plan for temporary change(s) affecting INR     Dosing Instructions: Booster dose then continue your current warfarin dose with next INR in 2 weeks       Summary  As of 7/9/2021    Full warfarin instructions:  7/9: 12.5 mg; Otherwise 7.5 mg every Sun, Wed, Fri; 5 mg all other days   Next INR check:  7/23/2021             Telephone call with Cayetano who verbalizes understanding and agrees to plan    Lab visit scheduled    Education provided: Importance of consistent vitamin K intake and explained how increased activity affects INR    Plan made per [facility] anticoagulation protocol    Tatyana Akers, RN  Anticoagulation Clinic  7/9/2021    _______________________________________________________________________     Anticoagulation Episode Summary     Current INR goal:  2.0-3.0   TTR:  49.8 % (1 y)   Target end date:  Indefinite   Send INR reminders to:  [facility]    Indications    Long-term (current) use of anticoagulants [Z79.01] [Z79.01]  Atrial fibrillation (H) [I48.91]  Longstanding persistent atrial fibrillation (H) [I48.11]  Long term current use of anticoagulants with INR goal of 2.0-3.0 [Z79.01]           Comments:   Prefers AVS printed.          Anticoagulation Care Providers     Provider Role Specialty Phone number    Mckenna  Dmitri Palmer MD [facility] [phone]

## 2021-07-09 NOTE — PATIENT INSTRUCTIONS
Patient Education   Personalized Prevention Plan  You are due for the preventive services outlined below.  Your care team is available to assist you in scheduling these services.  If you have already completed any of these items, please share that information with your care team to update in your medical record.  Health Maintenance Due   Topic Date Due     ANNUAL REVIEW OF HM ORDERS  Never done     Hepatitis C Screening  Never done     FALL RISK ASSESSMENT  05/01/2019     Diabetic Foot Exam  10/31/2019     PHQ-2  01/01/2021

## 2021-07-09 NOTE — PROGRESS NOTES
"  SUBJECTIVE:   Cayetano Lunsford is a 76 year old male who presents for Preventive Visit.    {  Patient has been advised of split billing requirements and indicates understanding: Yes  Are you in the first 12 months of your Medicare Part B coverage?  No    Physical Health:    In general, how would you rate your overall physical health? fair    Outside of work, how many days during the week do you exercise? 6-7 days/week    Outside of work, approximately how many minutes a day do you exercise?15-30 minutes    If you drink alcohol do you typically have >3 drinks per day or >7 drinks per week? Not Applicable    Do you usually eat at least 4 servings of fruit and vegetables a day, include whole grains & fiber and avoid regularly eating high fat or \"junk\" foods? Yes    Do you have any problems taking medications regularly?  No    Do you have any side effects from medications? none    Needs assistance for the following daily activities: no assistance needed    Which of the following safety concerns are present in your home?  none identified     Hearing impairment: Yes, Feel that people are mumbling or not speaking clearly.    Need to ask people to speak up or repeat themselves.    In the past 6 months, have you been bothered by leaking of urine? no    Mental Health:    In general, how would you rate your overall mental or emotional health? good  PHQ-2 Score:      Do you feel safe in your environment? Yes    Have you ever done Advance Care Planning? (For example, a Health Directive, POLST, or a discussion with a medical provider or your loved ones about your wishes): No, advance care planning information given to patient to review.  Patient plans to discuss their wishes with loved ones or provider.      Additional concerns to address?  Sees KAYKAY for his diabetes, improved with fewer low reading on nph    Fall risk:  Fallen 2 or more times in the past year?: Yes  Any fall with injury in the past year?: No  {If any of the " above assessments are answered yes, consider ordering appropriate referrals   Cognitive Screenin) Repeat 3 items (Leader, Season, Table)    2) Clock draw: NORMAL  3) 3 item recall: Recalls 1 object   Results: NORMAL clock, 1-2 items recalled: COGNITIVE IMPAIRMENT LESS LIKELY    Mini-CogTM Copyright HELIO Caldera. Licensed by the author for use in [facility]; reprinted with permission (marlene@[facility]). All rights reserved.      Do you have sleep apnea, excessive snoring or daytime drowsiness?: no            Reviewed and updated as needed this visit by clinical staff  Tobacco  Allergies  Meds   Med Hx  Surg Hx  Fam Hx  Soc Hx        Reviewed and updated as needed this visit by Provider                Social History     Tobacco Use     Smoking status: Former Smoker     Packs/day: 1.00     Years: 30.00     Pack years: 30.00     Types: Cigarettes     Quit date: 3/19/1992     Years since quittin.3     Smokeless tobacco: Never Used   Substance Use Topics     Alcohol use: Not Currently     Alcohol/week: 0.0 standard drinks                           Current providers sharing in care for this patient include: MTDAFNE Johnson   Patient Care Team:  Dmitri Andres MD as PCP - General (Family Practice)  Dmitri Andres MD as Assigned PCP  Bong Yoo MD as Assigned Neuroscience Provider  Daniel Marroquin MD as Assigned Heart and Vascular Provider  Diana Johnson RPH as Pharmacist (Pharmacist)  Diana Johnson RPH as Pharmacist (Pharmacist)    The following health maintenance items are reviewed in Epic and correct as of today:  Health Maintenance   Topic Date Due     ANNUAL REVIEW OF  ORDERS  Never done     HEPATITIS C SCREENING  Never done     FALL RISK ASSESSMENT  2019     DIABETIC FOOT EXAM  10/31/2019     INFLUENZA VACCINE (1) 2021     COLORECTAL CANCER SCREENING  2021     BMP  2021     LIPID  2021     MICROALBUMIN  2021  "    A1C  11/20/2021     ADVANCE CARE PLANNING  02/14/2022     EYE EXAM  02/25/2022     COPD ACTION PLAN  05/06/2022     MEDICARE ANNUAL WELLNESS VISIT  07/09/2022     DTAP/TDAP/TD IMMUNIZATION (3 - Td) 08/05/2024     SPIROMETRY  Completed     PHQ-2  Completed     Pneumococcal Vaccine: 65+ Years  Completed     ZOSTER IMMUNIZATION  Completed     COVID-19 Vaccine  Completed     IPV IMMUNIZATION  Aged Out     MENINGITIS IMMUNIZATION  Aged Out     BP Readings from Last 3 Encounters:   07/09/21 122/58   05/06/21 124/52   03/09/21 105/63    Wt Readings from Last 3 Encounters:   07/09/21 79.8 kg (175 lb 14.4 oz)   05/06/21 83.2 kg (183 lb 8 oz)   03/09/21 82.6 kg (182 lb)                  Pneumonia Vaccine:Adults age 65+ who received Pneumovax (PPSV23) at 65 years or older: Should be given PCV13 > 1 year after their most recent PPSV23    ROS:  Constitutional, HEENT, cardiovascular, pulmonary, GI, , musculoskeletal, neuro, skin, endocrine and psych systems are negative, except as otherwise noted.    OBJECTIVE:   /58 (BP Location: Right arm, Patient Position: Sitting, Cuff Size: Adult Regular)   Pulse 68   Temp 98.2  F (36.8  C) (Oral)   Ht 1.68 m (5' 6.14\")   Wt 79.8 kg (175 lb 14.4 oz)   SpO2 92%   BMI 28.27 kg/m   Estimated body mass index is 28.27 kg/m  as calculated from the following:    Height as of this encounter: 1.68 m (5' 6.14\").    Weight as of this encounter: 79.8 kg (175 lb 14.4 oz).  EXAM:   GENERAL: healthy, alert and no distress  EYES: Eyes grossly normal to inspection, PERRL and conjunctivae and sclerae normal  HENT: ear canals and TM's normal, nose and mouth without ulcers or lesions  NECK: no adenopathy, no asymmetry, masses, or scars and thyroid normal to palpation  RESP: lungs clear to auscultation - no rales, rhonchi or wheezes  CV: regular rate and rhythm, normal S1 S2, no S3 or S4, no murmur, click or rub, no peripheral edema and peripheral pulses strong  ABDOMEN: soft, nontender, no " "hepatosplenomegaly, no masses and bowel sounds normal  MS: no gross musculoskeletal defects noted, no edema  SKIN: no suspicious lesions or rashes  NEURO: Normal strength and tone, mentation intact and speech normal  PSYCH: mentation appears normal, affect normal/bright    Diagnostic Test Results:  Labs reviewed in Epic    ASSESSMENT / PLAN:   1. Encounter for Medicare annual wellness exam  Living successfully independent     2. Need for hepatitis C screening test    - Hepatitis C Screen Reflex to HCV RNA Quant and Genotype    3. Longstanding persistent atrial fibrillation (H)    - INR    4. Long term current use of anticoagulants with INR goal of 2.0-3.0    - INR    5. 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    Weight as of this encounter: 79.8 kg (175 lb 14.4 oz).    Weight management plan: Discussed healthy diet and exercise guidelines    He reports that he quit smoking about 29 years ago. His smoking use included cigarettes. He has a 30.00 pack-year smoking history. He has never used smokeless tobacco.    Appropriate preventive services were discussed with this patient, including applicable screening as appropriate for cardiovascular disease, diabetes, osteopenia/osteoporosis, and glaucoma.  As appropriate for age/gender, discussed screening for colorectal cancer, prostate cancer, breast cancer, and cervical cancer. Checklist reviewing preventive " services available has been given to the patient.    Reviewed patients plan of care and provided an AVS. The Basic Care Plan (routine screening as documented in Health Maintenance) for Cayetano meets the Care Plan requirement. This Care Plan has been established and reviewed with the Patient.    Counseling Resources:  ATP IV Guidelines  Pooled Cohorts Equation Calculator  Breast Cancer Risk Calculator  BRCA-Related Cancer Risk Assessment: FHS-7 Tool  FRAX Risk Assessment  ICSI Preventive Guidelines  Dietary Guidelines for Americans, 2010  USDA's MyPlate  ASA Prophylaxis  Lung CA Screening    Dmitri Andres MD  [facility]

## 2021-07-10 LAB — HCV AB SERPL QL IA: NONREACTIVE

## 2021-07-15 DIAGNOSIS — I48.0 PAROXYSMAL ATRIAL FIBRILLATION (H): ICD-10-CM

## 2021-07-15 RX ORDER — PROPAFENONE HYDROCHLORIDE 150 MG/1
150 TABLET, COATED ORAL 2 TIMES DAILY
Qty: 180 TABLET | Refills: 0 | Status: SHIPPED | OUTPATIENT
Start: 2021-07-15 | End: 2021-10-18

## 2021-07-23 ENCOUNTER — LAB (OUTPATIENT)
Dept: LAB | Facility: CLINIC | Age: 76
End: 2021-07-23
Payer: COMMERCIAL

## 2021-07-23 ENCOUNTER — ANTICOAGULATION THERAPY VISIT (OUTPATIENT)
Dept: ANTICOAGULATION | Facility: CLINIC | Age: 76
End: 2021-07-23

## 2021-07-23 DIAGNOSIS — I48.91 ATRIAL FIBRILLATION (H): ICD-10-CM

## 2021-07-23 DIAGNOSIS — Z79.01 LONG TERM CURRENT USE OF ANTICOAGULANT THERAPY: Primary | ICD-10-CM

## 2021-07-23 DIAGNOSIS — Z79.01 LONG TERM CURRENT USE OF ANTICOAGULANTS WITH INR GOAL OF 2.0-3.0: ICD-10-CM

## 2021-07-23 DIAGNOSIS — I48.11 LONGSTANDING PERSISTENT ATRIAL FIBRILLATION (H): ICD-10-CM

## 2021-07-23 LAB — INR BLD: 2.3 (ref 0.9–1.1)

## 2021-07-23 PROCEDURE — 85610 PROTHROMBIN TIME: CPT

## 2021-07-23 PROCEDURE — 36415 COLL VENOUS BLD VENIPUNCTURE: CPT

## 2021-07-23 NOTE — PROGRESS NOTES
ANTICOAGULATION MANAGEMENT     Cayetano Lunsford 76 year old male is on warfarin with therapeutic INR result. (Goal INR 2.0-3.0)    Recent labs: (last 7 days)     07/23/21  1300   INR 2.3*       ASSESSMENT     Source(s): Patient/Caregiver Call       Warfarin doses taken: Warfarin taken as instructed    Diet: No new diet changes identified    New illness, injury, or hospitalization: No    Medication/supplement changes: None noted    Signs or symptoms of bleeding or clotting: No    Previous INR: Subtherapeutic    Additional findings: None     PLAN     Recommended plan for no diet, medication or health factor changes affecting INR     Dosing Instructions: Continue your current warfarin dose with next INR in 3 weeks       Summary  As of 7/23/2021    Full warfarin instructions:  7.5 mg every Sun, Wed, Fri; 5 mg all other days   Next INR check:  8/13/2021             Telephone call with Cayetano who verbalizes understanding and agrees to plan    Lab visit scheduled    Education provided: None required    Plan made per [facility] anticoagulation protocol    Tatyana Akers RN  Anticoagulation Clinic  7/23/2021    _______________________________________________________________________     Anticoagulation Episode Summary     Current INR goal:  2.0-3.0   TTR:  48.1 % (1 y)   Target end date:  Indefinite   Send INR reminders to:  [facility]    Indications    Long-term (current) use of anticoagulants [Z79.01] [Z79.01]  Atrial fibrillation (H) [I48.91]  Longstanding persistent atrial fibrillation (H) [I48.11]  Long term current use of anticoagulants with INR goal of 2.0-3.0 [Z79.01]           Comments:   Prefers AVS printed.          Anticoagulation Care Providers     Provider Role Specialty Phone number    Dmitri Andres MD Referring Family Medicine [phone]

## 2021-07-23 NOTE — PROGRESS NOTES
Anticoagulation Management    Unable to reach Rich today.    Today's INR result of 2.3 is therapeutic (goal INR of 2.0-3.0).  Result received from: Clinic Lab    Follow up required to assess for changes     Left VM to continue SAME dose and to call Rodger with transfer to [facility]       Anticoagulation clinic to follow up    Tatyana Akers RN

## 2021-08-03 ENCOUNTER — TRANSFERRED RECORDS (OUTPATIENT)
Dept: HEALTH INFORMATION MANAGEMENT | Facility: CLINIC | Age: 76
End: 2021-08-03

## 2021-08-03 LAB
ALT SERPL-CCNC: 31 U/L (ref 0–78)
AST SERPL-CCNC: 24 U/L (ref 0–37)
CREATININE (EXTERNAL): 0.95 MG/DL (ref 0.7–1.3)
GFR ESTIMATED (EXTERNAL): >60 ML/MIN/1.73M2
GFR ESTIMATED (IF AFRICAN AMERICAN) (EXTERNAL): >60 ML/MIN/1.73M2

## 2021-08-11 ENCOUNTER — OFFICE VISIT (OUTPATIENT)
Dept: FAMILY MEDICINE | Facility: CLINIC | Age: 76
End: 2021-08-11
Payer: COMMERCIAL

## 2021-08-11 VITALS
BODY MASS INDEX: 28.08 KG/M2 | HEART RATE: 73 BPM | SYSTOLIC BLOOD PRESSURE: 122 MMHG | OXYGEN SATURATION: 93 % | DIASTOLIC BLOOD PRESSURE: 72 MMHG | TEMPERATURE: 97.7 F | WEIGHT: 174.7 LBS | RESPIRATION RATE: 16 BRPM

## 2021-08-11 DIAGNOSIS — L05.01 PILONIDAL CYST WITH ABSCESS: Primary | ICD-10-CM

## 2021-08-11 PROCEDURE — 99213 OFFICE O/P EST LOW 20 MIN: CPT | Performed by: PHYSICIAN ASSISTANT

## 2021-08-11 RX ORDER — SULFAMETHOXAZOLE/TRIMETHOPRIM 800-160 MG
1 TABLET ORAL 2 TIMES DAILY
Qty: 20 TABLET | Refills: 0 | Status: SHIPPED | OUTPATIENT
Start: 2021-08-11 | End: 2021-08-21

## 2021-08-11 RX ORDER — MUPIROCIN 20 MG/G
OINTMENT TOPICAL 2 TIMES DAILY
Qty: 30 G | Refills: 0 | Status: SHIPPED | OUTPATIENT
Start: 2021-08-11 | End: 2021-10-06

## 2021-08-11 NOTE — PATIENT INSTRUCTIONS
Take the complete course of the antibiotic.    Apply antibiotic ointment twice a day.    Follow-up if not improving as expected.

## 2021-08-11 NOTE — PROGRESS NOTES
Assessment & Plan     Pilonidal cyst with abscess    Treat with oral antibiotics as well as topical to help with discomfort and healing.    - mupirocin (BACTROBAN) 2 % external ointment; Apply topically 2 times daily  - sulfamethoxazole-trimethoprim (BACTRIM DS) 800-160 MG tablet; Take 1 tablet by mouth 2 times daily for 10 days             Patient Instructions   Take the complete course of the antibiotic.    Apply antibiotic ointment twice a day.    Follow-up if not improving as expected.      No follow-ups on file.    [facility] TIM Bailey is a 76 year old who presents for the following health issues     HPI     Concern - bump   Onset: 10 days ago   Description: 1-2 lumps above anus and a possible cut- lump is small and not painful  Intensity: mild  Progression of Symptoms: Comes and goes when sitting   Accompanying Signs & Symptoms: none   Previous history of similar problem: history of ulcerative colitis  Precipitating factors:        Worsened by: sitting   Alleviating factors:        Improved by: none   Therapies tried and outcome: used preparation h with some improvement        Review of Systems   Constitutional, HEENT, cardiovascular, pulmonary, gi and gu systems are negative, except as otherwise noted.        Objective    /72 (BP Location: Right arm, Patient Position: Chair, Cuff Size: Adult Regular)   Pulse 73   Temp 97.7  F (36.5  C) (Oral)   Resp 16   Wt 79.2 kg (174 lb 11.2 oz)   SpO2 93%   BMI 28.08 kg/m    Body mass index is 28.08 kg/m .         Physical Exam   GENERAL: healthy, alert and no distress  EYES: Eyes grossly normal to inspection, PERRL and conjunctivae and sclerae normal  RECTAL (male): normal sphincter tone, no rectal masses and an apparent cyst or fissure in pilonidal area of buttock. Evidence of previous purulent drainage, none current.    MS: no gross musculoskeletal defects noted, no edema  SKIN: no suspicious  lesions or rashes  NEURO: Normal strength and tone, mentation intact and speech normal  PSYCH: mentation appears normal, affect normal/bright

## 2021-08-12 ENCOUNTER — DOCUMENTATION ONLY (OUTPATIENT)
Dept: ANTICOAGULATION | Facility: CLINIC | Age: 76
End: 2021-08-12

## 2021-08-12 NOTE — PROGRESS NOTES
ANTICOAGULATION  MANAGEMENT     Interacting Medication Review    Interacting medication(s): Sulfamethoxazole-Trimethoprim (Bactrim) with warfarin.    Duration: 10 days  (8/11 to 8/21/21)    Indication: Pilonidal cyst with abscess    New medication?: Yes, interaction may increase INR and risk of bleeding       PLAN     Continue current warfarin dose. Recommend to check INR on 8/13/21    Patient was not contacted, INR already scheduled for 8/13/21    No adjustment to Anticoagulation Calendar was required    Tatyana Akers RN

## 2021-08-13 ENCOUNTER — ANTICOAGULATION THERAPY VISIT (OUTPATIENT)
Dept: ANTICOAGULATION | Facility: CLINIC | Age: 76
End: 2021-08-13

## 2021-08-13 ENCOUNTER — LAB (OUTPATIENT)
Dept: LAB | Facility: CLINIC | Age: 76
End: 2021-08-13
Payer: COMMERCIAL

## 2021-08-13 DIAGNOSIS — Z79.01 LONG TERM CURRENT USE OF ANTICOAGULANTS WITH INR GOAL OF 2.0-3.0: ICD-10-CM

## 2021-08-13 DIAGNOSIS — Z79.01 LONG TERM CURRENT USE OF ANTICOAGULANT THERAPY: Primary | ICD-10-CM

## 2021-08-13 DIAGNOSIS — I48.11 LONGSTANDING PERSISTENT ATRIAL FIBRILLATION (H): ICD-10-CM

## 2021-08-13 LAB — INR BLD: 2 (ref 0.9–1.1)

## 2021-08-13 PROCEDURE — 36416 COLLJ CAPILLARY BLOOD SPEC: CPT

## 2021-08-13 PROCEDURE — 85610 PROTHROMBIN TIME: CPT

## 2021-08-13 NOTE — PROGRESS NOTES
ANTICOAGULATION MANAGEMENT     Cayetano Lunsford 76 year old male is on warfarin with therapeutic INR result. (Goal INR 2.0-3.0)    Recent labs: (last 7 days)     08/13/21  1314   INR 2.0*       ASSESSMENT     Source(s): Chart Review and Patient/Caregiver Call       Warfarin doses taken: Warfarin taken as instructed    Diet: No new diet changes identified    New illness, injury, or hospitalization: Yes: Pilonidal cyst with abscess    Medication/supplement changes: Bactrim 10 day course (dates: 8/11/21 - 8/21/21) which has potential for interaction; increasing INR    Signs or symptoms of bleeding or clotting: No    Previous INR: Therapeutic last visit; previously outside of goal range    Additional findings: None     PLAN     Recommended plan for no diet, medication or health factor changes affecting INR     Dosing Instructions: Continue your current warfarin dose with next INR in 4 days       Summary  As of 8/13/2021    Full warfarin instructions:  7.5 mg every Sun, Wed, Fri; 5 mg all other days   Next INR check:  8/17/2021             Telephone call with Cayetano who verbalizes understanding and agrees to plan    Lab visit scheduled    Education provided: Please call back if any changes to your diet, medications or how you've been taking warfarin and Contact [phone]  with any changes, questions or concerns.     Plan made per [facility] anticoagulation protocol    Dayanara Cuevas RN  Anticoagulation Clinic  8/13/2021    _______________________________________________________________________     Anticoagulation Episode Summary     Current INR goal:  2.0-3.0   TTR:  53.6 % (1 y)   Target end date:  Indefinite   Send INR reminders to:  [facility]    Indications    Long-term (current) use of anticoagulants [Z79.01] [Z79.01]  Atrial fibrillation (H) [I48.91]  Longstanding persistent atrial fibrillation (H) [I48.11]  Long term current use of anticoagulants with INR goal of 2.0-3.0 [Z79.01]           Comments:    Prefers AVS printed.          Anticoagulation Care Providers     Provider Role Specialty Phone number    Dmitri Andres MD Referring Family Medicine [phone]

## 2021-08-14 DIAGNOSIS — Z79.4 TYPE 2 DIABETES MELLITUS WITH STAGE 1 CHRONIC KIDNEY DISEASE, WITH LONG-TERM CURRENT USE OF INSULIN (H): ICD-10-CM

## 2021-08-14 DIAGNOSIS — N18.1 TYPE 2 DIABETES MELLITUS WITH STAGE 1 CHRONIC KIDNEY DISEASE, WITH LONG-TERM CURRENT USE OF INSULIN (H): ICD-10-CM

## 2021-08-14 DIAGNOSIS — E11.22 TYPE 2 DIABETES MELLITUS WITH STAGE 1 CHRONIC KIDNEY DISEASE, WITH LONG-TERM CURRENT USE OF INSULIN (H): ICD-10-CM

## 2021-08-16 NOTE — TELEPHONE ENCOUNTER
Prescription approved per [facility] Refill Protocol.  Cady Cuevas RN

## 2021-08-17 ENCOUNTER — LAB (OUTPATIENT)
Dept: LAB | Facility: CLINIC | Age: 76
End: 2021-08-17
Payer: COMMERCIAL

## 2021-08-17 ENCOUNTER — ANTICOAGULATION THERAPY VISIT (OUTPATIENT)
Dept: ANTICOAGULATION | Facility: CLINIC | Age: 76
End: 2021-08-17

## 2021-08-17 DIAGNOSIS — Z79.01 LONG TERM CURRENT USE OF ANTICOAGULANT THERAPY: Primary | ICD-10-CM

## 2021-08-17 DIAGNOSIS — I48.11 LONGSTANDING PERSISTENT ATRIAL FIBRILLATION (H): ICD-10-CM

## 2021-08-17 DIAGNOSIS — Z79.01 LONG TERM CURRENT USE OF ANTICOAGULANTS WITH INR GOAL OF 2.0-3.0: ICD-10-CM

## 2021-08-17 LAB — INR BLD: 2.6 (ref 0.9–1.1)

## 2021-08-17 PROCEDURE — 36416 COLLJ CAPILLARY BLOOD SPEC: CPT

## 2021-08-17 PROCEDURE — 85610 PROTHROMBIN TIME: CPT

## 2021-08-17 NOTE — PROGRESS NOTES
Left message to call [phone].       Anticoagulation Management    Unable to reach Rich today.    Today's INR result of 2.6 is therapeutic (goal INR of 2.0-3.0).  Result received from: Clinic Lab    Follow up required to confirm warfarin dose taken and assess for changes    Left message to take reduced dose of warfarin, 2.5 mg tonight.   Bactrim 10 day course (dates: 8/11/21 - 8/21/21) which has potential for interaction; increasing INR    INR on 8/13 was 2.0    Anticoagulation clinic to follow up    Dayanara Cuevas RN

## 2021-08-17 NOTE — PROGRESS NOTES
ANTICOAGULATION MANAGEMENT     Cayetano Lunsford 76 year old male is on warfarin with therapeutic INR result. (Goal INR 2.0-3.0)    Recent labs: (last 7 days)     08/17/21  1256   INR 2.6*       ASSESSMENT     Source(s): Chart Review and Patient/Caregiver Call       Warfarin doses taken: Warfarin taken as instructed    Diet: No new diet changes identified    New illness, injury, or hospitalization: No    Medication/supplement changes: Bactrim 10 day course (dates: 8/11/21 - 8/21/21) which has potential for interaction; increasing INR    Signs or symptoms of bleeding or clotting: No    Previous INR: Therapeutic last 2(+) visits    Additional findings: None     PLAN     Recommended plan for temporary change(s) affecting INR     Dosing Instructions: take 2.5 mg warfarin 8/17/21 and 5 mg warfarin 8/18/21 then continue your current warfarin dose with next INR in 1 week       Summary  As of 8/17/2021    Full warfarin instructions:  8/17: 2.5 mg; 8/18: 5 mg; Otherwise 7.5 mg every Sun, Wed, Fri; 5 mg all other days   Next INR check:  8/24/2021             Telephone call with Cayetano who verbalizes understanding and agrees to plan    Lab visit scheduled    Education provided: Please call back if any changes to your diet, medications or how you've been taking warfarin and Contact [phone]  with any changes, questions or concerns.     Plan made per [facility] anticoagulation protocol    Dayanara Cuevas RN  Anticoagulation Clinic  8/17/2021    _______________________________________________________________________     Anticoagulation Episode Summary     Current INR goal:  2.0-3.0   TTR:  53.5 % (1 y)   Target end date:  Indefinite   Send INR reminders to:  [facility]    Indications    Long-term (current) use of anticoagulants [Z79.01] [Z79.01]  Atrial fibrillation (H) [I48.91]  Longstanding persistent atrial fibrillation (H) [I48.11]  Long term current use of anticoagulants with INR goal of 2.0-3.0 [Z79.01]            Comments:   Prefers AVS printed.          Anticoagulation Care Providers     Provider Role Specialty Phone number    Dmitri Andres MD Referring Family Medicine [phone]

## 2021-08-24 ENCOUNTER — ANTICOAGULATION THERAPY VISIT (OUTPATIENT)
Dept: ANTICOAGULATION | Facility: CLINIC | Age: 76
End: 2021-08-24

## 2021-08-24 ENCOUNTER — LAB (OUTPATIENT)
Dept: LAB | Facility: CLINIC | Age: 76
End: 2021-08-24
Payer: COMMERCIAL

## 2021-08-24 DIAGNOSIS — I48.91 ATRIAL FIBRILLATION (H): ICD-10-CM

## 2021-08-24 DIAGNOSIS — I48.11 LONGSTANDING PERSISTENT ATRIAL FIBRILLATION (H): ICD-10-CM

## 2021-08-24 DIAGNOSIS — Z79.01 LONG TERM CURRENT USE OF ANTICOAGULANTS WITH INR GOAL OF 2.0-3.0: ICD-10-CM

## 2021-08-24 DIAGNOSIS — Z79.01 LONG TERM CURRENT USE OF ANTICOAGULANT THERAPY: Primary | ICD-10-CM

## 2021-08-24 LAB — INR BLD: 3.5 (ref 0.9–1.1)

## 2021-08-24 PROCEDURE — 85610 PROTHROMBIN TIME: CPT

## 2021-08-24 PROCEDURE — 36416 COLLJ CAPILLARY BLOOD SPEC: CPT

## 2021-08-24 NOTE — PROGRESS NOTES
ANTICOAGULATION MANAGEMENT     Cayetano Lunsford 76 year old male is on warfarin with supratherapeutic INR result. (Goal INR 2.0-3.0)    Recent labs: (last 7 days)     08/24/21  1253   INR 3.5*       ASSESSMENT     Source(s): Patient/Caregiver Call       Warfarin doses taken: Warfarin taken as instructed    Diet: Decreased greens/vitamin K in diet; plans to resume previous intake--patient was out of town so didn't have access to greens and/or V8.    New illness, injury, or hospitalization: No; however, patient does not feel that the pilonidal cyst has totally cleared, he will call for appt if irritation persists.    Medication/supplement changes: Pt finished Bactrim on 8/21/21    Signs or symptoms of bleeding or clotting: No    Previous INR: Therapeutic last 2(+) visits    Additional findings: None     PLAN     Recommended plan for temporary change(s) affecting INR     Dosing Instructions: Partial hold then continue your current warfarin dose with next INR in 2 weeks       Summary  As of 8/24/2021    Full warfarin instructions:  8/24: 2.5 mg; Otherwise 7.5 mg every Sun, Wed, Fri; 5 mg all other days   Next INR check:               Telephone call with Cayetano who verbalizes understanding and agrees to plan    Lab visit scheduled    Education provided: Importance of notifying clinic for changes in medications; a sooner lab recheck maybe needed. and Contact [phone]  with any changes, questions or concerns.     Plan made per [facility] anticoagulation protocol    Tatyana Akers, RN  Anticoagulation Clinic  8/24/2021    _______________________________________________________________________     Anticoagulation Episode Summary     Current INR goal:  2.0-3.0   TTR:  52.6 % (1 y)   Target end date:  Indefinite   Send INR reminders to:  [facility]    Indications    Long-term (current) use of anticoagulants [Z79.01] [Z79.01]  Atrial fibrillation (H) [I48.91]  Longstanding persistent atrial fibrillation (H)  [I48.11]  Long term current use of anticoagulants with INR goal of 2.0-3.0 [Z79.01]           Comments:   Prefers AVS printed.          Anticoagulation Care Providers     Provider Role Specialty Phone number    Dmitri Andres MD Referring Family Medicine [phone]

## 2021-08-30 DIAGNOSIS — Z79.01 LONG TERM CURRENT USE OF ANTICOAGULANTS WITH INR GOAL OF 2.0-3.0: ICD-10-CM

## 2021-08-30 DIAGNOSIS — I48.0 PAROXYSMAL ATRIAL FIBRILLATION (H): ICD-10-CM

## 2021-08-31 DIAGNOSIS — E11.9 TYPE 2 DIABETES, HBA1C GOAL < 8% (H): ICD-10-CM

## 2021-09-01 RX ORDER — WARFARIN SODIUM 5 MG/1
TABLET ORAL
Qty: 105 TABLET | Refills: 0 | Status: SHIPPED | OUTPATIENT
Start: 2021-09-01 | End: 2021-09-14

## 2021-09-01 NOTE — TELEPHONE ENCOUNTER
"Requested Prescriptions   Pending Prescriptions Disp Refills     warfarin ANTICOAGULANT (JANTOVEN ANTICOAGULANT) 5 MG tablet [Pharmacy Med Name: JANTOVEN 5MG TABS] 96 tablet 1     Sig: TAKE ONE AND ONE-HALF TABLETS (7.5MG) BY MOUTH EVERY SUNDAY,  THURSDAY, TAKE ONE TABLET (5MG) BY MOUTH ALL OTHER DAYS OR AS INSTRUCTED BY INR CLINIC       Vitamin K Antagonists Failed - 8/30/2021 10:59 AM        Failed - INR is within goal in the past 6 weeks     Confirm INR is within goal in the past 6 weeks.     Recent Labs   Lab Test 08/24/21  1253   INR 3.5*                       Passed - Recent (12 mo) or future (30 days) visit within the authorizing provider's specialty     Patient has had an office visit with the authorizing provider or a provider within the authorizing providers department within the previous 12 mos or has a future within next 30 days. See \"Patient Info\" tab in inbasket, or \"Choose Columns\" in Meds & Orders section of the refill encounter.              Passed - Medication is active on med list        Passed - Patient is 18 years of age or older           Last office visit 8/11/21.  Warfarin dosing is managed by INR Clinic.  Prescription approved per [facility] Refill Protocol.  Estelle Starr RN    "

## 2021-09-02 RX ORDER — PEN NEEDLE, DIABETIC 29 G X1/2"
NEEDLE, DISPOSABLE MISCELLANEOUS
Qty: 100 EACH | Refills: 3 | Status: SHIPPED | OUTPATIENT
Start: 2021-09-02 | End: 2022-02-17

## 2021-09-02 NOTE — TELEPHONE ENCOUNTER
Prescription approved per [facility] Refill Protocol.    Rayna Estrella RN on 9/2/2021 at 9:39 AM

## 2021-09-07 ENCOUNTER — TELEPHONE (OUTPATIENT)
Dept: NEUROLOGY | Facility: CLINIC | Age: 76
End: 2021-09-07

## 2021-09-07 ENCOUNTER — ANTICOAGULATION THERAPY VISIT (OUTPATIENT)
Dept: ANTICOAGULATION | Facility: CLINIC | Age: 76
End: 2021-09-07

## 2021-09-07 ENCOUNTER — TELEPHONE (OUTPATIENT)
Dept: FAMILY MEDICINE | Facility: CLINIC | Age: 76
End: 2021-09-07

## 2021-09-07 ENCOUNTER — LAB (OUTPATIENT)
Dept: LAB | Facility: CLINIC | Age: 76
End: 2021-09-07
Payer: COMMERCIAL

## 2021-09-07 DIAGNOSIS — Z79.01 LONG TERM CURRENT USE OF ANTICOAGULANTS WITH INR GOAL OF 2.0-3.0: ICD-10-CM

## 2021-09-07 DIAGNOSIS — I48.91 ATRIAL FIBRILLATION (H): ICD-10-CM

## 2021-09-07 DIAGNOSIS — I48.11 LONGSTANDING PERSISTENT ATRIAL FIBRILLATION (H): ICD-10-CM

## 2021-09-07 DIAGNOSIS — Z79.01 LONG TERM CURRENT USE OF ANTICOAGULANT THERAPY: Primary | ICD-10-CM

## 2021-09-07 LAB — INR BLD: 1.8 (ref 0.9–1.1)

## 2021-09-07 PROCEDURE — 36416 COLLJ CAPILLARY BLOOD SPEC: CPT

## 2021-09-07 PROCEDURE — 85610 PROTHROMBIN TIME: CPT

## 2021-09-07 NOTE — TELEPHONE ENCOUNTER
Patient is returning Tatyana call regarding dosing instructions for his INR that he had done today. Please call patient at [phone].  Karlene Monge   [facility]  Central Scheduler

## 2021-09-07 NOTE — PROGRESS NOTES
ANTICOAGULATION MANAGEMENT     Cayetano Lunsford 76 year old male is on warfarin with subtherapeutic INR result. (Goal INR 2.0-3.0)    Recent labs: (last 7 days)     09/07/21  1244   INR 1.8*       ASSESSMENT     Source(s): Patient/Caregiver Call       Warfarin doses taken: Warfarin taken as instructed    Diet: Greens have been inconsistent--patient has been eating broccoli or green beans every MWF and V8-up to 2 glasses per day-on TThSSun.  Patient agrees to decrease the V8 to TThS, NONE on Sundays.  If INR still sub-therapeutic at next visit, patient may need to cut back to 1 glass of V8 per sitting.    New illness, injury, or hospitalization: No    Medication/supplement changes: None noted    Signs or symptoms of bleeding or clotting: No    Previous INR: Supratherapeutic    Additional findings: Patient is scheduled to get booster Covid-19 vaccine on 9/8/21     PLAN     Recommended plan for temporary change(s) affecting INR     Dosing Instructions: Booster dose then continue your current warfarin dose with next INR in 10 days       Summary  As of 9/7/2021    Full warfarin instructions:  9/7: 7.5 mg; Otherwise 7.5 mg every Sun, Wed, Fri; 5 mg all other days   Next INR check:               Telephone call with Cayetano who verbalizes understanding and agrees to plan    Lab visit scheduled    Education provided: Contact [phone]  with any changes, questions or concerns.     Plan made per [facility] anticoagulation protocol    Tatyana Akers, RN  Anticoagulation Clinic  9/7/2021    _______________________________________________________________________     Anticoagulation Episode Summary     Current INR goal:  2.0-3.0   TTR:  54.7 % (1 y)   Target end date:  Indefinite   Send INR reminders to:  [facility]    Indications    Long-term (current) use of anticoagulants [Z79.01] [Z79.01]  Atrial fibrillation (H) [I48.91]  Longstanding persistent atrial fibrillation (H) [I48.11]  Long term current use of anticoagulants  with INR goal of 2.0-3.0 [Z79.01]           Comments:   Prefers AVS printed.          Anticoagulation Care Providers     Provider Role Specialty Phone number    Dmitri Andres MD Referring Family Medicine [phone]

## 2021-09-07 NOTE — PROGRESS NOTES
Anticoagulation Management    Unable to reach Rich today.    Today's INR result of 1.8 is subtherapeutic (goal INR of 2.0-3.0).  Result received from: Clinic Lab    Follow up required to confirm warfarin dose taken and assess for changes    Left message to take a booster dose of warfarin,  7.5 mg tonight. and to call Rodger with transfer to [facility] OR route to:   [facility]      Anticoagulation clinic to follow up    Tatyana Akers RN

## 2021-09-10 ENCOUNTER — OFFICE VISIT (OUTPATIENT)
Dept: NEUROLOGY | Facility: CLINIC | Age: 76
End: 2021-09-10
Attending: PSYCHIATRY & NEUROLOGY
Payer: COMMERCIAL

## 2021-09-10 VITALS
BODY MASS INDEX: 28.38 KG/M2 | WEIGHT: 176.6 LBS | OXYGEN SATURATION: 95 % | DIASTOLIC BLOOD PRESSURE: 76 MMHG | SYSTOLIC BLOOD PRESSURE: 137 MMHG | HEART RATE: 67 BPM

## 2021-09-10 DIAGNOSIS — G25.0 BENIGN FAMILIAL TREMOR: Primary | ICD-10-CM

## 2021-09-10 PROCEDURE — 99214 OFFICE O/P EST MOD 30 MIN: CPT | Performed by: PSYCHIATRY & NEUROLOGY

## 2021-09-10 PROCEDURE — G0463 HOSPITAL OUTPT CLINIC VISIT: HCPCS

## 2021-09-10 RX ORDER — PRIMIDONE 50 MG/1
TABLET ORAL
Qty: 1001 TABLET | Refills: 1 | Status: SHIPPED | OUTPATIENT
Start: 2021-09-10 | End: 2022-03-10

## 2021-09-10 NOTE — PATIENT INSTRUCTIONS
AFTER VISIT SUMMARY (AVS):    At today's visit we thoroughly discussed current symptoms, available treatment options, and the plan.    We decided not to make any medication changes.  Primidone prescription was refilled.    We discussed Abiola Trio medical device. Please review information about it at home to see if it is something you are willing to try.    Next follow-up appointment is in the next 6 months or earlier if needed.    Please do not hesitate to call me with any questions or concerns.    Thanks.

## 2021-09-10 NOTE — PROGRESS NOTES
ESTABLISHED PATIENT NEUROLOGY NOTE    DATE OF VISIT: 9/10/2021  CLINIC LOCATION: [facility]  MRN: [id number]  PATIENT NAME: Cayetano Lunsford  YOB: 1945    PCP: Dmitri Andres MD    REASON FOR VISIT:   Chief Complaint   Patient presents with     Tremors     follow up      SUBJECTIVE:                                                      HISTORY OF PRESENT ILLNESS: Patient is here to follow up regarding essential tremor.  The last visit was on 3/9/2021.  At that time primidone dose was slightly increased.  Please refer to my initial/other prior notes for further information.    Since the last visit, the patient reports that his tremor is stable.  He did not notice significant improvement after increasing primidone dose.  No significant worsening compared to the previous appointment.  Tremor fluctuates day-to-day.  It is worse after caffeine.  He is on 200/200/150 mg of primidone daily and 120 mg of propranolol twice daily without noticeable side effects.  He denies interval development of new focal neurological symptoms.    On review of systems, patient endorses no other active complaints. Medications, allergies, family and social history were also reviewed. There are no changes reported by patient.  REVIEW OF SYSTEMS:                                                    10-system review was completed. Pertinent positives are included in HPI. The remainder of ROS is negative.  EXAM:                                                    Physical Exam:   Vitals: BP (!) 157/71 (BP Location: Left arm, Patient Position: Chair)   Pulse 63   Wt 80.1 kg (176 lb 9.6 oz)   SpO2 95%   BMI 28.38 kg/m    Repeat vitals: /76 (BP Location: Left arm, Patient Position: Chair)   Pulse 67   Wt 80.1 kg (176 lb 9.6 oz)   SpO2 95%   BMI 28.38 kg/m      General: pt is in NAD, cooperative.  Skin: normal turgor, moist mucous membranes, no lesions/rashes noticed.  HEENT: ATNC, white  sclera, normal conjunctiva.  Respiratory: Symmetric lung excursion, no accessory respiratory muscle use.  Abdomen: Non distended.  Neurological: awake, cooperative, follows commands, no aphasia, moderate chronic dysarthria, mild to moderate bilateral postural and action hand tremor, no other exam changes compared to the previous visit.    ASSESSMENT AND PLAN:                                                    Assessment: 76-year-old male patient with essential tremor presents for follow-up.  He reports no noticeable improvement after increasing the dose of primidone.  He is also on 120 mg of propranolol twice daily.  Previously we discussed that increasing the dose of propranolol might lead to bradycardia and hypotension.  This concern was shared by his primary care provider, and that is why we decided to increase primidone.  We also discussed additional treatment options of topiramate and gabapentin.  Today we reviewed all of these options again as well as Abiola Trio device.  We decided not to make any medication changes at the present time.    Rich to follow up with Primary Care provider regarding elevated blood pressure.    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    We decided not to make any medication changes.  Primidone prescription was refilled.    We discussed Abiola Trio medical device.  The patient will review information about it at home to see if it is something he is willing to try.    Next follow-up appointment is in the next 6 months or earlier if needed.    Total Time: 31 minutes spent on the date of the encounter doing chart review, history and exam, documentation and further activities per the note.    Bong Yoo MD  [facility] Neurology  (Chart documentation was completed in part with Dragon voice-recognition software. Even though reviewed, some  grammatical, spelling, and word errors may remain.)

## 2021-09-10 NOTE — LETTER
9/10/2021         RE: Cayetano Lunsford  [id number]  [place] [id number]        Dear Colleague,    Thank you for referring your patient, Cayetano Lunsford, to the [facility] [place]. Please see a copy of my visit note below.    ESTABLISHED PATIENT NEUROLOGY NOTE    DATE OF VISIT: 9/10/2021  CLINIC LOCATION: [facility]  MRN: [id number]  PATIENT NAME: Cayetano Lunsford  YOB: 1945    PCP: Dmitri Andres MD    REASON FOR VISIT:   Chief Complaint   Patient presents with     Tremors     follow up      SUBJECTIVE:                                                      HISTORY OF PRESENT ILLNESS: Patient is here to follow up regarding essential tremor.  The last visit was on 3/9/2021.  At that time primidone dose was slightly increased.  Please refer to my initial/other prior notes for further information.    Since the last visit, the patient reports that his tremor is stable.  He did not notice significant improvement after increasing primidone dose.  No significant worsening compared to the previous appointment.  Tremor fluctuates day-to-day.  It is worse after caffeine.  He is on 200/200/150 mg of primidone daily and 120 mg of propranolol twice daily without noticeable side effects.  He denies interval development of new focal neurological symptoms.    On review of systems, patient endorses no other active complaints. Medications, allergies, family and social history were also reviewed. There are no changes reported by patient.  REVIEW OF SYSTEMS:                                                    10-system review was completed. Pertinent positives are included in HPI. The remainder of ROS is negative.  EXAM:                                                    Physical Exam:   Vitals: BP (!) 157/71 (BP Location: Left arm, Patient Position: Chair)   Pulse 63   Wt 80.1 kg (176 lb 9.6 oz)   SpO2 95%   BMI 28.38 kg/m    Repeat  vitals: /76 (BP Location: Left arm, Patient Position: Chair)   Pulse 67   Wt 80.1 kg (176 lb 9.6 oz)   SpO2 95%   BMI 28.38 kg/m      General: pt is in NAD, cooperative.  Skin: normal turgor, moist mucous membranes, no lesions/rashes noticed.  HEENT: ATNC, white sclera, normal conjunctiva.  Respiratory: Symmetric lung excursion, no accessory respiratory muscle use.  Abdomen: Non distended.  Neurological: awake, cooperative, follows commands, no aphasia, moderate chronic dysarthria, mild to moderate bilateral postural and action hand tremor, no other exam changes compared to the previous visit.    ASSESSMENT AND PLAN:                                                    Assessment: 76-year-old male patient with essential tremor presents for follow-up.  He reports no noticeable improvement after increasing the dose of primidone.  He is also on 120 mg of propranolol twice daily.  Previously we discussed that increasing the dose of propranolol might lead to bradycardia and hypotension.  This concern was shared by his primary care provider, and that is why we decided to increase primidone.  We also discussed additional treatment options of topiramate and gabapentin.  Today we reviewed all of these options again as well as Abiola Trio device.  We decided not to make any medication changes at the present time.    Rich to follow up with Primary Care provider regarding elevated blood pressure.    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    We decided not to make any medication changes.  Primidone prescription was refilled.    We discussed Ab[employer]ola Trio medical device.  The patient will review information about it at home to see if it is something he is willing to try.    Next follow-up appointment is in the next 6 months or earlier if needed.    Total Time: 31 minutes spent on the date of the  encounter doing chart review, history and exam, documentation and further activities per the note.    Bong Yoo MD  [facility]  (Chart documentation was completed in part with Dragon voice-recognition software. Even though reviewed, some grammatical, spelling, and word errors may remain.)      Again, thank you for allowing me to participate in the care of your patient.        Sincerely,        Bong Yoo MD

## 2021-09-13 DIAGNOSIS — I48.0 PAROXYSMAL ATRIAL FIBRILLATION (H): ICD-10-CM

## 2021-09-13 DIAGNOSIS — Z79.01 LONG TERM CURRENT USE OF ANTICOAGULANTS WITH INR GOAL OF 2.0-3.0: ICD-10-CM

## 2021-09-14 RX ORDER — WARFARIN SODIUM 5 MG/1
TABLET ORAL
Qty: 105 TABLET | Refills: 0 | Status: SHIPPED | OUTPATIENT
Start: 2021-09-14 | End: 2021-11-24

## 2021-09-14 NOTE — TELEPHONE ENCOUNTER
"Requested Prescriptions   Pending Prescriptions Disp Refills     warfarin ANTICOAGULANT (JANTOVEN ANTICOAGULANT) 5 MG tablet [Pharmacy Med Name: JANTOVEN 5MG TABS] 105 tablet 0     Sig: TAKE ONE AND ONE-HALF TABLETS (7.5MG) BY MOUTH EVERY SUNDAY, WEDNESDAY, FRIDAY AND TAKE ONE TABLET (5MG) BY MOUTH ALL OTHER DAYS OR AS INSTRUCTED BY INR CLINIC       Vitamin K Antagonists Failed - 9/13/2021 11:01 AM        Failed - INR is within goal in the past 6 weeks     Confirm INR is within goal in the past 6 weeks.     Recent Labs   Lab Test 09/07/21  1244   INR 1.8*                       Passed - Recent (12 mo) or future (30 days) visit within the authorizing provider's specialty     Patient has had an office visit with the authorizing provider or a provider within the authorizing providers department within the previous 12 mos or has a future within next 30 days. See \"Patient Info\" tab in inbasket, or \"Choose Columns\" in Meds & Orders section of the refill encounter.              Passed - Medication is active on med list        Passed - Patient is 18 years of age or older           Last office visit 8/11/21.  Warfarin dosing is managed by INR Clinic.  Prescription approved per [facility] Refill Protocol.    "

## 2021-09-20 ENCOUNTER — ANTICOAGULATION THERAPY VISIT (OUTPATIENT)
Dept: ANTICOAGULATION | Facility: CLINIC | Age: 76
End: 2021-09-20

## 2021-09-20 ENCOUNTER — LAB (OUTPATIENT)
Dept: LAB | Facility: CLINIC | Age: 76
End: 2021-09-20
Payer: COMMERCIAL

## 2021-09-20 DIAGNOSIS — Z79.01 LONG TERM CURRENT USE OF ANTICOAGULANT THERAPY: Primary | ICD-10-CM

## 2021-09-20 DIAGNOSIS — Z79.01 LONG TERM CURRENT USE OF ANTICOAGULANTS WITH INR GOAL OF 2.0-3.0: ICD-10-CM

## 2021-09-20 DIAGNOSIS — I48.11 LONGSTANDING PERSISTENT ATRIAL FIBRILLATION (H): ICD-10-CM

## 2021-09-20 DIAGNOSIS — I48.91 ATRIAL FIBRILLATION (H): ICD-10-CM

## 2021-09-20 LAB — INR BLD: 1.6 (ref 0.9–1.1)

## 2021-09-20 PROCEDURE — 85610 PROTHROMBIN TIME: CPT

## 2021-09-20 PROCEDURE — 36416 COLLJ CAPILLARY BLOOD SPEC: CPT

## 2021-09-20 NOTE — PROGRESS NOTES
ANTICOAGULATION MANAGEMENT     Cayetano Lunsford 76 year old male is on warfarin with subtherapeutic INR result. (Goal INR 2.0-3.0)    Recent labs: (last 7 days)     09/20/21  1319   INR 1.6*       ASSESSMENT     Source(s): Patient/Caregiver Call       Warfarin doses taken: Warfarin taken as instructed    Diet: No new diet changes identified--patient has been eating broccoli or green beans every MWF and V8-up to 2 glasses per day-on TThSSun.  Patient did decrease the V8 to TThS, NONE on Sundays.         New illness, injury, or hospitalization: No    Medication/supplement changes: None noted    Signs or symptoms of bleeding or clotting: No    Previous INR: Subtherapeutic    Additional findings: Neurology visit on 9/10/21--no medication changes or concerns noted     PLAN     Recommended plan for ongoing change(s) affecting INR     Dosing Instructions:  Increase your warfarin dose (12% change) with next INR in 10 days       Summary  As of 9/20/2021    Full warfarin instructions:  9/20: 10 mg; Otherwise 5 mg every Wed, Sat; 7.5 mg all other days   Next INR check:  9/30/2021             Telephone call with Cayetano who agrees to plan and repeated back plan correctly    Lab visit scheduled    Education provided: Importance of consistent vitamin K intake, Impact of vitamin K foods on INR, Vitamin K content of foods and Contact [phone]  with any changes, questions or concerns.     Plan made per [facility] anticoagulation protocol    Tatyana Akers RN  Anticoagulation Clinic  9/20/2021    _______________________________________________________________________     Anticoagulation Episode Summary     Current INR goal:  2.0-3.0   TTR:  52.6 % (1 y)   Target end date:  Indefinite   Send INR reminders to:  [facility]    Indications    Long-term (current) use of anticoagulants [Z79.01] [Z79.01]  Atrial fibrillation (H) [I48.91]  Longstanding persistent atrial fibrillation (H) [I48.11]  Long term current use of  anticoagulants with INR goal of 2.0-3.0 [Z79.01]           Comments:   Prefers AVS printed.          Anticoagulation Care Providers     Provider Role Specialty Phone number    Dmitri Andres MD Referring Family Medicine [phone]

## 2021-09-30 ENCOUNTER — TELEPHONE (OUTPATIENT)
Dept: FAMILY MEDICINE | Facility: CLINIC | Age: 76
End: 2021-09-30

## 2021-09-30 ENCOUNTER — LAB (OUTPATIENT)
Dept: LAB | Facility: CLINIC | Age: 76
End: 2021-09-30
Payer: COMMERCIAL

## 2021-09-30 ENCOUNTER — ANTICOAGULATION THERAPY VISIT (OUTPATIENT)
Dept: ANTICOAGULATION | Facility: CLINIC | Age: 76
End: 2021-09-30

## 2021-09-30 DIAGNOSIS — I48.11 LONGSTANDING PERSISTENT ATRIAL FIBRILLATION (H): ICD-10-CM

## 2021-09-30 DIAGNOSIS — Z79.01 LONG TERM CURRENT USE OF ANTICOAGULANTS WITH INR GOAL OF 2.0-3.0: ICD-10-CM

## 2021-09-30 DIAGNOSIS — Z79.01 LONG TERM CURRENT USE OF ANTICOAGULANT THERAPY: Primary | ICD-10-CM

## 2021-09-30 DIAGNOSIS — I48.91 ATRIAL FIBRILLATION (H): ICD-10-CM

## 2021-09-30 LAB — INR BLD: 2.2 (ref 0.9–1.1)

## 2021-09-30 PROCEDURE — 36416 COLLJ CAPILLARY BLOOD SPEC: CPT

## 2021-09-30 PROCEDURE — 85610 PROTHROMBIN TIME: CPT

## 2021-09-30 NOTE — PROGRESS NOTES
Anticoagulation Management    Unable to reach Rich today.    Today's INR result of 2.2 is therapeutic (goal INR of 2.0-3.0).  Result received from: Clinic Lab    Follow up required to confirm warfarin dose taken and assess for changes    Left VM to call [place] with transfer to Tatyana Joe:[phone] OR transfer to:[facility]      Anticoagulation clinic to follow up    Tatyana Akers RN

## 2021-09-30 NOTE — TELEPHONE ENCOUNTER
Reason for Call:  Other INR Callback    Detailed comments: Calling back for INR results   Please call patient after 4pm    Phone Number Patient can be reached at: Cell number on file:    Telephone Information:   Mobile [phone]       Best Time: Anytime after 4pm    Can we leave a detailed message on this number? YES    Call taken on 9/30/2021 at 3:18 PM by Erika Rondon

## 2021-09-30 NOTE — PROGRESS NOTES
ANTICOAGULATION MANAGEMENT     Cayetano Lunsford 76 year old male is on warfarin with therapeutic INR result. (Goal INR 2.0-3.0)    Recent labs: (last 7 days)     09/30/21  1248   INR 2.2*       ASSESSMENT     Source(s): Patient/Caregiver Call       Warfarin doses taken: Warfarin taken as instructed    Diet: Decreased greens/vitamin K in diet; ongoing change--patient decreased V8 to 1 glass 3 times per week and will continue with this (also will continue with 3 greens per week, MW).    New illness, injury, or hospitalization: No    Medication/supplement changes: None noted    Signs or symptoms of bleeding or clotting: No    Previous INR: Subtherapeutic    Additional findings: Booster Covid-19 injection received today     PLAN     Recommended plan for ongoing change(s) affecting INR     Dosing Instructions: Continue your current warfarin dose with next INR in 10 days       Summary  As of 9/30/2021    Full warfarin instructions:  5 mg every Wed, Sat; 7.5 mg all other days   Next INR check:  10/11/2021             Telephone call with Cayetano who agrees to plan and repeated back plan correctly    Lab visit scheduled    Education provided: Impact of vitamin K foods on INR and Vitamin K content of foods    Plan made per [facility] anticoagulation protocol    Tatyana Akers, RN  Anticoagulation Clinic  9/30/2021    _______________________________________________________________________     Anticoagulation Episode Summary     Current INR goal:  2.0-3.0   TTR:  50.8 % (1 y)   Target end date:  Indefinite   Send INR reminders to:  [facility]    Indications    Long-term (current) use of anticoagulants [Z79.01] [Z79.01]  Atrial fibrillation (H) [I48.91]  Longstanding persistent atrial fibrillation (H) [I48.11]  Long term current use of anticoagulants with INR goal of 2.0-3.0 [Z79.01]           Comments:   Prefers AVS printed.          Anticoagulation Care Providers     Provider Role Specialty Phone number    Dmitri Andres  MD [facility] Medicine [phone]

## 2021-10-06 ENCOUNTER — TELEPHONE (OUTPATIENT)
Dept: ANTICOAGULATION | Facility: CLINIC | Age: 76
End: 2021-10-06

## 2021-10-06 ENCOUNTER — OFFICE VISIT (OUTPATIENT)
Dept: FAMILY MEDICINE | Facility: CLINIC | Age: 76
End: 2021-10-06
Payer: COMMERCIAL

## 2021-10-06 VITALS
RESPIRATION RATE: 18 BRPM | HEART RATE: 72 BPM | WEIGHT: 176 LBS | DIASTOLIC BLOOD PRESSURE: 78 MMHG | BODY MASS INDEX: 28.29 KG/M2 | SYSTOLIC BLOOD PRESSURE: 132 MMHG | TEMPERATURE: 98.2 F | OXYGEN SATURATION: 94 %

## 2021-10-06 DIAGNOSIS — Z79.4 TYPE 2 DIABETES MELLITUS WITH STAGE 1 CHRONIC KIDNEY DISEASE, WITH LONG-TERM CURRENT USE OF INSULIN (H): ICD-10-CM

## 2021-10-06 DIAGNOSIS — N18.1 TYPE 2 DIABETES MELLITUS WITH STAGE 1 CHRONIC KIDNEY DISEASE, WITH LONG-TERM CURRENT USE OF INSULIN (H): ICD-10-CM

## 2021-10-06 DIAGNOSIS — E11.22 TYPE 2 DIABETES MELLITUS WITH STAGE 1 CHRONIC KIDNEY DISEASE, WITH LONG-TERM CURRENT USE OF INSULIN (H): ICD-10-CM

## 2021-10-06 DIAGNOSIS — L05.91 PILONIDAL CYST: Primary | ICD-10-CM

## 2021-10-06 DIAGNOSIS — Z12.11 COLON CANCER SCREENING: ICD-10-CM

## 2021-10-06 PROBLEM — E66.3 OVERWEIGHT (BMI 25.0-29.9): Status: ACTIVE | Noted: 2021-10-06

## 2021-10-06 PROCEDURE — 99214 OFFICE O/P EST MOD 30 MIN: CPT | Performed by: PHYSICIAN ASSISTANT

## 2021-10-06 PROCEDURE — 99207 PR FOOT EXAM NO CHARGE: CPT | Mod: 25 | Performed by: PHYSICIAN ASSISTANT

## 2021-10-06 RX ORDER — SULFAMETHOXAZOLE/TRIMETHOPRIM 800-160 MG
1 TABLET ORAL 2 TIMES DAILY
Qty: 14 TABLET | Refills: 0 | Status: SHIPPED | OUTPATIENT
Start: 2021-10-06 | End: 2021-10-13

## 2021-10-06 RX ORDER — MUPIROCIN 20 MG/G
OINTMENT TOPICAL 2 TIMES DAILY PRN
Qty: 30 G | Refills: 1 | Status: SHIPPED | OUTPATIENT
Start: 2021-10-06 | End: 2022-02-15

## 2021-10-06 ASSESSMENT — PAIN SCALES - GENERAL: PAINLEVEL: NO PAIN (0)

## 2021-10-06 NOTE — PATIENT INSTRUCTIONS
Patient Education     Pilonidal Cyst  A pilonidal cyst is found near the base of the spine (tailbone) or top of the buttocks crease. It may look like a pit or small depression. In some cases, it may have a hollow tunnel (sinus tract) that connects it to the surface of the skin. Normally, a pilonidal cyst does not cause symptoms. But if it becomes infected, it can cause pain and swelling.     What causes a pilonidal cyst and who gets them?   Two main causes are:    Ingrown hairs. This happens when a hair is forced under the skin or when a hair follicle ruptures.    Injury to the area. This can happen from sitting for long periods of time.  These cysts are often diagnosed in people between ages 16 and 26. But people of any age can have a pilonidal cyst. They affect both men and women, but they are more common in men.   Symptoms of a pilonidal cyst infection   A pilonidal cyst may not cause symptoms unless it becomes infected or inflamed. Once a pilonidal cyst becomes infected, it is called a pilonidal abscess. Infection or inflammation from irritation may cause the following symptoms:     Pain, redness, and swelling of the cyst and area around it    Foul-smelling drainage from the cyst    Fever  Diagnosing a pilonidal cyst  A pilonidal cyst can be diagnosed by how it looks and by its location. Your healthcare provider will examine the suspected cyst to confirm a diagnosis. You will be told if any tests are needed.   Treating a pilonidal cyst infection   Most pilonidal cysts are left alone. But if a cyst becomes infected or inflamed, you need treatment. It may include the following:     Incision and drainage. If needed, the cyst is cut open, and pus and other infected material is allowed to drain.    Antibiotic medicines for the infection.  Know that medicines don't make the cyst go away, and antibiotics have limited use in treating an abscess. They also won t keep a cyst from becoming infected again.    Hot water  soaks. These can help draw out the infection and ease pain and itching.    Surgery to remove the cyst (excision).  This may be done if the infection is severe, does not respond to medicine, or keeps coming back. A surgeon cuts and removes the cyst and the tissue around it. Your healthcare provider can tell you more if this is needed.    Laser hair removalaround the area. This may decrease the frequency of flare-ups.  Preventing infection  A pilonidal cyst can easily become infected. The following to help prevent infections:     Keep the cyst and surrounding skin area clean.    Remove hair from the area of the cyst regularly. Ask your healthcare provider about safe hair removal products or procedures.    Don't sit in one position for long periods of time. This helps to reduce weight and pressure on your tailbone area. Sitting on a special cushion to relieve pressure on the tailbone may also help. Ask your healthcare provider about where to purchase these cushions.    Don't wear tight-fitting clothing to reduce skin irritation around the cyst.  [employer] last reviewed this educational content on 8/1/2019 2000-2021 [employer]. All rights reserved. This information is not intended as a substitute for professional medical care. Always follow your healthcare professional's instructions.

## 2021-10-06 NOTE — TELEPHONE ENCOUNTER
ANTICOAGULATION  MANAGEMENT     Interacting Medication Review    Interacting medication(s): Sulfamethoxazole-Trimethoprim (Bactrim) with warfarin.    Duration: 7 days (10/6 to 10/12)    Indication: Cellulitis, pilonidal cyst    New medication?: Yes, interaction may increase INR and risk of bleeding       PLAN     Continue current warfarin dose. Recommend to check INR on 10/11/21 (already scheduled)    Left a detailed message for Cayetano.  Advised him that it is important for him to keep the appointment on 10/11 and to be watchful for signs or symptoms of bleeding and to notify the INR clinic if he is having any bleeding problems.    No adjustment to Anticoagulation Calendar was required    Estelle Starr RN

## 2021-10-06 NOTE — PROGRESS NOTES
"    Assessment & Plan     Pilonidal cyst  Perhaps some cellulitis starting given the erythema, no drainage or cyst externally noted. Continue topical mupirocin, start Bactrim. Will notify INR to know patient has been started on new antibiotic.  - mupirocin (BACTROBAN) 2 % external ointment; Apply topically 2 times daily as needed (pilonidal cyst)  - sulfamethoxazole-trimethoprim (BACTRIM DS) 800-160 MG tablet; Take 1 tablet by mouth 2 times daily for 7 days    Colon cancer screening  Patient working with GI to determine if should repeat colonoscopy.    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, with long-term current use of insulin (H)  Performed today.  - FOOT EXAM    Prescription drug management  26 minutes spent on the date of the encounter doing chart review, history and exam, documentation and further activities per the note       See Patient Instructions    Return if symptoms worsen or fail to improve.    Sarita Gunter PA-C  [facility] TIM Bailey is a 76 year old who presents for the following health issues    HPI     Concern - cyst   Onset: follow up  Description: painful seitting, \"feels a lot of pressure.\"     Patient seen any evaluated on 8/11/21 for evaluation of pilonidal cyst. Treated with Bactrim and topical mupirocin. He had improvement with this.      Review of Systems   GENERAL:  No fevers  MUSCULOSKELETAL: As noted in HPI          Objective    /78 (BP Location: Right arm, Patient Position: Sitting, Cuff Size: Adult Large)   Pulse 72   Temp 98.2  F (36.8  C) (Oral)   Resp 18   Wt 79.8 kg (176 lb)   SpO2 94%   BMI 28.29 kg/m    Body mass index is 28.29 kg/m .  Physical Exam   GENERAL: No acute distress  HEENT: Normocephalic  VASCULAR: 2+ DP and PT pulses bilaterally  EXTREMITIES:   Right lower extremity: No obvious deformity, no edema or ecchymosis. Sensation present with monofilament testing. No ulcers, skin breakdown or open " wounds.  Left lower extremity: No obvious deformity, no edema or ecchymosis. Sensation present with monofilament testing. No ulcers, skin breakdown or open wounds.  SKIN: Erythema at the top of the gluteal cleft, no open sores or drainage, no swelling or cyst noted on exam.   NEURO: Alert, non-focal

## 2021-10-11 ENCOUNTER — ANTICOAGULATION THERAPY VISIT (OUTPATIENT)
Dept: ANTICOAGULATION | Facility: CLINIC | Age: 76
End: 2021-10-11

## 2021-10-11 ENCOUNTER — LAB (OUTPATIENT)
Dept: LAB | Facility: CLINIC | Age: 76
End: 2021-10-11
Payer: COMMERCIAL

## 2021-10-11 DIAGNOSIS — I48.91 ATRIAL FIBRILLATION (H): ICD-10-CM

## 2021-10-11 DIAGNOSIS — I48.11 LONGSTANDING PERSISTENT ATRIAL FIBRILLATION (H): ICD-10-CM

## 2021-10-11 DIAGNOSIS — Z79.01 LONG TERM CURRENT USE OF ANTICOAGULANTS WITH INR GOAL OF 2.0-3.0: ICD-10-CM

## 2021-10-11 DIAGNOSIS — Z79.01 LONG TERM CURRENT USE OF ANTICOAGULANT THERAPY: Primary | ICD-10-CM

## 2021-10-11 LAB — INR BLD: 2.5 (ref 0.9–1.1)

## 2021-10-11 PROCEDURE — 36416 COLLJ CAPILLARY BLOOD SPEC: CPT

## 2021-10-11 PROCEDURE — 85610 PROTHROMBIN TIME: CPT

## 2021-10-11 NOTE — LETTER
[facility]            Cayetano Lunsford  [id number]   [facility] [address]

## 2021-10-11 NOTE — PROGRESS NOTES
Attempted to reach patient by phone.  Left message to call INR Clinic. [phone].    Kriss Bernstein RN  [facility] INR [facility]  [phone]  P MIYA CHAMPAGNE [[id number]]

## 2021-10-11 NOTE — PROGRESS NOTES
ANTICOAGULATION MANAGEMENT     Cayetano Lunsford 76 year old male is on warfarin with therapeutic INR result. (Goal INR 2.0-3.0)    Recent labs: (last 7 days)     10/11/21  1337   INR 2.5*       ASSESSMENT     Source(s): Chart Review and Patient/Caregiver Call       Warfarin doses taken: Warfarin taken as instructed    Diet: No new diet changes identified    New illness, injury, or hospitalization: Yes: Cellulitis, pilonidal cyst. He says it is feeling better, less discomfort.    Medication/supplement changes: Bactim. Is on day 5 of 7.    Signs or symptoms of bleeding or clotting: No    Previous INR: Therapeutic last visit; previously outside of goal range    Additional findings: None     PLAN     Recommended plan for temporary change(s) affecting INR     Dosing Instructions: Continue your current warfarin dose with next INR in 3 weeks       Summary  As of 10/11/2021    Full warfarin instructions:  5 mg every Wed, Sat; 7.5 mg all other days   Next INR check:  11/2/2021             Telephone call with Cayetano who agrees to plan and repeated back plan correctly    Lab visit scheduled    Education provided: No interaction anticipated between warfarin and Bactrim, as he has been on it for 5 days already, and Written instructions provided.  AVS calendar mailed to home address.      Plan made per [facility] anticoagulation protocol    Kriss Bernstein RN  Anticoagulation Clinic  10/11/2021    _______________________________________________________________________     Anticoagulation Episode Summary     Current INR goal:  2.0-3.0   TTR:  50.8 % (1 y)   Target end date:  Indefinite   Send INR reminders to:  [facility]    Indications    Long-term (current) use of anticoagulants [Z79.01] [Z79.01]  Atrial fibrillation (H) [I48.91]  Longstanding persistent atrial fibrillation (H) [I48.11]  Long term current use of anticoagulants with INR goal of 2.0-3.0 [Z79.01]           Comments:   Prefers AVS printed.           Anticoagulation Care Providers     Provider Role Specialty Phone number    Dmitri Andres MD Referring Family Medicine [phone]

## 2021-10-12 ENCOUNTER — CARE COORDINATION (OUTPATIENT)
Dept: CARDIOLOGY | Facility: CLINIC | Age: 76
End: 2021-10-12

## 2021-10-12 DIAGNOSIS — I48.0 PAROXYSMAL ATRIAL FIBRILLATION (H): Primary | ICD-10-CM

## 2021-10-12 DIAGNOSIS — E87.5 HYPERKALEMIA: ICD-10-CM

## 2021-10-12 NOTE — PROGRESS NOTES
Received refill request for propafenone. Pt was due to see  in August for annual visit, but no order was placed so he wasn't scheduled. Next general return  Opening with  is February. Will message  to see if  ok to refill med until that time. Of note, it looks like pt has had recent issues with hyperkalemia (last check was 7/9/21 and K was 5.5).

## 2021-10-14 NOTE — PROGRESS NOTES
I left message for pt to update him that he needs a bmp lab to reassess potassium prior to refilling propafenone. Will find out how much medication pt has left and help him set up his lab when he calls back. Po ARANDA October 14, 2021, 3:16 PM

## 2021-10-14 NOTE — TELEPHONE ENCOUNTER
Can you please check BMP again, if K is stable then we can refill propafenone. Having said that, please alert PCP and see if his losartan needs to/ can be changed to alternative HTN medication since his K stays high like this. Thanks, K

## 2021-10-15 DIAGNOSIS — Z79.01 CHRONIC ANTICOAGULATION: ICD-10-CM

## 2021-10-15 DIAGNOSIS — E78.5 HYPERLIPIDEMIA LDL GOAL <100: Primary | ICD-10-CM

## 2021-10-15 NOTE — PROGRESS NOTES
Pt called back and I reviewed recommendation from  for bmp lab to reassess for hyperkalemia prior to refilling propafenone. Pt said he has a one week supply left. Pt is also due for his yearly follow up with . I scheduled pt for lab (3:00) and visit with  (3:45) on 10/18/21 in .  can adjust meds if needed and refill propafenone at that time. Po ARANDA October 15, 2021, 12:37 PM

## 2021-10-18 ENCOUNTER — OFFICE VISIT (OUTPATIENT)
Dept: CARDIOLOGY | Facility: CLINIC | Age: 76
End: 2021-10-18
Payer: COMMERCIAL

## 2021-10-18 VITALS
HEIGHT: 66 IN | BODY MASS INDEX: 27.79 KG/M2 | OXYGEN SATURATION: 93 % | DIASTOLIC BLOOD PRESSURE: 60 MMHG | HEART RATE: 67 BPM | SYSTOLIC BLOOD PRESSURE: 138 MMHG | WEIGHT: 172.9 LBS

## 2021-10-18 DIAGNOSIS — I48.0 PAROXYSMAL ATRIAL FIBRILLATION (H): Primary | ICD-10-CM

## 2021-10-18 PROCEDURE — 99214 OFFICE O/P EST MOD 30 MIN: CPT | Performed by: INTERNAL MEDICINE

## 2021-10-18 PROCEDURE — 93000 ELECTROCARDIOGRAM COMPLETE: CPT | Performed by: INTERNAL MEDICINE

## 2021-10-18 RX ORDER — LOSARTAN POTASSIUM 25 MG/1
12.5 TABLET ORAL DAILY
Qty: 60 TABLET | Refills: 3 | Status: SHIPPED | OUTPATIENT
Start: 2021-10-18 | End: 2022-10-04

## 2021-10-18 RX ORDER — PROPAFENONE HYDROCHLORIDE 150 MG/1
150 TABLET, COATED ORAL 2 TIMES DAILY
Qty: 180 TABLET | Refills: 3 | Status: SHIPPED | OUTPATIENT
Start: 2021-10-18 | End: 2022-10-04

## 2021-10-18 RX ORDER — AMLODIPINE BESYLATE 10 MG/1
10 TABLET ORAL DAILY
Qty: 90 TABLET | Refills: 3 | Status: SHIPPED | OUTPATIENT
Start: 2021-10-18 | End: 2022-10-04

## 2021-10-18 RX ORDER — ATORVASTATIN CALCIUM 20 MG/1
20 TABLET, FILM COATED ORAL DAILY
Qty: 90 TABLET | Refills: 3 | Status: SHIPPED | OUTPATIENT
Start: 2021-10-18 | End: 2022-10-04

## 2021-10-18 ASSESSMENT — MIFFLIN-ST. JEOR: SCORE: 1452.02

## 2021-10-18 NOTE — PATIENT INSTRUCTIONS
I have refilled your propafenone - atrial fibrillation medication.    I have reduced your dose of Losartan from 100 mg daily, down to 12.5 mg daily. You have a new script for that.     I have started a new medication called as amlodipine for your blood pressure    Stop your Simvastatin (Zocor) cholesterol medication and change to Atorvastatin (lipitor) 20 mg daily.     See us back in heart clinic in 1 year. Otherwise follow up with your PCP

## 2021-10-18 NOTE — PROGRESS NOTES
"CARDIOLOGY CLINIC CONSULTATION    REASON FOR CONSULT: AF    PRIMARY CARE PHYSICIAN:  Dmitri Andres    HISTORY OF PRESENT ILLNESS:  A very pleasant 76-year-old gentleman who transferred care to me in 2019 after Dr. Dang retired from practice.  He has a history of paroxysmal atrial fibrillation on anticoagulation with Coumadin and rhythm control strategy with propafenone and propranolol for many years.  No history of obstructive coronary disease.  No MI.  No heart failure symptoms.  Last nuclear stress test and echocardiogram showed normal LV function and no ischemia or infarction.  He is here for routine 1-year followup.        Denies any cardiovascular symptoms.  No chest pain, shortness of breath.  No bleeding problems.  No edema, orthopnea or PND. Recently K has been running high. Creatinine is normal. Likely diet related he thinks.     PAST MEDICAL HISTORY:  Past Medical History:   Diagnosis Date     Allergic rhinitis, cause unspecified      Atrial fibrillation (H)      BPH (benign prostatic hyperplasia)      Chronic airway obstruction, not elsewhere classified      COPD (chronic obstructive pulmonary disease) (H)      Hyperlipidemia LDL goal <100 5/9/2010     Hypertension goal BP (blood pressure) < 130/80 10/19/2006     Obesity (BMI 30-39.9)      Perforation of tympanic membrane, unspecified     Right TM     Tachycardia, unspecified      Type 2 diabetes, HbA1C goal < 8% (H) 5/2/2010     Ulcerative colitis (H)      Unspecified cardiovascular disease        MEDICATIONS:  Current Outpatient Medications   Medication     albuterol (PROAIR HFA/PROVENTIL HFA/VENTOLIN HFA) 108 (90 BASE) MCG/ACT Inhaler     amLODIPine (NORVASC) 10 MG tablet     atorvastatin (LIPITOR) 20 MG tablet     BD INSULIN SYRINGE U/F 31G X 5/16\" 0.5 ML miscellaneous     blood glucose (NO BRAND SPECIFIED) test strip     folic acid 0.8 MG CAPS     insulin NPH (NOVOLIN N VIAL) 100 UNIT/ML vial     insulin regular (NOVOLIN R VIAL) 100 UNIT/ML " vial     ipratropium - albuterol 0.5 mg/2.5 mg/3 mL (DUONEB) 0.5-2.5 (3) MG/3ML neb solution     losartan (COZAAR) 25 MG tablet     metFORMIN (GLUCOPHAGE) 1000 MG tablet     ONETOUCH ULTRA test strip     pantoprazole (PROTONIX) 40 MG enteric coated tablet     primidone (MYSOLINE) 50 MG tablet     Probiotic Product (FLORAJEN3) CAPS     propafenone (RYTHMOL) 150 MG TABS tablet     propranolol (INDERAL) 40 MG tablet     tamsulosin (FLOMAX) 0.4 MG capsule     tiotropium-olodaterol (STIOLTO RESPIMAT) 2.5-2.5 MCG/ACT AERS     Vitamin D3 (CHOLECALCIFEROL) 25 mcg (1000 units) tablet     warfarin ANTICOAGULANT (JANTOVEN ANTICOAGULANT) 5 MG tablet     mupirocin (BACTROBAN) 2 % external ointment     sulfaSALAzine (AZULFIDINE) 500 MG tablet     No current facility-administered medications for this visit.       ALLERGIES:  Allergies   Allergen Reactions     Percodan [Oxycodone-Aspirin] Nausea and Vomiting       SOCIAL HISTORY:  I have reviewed this patient's social history and updated it with pertinent information if needed. Cayetano NORIEGA [place]  reports that he quit smoking about 29 years ago. His smoking use included cigarettes. He has a 30.00 pack-year smoking history. He has never used smokeless tobacco. He reports previous alcohol use. He reports that he does not use drugs.    FAMILY HISTORY:  I have reviewed this patient's family history and updated it with pertinent information if needed.   Family History   Problem Relation Age of Onset     Circulatory Mother         blood clot in leg     Diabetes Mother         type 2     Allergies Mother      Arthritis Mother      Gastrointestinal Disease Mother      Neurologic Disorder Mother         Familiar tremors     Neurologic Disorder Father         brain tumor     Cerebrovascular Disease Paternal Grandfather      Hypertension Brother      Hypertension Sister      Diabetes Sister         Type 2     Allergies Sister      Lipids Brother      Lipids Sister        REVIEW OF  SYSTEMS:  Skin:  Negative     Eyes:  Positive for glasses  ENT:  Positive for hearing loss  Respiratory:  Positive for dyspnea on exertion;wheezing  Cardiovascular:  Negative    Gastroenterology: Positive for heartburn  Genitourinary:  Negative    Musculoskeletal:  Negative    Neurologic:  Negative    Psychiatric:  Negative    Heme/Lymph/Imm:  Negative    Endocrine:  Positive for diabetes      PHYSICAL EXAM:      BP: 138/60 Pulse: 67     SpO2: 93 %      Vital Signs with Ranges  Pulse:  [67] 67  BP: (138)/(60) 138/60  SpO2:  [93 %] 93 %  172 lbs 14.4 oz    Constitutional: awake, alert, no distress  Respiratory: Clear  Cardiovascular: Regular, no MRG, no edema, normal JVP  GI: nondistended  Neuropsychiatric: appropriate affact    DATA:  Labs: Pertinent cardiac labs reviewed    ASSESSMENT:  Paroxysmal atrial fibrillation  Hypertension  Hyperkalemia    RECOMMENDATIONS:  No cardiac symptoms reported.     I have refilled propafenone.     I reduced Losartan from 100 mg daily, down to 12.5 mg daily given elevated K. Needs low dose given HTN and DM. Recheck K in 2 weeks. If persistently elevated, discuss with PCP for adrenal causes to be evaluated.     Start amlodipine instead given dose lowering. Stop Simvastatin and change to Atorvastatin 20 mg daily.     See us back in heart clinic in 1 year. Otherwise follow up with your PCP    JOCY Almaraz, [facility]  Cardiology - [facility]  October 18, 2021

## 2021-10-18 NOTE — LETTER
10/18/2021    Dmitri Andres MD  [address] [facility] [address]    RE: Cayetano Lunsford       Dear Colleague,    I had the pleasure of seeing Cayetano Lunsford in the [facility].    CARDIOLOGY CLINIC CONSULTATION    REASON FOR CONSULT: AF    PRIMARY CARE PHYSICIAN:  Dmitri Andres    HISTORY OF PRESENT ILLNESS:  A very pleasant 76-year-old gentleman who transferred care to me in 2019 after Dr. Dang retired from practice.  He has a history of paroxysmal atrial fibrillation on anticoagulation with Coumadin and rhythm control strategy with propafenone and propranolol for many years.  No history of obstructive coronary disease.  No MI.  No heart failure symptoms.  Last nuclear stress test and echocardiogram showed normal LV function and no ischemia or infarction.  He is here for routine 1-year followup.        Denies any cardiovascular symptoms.  No chest pain, shortness of breath.  No bleeding problems.  No edema, orthopnea or PND. Recently K has been running high. Creatinine is normal. Likely diet related he thinks.     PAST MEDICAL HISTORY:  Past Medical History:   Diagnosis Date     Allergic rhinitis, cause unspecified      Atrial fibrillation (H)      BPH (benign prostatic hyperplasia)      Chronic airway obstruction, not elsewhere classified      COPD (chronic obstructive pulmonary disease) (H)      Hyperlipidemia LDL goal <100 5/9/2010     Hypertension goal BP (blood pressure) < 130/80 10/19/2006     Obesity (BMI 30-39.9)      Perforation of tympanic membrane, unspecified     Right TM     Tachycardia, unspecified      Type 2 diabetes, HbA1C goal < 8% (H) 5/2/2010     Ulcerative colitis (H)      Unspecified cardiovascular disease        MEDICATIONS:  Current Outpatient Medications   Medication     albuterol (PROAIR HFA/PROVENTIL HFA/VENTOLIN HFA) 108 (90 BASE) MCG/ACT Inhaler     amLODIPine (NORVASC) 10 MG tablet     atorvastatin  "(LIPITOR) 20 MG tablet     BD INSULIN SYRINGE U/F 31G X 5/16\" 0.5 ML miscellaneous     blood glucose (NO BRAND SPECIFIED) test strip     folic acid 0.8 MG CAPS     insulin NPH (NOVOLIN N VIAL) 100 UNIT/ML vial     insulin regular (NOVOLIN R VIAL) 100 UNIT/ML vial     ipratropium - albuterol 0.5 mg/2.5 mg/3 mL (DUONEB) 0.5-2.5 (3) MG/3ML neb solution     losartan (COZAAR) 25 MG tablet     metFORMIN (GLUCOPHAGE) 1000 MG tablet     ONETOUCH ULTRA test strip     pantoprazole (PROTONIX) 40 MG enteric coated tablet     primidone (MYSOLINE) 50 MG tablet     Probiotic Product (FLORAJEN3) CAPS     propafenone (RYTHMOL) 150 MG TABS tablet     propranolol (INDERAL) 40 MG tablet     tamsulosin (FLOMAX) 0.4 MG capsule     tiotropium-olodaterol (STIOLTO RESPIMAT) 2.5-2.5 MCG/ACT AERS     Vitamin D3 (CHOLECALCIFEROL) 25 mcg (1000 units) tablet     warfarin ANTICOAGULANT (JANTOVEN ANTICOAGULANT) 5 MG tablet     mupirocin (BACTROBAN) 2 % external ointment     sulfaSALAzine (AZULFIDINE) 500 MG tablet     No current facility-administered medications for this visit.       ALLERGIES:  Allergies   Allergen Reactions     Percodan [Oxycodone-Aspirin] Nausea and Vomiting       SOCIAL HISTORY:  I have reviewed this patient's social history and updated it with pertinent information if needed. Cayetano Lunsford  reports that he quit smoking about 29 years ago. His smoking use included cigarettes. He has a 30.00 pack-year smoking history. He has never used smokeless tobacco. He reports previous alcohol use. He reports that he does not use drugs.    FAMILY HISTORY:  I have reviewed this patient's family history and updated it with pertinent information if needed.   Family History   Problem Relation Age of Onset     Circulatory Mother         blood clot in leg     Diabetes Mother         type 2     Allergies Mother      Arthritis Mother      Gastrointestinal Disease Mother      Neurologic Disorder Mother         Familiar tremors     Neurologic " Disorder Father         brain tumor     Cerebrovascular Disease Paternal Grandfather      Hypertension Brother      Hypertension Sister      Diabetes Sister         Type 2     Allergies Sister      Lipids Brother      Lipids Sister        REVIEW OF SYSTEMS:  Skin:  Negative     Eyes:  Positive for glasses  ENT:  Positive for hearing loss  Respiratory:  Positive for dyspnea on exertion;wheezing  Cardiovascular:  Negative    Gastroenterology: Positive for heartburn  Genitourinary:  Negative    Musculoskeletal:  Negative    Neurologic:  Negative    Psychiatric:  Negative    Heme/Lymph/Imm:  Negative    Endocrine:  Positive for diabetes      PHYSICAL EXAM:      BP: 138/60 Pulse: 67     SpO2: 93 %      Vital Signs with Ranges  Pulse:  [67] 67  BP: (138)/(60) 138/60  SpO2:  [93 %] 93 %  172 lbs 14.4 oz    Constitutional: awake, alert, no distress  Respiratory: Clear  Cardiovascular: Regular, no MRG, no edema, normal JVP  GI: nondistended  Neuropsychiatric: appropriate affact    DATA:  Labs: Pertinent cardiac labs reviewed    ASSESSMENT:  Paroxysmal atrial fibrillation  Hypertension  Hyperkalemia    RECOMMENDATIONS:  No cardiac symptoms reported.     I have refilled propafenone.     I reduced Losartan from 100 mg daily, down to 12.5 mg daily given elevated K. Needs low dose given HTN and DM. Recheck K in 2 weeks. If persistently elevated, discuss with PCP for adrenal causes to be evaluated.     Start amlodipine instead given dose lowering. Stop Simvastatin and change to Atorvastatin 20 mg daily.     See us back in heart clinic in 1 year. Otherwise follow up with your PCP    JOCY Almaraz, [facility]  Cardiology - [facility]  October 18, 2021            Thank you for allowing me to participate in the care of your patient.      Sincerely,     Daniel Marroquin MD     [facility]  cc:   No referring provider defined for this encounter.

## 2021-11-02 ENCOUNTER — ANTICOAGULATION THERAPY VISIT (OUTPATIENT)
Dept: ANTICOAGULATION | Facility: CLINIC | Age: 76
End: 2021-11-02

## 2021-11-02 ENCOUNTER — LAB (OUTPATIENT)
Dept: LAB | Facility: CLINIC | Age: 76
End: 2021-11-02
Payer: COMMERCIAL

## 2021-11-02 DIAGNOSIS — Z79.01 LONG TERM CURRENT USE OF ANTICOAGULANT THERAPY: Primary | ICD-10-CM

## 2021-11-02 DIAGNOSIS — I48.11 LONGSTANDING PERSISTENT ATRIAL FIBRILLATION (H): ICD-10-CM

## 2021-11-02 DIAGNOSIS — Z79.01 LONG TERM CURRENT USE OF ANTICOAGULANTS WITH INR GOAL OF 2.0-3.0: ICD-10-CM

## 2021-11-02 DIAGNOSIS — I48.91 ATRIAL FIBRILLATION (H): ICD-10-CM

## 2021-11-02 LAB — INR BLD: 2 (ref 0.9–1.1)

## 2021-11-02 PROCEDURE — 36416 COLLJ CAPILLARY BLOOD SPEC: CPT

## 2021-11-02 PROCEDURE — 85610 PROTHROMBIN TIME: CPT

## 2021-11-02 NOTE — PROGRESS NOTES
ANTICOAGULATION MANAGEMENT     Cayetano Lunsford 76 year old male is on warfarin with therapeutic INR result. (Goal INR 2.0-3.0)    Recent labs: (last 7 days)     11/02/21  1305   INR 2.0*       ASSESSMENT     Source(s): Chart Review and Patient/Caregiver Call       Warfarin doses taken: Warfarin taken as instructed    Diet: No new diet changes identified    New illness, injury, or hospitalization: No    Medication/supplement changes: switched from simvastatin to atorvastatin on 10/18/21.  Simvastatin can increase INR, but atorvastatin does not have an effect per Up-to-date.    Signs or symptoms of bleeding or clotting: No    Previous INR: Therapeutic last 2(+) visits    Additional findings: None     PLAN     Recommended plan for ongoing changes affecting INR     Dosing Instructions: Continue your current warfarin dose with next INR in 2 weeks       Summary  As of 11/2/2021    Full warfarin instructions:  5 mg every Wed, Sat; 7.5 mg all other days   Next INR check:  11/16/2021             Telephone call with Cayetano who agrees to plan and repeated back plan correctly.  Will have AVS mailed.    Lab visit scheduled    Education provided: Please call back if any changes to your diet, medications or how you've been taking warfarin    Plan made per [facility] anticoagulation protocol    Estelle Starr RN  Anticoagulation Clinic  11/2/2021    _______________________________________________________________________     Anticoagulation Episode Summary     Current INR goal:  2.0-3.0   TTR:  55.9 % (1 y)   Target end date:  Indefinite   Send INR reminders to:  [facility]    Indications    Long-term (current) use of anticoagulants [Z79.01] [Z79.01]  Atrial fibrillation (H) [I48.91]  Longstanding persistent atrial fibrillation (H) [I48.11]  Long term current use of anticoagulants with INR goal of 2.0-3.0 [Z79.01]           Comments:           Anticoagulation Care Providers     Provider Role Specialty Phone number    Mckenna  Dmitri Palmer MD [facility] [phone]

## 2021-11-03 ENCOUNTER — LAB (OUTPATIENT)
Dept: LAB | Facility: CLINIC | Age: 76
End: 2021-11-03
Payer: COMMERCIAL

## 2021-11-03 DIAGNOSIS — I48.0 PAROXYSMAL ATRIAL FIBRILLATION (H): ICD-10-CM

## 2021-11-03 LAB
ANION GAP SERPL CALCULATED.3IONS-SCNC: 3 MMOL/L (ref 3–14)
BUN SERPL-MCNC: 14 MG/DL (ref 7–30)
CALCIUM SERPL-MCNC: 8.3 MG/DL (ref 8.5–10.1)
CHLORIDE BLD-SCNC: 102 MMOL/L (ref 94–109)
CO2 SERPL-SCNC: 31 MMOL/L (ref 20–32)
CREAT SERPL-MCNC: 0.71 MG/DL (ref 0.66–1.25)
GFR SERPL CREATININE-BSD FRML MDRD: 90 ML/MIN/1.73M2
GLUCOSE BLD-MCNC: 201 MG/DL (ref 70–99)
POTASSIUM BLD-SCNC: 4.8 MMOL/L (ref 3.4–5.3)
SODIUM SERPL-SCNC: 136 MMOL/L (ref 133–144)

## 2021-11-03 PROCEDURE — 80048 BASIC METABOLIC PNL TOTAL CA: CPT | Performed by: INTERNAL MEDICINE

## 2021-11-03 PROCEDURE — 36415 COLL VENOUS BLD VENIPUNCTURE: CPT | Performed by: INTERNAL MEDICINE

## 2021-11-04 NOTE — PROGRESS NOTES
Medication Therapy Management (MTM) Encounter    ASSESSMENT:                            Medication Adherence/Access: No issues identified    Type 2 Diabetes: A1c at goal of <8%. Fasting blood glucose not at goal of <150mg/dL. Post-prandial blood glucose is mostly at goal of <180mg/dL. Due to higher blood sugars in the morning upon waking up, recommend increasing evening time NPH to 6 units. Recheck A1c at next appointment.   Hypertension/Afib: Stable.  Hyperlipidemia: Due to recent switch from simvastatin to atorvastatin, can recheck lipid panel in 3-6 months.  COPD: Stable. If shortness of breath and wheezing worsen in the future, recommend using a LABA per the Gold 2021 guidelines.   BPH: Stable  UC: Stable  GERD: Stable  Tremors: Following neurologist.     PLAN:                            1. We would like to see your fasting blood sugar less than 150 in the morning when you wake up, so please increase insulin N to 6 units at night and keep at 2 units in the morning.     Follow-up: Return in about 6 months (around 2022).    SUBJECTIVE/OBJECTIVE:                          Cayetano Lunsford is a 76 year old male coming in for a follow-up visit. He was referred to me from Dr. Andres.  Today's visit is a follow-up MTM visit from .     Reason for visit: diabetes follow up    Tobacco: He reports that he quit smoking about 29 years ago. His smoking use included cigarettes. He has a 30.00 pack-year smoking history. He has never used smokeless tobacco.  Alcohol: not currently using  Past Medical History: Reviewed in chart      Medication Adherence/Access: no issues reported    Type 2 Diabetes:  Currently taking metformin 1000mg twice daily, NPH 2 units AM and 5 units PM, and insulin regular 2 units when the reading is >200mg/dL. Patient is not experiencing side effects.  Blood sugar monitorin-3 time(s) daily. Ranges (patient reported):   Date FBG/ 2hours post Before dinner Bedtime     155      188 125  156   11/03 179 110 156   11/02 150 290 (ate apple crisp) 151   11/01 187 167 153   10/31  195 134   10/30 140 177 60, 87   Symptoms of low blood sugar? When he was low last week he had no symptoms.  Symptoms of high blood sugar? none  Eye exam: up to date  Foot exam: up to date  Diet/Exercise: Eating too many good foods recently. Eats blueberries on his cereal and apples at night.   Aspirin: Not taking due to warfarin  Statin: Yes: atorvastatin  ACEi/ARB: Yes: losartan.   Urine Albumin:   Lab Results   Component Value Date    UMALCR 51.83 (H) 11/09/2020      Lab Results   Component Value Date    A1C 5.4 07/09/2021    A1C 5.3 05/20/2021    A1C 5.9 11/09/2020    A1C 5.8 07/07/2020    A1C 6.1 12/09/2019       Hypertension/Afib: Current medications include warfarin 5mg on Sat and Wed and 7.5mg all other days, propranolol 80-120mg by mouth two times daily, propafenone 150mg twice daily, losartan 12.5mg daily and amlodipine 10mg daily.  He reports his doctor made changes to his hypertension medications due to high potassium.  Patient does not self-monitor blood pressure.  Patient reports no current medication side effects. Follows with anticoagulation clinic.  BP Readings from Last 3 Encounters:   11/05/21 124/66   10/18/21 138/60   10/06/21 132/78     Lab Results   Component Value Date    INR 2.0 11/02/2021    INR 2.5 10/11/2021    INR 2.2 09/30/2021    INR 1.6 09/20/2021    INR 1.8 09/07/2021    INR 3.5 08/24/2021    INR 2.6 08/17/2021    INR 2.0 08/13/2021    INR 2.3 07/23/2021     Potassium   Date Value Ref Range Status   11/03/2021 4.8 3.4 - 5.3 mmol/L Final   07/09/2021 5.5 (H) 3.4 - 5.3 mmol/L Final       Hyperlipidemia: Current therapy includes atorvastatin 20mg daily (switched from simvastatin 40 mg last month).  Patient reports no significant myalgias or other side effects.  The 10-year ASCVD risk score (Kaitlin DIANE Chavez., et al., 2013) is: 52.2%    Values used to calculate the score:      Age: 76 years      Sex:  Male      Is Non- : No      Diabetic: Yes      Tobacco smoker: No      Systolic Blood Pressure: 124 mmHg      Is BP treated: Yes      HDL Cholesterol: 30 mg/dL      Total Cholesterol: 140 mg/dL  Recent Labs   Lab Test 11/09/20  1108 09/09/19  0947 05/05/16  0919 05/12/15  0757 04/24/14  1016 04/24/14  1016   CHOL 140 159   < > 141   < > 148   HDL 30* 38*   < > 44   < > 38*   LDL 59 68   < > 46   < > 66   TRIG 253* 264*   < > 255*   < > 220*   CHOLHDLRATIO  --   --   --  3.2  --  3.9    < > = values in this interval not displayed.       COPD: Current medications: albuterol MDI as needed (2-3 times daily), Duonebs as needed (hasn't been using lately but plans to refill and start using daily again), LAMA/LABA- Stiolto Respimat 2 puff(s) once daily.   Patient is not experiencing side effects.   Patient reports the following symptoms: none.    BPH: Patient taking tamsulosin 0.4mg daily and reports no issues.     UC: Patient taking sulfasalazine 1000mg AM and 500 mg daily, probiotic daily, and folic acid 0.8mg daily. No issues reported. Follows gastroenterologist.     GERD: Current medications include: Protonix (pantoprazole) 40mg once daily. Pt reports no current symptoms.      Tremors: Patient taking primidone 200mg AM, 200mg PM and 150 mg bedtime. Does also take propranolol 80-120mg twice daily and reports no issues. Follows with neurologist.     Today's Vitals: /66   Wt 178 lb 3.2 oz (80.8 kg)   BMI 28.76 kg/m    ----------------    I spent 45 minutes with this patient today. All changes were made via collaborative practice agreement with Dmitri Andres. A copy of the visit note was provided to the patient's primary care provider.    The patient was given a summary of these recommendations.     Vianey Mac, 4th Year Pharmacy Student    Diana Johnson, PharmD  Medication Therapy Management Provider, [facility]  Pager: [phone]      Medication  Therapy Recommendations  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)    Current Medication: insulin NPH (NOVOLIN N VIAL) 100 UNIT/ML vial   Rationale: Dose too low - Dosage too low - Effectiveness   Recommendation: Increase Dose   Status: Accepted per CPA   Note: Increase NPH to 6 units in the evening.

## 2021-11-05 ENCOUNTER — OFFICE VISIT (OUTPATIENT)
Dept: PHARMACY | Facility: CLINIC | Age: 76
End: 2021-11-05
Payer: COMMERCIAL

## 2021-11-05 VITALS — BODY MASS INDEX: 28.76 KG/M2 | SYSTOLIC BLOOD PRESSURE: 124 MMHG | WEIGHT: 178.2 LBS | DIASTOLIC BLOOD PRESSURE: 66 MMHG

## 2021-11-05 DIAGNOSIS — J44.9 CHRONIC OBSTRUCTIVE PULMONARY DISEASE, UNSPECIFIED COPD TYPE (H): ICD-10-CM

## 2021-11-05 DIAGNOSIS — E78.5 HYPERLIPIDEMIA LDL GOAL <100: ICD-10-CM

## 2021-11-05 DIAGNOSIS — K51.90 ULCERATIVE COLITIS WITHOUT COMPLICATIONS, UNSPECIFIED LOCATION (H): ICD-10-CM

## 2021-11-05 DIAGNOSIS — Z79.4 TYPE 2 DIABETES MELLITUS WITH STAGE 1 CHRONIC KIDNEY DISEASE, WITH LONG-TERM CURRENT USE OF INSULIN (H): Primary | ICD-10-CM

## 2021-11-05 DIAGNOSIS — N18.1 TYPE 2 DIABETES MELLITUS WITH STAGE 1 CHRONIC KIDNEY DISEASE, WITH LONG-TERM CURRENT USE OF INSULIN (H): Primary | ICD-10-CM

## 2021-11-05 DIAGNOSIS — I48.91 ATRIAL FIBRILLATION, UNSPECIFIED TYPE (H): ICD-10-CM

## 2021-11-05 DIAGNOSIS — N40.0 BENIGN PROSTATIC HYPERPLASIA, UNSPECIFIED WHETHER LOWER URINARY TRACT SYMPTOMS PRESENT: ICD-10-CM

## 2021-11-05 DIAGNOSIS — K21.9 GASTROESOPHAGEAL REFLUX DISEASE WITHOUT ESOPHAGITIS: ICD-10-CM

## 2021-11-05 DIAGNOSIS — E11.22 TYPE 2 DIABETES MELLITUS WITH STAGE 1 CHRONIC KIDNEY DISEASE, WITH LONG-TERM CURRENT USE OF INSULIN (H): Primary | ICD-10-CM

## 2021-11-05 DIAGNOSIS — I10 HYPERTENSION GOAL BP (BLOOD PRESSURE) < 140/90: ICD-10-CM

## 2021-11-05 DIAGNOSIS — G25.0 BENIGN FAMILIAL TREMOR: ICD-10-CM

## 2021-11-05 PROCEDURE — 99607 MTMS BY PHARM ADDL 15 MIN: CPT | Performed by: PHARMACIST

## 2021-11-05 PROCEDURE — 99606 MTMS BY PHARM EST 15 MIN: CPT | Performed by: PHARMACIST

## 2021-11-05 NOTE — PATIENT INSTRUCTIONS
Recommendations from today's MTM visit:                                                       1. We would like to see your fasting blood sugar less than 150 in the morning when you wake up, so please increase insulin N to 6 units at night.     It was great to speak with you today.  I value your experience and would be very thankful for your time with providing feedback on our clinic survey. You may receive a survey via email or text message in the next few days.     To schedule another MTM appointment, please call the clinic directly or you may call the MTM scheduling line at [phone] or toll-free at [phone].     My Clinical Pharmacist's contact information:                                                      Please feel free to contact me with any questions or concerns you have.      Diana Johnson, PharmD  Medication Therapy Management Provider, [facility]

## 2021-11-16 ENCOUNTER — LAB (OUTPATIENT)
Dept: LAB | Facility: CLINIC | Age: 76
End: 2021-11-16
Payer: COMMERCIAL

## 2021-11-16 ENCOUNTER — ANTICOAGULATION THERAPY VISIT (OUTPATIENT)
Dept: ANTICOAGULATION | Facility: CLINIC | Age: 76
End: 2021-11-16

## 2021-11-16 DIAGNOSIS — Z79.01 LONG TERM CURRENT USE OF ANTICOAGULANT THERAPY: Primary | ICD-10-CM

## 2021-11-16 DIAGNOSIS — I48.91 ATRIAL FIBRILLATION (H): ICD-10-CM

## 2021-11-16 DIAGNOSIS — I48.11 LONGSTANDING PERSISTENT ATRIAL FIBRILLATION (H): ICD-10-CM

## 2021-11-16 DIAGNOSIS — Z79.01 LONG TERM CURRENT USE OF ANTICOAGULANTS WITH INR GOAL OF 2.0-3.0: ICD-10-CM

## 2021-11-16 LAB — INR BLD: 2.4 (ref 0.9–1.1)

## 2021-11-16 PROCEDURE — 36416 COLLJ CAPILLARY BLOOD SPEC: CPT

## 2021-11-16 PROCEDURE — 85610 PROTHROMBIN TIME: CPT

## 2021-11-16 NOTE — PROGRESS NOTES
ANTICOAGULATION MANAGEMENT     Cayetano Lunsford 76 year old male is on warfarin with therapeutic INR result. (Goal INR 2.0-3.0)    Recent labs: (last 7 days)     11/16/21  1324   INR 2.4*       ASSESSMENT     Source(s): Chart Review and Patient/Caregiver Call       Warfarin doses taken: Warfarin taken as instructed    Diet: No new diet changes identified    New illness, injury, or hospitalization: No    Medication/supplement changes: zocor changed to lipitor in 10/2021 and Cozaar dose decreased    Signs or symptoms of bleeding or clotting: No    Previous INR: Therapeutic last 2(+) visits    Additional findings: None     PLAN     Recommended plan for ongoing change(s) affecting INR     Dosing Instructions: Continue your current warfarin dose with next INR in 3 weeks       Summary  As of 11/16/2021    Full warfarin instructions:  5 mg every Wed, Sat; 7.5 mg all other days   Next INR check:  12/7/2021             Telephone call with Cayetano who verbalizes understanding and agrees to plan    Lab visit scheduled, will have JAZZ Nichole RN mail AVS    Education provided: Contact [phone]  with any changes, questions or concerns.     Plan made per [facility] anticoagulation protocol    Tatyana Akers, RN  Anticoagulation Clinic  11/16/2021    _______________________________________________________________________     Anticoagulation Episode Summary     Current INR goal:  2.0-3.0   TTR:  59.7 % (1 y)   Target end date:  Indefinite   Send INR reminders to:  [facility]    Indications    Long-term (current) use of anticoagulants [Z79.01] [Z79.01]  Atrial fibrillation (H) [I48.91]  Longstanding persistent atrial fibrillation (H) [I48.11]  Long term current use of anticoagulants with INR goal of 2.0-3.0 [Z79.01]           Comments:           Anticoagulation Care Providers     Provider Role Specialty Phone number    Dmitri Andres MD Referring Family Medicine [phone]             negative...

## 2021-11-16 NOTE — PROGRESS NOTES
Anticoagulation Management    Unable to reach Rich today.    Today's INR result of 2.4 is therapeutic (goal INR of 2.0-3.0).  Result received from: Clinic Lab    Follow up required to confirm warfarin dose taken and assess for changes    Left VM to continue SAME DOSE and to call [phone] with transfer to Tatyana Joe:[phone] OR transfer to:[facility]    Anticoagulation clinic to follow up    Tatyana Akers RN

## 2021-11-24 DIAGNOSIS — Z79.01 LONG TERM CURRENT USE OF ANTICOAGULANTS WITH INR GOAL OF 2.0-3.0: ICD-10-CM

## 2021-11-24 DIAGNOSIS — I48.0 PAROXYSMAL ATRIAL FIBRILLATION (H): ICD-10-CM

## 2021-11-24 RX ORDER — WARFARIN SODIUM 5 MG/1
TABLET ORAL
Qty: 145 TABLET | Refills: 0 | Status: SHIPPED | OUTPATIENT
Start: 2021-11-24 | End: 2022-02-03

## 2021-12-07 ENCOUNTER — LAB (OUTPATIENT)
Dept: LAB | Facility: CLINIC | Age: 76
End: 2021-12-07
Payer: COMMERCIAL

## 2021-12-07 ENCOUNTER — ANTICOAGULATION THERAPY VISIT (OUTPATIENT)
Dept: ANTICOAGULATION | Facility: CLINIC | Age: 76
End: 2021-12-07

## 2021-12-07 DIAGNOSIS — Z79.01 LONG TERM CURRENT USE OF ANTICOAGULANTS WITH INR GOAL OF 2.0-3.0: ICD-10-CM

## 2021-12-07 DIAGNOSIS — Z79.01 LONG TERM CURRENT USE OF ANTICOAGULANT THERAPY: Primary | ICD-10-CM

## 2021-12-07 DIAGNOSIS — E11.22 TYPE 2 DIABETES MELLITUS WITH STAGE 1 CHRONIC KIDNEY DISEASE, UNSPECIFIED WHETHER LONG TERM INSULIN USE (H): Primary | ICD-10-CM

## 2021-12-07 DIAGNOSIS — I48.91 ATRIAL FIBRILLATION (H): ICD-10-CM

## 2021-12-07 DIAGNOSIS — I48.11 LONGSTANDING PERSISTENT ATRIAL FIBRILLATION (H): ICD-10-CM

## 2021-12-07 DIAGNOSIS — N18.1 TYPE 2 DIABETES MELLITUS WITH STAGE 1 CHRONIC KIDNEY DISEASE, UNSPECIFIED WHETHER LONG TERM INSULIN USE (H): Primary | ICD-10-CM

## 2021-12-07 LAB — INR BLD: 2.6 (ref 0.9–1.1)

## 2021-12-07 PROCEDURE — 85610 PROTHROMBIN TIME: CPT

## 2021-12-07 PROCEDURE — 36416 COLLJ CAPILLARY BLOOD SPEC: CPT

## 2021-12-07 NOTE — PROGRESS NOTES
ANTICOAGULATION MANAGEMENT     Cayetano Lunsford 76 year old male is on warfarin with therapeutic INR result. (Goal INR 2.0-3.0)    Recent labs: (last 7 days)     12/07/21  1317   INR 2.6*       ASSESSMENT     Source(s): Chart Review and Patient/Caregiver Call       Warfarin doses taken: Warfarin taken as instructed    Diet: Patient reports cutting back on red meat, green intake has been fairly consistent    New illness, injury, or hospitalization: No    Medication/supplement changes: As mentioned previously, Cardiologist reduced Losartan to 12.5 mg, started patient on Amlodipine and changed Simvastatin to Atorvastatin in October d/t elevated K+. Simvastatin is the only medication with potential interactions per up-to-date, but patient is tolerating well with stable INR levels since change. Patient is due for labs and will have those checked at next INR appt. Patient has also been taking Vitamin D daily for the last two months.     Signs or symptoms of bleeding or clotting: No    Previous INR: Therapeutic last 2(+) visits    Additional findings: None     PLAN     Recommended plan for ongoing change(s) affecting INR     Dosing Instructions: Continue your current warfarin dose with next INR in 3 weeks       Summary  As of 12/7/2021    Full warfarin instructions:  5 mg every Wed, Sat; 7.5 mg all other days   Next INR check:  12/30/2021             Telephone call with Cayetano who verbalizes understanding and agrees to plan. Sending [facility] encounter to Emily to mail AVS.    Lab visit scheduled    Education provided: Please call back if any changes to your diet, medications or how you've been taking warfarin, Monitoring for bleeding signs and symptoms, Monitoring for clotting signs and symptoms and Importance of notifying clinic for changes in medications; a sooner lab recheck maybe needed.    Plan made per [facility] anticoagulation protocol    Kirstin Meléndez RN  Anticoagulation  Clinic  12/7/2021    _______________________________________________________________________     Anticoagulation Episode Summary     Current INR goal:  2.0-3.0   TTR:  65.5 % (1 y)   Target end date:  Indefinite   Send INR reminders to:  [facility]    Indications    Long-term (current) use of anticoagulants [Z79.01] [Z79.01]  Atrial fibrillation (H) [I48.91]  Longstanding persistent atrial fibrillation (H) [I48.11]  Long term current use of anticoagulants with INR goal of 2.0-3.0 [Z79.01]           Comments:           Anticoagulation Care Providers     Provider Role Specialty Phone number    Dmitri Andres MD Referring Family Medicine [phone]

## 2021-12-23 DIAGNOSIS — N18.1 TYPE 2 DIABETES MELLITUS WITH STAGE 1 CHRONIC KIDNEY DISEASE, WITH LONG-TERM CURRENT USE OF INSULIN (H): ICD-10-CM

## 2021-12-23 DIAGNOSIS — Z79.4 TYPE 2 DIABETES MELLITUS WITH STAGE 1 CHRONIC KIDNEY DISEASE, WITH LONG-TERM CURRENT USE OF INSULIN (H): ICD-10-CM

## 2021-12-23 DIAGNOSIS — E11.22 TYPE 2 DIABETES MELLITUS WITH STAGE 1 CHRONIC KIDNEY DISEASE, WITH LONG-TERM CURRENT USE OF INSULIN (H): ICD-10-CM

## 2021-12-23 RX ORDER — BLOOD SUGAR DIAGNOSTIC
STRIP MISCELLANEOUS
Qty: 300 STRIP | Refills: 0 | Status: SHIPPED | OUTPATIENT
Start: 2021-12-23 | End: 2022-03-16

## 2021-12-23 NOTE — TELEPHONE ENCOUNTER
Prescription approved per [facility] Refill Protocol.    Rayna Estrella RN on 12/23/2021 at 12:01 PM

## 2021-12-30 ENCOUNTER — CARE COORDINATION (OUTPATIENT)
Dept: CARDIOLOGY | Facility: CLINIC | Age: 76
End: 2021-12-30

## 2021-12-30 ENCOUNTER — ANTICOAGULATION THERAPY VISIT (OUTPATIENT)
Dept: ANTICOAGULATION | Facility: CLINIC | Age: 76
End: 2021-12-30

## 2021-12-30 ENCOUNTER — LAB (OUTPATIENT)
Dept: LAB | Facility: CLINIC | Age: 76
End: 2021-12-30
Payer: COMMERCIAL

## 2021-12-30 DIAGNOSIS — Z79.01 CHRONIC ANTICOAGULATION: ICD-10-CM

## 2021-12-30 DIAGNOSIS — E11.22 TYPE 2 DIABETES MELLITUS WITH STAGE 1 CHRONIC KIDNEY DISEASE, UNSPECIFIED WHETHER LONG TERM INSULIN USE (H): ICD-10-CM

## 2021-12-30 DIAGNOSIS — Z79.01 LONG TERM CURRENT USE OF ANTICOAGULANT THERAPY: Primary | ICD-10-CM

## 2021-12-30 DIAGNOSIS — Z79.01 LONG TERM CURRENT USE OF ANTICOAGULANTS WITH INR GOAL OF 2.0-3.0: ICD-10-CM

## 2021-12-30 DIAGNOSIS — I48.11 LONGSTANDING PERSISTENT ATRIAL FIBRILLATION (H): ICD-10-CM

## 2021-12-30 DIAGNOSIS — N18.1 TYPE 2 DIABETES MELLITUS WITH STAGE 1 CHRONIC KIDNEY DISEASE, UNSPECIFIED WHETHER LONG TERM INSULIN USE (H): ICD-10-CM

## 2021-12-30 DIAGNOSIS — I48.91 ATRIAL FIBRILLATION (H): ICD-10-CM

## 2021-12-30 DIAGNOSIS — E78.5 HYPERLIPIDEMIA LDL GOAL <100: ICD-10-CM

## 2021-12-30 LAB
ALT SERPL W P-5'-P-CCNC: 39 U/L (ref 0–70)
CHOLEST SERPL-MCNC: 161 MG/DL
CREAT UR-MCNC: 48 MG/DL
ERYTHROCYTE [DISTWIDTH] IN BLOOD BY AUTOMATED COUNT: 13.8 % (ref 10–15)
FASTING STATUS PATIENT QL REPORTED: YES
HCT VFR BLD AUTO: 33.8 % (ref 40–53)
HDLC SERPL-MCNC: 48 MG/DL
HGB BLD-MCNC: 10.6 G/DL (ref 13.3–17.7)
INR BLD: 3 (ref 0.9–1.1)
LDLC SERPL CALC-MCNC: 78 MG/DL
MCH RBC QN AUTO: 30.1 PG (ref 26.5–33)
MCHC RBC AUTO-ENTMCNC: 31.4 G/DL (ref 31.5–36.5)
MCV RBC AUTO: 96 FL (ref 78–100)
MICROALBUMIN UR-MCNC: 46 MG/L
MICROALBUMIN/CREAT UR: 95.83 MG/G CR (ref 0–17)
NONHDLC SERPL-MCNC: 113 MG/DL
PLATELET # BLD AUTO: 272 10E3/UL (ref 150–450)
RBC # BLD AUTO: 3.52 10E6/UL (ref 4.4–5.9)
TRIGL SERPL-MCNC: 173 MG/DL
WBC # BLD AUTO: 5.7 10E3/UL (ref 4–11)

## 2021-12-30 PROCEDURE — 36415 COLL VENOUS BLD VENIPUNCTURE: CPT

## 2021-12-30 PROCEDURE — 82043 UR ALBUMIN QUANTITATIVE: CPT

## 2021-12-30 PROCEDURE — 85610 PROTHROMBIN TIME: CPT

## 2021-12-30 PROCEDURE — 84460 ALANINE AMINO (ALT) (SGPT): CPT

## 2021-12-30 PROCEDURE — 85027 COMPLETE CBC AUTOMATED: CPT

## 2021-12-30 PROCEDURE — 80061 LIPID PANEL: CPT

## 2021-12-30 NOTE — PROGRESS NOTES
Anticoagulation Management    Unable to reach Rich today.    Today's INR result of 3.0 is therapeutic (goal INR of 2.0-3.0).  Result received from: Clinic Lab    Follow up required to confirm warfarin dose taken and assess for changes    Left VM to call [phone] with transfer to Tatyana Joe:[phone] OR transfer to:[facility]      Anticoagulation clinic to follow up    Tatyana Akers RN

## 2021-12-30 NOTE — PROGRESS NOTES
Addendum  Patient called back and left  with his call back number. Called Cayetano back. I found a CBC from 8/2021 through [facility] in Care Everywhere. The Hgb at that time was 11.0. Cayetano said he has no signs/sx of bleeding. I recommended patient call his PMD Dr. Flowers to see if iron studies or other blood work needs to be done. Cayetano said he has routine follow up with [facility] but that's not until March. Patient agrees to call Dr. Flowers to review today's labs. FYI sent to Dr. Marroquin. Renetta Saldivar RN on 12/30/2021 at 3:53 PM    ----------------------------------------------------------------------------------------------------------------------------------------------------------------------------------    Call out to patient. CBC orders of Dr. Marroquin's drawn today for unclear reasons. Hgb low, patient on warfarin for afib. Patient did not answer. Left detailed  informed pt labs for next year visit w/Dr. Marroquin were drawn today and informed him hemoglobin is low so I'd like him to call me back to discuss that further. Renetta Saldivar RN on 12/30/2021 at 1:54 PM      Component      Latest Ref Rng & Units 12/30/2021   WBC      4.0 - 11.0 10e3/uL 5.7   RBC Count      4.40 - 5.90 10e6/uL 3.52 (L)   Hemoglobin      13.3 - 17.7 g/dL 10.6 (L)   Hematocrit      40.0 - 53.0 % 33.8 (L)   MCV      78 - 100 fL 96   MCH      26.5 - 33.0 pg 30.1   MCHC      31.5 - 36.5 g/dL 31.4 (L)   RDW      10.0 - 15.0 % 13.8   Platelet Count      150 - 450 10e3/uL 272

## 2021-12-30 NOTE — PROGRESS NOTES
ANTICOAGULATION MANAGEMENT     Cayetano Lunsford 76 year old male is on warfarin with therapeutic INR result. (Goal INR 2.0-3.0)    Recent labs: (last 7 days)     12/30/21  1001   INR 3.0*       ASSESSMENT     Source(s): Chart Review and Patient/Caregiver Call       Warfarin doses taken: Warfarin taken as instructed    Diet:Less greens over the holidays, plans to resume previous intake. Patient did report also that he drank a bit extra V8 on Christmas Eve and Leonard Day    New illness, injury, or hospitalization: No    Medication/supplement changes: Pt taking 12.5mg total of Cozaar everyday, I removed the 100mg dose from his medication list.  I informed pt that 100mg was refilled on 12/29/21, he states he will let pharmacy know that he no longer takes that strength.    Signs or symptoms of bleeding or clotting: No    Previous INR: Therapeutic last 2(+) visits    Additional findings: None     PLAN     Recommended plan for no diet, medication or health factor changes affecting INR     Dosing Instructions: Continue your current warfarin dose with next INR in 4 weeks       Summary  As of 12/30/2021    Full warfarin instructions:  5 mg every Wed, Sat; 7.5 mg all other days   Next INR check:  1/27/2022             Telephone call with Cayetano who verbalizes understanding and agrees to plan    Lab visit scheduled and AVS mailed    Education provided: Importance of consistent vitamin K intake and Contact [phone]  with any changes, questions or concerns.     Plan made per [facility] anticoagulation protocol    Tatyana Akers RN  Anticoagulation Clinic  12/30/2021    _______________________________________________________________________     Anticoagulation Episode Summary     Current INR goal:  2.0-3.0   TTR:  71.9 % (1 y)   Target end date:  Indefinite   Send INR reminders to:  [facility]    Indications    Long-term (current) use of anticoagulants [Z79.01] [Z79.01]  Atrial fibrillation (H) [I48.91]  Longstanding  persistent atrial fibrillation (H) [I48.11]  Long term current use of anticoagulants with INR goal of 2.0-3.0 [Z79.01]           Comments:           Anticoagulation Care Providers     Provider Role Specialty Phone number    Dmitri Andres MD Referring Family Medicine [phone]

## 2021-12-31 NOTE — TELEPHONE ENCOUNTER
Thank you. Please discuss with PCP or GI to ensure no bleeding. If worsening, and bleeding, we need to be notified.

## 2022-01-03 NOTE — PROGRESS NOTES
Noted. Renetta Saldivar RN on 1/3/2022 at 12:12 PM      Daniel Marroquin MD  You 3 days ago     KV       Thank you. Please discuss with PCP or GI to ensure no bleeding. If worsening, and bleeding, we need to be notified.         Documentation

## 2022-01-27 ENCOUNTER — ANTICOAGULATION THERAPY VISIT (OUTPATIENT)
Dept: ANTICOAGULATION | Facility: CLINIC | Age: 77
End: 2022-01-27

## 2022-01-27 ENCOUNTER — DOCUMENTATION ONLY (OUTPATIENT)
Dept: ANTICOAGULATION | Facility: CLINIC | Age: 77
End: 2022-01-27

## 2022-01-27 ENCOUNTER — LAB (OUTPATIENT)
Dept: LAB | Facility: CLINIC | Age: 77
End: 2022-01-27
Payer: COMMERCIAL

## 2022-01-27 DIAGNOSIS — I48.91 ATRIAL FIBRILLATION, UNSPECIFIED TYPE (H): Primary | ICD-10-CM

## 2022-01-27 DIAGNOSIS — I48.11 LONGSTANDING PERSISTENT ATRIAL FIBRILLATION (H): ICD-10-CM

## 2022-01-27 DIAGNOSIS — Z79.01 LONG TERM CURRENT USE OF ANTICOAGULANTS WITH INR GOAL OF 2.0-3.0: ICD-10-CM

## 2022-01-27 DIAGNOSIS — Z79.01 LONG TERM CURRENT USE OF ANTICOAGULANT THERAPY: Primary | ICD-10-CM

## 2022-01-27 DIAGNOSIS — I48.91 ATRIAL FIBRILLATION (H): ICD-10-CM

## 2022-01-27 LAB — INR BLD: 4.2 (ref 0.9–1.1)

## 2022-01-27 PROCEDURE — 85610 PROTHROMBIN TIME: CPT

## 2022-01-27 PROCEDURE — 36416 COLLJ CAPILLARY BLOOD SPEC: CPT

## 2022-01-27 NOTE — PROGRESS NOTES
Anticoagulation Management    Unable to reach Rich today.    Today's INR result of 4.2 is supratherapeutic (goal INR of 2.0-3.0).  Result received from: Clinic Lab    Follow up required to confirm warfarin dose taken and assess for changes    Left message to hold warfarin tonight. Request call back for assessment.   and to call [phone] with transfer to [facility]:[phone] OR transfer to:[facility]      Anticoagulation clinic to follow up    Tatyana Akers RN

## 2022-01-27 NOTE — PROGRESS NOTES
ANTICOAGULATION MANAGEMENT      Cayetano Lunsford due for annual renewal of referral to anticoagulation monitoring. Order pended for your review and signature.      ANTICOAGULATION SUMMARY      Warfarin indication(s)     Atrial fibrillation    Heart valve present?  NO       Current goal range   INR: 2.0-3.0     Goal appropriate for indication? Yes, INR 2-3 appropriate for hx of DVT, PE, hypercoagulable state, Afib, LVAD, or bileaflet AVR without risk factors     Current duration of therapy Indefinite/long term therapy   Time in Therapeutic Range (TTR)  (Goal > 60%) 65%       Office visit with referring provider's group within last year no on PATIENT ESTABLISHING WITH JOIE DALE 2/15/22       Tatyana Akers, RN

## 2022-01-27 NOTE — PROGRESS NOTES
ANTICOAGULATION MANAGEMENT     Cayetano Lunsford 77 year old male is on warfarin with supratherapeutic INR result. (Goal INR 2.0-3.0)    Recent labs: (last 7 days)     01/27/22  1310   INR 4.2*       ASSESSMENT     Source(s): Chart Review and Patient/Caregiver Call       Warfarin doses taken: Warfarin taken as instructed    Diet: Decreased greens/vitamin K in diet; plans to resume previous intake    New illness, injury, or hospitalization: No    Medication/supplement changes: None noted    Signs or symptoms of bleeding or clotting: No    Previous INR: Therapeutic last 2(+) visits    Additional findings: None     PLAN     Recommended plan for temporary change(s) affecting INR     Dosing Instructions: Hold 1 1/2 doses then continue your current warfarin dose with next INR in 10 days       Summary  As of 1/27/2022    Full warfarin instructions:  1/27: Hold; 1/28: 5 mg; Otherwise 5 mg every Wed, Sat; 7.5 mg all other days   Next INR check:  2/8/2022             Telephone call with Cayetano who agrees to plan and repeated back plan correctly    Lab visit scheduled and AVS mailed    Education provided: Importance of consistent vitamin K intake, Impact of vitamin K foods on INR, Vitamin K content of foods, Monitoring for bleeding signs and symptoms and Contact [phone]  with any changes, questions or concerns.     Plan made per [facility] anticoagulation protocol    Tatyana Akers RN  Anticoagulation Clinic  1/27/2022    _______________________________________________________________________     Anticoagulation Episode Summary     Current INR goal:  2.0-3.0   TTR:  65.0 % (1 y)   Target end date:  Indefinite   Send INR reminders to:  [facility]    Indications    Long-term (current) use of anticoagulants [Z79.01] [Z79.01]  Atrial fibrillation (H) [I48.91]  Longstanding persistent atrial fibrillation (H) [I48.11]  Long term current use of anticoagulants with INR goal of 2.0-3.0 [Z79.01]           Comments:            Anticoagulation Care Providers     Provider Role Specialty Phone number    Dmitri Andres MD Referring Family Medicine [phone]

## 2022-02-03 DIAGNOSIS — I48.0 PAROXYSMAL ATRIAL FIBRILLATION (H): ICD-10-CM

## 2022-02-03 DIAGNOSIS — Z79.01 LONG TERM CURRENT USE OF ANTICOAGULANTS WITH INR GOAL OF 2.0-3.0: ICD-10-CM

## 2022-02-03 RX ORDER — WARFARIN SODIUM 5 MG/1
TABLET ORAL
Qty: 115 TABLET | Refills: 1 | Status: SHIPPED | OUTPATIENT
Start: 2022-02-03 | End: 2022-04-20

## 2022-02-03 NOTE — TELEPHONE ENCOUNTER
Last INR: 1/27/22=4.2  Next INR: 2/8/22  INR referral and OV up-to-date: yes and patient establishing with Cong Bravo PA-C, on 2/15/22      Prescription approved per [facility] Refill Protocol.    Tatyana Akers RN

## 2022-02-08 ENCOUNTER — LAB (OUTPATIENT)
Dept: LAB | Facility: CLINIC | Age: 77
End: 2022-02-08
Payer: COMMERCIAL

## 2022-02-08 ENCOUNTER — ANTICOAGULATION THERAPY VISIT (OUTPATIENT)
Dept: ANTICOAGULATION | Facility: CLINIC | Age: 77
End: 2022-02-08

## 2022-02-08 DIAGNOSIS — I48.11 LONGSTANDING PERSISTENT ATRIAL FIBRILLATION (H): ICD-10-CM

## 2022-02-08 DIAGNOSIS — Z79.01 LONG TERM CURRENT USE OF ANTICOAGULANT THERAPY: Primary | ICD-10-CM

## 2022-02-08 DIAGNOSIS — I48.91 ATRIAL FIBRILLATION (H): ICD-10-CM

## 2022-02-08 DIAGNOSIS — Z79.01 LONG TERM CURRENT USE OF ANTICOAGULANTS WITH INR GOAL OF 2.0-3.0: ICD-10-CM

## 2022-02-08 LAB — INR BLD: 1.9 (ref 0.9–1.1)

## 2022-02-08 PROCEDURE — 85610 PROTHROMBIN TIME: CPT

## 2022-02-08 PROCEDURE — 36416 COLLJ CAPILLARY BLOOD SPEC: CPT

## 2022-02-08 NOTE — PROGRESS NOTES
ANTICOAGULATION MANAGEMENT     Cayetano Lunsford 77 year old male is on warfarin with subtherapeutic INR result. (Goal INR 2.0-3.0)    Recent labs: (last 7 days)     02/08/22  1404   INR 1.9*       ASSESSMENT     Source(s): Chart Review and Patient/Caregiver Call       Warfarin doses taken: Warfarin taken as instructed and see calendar, pt took more warfarin than instructed on 1/28/22 so he took less warfarin on 1/30/22 to offset the error    Diet: No new diet changes identified--still eating greens every MWF and drinking V8 daily.  Patient states he switched from large jug of V8 to small cans which are about 11.5 oz.    New illness, injury, or hospitalization: No    Medication/supplement changes: None noted    Signs or symptoms of bleeding or clotting: No    Previous INR: Supratherapeutic    Additional findings: Pt reports intermittent foot swelling, he will discuss on 2/15 when he establishes with new PCP.  I informed pt that he may want to switch to low sodium V8     PLAN     Recommended plan for no diet, medication or health factor changes affecting INR     Dosing Instructions: Continue your current warfarin dose with next INR in 2 weeks       Summary  As of 2/8/2022    Full warfarin instructions:  5 mg every Wed, Sat; 7.5 mg all other days   Next INR check:  2/22/2022             Telephone call with Cayetano who verbalizes understanding and agrees to plan    Lab visit scheduled    Education provided: Importance of consistent vitamin K intake, Impact of vitamin K foods on INR, Vitamin K content of foods and Contact [phone]  with any changes, questions or concerns.     Plan made per [facility] anticoagulation protocol    Tatyana Akers RN  Anticoagulation Clinic  2/8/2022    _______________________________________________________________________     Anticoagulation Episode Summary     Current INR goal:  2.0-3.0   TTR:  64.1 % (1 y)   Target end date:  Indefinite   Send INR reminders to:  [facility]     Problem: Lung Surgery (via Thoracotomy) (Adult)  Goal: Signs and Symptoms of Listed Potential Problems Will be Absent or Manageable (Lung Surgery)  Outcome: Ongoing (interventions implemented as appropriate)    06/15/17 0639   Lung Surgery (via Thoracotomy)   Problems Assessed (Lung Surgery) all   Problems Present (Lung Surgery) bleeding;hypoxia/hypoxemia;situational response            Indications    Long-term (current) use of anticoagulants [Z79.01] [Z79.01]  Atrial fibrillation (H) [I48.91]  Longstanding persistent atrial fibrillation (H) [I48.11]  Long term current use of anticoagulants with INR goal of 2.0-3.0 [Z79.01]           Comments:           Anticoagulation Care Providers     Provider Role Specialty Phone number    Dmitri Andres MD Referring [facility] Medicine [phone]    Drew Bravo PA-C Referring [facility] [phone]

## 2022-02-09 DIAGNOSIS — N18.1 TYPE 2 DIABETES MELLITUS WITH STAGE 1 CHRONIC KIDNEY DISEASE, WITH LONG-TERM CURRENT USE OF INSULIN (H): ICD-10-CM

## 2022-02-09 DIAGNOSIS — E11.22 TYPE 2 DIABETES MELLITUS WITH STAGE 1 CHRONIC KIDNEY DISEASE, WITH LONG-TERM CURRENT USE OF INSULIN (H): ICD-10-CM

## 2022-02-09 DIAGNOSIS — Z79.4 TYPE 2 DIABETES MELLITUS WITH STAGE 1 CHRONIC KIDNEY DISEASE, WITH LONG-TERM CURRENT USE OF INSULIN (H): ICD-10-CM

## 2022-02-09 NOTE — TELEPHONE ENCOUNTER
Routing refill request to provider for review/approval because:  Labs not current:  Last Hgb A1c-5.4% on 7/9/2021  Pt last seen by Sarita Gunter PA-C on 10/6/2021.  Pt has scheduled appointment w/Cong Bravo PA-C on Tue 2/15/2022 @ 1:00 PM to establish care & medication check.    Caitlin Simental, Registered Nurse, PAL (Patient Advocate Liaison)   [facility]     [phone]

## 2022-02-15 ENCOUNTER — OFFICE VISIT (OUTPATIENT)
Dept: FAMILY MEDICINE | Facility: CLINIC | Age: 77
End: 2022-02-15
Payer: COMMERCIAL

## 2022-02-15 VITALS
HEART RATE: 73 BPM | HEIGHT: 68 IN | RESPIRATION RATE: 16 BRPM | WEIGHT: 174 LBS | DIASTOLIC BLOOD PRESSURE: 67 MMHG | BODY MASS INDEX: 26.37 KG/M2 | SYSTOLIC BLOOD PRESSURE: 130 MMHG | OXYGEN SATURATION: 95 %

## 2022-02-15 DIAGNOSIS — R60.0 PERIPHERAL EDEMA: ICD-10-CM

## 2022-02-15 DIAGNOSIS — I48.91 ATRIAL FIBRILLATION, UNSPECIFIED TYPE (H): ICD-10-CM

## 2022-02-15 DIAGNOSIS — E11.649 TYPE 2 DIABETES MELLITUS WITH HYPOGLYCEMIA WITHOUT COMA, WITH LONG-TERM CURRENT USE OF INSULIN (H): ICD-10-CM

## 2022-02-15 DIAGNOSIS — K51.90 ULCERATIVE COLITIS WITHOUT COMPLICATIONS, UNSPECIFIED LOCATION (H): ICD-10-CM

## 2022-02-15 DIAGNOSIS — E11.22 TYPE 2 DIABETES MELLITUS WITH STAGE 1 CHRONIC KIDNEY DISEASE, WITH LONG-TERM CURRENT USE OF INSULIN (H): Primary | ICD-10-CM

## 2022-02-15 DIAGNOSIS — J44.9 CHRONIC OBSTRUCTIVE PULMONARY DISEASE, UNSPECIFIED COPD TYPE (H): ICD-10-CM

## 2022-02-15 DIAGNOSIS — Z79.4 TYPE 2 DIABETES MELLITUS WITH HYPOGLYCEMIA WITHOUT COMA, WITH LONG-TERM CURRENT USE OF INSULIN (H): ICD-10-CM

## 2022-02-15 DIAGNOSIS — N18.1 TYPE 2 DIABETES MELLITUS WITH STAGE 1 CHRONIC KIDNEY DISEASE, WITH LONG-TERM CURRENT USE OF INSULIN (H): Primary | ICD-10-CM

## 2022-02-15 DIAGNOSIS — Z79.4 TYPE 2 DIABETES MELLITUS WITH STAGE 1 CHRONIC KIDNEY DISEASE, WITH LONG-TERM CURRENT USE OF INSULIN (H): Primary | ICD-10-CM

## 2022-02-15 LAB — HBA1C MFR BLD: 6 % (ref 0–5.6)

## 2022-02-15 PROCEDURE — 83036 HEMOGLOBIN GLYCOSYLATED A1C: CPT | Performed by: PHYSICIAN ASSISTANT

## 2022-02-15 PROCEDURE — 99214 OFFICE O/P EST MOD 30 MIN: CPT | Performed by: PHYSICIAN ASSISTANT

## 2022-02-15 PROCEDURE — 36415 COLL VENOUS BLD VENIPUNCTURE: CPT | Performed by: PHYSICIAN ASSISTANT

## 2022-02-15 ASSESSMENT — MIFFLIN-ST. JEOR: SCORE: 1483.76

## 2022-02-15 NOTE — PROGRESS NOTES
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 Has had increase of a1c since last visit. Prior to obtaining a1c result, consider possible stopping insulin given hypoglycemia risk. Patient does not wish to do this. Now that a1c has increased, though well controlled, given increase, it may be beneficial to continue but possibly decreasing bedtime dose. Will consult with mtm as well on this case. Possible 2 units prebreakfast and pre dinner.   - metFORMIN (GLUCOPHAGE) 1000 MG tablet; TAKE ONE TABLET BY MOUTH TWICE A DAY WITH BREAKFAST  AND DINNER  -Medication use and side effects discussed with the patient. Patient is in complete understanding and agreement with plan.       Type 2 diabetes mellitus with hypoglycemia without coma, with long-term current use of insulin (H)  As above   - metFORMIN (GLUCOPHAGE) 1000 MG tablet; TAKE ONE TABLET BY MOUTH TWICE A DAY WITH BREAKFAST  AND DINNER  - Hemoglobin A1c; Future  - Hemoglobin A1c    Chronic obstructive pulmonary disease, unspecified COPD type (H)  Stable. Followed by pulm    Ulcerative colitis without complications, unspecified location (H)  Stable. Followed by gi    Atrial fibrillation, unspecified type (H)  Stable. Followed by cardiology.     Peripheral edema  Worsening since starting norvasc. Discussed with patient and recommending discussing with cardiology. Possible option is to decrease norvasc and increase losartan. If hyperkalemia continues, consider thiazide diuretic as option       BMI:   Estimated body mass index is 26.46 kg/m  as calculated from the following:    Height as of this encounter: 1.727 m (5' 8\").    Weight as of this encounter: 78.9 kg (174 lb).       Return in about 3 months (around 5/15/2022) for mtm as scheduled. .    Drew Bravo PA-C  [facility] TIM Bailey is a 77 year old who presents for the following " health issues     HPI     Diabetes Follow-up    How often are you checking your blood sugar? Three times daily  Blood sugar testing frequency justification:  Frequent hypoglycemia  What time of day are you checking your blood sugars (select all that apply)?  Before meals and At bedtime  Have you had any blood sugars above 200?  Yes   Have you had any blood sugars below 70?  Yes     What symptoms do you notice when your blood sugar is low?  Shaky, Weak and fingers tingly    What concerns do you have today about your diabetes? None     Do you have any of these symptoms? (Select all that apply)  No numbness or tingling in feet.  No redness, sores or blisters on feet.  No complaints of excessive thirst.  No reports of blurry vision.  No significant changes to weight.    Have you had a diabetic eye exam in the last 12 months? Yes-  Location: 02/25/2021 at [facility]      History of ulcerative colitis. Stable. Followed by GI.     History of copd. Stable. Followed by pulm.     History of a fib. Stable. No palpitations. No warfarin chronically. Followed by cardiology.     Having edema for months in  Bilateral feet. No pain, but struggles to put shoes on at times. Denies high salt intake. Last renal function November was normal. Of note, did start norvasc after losartan decreased due to hyperkalemia through cardiology.       BP Readings from Last 2 Encounters:   02/15/22 130/67   11/05/21 124/66     Hemoglobin A1C POCT (%)   Date Value   07/09/2021 5.4   05/20/2021 5.3     Hemoglobin A1C (%)   Date Value   02/15/2022 6.0 (H)     LDL Cholesterol Calculated (mg/dL)   Date Value   12/30/2021 78   11/09/2020 59   09/09/2019 68       Hypertension Follow-up      Do you check your blood pressure regularly outside of the clinic? No     Are you following a low salt diet? Yes    Are your blood pressures ever more than 140 on the top number (systolic) OR more   than 90 on the bottom number (diastolic), for example 140/90?  "No    COPD Follow-Up    Overall, how are your COPD symptoms since your last clinic visit?  No change    How much fatigue or shortness of breath do you have when you are walking?  Same as usual    How much shortness of breath do you have when you are resting?  Same as usual    How often do you cough? Never    Have you noticed any change in your sputum/phlegm?  No    Have you experienced a recent fever? No    Please describe how far you can walk without stopping to rest:  2-5 blocks    How many flights of stairs are you able to walk up without stopping?  unknown    Have you had any Emergency Room Visits, Urgent Care Visits, or Hospital Admissions because of your COPD since your last office visit?  No    History   Smoking Status     Former Smoker     Packs/day: 1.00     Years: 30.00     Types: Cigarettes     Quit date: 3/19/1992   Smokeless Tobacco     Never Used     No results found for: FEV1, KTC5IPC      Review of Systems   Constitutional, HEENT, cardiovascular, pulmonary, GI, , musculoskeletal, neuro, skin, endocrine and psych systems are negative, except as otherwise noted.      Objective    /67 (BP Location: Right arm, Patient Position: Chair, Cuff Size: Adult Large)   Pulse 73   Resp 16   Ht 1.727 m (5' 8\")   Wt 78.9 kg (174 lb)   SpO2 95%   BMI 26.46 kg/m    Body mass index is 26.46 kg/m .  Physical Exam   GENERAL: healthy, alert and no distress  RESP: lungs clear to auscultation - no rales, rhonchi or wheezes  CV: regular rates and rhythm, normal S1 S2, no S3 or S4 and 3+ bilateral lower extremity pitting edema to lower shin    PSYCH: mentation appears normal, affect normal/bright    Results for orders placed or performed in visit on 02/15/22 (from the past 24 hour(s))   Hemoglobin A1c   Result Value Ref Range    Hemoglobin A1C 6.0 (H) 0.0 - 5.6 %     *Note: Due to a large number of results and/or encounters for the requested time period, some results have not been displayed. A complete set of " results can be found in Results Review.

## 2022-02-15 NOTE — PATIENT INSTRUCTIONS
You could decrease the amlodipine which may help with swelling, and you can, add a medication called chlorthalidone to your losartan.

## 2022-02-16 DIAGNOSIS — E11.22 TYPE 2 DIABETES MELLITUS WITH STAGE 1 CHRONIC KIDNEY DISEASE, WITH LONG-TERM CURRENT USE OF INSULIN (H): ICD-10-CM

## 2022-02-16 DIAGNOSIS — E11.9 TYPE 2 DIABETES, HBA1C GOAL < 8% (H): ICD-10-CM

## 2022-02-16 DIAGNOSIS — N18.1 TYPE 2 DIABETES MELLITUS WITH STAGE 1 CHRONIC KIDNEY DISEASE, WITH LONG-TERM CURRENT USE OF INSULIN (H): ICD-10-CM

## 2022-02-16 DIAGNOSIS — Z79.4 TYPE 2 DIABETES MELLITUS WITH STAGE 1 CHRONIC KIDNEY DISEASE, WITH LONG-TERM CURRENT USE OF INSULIN (H): ICD-10-CM

## 2022-02-16 RX ORDER — HUMAN INSULIN 100 [IU]/ML
INJECTION, SUSPENSION SUBCUTANEOUS
Qty: 3 ML | Refills: 5 | Status: SHIPPED | OUTPATIENT
Start: 2022-02-16 | End: 2022-10-04

## 2022-02-17 RX ORDER — PEN NEEDLE, DIABETIC 29 G X1/2"
NEEDLE, DISPOSABLE MISCELLANEOUS
Qty: 100 EACH | Refills: 3 | Status: SHIPPED | OUTPATIENT
Start: 2022-02-17 | End: 2022-05-05

## 2022-02-17 NOTE — TELEPHONE ENCOUNTER
Prescription approved per [facility] Refill Protocol.    Mindi Agrawal RN

## 2022-02-22 ENCOUNTER — LAB (OUTPATIENT)
Dept: LAB | Facility: CLINIC | Age: 77
End: 2022-02-22
Payer: COMMERCIAL

## 2022-02-22 ENCOUNTER — ANTICOAGULATION THERAPY VISIT (OUTPATIENT)
Dept: ANTICOAGULATION | Facility: CLINIC | Age: 77
End: 2022-02-22

## 2022-02-22 DIAGNOSIS — I48.11 LONGSTANDING PERSISTENT ATRIAL FIBRILLATION (H): ICD-10-CM

## 2022-02-22 DIAGNOSIS — Z79.01 LONG TERM CURRENT USE OF ANTICOAGULANTS WITH INR GOAL OF 2.0-3.0: ICD-10-CM

## 2022-02-22 DIAGNOSIS — Z79.01 LONG TERM CURRENT USE OF ANTICOAGULANT THERAPY: Primary | ICD-10-CM

## 2022-02-22 DIAGNOSIS — I48.91 ATRIAL FIBRILLATION (H): ICD-10-CM

## 2022-02-22 LAB — INR BLD: 4.7 (ref 0.9–1.1)

## 2022-02-22 PROCEDURE — 85610 PROTHROMBIN TIME: CPT

## 2022-02-22 PROCEDURE — 36415 COLL VENOUS BLD VENIPUNCTURE: CPT

## 2022-02-22 NOTE — PROGRESS NOTES
ANTICOAGULATION MANAGEMENT     Cayetano Lunsford 77 year old male is on warfarin with supratherapeutic INR result. (Goal INR 2.0-3.0)    Recent labs: (last 7 days)     02/22/22  1319   INR 4.7*       ASSESSMENT     Source(s): Chart Review and Patient/Caregiver Call       Warfarin doses taken: Warfarin taken as instructed    Diet: No new diet changes identified    New illness, injury, or hospitalization: Yes: on-going, intermittent swelling in feet.  Patient saw janiya Cho PCP, on 2/15/22, patient will call cardiology to report symptoms and see if any medications should be changed--see Cong's note.    Medication/supplement changes: None noted    Signs or symptoms of bleeding or clotting: No    Previous INR: Subtherapeutic    Additional findings: I went ahead and decreased warfarin MD, despite sub INR on 2/8/22, because INR on 1/27/22 was also >4     PLAN     Recommended plan for ongoing change(s) affecting INR     Dosing Instructions: Hold dose then Decrease your warfarin dose (10% change) with next INR in 10 days       Summary  As of 2/22/2022    Full warfarin instructions:  2/22: Hold; Otherwise 7.5 mg every Tue, Thu, Sat; 5 mg all other days   Next INR check:  3/4/2022             Telephone call with Cayetano who agrees to plan and repeated back plan correctly    Lab visit scheduled    Education provided: Contact [phone]  with any changes, questions or concerns.     Plan made per [facility] anticoagulation protocol    Tatyana Akers, RN  Anticoagulation Clinic  2/22/2022    _______________________________________________________________________     Anticoagulation Episode Summary     Current INR goal:  2.0-3.0   TTR:  63.2 % (1 y)   Target end date:  Indefinite   Send INR reminders to:  [facility]    Indications    Long-term (current) use of anticoagulants [Z79.01] [Z79.01]  Atrial fibrillation (H) [I48.91]  Longstanding persistent atrial fibrillation (H) [I48.11]  Long term current use of  anticoagulants with INR goal of 2.0-3.0 [Z79.01]           Comments:           Anticoagulation Care Providers     Provider Role Specialty Phone number    Dmitri Andres MD Referring Family Medicine [phone]    Drew Bravo PA-C Referring Family Medicine [phone]

## 2022-03-04 ENCOUNTER — ANTICOAGULATION THERAPY VISIT (OUTPATIENT)
Dept: ANTICOAGULATION | Facility: CLINIC | Age: 77
End: 2022-03-04

## 2022-03-04 ENCOUNTER — LAB (OUTPATIENT)
Dept: LAB | Facility: CLINIC | Age: 77
End: 2022-03-04
Payer: COMMERCIAL

## 2022-03-04 DIAGNOSIS — Z79.01 LONG TERM CURRENT USE OF ANTICOAGULANTS WITH INR GOAL OF 2.0-3.0: ICD-10-CM

## 2022-03-04 DIAGNOSIS — I48.11 LONGSTANDING PERSISTENT ATRIAL FIBRILLATION (H): ICD-10-CM

## 2022-03-04 DIAGNOSIS — I48.91 ATRIAL FIBRILLATION (H): ICD-10-CM

## 2022-03-04 DIAGNOSIS — Z79.01 LONG TERM CURRENT USE OF ANTICOAGULANT THERAPY: Primary | ICD-10-CM

## 2022-03-04 LAB — INR BLD: 1.7 (ref 0.9–1.1)

## 2022-03-04 PROCEDURE — 85610 PROTHROMBIN TIME: CPT

## 2022-03-04 PROCEDURE — 36416 COLLJ CAPILLARY BLOOD SPEC: CPT

## 2022-03-04 NOTE — PROGRESS NOTES
ANTICOAGULATION MANAGEMENT     Cayetano Lunsford 77 year old male is on warfarin with subtherapeutic INR result. (Goal INR 2.0-3.0)    Recent labs: (last 7 days)     03/04/22  1316   INR 1.7*       ASSESSMENT       Source(s): Chart Review and Patient/Caregiver Call       Warfarin doses taken: Warfarin taken as instructed    Diet: No new diet changes identified    New illness, injury, or hospitalization: Continues to have edema in his feet.  Encouraged him to call his cardiologist to discuss switching his medications.    Medication/supplement changes: None noted    Signs or symptoms of bleeding or clotting: No    Previous INR: Supratherapeutic    Additional findings: None       PLAN     Recommended plan for ongoing change(s) affecting INR     Dosing Instructions: Continue your current warfarin dose with next INR in 10 days       Summary  As of 3/4/2022    Full warfarin instructions:  7.5 mg every Tue, Thu, Sat; 5 mg all other days   Next INR check:  3/11/2022             Telephone call with Cayetano who agrees to plan and repeated back plan correctly    Lab visit scheduled    Education provided: Please call back if any changes to your diet, medications or how you've been taking warfarin    Plan made per [facility] anticoagulation protocol    Estelle Starr, RN  Anticoagulation Clinic  3/4/2022    _______________________________________________________________________     Anticoagulation Episode Summary     Current INR goal:  2.0-3.0   TTR:  62.7 % (1 y)   Target end date:  Indefinite   Send INR reminders to:  [facility]    Indications    Long-term (current) use of anticoagulants [Z79.01] [Z79.01]  Atrial fibrillation (H) [I48.91]  Longstanding persistent atrial fibrillation (H) [I48.11]  Long term current use of anticoagulants with INR goal of 2.0-3.0 [Z79.01]           Comments:           Anticoagulation Care Providers     Provider Role Specialty Phone number    Dmitri Andres MD Referring Family Medicine  [phone]    Drew Bravo PA-C [facility] [phone]

## 2022-03-10 ENCOUNTER — OFFICE VISIT (OUTPATIENT)
Dept: NEUROLOGY | Facility: CLINIC | Age: 77
End: 2022-03-10
Payer: COMMERCIAL

## 2022-03-10 VITALS — SYSTOLIC BLOOD PRESSURE: 138 MMHG | DIASTOLIC BLOOD PRESSURE: 71 MMHG | OXYGEN SATURATION: 97 % | HEART RATE: 56 BPM

## 2022-03-10 DIAGNOSIS — G25.0 BENIGN FAMILIAL TREMOR: Primary | ICD-10-CM

## 2022-03-10 PROCEDURE — 99213 OFFICE O/P EST LOW 20 MIN: CPT | Performed by: PSYCHIATRY & NEUROLOGY

## 2022-03-10 RX ORDER — PRIMIDONE 50 MG/1
TABLET ORAL
Qty: 1001 TABLET | Refills: 1 | Status: SHIPPED | OUTPATIENT
Start: 2022-03-10 | End: 2022-09-09

## 2022-03-10 NOTE — PROGRESS NOTES
"Cayetano Lunsford is a 77 year old male who presents for:  Chief Complaint   Patient presents with     Tremors     6 month F/U patient feels tremor is stable and has not changed (re-doing tremor sheet today)         Initial Vitals:  /71 (BP Location: Right arm, Patient Position: Sitting)   Pulse 56   SpO2 97%  Estimated body mass index is 26.46 kg/m  as calculated from the following:    Height as of 2/15/22: 1.727 m (5' 8\").    Weight as of 2/15/22: 78.9 kg (174 lb).. There is no height or weight on file to calculate BSA. BP completed using cuff size: regular    Nursing Comments: Tremor sheet done today to track any changes     Sasha Tate  "

## 2022-03-10 NOTE — PROGRESS NOTES
ESTABLISHED PATIENT NEUROLOGY NOTE    DATE OF VISIT: 3/10/2022  CLINIC LOCATION: [facility]  MRN: [id number]  PATIENT NAME: Cayetano Lunsford  YOB: 1945    REASON FOR VISIT:   Chief Complaint   Patient presents with     Tremors     6 month F/U patient feels tremor is stable and has not changed (re-doing tremor sheet today)      SUBJECTIVE:                                                      HISTORY OF PRESENT ILLNESS: Patient is here to follow up regarding essential tremor.  Last seen on 9/10/2021.  At that time no medication changes were made.  Please refer to my initial/other prior notes for further information.    Since the last visit, the patient reports that his tremor is stable.  He is on 200/200/150 mg of primidone daily and 120 mg of propranolol twice daily without noticeable side effects.  He denies interval development of new focal neurological symptoms.  EXAM:                                                    Physical Exam:   Vitals: /71 (BP Location: Right arm, Patient Position: Sitting)   Pulse 56   SpO2 97%     General: pt is in NAD, cooperative.  Skin: normal turgor, moist mucous membranes, no lesions/rashes noticed.  HEENT: ATNC, white sclera, normal conjunctiva.  Respiratory: Symmetric lung excursion, no accessory respiratory muscle use.  Abdomen: Non distended.  Neurological: awake, cooperative, follows commands, moderate chronic dysarthria, mild to moderate bilateral postural and action hand tremor, equally moves all extremities.  No dysmetria bilaterally.  The patient completed tremor worksheet that will be scanned into his medical record.  ASSESSMENT AND PLAN:                                                    Assessment: 77 year old male patient presents for follow-up of essential tremor.  He reports that his tremor is stable on current combined therapy of primidone and propranolol.  Previously we discussed that propranolol dose should not be further  increased due to concerns regarding bradycardia and hypotension.  He is already on quite high dose of primidone.  Additional treatment options would be topiramate or gabapentin.  Occupational hand therapy could also be considered.  We decided not to make any medication changes.  Primidone was refilled.    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  I am pleased to hear that his tremor remains stable.    We decided not to make any medication changes.  Primidone prescription was refilled.    Next follow-up appointment is in the next 6 months or earlier if needed.    Total Time: 21 minutes spent on the date of the encounter doing chart review, history and exam, documentation and further activities per the note.    Bong Yoo MD  [facility] Neurology  (Chart documentation was completed in part with Dragon voice-recognition software. Even though reviewed, some grammatical, spelling, and word errors may remain.)

## 2022-03-10 NOTE — PATIENT INSTRUCTIONS
AFTER VISIT SUMMARY (AVS):    At today's visit we thoroughly discussed current symptoms, available treatment options, and the plan.  I am pleased to hear that your tremor remains stable.    We decided not to make any medication changes.  Primidone prescription was refilled.    Next follow-up appointment is in the next 6 months or earlier if needed.    Please do not hesitate to call me with any questions or concerns.    Thanks.

## 2022-03-10 NOTE — LETTER
3/10/2022         RE: Cayetano Lunsford  [address]  [place] [facility] [address]        Dear Colleague,    Thank you for referring your patient, Cayetano Lunsford, to the [facility]. Please see a copy of my visit note below.    ESTABLISHED PATIENT NEUROLOGY NOTE    DATE OF VISIT: 3/10/2022  CLINIC LOCATION: [facility]  MRN: [id number]  PATIENT NAME: Cayetano Lunsford  YOB: 1945    REASON FOR VISIT:   Chief Complaint   Patient presents with     Tremors     6 month F/U patient feels tremor is stable and has not changed (re-doing tremor sheet today)      SUBJECTIVE:                                                      HISTORY OF PRESENT ILLNESS: Patient is here to follow up regarding essential tremor.  Last seen on 9/10/2021.  At that time no medication changes were made.  Please refer to my initial/other prior notes for further information.    Since the last visit, the patient reports that his tremor is stable.  He is on 200/200/150 mg of primidone daily and 120 mg of propranolol twice daily without noticeable side effects.  He denies interval development of new focal neurological symptoms.  EXAM:                                                    Physical Exam:   Vitals: /71 (BP Location: Right arm, Patient Position: Sitting)   Pulse 56   SpO2 97%     General: pt is in NAD, cooperative.  Skin: normal turgor, moist mucous membranes, no lesions/rashes noticed.  HEENT: ATNC, white sclera, normal conjunctiva.  Respiratory: Symmetric lung excursion, no accessory respiratory muscle use.  Abdomen: Non distended.  Neurological: awake, cooperative, follows commands, moderate chronic dysarthria, mild to moderate bilateral postural and action hand tremor, equally moves all extremities.  No dysmetria bilaterally.  The patient completed tremor worksheet that will be scanned into his medical record.  ASSESSMENT AND PLAN:                     "                                Assessment: 77 year old male patient presents for follow-up of essential tremor.  He reports that his tremor is stable on current combined therapy of primidone and propranolol.  Previously we discussed that propranolol dose should not be further increased due to concerns regarding bradycardia and hypotension.  He is already on quite high dose of primidone.  Additional treatment options would be topiramate or gabapentin.  Occupational hand therapy could also be considered.  We decided not to make any medication changes.  Primidone was refilled.    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  I am pleased to hear that his tremor remains stable.    We decided not to make any medication changes.  Primidone prescription was refilled.    Next follow-up appointment is in the next 6 months or earlier if needed.    Total Time: 21 minutes spent on the date of the encounter doing chart review, history and exam, documentation and further activities per the note.    Bong Yoo MD  [facility] Neurology  (Chart documentation was completed in part with Dragon voice-recognition software. Even though reviewed, some grammatical, spelling, and word errors may remain.)      Cayetano Lunsford is a 77 year old male who presents for:  Chief Complaint   Patient presents with     Tremors     6 month F/U patient feels tremor is stable and has not changed (re-doing tremor sheet today)         Initial Vitals:  /71 (BP Location: Right arm, Patient Position: Sitting)   Pulse 56   SpO2 97%  Estimated body mass index is 26.46 kg/m  as calculated from the following:    Height as of 2/15/22: 1.727 m (5' 8\").    Weight as of 2/15/22: 78.9 kg (174 lb).. There is no height or weight on file to calculate BSA. BP completed using cuff size: regular    Nursing Comments: " Tremor sheet done today to track any changes     Sasha Tate      Again, thank you for allowing me to participate in the care of your patient.        Sincerely,        Bong Yoo MD

## 2022-03-14 ENCOUNTER — LAB (OUTPATIENT)
Dept: LAB | Facility: CLINIC | Age: 77
End: 2022-03-14
Payer: COMMERCIAL

## 2022-03-14 ENCOUNTER — ANTICOAGULATION THERAPY VISIT (OUTPATIENT)
Dept: ANTICOAGULATION | Facility: CLINIC | Age: 77
End: 2022-03-14

## 2022-03-14 DIAGNOSIS — Z79.01 LONG TERM CURRENT USE OF ANTICOAGULANTS WITH INR GOAL OF 2.0-3.0: ICD-10-CM

## 2022-03-14 DIAGNOSIS — N18.1 TYPE 2 DIABETES MELLITUS WITH STAGE 1 CHRONIC KIDNEY DISEASE, WITH LONG-TERM CURRENT USE OF INSULIN (H): ICD-10-CM

## 2022-03-14 DIAGNOSIS — I48.91 ATRIAL FIBRILLATION (H): ICD-10-CM

## 2022-03-14 DIAGNOSIS — Z79.4 TYPE 2 DIABETES MELLITUS WITH STAGE 1 CHRONIC KIDNEY DISEASE, WITH LONG-TERM CURRENT USE OF INSULIN (H): ICD-10-CM

## 2022-03-14 DIAGNOSIS — G25.0 BENIGN FAMILIAL TREMOR: ICD-10-CM

## 2022-03-14 DIAGNOSIS — Z79.01 LONG TERM CURRENT USE OF ANTICOAGULANT THERAPY: Primary | ICD-10-CM

## 2022-03-14 DIAGNOSIS — I48.11 LONGSTANDING PERSISTENT ATRIAL FIBRILLATION (H): ICD-10-CM

## 2022-03-14 DIAGNOSIS — E11.22 TYPE 2 DIABETES MELLITUS WITH STAGE 1 CHRONIC KIDNEY DISEASE, WITH LONG-TERM CURRENT USE OF INSULIN (H): ICD-10-CM

## 2022-03-14 LAB — INR BLD: 1.9 (ref 0.9–1.1)

## 2022-03-14 PROCEDURE — 36415 COLL VENOUS BLD VENIPUNCTURE: CPT

## 2022-03-14 PROCEDURE — 85610 PROTHROMBIN TIME: CPT

## 2022-03-14 NOTE — PROGRESS NOTES
ANTICOAGULATION MANAGEMENT     Cayetano Lunsford 77 year old male is on warfarin with subtherapeutic INR result. (Goal INR 2.0-3.0)    Recent labs: (last 7 days)     03/14/22  1444   INR 1.9*       ASSESSMENT       Source(s): Chart Review and Patient/Caregiver Call       Warfarin doses taken: Warfarin taken as instructed    Diet: No new diet changes identified--eating 2 servings of frozen broccoli per week (10oz pkg) and 2 servings of green beans and the V8 on the days he doesn't eat the vegetables.    New illness, injury, or hospitalization: No    Medication/supplement changes: None noted    Signs or symptoms of bleeding or clotting: No    Previous INR: Subtherapeutic    Additional findings: Neurologist visit on 3/10/22--NO changes made       PLAN     Recommended plan for no diet, medication or health factor changes affecting INR     Dosing Instructions:  Increase your warfarin dose (~6% change) with next INR in 2 weeks       Summary  As of 3/14/2022    Full warfarin instructions:  5 mg every Mon, Wed, Fri; 7.5 mg all other days   Next INR check:  3/28/2022             Telephone call with Cayetano who agrees to plan and repeated back plan correctly    Lab visit scheduled/AVS mailed    Education provided: Importance of consistent vitamin K intake, Impact of vitamin K foods on INR, Vitamin K content of foods and Contact [phone]  with any changes, questions or concerns.     Plan made with [facility] Pharmacist Yeny Akers, RN  Anticoagulation Clinic  3/14/2022    _______________________________________________________________________     Anticoagulation Episode Summary     Current INR goal:  2.0-3.0   TTR:  60.0 % (1 y)   Target end date:  Indefinite   Send INR reminders to:  [facility]    Indications    Long-term (current) use of anticoagulants [Z79.01] [Z79.01]  Atrial fibrillation (H) [I48.91]  Longstanding persistent atrial fibrillation (H) [I48.11]  Long term current use of  anticoagulants with INR goal of 2.0-3.0 [Z79.01]           Comments:           Anticoagulation Care Providers     Provider Role Specialty Phone number    Dmitri Andres MD Referring Family Medicine [phone]    Drew Bravo PA-C Referring Family Medicine [phone]

## 2022-03-16 RX ORDER — BLOOD SUGAR DIAGNOSTIC
STRIP MISCELLANEOUS
Qty: 300 STRIP | Refills: 0 | Status: SHIPPED | OUTPATIENT
Start: 2022-03-16 | End: 2022-07-05

## 2022-03-16 RX ORDER — PROPRANOLOL HYDROCHLORIDE 40 MG/1
TABLET ORAL
Qty: 180 TABLET | Refills: 0 | Status: SHIPPED | OUTPATIENT
Start: 2022-03-16 | End: 2022-04-14

## 2022-03-16 NOTE — TELEPHONE ENCOUNTER
Routing refill request to provider for review/approval because:  Drug interaction warning  Drug-Drug: propranolol and NovoLIN N VIAL  Cady Cuevas RN

## 2022-03-29 ENCOUNTER — TRANSFERRED RECORDS (OUTPATIENT)
Dept: HEALTH INFORMATION MANAGEMENT | Facility: CLINIC | Age: 77
End: 2022-03-29
Payer: COMMERCIAL

## 2022-03-29 LAB
ALT SERPL-CCNC: 30 IU/L (ref 0–44)
AST SERPL-CCNC: 29 IU/L (ref 0–40)
CREATININE (EXTERNAL): 0.8 MG/DL (ref 0.76–1.27)
GFR ESTIMATED (EXTERNAL): 91 ML/MIN/1.73
GLUCOSE (EXTERNAL): 235 MG/DL (ref 65–99)
POTASSIUM (EXTERNAL): 5.1 MMOL/L (ref 3.5–5.2)

## 2022-03-31 ENCOUNTER — ANTICOAGULATION THERAPY VISIT (OUTPATIENT)
Dept: ANTICOAGULATION | Facility: CLINIC | Age: 77
End: 2022-03-31

## 2022-03-31 ENCOUNTER — LAB (OUTPATIENT)
Dept: LAB | Facility: CLINIC | Age: 77
End: 2022-03-31
Payer: COMMERCIAL

## 2022-03-31 DIAGNOSIS — I48.11 LONGSTANDING PERSISTENT ATRIAL FIBRILLATION (H): ICD-10-CM

## 2022-03-31 DIAGNOSIS — E11.9 TYPE 2 DIABETES, HBA1C GOAL < 8% (H): Primary | ICD-10-CM

## 2022-03-31 DIAGNOSIS — Z79.01 LONG TERM CURRENT USE OF ANTICOAGULANTS WITH INR GOAL OF 2.0-3.0: ICD-10-CM

## 2022-03-31 DIAGNOSIS — I48.91 ATRIAL FIBRILLATION (H): ICD-10-CM

## 2022-03-31 DIAGNOSIS — Z79.01 LONG TERM CURRENT USE OF ANTICOAGULANT THERAPY: Primary | ICD-10-CM

## 2022-03-31 LAB — INR BLD: 1.9 (ref 0.9–1.1)

## 2022-03-31 PROCEDURE — 36416 COLLJ CAPILLARY BLOOD SPEC: CPT

## 2022-03-31 PROCEDURE — 85610 PROTHROMBIN TIME: CPT

## 2022-03-31 RX ORDER — HUMAN INSULIN 100 [IU]/ML
INJECTION, SOLUTION SUBCUTANEOUS
Qty: 10 ML | Refills: 0 | Status: SHIPPED | OUTPATIENT
Start: 2022-03-31 | End: 2022-11-02

## 2022-03-31 NOTE — PROGRESS NOTES
ANTICOAGULATION MANAGEMENT     Cayetano Lunsford 77 year old male is on warfarin with subtherapeutic INR result. (Goal INR 2.0-3.0)    Recent labs: (last 7 days)     03/31/22  1402   INR 1.9*       ASSESSMENT       Source(s): Chart Review and Patient/Caregiver Call       Warfarin doses taken: Warfarin taken as instructed--takes every evening around 10:30pm    Diet: No new diet changes identified    New illness, injury, or hospitalization: No    Medication/supplement changes: None noted    Signs or symptoms of bleeding or clotting: No    Previous INR: Subtherapeutic    Additional findings: None       PLAN     Recommended plan for no diet, medication or health factor changes affecting INR     Dosing Instructions: Increase your warfarin dose (5% change) with next INR in 11 days (lab was full in 10 days).       Summary  As of 3/31/2022    Full warfarin instructions:  5 mg every Mon, Fri; 7.5 mg all other days   Next INR check:  4/12/2022             Telephone call with Leo who agrees to plan and repeated back plan correctly    Lab visit scheduled  / AVS mailed  Education provided: Importance of consistent vitamin K intake, Impact of vitamin K foods on INR, Vitamin K content of foods and Contact [phone]  with any changes, questions or concerns.  and significance of INR result.    Plan made per [facility] anticoagulation protocol    Tatyana Akers RN  Anticoagulation Clinic  3/31/2022    _______________________________________________________________________     Anticoagulation Episode Summary     Current INR goal:  2.0-3.0   TTR:  55.3 % (1 y)   Target end date:  Indefinite   Send INR reminders to:  [facility]    Indications    Long-term (current) use of anticoagulants [Z79.01] [Z79.01]  Atrial fibrillation (H) [I48.91]  Longstanding persistent atrial fibrillation (H) [I48.11]  Long term current use of anticoagulants with INR goal of 2.0-3.0 [Z79.01]           Comments:           Anticoagulation Care Providers      Provider Role Specialty Phone number    Dmitri Andres MD Referring Family Medicine [phone]    Drew Bravo PA-C Referring Family Medicine [phone]

## 2022-04-05 DIAGNOSIS — J44.9 CHRONIC OBSTRUCTIVE PULMONARY DISEASE, UNSPECIFIED COPD TYPE (H): ICD-10-CM

## 2022-04-05 RX ORDER — IPRATROPIUM BROMIDE AND ALBUTEROL SULFATE 2.5; .5 MG/3ML; MG/3ML
SOLUTION RESPIRATORY (INHALATION)
Qty: 360 ML | Refills: 1 | Status: SHIPPED | OUTPATIENT
Start: 2022-04-05

## 2022-04-05 NOTE — TELEPHONE ENCOUNTER
Prescription approved per [facility] Refill Protocol.  Sasha Perez, RN, BSN, PHN  [facility]

## 2022-04-12 ENCOUNTER — LAB (OUTPATIENT)
Dept: LAB | Facility: CLINIC | Age: 77
End: 2022-04-12
Payer: COMMERCIAL

## 2022-04-12 ENCOUNTER — ANTICOAGULATION THERAPY VISIT (OUTPATIENT)
Dept: ANTICOAGULATION | Facility: CLINIC | Age: 77
End: 2022-04-12

## 2022-04-12 DIAGNOSIS — Z79.01 LONG TERM CURRENT USE OF ANTICOAGULANT THERAPY: Primary | ICD-10-CM

## 2022-04-12 DIAGNOSIS — I48.11 LONGSTANDING PERSISTENT ATRIAL FIBRILLATION (H): ICD-10-CM

## 2022-04-12 DIAGNOSIS — Z79.01 LONG TERM CURRENT USE OF ANTICOAGULANTS WITH INR GOAL OF 2.0-3.0: ICD-10-CM

## 2022-04-12 DIAGNOSIS — I48.91 ATRIAL FIBRILLATION (H): ICD-10-CM

## 2022-04-12 LAB — INR BLD: 2.1 (ref 0.9–1.1)

## 2022-04-12 PROCEDURE — 85610 PROTHROMBIN TIME: CPT

## 2022-04-12 PROCEDURE — 36416 COLLJ CAPILLARY BLOOD SPEC: CPT

## 2022-04-12 NOTE — PROGRESS NOTES
ANTICOAGULATION MANAGEMENT     Cayetano Lunsford 77 year old male is on warfarin with therapeutic INR result. (Goal INR 2.0-3.0)    Recent labs: (last 7 days)     04/12/22  1322   INR 2.1*       ASSESSMENT       Source(s): Chart Review and Patient/Caregiver Call       Warfarin doses taken: Warfarin taken as instructed    Diet: No new diet changes identified    New illness, injury, or hospitalization: No    Medication/supplement changes: None noted    Signs or symptoms of bleeding or clotting: No    Previous INR: Subtherapeutic at 1.9    Additional findings: None       PLAN     Recommended plan for no diet, medication or health factor changes affecting INR     Dosing Instructions: continue your current warfarin dose with next INR in 3 weeks       Summary  As of 4/12/2022    Full warfarin instructions:  5 mg every Mon, Fri; 7.5 mg all other days   Next INR check:  5/3/2022             Telephone call with Leo who agrees to plan and repeated back plan correctly    Lab visit scheduled    Education provided: Contact [phone]  with any changes, questions or concerns.     Plan made per [facility] anticoagulation protocol    Kriss Bernstein RN  Anticoagulation Clinic  4/12/2022    _______________________________________________________________________     Anticoagulation Episode Summary     Current INR goal:  2.0-3.0   TTR:  53.7 % (1 y)   Target end date:  Indefinite   Send INR reminders to:  [facility]    Indications    Longstanding persistent atrial fibrillation (H) [I48.11]  Long term current use of anticoagulants with INR goal of 2.0-3.0 [Z79.01]           Comments:           Anticoagulation Care Providers     Provider Role Specialty Phone number    Dmitri Andres MD Referring Family Medicine [phone]    Drew Bravo PA-C Referring Family Medicine [phone]

## 2022-04-12 NOTE — PROGRESS NOTES
ANTICOAGULATION MANAGEMENT     Cayetano Lunsford 77 year old male is on warfarin with therapeutic INR result. (Goal INR 2.0-3.0)    Recent labs: (last 7 days)     04/12/22  1322   INR 2.1*       ASSESSMENT       Source(s): Chart Review    Previous INR was Subtherapeutic at 1.9    Medication, diet, health changes since last INR chart reviewed; none identified           PLAN     Unable to reach Rich today.    Left message to continue current dose of warfarin 7.5 mg tonight. Request call back for assessment.    Follow up required to confirm warfarin dose taken and assess for changes and discuss dosing instructions and confirm understanding of instructions   Schedule next INR appointment in 3 weeks.      Kriss Bernstein RN  Anticoagulation Clinic  4/12/2022    If returning call, transfer to Kriss [phone] or route to [facility] [[id number]]

## 2022-04-13 DIAGNOSIS — G25.0 BENIGN FAMILIAL TREMOR: ICD-10-CM

## 2022-04-14 RX ORDER — PROPRANOLOL HYDROCHLORIDE 40 MG/1
TABLET ORAL
Qty: 180 TABLET | Refills: 0 | Status: SHIPPED | OUTPATIENT
Start: 2022-04-14 | End: 2022-05-05

## 2022-04-14 NOTE — TELEPHONE ENCOUNTER
Prescription approved per [facility] Refill Protocol..  Sasha Perez, RN, BSN, PHN  [facility]

## 2022-04-20 DIAGNOSIS — I48.0 PAROXYSMAL ATRIAL FIBRILLATION (H): ICD-10-CM

## 2022-04-20 DIAGNOSIS — Z79.01 LONG TERM CURRENT USE OF ANTICOAGULANTS WITH INR GOAL OF 2.0-3.0: ICD-10-CM

## 2022-04-20 RX ORDER — WARFARIN SODIUM 5 MG/1
TABLET ORAL
Qty: 114 TABLET | Refills: 1 | Status: SHIPPED | OUTPATIENT
Start: 2022-04-20 | End: 2022-09-28

## 2022-04-20 NOTE — TELEPHONE ENCOUNTER
ANTICOAGULATION MANAGEMENT:  Medication Refill    Anticoagulation Summary  As of 4/12/2022    Warfarin maintenance plan:  5 mg (5 mg x 1) every Mon, Fri; 7.5 mg (5 mg x 1.5) all other days   Next INR check:  5/3/2022   Target end date:  Indefinite    Indications    Longstanding persistent atrial fibrillation (H) [I48.11]  Long term current use of anticoagulants with INR goal of 2.0-3.0 [Z79.01]             Anticoagulation Care Providers     Provider Role Specialty Phone number    Dmitri Andres MD Referring Family Medicine [phone]    Drew Bravo PA-C Referring Family Medicine [phone]          Visit with referring provider/group within last year: Yes    [facility] referral signed within last year: Yes    Cayetano meets all criteria for refill (current [facility] referral, office visit with referring provider/group in last year, lab monitoring up to date or not exceeding 2 weeks overdue). Rx instructions and quantity supplied updated to match patient's current dosing plan. Warfarin 90 day supply with 1 refill granted per [facility] protocol     Estelle Starr RN  Anticoagulation Clinic

## 2022-05-03 ENCOUNTER — LAB (OUTPATIENT)
Dept: LAB | Facility: CLINIC | Age: 77
End: 2022-05-03
Payer: COMMERCIAL

## 2022-05-03 ENCOUNTER — ANTICOAGULATION THERAPY VISIT (OUTPATIENT)
Dept: ANTICOAGULATION | Facility: CLINIC | Age: 77
End: 2022-05-03

## 2022-05-03 DIAGNOSIS — I48.11 LONGSTANDING PERSISTENT ATRIAL FIBRILLATION (H): ICD-10-CM

## 2022-05-03 DIAGNOSIS — Z79.01 LONG TERM CURRENT USE OF ANTICOAGULANTS WITH INR GOAL OF 2.0-3.0: ICD-10-CM

## 2022-05-03 DIAGNOSIS — I48.11 LONGSTANDING PERSISTENT ATRIAL FIBRILLATION (H): Primary | ICD-10-CM

## 2022-05-03 LAB — INR BLD: 2.2 (ref 0.9–1.1)

## 2022-05-03 PROCEDURE — 36416 COLLJ CAPILLARY BLOOD SPEC: CPT

## 2022-05-03 PROCEDURE — 85610 PROTHROMBIN TIME: CPT

## 2022-05-03 NOTE — PROGRESS NOTES
ANTICOAGULATION MANAGEMENT     Cayetano Lunsford 77 year old male is on warfarin with therapeutic INR result. (Goal INR 2.0-3.0)    Recent labs: (last 7 days)     05/03/22  1307   INR 2.2*       ASSESSMENT       Source(s): Chart Review and Patient/Caregiver Call       Warfarin doses taken: Warfarin taken as instructed    Diet: No new diet changes identified    New illness, injury, or hospitalization: No    Medication/supplement changes: None noted    Signs or symptoms of bleeding or clotting: No    Previous INR: Therapeutic last visit; previously outside of goal range    Additional findings: Upcoming surgery/procedure EGD 7/8/22, patient also reports he will have a colonoscopy       PLAN     Recommended plan for no diet, medication or health factor changes affecting INR     Dosing Instructions: continue your current warfarin dose with next INR in 4 weeks       Summary  As of 5/3/2022    Full warfarin instructions:  5 mg every Mon, Fri; 7.5 mg all other days   Next INR check:  5/31/2022             Telephone call with Leo who verbalizes understanding and agrees to plan    Lab visit scheduled    Education provided: Please call back if any changes to your diet, medications or how you've been taking warfarin and Contact [phone]  with any changes, questions or concerns.     Plan made per [facility] anticoagulation protocol    Dayanara Cuevas RN  Anticoagulation Clinic  5/3/2022    _______________________________________________________________________     Anticoagulation Episode Summary     Current INR goal:  2.0-3.0   TTR:  54.5 % (1 y)   Target end date:  Indefinite   Send INR reminders to:  [facility]    Indications    Longstanding persistent atrial fibrillation (H) [I48.11]  Long term current use of anticoagulants with INR goal of 2.0-3.0 [Z79.01]           Comments:           Anticoagulation Care Providers     Provider Role Specialty Phone number    Dmitri Andres MD Referring Family Medicine  [phone]    Drew Bravo PA-C [facility] [phone]

## 2022-05-05 ENCOUNTER — OFFICE VISIT (OUTPATIENT)
Dept: PHARMACY | Facility: CLINIC | Age: 77
End: 2022-05-05
Payer: COMMERCIAL

## 2022-05-05 VITALS — BODY MASS INDEX: 26.9 KG/M2 | WEIGHT: 176.9 LBS | DIASTOLIC BLOOD PRESSURE: 62 MMHG | SYSTOLIC BLOOD PRESSURE: 138 MMHG

## 2022-05-05 DIAGNOSIS — G25.0 BENIGN FAMILIAL TREMOR: ICD-10-CM

## 2022-05-05 DIAGNOSIS — Z79.4 TYPE 2 DIABETES MELLITUS WITH STAGE 1 CHRONIC KIDNEY DISEASE, WITH LONG-TERM CURRENT USE OF INSULIN (H): Primary | ICD-10-CM

## 2022-05-05 DIAGNOSIS — R35.0 BENIGN PROSTATIC HYPERPLASIA WITH URINARY FREQUENCY: ICD-10-CM

## 2022-05-05 DIAGNOSIS — R60.9 EDEMA, UNSPECIFIED TYPE: ICD-10-CM

## 2022-05-05 DIAGNOSIS — N40.1 BENIGN PROSTATIC HYPERPLASIA WITH URINARY FREQUENCY: ICD-10-CM

## 2022-05-05 DIAGNOSIS — Z78.9 TAKES DIETARY SUPPLEMENTS: ICD-10-CM

## 2022-05-05 DIAGNOSIS — K21.9 GASTROESOPHAGEAL REFLUX DISEASE WITHOUT ESOPHAGITIS: ICD-10-CM

## 2022-05-05 DIAGNOSIS — I10 HYPERTENSION GOAL BP (BLOOD PRESSURE) < 140/90: ICD-10-CM

## 2022-05-05 DIAGNOSIS — E11.22 TYPE 2 DIABETES MELLITUS WITH STAGE 1 CHRONIC KIDNEY DISEASE, WITH LONG-TERM CURRENT USE OF INSULIN (H): Primary | ICD-10-CM

## 2022-05-05 DIAGNOSIS — N18.1 TYPE 2 DIABETES MELLITUS WITH STAGE 1 CHRONIC KIDNEY DISEASE, WITH LONG-TERM CURRENT USE OF INSULIN (H): Primary | ICD-10-CM

## 2022-05-05 DIAGNOSIS — J44.9 CHRONIC OBSTRUCTIVE PULMONARY DISEASE, UNSPECIFIED COPD TYPE (H): ICD-10-CM

## 2022-05-05 DIAGNOSIS — K51.90 ULCERATIVE COLITIS WITHOUT COMPLICATIONS, UNSPECIFIED LOCATION (H): ICD-10-CM

## 2022-05-05 DIAGNOSIS — I48.91 ATRIAL FIBRILLATION, UNSPECIFIED TYPE (H): ICD-10-CM

## 2022-05-05 DIAGNOSIS — E78.5 HYPERLIPIDEMIA LDL GOAL <100: ICD-10-CM

## 2022-05-05 PROCEDURE — 99605 MTMS BY PHARM NP 15 MIN: CPT | Performed by: PHARMACIST

## 2022-05-05 PROCEDURE — 99607 MTMS BY PHARM ADDL 15 MIN: CPT | Performed by: PHARMACIST

## 2022-05-05 RX ORDER — BLOOD SUGAR DIAGNOSTIC
STRIP MISCELLANEOUS
Qty: 300 EACH | Refills: 3 | Status: SHIPPED | OUTPATIENT
Start: 2022-05-05 | End: 2023-05-19

## 2022-05-05 RX ORDER — PROPRANOLOL HYDROCHLORIDE 60 MG/1
120 TABLET ORAL 2 TIMES DAILY
Qty: 360 TABLET | Refills: 1 | Status: SHIPPED | OUTPATIENT
Start: 2022-05-05 | End: 2022-10-18

## 2022-05-05 RX ORDER — FUROSEMIDE 20 MG
20 TABLET ORAL DAILY PRN
Qty: 90 TABLET | Refills: 0 | Status: SHIPPED | OUTPATIENT
Start: 2022-05-05 | End: 2022-07-28

## 2022-05-05 RX ORDER — TAMSULOSIN HYDROCHLORIDE 0.4 MG/1
CAPSULE ORAL
Qty: 90 CAPSULE | Refills: 1 | Status: SHIPPED | OUTPATIENT
Start: 2022-05-05 | End: 2022-11-01

## 2022-05-05 NOTE — LETTER
_  Medication List        Prepared on: 5/5/2022     Bring your Medication List when you go to the doctor, hospital, or   emergency room. And, share it with your family or caregivers.     Note any changes to how you take your medications.  Cross out medications when you no longer use them.    Medication How I take it Why I use it Prescriber   albuterol (PROAIR HFA/PROVENTIL HFA/VENTOLIN HFA) 108 (90 BASE) MCG/ACT Inhaler Inhale 1-2 puffs into the lungs every 4 hours as needed (for shortness of breath/wheezing) Chronic obstructive pulmonary disease, unspecified COPD type (H) Debbie Lewis MD   amLODIPine (NORVASC) 10 MG tablet Take 1 tablet (10 mg) by mouth daily Paroxysmal Atrial Fibrillation (H) Daniel Marroquin MD   atorvastatin (LIPITOR) 20 MG tablet Take 1 tablet (20 mg) by mouth daily Paroxysmal Atrial Fibrillation (H) Daniel Marroquin MD   folic acid 0.8 MG CAPS Take 1 tablet by mouth daily Ulcerative colitis without complications, unspecified location (H) Debbie Lewis MD   furosemide (LASIX) 20 MG tablet Take 1 tablet (20 mg) by mouth daily as needed (edema) Hypertension Goal BP (Blood Pressure) < 140/90 Drew Bravo PA-C   insulin NPH (NOVOLIN N VIAL) 100 UNIT/ML vial Inject 2 units prior to breakfast and 2 units prior to dinner. Discard vial after 42 days. 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, with long-term current use of insulin (H) Drew Bravo PA-C   ipratropium - albuterol 0.5 mg/2.5 mg/3 mL (DUONEB) 0.5-2.5 (3) MG/3ML neb solution NEBULIZE CONTENTS OF ONE VIAL (3 MLS) EVERY 4 HOURS AS NEEDED FOR SHORTNESS OF BREATH OR DYSPNEA Chronic obstructive pulmonary disease, unspecified COPD type (H) Drew Bravo PA-C   losartan (COZAAR) 25 MG tablet Take 0.5 tablets (12.5 mg) by mouth daily Paroxysmal Atrial Fibrillation (H) Daniel Marroquin MD   metFORMIN (GLUCOPHAGE) 1000 MG tablet TAKE ONE TABLET BY MOUTH TWICE A DAY WITH BREAKFAST  AND DINNER Type 2 diabetes  mellitus with stage 1 chronic kidney disease, with long-term current use of insulin (H); Type 2 diabetes mellitus with hypoglycemia without coma, with long-term current use of insulin (H) Drew Bravo PA-C   NOVOLIN R VIAL 100 UNIT/ML soln USE 2 UNITS AS NEEDED WHEN BLOOD GLUCOSE >200 MG/DL. Type 2 Diabetes, HbA1c Goal < 8% (H) Drew Bravo PA-C   pantoprazole (PROTONIX) 40 MG enteric coated tablet Take 40 mg by mouth daily heartburn  Debbie Rico MD   primidone (MYSOLINE) 50 MG tablet 4 tablets in am, 4 tablets afternoon, and 3 tablets every evening Benign Familial Tremor Bong Haydee Yoo MD   Probiotic Product (FLORAJEN3) CAPS Take 1 capsule by mouth 2 times daily Acute cystitis without hematuria Shiela Guerrero PA-C   propafenone (RYTHMOL) 150 MG TABS tablet Take 1 tablet (150 mg) by mouth 2 times daily Paroxysmal Atrial Fibrillation (H) Daniel Marroquin MD   propranolol (INDERAL) 60 MG tablet Take 2 tablets (120 mg) by mouth 2 times daily Benign Familial Tremor Drew Bravo PA-C   sulfaSALAzine (AZULFIDINE) 500 MG tablet TAKE ONE TABLET BY MOUTH THREE TIMES A DAY Unspecified Ulcerative Colitis Debbie Lewis MD   tamsulosin (FLOMAX) 0.4 MG capsule TAKE ONE CAPSULE BY MOUTH ONCE DAILY Benign prostatic hyperplasia with urinary frequency Drew Bravo PA-C   tiotropium-olodaterol 2.5-2.5 MCG/ACT AERS Inhale 2 puffs into the lungs daily COPD Patient Reported   Vitamin D3 (CHOLECALCIFEROL) 25 mcg (1000 units) tablet Take 2 tablets (50 mcg) by mouth daily [facility] Dmitri Andres MD   warfarin ANTICOAGULANT (JANTOVEN ANTICOAGULANT) 5 MG tablet TAKE ONE TABLET (5 MG) BY MOUTH ON Monday and Friday AND TAKE ONE AND ONE-HALF TABLETS BY MOUTH (7.5 MG) ALL OTHER DAYS OR AS DIRECTED BY THE INR CLINIC Paroxysmal Atrial Fibrillation (H); Long term current use of anticoagulants with INR goal of 2.0-3.0 Dmitri Andres MD         Add new  medications, over-the-counter drugs, herbals, vitamins, or  minerals in the blank rows below.    Medication How I take it Why I use it Prescriber                          Allergies:      percodan [oxycodone-aspirin]; aspirin        Side effects I have had:               Other Information:              My notes and questions:

## 2022-05-05 NOTE — PROGRESS NOTES
Medication Therapy Management (MTM) Encounter    ASSESSMENT:                            Medication Adherence/Access: No issues identified    Type 2 Diabetes: Patient is meeting A1c goal of < 8%. Self monitoring of blood glucose is mostly at goal of fasting  mg/dL and post prandial < 180 mg/dL. No changes recommended today as he prefers to remain on insulin and have insulin R as needed.     Hypertension/Afib/Edema: Recommend addition of low dose furosemide as needed for edema. Will recheck BMP in 2-3 weeks. Also will switch propranolol tablets to higher 60 mg tablets to decrease # of pillburden.     Hyperlipidemia: stable  COPD: stable  BPH: stable    UC: Follows gastroenterologist.    GERD: stable    Tremors:  Follows neurologist.     Supplements: May take melatonin as needed for insomnia.     PLAN:                            1. You can try melatonin 3 mg bedtime about 2 hours before bed.   2. Ordered propranolol 60 mg tablets for you so when you switch to those, you can take 2 tablets (120 mg) twice daily.  3.   Start furosemide 20 mg daily as needed for swelling.     Follow-up: Return in about 1 month (around 6/5/2022) for Lab Work.    SUBJECTIVE/OBJECTIVE:                          Cayetano Lunsford is a 77 year old male coming in for a follow-up visit.  Today's visit is a follow-up MTM visit from 11/5.     Reason for visit: blood glucose review.    Tobacco: He reports that he quit smoking about 30 years ago. His smoking use included cigarettes. He has a 30.00 pack-year smoking history. He has never used smokeless tobacco.  Alcohol: not currently using  Past Medical History: Reviewed in chart      Medication Adherence/Access: no issues reported    Type 2 Diabetes:  Currently taking metformin 1000mg twice daily, NPH 2 units AM and 2 units PM, and insulin regular 2 units when the reading is >200mg/dL. Patient is not experiencing side effects. States when he wasn't taking insulin earlier this winter, blood  glucose much worse and not at goal.   Blood sugar monitorin-3 time(s) daily. Ranges (patient reported):   Date FBG/ 2hours post Before dinner Bedtime     167     5/4 177 137 92   /3 151 147    5/2 137 96 215   / 159 124 136    177 134 104    144 122 98    148 156 139    134 141 112   Symptoms of low blood sugar? none.  Symptoms of high blood sugar? none  Eye exam: due  Foot exam: up to date  Diet/Exercise: Worse diet during holidays but improved now. Blood glucose was worse during holiday season. Will use 2 units of insulin regular if eating pasta or high carb meal.   Aspirin: Not taking due to warfarin  Statin: Yes: atorvastatin  ACEi/ARB: Yes: losartan.   Urine Albumin:   Lab Results   Component Value Date    UMALCR 95.83 (H) 2021     Lab Results   Component Value Date    A1C 6.0 02/15/2022    A1C 5.4 2021    A1C 5.3 2021    A1C 5.9 2020    A1C 5.8 2020    A1C 6.1 2019       Hypertension/Afib/Edema: Current medications include warfarin 5mg on Mon and Fri and 7.5mg all other days, propranolol 120mg by mouth two times daily, propafenone 150mg twice daily, losartan 12.5mg daily (decreased dose d/t high potassium) and amlodipine 10mg daily. Patient reports no current medication side effects except occasional tremor/lightheadedness. Has had 1 fall past year outside d/t turning and not seeing the curb. Sometimes check home blood pressure readings. Follows with anticoagulation clinic. Complains of edema today, visual edema in both ankles today which he states makes it difficult for him to put on his shoes, tight. He has had this for a while, most days.  BP Readings from Last 3 Encounters:   22 138/62   03/10/22 138/71   02/15/22 130/67     Lab Results   Component Value Date    INR 2.2 2022    INR 2.1 2022    INR 1.9 2022    INR 1.9 2022    INR 1.7 2022     Component      Latest Ref Rng & Units 3/29/2022   Potassium  (External)      3.5 - 5.2 mmol/L 5.1       Hyperlipidemia: Current therapy includes atorvastatin 20mg daily.  Patient reports no significant myalgias or other side effects.  Recent Labs   Lab Test 12/30/21  1004 11/09/20  1108 05/05/16  0919 05/12/15  0757 04/24/14  1016   CHOL 161 140   < > 141 148   HDL 48 30*   < > 44 38*   LDL 78 59   < > 46 66   TRIG 173* 253*   < > 255* 220*   CHOLHDLRATIO  --   --   --  3.2 3.9    < > = values in this interval not displayed.       COPD: Current medications: albuterol MDI as needed (occasoinal when out somewhere), Duonebs as needed (2-3x daily), LAMA/LABA- Stiolto Respimat 2 puff(s) once daily.   Patient is not experiencing side effects.   Patient reports the following symptoms: none.    BPH: Patient taking tamsulosin 0.4mg daily and reports no issues. Reports this helps decrease urinary frequency.    UC: Patient taking sulfasalazine 1000mg AM and 500 mg daily, probiotic daily, and folic acid 0.8mg daily. No issues reported. Follows gastroenterologist. Colonoscopy in July again.     GERD: Current medications include: Protonix (pantoprazole) 40mg once daily. Pt reports no current symptoms.      Tremors: Patient taking primidone 200mg AM, 200mg PM and 150 mg bedtime. Does also take propranolol 120mg twice daily and reports no issues. Some days worse than others. Avoids caffeine and chocolate. Follows with neurologist.     Supplements: Patient taking vitamin D 2000 units daily. Wonders about melatonin for sleep. Harder to fall asleep and stay asleep some nights.    Today's Vitals: /62   Wt 176 lb 14.4 oz (80.2 kg)   BMI 26.90 kg/m    ----------------      I spent 45 minutes with this patient today. All changes were made via collaborative practice agreement with Drew Bravo PA-C. A copy of the visit note was provided to the patient's provider(s).    The patient was given a summary of these recommendations.     Diana Johnson, PharmD  Medication Therapy Management  Provider [facility]  Pager: [phone]     Medication Therapy Recommendations  Edema, unspecified type    Current Medication: furosemide (LASIX) 20 MG tablet   Rationale: Untreated condition - Needs additional medication therapy - Indication   Recommendation: Start Medication   Status: Accepted per CPA         Takes dietary supplements    Rationale: Untreated condition - Needs additional medication therapy - Indication   Recommendation: Start Medication - melatonin 3 MG tablet   Status: Accepted - no CPA Needed

## 2022-05-05 NOTE — LETTER
"Recommended To-Do List      Prepared on: 5/5/2022     You can get the best results from your medications by completing the items on this \"To-Do List.\"      Bring your To-Do List when you go to your doctor. And, share it with your family or caregivers.    My To-Do List:  What we talked about: What I should do:   A new medication that may be of benefit to you    Start taking furosemide 20 mg daily as needed           What we talked about: What I should do:   A new medication that may be of benefit to you    Start taking melatonin 3 mg bedtime as needed 2 hours before bed          What we talked about: What I should do:                       "

## 2022-05-05 NOTE — PATIENT INSTRUCTIONS
"Recommendations from today's MTM visit:                                                       You can try melatonin 3 mg bedtime about 2 hours before bed.   Ordered propranolol 60 mg tablets for you so when you switch to those, you can take 2 tablets (120 mg) twice daily.  3. Start furosemide 20 mg daily as needed for swelling.     It was great speaking with you today.  I value your experience and would be very thankful for your time in providing feedback in our clinic survey. In the next few days, you may receive an email or text message from [facility] [employer] with a link to a survey related to your  clinical pharmacist.\"     To schedule another MTM appointment, please call the clinic directly or you may call the MTM scheduling line at [phone] or toll-free at [phone].     My Clinical Pharmacist's contact information:                                                      Please feel free to contact me with any questions or concerns you have.      Diana Johnson, PharmD  Medication Therapy Management Provider, [facility]  "

## 2022-05-05 NOTE — LETTER
May 5, 2022  Cayetano NORIEGA Jonn  [id number]   [place] [address]    Dear DAFNE [facility]        Thank you for talking with me on May 5, 2022 about your health and medications. As a follow-up to our conversation, I have included two documents:      1. Your Recommended To-Do List has steps you should take to get the best results from your medications.  2. Your Medication List will help you keep track of your medications and how to take them.    If you want to talk about these documents, please call Diana Johnson RPH at phone: [phone], Monday-Friday 8-4:30pm.    I look forward to working with you and your doctors to make sure your medications work well for you.    Sincerely,    Diana Johnson RPH  [facility] Pharmacist, [facility]

## 2022-05-31 ENCOUNTER — ANTICOAGULATION THERAPY VISIT (OUTPATIENT)
Dept: ANTICOAGULATION | Facility: CLINIC | Age: 77
End: 2022-05-31

## 2022-05-31 ENCOUNTER — TELEPHONE (OUTPATIENT)
Dept: ANTICOAGULATION | Facility: CLINIC | Age: 77
End: 2022-05-31

## 2022-05-31 ENCOUNTER — LAB (OUTPATIENT)
Dept: LAB | Facility: CLINIC | Age: 77
End: 2022-05-31
Payer: COMMERCIAL

## 2022-05-31 DIAGNOSIS — Z79.01 LONG TERM CURRENT USE OF ANTICOAGULANTS WITH INR GOAL OF 2.0-3.0: ICD-10-CM

## 2022-05-31 DIAGNOSIS — E11.22 TYPE 2 DIABETES MELLITUS WITH STAGE 1 CHRONIC KIDNEY DISEASE, WITH LONG-TERM CURRENT USE OF INSULIN (H): ICD-10-CM

## 2022-05-31 DIAGNOSIS — I48.11 LONGSTANDING PERSISTENT ATRIAL FIBRILLATION (H): Primary | ICD-10-CM

## 2022-05-31 DIAGNOSIS — I10 HYPERTENSION GOAL BP (BLOOD PRESSURE) < 140/90: ICD-10-CM

## 2022-05-31 DIAGNOSIS — N18.1 TYPE 2 DIABETES MELLITUS WITH STAGE 1 CHRONIC KIDNEY DISEASE, WITH LONG-TERM CURRENT USE OF INSULIN (H): ICD-10-CM

## 2022-05-31 DIAGNOSIS — I48.11 LONGSTANDING PERSISTENT ATRIAL FIBRILLATION (H): ICD-10-CM

## 2022-05-31 DIAGNOSIS — Z79.4 TYPE 2 DIABETES MELLITUS WITH STAGE 1 CHRONIC KIDNEY DISEASE, WITH LONG-TERM CURRENT USE OF INSULIN (H): ICD-10-CM

## 2022-05-31 LAB — INR BLD: 2.3 (ref 0.9–1.1)

## 2022-05-31 PROCEDURE — 36416 COLLJ CAPILLARY BLOOD SPEC: CPT

## 2022-05-31 PROCEDURE — 85610 PROTHROMBIN TIME: CPT

## 2022-05-31 NOTE — PROGRESS NOTES
ANTICOAGULATION MANAGEMENT     Cayetano Lunsford 77 year old male is on warfarin with therapeutic INR result. (Goal INR 2.0-3.0)    Recent labs: (last 7 days)     05/31/22  1309   INR 2.3*       ASSESSMENT       Source(s): Chart Review    Previous INR was Therapeutic last 2(+) visits    Medication, diet, health changes since last INR     MTM visit on 5/5/22--Melatonin and Lasix added    I routed TE to [facility] for 7/8/22 EGD/colonoscopy           PLAN     Unable to reach [facility] today x 2    Left VM to continue SAME DOSE and to call [phone] with transfer to [facility] OR [facility]      Follow up required to assess for changes     Tatyana Akers, RN  Anticoagulation Clinic  5/31/2022

## 2022-05-31 NOTE — TELEPHONE ENCOUNTER
Leo is scheduled for EGD/colonoscopy on 7/8/22 so will need to hold Warfarin x 4-5 days.    Patient is currently on Warfarin for A.Fib     Procedure is scheduled with Dr. Juan Simon at [facility]    Thank you,  Tatyana Akers RN

## 2022-06-01 NOTE — PROGRESS NOTES
ANTICOAGULATION MANAGEMENT     Cayetano Lunsford 77 year old male is on warfarin with therapeutic INR result. (Goal INR 2.0-3.0)    Recent labs: (last 7 days)     05/31/22  1309   INR 2.3*       ASSESSMENT       Source(s): Chart Review and Patient/Caregiver Call       Warfarin doses taken: Warfarin taken as instructed    Diet: No new diet changes identified    New illness, injury, or hospitalization: No    Medication/supplement changes: None noted    Signs or symptoms of bleeding or clotting: No    Previous INR: Therapeutic last 2(+) visits    Additional findings: EGD/Colonoscopy scheduled for 7/8/22       PLAN     Recommended plan for no diet, medication or health factor changes affecting INR     Dosing Instructions: continue your current warfarin dose with next INR in 4 weeks       Summary  As of 5/31/2022    Full warfarin instructions:  5 mg every Mon, Fri; 7.5 mg all other days   Next INR check:  6/28/2022             Telephone call with Leo who verbalizes understanding and agrees to plan    Lab visit scheduled    Education provided: Contact [phone]  with any changes, questions or concerns.    AVS calendar mailed to home address.      Plan made per [facility] anticoagulation protocol    Kriss Bernstein RN  Anticoagulation Clinic  6/1/2022    _______________________________________________________________________     Anticoagulation Episode Summary     Current INR goal:  2.0-3.0   TTR:  61.4 % (1 y)   Target end date:  Indefinite   Send INR reminders to:  [facility]    Indications    Longstanding persistent atrial fibrillation (H) [I48.11]  Long term current use of anticoagulants with INR goal of 2.0-3.0 [Z79.01]           Comments:           Anticoagulation Care Providers     Provider Role Specialty Phone number    Dmitri Andres MD Referring Family Medicine [phone]    Drew Bravo PA-C Referring Family Medicine [phone]

## 2022-06-08 NOTE — TELEPHONE ENCOUNTER
Fax received from [facility] on 6/8/22 requesting a 4 day hold for EDG/Colonscopy on 7/8/22.  [facility] can be reached at [phone].    Kim Mcmahon RN

## 2022-06-15 NOTE — TELEPHONE ENCOUNTER
Fax received from [facility] requesting 4 day hold for EGD/Colonoscopy on 7/8/22.  Please see notes below.    Routing to [facility] for review.    MARLINE CobbN, RN  Anticoagulation Clinic

## 2022-06-21 ENCOUNTER — OFFICE VISIT (OUTPATIENT)
Dept: FAMILY MEDICINE | Facility: CLINIC | Age: 77
End: 2022-06-21
Payer: COMMERCIAL

## 2022-06-21 VITALS
SYSTOLIC BLOOD PRESSURE: 116 MMHG | RESPIRATION RATE: 12 BRPM | OXYGEN SATURATION: 95 % | DIASTOLIC BLOOD PRESSURE: 50 MMHG | HEART RATE: 58 BPM | WEIGHT: 169 LBS | BODY MASS INDEX: 27.16 KG/M2 | HEIGHT: 66 IN | TEMPERATURE: 97.5 F

## 2022-06-21 DIAGNOSIS — Z01.818 PREOP GENERAL PHYSICAL EXAM: Primary | ICD-10-CM

## 2022-06-21 DIAGNOSIS — D64.9 ANEMIA, UNSPECIFIED TYPE: ICD-10-CM

## 2022-06-21 DIAGNOSIS — I25.10 CAD IN NATIVE ARTERY: ICD-10-CM

## 2022-06-21 DIAGNOSIS — K51.90 ULCERATIVE COLITIS WITHOUT COMPLICATIONS, UNSPECIFIED LOCATION (H): ICD-10-CM

## 2022-06-21 DIAGNOSIS — I48.0 PAROXYSMAL ATRIAL FIBRILLATION (H): ICD-10-CM

## 2022-06-21 DIAGNOSIS — Z79.01 LONG TERM CURRENT USE OF ANTICOAGULANTS WITH INR GOAL OF 2.0-3.0: ICD-10-CM

## 2022-06-21 DIAGNOSIS — L05.91 PILONIDAL CYST: ICD-10-CM

## 2022-06-21 DIAGNOSIS — D50.8 IRON DEFICIENCY ANEMIA SECONDARY TO INADEQUATE DIETARY IRON INTAKE: ICD-10-CM

## 2022-06-21 DIAGNOSIS — E78.5 HYPERLIPIDEMIA LDL GOAL <100: ICD-10-CM

## 2022-06-21 DIAGNOSIS — I10 HYPERTENSION GOAL BP (BLOOD PRESSURE) < 140/90: ICD-10-CM

## 2022-06-21 DIAGNOSIS — E11.9 TYPE 2 DIABETES, HBA1C GOAL < 8% (H): ICD-10-CM

## 2022-06-21 DIAGNOSIS — J44.9 CHRONIC OBSTRUCTIVE PULMONARY DISEASE, UNSPECIFIED COPD TYPE (H): ICD-10-CM

## 2022-06-21 LAB
ERYTHROCYTE [DISTWIDTH] IN BLOOD BY AUTOMATED COUNT: 14.9 % (ref 10–15)
HBA1C MFR BLD: 6.3 % (ref 0–5.6)
HCT VFR BLD AUTO: 32.2 % (ref 40–53)
HGB BLD-MCNC: 10.2 G/DL (ref 13.3–17.7)
MCH RBC QN AUTO: 30.4 PG (ref 26.5–33)
MCHC RBC AUTO-ENTMCNC: 31.7 G/DL (ref 31.5–36.5)
MCV RBC AUTO: 96 FL (ref 78–100)
PLATELET # BLD AUTO: 313 10E3/UL (ref 150–450)
RBC # BLD AUTO: 3.36 10E6/UL (ref 4.4–5.9)
WBC # BLD AUTO: 6.7 10E3/UL (ref 4–11)

## 2022-06-21 PROCEDURE — 99215 OFFICE O/P EST HI 40 MIN: CPT | Performed by: PHYSICIAN ASSISTANT

## 2022-06-21 PROCEDURE — 93000 ELECTROCARDIOGRAM COMPLETE: CPT | Performed by: PHYSICIAN ASSISTANT

## 2022-06-21 PROCEDURE — 83036 HEMOGLOBIN GLYCOSYLATED A1C: CPT | Performed by: PHYSICIAN ASSISTANT

## 2022-06-21 PROCEDURE — 85027 COMPLETE CBC AUTOMATED: CPT | Performed by: PHYSICIAN ASSISTANT

## 2022-06-21 PROCEDURE — 82728 ASSAY OF FERRITIN: CPT | Performed by: PHYSICIAN ASSISTANT

## 2022-06-21 PROCEDURE — 83550 IRON BINDING TEST: CPT | Performed by: PHYSICIAN ASSISTANT

## 2022-06-21 PROCEDURE — 36415 COLL VENOUS BLD VENIPUNCTURE: CPT | Performed by: PHYSICIAN ASSISTANT

## 2022-06-21 PROCEDURE — 80048 BASIC METABOLIC PNL TOTAL CA: CPT | Performed by: PHYSICIAN ASSISTANT

## 2022-06-21 RX ORDER — MUPIROCIN 20 MG/G
OINTMENT TOPICAL 3 TIMES DAILY
Qty: 22 G | Refills: 0 | Status: SHIPPED | OUTPATIENT
Start: 2022-06-21 | End: 2022-10-04

## 2022-06-21 NOTE — PROGRESS NOTES
[facility]  [address] [facility] [address]  Phone: [phone]  Primary Provider: Donis Dale  Pre-op Performing Provider: DONIS DALE      PREOPERATIVE EVALUATION:  Today's date: 6/21/2022    Cayetano Lunsford is a 77 year old male who presents for a preoperative evaluation.    Surgical Information:  Surgery/Procedure: Endoscopy and colonoscopy  Surgery Location: [facility]  Surgeon: ABHIJIT Simon  Surgery Date: 7/8/2022  Time of Surgery: 8:15am  Where patient plans to recover: At home with family  Fax number for surgical facility: Note does not need to be faxed, will be available electronically in Epic.    Type of Anesthesia Anticipated: Local with MAC    Assessment & Plan     The proposed surgical procedure is considered LOW risk.    Preop general physical exam  Stable exam. Cleared pending normal work up  - Hemoglobin A1c; Future  - Basic metabolic panel  (Ca, Cl, CO2, Creat, Gluc, K, Na, BUN); Future  - CBC with platelets; Future  - EKG 12-lead complete w/read - Clinics  - Hemoglobin A1c  - Basic metabolic panel  (Ca, Cl, CO2, Creat, Gluc, K, Na, BUN)  - CBC with platelets    Ulcerative colitis without complications, unspecified location (H)  As above     Type 2 diabetes, HbA1C goal < 8% (H)  History of this and past control. a1c pending. If stable, cleared for surgery.   - Hemoglobin A1c; Future  - Basic metabolic panel  (Ca, Cl, CO2, Creat, Gluc, K, Na, BUN); Future  - Hemoglobin A1c  - Basic metabolic panel  (Ca, Cl, CO2, Creat, Gluc, K, Na, BUN)    Chronic obstructive pulmonary disease, unspecified COPD type (H)  History of this. Stable on current regimen.     Long term current use of anticoagulants with INR goal of 2.0-3.0  No bridging needed. Hold 4 days prior   - CBC with platelets; Future  - CBC with platelets    Paroxysmal atrial fibrillation (H)  As above   - EKG 12-lead complete w/read - Clinics    Hypertension goal BP  (blood pressure) < 140/90  Stable on current regimen. Given low risk surgery with mac, no adjustments to medication needed.     CAD in native artery  Followed by cardiology as well. ekg stable. Mets greater than 4. Low risk surgery. Cleared.   - EKG 12-lead complete w/read - Clinics    Hyperlipidemia LDL goal <100      Pilonidal cyst  Refill requested.   - mupirocin (BACTROBAN) 2 % external ointment; Apply topically 3 times daily         Risks and Recommendations:  The patient has the following additional risks and recommendations for perioperative complications:  Diabetes:  - Patient is on insulin therapy; diabetic NPO guidelines provided and discussed.  Pulmonary:    - Incentive spirometry post-op  Anemia/Bleeding/Clotting:    - Anemia and does not require treatment prior to surgery. Monitor hemoglobin postoperatively    Medication Instructions:  Patient is to take all scheduled medications on the day of surgery EXCEPT for modifications listed below:   - warfarin: Thromboembolic risk is low (<4%/year). HOLD warfarin for 5 days without bridging before procedure.    - ACE/ARB: May be continued on the day of surgery.    - Beta Blockers: Continue taking on the day of surgery.   - Calcium Channel Blockers: May be continued on the day of surgery.   - Diuretics: HOLD on the day of surgery.   - Statins: Continue taking on the day of surgery.    - Intermediate acting insulin (NPH): Take 80% of usual dose the night before surgery.  Take 50% of the usual dose on the morning of surgery.   -hold metformin morning of surgery.     RECOMMENDATION:  APPROVAL GIVEN to proceed with proposed procedure pending review of diagnostic evaluation.      Subjective     HPI related to upcoming procedure: history of ulcerative colitis and the above procedure was deemed the next best step.     Preop Questions 6/21/2022   1. Have you ever had a heart attack or stroke? No   2. Have you ever had surgery on your heart or blood vessels, such as a  stent placement, a coronary artery bypass, or surgery on an artery in your head, neck, heart, or legs? No   3. Do you have chest pain with activity? No   4. Do you have a history of  heart failure? No   5. Do you currently have a cold, bronchitis or symptoms of other infection? No   6. Do you have a cough, shortness of breath, or wheezing? No   7. Do you or anyone in your family have previous history of blood clots? YES -    8. Do you or does anyone in your family have a serious bleeding problem such as prolonged bleeding following surgeries or cuts? No   9. Have you ever had problems with anemia or been told to take iron pills? No   10. Have you had any abnormal blood loss such as black, tarry or bloody stools? No   11. Have you ever had a blood transfusion? No   12. Are you willing to have a blood transfusion if it is medically needed before, during, or after your surgery? Yes   13. Have you or any of your relatives ever had problems with anesthesia? No   14. Do you have sleep apnea, excessive snoring or daytime drowsiness? No   15. Do you have any artifical heart valves or other implanted medical devices like a pacemaker, defibrillator, or continuous glucose monitor? No   16. Do you have artificial joints? No   17. Are you allergic to latex? No       Health Care Directive:  Patient does not have a Health Care Directive or Living Will: Discussed advance care planning with patient; information given to patient to review.    Preoperative Review of :   reviewed - controlled substances reflected in medication list.      Status of Chronic Conditions:  See problem list for active medical problems.  Problems all longstanding and stable, except as noted/documented.  See ROS for pertinent symptoms related to these conditions.    A-FIB - Patient has a longstanding history of chronic A-fib currently on rate control. Current treatment regimen includes Warfarin for stroke prevention and denies significant symptoms of  lightheadedness, palpitations or dyspnea.     ANEMIA - Patient has a recent history of moderate-severe anemia, which has not been symptomatic. Work up to date has revealed   Hemoglobin   Date Value Ref Range Status   12/30/2021 10.6 (L) 13.3 - 17.7 g/dL Final   03/13/2017 12.4 (L) 13.3 - 17.7 g/dL Final   ]  . Treatment has been not needed.     CAD - Patient has a longstanding history of moderate-severe CAD. Patient denies recent chest pain or NTG use, denies exercise induced dyspnea or PND. Last Stress test 6/26/19, EKG 6/21/22.     COPD - Patient has a longstanding history of moderate-severe COPD . Patient has been doing well overall noting NO SYMPTOMS and continues on medication regimen consisting of below without adverse reactions or side effects.    DIABETES - Patient has a longstanding history of DiabetesType Type II . Patient is being treated with diet, oral agents and insulin injections and denies significant side effects. Control has been good. Complicating factors include but are not limited to: hypertension, hyperlipidemia and CAD/PVD.     HYPERLIPIDEMIA - Patient has a long history of significant Hyperlipidemia requiring medication for treatment with recent good control. Patient reports no problems or side effects with the medication.     HYPERTENSION - Patient has longstanding history of HTN , currently denies any symptoms referable to elevated blood pressure. Specifically denies chest pain, palpitations, dyspnea, orthopnea, PND or peripheral edema. Blood pressure readings have been in normal range. Current medication regimen is as listed below. Patient denies any side effects of medication.       Review of Systems  CONSTITUTIONAL: NEGATIVE for fever, chills, change in weight  INTEGUMENTARY/SKIN: NEGATIVE for worrisome rashes, moles or lesions  EYES: NEGATIVE for vision changes or irritation  ENT/MOUTH: NEGATIVE for ear, mouth and throat problems  RESP: NEGATIVE for significant cough or SOB  CV:  NEGATIVE for chest pain, palpitations or peripheral edema  GI: NEGATIVE for nausea, abdominal pain, heartburn, or change in bowel habits  : NEGATIVE for frequency, dysuria, or hematuria  MUSCULOSKELETAL: NEGATIVE for significant arthralgias or myalgia  NEURO: NEGATIVE for weakness, dizziness or paresthesias  ENDOCRINE: NEGATIVE for temperature intolerance, skin/hair changes  HEME: NEGATIVE for bleeding problems  PSYCHIATRIC: NEGATIVE for changes in mood or affect    Patient Active Problem List    Diagnosis Date Noted     Overweight (BMI 25.0-29.9) 10/06/2021     Priority: Medium     Long term current use of anticoagulants with INR goal of 2.0-3.0 02/18/2021     Priority: Medium     Longstanding persistent atrial fibrillation (H) 03/02/2020     Priority: Medium     Bunion, left 10/31/2018     Priority: Medium     Long-term (current) use of anticoagulants [Z79.01] 03/30/2016     Priority: Medium     Diverticulitis of colon 02/09/2016     Priority: Medium     History of colonic polyps 02/09/2016     Priority: Medium     told to repeat colonoscopy in one year (due 4/2016)       Redundant colon 02/09/2016     Priority: Medium     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) 10/05/2015     Priority: Medium     Hard of hearing 07/31/2015     Priority: Medium     CAD in native artery      Priority: Medium     Pain in joint, lower leg 10/17/2013     Priority: Medium     Cramp of limb 11/01/2011     Priority: Medium     BPH (benign prostatic hyperplasia) 11/01/2011     Priority: Medium     Hypertension goal BP (blood pressure) < 140/90 06/16/2011     Priority: Medium     Benign familial tremor 03/24/2011     Priority: Medium     Takes propranolol       Advance Care Planning 02/10/2011     Priority: Medium     Advance Care Planning 2/14/2017: ACP Facilitation Session:    Cayetano Lunsford presented for ACP Facilitation session at a group session. He was accompanied by no one. [facility] information  provided and resources reviewed. He currently wishes to give additional consideration to ACP  He currently has the following questions or concerns about Advance Care Planning: none known.  Confirmed/documented legally designated decision maker(s).  Added by Tessa Randhawa RN, Advance Care Planning Liaison.  Advance Care Planning 2/10/11:  I gave the Health care Directive form to patient .  Patient will fill out the form and then he will make an appointment  with me to review. Ofelia Martin RN, Chronic Care Coordinator          HYPERLIPIDEMIA LDL GOAL <100 05/09/2010     Priority: Medium     Eczema 09/24/2009     Priority: Medium     Chronic obstructive pulmonary disease (H) 05/05/2006     Priority: Medium             Atrial fibrillation (H) 01/06/2006     Priority: Medium     Ulcerative colitis without complications (HCC) 11/26/2004     Priority: Medium     Diet controlled. Colonoscopy q3 years           Past Medical History:   Diagnosis Date     Allergic rhinitis, cause unspecified      Atrial fibrillation (H)      BPH (benign prostatic hyperplasia)      Chronic airway obstruction, not elsewhere classified      COPD (chronic obstructive pulmonary disease) (H)      Hyperlipidemia LDL goal <100 5/9/2010     Hypertension goal BP (blood pressure) < 130/80 10/19/2006     Obesity (BMI 30-39.9)      Perforation of tympanic membrane, unspecified     Right TM     Tachycardia, unspecified      Type 2 diabetes, HbA1C goal < 8% (H) 5/2/2010     Ulcerative colitis (H)      Unspecified cardiovascular disease      Past Surgical History:   Procedure Laterality Date     CARDIAC SURGERY       COLONOSCOPY  3/31/2011     COLONOSCOPY N/A 5/25/2018    Procedure: COMBINED COLONOSCOPY, SINGLE OR MULTIPLE BIOPSY/POLYPECTOMY BY BIOPSY;;  Surgeon: Juan Simon DO;  Location:  GI     COLONOSCOPY N/A 9/6/2019    Procedure: COLONOSCOPY, WITH POLYPECTOMY AND BIOPSY;  Surgeon: Paradise Mims MD;  Location:  GI     CORONARY  "ANGIOGRAPHY ADULT ORDER  2001 and 2008 2001 at [facility]. 2008- No interventions     HERNIA REPAIR      left inguinal     SURGICAL HISTORY OF -   1987    right ear graft     SURGICAL HISTORY OF -   1992    right shoulder surgery     SURGICAL HISTORY OF -   1979    back surgery     SURGICAL HISTORY OF -   1978    vasectomy     ZZHC REMOVAL OF NAIL PLATE SIMPLE SINGLE  11/10/2011    Right 2nd Toenail     Current Outpatient Medications   Medication Sig Dispense Refill     mupirocin (BACTROBAN) 2 % external ointment Apply topically 3 times daily 22 g 0     albuterol (PROAIR HFA/PROVENTIL HFA/VENTOLIN HFA) 108 (90 BASE) MCG/ACT Inhaler Inhale 1-2 puffs into the lungs every 4 hours as needed (for shortness of breath/wheezing) 1 Inhaler 5     amLODIPine (NORVASC) 10 MG tablet Take 1 tablet (10 mg) by mouth daily 90 tablet 3     atorvastatin (LIPITOR) 20 MG tablet Take 1 tablet (20 mg) by mouth daily 90 tablet 3     blood glucose (NO BRAND SPECIFIED) test strip Use to test blood sugar 3 times daily or as directed. 300 strip 3     folic acid 0.8 MG CAPS Take 1 tablet by mouth daily 90 capsule 3     furosemide (LASIX) 20 MG tablet Take 1 tablet (20 mg) by mouth daily as needed (edema) 90 tablet 0     insulin NPH (NOVOLIN N VIAL) 100 UNIT/ML vial Inject 2 units prior to breakfast and 2 units prior to dinner. Discard vial after 42 days. 3 mL 5     insulin syringe-needle U-100 (31G X 5/16\" 0.3 ML) 31G X 5/16\" 0.3 ML miscellaneous Use 3 syringes daily or as directed. 300 each 3     ipratropium - albuterol 0.5 mg/2.5 mg/3 mL (DUONEB) 0.5-2.5 (3) MG/3ML neb solution NEBULIZE CONTENTS OF ONE VIAL (3 MLS) EVERY 4 HOURS AS NEEDED FOR SHORTNESS OF BREATH OR DYSPNEA 360 mL 1     losartan (COZAAR) 25 MG tablet Take 0.5 tablets (12.5 mg) by mouth daily 60 tablet 3     metFORMIN (GLUCOPHAGE) 1000 MG tablet TAKE ONE TABLET BY MOUTH TWICE A DAY WITH BREAKFAST  AND DINNER 180 tablet 1     NOVOLIN R VIAL 100 UNIT/ML soln USE 2 UNITS AS NEEDED " WHEN BLOOD GLUCOSE >200 MG/DL. 10 mL 0     ONETOUCH ULTRA test strip USE TO TEST BLOOD SUGAR 3 TIMES DAILY OR AS DIRECTED. 300 strip 0     pantoprazole (PROTONIX) 40 MG enteric coated tablet Take 40 mg by mouth daily Started by Dr. Debbie Rico at [facility]       primidone (MYSOLINE) 50 MG tablet 4 tablets in am, 4 tablets afternoon, and 3 tablets every evening 1001 tablet 1     Probiotic Product (FLORAJEN3) CAPS Take 1 capsule by mouth 2 times daily 60 capsule 0     propafenone (RYTHMOL) 150 MG TABS tablet Take 1 tablet (150 mg) by mouth 2 times daily 180 tablet 3     propranolol (INDERAL) 60 MG tablet Take 2 tablets (120 mg) by mouth 2 times daily 360 tablet 1     sulfaSALAzine (AZULFIDINE) 500 MG tablet TAKE ONE TABLET BY MOUTH THREE TIMES A DAY 90 tablet 11     tamsulosin (FLOMAX) 0.4 MG capsule TAKE ONE CAPSULE BY MOUTH ONCE DAILY 90 capsule 1     tiotropium-olodaterol 2.5-2.5 MCG/ACT AERS Inhale 2 puffs into the lungs daily       Vitamin D3 (CHOLECALCIFEROL) 25 mcg (1000 units) tablet Take 2 tablets (50 mcg) by mouth daily       warfarin ANTICOAGULANT (JANTOVEN ANTICOAGULANT) 5 MG tablet TAKE ONE TABLET (5 MG) BY MOUTH ON Monday and Friday AND TAKE ONE AND ONE-HALF TABLETS BY MOUTH (7.5 MG) ALL OTHER DAYS OR AS DIRECTED BY THE INR CLINIC 114 tablet 1       Allergies   Allergen Reactions     Percodan [Oxycodone-Aspirin] Nausea and Vomiting     Aspirin         Social History     Tobacco Use     Smoking status: Former Smoker     Packs/day: 1.00     Years: 30.00     Pack years: 30.00     Types: Cigarettes     Quit date: 3/19/1992     Years since quittin.2     Smokeless tobacco: Never Used   Substance Use Topics     Alcohol use: Not Currently     Alcohol/week: 0.0 standard drinks     Family History   Problem Relation Age of Onset     Circulatory Mother         blood clot in leg     Diabetes Mother         type 2     Allergies Mother      Arthritis Mother      Gastrointestinal Disease Mother      Neurologic  "Disorder Mother         Familiar tremors     Neurologic Disorder Father         brain tumor     Cerebrovascular Disease Paternal Grandfather      Hypertension Brother      Hypertension Sister      Diabetes Sister         Type 2     Allergies Sister      Lipids Brother      Lipids Sister      History   Drug Use No         Objective     /50 (BP Location: Right arm, Patient Position: Chair, Cuff Size: Adult Regular)   Pulse 58   Temp 97.5  F (36.4  C) (Oral)   Resp 12   Ht 1.664 m (5' 5.5\")   Wt 76.7 kg (169 lb)   SpO2 95%   BMI 27.70 kg/m      Physical Exam    GENERAL APPEARANCE: alert, active and no distress     EYES: EOMI,  PERRL     HENT: ear canals and TM's normal and nose and mouth without ulcers or lesions     NECK: no adenopathy, no asymmetry, masses, or scars and thyroid normal to palpation     RESP: lungs clear to auscultation - no rales, rhonchi or wheezes     CV: regular rates and rhythm     ABDOMEN:  soft, nontender, no HSM or masses and bowel sounds normal     MS: extremities normal- no gross deformities noted, no evidence of inflammation in joints, FROM in all extremities.     SKIN: no suspicious lesions or rashes     NEURO: Normal strength and tone, sensory exam grossly normal, mentation intact and speech normal     PSYCH: mentation appears normal. and affect normal/bright     LYMPHATICS: No cervical adenopathy    Recent Labs   Lab Test 05/31/22  1309 05/03/22  1307 02/22/22  1319 02/15/22  1406 01/27/22  1310 12/30/21  1004 11/16/21  1324 11/03/21  1249 07/23/21  1300 07/09/21  1344   HGB  --   --   --   --   --  10.6*  --   --   --   --    PLT  --   --   --   --   --  272  --   --   --   --    INR 2.3* 2.2*   < >  --    < >  --    < >  --    < >  --    NA  --   --   --   --   --   --   --  136  --  137   POTASSIUM  --   --   --   --   --   --   --  4.8  --  5.5*   CR  --   --   --   --   --   --   --  0.71  --  0.76   A1C  --   --   --  6.0*  --   --   --   --   --  5.4    < > = values in " this interval not displayed.        Diagnostics:  Labs pending at this time.  Results will be reviewed when available.  No results found for this or any previous visit (from the past 24 hour(s)).   EKG: Normal Sinus Rhythm, incomplete RBBB. unchanged from previous, unchanged from previous tracings    Revised Cardiac Risk Index (RCRI):  The patient has the following serious cardiovascular risks for perioperative complications:   - Coronary Artery Disease (MI, positive stress test, angina, Qs on EKG) = 1 point   - Diabetes Mellitus (on Insulin) = 1 point     RCRI Interpretation: 2 points: Class III (moderate risk - 6.6% complication rate)     Estimated Functional Capacity: [facility] Activity Status Index (DASI) score: 30.2            Signed Electronically by: Drew Bravo PA-C  Copy of this evaluation report is provided to requesting physician.

## 2022-06-21 NOTE — PROGRESS NOTES
[address] [address]  Phone: [phone]  Primary Provider: Drew Bravo  {FV AMB Performing Provider (Optional):234102}    {Provider  Link to PREOP SmartSet  Use this to apply standard patient instructions to AVS; includes medication directions, common orders, guidelines for anemia, warfarin, additional testing   :272496}  PREOPERATIVE EVALUATION:  Today's date: 6/21/2022    Cayetano Lunsford is a 77 year old male who presents for a preoperative evaluation.    Surgical Information:  Surgery/Procedure: ***  Surgery Location: ***  Surgeon: ***  Surgery Date: ***  Time of Surgery: ***  Where patient plans to recover: {Preop post recovery plans :372109}  Fax number for surgical facility: {SURGERY FAX NUMBER:[id number]}    Type of Anesthesia Anticipated: {ANESTHESIA:958220}    {2021 Provider Charting Preference for Preop :142612}    Subjective     HPI related to upcoming procedure: ***    {Click here to pull in Questionnaire Data after Qnr completed :173514}  Health Care Directive:  Patient does not have a Health Care Directive or Living Will: {ADVANCE_DIRECTIVE_STATUS:269730}    Preoperative Review of :  {Mnpmpreport:547752}  {Review MNPMP for all patients per ICSI MNPMP Profile:373981}    {Chronic problem details (Optional) :393223}    Review of Systems  {ROS Preop Choices:529726}    Patient Active Problem List    Diagnosis Date Noted     Overweight (BMI 25.0-29.9) 10/06/2021     Priority: Medium     Long term current use of anticoagulants with INR goal of 2.0-3.0 02/18/2021     Priority: Medium     Longstanding persistent atrial fibrillation (H) 03/02/2020     Priority: Medium     Bunion, left 10/31/2018     Priority: Medium     Long-term (current) use of anticoagulants [Z79.01] 03/30/2016     Priority: Medium     Diverticulitis of colon 02/09/2016     Priority: Medium     History of colonic polyps 02/09/2016     Priority: Medium     told to  repeat colonoscopy in one year (due 4/2016)       Redundant colon 02/09/2016     Priority: Medium     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) 10/05/2015     Priority: Medium     Hard of hearing 07/31/2015     Priority: Medium     CAD in native artery      Priority: Medium     Pain in joint, lower leg 10/17/2013     Priority: Medium     Cramp of limb 11/01/2011     Priority: Medium     BPH (benign prostatic hyperplasia) 11/01/2011     Priority: Medium     Hypertension goal BP (blood pressure) < 140/90 06/16/2011     Priority: Medium     Benign familial tremor 03/24/2011     Priority: Medium     Takes propranolol       Advance Care Planning 02/10/2011     Priority: Medium     Advance Care Planning 2/14/2017: ACP Facilitation Session:    Cayetano NORIEGA Murray presented for ACP Facilitation session at a group session. He was accompanied by no one. Honoring Choices information provided and resources reviewed. He currently wishes to give additional consideration to ACP  He currently has the following questions or concerns about Advance Care Planning: none known.  Confirmed/documented legally designated decision maker(s).  Added by Tessa Randhawa RN, Advance Care Planning Liaison.  Advance Care Planning 2/10/11:  I gave the Health care Directive form to patient .  Patient will fill out the form and then he will make an appointment  with me to review. Ofelia Martin RN, Chronic Care Coordinator          HYPERLIPIDEMIA LDL GOAL <100 05/09/2010     Priority: Medium     Eczema 09/24/2009     Priority: Medium     Chronic obstructive pulmonary disease (H) 05/05/2006     Priority: Medium             Atrial fibrillation (H) 01/06/2006     Priority: Medium     Ulcerative colitis without complications (HCC) 11/26/2004     Priority: Medium     Diet controlled. Colonoscopy q3 years           Past Medical History:   Diagnosis Date     Allergic rhinitis, cause unspecified      Atrial fibrillation (H)      BPH  (benign prostatic hyperplasia)      Chronic airway obstruction, not elsewhere classified      COPD (chronic obstructive pulmonary disease) (H)      Hyperlipidemia LDL goal <100 5/9/2010     Hypertension goal BP (blood pressure) < 130/80 10/19/2006     Obesity (BMI 30-39.9)      Perforation of tympanic membrane, unspecified     Right TM     Tachycardia, unspecified      Type 2 diabetes, HbA1C goal < 8% (H) 5/2/2010     Ulcerative colitis (H)      Unspecified cardiovascular disease      Past Surgical History:   Procedure Laterality Date     CARDIAC SURGERY       COLONOSCOPY  3/31/2011     COLONOSCOPY N/A 5/25/2018    Procedure: COMBINED COLONOSCOPY, SINGLE OR MULTIPLE BIOPSY/POLYPECTOMY BY BIOPSY;;  Surgeon: Juan Simon DO;  Location:  GI     COLONOSCOPY N/A 9/6/2019    Procedure: COLONOSCOPY, WITH POLYPECTOMY AND BIOPSY;  Surgeon: Paradise Mims MD;  Location:  GI     CORONARY ANGIOGRAPHY ADULT ORDER  2001 and 2008 2001 at [facility]. 2008- No interventions     HERNIA REPAIR      left inguinal     SURGICAL HISTORY OF -   1987    right ear graft     SURGICAL HISTORY OF -   1992    right shoulder surgery     SURGICAL HISTORY OF -   1979    back surgery     SURGICAL HISTORY OF -   1978    vasectomy     ZZHC REMOVAL OF NAIL PLATE SIMPLE SINGLE  11/10/2011    Right 2nd Toenail     Current Outpatient Medications   Medication Sig Dispense Refill     albuterol (PROAIR HFA/PROVENTIL HFA/VENTOLIN HFA) 108 (90 BASE) MCG/ACT Inhaler Inhale 1-2 puffs into the lungs every 4 hours as needed (for shortness of breath/wheezing) 1 Inhaler 5     amLODIPine (NORVASC) 10 MG tablet Take 1 tablet (10 mg) by mouth daily 90 tablet 3     atorvastatin (LIPITOR) 20 MG tablet Take 1 tablet (20 mg) by mouth daily 90 tablet 3     blood glucose (NO BRAND SPECIFIED) test strip Use to test blood sugar 3 times daily or as directed. 300 strip 3     folic acid 0.8 MG CAPS Take 1 tablet by mouth daily 90 capsule 3     furosemide  "(LASIX) 20 MG tablet Take 1 tablet (20 mg) by mouth daily as needed (edema) 90 tablet 0     insulin NPH (NOVOLIN N VIAL) 100 UNIT/ML vial Inject 2 units prior to breakfast and 2 units prior to dinner. Discard vial after 42 days. 3 mL 5     insulin syringe-needle U-100 (31G X 5/16\" 0.3 ML) 31G X 5/16\" 0.3 ML miscellaneous Use 3 syringes daily or as directed. 300 each 3     ipratropium - albuterol 0.5 mg/2.5 mg/3 mL (DUONEB) 0.5-2.5 (3) MG/3ML neb solution NEBULIZE CONTENTS OF ONE VIAL (3 MLS) EVERY 4 HOURS AS NEEDED FOR SHORTNESS OF BREATH OR DYSPNEA 360 mL 1     losartan (COZAAR) 25 MG tablet Take 0.5 tablets (12.5 mg) by mouth daily 60 tablet 3     metFORMIN (GLUCOPHAGE) 1000 MG tablet TAKE ONE TABLET BY MOUTH TWICE A DAY WITH BREAKFAST  AND DINNER 180 tablet 1     NOVOLIN R VIAL 100 UNIT/ML soln USE 2 UNITS AS NEEDED WHEN BLOOD GLUCOSE >200 MG/DL. 10 mL 0     ONETOUCH ULTRA test strip USE TO TEST BLOOD SUGAR 3 TIMES DAILY OR AS DIRECTED. 300 strip 0     pantoprazole (PROTONIX) 40 MG enteric coated tablet Take 40 mg by mouth daily Started by Dr. Debbie Rico at [facility]       primidone (MYSOLINE) 50 MG tablet 4 tablets in am, 4 tablets afternoon, and 3 tablets every evening 1001 tablet 1     Probiotic Product (FLORAJEN3) CAPS Take 1 capsule by mouth 2 times daily 60 capsule 0     propafenone (RYTHMOL) 150 MG TABS tablet Take 1 tablet (150 mg) by mouth 2 times daily 180 tablet 3     propranolol (INDERAL) 60 MG tablet Take 2 tablets (120 mg) by mouth 2 times daily 360 tablet 1     sulfaSALAzine (AZULFIDINE) 500 MG tablet TAKE ONE TABLET BY MOUTH THREE TIMES A DAY 90 tablet 11     tamsulosin (FLOMAX) 0.4 MG capsule TAKE ONE CAPSULE BY MOUTH ONCE DAILY 90 capsule 1     tiotropium-olodaterol 2.5-2.5 MCG/ACT AERS Inhale 2 puffs into the lungs daily       Vitamin D3 (CHOLECALCIFEROL) 25 mcg (1000 units) tablet Take 2 tablets (50 mcg) by mouth daily       warfarin ANTICOAGULANT (JANTOVEN ANTICOAGULANT) 5 MG tablet TAKE ONE " "TABLET (5 MG) BY MOUTH ON Monday and Friday AND TAKE ONE AND ONE-HALF TABLETS BY MOUTH (7.5 MG) ALL OTHER DAYS OR AS DIRECTED BY THE INR CLINIC 114 tablet 1       Allergies   Allergen Reactions     Percodan [Oxycodone-Aspirin] Nausea and Vomiting     Aspirin         Social History     Tobacco Use     Smoking status: Former Smoker     Packs/day: 1.00     Years: 30.00     Pack years: 30.00     Types: Cigarettes     Quit date: 3/19/1992     Years since quittin.2     Smokeless tobacco: Never Used   Substance Use Topics     Alcohol use: Not Currently     Alcohol/week: 0.0 standard drinks     {FAMILY HISTORY (Optional):746040706}  History   Drug Use No         Objective     There were no vitals taken for this visit.    Physical Exam  {EXAM Preop Choices:661772}    Recent Labs   Lab Test 22  1309 22  1307 22  1319 02/15/22  1406 22  1310 21  1004 21  1324 21  1249 21  1300 21  1344   HGB  --   --   --   --   --  10.6*  --   --   --   --    PLT  --   --   --   --   --  272  --   --   --   --    INR 2.3* 2.2*   < >  --    < >  --    < >  --    < >  --    NA  --   --   --   --   --   --   --  136  --  137   POTASSIUM  --   --   --   --   --   --   --  4.8  --  5.5*   CR  --   --   --   --   --   --   --  0.71  --  0.76   A1C  --   --   --  6.0*  --   --   --   --   --  5.4    < > = values in this interval not displayed.        Diagnostics:  {LABS:399631}   {EK}    Revised Cardiac Risk Index (RCRI):  The patient has the following serious cardiovascular risks for perioperative complications:  {PREOP REVISED CARDIAC RISK INDEX (RCRI) :811034::\" - No serious cardiac risks = 0 points\"}     RCRI Interpretation: {REVISED CARDIAC RISK INTERPRETATION :109638}         Signed Electronically by: Drew Bravo PA-C  Copy of this evaluation report is provided to requesting physician.    {Provider Resources  Preop [facility] Preop Guidelines  " Revised Cardiac Risk Index :415293}

## 2022-06-21 NOTE — PATIENT INSTRUCTIONS
Intermediate acting insulin (NPH): Take 80% of usual dose the night before surgery.  Take 50% of the usual dose on the morning of surgery.   -hold metformin morning of surgery.     Preparing for Your Surgery  Getting started  A nurse will call you to review your health history and instructions. They will give you an arrival time based on your scheduled surgery time. Please be ready to share:  Your doctor's clinic name and phone number  Your medical, surgical and anesthesia history  A list of allergies and sensitivities  A list of medicines, including herbal treatments and over-the-counter drugs  Whether the patient has a legal guardian (ask how to send us the papers in advance)  Please tell us if you're pregnant--or if there's any chance you might be pregnant. Some surgeries may injure a fetus (unborn baby), so they require a pregnancy test. Surgeries that are safe for a fetus don't always need a test, and you can choose whether to have one.   If you have a child who's having surgery, please ask for a copy of Preparing for Your Child's Surgery.    Preparing for surgery  Within 30 days of surgery: Have a pre-op exam (sometimes called an H&P, or History and Physical). This can be done at a clinic or pre-operative center.  If you're having a , you may not need this exam. Talk to your care team.  At your pre-op exam, talk to your care team about all medicines you take. If you need to stop any medicines before surgery, ask when to start taking them again.  We do this for your safety. Many medicines can make you bleed too much during surgery. Some change how well surgery (anesthesia) drugs work.  Call your insurance company to let them know you're having surgery. (If you don't have insurance, call [phone].)  Call your clinic if there's any change in your health. This includes signs of a cold or flu (sore throat, runny nose, cough, rash, fever). It also includes a scrape or scratch near the surgery  site.  If you have questions on the day of surgery, call your hospital or surgery center.  COVID testing  You may need to be tested for COVID-19 before having surgery. If so, we will give you instructions.  Eating and drinking guidelines  For your safety: Unless your surgeon tells you otherwise, follow the guidelines below.  Eat and drink as usual until 8 hours before surgery. After that, no food or milk.  Drink clear liquids until 2 hours before surgery. These are liquids you can see through, like water, Gatorade and Propel Water. You may also have black coffee and tea (no cream or milk).  Nothing by mouth within 2 hours of surgery. This includes gum, candy and breath mints.  If you drink alcohol: Stop drinking it the night before surgery.  If your care team tells you to take medicine on the morning of surgery, it's okay to take it with a sip of water.  Preventing infection  Shower or bathe the night before and morning of your surgery. Follow the instructions your clinic gave you. (If no instructions, use regular soap.)  Don't shave or clip hair near your surgery site. We'll remove the hair if needed.  Don't smoke or vape the morning of surgery. You may chew nicotine gum up to 2 hours before surgery. A nicotine patch is okay.  Note: Some surgeries require you to completely quit smoking and nicotine. Check with your surgeon.  Your care team will make every effort to keep you safe from infection. We will:  Clean our hands often with soap and water (or an alcohol-based hand rub).  Clean the skin at your surgery site with a special soap that kills germs.  Give you a special gown to keep you warm. (Cold raises the risk of infection.)  Wear special hair covers, masks, gowns and gloves during surgery.  Give antibiotic medicine, if prescribed. Not all surgeries need antibiotics.  What to bring on the day of surgery  Photo ID and insurance card  Copy of your health care directive, if you have one  Glasses and hearing aides  (bring cases)  You can't wear contacts during surgery  Inhaler and eye drops, if you use them (tell us about these when you arrive)  CPAP machine or breathing device, if you use them  A few personal items, if spending the night  If you have . . .  A pacemaker, ICD (cardiac defibrillator) or other implant: Bring the ID card.  An implanted stimulator: Bring the remote control.  A legal guardian: Bring a copy of the certified (court-stamped) guardianship papers.  Please remove any jewelry, including body piercings. Leave jewelry and other valuables at home.  If you're going home the day of surgery  You must have a responsible adult drive you home. They should stay with you overnight as well.  If you don't have someone to stay with you, and you aren't safe to go home alone, we may keep you overnight. Insurance often won't pay for this.  After surgery  If it's hard to control your pain or you need more pain medicine, please call your surgeon's office.  Questions?   If you have any questions for your care team, list them here: _________________________________________________________________________________________________________________________________________________________________________ ____________________________________ ____________________________________ ____________________________________  For informational purposes only. Not to replace the advice of your health care provider. Copyright   2003, 2019 [facility]. All rights reserved. Clinically reviewed by Faviola Rhodes MD. [employer] [id number] - REV 07/21.

## 2022-06-21 NOTE — LETTER
June 22, 2022      Leo Lunsford  [id number]   [facility] [address]        Dear ,    We are writing to inform you of your test results.    Your a1c is stable. No concerns. Your have a persistent anemia. While this is stable and not a concern for your procedure, I have added on a few labs to assess for a possible cause of this.       Resulted Orders   Hemoglobin A1c   Result Value Ref Range    Hemoglobin A1C 6.3 (H) 0.0 - 5.6 %      Comment:      Normal <5.7%   Prediabetes 5.7-6.4%    Diabetes 6.5% or higher     Note: Adopted from ADA consensus guidelines.   CBC with platelets   Result Value Ref Range    WBC Count 6.7 4.0 - 11.0 10e3/uL    RBC Count 3.36 (L) 4.40 - 5.90 10e6/uL    Hemoglobin 10.2 (L) 13.3 - 17.7 g/dL    Hematocrit 32.2 (L) 40.0 - 53.0 %    MCV 96 78 - 100 fL    MCH 30.4 26.5 - 33.0 pg    MCHC 31.7 31.5 - 36.5 g/dL    RDW 14.9 10.0 - 15.0 %    Platelet Count 313 150 - 450 10e3/uL     If you have any questions or concerns, please call the clinic at the number listed above.     Sincerely,    Drew Bravo PA-C

## 2022-06-22 LAB
ANION GAP SERPL CALCULATED.3IONS-SCNC: 4 MMOL/L (ref 3–14)
BUN SERPL-MCNC: 16 MG/DL (ref 7–30)
CALCIUM SERPL-MCNC: 8.4 MG/DL (ref 8.5–10.1)
CHLORIDE BLD-SCNC: 102 MMOL/L (ref 94–109)
CO2 SERPL-SCNC: 30 MMOL/L (ref 20–32)
CREAT SERPL-MCNC: 0.65 MG/DL (ref 0.66–1.25)
FERRITIN SERPL-MCNC: 12 NG/ML (ref 26–388)
GFR SERPL CREATININE-BSD FRML MDRD: >90 ML/MIN/1.73M2
GLUCOSE BLD-MCNC: 192 MG/DL (ref 70–99)
IRON SATN MFR SERPL: 16 % (ref 15–46)
IRON SERPL-MCNC: 50 UG/DL (ref 35–180)
POTASSIUM BLD-SCNC: 5.5 MMOL/L (ref 3.4–5.3)
SODIUM SERPL-SCNC: 136 MMOL/L (ref 133–144)
TIBC SERPL-MCNC: 309 UG/DL (ref 240–430)

## 2022-06-22 RX ORDER — FERROUS SULFATE 325(65) MG
325 TABLET ORAL
COMMUNITY
Start: 2022-06-22

## 2022-06-28 ENCOUNTER — LAB (OUTPATIENT)
Dept: LAB | Facility: CLINIC | Age: 77
End: 2022-06-28
Payer: COMMERCIAL

## 2022-06-28 ENCOUNTER — ANTICOAGULATION THERAPY VISIT (OUTPATIENT)
Dept: ANTICOAGULATION | Facility: CLINIC | Age: 77
End: 2022-06-28

## 2022-06-28 DIAGNOSIS — Z79.01 LONG TERM CURRENT USE OF ANTICOAGULANTS WITH INR GOAL OF 2.0-3.0: ICD-10-CM

## 2022-06-28 DIAGNOSIS — I48.11 LONGSTANDING PERSISTENT ATRIAL FIBRILLATION (H): Primary | ICD-10-CM

## 2022-06-28 DIAGNOSIS — I48.11 LONGSTANDING PERSISTENT ATRIAL FIBRILLATION (H): ICD-10-CM

## 2022-06-28 LAB — INR BLD: 2.8 (ref 0.9–1.1)

## 2022-06-28 PROCEDURE — 36416 COLLJ CAPILLARY BLOOD SPEC: CPT

## 2022-06-28 PROCEDURE — 85610 PROTHROMBIN TIME: CPT

## 2022-06-28 NOTE — PROGRESS NOTES
ANTICOAGULATION MANAGEMENT     Cayetano Lunsford 77 year old male is on warfarin with therapeutic INR result. (Goal INR 2.0-3.0)    Recent labs: (last 7 days)     06/28/22  1313   INR 2.8*       ASSESSMENT       Source(s): Chart Review and Patient/Caregiver Call       Warfarin doses taken: Warfarin taken as instructed    Diet: No new diet changes identified    New illness, injury, or hospitalization: Yes: persistent anemia, per last CBC result note on 6/21/22.    Medication/supplement changes: Patient will start Ferrous Sulfate after his EGD and colonoscopy    Signs or symptoms of bleeding or clotting: No    Previous INR: Therapeutic last 2(+) visits    Additional findings: Patient states that his INR was drawn on 6/21/22 with other labs but this was not reported to [facility].  Pt reports an INR of 2.5 on 6/21/22       PLAN     Recommended plan for ongoing change(s) affecting INR     Dosing Instructions:Continue same weekly warfarin dose and then HOLD warfarin 7/4-7/7/22 with NO bridging with next INR in 2 1/2 weeks  (1 week after colonoscopy). Patient also informed to take booster dose on evening of 7/8/22 but to ask doctor performing procedures if ok to resume warfarin on evening of 7/8/22, patient verbalized understanding.    Summary  As of 6/28/2022    Full warfarin instructions:  7/4: Hold; 7/5: Hold; 7/6: Hold; 7/7: Hold; 7/8: 10 mg; Otherwise 5 mg every Mon, Fri; 7.5 mg all other days   Next INR check:  7/15/2022             Telephone call with Leo who agrees to plan and repeated back plan correctly    Lab visit scheduled    Education provided: Contact [phone]  with any changes, questions or concerns.     Plan made per [facility] anticoagulation protocol and per Yeny's hold plan--see 5/31/22 telephone encounter.    Tatyana Akers, RN  Anticoagulation Clinic  6/28/2022    _______________________________________________________________________     Anticoagulation Episode Summary     Current INR goal:  2.0-3.0   TTR:   65.2 % (1 y)   Target end date:  Indefinite   Send INR reminders to:  [facility]    Indications    Longstanding persistent atrial fibrillation (H) [I48.11]  Long term current use of anticoagulants with INR goal of 2.0-3.0 [Z79.01]           Comments:           Anticoagulation Care Providers     Provider Role Specialty Phone number    Dmitri Andres MD Referring [facility] [phone]    Drew Bravo PA-C Referring [facility] [phone]

## 2022-06-28 NOTE — TELEPHONE ENCOUNTER
[facility] calling again for hold orders for upcoming colonoscopy and endoscopy on 7/8/22.  Requesting 4 day hold.     MARLINE CobbN, RN  Anticoagulation Clinic

## 2022-06-28 NOTE — TELEPHONE ENCOUNTER
"JANETT-PROCEDURAL ANTICOAGULATION  MANAGEMENT    ASSESSMENT     Warfarin interruption plan for EGD on 7/8/22.    Indication for Anticoagulation: Atrial Fibrillation      [id number] = 4 (Hypertension, Age >= 75 and Diabetes)      Janett-Procedure Risk stratification for thromboembolism: low (2017 [facility] periprocedure pathway for NVAF Expert Consensus)    NVAF: 2017 [facility] periprocedure pathway for NVAF advises NO bridge for low risk stratification ([id number] score <=4 and no prior hx of stroke, TIA or systemic embolism)     RECOMMENDATION       Pre-Procedure:  o Hold warfarin for 4 days, until after procedure starting: Monday, July 4   o No Bridge      Post-Procedure:  o Resume warfarin dose if okay with provider doing procedure on night of procedure, Friday, July 8 PM: take 10 mg x 1, then resume home dose  o Recheck INR ~ 7 days after resuming warfarin       Plan outlined by referring provider in pre-op  ?   [facility]    SUBJECTIVE/OBJECTIVE     Cayetano Lunsford, a 77 year old male    Goal INR Range: 2.0-3.0     Patient bridged in past: No      Wt Readings from Last 3 Encounters:   06/21/22 76.7 kg (169 lb)   05/05/22 80.2 kg (176 lb 14.4 oz)   02/15/22 78.9 kg (174 lb)      Ideal body weight: 62.7 kg (138 lb 1.9 oz)  Adjusted ideal body weight: 68.3 kg (150 lb 7.5 oz)     Estimated body mass index is 27.7 kg/m  as calculated from the following:    Height as of 6/21/22: 1.664 m (5' 5.5\").    Weight as of 6/21/22: 76.7 kg (169 lb).    Lab Results   Component Value Date    INR 2.8 (H) 06/28/2022    INR 2.3 (H) 05/31/2022    INR 2.2 (H) 05/03/2022     Lab Results   Component Value Date    HGB 10.2 06/21/2022    HGB 12.4 03/13/2017    HCT 32.2 06/21/2022    HCT 39.4 03/13/2017     06/21/2022     03/13/2017     Lab Results   Component Value Date    CR 0.65 (L) 06/21/2022    CR 0.71 11/03/2021    CR 0.76 07/09/2021     Estimated Creatinine Clearance: 90.5 mL/min (A) (based on SCr of 0.65 mg/dL " (L)).

## 2022-06-30 NOTE — TELEPHONE ENCOUNTER
Voicemail from Sasha at [facility] states she needs a call back to confirm the hold orders at [phone]

## 2022-06-30 NOTE — TELEPHONE ENCOUNTER
Hold orders reviewed with patient at INR visit on 6/28/22.  I called [facility], left voicemail of 4 day hold with NO bridge.    Tatyana Akers RN  Anticoagulation Clinic

## 2022-07-05 ENCOUNTER — TELEPHONE (OUTPATIENT)
Dept: GASTROENTEROLOGY | Facility: CLINIC | Age: 77
End: 2022-07-05

## 2022-07-05 ENCOUNTER — LAB (OUTPATIENT)
Dept: URGENT CARE | Facility: URGENT CARE | Age: 77
End: 2022-07-05
Payer: COMMERCIAL

## 2022-07-05 DIAGNOSIS — N18.1 TYPE 2 DIABETES MELLITUS WITH STAGE 1 CHRONIC KIDNEY DISEASE, WITH LONG-TERM CURRENT USE OF INSULIN (H): ICD-10-CM

## 2022-07-05 DIAGNOSIS — Z79.4 TYPE 2 DIABETES MELLITUS WITH STAGE 1 CHRONIC KIDNEY DISEASE, WITH LONG-TERM CURRENT USE OF INSULIN (H): ICD-10-CM

## 2022-07-05 DIAGNOSIS — Z20.822 SUSPECTED 2019 NOVEL CORONAVIRUS INFECTION: ICD-10-CM

## 2022-07-05 DIAGNOSIS — E11.22 TYPE 2 DIABETES MELLITUS WITH STAGE 1 CHRONIC KIDNEY DISEASE, WITH LONG-TERM CURRENT USE OF INSULIN (H): ICD-10-CM

## 2022-07-05 DIAGNOSIS — Z20.822 SUSPECTED 2019 NOVEL CORONAVIRUS INFECTION: Primary | ICD-10-CM

## 2022-07-05 LAB — SARS-COV-2 RNA RESP QL NAA+PROBE: NEGATIVE

## 2022-07-05 PROCEDURE — U0003 INFECTIOUS AGENT DETECTION BY NUCLEIC ACID (DNA OR RNA); SEVERE ACUTE RESPIRATORY SYNDROME CORONAVIRUS 2 (SARS-COV-2) (CORONAVIRUS DISEASE [COVID-19]), AMPLIFIED PROBE TECHNIQUE, MAKING USE OF HIGH THROUGHPUT TECHNOLOGIES AS DESCRIBED BY CMS-2020-01-R: HCPCS

## 2022-07-05 PROCEDURE — U0005 INFEC AGEN DETEC AMPLI PROBE: HCPCS

## 2022-07-05 RX ORDER — BLOOD SUGAR DIAGNOSTIC
STRIP MISCELLANEOUS
Qty: 300 STRIP | Refills: 0 | OUTPATIENT
Start: 2022-07-05

## 2022-07-05 RX ORDER — BLOOD SUGAR DIAGNOSTIC
STRIP MISCELLANEOUS
Qty: 300 STRIP | Refills: 0 | Status: SHIPPED | OUTPATIENT
Start: 2022-07-05 | End: 2022-10-14

## 2022-07-05 NOTE — TELEPHONE ENCOUNTER
Reason for Call:  Other order    Detailed comments: patient has a colonscopy at  ENDOSCOPY on July 8.  Needs an order for a pre procedure covid test today.  Please order and contact patient to schedule.  Thank you.    Phone Number Patient can be reached at: Home number on file [phone] (home)    Best Time: any    Can we leave a detailed message on this number? YES    Call taken on 7/5/2022 at 9:47 AM by Bess Warner

## 2022-07-05 NOTE — TELEPHONE ENCOUNTER
Patient only has 2 test strips left.    Thank you,  Bethanie Irby & [facility] Staff Technician   [facility]  [email or web address]   Ph#: [phone]  Fax#: [phone]

## 2022-07-07 ENCOUNTER — ANESTHESIA EVENT (OUTPATIENT)
Dept: GASTROENTEROLOGY | Facility: CLINIC | Age: 77
End: 2022-07-07
Payer: COMMERCIAL

## 2022-07-07 ASSESSMENT — LIFESTYLE VARIABLES: TOBACCO_USE: 0

## 2022-07-07 ASSESSMENT — COPD QUESTIONNAIRES: COPD: 1

## 2022-07-07 ASSESSMENT — ENCOUNTER SYMPTOMS: DYSRHYTHMIAS: 1

## 2022-07-07 NOTE — ANESTHESIA PREPROCEDURE EVALUATION
Anesthesia Pre-Procedure Evaluation    Patient: Cayetano Lunsford   MRN: [id number] : 1945        Procedure : Procedure(s):  ESOPHAGOGASTRODUODENOSCOPY (EGD)  COLONOSCOPY          Past Medical History:   Diagnosis Date     Allergic rhinitis, cause unspecified      Atrial fibrillation (H)      BPH (benign prostatic hyperplasia)      Chronic airway obstruction, not elsewhere classified      COPD (chronic obstructive pulmonary disease) (H)      Hyperlipidemia LDL goal <100 2010     Hypertension goal BP (blood pressure) < 130/80 10/19/2006     Obesity (BMI 30-39.9)      Perforation of tympanic membrane, unspecified     Right TM     Tachycardia, unspecified      Type 2 diabetes, HbA1C goal < 8% (H) 2010     Ulcerative colitis (H)      Unspecified cardiovascular disease       Past Surgical History:   Procedure Laterality Date     CARDIAC SURGERY       COLONOSCOPY  3/31/2011     COLONOSCOPY N/A 2018    Procedure: COMBINED COLONOSCOPY, SINGLE OR MULTIPLE BIOPSY/POLYPECTOMY BY BIOPSY;;  Surgeon: Juan Simon DO;  Location:  GI     COLONOSCOPY N/A 2019    Procedure: COLONOSCOPY, WITH POLYPECTOMY AND BIOPSY;  Surgeon: Paradise Mims MD;  Location:  GI     CORONARY ANGIOGRAPHY ADULT ORDER   and 2008 at [facility]. 2008- No interventions     HERNIA REPAIR      left inguinal     SURGICAL HISTORY OF -       right ear graft     SURGICAL HISTORY OF -       right shoulder surgery     SURGICAL HISTORY OF -       back surgery     SURGICAL HISTORY OF -       vasectomy     ZZHC REMOVAL OF NAIL PLATE SIMPLE SINGLE  11/10/2011    Right 2nd Toenail      Allergies   Allergen Reactions     Percodan [Oxycodone-Aspirin] Nausea and Vomiting     Aspirin       Social History     Tobacco Use     Smoking status: Former Smoker     Packs/day: 1.00     Years: 30.00     Pack years: 30.00     Types: Cigarettes     Quit date: 3/19/1992     Years since quittin.3     Smokeless tobacco:  Never Used   Substance Use Topics     Alcohol use: Not Currently     Alcohol/week: 0.0 standard drinks      Wt Readings from Last 1 Encounters:   22 76.7 kg (169 lb)        Anesthesia Evaluation   Pt has had prior anesthetic. Type: General.    No history of anesthetic complications       ROS/MED HX  ENT/Pulmonary:     (+) COPD,  (-) tobacco use, asthma and sleep apnea   Neurologic:     (+) peripheral neuropathy, - familial tremor.     Cardiovascular:     (+) Dyslipidemia hypertension--CAD ---dysrhythmias, a-fib, Previous cardiac testing   Echo: Date:  Results:     Name: CAROLANN SAUNDERS  MRN: [id number]  : 1945  Study Date: 2019 11:33 AM  Age: 74 yrs  Gender: Male  Patient Location: [facility]  Reason For Study: Atrial fibrillation, unspecified type (H), Hyperlipidemia  LDL go  Ordering Physician: SULY BOSE  Referring Physician: Dmitri Andres  Performed By: Tom Madrid RDCS     BSA: 2.0 m2  Height: 68 in  Weight: 200 lb  HR: 68  BP: 138/79 mmHg  _____________________________________________________________________________  __        Procedure  Complete Echo Adult. Optison (NDC #[id number]) given intravenously.  _____________________________________________________________________________  __        Interpretation Summary     Sinus rhythm was noted.  The visual ejection fraction is estimated at 55-60%.  Grade II or moderate diastolic dysfunction.  There is moderate biatrial enlargement.  No hemodynamically significant valvular abnormalities on 2D or color flow  imaging. There is no comparison study available.  _____________________________________________________________________________  __        Left Ventricle  The left ventricle is normal in size. There is normal left ventricular wall  thickness. The visual ejection fraction is estimated at 55-60%. Grade II or  moderate diastolic dysfunction. No regional wall motion abnormalities noted.     Right Ventricle  The right ventricle is  normal in structure, function and size.     Atria  There is moderate biatrial enlargement. There is no color Doppler evidence of  an atrial shunt.     Mitral Valve  The mitral valve is normal in structure and function. There is trace mitral  regurgitation.        Tricuspid Valve  The tricuspid valve is normal in structure and function. There is mild (1+)  tricuspid regurgitation. The right ventricular systolic pressure is  approximated at 31mmHg plus the right atrial pressure.     Aortic Valve  The aortic valve is normal in structure and function.     Pulmonic Valve  The pulmonic valve is not well visualized. There is trace pulmonic valvular  regurgitation.     Vessels  Normal size aorta.     Pericardium  The pericardium appears normal.        Rhythm  Sinus rhythm was noted.  _____________________________________________________________________________  __  MMode/2D Measurements & Calculations  IVSd: 1.1 cm     LVIDd: 5.3 cm  LVIDs: 2.6 cm  LVPWd: 0.93 cm  FS: 49.9 %  LV mass(C)d: 209.0 grams  LV mass(C)dI: 102.3 grams/m2  Ao root diam: 3.5 cm  LA dimension: 4.2 cm  asc Aorta Diam: 3.4 cm  LA/Ao: 1.2  LVOT diam: 2.1 cm  LVOT area: 3.5 cm2  LA Volume Index (BP): 43.8 ml/m2  RWT: 0.35        Stress Test: Date: Results:    ECG Reviewed: Date: Results:    Cath: Date: Results:      METS/Exercise Tolerance:     Hematologic:     (+) anemia,     Musculoskeletal:       GI/Hepatic:    (-) GERD and liver disease   Renal/Genitourinary:     (+) renal disease, type: CRI,     Endo:     (+) type II DM, Using insulin, - not using insulin pump. Obesity,     Psychiatric/Substance Use:       Infectious Disease:       Malignancy:       Other:            Physical Exam    Airway        Mallampati: II   TM distance: > 3 FB   Neck ROM: full   Mouth opening: > 3 cm    Respiratory Devices and Support         Dental       (+) upper dentures and lower dentures      Cardiovascular   cardiovascular exam normal          Pulmonary   pulmonary exam  normal                OUTSIDE LABS:  CBC:   Lab Results   Component Value Date    WBC 6.7 06/21/2022    WBC 5.7 12/30/2021    HGB 10.2 (L) 06/21/2022    HGB 10.6 (L) 12/30/2021    HCT 32.2 (L) 06/21/2022    HCT 33.8 (L) 12/30/2021     06/21/2022     12/30/2021     BMP:   Lab Results   Component Value Date     06/21/2022     11/03/2021    POTASSIUM 5.5 (H) 06/21/2022    POTASSIUM 4.8 11/03/2021    CHLORIDE 102 06/21/2022    CHLORIDE 102 11/03/2021    CO2 30 06/21/2022    CO2 31 11/03/2021    BUN 16 06/21/2022    BUN 14 11/03/2021    CR 0.65 (L) 06/21/2022    CR 0.71 11/03/2021     (H) 06/21/2022     (H) 11/03/2021     COAGS:   Lab Results   Component Value Date    PTT 29 04/30/2008    INR 2.8 (H) 06/28/2022     POC:   Lab Results   Component Value Date     (H) 09/06/2019     HEPATIC:   Lab Results   Component Value Date    ALBUMIN 3.3 (L) 11/09/2020    PROTTOTAL 6.5 (L) 11/09/2020    ALT 39 12/30/2021    AST 26 02/02/2021    GGT 14 02/09/2016    ALKPHOS 84 11/09/2020    BILITOTAL 0.4 11/09/2020     OTHER:   Lab Results   Component Value Date    A1C 6.3 (H) 06/21/2022    LEBRON 8.4 (L) 06/21/2022    PHOS 3.8 12/29/2005    MAG 2.0 12/29/2005    TSH 3.62 11/09/2020    T4 0.91 09/09/2019    SED 12 03/24/2011       Anesthesia Plan    ASA Status:  3   NPO Status:  NPO Appropriate    Anesthesia Type: MAC.     - Reason for MAC: chronic cardiopulmonary disease              Consents    Anesthesia Plan(s) and associated risks, benefits, and realistic alternatives discussed. Questions answered and patient/representative(s) expressed understanding.    - Discussed:     - Discussed with:  Patient      - Extended Intubation/Ventilatory Support Discussed: No.      - Patient is DNR/DNI Status: No    Use of blood products discussed: No .     Postoperative Care    Pain management: IV analgesics.   PONV prophylaxis: Ondansetron (or other 5HT-3)     Comments:                Iván Harvey,  MD

## 2022-07-08 ENCOUNTER — DOCUMENTATION ONLY (OUTPATIENT)
Dept: ANTICOAGULATION | Facility: CLINIC | Age: 77
End: 2022-07-08

## 2022-07-08 ENCOUNTER — HOSPITAL ENCOUNTER (OUTPATIENT)
Facility: CLINIC | Age: 77
Discharge: HOME | End: 2022-07-08
Attending: INTERNAL MEDICINE | Admitting: INTERNAL MEDICINE
Payer: COMMERCIAL

## 2022-07-08 ENCOUNTER — ANESTHESIA (OUTPATIENT)
Dept: GASTROENTEROLOGY | Facility: CLINIC | Age: 77
End: 2022-07-08
Payer: COMMERCIAL

## 2022-07-08 VITALS
BODY MASS INDEX: 27.16 KG/M2 | DIASTOLIC BLOOD PRESSURE: 56 MMHG | HEART RATE: 54 BPM | SYSTOLIC BLOOD PRESSURE: 122 MMHG | HEIGHT: 66 IN | WEIGHT: 169 LBS

## 2022-07-08 LAB
COLONOSCOPY: NORMAL
GLUCOSE BLDC GLUCOMTR-MCNC: 87 MG/DL (ref 70–99)
INR PPP: 1.24 (ref 0.85–1.15)
UPPER GI ENDOSCOPY: NORMAL

## 2022-07-08 PROCEDURE — 999N000010 HC STATISTIC ANES STAT CODE-CRNA PER MINUTE: Performed by: INTERNAL MEDICINE

## 2022-07-08 PROCEDURE — 88305 TISSUE EXAM BY PATHOLOGIST: CPT | Mod: 26 | Performed by: PATHOLOGY

## 2022-07-08 PROCEDURE — 45385 COLONOSCOPY W/LESION REMOVAL: CPT | Performed by: INTERNAL MEDICINE

## 2022-07-08 PROCEDURE — 999N000099 HC STATISTIC MODERATE SEDATION < 10 MIN: Performed by: INTERNAL MEDICINE

## 2022-07-08 PROCEDURE — 85610 PROTHROMBIN TIME: CPT | Performed by: INTERNAL MEDICINE

## 2022-07-08 PROCEDURE — 45380 COLONOSCOPY AND BIOPSY: CPT | Mod: PT | Performed by: INTERNAL MEDICINE

## 2022-07-08 PROCEDURE — 43239 EGD BIOPSY SINGLE/MULTIPLE: CPT | Performed by: INTERNAL MEDICINE

## 2022-07-08 PROCEDURE — 250N000011 HC RX IP 250 OP 636: Performed by: NURSE ANESTHETIST, CERTIFIED REGISTERED

## 2022-07-08 PROCEDURE — 36415 COLL VENOUS BLD VENIPUNCTURE: CPT | Performed by: INTERNAL MEDICINE

## 2022-07-08 PROCEDURE — 370N000017 HC ANESTHESIA TECHNICAL FEE, PER MIN: Performed by: INTERNAL MEDICINE

## 2022-07-08 PROCEDURE — 258N000003 HC RX IP 258 OP 636: Performed by: NURSE ANESTHETIST, CERTIFIED REGISTERED

## 2022-07-08 PROCEDURE — 250N000009 HC RX 250: Performed by: NURSE ANESTHETIST, CERTIFIED REGISTERED

## 2022-07-08 PROCEDURE — 88305 TISSUE EXAM BY PATHOLOGIST: CPT | Mod: TC | Performed by: INTERNAL MEDICINE

## 2022-07-08 PROCEDURE — 82962 GLUCOSE BLOOD TEST: CPT

## 2022-07-08 RX ORDER — SODIUM CHLORIDE, SODIUM LACTATE, POTASSIUM CHLORIDE, CALCIUM CHLORIDE 600; 310; 30; 20 MG/100ML; MG/100ML; MG/100ML; MG/100ML
INJECTION, SOLUTION INTRAVENOUS CONTINUOUS PRN
Status: DISCONTINUED | OUTPATIENT
Start: 2022-07-08 | End: 2022-07-08

## 2022-07-08 RX ORDER — ONDANSETRON 2 MG/ML
INJECTION INTRAMUSCULAR; INTRAVENOUS PRN
Status: DISCONTINUED | OUTPATIENT
Start: 2022-07-08 | End: 2022-07-08

## 2022-07-08 RX ORDER — PROPOFOL 10 MG/ML
INJECTION, EMULSION INTRAVENOUS PRN
Status: DISCONTINUED | OUTPATIENT
Start: 2022-07-08 | End: 2022-07-08

## 2022-07-08 RX ORDER — ONDANSETRON 2 MG/ML
4 INJECTION INTRAMUSCULAR; INTRAVENOUS
Status: DISCONTINUED | OUTPATIENT
Start: 2022-07-08 | End: 2022-07-08 | Stop reason: HOSPADM

## 2022-07-08 RX ORDER — LIDOCAINE HYDROCHLORIDE 20 MG/ML
INJECTION, SOLUTION INFILTRATION; PERINEURAL PRN
Status: DISCONTINUED | OUTPATIENT
Start: 2022-07-08 | End: 2022-07-08

## 2022-07-08 RX ORDER — ONDANSETRON 4 MG/1
4 TABLET, ORALLY DISINTEGRATING ORAL EVERY 6 HOURS PRN
Status: DISCONTINUED | OUTPATIENT
Start: 2022-07-08 | End: 2022-07-08 | Stop reason: HOSPADM

## 2022-07-08 RX ORDER — LIDOCAINE 40 MG/G
CREAM TOPICAL
Status: DISCONTINUED | OUTPATIENT
Start: 2022-07-08 | End: 2022-07-08 | Stop reason: HOSPADM

## 2022-07-08 RX ORDER — NALOXONE HYDROCHLORIDE 0.4 MG/ML
0.4 INJECTION, SOLUTION INTRAMUSCULAR; INTRAVENOUS; SUBCUTANEOUS
Status: DISCONTINUED | OUTPATIENT
Start: 2022-07-08 | End: 2022-07-08 | Stop reason: HOSPADM

## 2022-07-08 RX ORDER — PROCHLORPERAZINE MALEATE 5 MG
5 TABLET ORAL EVERY 6 HOURS PRN
Status: DISCONTINUED | OUTPATIENT
Start: 2022-07-08 | End: 2022-07-08 | Stop reason: HOSPADM

## 2022-07-08 RX ORDER — FLUMAZENIL 0.1 MG/ML
0.2 INJECTION, SOLUTION INTRAVENOUS
Status: DISCONTINUED | OUTPATIENT
Start: 2022-07-08 | End: 2022-07-08 | Stop reason: HOSPADM

## 2022-07-08 RX ORDER — NALOXONE HYDROCHLORIDE 0.4 MG/ML
0.2 INJECTION, SOLUTION INTRAMUSCULAR; INTRAVENOUS; SUBCUTANEOUS
Status: DISCONTINUED | OUTPATIENT
Start: 2022-07-08 | End: 2022-07-08 | Stop reason: HOSPADM

## 2022-07-08 RX ORDER — ONDANSETRON 2 MG/ML
4 INJECTION INTRAMUSCULAR; INTRAVENOUS EVERY 6 HOURS PRN
Status: DISCONTINUED | OUTPATIENT
Start: 2022-07-08 | End: 2022-07-08 | Stop reason: HOSPADM

## 2022-07-08 RX ORDER — EPHEDRINE SULFATE 50 MG/ML
INJECTION, SOLUTION INTRAMUSCULAR; INTRAVENOUS; SUBCUTANEOUS PRN
Status: DISCONTINUED | OUTPATIENT
Start: 2022-07-08 | End: 2022-07-08

## 2022-07-08 RX ORDER — PROPOFOL 10 MG/ML
INJECTION, EMULSION INTRAVENOUS CONTINUOUS PRN
Status: DISCONTINUED | OUTPATIENT
Start: 2022-07-08 | End: 2022-07-08

## 2022-07-08 RX ADMIN — PHENYLEPHRINE HYDROCHLORIDE 100 MCG: 10 INJECTION INTRAVENOUS at 08:39

## 2022-07-08 RX ADMIN — SODIUM CHLORIDE, POTASSIUM CHLORIDE, SODIUM LACTATE AND CALCIUM CHLORIDE: 600; 310; 30; 20 INJECTION, SOLUTION INTRAVENOUS at 08:17

## 2022-07-08 RX ADMIN — LIDOCAINE HYDROCHLORIDE 40 MG: 20 INJECTION, SOLUTION INFILTRATION; PERINEURAL at 08:21

## 2022-07-08 RX ADMIN — PROPOFOL 100 MCG/KG/MIN: 10 INJECTION, EMULSION INTRAVENOUS at 08:20

## 2022-07-08 RX ADMIN — PROPOFOL 20 MG: 10 INJECTION, EMULSION INTRAVENOUS at 08:21

## 2022-07-08 RX ADMIN — Medication 5 MG: at 08:37

## 2022-07-08 RX ADMIN — ONDANSETRON 4 MG: 2 INJECTION INTRAMUSCULAR; INTRAVENOUS at 08:21

## 2022-07-08 RX ADMIN — TOPICAL ANESTHETIC 1 EACH: 200 SPRAY DENTAL; PERIODONTAL at 08:17

## 2022-07-08 NOTE — PROGRESS NOTES
ANTICOAGULATION  MANAGEMENT: Discharge Review    Cayetano Lunsford chart reviewed for anticoagulation continuity of care    Outpatient surgery/procedure on 7/8/22 for colonoscopy and upper GI endosciopy.    Discharge disposition: Home    Results:    Recent labs: (last 7 days)     07/08/22  0809   INR 1.24*     Anticoagulation inpatient management:     not applicable     Anticoagulation discharge instructions:     Warfarin dosing: home regimen continued, per Colonoscopy reports, patient can resume warfarin 7/8/22.   Bridging: No   INR goal change: No      Medication changes affecting anticoagulation: No    Additional factors affecting anticoagulation: No     PLAN     No adjustment to anticoagulation plan needed    Patient not contacted, planned procedure, lab INR appointment already scheduled for 7/15/22    No adjustment to Anticoagulation Calendar was required    Dayanara Cuevas RN

## 2022-07-08 NOTE — H&P
[facility] - Pre-Endoscopy History and Physical     Cayetano Lunsford MRN# [id number]   YOB: 1945 Age: 77 year old     Date of Procedure: 7/8/2022  Primary care provider: Drew Bravo  Type of Endoscopy: EGD/colonoscopy  Reason for Procedure: Anemia, reflux/UC  Type of Anesthesia Anticipated: MAC    HPI:    Cayetano is a 77 year old male who will be undergoing the above procedure.      A history and physical has been performed, 6/21/22, reviewed.     The patient's medications and allergies have been reviewed. The risks and benefits of the procedure and the sedation options and risks were discussed with the patient.  All questions were answered and informed consent was obtained.      He denies a personal or family history of anesthesia complications or bleeding disorders.     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)     Diverticulitis of colon     History of colonic polyps     Redundant colon     Long-term (current) use of anticoagulants [Z79.01]     Bunion, left     Longstanding persistent atrial fibrillation (H)     Long term current use of anticoagulants with INR goal of 2.0-3.0     Overweight (BMI 25.0-29.9)        Past Medical History:   Diagnosis Date     Allergic rhinitis, cause unspecified      Atrial fibrillation (H)      BPH (benign prostatic hyperplasia)      Chronic airway obstruction, not elsewhere classified      COPD (chronic obstructive pulmonary disease) (H)      Hyperlipidemia LDL goal <100 5/9/2010     Hypertension goal  BP (blood pressure) < 130/80 10/19/2006     Obesity (BMI 30-39.9)      Perforation of tympanic membrane, unspecified     Right TM     Tachycardia, unspecified      Type 2 diabetes, HbA1C goal < 8% (H) 2010     Ulcerative colitis (H)      Unspecified cardiovascular disease         Past Surgical History:   Procedure Laterality Date     CARDIAC SURGERY       COLONOSCOPY  3/31/2011     COLONOSCOPY N/A 2018    Procedure: COMBINED COLONOSCOPY, SINGLE OR MULTIPLE BIOPSY/POLYPECTOMY BY BIOPSY;;  Surgeon: Juan Simon DO;  Location:  GI     COLONOSCOPY N/A 2019    Procedure: COLONOSCOPY, WITH POLYPECTOMY AND BIOPSY;  Surgeon: Paradise Mims MD;  Location:  GI     CORONARY ANGIOGRAPHY ADULT ORDER   and 2008 at [facility]. - No interventions     HERNIA REPAIR      left inguinal     SURGICAL HISTORY OF -       right ear graft     SURGICAL HISTORY OF -       right shoulder surgery     SURGICAL HISTORY OF -       back surgery     SURGICAL HISTORY OF -       vasectomy     ZZHC REMOVAL OF NAIL PLATE SIMPLE SINGLE  11/10/2011    Right 2nd Toenail       Social History     Tobacco Use     Smoking status: Former Smoker     Packs/day: 1.00     Years: 30.00     Pack years: 30.00     Types: Cigarettes     Quit date: 3/19/1992     Years since quittin.3     Smokeless tobacco: Never Used   Substance Use Topics     Alcohol use: Not Currently     Alcohol/week: 0.0 standard drinks       Family History   Problem Relation Age of Onset     Circulatory Mother         blood clot in leg     Diabetes Mother         type 2     Allergies Mother      Arthritis Mother      Gastrointestinal Disease Mother      Neurologic Disorder Mother         Familiar tremors     Neurologic Disorder Father         brain tumor     Cerebrovascular Disease Paternal Grandfather      Hypertension Brother      Hypertension Sister      Diabetes Sister         Type 2     Allergies Sister      Lipids Brother       Lipids Sister        Prior to Admission medications    Medication Sig Start Date End Date Taking? Authorizing Provider   albuterol (PROAIR HFA/PROVENTIL HFA/VENTOLIN HFA) 108 (90 BASE) MCG/ACT Inhaler Inhale 1-2 puffs into the lungs every 4 hours as needed (for shortness of breath/wheezing) 11/2/17  Yes Debbie Lewis MD   amLODIPine (NORVASC) 10 MG tablet Take 1 tablet (10 mg) by mouth daily 10/18/21  Yes Daniel Marroquin MD   atorvastatin (LIPITOR) 20 MG tablet Take 1 tablet (20 mg) by mouth daily 10/18/21  Yes Daniel Marroquin MD   folic acid 0.8 MG CAPS Take 1 tablet by mouth daily 11/6/17  Yes Debbie Lewis MD   furosemide (LASIX) 20 MG tablet Take 1 tablet (20 mg) by mouth daily as needed (edema) 5/5/22  Yes Drew Bravo PA-C   insulin NPH (NOVOLIN N VIAL) 100 UNIT/ML vial Inject 2 units prior to breakfast and 2 units prior to dinner. Discard vial after 42 days. 2/16/22  Yes Drew Bravo PA-C   ipratropium - albuterol 0.5 mg/2.5 mg/3 mL (DUONEB) 0.5-2.5 (3) MG/3ML neb solution NEBULIZE CONTENTS OF ONE VIAL (3 MLS) EVERY 4 HOURS AS NEEDED FOR SHORTNESS OF BREATH OR DYSPNEA 4/5/22  Yes Drew Bravo PA-C   losartan (COZAAR) 25 MG tablet Take 0.5 tablets (12.5 mg) by mouth daily 10/18/21  Yes Daniel Marroquin MD   metFORMIN (GLUCOPHAGE) 1000 MG tablet TAKE ONE TABLET BY MOUTH TWICE A DAY WITH BREAKFAST  AND DINNER 2/15/22  Yes Drew Bravo PA-C   pantoprazole (PROTONIX) 40 MG enteric coated tablet Take 40 mg by mouth daily Started by Dr. Debbie Rico at [facility]   Yes Reported, Patient   primidone (MYSOLINE) 50 MG tablet 4 tablets in am, 4 tablets afternoon, and 3 tablets every evening 3/10/22  Yes Bong Yoo MD   Probiotic Product (FLORAJEN3) CAPS Take 1 capsule by mouth 2 times daily 11/19/15  Yes Shiela Guerrero PA-C   propafenone (RYTHMOL) 150 MG TABS tablet Take 1 tablet (150 mg) by mouth 2 times daily 10/18/21  Yes Daniel Marroquin MD  "  propranolol (INDERAL) 60 MG tablet Take 2 tablets (120 mg) by mouth 2 times daily 5/5/22  Yes Drew Bravo PA-C   sulfaSALAzine (AZULFIDINE) 500 MG tablet TAKE ONE TABLET BY MOUTH THREE TIMES A DAY 7/9/14  Yes Debbie Lewis MD   tamsulosin (FLOMAX) 0.4 MG capsule TAKE ONE CAPSULE BY MOUTH ONCE DAILY 5/5/22  Yes Drew Bravo PA-C   tiotropium-olodaterol 2.5-2.5 MCG/ACT AERS Inhale 2 puffs into the lungs daily   Yes Reported, Patient   Vitamin D3 (CHOLECALCIFEROL) 25 mcg (1000 units) tablet Take 2 tablets (50 mcg) by mouth daily 6/15/21  Yes Dmitri Andres MD   warfarin ANTICOAGULANT (JANTOVEN ANTICOAGULANT) 5 MG tablet TAKE ONE TABLET (5 MG) BY MOUTH ON Monday and Friday AND TAKE ONE AND ONE-HALF TABLETS BY MOUTH (7.5 MG) ALL OTHER DAYS OR AS DIRECTED BY THE INR CLINIC 4/20/22  Yes Dmitri Andres MD   blood glucose (NO BRAND SPECIFIED) test strip Use to test blood sugar 3 times daily or as directed. 12/2/20   Dmitri Andres MD   blood glucose (ONETOUCH ULTRA) test strip USE TO TEST BLOOD SUGAR 3 TIMES DAILY OR AS DIRECTED. 7/5/22   Drew Bravo PA-C   ferrous sulfate (FEROSUL) 325 (65 Fe) MG tablet Take 1 tablet (325 mg) by mouth daily (with breakfast) 6/22/22   Drew Bravo PA-C   insulin syringe-needle U-100 (31G X 5/16\" 0.3 ML) 31G X 5/16\" 0.3 ML miscellaneous Use 3 syringes daily or as directed. 5/5/22   Drew Bravo PA-C   mupirocin (BACTROBAN) 2 % external ointment Apply topically 3 times daily 6/21/22   Drew Bravo PA-C   NOVOLIN R VIAL 100 UNIT/ML soln USE 2 UNITS AS NEEDED WHEN BLOOD GLUCOSE >200 MG/DL. 3/31/22   Shelly, Drew Grupo, PA-C       Allergies   Allergen Reactions     Percodan [Oxycodone-Aspirin] Nausea and Vomiting     Aspirin         REVIEW OF SYSTEMS:   A comprehensive ROS was negative    PHYSICAL EXAM:   BP (!) 184/73   Pulse 69   Ht 1.676 m (5' 6\")   Wt 76.7 kg (169 lb)   BMI 27.28 kg/m   " "Estimated body mass index is 27.28 kg/m  as calculated from the following:    Height as of this encounter: 1.676 m (5' 6\").    Weight as of this encounter: 76.7 kg (169 lb).   GENERAL APPEARANCE: alert, and oriented  MENTAL STATUS: alert  RESP: lungs clear to auscultation - no rales, rhonchi or wheezes  CV: regular rates and rhythm      IMPRESSION   Anemia, reflux  Pan-UC, dx 1999    PLAN:     EGD  Colonoscopy    The above has been forwarded to the consulting provider.      Signed Electronically by: Juan Simon DO  July 8, 2022              "

## 2022-07-08 NOTE — ANESTHESIA CARE TRANSFER NOTE
Patient: Cayetano Lunsford    Procedure: Procedure(s):  ESOPHAGOGASTRODUODENOSCOPY, WITH BIOPSY  COLONOSCOPY       Diagnosis: Ulcerative (chronic) pancolitis without complications (H) [K51.00]  Anemia, unspecified type [D64.9]  Diagnosis Additional Information: No value filed.    Anesthesia Type:   MAC     Note:    Oropharynx: spontaneously breathing  Level of Consciousness: drowsy  Oxygen Supplementation: nasal cannula  Level of Supplemental Oxygen (L/min / FiO2): 2  Independent Airway: airway patency satisfactory and stable  Dentition: dentition unchanged  Vital Signs Stable: post-procedure vital signs reviewed and stable  Report to RN Given: handoff report given  Patient transferred to: Phase II  Comments: After procedure in [facility] under monitored anesthesia care (MAC), patient exhibited spontaneous respirations, oxygen via nasal cannula with oxygen saturation maintained greater than 98%, patient brought to [facility] recovery bay for postprocedure recovery, report on patient's clinical status given to recovery-trained endoscopy RN, oxygen tubing connected to wall O2 in recovery room.    Handoff Report: Identifed the Patient, Identified the Reponsible Provider, Reviewed the pertinent medical history, Discussed the surgical course, Reviewed Intra-OP anesthesia mangement and issues during anesthesia, Set expectations for post-procedure period and Allowed opportunity for questions and acknowledgement of understanding      Vitals:  Vitals Value Taken Time   BP     Temp     Pulse 54 07/08/22 0905   Resp 19 07/08/22 0905   SpO2 99 % 07/08/22 0905   Vitals shown include unvalidated device data.    Electronically Signed By: RAFFI Shepherd CRNA  July 8, 2022  9:06 AM

## 2022-07-09 NOTE — OR NURSING
Patient fell at discharge when trying to step off curb.  The fall was a guided fall assisted by a volunteer.  A compass report and post fall form was completed.  Writer called patient the day after fall and asked how he was doing.  He said that he felt okay after the fall.  He said his bottom was a little sore, but he didn't notice any bruising.  He said he will be evaluated if he notices any changes.     cbc, bmp ,ptptt done 1/30/17

## 2022-07-11 LAB
PATH REPORT.COMMENTS IMP SPEC: NORMAL
PATH REPORT.COMMENTS IMP SPEC: NORMAL
PATH REPORT.FINAL DX SPEC: NORMAL
PATH REPORT.GROSS SPEC: NORMAL
PATH REPORT.MICROSCOPIC SPEC OTHER STN: NORMAL
PATH REPORT.RELEVANT HX SPEC: NORMAL
PHOTO IMAGE: NORMAL

## 2022-07-15 ENCOUNTER — LAB (OUTPATIENT)
Dept: LAB | Facility: CLINIC | Age: 77
End: 2022-07-15
Payer: COMMERCIAL

## 2022-07-15 ENCOUNTER — ANTICOAGULATION THERAPY VISIT (OUTPATIENT)
Dept: ANTICOAGULATION | Facility: CLINIC | Age: 77
End: 2022-07-15

## 2022-07-15 DIAGNOSIS — I48.11 LONGSTANDING PERSISTENT ATRIAL FIBRILLATION (H): ICD-10-CM

## 2022-07-15 DIAGNOSIS — Z79.01 LONG TERM CURRENT USE OF ANTICOAGULANTS WITH INR GOAL OF 2.0-3.0: ICD-10-CM

## 2022-07-15 DIAGNOSIS — I48.11 LONGSTANDING PERSISTENT ATRIAL FIBRILLATION (H): Primary | ICD-10-CM

## 2022-07-15 LAB — INR BLD: 1.4 (ref 0.9–1.1)

## 2022-07-15 PROCEDURE — 36416 COLLJ CAPILLARY BLOOD SPEC: CPT

## 2022-07-15 PROCEDURE — 85610 PROTHROMBIN TIME: CPT

## 2022-07-15 NOTE — PROGRESS NOTES
ANTICOAGULATION MANAGEMENT     Cayetano Lunsford 77 year old male is on warfarin with subtherapeutic INR result. (Goal INR 2.0-3.0)    Recent labs: (last 7 days)     07/15/22  1308   INR 1.4*       ASSESSMENT       Source(s): Chart Review and Patient/Caregiver Call       Warfarin doses taken: Warfarin recently held for Colonoscopy which may be affecting INR. Also missed dose on 7/13    Diet: No new diet changes identified    New illness, injury, or hospitalization: No    Medication/supplement changes: None noted    Signs or symptoms of bleeding or clotting: No    Previous INR: Subtherapeutic    Additional findings: None       PLAN     Recommended plan for temporary change(s) affecting INR     Dosing Instructions: booster dose then continue your current warfarin dose with next INR in 1 week       Summary  As of 7/15/2022    Full warfarin instructions:  7/15: 10 mg; Otherwise 5 mg every Mon, Fri; 7.5 mg all other days   Next INR check:  7/22/2022             Telephone call with Leo who verbalizes understanding and agrees to plan and who agrees to plan and repeated back plan correctly    Lab visit scheduled    Education provided: Goal range and significance of current result, Written instructions provided and Contact [phone] with any changes, questions or concerns.     Plan made per [facility] anticoagulation protocol    Dyana Corrales RN  Anticoagulation Clinic  7/15/2022    _______________________________________________________________________     Anticoagulation Episode Summary     Current INR goal:  2.0-3.0   TTR:  65.4 % (1 y)   Target end date:  Indefinite   Send INR reminders to:  [facility] [place]    Indications    Longstanding persistent atrial fibrillation (H) [I48.11]  Long term current use of anticoagulants with INR goal of 2.0-3.0 [Z79.01]           Comments:           Anticoagulation Care Providers     Provider Role Specialty Phone number    Dmitri Andres MD Referring Family Medicine  [phone]    Drew Bravo PA-C [facility] [phone]

## 2022-07-18 NOTE — Clinical Note
Diagnosis:   1. MDS (myelodysplastic syndrome) (CMS/[facility])       Regimen: Vidaza D1-7 every 28 days  Cycle/Day: C125D1    Dr Gómez is ordering clinician today.    Vital Signs: There were no vitals filed for this visit.     Allergies:    ALLERGIES:   Allergen Reactions   • Contrast Media HIVES     IV contrast   • Iodine HIVES   • Versed Other (See Comments)     unkown        Medications:  The medication list was reviewed. No changes noted.     ECOG:   ECOG   ECOG Performance Status        Distress Screening: Is this day one of cycle or a new regimen? No No, patient did not indicate any new concerns. Refer to most recent PHQ2/9 score    Toxicity Assessment: Constitutional  Fatigue: Fatigue relieved by rest    Additional Nursing Assessment: In addition to above toxicity assessment, this RN assessed and pt ok to proceed. .    Prechemo Checklist  Chemo Consent Signed: Yes  Protocol Verified: Yes  Is Protocol Standard of Care or Research?: Standard of Care  Pre-Chemo Labs Reviewed?: Yes  Provider Notified of Abnormal Labs Not Meeting Treatment Conditions: No  Pregnancy Screening Performed (if indicated): Not applicable  All Treatment Conditions Met?: Yes  BSA/weight in Orders Verified for Weight-Based Drugs?: Yes  Chemo Dose Calculations Verified: Yes  Second RN Verified Calculations: Gunjan Silverman RN     Pre-Treatment: Patient has valid pre-authorization, Premed orders, including hydration, are verified prior to administration and I have reviewed the following with the patient: Name of chemo drug, duration and route of infusion, Infusion/Drug volume and dose, and reportable infusion-related symptoms.     Treatment: Refer to LDA and MAR for line assessment and medication administration, Chemotherapy has not ; double checked & verified by two practitioners, Appearance and physical integrity of drugs meets standard of drug monograph; double checked & verified by two practitioners and Drugs were administered in  Please see changes today and let me know if you have any questions.  Tanna proper sequencing    Post Treatment: Treatment tolerated well; no adverse reaction    Oral Chemotherapy: No    Education: No new instructions needed    Next appointment scheduled: 07/19/2022  Patient instructed to call the office with any questions or concerns.    Patient Discharged: patient discharged to home per self, ambulatory, with caregiver

## 2022-07-22 ENCOUNTER — LAB (OUTPATIENT)
Dept: LAB | Facility: CLINIC | Age: 77
End: 2022-07-22
Payer: COMMERCIAL

## 2022-07-22 ENCOUNTER — ANTICOAGULATION THERAPY VISIT (OUTPATIENT)
Dept: ANTICOAGULATION | Facility: CLINIC | Age: 77
End: 2022-07-22

## 2022-07-22 DIAGNOSIS — I48.11 LONGSTANDING PERSISTENT ATRIAL FIBRILLATION (H): Primary | ICD-10-CM

## 2022-07-22 DIAGNOSIS — Z79.01 LONG TERM CURRENT USE OF ANTICOAGULANTS WITH INR GOAL OF 2.0-3.0: ICD-10-CM

## 2022-07-22 DIAGNOSIS — I48.11 LONGSTANDING PERSISTENT ATRIAL FIBRILLATION (H): ICD-10-CM

## 2022-07-22 LAB — INR BLD: 1.9 (ref 0.9–1.1)

## 2022-07-22 PROCEDURE — 36416 COLLJ CAPILLARY BLOOD SPEC: CPT

## 2022-07-22 PROCEDURE — 85610 PROTHROMBIN TIME: CPT

## 2022-07-22 NOTE — PROGRESS NOTES
ANTICOAGULATION MANAGEMENT     Cayetano Lunsford 77 year old male is on warfarin with subtherapeutic INR result. (Goal INR 2.0-3.0)    Recent labs: (last 7 days)     07/22/22  1406   INR 1.9*       ASSESSMENT       Source(s): Chart Review and Patient/Caregiver Call       Warfarin doses taken: Warfarin taken differently, but did not change total weekly dose - missed last night but took this morning.     Diet: No new diet changes identified    New illness, injury, or hospitalization: No    Medication/supplement changes: None noted    Signs or symptoms of bleeding or clotting: No    Previous INR: Subtherapeutic    Additional findings: None       PLAN     Recommended plan for temporary change(s) affecting INR (INR still likely low due to hold for colonoscopy 7/8/22)    Dosing Instructions: continue your current warfarin dose with next INR in 1 week       Summary  As of 7/22/2022    Full warfarin instructions:  5 mg every Mon, Fri; 7.5 mg all other days   Next INR check:  7/29/2022             Telephone call with Leo who agrees to plan and repeated back plan correctly    Lab visit scheduled    Education provided: Please call back if any changes to your diet, medications or how you've been taking warfarin and Importance of taking warfarin as instructed    Plan made per [facility] anticoagulation protocol + training with Tatyana Akers, RN    Corina Cuevas, RN  Anticoagulation Clinic  7/22/2022    _______________________________________________________________________     Anticoagulation Episode Summary     Current INR goal:  2.0-3.0   TTR:  63.9 % (1 y)   Target end date:  Indefinite   Send INR reminders to:  [facility]    Indications    Longstanding persistent atrial fibrillation (H) [I48.11]  Long term current use of anticoagulants with INR goal of 2.0-3.0 [Z79.01]           Comments:           Anticoagulation Care Providers     Provider Role Specialty Phone number    Dmitri Andres MD Referring  Family Medicine [phone]    Drew Bravo PA-C Referring Family Medicine [phone]

## 2022-07-27 DIAGNOSIS — I10 HYPERTENSION GOAL BP (BLOOD PRESSURE) < 140/90: ICD-10-CM

## 2022-07-28 RX ORDER — FUROSEMIDE 20 MG
TABLET ORAL
Qty: 90 TABLET | Refills: 0 | Status: SHIPPED | OUTPATIENT
Start: 2022-07-28 | End: 2022-10-04

## 2022-07-28 NOTE — TELEPHONE ENCOUNTER
Routing refill request to provider for review/approval because:  Labs out of range:  K+ and creatinine    Mindi Agrawal RN

## 2022-07-29 ENCOUNTER — LAB (OUTPATIENT)
Dept: LAB | Facility: CLINIC | Age: 77
End: 2022-07-29
Payer: COMMERCIAL

## 2022-07-29 ENCOUNTER — ANTICOAGULATION THERAPY VISIT (OUTPATIENT)
Dept: ANTICOAGULATION | Facility: CLINIC | Age: 77
End: 2022-07-29

## 2022-07-29 DIAGNOSIS — Z79.01 LONG TERM CURRENT USE OF ANTICOAGULANTS WITH INR GOAL OF 2.0-3.0: ICD-10-CM

## 2022-07-29 DIAGNOSIS — I48.11 LONGSTANDING PERSISTENT ATRIAL FIBRILLATION (H): ICD-10-CM

## 2022-07-29 DIAGNOSIS — I48.11 LONGSTANDING PERSISTENT ATRIAL FIBRILLATION (H): Primary | ICD-10-CM

## 2022-07-29 LAB — INR BLD: 2.7 (ref 0.9–1.1)

## 2022-07-29 PROCEDURE — 36416 COLLJ CAPILLARY BLOOD SPEC: CPT

## 2022-07-29 PROCEDURE — 85610 PROTHROMBIN TIME: CPT

## 2022-07-29 NOTE — PROGRESS NOTES
ANTICOAGULATION MANAGEMENT     Cayetano Lunsford 77 year old male is on warfarin with therapeutic INR result. (Goal INR 2.0-3.0)    Recent labs: (last 7 days)     07/29/22  1300   INR 2.7*       ASSESSMENT       Source(s): Chart Review and Patient/Caregiver Call       Warfarin doses taken: Warfarin taken as instructed, previously reported at INR 7/22/22, patient missed his warfarin 7/21/22 and took the dose 7/22/22 which may still be affecting INR today    Diet: No new diet changes identified    New illness, injury, or hospitalization: No    Medication/supplement changes: None noted    Signs or symptoms of bleeding or clotting: No    Previous INR: Subtherapeutic    Additional findings: None       PLAN     Recommended plan for no diet, medication or health factor changes affecting INR     Dosing Instructions: Continue your current warfarin dose with next INR in 2 weeks       Summary  As of 7/29/2022    Full warfarin instructions:  5 mg every Mon, Fri; 7.5 mg all other days   Next INR check:  8/11/2022             Telephone call with Leo who verbalizes understanding and agrees to plan, ; made aware of patient survey and encouraged to participate. and patient did not want AVS mailed this time.    Lab visit scheduled    Education provided: Please call back if any changes to your diet, medications or how you've been taking warfarin and Contact [phone]  with any changes, questions or concerns.     Plan made per [facility] anticoagulation protocol    Dayanara Cuevas RN  Anticoagulation Clinic  7/29/2022    _______________________________________________________________________     Anticoagulation Episode Summary     Current INR goal:  2.0-3.0   TTR:  63.6 % (1 y)   Target end date:  Indefinite   Send INR reminders to:  [facility]    Indications    Longstanding persistent atrial fibrillation (H) [I48.11]  Long term current use of anticoagulants with INR goal of 2.0-3.0 [Z79.01]           Comments:            Anticoagulation Care Providers     Provider Role Specialty Phone number    Dmitri Andres MD Referring Family Medicine [phone]    Drew Bravo PA-C Referring Family Medicine [phone]

## 2022-08-02 ENCOUNTER — OFFICE VISIT (OUTPATIENT)
Dept: OPTOMETRY | Facility: CLINIC | Age: 77
End: 2022-08-02
Payer: COMMERCIAL

## 2022-08-02 DIAGNOSIS — H52.203 HYPEROPIA OF BOTH EYES WITH ASTIGMATISM: Primary | ICD-10-CM

## 2022-08-02 DIAGNOSIS — E11.9 DIABETES MELLITUS WITHOUT OPHTHALMIC MANIFESTATIONS (H): ICD-10-CM

## 2022-08-02 DIAGNOSIS — H52.4 PRESBYOPIA: ICD-10-CM

## 2022-08-02 DIAGNOSIS — H52.03 HYPEROPIA OF BOTH EYES WITH ASTIGMATISM: Primary | ICD-10-CM

## 2022-08-02 DIAGNOSIS — H25.13 NUCLEAR SCLEROSIS OF BOTH EYES: ICD-10-CM

## 2022-08-02 PROCEDURE — 92004 COMPRE OPH EXAM NEW PT 1/>: CPT | Performed by: OPTOMETRIST

## 2022-08-02 PROCEDURE — 92015 DETERMINE REFRACTIVE STATE: CPT | Performed by: OPTOMETRIST

## 2022-08-02 ASSESSMENT — REFRACTION_WEARINGRX
OS_AXIS: 010
OS_SPHERE: +1.50
OS_CYLINDER: +1.50
OD_CYLINDER: +0.75
OD_AXIS: 170
OD_SPHERE: +1.00

## 2022-08-02 ASSESSMENT — VISUAL ACUITY
OD_SC: 20/70
OS_CC: 20/25
OS_CC+: -2
OS_SC: 20/70
METHOD: SNELLEN - LINEAR
OD_CC: 20/40

## 2022-08-02 ASSESSMENT — REFRACTION_MANIFEST
OD_SPHERE: +1.25
OS_ADD: +2.25
OS_SPHERE: +1.00
OS_CYLINDER: +1.50
OD_ADD: +2.25
OD_AXIS: 180
OD_CYLINDER: +1.00
OS_AXIS: 010

## 2022-08-02 ASSESSMENT — EXTERNAL EXAM - LEFT EYE: OS_EXAM: NORMAL

## 2022-08-02 ASSESSMENT — CUP TO DISC RATIO
OS_RATIO: 0.45
OD_RATIO: 0.5

## 2022-08-02 ASSESSMENT — CONF VISUAL FIELD
METHOD: COUNTING FINGERS
OD_NORMAL: 1
OS_NORMAL: 1

## 2022-08-02 ASSESSMENT — TONOMETRY
OD_IOP_MMHG: 14
IOP_METHOD: APPLANATION
OS_IOP_MMHG: 14

## 2022-08-02 ASSESSMENT — SLIT LAMP EXAM - LIDS
COMMENTS: DERMATOCHALASIS
COMMENTS: DERMATOCHALASIS

## 2022-08-02 ASSESSMENT — EXTERNAL EXAM - RIGHT EYE: OD_EXAM: NORMAL

## 2022-08-02 NOTE — LETTER
8/2/2022         RE: Cayetano Lunsford  [address]  [facility] [address]        Dear Colleague,    Thank you for referring your patient, Cayetano Lunsford, to the [facility]. Please see a copy of my visit note below.    Chief Complaint   Patient presents with     Diabetic Eye Exam Follow Up      Lab Results   Component Value Date    A1C 6.3 06/21/2022    A1C 6.0 02/15/2022    A1C 5.4 07/09/2021    A1C 5.3 05/20/2021    A1C 5.9 11/09/2020    A1C 5.8 07/07/2020    A1C 6.1 12/09/2019       Last Eye Exam: 2021  Dilated Previously: Yes    What are you currently using to see?  glasses       Distance Vision Acuity: Satisfied with vision    Near Vision Acuity: Not satisfied w progressive addition lens for [employer]   Hard to read   Eye Comfort: good  Do you use eye drops? : No        Medical, surgical and family histories reviewed and updated 8/2/2022.       OBJECTIVE: See Ophthalmology exam    ASSESSMENT:    ICD-10-CM    1. Hyperopia of both eyes with astigmatism  H52.03 EYE EXAM (SIMPLE-NONBILLABLE)    H52.203 REFRACTION   2. Presbyopia  H52.4 EYE EXAM (SIMPLE-NONBILLABLE)     REFRACTION   3. Nuclear sclerosis of both eyes  H25.13    4. 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        Sincerely,        Lorin Cuevas, OD

## 2022-08-02 NOTE — PATIENT INSTRUCTIONS
Patient Education   Diabetes weakens the blood vessels all over the body, including the eyes. Damage to the blood vessels in the eyes can cause swelling or bleeding into part of the eye (called the retina). This is called diabetic retinopathy (WEN-tin--pu-thee). If not treated, this disease can cause vision loss or blindness.   Symptoms may include blurred or distorted vision, but many people have no symptoms. It's important to see your eye doctor regularly to check for problems.   Early treatment and good control can help protect your vision. Here are the things you can do to help prevent vision loss:      1. Keep your blood sugar levels under tight control.      2. Bring high blood pressure under control.      3. No smoking.      4. Have yearly dilated eye exams.    Monitor cataracts , no prescription change unless you want to get reading glasses or different no line bifocal

## 2022-08-02 NOTE — PROGRESS NOTES
Chief Complaint   Patient presents with     Diabetic Eye Exam Follow Up      Lab Results   Component Value Date    A1C 6.3 06/21/2022    A1C 6.0 02/15/2022    A1C 5.4 07/09/2021    A1C 5.3 05/20/2021    A1C 5.9 11/09/2020    A1C 5.8 07/07/2020    A1C 6.1 12/09/2019       Last Eye Exam: 2021  Dilated Previously: Yes    What are you currently using to see?  glasses       Distance Vision Acuity: Satisfied with vision    Near Vision Acuity: Not satisfied w progressive addition lens for [employer]   Hard to read   Eye Comfort: good  Do you use eye drops? : No        Medical, surgical and family histories reviewed and updated 8/2/2022.       OBJECTIVE: See Ophthalmology exam    ASSESSMENT:    ICD-10-CM    1. Hyperopia of both eyes with astigmatism  H52.03 EYE EXAM (SIMPLE-NONBILLABLE)    H52.203 REFRACTION   2. Presbyopia  H52.4 EYE EXAM (SIMPLE-NONBILLABLE)     REFRACTION   3. Nuclear sclerosis of both eyes  H25.13    4. 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

## 2022-08-11 ENCOUNTER — ANTICOAGULATION THERAPY VISIT (OUTPATIENT)
Dept: ANTICOAGULATION | Facility: CLINIC | Age: 77
End: 2022-08-11

## 2022-08-11 ENCOUNTER — LAB (OUTPATIENT)
Dept: LAB | Facility: CLINIC | Age: 77
End: 2022-08-11
Payer: COMMERCIAL

## 2022-08-11 DIAGNOSIS — Z79.01 LONG TERM CURRENT USE OF ANTICOAGULANTS WITH INR GOAL OF 2.0-3.0: ICD-10-CM

## 2022-08-11 DIAGNOSIS — I48.11 LONGSTANDING PERSISTENT ATRIAL FIBRILLATION (H): ICD-10-CM

## 2022-08-11 DIAGNOSIS — I48.11 LONGSTANDING PERSISTENT ATRIAL FIBRILLATION (H): Primary | ICD-10-CM

## 2022-08-11 LAB — INR BLD: 2 (ref 0.9–1.1)

## 2022-08-11 PROCEDURE — 85610 PROTHROMBIN TIME: CPT

## 2022-08-11 PROCEDURE — 36416 COLLJ CAPILLARY BLOOD SPEC: CPT

## 2022-08-11 NOTE — PROGRESS NOTES
ANTICOAGULATION MANAGEMENT     Cayetano Lunsford 77 year old male is on warfarin with therapeutic INR result. (Goal INR 2.0-3.0)    Recent labs: (last 7 days)     08/11/22  1338   INR 2.0*       ASSESSMENT       Source(s): Chart Review and Patient/Caregiver Call       Warfarin doses taken: Warfarin taken as instructed    Diet: No new diet changes identified    New illness, injury, or hospitalization: No    Medication/supplement changes: Inderal total daily dose did not change but tablet size changed to 60mg so patient only has to take 2 tablets twice daily.    Signs or symptoms of bleeding or clotting: No    Previous INR: Therapeutic last visit; previously outside of goal range    Additional findings: None       PLAN     Recommended plan for no diet, medication or health factor changes affecting INR     Dosing Instructions: Continue your current warfarin dose with next INR in 3 weeks       Summary  As of 8/11/2022    Full warfarin instructions:  5 mg every Mon, Fri; 7.5 mg all other days   Next INR check:  9/1/2022             Telephone call with Leo who verbalizes understanding and agrees to plan    Lab visit scheduled / AVS to be mailed by on-site co-worker.    Education provided: Importance of consistent vitamin K intake and Contact [phone]  with any changes, questions or concerns.     Plan made per [facility] anticoagulation protocol    Tatyana Akers RN  Anticoagulation Clinic  8/11/2022    _______________________________________________________________________     Anticoagulation Episode Summary     Current INR goal:  2.0-3.0   TTR:  63.7 % (1 y)   Target end date:  Indefinite   Send INR reminders to:  [facility]    Indications    Longstanding persistent atrial fibrillation (H) [I48.11]  Long term current use of anticoagulants with INR goal of 2.0-3.0 [Z79.01]           Comments:           Anticoagulation Care Providers     Provider Role Specialty Phone number    Dmitri Andres MD Referring  Family Medicine [phone]    Drew Bravo PA-C Referring Family Medicine [phone]

## 2022-08-17 ENCOUNTER — TRANSFERRED RECORDS (OUTPATIENT)
Dept: HEALTH INFORMATION MANAGEMENT | Facility: CLINIC | Age: 77
End: 2022-08-17

## 2022-09-01 ENCOUNTER — LAB (OUTPATIENT)
Dept: LAB | Facility: CLINIC | Age: 77
End: 2022-09-01
Payer: COMMERCIAL

## 2022-09-01 ENCOUNTER — ANTICOAGULATION THERAPY VISIT (OUTPATIENT)
Dept: ANTICOAGULATION | Facility: CLINIC | Age: 77
End: 2022-09-01

## 2022-09-01 DIAGNOSIS — I48.11 LONGSTANDING PERSISTENT ATRIAL FIBRILLATION (H): Primary | ICD-10-CM

## 2022-09-01 DIAGNOSIS — Z79.01 LONG TERM CURRENT USE OF ANTICOAGULANTS WITH INR GOAL OF 2.0-3.0: ICD-10-CM

## 2022-09-01 DIAGNOSIS — I48.11 LONGSTANDING PERSISTENT ATRIAL FIBRILLATION (H): ICD-10-CM

## 2022-09-01 DIAGNOSIS — D50.8 IRON DEFICIENCY ANEMIA SECONDARY TO INADEQUATE DIETARY IRON INTAKE: ICD-10-CM

## 2022-09-01 LAB
ERYTHROCYTE [DISTWIDTH] IN BLOOD BY AUTOMATED COUNT: 15.3 % (ref 10–15)
HCT VFR BLD AUTO: 34.1 % (ref 40–53)
HGB BLD-MCNC: 10.9 G/DL (ref 13.3–17.7)
INR BLD: 2.5 (ref 0.9–1.1)
MCH RBC QN AUTO: 30.5 PG (ref 26.5–33)
MCHC RBC AUTO-ENTMCNC: 32 G/DL (ref 31.5–36.5)
MCV RBC AUTO: 96 FL (ref 78–100)
PLATELET # BLD AUTO: 293 10E3/UL (ref 150–450)
RBC # BLD AUTO: 3.57 10E6/UL (ref 4.4–5.9)
WBC # BLD AUTO: 7.3 10E3/UL (ref 4–11)

## 2022-09-01 PROCEDURE — 85610 PROTHROMBIN TIME: CPT

## 2022-09-01 PROCEDURE — 36415 COLL VENOUS BLD VENIPUNCTURE: CPT

## 2022-09-01 PROCEDURE — 85027 COMPLETE CBC AUTOMATED: CPT

## 2022-09-01 NOTE — PROGRESS NOTES
ANTICOAGULATION MANAGEMENT     Cayetano Lunsford 77 year old male is on warfarin with therapeutic INR result. (Goal INR 2.0-3.0)    Recent labs: (last 7 days)     09/01/22  1323   INR 2.5*       ASSESSMENT       Source(s): Chart Review and Patient/Caregiver Call       Warfarin doses taken: Warfarin taken as instructed    Diet: Less vitamin K thank planned--may be on-going only in regards to V8 intake. Patient states last weekend he did not drink V8 because he was out of town. Patient drinks V8 on the days he doesn't eat greens but he stated that sometimes he would drink 2 cans in 1 day. Patient will cut back to 1 can per day (few times per week) and may cut out 1 weekend dose of V8 since INR is at a good value today.    New illness, injury, or hospitalization: No    Medication/supplement changes: None noted    Signs or symptoms of bleeding or clotting: No    Previous INR: Therapeutic last 2(+) visits    Additional findings: None       PLAN     Recommended plan for temporary change(s) and ongoing change(s) affecting INR     Dosing Instructions: Continue your current warfarin dose with next INR in 4 week; however, patient has a clinic visit scheduled in 5 weeks so will have INR checked then.      Summary  As of 9/1/2022    Full warfarin instructions:  5 mg every Mon, Fri; 7.5 mg all other days   Next INR check:  10/4/2022             Telephone call with Leo who verbalizes understanding and agrees to plan    Check at provider office visit    Education provided: Importance of consistent vitamin K intake, Impact of vitamin K foods on INR and Vitamin K content of foods    Plan made per [facility] anticoagulation protocol    Tatyana Akers, RN  Anticoagulation Clinic  9/1/2022    _______________________________________________________________________     Anticoagulation Episode Summary     Current INR goal:  2.0-3.0   TTR:  65.9 % (1 y)   Target end date:  Indefinite   Send INR reminders to:  [facility]    Indications     Longstanding persistent atrial fibrillation (H) [I48.11]  Long term current use of anticoagulants with INR goal of 2.0-3.0 [Z79.01]           Comments:           Anticoagulation Care Providers     Provider Role Specialty Phone number    Dmitri Andres MD Referring Family Medicine [phone]    Drew Bravo PA-C Referring Family Medicine [phone]

## 2022-09-06 RX ORDER — PEN NEEDLE, DIABETIC 29 G X1/2"
NEEDLE, DISPOSABLE MISCELLANEOUS
Refills: 3 | OUTPATIENT
Start: 2022-09-06

## 2022-09-07 ENCOUNTER — TELEPHONE (OUTPATIENT)
Dept: FAMILY MEDICINE | Facility: CLINIC | Age: 77
End: 2022-09-07

## 2022-09-07 NOTE — TELEPHONE ENCOUNTER
Patient calling regarding below.  He did not get iron tabs.  He wrote down and will go to get now.  FYI to Katharina.  MANOJ Cabrera PA-C   9/1/2022  5:00 PM CDT         Kevyn Bailey,     Your hemoglobin is relatively unchanged from last check. No concerns, but are you taking the iron supplement I recommended last time? Let me know either way.      -katharina deluca, pac

## 2022-09-09 ENCOUNTER — OFFICE VISIT (OUTPATIENT)
Dept: NEUROLOGY | Facility: CLINIC | Age: 77
End: 2022-09-09
Payer: COMMERCIAL

## 2022-09-09 VITALS — HEART RATE: 71 BPM | SYSTOLIC BLOOD PRESSURE: 138 MMHG | OXYGEN SATURATION: 96 % | DIASTOLIC BLOOD PRESSURE: 70 MMHG

## 2022-09-09 DIAGNOSIS — G25.0 BENIGN FAMILIAL TREMOR: Primary | ICD-10-CM

## 2022-09-09 PROCEDURE — 99213 OFFICE O/P EST LOW 20 MIN: CPT | Performed by: PSYCHIATRY & NEUROLOGY

## 2022-09-09 RX ORDER — PRIMIDONE 50 MG/1
TABLET ORAL
Qty: 1001 TABLET | Refills: 1 | Status: SHIPPED | OUTPATIENT
Start: 2022-09-09 | End: 2023-03-07

## 2022-09-09 NOTE — PROGRESS NOTES
"Cayetano Lunsford is a 77 year old male who presents for:  Chief Complaint   Patient presents with     Follow Up     Tremors, patient is stable         Initial Vitals:  /70 (BP Location: Right arm, Patient Position: Sitting, Cuff Size: Adult Regular)   Pulse 71   SpO2 96%  Estimated body mass index is 27.28 kg/m  as calculated from the following:    Height as of 7/8/22: 1.676 m (5' 6\").    Weight as of 7/8/22: 76.7 kg (169 lb).. There is no height or weight on file to calculate BSA. BP completed using cuff size: regular        Sasha Tate    "

## 2022-09-09 NOTE — PROGRESS NOTES
ESTABLISHED PATIENT NEUROLOGY NOTE    DATE OF VISIT: 9/9/2022  CLINIC LOCATION: [facility]  MRN: [id number]  PATIENT NAME: Cayetano Lunsford  YOB: 1945    REASON FOR VISIT:   Chief Complaint   Patient presents with     Follow Up     Tremors, patient is stable      SUBJECTIVE:                                                      HISTORY OF PRESENT ILLNESS: Patient is here to follow up regarding essential tremor.  The last visit was on 3/10/2022.  Please refer to my initial/other prior notes for further information.    Since the last visit, the patient reports that his symptoms are stable overall.  Writing is challenging at times.  He is on 200/200/150 mg of primidone daily and 120 mg of propranolol twice daily without noticeable side effects.  He denies new neurological symptoms.  EXAM:                                                    Physical Exam:   Vitals: /70 (BP Location: Right arm, Patient Position: Sitting, Cuff Size: Adult Regular)   Pulse 71   SpO2 96%     General: pt is in NAD, cooperative.  Skin: normal turgor, moist mucous membranes, no lesions/rashes noticed.  HEENT: ATNC, white sclera, normal conjunctiva.  Respiratory: Symmetric lung excursion, no accessory respiratory muscle use.  Abdomen: Non distended.  Neurological: awake, cooperative, follows commands, no changes compared to previous exams.  ASSESSMENT AND PLAN:                                                    Assessment: 77 year old male patient presents for follow-up of essential tremor.  He reports that his symptoms are stable on combination of primidone and propranolol.  We are limited on further adjustment of these 2 medications, because his primidone dose is already high, and higher propranolol dose might not be tolerated due to hypotension and bradycardia.  If needed, gabapentin or topiramate might be considered.    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 I am pleased to see that his symptoms are stable.    We decided not to make any medication changes.  Primidone prescription was refilled.    We discussed that wrist weights could be tried to help with eating and writing.    Next follow-up appointment is in the next 6 months or earlier if needed.    Total Time: 15 minutes spent on the date of the encounter doing chart review, history and exam, documentation and further activities per the note.    Bong Yoo MD  [facility] Neurology  (Chart documentation was completed in part with Dragon voice-recognition software. Even though reviewed, some grammatical, spelling, and word errors may remain.)

## 2022-09-09 NOTE — LETTER
9/9/2022         RE: Cayetano Lunsford  [address]  [facility] [address]        Dear Colleague,    Thank you for referring your patient, Cayetano Lunsford, to the [facility] NEUROLOGY [facility]. Please see a copy of my visit note below.    ESTABLISHED PATIENT NEUROLOGY NOTE    DATE OF VISIT: 9/9/2022  CLINIC LOCATION: [facility]  MRN: [id number]  PATIENT NAME: Cayetano Lunsford  YOB: 1945    REASON FOR VISIT:   Chief Complaint   Patient presents with     Follow Up     Tremors, patient is stable      SUBJECTIVE:                                                      HISTORY OF PRESENT ILLNESS: Patient is here to follow up regarding essential tremor.  The last visit was on 3/10/2022.  Please refer to my initial/other prior notes for further information.    Since the last visit, the patient reports that his symptoms are stable overall.  Writing is challenging at times.  He is on 200/200/150 mg of primidone daily and 120 mg of propranolol twice daily without noticeable side effects.  He denies new neurological symptoms.  EXAM:                                                    Physical Exam:   Vitals: /70 (BP Location: Right arm, Patient Position: Sitting, Cuff Size: Adult Regular)   Pulse 71   SpO2 96%     General: pt is in NAD, cooperative.  Skin: normal turgor, moist mucous membranes, no lesions/rashes noticed.  HEENT: ATNC, white sclera, normal conjunctiva.  Respiratory: Symmetric lung excursion, no accessory respiratory muscle use.  Abdomen: Non distended.  Neurological: awake, cooperative, follows commands, no changes compared to previous exams.  ASSESSMENT AND PLAN:                                                    Assessment: 77 year old male patient presents for follow-up of essential tremor.  He reports that his symptoms are stable on combination of primidone and propranolol.  We are limited on further adjustment of these 2  "medications, because his primidone dose is already high, and higher propranolol dose might not be tolerated due to hypotension and bradycardia.  If needed, gabapentin or topiramate might be considered.    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 I am pleased to see that his symptoms are stable.    We decided not to make any medication changes.  Primidone prescription was refilled.    We discussed that wrist weights could be tried to help with eating and writing.    Next follow-up appointment is in the next 6 months or earlier if needed.    Total Time: 15 minutes spent on the date of the encounter doing chart review, history and exam, documentation and further activities per the note.    Bong Yoo MD  [facility] Neurology  (Chart documentation was completed in part with Dragon voice-recognition software. Even though reviewed, some grammatical, spelling, and word errors may remain.)      Cayetano Lunsford is a 77 year old male who presents for:  Chief Complaint   Patient presents with     Follow Up     Tremors, patient is stable         Initial Vitals:  /70 (BP Location: Right arm, Patient Position: Sitting, Cuff Size: Adult Regular)   Pulse 71   SpO2 96%  Estimated body mass index is 27.28 kg/m  as calculated from the following:    Height as of 7/8/22: 1.676 m (5' 6\").    Weight as of 7/8/22: 76.7 kg (169 lb).. There is no height or weight on file to calculate BSA. BP completed using cuff size: regular        Sasha Tate        Again, thank you for allowing me to participate in the care of your patient.        Sincerely,        Bong Yoo MD    "

## 2022-09-09 NOTE — PATIENT INSTRUCTIONS
AFTER VISIT SUMMARY (AVS):    At today's visit we thoroughly discussed current symptoms, available treatment options, and the plan. I am pleased to see that your symptoms are stable.    We decided not to make any medication changes.  Primidone prescription was refilled.    We discussed that wrist weights could be tried to help with eating and writing.    Next follow-up appointment is in the next 6 months or earlier if needed.    Please do not hesitate to call me with any questions or concerns.    Thanks.

## 2022-09-13 ENCOUNTER — TRANSFERRED RECORDS (OUTPATIENT)
Dept: HEALTH INFORMATION MANAGEMENT | Facility: CLINIC | Age: 77
End: 2022-09-13

## 2022-09-13 LAB
ALT SERPL-CCNC: 31 IU/L (ref 0–44)
AST SERPL-CCNC: 29 IU/L (ref 0–40)
CREATININE (EXTERNAL): 0.74 MG/DL (ref 0.76–1.27)
GFR ESTIMATED (EXTERNAL): 93 ML/MIN/1.73

## 2022-09-23 ENCOUNTER — TELEPHONE (OUTPATIENT)
Dept: FAMILY MEDICINE | Facility: CLINIC | Age: 77
End: 2022-09-23

## 2022-09-23 NOTE — TELEPHONE ENCOUNTER
Patient Quality Outreach    Patient is due for the following:   Physical Annual Wellness Visit      Topic Date Due     COVID-19 Vaccine (5 - Booster for Pfizer series) 07/14/2022     Flu Vaccine (1) 09/01/2022       Next Steps:   Patient was scheduled for 10/04/2022    Type of outreach:    Chart review performed, no outreach needed.      Questions for provider review:    None     Malina Bullard

## 2022-09-28 DIAGNOSIS — I48.0 PAROXYSMAL ATRIAL FIBRILLATION (H): ICD-10-CM

## 2022-09-28 DIAGNOSIS — Z79.01 LONG TERM CURRENT USE OF ANTICOAGULANTS WITH INR GOAL OF 2.0-3.0: ICD-10-CM

## 2022-09-28 RX ORDER — WARFARIN SODIUM 5 MG/1
TABLET ORAL
Qty: 114 TABLET | Refills: 1 | Status: SHIPPED | OUTPATIENT
Start: 2022-09-28 | End: 2023-10-10

## 2022-09-28 NOTE — TELEPHONE ENCOUNTER
ANTICOAGULATION MANAGEMENT:  Medication Refill    Anticoagulation Summary  As of 9/1/2022    Warfarin maintenance plan:  5 mg (5 mg x 1) every Mon, Fri; 7.5 mg (5 mg x 1.5) all other days   Next INR check:  10/4/2022   Target end date:  Indefinite    Indications    Longstanding persistent atrial fibrillation (H) [I48.11]  Long term current use of anticoagulants with INR goal of 2.0-3.0 [Z79.01]             Anticoagulation Care Providers     Provider Role Specialty Phone number    Dmitri Andres MD Referring Family Medicine [phone]    Drew Bravo PA-C Referring Family Medicine [phone]          Visit with referring provider/group within last year: Yes    [facility] referral signed within last year: Yes    Cayetano meets all criteria for refill (current [facility] referral, office visit with referring provider/group in last year, lab monitoring up to date or not exceeding 2 weeks overdue). Rx instructions and quantity match patient's current dosing plan. Warfarin 90 day supply with 1 refill granted per [facility] protocol     Estelle Starr RN  Anticoagulation Clinic

## 2022-10-04 ENCOUNTER — ANTICOAGULATION THERAPY VISIT (OUTPATIENT)
Dept: ANTICOAGULATION | Facility: CLINIC | Age: 77
End: 2022-10-04

## 2022-10-04 ENCOUNTER — OFFICE VISIT (OUTPATIENT)
Dept: FAMILY MEDICINE | Facility: CLINIC | Age: 77
End: 2022-10-04
Payer: COMMERCIAL

## 2022-10-04 VITALS
OXYGEN SATURATION: 94 % | SYSTOLIC BLOOD PRESSURE: 138 MMHG | HEART RATE: 68 BPM | TEMPERATURE: 97.2 F | BODY MASS INDEX: 27 KG/M2 | HEIGHT: 66 IN | WEIGHT: 168 LBS | DIASTOLIC BLOOD PRESSURE: 66 MMHG

## 2022-10-04 DIAGNOSIS — I48.11 LONGSTANDING PERSISTENT ATRIAL FIBRILLATION (H): ICD-10-CM

## 2022-10-04 DIAGNOSIS — D64.9 ANEMIA, UNSPECIFIED TYPE: ICD-10-CM

## 2022-10-04 DIAGNOSIS — N18.1 TYPE 2 DIABETES MELLITUS WITH STAGE 1 CHRONIC KIDNEY DISEASE, WITH LONG-TERM CURRENT USE OF INSULIN (H): ICD-10-CM

## 2022-10-04 DIAGNOSIS — L02.31 ABSCESS OF BUTTOCK: ICD-10-CM

## 2022-10-04 DIAGNOSIS — Z79.4 TYPE 2 DIABETES MELLITUS WITH HYPOGLYCEMIA WITHOUT COMA, WITH LONG-TERM CURRENT USE OF INSULIN (H): ICD-10-CM

## 2022-10-04 DIAGNOSIS — I10 HYPERTENSION GOAL BP (BLOOD PRESSURE) < 140/90: ICD-10-CM

## 2022-10-04 DIAGNOSIS — Z79.01 LONG TERM CURRENT USE OF ANTICOAGULANTS WITH INR GOAL OF 2.0-3.0: ICD-10-CM

## 2022-10-04 DIAGNOSIS — I48.0 PAROXYSMAL ATRIAL FIBRILLATION (H): ICD-10-CM

## 2022-10-04 DIAGNOSIS — Z00.00 ENCOUNTER FOR MEDICARE ANNUAL WELLNESS EXAM: Primary | ICD-10-CM

## 2022-10-04 DIAGNOSIS — D50.8 IRON DEFICIENCY ANEMIA SECONDARY TO INADEQUATE DIETARY IRON INTAKE: ICD-10-CM

## 2022-10-04 DIAGNOSIS — I48.11 LONGSTANDING PERSISTENT ATRIAL FIBRILLATION (H): Primary | ICD-10-CM

## 2022-10-04 DIAGNOSIS — Z79.4 TYPE 2 DIABETES MELLITUS WITH STAGE 1 CHRONIC KIDNEY DISEASE, WITH LONG-TERM CURRENT USE OF INSULIN (H): ICD-10-CM

## 2022-10-04 DIAGNOSIS — E11.22 TYPE 2 DIABETES MELLITUS WITH STAGE 1 CHRONIC KIDNEY DISEASE, WITH LONG-TERM CURRENT USE OF INSULIN (H): ICD-10-CM

## 2022-10-04 DIAGNOSIS — E11.649 TYPE 2 DIABETES MELLITUS WITH HYPOGLYCEMIA WITHOUT COMA, WITH LONG-TERM CURRENT USE OF INSULIN (H): ICD-10-CM

## 2022-10-04 LAB
ERYTHROCYTE [DISTWIDTH] IN BLOOD BY AUTOMATED COUNT: 15.9 % (ref 10–15)
HBA1C MFR BLD: 5.7 % (ref 0–5.6)
HCT VFR BLD AUTO: 33.9 % (ref 40–53)
HGB BLD-MCNC: 10.7 G/DL (ref 13.3–17.7)
INR BLD: 2.3 (ref 0.9–1.1)
MCH RBC QN AUTO: 30.7 PG (ref 26.5–33)
MCHC RBC AUTO-ENTMCNC: 31.6 G/DL (ref 31.5–36.5)
MCV RBC AUTO: 97 FL (ref 78–100)
PLATELET # BLD AUTO: 291 10E3/UL (ref 150–450)
RBC # BLD AUTO: 3.49 10E6/UL (ref 4.4–5.9)
WBC # BLD AUTO: 6.6 10E3/UL (ref 4–11)

## 2022-10-04 PROCEDURE — 91312 COVID-19,PF,PFIZER BOOSTER BIVALENT: CPT | Performed by: PHYSICIAN ASSISTANT

## 2022-10-04 PROCEDURE — G0008 ADMIN INFLUENZA VIRUS VAC: HCPCS | Performed by: PHYSICIAN ASSISTANT

## 2022-10-04 PROCEDURE — 82607 VITAMIN B-12: CPT | Performed by: PHYSICIAN ASSISTANT

## 2022-10-04 PROCEDURE — 85027 COMPLETE CBC AUTOMATED: CPT | Performed by: PHYSICIAN ASSISTANT

## 2022-10-04 PROCEDURE — 0124A COVID-19,PF,PFIZER BOOSTER BIVALENT: CPT | Performed by: PHYSICIAN ASSISTANT

## 2022-10-04 PROCEDURE — 83036 HEMOGLOBIN GLYCOSYLATED A1C: CPT | Performed by: PHYSICIAN ASSISTANT

## 2022-10-04 PROCEDURE — G0439 PPPS, SUBSEQ VISIT: HCPCS | Performed by: PHYSICIAN ASSISTANT

## 2022-10-04 PROCEDURE — 99214 OFFICE O/P EST MOD 30 MIN: CPT | Mod: 25 | Performed by: PHYSICIAN ASSISTANT

## 2022-10-04 PROCEDURE — 36415 COLL VENOUS BLD VENIPUNCTURE: CPT | Performed by: PHYSICIAN ASSISTANT

## 2022-10-04 PROCEDURE — 36416 COLLJ CAPILLARY BLOOD SPEC: CPT | Performed by: PHYSICIAN ASSISTANT

## 2022-10-04 PROCEDURE — 85610 PROTHROMBIN TIME: CPT | Performed by: PHYSICIAN ASSISTANT

## 2022-10-04 PROCEDURE — 90662 IIV NO PRSV INCREASED AG IM: CPT | Performed by: PHYSICIAN ASSISTANT

## 2022-10-04 RX ORDER — MUPIROCIN 20 MG/G
OINTMENT TOPICAL 2 TIMES DAILY PRN
Qty: 30 G | Refills: 1 | Status: SHIPPED | OUTPATIENT
Start: 2022-10-04 | End: 2024-04-10

## 2022-10-04 RX ORDER — PROPAFENONE HYDROCHLORIDE 150 MG/1
150 TABLET, COATED ORAL 2 TIMES DAILY
Qty: 90 TABLET | Refills: 0 | Status: SHIPPED | OUTPATIENT
Start: 2022-10-04 | End: 2022-11-18

## 2022-10-04 RX ORDER — AMLODIPINE BESYLATE 10 MG/1
10 TABLET ORAL DAILY
Qty: 90 TABLET | Refills: 3 | Status: SHIPPED | OUTPATIENT
Start: 2022-10-04 | End: 2023-05-17

## 2022-10-04 RX ORDER — ATORVASTATIN CALCIUM 20 MG/1
20 TABLET, FILM COATED ORAL DAILY
Qty: 90 TABLET | Refills: 3 | Status: SHIPPED | OUTPATIENT
Start: 2022-10-04 | End: 2023-09-20

## 2022-10-04 RX ORDER — LOSARTAN POTASSIUM 25 MG/1
12.5 TABLET ORAL DAILY
Qty: 60 TABLET | Refills: 3 | Status: SHIPPED | OUTPATIENT
Start: 2022-10-04 | End: 2022-10-18

## 2022-10-04 RX ORDER — HUMAN INSULIN 100 [IU]/ML
INJECTION, SUSPENSION SUBCUTANEOUS
Qty: 3 ML | Refills: 5 | Status: SHIPPED | OUTPATIENT
Start: 2022-10-04 | End: 2022-11-02

## 2022-10-04 RX ORDER — FUROSEMIDE 20 MG
TABLET ORAL
Qty: 90 TABLET | Refills: 1 | Status: SHIPPED | OUTPATIENT
Start: 2022-10-04 | End: 2022-10-18

## 2022-10-04 ASSESSMENT — ACTIVITIES OF DAILY LIVING (ADL): CURRENT_FUNCTION: NO ASSISTANCE NEEDED

## 2022-10-04 NOTE — TELEPHONE ENCOUNTER
No further refills until seen by cardiology--call [phone] to schedule     [facility] Cardiology Refill Guideline reviewed.  Medication meets criteria for refill.     Alyssa Queen RN

## 2022-10-04 NOTE — TELEPHONE ENCOUNTER
LF: 7/11/22  Qty: 180  Last Cardiology Visit: 10/18/22   Next Scheduled Cardiology Visit: none scheduled

## 2022-10-04 NOTE — PROGRESS NOTES
"SUBJECTIVE:   Leo is a 77 year old who presents for Preventive Visit.      Patient has been advised of split billing requirements and indicates understanding: Yes  Are you in the first 12 months of your Medicare coverage?  No    Healthy Habits:     In general, how would you rate your overall health?  Good    Frequency of exercise:  2-3 days/week    Duration of exercise:  15-30 minutes    Do you usually eat at least 4 servings of fruit and vegetables a day, include whole grains    & fiber and avoid regularly eating high fat or \"junk\" foods?  Yes    Taking medications regularly:  Yes    Barriers to taking medications:  None    Medication side effects:  None    Ability to successfully perform activities of daily living:  No assistance needed    Home Safety:  No safety concerns identified    Hearing impairment symptoms: right ear     In the past 6 months, have you been bothered by leaking of urine?  No    In general, how would you rate your overall mental or emotional health?  Very good      PHQ-2 Total Score: 0    Additional concerns today:  Yes (cyst on right buttock)    Patient is wandering if vitamin (Iron/vit D) he takes makes his blood sugar high and drinks no sugar soda.    INR with his labs    Do you feel safe in your environment? Yes    Have you ever done Advance Care Planning? (For example, a Health Directive, POLST, or a discussion with a medical provider or your loved ones about your wishes): No, advance care planning information given to patient to review.  Advanced care planning was discussed at today's visit.       Fall risk  Fallen 2 or more times in the past year?: No  Any fall with injury in the past year?: No    Cognitive Screening   1) Repeat 3 items (Leader, Season, Table)    2) Clock draw: NORMAL  3) 3 item recall: Recalls 3 objects  Results: 3 items recalled: COGNITIVE IMPAIRMENT LESS LIKELY    Mini-CogTM Copyright S Verenice. Licensed by the author for use in [facility]; reprinted " with permission (marlene@[facility]). All rights reserved.      Do you have sleep apnea, excessive snoring or daytime drowsiness?: no    Reviewed and updated as needed this visit by clinical staff   Tobacco  Allergies  Meds  Problems  Med Hx  Surg Hx  Fam Hx            Reviewed and updated as needed this visit by Provider   Tobacco  Allergies  Meds  Problems  Med Hx  Surg Hx  Fam Hx           Social History     Tobacco Use     Smoking status: Former Smoker     Packs/day: 1.00     Years: 30.00     Pack years: 30.00     Types: Cigarettes     Quit date: 3/19/1992     Years since quittin.5     Smokeless tobacco: Never Used   Substance Use Topics     Alcohol use: Not Currently     Alcohol/week: 0.0 standard drinks     If you drink alcohol do you typically have >3 drinks per day or >7 drinks per week? No    Alcohol Use 2017   Prescreen: >3 drinks/day or >7 drinks/week? The patient does not drink >3 drinks per day nor >7 drinks per week.   No flowsheet data found.    Diabetes Follow-up    How often are you checking your blood sugar? One time daily  What time of day are you checking your blood sugars (select all that apply)?  Before meals  Have you had any blood sugars above 200?  No  Have you had any blood sugars below 70?  No    What symptoms do you notice when your blood sugar is low?  None    What concerns do you have today about your diabetes? None     Do you have any of these symptoms? (Select all that apply)  No numbness or tingling in feet.  No redness, sores or blisters on feet.  No complaints of excessive thirst.  No reports of blurry vision.  No significant changes to weight.    History of iron deficiency. Has been taking iron for 1 month. No side effects.     History of a fib. Denies symptoms. Chronic warfarin.     History of hypertension. Stable on current regimen. No side effects.       Ongoing cyst on buttock. Improves with mupirocin but not resolving. Pain with sitting.         BP Readings  from Last 2 Encounters:   10/04/22 138/66   09/09/22 138/70     Hemoglobin A1C (%)   Date Value   10/04/2022 5.7 (H)   06/21/2022 6.3 (H)   07/09/2021 5.4   05/20/2021 5.3     LDL Cholesterol Calculated (mg/dL)   Date Value   12/30/2021 78   11/09/2020 59   09/09/2019 68                Current providers sharing in care for this patient include:   Patient Care Team:  Drew Bravo PA-C as PCP - General (Family Medicine)  Bong Yoo MD as Assigned Neuroscience Provider  Daniel Marroquin MD as Assigned Heart and Vascular Provider  Diana Johnson RPH as Pharmacist (Pharmacist)  Diana Johnson RPH as Pharmacist (Pharmacist)  Drew Bravo PA-C as Assigned PCP  Lorin Cuevas OD as Assigned Surgical Provider  Diana Johnson RPH as Assigned MT Pharmacist    The following health maintenance items are reviewed in Epic and correct as of today:  Health Maintenance   Topic Date Due     COPD ACTION PLAN  05/06/2022     DIABETIC FOOT EXAM  10/06/2022     LIPID  12/30/2022     MICROALBUMIN  12/30/2022     A1C  04/04/2023     BMP  06/21/2023     EYE EXAM  08/02/2023     MEDICARE ANNUAL WELLNESS VISIT  10/04/2023     ANNUAL REVIEW OF HM ORDERS  10/04/2023     FALL RISK ASSESSMENT  10/04/2023     COLORECTAL CANCER SCREENING  07/08/2024     DTAP/TDAP/TD IMMUNIZATION (3 - Td or Tdap) 08/05/2024     ADVANCE CARE PLANNING  10/04/2027     SPIROMETRY  Completed     HEPATITIS C SCREENING  Completed     PHQ-2 (once per calendar year)  Completed     INFLUENZA VACCINE  Completed     Pneumococcal Vaccine: 65+ Years  Completed     URINALYSIS  Completed     ZOSTER IMMUNIZATION  Completed     COVID-19 Vaccine  Completed     IPV IMMUNIZATION  Aged Out     MENINGITIS IMMUNIZATION  Aged Out           Review of Systems  Constitutional, HEENT, cardiovascular, pulmonary, GI, , musculoskeletal, neuro, skin, endocrine and psych systems are negative, except as otherwise noted.    OBJECTIVE:   BP  "138/66 (BP Location: Right arm, Patient Position: Sitting, Cuff Size: Adult Large)   Pulse 68   Temp 97.2  F (36.2  C) (Oral)   Ht 1.676 m (5' 6\")   Wt 76.2 kg (168 lb)   SpO2 94%   BMI 27.12 kg/m   Estimated body mass index is 27.12 kg/m  as calculated from the following:    Height as of this encounter: 1.676 m (5' 6\").    Weight as of this encounter: 76.2 kg (168 lb).  Physical Exam  GENERAL: alert and no distress  RESP: lungs clear to auscultation - no rales, rhonchi or wheezes  CV: regular rates and rhythm  RECTAL (male): open wound noted on left buttocks. Weeping. No erythema or warmth.   PSYCH: mentation appears normal, affect normal/bright    Diagnostic Test Results:  none     ASSESSMENT / PLAN:   (Z00.00) Encounter for Medicare annual wellness exam  (primary encounter diagnosis)  Comment: stablae wellness.   Plan:     (I48.0) Paroxysmal atrial fibrillation (H)  Comment: history of this. Stable on warfarin along. Rate controlled. Regular rate and rhythm today. Will continue on current regimen and management through myself, inr clinic and mtm.   Plan: atorvastatin (LIPITOR) 20 MG tablet, amLODIPine        (NORVASC) 10 MG tablet, losartan (COZAAR) 25 MG        tablet        Medication use and side effects discussed with the patient. Patient is in complete understanding and agreement with plan.       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 a1c pending. However, years of good control. Encourage to discuss with mtm on insulin use. Given age and control, would consider stopping this due to higher risk of falls at age and bleeding on warfarin. Does not appear he needs this. .  Plan: Hemoglobin A1c, insulin NPH (NOVOLIN N VIAL)         100 UNIT/ML vial, metFORMIN (GLUCOPHAGE) 1000         MG tablet        Medication use and side effects discussed with the patient. Patient is in complete " "understanding and agreement with plan.       (L02.31) Abscess of buttock  Comment: was told this is pilonidal cyst in the past. However,  Does not appear as such. Possible fistula and/or abscess. Recommending colorectal surgery consult.   Plan: mupirocin (BACTROBAN) 2 % external ointment,         Adult Colorectal Surgery  Referral        Medication use and side effects discussed with the patient. Patient is in complete understanding and agreement with plan.       (D50.8) Iron deficiency anemia secondary to inadequate dietary iron intake  Comment: recheck cbc today. Should see improvement.   Plan: CBC with platelets            (I10) Hypertension goal BP (blood pressure) < 140/90  Comment:   Plan: furosemide (LASIX) 20 MG tablet            (E11.649,  Z79.4) Type 2 diabetes mellitus with hypoglycemia without coma, with long-term current use of insulin (H)  Comment: as above   Plan: metFORMIN (GLUCOPHAGE) 1000 MG tablet            (Z79.01) Long term current use of anticoagulants with INR goal of 2.0-3.0  Comment:   Plan:     (I48.11) Longstanding persistent atrial fibrillation (H)  Comment:   Plan:     (D64.9) Anemia, unspecified type  Comment:   Plan:     Patient has been advised of split billing requirements and indicates understanding: Yes    COUNSELING:  Reviewed preventive health counseling, as reflected in patient instructions       Regular exercise       Healthy diet/nutrition    Estimated body mass index is 27.12 kg/m  as calculated from the following:    Height as of this encounter: 1.676 m (5' 6\").    Weight as of this encounter: 76.2 kg (168 lb).    Weight management plan: Discussed healthy diet and exercise guidelines    He reports that he quit smoking about 30 years ago. His smoking use included cigarettes. He has a 30.00 pack-year smoking history. He has never used smokeless tobacco.      Appropriate preventive services were discussed with this patient, including applicable screening as " appropriate for cardiovascular disease, diabetes, osteopenia/osteoporosis, and glaucoma.  As appropriate for age/gender, discussed screening for colorectal cancer, prostate cancer, breast cancer, and cervical cancer. Checklist reviewing preventive services available has been given to the patient.    Reviewed patients plan of care and provided an AVS. The Basic Care Plan (routine screening as documented in Health Maintenance) for Cayetano meets the Care Plan requirement. This Care Plan has been established and reviewed with the Patient.    Counseling Resources:  ATP IV Guidelines  Pooled Cohorts Equation Calculator  Breast Cancer Risk Calculator  Breast Cancer: Medication to Reduce Risk  FRAX Risk Assessment  ICSI Preventive Guidelines  Dietary Guidelines for Americans, 2010  USDA's MyPlate  ASA Prophylaxis  Lung CA Screening    ISA N[facility]    Identified Health Risks:

## 2022-10-04 NOTE — PATIENT INSTRUCTIONS
Patient Education   Personalized Prevention Plan  You are due for the preventive services outlined below.  Your care team is available to assist you in scheduling these services.  If you have already completed any of these items, please share that information with your care team to update in your medical record.  Health Maintenance Due   Topic Date Due     COPD Action Plan  05/06/2022     ANNUAL REVIEW OF HM ORDERS  07/09/2022     COVID-19 Vaccine (5 - Booster for Pfizer series) 07/14/2022     Flu Vaccine (1) 09/01/2022     Diabetic Foot Exam  10/06/2022

## 2022-10-04 NOTE — PROGRESS NOTES
ANTICOAGULATION MANAGEMENT     Cayetano Lunsford 77 year old male is on warfarin with therapeutic INR result. (Goal INR 2.0-3.0)    Recent labs: (last 7 days)     10/04/22  1511   INR 2.3*       ASSESSMENT       Source(s): Chart Review    Previous INR was Therapeutic last 2(+) visits    Medication, diet, health changes since last INR chart reviewed; none identified           PLAN     Recommended plan for no diet, medication or health factor changes affecting INR     Dosing Instructions: Continue your current warfarin dose with next INR in 6 weeks       Summary  As of 10/4/2022    Full warfarin instructions:  5 mg every Mon, Fri; 7.5 mg all other days   Next INR check:  11/15/2022             Detailed voice message left for Rich with dosing instructions and follow up date.     Please call us to schedule your next INR in 6 weeks (around 11/15/22)    Education provided: Please call back if any changes to your diet, medications or how you've been taking warfarin    Plan made per [facility] anticoagulation protocol    Corina Cuevas RN  Anticoagulation Clinic  10/4/2022    _______________________________________________________________________     Anticoagulation Episode Summary     Current INR goal:  2.0-3.0   TTR:  71.7 % (1 y)   Target end date:  Indefinite   Send INR reminders to:  [facility]    Indications    Longstanding persistent atrial fibrillation (H) [I48.11]  Long term current use of anticoagulants with INR goal of 2.0-3.0 [Z79.01]           Comments:           Anticoagulation Care Providers     Provider Role Specialty Phone number    Dmitri Andres MD Referring Family Medicine [phone]    Drew Bravo PA-C Referring Family Medicine [phone]

## 2022-10-04 NOTE — LETTER
October 5, 2022      Leo Lunsford  [address]   [place] [address]        Dear ,    We are writing to inform you of your test results.    Your anemia is stable, but relatively unchanged. I recommend given your iron another month to work and getting this rechecked with diana next month.     Your a1c is great. However, I really think we need to stop the insulin here soon. Diana will talk to you more about this.     Your b12 is normal.       Resulted Orders   Vitamin B12   Result Value Ref Range    Vitamin B12 348 232 - 1,245 pg/mL   CBC with platelets   Result Value Ref Range    WBC Count 6.6 4.0 - 11.0 10e3/uL    RBC Count 3.49 (L) 4.40 - 5.90 10e6/uL    Hemoglobin 10.7 (L) 13.3 - 17.7 g/dL    Hematocrit 33.9 (L) 40.0 - 53.0 %    MCV 97 78 - 100 fL    MCH 30.7 26.5 - 33.0 pg    MCHC 31.6 31.5 - 36.5 g/dL    RDW 15.9 (H) 10.0 - 15.0 %    Platelet Count 291 150 - 450 10e3/uL   Hemoglobin A1c   Result Value Ref Range    Hemoglobin A1C 5.7 (H) 0.0 - 5.6 %      Comment:      Normal <5.7%   Prediabetes 5.7-6.4%    Diabetes 6.5% or higher     Note: Adopted from ADA consensus guidelines.       If you have any questions or concerns, please call the clinic at the number listed above.       Sincerely,      Drew Bravo PA-C

## 2022-10-04 NOTE — PROGRESS NOTES
ANTICOAGULATION MANAGEMENT     Cayetano Lunsford 77 year old male is on warfarin with therapeutic INR result. (Goal INR 2.0-3.0)    Recent labs: (last 7 days)     10/04/22  1511   INR 2.3*       ASSESSMENT       Source(s): Chart Review and Patient/Caregiver Call       Warfarin doses taken: Warfarin taken as instructed    Diet: No new diet changes identified    New illness, injury, or hospitalization: No    Medication/supplement changes: None noted    Signs or symptoms of bleeding or clotting: No    Previous INR: Therapeutic last 2(+) visits    Additional findings: None       PLAN     Recommended plan for no diet, medication or health factor changes affecting INR     Dosing Instructions: Continue your current warfarin dose with next INR in 6 weeks       Summary  As of 10/4/2022    Full warfarin instructions:  5 mg every Mon, Fri; 7.5 mg all other days   Next INR check:  11/15/2022             Telephone call with Leo who verbalizes understanding and agrees to plan. Did not need AVS sent with no changes made.    Lab visit scheduled    Education provided: Please call back if any changes to your diet, medications or how you've been taking warfarin    Plan made per [facility] anticoagulation protocol    Corina Cuevas RN  Anticoagulation Clinic  10/4/2022    _______________________________________________________________________     Anticoagulation Episode Summary     Current INR goal:  2.0-3.0   TTR:  71.7 % (1 y)   Target end date:  Indefinite   Send INR reminders to:  [facility]    Indications    Longstanding persistent atrial fibrillation (H) [I48.11]  Long term current use of anticoagulants with INR goal of 2.0-3.0 [Z79.01]           Comments:           Anticoagulation Care Providers     Provider Role Specialty Phone number    Dmitri Andres MD Referring Family Medicine [phone]    Drew Bravo PA-C Referring Family Medicine [phone]

## 2022-10-04 NOTE — PROGRESS NOTES
Pt called and left a voicemail today at 5:13 pm requesting a call back. Please call him at [phone]

## 2022-10-05 ENCOUNTER — TRANSFERRED RECORDS (OUTPATIENT)
Dept: HEALTH INFORMATION MANAGEMENT | Facility: CLINIC | Age: 77
End: 2022-10-05

## 2022-10-05 LAB
CREATININE (EXTERNAL): 0.73 MG/DL (ref 0.76–1.27)
GFR ESTIMATED (EXTERNAL): 93 ML/MIN/1.73
GLUCOSE (EXTERNAL): 177 MG/DL (ref 70–99)
POTASSIUM (EXTERNAL): 5.7 MMOL/L (ref 3.5–5.2)
VIT B12 SERPL-MCNC: 348 PG/ML (ref 232–1245)

## 2022-10-14 DIAGNOSIS — E11.22 TYPE 2 DIABETES MELLITUS WITH STAGE 1 CHRONIC KIDNEY DISEASE, WITH LONG-TERM CURRENT USE OF INSULIN (H): ICD-10-CM

## 2022-10-14 DIAGNOSIS — N18.1 TYPE 2 DIABETES MELLITUS WITH STAGE 1 CHRONIC KIDNEY DISEASE, WITH LONG-TERM CURRENT USE OF INSULIN (H): ICD-10-CM

## 2022-10-14 DIAGNOSIS — Z79.4 TYPE 2 DIABETES MELLITUS WITH STAGE 1 CHRONIC KIDNEY DISEASE, WITH LONG-TERM CURRENT USE OF INSULIN (H): ICD-10-CM

## 2022-10-14 RX ORDER — BLOOD SUGAR DIAGNOSTIC
STRIP MISCELLANEOUS
Qty: 300 STRIP | Refills: 0 | Status: SHIPPED | OUTPATIENT
Start: 2022-10-14 | End: 2023-01-12

## 2022-10-14 NOTE — TELEPHONE ENCOUNTER
Hello,     Patient only has 1 strip left, approve before the weekend if possible.    Thank you,  Bethanie Irby & [facility] Staff Technician   [facility]  [email or web address]   Ph#: [phone]  Fax#: [phone]

## 2022-10-14 NOTE — TELEPHONE ENCOUNTER
Prescription approved per [facility] Refill Protocol.  Penny JETER RN, BSN  [facility]

## 2022-10-18 ENCOUNTER — OFFICE VISIT (OUTPATIENT)
Dept: CARDIOLOGY | Facility: CLINIC | Age: 77
End: 2022-10-18
Payer: COMMERCIAL

## 2022-10-18 VITALS
OXYGEN SATURATION: 92 % | DIASTOLIC BLOOD PRESSURE: 73 MMHG | WEIGHT: 167 LBS | BODY MASS INDEX: 26.95 KG/M2 | SYSTOLIC BLOOD PRESSURE: 138 MMHG | HEART RATE: 62 BPM

## 2022-10-18 DIAGNOSIS — I10 HYPERTENSION GOAL BP (BLOOD PRESSURE) < 140/90: ICD-10-CM

## 2022-10-18 DIAGNOSIS — I48.0 PAROXYSMAL ATRIAL FIBRILLATION (H): ICD-10-CM

## 2022-10-18 DIAGNOSIS — G25.0 BENIGN FAMILIAL TREMOR: ICD-10-CM

## 2022-10-18 PROCEDURE — 99214 OFFICE O/P EST MOD 30 MIN: CPT | Performed by: INTERNAL MEDICINE

## 2022-10-18 RX ORDER — PROPRANOLOL HYDROCHLORIDE 60 MG/1
120 TABLET ORAL 2 TIMES DAILY
Qty: 360 TABLET | Refills: 1 | Status: SHIPPED | OUTPATIENT
Start: 2022-10-18 | End: 2023-04-11

## 2022-10-18 RX ORDER — FUROSEMIDE 20 MG
TABLET ORAL
Qty: 90 TABLET | Refills: 3 | Status: SHIPPED | OUTPATIENT
Start: 2022-10-18 | End: 2023-12-06

## 2022-10-18 RX ORDER — PROPRANOLOL HYDROCHLORIDE 60 MG/1
120 TABLET ORAL 2 TIMES DAILY
Qty: 360 TABLET | Refills: 3 | Status: CANCELLED | OUTPATIENT
Start: 2022-10-18

## 2022-10-18 RX ORDER — CHLORTHALIDONE 25 MG/1
25 TABLET ORAL DAILY
Qty: 90 TABLET | Refills: 3 | Status: SHIPPED | OUTPATIENT
Start: 2022-10-18 | End: 2023-09-25

## 2022-10-18 NOTE — PROGRESS NOTES
CARDIOLOGY CLINIC CONSULTATION    REASON FOR CONSULT: AF    PRIMARY CARE PHYSICIAN:  Drew Bravo    Today's clinic visit entailed:  Review of the result(s) of each unique test - [facility] ECG  35 minutes spent on the date of the encounter doing chart review, history and exam, documentation and further activities per the note  Provider  Link to [facility] Help Grid     The level of medical decision making during this visit was of moderate complexity.      HISTORY OF PRESENT ILLNESS:  This is a very pleasant 77-year-old gentleman who transferred care to me in 2019 after Dr. Dang retired from practice.  He has a history of paroxysmal atrial fibrillation on anticoagulation with Coumadin and rhythm control strategy with propafenone and propranolol (for tremors too) for many years.  No history of obstructive coronary disease.  No MI.  No heart failure symptoms.  Last nuclear stress test and echocardiogram showed normal LV function and no ischemia or infarction.  He is here for routine 1-year followup.        Denies any cardiovascular symptoms.  No chest pain, shortness of breath.  No bleeding problems.  No edema, orthopnea or PND. K has been running high despite lower doses of Losartan. Creatinine is normal. Takes lasix as needed for mild edema a few times a week.      PAST MEDICAL HISTORY:  Past Medical History:   Diagnosis Date     Allergic rhinitis, cause unspecified      Atrial fibrillation (H)      BPH (benign prostatic hyperplasia)      Chronic airway obstruction, not elsewhere classified      COPD (chronic obstructive pulmonary disease) (H)      Hyperlipidemia LDL goal <100 5/9/2010     Hypertension goal BP (blood pressure) < 130/80 10/19/2006     Obesity (BMI 30-39.9)      Perforation of tympanic membrane, unspecified     Right TM     Tachycardia, unspecified      Type 2 diabetes, HbA1C goal < 8% (H) 5/2/2010     Ulcerative colitis (H)      Unspecified cardiovascular disease        MEDICATIONS:  Current  "Outpatient Medications   Medication     albuterol (PROAIR HFA/PROVENTIL HFA/VENTOLIN HFA) 108 (90 BASE) MCG/ACT Inhaler     amLODIPine (NORVASC) 10 MG tablet     atorvastatin (LIPITOR) 20 MG tablet     blood glucose (NO BRAND SPECIFIED) test strip     chlorthalidone (HYGROTON) 25 MG tablet     ferrous sulfate (FEROSUL) 325 (65 Fe) MG tablet     folic acid 0.8 MG CAPS     furosemide (LASIX) 20 MG tablet     insulin NPH (NOVOLIN N VIAL) 100 UNIT/ML vial     insulin syringe-needle U-100 (31G X 5/16\" 0.3 ML) 31G X 5/16\" 0.3 ML miscellaneous     ipratropium - albuterol 0.5 mg/2.5 mg/3 mL (DUONEB) 0.5-2.5 (3) MG/3ML neb solution     metFORMIN (GLUCOPHAGE) 1000 MG tablet     mupirocin (BACTROBAN) 2 % external ointment     NOVOLIN R VIAL 100 UNIT/ML soln     ONETOUCH ULTRA test strip     pantoprazole (PROTONIX) 40 MG enteric coated tablet     primidone (MYSOLINE) 50 MG tablet     Probiotic Product (FLORAJEN3) CAPS     propafenone (RYTHMOL) 150 MG TABS tablet     propranolol (INDERAL) 60 MG tablet     sulfaSALAzine (AZULFIDINE) 500 MG tablet     tamsulosin (FLOMAX) 0.4 MG capsule     tiotropium-olodaterol 2.5-2.5 MCG/ACT AERS     Vitamin D3 (CHOLECALCIFEROL) 25 mcg (1000 units) tablet     warfarin ANTICOAGULANT (JANTOVEN ANTICOAGULANT) 5 MG tablet     No current facility-administered medications for this visit.       ALLERGIES:  Allergies   Allergen Reactions     Percodan [Oxycodone-Aspirin] Nausea and Vomiting     Aspirin        SOCIAL HISTORY:  I have reviewed this patient's social history and updated it with pertinent information if needed. Cayetano Lunsford  reports that he quit smoking about 30 years ago. His smoking use included cigarettes. He has a 30.00 pack-year smoking history. He has never used smokeless tobacco. He reports that he does not currently use alcohol. He reports that he does not use drugs.    FAMILY HISTORY:  I have reviewed this patient's family history and updated it with pertinent information if " needed.   Family History   Problem Relation Age of Onset     Circulatory Mother         blood clot in leg     Diabetes Mother         type 2     Allergies Mother      Arthritis Mother      Gastrointestinal Disease Mother      Neurologic Disorder Mother         Familiar tremors     Neurologic Disorder Father         brain tumor     Cerebrovascular Disease Paternal Grandfather      Hypertension Brother      Hypertension Sister      Diabetes Sister         Type 2     Allergies Sister      Lipids Brother      Lipids Sister        REVIEW OF SYSTEMS:  Skin:  Negative     Eyes:  Positive for glasses  ENT:  Positive for hearing loss  Respiratory:  Negative    Cardiovascular:    Positive for;edema  Gastroenterology: Positive for heartburn  Genitourinary:  Negative    Musculoskeletal:  Negative    Neurologic:  Negative    Psychiatric:  Negative    Heme/Lymph/Imm:  Negative    Endocrine:  Positive for diabetes      PHYSICAL EXAM:      BP: 138/73 Pulse: 62     SpO2: 92 %      Vital Signs with Ranges  Pulse:  [62] 62  BP: (138)/(73) 138/73  SpO2:  [92 %] 92 %  167 lbs 0 oz    Constitutional: awake, alert, no distress  Respiratory: Clear  Cardiovascular: Regular, no edema  GI: nondistended  Neuropsychiatric: appropriate affact    DATA:  Labs: Pertinent cardiac labs reviewed    ASSESSMENT:  Paroxysmal AF without recurrence  Hypertension    RECOMMENDATIONS:  1. Patient is asymptomatic from a cardiac standpoint.  He is feeling well.  No clinical recurrences of atrial fibrillation.  Continue propafenone and anticoagulation.  2. He is on propranolol for A. fib and tremors and hypertension.  Continue that.  3. Potassium is running on the higher side despite reducing losartan dose.  Given that he is on antiarrhythmic therapy, stop losartan.  Start chlorthalidone 25 mg daily.  Follow-up in 1 month lab with labs with [facility].  4. Routine follow-up in 1 year in cardiology clinic as advised.  Sooner if anything changes clinically.    Daniel  JOCY Marroquin, [facility]  Cardiology - [facility]  October 18, 2022

## 2022-10-18 NOTE — PATIENT INSTRUCTIONS
Stop Losartan - this is causing your potassium levels to be high.  Continue Propranolol, amlodipine, and as needed Lasix.  Start Chlorthalidone 25 mg daily for BP  See us back in clinic in 1 month with labs for BP check to ensure medication changes are working fine.

## 2022-10-18 NOTE — LETTER
10/18/2022    Drew Bravo PA-C  [id number] [facility] [address]    RE: Cayetano Lunsford       Dear Colleague,     I had the pleasure of seeing [facility].  CARDIOLOGY CLINIC CONSULTATION    REASON FOR CONSULT: AF    PRIMARY CARE PHYSICIAN:  Drew Bravo    Today's clinic visit entailed:  Review of the result(s) of each unique test - [facility] ECG  35 minutes spent on the date of the encounter doing chart review, history and exam, documentation and further activities per the note  Provider  Link to [facility] Help Grid     The level of medical decision making during this visit was of moderate complexity.      HISTORY OF PRESENT ILLNESS:  This is a very pleasant 77-year-old gentleman who transferred care to me in 2019 after Dr. Dang retired from practice.  He has a history of paroxysmal atrial fibrillation on anticoagulation with Coumadin and rhythm control strategy with propafenone and propranolol (for tremors too) for many years.  No history of obstructive coronary disease.  No MI.  No heart failure symptoms.  Last nuclear stress test and echocardiogram showed normal LV function and no ischemia or infarction.  He is here for routine 1-year followup.        Denies any cardiovascular symptoms.  No chest pain, shortness of breath.  No bleeding problems.  No edema, orthopnea or PND. K has been running high despite lower doses of Losartan. Creatinine is normal. Takes lasix as needed for mild edema a few times a week.      PAST MEDICAL HISTORY:  Past Medical History:   Diagnosis Date     Allergic rhinitis, cause unspecified      Atrial fibrillation (H)      BPH (benign prostatic hyperplasia)      Chronic airway obstruction, not elsewhere classified      COPD (chronic obstructive pulmonary disease) (H)      Hyperlipidemia LDL goal <100 5/9/2010     Hypertension goal BP (blood pressure) < 130/80 10/19/2006     Obesity (BMI 30-39.9)      Perforation of  "tympanic membrane, unspecified     Right TM     Tachycardia, unspecified      Type 2 diabetes, HbA1C goal < 8% (H) 5/2/2010     Ulcerative colitis (H)      Unspecified cardiovascular disease        MEDICATIONS:  Current Outpatient Medications   Medication     albuterol (PROAIR HFA/PROVENTIL HFA/VENTOLIN HFA) 108 (90 BASE) MCG/ACT Inhaler     amLODIPine (NORVASC) 10 MG tablet     atorvastatin (LIPITOR) 20 MG tablet     blood glucose (NO BRAND SPECIFIED) test strip     chlorthalidone (HYGROTON) 25 MG tablet     ferrous sulfate (FEROSUL) 325 (65 Fe) MG tablet     folic acid 0.8 MG CAPS     furosemide (LASIX) 20 MG tablet     insulin NPH (NOVOLIN N VIAL) 100 UNIT/ML vial     insulin syringe-needle U-100 (31G X 5/16\" 0.3 ML) 31G X 5/16\" 0.3 ML miscellaneous     ipratropium - albuterol 0.5 mg/2.5 mg/3 mL (DUONEB) 0.5-2.5 (3) MG/3ML neb solution     metFORMIN (GLUCOPHAGE) 1000 MG tablet     mupirocin (BACTROBAN) 2 % external ointment     NOVOLIN R VIAL 100 UNIT/ML soln     ONETOUCH ULTRA test strip     pantoprazole (PROTONIX) 40 MG enteric coated tablet     primidone (MYSOLINE) 50 MG tablet     Probiotic Product (FLORAJEN3) CAPS     propafenone (RYTHMOL) 150 MG TABS tablet     propranolol (INDERAL) 60 MG tablet     sulfaSALAzine (AZULFIDINE) 500 MG tablet     tamsulosin (FLOMAX) 0.4 MG capsule     tiotropium-olodaterol 2.5-2.5 MCG/ACT AERS     Vitamin D3 (CHOLECALCIFEROL) 25 mcg (1000 units) tablet     warfarin ANTICOAGULANT (JANTOVEN ANTICOAGULANT) 5 MG tablet     No current facility-administered medications for this visit.       ALLERGIES:  Allergies   Allergen Reactions     Percodan [Oxycodone-Aspirin] Nausea and Vomiting     Aspirin        SOCIAL HISTORY:  I have reviewed this patient's social history and updated it with pertinent information if needed. Cayetano NORIEGA Jonn  reports that he quit smoking about 30 years ago. His smoking use included cigarettes. He has a 30.00 pack-year smoking history. He has never used " smokeless tobacco. He reports that he does not currently use alcohol. He reports that he does not use drugs.    FAMILY HISTORY:  I have reviewed this patient's family history and updated it with pertinent information if needed.   Family History   Problem Relation Age of Onset     Circulatory Mother         blood clot in leg     Diabetes Mother         type 2     Allergies Mother      Arthritis Mother      Gastrointestinal Disease Mother      Neurologic Disorder Mother         Familiar tremors     Neurologic Disorder Father         brain tumor     Cerebrovascular Disease Paternal Grandfather      Hypertension Brother      Hypertension Sister      Diabetes Sister         Type 2     Allergies Sister      Lipids Brother      Lipids Sister        REVIEW OF SYSTEMS:  Skin:  Negative     Eyes:  Positive for glasses  ENT:  Positive for hearing loss  Respiratory:  Negative    Cardiovascular:    Positive for;edema  Gastroenterology: Positive for heartburn  Genitourinary:  Negative    Musculoskeletal:  Negative    Neurologic:  Negative    Psychiatric:  Negative    Heme/Lymph/Imm:  Negative    Endocrine:  Positive for diabetes      PHYSICAL EXAM:      BP: 138/73 Pulse: 62     SpO2: 92 %      Vital Signs with Ranges  Pulse:  [62] 62  BP: (138)/(73) 138/73  SpO2:  [92 %] 92 %  167 lbs 0 oz    Constitutional: awake, alert, no distress  Respiratory: Clear  Cardiovascular: Regular, no edema  GI: nondistended  Neuropsychiatric: appropriate affact    DATA:  Labs: Pertinent cardiac labs reviewed    ASSESSMENT:  Paroxysmal AF without recurrence  Hypertension    RECOMMENDATIONS:  1. Patient is asymptomatic from a cardiac standpoint.  He is feeling well.  No clinical recurrences of atrial fibrillation.  Continue propafenone and anticoagulation.  2. He is on propranolol for A. fib and tremors and hypertension.  Continue that.  3. Potassium is running on the higher side despite reducing losartan dose.  Given that he is on antiarrhythmic  therapy, stop losartan.  Start chlorthalidone 25 mg daily.  Follow-up in 1 month lab with labs with [facility].  4. Routine follow-up in 1 year in cardiology clinic as advised.  Sooner if anything changes clinically.    JOCY Almaraz, [facility]  Cardiology - [facility]  October 18, 2022    Thank you for allowing me to participate in the care of your patient.      Sincerely,     Daniel Marroquin MD     [facility]  cc:   Daniel Marroquin MD  [address] [facility] [address] [place] [id number]

## 2022-10-24 NOTE — MR AVS SNAPSHOT
After Visit Summary   11/5/2018    Cayetano Lunsford    MRN: [id number]           Patient Information     Date Of Birth          1945        Visit Information        Provider Department      11/5/2018 11:00 AM Dara Stratton, [facility]        Today's Diagnoses     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, with long-term current use of insulin (H)    -  1      Care Instructions    1. Continue current drug therapy.    Your next phone visit in in 3 months.    Keep up the good work!!      Dara Stratton, PharmD  [phone] in clinic on Tuesday and Wednesday    You may receive a survey about the [facility] services you received.  I would appreciate your feedback to help me serve you better in the future. Please fill it out and return it when you can. Your comments will be anonymous.                Follow-ups after your visit        Your next 10 appointments already scheduled     Nov 16, 2018 11:30 AM CST   Anticoagulation Visit with [facility]   [facility]    [id number] [place] [id number] [phone]            Feb 08, 2019 11:00 AM CST   TELEMEDICINE with Cheko Pereira [facility] ([facility])    [address] [place] [id number] [phone]           Note: this is not an onsite visit; there is no need to come to the facility.            Mar 06, 2019 10:30 AM CST   Return Visit with Bong Yoo MD   [facility] ([facility])    [address] [place] [address] [phone]              Who to contact     If you have questions or need follow up information about today's clinic visit or your schedule please contact [facility] directly at [phone].  Normal or non-critical lab and imaging results will be communicated to you  by MyChart, letter or phone within 4 business days after the clinic has received the results. If you do not hear from us within 7 days, please contact the clinic through MyChart or phone. If you have a critical or abnormal lab result, we will notify you by phone as soon as possible.  Submit refill requests through [employer] or call your pharmacy and they will forward the refill request to us. Please allow 3 business days for your refill to be completed.          Additional Information About Your Visit        Care EveryWhere ID     This is your Care EveryWhere ID. This could be used by other organizations to access your [facility] medical records  [id number]         Blood Pressure from Last 3 Encounters:   10/31/18 120/70   09/05/18 132/66   06/28/18 106/48    Weight from Last 3 Encounters:   10/31/18 195 lb (88.5 kg)   09/05/18 194 lb (88 kg)   06/28/18 190 lb 1.6 oz (86.2 kg)              Today, you had the following     No orders found for display       Primary Care Provider Office Phone # Fax #    Debbie Lewis [phone] [address]  [facility] [address]        Equal Access to Services     [facility] : Hadii aad ku hadasho Soomaali, waaxda luqadaha, qaybta kaalmada adeegyada, erendira walter. So [facility] [phone].    ATENCIÓN: Si habla español, tiene a jurado disposición servicios gratuitos de asistencia lingüística. Britta al [phone].    We comply with applicable federal civil rights laws and [place] laws. We do not discriminate on the basis of race, color, national origin, age, disability, sex, sexual orientation, or gender identity.            Thank you!     Thank you for choosing [facility]  for your care. Our goal is always to provide you with excellent care. Hearing back from our patients is one way we can continue to improve our services. Please take a few minutes to complete the written survey that you may receive in the mail  "after your visit with us. Thank you!             Your Updated Medication List - Protect others around you: Learn how to safely use, store and throw away your medicines at www.disposemymeds.org.          This list is accurate as of 11/5/18 12:12 PM.  Always use your most recent med list.                   Brand Name Dispense Instructions for use Diagnosis    albuterol 108 (90 Base) MCG/ACT inhaler    PROAIR HFA/PROVENTIL HFA/VENTOLIN HFA    1 Inhaler    Inhale 1-2 puffs into the lungs every 4 hours as needed (for shortness of breath/wheezing)    Chronic obstructive pulmonary disease, unspecified COPD type (H)       ASPIRIN NOT PRESCRIBED    INTENTIONAL     Reported on 5/17/2017        B-D INSULIN SYRINGE 31G X 5/16\" 0.5 ML   Generic drug:  insulin syringe-needle U-100     100 each    USE 1 SYRINGE TWO TIMES A DAY OR AS DIRECTED    Type 2 diabetes, HbA1C goal < 8% (H)       BD ULTRA FINE PEN NEEDLES     100 each    Use to inject insulin twice daily.    Type 2 diabetes, HbA1C goal < 8% (H)       blood glucose monitoring lancets     100 Each    Use to test up to four times per day    Type II or unspecified type diabetes mellitus without mention of complication, not stated as uncontrolled       FLORAJEN3 Caps     60 capsule    Take 1 capsule by mouth 2 times daily    Acute cystitis without hematuria       folic acid 0.8 MG Caps     90 capsule    Take 1 tablet by mouth daily    Ulcerative colitis without complications, unspecified location (H)       insulin  UNIT/ML injection    NovoLIN N RELION    3 vial    Inject 5 units under the skin at breakfast and 15 units at dinner.    Type 2 diabetes mellitus with hypoglycemia without coma, with long-term current use of insulin (H)       insulin regular 100 UNIT/ML injection    NovoLIN R RELION    30 mL    Inject 5 units with breakfast and 2 units with dinner (when mixing with NPH, draw this insulin up first)    Type 2 diabetes mellitus with hypoglycemia without coma, with " Show Aperture Variable?: Yes long-term current use of insulin (H)       ipratropium - albuterol 0.5 mg/2.5 mg/3 mL 0.5-2.5 (3) MG/3ML neb solution    DUONEB    270 mL    NEBULIZE CONTENTS OF ONE VIAL BY MOUTH EVERY 4 HOURS AS NEEDED FOR SHORTNESS OF BREATH OR DYSPNEA    Chronic obstructive bronchitis (H)       losartan 100 MG tablet    COZAAR    90 tablet    Take 1 tablet (100 mg) by mouth daily for hypertension.    Hypertension goal BP (blood pressure) < 140/90       metFORMIN 1000 MG tablet    GLUCOPHAGE    180 tablet    TAKE ONE TABLET BY MOUTH TWICE A DAY WITH BREAKFAST AND DINNER    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, with long-term current use of insulin (H)       nebulizer/tubing/mouthpiece Kit     1 kit    Use to nebulize medications for COPD    Chronic obstructive pulmonary disease, unspecified COPD type (H)       ONETOUCH ULTRA test strip   Generic drug:  blood glucose monitoring     400 each    USE TO TEST BLOOD SUGAR FOUR TIMES A DAY OR AS DIRECTED    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, with long-term current use of insulin (H)       pantoprazole 40 MG EC tablet    PROTONIX     Take 40 mg by mouth daily Started by Dr. Debbie Rico at [facility]        primidone 50 MG tablet    MYSOLINE    900 tablet    3 tablets 2 times a day and 4 tablets at the bedtime.    Benign familial tremor       propafenone 150 MG Tabs tablet    RYTHMOL    180 tablet    Take 1 tablet (150 mg) by mouth 2 times daily    Paroxysmal atrial fibrillation (H)       propranolol 40 MG tablet    INDERAL    180 tablet    Take 2-3 tablets ( mg) by mouth 2 times daily    Benign familial tremor       simvastatin 80 MG tablet    ZOCOR    45 tablet    Take 0.5 tablets (40 mg) by mouth At Bedtime Profile Rx: patient will contact pharmacy when needed    Hyperlipidemia LDL goal <100       STIOLTO RESPIMAT 2.5-2.5 MCG/ACT Aers   Generic drug:  tiotropium-olodaterol      Inhale 2 puffs into the lungs daily         sulfaSALAzine 500 MG tablet    AZULFIDINE    90 tablet    TAKE ONE TABLET BY MOUTH THREE TIMES A DAY    Ulcerative colitis, unspecified       tamsulosin 0.4 MG capsule    FLOMAX    90 capsule    Take 1 capsule (0.4 mg) by mouth daily    Benign prostatic hyperplasia with urinary frequency       warfarin 5 MG tablet    COUMADIN    150 tablet    Take as directed by Coumadin clinic-- 10 mg on Mon & Th / Take 7.5 mg all other days    Atrial fibrillation, unspecified type (H)          Render Note In Bullet Format When Appropriate: No Duration Of Freeze Thaw-Cycle (Seconds): 5-10 Medical Necessity Information: It is in your best interest to select a reason for this procedure from the list below. All of these items fulfill various CMS LCD requirements except the new and changing color options. Spray Paint Text: The liquid nitrogen was applied to the skin utilizing a spray paint frosting technique. Consent: The patient's consent was obtained including but not limited to risks of crusting, scabbing, blistering, scarring, darker or lighter pigmentary change, recurrence, incomplete removal and infection. Detail Level: Detailed Post-Care Instructions: I reviewed with the patient in detail post-care instructions. Patient is to wear sunprotection, and avoid picking at any of the treated lesions. Pt may apply Vaseline to crusted or scabbing areas. Number Of Freeze-Thaw Cycles: 1 freeze-thaw cycle Medical Necessity Clause: This procedure was medically necessary because the lesions that were treated were:

## 2022-10-31 DIAGNOSIS — R35.0 BENIGN PROSTATIC HYPERPLASIA WITH URINARY FREQUENCY: ICD-10-CM

## 2022-10-31 DIAGNOSIS — N40.1 BENIGN PROSTATIC HYPERPLASIA WITH URINARY FREQUENCY: ICD-10-CM

## 2022-11-01 RX ORDER — TAMSULOSIN HYDROCHLORIDE 0.4 MG/1
CAPSULE ORAL
Qty: 90 CAPSULE | Refills: 2 | Status: SHIPPED | OUTPATIENT
Start: 2022-11-01 | End: 2023-07-21

## 2022-11-02 ENCOUNTER — OFFICE VISIT (OUTPATIENT)
Dept: PHARMACY | Facility: CLINIC | Age: 77
End: 2022-11-02
Payer: COMMERCIAL

## 2022-11-02 VITALS — SYSTOLIC BLOOD PRESSURE: 136 MMHG | BODY MASS INDEX: 26.36 KG/M2 | WEIGHT: 163.3 LBS | DIASTOLIC BLOOD PRESSURE: 60 MMHG

## 2022-11-02 DIAGNOSIS — N40.1 BENIGN PROSTATIC HYPERPLASIA WITH URINARY FREQUENCY: ICD-10-CM

## 2022-11-02 DIAGNOSIS — I10 HYPERTENSION GOAL BP (BLOOD PRESSURE) < 140/90: ICD-10-CM

## 2022-11-02 DIAGNOSIS — Z78.9 TAKES DIETARY SUPPLEMENTS: ICD-10-CM

## 2022-11-02 DIAGNOSIS — G25.0 BENIGN FAMILIAL TREMOR: ICD-10-CM

## 2022-11-02 DIAGNOSIS — R60.9 EDEMA, UNSPECIFIED TYPE: ICD-10-CM

## 2022-11-02 DIAGNOSIS — I48.91 ATRIAL FIBRILLATION, UNSPECIFIED TYPE (H): ICD-10-CM

## 2022-11-02 DIAGNOSIS — R35.0 BENIGN PROSTATIC HYPERPLASIA WITH URINARY FREQUENCY: ICD-10-CM

## 2022-11-02 DIAGNOSIS — E78.5 HYPERLIPIDEMIA LDL GOAL <100: ICD-10-CM

## 2022-11-02 DIAGNOSIS — K21.9 GASTROESOPHAGEAL REFLUX DISEASE WITHOUT ESOPHAGITIS: ICD-10-CM

## 2022-11-02 DIAGNOSIS — E11.9 TYPE 2 DIABETES, HBA1C GOAL < 8% (H): Primary | ICD-10-CM

## 2022-11-02 DIAGNOSIS — K51.90 ULCERATIVE COLITIS WITHOUT COMPLICATIONS, UNSPECIFIED LOCATION (H): ICD-10-CM

## 2022-11-02 DIAGNOSIS — J44.9 CHRONIC OBSTRUCTIVE PULMONARY DISEASE, UNSPECIFIED COPD TYPE (H): ICD-10-CM

## 2022-11-02 PROCEDURE — 99607 MTMS BY PHARM ADDL 15 MIN: CPT | Performed by: PHARMACIST

## 2022-11-02 PROCEDURE — 99606 MTMS BY PHARM EST 15 MIN: CPT | Performed by: PHARMACIST

## 2022-11-02 RX ORDER — HUMAN INSULIN 100 [IU]/ML
INJECTION, SOLUTION SUBCUTANEOUS
Qty: 10 ML | Refills: 0 | Status: SHIPPED | OUTPATIENT
Start: 2022-11-02 | End: 2023-01-30

## 2022-11-02 NOTE — PATIENT INSTRUCTIONS
"Recommendations from today's MTM visit:                                                         Stop Novolin N insulin.       It was great speaking with you today.  I value your experience and would be very thankful for your time in providing feedback in our clinic survey. In the next few days, you may receive an email or text message from [facility] with a link to a survey related to your  clinical pharmacist.\"     To schedule another MTM appointment, please call the clinic directly or you may call the MTM scheduling line at [phone] or toll-free at [phone].     My Clinical Pharmacist's contact information:                                                      Please feel free to contact me with any questions or concerns you have.      Diana Johnson, PharmD, BCACP  Medication Therapy Management Provider, [facility]  "

## 2022-11-02 NOTE — PROGRESS NOTES
Medication Therapy Management (MTM) Encounter    ASSESSMENT:                            Medication Adherence/Access: No issues identified    Type 2 Diabetes: Patient is well meeting A1c goal of < 8%. Self monitoring of blood glucose is at goal of fasting  mg/dL and post prandial < 180 mg/dL. Due to age and risks of falls, recommend discontinue insulin as he is already on very low doses. He is agreeable today to discontinuing Novolin N and will have Novolin R to use as needed if blood glucose >200 as varies depending on diet.     Hypertension/Afib/Edema: Following cardiology. Plan in place to recheck potassium in 2 weeks.    Hyperlipidemia: stable  COPD: stable  BPH: stable  UC: stable. Follows gastroenterologist.  GERD: stable  Tremors: stable. Follows with neurologist.   Supplements: stable    PLAN:                            1. Stop Novolin N insulin.     Follow-up: Return in about 3 months (around 2023) for Medication Therapy Management Pharmacist.    SUBJECTIVE/OBJECTIVE:                          Cayetano Lunsford is a 77 year old male coming in for a follow-up visit.  Today's visit is a follow-up MTM visit from .     Reason for visit: blood glucose review.    Tobacco: He reports that he quit smoking about 30 years ago. His smoking use included cigarettes. He has a 30.00 pack-year smoking history. He has never used smokeless tobacco.  Alcohol: not currently using  Past Medical History: Reviewed in chart      Medication Adherence/Access: no issues reported    Type 2 Diabetes:  Currently taking metformin 1000mg twice daily, NPH 2 units AM and 2 units PM, and insulin regular 2 units when the reading is >200mg/dL (none lately, streaks of needing this depending on diet). Patient is not experiencing side effects.  Blood sugar monitorin-3 time(s) daily. Ranges (patient reported):   Date FBG/ 2hours post Before dinner Bedtime     144      144 106 109   10/31 133 88 100   10/30 150 221 71   10/29  113 128 133   10/28 128 145 133   10/27 150 80 117   10/26 135 106 107   10/25 134 129 121   Symptoms of low blood sugar? Once a week.  Symptoms of high blood sugar? none  Eye exam: up to date  Foot exam: due  Diet/Exercise: Depends on his diet. Wonders about apples vs berries today. Will use 2 units of insulin regular if eating pasta or high carb meal.   Aspirin: Not taking due to warfarin  Statin: Yes: atorvastatin  ACEi/ARB: none now d/t elevated potassium  Urine Albumin:   Lab Results   Component Value Date    UMALCR 95.83 (H) 12/30/2021     Lab Results   Component Value Date    A1C 5.7 10/04/2022    A1C 6.3 06/21/2022    A1C 6.0 02/15/2022    A1C 5.4 07/09/2021    A1C 5.3 05/20/2021    A1C 5.9 11/09/2020    A1C 5.8 07/07/2020    A1C 6.1 12/09/2019       Hypertension/Afib/Edema: Current medications include warfarin 5mg on Mon and Fri and 7.5mg all other days, propranolol 120mg by mouth two times daily, propafenone 150mg twice daily, chlorthalidone 25 mg daily (switched from losartan d/t elevated potassium), furosemide 20 mg daily as needed for edema (few times a week), and amlodipine 10mg daily. Patient reports no current medication side effects except occasional orthostatic hypotension in the morning when getting out of bed. Follows with anticoagulation clinic and cardiology.   BP Readings from Last 3 Encounters:   11/02/22 136/60   10/18/22 138/73   10/04/22 138/66     Lab Results   Component Value Date    INR 2.3 10/04/2022    INR 2.5 09/01/2022    INR 2.0 08/11/2022    INR 2.7 07/29/2022     Potassium   Date Value Ref Range Status   06/21/2022 5.5 (H) 3.4 - 5.3 mmol/L Final   07/09/2021 5.5 (H) 3.4 - 5.3 mmol/L Final       Hyperlipidemia: Current therapy includes atorvastatin 20mg daily.  Patient reports no significant myalgias or other side effects.  Recent Labs   Lab Test 12/30/21  1004 11/09/20  1108 05/05/16  0919 05/12/15  0757 04/24/14  1016   CHOL 161 140   < > 141 148   HDL 48 30*   < > 44 38*   LDL  78 59   < > 46 66   TRIG 173* 253*   < > 255* 220*   CHOLHDLRATIO  --   --   --  3.2 3.9    < > = values in this interval not displayed.       COPD: Current medications: albuterol MDI as needed (occasoinal when out somewhere), Duonebs as needed (2-3x daily), LAMA/LABA- Stiolto Respimat 2 puff(s) once daily. He states insurance may prefer a different inhaler for next year and he may check to bring this to us to switch Stiolto if needed.  Patient is not experiencing side effects. Sees [facility].  Patient reports the following symptoms: none.    BPH: Patient taking tamsulosin 0.4mg daily and reports no issues. Reports this helps decrease urinary frequency.    UC: Patient taking sulfasalazine 1000mg AM and 500 mg daily, probiotic daily, and folic acid 0.8mg daily. No issues reported. Follows gastroenterologist.    GERD: Current medications include: Protonix (pantoprazole) 40mg once daily. Pt reports no current symptoms.      Tremors: Patient taking primidone 200mg AM, 200mg PM and 150 mg bedtime. Does also take propranolol 120mg twice daily and reports no issues. Some days worse than others. Avoids caffeine and chocolate. Follows with neurologist.     Supplements: Patient taking vitamin D 2000 units daily and ferrous sulfate 325 mg daily. No concerns.    Today's Vitals: /60   Wt 163 lb 4.8 oz (74.1 kg)   BMI 26.36 kg/m    ----------------      I spent 30 minutes with this patient today. All changes were made via collaborative practice agreement with Drew Bravo PA-C. A copy of the visit note was provided to the patient's provider(s).    The patient was given a summary of these recommendations.     Diana Johnson, PharmD, BCACP  Medication Therapy Management Provider, [facility]  Pager: [phone]     Medication Therapy Recommendations  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)    Current Medication: insulin NPH (NOVOLIN N VIAL) 100 UNIT/ML vial  (Discontinued)   Rationale: Undesirable effect - Adverse medication event - Safety   Recommendation: Discontinue Medication   Status: Accepted per CPA

## 2022-11-15 ENCOUNTER — ANTICOAGULATION THERAPY VISIT (OUTPATIENT)
Dept: ANTICOAGULATION | Facility: CLINIC | Age: 77
End: 2022-11-15

## 2022-11-15 ENCOUNTER — LAB (OUTPATIENT)
Dept: LAB | Facility: CLINIC | Age: 77
End: 2022-11-15
Payer: COMMERCIAL

## 2022-11-15 DIAGNOSIS — G25.0 BENIGN FAMILIAL TREMOR: ICD-10-CM

## 2022-11-15 DIAGNOSIS — I48.11 LONGSTANDING PERSISTENT ATRIAL FIBRILLATION (H): Primary | ICD-10-CM

## 2022-11-15 DIAGNOSIS — Z79.01 LONG TERM CURRENT USE OF ANTICOAGULANTS WITH INR GOAL OF 2.0-3.0: ICD-10-CM

## 2022-11-15 DIAGNOSIS — I48.11 LONGSTANDING PERSISTENT ATRIAL FIBRILLATION (H): ICD-10-CM

## 2022-11-15 DIAGNOSIS — I48.0 PAROXYSMAL ATRIAL FIBRILLATION (H): ICD-10-CM

## 2022-11-15 LAB
ANION GAP SERPL CALCULATED.3IONS-SCNC: 13 MMOL/L (ref 7–15)
BUN SERPL-MCNC: 25.4 MG/DL (ref 8–23)
CALCIUM SERPL-MCNC: 9.1 MG/DL (ref 8.8–10.2)
CHLORIDE SERPL-SCNC: 99 MMOL/L (ref 98–107)
CREAT SERPL-MCNC: 0.74 MG/DL (ref 0.67–1.17)
DEPRECATED HCO3 PLAS-SCNC: 28 MMOL/L (ref 22–29)
GFR SERPL CREATININE-BSD FRML MDRD: >90 ML/MIN/1.73M2
GLUCOSE SERPL-MCNC: 221 MG/DL (ref 70–99)
INR BLD: 3.7 (ref 0.9–1.1)
POTASSIUM SERPL-SCNC: 5.2 MMOL/L (ref 3.4–5.3)
SODIUM SERPL-SCNC: 140 MMOL/L (ref 136–145)

## 2022-11-15 PROCEDURE — 36415 COLL VENOUS BLD VENIPUNCTURE: CPT

## 2022-11-15 PROCEDURE — 80048 BASIC METABOLIC PNL TOTAL CA: CPT

## 2022-11-15 PROCEDURE — 85610 PROTHROMBIN TIME: CPT

## 2022-11-15 NOTE — PROGRESS NOTES
ANTICOAGULATION MANAGEMENT     Cayetano Lunsford 77 year old male is on warfarin with supratherapeutic INR result. (Goal INR 2.0-3.0)    Recent labs: (last 7 days)     11/15/22  1318   INR 3.7*       ASSESSMENT       Source(s): Chart Review and Patient/Caregiver Call       Warfarin doses taken: Warfarin taken as instructed    Diet: No new diet changes identified    New illness, injury, or hospitalization: No    Medication/supplement changes: Chlorthalidone started on 10/18/22 No interaction anticipated    Losartan stopped on 10/18/22 No interaction anticipated    Signs or symptoms of bleeding or clotting: No    Previous INR: Therapeutic last 2(+) visits    Additional findings: None       PLAN     Recommended plan for no diet, medication or health factor changes affecting INR     Dosing Instructions: hold dose then continue your current warfarin dose with next INR in 2 weeks       Summary  As of 11/15/2022    Full warfarin instructions:  11/15: Hold; Otherwise 5 mg every Mon, Fri; 7.5 mg all other days; Starting 11/15/2022   Next INR check:  11/28/2022             Telephone call with Leo who verbalizes understanding and agrees to plan    Lab visit scheduled    Education provided:     Please call back if any changes to your diet, medications or how you've been taking warfarin    Contact [phone]  with any changes, questions or concerns.     Plan made per [facility] anticoagulation protocol    Dayanara Cuevas RN  Anticoagulation Clinic  11/15/2022    _______________________________________________________________________     Anticoagulation Episode Summary     Current INR goal:  2.0-3.0   TTR:  65.9 % (1 y)   Target end date:  Indefinite   Send INR reminders to:  [facility]    Indications    Longstanding persistent atrial fibrillation (H) [I48.11]  Long term current use of anticoagulants with INR goal of 2.0-3.0 [Z79.01]           Comments:           Anticoagulation Care Providers     Provider Role Specialty  Phone number    Dmitri Andres MD Referring Family Medicine [phone]    Drew Bravo PA-C Referring Family Medicine [phone]

## 2022-11-16 ENCOUNTER — CARE COORDINATION (OUTPATIENT)
Dept: CARDIOLOGY | Facility: CLINIC | Age: 77
End: 2022-11-16

## 2022-11-16 NOTE — PROGRESS NOTES
"BMP results from 11/15/22 noted. Pt had 10/18/22 visit with . Per , \"Potassium is running on the higher side despite reducing losartan dose.  Given that he is on antiarrhythmic therapy, stop losartan.  Start chlorthalidone 25 mg daily.  Follow-up in 1 month lab with labs with [facility].\"    Pt has 11/28/22 visit with Sasha Madrid PA-C. Will route update on bmp to  as K is still on the high side of the normal range. ESletteboe RN November 16, 2022, 9:28 AM      Component      Latest Ref Rng & Units 11/15/2022   Sodium      136 - 145 mmol/L 140   Potassium      3.4 - 5.3 mmol/L 5.2   Chloride      98 - 107 mmol/L 99   Carbon Dioxide (CO2)      22 - 29 mmol/L 28   Anion Gap      7 - 15 mmol/L 13   Urea Nitrogen      8.0 - 23.0 mg/dL 25.4 (H)   Creatinine      0.67 - 1.17 mg/dL 0.74   Calcium      8.8 - 10.2 mg/dL 9.1   Glucose      70 - 99 mg/dL 221 (H)   GFR Estimate      >60 mL/min/1.73m2 >90     "

## 2022-11-18 DIAGNOSIS — I48.0 PAROXYSMAL ATRIAL FIBRILLATION (H): ICD-10-CM

## 2022-11-18 RX ORDER — PROPAFENONE HYDROCHLORIDE 150 MG/1
150 TABLET, COATED ORAL 2 TIMES DAILY
Qty: 180 TABLET | Refills: 3 | Status: SHIPPED | OUTPATIENT
Start: 2022-11-18 | End: 2023-11-15

## 2022-11-18 NOTE — TELEPHONE ENCOUNTER
[facility] Cardiology Refill Guideline reviewed.  Medication meets criteria for refill. Refills sent. Luly Deluca RN Cardiology November 18, 2022, 12:05 PM

## 2022-11-18 NOTE — TELEPHONE ENCOUNTER
Pharmacy requesting refill for: Propafenone HCL 150mg   Quantity: 90  Last refill: 10/6/22  LOV: 10/18/22

## 2022-11-28 ENCOUNTER — ANTICOAGULATION THERAPY VISIT (OUTPATIENT)
Dept: ANTICOAGULATION | Facility: CLINIC | Age: 77
End: 2022-11-28

## 2022-11-28 ENCOUNTER — OFFICE VISIT (OUTPATIENT)
Dept: CARDIOLOGY | Facility: CLINIC | Age: 77
End: 2022-11-28
Attending: INTERNAL MEDICINE
Payer: COMMERCIAL

## 2022-11-28 ENCOUNTER — LAB (OUTPATIENT)
Dept: LAB | Facility: CLINIC | Age: 77
End: 2022-11-28
Payer: COMMERCIAL

## 2022-11-28 VITALS
HEART RATE: 70 BPM | BODY MASS INDEX: 26.71 KG/M2 | HEIGHT: 66 IN | SYSTOLIC BLOOD PRESSURE: 132 MMHG | OXYGEN SATURATION: 94 % | WEIGHT: 166.2 LBS | DIASTOLIC BLOOD PRESSURE: 62 MMHG

## 2022-11-28 DIAGNOSIS — Z79.01 LONG TERM CURRENT USE OF ANTICOAGULANTS WITH INR GOAL OF 2.0-3.0: ICD-10-CM

## 2022-11-28 DIAGNOSIS — G25.0 BENIGN FAMILIAL TREMOR: ICD-10-CM

## 2022-11-28 DIAGNOSIS — I48.11 LONGSTANDING PERSISTENT ATRIAL FIBRILLATION (H): ICD-10-CM

## 2022-11-28 DIAGNOSIS — I48.0 PAROXYSMAL ATRIAL FIBRILLATION (H): ICD-10-CM

## 2022-11-28 DIAGNOSIS — I48.11 LONGSTANDING PERSISTENT ATRIAL FIBRILLATION (H): Primary | ICD-10-CM

## 2022-11-28 LAB — INR BLD: 3.5 (ref 0.9–1.1)

## 2022-11-28 PROCEDURE — 85610 PROTHROMBIN TIME: CPT

## 2022-11-28 PROCEDURE — 99213 OFFICE O/P EST LOW 20 MIN: CPT | Performed by: PHYSICIAN ASSISTANT

## 2022-11-28 PROCEDURE — 36416 COLLJ CAPILLARY BLOOD SPEC: CPT

## 2022-11-28 NOTE — PROGRESS NOTES
ANTICOAGULATION MANAGEMENT     Cayetano Lunsford 77 year old male is on warfarin with supratherapeutic INR result. (Goal INR 2.0-3.0)    Recent labs: (last 7 days)     11/28/22  1258   INR 3.5*       ASSESSMENT       Source(s): Chart Review    Previous INR was Supratherapeutic    Medication, diet, health changes since last INR     Supratherapeutic INR 2 weeks ago so plan is to decrease warfarin maintenance dose 5% AND a partial warfarin hold today.    Cardiology visit today-no change in care plan           PLAN     Unable to reach Rich today.    Left message to take reduced dose of warfarin, 2.5 mg tonight. Request call back for assessment.    Follow up required to confirm warfarin dose taken and assess for changes    Tatyana Akers RN  Anticoagulation Clinic  11/28/2022

## 2022-11-28 NOTE — PROGRESS NOTES
"  Cardiology Clinic Progress Note    Cayetano Lunsford MRN# [id number]   YOB: 1945 Age: 77 year old     Primary cardiologist: Dr. Marroquin         Assessment and Plan     In summary, Cayetano Lunsford presents today for follow up on hyperkalemia and hypertension, after the cessation of Losartan and initiation of chlorthalidone. K+ level is under better control, and blood pressure is under good control.     Plan:  - No changes recommended today.     Follow-up:  - With me in 1 year (EKG and BMP prior), Dr. Marroquin in 2 years.     Sasha Madrid PA-C  [facility]         History of Presenting Illness     Cayetano Lunsford is a pleasant 77 year old patient with a pertinent history of the following -   1.  Paroxysmal atrial fibrillation, maintained on propafenone, anticoagulated.  2.  Hypertension.  3.  Essential tremors, on propranolol.  4.  COPD.  5.  DMII.    In brief, this patient saw Dr. Marroquin in October for a routine follow-up visit.  Due to a history of hyperkalemia, losartan was stopped, and chlorthalidone 25 mg daily was initiated. Repeat BMP on 11/15 showed a creatinine of 0.74, a BUN of 25, potassium 5.2, sodium 140.    Today, Rich returns to clinic stating he's feeling well from a cardiac standpoint. Patient denies chest pain, shortness of breath, PND, orthopnea, edema, claudication, palpitations, near syncope or syncope. Tolerating medication without issue. Actually was able to get off his insulin and continue on metformin alone for his diabetes. Remains in regular rhythm on exam today.          Review of Systems     12-pt ROS is negative except for as noted in the HPI.          Physical Exam     Vitals: /62 (BP Location: Right arm, Patient Position: Sitting, Cuff Size: Adult Regular)   Pulse 70   Ht 1.676 m (5' 6\")   Wt 75.4 kg (166 lb 3.2 oz)   SpO2 94%   BMI 26.83 kg/m    Wt Readings from Last 10 Encounters:   11/28/22 75.4 kg (166 lb 3.2 oz)   11/02/22 74.1 kg " (163 lb 4.8 oz)   10/18/22 75.8 kg (167 lb)   10/04/22 76.2 kg (168 lb)   07/08/22 76.7 kg (169 lb)   06/21/22 76.7 kg (169 lb)   05/05/22 80.2 kg (176 lb 14.4 oz)   02/15/22 78.9 kg (174 lb)   11/05/21 80.8 kg (178 lb 3.2 oz)   10/18/21 78.4 kg (172 lb 14.4 oz)       Constitutional:  Patient is pleasant, alert, cooperative, and in NAD.  HEENT:  NCAT. PERRLA. EOM's intact.   Neck:  CVP appears normal. No carotid bruits.   Pulmonary: Normal respiratory effort. CTAB.   Cardiac: RRR, normal S1/S2, no S3/S4, no murmur or rub.   Abdomen:  Non-tender abdomen, no hepatosplenomegaly appreciated.   Vascular: Pulses in the upper and lower extremities are 2+ and equal bilaterally.  Extremities: No edema, erythema, cyanosis or tenderness appreciated.  Skin:  No rashes or lesions appreciated.   Neurological:  No gross motor or sensory deficits.   Psych: Appropriate affect.          Data   Labs reviewed:  Recent Labs   Lab Test 06/21/22  1356 12/30/21  1004 11/09/20  1108 09/09/19  0947 05/08/17  1020 03/13/17  1050   LDL  --  78 59 68   < >  --    HDL  --  48 30* 38*   < >  --    NHDL  --  113 110 121   < >  --    CHOL  --  161 140 159   < >  --    TRIG  --  173* 253* 264*   < >  --    TSH  --   --  3.62 4.48*  --  3.25   IRON 50  --   --   --   --   --      --   --   --   --   --    IRONSAT 16  --   --   --   --   --    KELLIE 12*  --   --   --   --   --     < > = values in this interval not displayed.       Lab Results   Component Value Date    WBC 6.6 10/04/2022    WBC 7.9 03/13/2017    RBC 3.49 (L) 10/04/2022    RBC 4.11 (L) 03/13/2017    HGB 10.7 (L) 10/04/2022    HGB 12.4 (L) 03/13/2017    HCT 33.9 (L) 10/04/2022    HCT 39.4 (L) 03/13/2017    MCV 97 10/04/2022    MCV 96 03/13/2017    MCH 30.7 10/04/2022    MCH 30.2 03/13/2017    MCHC 31.6 10/04/2022    MCHC 31.5 03/13/2017    RDW 15.9 (H) 10/04/2022    RDW 13.9 03/13/2017     10/04/2022     03/13/2017       Lab Results   Component Value Date      11/15/2022     07/09/2021    POTASSIUM 5.2 11/15/2022    POTASSIUM 5.5 (H) 06/21/2022    POTASSIUM 5.5 (H) 07/09/2021    CHLORIDE 99 11/15/2022    CHLORIDE 102 06/21/2022    CHLORIDE 105 07/09/2021    CO2 28 11/15/2022    CO2 30 06/21/2022    CO2 32 07/09/2021    ANIONGAP 13 11/15/2022    ANIONGAP 4 06/21/2022    ANIONGAP <1 (L) 07/09/2021     (H) 11/15/2022    GLC 87 07/08/2022     (H) 06/21/2022     (H) 07/09/2021    BUN 25.4 (H) 11/15/2022    BUN 16 06/21/2022    BUN 15 07/09/2021    CR 0.74 11/15/2022    CR 0.76 07/09/2021    GFRESTIMATED >90 11/15/2022    GFRESTIMATED 88 07/09/2021    GFRESTBLACK >90 07/09/2021    LEBRON 9.1 11/15/2022    LEBRON 8.9 07/09/2021      Lab Results   Component Value Date    AST 26 02/02/2021    ALT 39 12/30/2021    ALT 32 02/02/2021       Lab Results   Component Value Date    A1C 5.7 (H) 10/04/2022    A1C 5.4 07/09/2021       Lab Results   Component Value Date    INR 3.5 (H) 11/28/2022    INR 3.7 (H) 11/15/2022    INR 1.24 (H) 07/08/2022    INR 1.70 (H) 07/09/2021    INR 2.30 (H) 06/25/2021            Problem List     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)     Diverticulitis of colon     History of colonic polyps     Redundant colon     Long-term (current) use of anticoagulants [Z79.01]     Bunion, left     Longstanding persistent atrial fibrillation (H)     Long term current use of anticoagulants with INR goal of 2.0-3.0     Overweight (BMI 25.0-29.9)            Medications     Current Outpatient  "Medications   Medication Sig Dispense Refill     albuterol (PROAIR HFA/PROVENTIL HFA/VENTOLIN HFA) 108 (90 BASE) MCG/ACT Inhaler Inhale 1-2 puffs into the lungs every 4 hours as needed (for shortness of breath/wheezing) 1 Inhaler 5     amLODIPine (NORVASC) 10 MG tablet Take 1 tablet (10 mg) by mouth daily 90 tablet 3     atorvastatin (LIPITOR) 20 MG tablet Take 1 tablet (20 mg) by mouth daily 90 tablet 3     blood glucose (NO BRAND SPECIFIED) test strip Use to test blood sugar 3 times daily or as directed. 300 strip 3     chlorthalidone (HYGROTON) 25 MG tablet Take 1 tablet (25 mg) by mouth daily 90 tablet 3     ferrous sulfate (FEROSUL) 325 (65 Fe) MG tablet Take 1 tablet (325 mg) by mouth daily (with breakfast)       folic acid 0.8 MG CAPS Take 1 tablet by mouth daily 90 capsule 3     furosemide (LASIX) 20 MG tablet TAKE 1 TABLET (20 MG) BY MOUTH DAILY AS NEEDED FOR EDEMA 90 tablet 3     insulin regular (NOVOLIN R VIAL) 100 UNIT/ML vial USE 2 UNITS AS NEEDED WHEN BLOOD GLUCOSE >200 MG/DL. 10 mL 0     insulin syringe-needle U-100 (31G X 5/16\" 0.3 ML) 31G X 5/16\" 0.3 ML miscellaneous Use 3 syringes daily or as directed. 300 each 3     ipratropium - albuterol 0.5 mg/2.5 mg/3 mL (DUONEB) 0.5-2.5 (3) MG/3ML neb solution NEBULIZE CONTENTS OF ONE VIAL (3 MLS) EVERY 4 HOURS AS NEEDED FOR SHORTNESS OF BREATH OR DYSPNEA 360 mL 1     metFORMIN (GLUCOPHAGE) 1000 MG tablet TAKE ONE TABLET BY MOUTH TWICE A DAY WITH BREAKFAST  AND DINNER 180 tablet 1     mupirocin (BACTROBAN) 2 % external ointment Apply topically 2 times daily as needed (pilonidal cyst) 30 g 1     ONETOUCH ULTRA test strip USE TO TEST BLOOD SUGAR 3 TIMES DAILY OR AS DIRECTED. 300 strip 0     pantoprazole (PROTONIX) 40 MG enteric coated tablet Take 40 mg by mouth daily Started by Dr. Debbie Rico at [facility]       primidone (MYSOLINE) 50 MG tablet 4 tablets in am, 4 tablets afternoon, and 3 tablets every evening 1001 tablet 1     Probiotic Product (FLORAJEN3) " CAPS Take 1 capsule by mouth 2 times daily 60 capsule 0     propafenone (RYTHMOL) 150 MG TABS tablet Take 1 tablet (150 mg) by mouth 2 times daily 180 tablet 3     propranolol (INDERAL) 60 MG tablet Take 2 tablets (120 mg) by mouth 2 times daily 360 tablet 1     sulfaSALAzine (AZULFIDINE) 500 MG tablet TAKE ONE TABLET BY MOUTH THREE TIMES A DAY 90 tablet 11     tamsulosin (FLOMAX) 0.4 MG capsule TAKE ONE CAPSULE BY MOUTH ONCE DAILY 90 capsule 2     tiotropium-olodaterol 2.5-2.5 MCG/ACT AERS Inhale 2 puffs into the lungs daily       Vitamin D3 (CHOLECALCIFEROL) 25 mcg (1000 units) tablet Take 2 tablets (50 mcg) by mouth daily       warfarin ANTICOAGULANT (JANTOVEN ANTICOAGULANT) 5 MG tablet TAKE ONE TABLET (5 MG) BY MOUTH ON MONDAY AND FRIDAY AND TAKE ONE AND ONE-HALF TABLETS BY MOUTH (7.5 MG) ALL OTHER DAYS OR AS DIRECTED BY THE INR CLINIC 114 tablet 1            Past Medical History     Past Medical History:   Diagnosis Date     Allergic rhinitis, cause unspecified      Atrial fibrillation (H)      BPH (benign prostatic hyperplasia)      Chronic airway obstruction, not elsewhere classified      COPD (chronic obstructive pulmonary disease) (H)      Hyperlipidemia LDL goal <100 5/9/2010     Hypertension goal BP (blood pressure) < 130/80 10/19/2006     Obesity (BMI 30-39.9)      Perforation of tympanic membrane, unspecified     Right TM     Tachycardia, unspecified      Type 2 diabetes, HbA1C goal < 8% (H) 5/2/2010     Ulcerative colitis (H)      Unspecified cardiovascular disease      Past Surgical History:   Procedure Laterality Date     CARDIAC SURGERY       COLONOSCOPY  3/31/2011     COLONOSCOPY N/A 5/25/2018    Procedure: COMBINED COLONOSCOPY, SINGLE OR MULTIPLE BIOPSY/POLYPECTOMY BY BIOPSY;;  Surgeon: Juan Simon DO;  Location:  GI     COLONOSCOPY N/A 9/6/2019    Procedure: COLONOSCOPY, WITH POLYPECTOMY AND BIOPSY;  Surgeon: Paradise Mims MD;  Location:  GI     COLONOSCOPY N/A 7/8/2022     Procedure: COLONOSCOPY, FLEXIBLE, WITH LESION REMOVAL USING SNARE;  Surgeon: Juan Simon DO;  Location:  GI     COLONOSCOPY N/A 2022    Procedure: COLONOSCOPY, WITH POLYPECTOMY AND BIOPSY;  Surgeon: Juan Simon DO;  Location:  GI     CORONARY ANGIOGRAPHY ADULT ORDER   and 2008 at [facility]. 2008- No interventions     ESOPHAGOSCOPY, GASTROSCOPY, DUODENOSCOPY (EGD), COMBINED N/A 2022    Procedure: ESOPHAGOGASTRODUODENOSCOPY, WITH BIOPSY;  Surgeon: Juan Simon DO;  Location:  GI     HERNIA REPAIR      left inguinal     SURGICAL HISTORY OF -       right ear graft     SURGICAL HISTORY OF -       right shoulder surgery     SURGICAL HISTORY OF -       back surgery     SURGICAL HISTORY OF -       vasectomy     ZZHC REMOVAL OF NAIL PLATE SIMPLE SINGLE  11/10/2011    Right 2nd Toenail     Family History   Problem Relation Age of Onset     Circulatory Mother         blood clot in leg     Diabetes Mother         type 2     Allergies Mother      Arthritis Mother      Gastrointestinal Disease Mother      Neurologic Disorder Mother         Familiar tremors     Neurologic Disorder Father         brain tumor     Cerebrovascular Disease Paternal Grandfather      Hypertension Brother      Hypertension Sister      Diabetes Sister         Type 2     Allergies Sister      Lipids Brother      Lipids Sister      Social History     Socioeconomic History     Marital status:      Spouse name: Izabel     Number of children: 2     Years of education: 12     Highest education level: Not on file   Occupational History     Occupation:      Employer: RETIRED   Tobacco Use     Smoking status: Former     Packs/day: 1.00     Years: 30.00     Pack years: 30.00     Types: Cigarettes     Quit date: 3/19/1992     Years since quittin.7     Smokeless tobacco: Never   Vaping Use     Vaping Use: Never used   Substance and Sexual Activity     Alcohol use: Not  Currently     Alcohol/week: 0.0 standard drinks     Drug use: No     Sexual activity: Yes     Partners: Female   Other Topics Concern     Parent/sibling w/ CABG, MI or angioplasty before 65F 55M? No      Service Not Asked     Blood Transfusions Not Asked     Caffeine Concern No     Comment: No Caffeine     Occupational Exposure Not Asked     Hobby Hazards Not Asked     Sleep Concern Not Asked     Stress Concern Not Asked     Weight Concern Not Asked     Special Diet Not Asked     Back Care Not Asked     Exercise Yes     Comment: 20 minutes - Walking- summer 2 x day     Bike Helmet Not Asked     Seat Belt Not Asked     Self-Exams Not Asked   Social History Narrative     Not on file     Social Determinants of Health     Financial Resource Strain: Not on file   Food Insecurity: Not on file   Transportation Needs: Not on file   Physical Activity: Not on file   Stress: Not on file   Social Connections: Not on file   Intimate Partner Violence: Not on file   Housing Stability: Not on file            Allergies   Percodan [oxycodone-aspirin] and Aspirin    Today's clinic visit entailed:  Review of the result(s) of each unique test - BMP  I spent a total of 20 minutes on the day of the visit.   Time spent doing chart review, history and exam, documentation and further activities per the note  Provider  Link to MDM Help Grid     The level of medical decision making during this visit was of moderate complexity.

## 2022-11-28 NOTE — LETTER
11/28/2022    Drew Bravo PA-C  [address] [facility] [address]    RE: Cayetano Lunsford       Dear Colleague,     I had the pleasure of seeing Cayetano Lunsford in the [facility].    Cardiology Clinic Progress Note    Cayetano Lunsford MRN# [id number]   YOB: 1945 Age: 77 year old     Primary cardiologist: Dr. Marroquin         Assessment and Plan     In summary, Cayetano Lunsford presents today for follow up on hyperkalemia and hypertension, after the cessation of Losartan and initiation of chlorthalidone. K+ level is under better control, and blood pressure is under good control.     Plan:  - No changes recommended today.     Follow-up:  - With me in 1 year (EKG and BMP prior), Dr. Marroquin in 2 years.     Sasha Madrid PA-C  [facility] - [facility]         History of Presenting Illness     Cayetano Lunsford is a pleasant 77 year old patient with a pertinent history of the following -   1.  Paroxysmal atrial fibrillation, maintained on propafenone, anticoagulated.  2.  Hypertension.  3.  Essential tremors, on propranolol.  4.  COPD.  5.  DMII.    In brief, this patient saw Dr. Marroquin in October for a routine follow-up visit.  Due to a history of hyperkalemia, losartan was stopped, and chlorthalidone 25 mg daily was initiated. Repeat BMP on 11/15 showed a creatinine of 0.74, a BUN of 25, potassium 5.2, sodium 140.    Today, Rich returns to clinic stating he's feeling well from a cardiac standpoint. Patient denies chest pain, shortness of breath, PND, orthopnea, edema, claudication, palpitations, near syncope or syncope. Tolerating medication without issue. Actually was able to get off his insulin and continue on metformin alone for his diabetes. Remains in regular rhythm on exam today.          Review of Systems     12-pt ROS is negative except for as noted in the HPI.          Physical Exam     Vitals: /62 (BP Location: Right arm, Patient Position:  "Sitting, Cuff Size: Adult Regular)   Pulse 70   Ht 1.676 m (5' 6\")   Wt 75.4 kg (166 lb 3.2 oz)   SpO2 94%   BMI 26.83 kg/m    Wt Readings from Last 10 Encounters:   11/28/22 75.4 kg (166 lb 3.2 oz)   11/02/22 74.1 kg (163 lb 4.8 oz)   10/18/22 75.8 kg (167 lb)   10/04/22 76.2 kg (168 lb)   07/08/22 76.7 kg (169 lb)   06/21/22 76.7 kg (169 lb)   05/05/22 80.2 kg (176 lb 14.4 oz)   02/15/22 78.9 kg (174 lb)   11/05/21 80.8 kg (178 lb 3.2 oz)   10/18/21 78.4 kg (172 lb 14.4 oz)       Constitutional:  Patient is pleasant, alert, cooperative, and in NAD.  HEENT:  NCAT. PERRLA. EOM's intact.   Neck:  CVP appears normal. No carotid bruits.   Pulmonary: Normal respiratory effort. CTAB.   Cardiac: RRR, normal S1/S2, no S3/S4, no murmur or rub.   Abdomen:  Non-tender abdomen, no hepatosplenomegaly appreciated.   Vascular: Pulses in the upper and lower extremities are 2+ and equal bilaterally.  Extremities: No edema, erythema, cyanosis or tenderness appreciated.  Skin:  No rashes or lesions appreciated.   Neurological:  No gross motor or sensory deficits.   Psych: Appropriate affect.          Data   Labs reviewed:  Recent Labs   Lab Test 06/21/22  1356 12/30/21  1004 11/09/20  1108 09/09/19  0947 05/08/17  1020 03/13/17  1050   LDL  --  78 59 68   < >  --    HDL  --  48 30* 38*   < >  --    NHDL  --  113 110 121   < >  --    CHOL  --  161 140 159   < >  --    TRIG  --  173* 253* 264*   < >  --    TSH  --   --  3.62 4.48*  --  3.25   IRON 50  --   --   --   --   --      --   --   --   --   --    IRONSAT 16  --   --   --   --   --    KELLIE 12*  --   --   --   --   --     < > = values in this interval not displayed.       Lab Results   Component Value Date    WBC 6.6 10/04/2022    WBC 7.9 03/13/2017    RBC 3.49 (L) 10/04/2022    RBC 4.11 (L) 03/13/2017    HGB 10.7 (L) 10/04/2022    HGB 12.4 (L) 03/13/2017    HCT 33.9 (L) 10/04/2022    HCT 39.4 (L) 03/13/2017    MCV 97 10/04/2022    MCV 96 03/13/2017    MCH 30.7 " 10/04/2022    MCH 30.2 03/13/2017    MCHC 31.6 10/04/2022    MCHC 31.5 03/13/2017    RDW 15.9 (H) 10/04/2022    RDW 13.9 03/13/2017     10/04/2022     03/13/2017       Lab Results   Component Value Date     11/15/2022     07/09/2021    POTASSIUM 5.2 11/15/2022    POTASSIUM 5.5 (H) 06/21/2022    POTASSIUM 5.5 (H) 07/09/2021    CHLORIDE 99 11/15/2022    CHLORIDE 102 06/21/2022    CHLORIDE 105 07/09/2021    CO2 28 11/15/2022    CO2 30 06/21/2022    CO2 32 07/09/2021    ANIONGAP 13 11/15/2022    ANIONGAP 4 06/21/2022    ANIONGAP <1 (L) 07/09/2021     (H) 11/15/2022    GLC 87 07/08/2022     (H) 06/21/2022     (H) 07/09/2021    BUN 25.4 (H) 11/15/2022    BUN 16 06/21/2022    BUN 15 07/09/2021    CR 0.74 11/15/2022    CR 0.76 07/09/2021    GFRESTIMATED >90 11/15/2022    GFRESTIMATED 88 07/09/2021    GFRESTBLACK >90 07/09/2021    LEBRON 9.1 11/15/2022    LEBRON 8.9 07/09/2021      Lab Results   Component Value Date    AST 26 02/02/2021    ALT 39 12/30/2021    ALT 32 02/02/2021       Lab Results   Component Value Date    A1C 5.7 (H) 10/04/2022    A1C 5.4 07/09/2021       Lab Results   Component Value Date    INR 3.5 (H) 11/28/2022    INR 3.7 (H) 11/15/2022    INR 1.24 (H) 07/08/2022    INR 1.70 (H) 07/09/2021    INR 2.30 (H) 06/25/2021            Problem List     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)     Diverticulitis of colon     History of colonic polyps     Redundant colon     Long-term (current) use of  "anticoagulants [Z79.01]     Bunion, left     Longstanding persistent atrial fibrillation (H)     Long term current use of anticoagulants with INR goal of 2.0-3.0     Overweight (BMI 25.0-29.9)            Medications     Current Outpatient Medications   Medication Sig Dispense Refill     albuterol (PROAIR HFA/PROVENTIL HFA/VENTOLIN HFA) 108 (90 BASE) MCG/ACT Inhaler Inhale 1-2 puffs into the lungs every 4 hours as needed (for shortness of breath/wheezing) 1 Inhaler 5     amLODIPine (NORVASC) 10 MG tablet Take 1 tablet (10 mg) by mouth daily 90 tablet 3     atorvastatin (LIPITOR) 20 MG tablet Take 1 tablet (20 mg) by mouth daily 90 tablet 3     blood glucose (NO BRAND SPECIFIED) test strip Use to test blood sugar 3 times daily or as directed. 300 strip 3     chlorthalidone (HYGROTON) 25 MG tablet Take 1 tablet (25 mg) by mouth daily 90 tablet 3     ferrous sulfate (FEROSUL) 325 (65 Fe) MG tablet Take 1 tablet (325 mg) by mouth daily (with breakfast)       folic acid 0.8 MG CAPS Take 1 tablet by mouth daily 90 capsule 3     furosemide (LASIX) 20 MG tablet TAKE 1 TABLET (20 MG) BY MOUTH DAILY AS NEEDED FOR EDEMA 90 tablet 3     insulin regular (NOVOLIN R VIAL) 100 UNIT/ML vial USE 2 UNITS AS NEEDED WHEN BLOOD GLUCOSE >200 MG/DL. 10 mL 0     insulin syringe-needle U-100 (31G X 5/16\" 0.3 ML) 31G X 5/16\" 0.3 ML miscellaneous Use 3 syringes daily or as directed. 300 each 3     ipratropium - albuterol 0.5 mg/2.5 mg/3 mL (DUONEB) 0.5-2.5 (3) MG/3ML neb solution NEBULIZE CONTENTS OF ONE VIAL (3 MLS) EVERY 4 HOURS AS NEEDED FOR SHORTNESS OF BREATH OR DYSPNEA 360 mL 1     metFORMIN (GLUCOPHAGE) 1000 MG tablet TAKE ONE TABLET BY MOUTH TWICE A DAY WITH BREAKFAST  AND DINNER 180 tablet 1     mupirocin (BACTROBAN) 2 % external ointment Apply topically 2 times daily as needed (pilonidal cyst) 30 g 1     ONETOUCH ULTRA test strip USE TO TEST BLOOD SUGAR 3 TIMES DAILY OR AS DIRECTED. 300 strip 0     pantoprazole (PROTONIX) 40 MG " enteric coated tablet Take 40 mg by mouth daily Started by Dr. Debbie Rico at [facility]       primidone (MYSOLINE) 50 MG tablet 4 tablets in am, 4 tablets afternoon, and 3 tablets every evening 1001 tablet 1     Probiotic Product (FLORAJEN3) CAPS Take 1 capsule by mouth 2 times daily 60 capsule 0     propafenone (RYTHMOL) 150 MG TABS tablet Take 1 tablet (150 mg) by mouth 2 times daily 180 tablet 3     propranolol (INDERAL) 60 MG tablet Take 2 tablets (120 mg) by mouth 2 times daily 360 tablet 1     sulfaSALAzine (AZULFIDINE) 500 MG tablet TAKE ONE TABLET BY MOUTH THREE TIMES A DAY 90 tablet 11     tamsulosin (FLOMAX) 0.4 MG capsule TAKE ONE CAPSULE BY MOUTH ONCE DAILY 90 capsule 2     tiotropium-olodaterol 2.5-2.5 MCG/ACT AERS Inhale 2 puffs into the lungs daily       Vitamin D3 (CHOLECALCIFEROL) 25 mcg (1000 units) tablet Take 2 tablets (50 mcg) by mouth daily       warfarin ANTICOAGULANT (JANTOVEN ANTICOAGULANT) 5 MG tablet TAKE ONE TABLET (5 MG) BY MOUTH ON MONDAY AND FRIDAY AND TAKE ONE AND ONE-HALF TABLETS BY MOUTH (7.5 MG) ALL OTHER DAYS OR AS DIRECTED BY THE INR CLINIC 114 tablet 1            Past Medical History     Past Medical History:   Diagnosis Date     Allergic rhinitis, cause unspecified      Atrial fibrillation (H)      BPH (benign prostatic hyperplasia)      Chronic airway obstruction, not elsewhere classified      COPD (chronic obstructive pulmonary disease) (H)      Hyperlipidemia LDL goal <100 5/9/2010     Hypertension goal BP (blood pressure) < 130/80 10/19/2006     Obesity (BMI 30-39.9)      Perforation of tympanic membrane, unspecified     Right TM     Tachycardia, unspecified      Type 2 diabetes, HbA1C goal < 8% (H) 5/2/2010     Ulcerative colitis (H)      Unspecified cardiovascular disease      Past Surgical History:   Procedure Laterality Date     CARDIAC SURGERY       COLONOSCOPY  3/31/2011     COLONOSCOPY N/A 5/25/2018    Procedure: COMBINED COLONOSCOPY, SINGLE OR MULTIPLE  BIOPSY/POLYPECTOMY BY BIOPSY;;  Surgeon: Juan Simon DO;  Location:  GI     COLONOSCOPY N/A 9/6/2019    Procedure: COLONOSCOPY, WITH POLYPECTOMY AND BIOPSY;  Surgeon: Paradise iMms MD;  Location:  GI     COLONOSCOPY N/A 7/8/2022    Procedure: COLONOSCOPY, FLEXIBLE, WITH LESION REMOVAL USING SNARE;  Surgeon: Juan Simon DO;  Location:  GI     COLONOSCOPY N/A 7/8/2022    Procedure: COLONOSCOPY, WITH POLYPECTOMY AND BIOPSY;  Surgeon: Juan Simon DO;  Location:  GI     CORONARY ANGIOGRAPHY ADULT ORDER  2001 and 2008 2001 at [facility]. 2008- No interventions     ESOPHAGOSCOPY, GASTROSCOPY, DUODENOSCOPY (EGD), COMBINED N/A 7/8/2022    Procedure: ESOPHAGOGASTRODUODENOSCOPY, WITH BIOPSY;  Surgeon: Juan Simon DO;  Location:  GI     HERNIA REPAIR      left inguinal     SURGICAL HISTORY OF -   1987    right ear graft     SURGICAL HISTORY OF -   1992    right shoulder surgery     SURGICAL HISTORY OF -   1979    back surgery     SURGICAL HISTORY OF -   1978    vasectomy     ZZHC REMOVAL OF NAIL PLATE SIMPLE SINGLE  11/10/2011    Right 2nd Toenail     Family History   Problem Relation Age of Onset     Circulatory Mother         blood clot in leg     Diabetes Mother         type 2     Allergies Mother      Arthritis Mother      Gastrointestinal Disease Mother      Neurologic Disorder Mother         Familiar tremors     Neurologic Disorder Father         brain tumor     Cerebrovascular Disease Paternal Grandfather      Hypertension Brother      Hypertension Sister      Diabetes Sister         Type 2     Allergies Sister      Lipids Brother      Lipids Sister      Social History     Socioeconomic History     Marital status:      Spouse name: Izabel     Number of children: 2     Years of education: 12     Highest education level: Not on file   Occupational History     Occupation:      Employer: RETIRED   Tobacco Use     Smoking status: Former      Packs/day: 1.00     Years: 30.00     Pack years: 30.00     Types: Cigarettes     Quit date: 3/19/1992     Years since quittin.7     Smokeless tobacco: Never   Vaping Use     Vaping Use: Never used   Substance and Sexual Activity     Alcohol use: Not Currently     Alcohol/week: 0.0 standard drinks     Drug use: No     Sexual activity: Yes     Partners: Female   Other Topics Concern     Parent/sibling w/ CABG, MI or angioplasty before 65F 55M? No      Service Not Asked     Blood Transfusions Not Asked     Caffeine Concern No     Comment: No Caffeine     Occupational Exposure Not Asked     Hobby Hazards Not Asked     Sleep Concern Not Asked     Stress Concern Not Asked     Weight Concern Not Asked     Special Diet Not Asked     Back Care Not Asked     Exercise Yes     Comment: 20 minutes - Walking- summer 2 x day     Bike Helmet Not Asked     Seat Belt Not Asked     Self-Exams Not Asked   Social History Narrative     Not on file     Social Determinants of Health     Financial Resource Strain: Not on file   Food Insecurity: Not on file   Transportation Needs: Not on file   Physical Activity: Not on file   Stress: Not on file   Social Connections: Not on file   Intimate Partner Violence: Not on file   Housing Stability: Not on file            Allergies   Percodan [oxycodone-aspirin] and Aspirin    Today's clinic visit entailed:  Review of the result(s) of each unique test - BMP  I spent a total of 20 minutes on the day of the visit.   Time spent doing chart review, history and exam, documentation and further activities per the note  Provider  Link to [facility] Help Grid     The level of medical decision making during this visit was of moderate complexity.    Thank you for allowing me to participate in the care of your patient.      Sincerely,     Sasha Madrid PA-C     [facility]  cc:   Daniel Marroquin MD  [address]  [place]  [address]

## 2022-11-28 NOTE — PROGRESS NOTES
ANTICOAGULATION MANAGEMENT     Cayetano Lunsford 77 year old male is on warfarin with supratherapeutic INR result. (Goal INR 2.0-3.0)    Recent labs: (last 7 days)     11/28/22  1258   INR 3.5*       ASSESSMENT       Source(s): Chart Review and Patient/Caregiver Call       Warfarin doses taken: Warfarin taken as instructed    Diet: Decreased greens/vitamin K in diet; plans to resume previous intake    New illness, injury, or hospitalization: No    Medication/supplement changes: patient states he is using less insulin.    Signs or symptoms of bleeding or clotting: No    Previous INR: Supratherapeutic    Additional findings: 2nd supratherapeutic INR so I decreased warfarin maintenance dose even thought patient had less greens in the past 7 days.       PLAN     Recommended plan for temporary change(s) affecting INR     Dosing Instructions: partial hold then decrease your warfarin dose (5% change) with next INR in 10 days       Summary  As of 11/28/2022    Full warfarin instructions:  11/28: 2.5 mg; Otherwise 5 mg every Mon, Wed, Fri; 7.5 mg all other days; Starting 11/28/2022   Next INR check:  12/8/2022             Telephone call with Leo who agrees to plan and repeated back plan correctly and patient states he wrote down instructions and AVS mailed.    Lab visit scheduled    Education provided:     Contact [phone]  with any changes, questions or concerns.     Plan made per [facility] anticoagulation protocol    Tatyana Akers RN  Anticoagulation Clinic  11/28/2022    _______________________________________________________________________     Anticoagulation Episode Summary     Current INR goal:  2.0-3.0   TTR:  62.4 % (1 y)   Target end date:  Indefinite   Send INR reminders to:  [facility]    Indications    Longstanding persistent atrial fibrillation (H) [I48.11]  Long term current use of anticoagulants with INR goal of 2.0-3.0 [Z79.01]           Comments:           Anticoagulation Care Providers      Provider Role Specialty Phone number    Dmitri Andres MD Referring Family Medicine [phone]    Drew Bravo PA-C Referring Family Medicine [phone]

## 2022-12-08 ENCOUNTER — LAB (OUTPATIENT)
Dept: LAB | Facility: CLINIC | Age: 77
End: 2022-12-08
Payer: COMMERCIAL

## 2022-12-08 ENCOUNTER — TELEPHONE (OUTPATIENT)
Dept: FAMILY MEDICINE | Facility: CLINIC | Age: 77
End: 2022-12-08

## 2022-12-08 ENCOUNTER — ANTICOAGULATION THERAPY VISIT (OUTPATIENT)
Dept: ANTICOAGULATION | Facility: CLINIC | Age: 77
End: 2022-12-08

## 2022-12-08 DIAGNOSIS — Z79.01 LONG TERM CURRENT USE OF ANTICOAGULANTS WITH INR GOAL OF 2.0-3.0: ICD-10-CM

## 2022-12-08 DIAGNOSIS — I48.11 LONGSTANDING PERSISTENT ATRIAL FIBRILLATION (H): ICD-10-CM

## 2022-12-08 DIAGNOSIS — I48.11 LONGSTANDING PERSISTENT ATRIAL FIBRILLATION (H): Primary | ICD-10-CM

## 2022-12-08 LAB — INR BLD: 3.8 (ref 0.9–1.1)

## 2022-12-08 PROCEDURE — 36416 COLLJ CAPILLARY BLOOD SPEC: CPT

## 2022-12-08 PROCEDURE — 85610 PROTHROMBIN TIME: CPT

## 2022-12-08 NOTE — PROGRESS NOTES
ANTICOAGULATION MANAGEMENT     Cayetano Lunsford 77 year old male is on warfarin with supratherapeutic INR result. (Goal INR 2.0-3.0)    Recent labs: (last 7 days)     12/08/22  1253   INR 3.8*       ASSESSMENT       Source(s): Chart Review    Previous INR was Supratherapeutic    Medication, diet, health changes since last INR chart reviewed; none identified           PLAN     Unable to reach Rich today.    Left message to hold warfarin tonight. Request call back for assessment. Left message to call [phone].     Follow up required to confirm warfarin dose taken and assess for changes and discuss out of range result     Dayanara Cuevas RN  Anticoagulation Clinic  12/8/2022

## 2022-12-08 NOTE — TELEPHONE ENCOUNTER
Called patient. See 12/8/22 Anticoagulation encounter for warfarin dosing details.   Dayanara Cuevas RN, BSN  Anticoagulation Clinic

## 2022-12-08 NOTE — PROGRESS NOTES
ANTICOAGULATION MANAGEMENT     Cayetano Lunsford 77 year old male is on warfarin with supratherapeutic INR result. (Goal INR 2.0-3.0)    Recent labs: (last 7 days)     12/08/22  1253   INR 3.8*       ASSESSMENT       Source(s): Chart Review and Patient/Caregiver Call       Warfarin doses taken: Warfarin taken as instructed    Diet: No new diet changes identified    New illness, injury, or hospitalization: No    Medication/supplement changes: None noted    Signs or symptoms of bleeding or clotting: No    Previous INR: Supratherapeutic    Additional findings: None       PLAN     Recommended plan for no diet, medication or health factor changes affecting INR. Patient denies any changes that would interact with warfarin. Patient had warfarin dose decrease at INR check 11/28/22 and INR today is higher than it was on 11/28/22 so decreased weekly warfarin maintenance dose again.     Dosing Instructions: hold dose then decrease your warfarin dose (5.6% change) with next INR in 1-2 weeks       Summary  As of 12/8/2022    Full warfarin instructions:  12/8: Hold; Otherwise 7.5 mg every Sun, Tue, Thu; 5 mg all other days; Starting 12/8/2022   Next INR check:  12/19/2022             Telephone call with Leo who verbalizes understanding and agrees to plan and AVS placed in outgoing mail    Lab visit scheduled    Education provided:     Please call back if any changes to your diet, medications or how you've been taking warfarin    Contact [phone]  with any changes, questions or concerns.     Plan made per [facility] anticoagulation protocol    Dayanara Cuevas RN  Anticoagulation Clinic  12/8/2022    _______________________________________________________________________     Anticoagulation Episode Summary     Current INR goal:  2.0-3.0   TTR:  59.7 % (1 y)   Target end date:  Indefinite   Send INR reminders to:  [facility]    Indications    Longstanding persistent atrial fibrillation (H) [I48.11]  Long term current use of  anticoagulants with INR goal of 2.0-3.0 [Z79.01]           Comments:           Anticoagulation Care Providers     Provider Role Specialty Phone number    Dmitri Andres MD Referring Family Medicine [phone]    Drew Bravo PA-C Referring Family Medicine [phone]

## 2022-12-19 ENCOUNTER — LAB (OUTPATIENT)
Dept: LAB | Facility: CLINIC | Age: 77
End: 2022-12-19
Payer: COMMERCIAL

## 2022-12-19 ENCOUNTER — ANTICOAGULATION THERAPY VISIT (OUTPATIENT)
Dept: ANTICOAGULATION | Facility: CLINIC | Age: 77
End: 2022-12-19

## 2022-12-19 ENCOUNTER — DOCUMENTATION ONLY (OUTPATIENT)
Dept: FAMILY MEDICINE | Facility: CLINIC | Age: 77
End: 2022-12-19

## 2022-12-19 DIAGNOSIS — Z79.01 LONG TERM CURRENT USE OF ANTICOAGULANTS WITH INR GOAL OF 2.0-3.0: ICD-10-CM

## 2022-12-19 DIAGNOSIS — I48.91 ATRIAL FIBRILLATION, UNSPECIFIED TYPE (H): Primary | ICD-10-CM

## 2022-12-19 DIAGNOSIS — I48.11 LONGSTANDING PERSISTENT ATRIAL FIBRILLATION (H): ICD-10-CM

## 2022-12-19 DIAGNOSIS — I48.11 LONGSTANDING PERSISTENT ATRIAL FIBRILLATION (H): Primary | ICD-10-CM

## 2022-12-19 LAB — INR BLD: 2.9 (ref 0.9–1.1)

## 2022-12-19 PROCEDURE — 36415 COLL VENOUS BLD VENIPUNCTURE: CPT

## 2022-12-19 PROCEDURE — 85610 PROTHROMBIN TIME: CPT

## 2022-12-19 NOTE — PROGRESS NOTES
ANTICOAGULATION CLINIC REFERRAL RENEWAL REQUEST       An annual renewal order is required for all patients referred to [facility].?  Please review and sign the pended referral order for Cayetano Lunsford.       ANTICOAGULATION SUMMARY      Warfarin indication(s)   Atrial Fibrillation    Mechanical heart valve present?  NO       Current goal range   INR: 2.0-3.0     Goal appropriate for indication? Goal INR 2-3, standard for indication(s) above     Time in Therapeutic Range (TTR)  (Goal > 60%) 57%       Office visit with referring provider's group within last year yes on 10/4/22       Corina Cuevas RN  [facility]

## 2022-12-19 NOTE — PROGRESS NOTES
ANTICOAGULATION MANAGEMENT     Cayetano Lunsford 77 year old male is on warfarin with therapeutic INR result. (Goal INR 2.0-3.0)    Recent labs: (last 7 days)     12/19/22  1306   INR 2.9*       ASSESSMENT       Source(s): Chart Review and Patient/Caregiver Call       Warfarin doses taken: Warfarin taken as instructed    Diet: No new diet changes identified    New illness, injury, or hospitalization: No    Medication/supplement changes: None noted    Signs or symptoms of bleeding or clotting: No    Previous INR: Supratherapeutic    Additional findings: None       PLAN     Recommended plan for no diet, medication or health factor changes affecting INR     Dosing Instructions: Continue your current warfarin dose with next INR in 2 weeks       Summary  As of 12/19/2022    Full warfarin instructions:  7.5 mg every Sun, Tue, Thu; 5 mg all other days; Starting 12/19/2022   Next INR check:  1/3/2023             Telephone call with Leo who verbalizes understanding and agrees to plan    Lab visit scheduled    Education provided:     Please call back if any changes to your diet, medications or how you've been taking warfarin    Thorough discussion of maintenance dose    Plan made per [facility] anticoagulation protocol    Corina Cuevas RN  Anticoagulation Clinic  12/19/2022    _______________________________________________________________________     Anticoagulation Episode Summary     Current INR goal:  2.0-3.0   TTR:  57.0 % (1 y)   Target end date:  Indefinite   Send INR reminders to:  [facility]    Indications    Longstanding persistent atrial fibrillation (H) [I48.11]  Long term current use of anticoagulants with INR goal of 2.0-3.0 [Z79.01]           Comments:           Anticoagulation Care Providers     Provider Role Specialty Phone number    Dmitri Andres MD Referring Family Medicine [phone]    Drew Bravo PA-C Referring Family Medicine [phone]

## 2022-12-20 PROBLEM — I48.91 ATRIAL FIBRILLATION, UNSPECIFIED TYPE (H): Status: ACTIVE | Noted: 2022-12-20

## 2023-01-04 DIAGNOSIS — I48.0 PAROXYSMAL ATRIAL FIBRILLATION (H): ICD-10-CM

## 2023-01-04 RX ORDER — LOSARTAN POTASSIUM 25 MG/1
12.5 TABLET ORAL DAILY
Qty: 60 TABLET | Refills: 3 | OUTPATIENT
Start: 2023-01-04

## 2023-01-04 NOTE — TELEPHONE ENCOUNTER
Refill request was received for losartan potassium 25mg. Medication was discontinued on 10/18/22 per Dr. Marroquin.

## 2023-01-05 ENCOUNTER — ANTICOAGULATION THERAPY VISIT (OUTPATIENT)
Dept: ANTICOAGULATION | Facility: CLINIC | Age: 78
End: 2023-01-05

## 2023-01-05 ENCOUNTER — LAB (OUTPATIENT)
Dept: LAB | Facility: CLINIC | Age: 78
End: 2023-01-05
Payer: COMMERCIAL

## 2023-01-05 DIAGNOSIS — I48.91 ATRIAL FIBRILLATION, UNSPECIFIED TYPE (H): ICD-10-CM

## 2023-01-05 DIAGNOSIS — Z79.01 LONG TERM CURRENT USE OF ANTICOAGULANTS WITH INR GOAL OF 2.0-3.0: ICD-10-CM

## 2023-01-05 DIAGNOSIS — I48.11 LONGSTANDING PERSISTENT ATRIAL FIBRILLATION (H): Primary | ICD-10-CM

## 2023-01-05 LAB — INR BLD: 3.7 (ref 0.9–1.1)

## 2023-01-05 PROCEDURE — 85610 PROTHROMBIN TIME: CPT

## 2023-01-05 PROCEDURE — 36416 COLLJ CAPILLARY BLOOD SPEC: CPT

## 2023-01-05 NOTE — PROGRESS NOTES
ANTICOAGULATION MANAGEMENT     Cayetano Lunsford 78 year old male is on warfarin with supratherapeutic INR result. (Goal INR 2.0-3.0)    Recent labs: (last 7 days)     01/05/23  1251   INR 3.7*       ASSESSMENT       Source(s): Chart Review and Patient/Caregiver Call       Warfarin doses taken: Warfarin taken as instructed    Diet: No new diet changes identified    New illness, injury, or hospitalization: No    Medication/supplement changes: Patient reports he takes up to 2000 mg Tylenol daily for back/hip pain, some days he doesn't take any Tylenol depending on pain.    Signs or symptoms of bleeding or clotting: No    Previous INR: Therapeutic last visit; previously outside of goal range    Additional findings: None       PLAN     Recommended plan for no diet, medication or health factor changes affecting INR. Last few INRs have been supratherapeutic even with warfarin maintenance dose reductions, patient denies any changes that would interfere with warfarin.     Dosing Instructions: hold dose then decrease your warfarin dose (5.9% change) with next INR in 2 weeks       Summary  As of 1/5/2023    Full warfarin instructions:  1/5: Hold; Otherwise 7.5 mg every Sun, Thu; 5 mg all other days   Next INR check:  1/19/2023             Telephone call with Leo who agrees to plan and repeated back plan correctly    Lab visit scheduled    Education provided:     Please call back if any changes to your diet, medications or how you've been taking warfarin    Contact [phone]  with any changes, questions or concerns.     Plan made per [facility] anticoagulation protocol    Dayanara Cuevas RN  Anticoagulation Clinic  1/5/2023    _______________________________________________________________________     Anticoagulation Episode Summary     Current INR goal:  2.0-3.0   TTR:  54.5 % (1 y)   Target end date:  Indefinite   Send INR reminders to:  [facility]    Indications    Longstanding persistent atrial fibrillation (H)  [I48.11]  Long term current use of anticoagulants with INR goal of 2.0-3.0 [Z79.01]  Atrial fibrillation  unspecified type (H) [I48.91]           Comments:           Anticoagulation Care Providers     Provider Role Specialty Phone number    Dmitri Andres MD Referring Family Medicine [phone]    Drew Bravo PA-C Referring Family Medicine [phone]

## 2023-01-11 DIAGNOSIS — Z79.4 TYPE 2 DIABETES MELLITUS WITH STAGE 1 CHRONIC KIDNEY DISEASE, WITH LONG-TERM CURRENT USE OF INSULIN (H): ICD-10-CM

## 2023-01-11 DIAGNOSIS — N18.1 TYPE 2 DIABETES MELLITUS WITH STAGE 1 CHRONIC KIDNEY DISEASE, WITH LONG-TERM CURRENT USE OF INSULIN (H): ICD-10-CM

## 2023-01-11 DIAGNOSIS — E11.22 TYPE 2 DIABETES MELLITUS WITH STAGE 1 CHRONIC KIDNEY DISEASE, WITH LONG-TERM CURRENT USE OF INSULIN (H): ICD-10-CM

## 2023-01-12 DIAGNOSIS — N18.1 TYPE 2 DIABETES MELLITUS WITH STAGE 1 CHRONIC KIDNEY DISEASE, WITH LONG-TERM CURRENT USE OF INSULIN (H): ICD-10-CM

## 2023-01-12 DIAGNOSIS — E11.22 TYPE 2 DIABETES MELLITUS WITH STAGE 1 CHRONIC KIDNEY DISEASE, WITH LONG-TERM CURRENT USE OF INSULIN (H): ICD-10-CM

## 2023-01-12 DIAGNOSIS — Z79.4 TYPE 2 DIABETES MELLITUS WITH STAGE 1 CHRONIC KIDNEY DISEASE, WITH LONG-TERM CURRENT USE OF INSULIN (H): ICD-10-CM

## 2023-01-12 RX ORDER — BLOOD SUGAR DIAGNOSTIC
STRIP MISCELLANEOUS
Qty: 300 STRIP | Refills: 0 | OUTPATIENT
Start: 2023-01-12

## 2023-01-12 RX ORDER — BLOOD SUGAR DIAGNOSTIC
STRIP MISCELLANEOUS
Qty: 300 STRIP | Refills: 0 | Status: SHIPPED | OUTPATIENT
Start: 2023-01-12 | End: 2023-04-13

## 2023-01-12 NOTE — TELEPHONE ENCOUNTER
Received message in another refill but was unable to sign due to it coming from a different [place] location. Created new encounter and signed as pt requests change in pharmacy.   ---------------  Original message yesterday:    Patient wants RX at [facility] in [place].     Thank you,  Bethanie Irby & [facility] [place] Staff Technician   [facility]  [email or web address]   Ph#: [phone]  Fax#: [phone]

## 2023-01-12 NOTE — TELEPHONE ENCOUNTER
Patient wants RX at [facility] in [place].    Thank you,  Bethanie Irby & [facility] Staff Technician   [facility]  [email or web address]   Ph#: [phone]  Fax#: [phone]

## 2023-01-19 ENCOUNTER — ANTICOAGULATION THERAPY VISIT (OUTPATIENT)
Dept: ANTICOAGULATION | Facility: CLINIC | Age: 78
End: 2023-01-19

## 2023-01-19 ENCOUNTER — LAB (OUTPATIENT)
Dept: LAB | Facility: CLINIC | Age: 78
End: 2023-01-19
Payer: COMMERCIAL

## 2023-01-19 ENCOUNTER — TELEPHONE (OUTPATIENT)
Dept: FAMILY MEDICINE | Facility: CLINIC | Age: 78
End: 2023-01-19

## 2023-01-19 DIAGNOSIS — Z79.01 LONG TERM CURRENT USE OF ANTICOAGULANTS WITH INR GOAL OF 2.0-3.0: ICD-10-CM

## 2023-01-19 DIAGNOSIS — I48.11 LONGSTANDING PERSISTENT ATRIAL FIBRILLATION (H): Primary | ICD-10-CM

## 2023-01-19 DIAGNOSIS — I48.91 ATRIAL FIBRILLATION, UNSPECIFIED TYPE (H): ICD-10-CM

## 2023-01-19 LAB — INR BLD: 2 (ref 0.9–1.1)

## 2023-01-19 PROCEDURE — 85610 PROTHROMBIN TIME: CPT

## 2023-01-19 PROCEDURE — 36416 COLLJ CAPILLARY BLOOD SPEC: CPT

## 2023-01-19 NOTE — PROGRESS NOTES
ANTICOAGULATION MANAGEMENT     Cayetano Lunsford 78 year old male is on warfarin with therapeutic INR result. (Goal INR 2.0-3.0)    Recent labs: (last 7 days)     01/19/23  1251   INR 2.0*       ASSESSMENT       Source(s): Chart Review and Patient/Caregiver Call       Warfarin doses taken: Warfarin taken as instructed    Diet: No new diet changes identified    New illness, injury, or hospitalization: No    Medication/supplement changes: None noted    Signs or symptoms of bleeding or clotting: No    Previous INR: Supratherapeutic    Additional findings: None       PLAN     Recommended plan for no diet, medication or health factor changes affecting INR. Patient had a couple warfarin maintenance dose decreases recently. Since INR dropped substantially, will slightly increase patient's warfarin maintenance dose today.    Dosing Instructions: Increase your warfarin dose (6.2% change) with next INR in 2 weeks       Summary  As of 1/19/2023    Full warfarin instructions:  7.5 mg every Sun, Tue, Thu; 5 mg all other days   Next INR check:  2/2/2023             Telephone call with Leo who agrees to plan and repeated back plan correctly and will mail AVS to patient.    Lab visit scheduled    Education provided:     Please call back if any changes to your diet, medications or how you've been taking warfarin    Contact [phone]  with any changes, questions or concerns.     Plan made per [facility] anticoagulation protocol    Dayanara Cuevas RN  Anticoagulation Clinic  1/19/2023    _______________________________________________________________________     Anticoagulation Episode Summary     Current INR goal:  2.0-3.0   TTR:  56.7 % (1 y)   Target end date:  Indefinite   Send INR reminders to:  [facility]    Indications    Longstanding persistent atrial fibrillation (H) [I48.11]  Long term current use of anticoagulants with INR goal of 2.0-3.0 [Z79.01]  Atrial fibrillation  unspecified type (H) [I48.91]            Comments:           Anticoagulation Care Providers     Provider Role Specialty Phone number    Dmitri Andres MD Referring Family Medicine [phone]    Drew Bravo PA-C Referring Family Medicine [phone]

## 2023-01-19 NOTE — TELEPHONE ENCOUNTER
Call returned to patient. See 1/19/23 Anticoagulation encounter for warfarin dosing details.  Dayanara Cuevas RN, BSN  Anticoagulation Clinic

## 2023-01-19 NOTE — TELEPHONE ENCOUNTER
Reason for Call:  INR follow up    Detailed comments: Patient is returning call from today, please call back    Phone Number Patient can be reached at: Cell number on file:    Telephone Information:   Mobile [phone]       Best Time: any    Can we leave a detailed message on this number? YES    Call taken on 1/19/2023 at 4:08 PM by Viky Mendoza

## 2023-01-30 ENCOUNTER — VIRTUAL VISIT (OUTPATIENT)
Dept: PHARMACY | Facility: CLINIC | Age: 78
End: 2023-01-30
Payer: COMMERCIAL

## 2023-01-30 DIAGNOSIS — K51.90 ULCERATIVE COLITIS WITHOUT COMPLICATIONS, UNSPECIFIED LOCATION (H): ICD-10-CM

## 2023-01-30 DIAGNOSIS — R60.9 EDEMA, UNSPECIFIED TYPE: ICD-10-CM

## 2023-01-30 DIAGNOSIS — K21.9 GASTROESOPHAGEAL REFLUX DISEASE WITHOUT ESOPHAGITIS: ICD-10-CM

## 2023-01-30 DIAGNOSIS — J44.9 CHRONIC OBSTRUCTIVE PULMONARY DISEASE, UNSPECIFIED COPD TYPE (H): ICD-10-CM

## 2023-01-30 DIAGNOSIS — Z78.9 TAKES DIETARY SUPPLEMENTS: ICD-10-CM

## 2023-01-30 DIAGNOSIS — R35.0 BENIGN PROSTATIC HYPERPLASIA WITH URINARY FREQUENCY: ICD-10-CM

## 2023-01-30 DIAGNOSIS — E78.5 HYPERLIPIDEMIA LDL GOAL <100: ICD-10-CM

## 2023-01-30 DIAGNOSIS — I10 HYPERTENSION GOAL BP (BLOOD PRESSURE) < 140/90: ICD-10-CM

## 2023-01-30 DIAGNOSIS — G25.0 BENIGN FAMILIAL TREMOR: ICD-10-CM

## 2023-01-30 DIAGNOSIS — I48.91 ATRIAL FIBRILLATION, UNSPECIFIED TYPE (H): ICD-10-CM

## 2023-01-30 DIAGNOSIS — E11.9 TYPE 2 DIABETES, HBA1C GOAL < 8% (H): Primary | ICD-10-CM

## 2023-01-30 DIAGNOSIS — N40.1 BENIGN PROSTATIC HYPERPLASIA WITH URINARY FREQUENCY: ICD-10-CM

## 2023-01-30 PROCEDURE — 99605 MTMS BY PHARM NP 15 MIN: CPT | Performed by: PHARMACIST

## 2023-01-30 RX ORDER — HUMAN INSULIN 100 [IU]/ML
INJECTION, SOLUTION SUBCUTANEOUS
Qty: 10 ML | Refills: 1 | Status: SHIPPED | OUTPATIENT
Start: 2023-01-30 | End: 2023-03-23

## 2023-01-30 NOTE — LETTER
_  Medication List        Prepared on: Jan 30, 2023     Bring your Medication List when you go to the doctor, hospital, or   emergency room. And, share it with your family or caregivers.     Note any changes to how you take your medications.  Cross out medications when you no longer use them.    Medication How I take it Why I use it Prescriber   albuterol (PROAIR HFA/PROVENTIL HFA/VENTOLIN HFA) 108 (90 BASE) MCG/ACT Inhaler Inhale 1-2 puffs into the lungs every 4 hours as needed (for shortness of breath/wheezing) Chronic obstructive pulmonary disease, unspecified COPD type (H) Debbie Lewis MD   amLODIPine (NORVASC) 10 MG tablet Take 1 tablet (10 mg) by mouth daily Paroxysmal Atrial Fibrillation (H) Drew Bravo PA-C   atorvastatin (LIPITOR) 20 MG tablet Take 1 tablet (20 mg) by mouth daily Paroxysmal Atrial Fibrillation (H) Drew Bravo PA-C   chlorthalidone (HYGROTON) 25 MG tablet Take 1 tablet (25 mg) by mouth daily Paroxysmal Atrial Fibrillation (H); Benign Familial Tremor; Hypertension Goal BP (Blood Pressure) < 140/90 Daniel Marroquin MD   ferrous sulfate (FEROSUL) 325 (65 Fe) MG tablet Take 1 tablet (325 mg) by mouth daily (with breakfast) Iron Deficiency Anemia Secondary to Inadequate Dietary Iron Intake Drew Bravo PA-C   folic acid 0.8 MG CAPS Take 1 tablet by mouth daily Ulcerative colitis without complications, unspecified location (H) Debbie Lewis MD   furosemide (LASIX) 20 MG tablet TAKE 1 TABLET (20 MG) BY MOUTH DAILY AS NEEDED FOR EDEMA Hypertension Goal BP (Blood Pressure) < 140/90 Daniel Marroquin MD   insulin regular (NOVOLIN R VIAL) 100 UNIT/ML vial USE 2 UNITS AS NEEDED WHEN BLOOD GLUCOSE >200 MG/DL. 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, with long-term current use of insulin (H); Type 2 diabetes mellitus with hypoglycemia without coma, with long-term current use of insulin (H) Drew Bravo PA-C   mupirocin (BACTROBAN) 2 % external ointment Apply topically 2 times daily as needed (pilonidal cyst) Abscess of Buttock Drew Bravo PA-C   pantoprazole (PROTONIX) 40 MG enteric coated tablet Take 40 mg by mouth daily GERD Debbie Rico MD   primidone (MYSOLINE) 50 MG tablet 4 tablets in am, 4 tablets afternoon, and 3 tablets every evening Benign Familial Tremor Bong Haydee Yoo MD   Probiotic Product (FLORAJEN3) CAPS Take 1 capsule by mouth 2 times daily Acute cystitis without hematuria Shiela Guerrero PA-C   propafenone (RYTHMOL) 150 MG TABS tablet Take 1 tablet (150 mg) by mouth 2 times daily Paroxysmal Atrial Fibrillation (H) Daniel Marroquin MD   propranolol (INDERAL) 60 MG tablet Take 2 tablets (120 mg) by mouth 2 times daily Benign Familial Tremor Daniel Marroquin MD   sulfaSALAzine (AZULFIDINE) 500 MG tablet TAKE ONE TABLET BY MOUTH THREE TIMES A DAY Unspecified Ulcerative Colitis Debbie Lewis MD   tamsulosin (FLOMAX) 0.4 MG capsule TAKE ONE CAPSULE BY MOUTH ONCE DAILY Benign prostatic hyperplasia with urinary frequency Drew Bravo PA-C   tiotropium-olodaterol 2.5-2.5 MCG/ACT AERS Inhale 2 puffs into the lungs daily COPD Patient Reported   Vitamin D3 (CHOLECALCIFEROL) 25 mcg (1000 units) tablet Take 2 tablets (50 mcg) by mouth daily [facility] Drew Bravo PA-C   warfarin ANTICOAGULANT (JANTOVEN ANTICOAGULANT) 5 MG tablet TAKE ONE TABLET (5 MG) BY MOUTH ON MONDAY AND FRIDAY AND TAKE ONE AND ONE-HALF TABLETS BY MOUTH (7.5 MG) ALL OTHER DAYS OR AS  DIRECTED BY THE INR CLINIC Paroxysmal Atrial Fibrillation (H); Long term current use of anticoagulants with INR goal of 2.0-3.0 Drew Bravo PA-C         Add new medications, over-the-counter drugs, herbals, vitamins, or  minerals in the blank rows below.    Medication How I take it Why I use it Prescriber                          Allergies:      percodan [oxycodone-aspirin]; aspirin        Side effects I have had:               Other Information:              My notes and questions:

## 2023-01-30 NOTE — PATIENT INSTRUCTIONS
"Recommendations from today's MTM visit:                                                         No changes. Labs this week.       It was great speaking with you today.  I value your experience and would be very thankful for your time in providing feedback in our clinic survey. In the next few days, you may receive an email or text message from [facility] with a link to a survey related to your  clinical pharmacist.\"     To schedule another MTM appointment, please call the clinic directly or you may call the MTM scheduling line at [phone] or toll-free at [phone].     My Clinical Pharmacist's contact information:                                                      Please feel free to contact me with any questions or concerns you have.      Diana Johnson, PharmD, BCACP  Medication Therapy Management Provider, [facility]  "

## 2023-01-30 NOTE — PROGRESS NOTES
Medication Therapy Management (MTM) Encounter    ASSESSMENT:                            Medication Adherence/Access: No issues identified    Type 2 Diabetes: Patient is meeting A1c goal of < 8%. Self monitoring of blood glucose is mostly at goal of fasting  mg/dL and post prandial < 180 mg/dL. No medication changes. Will recheck A1c lab this week with INR lab.    Hypertension/Afib/Edema: Stable. Following cardiology.    Hyperlipidemia: stable  COPD: stable  BPH: stable  UC: stable. Follows gastroenterologist.  GERD: stable  Tremors: stable. Follows with neurologist.   Supplements: stable    PLAN:                            1. No medication changes. Lab recheck this week.     Follow-up: Return in about 6 months (around 2023) for Medication Therapy Management Pharmacist.    SUBJECTIVE/OBJECTIVE:                          Cayetano Lunsford is a 78 year old male called for a follow-up visit.  Today's visit is a follow-up MTM visit from .     Reason for visit: blood glucose review.    Tobacco: He reports that he quit smoking about 30 years ago. His smoking use included cigarettes. He has a 30.00 pack-year smoking history. He has never used smokeless tobacco.  Alcohol: not currently using  Past Medical History: Reviewed in chart      Medication Adherence/Access: no issues reported    Type 2 Diabetes:  Currently taking metformin 1000mg twice daily and insulin regular 2 units when the reading is >200mg/dL (last use was on , finding he needs this about 2-3x monthly). Patient is not experiencing side effects.  Blood sugar monitorin-3 time(s) daily. Ranges (patient reported):   Average  day: 142 mg/dL;  day: 137 mg/dL,  day: 128 mg/dL  Date FBG/ 2hours post Before dinner Bedtime     130      148 143 75    136 130 184    124 129 152    159 172 113    127 136 109    118 115 98    180 113 109    161 92 102    173 125 138    187 235 171   Symptoms of low blood  sugar? None now  Symptoms of high blood sugar? none  Eye exam: up to date  Foot exam: due  Diet/Exercise: Depends on his diet. Worse diet during holidays. Will use 2 units of insulin regular if eating pasta or high carb meal. No changes.   Aspirin: Not taking due to warfarin  Statin: Yes: atorvastatin  ACEi/ARB: none now d/t elevated potassium  Urine Albumin:   Lab Results   Component Value Date    UMALCR 95.83 (H) 12/30/2021     Lab Results   Component Value Date    A1C 5.7 10/04/2022    A1C 6.3 06/21/2022    A1C 6.0 02/15/2022    A1C 5.4 07/09/2021    A1C 5.3 05/20/2021    A1C 5.9 11/09/2020    A1C 5.8 07/07/2020    A1C 6.1 12/09/2019       Hypertension/Afib/Edema: Current medications include warfarin 7.5mg on Sun, Tues, Thus and 5mg all other days, propranolol 120mg by mouth two times daily, propafenone 150mg twice daily, chlorthalidone 25 mg daily (switched from losartan d/t elevated potassium), furosemide 20 mg daily as needed for edema (few times a week), and amlodipine 10mg daily. Patient reports no current medication side effects except occasional orthostatic hypotension in the morning when getting out of bed. Follows with anticoagulation clinic and cardiology.   BP Readings from Last 3 Encounters:   11/28/22 132/62   11/02/22 136/60   10/18/22 138/73     Lab Results   Component Value Date    INR 2.0 01/19/2023    INR 3.7 01/05/2023    INR 2.9 12/19/2022    INR 3.8 12/08/2022    INR 3.5 11/28/2022    INR 3.7 11/15/2022    INR 2.3 10/04/2022    INR 2.5 09/01/2022    INR 2.0 08/11/2022    INR 2.7 07/29/2022     Potassium   Date Value Ref Range Status   11/15/2022 5.2 3.4 - 5.3 mmol/L Final   06/21/2022 5.5 (H) 3.4 - 5.3 mmol/L Final   07/09/2021 5.5 (H) 3.4 - 5.3 mmol/L Final       Hyperlipidemia: Current therapy includes atorvastatin 20mg daily.  Patient reports no significant myalgias or other side effects.  Recent Labs   Lab Test 12/30/21  1004 11/09/20  1108 05/05/16  0919 05/12/15  0757   CHOL 161 140   <  > 141   HDL 48 30*   < > 44   LDL 78 59   < > 46   TRIG 173* 253*   < > 255*   CHOLHDLRATIO  --   --   --  3.2    < > = values in this interval not displayed.       COPD: Current medications: albuterol MDI as needed (occasoinal when out somewhere), Duonebs as needed (hasn't needed this lately), LAMA/LABA- Stiolto Respimat 2 puff(s) once daily. No concerns.  Patient is not experiencing side effects. Sees [facility].  Patient reports the following symptoms: none.    BPH: Patient taking tamsulosin 0.4mg daily and reports no issues. Reports this helps decrease urinary frequency.    UC: Patient taking sulfasalazine 1000mg AM and 500 mg daily, probiotic daily, and folic acid 0.8mg daily. No issues reported. Follows gastroenterologist.    GERD: Current medications include: Protonix (pantoprazole) 40mg once daily. Pt reports no current symptoms.      Tremors: Patient taking primidone 200mg AM, 200mg PM and 150 mg bedtime. Does also take propranolol 120mg twice daily and reports no issues. Some days worse than others. Avoids caffeine and chocolate. Follows with neurologist.     Supplements: Patient taking vitamin D 2000 units daily and ferrous sulfate 325 mg daily. No concerns.    Today's Vitals: There were no vitals taken for this visit.  ----------------      I spent 20 minutes with this patient today. All changes were made via collaborative practice agreement with Drew Bravo PA-C. A copy of the visit note was provided to the patient's provider(s).    A summary of these recommendations was declined by the patient.    Diana Johnson, PharmD, BCACP  Medication Therapy Management Provider, [facility]  Pager: [phone]    Telemedicine Visit Details  Type of service:  Telephone visit  Start Time: 1:59 PM  End Time: 2:19 PM     Medication Therapy Recommendations  No medication therapy recommendations to display

## 2023-01-30 NOTE — LETTER
January 30, 2023  Cayetano NORIEGA Jonn  [address]   [place] [id number]    Dear DAFNE [facility]     Thank you for talking with me on Jan 30, 2023 about your health and medications. As a follow-up to our conversation, I have included two documents:      1. Your Recommended To-Do List has steps you should take to get the best results from your medications.  2. Your Medication List will help you keep track of your medications and how to take them.    If you want to talk about these documents, please call Diana Johnson RPH at phone: [phone], Monday-Friday 8-4:30pm.    I look forward to working with you and your doctors to make sure your medications work well for you.    Sincerely,  Daina Johnson RPH  [facility] Pharmacist, [facility]

## 2023-01-30 NOTE — LETTER
"Recommended To-Do List      Prepared on: Jan 30, 2023       You can get the best results from your medications by completing the items on this \"To-Do List.\"      Bring your To-Do List when you go to your doctor. And, share it with your family or caregivers.    My To-Do List:  What we talked about: What I should do:    What my medicines are for, how to know if my medicines are working, made sure my medicines are safe for me and reviewed how to take my medicines.    Take my medicines every day                "

## 2023-02-02 ENCOUNTER — LAB (OUTPATIENT)
Dept: LAB | Facility: CLINIC | Age: 78
End: 2023-02-02
Payer: COMMERCIAL

## 2023-02-02 ENCOUNTER — ANTICOAGULATION THERAPY VISIT (OUTPATIENT)
Dept: ANTICOAGULATION | Facility: CLINIC | Age: 78
End: 2023-02-02

## 2023-02-02 DIAGNOSIS — E11.9 TYPE 2 DIABETES, HBA1C GOAL < 8% (H): ICD-10-CM

## 2023-02-02 DIAGNOSIS — I48.91 ATRIAL FIBRILLATION, UNSPECIFIED TYPE (H): ICD-10-CM

## 2023-02-02 DIAGNOSIS — I48.91 ATRIAL FIBRILLATION, UNSPECIFIED TYPE (H): Primary | ICD-10-CM

## 2023-02-02 DIAGNOSIS — E78.5 HYPERLIPIDEMIA LDL GOAL <100: ICD-10-CM

## 2023-02-02 LAB
CHOLEST SERPL-MCNC: 157 MG/DL
CREAT UR-MCNC: 39.2 MG/DL
HBA1C MFR BLD: 5.9 % (ref 0–5.6)
HDLC SERPL-MCNC: 48 MG/DL
INR BLD: 3.4 (ref 0.9–1.1)
LDLC SERPL CALC-MCNC: 77 MG/DL
MICROALBUMIN UR-MCNC: 16.9 MG/L
MICROALBUMIN/CREAT UR: 43.11 MG/G CR (ref 0–17)
NONHDLC SERPL-MCNC: 109 MG/DL
TRIGL SERPL-MCNC: 161 MG/DL

## 2023-02-02 PROCEDURE — 85610 PROTHROMBIN TIME: CPT

## 2023-02-02 PROCEDURE — 82043 UR ALBUMIN QUANTITATIVE: CPT

## 2023-02-02 PROCEDURE — 36415 COLL VENOUS BLD VENIPUNCTURE: CPT

## 2023-02-02 PROCEDURE — 80061 LIPID PANEL: CPT

## 2023-02-02 PROCEDURE — 83036 HEMOGLOBIN GLYCOSYLATED A1C: CPT

## 2023-02-02 PROCEDURE — 82570 ASSAY OF URINE CREATININE: CPT

## 2023-02-02 RX ORDER — WARFARIN SODIUM 2.5 MG/1
TABLET ORAL
Qty: 200 TABLET | Refills: 0 | Status: SHIPPED | OUTPATIENT
Start: 2023-02-02 | End: 2023-04-24

## 2023-02-02 NOTE — PROGRESS NOTES
ANTICOAGULATION MANAGEMENT     Cayetano Lunsford 78 year old male is on warfarin with supratherapeutic INR result. (Goal INR 2.0-3.0)    Recent labs: (last 7 days)     02/02/23  1258   INR 3.4*       ASSESSMENT       Source(s): Chart Review and Patient/Caregiver Call       Warfarin doses taken: Warfarin taken as instructed    Diet: No new diet changes identified    New illness, injury, or hospitalization: No    Medication/supplement changes: None noted    Signs or symptoms of bleeding or clotting: No    Previous INR: Therapeutic last visit; previously outside of goal range    Additional findings: Pt INR levels have been up and down. Changed tablet strength from 5 mg to 2.5 mg for smaller adjustments. RX sent to pharmacy. Pt will  prescription tomorrow 2/3/23.       PLAN     Recommended plan for ongoing change(s) affecting INR     Dosing Instructions:  Take 2.5 mg today 2/2/23 then decrease dose by 2.9% to 5 mg on Mondays and Fridays and 6.25 mg all other days with next INR in 2 weeks       Summary  As of 2/2/2023    Full warfarin instructions:  2/2: 2.5 mg; Otherwise 5 mg every Mon, Fri; 6.25 mg all other days   Next INR check:  2/16/2023             Telephone call with Leo who agrees to plan and repeated back plan correctly    Lab visit scheduled    Education provided:     Please call back if any changes to your diet, medications or how you've been taking warfarin    Contact [phone]  with any changes, questions or concerns.     Plan made per [facility] anticoagulation protocol    Yuliet Moctezuma RN  Anticoagulation Clinic  2/2/2023    _______________________________________________________________________     Anticoagulation Episode Summary     Current INR goal:  2.0-3.0   TTR:  59.5 % (1 y)   Target end date:  Indefinite   Send INR reminders to:  [facility]    Indications    Longstanding persistent atrial fibrillation (H) [I48.11]  Long term current use of anticoagulants with INR goal of 2.0-3.0  [Z79.01]  Atrial fibrillation  unspecified type (H) [I48.91]           Comments:           Anticoagulation Care Providers     Provider Role Specialty Phone number    Dmitri Andres MD Referring Family Medicine [phone]    Drew Bravo PA-C Referring Family Medicine [phone]

## 2023-02-16 ENCOUNTER — TELEPHONE (OUTPATIENT)
Dept: FAMILY MEDICINE | Facility: CLINIC | Age: 78
End: 2023-02-16

## 2023-02-16 ENCOUNTER — LAB (OUTPATIENT)
Dept: LAB | Facility: CLINIC | Age: 78
End: 2023-02-16
Payer: COMMERCIAL

## 2023-02-16 ENCOUNTER — ANTICOAGULATION THERAPY VISIT (OUTPATIENT)
Dept: ANTICOAGULATION | Facility: CLINIC | Age: 78
End: 2023-02-16

## 2023-02-16 DIAGNOSIS — I48.91 ATRIAL FIBRILLATION, UNSPECIFIED TYPE (H): ICD-10-CM

## 2023-02-16 DIAGNOSIS — Z79.01 LONG TERM CURRENT USE OF ANTICOAGULANTS WITH INR GOAL OF 2.0-3.0: ICD-10-CM

## 2023-02-16 DIAGNOSIS — I48.11 LONGSTANDING PERSISTENT ATRIAL FIBRILLATION (H): Primary | ICD-10-CM

## 2023-02-16 LAB — INR BLD: 2 (ref 0.9–1.1)

## 2023-02-16 PROCEDURE — 85610 PROTHROMBIN TIME: CPT

## 2023-02-16 PROCEDURE — 36416 COLLJ CAPILLARY BLOOD SPEC: CPT

## 2023-02-16 NOTE — PROGRESS NOTES
ANTICOAGULATION MANAGEMENT     Cayetano Lunsford 78 year old male is on warfarin with therapeutic INR result. (Goal INR 2.0-3.0)    Recent labs: (last 7 days)     02/16/23  1254   INR 2.0*       ASSESSMENT       Source(s): Chart Review and Patient/Caregiver Call       Warfarin doses taken: Warfarin taken as instructed    Diet: No new diet changes identified    New illness, injury, or hospitalization: No    Medication/supplement changes: None noted    Signs or symptoms of bleeding or clotting: No    Previous INR: Supratherapeutic    Additional findings: None       PLAN     Recommended plan for no diet, medication or health factor changes affecting INR     Dosing Instructions: Continue your current warfarin dose with next INR in 2 weeks       Summary  As of 2/16/2023    Full warfarin instructions:  5 mg every Mon, Fri; 6.25 mg all other days   Next INR check:  3/7/2023             Telephone call with Leo who agrees to plan and repeated back plan correctly    Lab visit scheduled    Education provided:     Please call back if any changes to your diet, medications or how you've been taking warfarin    Plan made per [facility] anticoagulation protocol    Estelle Starr RN  Anticoagulation Clinic  2/16/2023    _______________________________________________________________________     Anticoagulation Episode Summary     Current INR goal:  2.0-3.0   TTR:  59.4 % (1 y)   Target end date:  Indefinite   Send INR reminders to:  [facility]    Indications    Longstanding persistent atrial fibrillation (H) [I48.11]  Long term current use of anticoagulants with INR goal of 2.0-3.0 [Z79.01]  Atrial fibrillation  unspecified type (H) [I48.91]           Comments:           Anticoagulation Care Providers     Provider Role Specialty Phone number    Dmitri Andres MD Referring Family Medicine [phone]    Drew Bravo PA-C Referring Family Medicine [phone]

## 2023-02-16 NOTE — TELEPHONE ENCOUNTER
Patient Returning Call    Reason for call:  Patient is returning a call to Estelle in Anticoagulation/unable to reach Estelle/please return call to patient for patient's next route of care    Information relayed to patient:  none    Patient has additional questions:  Yes    What are your questions/concerns:  Patient is needing next route of care    Okay to leave a detailed message?: Yes at Cell number on file:    Telephone Information:   Mobile [phone]

## 2023-02-16 NOTE — PROGRESS NOTES
ANTICOAGULATION MANAGEMENT     Cayetano Lunsford 78 year old male is on warfarin with therapeutic INR result. (Goal INR 2.0-3.0)    Recent labs: (last 7 days)     02/16/23  1254   INR 2.0*       ASSESSMENT       Source(s): Chart Review    Previous INR was Supratherapeutic.  Maintenance dose was lowered ~3% at last check.  New tablets size (2.5 mg) was also sent to the pharmacy at last INR check.    Medication, diet, health changes since last INR chart reviewed; none identified           PLAN     Unable to reach Rich today.    Left message for patient to call INR clinic to discuss results.    Follow up required to confirm warfarin dose taken and assess for changes    Estelle Starr RN  Anticoagulation Clinic  2/16/2023

## 2023-02-20 NOTE — PATIENT INSTRUCTIONS
Emergency Department APC Note    Patient: Aldo Lam Age: 27 year old Sex: male   MRN: [id number] : 1995 Encounter Date: 2023       History          Chief Complaint   Patient presents with   • Eye Injury       Pt is a 28 yo M with no chronic medical conditions presenting for evaluation of right eye pain, and redness that started 2 days ago when he was playing basketball and was poked in the eye by another player accidentally. Pt states he had significant pain and difficulty opening the eye in the first few days, but this has improved with time. He now feels foreign body sensation, lacrimation, and still pain with opening the eye, but this has improved over the last day and today. No use of contact lenses, no pain with EOM movement, no fevers, chills, or purulent discharge. Denies n/v, HA. No ciliary flush.          Review of Systems    No past medical history on file.     No past surgical history on file.    No family history on file.         Discharge Medication List as of 2023  1:23 PM      New Prescriptions    Details   ciprofloxacin (CIPRO) 0.3 % ophthalmic solution 1 drop in right every 2 hours x 2 days, then every 4 hours x 5 days.Eprescribe, Disp-4.2 mL, R-0      ibuprofen (MOTRIN) 600 MG tablet Take 1 tablet by mouth every 8 hours as needed for Pain.Eprescribe, Disp-30 tablet, R-0               Not on File    Physical Exam     ED Triage Vitals [23 1137]   ED Triage Vitals Group      Temp 98.2 °F (36.8 °C)      Heart Rate 66      Resp 16      /84      SpO2 100 %      EtCO2 mmHg       Height       Weight       Weight Scale Used       BMI (Calculated)       IBW/kg (Calculated)        Physical Exam  Vitals and nursing note reviewed.   Constitutional:       General: He is not in acute distress.     Appearance: He is not ill-appearing, toxic-appearing or diaphoretic.   HENT:      Head: Atraumatic. No contusion, right periorbital erythema, left periorbital erythema or  Recommendations from today's MTM visit:                                                       1. Keep your insulin doses the same. Call me if you start to have more low blood sugars less than 70.    Next MTM visit: 11/5/18 at 11am    To schedule another MTM appointment, please call the clinic directly or you may call the MTM scheduling line at [phone] or toll-free at [phone].     My Clinical Pharmacist's contact information:                                                      It was a pleasure seeing you today!  Please feel free to contact me with any questions or concerns you have.      Tanna Diaz, PharmD  Medication Therapy Management Pharmacist    You may receive a survey about the MTM services you received.  I would appreciate your feedback to help me serve you better in the future. Please fill it out and return it when you can. Your comments will be anonymous.             laceration.   Eyes:      General: Lids are normal. Lids are everted, no foreign bodies appreciated. Vision grossly intact. Gaze aligned appropriately. No visual field deficit.        Right eye: Discharge (watery, clear lacrimation right eye; no purulent discharge) present. No foreign body or hordeolum.         Left eye: No foreign body, discharge or hordeolum.      Extraocular Movements: Extraocular movements intact.      Right eye: Normal extraocular motion.      Left eye: Normal extraocular motion.      Conjunctiva/sclera:      Right eye: Right conjunctiva is injected (no ciliary flush). No chemosis, exudate or hemorrhage.     Pupils: Pupils are equal, round, and reactive to light.      Comments: Negative arik sign.   No hypopyon or hyphema  No area of fluorescein uptake/abrasion/ulceration/dendritic lesions.   No foreign body.   No pain with EOM  No periorbital swelling, or proptosis   Cardiovascular:      Rate and Rhythm: Normal rate and regular rhythm.      Pulses: Normal pulses.      Heart sounds: Normal heart sounds.   Pulmonary:      Effort: Pulmonary effort is normal.      Breath sounds: Normal breath sounds.   Skin:     Capillary Refill: Capillary refill takes less than 2 seconds.   Neurological:      General: No focal deficit present.      Mental Status: He is alert and oriented to person, place, and time.      Gait: Gait normal.         Results     No results found for this visit on 02/19/23.    No results found for this or any previous visit.     Imaging Results    None         ED Course     Procedures    ED Medication Orders (From admission, onward)    None                   Medical Decision Making  26 yo M with pain, redness, lacrimation to right eye s/p injury 2 days ago. Improving, but persisting symptoms. Pt is in no acute distress, afebrile, VS normal, VA grossly in tact, right eye PERRLA, no painful EOM, no ciliary flush, no discharge, no hyphema/hypopyon, negative arik's. No ulcer or  fluorescein uptake, no foreign body, no steamy cornea. Clinically not consistent with coreal ulceration/abrasion, dendritic lesion, keratitis, dendritic lesion, open/ruptured globe, episcleritis/uveitis, retained fb, Acute glaucoma, orbital cellulitis, or other ophthalmologic urgencies/emergencies based on hx/exam. Left VM for Community health worker to assist with outpatient ophtho follow up. Clinical impression: eye pain, conjunctivitis. Pt to be discharged home with close f/u with eye specialist, start on Ciprofloxacin drops, and ibuprofen for pain. Strict ER return precautions.     Conjunctivitis of right eye, unspecified conjunctivitis type: acute illness or injury      Clinical Impression     ED Diagnosis   1. Pain in right eye        2. Conjunctivitis of right eye, unspecified conjunctivitis type            Disposition     Discharge 2/19/2023  1:20 PM  Aldo Lam discharge to home/self care.         Plan and return precautions discussed with the patient and the patient verbalized understanding and agreement. Please refer to discharge instructions for further details. All questions were answered to the patient's satisfaction.     This patient was seen during the novel Coronavirus, COVID-19 pandemic. There were significant changes in work flow, staffing, resource availability, disposition and practice patterns due to this pandemic (as recommended by the CDC and ID[facility]).    I participated in the care of this patient and this note provides information regarding their visit. This note may have been created using voice dictation technology and may include inadvertent transcriptional errors. Any such errors should be contextually interpreted.    Note to patient: The 21st Century Cures Act makes medical notes available to patients in the interest of transparency. Please be advised this note is a medical document. Medical documents are intended to carry relevant information, convey facts as evidence, and include  the clinical opinion/interpretation of the practitioner. The medical note is intended as peer to peer communication and may appear blunt or direct. It is written in medical language and may contain abbreviations or verbiage that are unfamiliar.    Kristy Cao PA-C  Emergency Medicine  [employer]:      Kristy Cao PA-C  02/20/23 044

## 2023-03-02 DIAGNOSIS — G25.0 BENIGN FAMILIAL TREMOR: ICD-10-CM

## 2023-03-03 RX ORDER — PRIMIDONE 50 MG/1
TABLET ORAL
Refills: 1 | OUTPATIENT
Start: 2023-03-03

## 2023-03-03 NOTE — TELEPHONE ENCOUNTER
Pending Prescriptions:                       Disp   Refills    primidone (MYSOLINE) 50 MG tablet         1001 t*1            Si tablets in am, 4 tablets afternoon, and 3           tablets every evening  Refused Prescriptions:                       Disp   Refills    primidone (MYSOLINE) 50 MG tablet [Pharmac*       1        Sig: TAKE 4 TABLETS BY MOUTH EVERY MORNING , 4 TABLETS IN           THE AFTERNOON, AND 3 TABLETS EVERY EVENING  Refused By: CARIN BEAL  Reason for Refusal: OTHER  Reason for Refusal Comment: Will be filled at upcoming appt 3/9/23      Last Appt: 2022  Next Appt: 2023

## 2023-03-06 ENCOUNTER — TELEPHONE (OUTPATIENT)
Dept: NEUROLOGY | Facility: CLINIC | Age: 78
End: 2023-03-06
Payer: COMMERCIAL

## 2023-03-06 RX ORDER — PRIMIDONE 50 MG/1
TABLET ORAL
Qty: 1001 TABLET | Refills: 1 | Status: CANCELLED | OUTPATIENT
Start: 2023-03-06

## 2023-03-06 NOTE — TELEPHONE ENCOUNTER
Left message for patient to check if they have enough medication PRIMIDONE 50MG TABS for appt. Asked patient to call us back when available.         Rose Marie Stone MA

## 2023-03-07 ENCOUNTER — OFFICE VISIT (OUTPATIENT)
Dept: NEUROLOGY | Facility: CLINIC | Age: 78
End: 2023-03-07
Payer: COMMERCIAL

## 2023-03-07 ENCOUNTER — ANTICOAGULATION THERAPY VISIT (OUTPATIENT)
Dept: ANTICOAGULATION | Facility: CLINIC | Age: 78
End: 2023-03-07

## 2023-03-07 ENCOUNTER — LAB (OUTPATIENT)
Dept: LAB | Facility: CLINIC | Age: 78
End: 2023-03-07
Payer: COMMERCIAL

## 2023-03-07 VITALS — SYSTOLIC BLOOD PRESSURE: 129 MMHG | HEART RATE: 74 BPM | OXYGEN SATURATION: 95 % | DIASTOLIC BLOOD PRESSURE: 72 MMHG

## 2023-03-07 DIAGNOSIS — Z79.01 LONG TERM CURRENT USE OF ANTICOAGULANTS WITH INR GOAL OF 2.0-3.0: ICD-10-CM

## 2023-03-07 DIAGNOSIS — I48.91 ATRIAL FIBRILLATION, UNSPECIFIED TYPE (H): ICD-10-CM

## 2023-03-07 DIAGNOSIS — I48.11 LONGSTANDING PERSISTENT ATRIAL FIBRILLATION (H): Primary | ICD-10-CM

## 2023-03-07 DIAGNOSIS — G25.0 BENIGN FAMILIAL TREMOR: Primary | ICD-10-CM

## 2023-03-07 LAB — INR BLD: 2.5 (ref 0.9–1.1)

## 2023-03-07 PROCEDURE — 85610 PROTHROMBIN TIME: CPT

## 2023-03-07 PROCEDURE — 99214 OFFICE O/P EST MOD 30 MIN: CPT | Performed by: PSYCHIATRY & NEUROLOGY

## 2023-03-07 PROCEDURE — 36416 COLLJ CAPILLARY BLOOD SPEC: CPT

## 2023-03-07 RX ORDER — PRIMIDONE 50 MG/1
TABLET ORAL
Qty: 1001 TABLET | Refills: 1 | Status: SHIPPED | OUTPATIENT
Start: 2023-03-07 | End: 2023-09-07

## 2023-03-07 NOTE — LETTER
3/7/2023         RE: Cayetano Lunsford  [address]  [place] [address]        Dear Colleague,    Thank you for referring your patient, Cayetaon Lunsford, to the [facility]. Please see a copy of my visit note below.    ESTABLISHED PATIENT NEUROLOGY NOTE    DATE OF VISIT: 3/7/2023  CLINIC LOCATION: [facility]  MRN: [id number]  PATIENT NAME: Cayetano Lunsford  YOB: 1945    REASON FOR VISIT:   Chief Complaint   Patient presents with     RECHECK     Tremor 6 month follow     SUBJECTIVE:                                                      HISTORY OF PRESENT ILLNESS: Patient is here to follow up regarding essential tremor.  During the last visit on 9/9/2022 no medication changes were made.  Please refer to my initial/other prior notes for further information.    Since the last visit, the patient reports that his symptoms are fluctuating on day to day basis, but overall appear to be stable.  At times, he has difficulty eating and writing due to tremor.  He is on 200/200/150 mg of primidone daily and 120 mg of propranolol twice daily without noticeable side effects.  No interval development of new neurological symptoms.  EXAM:                                                    Physical Exam:   Vitals: BP (!) 146/72   Pulse 74   SpO2 95%     General: pt is in NAD, cooperative.  Skin: normal turgor, moist mucous membranes, no lesions/rashes noticed.  HEENT: ATNC, white sclera, normal conjunctiva.  Respiratory: Symmetric lung excursion, no accessory respiratory muscle use.  Abdomen: Non distended.  Neurological: awake, cooperative, follows commands, moderate chronic dysarthria, mild to moderate bilateral positional and kinetic hand tremor, no dysmetria bilaterally.  ASSESSMENT AND PLAN:                                                    Assessment: 78 year old male patient presents for follow-up of essential tremor.  He reports that  his symptoms are stable on current therapy, though at times he has difficulty eating and writing.  Previously we discussed that we are somewhat limited on further increasing the dose of primidone or propranolol.  Additional second line therapies include gabapentin and topiramate.  Today, we reviewed option of bilateral wrist weights that might be beneficial to counteract increased tremor while eating and writing.  The patient was interested in trying.    Rich to follow up with Primary Care provider regarding elevated blood pressure.    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    We decided not to make any medication changes, and I refilled his primidone prescription.  We discussed that he should also continue propranolol.  It looks like it was refilled by his primary care office.    We also discussed use of wrist weights while he is eating and writing to see if it provides more stability.    Next follow-up appointment is in the next 6 months or earlier if needed.    Total Time: 21 minutes spent on the date of the encounter doing chart review, history and exam, documentation and further activities per the note.    Bong Yoo MD  [facility]  (Chart documentation was completed in part with Dragon voice-recognition software. Even though reviewed, some grammatical, spelling, and word errors may remain.)        Again, thank you for allowing me to participate in the care of your patient.        Sincerely,        Bong Yoo MD

## 2023-03-07 NOTE — PROGRESS NOTES
ESTABLISHED PATIENT NEUROLOGY NOTE    DATE OF VISIT: 3/7/2023  CLINIC LOCATION: [facility]  MRN: [id number]  PATIENT NAME: Cayetano Lunsford  YOB: 1945    REASON FOR VISIT:   Chief Complaint   Patient presents with     RECHECK     Tremor 6 month follow     SUBJECTIVE:                                                      HISTORY OF PRESENT ILLNESS: Patient is here to follow up regarding essential tremor.  During the last visit on 9/9/2022 no medication changes were made.  Please refer to my initial/other prior notes for further information.    Since the last visit, the patient reports that his symptoms are fluctuating on day to day basis, but overall appear to be stable.  At times, he has difficulty eating and writing due to tremor.  He is on 200/200/150 mg of primidone daily and 120 mg of propranolol twice daily without noticeable side effects.  No interval development of new neurological symptoms.  EXAM:                                                    Physical Exam:   Vitals: BP (!) 146/72   Pulse 74   SpO2 95%     General: pt is in NAD, cooperative.  Skin: normal turgor, moist mucous membranes, no lesions/rashes noticed.  HEENT: ATNC, white sclera, normal conjunctiva.  Respiratory: Symmetric lung excursion, no accessory respiratory muscle use.  Abdomen: Non distended.  Neurological: awake, cooperative, follows commands, moderate chronic dysarthria, mild to moderate bilateral positional and kinetic hand tremor, no dysmetria bilaterally.  ASSESSMENT AND PLAN:                                                    Assessment: 78 year old male patient presents for follow-up of essential tremor.  He reports that his symptoms are stable on current therapy, though at times he has difficulty eating and writing.  Previously we discussed that we are somewhat limited on further increasing the dose of primidone or propranolol.  Additional second line therapies include gabapentin and  topiramate.  Today, we reviewed option of bilateral wrist weights that might be beneficial to counteract increased tremor while eating and writing.  The patient was interested in trying.    Rich to follow up with Primary Care provider regarding elevated blood pressure.    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    We decided not to make any medication changes, and I refilled his primidone prescription.  We discussed that he should also continue propranolol.  It looks like it was refilled by his primary care office.    We also discussed use of wrist weights while he is eating and writing to see if it provides more stability.    Next follow-up appointment is in the next 6 months or earlier if needed.    Total Time: 21 minutes spent on the date of the encounter doing chart review, history and exam, documentation and further activities per the note.    Bong Yoo MD  [facility] Neurology  (Chart documentation was completed in part with Dragon voice-recognition software. Even though reviewed, some grammatical, spelling, and word errors may remain.)

## 2023-03-07 NOTE — PATIENT INSTRUCTIONS
AFTER VISIT SUMMARY (AVS):    At today's visit we thoroughly discussed current symptoms, available treatment options, and the plan.    We decided not to make any medication changes, and I refilled your primidone prescription.  You should also continue propranolol.  It looks like it was refilled by your primary care office.    We also discussed use of wrist weights while you are eating and writing to see if it provides more stability.    Next follow-up appointment is in the next 6 months or earlier if needed.    Please do not hesitate to call me with any questions or concerns.    Thanks.

## 2023-03-07 NOTE — PROGRESS NOTES
ANTICOAGULATION MANAGEMENT     Cayetano Lunsford 78 year old male is on warfarin with therapeutic INR result. (Goal INR 2.0-3.0)    Recent labs: (last 7 days)     03/07/23  1255   INR 2.5*       ASSESSMENT       Source(s): Chart Review and Patient/Caregiver Call       Warfarin doses taken: Warfarin taken as instructed    Diet: No new diet changes identified    New illness, injury, or hospitalization: No    Medication/supplement changes: None noted    Signs or symptoms of bleeding or clotting: No    Previous INR: Therapeutic last visit; previously outside of goal range    Additional findings: None         PLAN     Recommended plan for no diet, medication or health factor changes affecting INR     Dosing Instructions: Continue your current warfarin dose with next INR in 4 weeks       Summary  As of 3/7/2023    Full warfarin instructions:  5 mg every Mon, Fri; 6.25 mg all other days   Next INR check:  4/4/2023             Telephone call with Leo who verbalizes understanding and agrees to plan. Also mailing AVS per request.    Lab visit scheduled    Education provided:     Please call back if any changes to your diet, medications or how you've been taking warfarin    Plan made per [facility] anticoagulation protocol    Corina Cuevas RN  Anticoagulation Clinic  3/7/2023    _______________________________________________________________________     Anticoagulation Episode Summary     Current INR goal:  2.0-3.0   TTR:  63.7 % (1 y)   Target end date:  Indefinite   Send INR reminders to:  [facility]    Indications    Longstanding persistent atrial fibrillation (H) [I48.11]  Long term current use of anticoagulants with INR goal of 2.0-3.0 [Z79.01]  Atrial fibrillation  unspecified type (H) [I48.91]           Comments:           Anticoagulation Care Providers     Provider Role Specialty Phone number    Dmitri Andres MD Referring Family Medicine [phone]    Drew Bravo PA-C Referring  Family Medicine [phone]

## 2023-03-10 DIAGNOSIS — Z79.01 LONG TERM CURRENT USE OF ANTICOAGULANTS WITH INR GOAL OF 2.0-3.0: ICD-10-CM

## 2023-03-10 DIAGNOSIS — I48.91 ATRIAL FIBRILLATION, UNSPECIFIED TYPE (H): ICD-10-CM

## 2023-03-10 DIAGNOSIS — I48.11 LONGSTANDING PERSISTENT ATRIAL FIBRILLATION (H): Primary | ICD-10-CM

## 2023-03-10 DIAGNOSIS — I48.0 PAROXYSMAL ATRIAL FIBRILLATION (H): ICD-10-CM

## 2023-03-10 RX ORDER — WARFARIN SODIUM 5 MG/1
TABLET ORAL
Qty: 114 TABLET | Refills: 1 | OUTPATIENT
Start: 2023-03-10

## 2023-03-20 DIAGNOSIS — E11.22 TYPE 2 DIABETES MELLITUS WITH STAGE 1 CHRONIC KIDNEY DISEASE, WITH LONG-TERM CURRENT USE OF INSULIN (H): ICD-10-CM

## 2023-03-20 DIAGNOSIS — Z79.4 TYPE 2 DIABETES MELLITUS WITH STAGE 1 CHRONIC KIDNEY DISEASE, WITH LONG-TERM CURRENT USE OF INSULIN (H): ICD-10-CM

## 2023-03-20 DIAGNOSIS — N18.1 TYPE 2 DIABETES MELLITUS WITH STAGE 1 CHRONIC KIDNEY DISEASE, WITH LONG-TERM CURRENT USE OF INSULIN (H): ICD-10-CM

## 2023-03-20 DIAGNOSIS — E11.9 TYPE 2 DIABETES, HBA1C GOAL < 8% (H): ICD-10-CM

## 2023-03-22 RX ORDER — HUMAN INSULIN 100 [IU]/ML
INJECTION, SUSPENSION SUBCUTANEOUS
Qty: 10 ML | Refills: 0 | OUTPATIENT
Start: 2023-03-22

## 2023-03-22 NOTE — TELEPHONE ENCOUNTER
Routing to [facility] for vefication, but I believe patient is now on novolin R to replace this. Appears to be possible error in request.     -katharina deluca, pac

## 2023-03-22 NOTE — TELEPHONE ENCOUNTER
Routing refill request to provider for review/approval because:  Drug not active on patient's medication list    Mindi Agrawal RN

## 2023-03-22 NOTE — TELEPHONE ENCOUNTER
Patient no longer on N insulin. Only insulin regular as needed for elevated blood glucose.    Diana Johnson, PharmD, BCACP  Medication Therapy Management Provider, [facility]  Pager: [phone]

## 2023-03-23 RX ORDER — HUMAN INSULIN 100 [IU]/ML
INJECTION, SOLUTION SUBCUTANEOUS
Qty: 30 ML | Refills: 1 | Status: SHIPPED | OUTPATIENT
Start: 2023-03-23 | End: 2023-06-28

## 2023-03-23 NOTE — TELEPHONE ENCOUNTER
He does need refill of R insulin vials as they  after 30 days once opened.    Diana Johnson, PharmD, BCACP  Medication Therapy Management Provider, [facility]  Pager: [phone]

## 2023-04-04 ENCOUNTER — LAB (OUTPATIENT)
Dept: LAB | Facility: CLINIC | Age: 78
End: 2023-04-04
Payer: COMMERCIAL

## 2023-04-04 ENCOUNTER — ANTICOAGULATION THERAPY VISIT (OUTPATIENT)
Dept: ANTICOAGULATION | Facility: CLINIC | Age: 78
End: 2023-04-04

## 2023-04-04 DIAGNOSIS — Z79.01 LONG TERM CURRENT USE OF ANTICOAGULANTS WITH INR GOAL OF 2.0-3.0: ICD-10-CM

## 2023-04-04 DIAGNOSIS — I48.91 ATRIAL FIBRILLATION, UNSPECIFIED TYPE (H): ICD-10-CM

## 2023-04-04 DIAGNOSIS — I48.11 LONGSTANDING PERSISTENT ATRIAL FIBRILLATION (H): Primary | ICD-10-CM

## 2023-04-04 LAB — INR BLD: 2.8 (ref 0.9–1.1)

## 2023-04-04 PROCEDURE — 85610 PROTHROMBIN TIME: CPT

## 2023-04-04 PROCEDURE — 36416 COLLJ CAPILLARY BLOOD SPEC: CPT

## 2023-04-04 NOTE — PROGRESS NOTES
ANTICOAGULATION MANAGEMENT     Cayetano Lunsford 78 year old male is on warfarin with therapeutic INR result. (Goal INR 2.0-3.0)    Recent labs: (last 7 days)     04/04/23  1300   INR 2.8*       ASSESSMENT       Source(s): Chart Review and Patient/Caregiver Call       Warfarin doses taken: Warfarin taken as instructed    Diet: No new diet changes identified    New illness, injury, or hospitalization: No    Medication/supplement changes: None noted    Signs or symptoms of bleeding or clotting: No    Previous INR: Therapeutic last 2(+) visits    Additional findings: None         PLAN     Recommended plan for no diet, medication or health factor changes affecting INR     Dosing Instructions: Continue your current warfarin dose with next INR in 5 weeks       Summary  As of 4/4/2023    Full warfarin instructions:  5 mg every Mon, Fri; 6.25 mg all other days   Next INR check:  5/9/2023             Telephone call with Leo who verbalizes understanding and agrees to plan  Also mailing AVS per request    Lab visit scheduled    Education provided:     Please call back if any changes to your diet, medications or how you've been taking warfarin    Written instructions provided    Plan made per [facility] anticoagulation protocol    Corina Cuevas RN  Anticoagulation Clinic  4/4/2023    _______________________________________________________________________     Anticoagulation Episode Summary     Current INR goal:  2.0-3.0   TTR:  71.3 % (1 y)   Target end date:  Indefinite   Send INR reminders to:  [facility]    Indications    Longstanding persistent atrial fibrillation (H) [I48.11]  Long term current use of anticoagulants with INR goal of 2.0-3.0 [Z79.01]  Atrial fibrillation  unspecified type (H) [I48.91]           Comments:           Anticoagulation Care Providers     Provider Role Specialty Phone number    Dmitri Andres MD Referring Family Medicine [phone]    Drew Bravo PA-C  [facility] Family Medicine [phone]

## 2023-04-06 DIAGNOSIS — G25.0 BENIGN FAMILIAL TREMOR: ICD-10-CM

## 2023-04-11 RX ORDER — PROPRANOLOL HYDROCHLORIDE 60 MG/1
120 TABLET ORAL 2 TIMES DAILY
Qty: 360 TABLET | Refills: 2 | Status: SHIPPED | OUTPATIENT
Start: 2023-04-11 | End: 2023-12-26

## 2023-04-12 DIAGNOSIS — N18.1 TYPE 2 DIABETES MELLITUS WITH STAGE 1 CHRONIC KIDNEY DISEASE, WITH LONG-TERM CURRENT USE OF INSULIN (H): ICD-10-CM

## 2023-04-12 DIAGNOSIS — E11.22 TYPE 2 DIABETES MELLITUS WITH STAGE 1 CHRONIC KIDNEY DISEASE, WITH LONG-TERM CURRENT USE OF INSULIN (H): ICD-10-CM

## 2023-04-12 DIAGNOSIS — Z79.4 TYPE 2 DIABETES MELLITUS WITH STAGE 1 CHRONIC KIDNEY DISEASE, WITH LONG-TERM CURRENT USE OF INSULIN (H): ICD-10-CM

## 2023-04-13 RX ORDER — BLOOD SUGAR DIAGNOSTIC
STRIP MISCELLANEOUS
Qty: 300 STRIP | Refills: 0 | Status: SHIPPED | OUTPATIENT
Start: 2023-04-13 | End: 2023-07-28

## 2023-04-13 NOTE — TELEPHONE ENCOUNTER
Medication is being filled for 1 time refill only due to:  Patient needs to be seen because due for DM f/up please call to schedule .

## 2023-04-14 NOTE — TELEPHONE ENCOUNTER
Contacted Pt. Advised test strips refill was approved at at pharmacy.    Pt is now scheduled for DM follow up on 5/17/23.    Ivon Putnam/EMT-B  [facility] [place]

## 2023-04-21 DIAGNOSIS — I48.91 ATRIAL FIBRILLATION, UNSPECIFIED TYPE (H): ICD-10-CM

## 2023-04-24 RX ORDER — WARFARIN SODIUM 2.5 MG/1
TABLET ORAL
Qty: 200 TABLET | Refills: 0 | Status: SHIPPED | OUTPATIENT
Start: 2023-04-24 | End: 2023-07-17

## 2023-04-24 NOTE — TELEPHONE ENCOUNTER
ANTICOAGULATION MANAGEMENT:  Medication Refill    Anticoagulation Summary  As of 4/4/2023    Warfarin maintenance plan:  5 mg (2.5 mg x 2) every Mon, Fri; 6.25 mg (2.5 mg x 2.5) all other days   Next INR check:  5/9/2023   Target end date:  Indefinite    Indications    Longstanding persistent atrial fibrillation (H) [I48.11]  Long term current use of anticoagulants with INR goal of 2.0-3.0 [Z79.01]  Atrial fibrillation  unspecified type (H) [I48.91]             Anticoagulation Care Providers     Provider Role Specialty Phone number    Dmitri Andres MD Referring Family Medicine [phone]    Drew Bravo PA-C Referring Family Medicine [phone]          Visit with referring provider/group within last year: Yes    [facility] referral signed within last year: Yes    Cayetano meets all criteria for refill (current [facility] referral, office visit with referring provider/group in last year, lab monitoring up to date or not exceeding 2 weeks overdue). Rx instructions and quantity match patient's current dosing plan. 90 day supply with 0 refills granted per [facility] protocol     Dayanara Cuevas RN  Anticoagulation Clinic

## 2023-05-02 ENCOUNTER — TRANSFERRED RECORDS (OUTPATIENT)
Dept: HEALTH INFORMATION MANAGEMENT | Facility: CLINIC | Age: 78
End: 2023-05-02
Payer: COMMERCIAL

## 2023-05-09 ENCOUNTER — LAB (OUTPATIENT)
Dept: LAB | Facility: CLINIC | Age: 78
End: 2023-05-09
Payer: COMMERCIAL

## 2023-05-09 ENCOUNTER — ANTICOAGULATION THERAPY VISIT (OUTPATIENT)
Dept: ANTICOAGULATION | Facility: CLINIC | Age: 78
End: 2023-05-09

## 2023-05-09 DIAGNOSIS — I48.91 ATRIAL FIBRILLATION, UNSPECIFIED TYPE (H): ICD-10-CM

## 2023-05-09 LAB — INR BLD: 1.9 (ref 0.9–1.1)

## 2023-05-09 PROCEDURE — 85610 PROTHROMBIN TIME: CPT

## 2023-05-09 PROCEDURE — 36416 COLLJ CAPILLARY BLOOD SPEC: CPT

## 2023-05-09 NOTE — PROGRESS NOTES
ANTICOAGULATION MANAGEMENT     Cayetano Lunsford 78 year old male is on warfarin with subtherapeutic INR result. (Goal INR 2.0-3.0)    Recent labs: (last 7 days)     05/09/23  1255   INR 1.9*       ASSESSMENT       Source(s): Chart Review    Previous INR was Therapeutic last 2(+) visits    Medication, diet, health changes since last INR chart reviewed; none identified     Left a detailed voicemail message with recommendation to take a boost dose today then continue warfarin maintenance dose. Recheck INR recheck in 2 weeks. Will mail out AVS. Pt to call back if questions, updates or concerns.             PLAN       Dosing Instructions: booster dose then continue your current warfarin dose with next INR in 2 weeks       Summary  As of 5/9/2023    Full warfarin instructions:  5/9: 7.5 mg; Otherwise 5 mg every Mon, Fri; 6.25 mg all other days   Next INR check:  5/23/2023             Detailed voice message left for Rich with dosing instructions and follow up date.     Contact [phone]  to schedule and with any changes, questions or concerns.     Education provided:     Please call back if any changes to your diet, medications or how you've been taking warfarin    Contact [phone]  with any changes, questions or concerns.     Plan made per [facility] anticoagulation protocol    Yuliet Moctezuma RN  Anticoagulation Clinic  5/9/2023    _______________________________________________________________________     Anticoagulation Episode Summary     Current INR goal:  2.0-3.0   TTR:  71.0 % (1 y)   Target end date:  Indefinite   Send INR reminders to:  [facility]    Indications    Longstanding persistent atrial fibrillation (H) [I48.11]  Long term current use of anticoagulants with INR goal of 2.0-3.0 [Z79.01]  Atrial fibrillation  unspecified type (H) [I48.91]           Comments:           Anticoagulation Care Providers     Provider Role Specialty Phone number    Dmitri Andres MD Referring Family Medicine  [phone]    Drew Bravo PA-C [facility] [phone]

## 2023-05-10 NOTE — PROGRESS NOTES
Pt called back. Pt did not get the voicemail. Pt advised to take 3 tablets (7.5 mg) today 5/10/23 then continue taking current warfarin maintenance dose. Pt reports that he took 6.25 mg yesterday 5/9/23. Pt scheduled to recheck INR on 5/23/23.

## 2023-05-12 ENCOUNTER — TELEPHONE (OUTPATIENT)
Dept: FAMILY MEDICINE | Facility: CLINIC | Age: 78
End: 2023-05-12
Payer: COMMERCIAL

## 2023-05-12 ENCOUNTER — TRANSFERRED RECORDS (OUTPATIENT)
Dept: HEALTH INFORMATION MANAGEMENT | Facility: CLINIC | Age: 78
End: 2023-05-12
Payer: COMMERCIAL

## 2023-05-12 NOTE — TELEPHONE ENCOUNTER
Forms/Letter Request    Type of form/letter: [employer] Physician order case ID#[id number] Insulin Syringe Disp Syrin      Have you been seen for this request:     Do we have the form/letter: Yes. In PCP in basket.    Who is the form from? [employer] Physician order case ID#[id number] Insulin Syringe Disp Syrin      Where did/will the form come from? form was faxed in    When is form/letter needed by:     How would you like the form/letter returned: Fax to: [phone]    LUIZ Christopher

## 2023-05-17 ENCOUNTER — OFFICE VISIT (OUTPATIENT)
Dept: FAMILY MEDICINE | Facility: CLINIC | Age: 78
End: 2023-05-17
Payer: COMMERCIAL

## 2023-05-17 VITALS
BODY MASS INDEX: 26.31 KG/M2 | WEIGHT: 163 LBS | RESPIRATION RATE: 16 BRPM | OXYGEN SATURATION: 93 % | HEART RATE: 65 BPM | DIASTOLIC BLOOD PRESSURE: 79 MMHG | SYSTOLIC BLOOD PRESSURE: 123 MMHG | TEMPERATURE: 97.3 F

## 2023-05-17 DIAGNOSIS — Z79.4 TYPE 2 DIABETES MELLITUS WITH STAGE 1 CHRONIC KIDNEY DISEASE, WITH LONG-TERM CURRENT USE OF INSULIN (H): ICD-10-CM

## 2023-05-17 DIAGNOSIS — I48.0 PAROXYSMAL ATRIAL FIBRILLATION (H): ICD-10-CM

## 2023-05-17 DIAGNOSIS — L60.2 THICKENED NAIL: ICD-10-CM

## 2023-05-17 DIAGNOSIS — E11.22 TYPE 2 DIABETES MELLITUS WITH STAGE 1 CHRONIC KIDNEY DISEASE, WITH LONG-TERM CURRENT USE OF INSULIN (H): ICD-10-CM

## 2023-05-17 DIAGNOSIS — N18.1 TYPE 2 DIABETES MELLITUS WITH STAGE 1 CHRONIC KIDNEY DISEASE, WITH LONG-TERM CURRENT USE OF INSULIN (H): ICD-10-CM

## 2023-05-17 DIAGNOSIS — E11.649 TYPE 2 DIABETES MELLITUS WITH HYPOGLYCEMIA WITHOUT COMA, WITH LONG-TERM CURRENT USE OF INSULIN (H): ICD-10-CM

## 2023-05-17 DIAGNOSIS — D64.9 ANEMIA, UNSPECIFIED TYPE: Primary | ICD-10-CM

## 2023-05-17 DIAGNOSIS — Z79.4 TYPE 2 DIABETES MELLITUS WITH HYPOGLYCEMIA WITHOUT COMA, WITH LONG-TERM CURRENT USE OF INSULIN (H): ICD-10-CM

## 2023-05-17 PROCEDURE — 99213 OFFICE O/P EST LOW 20 MIN: CPT | Performed by: PHYSICIAN ASSISTANT

## 2023-05-17 RX ORDER — AMLODIPINE BESYLATE 5 MG/1
5 TABLET ORAL DAILY
Qty: 90 TABLET | Refills: 1 | Status: SHIPPED | OUTPATIENT
Start: 2023-05-17 | End: 2023-11-20

## 2023-05-17 NOTE — PROGRESS NOTES
"  Assessment & Plan     Type 2 diabetes mellitus with hypoglycemia without coma, with long-term current use of insulin (H)  Stable on current regimen. Labs utd. To maintain and follow up with mtm in ~3 months. Myself in 6 months.   - metFORMIN (GLUCOPHAGE) 1000 MG tablet; TAKE ONE TABLET BY MOUTH TWICE A DAY WITH BREAKFAST  AND DINNER  Medication use and side effects discussed with the patient. Patient is in complete understanding and agreement with plan.     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, with long-term current use of insulin (H)  As above   - metFORMIN (GLUCOPHAGE) 1000 MG tablet; TAKE ONE TABLET BY MOUTH TWICE A DAY WITH BREAKFAST  AND DINNER    Anemia, unspecified type  Followed by oncology. Awaiting reports to be abstracted.     Paroxysmal atrial fibrillation (H)  Patient would like to reduce norvasc due to edema. bp stable. Of note, did stop losartan in past due to hyperkalemia. Will reduce to 5 mg and patient to monitor bp at home. If increases 140/90 or higher, to call.   - amLODIPine (NORVASC) 5 MG tablet; Take 1 tablet (5 mg) by mouth daily  Medication use and side effects discussed with the patient. Patient is in complete understanding and agreement with plan.     Thickened nail  Reported by patient. Discussed twinkle toes and happy feet. Information given. See patient instructions.          BMI:   Estimated body mass index is 26.31 kg/m  as calculated from the following:    Height as of 11/28/22: 1.676 m (5' 6\").    Weight as of this encounter: 73.9 kg (163 lb).       Drew Bravo PA-C  [facility] TIM Bailey is a 78 year old, presenting for the following health issues:  Diabetes, Hypertension, and Lipids        5/17/2023    11:18 AM   Additional Questions   Roomed by Shayna Elmore CMA   Accompanied by self         5/17/2023    11:18 AM   Patient Reported Additional Medications   Patient reports taking the following new " medications no     History of Present Illness       Diabetes:   He presents for follow up of diabetes.  He is checking home blood glucose three times daily. He checks blood glucose before meals.  Blood glucose is never over 200 and never under 70. When his blood glucose is low, the patient is asymptomatic for confusion, blurred vision, lethargy and reports not feeling dizzy, shaky, or weak.  He has no concerns regarding his diabetes at this time.  He is not experiencing numbness or burning in feet, excessive thirst, blurry vision, weight changes or redness, sores or blisters on feet.         Hyperlipidemia:  He presents for follow up of hyperlipidemia.  He is taking medication to lower cholesterol. He is not having myalgia or other side effects to statin medications.    Hypertension: He presents for follow up of hypertension.  He does not check blood pressure  regularly outside of the clinic. Outpatient blood pressures have not been over 140/90. He follows a low salt diet.     He eats 0-1 servings of fruits and vegetables daily.He consumes 0 sweetened beverage(s) daily.He exercises with enough effort to increase his heart rate 10 to 19 minutes per day.  He exercises with enough effort to increase his heart rate 7 days per week.   He is taking medications regularly.     History of anemia. Unknown cause. Seeing oncology. Last seen 1 week ago. Seeing again in 1 week.     Patient would like to reduce norvasc due to bilateral feet swelling. He feels it's due to this.     Also states years of thickened toenails and difficult to trim due to tremor. Would like orthotics so his feet fit better in shoes due to this.             Review of Systems   Constitutional, HEENT, cardiovascular, pulmonary, GI, , musculoskeletal, neuro, skin, endocrine and psych systems are negative, except as otherwise noted.      Objective    /79   Pulse 65   Temp 97.3  F (36.3  C) (Oral)   Resp 16   Wt 73.9 kg (163 lb)   SpO2 93%   BMI  26.31 kg/m    Body mass index is 26.31 kg/m .  Physical Exam   GENERAL: healthy, alert and no distress  RESP: lungs clear to auscultation - no rales, rhonchi or wheezes  CV: regular rates and rhythm and normal S1 S2, no S3 or S4  PSYCH: mentation appears normal, affect normal/bright

## 2023-05-17 NOTE — PATIENT INSTRUCTIONS
Kaylen hdez    Address: [place] [address]    Email: [email or web address]    Phone: [phone]      [facility] [address]/[facility]    [address] [facility]    $45 Cash, Check or Credit    1st and 3rd Thursday of each month    3rd and 4th Friday of each month    Appts from 9AM to 4PM    Call [phone] to schedule an appt

## 2023-05-18 DIAGNOSIS — Z79.4 TYPE 2 DIABETES MELLITUS WITH STAGE 1 CHRONIC KIDNEY DISEASE, WITH LONG-TERM CURRENT USE OF INSULIN (H): ICD-10-CM

## 2023-05-18 DIAGNOSIS — E11.22 TYPE 2 DIABETES MELLITUS WITH STAGE 1 CHRONIC KIDNEY DISEASE, WITH LONG-TERM CURRENT USE OF INSULIN (H): ICD-10-CM

## 2023-05-18 DIAGNOSIS — N18.1 TYPE 2 DIABETES MELLITUS WITH STAGE 1 CHRONIC KIDNEY DISEASE, WITH LONG-TERM CURRENT USE OF INSULIN (H): ICD-10-CM

## 2023-05-19 ENCOUNTER — TRANSFERRED RECORDS (OUTPATIENT)
Dept: FAMILY MEDICINE | Facility: CLINIC | Age: 78
End: 2023-05-19
Payer: COMMERCIAL

## 2023-05-19 RX ORDER — BLOOD SUGAR DIAGNOSTIC
STRIP MISCELLANEOUS
Qty: 300 EACH | Refills: 3 | Status: SHIPPED | OUTPATIENT
Start: 2023-05-19 | End: 2024-08-27

## 2023-05-23 ENCOUNTER — LAB (OUTPATIENT)
Dept: LAB | Facility: CLINIC | Age: 78
End: 2023-05-23
Payer: COMMERCIAL

## 2023-05-23 ENCOUNTER — ANTICOAGULATION THERAPY VISIT (OUTPATIENT)
Dept: ANTICOAGULATION | Facility: CLINIC | Age: 78
End: 2023-05-23

## 2023-05-23 DIAGNOSIS — I48.91 ATRIAL FIBRILLATION, UNSPECIFIED TYPE (H): ICD-10-CM

## 2023-05-23 DIAGNOSIS — Z79.01 LONG TERM CURRENT USE OF ANTICOAGULANTS WITH INR GOAL OF 2.0-3.0: ICD-10-CM

## 2023-05-23 DIAGNOSIS — I48.11 LONGSTANDING PERSISTENT ATRIAL FIBRILLATION (H): Primary | ICD-10-CM

## 2023-05-23 LAB — INR BLD: 2.4 (ref 0.9–1.1)

## 2023-05-23 PROCEDURE — 85610 PROTHROMBIN TIME: CPT

## 2023-05-23 PROCEDURE — 36415 COLL VENOUS BLD VENIPUNCTURE: CPT

## 2023-05-23 NOTE — PROGRESS NOTES
ANTICOAGULATION MANAGEMENT     Cayetano Lunsford 78 year old male is on warfarin with therapeutic INR result. (Goal INR 2.0-3.0)    Recent labs: (last 7 days)     05/23/23  1325   INR 2.4*       ASSESSMENT       Source(s): Chart Review and Patient/Caregiver Call       Warfarin doses taken: Warfarin taken as instructed    Diet: No new diet changes identified    Medication/supplement changes: Amlodipine dosage decreased - no interaction per [employer]. Also started vitamin B 12 last week - also no interaction per [facility].    New illness, injury, or hospitalization: No    Signs or symptoms of bleeding or clotting: No    Previous result: Subtherapeutic    Additional findings: None - did not want AVS mailed out today         PLAN     Recommended plan for no diet, medication or health factor changes affecting INR     Dosing Instructions: Continue your current warfarin dose with next INR in 3 weeks       Summary  As of 5/23/2023    Full warfarin instructions:  5 mg every Mon, Fri; 6.25 mg all other days   Next INR check:  6/13/2023             Telephone call with Leo who verbalizes understanding and agrees to plan    Lab visit scheduled    Education provided:     Please call back if any changes to your diet, medications or how you've been taking warfarin    Interaction IS NOT anticipated between warfarin and amlodipine/B12    Plan made per [facility] anticoagulation protocol    Corina Cuevas RN  Anticoagulation Clinic  5/23/2023    _______________________________________________________________________     Anticoagulation Episode Summary     Current INR goal:  2.0-3.0   TTR:  70.2 % (1 y)   Target end date:  Indefinite   Send INR reminders to:  [facility]    Indications    Longstanding persistent atrial fibrillation (H) [I48.11]  Long term current use of anticoagulants with INR goal of 2.0-3.0 [Z79.01]  Atrial fibrillation  unspecified type (H) [I48.91]           Comments:           Anticoagulation  Care Providers     Provider Role Specialty Phone number    Dmitri Andres MD Referring Family Medicine [phone]    Drew Bravo PA-C Referring Family Medicine [phone]

## 2023-06-13 ENCOUNTER — ANTICOAGULATION THERAPY VISIT (OUTPATIENT)
Dept: ANTICOAGULATION | Facility: CLINIC | Age: 78
End: 2023-06-13

## 2023-06-13 ENCOUNTER — LAB (OUTPATIENT)
Dept: LAB | Facility: CLINIC | Age: 78
End: 2023-06-13
Payer: COMMERCIAL

## 2023-06-13 DIAGNOSIS — I48.91 ATRIAL FIBRILLATION, UNSPECIFIED TYPE (H): ICD-10-CM

## 2023-06-13 DIAGNOSIS — I48.11 LONGSTANDING PERSISTENT ATRIAL FIBRILLATION (H): Primary | ICD-10-CM

## 2023-06-13 DIAGNOSIS — Z79.01 LONG TERM CURRENT USE OF ANTICOAGULANTS WITH INR GOAL OF 2.0-3.0: ICD-10-CM

## 2023-06-13 LAB — INR BLD: 4.5 (ref 0.9–1.1)

## 2023-06-13 PROCEDURE — 36416 COLLJ CAPILLARY BLOOD SPEC: CPT

## 2023-06-13 PROCEDURE — 85610 PROTHROMBIN TIME: CPT

## 2023-06-13 NOTE — PROGRESS NOTES
ANTICOAGULATION MANAGEMENT     Cayetano Lunsford 78 year old male is on warfarin with supratherapeutic INR result. (Goal INR 2.0-3.0)    Recent labs: (last 7 days)     23  1252   INR 4.5*       ASSESSMENT       Source(s): Chart Review and Patient/Caregiver Call       Warfarin doses taken: Warfarin taken as instructed    Diet: Decreased greens/vitamin K in diet; plans to resume previous intake - was out of town (family ) and did not eat his normal diet. Normally eats greens M/W/F and drinks V8 the other days. Missed at least 2 days of greens and 1 day of V8.     Medication/supplement changes: None noted    New illness, injury, or hospitalization: No    Signs or symptoms of bleeding or clotting: No    Previous result: Therapeutic last visit; previously outside of goal range    Additional findings: None         PLAN     Recommended plan for temporary change(s) affecting INR     Dosing Instructions: hold then partial hold then continue your current warfarin dose with next INR in 1 week, unable to return until 8 days      Summary  As of 2023    Full warfarin instructions:  : Hold; : 5 mg; Otherwise 5 mg every Mon, Fri; 6.25 mg all other days   Next INR check:  2023             Telephone call with Leo who verbalizes understanding and agrees to plan    Lab visit scheduled    Education provided:     Please call back if any changes to your diet, medications or how you've been taking warfarin    Dietary considerations: impact of vitamin K foods on INR and importance of notifying [facility] to changes in diet    Plan made per [facility] anticoagulation protocol    Corina Cuevas RN  Anticoagulation Clinic  2023    _______________________________________________________________________     Anticoagulation Episode Summary     Current INR goal:  2.0-3.0   TTR:  66.1 % (1 y)   Target end date:  Indefinite   Send INR reminders to:  [facility]    Indications    Longstanding persistent  atrial fibrillation (H) [I48.11]  Long term current use of anticoagulants with INR goal of 2.0-3.0 [Z79.01]  Atrial fibrillation  unspecified type (H) [I48.91]           Comments:           Anticoagulation Care Providers     Provider Role Specialty Phone number    Dmitri Andres MD Referring Family Medicine [phone]    Drew Bravo PA-C Referring Family Medicine [phone]

## 2023-06-21 ENCOUNTER — TELEPHONE (OUTPATIENT)
Dept: ANTICOAGULATION | Facility: CLINIC | Age: 78
End: 2023-06-21

## 2023-06-21 ENCOUNTER — ANTICOAGULATION THERAPY VISIT (OUTPATIENT)
Dept: ANTICOAGULATION | Facility: CLINIC | Age: 78
End: 2023-06-21

## 2023-06-21 ENCOUNTER — LAB (OUTPATIENT)
Dept: LAB | Facility: CLINIC | Age: 78
End: 2023-06-21
Payer: COMMERCIAL

## 2023-06-21 DIAGNOSIS — I48.91 ATRIAL FIBRILLATION, UNSPECIFIED TYPE (H): ICD-10-CM

## 2023-06-21 DIAGNOSIS — Z79.01 LONG TERM CURRENT USE OF ANTICOAGULANTS WITH INR GOAL OF 2.0-3.0: ICD-10-CM

## 2023-06-21 DIAGNOSIS — I48.11 LONGSTANDING PERSISTENT ATRIAL FIBRILLATION (H): Primary | ICD-10-CM

## 2023-06-21 LAB — INR BLD: 2.3 (ref 0.9–1.1)

## 2023-06-21 PROCEDURE — 85610 PROTHROMBIN TIME: CPT

## 2023-06-21 PROCEDURE — 36416 COLLJ CAPILLARY BLOOD SPEC: CPT

## 2023-06-21 NOTE — PROGRESS NOTES
ANTICOAGULATION MANAGEMENT     Cayetano Lunsford 78 year old male is on warfarin with therapeutic INR result. (Goal INR 2.0-3.0)    Recent labs: (last 7 days)     06/21/23  1256   INR 2.3*       ASSESSMENT       Source(s): Chart Review    Previous INR was Supratherapeutic (most likely diet related)    Medication, diet, health changes since last INR chart reviewed; none identified             PLAN     Recommended plan for no diet, medication or health factor changes affecting INR     Dosing Instructions: Continue your current warfarin dose with next INR in 3 weeks       Summary  As of 6/21/2023    Full warfarin instructions:  5 mg every Mon, Fri; 6.25 mg all other days   Next INR check:  7/12/2023             Detailed voice message left for Rich with dosing instructions and follow up date.   Will have onsite staff mail AVS.    Contact [phone]  to schedule and with any changes, questions or concerns.     Education provided:     None required    Plan made per [facility] anticoagulation protocol    Estelle Starr RN  Anticoagulation Clinic  6/21/2023    _______________________________________________________________________     Anticoagulation Episode Summary     Current INR goal:  2.0-3.0   TTR:  64.6 % (1 y)   Target end date:  Indefinite   Send INR reminders to:  [facility]    Indications    Longstanding persistent atrial fibrillation (H) [I48.11]  Long term current use of anticoagulants with INR goal of 2.0-3.0 [Z79.01]  Atrial fibrillation  unspecified type (H) [I48.91]           Comments:           Anticoagulation Care Providers     Provider Role Specialty Phone number    Dmitri Andres MD Referring Family Medicine [phone]    Drew Bravo PA-C Referring Family Medicine [phone]

## 2023-06-21 NOTE — TELEPHONE ENCOUNTER
Voicemail on the [place] line from patient stating he missed a call and to call him back.    Bethanie Dominguez RN    [facility] Anticoagulation [facility]

## 2023-06-21 NOTE — PROGRESS NOTES
Patient returned call.  Denies any changes in diet, health or medications since last INR check.  Advised him to continue warfarin maintenance dose and assisted him to schedule another INR check in 3 weeks.

## 2023-06-26 DIAGNOSIS — E11.9 TYPE 2 DIABETES, HBA1C GOAL < 8% (H): ICD-10-CM

## 2023-06-26 DIAGNOSIS — N18.1 TYPE 2 DIABETES MELLITUS WITH STAGE 1 CHRONIC KIDNEY DISEASE, WITH LONG-TERM CURRENT USE OF INSULIN (H): ICD-10-CM

## 2023-06-26 DIAGNOSIS — Z79.4 TYPE 2 DIABETES MELLITUS WITH STAGE 1 CHRONIC KIDNEY DISEASE, WITH LONG-TERM CURRENT USE OF INSULIN (H): ICD-10-CM

## 2023-06-26 DIAGNOSIS — E11.22 TYPE 2 DIABETES MELLITUS WITH STAGE 1 CHRONIC KIDNEY DISEASE, WITH LONG-TERM CURRENT USE OF INSULIN (H): ICD-10-CM

## 2023-06-28 RX ORDER — HUMAN INSULIN 100 [IU]/ML
INJECTION, SOLUTION SUBCUTANEOUS
Qty: 30 ML | Refills: 1 | Status: SHIPPED | OUTPATIENT
Start: 2023-06-28 | End: 2024-03-05

## 2023-06-28 RX ORDER — HUMAN INSULIN 100 [IU]/ML
INJECTION, SUSPENSION SUBCUTANEOUS
Qty: 10 ML | Refills: 5 | OUTPATIENT
Start: 2023-06-28

## 2023-06-28 NOTE — TELEPHONE ENCOUNTER
TC informs Pt walks in to check status, informs takes for sugars > 200, pharmacy sent novolin N, pt will follow up with pharmacy  Prescription approved per [facility] Refill Protocol.  Nany Dudley RN, BSN  [facility]

## 2023-07-12 ENCOUNTER — ANTICOAGULATION THERAPY VISIT (OUTPATIENT)
Dept: ANTICOAGULATION | Facility: CLINIC | Age: 78
End: 2023-07-12

## 2023-07-12 ENCOUNTER — LAB (OUTPATIENT)
Dept: LAB | Facility: CLINIC | Age: 78
End: 2023-07-12
Payer: COMMERCIAL

## 2023-07-12 DIAGNOSIS — I48.11 LONGSTANDING PERSISTENT ATRIAL FIBRILLATION (H): Primary | ICD-10-CM

## 2023-07-12 DIAGNOSIS — I48.91 ATRIAL FIBRILLATION, UNSPECIFIED TYPE (H): ICD-10-CM

## 2023-07-12 DIAGNOSIS — Z79.01 LONG TERM CURRENT USE OF ANTICOAGULANTS WITH INR GOAL OF 2.0-3.0: ICD-10-CM

## 2023-07-12 LAB — INR BLD: 1.6 (ref 0.9–1.1)

## 2023-07-12 PROCEDURE — 36416 COLLJ CAPILLARY BLOOD SPEC: CPT

## 2023-07-12 PROCEDURE — 85610 PROTHROMBIN TIME: CPT

## 2023-07-12 NOTE — PROGRESS NOTES
ANTICOAGULATION MANAGEMENT     Cayetano Lunsford 78 year old male is on warfarin with subtherapeutic INR result. (Goal INR 2.0-3.0)    Recent labs: (last 7 days)     07/12/23  1253   INR 1.6*       ASSESSMENT       Source(s): Chart Review and Patient/Caregiver Call       Warfarin doses taken: Warfarin taken as instructed    Diet: No new diet changes identified    Medication/supplement changes: None noted    New illness, injury, or hospitalization: No    Signs or symptoms of bleeding or clotting: No    Previous result: Therapeutic last visit; previously outside of goal range    Additional findings: None         PLAN     Recommended plan for no diet, medication or health factor changes affecting INR     Dosing Instructions: booster dose then Increase your warfarin dose (6% change) with next INR in 2 weeks       Summary  As of 7/12/2023    Full warfarin instructions:  7/12: 10 mg; Otherwise 6.25 mg every day   Next INR check:  7/26/2023             Telephone call with Leo who agrees to plan and repeated back plan correctly.  Will also have onsite staff mail AVS.    Lab visit scheduled    Education provided:     Please call back if any changes to your diet, medications or how you've been taking warfarin    Plan made per [facility] anticoagulation protocol    Estelle Starr RN  Anticoagulation Clinic  7/12/2023    _______________________________________________________________________     Anticoagulation Episode Summary     Current INR goal:  2.0-3.0   TTR:  63.6 % (1 y)   Target end date:  Indefinite   Send INR reminders to:  [facility]    Indications    Longstanding persistent atrial fibrillation (H) [I48.11]  Long term current use of anticoagulants with INR goal of 2.0-3.0 [Z79.01]  Atrial fibrillation  unspecified type (H) [I48.91]           Comments:           Anticoagulation Care Providers     Provider Role Specialty Phone number    Dmitri Andres MD Referring Family Medicine [phone]    Shelly  Drew Lombardo PA-C [facility] [phone]

## 2023-07-13 DIAGNOSIS — I48.91 ATRIAL FIBRILLATION, UNSPECIFIED TYPE (H): ICD-10-CM

## 2023-07-17 RX ORDER — WARFARIN SODIUM 2.5 MG/1
TABLET ORAL
Qty: 225 TABLET | Refills: 1 | Status: SHIPPED | OUTPATIENT
Start: 2023-07-17 | End: 2024-01-03

## 2023-07-17 NOTE — TELEPHONE ENCOUNTER
ANTICOAGULATION MANAGEMENT:  Medication Refill    Anticoagulation Summary  As of 7/12/2023    Warfarin maintenance plan:  6.25 mg (2.5 mg x 2.5) every day   Next INR check:  7/26/2023   Target end date:  Indefinite    Indications    Longstanding persistent atrial fibrillation (H) [I48.11]  Long term current use of anticoagulants with INR goal of 2.0-3.0 [Z79.01]  Atrial fibrillation  unspecified type (H) [I48.91]             Anticoagulation Care Providers     Provider Role Specialty Phone number    Dmitri Andres MD Referring Family Medicine [phone]    Drew Bravo PA-C Referring Family Medicine [phone]          Visit with referring provider/group within last year: Yes    [facility] referral signed last signed: 12/20/2022; within last year: Yes    Cayetano meets all criteria for refill (current [facility] referral, office visit with referring provider/group in last year, lab monitoring up to date or not exceeding 2 weeks overdue). Rx instructions and quantity supplied updated to match patient's current dosing plan. Warfarin 90 day supply with 1 refill granted per [facility] protocol     Sergio Gooden RN  Anticoagulation Clinic

## 2023-07-26 ENCOUNTER — LAB (OUTPATIENT)
Dept: LAB | Facility: CLINIC | Age: 78
End: 2023-07-26
Payer: COMMERCIAL

## 2023-07-26 ENCOUNTER — ANTICOAGULATION THERAPY VISIT (OUTPATIENT)
Dept: ANTICOAGULATION | Facility: CLINIC | Age: 78
End: 2023-07-26

## 2023-07-26 DIAGNOSIS — N18.1 TYPE 2 DIABETES WITH STAGE 1 CHRONIC KIDNEY DISEASE GFR>90 (H): Primary | ICD-10-CM

## 2023-07-26 DIAGNOSIS — Z79.4 TYPE 2 DIABETES MELLITUS WITH STAGE 1 CHRONIC KIDNEY DISEASE, WITH LONG-TERM CURRENT USE OF INSULIN (H): ICD-10-CM

## 2023-07-26 DIAGNOSIS — I48.91 ATRIAL FIBRILLATION, UNSPECIFIED TYPE (H): ICD-10-CM

## 2023-07-26 DIAGNOSIS — I48.11 LONGSTANDING PERSISTENT ATRIAL FIBRILLATION (H): Primary | ICD-10-CM

## 2023-07-26 DIAGNOSIS — N18.1 TYPE 2 DIABETES MELLITUS WITH STAGE 1 CHRONIC KIDNEY DISEASE, WITH LONG-TERM CURRENT USE OF INSULIN (H): ICD-10-CM

## 2023-07-26 DIAGNOSIS — E11.22 TYPE 2 DIABETES WITH STAGE 1 CHRONIC KIDNEY DISEASE GFR>90 (H): Primary | ICD-10-CM

## 2023-07-26 DIAGNOSIS — Z79.01 LONG TERM CURRENT USE OF ANTICOAGULANTS WITH INR GOAL OF 2.0-3.0: ICD-10-CM

## 2023-07-26 DIAGNOSIS — E11.22 TYPE 2 DIABETES MELLITUS WITH STAGE 1 CHRONIC KIDNEY DISEASE, WITH LONG-TERM CURRENT USE OF INSULIN (H): ICD-10-CM

## 2023-07-26 LAB
HBA1C MFR BLD: 5.7 % (ref 0–5.6)
INR BLD: 2 (ref 0.9–1.1)

## 2023-07-26 PROCEDURE — 83036 HEMOGLOBIN GLYCOSYLATED A1C: CPT

## 2023-07-26 PROCEDURE — 36415 COLL VENOUS BLD VENIPUNCTURE: CPT

## 2023-07-26 PROCEDURE — 85610 PROTHROMBIN TIME: CPT

## 2023-07-26 NOTE — PROGRESS NOTES
ANTICOAGULATION MANAGEMENT     Cayetano Lunsford 78 year old male is on warfarin with therapeutic INR result. (Goal INR 2.0-3.0)    Recent labs: (last 7 days)     07/26/23  1336   INR 2.0*       ASSESSMENT     Source(s): Chart Review and Patient/Caregiver Call     Warfarin doses taken: Warfarin taken as instructed  Diet: No new diet changes identified  Medication/supplement changes: None noted  New illness, injury, or hospitalization: No  Signs or symptoms of bleeding or clotting: No  Previous result: Subtherapeutic  Additional findings: None       PLAN     Recommended plan for no diet, medication or health factor changes affecting INR     Dosing Instructions: Continue your current warfarin dose with next INR in 3 weeks       Summary  As of 7/26/2023      Full warfarin instructions:  6.25 mg every day   Next INR check:  8/16/2023               Telephone call with Rich who agrees to plan and repeated back plan correctly.  Will have onsite staff mail AVS to patient.    Lab visit scheduled    Education provided:   Please call back if any changes to your diet, medications or how you've been taking warfarin    Plan made per [facility] anticoagulation protocol    Estelle Starr RN  Anticoagulation Clinic  7/26/2023    _______________________________________________________________________     Anticoagulation Episode Summary       Current INR goal:  2.0-3.0   TTR:  62.9 % (1 y)   Target end date:  Indefinite   Send INR reminders to:  [facility]    Indications    Longstanding persistent atrial fibrillation (H) [I48.11]  Long term current use of anticoagulants with INR goal of 2.0-3.0 [Z79.01]  Atrial fibrillation  unspecified type (H) [I48.91]             Comments:               Anticoagulation Care Providers       Provider Role Specialty Phone number    Dmitri Andres MD Referring Family Medicine [phone]    Drew Bravo PA-C Referring Family Medicine [phone]

## 2023-07-28 RX ORDER — BLOOD SUGAR DIAGNOSTIC
STRIP MISCELLANEOUS
Qty: 300 STRIP | Refills: 3 | Status: SHIPPED | OUTPATIENT
Start: 2023-07-28 | End: 2024-08-12

## 2023-08-03 ENCOUNTER — OFFICE VISIT (OUTPATIENT)
Dept: OPTOMETRY | Facility: CLINIC | Age: 78
End: 2023-08-03
Payer: COMMERCIAL

## 2023-08-03 DIAGNOSIS — E11.9 DIABETES MELLITUS WITHOUT OPHTHALMIC MANIFESTATIONS (H): ICD-10-CM

## 2023-08-03 DIAGNOSIS — H52.4 PRESBYOPIA: ICD-10-CM

## 2023-08-03 DIAGNOSIS — H52.03 HYPEROPIA OF BOTH EYES WITH ASTIGMATISM: Primary | ICD-10-CM

## 2023-08-03 DIAGNOSIS — H25.13 NUCLEAR SCLEROSIS OF BOTH EYES: ICD-10-CM

## 2023-08-03 DIAGNOSIS — H52.203 HYPEROPIA OF BOTH EYES WITH ASTIGMATISM: Primary | ICD-10-CM

## 2023-08-03 PROCEDURE — 92015 DETERMINE REFRACTIVE STATE: CPT | Performed by: OPTOMETRIST

## 2023-08-03 PROCEDURE — 92014 COMPRE OPH EXAM EST PT 1/>: CPT | Performed by: OPTOMETRIST

## 2023-08-03 ASSESSMENT — REFRACTION_WEARINGRX
SPECS_TYPE: PAL
OS_SPHERE: +1.50
OD_SPHERE: +1.00
OD_CYLINDER: +0.75
OS_AXIS: 010
OS_CYLINDER: +1.50
OS_ADD: +2.50
OD_ADD: +2.50
OD_AXIS: 170

## 2023-08-03 ASSESSMENT — CONF VISUAL FIELD
OD_SUPERIOR_TEMPORAL_RESTRICTION: 0
OD_SUPERIOR_NASAL_RESTRICTION: 0
OD_INFERIOR_NASAL_RESTRICTION: 0
OS_SUPERIOR_NASAL_RESTRICTION: 0
METHOD: COUNTING FINGERS
OD_INFERIOR_TEMPORAL_RESTRICTION: 0
OS_NORMAL: 1
OS_INFERIOR_NASAL_RESTRICTION: 0
OS_INFERIOR_TEMPORAL_RESTRICTION: 0
OS_SUPERIOR_TEMPORAL_RESTRICTION: 0
OD_NORMAL: 1

## 2023-08-03 ASSESSMENT — REFRACTION_MANIFEST
OS_SPHERE: +0.25
METHOD_AUTOREFRACTION: 1
OD_SPHERE: +1.25
OD_ADD: +2.50
OD_CYLINDER: +1.25
OD_AXIS: 180
OD_CYLINDER: +1.00
OS_ADD: +2.50
OD_AXIS: 175
OS_AXIS: 030
OS_CYLINDER: +1.00
OS_AXIS: 011
OS_SPHERE: +1.75
OD_SPHERE: +1.25
OS_CYLINDER: +1.25

## 2023-08-03 ASSESSMENT — VISUAL ACUITY
OS_CC: 20/40
OD_CC+: -2
METHOD: SNELLEN - LINEAR
OD_CC: 20/60
OD_SC: 20/70
OS_SC: 20/70
OS_CC: 20/25
CORRECTION_TYPE: GLASSES
OS_CC+: -2
OD_CC: 20/30

## 2023-08-03 ASSESSMENT — CUP TO DISC RATIO
OS_RATIO: 0.45
OD_RATIO: 0.5

## 2023-08-03 ASSESSMENT — EXTERNAL EXAM - LEFT EYE: OS_EXAM: NORMAL

## 2023-08-03 ASSESSMENT — SLIT LAMP EXAM - LIDS
COMMENTS: DERMATOCHALASIS
COMMENTS: DERMATOCHALASIS

## 2023-08-03 ASSESSMENT — TONOMETRY
OD_IOP_MMHG: 13
IOP_METHOD: APPLANATION
OS_IOP_MMHG: 13

## 2023-08-03 ASSESSMENT — EXTERNAL EXAM - RIGHT EYE: OD_EXAM: NORMAL

## 2023-08-03 NOTE — PROGRESS NOTES
Chief Complaint   Patient presents with    Diabetic Eye Exam       Lab Results   Component Value Date    A1C 5.7 07/26/2023    A1C 5.9 02/02/2023    A1C 5.7 10/04/2022    A1C 6.3 06/21/2022    A1C 6.0 02/15/2022    A1C 5.4 07/09/2021    A1C 5.3 05/20/2021    A1C 5.9 11/09/2020    A1C 5.8 07/07/2020    A1C 6.1 12/09/2019            Last Eye Exam: 08/2022  Dilated Previously: Yes, side effects of dilation explained today    What are you currently using to see?  glasses    Distance Vision Acuity: Noticed gradual change in both eyes in glasses     Near Vision Acuity: Not satisfied in glasses     Eye Comfort: good  Do you use eye drops? : No      Rosina Patricio - Optometric Assistant      Medical, surgical and family histories reviewed and updated 8/3/2023.       OBJECTIVE: See Ophthalmology exam    ASSESSMENT:    ICD-10-CM    1. Hyperopia of both eyes with astigmatism  H52.03     H52.203       2. Presbyopia  H52.4       3. Nuclear sclerosis of both eyes  H25.13       4. Diabetes mellitus without ophthalmic manifestations (H)  E11.9           PLAN:  Optional change w subjective improvement    Cayetano Lunsford aware  eye exam results will be sent to Drew Bravo.    Lorin Cuevas OD

## 2023-08-03 NOTE — PATIENT INSTRUCTIONS
Patient Education   Diabetes weakens the blood vessels all over the body, including the eyes. Damage to the blood vessels in the eyes can cause swelling or bleeding into part of the eye (called the retina). This is called diabetic retinopathy (WEN-tin--pu-thee). If not treated, this disease can cause vision loss or blindness.   Symptoms may include blurred or distorted vision, but many people have no symptoms. It's important to see your eye doctor regularly to check for problems.   Early treatment and good control can help protect your vision. Here are the things you can do to help prevent vision loss:      1. Keep your blood sugar levels under tight control.      2. Bring high blood pressure under control.      3. No smoking.      4. Have yearly dilated eye exams.

## 2023-08-03 NOTE — LETTER
8/3/2023         RE: Cayetano Lunsford  [address]  [facility] [address]        Dear Colleague,    Thank you for referring your patient, Cayetano Lunsford, to the [facility]. Please see a copy of my visit note below.    Chief Complaint   Patient presents with     Diabetic Eye Exam       Lab Results   Component Value Date    A1C 5.7 07/26/2023    A1C 5.9 02/02/2023    A1C 5.7 10/04/2022    A1C 6.3 06/21/2022    A1C 6.0 02/15/2022    A1C 5.4 07/09/2021    A1C 5.3 05/20/2021    A1C 5.9 11/09/2020    A1C 5.8 07/07/2020    A1C 6.1 12/09/2019            Last Eye Exam: 08/2022  Dilated Previously: Yes, side effects of dilation explained today    What are you currently using to see?  glasses    Distance Vision Acuity: Noticed gradual change in both eyes in glasses     Near Vision Acuity: Not satisfied in glasses     Eye Comfort: good  Do you use eye drops? : No      Rosina Patricio - Optometric Assistant      Medical, surgical and family histories reviewed and updated 8/3/2023.       OBJECTIVE: See Ophthalmology exam    ASSESSMENT:    ICD-10-CM    1. Hyperopia of both eyes with astigmatism  H52.03     H52.203       2. Presbyopia  H52.4       3. Nuclear sclerosis of both eyes  H25.13       4. Diabetes mellitus without ophthalmic manifestations (H)  E11.9           PLAN:  Optional change w subjective improvement    Cayetano Lunsford aware  eye exam results will be sent to Drew Bravo.    Lorin Cuevas OD        Again, thank you for allowing me to participate in the care of your patient.        Sincerely,        Lorin Cuevas, OD

## 2023-08-16 ENCOUNTER — ANTICOAGULATION THERAPY VISIT (OUTPATIENT)
Dept: ANTICOAGULATION | Facility: CLINIC | Age: 78
End: 2023-08-16

## 2023-08-16 ENCOUNTER — LAB (OUTPATIENT)
Dept: LAB | Facility: CLINIC | Age: 78
End: 2023-08-16
Payer: COMMERCIAL

## 2023-08-16 DIAGNOSIS — Z79.01 LONG TERM CURRENT USE OF ANTICOAGULANTS WITH INR GOAL OF 2.0-3.0: ICD-10-CM

## 2023-08-16 DIAGNOSIS — I48.11 LONGSTANDING PERSISTENT ATRIAL FIBRILLATION (H): Primary | ICD-10-CM

## 2023-08-16 DIAGNOSIS — I48.91 ATRIAL FIBRILLATION, UNSPECIFIED TYPE (H): ICD-10-CM

## 2023-08-16 LAB — INR BLD: 1.8 (ref 0.9–1.1)

## 2023-08-16 PROCEDURE — 85610 PROTHROMBIN TIME: CPT

## 2023-08-16 PROCEDURE — 36416 COLLJ CAPILLARY BLOOD SPEC: CPT

## 2023-08-16 NOTE — PROGRESS NOTES
ANTICOAGULATION MANAGEMENT     Cayetano Lunsford 78 year old male is on warfarin with subtherapeutic INR result. (Goal INR 2.0-3.0)    Recent labs: (last 7 days)     08/16/23  1324   INR 1.8*       ASSESSMENT     Source(s): Chart Review  Previous INR was Therapeutic last visit; previously outside of goal range  Medication, diet, health changes since last INR chart reviewed; none identified         PLAN     Unable to reach Rich today.    Left message to take a booster dose of warfarin,  7.5 mg tonight. Request call back for assessment. Left message to call [phone].     Follow up required to confirm warfarin dose taken and assess for changes    Dayanara Cuevas RN  Anticoagulation Clinic  8/16/2023

## 2023-08-16 NOTE — PROGRESS NOTES
ANTICOAGULATION MANAGEMENT     Cayetano Lunsford 78 year old male is on warfarin with subtherapeutic INR result. (Goal INR 2.0-3.0)    Recent labs: (last 7 days)     08/16/23  1324   INR 1.8*       ASSESSMENT     Source(s): Chart Review and Patient/Caregiver Call     Warfarin doses taken: Warfarin taken as instructed  Diet: Decreased greens/vitamin K in diet; plans to resume previous intake  Medication/supplement changes: None noted  New illness, injury, or hospitalization: No  Signs or symptoms of bleeding or clotting: No  Previous result: Therapeutic last visit; previously outside of goal range  Additional findings: None       PLAN     Recommended plan for no diet, medication or health factor changes affecting INR     Dosing Instructions: Increase your warfarin dose (2.9% change) with next INR in 1-2 weeks       Summary  As of 8/16/2023      Full warfarin instructions:  8/16: 7.5 mg; Otherwise 7.5 mg every Wed; 6.25 mg all other days   Next INR check:  8/23/2023               Telephone call with Leo who agrees to plan and repeated back plan correctly and AVS placed in outgoing mail    Lab visit scheduled    Education provided:   Please call back if any changes to your diet, medications or how you've been taking warfarin  Contact [phone]  with any changes, questions or concerns.     Plan made per [facility] anticoagulation protocol    Dayanara Cuevas RN  Anticoagulation Clinic  8/16/2023    _______________________________________________________________________     Anticoagulation Episode Summary       Current INR goal:  2.0-3.0   TTR:  57.2 % (1 y)   Target end date:  Indefinite   Send INR reminders to:  [facility]    Indications    Longstanding persistent atrial fibrillation (H) [I48.11]  Long term current use of anticoagulants with INR goal of 2.0-3.0 [Z79.01]  Atrial fibrillation  unspecified type (H) [I48.91]             Comments:               Anticoagulation Care Providers       Provider Role  Specialty Phone number    Dmitri Andres MD Referring Family Medicine [phone]    Drew Bravo PA-C Referring Family Medicine [phone]

## 2023-08-23 ENCOUNTER — LAB (OUTPATIENT)
Dept: LAB | Facility: CLINIC | Age: 78
End: 2023-08-23
Payer: COMMERCIAL

## 2023-08-23 ENCOUNTER — ANTICOAGULATION THERAPY VISIT (OUTPATIENT)
Dept: ANTICOAGULATION | Facility: CLINIC | Age: 78
End: 2023-08-23

## 2023-08-23 ENCOUNTER — TELEPHONE (OUTPATIENT)
Dept: FAMILY MEDICINE | Facility: CLINIC | Age: 78
End: 2023-08-23

## 2023-08-23 DIAGNOSIS — I48.91 ATRIAL FIBRILLATION, UNSPECIFIED TYPE (H): ICD-10-CM

## 2023-08-23 DIAGNOSIS — Z79.01 LONG TERM CURRENT USE OF ANTICOAGULANTS WITH INR GOAL OF 2.0-3.0: ICD-10-CM

## 2023-08-23 DIAGNOSIS — I48.11 LONGSTANDING PERSISTENT ATRIAL FIBRILLATION (H): Primary | ICD-10-CM

## 2023-08-23 LAB — INR BLD: 1.6 (ref 0.9–1.1)

## 2023-08-23 PROCEDURE — 85610 PROTHROMBIN TIME: CPT

## 2023-08-23 PROCEDURE — 36416 COLLJ CAPILLARY BLOOD SPEC: CPT

## 2023-08-23 NOTE — PROGRESS NOTES
ANTICOAGULATION MANAGEMENT     Cayetano Lunsford 78 year old male is on warfarin with ongoing subtherapeutic INR result. (Goal INR 2.0-3.0)    Recent labs: (last 7 days)     08/23/23  1321   INR 1.6*       ASSESSMENT     Source(s): Chart Review  Previous INR was Subtherapeutic, maintenance dose increased 3%.  Medication, diet, health changes since last INR chart reviewed; none identified         PLAN     Unable to reach Sotero today.    Left message to take a booster dose of warfarin,  10 mg (4 tablets) tonight. Request call back for assessment.    Follow up required to discuss out of range result . If still no factors identified for subtherapeutic INR, then per protocol, plan to increase maintenance dose about 11% with weekly dosing at 2.5 tablets (6.25 mg) on Mon and Fri; 3 tablets (7.5 mg) all other days. Next INR in 2 weeks.    Mora Berg, RN  Anticoagulation Clinic  8/23/2023

## 2023-08-23 NOTE — PROGRESS NOTES
ANTICOAGULATION MANAGEMENT     Cayetano Lunsford 78 year old male is on warfarin with therapeutic INR result. (Goal INR 2.0-3.0)    Recent labs: (last 7 days)     08/23/23  1321   INR 1.6*       ASSESSMENT     Source(s): Chart Review and Patient/Caregiver Call     Warfarin doses taken: Warfarin taken as instructed, increased at last visit 3%  Diet: Decreased greens/vitamin K in diet; plans to resume previous intake  Medication/supplement changes: None noted  New illness, injury, or hospitalization: No  Signs or symptoms of bleeding or clotting: No  Previous result: Subtherapeutic  Additional findings: None       PLAN     Recommended plan for no diet, medication or health factor changes affecting INR     Dosing Instructions: booster dose then Increase your warfarin dose (11% change) with next INR in 2 weeks       Summary  As of 8/23/2023      Full warfarin instructions:  8/23: 10 mg; Otherwise 6.25 mg every Mon, Fri; 7.5 mg all other days   Next INR check:  9/7/2023               Telephone call with Leo who agrees to plan and repeated back plan correctly    Check at provider office visit. Onsite [facility] RN will mail AVS with next appointment time and dosing calendar.    Education provided:   Please call back if any changes to your diet, medications or how you've been taking warfarin  Contact [phone]  with any changes, questions or concerns.     Plan made per [facility] anticoagulation protocol    Mora Berg, RN  Anticoagulation Clinic  8/23/2023    _______________________________________________________________________     Anticoagulation Episode Summary       Current INR goal:  2.0-3.0   TTR:  55.3 % (1 y)   Target end date:  Indefinite   Send INR reminders to:  [facility]    Indications    Longstanding persistent atrial fibrillation (H) [I48.11]  Long term current use of anticoagulants with INR goal of 2.0-3.0 [Z79.01]  Atrial fibrillation  unspecified type (H) [I48.91]             Comments:                Anticoagulation Care Providers       Provider Role Specialty Phone number    Dmitri Andres MD Referring Family Medicine [phone]    Drew Bravo PA-C Referring Family Medicine [phone]

## 2023-08-23 NOTE — TELEPHONE ENCOUNTER
Patient Returning Call    Reason for call:  returning anticoagulation nurse     Information relayed to patient:  I will send a message to the anticoagulation team and ask them to call you back     Patient has additional questions:  No    What are your questions/concerns:  Patient would like a call back for the low INR today     Okay to leave a detailed message?: Yes at Home number on file [phone] (home)

## 2023-08-30 DIAGNOSIS — G25.0 BENIGN FAMILIAL TREMOR: ICD-10-CM

## 2023-09-01 RX ORDER — PRIMIDONE 50 MG/1
TABLET ORAL
Refills: 1 | OUTPATIENT
Start: 2023-09-01

## 2023-09-07 ENCOUNTER — TELEPHONE (OUTPATIENT)
Dept: FAMILY MEDICINE | Facility: CLINIC | Age: 78
End: 2023-09-07

## 2023-09-07 ENCOUNTER — OFFICE VISIT (OUTPATIENT)
Dept: NEUROLOGY | Facility: CLINIC | Age: 78
End: 2023-09-07
Payer: COMMERCIAL

## 2023-09-07 ENCOUNTER — LAB (OUTPATIENT)
Dept: LAB | Facility: CLINIC | Age: 78
End: 2023-09-07
Payer: COMMERCIAL

## 2023-09-07 ENCOUNTER — ANTICOAGULATION THERAPY VISIT (OUTPATIENT)
Dept: ANTICOAGULATION | Facility: CLINIC | Age: 78
End: 2023-09-07

## 2023-09-07 VITALS
BODY MASS INDEX: 26.16 KG/M2 | HEIGHT: 66 IN | OXYGEN SATURATION: 93 % | SYSTOLIC BLOOD PRESSURE: 136 MMHG | WEIGHT: 162.8 LBS | HEART RATE: 72 BPM | DIASTOLIC BLOOD PRESSURE: 69 MMHG

## 2023-09-07 DIAGNOSIS — G25.0 BENIGN FAMILIAL TREMOR: Primary | ICD-10-CM

## 2023-09-07 DIAGNOSIS — I48.11 LONGSTANDING PERSISTENT ATRIAL FIBRILLATION (H): Primary | ICD-10-CM

## 2023-09-07 DIAGNOSIS — Z79.01 LONG TERM CURRENT USE OF ANTICOAGULANTS WITH INR GOAL OF 2.0-3.0: ICD-10-CM

## 2023-09-07 DIAGNOSIS — I48.91 ATRIAL FIBRILLATION, UNSPECIFIED TYPE (H): ICD-10-CM

## 2023-09-07 LAB — INR BLD: 2.2 (ref 0.9–1.1)

## 2023-09-07 PROCEDURE — 36415 COLL VENOUS BLD VENIPUNCTURE: CPT

## 2023-09-07 PROCEDURE — 99213 OFFICE O/P EST LOW 20 MIN: CPT | Performed by: PSYCHIATRY & NEUROLOGY

## 2023-09-07 PROCEDURE — 85610 PROTHROMBIN TIME: CPT

## 2023-09-07 RX ORDER — PRIMIDONE 50 MG/1
TABLET ORAL
Qty: 1001 TABLET | Refills: 1 | Status: SHIPPED | OUTPATIENT
Start: 2023-09-07 | End: 2024-03-07

## 2023-09-07 NOTE — PROGRESS NOTES
ANTICOAGULATION MANAGEMENT     Cayetano Lunsford 78 year old male is on warfarin with therapeutic INR result. (Goal INR 2.0-3.0)    Recent labs: (last 7 days)     09/07/23  1313   INR 2.2*       ASSESSMENT     Source(s): Chart Review  Previous INR was Subtherapeutic  Medication, diet, health changes since last INR chart reviewed:  MD increased by 11% previously   No changes per Neurology ov notes today         PLAN     Unable to reach Rich today.    Left message to continue current dose of warfarin 7.5 mg tonight. Request call back for assessment.    Follow up required to confirm warfarin dose taken and assess for changes    Kirstin Meléndez, RN  Anticoagulation Clinic  9/7/2023

## 2023-09-07 NOTE — TELEPHONE ENCOUNTER
Patient Returning Call    Reason for call:  Returning call    Information relayed to patient:  Will leave TE for call back    Patient has additional questions:  No      Okay to leave a detailed message?: Yes at Cell number on file:    Telephone Information:   Mobile [phone]

## 2023-09-07 NOTE — PROGRESS NOTES
"ESTABLISHED PATIENT NEUROLOGY NOTE    DATE OF VISIT: 9/7/2023  CLINIC LOCATION: [facility]  MRN: [id number]  PATIENT NAME: Cayetano Lunsford  YOB: 1945    REASON FOR VISIT:   Chief Complaint   Patient presents with    Tremors     6 month follow up      SUBJECTIVE:                                                      HISTORY OF PRESENT ILLNESS: Patient is here to follow up regarding essential tremor.  The last visit was on 3/7/2023.  No medication changes were made at that time, but we discussed bilateral wrist weights.  Please refer to my initial/other prior notes for further information.    Since the last visit, the patient reports that his symptoms are overall stable, though he notices fluctuations on a day-to-day basis.  He did not try wrist weights, as we discussed.  He is on 200/200/150 mg of primidone daily and 120 mg of propranolol twice daily without noticeable side effects.  He denies interval development of new neurological symptoms.  His sister also has tremor.  EXAM:                                                    Physical Exam:   Vitals: /69   Pulse 72   Ht 1.67 m (5' 5.75\")   Wt 73.8 kg (162 lb 12.8 oz)   SpO2 93%   BMI 26.48 kg/m      General: pt is in NAD, cooperative.  Skin: normal turgor, moist mucous membranes, no lesions/rashes noticed.  HEENT: ATNC, white sclera, normal conjunctiva.  Respiratory: Symmetric lung excursion, no accessory respiratory muscle use.  Abdomen: Non distended.  Neurological: awake, cooperative, follows commands, no significant exam changes compared to the previous visits.  ASSESSMENT AND PLAN:                                                    Assessment: 78 year old male patient presents for follow-up of essential tremor.  He reports that his symptoms are stable on current therapy.  Previously we discussed that further increasing dose of either propranolol or primidone would not be beneficial or tolerated, but additional " treatment options include gabapentin and topiramate.  At the present time, I do not feel that we need to adjust his doses, but I reminded the patient about option of trying bilateral wrist splints.  The primidone prescription was refilled.  Propranolol prescription comes from his primary care provider.    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 I am pleased to see that his symptoms are stable on current therapy.    We decided not to make any medication changes.    Next follow-up appointment is in the next 6 months or earlier if needed.    Total Time: 14 minutes spent on the date of the encounter doing chart review, history and exam, documentation and further activities per the note.    Bong Yoo MD  [facility]  (Chart documentation was completed in part with Dragon voice-recognition software. Even though reviewed, some grammatical, spelling, and word errors may remain.)

## 2023-09-07 NOTE — LETTER
"    9/7/2023         RE: Cayetano Lunsford  [address]  [place] [address]        Dear Colleague,    Thank you for referring your patient, Cayetano Lunsford, to the [facility]. Please see a copy of my visit note below.    ESTABLISHED PATIENT NEUROLOGY NOTE    DATE OF VISIT: 9/7/2023  CLINIC LOCATION: [facility]  MRN: [id number]  PATIENT NAME: Cayetano Lunsford  YOB: 1945    REASON FOR VISIT:   Chief Complaint   Patient presents with     Tremors     6 month follow up      SUBJECTIVE:                                                      HISTORY OF PRESENT ILLNESS: Patient is here to follow up regarding essential tremor.  The last visit was on 3/7/2023.  No medication changes were made at that time, but we discussed bilateral wrist weights.  Please refer to my initial/other prior notes for further information.    Since the last visit, the patient reports that his symptoms are overall stable, though he notices fluctuations on a day-to-day basis.  He did not try wrist weights, as we discussed.  He is on 200/200/150 mg of primidone daily and 120 mg of propranolol twice daily without noticeable side effects.  He denies interval development of new neurological symptoms.  His sister also has tremor.  EXAM:                                                    Physical Exam:   Vitals: /69   Pulse 72   Ht 1.67 m (5' 5.75\")   Wt 73.8 kg (162 lb 12.8 oz)   SpO2 93%   BMI 26.48 kg/m      General: pt is in NAD, cooperative.  Skin: normal turgor, moist mucous membranes, no lesions/rashes noticed.  HEENT: ATNC, white sclera, normal conjunctiva.  Respiratory: Symmetric lung excursion, no accessory respiratory muscle use.  Abdomen: Non distended.  Neurological: awake, cooperative, follows commands, no significant exam changes compared to the previous visits.  ASSESSMENT AND PLAN:                                                  "   Assessment: 78 year old male patient presents for follow-up of essential tremor.  He reports that his symptoms are stable on current therapy.  Previously we discussed that further increasing dose of either propranolol or primidone would not be beneficial or tolerated, but additional treatment options include gabapentin and topiramate.  At the present time, I do not feel that we need to adjust his doses, but I reminded the patient about option of trying bilateral wrist splints.  The primidone prescription was refilled.  Propranolol prescription comes from his primary care provider.    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 I am pleased to see that his symptoms are stable on current therapy.    We decided not to make any medication changes.    Next follow-up appointment is in the next 6 months or earlier if needed.    Total Time: 14 minutes spent on the date of the encounter doing chart review, history and exam, documentation and further activities per the note.    Bong Yoo MD  [facility]  (Chart documentation was completed in part with Dragon voice-recognition software. Even though reviewed, some grammatical, spelling, and word errors may remain.)      Again, thank you for allowing me to participate in the care of your patient.        Sincerely,        Bong Yoo MD

## 2023-09-07 NOTE — PROGRESS NOTES
ANTICOAGULATION MANAGEMENT     Cayetano Lunsford 78 year old male is on warfarin with therapeutic INR result. (Goal INR 2.0-3.0)    Recent labs: (last 7 days)     09/07/23  1313   INR 2.2*       ASSESSMENT     Source(s): Chart Review and Patient/Caregiver Call     Warfarin doses taken: Warfarin taken as instructed  Diet: No new diet changes identified  Medication/supplement changes: None noted  New illness, injury, or hospitalization: No  Signs or symptoms of bleeding or clotting: No  Previous result: Subtherapeutic  Additional findings: MD increased by 11% previously        PLAN     Recommended plan for no diet, medication or health factor changes affecting INR     Dosing Instructions: Continue your current warfarin dose with next INR in 2 weeks       Summary  As of 9/7/2023      Full warfarin instructions:  6.25 mg every Mon, Fri; 7.5 mg all other days   Next INR check:  9/21/2023               Telephone call with Leo who verbalizes understanding and agrees to plan    Lab visit scheduled    Education provided:   Please call back if any changes to your diet, medications or how you've been taking warfarin  Symptom monitoring: monitoring for bleeding signs and symptoms and monitoring for clotting signs and symptoms    Plan made per [facility] anticoagulation protocol    Kirstin Meléndez RN  Anticoagulation Clinic  9/7/2023    _______________________________________________________________________     Anticoagulation Episode Summary       Current INR goal:  2.0-3.0   TTR:  52.5 % (1 y)   Target end date:  Indefinite   Send INR reminders to:  [facility]    Indications    Longstanding persistent atrial fibrillation (H) [I48.11]  Long term current use of anticoagulants with INR goal of 2.0-3.0 [Z79.01]  Atrial fibrillation  unspecified type (H) [I48.91]             Comments:               Anticoagulation Care Providers       Provider Role Specialty Phone number    Dmitri Andres MD Referring Family  Medicine [phone]    Drew Bravo PA-C [facility] [phone]

## 2023-09-07 NOTE — TELEPHONE ENCOUNTER
Writer returned call to patient and reviewed INR level and dosing plan. See [facility] encounter. MARLINE KingN, RN

## 2023-09-07 NOTE — PATIENT INSTRUCTIONS
AFTER VISIT SUMMARY (AVS):    At today's visit we thoroughly discussed current symptoms, available treatment options, and the plan. I am pleased to see that your symptoms are stable on current therapy.    We decided not to make any medication changes.    Please consider trial of bilateral wrist weights to help your tremor.    Next follow-up appointment is in the next 6 months or earlier if needed.    Please do not hesitate to call me with any questions or concerns.    Thanks.    Detail Level: Zone

## 2023-09-14 ENCOUNTER — TRANSFERRED RECORDS (OUTPATIENT)
Dept: HEALTH INFORMATION MANAGEMENT | Facility: CLINIC | Age: 78
End: 2023-09-14
Payer: COMMERCIAL

## 2023-09-20 DIAGNOSIS — I48.0 PAROXYSMAL ATRIAL FIBRILLATION (H): ICD-10-CM

## 2023-09-20 RX ORDER — ATORVASTATIN CALCIUM 20 MG/1
20 TABLET, FILM COATED ORAL DAILY
Qty: 90 TABLET | Refills: 1 | Status: SHIPPED | OUTPATIENT
Start: 2023-09-20 | End: 2024-03-20

## 2023-09-21 ENCOUNTER — LAB (OUTPATIENT)
Dept: LAB | Facility: CLINIC | Age: 78
End: 2023-09-21
Payer: COMMERCIAL

## 2023-09-21 ENCOUNTER — ANTICOAGULATION THERAPY VISIT (OUTPATIENT)
Dept: ANTICOAGULATION | Facility: CLINIC | Age: 78
End: 2023-09-21

## 2023-09-21 DIAGNOSIS — I48.11 LONGSTANDING PERSISTENT ATRIAL FIBRILLATION (H): Primary | ICD-10-CM

## 2023-09-21 DIAGNOSIS — Z79.01 LONG TERM CURRENT USE OF ANTICOAGULANTS WITH INR GOAL OF 2.0-3.0: ICD-10-CM

## 2023-09-21 DIAGNOSIS — I48.91 ATRIAL FIBRILLATION, UNSPECIFIED TYPE (H): ICD-10-CM

## 2023-09-21 LAB — INR BLD: 2.1 (ref 0.9–1.1)

## 2023-09-21 PROCEDURE — 85610 PROTHROMBIN TIME: CPT

## 2023-09-21 PROCEDURE — 36416 COLLJ CAPILLARY BLOOD SPEC: CPT

## 2023-09-21 NOTE — PROGRESS NOTES
ANTICOAGULATION MANAGEMENT     Cayetano Lunsford 78 year old male is on warfarin with therapeutic INR result. (Goal INR 2.0-3.0)    Recent labs: (last 7 days)     09/21/23  1257   INR 2.1*       ASSESSMENT     Source(s): Chart Review and Patient/Caregiver Call     Warfarin doses taken: Warfarin taken as instructed  Diet: No new diet changes identified  Medication/supplement changes: None noted  New illness, injury, or hospitalization: No  Signs or symptoms of bleeding or clotting: No  Previous result: Therapeutic last visit; previously outside of goal range  Additional findings: None       PLAN     Recommended plan for no diet, medication or health factor changes affecting INR     Dosing Instructions: Continue your current warfarin dose with next INR in 3 weeks       Summary  As of 9/21/2023      Full warfarin instructions:  6.25 mg every Mon, Fri; 7.5 mg all other days   Next INR check:  10/10/2023               Telephone call with Leo who agrees to plan and repeated back plan correctly    Lab visit scheduled    Education provided:   Please call back if any changes to your diet, medications or how you've been taking warfarin    Plan made per [facility] anticoagulation protocol    Estelle Starr RN  Anticoagulation Clinic  9/21/2023    _______________________________________________________________________     Anticoagulation Episode Summary       Current INR goal:  2.0-3.0   TTR:  52.5 % (1 y)   Target end date:  Indefinite   Send INR reminders to:  [facility]    Indications    Longstanding persistent atrial fibrillation (H) [I48.11]  Long term current use of anticoagulants with INR goal of 2.0-3.0 [Z79.01]  Atrial fibrillation  unspecified type (H) [I48.91]             Comments:               Anticoagulation Care Providers       Provider Role Specialty Phone number    Dmitri Andres MD Referring Family Medicine [phone]    Drew Bravo PA-C Referring Family Medicine [phone]

## 2023-09-25 DIAGNOSIS — I10 HYPERTENSION GOAL BP (BLOOD PRESSURE) < 140/90: ICD-10-CM

## 2023-09-25 DIAGNOSIS — I48.0 PAROXYSMAL ATRIAL FIBRILLATION (H): ICD-10-CM

## 2023-09-25 DIAGNOSIS — G25.0 BENIGN FAMILIAL TREMOR: ICD-10-CM

## 2023-09-26 RX ORDER — CHLORTHALIDONE 25 MG/1
25 TABLET ORAL DAILY
Qty: 90 TABLET | Refills: 0 | Status: SHIPPED | OUTPATIENT
Start: 2023-09-26 | End: 2023-12-27

## 2023-10-10 ENCOUNTER — OFFICE VISIT (OUTPATIENT)
Dept: PHARMACY | Facility: CLINIC | Age: 78
End: 2023-10-10
Payer: COMMERCIAL

## 2023-10-10 ENCOUNTER — ALLIED HEALTH/NURSE VISIT (OUTPATIENT)
Dept: FAMILY MEDICINE | Facility: CLINIC | Age: 78
End: 2023-10-10
Payer: COMMERCIAL

## 2023-10-10 ENCOUNTER — LAB (OUTPATIENT)
Dept: LAB | Facility: CLINIC | Age: 78
End: 2023-10-10
Payer: COMMERCIAL

## 2023-10-10 ENCOUNTER — ANTICOAGULATION THERAPY VISIT (OUTPATIENT)
Dept: ANTICOAGULATION | Facility: CLINIC | Age: 78
End: 2023-10-10

## 2023-10-10 VITALS — BODY MASS INDEX: 26.31 KG/M2 | DIASTOLIC BLOOD PRESSURE: 56 MMHG | SYSTOLIC BLOOD PRESSURE: 116 MMHG | WEIGHT: 161.8 LBS

## 2023-10-10 DIAGNOSIS — R60.9 EDEMA, UNSPECIFIED TYPE: ICD-10-CM

## 2023-10-10 DIAGNOSIS — Z79.01 LONG TERM CURRENT USE OF ANTICOAGULANTS WITH INR GOAL OF 2.0-3.0: ICD-10-CM

## 2023-10-10 DIAGNOSIS — J30.2 SEASONAL ALLERGIC RHINITIS, UNSPECIFIED TRIGGER: ICD-10-CM

## 2023-10-10 DIAGNOSIS — Z79.4 TYPE 2 DIABETES MELLITUS WITH STAGE 1 CHRONIC KIDNEY DISEASE, WITH LONG-TERM CURRENT USE OF INSULIN (H): Primary | ICD-10-CM

## 2023-10-10 DIAGNOSIS — N40.1 BENIGN PROSTATIC HYPERPLASIA WITH URINARY FREQUENCY: ICD-10-CM

## 2023-10-10 DIAGNOSIS — Z23 COVID-19 VACCINE ADMINISTERED: ICD-10-CM

## 2023-10-10 DIAGNOSIS — I48.11 LONGSTANDING PERSISTENT ATRIAL FIBRILLATION (H): Primary | ICD-10-CM

## 2023-10-10 DIAGNOSIS — E78.5 HYPERLIPIDEMIA LDL GOAL <100: ICD-10-CM

## 2023-10-10 DIAGNOSIS — R35.0 BENIGN PROSTATIC HYPERPLASIA WITH URINARY FREQUENCY: ICD-10-CM

## 2023-10-10 DIAGNOSIS — G25.0 BENIGN FAMILIAL TREMOR: ICD-10-CM

## 2023-10-10 DIAGNOSIS — N18.1 TYPE 2 DIABETES MELLITUS WITH STAGE 1 CHRONIC KIDNEY DISEASE, WITH LONG-TERM CURRENT USE OF INSULIN (H): Primary | ICD-10-CM

## 2023-10-10 DIAGNOSIS — Z71.85 VACCINE COUNSELING: ICD-10-CM

## 2023-10-10 DIAGNOSIS — K21.9 GASTROESOPHAGEAL REFLUX DISEASE WITHOUT ESOPHAGITIS: ICD-10-CM

## 2023-10-10 DIAGNOSIS — E11.22 TYPE 2 DIABETES MELLITUS WITH STAGE 1 CHRONIC KIDNEY DISEASE, WITH LONG-TERM CURRENT USE OF INSULIN (H): Primary | ICD-10-CM

## 2023-10-10 DIAGNOSIS — J44.9 CHRONIC OBSTRUCTIVE PULMONARY DISEASE, UNSPECIFIED COPD TYPE (H): ICD-10-CM

## 2023-10-10 DIAGNOSIS — Z23 INFLUENZA VACCINE ADMINISTERED: Primary | ICD-10-CM

## 2023-10-10 DIAGNOSIS — Z78.9 TAKES DIETARY SUPPLEMENTS: ICD-10-CM

## 2023-10-10 DIAGNOSIS — I10 HYPERTENSION GOAL BP (BLOOD PRESSURE) < 140/90: ICD-10-CM

## 2023-10-10 DIAGNOSIS — I48.91 ATRIAL FIBRILLATION, UNSPECIFIED TYPE (H): ICD-10-CM

## 2023-10-10 DIAGNOSIS — K51.90 ULCERATIVE COLITIS WITHOUT COMPLICATIONS, UNSPECIFIED LOCATION (H): ICD-10-CM

## 2023-10-10 LAB — INR BLD: 2.8 (ref 0.9–1.1)

## 2023-10-10 PROCEDURE — 90480 ADMN SARSCOV2 VAC 1/ONLY CMP: CPT

## 2023-10-10 PROCEDURE — 99207 PR NO CHARGE NURSE ONLY: CPT

## 2023-10-10 PROCEDURE — 36416 COLLJ CAPILLARY BLOOD SPEC: CPT

## 2023-10-10 PROCEDURE — G0008 ADMIN INFLUENZA VIRUS VAC: HCPCS

## 2023-10-10 PROCEDURE — 85610 PROTHROMBIN TIME: CPT

## 2023-10-10 PROCEDURE — 90662 IIV NO PRSV INCREASED AG IM: CPT

## 2023-10-10 PROCEDURE — 99606 MTMS BY PHARM EST 15 MIN: CPT | Performed by: PHARMACIST

## 2023-10-10 PROCEDURE — 91320 SARSCV2 VAC 30MCG TRS-SUC IM: CPT

## 2023-10-10 RX ORDER — LANOLIN ALCOHOL/MO/W.PET/CERES
1000 CREAM (GRAM) TOPICAL DAILY
COMMUNITY

## 2023-10-10 RX ORDER — ACETAMINOPHEN 325 MG/1
325-650 TABLET ORAL EVERY 6 HOURS PRN
COMMUNITY

## 2023-10-10 NOTE — PROGRESS NOTES
Prior to immunization administration, verified patients identity using patient s name and date of birth. Please see Immunization Activity for additional information.     Screening Questionnaire for Adult Immunization    Are you sick today?   No   Do you have allergies to medications, food, a vaccine component or latex?   No   Have you ever had a serious reaction after receiving a vaccination?   No   Do you have a long-term health problem with heart, lung, kidney, or metabolic disease (e.g., diabetes), asthma, a blood disorder, no spleen, complement component deficiency, a cochlear implant, or a spinal fluid leak?  Are you on long-term aspirin therapy?   No   Do you have cancer, leukemia, HIV/AIDS, or any other immune system problem?   No   Do you have a parent, brother, or sister with an immune system problem?   No   In the past 3 months, have you taken medications that affect  your immune system, such as prednisone, other steroids, or anticancer drugs; drugs for the treatment of rheumatoid arthritis, Crohn s disease, or psoriasis; or have you had radiation treatments?   No   Have you had a seizure, or a brain or other nervous system problem?   No   During the past year, have you received a transfusion of blood or blood    products, or been given immune (gamma) globulin or antiviral drug?   No   For women: Are you pregnant or is there a chance you could become       pregnant during the next month?   No   Have you received any vaccinations in the past 4 weeks?   No     Immunization questionnaire answers were all negative.    I have reviewed the following standing orders:   This patient is due and qualifies for the Covid-19 vaccine.     Click here for COVID-19 Standing Order    I have reviewed the vaccines inclusion and exclusion criteria; No concerns regarding eligibility.     This patient is due and qualifies for the Influenza vaccine.    Click here for Influenza Vaccine Standing Order    I have reviewed the  vaccines inclusion and exclusion criteria; No concerns regarding eligibility.     Patient instructed to remain in clinic for 15 minutes afterwards, and to report any adverse reactions.     Screening performed by Carola Davila CMA on 10/10/2023 at 2:05 PM.

## 2023-10-10 NOTE — PROGRESS NOTES
Patient returned call.  Denied any questions or concerns.  No other changes upon further assessment.  Reviewed dosing instructions and assisted him to schedule next INR check.

## 2023-10-10 NOTE — PROGRESS NOTES
ANTICOAGULATION MANAGEMENT     Cayetano Lunsford 78 year old male is on warfarin with therapeutic INR result. (Goal INR 2.0-3.0)    Recent labs: (last 7 days)     10/10/23  1310   INR 2.8*       ASSESSMENT     Source(s): Chart Review  Previous INR was Therapeutic last 2(+) visits  Medication, diet, health changes since last INR chart reviewed; none identified         PLAN     Recommended plan for no diet, medication or health factor changes affecting INR     Dosing Instructions: Continue your current warfarin dose with next INR in 4 weeks       Summary  As of 10/10/2023      Full warfarin instructions:  6.25 mg every Mon, Fri; 7.5 mg all other days   Next INR check:  11/7/2023               Detailed voice message left for Rich with dosing instructions and follow up date.   Will also have onsite staff mail warfarin dosing calendar since this is patient's preference.    Contact [phone]  to schedule and with any changes, questions or concerns.     Education provided:   Please call back if any changes to your diet, medications or how you've been taking warfarin    Plan made per [facility] anticoagulation protocol    sEtelle Starr RN  Anticoagulation Clinic  10/10/2023    _______________________________________________________________________     Anticoagulation Episode Summary       Current INR goal:  2.0-3.0   TTR:  52.5 % (1 y)   Target end date:  Indefinite   Send INR reminders to:  [facility]    Indications    Longstanding persistent atrial fibrillation (H) [I48.11]  Long term current use of anticoagulants with INR goal of 2.0-3.0 [Z79.01]  Atrial fibrillation  unspecified type (H) [I48.91]             Comments:               Anticoagulation Care Providers       Provider Role Specialty Phone number    Dmitri Andres MD Referring Family Medicine [phone]    Drew Bravo PA-C Referring Family Medicine [phone]

## 2023-10-10 NOTE — PROGRESS NOTES
Medication Therapy Management (MTM) Encounter    ASSESSMENT:                            Medication Adherence/Access: Cannot use nebulizer due to hose not matching machine.  Recommend reaching out to [facility] for replacement equipment    Diabetes: Stable.  Patient is meeting A1c goal of < 7%.  Patient's A1c may be falsely low due to anemia, but would expect the true value to still be at goal as blood glucose are at goal fasting  mg/dL.    Hypertension/Afib/Edema: Stable. Future consideration to decrease propranolol if blood pressure remains low and develops side effects (dizziness).    Hyperlipidemia: Stable.  COPD: stable   BPH: stable  UC: stable. Follows gastroenterologist.  GERD: stable  Tremors: stable. Follows with neurologist.   Anemia: Improved  Headache/Pain: stable  Allergic Rhinitis: recommend trial cetirizine or fexofenadine instead of diphenhydramine due to anticholinergic side effects.   Vaccines: recommended Covid-19, influenza, and RSV vaccines per CDC and ACIP guidelines    PLAN:                            Instead of diphenhydramine, change to fexofenadine 180 mg daily or cetirizine 10 mg daily.     Follow-up: Return for follow-up MTM pharmacist visit in ~ 1 year    SUBJECTIVE/OBJECTIVE:                          Leo Lunsford is a 78 year old male coming in for a follow-up visit from 1/30/23.       Reason for visit: diabetes management.    Allergies/ADRs: Reviewed in chart  Past Medical History: Reviewed in chart  Tobacco: He reports that he quit smoking about 31 years ago. His smoking use included cigarettes. He has a 30.00 pack-year smoking history. He has never used smokeless tobacco.  Alcohol: not currently using  Medication Adherence/Access: Cannot use nebulizer due to hose not matching machine    Type 2 Diabetes   Metformin 1000 mg twice daily  Novolin R 2 units when the reading is >200mg/dL - uses maybe once per week  Patient is not experiencing side effects.  Blood sugar monitoring: 3  time(s) daily; see chart below  Current diabetes symptoms: none  Diet/Exercise: notes sometimes he has an apple at night that raises his blood sugars     Eye exam is up to date  Foot exam: due  Urine Albumin:   Lab Results   Component Value Date    UMALCR 43.11 (H) 02/02/2023      Lab Results   Component Value Date    A1C 5.7 (H) 07/26/2023     Date Pre breakfast Pre Lunch Pre Dinner   10/10 167     10/9 180 118 122   10/8 127 135 132   10/7 165 117 111   10/6 154 132 131   10/5 158 131 117   10/4 154 146 90       Hypertension /Afib/Edema  Amlodipine 5 mg daily  Warfarin 6.25 mg M, F, 7.5 mg all other days  Propranalol 120 mg twice daily  Chlorthalidone 25 mg daily  Furosemide 20 mg as needed - uses 1-2x per week  Propafenone 150 mg twice daily    Patient reports no current medication side effects.  Patient does not self-monitor blood pressure.         BP Readings from Last 3 Encounters:   10/10/23 116/56   09/07/23 136/69   05/17/23 123/79     Pulse Readings from Last 3 Encounters:   09/07/23 72   05/17/23 65   03/07/23 74     Hyperlipidemia   atorvastatin 20mg daily  Patient reports no significant myalgias or flushing symptoms.  The 10-year ASCVD risk score (Jacque WU, et al., 2019) is: 49.2%    Values used to calculate the score:      Age: 78 years      Sex: Male      Is Non- : No      Diabetic: Yes      Tobacco smoker: No      Systolic Blood Pressure: 116 mmHg      Is BP treated: Yes      HDL Cholesterol: 48 mg/dL      Total Cholesterol: 157 mg/dL     Recent Labs   Lab Test 02/02/23  1258 12/30/21  1004   CHOL 157 161   HDL 48 48   LDL 77 78   TRIG 161* 173*     COPD:   albuterol MDI as needed (states he's using this at night, sometimes more often during the day)  Duonebs as needed (hasn't needed this lately)  LAMA/LABA- Stiolto Respimat 2 puff(s) once daily  No concerns.  Patient is not experiencing side effects. Sees [facility].  Patient reports the following symptoms: none.  Patient notes  that part of his nebulizer equipment broke and the replacement supplies doesn't match his machine.    BPH:   tamsulosin 0.4mg daily  reports no issues. Reports this helps decrease urinary frequency.    UC:  sulfasalazine 1000mg AM and 500 mg daily  probiotic daily  folic acid 0.8mg daily  No issues reported. Follows gastroenterologist.    GERD:   Protonix (pantoprazole) 40mg once daily  Pt reports no current symptoms.      Tremors:   primidone 200mg AM, 200mg PM and 150 mg bedtime  propranolol 120mg twice daily   Reports no issues. Some days worse than others. Avoids caffeine and chocolate. Follows with neurologist.     Anemia:   B12 1000 mg daily - patient will double check this  vitamin D 2000 units daily  ferrous sulfate 325 mg daily (Hbg was 11.2 g/dL on 9/14)  No concerns. Follows [facility].     Headache/Pain:   Acetaminophen 325-650 mg as needed (takes 1-2x per week)    Allergic Rhinitis:   diphenhydramine daily as needed     Patient reports no current medication side effects.     Patient feels that current therapy is effective.     Vaccines:   Most Recent Immunizations   Administered Date(s) Administered    COVID-19 12+ (2023-24) (Pfizer) 10/10/2023    COVID-19 Bivalent 12+ (Pfizer) 10/04/2022    COVID-19 MONOVALENT 12+ (Pfizer) 09/30/2021    COVID-19 Monovalent 12+ (Pfizer 2022) 05/19/2022    HepB 08/05/2014    Influenza (H1N1) 01/08/2010    Influenza (High Dose) 3 valent vaccine 09/11/2019    Influenza (IIV3) PF 09/11/2009    Influenza Vaccine 65+ (Fluzone HD) 10/10/2023    Pneumo Conj 13-V (2010&after) 11/10/2015    Pneumococcal 23 valent 07/20/2010    TD,PF 7+ (Tenivac) 08/08/2008    TDAP Vaccine (Adacel) 08/05/2014    Zoster recombinant adjuvanted (SHINGRIX) 06/20/2018    Zoster vaccine, live 06/13/2008       Today's Vitals: /56   Wt 161 lb 12.8 oz (73.4 kg)   BMI 26.31 kg/m    ----------------    I spent 30 minutes with this patient today. All changes were made via collaborative practice  agreement with Drew Bravo PA-C. A copy of the visit note was provided to the patient's provider(s).    A summary of these recommendations was given to the patient.    Alecia Cordero, JohnnieD  Pharmacy Resident  Pager #[phone]    Diana Johnson PharmD, [facility]  Medication Therapy Management Provider, [facility]  Pager: [phone]     Medication Therapy Recommendations  Seasonal allergic rhinitis, unspecified trigger    Rationale: Nonmedication therapy more appropriate - Unnecessary medication therapy - Indication   Recommendation: Change Medication - fexofenadine 180 MG tablet   Status: Accepted - no CPA Needed         Vaccine counseling    Rationale: Preventive therapy - Needs additional medication therapy - Indication   Recommendation: Order Vaccine - Comirnaty 30 MCG/0.3ML Susp   Status: Accepted per CPA

## 2023-10-10 NOTE — PROGRESS NOTES
3:14 PM    AVS calendar mailed to the patient.     Tito Carter RN, BSN, PHN  Anticoagulation Clinic   [phone]

## 2023-10-10 NOTE — LETTER
[facility] ANTICOAGULATION [facility]INIC  [address] [facility] [address]  Phone: [phone]  Fax: [phone]     October 10, 2023        Cayetano Lunsford  [address]   [facility] [address]            Dear Cayetano,    Enclosed is a copy of the warfarin dosing calendar from the INR visit on 10/10/23.      Sincerely,       [facility] Anticoagulation Management Program

## 2023-10-10 NOTE — PATIENT INSTRUCTIONS
"Recommendations from today's MTM visit:                                                       No medication changes.   Let me know what your allergy medication is. I don't want you to take diphenhydramine. Call and let me know: [phone]      It was great speaking with you today.  I value your experience and would be very thankful for your time in providing feedback in our clinic survey. In the next few days, you may receive an email or text message from [facility] with a link to a survey related to your  clinical pharmacist.\"     To schedule another MTM appointment, please call the clinic directly or you may call the MTM scheduling line at [phone] or toll-free at [phone].     My Clinical Pharmacist's contact information:                                                      Please feel free to contact me with any questions or concerns you have.      Diana Johnson, PharmD, BCACP  Medication Therapy Management Provider, [facility]    "

## 2023-10-19 DIAGNOSIS — R35.0 BENIGN PROSTATIC HYPERPLASIA WITH URINARY FREQUENCY: ICD-10-CM

## 2023-10-19 DIAGNOSIS — N40.1 BENIGN PROSTATIC HYPERPLASIA WITH URINARY FREQUENCY: ICD-10-CM

## 2023-10-19 RX ORDER — TAMSULOSIN HYDROCHLORIDE 0.4 MG/1
CAPSULE ORAL
Qty: 90 CAPSULE | Refills: 1 | Status: SHIPPED | OUTPATIENT
Start: 2023-10-19 | End: 2024-04-12

## 2023-10-25 ENCOUNTER — TELEPHONE (OUTPATIENT)
Dept: PHARMACY | Facility: CLINIC | Age: 78
End: 2023-10-25
Payer: COMMERCIAL

## 2023-10-25 RX ORDER — FEXOFENADINE HCL 180 MG/1
180 TABLET ORAL DAILY
COMMUNITY

## 2023-10-25 NOTE — TELEPHONE ENCOUNTER
Patient calls with questions regarding Allegra that he's started instead of diphenhydramine. It is effective but he still has runny nose. Wonders what to do since he would like to avoid diphenhydramine still.    Informed he can try OTC fluticasone nasal spray as needed with Allegra.     Diana Johnson, PharmD, BCACP  Medication Therapy Management Provider, [facility]  Pager: [phone]

## 2023-11-01 DIAGNOSIS — I48.0 PAROXYSMAL ATRIAL FIBRILLATION (H): ICD-10-CM

## 2023-11-01 RX ORDER — AMLODIPINE BESYLATE 5 MG/1
5 TABLET ORAL DAILY
Qty: 90 TABLET | Refills: 1 | OUTPATIENT
Start: 2023-11-01

## 2023-11-07 ENCOUNTER — ANTICOAGULATION THERAPY VISIT (OUTPATIENT)
Dept: ANTICOAGULATION | Facility: CLINIC | Age: 78
End: 2023-11-07

## 2023-11-07 ENCOUNTER — LAB (OUTPATIENT)
Dept: LAB | Facility: CLINIC | Age: 78
End: 2023-11-07
Payer: COMMERCIAL

## 2023-11-07 DIAGNOSIS — I48.11 LONGSTANDING PERSISTENT ATRIAL FIBRILLATION (H): Primary | ICD-10-CM

## 2023-11-07 DIAGNOSIS — I48.91 ATRIAL FIBRILLATION, UNSPECIFIED TYPE (H): ICD-10-CM

## 2023-11-07 DIAGNOSIS — Z79.01 LONG TERM CURRENT USE OF ANTICOAGULANTS WITH INR GOAL OF 2.0-3.0: ICD-10-CM

## 2023-11-07 LAB — INR BLD: 3.9 (ref 0.9–1.1)

## 2023-11-07 PROCEDURE — 85610 PROTHROMBIN TIME: CPT

## 2023-11-07 PROCEDURE — 36416 COLLJ CAPILLARY BLOOD SPEC: CPT

## 2023-11-07 NOTE — PROGRESS NOTES
ANTICOAGULATION MANAGEMENT     Cayetano Lunsford 78 year old male is on warfarin with supratherapeutic INR result. (Goal INR 2.0-3.0)    Recent labs: (last 7 days)     11/07/23  1253   INR 3.9*       ASSESSMENT     Source(s): Chart Review and Patient/Caregiver Call     Warfarin doses taken: Warfarin taken as instructed  Diet: No new diet changes identified  Medication/supplement changes:  Patient reports he changed his allergy med to Allegra, no interaction with warfarin anticipated  New illness, injury, or hospitalization: No  Signs or symptoms of bleeding or clotting: No  Previous result: Therapeutic last 2(+) visits  Additional findings: None       PLAN     Recommended plan for no diet, medication or health factor changes affecting INR     Dosing Instructions: hold dose then decrease your warfarin dose (2.5% change) with next INR in 1-2 weeks       Summary  As of 11/7/2023      Full warfarin instructions:  11/7: Hold; Otherwise 6.25 mg every Mon, Wed, Fri; 7.5 mg all other days   Next INR check:  11/21/2023               Telephone call with Leo who verbalizes understanding and agrees to plan and AVS placed in outgoing mail    Lab visit scheduled    Education provided:   Please call back if any changes to your diet, medications or how you've been taking warfarin  Contact [phone]  with any changes, questions or concerns.     Plan made per [facility] anticoagulation protocol    Dayanara Cuevas RN  Anticoagulation Clinic  11/7/2023    _______________________________________________________________________     Anticoagulation Episode Summary       Current INR goal:  2.0-3.0   TTR:  49.7% (1 y)   Target end date:  Indefinite   Send INR reminders to:  [facility]    Indications    Longstanding persistent atrial fibrillation (H) [I48.11]  Long term current use of anticoagulants with INR goal of 2.0-3.0 [Z79.01]  Atrial fibrillation  unspecified type (H) [I48.91]             Comments:                Anticoagulation Care Providers       Provider Role Specialty Phone number    Dmitri Andres MD Referring Family Medicine [phone]    Drew Bravo PA-C Referring Family Medicine [phone]

## 2023-11-12 DIAGNOSIS — Z79.4 TYPE 2 DIABETES MELLITUS WITH STAGE 1 CHRONIC KIDNEY DISEASE, WITH LONG-TERM CURRENT USE OF INSULIN (H): ICD-10-CM

## 2023-11-12 DIAGNOSIS — Z79.4 TYPE 2 DIABETES MELLITUS WITH HYPOGLYCEMIA WITHOUT COMA, WITH LONG-TERM CURRENT USE OF INSULIN (H): ICD-10-CM

## 2023-11-12 DIAGNOSIS — E11.649 TYPE 2 DIABETES MELLITUS WITH HYPOGLYCEMIA WITHOUT COMA, WITH LONG-TERM CURRENT USE OF INSULIN (H): ICD-10-CM

## 2023-11-12 DIAGNOSIS — E11.22 TYPE 2 DIABETES MELLITUS WITH STAGE 1 CHRONIC KIDNEY DISEASE, WITH LONG-TERM CURRENT USE OF INSULIN (H): ICD-10-CM

## 2023-11-12 DIAGNOSIS — N18.1 TYPE 2 DIABETES MELLITUS WITH STAGE 1 CHRONIC KIDNEY DISEASE, WITH LONG-TERM CURRENT USE OF INSULIN (H): ICD-10-CM

## 2023-11-15 DIAGNOSIS — I48.0 PAROXYSMAL ATRIAL FIBRILLATION (H): ICD-10-CM

## 2023-11-15 RX ORDER — PROPAFENONE HYDROCHLORIDE 150 MG/1
150 TABLET, COATED ORAL 2 TIMES DAILY
Qty: 180 TABLET | Refills: 0 | Status: SHIPPED | OUTPATIENT
Start: 2023-11-15 | End: 2023-12-06

## 2023-11-20 DIAGNOSIS — I48.0 PAROXYSMAL ATRIAL FIBRILLATION (H): ICD-10-CM

## 2023-11-20 RX ORDER — AMLODIPINE BESYLATE 5 MG/1
5 TABLET ORAL DAILY
Qty: 90 TABLET | Refills: 1 | Status: SHIPPED | OUTPATIENT
Start: 2023-11-20 | End: 2024-05-01

## 2023-11-20 NOTE — TELEPHONE ENCOUNTER
Pt due for labs.  Lab appt scheduled for 11/22/23, would you like to refill or wait for lab results?    Penny Bravo RN, BSN  [facility]

## 2023-11-22 ENCOUNTER — LAB (OUTPATIENT)
Dept: LAB | Facility: CLINIC | Age: 78
End: 2023-11-22
Payer: COMMERCIAL

## 2023-11-22 ENCOUNTER — ANTICOAGULATION THERAPY VISIT (OUTPATIENT)
Dept: ANTICOAGULATION | Facility: CLINIC | Age: 78
End: 2023-11-22

## 2023-11-22 ENCOUNTER — DOCUMENTATION ONLY (OUTPATIENT)
Dept: ANTICOAGULATION | Facility: CLINIC | Age: 78
End: 2023-11-22

## 2023-11-22 DIAGNOSIS — I48.11 LONGSTANDING PERSISTENT ATRIAL FIBRILLATION (H): Primary | ICD-10-CM

## 2023-11-22 DIAGNOSIS — Z79.01 LONG TERM CURRENT USE OF ANTICOAGULANTS WITH INR GOAL OF 2.0-3.0: ICD-10-CM

## 2023-11-22 DIAGNOSIS — I48.91 ATRIAL FIBRILLATION, UNSPECIFIED TYPE (H): Primary | ICD-10-CM

## 2023-11-22 DIAGNOSIS — I48.91 ATRIAL FIBRILLATION, UNSPECIFIED TYPE (H): ICD-10-CM

## 2023-11-22 DIAGNOSIS — Z79.01 LONG TERM CURRENT USE OF ANTICOAGULANT THERAPY: ICD-10-CM

## 2023-11-22 LAB — INR BLD: 4.2 (ref 0.9–1.1)

## 2023-11-22 PROCEDURE — 85610 PROTHROMBIN TIME: CPT

## 2023-11-22 PROCEDURE — 36416 COLLJ CAPILLARY BLOOD SPEC: CPT

## 2023-11-22 NOTE — PROGRESS NOTES
ANTICOAGULATION MANAGEMENT     Cayetano Lunsford 78 year old male is on warfarin with supratherapeutic INR result. (Goal INR 2.0-3.0)    Recent labs: (last 7 days)     11/22/23  1258   INR 4.2*       ASSESSMENT     Source(s): Chart Review and Patient/Caregiver Call     Warfarin doses taken: Warfarin taken as instructed  Diet: No new diet changes identified  Medication/supplement changes: None noted  New illness, injury, or hospitalization: No  Signs or symptoms of bleeding or clotting: No  Previous result: Supratherapeutic  Additional findings: None       PLAN     Recommended plan for no diet, medication or health factor changes affecting INR     Dosing Instructions: hold dose then decrease your warfarin dose (10% change) with next INR in 10 days       Summary  As of 11/22/2023      Full warfarin instructions:  11/22: Hold; 11/23: 6.25 mg; Otherwise 6.25 mg every day   Next INR check:  12/1/2023               Telephone call with Leo who agrees to plan and repeated back plan correctly    Lab visit scheduled    Education provided:   Please call back if any changes to your diet, medications or how you've been taking warfarin    Plan made per [facility] anticoagulation protocol    Estelle tSarr RN  Anticoagulation Clinic  11/22/2023    _______________________________________________________________________     Anticoagulation Episode Summary       Current INR goal:  2.0-3.0   TTR:  49.7% (1 y)   Target end date:  Indefinite   Send INR reminders to:  [facility]    Indications    Longstanding persistent atrial fibrillation (H) [I48.11]  Long term current use of anticoagulants with INR goal of 2.0-3.0 [Z79.01]  Atrial fibrillation  unspecified type (H) [I48.91]             Comments:               Anticoagulation Care Providers       Provider Role Specialty Phone number    Dmitri Andres MD Referring Family Medicine [phone]    Drew Bravo PA-C Referring [facility] Medicine [phone]

## 2023-11-22 NOTE — PROGRESS NOTES
ANTICOAGULATION CLINIC REFERRAL RENEWAL REQUEST       An annual renewal order is required for all patients referred to [facility] Anticoagulation Clinic.?  Please review and sign the pended referral order for Cayetano Lunsford.       ANTICOAGULATION SUMMARY      Warfarin indication(s)   Atrial Fibrillation    Mechanical heart valve present?  NO       Current goal range   INR: 2.0-3.0     Goal appropriate for indication? Goal INR 2-3, standard for indication(s) above     Time in Therapeutic Range (TTR)  (Goal > 60%) 49.7%       Office visit with referring provider's group within last year yes on 5/17/23       Estelle Starr RN  [facility] Anticoagulation Clinic

## 2023-11-22 NOTE — PROGRESS NOTES
ANTICOAGULATION MANAGEMENT     Cayetano Lunsford 78 year old male is on warfarin with supratherapeutic INR result. (Goal INR 2.0-3.0)    Recent labs: (last 7 days)     11/22/23  1258   INR 4.2*       ASSESSMENT     Source(s): Chart Review  Previous INR was Supratherapeutic  Medication, diet, health changes since last INR chart reviewed; none identified         PLAN     Unable to reach Rich today.    Left message to hold warfarin tonight and take a reduced dose of warfarin tomorrow by taking 6.25 mg (2.5 tablets). Requested call back for assessment.    Follow up required to confirm warfarin dose taken and assess for changes and discuss dosing instructions and confirm understanding of instructions    Would consider lowering warfarin dose to 6.25 mg daily (~10% dose reduction) if no changes identified upon assessment.    Estelle Starr RN  Anticoagulation Clinic  11/22/2023

## 2023-12-01 ENCOUNTER — LAB (OUTPATIENT)
Dept: LAB | Facility: CLINIC | Age: 78
End: 2023-12-01
Payer: COMMERCIAL

## 2023-12-01 ENCOUNTER — ANTICOAGULATION THERAPY VISIT (OUTPATIENT)
Dept: ANTICOAGULATION | Facility: CLINIC | Age: 78
End: 2023-12-01

## 2023-12-01 DIAGNOSIS — Z79.01 LONG TERM CURRENT USE OF ANTICOAGULANTS WITH INR GOAL OF 2.0-3.0: ICD-10-CM

## 2023-12-01 DIAGNOSIS — Z79.01 LONG TERM CURRENT USE OF ANTICOAGULANT THERAPY: ICD-10-CM

## 2023-12-01 DIAGNOSIS — I48.0 PAROXYSMAL ATRIAL FIBRILLATION (H): ICD-10-CM

## 2023-12-01 DIAGNOSIS — I48.11 LONGSTANDING PERSISTENT ATRIAL FIBRILLATION (H): Primary | ICD-10-CM

## 2023-12-01 DIAGNOSIS — I48.91 ATRIAL FIBRILLATION, UNSPECIFIED TYPE (H): ICD-10-CM

## 2023-12-01 LAB — INR BLD: 4.2 (ref 0.9–1.1)

## 2023-12-01 PROCEDURE — 80048 BASIC METABOLIC PNL TOTAL CA: CPT

## 2023-12-01 PROCEDURE — 36415 COLL VENOUS BLD VENIPUNCTURE: CPT

## 2023-12-01 PROCEDURE — 85610 PROTHROMBIN TIME: CPT

## 2023-12-01 NOTE — PROGRESS NOTES
ANTICOAGULATION MANAGEMENT     Cayetano Lunsford 78 year old male is on warfarin with supratherapeutic INR result. (Goal INR 2.0-3.0)    Recent labs: (last 7 days)     12/01/23  1300   INR 4.2*       ASSESSMENT     Source(s): Chart Review and Patient/Caregiver Call     Warfarin doses taken: Warfarin taken as instructed  Diet: No new diet changes identified  Medication/supplement changes: Tylenol 2000 mg daily Monday-Wednesday this week.    New illness, injury, or hospitalization: Foot pain on Monday.  Better now.  Signs or symptoms of bleeding or clotting: No  Previous result: Supratherapeutic  Additional findings: None       PLAN     Recommended plan for temporary change(s) affecting INR. Smaller maintenance dose adjustment was done today since patient had temporary factors also effecting INR.  INR has been consistently elevated though so dose was still adjusted.     Dosing Instructions: hold 2 doses then decrease your warfarin dose (8.5% change) with next INR in 10 days       Summary  As of 12/1/2023      Full warfarin instructions:  12/1: Hold; 12/2: Hold; Otherwise 5 mg every Mon, Wed, Fri; 6.25 mg all other days   Next INR check:  12/11/2023               Telephone call with Leo who agrees to plan and repeated back plan correctly    Lab visit scheduled    Education provided:   Please call back if any changes to your diet, medications or how you've been taking warfarin    Plan made per [facility] anticoagulation protocol    Estelle Starr RN  Anticoagulation Clinic  12/1/2023    _______________________________________________________________________     Anticoagulation Episode Summary       Current INR goal:  2.0-3.0   TTR:  49.7% (1 y)   Target end date:  Indefinite   Send INR reminders to:  [facility]    Indications    Longstanding persistent atrial fibrillation (H) [I48.11]  Long term current use of anticoagulants with INR goal of 2.0-3.0 [Z79.01]  Atrial fibrillation  unspecified type (H)  [I48.91]  Long-term (current) use of anticoagulants [Z79.01] [Z79.01]             Comments:               Anticoagulation Care Providers       Provider Role Specialty Phone number    Dmitri Andres MD Referring Family Medicine [phone]    Drew Bravo PA-C Referring Family Medicine [phone]

## 2023-12-02 LAB
ANION GAP SERPL CALCULATED.3IONS-SCNC: 9 MMOL/L (ref 7–15)
BUN SERPL-MCNC: 27 MG/DL (ref 8–23)
CALCIUM SERPL-MCNC: 9.2 MG/DL (ref 8.8–10.2)
CHLORIDE SERPL-SCNC: 101 MMOL/L (ref 98–107)
CREAT SERPL-MCNC: 0.68 MG/DL (ref 0.67–1.17)
DEPRECATED HCO3 PLAS-SCNC: 28 MMOL/L (ref 22–29)
EGFRCR SERPLBLD CKD-EPI 2021: >90 ML/MIN/1.73M2
GLUCOSE SERPL-MCNC: 219 MG/DL (ref 70–99)
POTASSIUM SERPL-SCNC: 5.1 MMOL/L (ref 3.4–5.3)
SODIUM SERPL-SCNC: 138 MMOL/L (ref 135–145)

## 2023-12-06 ENCOUNTER — OFFICE VISIT (OUTPATIENT)
Dept: CARDIOLOGY | Facility: CLINIC | Age: 78
End: 2023-12-06
Payer: COMMERCIAL

## 2023-12-06 ENCOUNTER — LAB (OUTPATIENT)
Dept: LAB | Facility: CLINIC | Age: 78
End: 2023-12-06
Payer: COMMERCIAL

## 2023-12-06 VITALS
HEART RATE: 60 BPM | SYSTOLIC BLOOD PRESSURE: 144 MMHG | OXYGEN SATURATION: 92 % | HEIGHT: 66 IN | DIASTOLIC BLOOD PRESSURE: 48 MMHG | BODY MASS INDEX: 26.03 KG/M2 | WEIGHT: 162 LBS

## 2023-12-06 DIAGNOSIS — I48.0 PAROXYSMAL ATRIAL FIBRILLATION (H): ICD-10-CM

## 2023-12-06 DIAGNOSIS — I10 HYPERTENSION GOAL BP (BLOOD PRESSURE) < 140/90: ICD-10-CM

## 2023-12-06 LAB
ERYTHROCYTE [DISTWIDTH] IN BLOOD BY AUTOMATED COUNT: 12.8 % (ref 10–15)
HCT VFR BLD AUTO: 33.3 % (ref 40–53)
HGB BLD-MCNC: 10.5 G/DL (ref 13.3–17.7)
MCH RBC QN AUTO: 32.7 PG (ref 26.5–33)
MCHC RBC AUTO-ENTMCNC: 31.5 G/DL (ref 31.5–36.5)
MCV RBC AUTO: 104 FL (ref 78–100)
PLATELET # BLD AUTO: 335 10E3/UL (ref 150–450)
RBC # BLD AUTO: 3.21 10E6/UL (ref 4.4–5.9)
WBC # BLD AUTO: 8 10E3/UL (ref 4–11)

## 2023-12-06 PROCEDURE — 99214 OFFICE O/P EST MOD 30 MIN: CPT | Performed by: PHYSICIAN ASSISTANT

## 2023-12-06 PROCEDURE — 36415 COLL VENOUS BLD VENIPUNCTURE: CPT | Performed by: PHYSICIAN ASSISTANT

## 2023-12-06 PROCEDURE — 93000 ELECTROCARDIOGRAM COMPLETE: CPT | Performed by: PHYSICIAN ASSISTANT

## 2023-12-06 PROCEDURE — 85027 COMPLETE CBC AUTOMATED: CPT | Performed by: PHYSICIAN ASSISTANT

## 2023-12-06 RX ORDER — FUROSEMIDE 20 MG
TABLET ORAL
Qty: 90 TABLET | Refills: 3 | Status: SHIPPED | OUTPATIENT
Start: 2023-12-06

## 2023-12-06 RX ORDER — PROPAFENONE HYDROCHLORIDE 150 MG/1
150 TABLET, COATED ORAL 2 TIMES DAILY
Qty: 180 TABLET | Refills: 3 | Status: SHIPPED | OUTPATIENT
Start: 2023-12-06

## 2023-12-06 NOTE — PATIENT INSTRUCTIONS
Today's Plan:   Will check on pricing for alternate blood thinners.  Blood draw on your way out today.   Remember, you can take a dose of FUROSEMIDE (LASIX) here and there if your leg swelling gets bothersome. I sent in a new prescription today.  Come back to see Dr. Marroqiun in one year with another non-fasting blood draw prior.    If you have questions or concerns please call my nurse team at [phone].   Scheduling phone number: [phone]  For after hours urgent concerns call [phone] option 2.   Reminder: Please bring in all current medications, over the counter supplements and vitamin bottles to your next appointment.    It was a pleasure seeing you today!     Sasha Madrid PA-C

## 2023-12-06 NOTE — LETTER
"12/6/2023    Drew Bravo PA-C  [address] [facility] [id number]    RE: Cayetano Lunsford       Dear Colleague,     I had the pleasure of seeing Cayetano Lunsford in the [facility].    Cardiology Clinic Progress Note    Cayetano Lunsford MRN# [id number]   YOB: 1945 Age: 78 year old     Primary cardiologist: Dr. Marroquin         Assessment and Plan     In summary, Cayetano Lunsford presents today for a routine annual follow up visit regarding PAF on propafenone and HTN.  From this standpoint, he is doing well. He has notable peripheral edema which he states is chronic and not very bothersome to him.     Plan:  - I reminded Leo that he can use furosemide as needed if his leg swelling becomes bothersome. I sent a new Rx for that today.  - CBC on his way out, on anticoagulation.   - His INR's have been variable lately. Will get pharmacy/insurance info on DOACs.     Follow-up:  - With Dr. Marroquin in 1 year with another EKG and BMP/CBC prior.    Sasha Madrid PA-C  [facility]         History of Presenting Illness     Cayetano Lunsford is a pleasant 78 year old patient with a pertinent history of the following -   1.  Paroxysmal atrial fibrillation, maintained on propafenone, anticoagulated with warfarin.   -- Stress test in 2019 neg for ischemia.    -- TTE 2019 EF 55-60%.  2.  Hypertension.  3.  Essential tremors, on propranolol.  4.  COPD.  5.  DMII.    Today, Leo returns to clinic stating he's feeling well from a cardiac standpoint. Patient denies chest pain, shortness of breath, PND, orthopnea, claudication, palpitations, near syncope or syncope. Tolerating medication without issue. PCP reduced amlodipine about 6 months ago d/t complaint of edema. Today, he continues to have quite a bit of pretibial edema, which is chronic for him. He can't remember if he takes furosemide, which is currently prescribed as \"as needed for edema.\" He didn't really " "remember having this option. He states that this degree of swelling doesn't bother him, however does tell me that if he takes his shoes off for more than an hour during the day he has difficulty getting them back on.  EKG today shows sinus rhythm at 71 bpm, with an incomplete right bundle branch block, and a QRS of 114 ms.  This is stable from 1 year ago.  BMP on 12/1 shows a creatinine of 0.68, BUN of 27, potassium of 5.1. No CBC in the past year. 12/1 INR high at 4.2. This is being re-checked on 12/11.          Review of Systems     12-pt ROS is negative except for as noted in the HPI.          Physical Exam     Vitals: BP (!) 144/48 (BP Location: Right arm, Patient Position: Sitting, Cuff Size: Adult Regular)   Pulse 60   Ht 1.67 m (5' 5.75\")   Wt 73.5 kg (162 lb)   SpO2 92%   BMI 26.35 kg/m    Wt Readings from Last 10 Encounters:   12/06/23 73.5 kg (162 lb)   10/10/23 73.4 kg (161 lb 12.8 oz)   09/07/23 73.8 kg (162 lb 12.8 oz)   05/17/23 73.9 kg (163 lb)   11/28/22 75.4 kg (166 lb 3.2 oz)   11/02/22 74.1 kg (163 lb 4.8 oz)   10/18/22 75.8 kg (167 lb)   10/04/22 76.2 kg (168 lb)   07/08/22 76.7 kg (169 lb)   06/21/22 76.7 kg (169 lb)       Constitutional:  Patient is pleasant, alert, cooperative, and in NAD.  HEENT:  NCAT. PERRLA. EOM's intact.   Neck:  CVP appears normal. No carotid bruits.   Pulmonary: Normal respiratory effort. CTAB.   Cardiac: RRR, normal S1/S2, no S3/S4, no murmur or rub.   Abdomen:  Non-tender abdomen, no hepatosplenomegaly appreciated.   Vascular: Pulses in the upper and lower extremities are 2+ and equal bilaterally.  Extremities: No edema, erythema, cyanosis or tenderness appreciated.  Skin:  No rashes or lesions appreciated.   Neurological:  No gross motor or sensory deficits.   Psych: Appropriate affect.          Data   Labs reviewed:  Recent Labs   Lab Test 02/02/23  1258 06/21/22  1356 12/30/21  1004 11/09/20  1108 09/09/19  0947 05/08/17  1020 03/13/17  1050   LDL 77  --  78 " 59 68   < >  --    HDL 48  --  48 30* 38*   < >  --    NHDL 109  --  113 110 121   < >  --    CHOL 157  --  161 140 159   < >  --    TRIG 161*  --  173* 253* 264*   < >  --    TSH  --   --   --  3.62 4.48*  --  3.25   IRON  --  50  --   --   --   --   --    FEB  --  309  --   --   --   --   --    IRONSAT  --  16  --   --   --   --   --    KELLIE  --  12*  --   --   --   --   --     < > = values in this interval not displayed.       Lab Results   Component Value Date    WBC 6.6 10/04/2022    WBC 7.9 03/13/2017    RBC 3.49 (L) 10/04/2022    RBC 4.11 (L) 03/13/2017    HGB 10.7 (L) 10/04/2022    HGB 12.4 (L) 03/13/2017    HCT 33.9 (L) 10/04/2022    HCT 39.4 (L) 03/13/2017    MCV 97 10/04/2022    MCV 96 03/13/2017    MCH 30.7 10/04/2022    MCH 30.2 03/13/2017    MCHC 31.6 10/04/2022    MCHC 31.5 03/13/2017    RDW 15.9 (H) 10/04/2022    RDW 13.9 03/13/2017     10/04/2022     03/13/2017       Lab Results   Component Value Date     12/01/2023     07/09/2021    POTASSIUM 5.1 12/01/2023    POTASSIUM 5.5 (H) 06/21/2022    POTASSIUM 5.5 (H) 07/09/2021    CHLORIDE 101 12/01/2023    CHLORIDE 102 06/21/2022    CHLORIDE 105 07/09/2021    CO2 28 12/01/2023    CO2 30 06/21/2022    CO2 32 07/09/2021    ANIONGAP 9 12/01/2023    ANIONGAP 4 06/21/2022    ANIONGAP <1 (L) 07/09/2021     (H) 12/01/2023    GLC 87 07/08/2022     (H) 06/21/2022     (H) 07/09/2021    BUN 27.0 (H) 12/01/2023    BUN 16 06/21/2022    BUN 15 07/09/2021    CR 0.68 12/01/2023    CR 0.76 07/09/2021    GFRESTIMATED >90 12/01/2023    GFRESTIMATED 88 07/09/2021    GFRESTBLACK >90 07/09/2021    LEBRON 9.2 12/01/2023    LEBRON 8.9 07/09/2021      Lab Results   Component Value Date    AST 26 02/02/2021    ALT 39 12/30/2021    ALT 32 02/02/2021       Lab Results   Component Value Date    A1C 5.7 (H) 07/26/2023    A1C 5.4 07/09/2021       Lab Results   Component Value Date    INR 4.2 (H) 12/01/2023    INR 4.2 (H) 11/22/2023    INR 1.24  (H) 07/08/2022    INR 1.70 (H) 07/09/2021    INR 2.30 (H) 06/25/2021            Problem List     Patient Active Problem List   Diagnosis    Ulcerative colitis without complications (HCC)    Atrial fibrillation (H)    Chronic obstructive pulmonary disease (H)    Eczema    HYPERLIPIDEMIA LDL GOAL <100    Advance Care Planning    Benign familial tremor    Hypertension goal BP (blood pressure) < 140/90    Cramp of limb    BPH (benign prostatic hyperplasia)    Pain in joint, lower leg    CAD in native artery    Hard of hearing    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)    Diverticulitis of colon    History of colonic polyps    Redundant colon    Long-term (current) use of anticoagulants [Z79.01]    Bunion, left    Longstanding persistent atrial fibrillation (H)    Long term current use of anticoagulants with INR goal of 2.0-3.0    Overweight (BMI 25.0-29.9)    Atrial fibrillation, unspecified type (H)            Medications     Current Outpatient Medications   Medication Sig Dispense Refill    acetaminophen (TYLENOL) 325 MG tablet Take 325-650 mg by mouth every 6 hours as needed for mild pain      albuterol (PROAIR HFA/PROVENTIL HFA/VENTOLIN HFA) 108 (90 BASE) MCG/ACT Inhaler Inhale 1-2 puffs into the lungs every 4 hours as needed (for shortness of breath/wheezing) 1 Inhaler 5    amLODIPine (NORVASC) 5 MG tablet TAKE 1 TABLET (5 MG) BY MOUTH DAILY 90 tablet 1    atorvastatin (LIPITOR) 20 MG tablet TAKE 1 TABLET (20 MG) BY MOUTH DAILY 90 tablet 1    blood glucose (ONETOUCH ULTRA) test strip USE TO TEST BLOOD SUGAR THREE TIMES A DAY OR AS DIRECTED 300 strip 3    chlorthalidone (HYGROTON) 25 MG tablet Take 1 tablet (25 mg) by mouth daily 90 tablet 0    cyanocobalamin (VITAMIN B-12) 1000 MCG tablet Take 1,000 mcg by mouth daily      ferrous sulfate (FEROSUL) 325 (65 Fe) MG tablet Take 1 tablet (325 mg) by mouth daily (with breakfast)      fexofenadine (ALLEGRA) 180 MG tablet Take 180 mg by mouth daily   "    folic acid 0.8 MG CAPS Take 1 tablet by mouth daily 90 capsule 3    furosemide (LASIX) 20 MG tablet TAKE 1 TABLET (20 MG) BY MOUTH DAILY AS NEEDED FOR EDEMA 90 tablet 3    insulin regular (NOVOLIN R VIAL) 100 UNIT/ML vial USE 2 UNITS AS NEEDED WHEN BLOOD GLUCOSE >200 MG/DL. 30 mL 1    insulin syringe-needle U-100 (BD INSULIN SYRINGE ULTRAFINE) 31G X 5/16\" 0.3 ML miscellaneous USE 3 SYRINGES DAILY OR AS DIRECTED. 300 each 3    ipratropium - albuterol 0.5 mg/2.5 mg/3 mL (DUONEB) 0.5-2.5 (3) MG/3ML neb solution NEBULIZE CONTENTS OF ONE VIAL (3 MLS) EVERY 4 HOURS AS NEEDED FOR SHORTNESS OF BREATH OR DYSPNEA 360 mL 1    metFORMIN (GLUCOPHAGE) 1000 MG tablet TAKE ONE TABLET BY MOUTH TWICE A DAY WITH BREAKFAST  AND DINNER 180 tablet 0    mupirocin (BACTROBAN) 2 % external ointment Apply topically 2 times daily as needed (pilonidal cyst) 30 g 1    pantoprazole (PROTONIX) 40 MG enteric coated tablet Take 40 mg by mouth daily Started by Dr. Debbie Rico at [facility]      primidone (MYSOLINE) 50 MG tablet 4 tablets in am, 4 tablets afternoon, and 3 tablets every evening 1001 tablet 1    Probiotic Product (FLORAJEN3) CAPS Take 1 capsule by mouth 2 times daily 60 capsule 0    propafenone (RYTHMOL) 150 MG TABS tablet Take 1 tablet (150 mg) by mouth 2 times daily 180 tablet 0    propranolol (INDERAL) 60 MG tablet Take 2 tablets (120 mg) by mouth 2 times daily 360 tablet 2    sulfaSALAzine (AZULFIDINE) 500 MG tablet TAKE ONE TABLET BY MOUTH THREE TIMES A DAY 90 tablet 11    tamsulosin (FLOMAX) 0.4 MG capsule TAKE ONE CAPSULE BY MOUTH ONCE DAILY 90 capsule 1    tiotropium-olodaterol 2.5-2.5 MCG/ACT AERS Inhale 2 puffs into the lungs daily      Vitamin D3 (CHOLECALCIFEROL) 25 mcg (1000 units) tablet Take 2 tablets (50 mcg) by mouth daily      warfarin ANTICOAGULANT (JANTOVEN ANTICOAGULANT) 2.5 MG tablet TAKE TWO AND ONE HALF TABLETS (6.25 MG) DAILY OR AS DIRECTED BY THE INR CLINIC 225 tablet 1            Past Medical History "     Past Medical History:   Diagnosis Date    Allergic rhinitis, cause unspecified     Atrial fibrillation (H)     BPH (benign prostatic hyperplasia)     Chronic airway obstruction, not elsewhere classified     COPD (chronic obstructive pulmonary disease) (H)     Hyperlipidemia LDL goal <100 5/9/2010    Hypertension goal BP (blood pressure) < 130/80 10/19/2006    Obesity (BMI 30-39.9)     Perforation of tympanic membrane, unspecified     Right TM    Tachycardia, unspecified     Type 2 diabetes, HbA1C goal < 8% (H) 5/2/2010    Ulcerative colitis (H)     Unspecified cardiovascular disease      Past Surgical History:   Procedure Laterality Date    CARDIAC SURGERY      COLONOSCOPY  3/31/2011    COLONOSCOPY N/A 5/25/2018    Procedure: COMBINED COLONOSCOPY, SINGLE OR MULTIPLE BIOPSY/POLYPECTOMY BY BIOPSY;;  Surgeon: Juan Simon DO;  Location:  GI    COLONOSCOPY N/A 9/6/2019    Procedure: COLONOSCOPY, WITH POLYPECTOMY AND BIOPSY;  Surgeon: Paradise Mims MD;  Location:  GI    COLONOSCOPY N/A 7/8/2022    Procedure: COLONOSCOPY, FLEXIBLE, WITH LESION REMOVAL USING SNARE;  Surgeon: Juan Simon DO;  Location: [facility]    COLONOSCOPY N/A 7/8/2022    Procedure: COLONOSCOPY, WITH POLYPECTOMY AND BIOPSY;  Surgeon: Juan Simon DO;  Location: [facility]    CORONARY ANGIOGRAPHY ADULT ORDER  2001 and 2008 2001 at [facility]. 2008- No interventions    ESOPHAGOSCOPY, GASTROSCOPY, DUODENOSCOPY (EGD), COMBINED N/A 7/8/2022    Procedure: ESOPHAGOGASTRODUODENOSCOPY, WITH BIOPSY;  Surgeon: Juan Simon DO;  Location:  GI    HERNIA REPAIR      left inguinal    SURGICAL HISTORY OF -   1987    right ear graft    SURGICAL HISTORY OF -   1992    right shoulder surgery    SURGICAL HISTORY OF -   1979    back surgery    SURGICAL HISTORY OF -   [facility]  11/10/2011    Right 2nd Toenail     Family History   Problem Relation Age of Onset    Circulatory Mother          blood clot in leg    Diabetes Mother         type 2    Allergies Mother     Arthritis Mother     Gastrointestinal Disease Mother     Neurologic Disorder Mother         Familiar tremors    Neurologic Disorder Father         brain tumor    Cerebrovascular Disease Paternal Grandfather     Hypertension Brother     Lipids Brother     Hypertension Sister     Diabetes Sister         Type 2    Allergies Sister     Lipids Sister     Glaucoma No family hx of     Macular Degeneration No family hx of      Social History     Socioeconomic History    Marital status:      Spouse name: Izabel    Number of children: 2    Years of education: 12    Highest education level: Not on file   Occupational History    Occupation:      Employer: RETIRED   Tobacco Use    Smoking status: Former     Packs/day: 1.00     Years: 30.00     Additional pack years: 0.00     Total pack years: 30.00     Types: Cigarettes     Quit date: 3/19/1992     Years since quittin.7    Smokeless tobacco: Never   Vaping Use    Vaping Use: Never used   Substance and Sexual Activity    Alcohol use: Not Currently     Alcohol/week: 0.0 standard drinks of alcohol    Drug use: No    Sexual activity: Yes     Partners: Female   Other Topics Concern    Parent/sibling w/ CABG, MI or angioplasty before 65F 55M? No     Service Not Asked    Blood Transfusions Not Asked    Caffeine Concern No     Comment: No Caffeine    Occupational Exposure Not Asked    Hobby Hazards Not Asked    Sleep Concern Not Asked    Stress Concern Not Asked    Weight Concern Not Asked    Special Diet Not Asked    Back Care Not Asked    Exercise Yes     Comment: 20 minutes - Walking- summer 2 x day    Bike Helmet Not Asked    Seat Belt Not Asked    Self-Exams Not Asked   Social History Narrative    Not on file     Social Determinants of Health     Financial Resource Strain: Not on file   Food Insecurity: Not on file   Transportation Needs: Not on file    Physical Activity: Not on file   Stress: Not on file   Social Connections: Not on file   Interpersonal Safety: Not on file   Housing Stability: Not on file            Allergies   Percodan [oxycodone-aspirin] and Aspirin    Today's clinic visit entailed:  Review of the result(s) of each unique test - CBC, BMP, EKG  Ordering of each unique test  Prescription drug management  I spent a total of 30 minutes on the day of the visit.   Time spent by me doing chart review, history and exam, documentation and further activities per the note  Provider  Link to [facility] Help Grid     The level of medical decision making during this visit was of moderate complexity.      Thank you for allowing me to participate in the care of your patient.      Sincerely,     Sasha Madrid PA-C     [facility]  cc:   No referring provider defined for this encounter.

## 2023-12-06 NOTE — PROGRESS NOTES
"  Cardiology Clinic Progress Note    Cayetano Lunsford MRN# [id number]   YOB: 1945 Age: 78 year old     Primary cardiologist: Dr. Marroquin         Assessment and Plan     In summary, Cayetano Lunsford presents today for a routine annual follow up visit regarding PAF on propafenone and HTN.  From this standpoint, he is doing well. He has notable peripheral edema which he states is chronic and not very bothersome to him.     Plan:  - I reminded Leo that he can use furosemide as needed if his leg swelling becomes bothersome. I sent a new Rx for that today.  - CBC on his way out, on anticoagulation.   - His INR's have been variable lately. Will get pharmacy/insurance info on DOACs.     Follow-up:  - With Dr. Marroquin in 1 year with another EKG and BMP/CBC prior.    AMARJIT SolorioResearch [facility]         History of Presenting Illness     Cayetano Lunsford is a pleasant 78 year old patient with a pertinent history of the following -   1.  Paroxysmal atrial fibrillation, maintained on propafenone, anticoagulated with warfarin.   -- Stress test in 2019 neg for ischemia.    -- TTE 2019 EF 55-60%.  2.  Hypertension.  3.  Essential tremors, on propranolol.  4.  COPD.  5.  DMII.    Today, Leo returns to clinic stating he's feeling well from a cardiac standpoint. Patient denies chest pain, shortness of breath, PND, orthopnea, claudication, palpitations, near syncope or syncope. Tolerating medication without issue. PCP reduced amlodipine about 6 months ago d/t complaint of edema. Today, he continues to have quite a bit of pretibial edema, which is chronic for him. He can't remember if he takes furosemide, which is currently prescribed as \"as needed for edema.\" He didn't really remember having this option. He states that this degree of swelling doesn't bother him, however does tell me that if he takes his shoes off for more than an hour during the day he has difficulty getting them back on.  EKG " "today shows sinus rhythm at 71 bpm, with an incomplete right bundle branch block, and a QRS of 114 ms.  This is stable from 1 year ago.  BMP on 12/1 shows a creatinine of 0.68, BUN of 27, potassium of 5.1. No CBC in the past year. 12/1 INR high at 4.2. This is being re-checked on 12/11.          Review of Systems     12-pt ROS is negative except for as noted in the HPI.          Physical Exam     Vitals: BP (!) 144/48 (BP Location: Right arm, Patient Position: Sitting, Cuff Size: Adult Regular)   Pulse 60   Ht 1.67 m (5' 5.75\")   Wt 73.5 kg (162 lb)   SpO2 92%   BMI 26.35 kg/m    Wt Readings from Last 10 Encounters:   12/06/23 73.5 kg (162 lb)   10/10/23 73.4 kg (161 lb 12.8 oz)   09/07/23 73.8 kg (162 lb 12.8 oz)   05/17/23 73.9 kg (163 lb)   11/28/22 75.4 kg (166 lb 3.2 oz)   11/02/22 74.1 kg (163 lb 4.8 oz)   10/18/22 75.8 kg (167 lb)   10/04/22 76.2 kg (168 lb)   07/08/22 76.7 kg (169 lb)   06/21/22 76.7 kg (169 lb)       Constitutional:  Patient is pleasant, alert, cooperative, and in NAD.  HEENT:  NCAT. PERRLA. EOM's intact.   Neck:  CVP appears normal. No carotid bruits.   Pulmonary: Normal respiratory effort. CTAB.   Cardiac: RRR, normal S1/S2, no S3/S4, no murmur or rub.   Abdomen:  Non-tender abdomen, no hepatosplenomegaly appreciated.   Vascular: Pulses in the upper and lower extremities are 2+ and equal bilaterally.  Extremities: No edema, erythema, cyanosis or tenderness appreciated.  Skin:  No rashes or lesions appreciated.   Neurological:  No gross motor or sensory deficits.   Psych: Appropriate affect.          Data   Labs reviewed:  Recent Labs   Lab Test 02/02/23  1258 06/21/22  1356 12/30/21  1004 11/09/20  1108 09/09/19  0947 05/08/17  1020 03/13/17  1050   LDL 77  --  78 59 68   < >  --    HDL 48  --  48 30* 38*   < >  --    NHDL 109  --  113 110 121   < >  --    CHOL 157  --  161 140 159   < >  --    TRIG 161*  --  173* 253* 264*   < >  --    TSH  --   --   --  3.62 4.48*  --  3.25   IRON  " --  50  --   --   --   --   --    FEB  --  309  --   --   --   --   --    IRONSAT  --  16  --   --   --   --   --    KELLIE  --  12*  --   --   --   --   --     < > = values in this interval not displayed.       Lab Results   Component Value Date    WBC 6.6 10/04/2022    WBC 7.9 03/13/2017    RBC 3.49 (L) 10/04/2022    RBC 4.11 (L) 03/13/2017    HGB 10.7 (L) 10/04/2022    HGB 12.4 (L) 03/13/2017    HCT 33.9 (L) 10/04/2022    HCT 39.4 (L) 03/13/2017    MCV 97 10/04/2022    MCV 96 03/13/2017    MCH 30.7 10/04/2022    MCH 30.2 03/13/2017    MCHC 31.6 10/04/2022    MCHC 31.5 03/13/2017    RDW 15.9 (H) 10/04/2022    RDW 13.9 03/13/2017     10/04/2022     03/13/2017       Lab Results   Component Value Date     12/01/2023     07/09/2021    POTASSIUM 5.1 12/01/2023    POTASSIUM 5.5 (H) 06/21/2022    POTASSIUM 5.5 (H) 07/09/2021    CHLORIDE 101 12/01/2023    CHLORIDE 102 06/21/2022    CHLORIDE 105 07/09/2021    CO2 28 12/01/2023    CO2 30 06/21/2022    CO2 32 07/09/2021    ANIONGAP 9 12/01/2023    ANIONGAP 4 06/21/2022    ANIONGAP <1 (L) 07/09/2021     (H) 12/01/2023    GLC 87 07/08/2022     (H) 06/21/2022     (H) 07/09/2021    BUN 27.0 (H) 12/01/2023    BUN 16 06/21/2022    BUN 15 07/09/2021    CR 0.68 12/01/2023    CR 0.76 07/09/2021    GFRESTIMATED >90 12/01/2023    GFRESTIMATED 88 07/09/2021    GFRESTBLACK >90 07/09/2021    LEBRON 9.2 12/01/2023    LEBRON 8.9 07/09/2021      Lab Results   Component Value Date    AST 26 02/02/2021    ALT 39 12/30/2021    ALT 32 02/02/2021       Lab Results   Component Value Date    A1C 5.7 (H) 07/26/2023    A1C 5.4 07/09/2021       Lab Results   Component Value Date    INR 4.2 (H) 12/01/2023    INR 4.2 (H) 11/22/2023    INR 1.24 (H) 07/08/2022    INR 1.70 (H) 07/09/2021    INR 2.30 (H) 06/25/2021            Problem List     Patient Active Problem List   Diagnosis    Ulcerative colitis without complications (HCC)    Atrial fibrillation (H)    Chronic  obstructive pulmonary disease (H)    Eczema    HYPERLIPIDEMIA LDL GOAL <100    Advance Care Planning    Benign familial tremor    Hypertension goal BP (blood pressure) < 140/90    Cramp of limb    BPH (benign prostatic hyperplasia)    Pain in joint, lower leg    CAD in native artery    Hard of hearing    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)    Diverticulitis of colon    History of colonic polyps    Redundant colon    Long-term (current) use of anticoagulants [Z79.01]    Bunion, left    Longstanding persistent atrial fibrillation (H)    Long term current use of anticoagulants with INR goal of 2.0-3.0    Overweight (BMI 25.0-29.9)    Atrial fibrillation, unspecified type (H)            Medications     Current Outpatient Medications   Medication Sig Dispense Refill    acetaminophen (TYLENOL) 325 MG tablet Take 325-650 mg by mouth every 6 hours as needed for mild pain      albuterol (PROAIR HFA/PROVENTIL HFA/VENTOLIN HFA) 108 (90 BASE) MCG/ACT Inhaler Inhale 1-2 puffs into the lungs every 4 hours as needed (for shortness of breath/wheezing) 1 Inhaler 5    amLODIPine (NORVASC) 5 MG tablet TAKE 1 TABLET (5 MG) BY MOUTH DAILY 90 tablet 1    atorvastatin (LIPITOR) 20 MG tablet TAKE 1 TABLET (20 MG) BY MOUTH DAILY 90 tablet 1    blood glucose (ONETOUCH ULTRA) test strip USE TO TEST BLOOD SUGAR THREE TIMES A DAY OR AS DIRECTED 300 strip 3    chlorthalidone (HYGROTON) 25 MG tablet Take 1 tablet (25 mg) by mouth daily 90 tablet 0    cyanocobalamin (VITAMIN B-12) 1000 MCG tablet Take 1,000 mcg by mouth daily      ferrous sulfate (FEROSUL) 325 (65 Fe) MG tablet Take 1 tablet (325 mg) by mouth daily (with breakfast)      fexofenadine (ALLEGRA) 180 MG tablet Take 180 mg by mouth daily      folic acid 0.8 MG CAPS Take 1 tablet by mouth daily 90 capsule 3    furosemide (LASIX) 20 MG tablet TAKE 1 TABLET (20 MG) BY MOUTH DAILY AS NEEDED FOR EDEMA 90 tablet 3    insulin regular (NOVOLIN R VIAL) 100 UNIT/ML  "vial USE 2 UNITS AS NEEDED WHEN BLOOD GLUCOSE >200 MG/DL. 30 mL 1    insulin syringe-needle U-100 (BD INSULIN SYRINGE ULTRAFINE) 31G X 5/16\" 0.3 ML miscellaneous USE 3 SYRINGES DAILY OR AS DIRECTED. 300 each 3    ipratropium - albuterol 0.5 mg/2.5 mg/3 mL (DUONEB) 0.5-2.5 (3) MG/3ML neb solution NEBULIZE CONTENTS OF ONE VIAL (3 MLS) EVERY 4 HOURS AS NEEDED FOR SHORTNESS OF BREATH OR DYSPNEA 360 mL 1    metFORMIN (GLUCOPHAGE) 1000 MG tablet TAKE ONE TABLET BY MOUTH TWICE A DAY WITH BREAKFAST  AND DINNER 180 tablet 0    mupirocin (BACTROBAN) 2 % external ointment Apply topically 2 times daily as needed (pilonidal cyst) 30 g 1    pantoprazole (PROTONIX) 40 MG enteric coated tablet Take 40 mg by mouth daily Started by Dr. Debbie Rico at [facility]      primidone (MYSOLINE) 50 MG tablet 4 tablets in am, 4 tablets afternoon, and 3 tablets every evening 1001 tablet 1    Probiotic Product (FLORAJEN3) CAPS Take 1 capsule by mouth 2 times daily 60 capsule 0    propafenone (RYTHMOL) 150 MG TABS tablet Take 1 tablet (150 mg) by mouth 2 times daily 180 tablet 0    propranolol (INDERAL) 60 MG tablet Take 2 tablets (120 mg) by mouth 2 times daily 360 tablet 2    sulfaSALAzine (AZULFIDINE) 500 MG tablet TAKE ONE TABLET BY MOUTH THREE TIMES A DAY 90 tablet 11    tamsulosin (FLOMAX) 0.4 MG capsule TAKE ONE CAPSULE BY MOUTH ONCE DAILY 90 capsule 1    tiotropium-olodaterol 2.5-2.5 MCG/ACT AERS Inhale 2 puffs into the lungs daily      Vitamin D3 (CHOLECALCIFEROL) 25 mcg (1000 units) tablet Take 2 tablets (50 mcg) by mouth daily      warfarin ANTICOAGULANT (JANTOVEN ANTICOAGULANT) 2.5 MG tablet TAKE TWO AND ONE HALF TABLETS (6.25 MG) DAILY OR AS DIRECTED BY THE INR CLINIC 225 tablet 1            Past Medical History     Past Medical History:   Diagnosis Date    Allergic rhinitis, cause unspecified     Atrial fibrillation (H)     BPH (benign prostatic hyperplasia)     Chronic airway obstruction, not elsewhere classified     COPD (chronic " obstructive pulmonary disease) (H)     Hyperlipidemia LDL goal <100 5/9/2010    Hypertension goal BP (blood pressure) < 130/80 10/19/2006    Obesity (BMI 30-39.9)     Perforation of tympanic membrane, unspecified     Right TM    Tachycardia, unspecified     Type 2 diabetes, HbA1C goal < 8% (H) 5/2/2010    Ulcerative colitis (H)     Unspecified cardiovascular disease      Past Surgical History:   Procedure Laterality Date    CARDIAC SURGERY      COLONOSCOPY  3/31/2011    COLONOSCOPY N/A 5/25/2018    Procedure: COMBINED COLONOSCOPY, SINGLE OR MULTIPLE BIOPSY/POLYPECTOMY BY BIOPSY;;  Surgeon: Juan Simon DO;  Location:  GI    COLONOSCOPY N/A 9/6/2019    Procedure: COLONOSCOPY, WITH POLYPECTOMY AND BIOPSY;  Surgeon: Paradise Mims MD;  Location: [facility]    COLONOSCOPY N/A 7/8/2022    Procedure: COLONOSCOPY, FLEXIBLE, WITH LESION REMOVAL USING SNARE;  Surgeon: Juan Simon DO;  Location: [facility]    COLONOSCOPY N/A 7/8/2022    Procedure: COLONOSCOPY, WITH POLYPECTOMY AND BIOPSY;  Surgeon: Juan Simon DO;  Location: [facility]    CORONARY ANGIOGRAPHY ADULT ORDER  2001 and 2008 2001 at [facility]. 2008- No interventions    ESOPHAGOSCOPY, GASTROSCOPY, DUODENOSCOPY (EGD), COMBINED N/A 7/8/2022    Procedure: ESOPHAGOGASTRODUODENOSCOPY, WITH BIOPSY;  Surgeon: Juan Simon DO;  Location:  GI    HERNIA REPAIR      left inguinal    SURGICAL HISTORY OF -   1987    right ear graft    SURGICAL HISTORY OF -   1992    right shoulder surgery    SURGICAL HISTORY OF -   1979    back surgery    SURGICAL HISTORY OF -   1978    vasectomy    ZZHC REMOVAL OF NAIL PLATE SIMPLE SINGLE  11/10/2011    Right 2nd Toenail     Family History   Problem Relation Age of Onset    Circulatory Mother         blood clot in leg    Diabetes Mother         type 2    Allergies Mother     Arthritis Mother     Gastrointestinal Disease Mother     Neurologic Disorder Mother         Familiar tremors    Neurologic Disorder Father          brain tumor    Cerebrovascular Disease Paternal Grandfather     Hypertension Brother     Lipids Brother     Hypertension Sister     Diabetes Sister         Type 2    Allergies Sister     Lipids Sister     Glaucoma No family hx of     Macular Degeneration No family hx of      Social History     Socioeconomic History    Marital status:      Spouse name: Izabel    Number of children: 2    Years of education: 12    Highest education level: Not on file   Occupational History    Occupation:      Employer: RETIRED   Tobacco Use    Smoking status: Former     Packs/day: 1.00     Years: 30.00     Additional pack years: 0.00     Total pack years: 30.00     Types: Cigarettes     Quit date: 3/19/1992     Years since quittin.7    Smokeless tobacco: Never   Vaping Use    Vaping Use: Never used   Substance and Sexual Activity    Alcohol use: Not Currently     Alcohol/week: 0.0 standard drinks of alcohol    Drug use: No    Sexual activity: Yes     Partners: Female   Other Topics Concern    Parent/sibling w/ CABG, MI or angioplasty before 65F 55M? No     Service Not Asked    Blood Transfusions Not Asked    Caffeine Concern No     Comment: No Caffeine    Occupational Exposure Not Asked    Hobby Hazards Not Asked    Sleep Concern Not Asked    Stress Concern Not Asked    Weight Concern Not Asked    Special Diet Not Asked    Back Care Not Asked    Exercise Yes     Comment: 20 minutes - Walking- summer 2 x day    Bike Helmet Not Asked    Seat Belt Not Asked    Self-Exams Not Asked   Social History Narrative    Not on file     Social Determinants of Health     Financial Resource Strain: Not on file   Food Insecurity: Not on file   Transportation Needs: Not on file   Physical Activity: Not on file   Stress: Not on file   Social Connections: Not on file   Interpersonal Safety: Not on file   Housing Stability: Not on file            Allergies   Percodan [oxycodone-aspirin] and  Aspirin    Today's clinic visit entailed:  Review of the result(s) of each unique test - CBC, BMP, EKG  Ordering of each unique test  Prescription drug management  I spent a total of 30 minutes on the day of the visit.   Time spent by me doing chart review, history and exam, documentation and further activities per the note  Provider  Link to MDM Help Grid     The level of medical decision making during this visit was of moderate complexity.

## 2023-12-07 ENCOUNTER — TELEPHONE (OUTPATIENT)
Dept: CARDIOLOGY | Facility: CLINIC | Age: 78
End: 2023-12-07
Payer: COMMERCIAL

## 2023-12-07 NOTE — TELEPHONE ENCOUNTER
----- Message from Sasha Madrid PA-C sent at 12/7/2023 11:24 AM CST -----  Can you let Leo know this? This is likely unaffordable to him. However he might be interested in the $85/month option for Xarelto. Thanks!  ----- Message -----  From: Diana Rico  Sent: 12/7/2023   8:10 AM CST  To: Sasha Madrid PA-C    No I'm not. I wish I could.     ----- Message -----  From: Sasha Madrid PA-C  Sent: 12/6/2023   5:15 PM CST  To: Diana Rico    Ok. You aren't able to tell what it will be next year, are you?  ----- Message -----  From: Diana Rico  Sent: 12/6/2023   1:40 PM CST  To: Sasha Madrid PA-C    Hi there,     Looks like this patient is meeting an end of the year deductible of some sort.   Eliquis $135 for 30 days   Xarelto $131    They could use a voucher if they haven't used one already.     Thank you for allowing me to help with your patient  Diana Rico [facility]  Pharmacy Discharge Liaison [facility] [facility]      ----- Message -----  From: Sasha Madrid PA-C  Sent: 12/6/2023   1:06 PM CST  To: Rx Coverage Check For Heart Clinics    DOACs, please!

## 2023-12-07 NOTE — TELEPHONE ENCOUNTER
A message left for patient to review cost of DOAC medications.  Waiting call back from patient.  MANOJ Phan

## 2023-12-11 ENCOUNTER — LAB (OUTPATIENT)
Dept: LAB | Facility: CLINIC | Age: 78
End: 2023-12-11
Payer: COMMERCIAL

## 2023-12-11 ENCOUNTER — TELEPHONE (OUTPATIENT)
Dept: FAMILY MEDICINE | Facility: CLINIC | Age: 78
End: 2023-12-11

## 2023-12-11 ENCOUNTER — ANTICOAGULATION THERAPY VISIT (OUTPATIENT)
Dept: ANTICOAGULATION | Facility: CLINIC | Age: 78
End: 2023-12-11

## 2023-12-11 DIAGNOSIS — I48.11 LONGSTANDING PERSISTENT ATRIAL FIBRILLATION (H): Primary | ICD-10-CM

## 2023-12-11 DIAGNOSIS — I48.91 ATRIAL FIBRILLATION, UNSPECIFIED TYPE (H): ICD-10-CM

## 2023-12-11 DIAGNOSIS — Z79.01 LONG TERM CURRENT USE OF ANTICOAGULANT THERAPY: ICD-10-CM

## 2023-12-11 DIAGNOSIS — Z79.01 LONG TERM CURRENT USE OF ANTICOAGULANTS WITH INR GOAL OF 2.0-3.0: ICD-10-CM

## 2023-12-11 LAB — INR BLD: 2.3 (ref 0.9–1.1)

## 2023-12-11 PROCEDURE — 85610 PROTHROMBIN TIME: CPT

## 2023-12-11 PROCEDURE — 36416 COLLJ CAPILLARY BLOOD SPEC: CPT

## 2023-12-11 NOTE — PROGRESS NOTES
ANTICOAGULATION MANAGEMENT     Cayetano Lunsford 78 year old male is on warfarin with therapeutic INR result. (Goal INR 2.0-3.0)    Recent labs: (last 7 days)     12/11/23  1405   INR 2.3*       ASSESSMENT     Source(s): Chart Review  Previous INR was Supratherapeutic  Medication, diet, health changes since last INR chart reviewed - Cardiology would prefer if patient transitioned to a DOAC, however, rx remain unaffordable for patient (see 12/7/23 TE)   MD previously reduced by 8.5%         PLAN     Unable to reach Rich today.    Left message to continue current dose of warfarin 5 mg tonight. Request call back for assessment.    Follow up required to confirm warfarin dose taken and assess for changes    Kirstin Meléndez RN  Anticoagulation Clinic  12/11/2023

## 2023-12-11 NOTE — PROGRESS NOTES
ANTICOAGULATION MANAGEMENT     Cayetano Lunsford 78 year old male is on warfarin with therapeutic INR result. (Goal INR 2.0-3.0)    Recent labs: (last 7 days)     12/11/23  1405   INR 2.3*       ASSESSMENT     Source(s): Chart Review and Patient/Caregiver Call     Warfarin doses taken: Warfarin taken as instructed - MD previously reduced by 8.5%   Diet: No new diet changes identified  Medication/supplement changes: None noted - encouraged prn furosemide for leg swelling per Cardiology  New illness, injury, or hospitalization: Yes: intermittent back and hip pain likely r/t spinal stenosis &/or arthritis - ongoing sx over the last 9-12 months    Signs or symptoms of bleeding or clotting: No  Previous result: Supratherapeutic  Additional findings: Bunions on both feet - cause much discomfort, patient has a difficult time finding a comfortable shoe - f/u with Podiatry or Ortho encouraged  Cardiology would prefer if patient transitioned to a DOAC, however, rx remain unaffordable for patient (see 12/7/23 TE)        PLAN     Recommended plan for ongoing change(s) affecting INR     Dosing Instructions: Continue your current warfarin dose with next INR in 2 weeks       Summary  As of 12/11/2023      Full warfarin instructions:  5 mg every Mon, Wed, Fri; 6.25 mg all other days   Next INR check:  12/26/2023               Telephone call with Leo who verbalizes understanding and agrees to plan    Lab visit scheduled    Education provided:   Please call back if any changes to your diet, medications or how you've been taking warfarin  Symptom monitoring: monitoring for bleeding signs and symptoms and monitoring for clotting signs and symptoms    Plan made per [facility] anticoagulation protocol    Kirstin Meléndez RN  Anticoagulation Clinic  12/11/2023    _______________________________________________________________________     Anticoagulation Episode Summary       Current INR goal:  2.0-3.0   TTR:  50.7% (1 y)   Target end date:   Indefinite   Send INR reminders to:  [facility]    Indications    Longstanding persistent atrial fibrillation (H) [I48.11]  Long term current use of anticoagulants with INR goal of 2.0-3.0 [Z79.01]  Atrial fibrillation  unspecified type (H) [I48.91]  Long-term (current) use of anticoagulants [Z79.01] [Z79.01]             Comments:               Anticoagulation Care Providers       Provider Role Specialty Phone number    Dmitri Andres MD Referring Family Medicine [phone]    Drew Bravo PA-C Referring Family [facility] [phone]

## 2023-12-11 NOTE — TELEPHONE ENCOUNTER
Patient Returning Call    Reason for call:  Patient returning call to INR nurse     Information relayed to patient:  n/a    Patient has additional questions:  No      Okay to leave a detailed message?: Yes at Cell number on file:    Telephone Information:   Mobile [phone]

## 2023-12-11 NOTE — TELEPHONE ENCOUNTER
Writer returned call to patient and reviewed INR level and dosing plan. See [facility] encounter. AMRLINE KingN, RN

## 2023-12-22 DIAGNOSIS — G25.0 BENIGN FAMILIAL TREMOR: ICD-10-CM

## 2023-12-26 ENCOUNTER — ANTICOAGULATION THERAPY VISIT (OUTPATIENT)
Dept: ANTICOAGULATION | Facility: CLINIC | Age: 78
End: 2023-12-26

## 2023-12-26 ENCOUNTER — LAB (OUTPATIENT)
Dept: LAB | Facility: CLINIC | Age: 78
End: 2023-12-26
Payer: COMMERCIAL

## 2023-12-26 DIAGNOSIS — I48.91 ATRIAL FIBRILLATION, UNSPECIFIED TYPE (H): ICD-10-CM

## 2023-12-26 DIAGNOSIS — Z79.01 LONG TERM CURRENT USE OF ANTICOAGULANT THERAPY: ICD-10-CM

## 2023-12-26 DIAGNOSIS — Z79.01 LONG TERM CURRENT USE OF ANTICOAGULANTS WITH INR GOAL OF 2.0-3.0: ICD-10-CM

## 2023-12-26 DIAGNOSIS — I48.11 LONGSTANDING PERSISTENT ATRIAL FIBRILLATION (H): Primary | ICD-10-CM

## 2023-12-26 LAB — INR BLD: 1.8 (ref 0.9–1.1)

## 2023-12-26 PROCEDURE — 85610 PROTHROMBIN TIME: CPT

## 2023-12-26 PROCEDURE — 36415 COLL VENOUS BLD VENIPUNCTURE: CPT

## 2023-12-26 NOTE — PROGRESS NOTES
ANTICOAGULATION MANAGEMENT     Cayetano Lunsford 78 year old male is on warfarin with subtherapeutic INR result. (Goal INR 2.0-3.0)    Recent labs: (last 7 days)     12/26/23  1343   INR 1.8*       ASSESSMENT     Source(s): Chart Review and Patient/Caregiver Call     Warfarin doses taken: Warfarin taken as instructed  Diet: No new diet changes identified  Medication/supplement changes: None noted  New illness, injury, or hospitalization: No  Signs or symptoms of bleeding or clotting: No  Previous result: Therapeutic last visit; previously outside of goal range, warfarin maintenance dose was decreased 8.5% on 12/1/23  Additional findings: None       PLAN     Recommended plan for no diet, medication or health factor changes affecting INR     Dosing Instructions: Increase your warfarin dose (3.1% change) with next INR in 1-2 weeks       Summary  As of 12/26/2023      Full warfarin instructions:  5 mg every Mon, Fri; 6.25 mg all other days   Next INR check:  1/4/2024               Telephone call with Leo who verbalizes understanding and agrees to plan.   AVS placed in outgoing mail    Lab visit scheduled    Education provided:   Please call back if any changes to your diet, medications or how you've been taking warfarin  Contact [phone]  with any changes, questions or concerns.     Plan made per [facility] anticoagulation protocol    Dayanara Cuevas RN  Anticoagulation Clinic  12/26/2023    _______________________________________________________________________     Anticoagulation Episode Summary       Current INR goal:  2.0-3.0   TTR:  52.2% (1 y)   Target end date:  Indefinite   Send INR reminders to:  [facility]    Indications    Longstanding persistent atrial fibrillation (H) [I48.11]  Long term current use of anticoagulants with INR goal of 2.0-3.0 [Z79.01]  Atrial fibrillation  unspecified type (H) [I48.91]  Long-term (current) use of anticoagulants [Z79.01] [Z79.01]             Comments:                Anticoagulation Care Providers       Provider Role Specialty Phone number    Dmitri Andres MD Referring Family Medicine [phone]    Drew Bravo PA-C Referring Family Medicine [phone]

## 2023-12-27 DIAGNOSIS — I10 HYPERTENSION GOAL BP (BLOOD PRESSURE) < 140/90: ICD-10-CM

## 2023-12-27 DIAGNOSIS — G25.0 BENIGN FAMILIAL TREMOR: ICD-10-CM

## 2023-12-27 DIAGNOSIS — I48.0 PAROXYSMAL ATRIAL FIBRILLATION (H): ICD-10-CM

## 2023-12-27 RX ORDER — PROPRANOLOL HYDROCHLORIDE 60 MG/1
120 TABLET ORAL 2 TIMES DAILY
Qty: 360 TABLET | Refills: 4 | Status: SHIPPED | OUTPATIENT
Start: 2023-12-27

## 2023-12-27 RX ORDER — CHLORTHALIDONE 25 MG/1
25 TABLET ORAL DAILY
Qty: 90 TABLET | Refills: 3 | Status: SHIPPED | OUTPATIENT
Start: 2023-12-27

## 2023-12-27 RX ORDER — CHLORTHALIDONE 25 MG/1
25 TABLET ORAL DAILY
Qty: 90 TABLET | Refills: 0 | OUTPATIENT
Start: 2023-12-27

## 2024-01-02 NOTE — PROGRESS NOTES
CARDIOLOGY CONSULTATION NOTE      Patient:     Tara Tierney    YOB: 1954  MRN:     [id number]    Date:     January 2, 2024    Consulting Cardiologist:    Izaiah Tavares MD , [facility]     Primary Cardiologist: NIC Barron MD , [facility]     Reason for Consultation: Consult for IVC Filter Placement, DVT / PE history      IMPRESSION:      CONSULT FOR IVC FILTER PLACEMENT, reported high risk for anticoagulation.  History of prior DVT, right, 11/23  History of Prior PE 11/23  Chronic Lower Back Pain  Lumbar decompression laminectomy November 2023  Postoperative LSO MRSA wound /epidural abscess infection with multiple washouts on IV antibiotics with vancomycin  PVOD post bilateral femoral arterial bypass, negative CTA runoff 7/2023.  Negative Echocardiogram 12/23.  Normal LV Function, LVEF 60%  Negative perfusion stress test 6/2022. Hx Cardiac Cath 2014, post PCI.  LBBB  Hypertension  Hyperlipidemia  COPD  Degenerative joint disease post prior bilateral knee arthroplasties  Eczema  Obesity  Past Tobacco abuse  Otherwise as per assessment below.    RECOMMENDATIONS:      Cardiovascular Supportive Care.  Telemetry Monitoring.  [facility]   Dr Barron to see for IVC Filter Placement in AM. Held NPO. Consents discussed.  Further Recommendations to Follow.  See Orders.    HPI:     Electronic medical records were reviewed. Patient was interviewed and examined.    The patient's problems are listed in the impression above.       Tara Tierney  was seen in cardiology consultation at the [facility] 1/2/2024.      Patient is a pleasant 69-year-old hypertensive hyperlipidemic woman with history of coronary artery disease, cardiomyopathy, peripheral vascular disease with chronic lower back pain postop lumbar spine surgery November 2023 with subsequent MRSA wound infection on IV vancomycin treatment currently.  She also had a history of a right DVT and pulmonary  AVS mailed to patient.  Dayanara Cuevas, RN, BSN  Anticoagulation Clinic     embolism approximately 1 month ago.  She has significant anemia with hemoglobin currently 7.  She is felt to be high risk for anticoagulation long-term and cardiology was asked to see for placement of IVC filter.    Patient does follow with Dr. Barron as an outpatient.      Patient denies Chest Pain, SOB, Lightheadedness, Dizziness, TIA or CVA symptoms.  No CHF or Edema.  No Palpitations.  No GI,  or Bleeding Issues. No Recent Fever or Chills.     Cardiovascular and general review of systems is otherwise negative.      Allergies:     Allergies   Allergen Reactions    Neomycin Other     swelling, redness, burning post eye surgery    Neomycin-Polymyxin B-Dexameth Other and Rash     blisters, eye swelling, burning        Medications:     Current Outpatient Medications   Medication Instructions    albuterol (ProAir HFA) 90 mcg/actuation inhaler 2 puffs, inhalation    apixaban (ELIQUIS) 10 mg, oral, 2 times daily    apixaban (ELIQUIS) 5 mg, oral, 2 times daily    aspirin 81 mg, oral, Daily    atenolol (TENORMIN) 50 mg, oral, Every morning    brimonidine (AlphaGAN) 0.2 % ophthalmic solution 1 drop, Both Eyes, 2 times daily    busPIRone (BUSPAR) 10 mg, oral, Daily    cholecalciferol (Vitamin D-3) 50 mcg (2,000 unit) capsule 1 capsule, oral, Daily    cyclobenzaprine (FLEXERIL) 5 mg, oral, 3 times daily PRN    ezetimibe (ZETIA) 10 mg, oral, Daily    furosemide (LASIX) 20 mg, oral, Daily    heparin sodium,porcine/PF (HEPARIN, PORCINE, LOCK FLUSH IV) 5 mL, intravenous, Daily    hydrALAZINE (APRESOLINE) 50 mg, oral, 2 times daily    HYDROmorphone (DILAUDID) 2 mg, oral, Every 4 hours PRN    ibandronate (BONIVA) 150 mg, oral, Every 30 days    ipratropium (Atrovent HFA) 17 mcg/actuation inhaler 2 puffs, inhalation, 2 times daily RT    isosorbide mononitrate ER (IMDUR) 60 mg, oral, Daily RT    lactobacillus acidophilus tablet tablet 1 tablet, oral, Daily    loratadine (Claritin) 10 mg tablet 1 tablet, oral, Daily    LUTEIN ORAL  20 mg, oral, Daily RT    meclizine (ANTIVERT) 25 mg, oral, Every 8 hours PRN    mirtazapine (Remeron) 15 mg tablet 1 tablet, oral, Nightly    nitroglycerin (NITROSTAT) 0.4 mg, sublingual, Every 5 min PRN,  PLACE 1 TABLET UNDER THE TONGUE EVERY 5 MINUTES UP TO 3 DOSES AS NEEDED FOR CHEST PAIN.<BR>    pantoprazole (PROTONIX) 40 mg, oral, Daily before breakfast, Do not crush, chew, or split.    pilocarpine (SALAGEN (PILOCARPINE)) 5 mg, oral, Daily RT    potassium chloride ER (Micro-K) 10 mEq ER capsule 10 mEq, oral, Daily    pregabalin (LYRICA) 75 mg, oral, 2 times daily    simvastatin (ZOCOR) 40 mg, oral, Nightly    sodium chloride 0.9% (ClearShield Sodium Chlor Flush) flush 10 mL, intravenous, Daily    tolterodine (Detrol) 1 mg tablet 1 tablet, oral, Nightly    vancomycin (VANCOCIN) 1,250 mg, intravenous, Every 24 hours    vancomycin in dextrose 5 % (Vancocin) IVPB 1 g, intravenous, Every 12 hours, CBC BMP weekly<BR>Vanco level weekly<BR>CRP sed rate in 3 and 6 weeks<BR>Fax results to [id number]    vit C,U-Gk-lfavv-lutein-zeaxan (PreserVision AREDS-2) 250-90-40-1 mg capsule 1 capsule, oral, 2 times daily       Past Medical History:   Hypertension, hyperlipidemia.  No diabetes.  No prior stroke.  History of peripheral vascular disease post aortobifemoral bypass.  Last CTA angiogram with bilateral leg runoff 7/2023.  Left bundle branch block.  History of prior remote coronary artery stenting.  Pulmonary embolism and DVT right 11/2023.  Otherwise as noted above.    No other significant past medical or surgical history.    Social History:   Quit smoking 3 months ago.  Prior 20-pack-year smoking history.  No alcohol or illicit drug use.    Family History:   Positive family history of cardiovascular disease    ELECTROCARDIOGRAM:      Sinus rhythm with short DE,  Left bundle branch block     CARDIAC TESTING:      ECHO 12/23:  1. Left ventricular systolic function is normal with a 55% estimated ejection fraction.  2.  "Spectral Doppler shows an impaired relaxation pattern of left ventricular diastolic filling.  3. There is no evidence of mitral valve stenosis.  4. Trace mitral valve regurgitation.  5. Trace tricuspid regurgitation is visualized.  6. Aortic valve stenosis is not present.  7. Moderately elevated pulmonary artery pressure.     PHYSICAL EXAMINATION:      Vital signs:  /61   Pulse 79   Temp 36.8 °C (98.2 °F)   Resp 20   Ht 1.448 m (4' 9.01\")   Wt 78.2 kg (172 lb 6.4 oz)   SpO2 92%   BMI 37.30 kg/m²     General: No acute distress. Alert and oriented.  Head And Neck Examination: No jugular venous distention, no carotid bruits, no mass. Carotid upstrokes preserved. Oral mucosa moist.  No xanthelasma. Head and neck examination otherwise unremarkable.  Lungs: Clear to auscultation and percussion. No wheezes, no rales,  and no rhonchi.  Chest: Excursion appeared to be normal. No chest wall tenderness on palpation.  Heart: Normal S1 and S2. No S3. No S4. No rub. Grade 1/6 systolic murmur, best heard at the left sternal border. Point of maximal impulse was within normal limits.  Abdomen: Soft. Nontender. No organomegaly. No bruits. No masses. Obese.  Extremities: No bipedal edema. No clubbing. No cyanosis.  Pulses are strong throughout. No bruits.  Musculoskeletal Exam: No ulcers, otherwise unremarkable.  Neuro: Neurologically appeared grossly intact.      LABORATORY DATA:        CBC:   Lab Results   Component Value Date    WBC 4.6 01/02/2024    RBC 2.29 (L) 01/02/2024    HGB 7.0 (L) 01/02/2024    HCT 22.7 (L) 01/02/2024     01/02/2024        CMP:    Lab Results   Component Value Date     (L) 01/02/2024    K 3.7 01/02/2024     01/02/2024    CO2 26 01/02/2024    BUN 7 01/02/2024    CREATININE 0.85 01/02/2024    GLUCOSE 101 (H) 01/02/2024    CALCIUM 8.3 (L) 01/02/2024       Magnesium:    Lab Results   Component Value Date    MG 1.85 01/02/2024         HgBA1c:    Lab Results   Component Value Date "    HGBA1C 5.8 (A) 06/14/2022         Cardiac Enzymes:    Lab Results   Component Value Date    TROPHS 5 07/21/2023    TROPHS 5 07/21/2023    TROPHS 7 05/18/2022       Diagnostic Studies:     CT abdomen pelvis wo IV contrast    Result Date: 12/31/2023  STUDY: CT Abdomen and Pelvis without IV Contrast; 12/31/2023 at 5:11 PM. INDICATION: Abdominal pain. COMPARISON: CT AP 12/19/2023 ACCESSION NUMBER(S): [id number] ORDERING CLINICIAN: WILLIAM EDMONDS TECHNIQUE: CT of the abdomen and pelvis was performed.  Contiguous axial images were obtained at 3 mm slice thickness through the abdomen and pelvis. Coronal and sagittal reconstructions at 3 mm slice thickness were performed. No intravenous contrast was administered.  Automated mA/kV exposure control was utilized and patient examination was performed in strict accordance with principles of ALARA. FINDINGS: Please note that the evaluation of vessels, lymph nodes and organs is limited without intravenous contrast.  LOWER CHEST: No cardiomegaly.  No pericardial effusion.  Very small bilateral pleural effusions  ABDOMEN:  LIVER: No hepatomegaly.  Smooth surface contour.  Normal attenuation.  BILE DUCTS: No intrahepatic or extrahepatic biliary ductal dilatation.  GALLBLADDER: The gallbladder is absent. STOMACH: No abnormalities identified.  PANCREAS: Negative for pancreatitis  SPLEEN: No splenomegaly or focal splenic lesion.  ADRENAL GLANDS: No thickening or nodules.  KIDNEYS AND URETERS: Kidneys are normal in size and location.  No renal or ureteral calculi.  PELVIS:  BLADDER: No abnormalities identified.  REPRODUCTIVE ORGANS: No abnormalities identified.  BOWEL: Colonic diverticulosis.  Mild constipation without bowel obstruction. Negative for appendicitis  VESSELS: Limited due to lack of intravenous contrast.  Prior femoral to femoral artery bypass.  Abdominal aorta is normal in caliber.  PERITONEUM/RETROPERITONEUM/LYMPH NODES: Small amount of low-density free pelvic  fluid  No pneumoperitoneum. No lymphadenopathy.  ABDOMINAL WALL: Small fat-containing umbilical hernia. SOFT TISSUES: Postsurgical changes within the posterior lumbar soft tissues with surgical drains in place.  BONES: Status post interbody fusion of L4/5 and L5/S1 with metallic spacer. This space narrowing L3/4.  Laminectomies L3-L5.  Bone graft along the lateral margins of L3-L5 fracture of the right L3 transverse process. Fracture of the right L4 transverse process.  Old screw tract within the L4 and L5 pedicles.  Narrowing of the right L5/S1 neural foramen due to facet joint osteophyte.    Negative for bowel obstruction.  Mild constipation. Colonic diverticulosis without diverticulitis. Negative for appendicitis. Postsurgical changes lumbar spine removal of pedicle screws and posterior rods from L4 through S1.  Stable appearance of interbody spacers at L4/5 and L5/S1. Bone graft along the lateral margins of L3-L5.  Fractures of the right L4 and L3 transverse process.  The right L3 transverse process fracture appears old.  Postsurgical changes within the posterior lumbar soft tissues with surgical drains in place. Signed by Aung Sparrow MD    MR lumbar spine w and wo IV contrast    Result Date: 12/31/2023  Interpreted By:  Jonas Sanchez, STUDY: MR LUMBAR SPINE W AND WO IV CONTRAST;  12/31/2023 12:33 am   INDICATION: Signs/Symptoms:Pain.   COMPARISON: MRI of the lumbar spine dated 12/10/2023   ACCESSION NUMBER(S): [id number]   ORDERING CLINICIAN: YVETTE LEE   TECHNIQUE: Sagittal T1, T2, STIR, axial T1 and T2 weighted images of the lumbar spine were acquired. Additional axial and sagittal T1 weighted images of the lumbar spine were obtained after intravenous administration of 15.5 mL of contrast.   FINDINGS: Exam is degraded by motion.   There is again evidence of 5 lumbar type non rib-bearing vertebral bodies, with the lowest well-formed intervertebral disc space labeled L5-S1.   Postsurgical  changes of posterior decompression and laminectomies of L3 through L5 with posterior spinal fusion extending from L4 through S1 and discectomies with intervertebral disc spaces at L4-L5 and L5-S1. These are similar in appearance to prior examination on 12/10/2023. Susceptibility artifact from the surgical hardware somewhat limits evaluation of the adjacent structures.   In the interim since prior imaging on 12/10/2023, new postsurgical consistent with irrigation debridement of subfascial tissues of the cutaneous spine are evident. STIR and T2 hypointense signal abnormality previously seen in the cutaneous fat of the lower lumbar spine has resolved, with new 3.7 x 3.6 x 13.6 cm (series 7, image 10; series 10, image 10) T2 hyperintense fluid collection present, with slight peripheral enhancement. This collection may be contiguous with the skin.   T2 hyperintense collection is again present in the laminectomy surgical bed, abutting the thecal sac at the level of L3 through L5 (series 8, image 14), measuring up to 5.2 cm in craniocaudal dimension, 2.9 cm in transverse dimension and up to 1.5 cm in AP dimension (series 10, image 7). The surgical catheter previously seen within the collection is gone, although collection is decreased in size to previous imaging, with resolution of previously seen air within the collection. Mild surrounding edema and reactive soft tissue changes are present in the epidural space, somewhat increased in the interim since prior exam, with new/increased mass effect on the adjacent thecal sac.   There also appears to be small amount of new fluid present in the anterior epidural space at the level of L3 (series 10, image 3).   Although the exact characterization is difficult due to motion, there appears to be somewhat worsened spinal canal narrowing at the level of L2-L3 due to combination of new fluid in the anterior epidural space, and the T2 hyperintense fluid collection with associated  inflammatory/reactive changes along the posterior aspect of the thecal sac, with somewhat increased effacement of the subarachnoid space.   No new intra thecal enhancement is identified.   Neural foramina are not well assessed due to motion and susceptibility artifact from the surgical hardware.       1.  New surgical changes of irrigation and debridement in the laminectomy surgical bed evident, with previously seen geographic area of hypointense T2 and STIR signal in the cutaneous fat of the lumbar spine resolved, and new T2 hyperintense collection measuring 3.7 x 3.6 x 13.6 cm present in the cutaneous fat, likely representing a postsurgical seroma, although sterility can not be ascertained on imaging alone. This collection reaches of the dermis, and may drain at the skin. 2. T2 hyperintense collection within the laminectomy bed itself along the dorsal aspect of the thecal sac described on previous MRI in 12/10/2023 has mildly decreased in size from prior imaging, with interval removal of previously seen drain, although there are increased inflammatory/reactive soft tissue changes present in the laminectomy surgical bed, focus of fluid in the anterior epidural space at the level of L3 contributes to increased effacement of the thecal sac at the level of L3 compared to prior MRI.   MACRO: None   Signed by: Jonas Sanchez 12/31/2023 1:13 AM Dictation workstation:   [id number]      Radiology:     Vascular US lower extremity venous duplex bilateral   Final Result   Negative study.  No deep venous thrombosis of the  right or left   lower extremity.  Previous right peroneal thrombus is no longer   identified.        MACRO:   None        Signed by: Connie Pinto 1/2/2024 8:43 AM   Dictation workstation:   [id number]      XR lumbar spine 2-3 views   Final Result   Removal of lower lumbar transfixation hardware.        Signed by: Phillip Renteria 1/1/2024 12:49 PM   Dictation workstation:   [id number]      CT  abdomen pelvis wo IV contrast   Final Result   Negative for bowel obstruction.  Mild constipation.   Colonic diverticulosis without diverticulitis.   Negative for appendicitis.   Postsurgical changes lumbar spine removal of pedicle screws and   posterior rods from L4 through S1.  Stable appearance of interbody   spacers at L4/5 and L5/S1.   Bone graft along the lateral margins of L3-L5.  Fractures of the right   L4 and L3 transverse process.  The right L3 transverse process   fracture appears old.  Postsurgical changes within the posterior   lumbar soft tissues with surgical drains in place.   Signed by Aung Sparrow MD      MR lumbar spine w and wo IV contrast   Final Result   1.  New surgical changes of irrigation and debridement in the   laminectomy surgical bed evident, with previously seen geographic   area of hypointense T2 and STIR signal in the cutaneous fat of the   lumbar spine resolved, and new T2 hyperintense collection measuring   3.7 x 3.6 x 13.6 cm present in the cutaneous fat, likely representing   a postsurgical seroma, although sterility can not be ascertained on   imaging alone. This collection reaches of the dermis, and may drain   at the skin.   2. T2 hyperintense collection within the laminectomy bed itself along   the dorsal aspect of the thecal sac described on previous MRI in   12/10/2023 has mildly decreased in size from prior imaging, with   interval removal of previously seen drain, although there are   increased inflammatory/reactive soft tissue changes present in the   laminectomy surgical bed, focus of fluid in the anterior epidural   space at the level of L3 contributes to increased effacement of the   thecal sac at the level of L3 compared to prior MRI.        MACRO:   None        Signed by: Jonas Sanchez 12/31/2023 1:13 AM   Dictation workstation:   [id number]      MR hip right w and wo IV contrast    (Results Pending)   Cardiac catheterization - non-coronary    (Results  Pending)     CTA ABD AORTA WITH BILAT ILIOFEM EXTR RUNOFF W/WO CONT   7/23  1. No acute vascular abnormality within the abdomen or pelvis. No  aneurysm or dissection.  2. Chronic left common iliac occlusion with patent fem-fem bypass  graft. Patent lower extremity vasculature with three-vessel runoff  bilaterally.  3. No acute inflammatory process within the abdomen or pelvis.            Problem List:     Patient Active Problem List   Diagnosis    Abdominal aortic aneurysm (CMS/HCC)    Abnormal EKG    Bilateral carotid artery stenosis    Bruit of right carotid artery    CAD in native artery    Cardiomyopathy (CMS/HCC)    Chest pain    Chronic asthmatic bronchitis    Closed displaced fracture of fifth metatarsal bone    Current every day smoker    Difficulty breathing    Essential hypertension    History of total knee replacement    Hypokalemia    Intermittent claudication (CMS/HCC)    Knee osteoarthritis    Knee pain    Limb pain    Localized swelling, mass, or lump of lower extremity    Celiac artery stenosis (CMS/HCC)    Mesenteric artery stenosis (CMS/HCC)    Mixed hyperlipidemia    Obese    PVD (peripheral vascular disease) (CMS/HCC)    Right foot pain    Swelling    Trigger finger    Urinary tract infection without hematuria    Back pain of lumbar region with sciatica    Back pain with radiculopathy    Intractable back pain    Seroma, post-traumatic (CMS/HCC)    Lumbar surgical wound fluid collection    Generalized weakness    Postoperative surgical complication involving musculoskeletal system associated with musculoskeletal procedure, unspecified complication    Back pain at L4-L5 level    Deep vein thrombosis (DVT) of right lower extremity (CMS/HCC)    Anemia             Izaiah Tavares MD, [facility] / NO /  Cardiology      Of Note:  [employer] voice recognition dictation software was utilized partially in the preparation of this note, therefore, inaccuracies in spelling, word choice and punctuation  may have occurred which were not recognized the time of signing.      Patient was seen and examined with total time of visit including chart preparation, rooming, and chart completion exceeding 40 minutes.    ----  January 2, 2024:  Patient seen evaluate the bedside in telemetry.    Bedside examination evaluation are performed by me.    Chart was reviewed in detail discussed with the patient and staff.    Impression:  Deep vein thrombosis  Remote prior pulmonary embolism  Recent laminectomy  Normal LV function and normal perfusion scan within the last year to 2 years  Chronic left bundle branch block pattern  Hypertension  Dyslipidemia  COPD  Prior epidural abscess with MRSA wound infection      Recommendation:  Cardiology consulted for IVC filter placement  No active cardiovascular issues at this time  Filter to be done as soon as spot available  No ongoing cardiac management necessary  Follow-up postdischarge at the patient's discretion  Please notify us should there be any other issues to address while here

## 2024-01-03 DIAGNOSIS — I48.91 ATRIAL FIBRILLATION, UNSPECIFIED TYPE (H): ICD-10-CM

## 2024-01-03 RX ORDER — WARFARIN SODIUM 2.5 MG/1
TABLET ORAL
Qty: 210 TABLET | Refills: 1 | Status: SHIPPED | OUTPATIENT
Start: 2024-01-03 | End: 2024-06-14

## 2024-01-03 NOTE — TELEPHONE ENCOUNTER
ANTICOAGULATION MANAGEMENT:  Medication Refill    Anticoagulation Summary  As of 12/26/2023      Warfarin maintenance plan:  5 mg (2.5 mg x 2) every Mon, Fri; 6.25 mg (2.5 mg x 2.5) all other days   Next INR check:  1/4/2024   Target end date:  Indefinite    Indications    Longstanding persistent atrial fibrillation (H) [I48.11]  Long term current use of anticoagulants with INR goal of 2.0-3.0 [Z79.01]  Atrial fibrillation  unspecified type (H) [I48.91]  Long-term (current) use of anticoagulants [Z79.01] [Z79.01]                 Anticoagulation Care Providers       Provider Role Specialty Phone number    Dmitri Andres MD Referring Family Medicine [phone]    Drew Bravo PA-C Referring Family Medicine [phone]            Refill Criteria    Visit with referring provider/group: Meets criteria: office visit within referring provider group in the last 1 year on 05/17/2023    [facility] referral signed last signed: 11/27/2023; within last year: Yes    Lab monitoring not exceeding 2 weeks overdue: Yes    Cayetano meets all criteria for refill. Rx instructions and quantity supplied updated to match patient's current dosing plan. Warfarin 90 day supply with 1 refill granted per [facility] protocol     Tatyana Akers RN  Anticoagulation Clinic

## 2024-01-04 ENCOUNTER — ANTICOAGULATION THERAPY VISIT (OUTPATIENT)
Dept: ANTICOAGULATION | Facility: CLINIC | Age: 79
End: 2024-01-04

## 2024-01-04 ENCOUNTER — LAB (OUTPATIENT)
Dept: LAB | Facility: CLINIC | Age: 79
End: 2024-01-04
Payer: COMMERCIAL

## 2024-01-04 DIAGNOSIS — I48.91 ATRIAL FIBRILLATION, UNSPECIFIED TYPE (H): ICD-10-CM

## 2024-01-04 DIAGNOSIS — Z79.01 LONG TERM CURRENT USE OF ANTICOAGULANT THERAPY: ICD-10-CM

## 2024-01-04 DIAGNOSIS — I48.11 LONGSTANDING PERSISTENT ATRIAL FIBRILLATION (H): Primary | ICD-10-CM

## 2024-01-04 DIAGNOSIS — Z79.01 LONG TERM CURRENT USE OF ANTICOAGULANTS WITH INR GOAL OF 2.0-3.0: ICD-10-CM

## 2024-01-04 LAB — INR BLD: 2.2 (ref 0.9–1.1)

## 2024-01-04 PROCEDURE — 36416 COLLJ CAPILLARY BLOOD SPEC: CPT

## 2024-01-04 PROCEDURE — 85610 PROTHROMBIN TIME: CPT

## 2024-01-04 NOTE — PROGRESS NOTES
ANTICOAGULATION MANAGEMENT     Cayetano Lunsford 79 year old male is on warfarin with therapeutic INR result. (Goal INR 2.0-3.0)    Recent labs: (last 7 days)     01/04/24  1347   INR 2.2*       ASSESSMENT     Source(s): Chart Review and Patient/Caregiver Call     Warfarin doses taken: Warfarin taken as instructed  Diet: No new diet changes identified  Medication/supplement changes: None noted  New illness, injury, or hospitalization: No  Signs or symptoms of bleeding or clotting: No  Previous result: Subtherapeutic  Additional findings: None       PLAN     Recommended plan for no diet, medication or health factor changes affecting INR     Dosing Instructions: Continue your current warfarin dose with next INR in 2 weeks       Summary  As of 1/4/2024      Full warfarin instructions:  5 mg every Mon, Fri; 6.25 mg all other days   Next INR check:  1/18/2024               Telephone call with Leo who agrees to plan and repeated back plan correctly.  Will have onsite staff mail dosing calendar.    Lab visit scheduled    Education provided:   Please call back if any changes to your diet, medications or how you've been taking warfarin    Plan made per [facility] anticoagulation protocol    Estelle Starr RN  Anticoagulation Clinic  1/4/2024    _______________________________________________________________________     Anticoagulation Episode Summary       Current INR goal:  2.0-3.0   TTR:  53.4% (1 y)   Target end date:  Indefinite   Send INR reminders to:  [facility]    Indications    Longstanding persistent atrial fibrillation (H) [I48.11]  Long term current use of anticoagulants with INR goal of 2.0-3.0 [Z79.01]  Atrial fibrillation  unspecified type (H) [I48.91]  Long-term (current) use of anticoagulants [Z79.01] [Z79.01]             Comments:               Anticoagulation Care Providers       Provider Role Specialty Phone number    Dmitri Andres MD Referring Family Medicine [phone]    Drew Bravo  ISA Lombardo [facility] [phone]

## 2024-01-18 ENCOUNTER — LAB (OUTPATIENT)
Dept: LAB | Facility: CLINIC | Age: 79
End: 2024-01-18
Payer: COMMERCIAL

## 2024-01-18 ENCOUNTER — ANTICOAGULATION THERAPY VISIT (OUTPATIENT)
Dept: ANTICOAGULATION | Facility: CLINIC | Age: 79
End: 2024-01-18

## 2024-01-18 DIAGNOSIS — I48.11 LONGSTANDING PERSISTENT ATRIAL FIBRILLATION (H): Primary | ICD-10-CM

## 2024-01-18 DIAGNOSIS — I48.91 ATRIAL FIBRILLATION, UNSPECIFIED TYPE (H): ICD-10-CM

## 2024-01-18 DIAGNOSIS — Z79.01 LONG TERM CURRENT USE OF ANTICOAGULANT THERAPY: ICD-10-CM

## 2024-01-18 DIAGNOSIS — Z79.01 LONG TERM CURRENT USE OF ANTICOAGULANTS WITH INR GOAL OF 2.0-3.0: ICD-10-CM

## 2024-01-18 LAB — INR BLD: 2.7 (ref 0.9–1.1)

## 2024-01-18 PROCEDURE — 85610 PROTHROMBIN TIME: CPT

## 2024-01-18 PROCEDURE — 36415 COLL VENOUS BLD VENIPUNCTURE: CPT

## 2024-01-18 NOTE — PROGRESS NOTES
ANTICOAGULATION MANAGEMENT     Cayetano Lunsford 79 year old male is on warfarin with therapeutic INR result. (Goal INR 2.0-3.0)    Recent labs: (last 7 days)     01/18/24  1308   INR 2.7*       ASSESSMENT     Source(s): Chart Review and Patient/Caregiver Call     Warfarin doses taken: Warfarin taken as instructed  Diet: No new diet changes identified  Medication/supplement changes: None noted  New illness, injury, or hospitalization: No  Signs or symptoms of bleeding or clotting: No  Previous result: Therapeutic last visit; previously outside of goal range  Additional findings: None       PLAN     Recommended plan for no diet, medication or health factor changes affecting INR     Dosing Instructions: Continue your current warfarin dose with next INR in 3 weeks       Summary  As of 1/18/2024      Full warfarin instructions:  5 mg every Mon, Fri; 6.25 mg all other days   Next INR check:  2/8/2024               Telephone call with Leo who agrees to plan and repeated back plan correctly    Lab visit scheduled    Education provided:   Please call back if any changes to your diet, medications or how you've been taking warfarin  Contact [phone]  with any changes, questions or concerns.     Plan made per [facility] anticoagulation protocol    Dayanara Cuevas RN  Anticoagulation Clinic  1/18/2024    _______________________________________________________________________     Anticoagulation Episode Summary       Current INR goal:  2.0-3.0   TTR:  55.4% (1 y)   Target end date:  Indefinite   Send INR reminders to:  [facility]    Indications    Longstanding persistent atrial fibrillation (H) [I48.11]  Long term current use of anticoagulants with INR goal of 2.0-3.0 [Z79.01]  Atrial fibrillation  unspecified type (H) [I48.91]  Long-term (current) use of anticoagulants [Z79.01] [Z79.01]             Comments:               Anticoagulation Care Providers       Provider Role Specialty Phone number    Dmitri Andres  MD Spencer Referring Family Medicine [phone]    Drew Bravo PA-C Referring [facility] Medicine [phone]

## 2024-01-23 ENCOUNTER — TELEPHONE (OUTPATIENT)
Dept: FAMILY MEDICINE | Facility: CLINIC | Age: 79
End: 2024-01-23
Payer: COMMERCIAL

## 2024-01-23 NOTE — TELEPHONE ENCOUNTER
Called patient, he states he called pharmacy back today, and everything has been taken care of. Ruth Behrens.

## 2024-01-23 NOTE — TELEPHONE ENCOUNTER
Reason for Call:  Other prescription    Detailed comments: Had phone line issues yesterday. Informed ALL call lines restored. But when calling the Pharmacy gets automate message. Wants to know are his RX ready for ?    Phone Number Patient can be reached at: Cell number on file:    Telephone Information:   Mobile [phone]       Best Time: late aftrenoon    Can we leave a detailed message on this number? YES    Call taken on 1/23/2024 at 10:24 AM by Miranda Flanagan

## 2024-02-02 ENCOUNTER — VIRTUAL VISIT (OUTPATIENT)
Dept: PHARMACY | Facility: CLINIC | Age: 79
End: 2024-02-02
Payer: COMMERCIAL

## 2024-02-02 DIAGNOSIS — E78.5 HYPERLIPIDEMIA LDL GOAL <100: ICD-10-CM

## 2024-02-02 DIAGNOSIS — M25.562 ARTHRALGIA OF LEFT LOWER LEG: ICD-10-CM

## 2024-02-02 DIAGNOSIS — G25.0 BENIGN FAMILIAL TREMOR: ICD-10-CM

## 2024-02-02 DIAGNOSIS — R35.0 BENIGN PROSTATIC HYPERPLASIA WITH URINARY FREQUENCY: ICD-10-CM

## 2024-02-02 DIAGNOSIS — Z78.9 TAKES DIETARY SUPPLEMENTS: ICD-10-CM

## 2024-02-02 DIAGNOSIS — R60.9 EDEMA, UNSPECIFIED TYPE: ICD-10-CM

## 2024-02-02 DIAGNOSIS — J44.9 CHRONIC OBSTRUCTIVE PULMONARY DISEASE, UNSPECIFIED COPD TYPE (H): ICD-10-CM

## 2024-02-02 DIAGNOSIS — I10 HYPERTENSION GOAL BP (BLOOD PRESSURE) < 140/90: ICD-10-CM

## 2024-02-02 DIAGNOSIS — Z79.4 TYPE 2 DIABETES MELLITUS WITH STAGE 1 CHRONIC KIDNEY DISEASE, WITH LONG-TERM CURRENT USE OF INSULIN (H): Primary | ICD-10-CM

## 2024-02-02 DIAGNOSIS — N40.1 BENIGN PROSTATIC HYPERPLASIA WITH URINARY FREQUENCY: ICD-10-CM

## 2024-02-02 DIAGNOSIS — I48.91 ATRIAL FIBRILLATION, UNSPECIFIED TYPE (H): ICD-10-CM

## 2024-02-02 DIAGNOSIS — J30.2 SEASONAL ALLERGIC RHINITIS, UNSPECIFIED TRIGGER: ICD-10-CM

## 2024-02-02 DIAGNOSIS — K51.90 ULCERATIVE COLITIS WITHOUT COMPLICATIONS, UNSPECIFIED LOCATION (H): ICD-10-CM

## 2024-02-02 DIAGNOSIS — N18.1 TYPE 2 DIABETES MELLITUS WITH STAGE 1 CHRONIC KIDNEY DISEASE, WITH LONG-TERM CURRENT USE OF INSULIN (H): Primary | ICD-10-CM

## 2024-02-02 DIAGNOSIS — K21.9 GASTROESOPHAGEAL REFLUX DISEASE WITHOUT ESOPHAGITIS: ICD-10-CM

## 2024-02-02 DIAGNOSIS — E11.22 TYPE 2 DIABETES MELLITUS WITH STAGE 1 CHRONIC KIDNEY DISEASE, WITH LONG-TERM CURRENT USE OF INSULIN (H): Primary | ICD-10-CM

## 2024-02-02 PROCEDURE — 99605 MTMS BY PHARM NP 15 MIN: CPT | Mod: 93 | Performed by: PHARMACIST

## 2024-02-02 RX ORDER — FLUTICASONE PROPIONATE 50 MCG
1 SPRAY, SUSPENSION (ML) NASAL DAILY
COMMUNITY

## 2024-02-02 NOTE — PROGRESS NOTES
"Medication Therapy Management (MTM) Encounter    ASSESSMENT:                            Medication Adherence/Access: No issues identified    Diabetes: Stable.  Patient is meeting A1c goal of < 7%.  Patient's A1c may be falsely low due to anemia, but would expect the true value to still be at goal as average blood glucose is at goal about 150 mg/dL.    Hypertension/Afib/Edema: Recheck blood pressure next week.     Hyperlipidemia: Stable.  COPD: stable   BPH: stable  UC: stable. Follows gastroenterologist.  GERD: stable  Tremors: stable. Follows with neurologist.   Anemia: stable  Headache/Pain: stable  Allergic Rhinitis: Improved, stable    PLAN:                            No medication changes. Lab and blood pressure recheck next week.     Follow-up: Return in about 1 year (around 2/2/2025) for Medication Therapy Management Pharmacist.    SUBJECTIVE/OBJECTIVE:                          Leo Lunsford is a 79 year old male called for a follow-up visit from 10/10.       Reason for visit: medication and blood glucose review.    Allergies/ADRs: Reviewed in chart  Past Medical History: Reviewed in chart  Tobacco: He reports that he quit smoking about 31 years ago. His smoking use included cigarettes. He has a 30 pack-year smoking history. He has never used smokeless tobacco.  Alcohol: not currently using    Medication Adherence/Access: no issues reported    Diabetes   Metformin 1000 mg twice daily  Novolin R 2 units when the reading is >200mg/dL - uses maybe once per week  Patient is not experiencing side effects.  Blood sugar monitoring: 3 time(s) daily;   Averages: 7 day average 147 mg/dL, 14 day average 157 mg/dL, 30 day average 154 mg/dL  Current diabetes symptoms: none, no hypoglycemia for a long time per his report  Diet/Exercise: Worse diet over the holidays but reports better again now. Isn't perfect, all the good foods are \"bland\" and he knows certain foods increases blood glucose like spaghetti so limits. Some " walking around the house.        Eye exam is up to date  Foot exam: due  Urine Albumin:   Lab Results   Component Value Date    UMALCR 43.11 (H) 02/02/2023      Lab Results   Component Value Date    A1C 5.7 (H) 07/26/2023       Hypertension /Afib/Edema  Amlodipine 5 mg daily  Warfarin 5 mg Mon and Fri and 6.25 mg all other days  Propranalol 120 mg twice daily  Chlorthalidone 25 mg daily  Furosemide 20 mg as needed - uses 1-2x per week  Propafenone 150 mg twice daily    Patient reports no current medication side effects.  Patient does not self-monitor blood pressure.         BP Readings from Last 3 Encounters:   12/06/23 (!) 144/48   10/10/23 116/56   09/07/23 136/69     Pulse Readings from Last 3 Encounters:   12/06/23 60   09/07/23 72   05/17/23 65       Hyperlipidemia   atorvastatin 20mg daily  Patient reports no significant myalgias or flushing symptoms.     Recent Labs   Lab Test 02/02/23  1258 12/30/21  1004   CHOL 157 161   HDL 48 48   LDL 77 78   TRIG 161* 173*         COPD:   albuterol MDI as needed (states he's using this at night usually, helps before bed)  Duonebs as needed (hasn't needed this lately) - need to buy new machine  LAMA/LABA- Stiolto Respimat 2 puff(s) once daily  No concerns. More allergies but adequately controlled now with new Flonase previously recommended.   Patient is not experiencing side effects. Sees [facility].  Patient reports the following symptoms: none.    BPH:   tamsulosin 0.4mg daily  reports no issues. Reports this helps decrease urinary frequency.    UC:  sulfasalazine 1000mg AM and 500 mg daily  probiotic daily  folic acid 0.8mg daily  No issues reported. Follows gastroenterologist.    GERD:   Protonix (pantoprazole) 40mg once daily  Pt reports no current symptoms.      Tremors:   primidone 200mg AM, 200mg PM and 150 mg bedtime  propranolol 120mg twice daily   Reports no issues. Some days worse than others. Avoids caffeine and chocolate. Follows with neurologist.     Anemia:    B12 1000 mg daily - patient will double check this  vitamin D 2000 units daily  ferrous sulfate 325 mg daily   No concerns. Follows [facility].     Hemoglobin   Date Value Ref Range Status   12/06/2023 10.5 (L) 13.3 - 17.7 g/dL Final   03/13/2017 12.4 (L) 13.3 - 17.7 g/dL Final       Headache/Pain:   Acetaminophen 325-650 mg as needed (takes 2-3x per week)  Wonders if this dose is okay for acetaminophen.     Allergic Rhinitis:   Flonase as needed - this addition has helped runny nose a lot  Fexofenadine 180 mg daily     Patient reports no current medication side effects.     Patient feels that current therapy is effective. No longer taking diphenhydramine.     Today's Vitals: There were no vitals taken for this visit.  ----------------      I spent 30 minutes with this patient today. All changes were made via collaborative practice agreement with Drew Bravo PA-C. A copy of the visit note was provided to the patient's provider(s).    A summary of these recommendations was declined by the patient.    Diana Johnson, PharmD, BCACP  Medication Therapy Management Provider, [facility]  [phone]    Telemedicine Visit Details  Type of service:  Telephone visit  Start Time: 2:00 PM  End Time: 2:31 PM     Medication Therapy Recommendations  No medication therapy recommendations to display

## 2024-02-02 NOTE — LETTER
_  Medication List        Prepared on: 02/02/2024     Bring your Medication List when you go to the doctor, hospital, or   emergency room. And, share it with your family or caregivers.     Note any changes to how you take your medications.  Cross out medications when you no longer use them.    Medication How I take it Why I use it Prescriber   acetaminophen (TYLENOL) 325 MG tablet Take 325-650 mg by mouth every 6 hours as needed for mild pain  Pain Patient Reported   albuterol (PROAIR HFA/PROVENTIL HFA/VENTOLIN HFA) 108 (90 BASE) MCG/ACT Inhaler Inhale 1-2 puffs into the lungs every 4 hours as needed (for shortness of breath/wheezing) Chronic obstructive pulmonary disease, unspecified COPD type (H) Debbie Lewis MD   amLODIPine (NORVASC) 5 MG tablet TAKE 1 TABLET (5 MG) BY MOUTH DAILY Paroxysmal Atrial Fibrillation (H) Drew Bravo PA-C   atorvastatin (LIPITOR) 20 MG tablet TAKE 1 TABLET (20 MG) BY MOUTH DAILY Paroxysmal Atrial Fibrillation (H) Drew Bravo PA-C   chlorthalidone (HYGROTON) 25 MG tablet Take 1 tablet (25 mg) by mouth daily Paroxysmal Atrial Fibrillation (H); Benign Familial Tremor; Hypertension Goal BP (Blood Pressure) < 140/90 Sasha Madrid PA-C   cyanocobalamin (VITAMIN B-12) 1000 MCG tablet Take 1,000 mcg by mouth daily  General Health Patient Reported   ferrous sulfate (FEROSUL) 325 (65 Fe) MG tablet Take 1 tablet (325 mg) by mouth daily (with breakfast) Iron Deficiency Anemia Secondary to Inadequate Dietary Iron Intake Drew Bravo PA-C   fexofenadine (ALLEGRA) 180 MG tablet Take 180 mg by mouth daily  Allergies  Patient Reported   fluticasone (FLONASE) 50 MCG/ACT nasal spray Spray 1 spray into both nostrils daily  Allergies  Patient Reported   folic acid 0.8 MG CAPS Take 1 tablet by mouth daily Ulcerative colitis without complications, unspecified location (H) Debbie Lewis MD   furosemide (LASIX) 20 MG tablet TAKE 1 TABLET (20 MG) BY MOUTH AS NEEDED FOR  LEG SWELLING Hypertension Goal BP (Blood Pressure) < 140/90 Sasha Madrid PA-C   insulin regular (NOVOLIN R VIAL) 100 UNIT/ML vial USE 2 UNITS AS NEEDED WHEN BLOOD GLUCOSE >200 MG/DL. 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, with long-term current use of insulin (H) Drew Bravo PA-C   mupirocin (BACTROBAN) 2 % external ointment Apply topically 2 times daily as needed (pilonidal cyst) Abscess of Buttock Drew Bravo PA-C   pantoprazole (PROTONIX) 40 MG enteric coated tablet Take 40 mg by mouth daily  GERD Patient Reported   primidone (MYSOLINE) 50 MG tablet 4 tablets in am, 4 tablets afternoon, and 3 tablets every evening Benign Familial Tremor Bong Haydee Yoo MD   Probiotic Product (FLORAJEN3) CAPS Take 1 capsule by mouth 2 times daily Acute cystitis without hematuria Shiela Guerrero PA-C   propafenone (RYTHMOL) 150 MG TABS tablet Take 1 tablet (150 mg) by mouth 2 times daily Paroxysmal Atrial Fibrillation (H) Sasha Madrid PA-C   propranolol (INDERAL) 60 MG tablet Take 2 tablets (120 mg) by mouth 2 times daily Benign Familial Tremor Sasha Madrid PA-C   sulfaSALAzine (AZULFIDINE) 500 MG tablet TAKE ONE TABLET BY MOUTH THREE TIMES A DAY Unspecified Ulcerative Colitis Debbie Lewis MD   tamsulosin (FLOMAX) 0.4 MG capsule TAKE ONE CAPSULE BY MOUTH ONCE DAILY Benign prostatic hyperplasia with  urinary frequency Drew rBavo PA-C   tiotropium-olodaterol 2.5-2.5 MCG/ACT AERS Inhale 2 puffs into the lungs daily  COPD Patient Reported   Vitamin D3 (CHOLECALCIFEROL) 25 mcg (1000 units) tablet Take 2 tablets (50 mcg) by mouth daily  General Health Patient Reported   warfarin ANTICOAGULANT (JANTOVEN ANTICOAGULANT) 2.5 MG tablet Take 5mg every Mon & Fri / Take 6.25mg all other days OR per INR clinic Atrial fibrillation, unspecified type (H) Drew Bravo PA-C         Add new medications, over-the-counter drugs, herbals, vitamins, or  minerals in the blank rows below.    Medication How I take it Why I use it Prescriber                                      Allergies:      percodan [oxycodone-aspirin]; aspirin        Side effects I have had:               Other Information:              My notes and questions:

## 2024-02-02 NOTE — LETTER
"Recommended To-Do List      Prepared on: 02/02/2024       You can get the best results from your medications by completing the items on this \"To-Do List.\"      Bring your To-Do List when you go to your doctor. And, share it with your family or caregivers.    My To-Do List:  What we talked about: What I should do:    What my medicines are for, how to know if my medicines are working, made sure my medicines are safe for me and reviewed how to take my medicines.    Take my medicines every day               "

## 2024-02-05 ENCOUNTER — TELEPHONE (OUTPATIENT)
Dept: FAMILY MEDICINE | Facility: CLINIC | Age: 79
End: 2024-02-05
Payer: COMMERCIAL

## 2024-02-05 NOTE — TELEPHONE ENCOUNTER
Patient Quality Outreach    Patient is due for the following:   Hypertension -  BP check    Next Steps:   Patient was scheduled for BP check on 2/8/24    Type of outreach:    No reach out needed      Questions for provider review:    None           Beatrice Boland, CMA

## 2024-02-07 DIAGNOSIS — E11.22 TYPE 2 DIABETES MELLITUS WITH STAGE 1 CHRONIC KIDNEY DISEASE, WITH LONG-TERM CURRENT USE OF INSULIN (H): ICD-10-CM

## 2024-02-07 DIAGNOSIS — Z79.4 TYPE 2 DIABETES MELLITUS WITH HYPOGLYCEMIA WITHOUT COMA, WITH LONG-TERM CURRENT USE OF INSULIN (H): ICD-10-CM

## 2024-02-07 DIAGNOSIS — Z79.4 TYPE 2 DIABETES MELLITUS WITH STAGE 1 CHRONIC KIDNEY DISEASE, WITH LONG-TERM CURRENT USE OF INSULIN (H): ICD-10-CM

## 2024-02-07 DIAGNOSIS — E11.649 TYPE 2 DIABETES MELLITUS WITH HYPOGLYCEMIA WITHOUT COMA, WITH LONG-TERM CURRENT USE OF INSULIN (H): ICD-10-CM

## 2024-02-07 DIAGNOSIS — N18.1 TYPE 2 DIABETES MELLITUS WITH STAGE 1 CHRONIC KIDNEY DISEASE, WITH LONG-TERM CURRENT USE OF INSULIN (H): ICD-10-CM

## 2024-02-08 ENCOUNTER — LAB (OUTPATIENT)
Dept: LAB | Facility: CLINIC | Age: 79
End: 2024-02-08
Payer: COMMERCIAL

## 2024-02-08 ENCOUNTER — ALLIED HEALTH/NURSE VISIT (OUTPATIENT)
Dept: FAMILY MEDICINE | Facility: CLINIC | Age: 79
End: 2024-02-08
Payer: COMMERCIAL

## 2024-02-08 ENCOUNTER — ANTICOAGULATION THERAPY VISIT (OUTPATIENT)
Dept: ANTICOAGULATION | Facility: CLINIC | Age: 79
End: 2024-02-08

## 2024-02-08 VITALS — SYSTOLIC BLOOD PRESSURE: 132 MMHG | DIASTOLIC BLOOD PRESSURE: 52 MMHG

## 2024-02-08 DIAGNOSIS — E11.22 TYPE 2 DIABETES MELLITUS WITH STAGE 1 CHRONIC KIDNEY DISEASE, WITH LONG-TERM CURRENT USE OF INSULIN (H): ICD-10-CM

## 2024-02-08 DIAGNOSIS — Z79.01 LONG TERM CURRENT USE OF ANTICOAGULANT THERAPY: ICD-10-CM

## 2024-02-08 DIAGNOSIS — I48.11 LONGSTANDING PERSISTENT ATRIAL FIBRILLATION (H): Primary | ICD-10-CM

## 2024-02-08 DIAGNOSIS — Z79.01 LONG TERM CURRENT USE OF ANTICOAGULANTS WITH INR GOAL OF 2.0-3.0: ICD-10-CM

## 2024-02-08 DIAGNOSIS — E78.5 HYPERLIPIDEMIA LDL GOAL <100: ICD-10-CM

## 2024-02-08 DIAGNOSIS — Z79.4 TYPE 2 DIABETES MELLITUS WITH STAGE 1 CHRONIC KIDNEY DISEASE, WITH LONG-TERM CURRENT USE OF INSULIN (H): ICD-10-CM

## 2024-02-08 DIAGNOSIS — I48.91 ATRIAL FIBRILLATION, UNSPECIFIED TYPE (H): ICD-10-CM

## 2024-02-08 DIAGNOSIS — E11.649 TYPE 2 DIABETES MELLITUS WITH HYPOGLYCEMIA WITHOUT COMA, WITH LONG-TERM CURRENT USE OF INSULIN (H): Primary | ICD-10-CM

## 2024-02-08 DIAGNOSIS — Z79.4 TYPE 2 DIABETES MELLITUS WITH HYPOGLYCEMIA WITHOUT COMA, WITH LONG-TERM CURRENT USE OF INSULIN (H): Primary | ICD-10-CM

## 2024-02-08 DIAGNOSIS — N18.1 TYPE 2 DIABETES MELLITUS WITH STAGE 1 CHRONIC KIDNEY DISEASE, WITH LONG-TERM CURRENT USE OF INSULIN (H): ICD-10-CM

## 2024-02-08 LAB
CHOLEST SERPL-MCNC: 164 MG/DL
CREAT UR-MCNC: 45.7 MG/DL
FASTING STATUS PATIENT QL REPORTED: YES
HBA1C MFR BLD: 6 % (ref 0–5.6)
HDLC SERPL-MCNC: 56 MG/DL
INR BLD: 1.7 (ref 0.9–1.1)
LDLC SERPL CALC-MCNC: 81 MG/DL
MICROALBUMIN UR-MCNC: 14.7 MG/L
MICROALBUMIN/CREAT UR: 32.17 MG/G CR (ref 0–17)
NONHDLC SERPL-MCNC: 108 MG/DL
TRIGL SERPL-MCNC: 137 MG/DL

## 2024-02-08 PROCEDURE — 82043 UR ALBUMIN QUANTITATIVE: CPT

## 2024-02-08 PROCEDURE — 80061 LIPID PANEL: CPT

## 2024-02-08 PROCEDURE — 36415 COLL VENOUS BLD VENIPUNCTURE: CPT

## 2024-02-08 PROCEDURE — 82570 ASSAY OF URINE CREATININE: CPT

## 2024-02-08 PROCEDURE — 99207 PR NO CHARGE NURSE ONLY: CPT

## 2024-02-08 PROCEDURE — 83036 HEMOGLOBIN GLYCOSYLATED A1C: CPT

## 2024-02-08 PROCEDURE — 85610 PROTHROMBIN TIME: CPT

## 2024-02-08 NOTE — PROGRESS NOTES
ANTICOAGULATION MANAGEMENT     Cayetano Lunsford 79 year old male is on warfarin with subtherapeutic INR result. (Goal INR 2.0-3.0)    Recent labs: (last 7 days)     02/08/24  1042   INR 1.7*       ASSESSMENT     Source(s): Chart Review and Patient/Caregiver Call     Warfarin doses taken: Warfarin taken as instructed, denies any missed warfarin doses  Diet: No new diet changes identified  Medication/supplement changes: None noted  New illness, injury, or hospitalization: No  Signs or symptoms of bleeding or clotting: No  Previous result: Therapeutic last 2(+) visits  Additional findings: None       PLAN     Recommended plan for no diet, medication or health factor changes affecting INR     Dosing Instructions: Continue your current warfarin dose with next INR in 1-2 weeks       Summary  As of 2/8/2024      Full warfarin instructions:  5 mg every Mon, Fri; 6.25 mg all other days   Next INR check:  2/15/2024               Telephone call with Leo who verbalizes understanding and agrees to plan    Lab visit scheduled    Education provided:   Please call back if any changes to your diet, medications or how you've been taking warfarin  Contact [phone]  with any changes, questions or concerns.     Plan made per [facility] anticoagulation protocol    Dayanara Cuevas RN  Anticoagulation Clinic  2/8/2024    _______________________________________________________________________     Anticoagulation Episode Summary       Current INR goal:  2.0-3.0   TTR:  55.9% (1 y)   Target end date:  Indefinite   Send INR reminders to:  [facility]    Indications    Longstanding persistent atrial fibrillation (H) [I48.11]  Long term current use of anticoagulants with INR goal of 2.0-3.0 [Z79.01]  Atrial fibrillation  unspecified type (H) [I48.91]  Long-term (current) use of anticoagulants [Z79.01] [Z79.01]             Comments:               Anticoagulation Care Providers       Provider Role Specialty Phone number    Mckenna  Dmitri Palmer MD Referring Family Medicine [phone]    Drew Bravo PA-C Referring Family Medicine [phone]

## 2024-02-08 NOTE — PROGRESS NOTES
Cayetano Lunsford is a 79 year old patient who comes in today for a Blood Pressure check.  Initial BP:  /52 (BP Location: Right arm, Patient Position: Chair, Cuff Size: Adult Regular)      Data Unavailable  Disposition: follow-up as previously indicated by provider  Beatrice Boland [facility]

## 2024-02-09 NOTE — RESULT ENCOUNTER NOTE
Called patient with results.    Labs stable from 1 year ago. A1c at goal, lipids stable, urine albumin-creatinine ratio improved slightly.     Morteza Tavarez, PharmD  Medication Therapy Management Pharmacist  [facility]

## 2024-02-15 ENCOUNTER — ANTICOAGULATION THERAPY VISIT (OUTPATIENT)
Dept: ANTICOAGULATION | Facility: CLINIC | Age: 79
End: 2024-02-15

## 2024-02-15 ENCOUNTER — LAB (OUTPATIENT)
Dept: LAB | Facility: CLINIC | Age: 79
End: 2024-02-15
Payer: COMMERCIAL

## 2024-02-15 DIAGNOSIS — I48.11 LONGSTANDING PERSISTENT ATRIAL FIBRILLATION (H): Primary | ICD-10-CM

## 2024-02-15 DIAGNOSIS — Z79.01 LONG TERM CURRENT USE OF ANTICOAGULANTS WITH INR GOAL OF 2.0-3.0: ICD-10-CM

## 2024-02-15 DIAGNOSIS — Z79.01 LONG TERM CURRENT USE OF ANTICOAGULANT THERAPY: ICD-10-CM

## 2024-02-15 DIAGNOSIS — I48.91 ATRIAL FIBRILLATION, UNSPECIFIED TYPE (H): ICD-10-CM

## 2024-02-15 LAB — INR BLD: 2.2 (ref 0.9–1.1)

## 2024-02-15 PROCEDURE — 85610 PROTHROMBIN TIME: CPT

## 2024-02-15 PROCEDURE — 36416 COLLJ CAPILLARY BLOOD SPEC: CPT

## 2024-02-15 NOTE — PROGRESS NOTES
ANTICOAGULATION MANAGEMENT     Cayetano Lunsford 79 year old male is on warfarin with therapeutic INR result. (Goal INR 2.0-3.0)    Recent labs: (last 7 days)     02/15/24  1325   INR 2.2*       ASSESSMENT     Source(s): Chart Review and Patient/Caregiver Call     Warfarin doses taken: Warfarin taken as instructed  Diet: No new diet changes identified  Medication/supplement changes: None noted  New illness, injury, or hospitalization: No  Signs or symptoms of bleeding or clotting: No  Previous result: Subtherapeutic  Additional findings: None       PLAN     Recommended plan for no diet, medication or health factor changes affecting INR     Dosing Instructions: Continue your current warfarin dose with next INR in 2 weeks       Summary  As of 2/15/2024      Full warfarin instructions:  5 mg every Mon, Fri; 6.25 mg all other days   Next INR check:  2/29/2024               Telephone call with Leo who verbalizes understanding and agrees to plan    Lab visit scheduled    Education provided:   Please call back if any changes to your diet, medications or how you've been taking warfarin  Contact [phone]  with any changes, questions or concerns.     Plan made per [facility] anticoagulation protocol    Dayanara Cuevas RN  Anticoagulation Clinic  2/15/2024    _______________________________________________________________________     Anticoagulation Episode Summary       Current INR goal:  2.0-3.0   TTR:  54.7% (1 y)   Target end date:  Indefinite   Send INR reminders to:  [facility]    Indications    Longstanding persistent atrial fibrillation (H) [I48.11]  Long term current use of anticoagulants with INR goal of 2.0-3.0 [Z79.01]  Atrial fibrillation  unspecified type (H) [I48.91]  Long-term (current) use of anticoagulants [Z79.01] [Z79.01]             Comments:               Anticoagulation Care Providers       Provider Role Specialty Phone number    Dmitri Andres MD Referring Family Medicine [phone]     Drew Bravo PA-C [facility] [phone]

## 2024-02-24 DIAGNOSIS — G25.0 BENIGN FAMILIAL TREMOR: ICD-10-CM

## 2024-02-26 RX ORDER — PRIMIDONE 50 MG/1
TABLET ORAL
Refills: 1 | OUTPATIENT
Start: 2024-02-26

## 2024-02-26 NOTE — TELEPHONE ENCOUNTER
Pt has appt 3/7/24.  Should have enough until appt.     Will send message to pharmacy.     Candelaria GIL RN, BSN  [facility]

## 2024-02-29 ENCOUNTER — ANTICOAGULATION THERAPY VISIT (OUTPATIENT)
Dept: ANTICOAGULATION | Facility: CLINIC | Age: 79
End: 2024-02-29

## 2024-02-29 ENCOUNTER — LAB (OUTPATIENT)
Dept: LAB | Facility: CLINIC | Age: 79
End: 2024-02-29
Payer: COMMERCIAL

## 2024-02-29 DIAGNOSIS — I48.91 ATRIAL FIBRILLATION, UNSPECIFIED TYPE (H): ICD-10-CM

## 2024-02-29 DIAGNOSIS — Z79.01 LONG TERM CURRENT USE OF ANTICOAGULANTS WITH INR GOAL OF 2.0-3.0: ICD-10-CM

## 2024-02-29 DIAGNOSIS — Z79.01 LONG TERM CURRENT USE OF ANTICOAGULANT THERAPY: ICD-10-CM

## 2024-02-29 DIAGNOSIS — I48.11 LONGSTANDING PERSISTENT ATRIAL FIBRILLATION (H): Primary | ICD-10-CM

## 2024-02-29 LAB — INR BLD: 3.3 (ref 0.9–1.1)

## 2024-02-29 PROCEDURE — 36416 COLLJ CAPILLARY BLOOD SPEC: CPT

## 2024-02-29 PROCEDURE — 85610 PROTHROMBIN TIME: CPT

## 2024-02-29 NOTE — PROGRESS NOTES
ANTICOAGULATION MANAGEMENT     Cayetano Lunsford 79 year old male is on warfarin with supratherapeutic INR result. (Goal INR 2.0-3.0)    Recent labs: (last 7 days)     02/29/24  1320   INR 3.3*       ASSESSMENT     Source(s): Chart Review and Patient/Caregiver Call     Warfarin doses taken: Warfarin taken as instructed  Diet: Decreased greens/vitamin K in diet; plans to resume previous intake  Medication/supplement changes: None noted  New illness, injury, or hospitalization: No  Signs or symptoms of bleeding or clotting: No  Previous result: Therapeutic last visit; previously outside of goal range  Additional findings: None       PLAN     Recommended plan for temporary change(s) affecting INR     Dosing Instructions: partial hold then continue your current warfarin dose with next INR in 1-2 weeks       Summary  As of 2/29/2024      Full warfarin instructions:  2/29: 5 mg; Otherwise 5 mg every Mon, Fri; 6.25 mg all other days   Next INR check:  3/14/2024               Telephone call with Leo who agrees to plan and repeated back plan correctly    Lab visit scheduled    Education provided:   Please call back if any changes to your diet, medications or how you've been taking warfarin  Contact [phone]  with any changes, questions or concerns.     Plan made per [facility] anticoagulation protocol    Dayanara Cuevas RN  Anticoagulation Clinic  2/29/2024    _______________________________________________________________________     Anticoagulation Episode Summary       Current INR goal:  2.0-3.0   TTR:  53.7% (1 y)   Target end date:  Indefinite   Send INR reminders to:  [facility]    Indications    Longstanding persistent atrial fibrillation (H) [I48.11]  Long term current use of anticoagulants with INR goal of 2.0-3.0 [Z79.01]  Atrial fibrillation  unspecified type (H) [I48.91]  Long-term (current) use of anticoagulants [Z79.01] [Z79.01]             Comments:               Anticoagulation Care Providers        Provider Role Specialty Phone number    Dmitri Andres MD Referring Family Medicine [phone]    Drew Bravo PA-C Referring Family Medicine [phone]

## 2024-03-04 ENCOUNTER — DOCUMENTATION ONLY (OUTPATIENT)
Dept: FAMILY MEDICINE | Facility: CLINIC | Age: 79
End: 2024-03-04
Payer: COMMERCIAL

## 2024-03-04 NOTE — PROGRESS NOTES
RN spoke to patient     He is scheduled for an INR on 3/14/2024 - lab appt states INR     Eva Pulido, Registered Nurse  [facility]

## 2024-03-04 NOTE — PROGRESS NOTES
I have not seen patient since may of last year. I am not under the impression if any labs are needed, but can we call and inquire on this with patient? Just saw mtm last week and A1c was utd.     -katharina deluca, pac

## 2024-03-05 DIAGNOSIS — E11.9 TYPE 2 DIABETES, HBA1C GOAL < 8% (H): ICD-10-CM

## 2024-03-05 RX ORDER — HUMAN INSULIN 100 [IU]/ML
INJECTION, SOLUTION SUBCUTANEOUS
Qty: 30 ML | Refills: 1 | Status: SHIPPED | OUTPATIENT
Start: 2024-03-05

## 2024-03-06 ENCOUNTER — TELEPHONE (OUTPATIENT)
Dept: NEUROLOGY | Facility: CLINIC | Age: 79
End: 2024-03-06
Payer: COMMERCIAL

## 2024-03-07 ENCOUNTER — OFFICE VISIT (OUTPATIENT)
Dept: NEUROLOGY | Facility: CLINIC | Age: 79
End: 2024-03-07
Payer: COMMERCIAL

## 2024-03-07 VITALS — DIASTOLIC BLOOD PRESSURE: 69 MMHG | SYSTOLIC BLOOD PRESSURE: 130 MMHG | HEART RATE: 56 BPM | OXYGEN SATURATION: 96 %

## 2024-03-07 DIAGNOSIS — G25.0 BENIGN FAMILIAL TREMOR: Primary | ICD-10-CM

## 2024-03-07 PROCEDURE — 99214 OFFICE O/P EST MOD 30 MIN: CPT | Performed by: PSYCHIATRY & NEUROLOGY

## 2024-03-07 RX ORDER — PRIMIDONE 50 MG/1
TABLET ORAL
Qty: 1001 TABLET | Refills: 1 | Status: SHIPPED | OUTPATIENT
Start: 2024-03-07 | End: 2024-09-04

## 2024-03-07 NOTE — LETTER
3/7/2024         RE: Cayetano Lunsford  [address]  [facility] [address]        Dear Colleague,    Thank you for referring your patient, Cayetano Lunsford, to the [facility]. Please see a copy of my visit note below.    ESTABLISHED PATIENT NEUROLOGY NOTE    DATE OF VISIT: 3/7/2024  CLINIC LOCATION: [facility]  MRN: [id number]  PATIENT NAME: Cayetano Lunsford  YOB: 1945    REASON FOR VISIT:   Chief Complaint   Patient presents with     Follow Up     Tremor- better some days than other      SUBJECTIVE:                                                      HISTORY OF PRESENT ILLNESS: Patient is here to follow up regarding essential tremor.  During the last visit on 9/7/2023 no medication changes were made.  Please refer to my initial/other prior notes for further information.    Since the last visit, the patient reports that some days the tremor is worse.  He is on 120 mg of propranolol twice daily in addition to 200/200/150 mg of primidone daily.  No significant side effects.  He denies interval development of new neurological symptoms.  Was not able to find wrist weights.  EXAM:                                                    Physical Exam:   Vitals: /69 (BP Location: Right arm, Patient Position: Sitting, Cuff Size: Adult Regular)   Pulse 56   SpO2 96%     General: pt is in NAD, cooperative.  Skin: normal turgor, moist mucous membranes, no lesions/rashes noticed.  HEENT: ATNC, white sclera, normal conjunctiva.  Respiratory: Symmetric lung excursion, no accessory respiratory muscle use.  Abdomen: Non distended.  Neurological: awake, cooperative, follows commands, moderate chronic dysarthria, mild to moderate bilateral positional and kinetic hand tremor, no resting tremor, no dysmetria bilaterally.  ASSESSMENT AND PLAN:                                                    Assessment: 79 year old male patient presents for  "follow-up of essential tremor.  He reports that his tremor is worse on certain days, though on exam today it looks stable.  Previously we discussed additional options of gabapentin and topiramate if tremor worsens.  We also reviewed use of wrist splints.  Today, we reviewed all of these options again and decided to try wrist weights while keeping the same doses of propranolol and primidone.  I refilled his primidone prescription.    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    We decided to continue same medications while you try wrist weights.  I refilled the prescription for primidone.    Next follow-up appointment is in the next 6 months or earlier if needed.    Total Time: 16 minutes spent on the date of the encounter doing chart review, history and exam, documentation and further activities per the note.    Bong Yoo MD  [facility]  (Chart documentation was completed in part with Dragon voice-recognition software. Even though reviewed, some grammatical, spelling, and word errors may remain.)    Cayetano Lunsford is a 79 year old male who presents for:  Chief Complaint   Patient presents with     Follow Up     Tremor- better some days than other         Initial Vitals:  /69 (BP Location: Right arm, Patient Position: Sitting, Cuff Size: Adult Regular)   Pulse 56   SpO2 96%  Estimated body mass index is 26.35 kg/m  as calculated from the following:    Height as of 12/6/23: 1.67 m (5' 5.75\").    Weight as of 12/6/23: 73.5 kg (162 lb).. There is no height or weight on file to calculate BSA. BP completed using cuff size: regular    Sasha Tate       Again, thank you for allowing me to participate in the care of your patient.        Sincerely,        Bong Yoo MD  "

## 2024-03-07 NOTE — PATIENT INSTRUCTIONS
AFTER VISIT SUMMARY (AVS):    At today's visit we thoroughly discussed current symptoms, available treatment options, and the plan.    We decided to continue same medications while you try wrist weights.  I refilled your prescription for primidone.    Next follow-up appointment is in the next 6 months or earlier if needed.    Please do not hesitate to call me with any questions or concerns.    Thanks.

## 2024-03-07 NOTE — PROGRESS NOTES
"Cayetano Lunsford is a 79 year old male who presents for:  Chief Complaint   Patient presents with    Follow Up     Tremor- better some days than other         Initial Vitals:  /69 (BP Location: Right arm, Patient Position: Sitting, Cuff Size: Adult Regular)   Pulse 56   SpO2 96%  Estimated body mass index is 26.35 kg/m  as calculated from the following:    Height as of 12/6/23: 1.67 m (5' 5.75\").    Weight as of 12/6/23: 73.5 kg (162 lb).. There is no height or weight on file to calculate BSA. BP completed using cuff size: regular    Sasha Tate   "

## 2024-03-07 NOTE — PROGRESS NOTES
ESTABLISHED PATIENT NEUROLOGY NOTE    DATE OF VISIT: 3/7/2024  CLINIC LOCATION: [facility]  MRN: [id number]  PATIENT NAME: Cayetano Lunsford  YOB: 1945    REASON FOR VISIT:   Chief Complaint   Patient presents with    Follow Up     Tremor- better some days than other      SUBJECTIVE:                                                      HISTORY OF PRESENT ILLNESS: Patient is here to follow up regarding essential tremor.  During the last visit on 9/7/2023 no medication changes were made.  Please refer to my initial/other prior notes for further information.    Since the last visit, the patient reports that some days the tremor is worse.  He is on 120 mg of propranolol twice daily in addition to 200/200/150 mg of primidone daily.  No significant side effects.  He denies interval development of new neurological symptoms.  Was not able to find wrist weights.  EXAM:                                                    Physical Exam:   Vitals: /69 (BP Location: Right arm, Patient Position: Sitting, Cuff Size: Adult Regular)   Pulse 56   SpO2 96%     General: pt is in NAD, cooperative.  Skin: normal turgor, moist mucous membranes, no lesions/rashes noticed.  HEENT: ATNC, white sclera, normal conjunctiva.  Respiratory: Symmetric lung excursion, no accessory respiratory muscle use.  Abdomen: Non distended.  Neurological: awake, cooperative, follows commands, moderate chronic dysarthria, mild to moderate bilateral positional and kinetic hand tremor, no resting tremor, no dysmetria bilaterally.  ASSESSMENT AND PLAN:                                                    Assessment: 79 year old male patient presents for follow-up of essential tremor.  He reports that his tremor is worse on certain days, though on exam today it looks stable.  Previously we discussed additional options of gabapentin and topiramate if tremor worsens.  We also reviewed use of wrist splints.  Today, we reviewed all  of these options again and decided to try wrist weights while keeping the same doses of propranolol and primidone.  I refilled his primidone prescription.    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    We decided to continue same medications while he tries wrist weights.  I refilled the prescription for primidone.    Next follow-up appointment is in the next 6 months or earlier if needed.    Total Time: 16 minutes spent on the date of the encounter doing chart review, history and exam, documentation and further activities per the note.    Bong Yoo MD  [facility]  (Chart documentation was completed in part with Dragon voice-recognition software. Even though reviewed, some grammatical, spelling, and word errors may remain.)

## 2024-03-13 ENCOUNTER — PATIENT OUTREACH (OUTPATIENT)
Dept: FAMILY MEDICINE | Facility: CLINIC | Age: 79
End: 2024-03-13
Payer: COMMERCIAL

## 2024-03-13 NOTE — TELEPHONE ENCOUNTER
Patient Quality Outreach    Patient is due for the following:   Physical Annual Wellness Visit    Next Steps:   Schedule a Annual Wellness Visit    Type of outreach:    Sent letter.    Next Steps:  Reach out within 90 days via Phone.    Max number of attempts reached: No. Will try again in 90 days if patient still on fail list.    Questions for provider review:    None           Dona Salamanca, [facility]

## 2024-03-13 NOTE — LETTER
March 13, 2024      Cayetano Lunsford  [address]   [facility] [address]          March 13, 2024    To  Cayetano Lunsford  [address]   [facility] [address]    Your team at [facility] cares about your health. We have reviewed your chart and based on our findings; we are making the following recommendations to better manage your health.     You are in particular need of attention regarding the following:     PREVENTATIVE VISIT: Annual Medicare Wellness:Schedule an Annual Medicare Wellness Exam. Please call your [facility] to set up your appointment.    If you have already completed these items or are seen by another provider, please contact the clinic via phone at [phone] or [email or web address] so your care team can review and update your records. Thank you for choosing [facility]ics for your healthcare needs. For any questions, concerns, or to schedule an appointment please contact our clinic.    Healthy Regards,      Drew Bravo PA-C

## 2024-03-14 ENCOUNTER — ANTICOAGULATION THERAPY VISIT (OUTPATIENT)
Dept: ANTICOAGULATION | Facility: CLINIC | Age: 79
End: 2024-03-14

## 2024-03-14 ENCOUNTER — LAB (OUTPATIENT)
Dept: LAB | Facility: CLINIC | Age: 79
End: 2024-03-14
Payer: COMMERCIAL

## 2024-03-14 DIAGNOSIS — I48.11 LONGSTANDING PERSISTENT ATRIAL FIBRILLATION (H): Primary | ICD-10-CM

## 2024-03-14 DIAGNOSIS — I48.91 ATRIAL FIBRILLATION, UNSPECIFIED TYPE (H): ICD-10-CM

## 2024-03-14 DIAGNOSIS — Z79.01 LONG TERM CURRENT USE OF ANTICOAGULANTS WITH INR GOAL OF 2.0-3.0: ICD-10-CM

## 2024-03-14 DIAGNOSIS — Z79.01 LONG TERM CURRENT USE OF ANTICOAGULANT THERAPY: ICD-10-CM

## 2024-03-14 LAB — INR BLD: 2 (ref 0.9–1.1)

## 2024-03-14 PROCEDURE — 36415 COLL VENOUS BLD VENIPUNCTURE: CPT

## 2024-03-14 PROCEDURE — 85610 PROTHROMBIN TIME: CPT

## 2024-03-14 NOTE — PROGRESS NOTES
ANTICOAGULATION MANAGEMENT     Cayetano Lunsford 79 year old male is on warfarin with therapeutic INR result. (Goal INR 2.0-3.0)    Recent labs: (last 7 days)     03/14/24  1332   INR 2.0*       ASSESSMENT     Source(s): Chart Review and Patient/Caregiver Call     Warfarin doses taken: Warfarin taken as instructed  Diet: No new diet changes identified  Medication/supplement changes: None noted  New illness, injury, or hospitalization: No  Signs or symptoms of bleeding or clotting: No  Previous result: Supratherapeutic  Additional findings: None       PLAN     Recommended plan for no diet, medication or health factor changes affecting INR     Dosing Instructions: Continue your current warfarin dose with next INR in 3 weeks       Summary  As of 3/14/2024      Full warfarin instructions:  5 mg every Mon, Fri; 6.25 mg all other days   Next INR check:  4/4/2024               Telephone call with pt who verbalizes understanding and agrees to plan    Lab visit scheduled    Education provided:   Please call back if any changes to your diet, medications or how you've been taking warfarin  Dietary considerations: importance of consistent vitamin K intake, impact of vitamin K foods on INR, and vitamin K content of foods    Plan made per [facility] anticoagulation protocol    Cady Boone, RN  Anticoagulation Clinic  3/14/2024    _______________________________________________________________________     Anticoagulation Episode Summary       Current INR goal:  2.0-3.0   TTR:  52.8% (1 y)   Target end date:  Indefinite   Send INR reminders to:  [facility]    Indications    Longstanding persistent atrial fibrillation (H) [I48.11]  Long term current use of anticoagulants with INR goal of 2.0-3.0 [Z79.01]  Atrial fibrillation  unspecified type (H) [I48.91]  Long-term (current) use of anticoagulants [Z79.01] [Z79.01]             Comments:               Anticoagulation Care Providers       Provider Role Specialty Phone number     Dmitri Andres MD Referring Family Medicine [phone]    Drew Bravo PA-C Referring Family Medicine [phone]

## 2024-03-20 DIAGNOSIS — I48.0 PAROXYSMAL ATRIAL FIBRILLATION (H): ICD-10-CM

## 2024-03-20 RX ORDER — ATORVASTATIN CALCIUM 20 MG/1
20 TABLET, FILM COATED ORAL DAILY
Qty: 90 TABLET | Refills: 0 | Status: SHIPPED | OUTPATIENT
Start: 2024-03-20 | End: 2024-05-01

## 2024-04-04 ENCOUNTER — LAB (OUTPATIENT)
Dept: LAB | Facility: CLINIC | Age: 79
End: 2024-04-04
Payer: COMMERCIAL

## 2024-04-04 ENCOUNTER — ANTICOAGULATION THERAPY VISIT (OUTPATIENT)
Dept: ANTICOAGULATION | Facility: CLINIC | Age: 79
End: 2024-04-04

## 2024-04-04 DIAGNOSIS — I48.91 ATRIAL FIBRILLATION, UNSPECIFIED TYPE (H): ICD-10-CM

## 2024-04-04 DIAGNOSIS — I48.11 LONGSTANDING PERSISTENT ATRIAL FIBRILLATION (H): Primary | ICD-10-CM

## 2024-04-04 DIAGNOSIS — Z79.01 LONG TERM CURRENT USE OF ANTICOAGULANT THERAPY: ICD-10-CM

## 2024-04-04 DIAGNOSIS — Z79.01 LONG TERM CURRENT USE OF ANTICOAGULANTS WITH INR GOAL OF 2.0-3.0: ICD-10-CM

## 2024-04-04 LAB — INR BLD: 3.3 (ref 0.9–1.1)

## 2024-04-04 PROCEDURE — 36416 COLLJ CAPILLARY BLOOD SPEC: CPT

## 2024-04-04 PROCEDURE — 85610 PROTHROMBIN TIME: CPT

## 2024-04-04 NOTE — PROGRESS NOTES
ANTICOAGULATION MANAGEMENT     Cayetano Lunsford 79 year old male is on warfarin with supratherapeutic INR result. (Goal INR 2.0-3.0)    Recent labs: (last 7 days)     04/04/24  1329   INR 3.3*       ASSESSMENT     Source(s): Chart Review and Patient/Caregiver Call     Warfarin doses taken: Warfarin taken as instructed  Diet: Decreased greens/vitamin K in diet; plans to resume previous intake.  Usually drinks V8 juice on the days he does not have green veggies, but thinks he missed drinking the V8 twice within the last 8 days.  Medication/supplement changes: None noted  New illness, injury, or hospitalization: No  Signs or symptoms of bleeding or clotting: No  Previous result: Therapeutic last visit; previously outside of goal range  Additional findings: None       PLAN     Recommended plan for temporary change(s) affecting INR     Dosing Instructions: Continue your current warfarin dose with next INR in 2 weeks       Summary  As of 4/4/2024      Full warfarin instructions:  5 mg every Mon, Fri; 6.25 mg all other days   Next INR check:  4/18/2024               Telephone call with Leo who agrees to plan and repeated back plan correctly    Lab visit scheduled    Education provided:   Please call back if any changes to your diet, medications or how you've been taking warfarin    Plan made per [facility] anticoagulation protocol    Estelle Starr RN  Anticoagulation Clinic  4/4/2024    _______________________________________________________________________     Anticoagulation Episode Summary       Current INR goal:  2.0-3.0   TTR:  51.5% (1 y)   Target end date:  Indefinite   Send INR reminders to:  [facility]    Indications    Longstanding persistent atrial fibrillation (H) [I48.11]  Long term current use of anticoagulants with INR goal of 2.0-3.0 [Z79.01]  Atrial fibrillation  unspecified type (H) [I48.91]  Long-term (current) use of anticoagulants [Z79.01] [Z79.01]             Comments:                Anticoagulation Care Providers       Provider Role Specialty Phone number    Dmitri Andres MD Referring Family Medicine [phone]    Drew Bravo PA-C Referring Family Medicine [phone]

## 2024-04-10 DIAGNOSIS — L02.31 ABSCESS OF BUTTOCK: ICD-10-CM

## 2024-04-10 RX ORDER — MUPIROCIN 20 MG/G
OINTMENT TOPICAL 2 TIMES DAILY PRN
Qty: 22 G | Refills: 1 | Status: SHIPPED | OUTPATIENT
Start: 2024-04-10

## 2024-04-10 NOTE — TELEPHONE ENCOUNTER
Pt calls.  He says it is the same symptoms as he had before.  He can feel it on both sides - it feels like there are bumps there and it is tender to the touch.  It itches.  Sometimes it is hard to sit.  It is getting better again now because he is using the ointment, but he is almost out of it.      He has been cleaning the area and using the ointment.      Advised he may want him to have some kind of visit for this.  Not seeing any appts in the near future that I can use.  Will forward to Cong Bravo for advisal.

## 2024-04-10 NOTE — TELEPHONE ENCOUNTER
Please call patient's.  Was not expecting refill request for this medication.  Has not been prescribed since October 2022.  This was for an acute abscess of the buttock. He was referred to colorectal surgery for this.  I do not see any records of this in our chart.  Was he ever seen by them?  Also was this request accidental or does he have new symptoms?    Cong Bravo PA-C

## 2024-04-10 NOTE — TELEPHONE ENCOUNTER
It sounds from this message to the previous infection resolved he did not need to see the colorectal surgical team.  Given improvement with the mupirocin will refill but if failed to resolve in the next 5 days patient should be seen in person.  Sooner if worsening.    Cong Bravo PA-C

## 2024-04-10 NOTE — TELEPHONE ENCOUNTER
Attempt # 1. Left message to call back any triage nurse.    Nette Mackey RN on 4/10/2024 at 12:39 PM

## 2024-04-11 NOTE — TELEPHONE ENCOUNTER
Incoming call from patient. He understands that he should make an appointment at the clinic if he gets any worse or if it is not improved within 5 days. He is interested in a referral to colorectal. Please see pended order.    Please call patient to let them know if referral is not ordered.  I told him that if referral is placed, [facility] will call you to coordinate care as prescribed your provider. If you don t hear from a representative within 2 business days, please call [phone]. Please be aware that coverage of these services is subject to the terms and limitations of your health insurance plan.  Call member services at your health plan with any benefit or coverage questions.

## 2024-04-12 ENCOUNTER — PATIENT OUTREACH (OUTPATIENT)
Dept: SURGERY | Facility: CLINIC | Age: 79
End: 2024-04-12
Payer: COMMERCIAL

## 2024-04-12 DIAGNOSIS — N40.1 BENIGN PROSTATIC HYPERPLASIA WITH URINARY FREQUENCY: ICD-10-CM

## 2024-04-12 DIAGNOSIS — R35.0 BENIGN PROSTATIC HYPERPLASIA WITH URINARY FREQUENCY: ICD-10-CM

## 2024-04-12 RX ORDER — TAMSULOSIN HYDROCHLORIDE 0.4 MG/1
CAPSULE ORAL
Qty: 90 CAPSULE | Refills: 3 | Status: SHIPPED | OUTPATIENT
Start: 2024-04-12

## 2024-04-12 NOTE — PROGRESS NOTES
"Abscess of Buttock Triage note:     This originally started as a cyst on his butt cheek. He used mupirocin ointment and the area eventually \"cleared up\" for almost a year. This cyst has now reoccurred now on both butt cheeks. Denies drainage. Denies fevers. Patient declines scheduling at main location in [place]. Would like to schedule in [place].       "

## 2024-04-12 NOTE — TELEPHONE ENCOUNTER
REFERRAL INFORMATION:  Referring Provider:  Drew Bravo PA-C   Referring Clinic:  [facility]   Reason for Visit/Diagnosis: Abscess of buttock      FUTURE VISIT INFORMATION:  Appointment Date:   Appointment Time:      NOTES STATUS DETAILS   OFFICE NOTE from Referring Provider Internal 4/12/2024 Triage note with DAFNE Petersen RN   OFFICE NOTE from Other Specialist N/A 5/17/2023 OV with JACOB Bravo   HOSPITAL DISCHARGE SUMMARY/  ED VISITS N/A    OPERATIVE REPORT N/A    MEDICATION LIST Internal         ENDOSCOPY  N/A    COLONOSCOPY N/A    IMAGING (CT, MRI, EGD, MRCP, Small Bowel Follow Through/SBT, MR/CT Enterography) N/A

## 2024-04-18 ENCOUNTER — LAB (OUTPATIENT)
Dept: LAB | Facility: CLINIC | Age: 79
End: 2024-04-18
Payer: COMMERCIAL

## 2024-04-18 ENCOUNTER — ANTICOAGULATION THERAPY VISIT (OUTPATIENT)
Dept: ANTICOAGULATION | Facility: CLINIC | Age: 79
End: 2024-04-18

## 2024-04-18 DIAGNOSIS — I48.91 ATRIAL FIBRILLATION, UNSPECIFIED TYPE (H): ICD-10-CM

## 2024-04-18 DIAGNOSIS — Z79.01 LONG TERM CURRENT USE OF ANTICOAGULANT THERAPY: ICD-10-CM

## 2024-04-18 DIAGNOSIS — I48.11 LONGSTANDING PERSISTENT ATRIAL FIBRILLATION (H): Primary | ICD-10-CM

## 2024-04-18 DIAGNOSIS — Z79.01 LONG TERM CURRENT USE OF ANTICOAGULANTS WITH INR GOAL OF 2.0-3.0: ICD-10-CM

## 2024-04-18 LAB — INR BLD: 1.6 (ref 0.9–1.1)

## 2024-04-18 PROCEDURE — 85610 PROTHROMBIN TIME: CPT

## 2024-04-18 PROCEDURE — 36416 COLLJ CAPILLARY BLOOD SPEC: CPT

## 2024-04-18 NOTE — PROGRESS NOTES
ANTICOAGULATION MANAGEMENT     Cayetano Lunsford 79 year old male is on warfarin with subtherapeutic INR result. (Goal INR 2.0-3.0)    Recent labs: (last 7 days)     04/18/24  1433   INR 1.6*       ASSESSMENT     Source(s): Chart Review and Patient/Caregiver Call     Warfarin doses taken: Warfarin taken as instructed  Diet: Increased greens/vitamin K in diet; pt reports that he was able to return to baseline green intake   Medication/supplement changes: None noted  New illness, injury, or hospitalization: No  Signs or symptoms of bleeding or clotting: No  Previous result: Supratherapeutic  Additional findings:  Shared clinical decision making with pt due to pt concerns that INR keeps fluctuating, willing to small boost and slight maintenance dose adjustment.        PLAN     Recommended plan for ongoing change(s) affecting INR     Dosing Instructions: booster dose then Increase your warfarin dose (3% change) with next INR in 2 weeks       Summary  As of 4/18/2024      Full warfarin instructions:  4/18: 7.5 mg; Otherwise 5 mg every Fri; 6.25 mg all other days   Next INR check:  5/1/2024               Telephone call with Leo who verbalizes understanding and agrees to plan    Lab visit scheduled    Education provided:   Goal range and lab monitoring: goal range and significance of current result, Importance of therapeutic range, and Importance of following up at instructed interval  Dietary considerations: importance of consistent vitamin K intake, impact of vitamin K foods on INR, vitamin K content of foods, and importance of notifying [facility] to changes in diet    Plan made per [facility] anticoagulation protocol    John Nieves RN  Anticoagulation Clinic  4/18/2024    _______________________________________________________________________     Anticoagulation Episode Summary       Current INR goal:  2.0-3.0   TTR:  49.9% (1 y)   Target end date:  Indefinite   Send INR reminders to:  [facility]    Indications     Longstanding persistent atrial fibrillation (H) [I48.11]  Long term current use of anticoagulants with INR goal of 2.0-3.0 [Z79.01]  Atrial fibrillation  unspecified type (H) [I48.91]  Long-term (current) use of anticoagulants [Z79.01] [Z79.01]             Comments:               Anticoagulation Care Providers       Provider Role Specialty Phone number    Dmitri Andres MD Referring Family Medicine [phone]    Drew Bravo PA-C Referring [facility] Medicine [phone]

## 2024-04-25 ENCOUNTER — PRE VISIT (OUTPATIENT)
Dept: SURGERY | Facility: CLINIC | Age: 79
End: 2024-04-25

## 2024-04-26 ENCOUNTER — TELEPHONE (OUTPATIENT)
Dept: SURGERY | Facility: CLINIC | Age: 79
End: 2024-04-26
Payer: COMMERCIAL

## 2024-04-26 NOTE — TELEPHONE ENCOUNTER
Patient confirmed scheduled appointment:  Date: 5/2/24  Time: 9:00 am  Visit type: New Patient  Provider: Dr. Moses  Location: [facility]  Testing/imaging: n/a  Additional notes: Rescheduled from missed appt on 4/25 per message from Martha SIMS RN    Canceled 7/5 Appt with Dr. Park

## 2024-04-29 DIAGNOSIS — Z79.4 TYPE 2 DIABETES MELLITUS WITH STAGE 1 CHRONIC KIDNEY DISEASE, WITH LONG-TERM CURRENT USE OF INSULIN (H): ICD-10-CM

## 2024-04-29 DIAGNOSIS — E11.22 TYPE 2 DIABETES MELLITUS WITH STAGE 1 CHRONIC KIDNEY DISEASE, WITH LONG-TERM CURRENT USE OF INSULIN (H): ICD-10-CM

## 2024-04-29 DIAGNOSIS — Z79.4 TYPE 2 DIABETES MELLITUS WITH HYPOGLYCEMIA WITHOUT COMA, WITH LONG-TERM CURRENT USE OF INSULIN (H): ICD-10-CM

## 2024-04-29 DIAGNOSIS — N18.1 TYPE 2 DIABETES MELLITUS WITH STAGE 1 CHRONIC KIDNEY DISEASE, WITH LONG-TERM CURRENT USE OF INSULIN (H): ICD-10-CM

## 2024-04-29 DIAGNOSIS — E11.649 TYPE 2 DIABETES MELLITUS WITH HYPOGLYCEMIA WITHOUT COMA, WITH LONG-TERM CURRENT USE OF INSULIN (H): ICD-10-CM

## 2024-04-29 NOTE — PROGRESS NOTES
"Colon and Rectal Surgery Consult Clinic Note     Referring provider:  Drew Bravo PA-C  [address] [place] [address]     RE: Cayetano Lunsford  : 1945  ROD: 2024    Reason for visit: cyst of buttock; referred by Drew Bravo PA-C    HPI:  Cayetano Lunsford is a very pleasant 79 year old male with past medical history of T2DM on metformin, CKD stage 1, anemia, a fib anticoagulated on Warfarin , COPD, hypertension on amlodipine. Patient is being seen for a cyst on his buttock. Patient had used mupirocin ointment and the area \"cleared up\" for about a year. The cyst has now reoccurred and is on both butt cheeks.     INR = 3.3 (24)   Hgb A1c = 6.0 (24)  Hgb = 10.5 (23)     Patient is feeling well.  He has a diagnosis of ulcerative colitis, is on sulfasalazine, and is seeing Dr. Antione Mccartney of [facility].  He denies fecal leakage.  He had this lesion on one side of his buttock before;  it improved, but now has returned and is on both butt cheeks.  He denies swelling.      10/4/2022: Patient was seen by Drew Bravo PA-C with family medicine. Per the note, on physician exam the patient had \"open wound on left buttocks. Weeping. No erythema or warmth.\" Patient was told this area was a pilonidal cyst in the past. Drew OCASIO recommended colorectal surgery consult and prescribed mupirocin ointment.     2021: Patient was seen by Tito OCASIO for pilonidal cyst with abscess. On physical exam it was noted \"normal sphincter tone, no rectal masses and an apparent cyst or fissure in pilonidal area of buttock. Evidence of previous purulent drainage, none current.\" Patient was prescribed topical mupirocin and a course of Bactrim.     2022: colonoscopy   Findings:       Two sessile polyps were found in the ascending colon. The polyps were 4        to 6 mm in size.        The colon (entire examined portion) appeared normal without signs of        active colitis. Surveillance " biopsies obtained from the cecum/ascending,        transverse, descending and rectosigmoid.                                                                                    Impression:                 - Preparation of the colon was fair.                - Two 4 to 6 mm polyps in the ascending colon.       Medical history:  Past Medical History:   Diagnosis Date    Allergic rhinitis, cause unspecified     Atrial fibrillation (H)     BPH (benign prostatic hyperplasia)     Chronic airway obstruction, not elsewhere classified     COPD (chronic obstructive pulmonary disease) (H)     Hyperlipidemia LDL goal <100 5/9/2010    Hypertension goal BP (blood pressure) < 130/80 10/19/2006    Obesity (BMI 30-39.9)     Perforation of tympanic membrane, unspecified     Right TM    Tachycardia, unspecified     Type 2 diabetes, HbA1C goal < 8% (H) 5/2/2010    Ulcerative colitis (H)     Unspecified cardiovascular disease        Surgical history:  Past Surgical History:   Procedure Laterality Date    CARDIAC SURGERY      COLONOSCOPY  3/31/2011    COLONOSCOPY N/A 5/25/2018    Procedure: COMBINED COLONOSCOPY, SINGLE OR MULTIPLE BIOPSY/POLYPECTOMY BY BIOPSY;;  Surgeon: Juan Simon DO;  Location: [facility]    COLONOSCOPY N/A 9/6/2019    Procedure: COLONOSCOPY, WITH POLYPECTOMY AND BIOPSY;  Surgeon: Paradise Mims MD;  Location: [facility]    COLONOSCOPY N/A 7/8/2022    Procedure: COLONOSCOPY, FLEXIBLE, WITH LESION REMOVAL USING SNARE;  Surgeon: Juan Simon DO;  Location: [facility]    COLONOSCOPY N/A 7/8/2022    Procedure: COLONOSCOPY, WITH POLYPECTOMY AND BIOPSY;  Surgeon: Juan Simon DO;  Location: [facility]    CORONARY ANGIOGRAPHY ADULT ORDER  2001 and 2008 2001 at [facility]. 2008- No interventions    ESOPHAGOSCOPY, GASTROSCOPY, DUODENOSCOPY (EGD), COMBINED N/A 7/8/2022    Procedure: ESOPHAGOGASTRODUODENOSCOPY, WITH BIOPSY;  Surgeon: Juan Simon DO;  Location: [facility]    HERNIA REPAIR      left inguinal    SURGICAL  HISTORY OF -   1987    right ear graft    SURGICAL HISTORY OF -   1992    right shoulder surgery    SURGICAL HISTORY OF -   1979    back surgery    SURGICAL HISTORY OF -   1978    vasectomy    ZZHC REMOVAL OF NAIL PLATE SIMPLE SINGLE  11/10/2011    Right 2nd Toenail       Problem list:    Patient Active Problem List    Diagnosis Date Noted    Atrial fibrillation, unspecified type (H) 12/20/2022     Priority: Medium    Overweight (BMI 25.0-29.9) 10/06/2021     Priority: Medium    Long term current use of anticoagulants with INR goal of 2.0-3.0 02/18/2021     Priority: Medium    Longstanding persistent atrial fibrillation (H) 03/02/2020     Priority: Medium    Bunion, left 10/31/2018     Priority: Medium    Long-term (current) use of anticoagulants [Z79.01] 03/30/2016     Priority: Medium    Diverticulitis of colon 02/09/2016     Priority: Medium    History of colonic polyps 02/09/2016     Priority: Medium     told to repeat colonoscopy in one year (due 4/2016)      Redundant colon 02/09/2016     Priority: Medium    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) 10/05/2015     Priority: Medium    Hard of hearing 07/31/2015     Priority: Medium    CAD in native artery      Priority: Medium    Pain in joint, lower leg 10/17/2013     Priority: Medium    Cramp of limb 11/01/2011     Priority: Medium    BPH (benign prostatic hyperplasia) 11/01/2011     Priority: Medium    Hypertension goal BP (blood pressure) < 140/90 06/16/2011     Priority: Medium    Benign familial tremor 03/24/2011     Priority: Medium     Takes propranolol      Advance Care Planning 02/10/2011     Priority: Medium     Advance Care Planning 2/14/2017: ACP Facilitation Session:    Cayetano Lunsford presented for ACP Facilitation session at a group session. He was accompanied by no one. [facility] information provided and resources reviewed. He currently wishes to give additional consideration to ACP  He currently has the following  questions or concerns about Advance Care Planning: none known.  Confirmed/documented legally designated decision maker(s).  Added by Tessa Randhawa RN, Advance Care Planning Liaison.  Advance Care Planning 2/10/11:  I gave the Health care Directive form to patient .  Patient will fill out the form and then he will make an appointment  with me to review. Ofelia Martin RN, Chronic Care Coordinator         HYPERLIPIDEMIA LDL GOAL <100 05/09/2010     Priority: Medium    Eczema 09/24/2009     Priority: Medium    Chronic obstructive pulmonary disease (H) 05/05/2006     Priority: Medium            Atrial fibrillation (H) 01/06/2006     Priority: Medium    Ulcerative colitis without complications (HCC) 11/26/2004     Priority: Medium     Diet controlled. Colonoscopy q3 years            Medications:  Current Outpatient Medications   Medication Sig Dispense Refill    acetaminophen (TYLENOL) 325 MG tablet Take 325-650 mg by mouth every 6 hours as needed for mild pain      albuterol (PROAIR HFA/PROVENTIL HFA/VENTOLIN HFA) 108 (90 BASE) MCG/ACT Inhaler Inhale 1-2 puffs into the lungs every 4 hours as needed (for shortness of breath/wheezing) 1 Inhaler 5    amLODIPine (NORVASC) 5 MG tablet TAKE 1 TABLET (5 MG) BY MOUTH DAILY 90 tablet 1    atorvastatin (LIPITOR) 20 MG tablet TAKE 1 TABLET (20 MG) BY MOUTH DAILY 90 tablet 0    blood glucose (ONETOUCH ULTRA) test strip USE TO TEST BLOOD SUGAR THREE TIMES A DAY OR AS DIRECTED 300 strip 3    chlorthalidone (HYGROTON) 25 MG tablet Take 1 tablet (25 mg) by mouth daily 90 tablet 3    cyanocobalamin (VITAMIN B-12) 1000 MCG tablet Take 1,000 mcg by mouth daily      ferrous sulfate (FEROSUL) 325 (65 Fe) MG tablet Take 1 tablet (325 mg) by mouth daily (with breakfast)      fexofenadine (ALLEGRA) 180 MG tablet Take 180 mg by mouth daily      fluticasone (FLONASE) 50 MCG/ACT nasal spray Spray 1 spray into both nostrils daily      folic acid 0.8 MG CAPS Take 1 tablet by mouth daily 90  "capsule 3    furosemide (LASIX) 20 MG tablet TAKE 1 TABLET (20 MG) BY MOUTH AS NEEDED FOR LEG SWELLING 90 tablet 3    insulin regular (NOVOLIN R VIAL) 100 UNIT/ML vial INJECT 2 UNITS AS NEEDED WHEN BLOOD GLUCOSE IS >200 MG/DL. 30 mL 1    insulin syringe-needle U-100 (BD INSULIN SYRINGE ULTRAFINE) 31G X 5/16\" 0.3 ML miscellaneous USE 3 SYRINGES DAILY OR AS DIRECTED. 300 each 3    ipratropium - albuterol 0.5 mg/2.5 mg/3 mL (DUONEB) 0.5-2.5 (3) MG/3ML neb solution NEBULIZE CONTENTS OF ONE VIAL (3 MLS) EVERY 4 HOURS AS NEEDED FOR SHORTNESS OF BREATH OR DYSPNEA 360 mL 1    metFORMIN (GLUCOPHAGE) 1000 MG tablet TAKE ONE TABLET BY MOUTH TWICE A DAY WITH BREAKFAST AND DINNER 180 tablet 0    mupirocin (BACTROBAN) 2 % external ointment APPLY TOPICALLY 2 TIMES DAILY AS NEEDED (PILONIDAL CYST) 22 g 1    pantoprazole (PROTONIX) 40 MG enteric coated tablet Take 40 mg by mouth daily Started by Dr. Debbie Rico at [facility]      primidone (MYSOLINE) 50 MG tablet 4 tablets in am, 4 tablets afternoon, and 3 tablets every evening 1001 tablet 1    Probiotic Product (FLORAJEN3) CAPS Take 1 capsule by mouth 2 times daily 60 capsule 0    propafenone (RYTHMOL) 150 MG TABS tablet Take 1 tablet (150 mg) by mouth 2 times daily 180 tablet 3    propranolol (INDERAL) 60 MG tablet Take 2 tablets (120 mg) by mouth 2 times daily 360 tablet 4    sulfaSALAzine (AZULFIDINE) 500 MG tablet TAKE ONE TABLET BY MOUTH THREE TIMES A DAY 90 tablet 11    tamsulosin (FLOMAX) 0.4 MG capsule TAKE ONE CAPSULE BY MOUTH ONCE DAILY 90 capsule 3    tiotropium-olodaterol 2.5-2.5 MCG/ACT AERS Inhale 2 puffs into the lungs daily      Vitamin D3 (CHOLECALCIFEROL) 25 mcg (1000 units) tablet Take 2 tablets (50 mcg) by mouth daily      warfarin ANTICOAGULANT (JANTOVEN ANTICOAGULANT) 2.5 MG tablet Take 5mg every Mon & Fri / Take 6.25mg all other days OR per INR clinic 210 tablet 1       Allergies:  Allergies   Allergen Reactions    Percodan [Oxycodone-Aspirin] Nausea and " Vomiting    Aspirin        Family history:  Family History   Problem Relation Age of Onset    Circulatory Mother         blood clot in leg    Diabetes Mother         type 2    Allergies Mother     Arthritis Mother     Gastrointestinal Disease Mother     Neurologic Disorder Mother         Familiar tremors    Neurologic Disorder Father         brain tumor    Cerebrovascular Disease Paternal Grandfather     Hypertension Brother     Lipids Brother     Hypertension Sister     Diabetes Sister         Type 2    Allergies Sister     Lipids Sister     Glaucoma No family hx of     Macular Degeneration No family hx of        Social history:  Social History     Tobacco Use    Smoking status: Former     Current packs/day: 0.00     Average packs/day: 1 pack/day for 30.0 years (30.0 ttl pk-yrs)     Types: Cigarettes     Start date: 3/19/1962     Quit date: 3/19/1992     Years since quittin.1    Smokeless tobacco: Never   Substance Use Topics    Alcohol use: Not Currently     Alcohol/week: 0.0 standard drinks of alcohol    Marital status: .  Occupation: N/A.    There are no exam notes on file for this visit.     Physical Examination:  There were no vitals taken for this visit.  General: Well appearing male, no acute distress      Perianal external examination: Exam was chaperoned by Stanley Pro MA   Perianal skin: abnormal On either side of the coccyx, two areas of superficial ulceration, and some erythema of the skin.  Almost more like pressure sores but not necessarily  in the location for this.  Lesions: No.  Eversion of buttocks: There was not evidence of an anal fissure. Details: N/A.  Skin tags or external hemorrhoids: None.  No stigmata of Crohns    Digital rectal examination: Was performed.   Sphincter tone: Good.  Palpable lesions: No.      Anoscopy: Was performed.   Hemorrhoids: No significant internal hemorrhoids.  Lesions: No internal opening of fistula    Assessment/Plan: 79 year old male with a  significant past medical history of ulcerative colitis with a recent diagnosis of  bilateral buttock/perianal skin lesions, that look mainly like pressure sores but the location more atypical.  Denies FI.  Could be anal fistula but again not typical appearance -look more like skin abrasions.  I have given him some samples of Calmoseptine cream to protect skin.  If no improvement, he will call and we can set up MRI anal fistula protocol to rule this out.      20 minutes spent on the date of encounter performing chart review, history and exam, documentation and further activities as noted above with an additional 1 minute for anoscopy.     Clara Moses MD     Division of Colon & Rectal Surgery  [facility]       This note was created using speech recognition software and may contain unintended word substitutions.    CC: MD Drew Sinha PA-C

## 2024-05-01 ENCOUNTER — ANTICOAGULATION THERAPY VISIT (OUTPATIENT)
Dept: ANTICOAGULATION | Facility: CLINIC | Age: 79
End: 2024-05-01

## 2024-05-01 ENCOUNTER — OFFICE VISIT (OUTPATIENT)
Dept: FAMILY MEDICINE | Facility: CLINIC | Age: 79
End: 2024-05-01
Payer: COMMERCIAL

## 2024-05-01 ENCOUNTER — LAB (OUTPATIENT)
Dept: LAB | Facility: CLINIC | Age: 79
End: 2024-05-01
Payer: COMMERCIAL

## 2024-05-01 VITALS
WEIGHT: 158.4 LBS | SYSTOLIC BLOOD PRESSURE: 140 MMHG | RESPIRATION RATE: 14 BRPM | BODY MASS INDEX: 25.46 KG/M2 | TEMPERATURE: 97.5 F | DIASTOLIC BLOOD PRESSURE: 48 MMHG | OXYGEN SATURATION: 94 % | HEIGHT: 66 IN | HEART RATE: 57 BPM

## 2024-05-01 DIAGNOSIS — E11.649 TYPE 2 DIABETES MELLITUS WITH HYPOGLYCEMIA WITHOUT COMA, WITH LONG-TERM CURRENT USE OF INSULIN (H): ICD-10-CM

## 2024-05-01 DIAGNOSIS — I48.11 LONGSTANDING PERSISTENT ATRIAL FIBRILLATION (H): Primary | ICD-10-CM

## 2024-05-01 DIAGNOSIS — Z79.4 TYPE 2 DIABETES MELLITUS WITH HYPOGLYCEMIA WITHOUT COMA, WITH LONG-TERM CURRENT USE OF INSULIN (H): ICD-10-CM

## 2024-05-01 DIAGNOSIS — N18.1 TYPE 2 DIABETES MELLITUS WITH STAGE 1 CHRONIC KIDNEY DISEASE, WITH LONG-TERM CURRENT USE OF INSULIN (H): ICD-10-CM

## 2024-05-01 DIAGNOSIS — I48.0 PAROXYSMAL ATRIAL FIBRILLATION (H): ICD-10-CM

## 2024-05-01 DIAGNOSIS — Z79.01 LONG TERM CURRENT USE OF ANTICOAGULANT THERAPY: ICD-10-CM

## 2024-05-01 DIAGNOSIS — Z79.01 LONG TERM CURRENT USE OF ANTICOAGULANTS WITH INR GOAL OF 2.0-3.0: ICD-10-CM

## 2024-05-01 DIAGNOSIS — M21.611 BUNION, RIGHT: ICD-10-CM

## 2024-05-01 DIAGNOSIS — Z00.00 ENCOUNTER FOR MEDICARE ANNUAL WELLNESS EXAM: Primary | ICD-10-CM

## 2024-05-01 DIAGNOSIS — I48.91 ATRIAL FIBRILLATION, UNSPECIFIED TYPE (H): ICD-10-CM

## 2024-05-01 DIAGNOSIS — B35.1 ONYCHOMYCOSIS: ICD-10-CM

## 2024-05-01 DIAGNOSIS — E11.22 TYPE 2 DIABETES MELLITUS WITH STAGE 1 CHRONIC KIDNEY DISEASE, WITH LONG-TERM CURRENT USE OF INSULIN (H): ICD-10-CM

## 2024-05-01 DIAGNOSIS — Z79.4 TYPE 2 DIABETES MELLITUS WITH STAGE 1 CHRONIC KIDNEY DISEASE, WITH LONG-TERM CURRENT USE OF INSULIN (H): ICD-10-CM

## 2024-05-01 DIAGNOSIS — M21.612 BUNION, LEFT: ICD-10-CM

## 2024-05-01 PROBLEM — K64.4 EXTERNAL HEMORRHOIDS: Status: ACTIVE | Noted: 2017-09-13

## 2024-05-01 LAB — INR BLD: 2.2 (ref 0.9–1.1)

## 2024-05-01 PROCEDURE — 85610 PROTHROMBIN TIME: CPT

## 2024-05-01 PROCEDURE — 99214 OFFICE O/P EST MOD 30 MIN: CPT | Mod: 25 | Performed by: FAMILY MEDICINE

## 2024-05-01 PROCEDURE — G0439 PPPS, SUBSEQ VISIT: HCPCS | Performed by: FAMILY MEDICINE

## 2024-05-01 PROCEDURE — 36416 COLLJ CAPILLARY BLOOD SPEC: CPT

## 2024-05-01 PROCEDURE — 99207 PR FOOT EXAM NO CHARGE: CPT | Performed by: FAMILY MEDICINE

## 2024-05-01 RX ORDER — CICLOPIROX 80 MG/ML
SOLUTION TOPICAL
Qty: 6.6 ML | Refills: 11 | Status: SHIPPED | OUTPATIENT
Start: 2024-05-01

## 2024-05-01 RX ORDER — AMLODIPINE BESYLATE 5 MG/1
5 TABLET ORAL DAILY
Qty: 90 TABLET | Refills: 1 | Status: SHIPPED | OUTPATIENT
Start: 2024-05-01

## 2024-05-01 RX ORDER — ATORVASTATIN CALCIUM 20 MG/1
20 TABLET, FILM COATED ORAL DAILY
Qty: 90 TABLET | Refills: 2 | Status: SHIPPED | OUTPATIENT
Start: 2024-05-01

## 2024-05-01 SDOH — HEALTH STABILITY: PHYSICAL HEALTH: ON AVERAGE, HOW MANY DAYS PER WEEK DO YOU ENGAGE IN MODERATE TO STRENUOUS EXERCISE (LIKE A BRISK WALK)?: 3 DAYS

## 2024-05-01 SDOH — HEALTH STABILITY: PHYSICAL HEALTH: ON AVERAGE, HOW MANY MINUTES DO YOU ENGAGE IN EXERCISE AT THIS LEVEL?: 20 MIN

## 2024-05-01 ASSESSMENT — SOCIAL DETERMINANTS OF HEALTH (SDOH)
HOW OFTEN DO YOU ATTENT MEETINGS OF THE CLUB OR ORGANIZATION YOU BELONG TO?: NEVER
IN A TYPICAL WEEK, HOW MANY TIMES DO YOU TALK ON THE PHONE WITH FAMILY, FRIENDS, OR NEIGHBORS?: TWICE A WEEK
HOW OFTEN DO YOU GET TOGETHER WITH FRIENDS OR RELATIVES?: TWICE A WEEK
HOW OFTEN DO YOU ATTEND CHURCH OR RELIGIOUS SERVICES?: NEVER
HOW OFTEN DO YOU GET TOGETHER WITH FRIENDS OR RELATIVES?: TWICE A WEEK
DO YOU BELONG TO ANY CLUBS OR ORGANIZATIONS SUCH AS CHURCH GROUPS UNIONS, FRATERNAL OR ATHLETIC GROUPS, OR SCHOOL GROUPS?: NO

## 2024-05-01 ASSESSMENT — PAIN SCALES - GENERAL: PAINLEVEL: SEVERE PAIN (6)

## 2024-05-01 ASSESSMENT — LIFESTYLE VARIABLES
HOW MANY STANDARD DRINKS CONTAINING ALCOHOL DO YOU HAVE ON A TYPICAL DAY: PATIENT DOES NOT DRINK
SKIP TO QUESTIONS 9-10: 1
AUDIT-C TOTAL SCORE: 0
HOW OFTEN DO YOU HAVE A DRINK CONTAINING ALCOHOL: NEVER
HOW OFTEN DO YOU HAVE SIX OR MORE DRINKS ON ONE OCCASION: NEVER

## 2024-05-01 NOTE — PROGRESS NOTES
Please mail AVS--only appointment page and INR calendar.    Thank you,  Tatyana Akers RN  Anticoagulation Clinic

## 2024-05-01 NOTE — PATIENT INSTRUCTIONS
Preventive Care Advice   This is general advice given by our system to help you stay healthy. However, your care team may have specific advice just for you. Please talk to your care team about your preventive care needs.  Nutrition  Eat 5 or more servings of fruits and vegetables each day.  Try wheat bread, brown rice and whole grain pasta (instead of white bread, rice, and pasta).  Get enough calcium and vitamin D. Check the label on foods and aim for 100% of the RDA (recommended daily allowance).  Lifestyle  Exercise at least 150 minutes each week   (30 minutes a day, 5 days a week).  Do muscle strengthening activities 2 days a week. These help control your weight and prevent disease.  No smoking.  Wear sunscreen to prevent skin cancer.  Have a dental exam and cleaning every 6 months.  Yearly exams  See your health care team every year to talk about:  Any changes in your health.  Any medicines your care team has prescribed.  Preventive care, family planning, and ways to prevent chronic diseases.  Shots (vaccines)   HPV shots (up to age 26), if you've never had them before.  Hepatitis B shots (up to age 59), if you've never had them before.  COVID-19 shot: Get this shot when it's due.  Flu shot: Get a flu shot every year.  Tetanus shot: Get a tetanus shot every 10 years.  Pneumococcal, hepatitis A, and RSV shots: Ask your care team if you need these based on your risk.  Shingles shot (for age 50 and up).  General health tests  Diabetes screening:  Starting at age 35, Get screened for diabetes at least every 3 years.  If you are younger than age 35, ask your care team if you should be screened for diabetes.  Cholesterol test: At age 39, start having a cholesterol test every 5 years, or more often if advised.  Bone density scan (DEXA): At age 50, ask your care team if you should have this scan for osteoporosis (brittle bones).  Hepatitis C: Get tested at least once in your life.  STIs (sexually transmitted  infections)  Before age 24: Ask your care team if you should be screened for STIs.  After age 24: Get screened for STIs if you're at risk. You are at risk for STIs (including HIV) if:  You are sexually active with more than one person.  You don't use condoms every time.  You or a partner was diagnosed with a sexually transmitted infection.  If you are at risk for HIV, ask about PrEP medicine to prevent HIV.  Get tested for HIV at least once in your life, whether you are at risk for HIV or not.  Cancer screening tests  Cervical cancer screening: If you have a cervix, begin getting regular cervical cancer screening tests at age 21. Most people who have regular screenings with normal results can stop after age 65. Talk about this with your provider.  Breast cancer scan (mammogram): If you've ever had breasts, begin having regular mammograms starting at age 40. This is a scan to check for breast cancer.  Colon cancer screening: It is important to start screening for colon cancer at age 45.  Have a colonoscopy test every 10 years (or more often if you're at risk) Or, ask your provider about stool tests like a FIT test every year or Cologuard test every 3 years.  To learn more about your testing options, visit: https://www.[employer]/544495.pdf.  For help making a decision, visit: https://bit.ly/qm87041.  Prostate cancer screening test: If you have a prostate and are age 55 to 69, ask your provider if you would benefit from a yearly prostate cancer screening test.  Lung cancer screening: If you are a current or former smoker age 50 to 80, ask your care team if ongoing lung cancer screenings are right for you.  For informational purposes only. Not to replace the advice of your health care provider. Copyright   2023 [employer]. All rights reserved. Clinically reviewed by the [facility] Transitions Program. [employer] [id number] - REV 01/24.    Hearing Loss: Care Instructions  Overview     Hearing loss is a  sudden or slow decrease in how well you hear. It can range from slight to profound. Permanent hearing loss can occur with aging. It also can happen when you are exposed long-term to loud noise. Examples include listening to loud music, riding motorcycles, or being around other loud machines.  Hearing loss can affect your work and home life. It can make you feel lonely or depressed. You may feel that you have lost your independence. But hearing aids and other devices can help you hear better and feel connected to others.  Follow-up care is a key part of your treatment and safety. Be sure to make and go to all appointments, and call your doctor if you are having problems. It's also a good idea to know your test results and keep a list of the medicines you take.  How can you care for yourself at home?  Avoid loud noises whenever possible. This helps keep your hearing from getting worse.  Always wear hearing protection around loud noises.  Wear a hearing aid as directed.  A professional can help you pick a hearing aid that will work best for you.  You can also get hearing aids over the counter for mild to moderate hearing loss.  Have hearing tests as your doctor suggests. They can show whether your hearing has changed. Your hearing aid may need to be adjusted.  Use other devices as needed. These may include:  Telephone amplifiers and hearing aids that can connect to a television, stereo, radio, or microphone.  Devices that use lights or vibrations. These alert you to the doorbell, a ringing telephone, or a baby monitor.  Television closed-captioning. This shows the words at the bottom of the screen. Most new TVs can do this.  TTY (text telephone). This lets you type messages back and forth on the telephone instead of talking or listening. These devices are also called TDD. When messages are typed on the keyboard, they are sent over the phone line to a receiving TTY. The message is shown on a monitor.  Use text  "messaging, social media, and email if it is hard for you to communicate by telephone.  Try to learn a listening technique called speechreading. It is not lipreading. You pay attention to people's gestures, expressions, posture, and tone of voice. These clues can help you understand what a person is saying. Face the person you are talking to, and have them face you. Make sure the lighting is good. You need to see the other person's face clearly.  Think about counseling if you need help to adjust to your hearing loss.  When should you call for help?  Watch closely for changes in your health, and be sure to contact your doctor if:    You think your hearing is getting worse.     You have new symptoms, such as dizziness or nausea.   Where can you learn more?  Go to https://www.[employer].net/patiented  Enter R798 in the search box to learn more about \"Hearing Loss: Care Instructions.\"  Current as of: September 27, 2023               Content Version: 14.0    7595-6275 [employer].   Care instructions adapted under license by your healthcare professional. If you have questions about a medical condition or this instruction, always ask your healthcare professional. [employer], [employer] disclaims any warranty or liability for your use of this information.      "

## 2024-05-01 NOTE — PROGRESS NOTES
ANTICOAGULATION MANAGEMENT     Cayetano Lunsford 79 year old male is on warfarin with therapeutic INR result. (Goal INR 2.0-3.0)    Recent labs: (last 7 days)     05/01/24  1258   INR 2.2*       ASSESSMENT     Source(s): Chart Review and Patient/Caregiver Call     Warfarin doses taken: Less warfarin taken than planned which may be affecting INR  Diet: No new diet changes identified  Medication/supplement changes: None noted  New illness, injury, or hospitalization: No  Signs or symptoms of bleeding or clotting: No  Previous result: Subtherapeutic  Additional findings: Warfarin maintenance dose was increased 3% at last visit  Office visit today for wellness exam--notes not complete; however, patient denies any change in care plan.       PLAN     Recommended plan for temporary change(s) affecting INR     Dosing Instructions: Continue your current warfarin dose with next INR in 3 weeks       Summary  As of 5/1/2024      Full warfarin instructions:  5 mg every Fri; 6.25 mg all other days   Next INR check:  5/22/2024               Telephone call with Leo who verbalizes understanding and agrees to plan and who agrees to plan and repeated back plan correctly    Lab visit scheduled    Education provided:   Taking warfarin: importance of following [facility] instructions vs instructions on the prescription bottle    Plan made per [facility] anticoagulation protocol    Tatyana Akers, RN  Anticoagulation Clinic  5/1/2024    _______________________________________________________________________     Anticoagulation Episode Summary       Current INR goal:  2.0-3.0   TTR:  47.5% (1 y)   Target end date:  Indefinite   Send INR reminders to:  [facility]    Indications    Longstanding persistent atrial fibrillation (H) [I48.11]  Long term current use of anticoagulants with INR goal of 2.0-3.0 [Z79.01]  Atrial fibrillation  unspecified type (H) (Resolved) [I48.91]  Long-term (current) use of anticoagulants [Z79.01] (Resolved)  [Z79.01]             Comments:               Anticoagulation Care Providers       Provider Role Specialty Phone number    Dmitri Andres MD Referring Family Medicine [phone]    Drew Bravo PA-C Referring Family Medicine [phone]

## 2024-05-01 NOTE — LETTER
My COPD Action Plan     Name: Cayetano Lunsford    YOB: 1945   Date: 5/1/2024    My doctor: Cristine Espinal MD   My clinic: [address] [address] [phone]  My Controller Medicine: Olodaterol/tiotropium (Stiolto Respimat)   Dose: 2 puffs twice per day     My Rescue Medicine:  DuoNebs   Dose: one vial every 6 hours as needed.  Albuterol inhaler, 1-2 puffs every 4 hours as needed          My COPD Severity: Unknown      Use of Oxygen: Oxygen Not Prescribed      Make sure you've had your pneumonia   vaccines.          GREEN ZONE       Doing well today    Usual level of activity and exercise  Usual amount of cough and mucus  No shortness of breath  Usual level of health (thinking clearly, sleeping well, feel like eating) Actions:    Take daily medicines  Use oxygen as prescribed  Follow regular exercise and diet plan  Avoid cigarette smoke and other irritants that harm the lungs           YELLOW ZONE          Having a bad day or flare up    Short of breath more than usual  A lot more sputum (mucus) than usual  Sputum looks yellow, green, tan, brown or bloody  More coughing or wheezing  Fever or chills  Less energy; trouble completing activities  Trouble thinking or focusing  Using quick relief inhaler or nebulizer more often  Poor sleep; symptoms wake me up  Do not feel like eating Actions:    Get plenty of rest  Take daily medicines  Use quick relief inhaler every 4 hours  If you use oxygen, call you doctor to see if you should adjust your oxygen  Do breathing exercises or other things to help you relax  Let a loved one, friend or neighbor know you are feeling worse  Call your care team if you have 2 or more symptoms.  Start taking steroids or antibiotics if directed by your care team           RED ZONE       Need medical care now    Severe shortness of breath (feel you can't breathe)  Fever, chills  Not enough breath to do any  activity  Trouble coughing up mucus, walking or talking  Blood in mucus  Frequent coughing Rescue medicines are not working  Not able to sleep because of breathing  Feel confused or drowsy  Chest pain    Actions:    Call your health care team.  If you cannot reach your care team, call 911 or go to the emergency room.        Annual Reminders:  Meet with Care Team, Flu Shot every Fall  Pharmacy:    [facility], [place] - [address] [facility] [address] [place] [address] [address] [facility]

## 2024-05-01 NOTE — PROGRESS NOTES
"Preventive Care Visit  [facility]  April LUDMILA. Jodi Espinal MD, Family Medicine  May 1, 2024      Assessment & Plan     Encounter for Medicare annual wellness exam  Exam completed today, routine health maintenance items updated as able.  Labs ordered.  Follow up one year or sooner as needed.    Bunion, right  - Orthopedic  Referral; Future    Bunion, left  - Orthopedic  Referral; Future    Paroxysmal atrial fibrillation (H)  Continue current medications, anticoagulated.  Stable.  - amLODIPine (NORVASC) 5 MG tablet; Take 1 tablet (5 mg) by mouth daily  - atorvastatin (LIPITOR) 20 MG tablet; Take 1 tablet (20 mg) by mouth daily    Type 2 diabetes mellitus with hypoglycemia without coma, with long-term current use of insulin (H)  Continue Metformin, last A1c 6.0.  - metFORMIN (GLUCOPHAGE) 1000 MG tablet; TAKE ONE TABLET BY MOUTH TWICE A DAY WITH BREAKFAST  AND DINNER  - FOOT EXAM    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, with long-term current use of insulin (H)  As above  - metFORMIN (GLUCOPHAGE) 1000 MG tablet; TAKE ONE TABLET BY MOUTH TWICE A DAY WITH BREAKFAST  AND DINNER  - FOOT EXAM    Onychomycosis  - ciclopirox (PENLAC) 8 % external solution; Apply to adjacent skin and affected nails daily.  Remove with alcohol every 7 days, then repeat.        36 minutes spent by me on the date of the encounter doing chart review, history and exam, documentation and further activities per the note      BMI  Estimated body mass index is 25.57 kg/m  as calculated from the following:    Height as of this encounter: 1.676 m (5' 6\").    Weight as of this encounter: 71.8 kg (158 lb 6.4 oz).       Counseling  Appropriate preventive services were discussed with this patient, including applicable screening as appropriate for fall prevention, nutrition, physical activity, Tobacco-use cessation, weight loss and cognition.  Checklist reviewing preventive services available " has been given to the patient.      See Patient Instructions    Randall Bailey is a 79 year old, presenting for the following:  Wellness Visit        5/1/2024     1:04 PM   Additional Questions   Roomed by Ivon Putnam         Health Care Directive  Patient does not have a Health Care Directive or Living Will: Discussed advance care planning with patient; however, patient declined at this time.    HPI    Not sure if he has arthritis but he has some pains in the hip, sometimes in the lower back.  Many years ago, in the late 70s', he hurt his back at work. Had surgery that found calcifications on a couple discs and spinal stenosis.  He was good for many years. Now has right hip pain.  He takes Tylenol PRN    He has Bunions on both feet.  He also has toenail fungus. Uses an OTC medication, helps a little.  He has trouble now cutting his toenails given his tremor.  He was told in the past that he would not be able to get bunion removal due to A.fib.        5/1/2024   General Health   How would you rate your overall physical health? (!) FAIR   Feel stress (tense, anxious, or unable to sleep) Not at all    Only a little         5/1/2024   Nutrition   Diet: Low salt         5/1/2024   Exercise   Days per week of moderate/strenous exercise 3 days    3 days   Average minutes spent exercising at this level 20 min    20 min         5/1/2024   Social Factors   Frequency of gathering with friends or relatives Twice a week    Twice a week   Worry food won't last until get money to buy more No    No   Food not last or not have enough money for food? No    No   Do you have housing?  Yes    Yes   Are you worried about losing your housing? No    No   Lack of transportation? No    No   Unable to get utilities (heat,electricity)? No    No         5/1/2024   Fall Risk   Fallen 2 or more times in the past year? No   Trouble with walking or balance? No          5/1/2024   Activities of Daily Living- Home Safety   Needs help with the  following daily activites None of the above   Safety concerns in the home None of the above         2024   Dental   Dentist two times every year? (!) NO         2024   Hearing Screening   Hearing concerns? (!) I NEED TO ASK PEOPLE TO SPEAK UP OR REPEAT THEMSELVES.    (!) IT'S HARDER TO UNDERSTAND WOMEN'S VOICES THAN MEN'S VOICES.    (!) TROUBLE UNDERSTANDING SOFT OR WHISPERED SPEECH.         2024   Driving Risk Screening   Patient/family members have concerns about driving No         2024   General Alertness/Fatigue Screening   Have you been more tired than usual lately? No         2024   Urinary Incontinence Screening   Bothered by leaking urine in past 6 months No         2024   TB Screening   Were you born outside of the [place]? No         Today's PHQ-2 Score:       2024     1:20 PM   PHQ-2 (  [employer])   Q1: Little interest or pleasure in doing things 0   Q2: Feeling down, depressed or hopeless 0   PHQ-2 Score 0   Q1: Little interest or pleasure in doing things Not at all   Q2: Feeling down, depressed or hopeless Not at all   PHQ-2 Score 0           2024   Substance Use   Alcohol more than 3/day or more than 7/wk No   Do you have a current opioid prescription? No   How severe/bad is pain from 1 to 10? 6/10   Do you use any other substances recreationally? No     Social History     Tobacco Use    Smoking status: Former     Current packs/day: 0.00     Average packs/day: 1 pack/day for 30.0 years (30.0 ttl pk-yrs)     Types: Cigarettes     Start date: 3/19/1962     Quit date: 3/19/1992     Years since quittin.1    Smokeless tobacco: Never   Vaping Use    Vaping status: Never Used   Substance Use Topics    Alcohol use: Not Currently     Alcohol/week: 0.0 standard drinks of alcohol    Drug use: No               Reviewed and updated as needed this visit by Provider                      Current providers sharing in care for this patient include:  Patient Care Team:  Drew Bravo  ISA Lombardo as PCP - General (Family Medicine)  Bong Yoo MD as Assigned Neuroscience Provider  Diana Johnson RPH as Pharmacist (Pharmacist)  Diana Johnson RPH as Pharmacist (Pharmacist)  Drew Bravo PA-C as Assigned PCP  Lorin Cuevas OD as Assigned Surgical Provider  Diana Johnson RPH as Assigned MTM Pharmacist  Sasha Madrid PA-C as Assigned Heart and Vascular Provider  Medardo Park MD as MD (Colon & Rectal)    The following health maintenance items are reviewed in Epic and correct as of today:  Health Maintenance   Topic Date Due    COPD ACTION PLAN  05/06/2022    DIABETIC FOOT EXAM  10/06/2022    MEDICARE ANNUAL WELLNESS VISIT  10/04/2023    ANNUAL REVIEW OF HM ORDERS  10/04/2023    COVID-19 Vaccine (7 - 2023-24 season) 12/05/2023    COLORECTAL CANCER SCREENING  07/08/2024    EYE EXAM  08/03/2024    DTAP/TDAP/TD IMMUNIZATION (2 - Td or Tdap) 08/05/2024    A1C  08/08/2024    BMP  12/01/2024    LIPID  02/08/2025    MICROALBUMIN  02/08/2025    FALL RISK ASSESSMENT  05/01/2025    ADVANCE CARE PLANNING  10/04/2027    SPIROMETRY  Completed    PHQ-2 (once per calendar year)  Completed    INFLUENZA VACCINE  Completed    Pneumococcal Vaccine: 65+ Years  Completed    URINALYSIS  Completed    ZOSTER IMMUNIZATION  Completed    RSV VACCINE (Pregnancy & 60+)  Completed    Medicare Annual MTM Pharmacist Visit (once per calendar year)  Completed    IPV IMMUNIZATION  Aged Out    HPV IMMUNIZATION  Aged Out    MENINGITIS IMMUNIZATION  Aged Out    RSV MONOCLONAL ANTIBODY  Aged Out       Current Outpatient Medications   Medication Sig Dispense Refill    acetaminophen (TYLENOL) 325 MG tablet Take 325-650 mg by mouth every 6 hours as needed for mild pain      albuterol (PROAIR HFA/PROVENTIL HFA/VENTOLIN HFA) 108 (90 BASE) MCG/ACT Inhaler Inhale 1-2 puffs into the lungs every 4 hours as needed (for shortness of breath/wheezing) 1 Inhaler 5    amLODIPine  "(NORVASC) 5 MG tablet Take 1 tablet (5 mg) by mouth daily 90 tablet 1    atorvastatin (LIPITOR) 20 MG tablet Take 1 tablet (20 mg) by mouth daily 90 tablet 2    blood glucose (ONETOUCH ULTRA) test strip USE TO TEST BLOOD SUGAR THREE TIMES A DAY OR AS DIRECTED 300 strip 3    chlorthalidone (HYGROTON) 25 MG tablet Take 1 tablet (25 mg) by mouth daily 90 tablet 3    cyanocobalamin (VITAMIN B-12) 1000 MCG tablet Take 1,000 mcg by mouth daily      ferrous sulfate (FEROSUL) 325 (65 Fe) MG tablet Take 1 tablet (325 mg) by mouth daily (with breakfast)      fexofenadine (ALLEGRA) 180 MG tablet Take 180 mg by mouth daily      fluticasone (FLONASE) 50 MCG/ACT nasal spray Spray 1 spray into both nostrils daily      folic acid 0.8 MG CAPS Take 1 tablet by mouth daily 90 capsule 3    furosemide (LASIX) 20 MG tablet TAKE 1 TABLET (20 MG) BY MOUTH AS NEEDED FOR LEG SWELLING 90 tablet 3    insulin regular (NOVOLIN R VIAL) 100 UNIT/ML vial INJECT 2 UNITS AS NEEDED WHEN BLOOD GLUCOSE IS >200 MG/DL. 30 mL 1    insulin syringe-needle U-100 (BD INSULIN SYRINGE ULTRAFINE) 31G X 5/16\" 0.3 ML miscellaneous USE 3 SYRINGES DAILY OR AS DIRECTED. 300 each 3    metFORMIN (GLUCOPHAGE) 1000 MG tablet TAKE ONE TABLET BY MOUTH TWICE A DAY WITH BREAKFAST  AND DINNER 180 tablet 1    mupirocin (BACTROBAN) 2 % external ointment APPLY TOPICALLY 2 TIMES DAILY AS NEEDED (PILONIDAL CYST) 22 g 1    pantoprazole (PROTONIX) 40 MG enteric coated tablet Take 40 mg by mouth daily Started by Dr. Debbie Rico at [facility]      primidone (MYSOLINE) 50 MG tablet 4 tablets in am, 4 tablets afternoon, and 3 tablets every evening 1001 tablet 1    Probiotic Product (FLORAJEN3) CAPS Take 1 capsule by mouth 2 times daily 60 capsule 0    propafenone (RYTHMOL) 150 MG TABS tablet Take 1 tablet (150 mg) by mouth 2 times daily 180 tablet 3    propranolol (INDERAL) 60 MG tablet Take 2 tablets (120 mg) by mouth 2 times daily 360 tablet 4    sulfaSALAzine (AZULFIDINE) 500 MG tablet " "TAKE ONE TABLET BY MOUTH THREE TIMES A DAY 90 tablet 11    tamsulosin (FLOMAX) 0.4 MG capsule TAKE ONE CAPSULE BY MOUTH ONCE DAILY 90 capsule 3    tiotropium-olodaterol 2.5-2.5 MCG/ACT AERS Inhale 2 puffs into the lungs daily      Vitamin D3 (CHOLECALCIFEROL) 25 mcg (1000 units) tablet Take 2 tablets (50 mcg) by mouth daily      warfarin ANTICOAGULANT (JANTOVEN ANTICOAGULANT) 2.5 MG tablet Take 5mg every Mon & Fri / Take 6.25mg all other days OR per INR clinic 210 tablet 1    ipratropium - albuterol 0.5 mg/2.5 mg/3 mL (DUONEB) 0.5-2.5 (3) MG/3ML neb solution NEBULIZE CONTENTS OF ONE VIAL (3 MLS) EVERY 4 HOURS AS NEEDED FOR SHORTNESS OF BREATH OR DYSPNEA 360 mL 1            Objective    Exam  BP (!) 146/69 (BP Location: Right arm, Patient Position: Sitting, Cuff Size: Adult Regular)   Pulse 57   Temp 97.5  F (36.4  C) (Oral)   Resp 14   Ht 1.676 m (5' 6\")   Wt 71.8 kg (158 lb 6.4 oz)   SpO2 94%   BMI 25.57 kg/m     Estimated body mass index is 25.57 kg/m  as calculated from the following:    Height as of this encounter: 1.676 m (5' 6\").    Weight as of this encounter: 71.8 kg (158 lb 6.4 oz).    Physical Exam  GENERAL: alert and no distress  EYES: Eyes grossly normal to inspection, PERRL and conjunctivae and sclerae normal  RESP: lungs clear to auscultation - no rales, rhonchi or wheezes  CV: regular rates and rhythm and 3+ bilateral lower extremity pitting edema to knees    MS: bilateral bunions  PSYCH: mentation appears normal, affect normal/bright  Diabetic foot exam: no trophic changes or ulcerative lesions, normal sensory exam, normal monofilament exam, except for right ball of foot and bilateral ankles, and left ball of foot. DP reduced bilaterally, PT reduced bilaterally, bunions, and onychomycosis         5/1/2024   Mini Cog   Mini-Cog Not Completed (choose reason) Deaf/hard of hearing   Clock Draw Score 2 Normal   3 Item Recall 3 objects recalled   Mini Cog Total Score 5              Signed " Electronically by: Cristine Espinal MD

## 2024-05-02 ENCOUNTER — OFFICE VISIT (OUTPATIENT)
Dept: SURGERY | Facility: CLINIC | Age: 79
End: 2024-05-02
Payer: COMMERCIAL

## 2024-05-02 VITALS
HEART RATE: 57 BPM | SYSTOLIC BLOOD PRESSURE: 145 MMHG | WEIGHT: 158.6 LBS | DIASTOLIC BLOOD PRESSURE: 71 MMHG | OXYGEN SATURATION: 93 % | BODY MASS INDEX: 25.6 KG/M2

## 2024-05-02 DIAGNOSIS — L98.411 SKIN ULCER OF BUTTOCK, LIMITED TO BREAKDOWN OF SKIN (H): Primary | ICD-10-CM

## 2024-05-02 PROCEDURE — 99202 OFFICE O/P NEW SF 15 MIN: CPT | Mod: 25 | Performed by: COLON & RECTAL SURGERY

## 2024-05-02 PROCEDURE — 46600 DIAGNOSTIC ANOSCOPY SPX: CPT | Performed by: COLON & RECTAL SURGERY

## 2024-05-02 ASSESSMENT — PAIN SCALES - GENERAL: PAINLEVEL: MODERATE PAIN (5)

## 2024-05-02 NOTE — NURSING NOTE
Chief Complaint   Patient presents with    New Patient     Abscess of buttock       Vitals:    05/02/24 0844 05/02/24 0852   BP: (!) 159/75 (!) 145/71   BP Location: Left arm Left arm   Patient Position: Sitting Sitting   Cuff Size: Adult Regular Adult Regular   Pulse: 57    SpO2: 93%    Weight: 71.9 kg (158 lb 9.6 oz)        Body mass index is 25.6 kg/m .      Stanley Pro MA

## 2024-05-02 NOTE — TELEPHONE ENCOUNTER
CERTIFICATE OF RETURN TO WORK    May 2, 2024      Re:   Sara Hudson  [address]  [facility] [address]                        This is to certify that Sara Hudson was seen on 5/2/2024 and can return to regular work on patient's next scheduled work day.    RESTRICTIONS: none.    Medical information is private and protected by HIPAA Law.         SIGNATURE:___________________________________________,   5/2/2024      MD Angelita Yates MD  [place] [address] [address] [facility] [address]  Dept Phone: [phone]     Pt told of the cost and at this time would like to stay on warfarin. Did tell pt that we could look into the patient assistance from [employer] next year to see if we can get a lower cost for pt. Pt seemed interested in that.  Dimitri make Sasha aware. Analilia

## 2024-05-02 NOTE — LETTER
"    2024         RE: Cayetano Lunsford  [address]  [facility] [address]        Dear Colleague,    Thank you for referring your patient, Cayetano Lunsford, to the [facility] [place]. Please see a copy of my visit note below.    Colon and Rectal Surgery Consult Clinic Note     Referring provider:  Drew Bravo PA-C  [address] [facility] [place] [address]     RE: Cayetano Lunsford  : 1945  ROD: 2024    Reason for visit: cyst of buttock; referred by Drew Bravo PA-C    HPI:  Cayetano Lunsford is a very pleasant 79 year old male with past medical history of T2DM on metformin, CKD stage 1, anemia, a fib anticoagulated on Warfarin , COPD, hypertension on amlodipine. Patient is being seen for a cyst on his buttock. Patient had used mupirocin ointment and the area \"cleared up\" for about a year. The cyst has now reoccurred and is on both butt cheeks.     INR = 3.3 (24)   Hgb A1c = 6.0 (24)  Hgb = 10.5 (23)     Patient is feeling well.  He has a diagnosis of ulcerative colitis, is on sulfasalazine, and is seeing Dr. Antione Mccartney of [facility].  He denies fecal leakage.  He had this lesion on one side of his buttock before;  it improved, but now has returned and is on both butt cheeks.  He denies swelling.      10/4/2022: Patient was seen by Drew Bravo PA-C with family medicine. Per the note, on physician exam the patient had \"open wound on left buttocks. Weeping. No erythema or warmth.\" Patient was told this area was a pilonidal cyst in the past. Drew OCASIO recommended colorectal surgery consult and prescribed mupirocin ointment.     2021: Patient was seen by Tito OCASIO for pilonidal cyst with abscess. On physical exam it was noted \"normal sphincter tone, no rectal masses and an apparent cyst or fissure in pilonidal area of buttock. Evidence of previous purulent drainage, none current.\" Patient was prescribed topical mupirocin and " a course of Bactrim.     7/8/2022: colonoscopy   Findings:       Two sessile polyps were found in the ascending colon. The polyps were 4        to 6 mm in size.        The colon (entire examined portion) appeared normal without signs of        active colitis. Surveillance biopsies obtained from the cecum/ascending,        transverse, descending and rectosigmoid.                                                                                    Impression:                 - Preparation of the colon was fair.                - Two 4 to 6 mm polyps in the ascending colon.       Medical history:  Past Medical History:   Diagnosis Date     Allergic rhinitis, cause unspecified      Atrial fibrillation (H)      BPH (benign prostatic hyperplasia)      Chronic airway obstruction, not elsewhere classified      COPD (chronic obstructive pulmonary disease) (H)      Hyperlipidemia LDL goal <100 5/9/2010     Hypertension goal BP (blood pressure) < 130/80 10/19/2006     Obesity (BMI 30-39.9)      Perforation of tympanic membrane, unspecified     Right TM     Tachycardia, unspecified      Type 2 diabetes, HbA1C goal < 8% (H) 5/2/2010     Ulcerative colitis (H)      Unspecified cardiovascular disease        Surgical history:  Past Surgical History:   Procedure Laterality Date     CARDIAC SURGERY       COLONOSCOPY  3/31/2011     COLONOSCOPY N/A 5/25/2018    Procedure: COMBINED COLONOSCOPY, SINGLE OR MULTIPLE BIOPSY/POLYPECTOMY BY BIOPSY;;  Surgeon: Juan Simon DO;  Location: [facility]     COLONOSCOPY N/A 9/6/2019    Procedure: COLONOSCOPY, WITH POLYPECTOMY AND BIOPSY;  Surgeon: Paradise Mims MD;  Location: [facility]     COLONOSCOPY N/A 7/8/2022    Procedure: COLONOSCOPY, FLEXIBLE, WITH LESION REMOVAL USING SNARE;  Surgeon: Juan Simon DO;  Location: [facility]     COLONOSCOPY N/A 7/8/2022    Procedure: COLONOSCOPY, WITH POLYPECTOMY AND BIOPSY;  Surgeon: Juan Simon DO;  Location: [facility]     CORONARY ANGIOGRAPHY  ADULT ORDER  2001 and 2008    2001 at [facility]. 2008- No interventions     ESOPHAGOSCOPY, GASTROSCOPY, DUODENOSCOPY (EGD), COMBINED N/A 7/8/2022    Procedure: ESOPHAGOGASTRODUODENOSCOPY, WITH BIOPSY;  Surgeon: Juan Simon DO;  Location:  GI     HERNIA REPAIR      left inguinal     SURGICAL HISTORY OF -   1987    right ear graft     SURGICAL HISTORY OF -   1992    right shoulder surgery     SURGICAL HISTORY OF -   1979    back surgery     SURGICAL HISTORY OF -   1978    vasectomy     ZZHC REMOVAL OF NAIL PLATE SIMPLE SINGLE  11/10/2011    Right 2nd Toenail       Problem list:    Patient Active Problem List    Diagnosis Date Noted     Atrial fibrillation, unspecified type (H) 12/20/2022     Priority: Medium     Overweight (BMI 25.0-29.9) 10/06/2021     Priority: Medium     Long term current use of anticoagulants with INR goal of 2.0-3.0 02/18/2021     Priority: Medium     Longstanding persistent atrial fibrillation (H) 03/02/2020     Priority: Medium     Bunion, left 10/31/2018     Priority: Medium     Long-term (current) use of anticoagulants [Z79.01] 03/30/2016     Priority: Medium     Diverticulitis of colon 02/09/2016     Priority: Medium     History of colonic polyps 02/09/2016     Priority: Medium     told to repeat colonoscopy in one year (due 4/2016)       Redundant colon 02/09/2016     Priority: Medium     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) 10/05/2015     Priority: Medium     Hard of hearing 07/31/2015     Priority: Medium     CAD in native artery      Priority: Medium     Pain in joint, lower leg 10/17/2013     Priority: Medium     Cramp of limb 11/01/2011     Priority: Medium     BPH (benign prostatic hyperplasia) 11/01/2011     Priority: Medium     Hypertension goal BP (blood pressure) < 140/90 06/16/2011     Priority: Medium     Benign familial tremor 03/24/2011     Priority: Medium     Takes propranolol       Advance Care Planning 02/10/2011     Priority: Medium      Advance Care Planning 2/14/2017: ACP Facilitation Session:    Cayetano Lunsford presented for ACP Facilitation session at a group session. He was accompanied by no one. [employer] information provided and resources reviewed. He currently wishes to give additional consideration to ACP  He currently has the following questions or concerns about Advance Care Planning: none known.  Confirmed/documented legally designated decision maker(s).  Added by Tessa Randhawa RN, Advance Care Planning Liaison.  Advance Care Planning 2/10/11:  I gave the Health care Directive form to patient .  Patient will fill out the form and then he will make an appointment  with me to review. Ofelia Martin RN, Chronic Care Coordinator          HYPERLIPIDEMIA LDL GOAL <100 05/09/2010     Priority: Medium     Eczema 09/24/2009     Priority: Medium     Chronic obstructive pulmonary disease (H) 05/05/2006     Priority: Medium             Atrial fibrillation (H) 01/06/2006     Priority: Medium     Ulcerative colitis without complications (HCC) 11/26/2004     Priority: Medium     Diet controlled. Colonoscopy q3 years            Medications:  Current Outpatient Medications   Medication Sig Dispense Refill     acetaminophen (TYLENOL) 325 MG tablet Take 325-650 mg by mouth every 6 hours as needed for mild pain       albuterol (PROAIR HFA/PROVENTIL HFA/VENTOLIN HFA) 108 (90 BASE) MCG/ACT Inhaler Inhale 1-2 puffs into the lungs every 4 hours as needed (for shortness of breath/wheezing) 1 Inhaler 5     amLODIPine (NORVASC) 5 MG tablet TAKE 1 TABLET (5 MG) BY MOUTH DAILY 90 tablet 1     atorvastatin (LIPITOR) 20 MG tablet TAKE 1 TABLET (20 MG) BY MOUTH DAILY 90 tablet 0     blood glucose (ONETOUCH ULTRA) test strip USE TO TEST BLOOD SUGAR THREE TIMES A DAY OR AS DIRECTED 300 strip 3     chlorthalidone (HYGROTON) 25 MG tablet Take 1 tablet (25 mg) by mouth daily 90 tablet 3     cyanocobalamin (VITAMIN B-12) 1000 MCG tablet Take 1,000 mcg by mouth  "daily       ferrous sulfate (FEROSUL) 325 (65 Fe) MG tablet Take 1 tablet (325 mg) by mouth daily (with breakfast)       fexofenadine (ALLEGRA) 180 MG tablet Take 180 mg by mouth daily       fluticasone (FLONASE) 50 MCG/ACT nasal spray Spray 1 spray into both nostrils daily       folic acid 0.8 MG CAPS Take 1 tablet by mouth daily 90 capsule 3     furosemide (LASIX) 20 MG tablet TAKE 1 TABLET (20 MG) BY MOUTH AS NEEDED FOR LEG SWELLING 90 tablet 3     insulin regular (NOVOLIN R VIAL) 100 UNIT/ML vial INJECT 2 UNITS AS NEEDED WHEN BLOOD GLUCOSE IS >200 MG/DL. 30 mL 1     insulin syringe-needle U-100 (BD INSULIN SYRINGE ULTRAFINE) 31G X 5/16\" 0.3 ML miscellaneous USE 3 SYRINGES DAILY OR AS DIRECTED. 300 each 3     ipratropium - albuterol 0.5 mg/2.5 mg/3 mL (DUONEB) 0.5-2.5 (3) MG/3ML neb solution NEBULIZE CONTENTS OF ONE VIAL (3 MLS) EVERY 4 HOURS AS NEEDED FOR SHORTNESS OF BREATH OR DYSPNEA 360 mL 1     metFORMIN (GLUCOPHAGE) 1000 MG tablet TAKE ONE TABLET BY MOUTH TWICE A DAY WITH BREAKFAST AND DINNER 180 tablet 0     mupirocin (BACTROBAN) 2 % external ointment APPLY TOPICALLY 2 TIMES DAILY AS NEEDED (PILONIDAL CYST) 22 g 1     pantoprazole (PROTONIX) 40 MG enteric coated tablet Take 40 mg by mouth daily Started by Dr. Debbie Rico at [facility]       primidone (MYSOLINE) 50 MG tablet 4 tablets in am, 4 tablets afternoon, and 3 tablets every evening 1001 tablet 1     Probiotic Product (FLORAJEN3) CAPS Take 1 capsule by mouth 2 times daily 60 capsule 0     propafenone (RYTHMOL) 150 MG TABS tablet Take 1 tablet (150 mg) by mouth 2 times daily 180 tablet 3     propranolol (INDERAL) 60 MG tablet Take 2 tablets (120 mg) by mouth 2 times daily 360 tablet 4     sulfaSALAzine (AZULFIDINE) 500 MG tablet TAKE ONE TABLET BY MOUTH THREE TIMES A DAY 90 tablet 11     tamsulosin (FLOMAX) 0.4 MG capsule TAKE ONE CAPSULE BY MOUTH ONCE DAILY 90 capsule 3     tiotropium-olodaterol 2.5-2.5 MCG/ACT AERS Inhale 2 puffs into the lungs daily "       Vitamin D3 (CHOLECALCIFEROL) 25 mcg (1000 units) tablet Take 2 tablets (50 mcg) by mouth daily       warfarin ANTICOAGULANT (JANTOVEN ANTICOAGULANT) 2.5 MG tablet Take 5mg every Mon & Fri / Take 6.25mg all other days OR per INR clinic 210 tablet 1       Allergies:  Allergies   Allergen Reactions     Percodan [Oxycodone-Aspirin] Nausea and Vomiting     Aspirin        Family history:  Family History   Problem Relation Age of Onset     Circulatory Mother         blood clot in leg     Diabetes Mother         type 2     Allergies Mother      Arthritis Mother      Gastrointestinal Disease Mother      Neurologic Disorder Mother         Familiar tremors     Neurologic Disorder Father         brain tumor     Cerebrovascular Disease Paternal Grandfather      Hypertension Brother      Lipids Brother      Hypertension Sister      Diabetes Sister         Type 2     Allergies Sister      Lipids Sister      Glaucoma No family hx of      Macular Degeneration No family hx of        Social history:  Social History     Tobacco Use     Smoking status: Former     Current packs/day: 0.00     Average packs/day: 1 pack/day for 30.0 years (30.0 ttl pk-yrs)     Types: Cigarettes     Start date: 3/19/1962     Quit date: 3/19/1992     Years since quittin.1     Smokeless tobacco: Never   Substance Use Topics     Alcohol use: Not Currently     Alcohol/week: 0.0 standard drinks of alcohol    Marital status: .  Occupation: N/A.    There are no exam notes on file for this visit.     Physical Examination:  There were no vitals taken for this visit.  General: Well appearing male, no acute distress      Perianal external examination: Exam was chaperoned by Stanley Pro MA   Perianal skin: abnormal On either side of the coccyx, two areas of superficial ulceration, and some erythema of the skin.  Almost more like pressure sores but not necessarily  in the location for this.  Lesions: No.  Eversion of buttocks: There was not evidence  of an anal fissure. Details: N/A.  Skin tags or external hemorrhoids: None.  No stigmata of Crohns    Digital rectal examination: Was performed.   Sphincter tone: Good.  Palpable lesions: No.      Anoscopy: Was performed.   Hemorrhoids: No significant internal hemorrhoids.  Lesions: No internal opening of fistula    Assessment/Plan: 79 year old male with a significant past medical history of ulcerative colitis with a recent diagnosis of  bilateral buttock/perianal skin lesions, that look mainly like pressure sores but the location more atypical.  Denies FI.  Could be anal fistula but again not typical appearance -look more like skin abrasions.  I have given him some samples of Calmoseptine cream to protect skin.  If no improvement, he will call and we can set up MRI anal fistula protocol to rule this out.      20 minutes spent on the date of encounter performing chart review, history and exam, documentation and further activities as noted above with an additional 1 minute for anoscopy.     Clara Moses MD     Division of Colon & Rectal Surgery  [facility]       This note was created using speech recognition software and may contain unintended word substitutions.    CC: MD Drew Sinha PA-C      Again, thank you for allowing me to participate in the care of your patient.        Sincerely,        Clara Moses MD

## 2024-05-03 ENCOUNTER — TRANSFERRED RECORDS (OUTPATIENT)
Dept: HEALTH INFORMATION MANAGEMENT | Facility: CLINIC | Age: 79
End: 2024-05-03
Payer: COMMERCIAL

## 2024-05-03 LAB
ALT SERPL-CCNC: 22 IU/L (ref 0–44)
AST SERPL-CCNC: 21 IU/L (ref 0–40)
CREATININE (EXTERNAL): 0.63 MG/DL (ref 0.76–1.27)
GFR SERPL CREATININE-BSD FRML MDRD: 96 ML/MIN/1.73

## 2024-05-15 ENCOUNTER — OFFICE VISIT (OUTPATIENT)
Dept: PODIATRY | Facility: CLINIC | Age: 79
End: 2024-05-15
Payer: COMMERCIAL

## 2024-05-15 ENCOUNTER — ANCILLARY PROCEDURE (OUTPATIENT)
Dept: GENERAL RADIOLOGY | Facility: CLINIC | Age: 79
End: 2024-05-15
Attending: PODIATRIST
Payer: COMMERCIAL

## 2024-05-15 VITALS — DIASTOLIC BLOOD PRESSURE: 60 MMHG | HEIGHT: 66 IN | BODY MASS INDEX: 25.6 KG/M2 | SYSTOLIC BLOOD PRESSURE: 130 MMHG

## 2024-05-15 DIAGNOSIS — M21.611 BUNION, RIGHT: ICD-10-CM

## 2024-05-15 DIAGNOSIS — M19.072 ARTHRITIS OF BOTH FEET: ICD-10-CM

## 2024-05-15 DIAGNOSIS — M79.671 FOOT PAIN, BILATERAL: Primary | ICD-10-CM

## 2024-05-15 DIAGNOSIS — M79.672 FOOT PAIN, BILATERAL: Primary | ICD-10-CM

## 2024-05-15 DIAGNOSIS — M19.071 ARTHRITIS OF BOTH FEET: ICD-10-CM

## 2024-05-15 DIAGNOSIS — I87.2 EDEMA OF BOTH LOWER EXTREMITIES DUE TO PERIPHERAL VENOUS INSUFFICIENCY: ICD-10-CM

## 2024-05-15 DIAGNOSIS — M21.612 BUNION, LEFT: ICD-10-CM

## 2024-05-15 PROCEDURE — 99204 OFFICE O/P NEW MOD 45 MIN: CPT | Performed by: PODIATRIST

## 2024-05-15 PROCEDURE — 73630 X-RAY EXAM OF FOOT: CPT | Mod: TC | Performed by: RADIOLOGY

## 2024-05-15 NOTE — PATIENT INSTRUCTIONS
Thank you for choosing [facility]!    DR MADRIGAL'S CLINIC:  [facility]   [address]   [place] [address]      TRIAGE LINE: [phone]  APPOINTMENTS: [phone]  RADIOLOGY: [phone]  SET UP SURGERY: [phone]  PHYSICAL THERAPY: [phone]   FAX NUMBER: [phone]  BILLING QUESTIONS: [phone]       Follow up: As needed       SUPER FEET-GREEN    PROPET SHOES    LARS SHOES LOCATIONS  [address]  [phone]   [address]  [phone] [place]  [address] [facility]  [phone]   [place]  [address]  [phone]   [place]  [address]  [phone] [place] [address]  [phone]   [place]  [address]  [phone]   [place]  [address] [address]  [phone] [place]  [address]  [phone]          BUNION (HALLUX ABDUCTO VALGUS)  A bunion is caused by muscle imbalance. The great toe is pulled toward the smaller toes. The metatarsal head is pushed outward creating a lump on the side of your foot. Imbalance is the result of foot structure and instability.   Bunions do not improve with time. They usually enlarge, however this is a fairly slow process. Shoes do not necessarily cause bunions, however, they can hasten development and definitely cause bunions to hurt.   Bunions often run in families. We inherit a certain foot structure, which may be predisposed to bunion development.   Bunion pain is likely a combination of shoes rubbing on the bump, nerve irritation, compression between the toes, joint misalignment, arthritis and altered gait.   SYMPTOMS   Bunions are usually termed mild, moderate or severe. Just because you have a bunion does not mean you have to have pain. There are some people with very severe bunions and no pain and people with mild bunions and a lot of pain.   - Pain on the inside of your foot at the  big toe joint (1st MTPJ)   - Swelling on the inside of your foot at the big toe joint   - Redness on the inside of your foot at the big toe joint   - Numbness or burning in the big toe (hallux)   - Decreased motion at the big toe joint   - Painful bursa (fluid-filled sac) on the inside of your foot at the big toe joint   - Pain while wearing shoes -especially shoes too narrow or with high heels    - Pain during activities   - Corn in between the big toe and second toe   - Callous formation on the side or bottom of the big toe or big toe joint   - Callous under the second toe joint (2nd MTPJ)   - Pain in the second toe joint   TREATMENT  Conservative (non-surgical) treatment will not make the bunion go away, but it will hopefully decrease the signs and symptoms you have and help you get rid of the pain and get you back to your activities.   1.  Wider shoes or extra depth shoes: Most bunion pain can be improved simply by wearing compatible shoes. People with bunions cannot choose footwear simply because they like the style. Your bunion should determine which shoes are to be worn. Wide shoes with nonirritating seams,soft leather and a square toe box are most compatible with a bunion. Shoes should fit appropriately right out of the box but may need to be professionally stretched and modified to accommodate the bump. Heels, dress shoes and shoes with pointed toes will not be comfortable.   2. NSAIDs   3.  Arch supports, custom inserts, padding, splints, toe spacers : Most bunion pain can be improved simply by wearing compatible shoes. People with bunions cannot choose footwear simply because they like the style. Your bunion should determine which shoes are to be worn. Wide shoes with nonirritating seams,soft leather and a square toe box are most compatible with a bunion. Shoes should fit appropriately right out of the box but may need to be professionally stretched and modified to accommodate the bump. Heels, dress shoes  and shoes with pointed toes will not be comfortable.   4.  Change activities   5.  Physical therapy  SURGERY  Surgical treatment for bunions is sometimes needed. If you are limited by pain, cannot fit in shoes comfortably and are not able to do your daily activities then surgery may be a good option for you. There are many different surgical procedures to repair bunions. Your foot and ankle surgeon will review your foot exam findings, your x-rays, your age, your health, your lifestyle, your physical activity level and discuss with you which procedure he or she would recommend. Surgical procedures for bunions range from soft tissue repair to cutting and realigning the bones. It is not recommended that you have bunion surgery for cosmetic reasons (you do not like how your foot looks) or because you want to fit in a certain pair of shoes; There is the risk that even after surgery, the bunion will reoccur 9-10% of the time.   Bunion surgery involves cutting and repositioning the bones surrounding the bunion. Pins and screws are used to hold the bones in place during the healing process. The goal of bunion surgery is to reduce the size of the bunion bump. Realignment of the toe and joint is attempted.     Some first toes cannot be forced back into normal alignment even with surgery. Surgery is helpful in most cases but does not necessarily create a normal foot.   Healing after surgery requires about six weeks of protection. This allows the bone to heal. Maximum recovery takes about one year. The scar tissue and jOint structures require this amount of time to finish the healing process. Expect stiffness, swelling and numbness during that time frame. Bunion surgery does involve side effects. Some side effects are predictable and others are less common but do occur. A scar will be visible and could be irritated by shoes. The shoe may rub on the screw or internal pin requiring surgical removal of these fixation devices. The  screw and pin would likely be left in place for a full year. The first toe may loose motion after bunion surgery. The amount of stiffness is variable. Some people never regain normal motion of the first toe. This is due to scar tissue inherent to any surgery. The first toe may drift toward the second toe or away from the second toe. Spreading of the first and second toes is a rare occurrence after bunion surgery. This can be quite bothersome and would need to be surgically repaired. Toe drift toward the second toe could result in a recurrent bunion and revision surgery. Joint fusion is one option to correct an unstable, drifting toe. This procedure straightens the toe, however, no motion remains. Fusion may be necessary to correct complications of bunion surgery or as the original procedure in severe cases.   All surgical procedures involve risk of infection, numbness, pain, delayed healing, osteotomy dislocation, blood clots, continued foot pain, etc. Bunion surgery is quite complex and should not be taken lightly.   Any skin incision can lead to infection. Deep infection might involve the bone and thus repeat surgery and six weeks of IV antibiotics. Scar tissue can cause nerve pain or numbness. This is generally temporary but can be permanent. We do not have treatments that cure nerve problems. Second toe pain could be related to altered mechanics and pressure transferred to the second toe. Most feet with bunions have pre-existing second toe problems. Delayed bone healing would lengthen the healing time. Some bones simply do not heal. This requires repeat surgery, electronic bone stimulation and/or extended protection. Smokers have an approximate 20% chance of poor bone healing. This is double that of a non-smoker. The bone cut may displace. This may need to be repaired with a second operation. Displacement can cause jOint malalignment. Immobility after surgery can cause blood clots in the legs and lungs. This  could result in death.   Foot pain is complex. Most feet hurt for more than one reason. Fixing the bunion would not necessarily create a pain free foot. Appropriate shoes, healthy body weight, avoidance of bare foot walking and moderation of activity will always be necessary to enjoy foot comfort. Your bunion may involve arthritis, which is incurable even with surgery. Long standing bunions often involve chronic irritation to the surrounding nerves. Nerve pain may not resolve even with reducing the bunion bump since permanent nerve damage may be present   Bunion surgery is nevertheless quite successful. Most surgical patients are pleased with their foot following bunion surgery. Many of the issues described above can be controlled by taking proper care of your foot during the healing process.   Your surgeon would be happy to fully describe any of the above issues. You should pursue a full understanding of the operation,recovery process and any potential problems that could develop.   PREVENTION  1.  Do not wear high heels if there is a family history of bunions.  2.  Wear shoes that have enough width and depth in the toe box  Here are exercises that may benefit people with bunions:   Toe stretches - Stretching out your toes can help keep them limber and offset foot pain. To stretch your toes, point your toes straight ahead for 5 seconds and then curl them under for 5 seconds. Repeat these stretches 10 times. These exercises can be especially beneficial if you also have hammertoes, or chronically bent toes, in addition to a bunion.   Toe flexing and debbie - Press your toes against a hard surface such as a wall, to flex and stretch them; hold the position for 10 seconds and repeat three to four times. Then flex your toes in the opposite direction; hold the position for 10 seconds and repeat three to four times.   Stretching your big toe - Using your fingers to gently pull your big toe over into proper alignment  can be helpful as well. Hold your toe in position for 10 seconds and repeat three to four times.   Resistance exercises - Wrap either a towel or belt around your big toe and use it to pull your big toe toward you while simultaneously pushing forward, against the towel, with your big toe.   Ball roll - To massage the bottom of your foot, sit down, place a golf ball on the floor under your foot, and roll it around under your foot for two minutes. This can help relieve foot strain and cramping.   Towel curls - You can strengthen your toes by spreading out a small towel on the floor, curling your toes around it, and pulling it toward you. Repeat five times. Gripping objects with your toes like this can help keep your foot flexible.   Picking up marbles - Another gripping exercise you can perform to keep your foot flexible is picking up marbles with your toes. Do this by placing 20 marbles on the floor in front of you and use your foot to pick the marbles up one by one and place them in a bowl.   Walking along the beach - Whenever possible, spend time walking on sand. This can give you a gentle foot massage and also help strengthen your toes. This is especially beneficial for people who have arthritis associated with their bunions.    EDEMA (SWELLING)  Swollen ankles and swollen feet are common and usually not cause for concern, particularly if you have been standing or walking a lot. But feet and ankles that stay swollen or are accompanied by other symptoms could signal a serious health problem.     1. Pregnancy complications: Some swelling of the ankles and feet is normal during pregnancy. Sudden or excessive swelling, however, may be a sign of preeclampsia, a serious condition in which high blood pressure and protein in the urine develop after the 20th week of pregnancy. If you experience severe swelling or swelling accompanied by other symptoms such as abdominal pain, headaches, infrequent urination, nausea and  vomiting, or vision changes, call your doctor immediately.    2. Foot or ankle injury or surgery: An injury to the foot or ankle can lead to swelling. The most common is a sprained ankle, which occurs when an injury or misstep causes the ligaments that hold the ankle in place to be stretched beyond their normal range. To reduce the swelling from a foot or ankle injury, rest to avoid walking on the injured ankle or foot, use ice packs, wrap the foot or ankle with compression bandage, and elevate the foot on a stool or pillow.    3. Lymphedema: This is a collection of lymphatic fluid in the tissues that can develop because of the absence of or problems with the lymph vessels or after the removal of lymph nodes. Lymph is a protein-rich fluid that normally travels along an extensive network of vessels and capillaries. It is filtered through the lymph nodes, which trap and destroy unwanted substances, such as bacteria. When there is a problem with the vessels or lymph nodes, however, the fluid's movement can be blocked. Untreated, lymph buildup can impair wound healing and lead to infection and deformity. Lymphedema is common following radiation therapy or removal of the lymph nodes in patients with cancer. Can also be due to certain conditions such as spina bifida, heart disease, kidney disease, liver disease, obesity, or even from medications.    4. Venous insufficiency (Moste Common): Swelling of the ankles and feet is often an early symptom of venous insufficiency, a condition in which blood inadequately moves up the veins from the legs and feet up to the heart. Normally, the veins keep blood flowing upward with one-way valves. When these valves become damaged or weakened, the blood leaks back down the vessels and fluid is retained in the soft tissue of the lower legs, especially the ankles and feet. Chronic venous insufficiency can lead to skin changes, skin ulcers, and infection.    TREATMENT  1.  Compression -  Either with compression stockings, ace bandages, wraps, or leg pumps. Compression is the most important treatment and usually the 1st step.   2.  Thermal ablation - This is done by a vascular surgeon to the veins.  3.  Phlebectomy - Surgical removal of the veins or varicosities.  Again done by a vascular surgeon.  4.  Scleropathy - Laser removal of veins (also done by vascular surgeon).    PREVENTION  1.  Exercise to improve circulation and fluid distribution.  2.  Eat a healthy diet; too much salt may cause fluid retention, hypertension and swelling.  3.  Interrupt sitting or standing several times a day and elevate the feet and ankles above the heart  4. Lose excess weight to retain less fluid and decrease pressure on muscles and joints  5. Consider using support stockings or hose   6. Examine prescription and other medications; consult with the doctor if medication may be responsible for fluid retention   7. Avoid smoking, alcohol and other substances that can lead to underlying causes of swelling     GETTING READY FOR YOUR SURGERY  ONE-THREE WEEKS BEFORE  1. See your Family Doctor or Primary Care Doctor for a History and Physical. If you do not, we may need to change the date of your surgery.  2. Please see pre-surgical medications below to which medications need to be stopped before surgery and when.    TEN OR MORE DAYS BEFORE    1. Prospect with the hospital. (For [facility])      By Phone: [phone].      By Internet: [email or web address]. Choose [facility].      If you do not register by phone or online, we will call to help you register.    SAME DAY SURGERY PATIENTS  1. You will need a family member of friend to drive you home. If you do not have one the surgery will be cancelled or rescheduled.  2. You will need a responsible adult to stay with you that night after the surgery.       We will ask this person to listen to some instructions before you leave the hospital.  * If your child  is having surgery, and you would like a tour of the hospital, please call: [phone].      DAY BEFORE SURGERY  1. DO NOT EAT OR DRINK ANYTHING AFTER MIDNIGHT THE NIGHT BEFORE YOUR SURGERY!   2. DO NOT DRINK ALCOHOL.  3. Do not take over the counter drugs.  4. Some people need to have blood tests at the hospital. If you need blood tests, we will tell you in advance.  5. Take medications as directed by your doctor. You may take these with a small amount of water.  6. Do not chew gum, chew tobacco, or suck on hard candy the day of surgery.  7. Bring your insurance cards, a list of your medicines and co-pays you might need. Leave jewelry and other valuables at home.  8. If you received papers at your doctor's office, bring these with you to the surgery.    If you have questions about these instructions, please call: [phone]  Ask to speak with a pre-admitting nurse.    PRESURGICAL MEDICATIONS:  Certain prescription, over-the-counter, and herbal medications interfere with healing after an operation. The main concern relates to medications that increase bleeding at the surgical site. Excess blood under the incision results in poor wound healing, excess pain, increased scarring, and a higher risk of infection.    Some medications slow the healing process of bone. Medications can also interfere with the anesthesia drugs that keep you asleep during the operation. It is important to ensure that these medications are out of your system prior to the operation. The list below details a number of medications that are of concern. Pay special attention to how long you should avoid these medications before your operation. Please note that this list is not complete. You should ask your surgeon or pharmacist if you are uncertain about other medications. Any herbal supplement not listed should be discontinued at least one week prior to surgery.    Aspirin: Hold for one week prior to surgery and restart the day after surgery.  This over the counter medication promotes bleeding.    Motrin / Ibuprofen / Aleve / Advil / NSAIDS:  Stop one week prior to surgery. These medications affect bleeding and may cause delay in bone healing. Avoid taking these medications for six weeks after bone surgeries. Other procedures may allow you to restart sooner than 6 weeks after surgery.    Coumadin / Plavix: Your primary care provider will manage Coumadin in relation to surgery. Coumadin may result in excessive bleeding and may be adjusted before and after surgery.    Enbriel: Stop two weeks prior to surgery and restart two weeks after surgery. This medication can effect soft tissue healing and increases the risk of infection.    Remicade: Stop 8-12 weeks before surgery and restart two weeks after surgery. This medication can affect soft tissue healing and increases the risk of infection.    Humira: Stop 4 weeks before surgery and restart two weeks after surgery. This medication can affect soft tissue healing and increases the risk of infection.    Methotrexate: Stop one dose prior to surgery. This medication will be restarted when the wound appears to be healing well. Please ask your surgeon about restarting this medication when you are being seen in the office for wound checks.    Kava: Stop at least one day prior to surgery and may restart one day after surgery. Kava may increase the sedative effect of anesthetics that are given during the operation. Kava can also increase bleeding at the surgical site.    Ephedra (anatoliy hassan): Stop at least one day prior to surgery and may restart one day after surgery.  Ephedra may increase the risk of heart attack and stroke. This medication can also increase bleeding at the surgical site.    Brightwaters's Wort: Stop at least five days before surgery and may restart one day after surgery. Mehdi's wort may diminish the effects of several drugs that are given during surgery.    Ginseng: Stop at least one week prior to  surgery and may restart one day after surgery.  Ginseng lowers blood sugar and may increase bleeding at the surgical site.    Ginkgo: Stop 36 hours before surgery and may restart one day after surgery. Ginkgo may increase bleeding at the surgical site.    Garlic: Stop at least one week prior to surgery and may restart one day after. Garlic may increase bleeding at the surgery site.    Valerian: Do a slow steady decrease in your daily dose over a period of 2-3 weeks before surgery to decrease the chance of withdrawal symptoms. Valerian may increase the sedative effect of anesthetics given during the operation.    Echinacea: There is no data on stopping echinacea prior to surgery. This medication though can be associated with allergic reactions and suppression of your immune system.    Vitamin E, Omega-3, Flax, Fish Oil, Glucosamine and Chondroitin: Stop 2 weeks prior to surgery and may restart one day after. These herbal medications can increase risk of bleeding at surgical site.      POST OPERATIVE HOME CARE INSTRUCTIONS  Activities: You should rest today. Stay off your feet as much as possible and keep your foot elevated above the level of your heart (about two pillow height). Wear your surgical shoe at all times when up. Limit walking to 5 to 10 minutes per hour over the next few days if your doctor has previously told you that you can put some weight on the foot after surgery, although limit the weight to your heel. If you are supposed to be non-weight bearing, that means NO WEIGHT AT ALL ON THE FOOT. Use an ice pack on the ankle while awake 20 minutes per hour to help decrease pain and swelling.     Discomfort: If a prescription for pain was given, take as directed. If no pain medication was ordered, you may take a non-prescription, non-aspirin pain medication. If the pain is not relieved by pain medication, call the clinic.     Incision and Dressing: Your surgical dressing is a sterile dressing and should be  left in place until removed by your physician. Keep the dressing dry by covering it with a plastic bag for showers, taking baths with the surgical foot out of the tub, or sponge bathing. Some bleeding on the dressing should be expected. If however, you notice active or excessive bleeding or a temperature over 100 degrees by mouth, call the clinic. Do not change dressing by yourself.  If the dressing becomes wet or dirty, please call the clinic as it may need a new sterile dressing applied. You may start getting the foot wet after the stitches are removed.     Do not wear regular shoes with a surgical bandage and/or external pins in your foot. Wear loose fitting clothing that easily will slip over the bandage and/or pins. Do not cover your surgical foot with blankets as they may damage the dressing/pins. Also, remember that dogs are not aware of your surgery. Please keep them away from the bandage/pins.   If your surgeon places external pins in your foot, you must keep the foot dry until the pins are removed at 6-8 weeks after the surgery. Pins should be covered with a dressing for protection. You should examine the pins and your skin often. Check for any spreading redness or yellow drainage from the pin areas. Do not apply ointment around the pins. Do not push a loose pin back into your foot. Please call the clinic if the pin is spinning or moving in and out. If the pins are bumped or loosened they may need to be removed early. This may affect your surgical outcome.   Please call the clinic if you feel there is a problem with your pins and/or surgical bandage.    TIPS FOR SUCCESSFUL HEALING  How you care for the surgical site is critically important to achieve a successful result after surgery. Avoidance of injury, infection, excess swelling, scar tissue and stiffness are highly dependent on the care you provide over the next six weeks. Please do not hesitate to call if you have questions or concerns.   Your foot  requires significant rest and elevation. Sitting for long hours with your foot elevated, however, will create its own problems. Expect muscle aches, back pain, cramps, etc. Optimal posture, lumbar support, back exercises, ice and heat may all help with your new aches and pains. Do not apply a heating pad to your foot or leg as this can cause increase swelling and pain. Rather use ice in those areas.   Pain medications cause drowsiness. You may frequently sleep during the day and then have trouble sleeping at night. Over the counter sleep aids might be more effective than narcotic pain medication to achieve a reasonable night's sleep.    Narcotic pain medications and inactivity lead to constipation. Limiting use of narcotics will help minimize this problem. The pain medications will not completely alleviate your pain. The purpose of pain pills is to take the edge off and help you get through the first few days. You can substitute Extra Strength Tylenol if pain is mild. Please note that narcotic pain pills usually contain acetaminophen (the active ingredient in Tylenol) so be careful to avoid the maximum dose of acetaminophen. You should take measures to avoid constipation by drinking plenty of water, eating lots of fruit and vegetables and taking the recommended dose of Metamucil or a similar fiber supplement. These measures should be continued for as long as you require narcotic pain medications and are inactive.     Showering is a major challenge. Your incision requires about three days to become sealed from water. Your bandage should not get wet and should not be removed. Do not attempt showering for the first three days. A sponge bath is preferred. You may attempt to shower on the fourth day after the operation. Your foot should be covered with a bag, tape and rubber bands. Double bagging is preferred. Standing in the shower with a bag on your foot is quite hazardous. A portable shower stool would be ideal. The  bandage will need to be changed in the office if it becomes moistened. A moist bandage will not dry on its own. A moist dressing may lead to infection.   Stiffness will develop after any operation due to scarring. The scar tissue begins to form immediately after the surgery. Inactivity can cause excess stiffness and may lead to blood clots in your legs. Frequent range of motion exercises will help decrease stiffness, blood clots, scar tissue and adhesions. Please call if you are unsure about these recommendations.   Good luck and best wishes on a prompt recovery. Healing is slow but an important step in your recovery. You are in control of the final result. Please use this time wisely. Please do not hesitate to call if you have questions, concerns or comments.    * If you have any post-operative questions or concerns regarding your procedure, call our triage team at the [facility] at [phone].     GETTING READY FOR YOUR SURGERY  ONE-THREE WEEKS BEFORE  1. See your Family Doctor or Primary Care Doctor for a History and Physical. If you do not, we may need to change the date of your surgery.  2. Please see pre-surgical medications below to which medications need to be stopped before surgery and when.    TEN OR MORE DAYS BEFORE    1. [place] with the hospital. (For [facility])      By Phone: [phone].      By Internet: [email or web address]. Choose [facility].      If you do not register by phone or online, we will call to help you register.    SAME DAY SURGERY PATIENTS  1. You will need a family member of friend to drive you home. If you do not have one the surgery will be cancelled or rescheduled.  2. You will need a responsible adult to stay with you that night after the surgery.       We will ask this person to listen to some instructions before you leave the hospital.  * If your child is having surgery, and you would like a tour of the hospital, please call:  [phone].      DAY BEFORE SURGERY  1. DO NOT EAT OR DRINK ANYTHING AFTER MIDNIGHT THE NIGHT BEFORE YOUR SURGERY!   2. DO NOT DRINK ALCOHOL.  3. Do not take over the counter drugs.  4. Some people need to have blood tests at the hospital. If you need blood tests, we will tell you in advance.  5. Take medications as directed by your doctor. You may take these with a small amount of water.  6. Do not chew gum, chew tobacco, or suck on hard candy the day of surgery.  7. Bring your insurance cards, a list of your medicines and co-pays you might need. Leave jewelry and other valuables at home.  8. If you received papers at your doctor's office, bring these with you to the surgery.    If you have questions about these instructions, please call: [phone]  Ask to speak with a pre-admitting nurse.    PRESURGICAL MEDICATIONS:  Certain prescription, over-the-counter, and herbal medications interfere with healing after an operation. The main concern relates to medications that increase bleeding at the surgical site. Excess blood under the incision results in poor wound healing, excess pain, increased scarring, and a higher risk of infection.    Some medications slow the healing process of bone. Medications can also interfere with the anesthesia drugs that keep you asleep during the operation. It is important to ensure that these medications are out of your system prior to the operation. The list below details a number of medications that are of concern. Pay special attention to how long you should avoid these medications before your operation. Please note that this list is not complete. You should ask your surgeon or pharmacist if you are uncertain about other medications. Any herbal supplement not listed should be discontinued at least one week prior to surgery.    Aspirin: Hold for one week prior to surgery and restart the day after surgery. This over the counter medication promotes bleeding.    Motrin / Ibuprofen /  Aleve / Advil / NSAIDS:  Stop one week prior to surgery. These medications affect bleeding and may cause delay in bone healing. Avoid taking these medications for six weeks after bone surgeries. Other procedures may allow you to restart sooner than 6 weeks after surgery.    Coumadin / Plavix: Your primary care provider will manage Coumadin in relation to surgery. Coumadin may result in excessive bleeding and may be adjusted before and after surgery.    Enbriel: Stop two weeks prior to surgery and restart two weeks after surgery. This medication can effect soft tissue healing and increases the risk of infection.    Remicade: Stop 8-12 weeks before surgery and restart two weeks after surgery. This medication can affect soft tissue healing and increases the risk of infection.    Humira: Stop 4 weeks before surgery and restart two weeks after surgery. This medication can affect soft tissue healing and increases the risk of infection.    Methotrexate: Stop one dose prior to surgery. This medication will be restarted when the wound appears to be healing well. Please ask your surgeon about restarting this medication when you are being seen in the office for wound checks.    Kava: Stop at least one day prior to surgery and may restart one day after surgery. Kava may increase the sedative effect of anesthetics that are given during the operation. Kava can also increase bleeding at the surgical site.    Ephedra (ma hassan): Stop at least one day prior to surgery and may restart one day after surgery.  Ephedra may increase the risk of heart attack and stroke. This medication can also increase bleeding at the surgical site.    Mehdi's Wort: Stop at least five days before surgery and may restart one day after surgery. Toftrees's wort may diminish the effects of several drugs that are given during surgery.    Ginseng: Stop at least one week prior to surgery and may restart one day after surgery.  Ginseng lowers blood sugar and  may increase bleeding at the surgical site.    Ginkgo: Stop 36 hours before surgery and may restart one day after surgery. Ginkgo may increase bleeding at the surgical site.    Garlic: Stop at least one week prior to surgery and may restart one day after. Garlic may increase bleeding at the surgery site.    Valerian: Do a slow steady decrease in your daily dose over a period of 2-3 weeks before surgery to decrease the chance of withdrawal symptoms. Valerian may increase the sedative effect of anesthetics given during the operation.    Echinacea: There is no data on stopping echinacea prior to surgery. This medication though can be associated with allergic reactions and suppression of your immune system.    Vitamin E, Omega-3, Flax, Fish Oil, Glucosamine and Chondroitin: Stop 2 weeks prior to surgery and may restart one day after. These herbal medications can increase risk of bleeding at surgical site.     POTENTIAL COMPLICATIONS OF FOOT & ANKLE SURGERY  Undergoing a surgical procedure involves a certain amount of risk. Risks of complications vary depending on the complexity of the surgery and how you take care of the surgical area during the healing process. Complications can range from minor infection to death. Some complications are temporary while others will be permanent.  Your surgeon weighs the risk of complications vs the potential benefit of undergoing surgery. You need to consider your tolerance for unexpected problems as you decide whether to undergo surgery.    Foot and Ankle surgery involves cutting skin, bone, ligaments, blood vessels and joints.  These structures heal well but not without consequence. Any break to the skin can lead to infection. A deep infection involves bones or joints which can be devastating. Deep infection can lead to amputation or could spread to other parts of your body. Most infections are minor and easily treated with oral antibiotics. Infections are often times from bacteria  already present on your skin. Proper care of the surgical site is an essential component of avoiding infection. Do not get the bandage wet and take proper care of external pins to avoid these problems.     Joint stiffness is inherent to any foot or ankle surgery. Joint surgery is a major component of reconstructive foot and ankle procedures. The ligaments and tissues around the joint are cut, and later repaired. Scare tissue limits joint mobility. This can be permanent but generally improves over the course of one year.    Surgery involves dissection around nerves. Visible nerves are moved out of the way while very small nerves are cut. Scar tissue develops around nerves and can lead to nerve pain, numbness, or neuromas. Nerve symptoms can be permanent. This can lead to numbness or sometimes hypersensitivity to touch and problems wearing shoes.    Bones do not always heal after surgery. Poor healing after a bone cut or joint fusion can lead to an extended period of casting or repeat surgery. Electronic bone stimulators are sometimes used to stimulate poor healing of bone. Nonunion is when joint fusion does not take.  This can occur as often as 10% of the time. Smoking doubles your risk of poor bone healing to 20%.    Bone grafting is sometimes necessary during the original or subsequent surgery. Bone is sometimes taken from other parts of your body or freeze dried bone from a bone bank from a bone bank or synthetic bone material might be used.    A scar is always present after foot and ankle surgery. The scar will be visible and could be sensitive. Some people develop excessive scarring, which cannot be controlled by the surgeon. Scars can be unsightly and can restrict joint mobility.    Blood clots can develop in the calf after surgery. Foot and ankle surgery is a predisposing factor for blood clots. The blood clot could break and travel to your lung.  This condition can lead to death. Early warning signs could  include calf swelling and pain, chest pain or shortness of breath. This is an emergency that requires immediate attention by a medical doctor!    Surgery will not necessarily create a pain-free foot. Even normal feet hurt. Crooked toes, bunions, neuromas, flat feet and arthritis should all be considered permanent conditions.  Ankle pain commonly requires multiple surgeries over a lifetime. Do not assume that having surgery will permanently fix your condition. You may need permanent alteration in shoes and activities to accommodate your foot and ankle problem.    Careful attention to post-operative recommendations will dramatically reduce your risk of complications. Proper dressing, wound care, elevation and rest will be essential to get the wound healed and minimize scarring. Strict attention to activity restrictions, such as non-weight bearing, or partial weight bearing is essential. Internal fixation devices may not resist the stress of walking. Some select surgeries allow the patient to walk, however this should be very minimal.    Despite these concerns, foot and ankle surgery leads to a high level of patient satisfaction. Your surgeon would not recommend surgery if he/she did not expect your foot to improve. Talk to your surgeon about any of the above issues.    POST OPERATIVE HOME CARE INSTRUCTIONS  Activities: You should rest today. Stay off your feet as much as possible and keep your foot elevated above the level of your heart (about two pillow height). Wear your surgical shoe at all times when up. Limit walking to 5 to 10 minutes per hour over the next few days if your doctor has previously told you that you can put some weight on the foot after surgery, although limit the weight to your heel. If you are supposed to be non-weight bearing, that means NO WEIGHT AT ALL ON THE FOOT. Use an ice pack on the ankle while awake 20 minutes per hour to help decrease pain and swelling.     Discomfort: If a prescription  for pain was given, take as directed. If no pain medication was ordered, you may take a non-prescription, non-aspirin pain medication. If the pain is not relieved by pain medication, call the clinic.     Incision and Dressing: Your surgical dressing is a sterile dressing and should be left in place until removed by your physician. Keep the dressing dry by covering it with a plastic bag for showers, taking baths with the surgical foot out of the tub, or sponge bathing. Some bleeding on the dressing should be expected. If however, you notice active or excessive bleeding or a temperature over 100 degrees by mouth, call the clinic. Do not change dressing by yourself.  If the dressing becomes wet or dirty, please call the clinic as it may need a new sterile dressing applied. You may start getting the foot wet after the stitches are removed.     Do not wear regular shoes with a surgical bandage and/or external pins in your foot. Wear loose fitting clothing that easily will slip over the bandage and/or pins. Do not cover your surgical foot with blankets as they may damage the dressing/pins. Also, remember that dogs are not aware of your surgery. Please keep them away from the bandage/pins.   If your surgeon places external pins in your foot, you must keep the foot dry until the pins are removed at 6-8 weeks after the surgery. Pins should be covered with a dressing for protection. You should examine the pins and your skin often. Check for any spreading redness or yellow drainage from the pin areas. Do not apply ointment around the pins. Do not push a loose pin back into your foot. Please call the clinic if the pin is spinning or moving in and out. If the pins are bumped or loosened they may need to be removed early. This may affect your surgical outcome.   Please call the clinic if you feel there is a problem with your pins and/or surgical bandage.    TIPS FOR SUCCESSFUL HEALING  How you care for the surgical site is  critically important to achieve a successful result after surgery. Avoidance of injury, infection, excess swelling, scar tissue and stiffness are highly dependent on the care you provide over the next six weeks. Please do not hesitate to call if you have questions or concerns.   Your foot requires significant rest and elevation. Sitting for long hours with your foot elevated, however, will create its own problems. Expect muscle aches, back pain, cramps, etc. Optimal posture, lumbar support, back exercises, ice and heat may all help with your new aches and pains. Do not apply a heating pad to your foot or leg as this can cause increase swelling and pain. Rather use ice in those areas.   Pain medications cause drowsiness. You may frequently sleep during the day and then have trouble sleeping at night. Over the counter sleep aids might be more effective than narcotic pain medication to achieve a reasonable night's sleep.    Narcotic pain medications and inactivity lead to constipation. Limiting use of narcotics will help minimize this problem. The pain medications will not completely alleviate your pain. The purpose of pain pills is to take the edge off and help you get through the first few days. You can substitute Extra Strength Tylenol if pain is mild. Please note that narcotic pain pills usually contain acetaminophen (the active ingredient in Tylenol) so be careful to avoid the maximum dose of acetaminophen. You should take measures to avoid constipation by drinking plenty of water, eating lots of fruit and vegetables and taking the recommended dose of Metamucil or a similar fiber supplement. These measures should be continued for as long as you require narcotic pain medications and are inactive.     Showering is a major challenge. Your incision requires about three days to become sealed from water. Your bandage should not get wet and should not be removed. Do not attempt showering for the first three days. A  sponge bath is preferred. You may attempt to shower on the fourth day after the operation. Your foot should be covered with a bag, tape and rubber bands. Double bagging is preferred. Standing in the shower with a bag on your foot is quite hazardous. A portable shower stool would be ideal. The bandage will need to be changed in the office if it becomes moistened. A moist bandage will not dry on its own. A moist dressing may lead to infection.   Stiffness will develop after any operation due to scarring. The scar tissue begins to form immediately after the surgery. Inactivity can cause excess stiffness and may lead to blood clots in your legs. Frequent range of motion exercises will help decrease stiffness, blood clots, scar tissue and adhesions. Please call if you are unsure about these recommendations.   Good luck and best wishes on a prompt recovery. Healing is slow but an important step in your recovery. You are in control of the final result. Please use this time wisely. Please do not hesitate to call if you have questions, concerns or comments.    * If you have any post-operative questions or concerns regarding your procedure, call our triage team at the [facility] at [phone] (option [phone]).

## 2024-05-15 NOTE — LETTER
5/15/2024         RE: Cayetano Lunsford  [address]  [facility] [address]        Dear Colleague,    Thank you for referring your patient, Cayetano Lunsford, to the [facility]. Please see a copy of my visit note below.    PATIENT HISTORY:  Dr. Jodi Espinal requested I see this patient for their foot issue.  Cayetano Lunsford is a 79 year old male who presents to clinic for pain to both feet, bunions, swelling to both legs and hip pain.  Notes that it for years.  It is getting harder for him to the cane.  States that he gets aching throughout the whole foot and he is concerned it is the bunion causing the pain and the swelling to the legs and hip pain.  Pain can be 8 out of 10 at its worst.  Worse with weightbearing.  Wondering what can be done for it.    Review of Systems:  Patient denies fever, chills, rash, wound, numbness, weakness, heart burn, blood in stool, chest pain with activity, calf pain when walking, shortness of breath with activity, chronic cough, easy bleeding/bruising, swelling of ankles, excessive thirst, fatigue, depression, anxiety.  Patient admits to limping, stiffness.     PAST MEDICAL HISTORY:   Past Medical History:   Diagnosis Date     Allergic rhinitis, cause unspecified      Atrial fibrillation (H)      BPH (benign prostatic hyperplasia)      Chronic airway obstruction, not elsewhere classified      COPD (chronic obstructive pulmonary disease) (H)      Hyperlipidemia LDL goal <100 5/9/2010     Hypertension goal BP (blood pressure) < 130/80 10/19/2006     Obesity (BMI 30-39.9)      Perforation of tympanic membrane, unspecified     Right TM     Tachycardia, unspecified      Type 2 diabetes, HbA1C goal < 8% (H) 5/2/2010     Ulcerative colitis (H)      Unspecified cardiovascular disease         PAST SURGICAL HISTORY:   Past Surgical History:   Procedure Laterality Date     CARDIAC SURGERY       COLONOSCOPY  3/31/2011     COLONOSCOPY N/A  Patient states he does not want an IV or labs drawn, Dr. Zepeda a bedside to discuss with him.    Patient subsequently up to restroom, but unable to provide urine sample at this time.      Patient back to room with PO fluids at bedside.     5/25/2018    Procedure: COMBINED COLONOSCOPY, SINGLE OR MULTIPLE BIOPSY/POLYPECTOMY BY BIOPSY;;  Surgeon: Juan Simon DO;  Location:  GI     COLONOSCOPY N/A 9/6/2019    Procedure: COLONOSCOPY, WITH POLYPECTOMY AND BIOPSY;  Surgeon: Paradise Mims MD;  Location:  GI     COLONOSCOPY N/A 7/8/2022    Procedure: COLONOSCOPY, FLEXIBLE, WITH LESION REMOVAL USING SNARE;  Surgeon: Juan Simon DO;  Location:  GI     COLONOSCOPY N/A 7/8/2022    Procedure: COLONOSCOPY, WITH POLYPECTOMY AND BIOPSY;  Surgeon: Juan Simon DO;  Location: [facility]     CORONARY ANGIOGRAPHY ADULT ORDER  2001 and 2008 2001 at [facility]. 2008- No interventions     ESOPHAGOSCOPY, GASTROSCOPY, DUODENOSCOPY (EGD), COMBINED N/A 7/8/2022    Procedure: ESOPHAGOGASTRODUODENOSCOPY, WITH BIOPSY;  Surgeon: Juan Simon DO;  Location: [facility]     HERNIA REPAIR      left inguinal     SURGICAL HISTORY OF -   1987    right ear graft     SURGICAL HISTORY OF -   1992    right shoulder surgery     SURGICAL HISTORY OF -   1979    back surgery     SURGICAL HISTORY OF -   1978    vasectomy     ZZHC REMOVAL OF NAIL PLATE SIMPLE SINGLE  11/10/2011    Right 2nd Toenail        MEDICATIONS:   Current Outpatient Medications:      acetaminophen (TYLENOL) 325 MG tablet, Take 325-650 mg by mouth every 6 hours as needed for mild pain, Disp: , Rfl:      albuterol (PROAIR HFA/PROVENTIL HFA/VENTOLIN HFA) 108 (90 BASE) MCG/ACT Inhaler, Inhale 1-2 puffs into the lungs every 4 hours as needed (for shortness of breath/wheezing), Disp: 1 Inhaler, Rfl: 5     amLODIPine (NORVASC) 5 MG tablet, Take 1 tablet (5 mg) by mouth daily, Disp: 90 tablet, Rfl: 1     atorvastatin (LIPITOR) 20 MG tablet, Take 1 tablet (20 mg) by mouth daily, Disp: 90 tablet, Rfl: 2     blood glucose (ONETOUCH ULTRA) test strip, USE TO TEST BLOOD SUGAR THREE TIMES A DAY OR AS DIRECTED, Disp: 300 strip, Rfl: 3     chlorthalidone (HYGROTON) 25 MG tablet, Take 1 tablet (25 mg) by  "mouth daily, Disp: 90 tablet, Rfl: 3     ciclopirox (PENLAC) 8 % external solution, Apply to adjacent skin and affected nails daily.  Remove with alcohol every 7 days, then repeat., Disp: 6.6 mL, Rfl: 11     cyanocobalamin (VITAMIN B-12) 1000 MCG tablet, Take 1,000 mcg by mouth daily, Disp: , Rfl:      ferrous sulfate (FEROSUL) 325 (65 Fe) MG tablet, Take 1 tablet (325 mg) by mouth daily (with breakfast), Disp: , Rfl:      fexofenadine (ALLEGRA) 180 MG tablet, Take 180 mg by mouth daily, Disp: , Rfl:      fluticasone (FLONASE) 50 MCG/ACT nasal spray, Spray 1 spray into both nostrils daily, Disp: , Rfl:      folic acid 0.8 MG CAPS, Take 1 tablet by mouth daily, Disp: 90 capsule, Rfl: 3     furosemide (LASIX) 20 MG tablet, TAKE 1 TABLET (20 MG) BY MOUTH AS NEEDED FOR LEG SWELLING, Disp: 90 tablet, Rfl: 3     insulin regular (NOVOLIN R VIAL) 100 UNIT/ML vial, INJECT 2 UNITS AS NEEDED WHEN BLOOD GLUCOSE IS >200 MG/DL., Disp: 30 mL, Rfl: 1     insulin syringe-needle U-100 (BD INSULIN SYRINGE ULTRAFINE) 31G X 5/16\" 0.3 ML miscellaneous, USE 3 SYRINGES DAILY OR AS DIRECTED., Disp: 300 each, Rfl: 3     ipratropium - albuterol 0.5 mg/2.5 mg/3 mL (DUONEB) 0.5-2.5 (3) MG/3ML neb solution, NEBULIZE CONTENTS OF ONE VIAL (3 MLS) EVERY 4 HOURS AS NEEDED FOR SHORTNESS OF BREATH OR DYSPNEA, Disp: 360 mL, Rfl: 1     metFORMIN (GLUCOPHAGE) 1000 MG tablet, TAKE ONE TABLET BY MOUTH TWICE A DAY WITH BREAKFAST  AND DINNER, Disp: 180 tablet, Rfl: 1     mupirocin (BACTROBAN) 2 % external ointment, APPLY TOPICALLY 2 TIMES DAILY AS NEEDED (PILONIDAL CYST), Disp: 22 g, Rfl: 1     pantoprazole (PROTONIX) 40 MG enteric coated tablet, Take 40 mg by mouth daily Started by Dr. Debbie Rico at [facility], Disp: , Rfl:      primidone (MYSOLINE) 50 MG tablet, 4 tablets in am, 4 tablets afternoon, and 3 tablets every evening, Disp: 1001 tablet, Rfl: 1     Probiotic Product (FLORAJEN3) CAPS, Take 1 capsule by mouth 2 times daily, Disp: 60 capsule, Rfl: " 0     propafenone (RYTHMOL) 150 MG TABS tablet, Take 1 tablet (150 mg) by mouth 2 times daily, Disp: 180 tablet, Rfl: 3     propranolol (INDERAL) 60 MG tablet, Take 2 tablets (120 mg) by mouth 2 times daily, Disp: 360 tablet, Rfl: 4     sulfaSALAzine (AZULFIDINE) 500 MG tablet, TAKE ONE TABLET BY MOUTH THREE TIMES A DAY, Disp: 90 tablet, Rfl: 11     tamsulosin (FLOMAX) 0.4 MG capsule, TAKE ONE CAPSULE BY MOUTH ONCE DAILY, Disp: 90 capsule, Rfl: 3     tiotropium-olodaterol 2.5-2.5 MCG/ACT AERS, Inhale 2 puffs into the lungs daily, Disp: , Rfl:      Vitamin D3 (CHOLECALCIFEROL) 25 mcg (1000 units) tablet, Take 2 tablets (50 mcg) by mouth daily, Disp: , Rfl:      warfarin ANTICOAGULANT (JANTOVEN ANTICOAGULANT) 2.5 MG tablet, Take 5mg every Mon & Fri / Take 6.25mg all other days OR per INR clinic, Disp: 210 tablet, Rfl: 1     ALLERGIES:    Allergies   Allergen Reactions     Percodan [Oxycodone-Aspirin] Nausea and Vomiting     Aspirin         SOCIAL HISTORY:   Social History     Socioeconomic History     Marital status:      Spouse name: Izabel     Number of children: 2     Years of education: 12     Highest education level: Not on file   Occupational History     Occupation:      Employer: RETIRED   Tobacco Use     Smoking status: Former     Current packs/day: 0.00     Average packs/day: 1 pack/day for 30.0 years (30.0 ttl pk-yrs)     Types: Cigarettes     Start date: 3/19/1962     Quit date: 3/19/1992     Years since quittin.1     Smokeless tobacco: Never   Vaping Use     Vaping status: Never Used   Substance and Sexual Activity     Alcohol use: Not Currently     Alcohol/week: 0.0 standard drinks of alcohol     Drug use: No     Sexual activity: Yes     Partners: Female   Other Topics Concern     Parent/sibling w/ CABG, MI or angioplasty before 65F 55M? No      Service Not Asked     Blood Transfusions Not Asked     Caffeine Concern No     Comment: No Caffeine      Occupational Exposure Not Asked     Hobby Hazards Not Asked     Sleep Concern Not Asked     Stress Concern Not Asked     Weight Concern Not Asked     Special Diet Not Asked     Back Care Not Asked     Exercise Yes     Comment: 20 minutes - Walking- summer 2 x day     Bike Helmet Not Asked     Seat Belt Not Asked     Self-Exams Not Asked   Social History Narrative     Not on file     Social Determinants of Health     Financial Resource Strain: Low Risk  (5/1/2024)    Financial Resource Strain      Within the past 12 months, have you or your family members you live with been unable to get utilities (heat, electricity) when it was really needed?: No   Food Insecurity: Low Risk  (5/1/2024)    Food Insecurity      Within the past 12 months, did you worry that your food would run out before you got money to buy more?: No      Within the past 12 months, did the food you bought just not last and you didn t have money to get more?: No   Transportation Needs: Low Risk  (5/1/2024)    Transportation Needs      Within the past 12 months, has lack of transportation kept you from medical appointments, getting your medicines, non-medical meetings or appointments, work, or from getting things that you need?: No   Physical Activity: Insufficiently Active (5/1/2024)    Exercise Vital Sign      Days of Exercise per Week: 3 days      Minutes of Exercise per Session: 20 min   Stress: No Stress Concern Present (5/1/2024)    [employer] - Occupational Stress Questionnaire      Feeling of Stress : Not at all   Social Connections: Socially Isolated (5/1/2024)    Social Connection and Isolation Panel [NHANES]      Frequency of Communication with Friends and Family: Twice a week      Frequency of Social Gatherings with Friends and Family: Twice a week      Attends Synagogue Services: Never      Active Member of Clubs or Organizations: No      Attends Club or Organization Meetings: Never      Marital Status:   "  Interpersonal Safety: Low Risk  (5/1/2024)    Interpersonal Safety      Do you feel physically and emotionally safe where you currently live?: Yes      Within the past 12 months, have you been hit, slapped, kicked or otherwise physically hurt by someone?: No      Within the past 12 months, have you been humiliated or emotionally abused in other ways by your partner or ex-partner?: No   Housing Stability: Low Risk  (5/1/2024)    Housing Stability      Do you have housing? : Yes      Are you worried about losing your housing?: No        FAMILY HISTORY:   Family History   Problem Relation Age of Onset     Circulatory Mother         blood clot in leg     Diabetes Mother         type 2     Allergies Mother      Arthritis Mother      Gastrointestinal Disease Mother      Neurologic Disorder Mother         Familiar tremors     Neurologic Disorder Father         brain tumor     Cerebrovascular Disease Paternal Grandfather      Hypertension Brother      Lipids Brother      Hypertension Sister      Diabetes Sister         Type 2     Allergies Sister      Lipids Sister      Glaucoma No family hx of      Macular Degeneration No family hx of         EXAM:Vitals: Ht 1.676 m (5' 6\")   BMI 25.60 kg/m    BMI= Body mass index is 25.6 kg/m .    A1C: 6.0 (2/8/2024)    General appearance: Patient is alert and fully cooperative with history & exam.  No sign of distress is noted during the visit.     Psychiatric: Affect is pleasant & appropriate.  Patient appears motivated to improve health.     Respiratory: Breathing is regular & unlabored while sitting.     HEENT: Hearing is intact to spoken word.  Speech is clear.  No gross evidence of visual impairment that would impact ambulation.     Dermatologic: Skin is intact to both lower extremities without significant lesions, rash or abrasion.  No paronychia or evidence of soft tissue infection is noted.     Vascular: DP & PT pulses are intact & regular bilaterally.  Moderate pitting edema " and varicosities noted.  CFT and skin temperature is normal to both lower extremities.     Neurologic: Lower extremity sensation is intact to light touch.  No evidence of weakness or contracture in the lower extremities.  No evidence of neuropathy.     Musculoskeletal: Patient is ambulatory without assistive device or brace.  Decreased arch height.  Prominent first metatarsal heads medially on both feet with lateral deviation of both great toes.    Radiographs: bilateral foot xrays -  I personally reviewed the xrays.  Increase in the first and second intermetatarsal angle with tibial sesamoid position of 7 for both feet.  Degenerative changes noted to the midfoot on both feet.  No fractures are noted.  Decreased calcaneal inclination angle bilaterally.     ASSESSMENT:    Bunion, right  Bunion, left  Foot pain, bilateral  Edema of both lower extremities due to peripheral venous insufficiency  Arthritis of both feet     Medical Decision Making/Plan:  Reviewed patient[facility] in [facility].  Reviewed and discussed x-rays.Reviewed and discussed causes of bunion with patient.  Explained that it is in large part due to a patients foot type and how they walk.   Discussed treatment options with patient including orthotics, splints, pads, and shoe gear.  We discussed that sometimes cortisone injections can help with the pain or physical therapy treatments such as ultrasound to help with pain but does not fix the problem.  Discussed that this is normally a structural issue in the foot and if conservative therapy doesn't work, surgery is considered.  Distal osteotomy versus fusion.  With a distal osteotomy procedure, patient is normally minimally weight bearing in a cam boot for 6 weeks.  With fusion, patient is normally non weight bearing for 6 weeks.  With any surgery, patient needs to take the first 2 weeks off work for icing and elevating and could possible due seated work only for the remaining 4 weeks after that.  We discussed  risks of surgery including infection, neuritis, non union, need for further surgery.    Discussed that he is not a great surgical candidate but sometimes we can go in and shave down the bone or shift the metatarsal head over to try to help with fitting with shoes a little better.  Also talked about shoes with a wider toe box and inserts to try to help with foot pain.  Discussed that correcting the bunion would not get rid of the hip pain.  Also discussed that it is not causing the swelling and he does have some arthritis in the feet so would not take away back pain either.  He is going to think about surgery and he will call if he would like to proceed.    Patient risk factor: Patient is at medium risk for infection.        Iza Biswas DPM, Podiatry/Foot and Ankle Surgery      Again, thank you for allowing me to participate in the care of your patient.        Sincerely,        Iza Biswas DPM, Podiatry/Foot and Ankle Surgery

## 2024-05-15 NOTE — PROGRESS NOTES
PATIENT HISTORY:  Dr. Jodi Espinal requested I see this patient for their foot issue.  Cayetano Lunsford is a 79 year old male who presents to clinic for pain to both feet, bunions, swelling to both legs and hip pain.  Notes that it for years.  It is getting harder for him to the cane.  States that he gets aching throughout the whole foot and he is concerned it is the bunion causing the pain and the swelling to the legs and hip pain.  Pain can be 8 out of 10 at its worst.  Worse with weightbearing.  Wondering what can be done for it.    Review of Systems:  Patient denies fever, chills, rash, wound, numbness, weakness, heart burn, blood in stool, chest pain with activity, calf pain when walking, shortness of breath with activity, chronic cough, easy bleeding/bruising, swelling of ankles, excessive thirst, fatigue, depression, anxiety.  Patient admits to limping, stiffness.     PAST MEDICAL HISTORY:   Past Medical History:   Diagnosis Date    Allergic rhinitis, cause unspecified     Atrial fibrillation (H)     BPH (benign prostatic hyperplasia)     Chronic airway obstruction, not elsewhere classified     COPD (chronic obstructive pulmonary disease) (H)     Hyperlipidemia LDL goal <100 5/9/2010    Hypertension goal BP (blood pressure) < 130/80 10/19/2006    Obesity (BMI 30-39.9)     Perforation of tympanic membrane, unspecified     Right TM    Tachycardia, unspecified     Type 2 diabetes, HbA1C goal < 8% (H) 5/2/2010    Ulcerative colitis (H)     Unspecified cardiovascular disease         PAST SURGICAL HISTORY:   Past Surgical History:   Procedure Laterality Date    CARDIAC SURGERY      COLONOSCOPY  3/31/2011    COLONOSCOPY N/A 5/25/2018    Procedure: COMBINED COLONOSCOPY, SINGLE OR MULTIPLE BIOPSY/POLYPECTOMY BY BIOPSY;;  Surgeon: Juan Simon DO;  Location:  GI    COLONOSCOPY N/A 9/6/2019    Procedure: COLONOSCOPY, WITH POLYPECTOMY AND BIOPSY;  Surgeon: Paradise Mims MD;  Location:  GI     COLONOSCOPY N/A 7/8/2022    Procedure: COLONOSCOPY, FLEXIBLE, WITH LESION REMOVAL USING SNARE;  Surgeon: Juan Simon DO;  Location:  GI    COLONOSCOPY N/A 7/8/2022    Procedure: COLONOSCOPY, WITH POLYPECTOMY AND BIOPSY;  Surgeon: Juan Simon DO;  Location:  GI    CORONARY ANGIOGRAPHY ADULT ORDER  2001 and 2008 2001 at [facility]. 2008- No interventions    ESOPHAGOSCOPY, GASTROSCOPY, DUODENOSCOPY (EGD), COMBINED N/A 7/8/2022    Procedure: ESOPHAGOGASTRODUODENOSCOPY, WITH BIOPSY;  Surgeon: Juan Simon DO;  Location:  GI    HERNIA REPAIR      left inguinal    SURGICAL HISTORY OF -   1987    right ear graft    SURGICAL HISTORY OF -   1992    right shoulder surgery    SURGICAL HISTORY OF -   1979    back surgery    SURGICAL HISTORY OF -   1978    vasectomy    ZZHC REMOVAL OF NAIL PLATE SIMPLE SINGLE  11/10/2011    Right 2nd Toenail        MEDICATIONS:   Current Outpatient Medications:     acetaminophen (TYLENOL) 325 MG tablet, Take 325-650 mg by mouth every 6 hours as needed for mild pain, Disp: , Rfl:     albuterol (PROAIR HFA/PROVENTIL HFA/VENTOLIN HFA) 108 (90 BASE) MCG/ACT Inhaler, Inhale 1-2 puffs into the lungs every 4 hours as needed (for shortness of breath/wheezing), Disp: 1 Inhaler, Rfl: 5    amLODIPine (NORVASC) 5 MG tablet, Take 1 tablet (5 mg) by mouth daily, Disp: 90 tablet, Rfl: 1    atorvastatin (LIPITOR) 20 MG tablet, Take 1 tablet (20 mg) by mouth daily, Disp: 90 tablet, Rfl: 2    blood glucose (ONETOUCH ULTRA) test strip, USE TO TEST BLOOD SUGAR THREE TIMES A DAY OR AS DIRECTED, Disp: 300 strip, Rfl: 3    chlorthalidone (HYGROTON) 25 MG tablet, Take 1 tablet (25 mg) by mouth daily, Disp: 90 tablet, Rfl: 3    ciclopirox (PENLAC) 8 % external solution, Apply to adjacent skin and affected nails daily.  Remove with alcohol every 7 days, then repeat., Disp: 6.6 mL, Rfl: 11    cyanocobalamin (VITAMIN B-12) 1000 MCG tablet, Take 1,000 mcg by mouth daily, Disp: , Rfl:     ferrous  "sulfate (FEROSUL) 325 (65 Fe) MG tablet, Take 1 tablet (325 mg) by mouth daily (with breakfast), Disp: , Rfl:     fexofenadine (ALLEGRA) 180 MG tablet, Take 180 mg by mouth daily, Disp: , Rfl:     fluticasone (FLONASE) 50 MCG/ACT nasal spray, Spray 1 spray into both nostrils daily, Disp: , Rfl:     folic acid 0.8 MG CAPS, Take 1 tablet by mouth daily, Disp: 90 capsule, Rfl: 3    furosemide (LASIX) 20 MG tablet, TAKE 1 TABLET (20 MG) BY MOUTH AS NEEDED FOR LEG SWELLING, Disp: 90 tablet, Rfl: 3    insulin regular (NOVOLIN R VIAL) 100 UNIT/ML vial, INJECT 2 UNITS AS NEEDED WHEN BLOOD GLUCOSE IS >200 MG/DL., Disp: 30 mL, Rfl: 1    insulin syringe-needle U-100 (BD INSULIN SYRINGE ULTRAFINE) 31G X 5/16\" 0.3 ML miscellaneous, USE 3 SYRINGES DAILY OR AS DIRECTED., Disp: 300 each, Rfl: 3    ipratropium - albuterol 0.5 mg/2.5 mg/3 mL (DUONEB) 0.5-2.5 (3) MG/3ML neb solution, NEBULIZE CONTENTS OF ONE VIAL (3 MLS) EVERY 4 HOURS AS NEEDED FOR SHORTNESS OF BREATH OR DYSPNEA, Disp: 360 mL, Rfl: 1    metFORMIN (GLUCOPHAGE) 1000 MG tablet, TAKE ONE TABLET BY MOUTH TWICE A DAY WITH BREAKFAST  AND DINNER, Disp: 180 tablet, Rfl: 1    mupirocin (BACTROBAN) 2 % external ointment, APPLY TOPICALLY 2 TIMES DAILY AS NEEDED (PILONIDAL CYST), Disp: 22 g, Rfl: 1    pantoprazole (PROTONIX) 40 MG enteric coated tablet, Take 40 mg by mouth daily Started by Dr. Debbie Rico at [facility], Disp: , Rfl:     primidone (MYSOLINE) 50 MG tablet, 4 tablets in am, 4 tablets afternoon, and 3 tablets every evening, Disp: 1001 tablet, Rfl: 1    Probiotic Product (FLORAJEN3) CAPS, Take 1 capsule by mouth 2 times daily, Disp: 60 capsule, Rfl: 0    propafenone (RYTHMOL) 150 MG TABS tablet, Take 1 tablet (150 mg) by mouth 2 times daily, Disp: 180 tablet, Rfl: 3    propranolol (INDERAL) 60 MG tablet, Take 2 tablets (120 mg) by mouth 2 times daily, Disp: 360 tablet, Rfl: 4    sulfaSALAzine (AZULFIDINE) 500 MG tablet, TAKE ONE TABLET BY MOUTH THREE TIMES A DAY, Disp: " 90 tablet, Rfl: 11    tamsulosin (FLOMAX) 0.4 MG capsule, TAKE ONE CAPSULE BY MOUTH ONCE DAILY, Disp: 90 capsule, Rfl: 3    tiotropium-olodaterol 2.5-2.5 MCG/ACT AERS, Inhale 2 puffs into the lungs daily, Disp: , Rfl:     Vitamin D3 (CHOLECALCIFEROL) 25 mcg (1000 units) tablet, Take 2 tablets (50 mcg) by mouth daily, Disp: , Rfl:     warfarin ANTICOAGULANT (JANTOVEN ANTICOAGULANT) 2.5 MG tablet, Take 5mg every Mon & Fri / Take 6.25mg all other days OR per INR clinic, Disp: 210 tablet, Rfl: 1     ALLERGIES:    Allergies   Allergen Reactions    Percodan [Oxycodone-Aspirin] Nausea and Vomiting    Aspirin         SOCIAL HISTORY:   Social History     Socioeconomic History    Marital status:      Spouse name: Izabel    Number of children: 2    Years of education: 12    Highest education level: Not on file   Occupational History    Occupation:      Employer: RETIRED   Tobacco Use    Smoking status: Former     Current packs/day: 0.00     Average packs/day: 1 pack/day for 30.0 years (30.0 ttl pk-yrs)     Types: Cigarettes     Start date: 3/19/1962     Quit date: 3/19/1992     Years since quittin.1    Smokeless tobacco: Never   Vaping Use    Vaping status: Never Used   Substance and Sexual Activity    Alcohol use: Not Currently     Alcohol/week: 0.0 standard drinks of alcohol    Drug use: No    Sexual activity: Yes     Partners: Female   Other Topics Concern    Parent/sibling w/ CABG, MI or angioplasty before 65F 55M? No     Service Not Asked    Blood Transfusions Not Asked    Caffeine Concern No     Comment: No Caffeine    Occupational Exposure Not Asked    Hobby Hazards Not Asked    Sleep Concern Not Asked    Stress Concern Not Asked    Weight Concern Not Asked    Special Diet Not Asked    Back Care Not Asked    Exercise Yes     Comment: 20 minutes - Walking- summer 2 x day    Bike Helmet Not Asked    Seat Belt Not Asked    Self-Exams Not Asked   Social History Narrative    Not  on file     Social Determinants of Health     Financial Resource Strain: Low Risk  (5/1/2024)    Financial Resource Strain     Within the past 12 months, have you or your family members you live with been unable to get utilities (heat, electricity) when it was really needed?: No   Food Insecurity: Low Risk  (5/1/2024)    Food Insecurity     Within the past 12 months, did you worry that your food would run out before you got money to buy more?: No     Within the past 12 months, did the food you bought just not last and you didn t have money to get more?: No   Transportation Needs: Low Risk  (5/1/2024)    Transportation Needs     Within the past 12 months, has lack of transportation kept you from medical appointments, getting your medicines, non-medical meetings or appointments, work, or from getting things that you need?: No   Physical Activity: Insufficiently Active (5/1/2024)    Exercise Vital Sign     Days of Exercise per Week: 3 days     Minutes of Exercise per Session: 20 min   Stress: No Stress Concern Present (5/1/2024)    [employer] - Occupational Stress Questionnaire     Feeling of Stress : Not at all   Social Connections: Socially Isolated (5/1/2024)    Social Connection and Isolation Panel [NHANES]     Frequency of Communication with Friends and Family: Twice a week     Frequency of Social Gatherings with Friends and Family: Twice a week     Attends Hinduism Services: Never     Active Member of Clubs or Organizations: No     Attends Club or Organization Meetings: Never     Marital Status:    Interpersonal Safety: Low Risk  (5/1/2024)    Interpersonal Safety     Do you feel physically and emotionally safe where you currently live?: Yes     Within the past 12 months, have you been hit, slapped, kicked or otherwise physically hurt by someone?: No     Within the past 12 months, have you been humiliated or emotionally abused in other ways by your partner or ex-partner?: No  "  Housing Stability: Low Risk  (5/1/2024)    Housing Stability     Do you have housing? : Yes     Are you worried about losing your housing?: No        FAMILY HISTORY:   Family History   Problem Relation Age of Onset    Circulatory Mother         blood clot in leg    Diabetes Mother         type 2    Allergies Mother     Arthritis Mother     Gastrointestinal Disease Mother     Neurologic Disorder Mother         Familiar tremors    Neurologic Disorder Father         brain tumor    Cerebrovascular Disease Paternal Grandfather     Hypertension Brother     Lipids Brother     Hypertension Sister     Diabetes Sister         Type 2    Allergies Sister     Lipids Sister     Glaucoma No family hx of     Macular Degeneration No family hx of         EXAM:Vitals: Ht 1.676 m (5' 6\")   BMI 25.60 kg/m    BMI= Body mass index is 25.6 kg/m .    A1C: 6.0 (2/8/2024)    General appearance: Patient is alert and fully cooperative with history & exam.  No sign of distress is noted during the visit.     Psychiatric: Affect is pleasant & appropriate.  Patient appears motivated to improve health.     Respiratory: Breathing is regular & unlabored while sitting.     HEENT: Hearing is intact to spoken word.  Speech is clear.  No gross evidence of visual impairment that would impact ambulation.     Dermatologic: Skin is intact to both lower extremities without significant lesions, rash or abrasion.  No paronychia or evidence of soft tissue infection is noted.     Vascular: DP & PT pulses are intact & regular bilaterally.  Moderate pitting edema and varicosities noted.  CFT and skin temperature is normal to both lower extremities.     Neurologic: Lower extremity sensation is intact to light touch.  No evidence of weakness or contracture in the lower extremities.  No evidence of neuropathy.     Musculoskeletal: Patient is ambulatory without assistive device or brace.  Decreased arch height.  Prominent first metatarsal heads medially on both feet " with lateral deviation of both great toes.    Radiographs: bilateral foot xrays -  I personally reviewed the xrays.  Increase in the first and second intermetatarsal angle with tibial sesamoid position of 7 for both feet.  Degenerative changes noted to the midfoot on both feet.  No fractures are noted.  Decreased calcaneal inclination angle bilaterally.     ASSESSMENT:    Bunion, right  Bunion, left  Foot pain, bilateral  Edema of both lower extremities due to peripheral venous insufficiency  Arthritis of both feet     Medical Decision Making/Plan:  Reviewed patient[facility] in [facility].  Reviewed and discussed x-rays.Reviewed and discussed causes of bunion with patient.  Explained that it is in large part due to a patients foot type and how they walk.   Discussed treatment options with patient including orthotics, splints, pads, and shoe gear.  We discussed that sometimes cortisone injections can help with the pain or physical therapy treatments such as ultrasound to help with pain but does not fix the problem.  Discussed that this is normally a structural issue in the foot and if conservative therapy doesn't work, surgery is considered.  Distal osteotomy versus fusion.  With a distal osteotomy procedure, patient is normally minimally weight bearing in a cam boot for 6 weeks.  With fusion, patient is normally non weight bearing for 6 weeks.  With any surgery, patient needs to take the first 2 weeks off work for icing and elevating and could possible due seated work only for the remaining 4 weeks after that.  We discussed risks of surgery including infection, neuritis, non union, need for further surgery.    Discussed that he is not a great surgical candidate but sometimes we can go in and shave down the bone or shift the metatarsal head over to try to help with fitting with shoes a little better.  Also talked about shoes with a wider toe box and inserts to try to help with foot pain.  Discussed that correcting the  kalia would not get rid of the hip pain.  Also discussed that it is not causing the swelling and he does have some arthritis in the feet so would not take away back pain either.  He is going to think about surgery and he will call if he would like to proceed.    Patient risk factor: Patient is at medium risk for infection.        Iza Biswas DPM, Podiatry/Foot and Ankle Surgery

## 2024-05-22 ENCOUNTER — ANTICOAGULATION THERAPY VISIT (OUTPATIENT)
Dept: ANTICOAGULATION | Facility: CLINIC | Age: 79
End: 2024-05-22

## 2024-05-22 ENCOUNTER — LAB (OUTPATIENT)
Dept: LAB | Facility: CLINIC | Age: 79
End: 2024-05-22
Payer: COMMERCIAL

## 2024-05-22 DIAGNOSIS — Z79.01 LONG TERM CURRENT USE OF ANTICOAGULANT THERAPY: ICD-10-CM

## 2024-05-22 DIAGNOSIS — I48.11 LONGSTANDING PERSISTENT ATRIAL FIBRILLATION (H): Primary | ICD-10-CM

## 2024-05-22 DIAGNOSIS — I48.91 ATRIAL FIBRILLATION, UNSPECIFIED TYPE (H): ICD-10-CM

## 2024-05-22 DIAGNOSIS — Z79.01 LONG TERM CURRENT USE OF ANTICOAGULANTS WITH INR GOAL OF 2.0-3.0: ICD-10-CM

## 2024-05-22 LAB — INR BLD: 1.6 (ref 0.9–1.1)

## 2024-05-22 PROCEDURE — 85610 PROTHROMBIN TIME: CPT

## 2024-05-22 PROCEDURE — 36416 COLLJ CAPILLARY BLOOD SPEC: CPT

## 2024-05-22 NOTE — PROGRESS NOTES
ANTICOAGULATION MANAGEMENT     Cayetano Lunsford 79 year old male is on warfarin with subtherapeutic INR result. (Goal INR 2.0-3.0)    Recent labs: (last 7 days)     05/22/24  1305   INR 1.6*       ASSESSMENT     Source(s): Chart Review and Patient/Caregiver Call     Warfarin doses taken: Warfarin taken as instructed  Diet: No new diet changes identified  Medication/supplement changes: None noted  New illness, injury, or hospitalization: No  Signs or symptoms of bleeding or clotting: No  Previous result: Therapeutic last visit; previously outside of goal range  Additional findings: None       PLAN     Recommended plan for no diet, medication or health factor changes affecting INR     Dosing Instructions: Increase your warfarin dose (12% change) with next INR in 1 week       Summary  As of 5/22/2024      Full warfarin instructions:  7.5 mg every Mon, Wed, Fri; 6.25 mg all other days   Next INR check:  5/30/2024               Telephone call with Leo who agrees to plan and repeated back plan correctly    Lab visit scheduled    Education provided:   Please call back if any changes to your diet, medications or how you've been taking warfarin  Goal range and lab monitoring: goal range and significance of current result  Symptom monitoring: monitoring for clotting signs and symptoms and monitoring for stroke signs and symptoms    Plan made per [facility] anticoagulation protocol    Estelle Starr RN  Anticoagulation Clinic  5/22/2024    _______________________________________________________________________     Anticoagulation Episode Summary       Current INR goal:  2.0-3.0   TTR:  45.5% (1 y)   Target end date:  Indefinite   Send INR reminders to:  [facility]    Indications    Longstanding persistent atrial fibrillation (H) [I48.11]  Long term current use of anticoagulants with INR goal of 2.0-3.0 [Z79.01]  Atrial fibrillation  unspecified type (H) (Resolved) [I48.91]  Long-term (current) use of anticoagulants  [Z79.01] (Resolved) [Z79.01]             Comments:               Anticoagulation Care Providers       Provider Role Specialty Phone number    Dmitri Andres MD Referring Family Medicine [phone]    Drew Bravo PA-C Referring Family Medicine [phone]

## 2024-05-23 ENCOUNTER — TRANSFERRED RECORDS (OUTPATIENT)
Dept: HEALTH INFORMATION MANAGEMENT | Facility: CLINIC | Age: 79
End: 2024-05-23
Payer: COMMERCIAL

## 2024-05-26 NOTE — PATIENT INSTRUCTIONS
Chief Complaint   Patient presents with    Vomiting     Pt presents to ER with vomiting and reports right sided flank pain     Mental Health Problem     Pt hx of mental health, has not been taking medications      History of Present Illness and Review of Systems   This is a 35 y.o. female with PMH notable for bipolar 1, hepatitis C, asthma, chronic pain,  coming in today with complaint of vomiting.  Patient states she has been having vomiting and diarrhea that is been ongoing for the last 2 days.  Reports a vague right-sided flank pain as well.  She has no urinary symptoms.  Denies any hematuria.  No fevers, chest pain, shortness of breath.  She has had decreased p.o. intake however still tolerating some p.o.  Denies any melena or hematochezia.  She has not been taking her psychiatric medication as result.  Denies any missed menstrual periods no vaginal bleeding or discharge.  Denies any episodes of syncope.  She has a history of C-sections but no other abdominal surgeries.  No other symptoms currently.    - No language barrier.     No other complaints for this encounter.    Remainder of ROS Reviewed and Non-Pertinent    Past Medical, Past Surgical History:    has a past medical history of Abnormal Pap smear of cervix, ADHD (attention deficit hyperactivity disorder), Ankle fracture, Anxiety, Arthritis, Asthma, Bipolar disorder (HCC), Cellulitis, Chronic pain disorder, Depression, Diverticulitis, Flank pain (08/16/2016), Hallucination, Hepatitis C, Hip disease (2006), History of abnormal cervical Pap smear, History of multiple miscarriages, IBS (irritable bowel syndrome), Infantile idiopathic scoliosis, Joint pain, Kidney stone, Lactose intolerance, Low back pain, Myofascial pain syndrome, Peripheral neuropathy (3/28/2024), Poor dentition, Psychiatric illness, Psychosis (HCC), PTSD (post-traumatic stress disorder), Pyelonephritis affecting pregnancy, Right hand paresthesia, Right ovarian cyst, Scoliosis, Seizures  Recommendations from today's MTM visit:                                                      1. No changes today.    Next MTM visit: 8/6/18    To schedule another MTM appointment, please call the clinic directly or you may call the MTM scheduling line at [phone] or toll-free at [phone].     My Clinical Pharmacist's contact information:                                                      It was a pleasure seeing you today!  Please feel free to contact me with any questions or concerns you have.      Tanna Diaz, PharmD  Medication Therapy Management Pharmacist  [facility] - Monday 9:30 - 6:00 and Wednesday 7:30 - 4:00  Phone: [phone] - direct clinic line           "(HCC), Self-injurious behavior, Sleep difficulties, Slow transit constipation, Substance abuse (HCC), and Suicide attempt (HCC).   has a past surgical history that includes Hip surgery; pr  delivery only (N/A, 2016); pr lig/trnsxj flp tube abdl/vag appr uni/bi (Bilateral, 2016);  section (2016);  section (2014); and orthopedic surgery.     Allergies:     Allergies   Allergen Reactions    Aspirin      Pt given enteric coated 81 mg, when ask pt denies any allergy, listed under allergies on paperwork provided by berny Hernández - Food Allergy Itching    Naproxen     Toradol [Ketorolac Tromethamine] GI Bleeding    Azithromycin Rash    Latex Hives and Rash    Neurontin [Gabapentin] Rash and GI Bleeding    Ppd [Tuberculin Purified Protein Derivative] Rash       Social and Family History:     Social History     Substance and Sexual Activity   Alcohol Use Never     Social History     Tobacco Use   Smoking Status Every Day    Current packs/day: 0.25    Average packs/day: 0.3 packs/day for 15.0 years (3.8 ttl pk-yrs)    Types: Cigarettes    Passive exposure: Never   Smokeless Tobacco Never   Tobacco Comments    pt refused smoking cessation teaching.      Social History     Substance and Sexual Activity   Drug Use Not Currently    Types: Marijuana, Cocaine, \"Crack\" cocaine    Comment: on occasion        Physical Examination     Vitals:    24 1206   BP: 134/92   BP Location: Left arm   Pulse: (!) 112   Resp: 18   Temp: 99.3 °F (37.4 °C)   TempSrc: Temporal   SpO2: 96%       Physical Exam  Vitals and nursing note reviewed.   Constitutional:       General: She is not in acute distress.     Appearance: She is well-developed.   HENT:      Head: Normocephalic and atraumatic.      Nose: Nose normal.   Eyes:      Conjunctiva/sclera: Conjunctivae normal.   Cardiovascular:      Rate and Rhythm: Regular rhythm. Tachycardia present.      Heart sounds: No murmur heard.  Pulmonary:     "  Effort: Pulmonary effort is normal. No respiratory distress.      Breath sounds: Normal breath sounds.   Abdominal:      Palpations: Abdomen is soft.      Tenderness: There is no abdominal tenderness. There is no right CVA tenderness, left CVA tenderness, guarding or rebound.   Musculoskeletal:         General: No swelling.      Cervical back: Normal range of motion and neck supple.      Right lower leg: No edema.      Left lower leg: No edema.   Skin:     General: Skin is warm and dry.      Capillary Refill: Capillary refill takes less than 2 seconds.   Neurological:      General: No focal deficit present.      Mental Status: She is alert.   Psychiatric:         Mood and Affect: Mood normal.           Procedures   Procedures      MDM:   Medical Decision Making  Antionette Downing is a 35 y.o. who presents with complaints of abdominal pain    Vital signs: notable for tachycardia  Physical Exam: Patient is tachycardic however mucous members are moist, abdomen is benign, no CVA tenderness, otherwise appears at baseline    Differential includes but not limited to: Given her vomiting and diarrhea may have related gastritis, will evaluate for arrhythmia, pyelonephritis, ectopic pregnancy, DEXTER    Plan: Labs, IVF, UA, UPT, droperidol. Will monitor closely and reassess.    Reviewed previous CT abdomen pelvis within the last couple of months.  Largely unremarkable.    Reassessment/Disposition: Workup negative, patient well-appearing.  No indication for further workup or imaging, recommend outpatient follow-up and return precautions.        Amount and/or Complexity of Data Reviewed  Labs: ordered.    Risk  Prescription drug management.        - Reviewed relevant past office visits/hospitalizations/procedures  -Obtained pertinent history that influenced decision making from the patient    ED Course as of 05/26/24 2044   Sun May 26, 2024   1357 Pt requesting to leave. Labs unremarkable. Pain controlled. No evidence of  infection. No indication for further workup, will DC home   1358 My EKG read: NSR, no ST or T wave abnormalities, normal intervals, normal axis, no evidence of ischemia        Final Dispo   Final Diagnosis:  1. Gastroenteritis      Time reflects when diagnosis was documented in both MDM as applicable and the Disposition within this note       Time User Action Codes Description Comment    5/26/2024  1:58 PM David Baum Add [K52.9] Gastroenteritis           ED Disposition       ED Disposition   Discharge    Condition   Stable    Date/Time   Sun May 26, 2024  1:58 PM    Comment   Antionette Downing discharge to home/self care.                   Follow-up Information    None       Medications   sodium chloride 0.9 % bolus 1,000 mL (0 mL Intravenous Stopped 5/26/24 1358)   droperidol (INAPSINE) injection 1.25 mg (1.25 mg Intravenous Given 5/26/24 1241)   droperidol (INAPSINE) injection 1.25 mg (1.25 mg Intravenous Given 5/26/24 1328)       Risk Stratification Tools                Orders Placed This Encounter   Procedures    CBC and differential    Comprehensive metabolic panel    UA (URINE) with reflex to Scope    Lipase    POCT pregnancy, urine    ECG 12 lead    ECG 12 lead       Labs:     Labs Reviewed   LIPASE - Normal       Result Value Ref Range Status    Lipase 21  11 - 82 u/L Final   POCT PREGNANCY, URINE - Normal    EXT Preg Test, Ur Negative   Final    Control Valid   Final   CBC AND DIFFERENTIAL    WBC 7.61  4.31 - 10.16 Thousand/uL Final    RBC 4.38  3.81 - 5.12 Million/uL Final    Hemoglobin 13.7  11.5 - 15.4 g/dL Final    Hematocrit 41.4  34.8 - 46.1 % Final    MCV 95  82 - 98 fL Final    MCH 31.3  26.8 - 34.3 pg Final    MCHC 33.1  31.4 - 37.4 g/dL Final    RDW 12.6  11.6 - 15.1 % Final    MPV 10.0  8.9 - 12.7 fL Final    Platelets 210  149 - 390 Thousands/uL Final    nRBC 0  /100 WBCs Final    Segmented % 68  43 - 75 % Final    Immature Grans % 0  0 - 2 % Final    Lymphocytes % 21  14 - 44 % Final     Monocytes % 9  4 - 12 % Final    Eosinophils Relative 2  0 - 6 % Final    Basophils Relative 0  0 - 1 % Final    Absolute Neutrophils 5.15  1.85 - 7.62 Thousands/µL Final    Absolute Immature Grans 0.03  0.00 - 0.20 Thousand/uL Final    Absolute Lymphocytes 1.60  0.60 - 4.47 Thousands/µL Final    Absolute Monocytes 0.68  0.17 - 1.22 Thousand/µL Final    Eosinophils Absolute 0.12  0.00 - 0.61 Thousand/µL Final    Basophils Absolute 0.03  0.00 - 0.10 Thousands/µL Final   COMPREHENSIVE METABOLIC PANEL    Sodium 138  135 - 147 mmol/L Final    Potassium 3.7  3.5 - 5.3 mmol/L Final    Chloride 106  96 - 108 mmol/L Final    CO2 23  21 - 32 mmol/L Final    ANION GAP 9  4 - 13 mmol/L Final    BUN 7  5 - 25 mg/dL Final    Creatinine 0.78  0.60 - 1.30 mg/dL Final    Comment: Standardized to IDMS reference method    Glucose 101  65 - 140 mg/dL Final    Comment: If the patient is fasting, the ADA then defines impaired fasting glucose as > 100 mg/dL and diabetes as > or equal to 123 mg/dL.    Calcium 9.2  8.4 - 10.2 mg/dL Final    AST 13  13 - 39 U/L Final    ALT 8  7 - 52 U/L Final    Comment: Specimen collection should occur prior to Sulfasalazine administration due to the potential for falsely depressed results.     Alkaline Phosphatase 68  34 - 104 U/L Final    Total Protein 6.8  6.4 - 8.4 g/dL Final    Albumin 4.4  3.5 - 5.0 g/dL Final    Total Bilirubin 0.43  0.20 - 1.00 mg/dL Final    Comment: Use of this assay is not recommended for patients undergoing treatment with eltrombopag due to the potential for falsely elevated results.  N-acetyl-p-benzoquinone imine (metabolite of Acetaminophen) will generate erroneously low results in samples for patients that have taken an overdose of Acetaminophen.    eGFR 98  ml/min/1.73sq m Final    Narrative:     National Kidney Disease Foundation guidelines for Chronic Kidney Disease (CKD):     Stage 1 with normal or high GFR (GFR > 90 mL/min/1.73 square meters)    Stage 2 Mild CKD  "(GFR = 60-89 mL/min/1.73 square meters)    Stage 3A Moderate CKD (GFR = 45-59 mL/min/1.73 square meters)    Stage 3B Moderate CKD (GFR = 30-44 mL/min/1.73 square meters)    Stage 4 Severe CKD (GFR = 15-29 mL/min/1.73 square meters)    Stage 5 End Stage CKD (GFR <15 mL/min/1.73 square meters)  Note: GFR calculation is accurate only with a steady state creatinine   URINALYSIS WITH REFLEX TO SCOPE    Color, UA Yellow   Final    Clarity, UA Turbid   Final    Specific Gravity, UA 1.012  1.003 - 1.030 Final    pH, UA 7.0  4.5, 5.0, 5.5, 6.0, 6.5, 7.0, 7.5, 8.0 Final    Leukocytes, UA Negative  Negative Final    Nitrite, UA Negative  Negative Final    Protein, UA Negative  Negative mg/dl Final    Glucose, UA Negative  Negative mg/dl Final    Ketones, UA Negative  Negative mg/dl Final    Urobilinogen, UA <2.0  <2.0 mg/dl mg/dl Final    Bilirubin, UA Negative  Negative Final    Occult Blood, UA Negative  Negative Final       Imaging:     No orders to display      All details of the evaluation and treatment plan were made clear and additionally all questions and concerns were addressed while under my care.    Portions of the record may have been created with voice recognition software. Occasional wrong word or \"sound a like\" substitutions may have occurred due to the inherent limitations of voice recognition software. Read the chart carefully and recognize, using context, where substitutions have occurred.     David Baum MD  05/26/24 2044    "

## 2024-05-30 ENCOUNTER — LAB (OUTPATIENT)
Dept: LAB | Facility: CLINIC | Age: 79
End: 2024-05-30
Payer: COMMERCIAL

## 2024-05-30 ENCOUNTER — ANTICOAGULATION THERAPY VISIT (OUTPATIENT)
Dept: ANTICOAGULATION | Facility: CLINIC | Age: 79
End: 2024-05-30

## 2024-05-30 DIAGNOSIS — I48.11 LONGSTANDING PERSISTENT ATRIAL FIBRILLATION (H): Primary | ICD-10-CM

## 2024-05-30 DIAGNOSIS — I48.91 ATRIAL FIBRILLATION, UNSPECIFIED TYPE (H): ICD-10-CM

## 2024-05-30 DIAGNOSIS — Z79.01 LONG TERM CURRENT USE OF ANTICOAGULANTS WITH INR GOAL OF 2.0-3.0: ICD-10-CM

## 2024-05-30 DIAGNOSIS — Z79.01 LONG TERM CURRENT USE OF ANTICOAGULANT THERAPY: ICD-10-CM

## 2024-05-30 LAB — INR BLD: 1.9 (ref 0.9–1.1)

## 2024-05-30 PROCEDURE — 36416 COLLJ CAPILLARY BLOOD SPEC: CPT

## 2024-05-30 PROCEDURE — 85610 PROTHROMBIN TIME: CPT

## 2024-05-30 NOTE — PROGRESS NOTES
ANTICOAGULATION MANAGEMENT     Cayetano Lunsford 79 year old male is on warfarin with subtherapeutic INR result. (Goal INR 2.0-3.0)    Recent labs: (last 7 days)     05/30/24  1322   INR 1.9*       ASSESSMENT     Source(s): Chart Review and Patient/Caregiver Call     Warfarin doses taken: Warfarin taken as instructed  Diet: No new diet changes identified  Medication/supplement changes: None noted  New illness, injury, or hospitalization: No  Signs or symptoms of bleeding or clotting: No  Previous result: Subtherapeutic  Additional findings: None       PLAN     Recommended plan for no diet, medication or health factor changes affecting INR     Dosing Instructions: Increase your warfarin dose (5% change) with next INR in 2 weeks       Summary  As of 5/30/2024      Full warfarin instructions:  6.25 mg every Tue, Sat; 7.5 mg all other days   Next INR check:  6/13/2024               Telephone call with Leo who agrees to plan and repeated back plan correctly    Lab visit scheduled    Education provided:   Please call back if any changes to your diet, medications or how you've been taking warfarin  Goal range and lab monitoring: goal range and significance of current result    Plan made per [facility] anticoagulation protocol    Estelle Starr, RN  Anticoagulation Clinic  5/30/2024    _______________________________________________________________________     Anticoagulation Episode Summary       Current INR goal:  2.0-3.0   TTR:  43.8% (1 y)   Target end date:  Indefinite   Send INR reminders to:  [facility] [place]    Indications    Longstanding persistent atrial fibrillation (H) [I48.11]  Long term current use of anticoagulants with INR goal of 2.0-3.0 [Z79.01]  Atrial fibrillation  unspecified type (H) (Resolved) [I48.91]  Long-term (current) use of anticoagulants [Z79.01] (Resolved) [Z79.01]             Comments:               Anticoagulation Care Providers       Provider Role Specialty Phone number    Dmitri Andres  MD Spencer Referring Family Medicine [phone]    Drew Bravo PA-C Referring Family Medicine [phone]

## 2024-06-13 ENCOUNTER — ANTICOAGULATION THERAPY VISIT (OUTPATIENT)
Dept: ANTICOAGULATION | Facility: CLINIC | Age: 79
End: 2024-06-13

## 2024-06-13 ENCOUNTER — LAB (OUTPATIENT)
Dept: LAB | Facility: CLINIC | Age: 79
End: 2024-06-13
Payer: COMMERCIAL

## 2024-06-13 DIAGNOSIS — Z79.01 LONG TERM CURRENT USE OF ANTICOAGULANT THERAPY: ICD-10-CM

## 2024-06-13 DIAGNOSIS — I48.11 LONGSTANDING PERSISTENT ATRIAL FIBRILLATION (H): Primary | ICD-10-CM

## 2024-06-13 DIAGNOSIS — Z79.01 LONG TERM CURRENT USE OF ANTICOAGULANTS WITH INR GOAL OF 2.0-3.0: ICD-10-CM

## 2024-06-13 DIAGNOSIS — I48.91 ATRIAL FIBRILLATION, UNSPECIFIED TYPE (H): ICD-10-CM

## 2024-06-13 LAB — INR BLD: 3.6 (ref 0.9–1.1)

## 2024-06-13 PROCEDURE — 85610 PROTHROMBIN TIME: CPT

## 2024-06-13 PROCEDURE — 36415 COLL VENOUS BLD VENIPUNCTURE: CPT

## 2024-06-13 NOTE — PROGRESS NOTES
ANTICOAGULATION MANAGEMENT     Cayetano Lunsford 79 year old male is on warfarin with supratherapeutic INR result. (Goal INR 2.0-3.0)    Recent labs: (last 7 days)     06/13/24  1337   INR 3.6*       ASSESSMENT     Source(s): Chart Review and Patient/Caregiver Call     Warfarin doses taken: Warfarin taken as instructed  Diet: No new diet changes identified  Medication/supplement changes: None noted  New illness, injury, or hospitalization: No  Signs or symptoms of bleeding or clotting: No  Previous result: Subtherapeutic, now has trended too high following 2 recent maintenance dose increases  Additional findings: None       PLAN     Recommended plan for no diet, medication or health factor changes affecting INR     Dosing Instructions: partial hold then decrease your warfarin dose (10% change) with next INR in 2 weeks       Summary  As of 6/13/2024      Full warfarin instructions:  6/13: 5 mg; Otherwise 7.5 mg every Mon; 6.25 mg all other days   Next INR check:  6/27/2024               Telephone call with Leo who verbalizes understanding and agrees to plan    Lab visit scheduled    Education provided:   Contact [phone]  with any changes, questions or concerns.     Plan made per [facility] anticoagulation protocol    Lisa Minor RN  Anticoagulation Clinic  6/13/2024    _______________________________________________________________________     Anticoagulation Episode Summary       Current INR goal:  2.0-3.0   TTR:  46.0% (1 y)   Target end date:  Indefinite   Send INR reminders to:  [facility] [facility]    Indications    Longstanding persistent atrial fibrillation (H) [I48.11]  Long term current use of anticoagulants with INR goal of 2.0-3.0 [Z79.01]  Atrial fibrillation  unspecified type (H) (Resolved) [I48.91]  Long-term (current) use of anticoagulants [Z79.01] (Resolved) [Z79.01]             Comments:               Anticoagulation Care Providers       Provider Role Specialty Phone number    Dmitri Andres  MD Spencer Referring Family Medicine [phone]    Drew Bravo PA-C Referring Family Medicine [phone]

## 2024-06-14 ENCOUNTER — OFFICE VISIT (OUTPATIENT)
Dept: FAMILY MEDICINE | Facility: CLINIC | Age: 79
End: 2024-06-14
Payer: COMMERCIAL

## 2024-06-14 VITALS
BODY MASS INDEX: 23.83 KG/M2 | TEMPERATURE: 98.1 F | HEART RATE: 70 BPM | WEIGHT: 148.3 LBS | RESPIRATION RATE: 14 BRPM | OXYGEN SATURATION: 93 % | DIASTOLIC BLOOD PRESSURE: 74 MMHG | SYSTOLIC BLOOD PRESSURE: 145 MMHG | HEIGHT: 66 IN

## 2024-06-14 DIAGNOSIS — H61.22 IMPACTED CERUMEN OF LEFT EAR: Primary | ICD-10-CM

## 2024-06-14 DIAGNOSIS — H90.6 MIXED CONDUCTIVE AND SENSORINEURAL HEARING LOSS OF BOTH EARS: ICD-10-CM

## 2024-06-14 DIAGNOSIS — I48.91 ATRIAL FIBRILLATION, UNSPECIFIED TYPE (H): ICD-10-CM

## 2024-06-14 PROCEDURE — 99213 OFFICE O/P EST LOW 20 MIN: CPT | Mod: 25

## 2024-06-14 PROCEDURE — 69209 REMOVE IMPACTED EAR WAX UNI: CPT | Mod: LT

## 2024-06-14 RX ORDER — WARFARIN SODIUM 2.5 MG/1
TABLET ORAL
Qty: 220 TABLET | Refills: 1 | Status: SHIPPED | OUTPATIENT
Start: 2024-06-14

## 2024-06-14 NOTE — PROGRESS NOTES
Patient identified using two patient identifiers.  Ear exam showing wax occlusion completed by provider.  Solution: warm water and H202/H20 was placed in the left ear(s) via  washed out ear lots of crusty wax came out    Marco Antonio cope

## 2024-06-14 NOTE — PROGRESS NOTES
Assessment & Plan     (H61.22) Impacted cerumen of left ear  (primary encounter diagnosis)  Comment: Following ear lavage TM pearly gray, cone of light noted, bony structures visible. Hearing improved. There is some skin irritation noted but No infection evident. Follow up if noting any discomfort. Patient fully understands and is agreeable with plan of care, at this point patient will follow up as needed unless acute concerns arise in the meantime.  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 Would like to be referred for hearing aids potentially. Audiology referral placed. Patient fully understands and is agreeable with plan of care, at this point patient will follow up as needed unless acute concerns arise in the meantime.  Plan: Adult Audiology  Referral        Subjective   Leo is a 79 year old, presenting for the following health issues:  Ear Problem (C/o left ear clogged with water after a shower 3 days ago, feels like ear foams and does not hear well)      6/14/2024     3:37 PM   Additional Questions   Roomed by Vania   Accompanied by Self         6/14/2024     3:37 PM   Patient Reported Additional Medications   Patient reports taking the following new medications Warfarin dosage was updated 6/13/24     Ear Problem       Concern - Clogged left ear  Onset: 3 days  Description: feels like cotton, clogged with water after a shower 3 days ago  Intensity: severe  Progression of Symptoms:  same  Accompanying Signs & Symptoms: sudden hearing loss Tuesday after shower, feels like water in ear  Previous history of similar problem: yes   Precipitating factors: none       Worsened by:   Alleviating factors:        Improved by:   Therapies tried and outcome: None      Review of Systems  Constitutional, HEENT, cardiovascular, pulmonary, gi and gu systems are negative, except as  "otherwise noted.      Objective    BP (!) 145/74 (BP Location: Right arm, Patient Position: Sitting, Cuff Size: Adult Large)   Pulse 70   Temp 98.1  F (36.7  C) (Oral)   Resp 14   Ht 1.676 m (5' 6\")   Wt 67.3 kg (148 lb 4.8 oz)   SpO2 93%   BMI 23.94 kg/m    Body mass index is 23.94 kg/m .  Physical Exam  Vitals and nursing note reviewed.   Constitutional:       General: He is not in acute distress.     Appearance: Normal appearance. He is not ill-appearing.   HENT:      Right Ear: Tympanic membrane, ear canal and external ear normal. Decreased hearing noted. There is no impacted cerumen. Tympanic membrane is not perforated, erythematous or retracted.      Left Ear: Ear canal and external ear normal. Decreased hearing noted. There is impacted cerumen.   Cardiovascular:      Rate and Rhythm: Normal rate.   Pulmonary:      Effort: Pulmonary effort is normal.   Neurological:      Mental Status: He is alert.   Psychiatric:         Mood and Affect: Mood normal.         Behavior: Behavior normal.         Thought Content: Thought content normal.         Judgment: Judgment normal.           Signed Electronically by: RAFFI Doe CNP    "

## 2024-06-14 NOTE — TELEPHONE ENCOUNTER
ANTICOAGULATION MANAGEMENT:  Medication Refill    Anticoagulation Summary  As of 6/13/2024      Warfarin maintenance plan:  7.5 mg (2.5 mg x 3) every Mon; 6.25 mg (2.5 mg x 2.5) all other days   Next INR check:  6/27/2024   Target end date:  Indefinite    Indications    Longstanding persistent atrial fibrillation (H) [I48.11]  Long term current use of anticoagulants with INR goal of 2.0-3.0 [Z79.01]  Atrial fibrillation  unspecified type (H) (Resolved) [I48.91]  Long-term (current) use of anticoagulants [Z79.01] (Resolved) [Z79.01]                 Anticoagulation Care Providers       Provider Role Specialty Phone number    Dmitri Andres MD Referring Family Medicine [phone]    Drew Bravo PA-C Referring Family Medicine [phone]            Refill Criteria    Visit with referring provider/group: Meets criteria: office visit within referring provider group in the last 1 year on 5/1/24    [facility] referral last signed: 11/27/2023; within last year: Yes    Lab monitoring not exceeding 2 weeks overdue: Yes    Cayetano meets all criteria for refill. Rx instructions and quantity supplied updated to match patient's current dosing plan. Warfarin 90 day supply with 1 refill granted per [facility] protocol     Meri Chauhan RN  Anticoagulation Clinic

## 2024-06-27 ENCOUNTER — ANTICOAGULATION THERAPY VISIT (OUTPATIENT)
Dept: ANTICOAGULATION | Facility: CLINIC | Age: 79
End: 2024-06-27

## 2024-06-27 ENCOUNTER — LAB (OUTPATIENT)
Dept: LAB | Facility: CLINIC | Age: 79
End: 2024-06-27
Payer: COMMERCIAL

## 2024-06-27 DIAGNOSIS — I48.91 ATRIAL FIBRILLATION, UNSPECIFIED TYPE (H): ICD-10-CM

## 2024-06-27 DIAGNOSIS — Z79.01 LONG TERM CURRENT USE OF ANTICOAGULANT THERAPY: ICD-10-CM

## 2024-06-27 DIAGNOSIS — Z79.01 LONG TERM CURRENT USE OF ANTICOAGULANTS WITH INR GOAL OF 2.0-3.0: ICD-10-CM

## 2024-06-27 DIAGNOSIS — I48.11 LONGSTANDING PERSISTENT ATRIAL FIBRILLATION (H): Primary | ICD-10-CM

## 2024-06-27 LAB — INR BLD: 1.7 (ref 0.9–1.1)

## 2024-06-27 PROCEDURE — 85610 PROTHROMBIN TIME: CPT

## 2024-06-27 PROCEDURE — 36416 COLLJ CAPILLARY BLOOD SPEC: CPT

## 2024-06-27 NOTE — PROGRESS NOTES
ANTICOAGULATION MANAGEMENT     Cayetano Lunsford 79 year old male is on warfarin with subtherapeutic INR result. (Goal INR 2.0-3.0)    Recent labs: (last 7 days)     06/27/24  1324   INR 1.7*       ASSESSMENT     Source(s): Chart Review and Patient/Caregiver Call     Warfarin doses taken: Warfarin taken as instructed  Diet: No new diet changes identified  Medication/supplement changes: None noted  New illness, injury, or hospitalization: No  Signs or symptoms of bleeding or clotting: No  Previous result: Supratherapeutic, warfarin dose was decreased 10%  Additional findings: None       PLAN     Recommended plan for no diet, medication or health factor changes affecting INR     Dosing Instructions: Increase your warfarin dose (5.6% change) with next INR in 1-2 weeks       Summary  As of 6/27/2024      Full warfarin instructions:  7.5 mg every Mon, Thu, Sat; 6.25 mg all other days   Next INR check:  7/11/2024               Telephone call with Leo who agrees to plan and repeated back plan correctly    Lab visit scheduled    Education provided: Please call back if any changes to your diet, medications or how you've been taking warfarin  Contact [phone] with any changes, questions or concerns.     Plan made per [facility] anticoagulation protocol    Dayanara Cuevas RN  Anticoagulation Clinic  6/27/2024    _______________________________________________________________________     Anticoagulation Episode Summary       Current INR goal:  2.0-3.0   TTR:  45.6% (1 y)   Target end date:  Indefinite   Send INR reminders to:  [facility]    Indications    Longstanding persistent atrial fibrillation (H) [I48.11]  Long term current use of anticoagulants with INR goal of 2.0-3.0 [Z79.01]  Atrial fibrillation  unspecified type (H) (Resolved) [I48.91]  Long-term (current) use of anticoagulants [Z79.01] (Resolved) [Z79.01]             Comments:               Anticoagulation Care Providers       Provider Role Specialty Phone  number    Dmitri Andres MD Referring Family Medicine [phone]    Drew Bravo PA-C Referring Family Medicine [phone]

## 2024-07-11 ENCOUNTER — LAB (OUTPATIENT)
Dept: LAB | Facility: CLINIC | Age: 79
End: 2024-07-11
Payer: COMMERCIAL

## 2024-07-11 ENCOUNTER — ANTICOAGULATION THERAPY VISIT (OUTPATIENT)
Dept: ANTICOAGULATION | Facility: CLINIC | Age: 79
End: 2024-07-11

## 2024-07-11 DIAGNOSIS — Z79.01 LONG TERM CURRENT USE OF ANTICOAGULANT THERAPY: ICD-10-CM

## 2024-07-11 DIAGNOSIS — I48.91 ATRIAL FIBRILLATION, UNSPECIFIED TYPE (H): ICD-10-CM

## 2024-07-11 LAB — INR BLD: 2 (ref 0.9–1.1)

## 2024-07-11 PROCEDURE — 36415 COLL VENOUS BLD VENIPUNCTURE: CPT

## 2024-07-11 PROCEDURE — 85610 PROTHROMBIN TIME: CPT

## 2024-07-11 NOTE — PROGRESS NOTES
ANTICOAGULATION MANAGEMENT     Cayetano Lunsford 79 year old male is on warfarin with therapeutic INR result. (Goal INR 2.0-3.0)    Recent labs: (last 7 days)     07/11/24  1319   INR 2.0*       ASSESSMENT     Source(s): Chart Review and Patient/Caregiver Call     Warfarin doses taken: Warfarin taken as instructed  Diet: No new diet changes identified  Medication/supplement changes: None noted  New illness, injury, or hospitalization: No  Signs or symptoms of bleeding or clotting: No  Previous result: Subtherapeutic  Additional findings: Pt offered but declined AVS. He reports that he will use the one he has.       PLAN     Recommended plan for ongoing change(s) affecting INR     Dosing Instructions: Continue your current warfarin dose with next INR in 3 weeks       Summary  As of 7/11/2024      Full warfarin instructions:  7.5 mg every Mon, Thu, Sat; 6.25 mg all other days   Next INR check:  8/2/2024               Telephone call with Leo who verbalizes understanding and agrees to plan    Check at provider office visit (Lab visit scheduled)    Education provided: Contact [phone] with any changes, questions or concerns.     Plan made per [facility] anticoagulation protocol    Yuliet Moctezuma, RN  Anticoagulation Clinic  7/11/2024    _______________________________________________________________________     Anticoagulation Episode Summary       Current INR goal:  2.0-3.0   TTR:  44.9% (1 y)   Target end date:  Indefinite   Send INR reminders to:  [facility]    Indications    Longstanding persistent atrial fibrillation (H) [I48.11]  Long term current use of anticoagulants with INR goal of 2.0-3.0 [Z79.01]  Atrial fibrillation  unspecified type (H) (Resolved) [I48.91]  Long-term (current) use of anticoagulants [Z79.01] (Resolved) [Z79.01]             Comments:               Anticoagulation Care Providers       Provider Role Specialty Phone number    Dmitri Andres MD Referring Family Medicine  [phone]    Drew Bravo PA-C [facility] [phone]

## 2024-08-02 ENCOUNTER — OFFICE VISIT (OUTPATIENT)
Dept: FAMILY MEDICINE | Facility: CLINIC | Age: 79
End: 2024-08-02
Payer: COMMERCIAL

## 2024-08-02 ENCOUNTER — LAB (OUTPATIENT)
Dept: LAB | Facility: CLINIC | Age: 79
End: 2024-08-02
Payer: COMMERCIAL

## 2024-08-02 ENCOUNTER — ANTICOAGULATION THERAPY VISIT (OUTPATIENT)
Dept: ANTICOAGULATION | Facility: CLINIC | Age: 79
End: 2024-08-02

## 2024-08-02 VITALS
OXYGEN SATURATION: 94 % | TEMPERATURE: 97.6 F | SYSTOLIC BLOOD PRESSURE: 113 MMHG | HEART RATE: 55 BPM | WEIGHT: 151.7 LBS | DIASTOLIC BLOOD PRESSURE: 56 MMHG | BODY MASS INDEX: 24.38 KG/M2 | HEIGHT: 66 IN | RESPIRATION RATE: 18 BRPM

## 2024-08-02 DIAGNOSIS — R35.0 BENIGN PROSTATIC HYPERPLASIA WITH URINARY FREQUENCY: ICD-10-CM

## 2024-08-02 DIAGNOSIS — Z79.01 LONG TERM CURRENT USE OF ANTICOAGULANTS WITH INR GOAL OF 2.0-3.0: ICD-10-CM

## 2024-08-02 DIAGNOSIS — I48.11 LONGSTANDING PERSISTENT ATRIAL FIBRILLATION (H): Primary | ICD-10-CM

## 2024-08-02 DIAGNOSIS — Z79.4 TYPE 2 DIABETES MELLITUS WITH STAGE 1 CHRONIC KIDNEY DISEASE, WITH LONG-TERM CURRENT USE OF INSULIN (H): ICD-10-CM

## 2024-08-02 DIAGNOSIS — N18.1 TYPE 2 DIABETES MELLITUS WITH STAGE 1 CHRONIC KIDNEY DISEASE, WITH LONG-TERM CURRENT USE OF INSULIN (H): ICD-10-CM

## 2024-08-02 DIAGNOSIS — I48.0 PAROXYSMAL ATRIAL FIBRILLATION (H): ICD-10-CM

## 2024-08-02 DIAGNOSIS — N40.1 BENIGN PROSTATIC HYPERPLASIA WITH URINARY FREQUENCY: ICD-10-CM

## 2024-08-02 DIAGNOSIS — Z79.4 TYPE 2 DIABETES MELLITUS WITH STAGE 1 CHRONIC KIDNEY DISEASE, WITH LONG-TERM CURRENT USE OF INSULIN (H): Primary | ICD-10-CM

## 2024-08-02 DIAGNOSIS — Z79.01 LONG TERM CURRENT USE OF ANTICOAGULANT THERAPY: ICD-10-CM

## 2024-08-02 DIAGNOSIS — E11.22 TYPE 2 DIABETES MELLITUS WITH STAGE 1 CHRONIC KIDNEY DISEASE, WITH LONG-TERM CURRENT USE OF INSULIN (H): ICD-10-CM

## 2024-08-02 DIAGNOSIS — N18.1 TYPE 2 DIABETES MELLITUS WITH STAGE 1 CHRONIC KIDNEY DISEASE, WITH LONG-TERM CURRENT USE OF INSULIN (H): Primary | ICD-10-CM

## 2024-08-02 DIAGNOSIS — E11.22 TYPE 2 DIABETES MELLITUS WITH STAGE 1 CHRONIC KIDNEY DISEASE, WITH LONG-TERM CURRENT USE OF INSULIN (H): Primary | ICD-10-CM

## 2024-08-02 DIAGNOSIS — I48.91 ATRIAL FIBRILLATION, UNSPECIFIED TYPE (H): ICD-10-CM

## 2024-08-02 LAB
HBA1C MFR BLD: 5.4 % (ref 0–5.6)
INR BLD: 1.4 (ref 0.9–1.1)

## 2024-08-02 PROCEDURE — G2211 COMPLEX E/M VISIT ADD ON: HCPCS

## 2024-08-02 PROCEDURE — 85610 PROTHROMBIN TIME: CPT

## 2024-08-02 PROCEDURE — 83036 HEMOGLOBIN GLYCOSYLATED A1C: CPT

## 2024-08-02 PROCEDURE — 99214 OFFICE O/P EST MOD 30 MIN: CPT

## 2024-08-02 PROCEDURE — 36415 COLL VENOUS BLD VENIPUNCTURE: CPT

## 2024-08-02 PROCEDURE — 36416 COLLJ CAPILLARY BLOOD SPEC: CPT

## 2024-08-02 ASSESSMENT — PAIN SCALES - GENERAL: PAINLEVEL: NO PAIN (0)

## 2024-08-02 NOTE — PROGRESS NOTES
ANTICOAGULATION MANAGEMENT     Cayetano Lunsford 79 year old male is on warfarin with subtherapeutic INR result. (Goal INR 2.0-3.0)    Recent labs: (last 7 days)     08/02/24  1330   INR 1.4*       ASSESSMENT     Source(s): Chart Review and Patient/Caregiver Call     Warfarin doses taken: Warfarin taken as instructed  Diet: No new diet changes identified - had one day where he didn't drink his V8 (minimal influence but if anything, this would increase INR so not considered a temp factor)  Medication/supplement changes: None noted  New illness, injury, or hospitalization: No  Signs or symptoms of bleeding or clotting: No  Previous result: Therapeutic last visit; previously outside of goal range  Additional findings: Took notes on dosing and did not need AVS mailed today.       PLAN     Recommended plan for no diet, medication or health factor changes affecting INR     Dosing Instructions: booster dose then Increase your warfarin dose (10.5% change) with next INR in 1 week       Summary  As of 8/2/2024      Full warfarin instructions:  8/2: 10 mg; Otherwise 7.5 mg every day   Next INR check:  8/9/2024               Telephone call with Leo who verbalizes understanding and agrees to plan    Lab visit scheduled    Education provided: Please call back if any changes to your diet, medications or how you've been taking warfarin    Plan made per [facility] anticoagulation protocol    Corina Cuevas RN  Anticoagulation Clinic  8/2/2024    _______________________________________________________________________     Anticoagulation Episode Summary       Current INR goal:  2.0-3.0   TTR:  44.9% (1 y)   Target end date:  Indefinite   Send INR reminders to:  [facility]    Indications    Longstanding persistent atrial fibrillation (H) [I48.11]  Long term current use of anticoagulants with INR goal of 2.0-3.0 [Z79.01]  Atrial fibrillation  unspecified type (H) (Resolved) [I48.91]  Long-term (current) use of  anticoagulants [Z79.01] (Resolved) [Z79.01]             Comments:               Anticoagulation Care Providers       Provider Role Specialty Phone number    Dmitri Andres MD Referring Family Medicine [phone]    Drew Bravo PA-C Referring Family Medicine [phone]

## 2024-08-02 NOTE — PROGRESS NOTES
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 He does have insulin which he uses PRN if sugars are >200 but this only happened twice in July. Typically will only use insulin 1-3 times a month. He did have an episode of hypoglycemia last week but can feel when sugars are low and was able to improve with orange juice and a snack. Due for A1c check today.     (I48.0) Paroxysmal atrial fibrillation (H)  Has Cardiology that he follows with. Has been stable. On anticoagulation.     (N40.1,  R35.0) Benign prostatic hyperplasia with urinary frequency  Well controlled with use of tamsulosin. No new side effects of the medication. Not due for refill at this time.       The longitudinal plan of care for the diagnosis(es)/condition(s) as documented were addressed during this visit. Due to the added complexity in care, I will continue to support Leo in the subsequent management and with ongoing continuity of care.    Follow up in Spring for physical; sooner with any acute concerns.    Subjective   Leo is a 79 year old, presenting for the following health issues:  Diabetes      8/2/2024    12:51 PM   Additional Questions   Roomed by indy cisneros   Accompanied by self     History of Present Illness       Hypertension: He presents for follow up of hypertension.  He does not check blood pressure  regularly outside of the clinic. Outpatient blood pressures have not been over 140/90. He follows a low salt diet.     He eats 0-1 servings of fruits and vegetables daily.He consumes 0 sweetened beverage(s) daily.He exercises with enough effort to increase his heart rate 9 or less minutes per day.  He exercises with enough effort to increase his heart rate 3 or less days per week.   He is taking medications regularly.     Hypertension  "Follow-up  Do you check your blood pressure regularly outside of the clinic? No   Are you following a low salt diet? Yes  Are your blood pressures ever more than 140 on the top number (systolic) OR more than 90 on the bottom number (diastolic), for example 140/90? No    How many servings of fruits and vegetables do you eat daily?  0-1  On average, how many sweetened beverages do you drink each day (Examples: soda, juice, sweet tea, etc.  Do NOT count diet or artificially sweetened beverages)?   0  How many days per week do you exercise enough to make your heart beat faster? 3 or less  How many minutes a day do you exercise enough to make your heart beat faster? 9 or less  How many days per week do you miss taking your medication? 0        Review of Systems  Constitutional, HEENT, cardiovascular, pulmonary, gi and gu systems are negative, except as otherwise noted.        Objective    /56 (BP Location: Right arm, Patient Position: Chair, Cuff Size: Adult Regular)   Pulse 55   Temp 97.6  F (36.4  C) (Oral)   Resp 18   Ht 1.676 m (5' 6\")   Wt 68.8 kg (151 lb 11.2 oz)   SpO2 94%   BMI 24.49 kg/m    Body mass index is 24.49 kg/m .  Physical Exam   GENERAL: alert and no distress  RESP: lungs clear to auscultation - no rales, rhonchi or wheezes  CV: regular rate and rhythm, normal S1 S2, no S3 or S4, no murmur, click or rub  MS: no gross musculoskeletal defects noted      Signed Electronically by: Deborah Mackey PA-C    "

## 2024-08-06 ENCOUNTER — OFFICE VISIT (OUTPATIENT)
Dept: OPTOMETRY | Facility: CLINIC | Age: 79
End: 2024-08-06
Payer: COMMERCIAL

## 2024-08-06 DIAGNOSIS — E11.9 DIABETES MELLITUS WITHOUT OPHTHALMIC MANIFESTATIONS (H): Primary | ICD-10-CM

## 2024-08-06 DIAGNOSIS — H52.03 HYPEROPIA OF BOTH EYES WITH ASTIGMATISM: ICD-10-CM

## 2024-08-06 DIAGNOSIS — H25.13 NUCLEAR SCLEROSIS OF BOTH EYES: ICD-10-CM

## 2024-08-06 DIAGNOSIS — H52.4 PRESBYOPIA: ICD-10-CM

## 2024-08-06 DIAGNOSIS — H52.203 HYPEROPIA OF BOTH EYES WITH ASTIGMATISM: ICD-10-CM

## 2024-08-06 PROCEDURE — 92015 DETERMINE REFRACTIVE STATE: CPT | Performed by: OPTOMETRIST

## 2024-08-06 PROCEDURE — 92014 COMPRE OPH EXAM EST PT 1/>: CPT | Performed by: OPTOMETRIST

## 2024-08-06 ASSESSMENT — REFRACTION_MANIFEST
OD_ADD: +2.50
OS_SPHERE: +1.50
OS_SPHERE: +1.25
OS_AXIS: 022
OD_CYLINDER: +1.50
OD_CYLINDER: +1.75
OS_ADD: +2.50
OS_AXIS: 006
METHOD_AUTOREFRACTION: 1
OD_AXIS: 180
OD_SPHERE: +1.00
OS_CYLINDER: +1.25
OD_SPHERE: +1.25
OS_CYLINDER: +1.50
OD_AXIS: 001

## 2024-08-06 ASSESSMENT — VISUAL ACUITY
OS_CC: 20/30
OD_CC+: -2
OS_CC: 20/40
OS_CC+: -1
OD_CC: 20/30
CORRECTION_TYPE: GLASSES
METHOD: SNELLEN - LINEAR
OD_CC: 20/40

## 2024-08-06 ASSESSMENT — TONOMETRY
IOP_METHOD: APPLANATION
OD_IOP_MMHG: 14
OS_IOP_MMHG: 14

## 2024-08-06 ASSESSMENT — CONF VISUAL FIELD
OS_NORMAL: 1
OS_SUPERIOR_TEMPORAL_RESTRICTION: 0
METHOD: COUNTING FINGERS
OD_INFERIOR_TEMPORAL_RESTRICTION: 0
OS_SUPERIOR_NASAL_RESTRICTION: 0
OD_NORMAL: 1
OS_INFERIOR_TEMPORAL_RESTRICTION: 0
OD_INFERIOR_NASAL_RESTRICTION: 0
OS_INFERIOR_NASAL_RESTRICTION: 0
OD_SUPERIOR_NASAL_RESTRICTION: 0
OD_SUPERIOR_TEMPORAL_RESTRICTION: 0

## 2024-08-06 ASSESSMENT — REFRACTION_WEARINGRX
OD_CYLINDER: +0.75
SPECS_TYPE: PAL
OS_ADD: +2.50
OS_SPHERE: +1.50
OS_CYLINDER: +1.50
OD_SPHERE: +1.00
OD_ADD: +2.50
OS_AXIS: 010
OD_AXIS: 170

## 2024-08-06 ASSESSMENT — EXTERNAL EXAM - RIGHT EYE: OD_EXAM: NORMAL

## 2024-08-06 ASSESSMENT — EXTERNAL EXAM - LEFT EYE: OS_EXAM: NORMAL

## 2024-08-06 ASSESSMENT — SLIT LAMP EXAM - LIDS
COMMENTS: DERMATOCHALASIS
COMMENTS: DERMATOCHALASIS

## 2024-08-06 ASSESSMENT — CUP TO DISC RATIO
OS_RATIO: 0.45
OD_RATIO: 0.5

## 2024-08-06 NOTE — PROGRESS NOTES
Chief Complaint   Patient presents with    Diabetic Eye Exam       Lab Results   Component Value Date    A1C 5.4 08/02/2024    A1C 6.0 02/08/2024    A1C 5.7 07/26/2023    A1C 5.9 02/02/2023    A1C 5.7 10/04/2022    A1C 5.4 07/09/2021    A1C 5.3 05/20/2021    A1C 5.9 11/09/2020    A1C 5.8 07/07/2020    A1C 6.1 12/09/2019            Last Eye Exam: 08/2023  Dilated Previously: Yes, side effects of dilation explained today    What are you currently using to see?  glasses    Distance Vision Acuity: Noticed gradual change in both eyes in glasses     Near Vision Acuity: Not satisfied in glasses     Eye Comfort: good  Do you use eye drops? : No      Rosina Patricio - Optometric Assistant      Medical, surgical and family histories reviewed and updated 8/6/2024.       OBJECTIVE: See Ophthalmology exam    ASSESSMENT:  No diagnosis found.    PLAN:    Cayetano almonte  eye exam results will be sent to Deborah Mackey  There are no Patient Instructions on file for this visit.

## 2024-08-06 NOTE — LETTER
8/6/2024      Cayetano Lunsford  [address]  [facility] [address]      Dear Colleague,    Thank you [facility] patient, [facility]unsford[facility]. Please see a copy of my visit note below.    Chief Complaint   Patient presents with     Diabetic Eye Exam       Lab Results   Component Value Date    A1C 5.4 08/02/2024    A1C 6.0 02/08/2024    A1C 5.7 07/26/2023    A1C 5.9 02/02/2023    A1C 5.7 10/04/2022    A1C 5.4 07/09/2021    A1C 5.3 05/20/2021    A1C 5.9 11/09/2020    A1C 5.8 07/07/2020    A1C 6.1 12/09/2019            Last Eye Exam: 08/2023  Dilated Previously: Yes, side effects of dilation explained today    What are you currently using to see?  glasses    Distance Vision Acuity: Noticed gradual change in both eyes in glasses     Near Vision Acuity: Not satisfied in glasses     Eye Comfort: good  Do you use eye drops? : No      Rosina Patricio - Optometric Assistant      Medical, surgical and family histories reviewed and updated 8/6/2024.       OBJECTIVE: See Ophthalmology exam    ASSESSMENT:  No diagnosis found.    PLAN:    Cayetano Lunsford aware  eye exam results will be sent to Deborah Mackey.  There are no Patient Instructions on file for this visit.          Again, thank you for allowing me to participate in the care of your patient.        Sincerely,        Lorin Cuevas OD

## 2024-08-06 NOTE — PATIENT INSTRUCTIONS
Patient Education   Diabetes weakens the blood vessels all over the body, including the eyes. Damage to the blood vessels in the eyes can cause swelling or bleeding into part of the eye (called the retina). This is called diabetic retinopathy (WEN-tin--pu-thee). If not treated, this disease can cause vision loss or blindness.   Symptoms may include blurred or distorted vision, but many people have no symptoms. It's important to see your eye doctor regularly to check for problems.   Early treatment and good control can help protect your vision. Here are the things you can do to help prevent vision loss:      1. Keep your blood sugar levels under tight control.      2. Bring high blood pressure under control.      3. No smoking.      4. Have yearly dilated eye exams.    No retinopathy   Cataracts unchanged, vision unchanged

## 2024-08-09 ENCOUNTER — ANTICOAGULATION THERAPY VISIT (OUTPATIENT)
Dept: ANTICOAGULATION | Facility: CLINIC | Age: 79
End: 2024-08-09

## 2024-08-09 ENCOUNTER — LAB (OUTPATIENT)
Dept: LAB | Facility: CLINIC | Age: 79
End: 2024-08-09
Payer: COMMERCIAL

## 2024-08-09 DIAGNOSIS — I48.11 LONGSTANDING PERSISTENT ATRIAL FIBRILLATION (H): Primary | ICD-10-CM

## 2024-08-09 DIAGNOSIS — Z79.01 LONG TERM CURRENT USE OF ANTICOAGULANTS WITH INR GOAL OF 2.0-3.0: ICD-10-CM

## 2024-08-09 DIAGNOSIS — Z79.01 LONG TERM CURRENT USE OF ANTICOAGULANT THERAPY: ICD-10-CM

## 2024-08-09 DIAGNOSIS — I48.91 ATRIAL FIBRILLATION, UNSPECIFIED TYPE (H): ICD-10-CM

## 2024-08-09 LAB — INR BLD: 2 (ref 0.9–1.1)

## 2024-08-09 PROCEDURE — 85610 PROTHROMBIN TIME: CPT

## 2024-08-09 PROCEDURE — 36416 COLLJ CAPILLARY BLOOD SPEC: CPT

## 2024-08-09 NOTE — PROGRESS NOTES
ANTICOAGULATION MANAGEMENT     Cayetano Lunsford 79 year old male is on warfarin with therapeutic INR result. (Goal INR 2.0-3.0)    Recent labs: (last 7 days)     08/09/24  1327   INR 2.0*       ASSESSMENT     Source(s): Chart Review and Patient/Caregiver Call     Warfarin doses taken: Booster dose(s) recently taken which may be affecting INR  Diet: No new diet changes identified  Medication/supplement changes: None noted  New illness, injury, or hospitalization: No; however, patient reports bilateral foot swelling, he is wearing compression stockings.  Signs or symptoms of bleeding or clotting: No  Previous result: Subtherapeutic  Additional findings: Warfarin maintenance dose was increased 10% at last visit         PLAN     Recommended plan for temporary change(s) and ongoing change(s) affecting INR     Dosing Instructions: Continue your current warfarin dose with next INR in 10-14 days       Summary  As of 8/9/2024      Full warfarin instructions:  7.5 mg every day   Next INR check:  8/22/2024               Telephone call with Leo who verbalizes understanding and agrees to plan    Lab visit scheduled    Education provided: Taking warfarin: Importance of taking warfarin as instructed    Plan made per [facility] anticoagulation protocol    Tatyana Akers, RN  Anticoagulation Clinic  8/9/2024    _______________________________________________________________________     Anticoagulation Episode Summary       Current INR goal:  2.0-3.0   TTR:  44.9% (1 y)   Target end date:  Indefinite   Send INR reminders to:  [facility]    Indications    Longstanding persistent atrial fibrillation (H) [I48.11]  Long term current use of anticoagulants with INR goal of 2.0-3.0 [Z79.01]  Atrial fibrillation  unspecified type (H) (Resolved) [I48.91]  Long-term (current) use of anticoagulants [Z79.01] (Resolved) [Z79.01]             Comments:               Anticoagulation Care Providers       Provider Role Specialty Phone number     Dmitri Andres MD Referring Family Medicine [phone]    Drew Bravo PA-C Referring Family Medicine [phone]

## 2024-08-12 DIAGNOSIS — N18.1 TYPE 2 DIABETES MELLITUS WITH STAGE 1 CHRONIC KIDNEY DISEASE, WITH LONG-TERM CURRENT USE OF INSULIN (H): ICD-10-CM

## 2024-08-12 DIAGNOSIS — Z79.4 TYPE 2 DIABETES MELLITUS WITH STAGE 1 CHRONIC KIDNEY DISEASE, WITH LONG-TERM CURRENT USE OF INSULIN (H): ICD-10-CM

## 2024-08-12 DIAGNOSIS — E11.22 TYPE 2 DIABETES MELLITUS WITH STAGE 1 CHRONIC KIDNEY DISEASE, WITH LONG-TERM CURRENT USE OF INSULIN (H): ICD-10-CM

## 2024-08-12 RX ORDER — BLOOD SUGAR DIAGNOSTIC
STRIP MISCELLANEOUS
Qty: 300 STRIP | Refills: 2 | Status: SHIPPED | OUTPATIENT
Start: 2024-08-12

## 2024-08-22 ENCOUNTER — LAB (OUTPATIENT)
Dept: LAB | Facility: CLINIC | Age: 79
End: 2024-08-22
Payer: COMMERCIAL

## 2024-08-22 ENCOUNTER — ANTICOAGULATION THERAPY VISIT (OUTPATIENT)
Dept: ANTICOAGULATION | Facility: CLINIC | Age: 79
End: 2024-08-22

## 2024-08-22 DIAGNOSIS — Z79.01 LONG TERM CURRENT USE OF ANTICOAGULANT THERAPY: ICD-10-CM

## 2024-08-22 DIAGNOSIS — I48.11 LONGSTANDING PERSISTENT ATRIAL FIBRILLATION (H): Primary | ICD-10-CM

## 2024-08-22 DIAGNOSIS — I48.91 ATRIAL FIBRILLATION, UNSPECIFIED TYPE (H): ICD-10-CM

## 2024-08-22 DIAGNOSIS — Z79.01 LONG TERM CURRENT USE OF ANTICOAGULANTS WITH INR GOAL OF 2.0-3.0: ICD-10-CM

## 2024-08-22 LAB — INR BLD: 3.5 (ref 0.9–1.1)

## 2024-08-22 PROCEDURE — 85610 PROTHROMBIN TIME: CPT

## 2024-08-22 PROCEDURE — 36416 COLLJ CAPILLARY BLOOD SPEC: CPT

## 2024-08-22 NOTE — PROGRESS NOTES
ANTICOAGULATION MANAGEMENT     Cayetano Lunsford 79 year old male is on warfarin with supratherapeutic INR result. (Goal INR 2.0-3.0)    Recent labs: (last 7 days)     08/22/24  1300   INR 3.5*       ASSESSMENT     Source(s): Chart Review and Patient/Caregiver Call     Warfarin doses taken: Warfarin taken as instructed  Diet: Decreased greens/vitamin K in diet; plans to resume previous intake.  Also had 2 beers when he usually does have alcohol.  Medication/supplement changes: None noted  New illness, injury, or hospitalization: No  Signs or symptoms of bleeding or clotting: No  Previous result: Therapeutic last visit; previously outside of goal range  Additional findings: None       PLAN     Recommended plan for no diet, medication or health factor changes affecting INR     Dosing Instructions: Continue your current warfarin dose with next INR in 2 weeks       Summary  As of 8/22/2024      Full warfarin instructions:  8/22: Hold; Otherwise 7.5 mg every day   Next INR check:  9/5/2024               Telephone call with Leo who agrees to plan and repeated back plan correctly    Lab visit scheduled    Education provided: Please call back if any changes to your diet, medications or how you've been taking warfarin  Dietary considerations: importance of consistent vitamin K intake, impact of vitamin K foods on INR, and vitamin K content of foods  Healthy lifestyle considerations: potential interaction between warfarin and alcohol    Plan made per [facility] anticoagulation protocol    Estelle Starr RN  Anticoagulation Clinic  8/22/2024    _______________________________________________________________________     Anticoagulation Episode Summary       Current INR goal:  2.0-3.0   TTR:  47.3% (1 y)   Target end date:  Indefinite   Send INR reminders to:  [facility]    Indications    Longstanding persistent atrial fibrillation (H) [I48.11]  Long term current use of anticoagulants with INR goal of 2.0-3.0  [Z79.01]  Atrial fibrillation  unspecified type (H) (Resolved) [I48.91]  Long-term (current) use of anticoagulants [Z79.01] (Resolved) [Z79.01]             Comments:               Anticoagulation Care Providers       Provider Role Specialty Phone number    Dmitri Andres MD Referring Family Medicine [phone]    Drew Bravo PA-C Referring Family Medicine [phone]

## 2024-08-27 DIAGNOSIS — E11.22 TYPE 2 DIABETES MELLITUS WITH STAGE 1 CHRONIC KIDNEY DISEASE, WITH LONG-TERM CURRENT USE OF INSULIN (H): ICD-10-CM

## 2024-08-27 DIAGNOSIS — Z79.4 TYPE 2 DIABETES MELLITUS WITH STAGE 1 CHRONIC KIDNEY DISEASE, WITH LONG-TERM CURRENT USE OF INSULIN (H): ICD-10-CM

## 2024-08-27 DIAGNOSIS — N18.1 TYPE 2 DIABETES MELLITUS WITH STAGE 1 CHRONIC KIDNEY DISEASE, WITH LONG-TERM CURRENT USE OF INSULIN (H): ICD-10-CM

## 2024-08-27 RX ORDER — BLOOD SUGAR DIAGNOSTIC
STRIP MISCELLANEOUS
Qty: 300 EACH | Refills: 3 | Status: SHIPPED | OUTPATIENT
Start: 2024-08-27

## 2024-08-28 DIAGNOSIS — G25.0 BENIGN FAMILIAL TREMOR: ICD-10-CM

## 2024-08-29 RX ORDER — PRIMIDONE 50 MG/1
TABLET ORAL
Refills: 1 | OUTPATIENT
Start: 2024-08-29

## 2024-09-04 ENCOUNTER — OFFICE VISIT (OUTPATIENT)
Dept: NEUROLOGY | Facility: CLINIC | Age: 79
End: 2024-09-04
Payer: COMMERCIAL

## 2024-09-04 VITALS — HEART RATE: 52 BPM | OXYGEN SATURATION: 96 % | DIASTOLIC BLOOD PRESSURE: 50 MMHG | SYSTOLIC BLOOD PRESSURE: 130 MMHG

## 2024-09-04 DIAGNOSIS — G25.0 BENIGN FAMILIAL TREMOR: Primary | ICD-10-CM

## 2024-09-04 PROCEDURE — G2211 COMPLEX E/M VISIT ADD ON: HCPCS | Performed by: PSYCHIATRY & NEUROLOGY

## 2024-09-04 PROCEDURE — 99213 OFFICE O/P EST LOW 20 MIN: CPT | Performed by: PSYCHIATRY & NEUROLOGY

## 2024-09-04 RX ORDER — PRIMIDONE 50 MG/1
TABLET ORAL
Qty: 1001 TABLET | Refills: 1 | Status: SHIPPED | OUTPATIENT
Start: 2024-09-04

## 2024-09-04 NOTE — PATIENT INSTRUCTIONS
AFTER VISIT SUMMARY (AVS):    At today's visit we thoroughly discussed current symptoms, available treatment options, and the plan.    We decided to continue the same medications. I refilled your primidone.    We discussed the trial of wrist weights of proper size.    Next follow-up appointment is in the next 6 months or earlier if needed.    Please do not hesitate to call me with any questions or concerns.    Thanks.

## 2024-09-04 NOTE — PROGRESS NOTES
"Cayetano Lunsford is a 79 year old male who presents for:  Chief Complaint   Patient presents with    Tremors     Overall stable, but could fluctuate day-to-day.        Initial Vitals:  /50 (BP Location: Right arm, Patient Position: Sitting, Cuff Size: Adult Regular)   Pulse 52   SpO2 96%  Estimated body mass index is 24.49 kg/m  as calculated from the following:    Height as of 8/2/24: 1.676 m (5' 6\").    Weight as of 8/2/24: 68.8 kg (151 lb 11.2 oz).. There is no height or weight on file to calculate BSA. BP completed using cuff size: regular    Sasha Tate   "

## 2024-09-04 NOTE — PROGRESS NOTES
ESTABLISHED PATIENT NEUROLOGY NOTE    DATE OF VISIT: 9/4/2024  CLINIC LOCATION: [facility]  MRN: [id number]  PATIENT NAME: Cayetano Lunsford  YOB: 1945    REASON FOR VISIT:   Chief Complaint   Patient presents with    Tremors     Overall stable, but could fluctuate day-to-day.     SUBJECTIVE:                                                      HISTORY OF PRESENT ILLNESS: Patient is here to follow up regarding essential tremor.  The last visit was on 3/7/2024.  Please refer to my initial/other prior notes for further information.    Since the last visit, the patient reports that his tremor fluctuates.  Wrist weights were helpful, but not staying on (thinks that the size is too big).  He is on 120 mg of propranolol twice daily in addition to 200/200/150 mg of primidone daily.  He denies any significant side effects or interval development of new neurological symptoms.  EXAM:                                                    Physical Exam:   Vitals: /50 (BP Location: Right arm, Patient Position: Sitting, Cuff Size: Adult Regular)   Pulse 52   SpO2 96%     General: pt is in NAD, cooperative.  Skin: normal turgor, moist mucous membranes, no lesions/rashes noticed.  HEENT: ATNC, white sclera, normal conjunctiva.  Respiratory: Symmetric lung excursion, no accessory respiratory muscle use.  Abdomen: Non distended.  Neurological: awake, cooperative, follows commands, no exam changes compared to previous visits.  ASSESSMENT AND PLAN:                                                    Assessment: 79 year old male patient presents for follow-up of essential tremor.  The patient reports that his tremor is stable.  Wrist weights are helpful, but he feels that the size is not appropriate for him.  He is on primidone and propranolol.  Previously we discussed that both medications could be increased, though further increase of primidone is unlikely to be beneficial and further increase of  propranolol might not be tolerated due to bradycardia.  Additional treatment options include gabapentin and topiramate.  We decided not to make any medication changes while he tries wrist weights of appropriate size.    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    We decided to continue the same medications. I refilled primidone.    We discussed the trial of wrist weights of proper size.    Next follow-up appointment is in the next 6 months or earlier if needed.    Total Time: 21 minutes spent on the date of the encounter doing chart review, history and exam, documentation and further activities per the note.    Bong Yoo MD  [facility]  (Chart documentation was completed in part with Dragon voice-recognition software. Even though reviewed, some grammatical, spelling, and word errors may remain.)

## 2024-09-04 NOTE — LETTER
9/4/2024      Cayetano Lunsford  [address]  [place] [address]      Dear Colleague,    Thank you for referring your patient, Cayetano Lunsford, to the [facility]UR[facility]. Please see a copy of my visit note below.    ESTABLISHED PATIENT NEUROLOGY NOTE    DATE OF VISIT: 9/4/2024  CLINIC LOCATION: [facility]  MRN: [id number]  PATIENT NAME: Cayetano Lunsford  YOB: 1945    REASON FOR VISIT:   Chief Complaint   Patient presents with     Tremors     Overall stable, but could fluctuate day-to-day.     SUBJECTIVE:                                                      HISTORY OF PRESENT ILLNESS: Patient is here to follow up regarding essential tremor.  The last visit was on 3/7/2024.  Please refer to my initial/other prior notes for further information.    Since the last visit, the patient reports that his tremor fluctuates.  Wrist weights were helpful, but not staying on (thinks that the size is too big).  He is on 120 mg of propranolol twice daily in addition to 200/200/150 mg of primidone daily.  He denies any significant side effects or interval development of new neurological symptoms.  EXAM:                                                    Physical Exam:   Vitals: /50 (BP Location: Right arm, Patient Position: Sitting, Cuff Size: Adult Regular)   Pulse 52   SpO2 96%     General: pt is in NAD, cooperative.  Skin: normal turgor, moist mucous membranes, no lesions/rashes noticed.  HEENT: ATNC, white sclera, normal conjunctiva.  Respiratory: Symmetric lung excursion, no accessory respiratory muscle use.  Abdomen: Non distended.  Neurological: awake, cooperative, follows commands, no exam changes compared to previous visits.  ASSESSMENT AND PLAN:                                                    Assessment: 79 year old male patient presents for follow-up of essential tremor.  The patient reports that his tremor is stable.  Wrist  "weights are helpful, but he feels that the size is not appropriate for him.  He is on primidone and propranolol.  Previously we discussed that both medications could be increased, though further increase of primidone is unlikely to be beneficial and further increase of propranolol might not be tolerated due to bradycardia.  Additional treatment options include gabapentin and topiramate.  We decided not to make any medication changes while he tries wrist weights of appropriate size.    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    We decided to continue the same medications. I refilled primidone.    We discussed the trial of wrist weights of proper size.    Next follow-up appointment is in the next 6 months or earlier if needed.    Total Time: 21 minutes spent on the date of the encounter doing chart review, history and exam, documentation and further activities per the note.    Bong Yoo MD  [facility]rology  (Chart documentation was completed in part with Dragon voice-recognition software. Even though reviewed, some grammatical, spelling, and word errors may remain.)    Cayetano Lunsford is a 79 year old male who presents for:  Chief Complaint   Patient presents with     Tremors     Overall stable, but could fluctuate day-to-day.        Initial Vitals:  /50 (BP Location: Right arm, Patient Position: Sitting, Cuff Size: Adult Regular)   Pulse 52   SpO2 96%  Estimated body mass index is 24.49 kg/m  as calculated from the following:    Height as of 8/2/24: 1.676 m (5' 6\").    Weight as of 8/2/24: 68.8 kg (151 lb 11.2 oz).. There is no height or weight on file to calculate BSA. BP completed using cuff size: regular    Sasha Tate       Again, thank you for allowing me to participate in the care of your patient.        Sincerely,        Bong CABRALES" MD Naila

## 2024-09-05 ENCOUNTER — LAB (OUTPATIENT)
Dept: LAB | Facility: CLINIC | Age: 79
End: 2024-09-05
Payer: COMMERCIAL

## 2024-09-05 ENCOUNTER — ANTICOAGULATION THERAPY VISIT (OUTPATIENT)
Dept: ANTICOAGULATION | Facility: CLINIC | Age: 79
End: 2024-09-05

## 2024-09-05 DIAGNOSIS — I48.91 ATRIAL FIBRILLATION, UNSPECIFIED TYPE (H): ICD-10-CM

## 2024-09-05 DIAGNOSIS — Z79.01 LONG TERM CURRENT USE OF ANTICOAGULANT THERAPY: ICD-10-CM

## 2024-09-05 DIAGNOSIS — Z79.01 LONG TERM CURRENT USE OF ANTICOAGULANTS WITH INR GOAL OF 2.0-3.0: ICD-10-CM

## 2024-09-05 DIAGNOSIS — I48.11 LONGSTANDING PERSISTENT ATRIAL FIBRILLATION (H): Primary | ICD-10-CM

## 2024-09-05 LAB — INR BLD: 3 (ref 0.9–1.1)

## 2024-09-05 PROCEDURE — 36416 COLLJ CAPILLARY BLOOD SPEC: CPT

## 2024-09-05 PROCEDURE — 85610 PROTHROMBIN TIME: CPT

## 2024-09-05 NOTE — PROGRESS NOTES
ANTICOAGULATION MANAGEMENT     Cayetano Lunsford 79 year old male is on warfarin with therapeutic INR result. (Goal INR 2.0-3.0)    Recent labs: (last 7 days)     09/05/24  1252   INR 3.0*       ASSESSMENT     Source(s): Chart Review and Patient/Caregiver Call     Warfarin doses taken: Warfarin taken as instructed  Diet: No new diet changes identified  Medication/supplement changes: None noted  New illness, injury, or hospitalization: No  Signs or symptoms of bleeding or clotting: No  Previous result: Supratherapeutic  Additional findings: None       PLAN     Recommended plan for no diet, medication or health factor changes affecting INR     Dosing Instructions: Continue your current warfarin dose with next INR in 3 weeks       Summary  As of 9/5/2024      Full warfarin instructions:  7.5 mg every day   Next INR check:  9/26/2024               Telephone call with Leo who verbalizes understanding and agrees to plan    Lab visit scheduled    Education provided: Please call back if any changes to your diet, medications or how you've been taking warfarin  Contact [phone] with any changes, questions or concerns.     Plan made per [facility] anticoagulation protocol    Bethanie Dunn RN  Anticoagulation Clinic  9/5/2024    _______________________________________________________________________     Anticoagulation Episode Summary       Current INR goal:  2.0-3.0   TTR:  46.3% (1 y)   Target end date:  Indefinite   Send INR reminders to:  [facility]    Indications    Longstanding persistent atrial fibrillation (H) [I48.11]  Long term current use of anticoagulants with INR goal of 2.0-3.0 [Z79.01]  Atrial fibrillation  unspecified type (H) (Resolved) [I48.91]  Long-term (current) use of anticoagulants [Z79.01] (Resolved) [Z79.01]             Comments:               Anticoagulation Care Providers       Provider Role Specialty Phone number    Dmitri Andres MD Referring Family Medicine [phone]     Drew Bravo PA-C [facility] [phone]

## 2024-09-26 ENCOUNTER — ANTICOAGULATION THERAPY VISIT (OUTPATIENT)
Dept: ANTICOAGULATION | Facility: CLINIC | Age: 79
End: 2024-09-26

## 2024-09-26 ENCOUNTER — LAB (OUTPATIENT)
Dept: LAB | Facility: CLINIC | Age: 79
End: 2024-09-26
Payer: COMMERCIAL

## 2024-09-26 DIAGNOSIS — Z79.01 LONG TERM CURRENT USE OF ANTICOAGULANTS WITH INR GOAL OF 2.0-3.0: ICD-10-CM

## 2024-09-26 DIAGNOSIS — I48.91 ATRIAL FIBRILLATION, UNSPECIFIED TYPE (H): ICD-10-CM

## 2024-09-26 DIAGNOSIS — I48.11 LONGSTANDING PERSISTENT ATRIAL FIBRILLATION (H): Primary | ICD-10-CM

## 2024-09-26 DIAGNOSIS — Z79.01 LONG TERM CURRENT USE OF ANTICOAGULANT THERAPY: ICD-10-CM

## 2024-09-26 LAB — INR BLD: 2.6 (ref 0.9–1.1)

## 2024-09-26 PROCEDURE — 36416 COLLJ CAPILLARY BLOOD SPEC: CPT

## 2024-09-26 PROCEDURE — 85610 PROTHROMBIN TIME: CPT

## 2024-09-26 NOTE — PROGRESS NOTES
ANTICOAGULATION MANAGEMENT     Cayetano Lunsford 79 year old male is on warfarin with therapeutic INR result. (Goal INR 2.0-3.0)    Recent labs: (last 7 days)     09/26/24  1323   INR 2.6*       ASSESSMENT     Source(s): Chart Review and Patient/Caregiver Call     Warfarin doses taken: Warfarin taken as instructed  Diet: No new diet changes identified  Medication/supplement changes: None noted  New illness, injury, or hospitalization: No  Signs or symptoms of bleeding or clotting: No  Previous result: Therapeutic last visit; previously outside of goal range  Additional findings: None       PLAN     Recommended plan for no diet, medication or health factor changes affecting INR     Dosing Instructions: Continue your current warfarin dose with next INR in 4 weeks       Summary  As of 9/26/2024      Full warfarin instructions:  7.5 mg every day   Next INR check:  10/24/2024               Telephone call with Leo who verbalizes understanding and agrees to plan    Lab visit scheduled    Education provided: Please call back if any changes to your diet, medications or how you've been taking warfarin    Plan made per [facility] anticoagulation protocol    Dayanara Cuevas RN  9/26/2024  Anticoagulation Clinic  [employer] for routing messages: an [employer]  [facility] patient phone line: [phone]        _______________________________________________________________________     Anticoagulation Episode Summary       Current INR goal:  2.0-3.0   TTR:  46.3% (1 y)   Target end date:  Indefinite   Send INR reminders to:  [facility]    Indications    Longstanding persistent atrial fibrillation (H) [I48.11]  Long term current use of anticoagulants with INR goal of 2.0-3.0 [Z79.01]  Atrial fibrillation  unspecified type (H) (Resolved) [I48.91]  Long-term (current) use of anticoagulants [Z79.01] (Resolved) [Z79.01]             Comments:               Anticoagulation Care Providers       Provider Role Specialty Phone number     Dmitri Andres MD Referring Family Medicine [phone]    Drew Bravo PA-C Referring Family Medicine [phone]

## 2024-10-08 DIAGNOSIS — J44.9 CHRONIC OBSTRUCTIVE PULMONARY DISEASE, UNSPECIFIED COPD TYPE (H): Primary | ICD-10-CM

## 2024-10-09 ENCOUNTER — TELEPHONE (OUTPATIENT)
Dept: FAMILY MEDICINE | Facility: CLINIC | Age: 79
End: 2024-10-09
Payer: COMMERCIAL

## 2024-10-09 NOTE — TELEPHONE ENCOUNTER
Patient Returning Call    Reason for call:  patient is requesting to speak with an INR nurse has questions on his warfarin    Information relayed to patient:  no    Patient has additional questions:  No      Okay to leave a detailed message?: Yes at Home number on file [phone] (home)

## 2024-10-09 NOTE — TELEPHONE ENCOUNTER
Called and spoke with patient.  He states that he has been constipated recently and questions if dulcolax would interact with warfarin if he started taking this.  No interaction per [employer] so advised him that this would not effect his INR readings.

## 2024-10-14 DIAGNOSIS — E11.22 TYPE 2 DIABETES MELLITUS WITH STAGE 1 CHRONIC KIDNEY DISEASE, WITH LONG-TERM CURRENT USE OF INSULIN (H): ICD-10-CM

## 2024-10-14 DIAGNOSIS — N18.1 TYPE 2 DIABETES MELLITUS WITH STAGE 1 CHRONIC KIDNEY DISEASE, WITH LONG-TERM CURRENT USE OF INSULIN (H): ICD-10-CM

## 2024-10-14 DIAGNOSIS — E11.649 TYPE 2 DIABETES MELLITUS WITH HYPOGLYCEMIA WITHOUT COMA, WITH LONG-TERM CURRENT USE OF INSULIN (H): ICD-10-CM

## 2024-10-14 DIAGNOSIS — Z79.4 TYPE 2 DIABETES MELLITUS WITH STAGE 1 CHRONIC KIDNEY DISEASE, WITH LONG-TERM CURRENT USE OF INSULIN (H): ICD-10-CM

## 2024-10-14 DIAGNOSIS — Z79.4 TYPE 2 DIABETES MELLITUS WITH HYPOGLYCEMIA WITHOUT COMA, WITH LONG-TERM CURRENT USE OF INSULIN (H): ICD-10-CM

## 2024-10-17 ENCOUNTER — TRANSFERRED RECORDS (OUTPATIENT)
Dept: HEALTH INFORMATION MANAGEMENT | Facility: CLINIC | Age: 79
End: 2024-10-17
Payer: COMMERCIAL

## 2024-10-24 ENCOUNTER — LAB (OUTPATIENT)
Dept: LAB | Facility: CLINIC | Age: 79
End: 2024-10-24
Payer: COMMERCIAL

## 2024-10-24 ENCOUNTER — DOCUMENTATION ONLY (OUTPATIENT)
Dept: ANTICOAGULATION | Facility: CLINIC | Age: 79
End: 2024-10-24

## 2024-10-24 ENCOUNTER — ANTICOAGULATION THERAPY VISIT (OUTPATIENT)
Dept: ANTICOAGULATION | Facility: CLINIC | Age: 79
End: 2024-10-24

## 2024-10-24 DIAGNOSIS — I48.11 LONGSTANDING PERSISTENT ATRIAL FIBRILLATION (H): Primary | ICD-10-CM

## 2024-10-24 DIAGNOSIS — Z79.01 LONG TERM CURRENT USE OF ANTICOAGULANTS WITH INR GOAL OF 2.0-3.0: ICD-10-CM

## 2024-10-24 DIAGNOSIS — I48.91 ATRIAL FIBRILLATION, UNSPECIFIED TYPE (H): ICD-10-CM

## 2024-10-24 DIAGNOSIS — Z79.01 LONG TERM CURRENT USE OF ANTICOAGULANT THERAPY: ICD-10-CM

## 2024-10-24 DIAGNOSIS — I48.0 PAROXYSMAL ATRIAL FIBRILLATION (H): ICD-10-CM

## 2024-10-24 LAB — INR BLD: 2.9 (ref 0.9–1.1)

## 2024-10-24 PROCEDURE — 36415 COLL VENOUS BLD VENIPUNCTURE: CPT

## 2024-10-24 PROCEDURE — 85610 PROTHROMBIN TIME: CPT

## 2024-10-24 RX ORDER — AMLODIPINE BESYLATE 5 MG/1
5 TABLET ORAL DAILY
Qty: 90 TABLET | Refills: 1 | Status: SHIPPED | OUTPATIENT
Start: 2024-10-24

## 2024-10-24 NOTE — PROGRESS NOTES
ANTICOAGULATION CLINIC REFERRAL RENEWAL REQUEST       An annual renewal order is required for all patients referred to [facility].?  Please review and sign the pended referral order for Cayetano Lunsford.       ANTICOAGULATION SUMMARY      Warfarin indication(s)   Atrial Fibrillation    Mechanical heart valve present?  NO       Current goal range   INR: 2.0-3.0     Goal appropriate for indication? Goal INR 2-3, standard for indication(s) above     Time in Therapeutic Range (TTR)  (Goal > 60%) 48.9%       Office visit with referring provider's group within last year yes on 8/2/24       Dayanara Cuevas RN  [facility]

## 2024-10-24 NOTE — PROGRESS NOTES
Opened in error, disregard, see other Anticoag encounter dated 10/24/24  Dayanara Cuevas RN, BSN  Anticoagulation Clinic

## 2024-10-24 NOTE — PROGRESS NOTES
ANTICOAGULATION MANAGEMENT     Cayetano Lunsford 79 year old male is on warfarin with therapeutic INR result. (Goal INR 2.0-3.0)    Recent labs: (last 7 days)     10/24/24  1402   INR 2.9*       ASSESSMENT     Source(s): Chart Review and Patient/Caregiver Call     Warfarin doses taken: Warfarin taken as instructed  Diet: Decreased greens/vitamin K in diet; plans to resume previous intake, Patient reports he was out of town, eating out and may have had fewer green veggies than usual, discussed different foods with Vit K, will have Vit K foods list mailed to patient  Medication/supplement changes: None noted  New illness, injury, or hospitalization: No  Signs or symptoms of bleeding or clotting: No  Previous result: Therapeutic last 2(+) visits  Additional findings: None       PLAN     Recommended plan for no diet, medication or health factor changes affecting INR     Dosing Instructions: Continue your current warfarin dose with next INR in 5 weeks       Summary  As of 10/24/2024      Full warfarin instructions:  7.5 mg every day   Next INR check:  11/26/2024               Telephone call with Leo who verbalizes understanding and agrees to plan. Request sent to On-site staff to mail YUE and Dr Morocho Vit K foods list to patient.     Lab visit scheduled    Education provided: Please call back if any changes to your diet, medications or how you've been taking warfarin  Dietary considerations: vitamin K content of foods  Contact [phone] with any changes, questions or concerns.     Plan made per [facility] anticoagulation protocol    Dayanara Cuevas RN  10/24/2024  Anticoagulation Clinic  [employer] for routing messages: p [facility] [place]  [facility] patient phone line: [phone]        _______________________________________________________________________     Anticoagulation Episode Summary       Current INR goal:  2.0-3.0   TTR:  48.9% (1 y)   Target end date:  Indefinite   Send INR reminders to:  [facility]     Indications    Longstanding persistent atrial fibrillation (H) [I48.11]  Long term current use of anticoagulants with INR goal of 2.0-3.0 [Z79.01]  Atrial fibrillation  unspecified type (H) (Resolved) [I48.91]  Long-term (current) use of anticoagulants [Z79.01] (Resolved) [Z79.01]             Comments:  --             Anticoagulation Care Providers       Provider Role Specialty Phone number    Dmitri Andres MD Referring Family Medicine [phone]    Drew Bravo PA-C Referring Family Medicine [phone]

## 2024-10-29 NOTE — PROGRESS NOTES
"TELEPHONE ENCOUNTER NOTE    DATE OF VISIT: 9/8/2020  CLINIC LOCATION: [facility]  MRN: [id number]  PATIENT NAME: Cayetano Lunsford  YOB: 1945  PCP: Dmitri Andres MD    Cayetano Lunsford is a 75 year old male who is being evaluated via a billable telephone visit due to COVID-19 precautions.      The patient has been notified of following:     \"This telephone visit will be conducted via a call between you and your physician/provider. We have found that certain health care needs can be provided without the need for a physical exam.  This service lets us provide the care you need with a short phone conversation.  If a prescription is necessary we can send it directly to your pharmacy.  If lab work is needed we can place an order for that and you can then stop by our lab to have the test done at a later time.    If during the course of the call the physician/provider feels a telephone visit is not appropriate, you will not be charged for this service.\"     Cayetano Lusnford complains of    Chief Complaint   Patient presents with     Follow Up     tremor-some days are worse      I have reviewed and updated the patient's Past Medical History, Social History, Family History and Medication List.    ALLERGIES  Percodan [oxycodone-aspirin]    The consent for this service was obtained by:  Netta Cornejo ([place] signature)    Additional provider notes:    REASON FOR VISIT:   Chief Complaint   Patient presents with     Follow Up     tremor-some days are worse      SUBJECTIVE:                                                      HISTORY OF PRESENT ILLNESS: This telephone encounter is regarding essential tremor.  The last visit was on 3/10/2020.  No medication changes were made.  Please refer to my initial/other prior notes for further information.    Since the last visit, the patient reports that his tremor is stable. Right hand is slightly worse than the left. Coffee/chocolate make it " worse.  He is on 200/150/150 mg of primidone daily and 120 mg of propranolol twice daily without noticeable side effects.  He takes 200 mg of primidone in the morning instead of evening and feels that this change is beneficial.  He denies interval development of new focal neurological symptoms.    On review of systems, patient endorses no other active complaints.  ASSESSMENT AND PLAN:                                                    Assessment: 75 year old male patient, who due to COVID-19 precautions is evaluated via a telephone visit for essential tremor.  We had a detailed discussion with the patient regarding his current symptoms, available treatment options, and the plan.  He was not able to tolerate increased dose of primidone.  We reviewed that potentially we could increase propranolol dose, but should be very closely monitoring side effects of bradycardia and hypotension.  He will discuss it with his primary care provider.  He also reviewed that topiramate or gabapentin are other less effective options (these 2 medications in combination with primidone might affect the level of alertness).    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    We decided not to make any medication changes.    Next follow-up appointment is in the next 6 months or earlier if needed.    I have reviewed the note as documented above.  This accurately captures the substance of my conversation with the patient.    Phone call contact time: 9 minutes.    Bong Yoo MD  / Neurology.     Progress slowly

## 2024-11-05 DIAGNOSIS — E11.9 TYPE 2 DIABETES, HBA1C GOAL < 8% (H): ICD-10-CM

## 2024-11-06 RX ORDER — HUMAN INSULIN 100 [IU]/ML
INJECTION, SOLUTION SUBCUTANEOUS
Qty: 30 ML | Refills: 1 | Status: SHIPPED | OUTPATIENT
Start: 2024-11-06

## 2024-11-08 DIAGNOSIS — J44.9 CHRONIC OBSTRUCTIVE PULMONARY DISEASE, UNSPECIFIED COPD TYPE (H): ICD-10-CM

## 2024-11-08 NOTE — TELEPHONE ENCOUNTER
Albuterol inhaler is as needed.  Last RX 11/2/17.  Also requested from [facility] yesterday but request denied.  Please advise.  Mindi Agrawal RN      Associated Diagnoses    Chronic obstructive pulmonary disease, unspecified COPD type (H) [J44.9]

## 2024-11-08 NOTE — TELEPHONE ENCOUNTER
Clinic RN: Please investigate patient's chart or contact patient if the information cannot be found because patient should have run out of this medication on over 3 months ago (2017). Confirm patient is taking this medication as prescribed. Document findings and route refill encounter to provider for approval or denial.

## 2024-11-11 RX ORDER — ALBUTEROL SULFATE 90 UG/1
1-2 INHALANT RESPIRATORY (INHALATION) EVERY 4 HOURS PRN
Qty: 18 G | Refills: 0 | Status: SHIPPED | OUTPATIENT
Start: 2024-11-11

## 2024-11-25 DIAGNOSIS — I48.91 ATRIAL FIBRILLATION, UNSPECIFIED TYPE (H): ICD-10-CM

## 2024-11-25 RX ORDER — WARFARIN SODIUM 2.5 MG/1
TABLET ORAL
Qty: 220 TABLET | Refills: 1 | Status: SHIPPED | OUTPATIENT
Start: 2024-11-25

## 2024-11-26 ENCOUNTER — ANTICOAGULATION THERAPY VISIT (OUTPATIENT)
Dept: ANTICOAGULATION | Facility: CLINIC | Age: 79
End: 2024-11-26

## 2024-11-26 ENCOUNTER — LAB (OUTPATIENT)
Dept: LAB | Facility: CLINIC | Age: 79
End: 2024-11-26
Payer: COMMERCIAL

## 2024-11-26 DIAGNOSIS — Z79.01 LONG TERM CURRENT USE OF ANTICOAGULANTS WITH INR GOAL OF 2.0-3.0: ICD-10-CM

## 2024-11-26 DIAGNOSIS — I48.11 LONGSTANDING PERSISTENT ATRIAL FIBRILLATION (H): Primary | ICD-10-CM

## 2024-11-26 DIAGNOSIS — I48.11 LONGSTANDING PERSISTENT ATRIAL FIBRILLATION (H): ICD-10-CM

## 2024-11-26 LAB — INR BLD: 6 (ref 0.9–1.1)

## 2024-11-26 PROCEDURE — 36416 COLLJ CAPILLARY BLOOD SPEC: CPT

## 2024-11-26 PROCEDURE — 85610 PROTHROMBIN TIME: CPT

## 2024-11-26 NOTE — PROGRESS NOTES
ANTICOAGULATION MANAGEMENT     Cayetano Lunsford 79 year old male is on warfarin with supratherapeutic INR result. (Goal INR 2.0-3.0)    Recent labs: (last 7 days)     11/26/24  1311   INR 6.0*       ASSESSMENT     Source(s): Chart Review and Patient/Caregiver Call     Warfarin doses taken: Warfarin taken as instructed  Diet: No new diet changes identified  Medication/supplement changes: None noted  New illness, injury, or hospitalization: No  Signs or symptoms of bleeding or clotting: No  Previous result: Therapeutic last 2(+) visits  Additional findings: None       PLAN     Recommended plan for no diet, medication or health factor changes affecting INR     Dosing Instructions: hold 2 doses then decrease your warfarin dose (14% change) with next INR in 3 days.    Dosing plan only given until next INR check to avoid confusion for patient in case further adjustment needs to be done.       Summary  As of 11/26/2024      Full warfarin instructions:  11/26: Hold; 11/27: Hold; Otherwise 5 mg every Mon, Thu, Sat; 7.5 mg all other days   Next INR check:  11/29/2024               Telephone call with Leo who agrees to plan and repeated back plan correctly    Lab visit scheduled    Education provided: Please call back if any changes to your diet, medications or how you've been taking warfarin  Goal range and lab monitoring: goal range and significance of current result  Symptom monitoring: monitoring for bleeding signs and symptoms, when to seek medical attention/emergency care, and if you hit your head or have a bad fall seek emergency care    Plan made per [facility] anticoagulation protocol    Estelle Starr RN  11/26/2024  Anticoagulation Clinic  [employer] for routing messages: p [facility] [place]  [facility] patient phone line: [phone]        _______________________________________________________________________     Anticoagulation Episode Summary       Current INR goal:  2.0-3.0   TTR:  49.2% (1 y)   Target end date:   Indefinite   Send INR reminders to:  [facility]    Indications    Longstanding persistent atrial fibrillation (H) [I48.11]  Long term current use of anticoagulants with INR goal of 2.0-3.0 [Z79.01]  Atrial fibrillation  unspecified type (H) (Resolved) [I48.91]  Long-term (current) use of anticoagulants [Z79.01] (Resolved) [Z79.01]             Comments:  --             Anticoagulation Care Providers       Provider Role Specialty Phone number    Dmitri Andres MD Referring Family Medicine [phone]    Drew Bravo PA-C Referring Family Medicine [phone]    Deborah Mackey PA-C Referring Family Medicine [phone]

## 2024-11-26 NOTE — PROGRESS NOTES
ANTICOAGULATION MANAGEMENT     Cayetano Lunsford 79 year old male is on warfarin with supratherapeutic INR result. (Goal INR 2.0-3.0)    Recent labs: (last 7 days)     11/26/24  1311   INR 6.0*       ASSESSMENT     Source(s): Chart Review  Previous INR was Therapeutic last 2(+) visits  Medication, diet, health changes since last INR chart reviewed; none identified         PLAN     Unable to reach Rich today.    Left message to hold warfarin tonight. Request call back for assessment.    Follow up required to confirm warfarin dose taken and assess for changes    Estelle Starr RN  11/26/2024  Anticoagulation Clinic  [facility] for routing messages: an [employer]  [facility] patient phone line: [phone]

## 2024-12-12 ENCOUNTER — TELEPHONE (OUTPATIENT)
Dept: CARDIOLOGY | Facility: CLINIC | Age: 79
End: 2024-12-12
Payer: COMMERCIAL

## 2024-12-12 DIAGNOSIS — G25.0 BENIGN FAMILIAL TREMOR: ICD-10-CM

## 2024-12-12 DIAGNOSIS — I10 HYPERTENSION GOAL BP (BLOOD PRESSURE) < 140/90: ICD-10-CM

## 2024-12-12 DIAGNOSIS — I48.0 PAROXYSMAL ATRIAL FIBRILLATION (H): ICD-10-CM

## 2024-12-12 RX ORDER — CHLORTHALIDONE 25 MG/1
25 TABLET ORAL DAILY
Qty: 90 TABLET | Refills: 0 | Status: SHIPPED | OUTPATIENT
Start: 2024-12-12

## 2024-12-23 ENCOUNTER — TELEPHONE (OUTPATIENT)
Dept: ANTICOAGULATION | Facility: CLINIC | Age: 79
End: 2024-12-23
Payer: COMMERCIAL

## 2024-12-23 NOTE — TELEPHONE ENCOUNTER
ANTICOAGULATION     Cayetano Lunsford is overdue for an INR check.     Left message for patient to call and schedule lab appointment as soon as possible. If returning call, please schedule.     Corina Cuevas RN  12/23/2024  Anticoagulation Clinic  [facility] for routing messages: an [employer] [facility] patient phone line: [phone]

## 2024-12-26 ENCOUNTER — ANTICOAGULATION THERAPY VISIT (OUTPATIENT)
Dept: ANTICOAGULATION | Facility: CLINIC | Age: 79
End: 2024-12-26

## 2024-12-26 ENCOUNTER — LAB (OUTPATIENT)
Dept: LAB | Facility: CLINIC | Age: 79
End: 2024-12-26
Payer: COMMERCIAL

## 2024-12-26 DIAGNOSIS — E11.22 TYPE 2 DIABETES WITH STAGE 1 CHRONIC KIDNEY DISEASE GFR>90 (H): Primary | ICD-10-CM

## 2024-12-26 DIAGNOSIS — I48.11 LONGSTANDING PERSISTENT ATRIAL FIBRILLATION (H): Primary | ICD-10-CM

## 2024-12-26 DIAGNOSIS — N18.1 TYPE 2 DIABETES WITH STAGE 1 CHRONIC KIDNEY DISEASE GFR>90 (H): Primary | ICD-10-CM

## 2024-12-26 DIAGNOSIS — I48.11 LONGSTANDING PERSISTENT ATRIAL FIBRILLATION (H): ICD-10-CM

## 2024-12-26 DIAGNOSIS — Z79.01 LONG TERM CURRENT USE OF ANTICOAGULANTS WITH INR GOAL OF 2.0-3.0: ICD-10-CM

## 2024-12-26 LAB — INR BLD: 4 (ref 0.9–1.1)

## 2024-12-26 NOTE — PROGRESS NOTES
AVS mailed, 12/26/24 3:01 PM     Molly Powers, RN, BSN, PHN  Anticoagulation Nurse  [phone]

## 2024-12-26 NOTE — PROGRESS NOTES
ANTICOAGULATION MANAGEMENT     Cayetano Lunsford 79 year old male is on warfarin with supratherapeutic INR result. (Goal INR 2.0-3.0)    Recent labs: (last 7 days)     12/26/24  1327   INR 4.0*       ASSESSMENT     Source(s): Chart Review and Patient/Caregiver Call     Warfarin doses taken: Warfarin taken as instructed  Diet: No new diet changes identified  Medication/supplement changes: None noted  New illness, injury, or hospitalization: No; however, patient reports ongoing issues with intermittent swelling in legs and feet  Signs or symptoms of bleeding or clotting: No  Previous result: Supratherapeutic  Additional findings: None       PLAN     Recommended plan for ongoing change(s) affecting INR     Dosing Instructions: hold dose then decrease your warfarin dose (11% change) with next INR in 10 days       Summary  As of 12/26/2024      Full warfarin instructions:  12/26: Hold; Otherwise 7.5 mg every Sun, Wed; 5 mg all other days   Next INR check:  --               Telephone call with Rich who verbalizes understanding and agrees to plan and who agrees to plan and repeated back plan correctly    Lab visit scheduled    Education provided: Taking warfarin: Importance of taking warfarin as instructed  How swelling can affect INR    Plan made per [facility] anticoagulation protocol    Tatyana Akers, RN  12/26/2024  Anticoagulation Clinic  [employer] for routing messages: an [employer]  [facility] patient phone line: [phone]        _______________________________________________________________________     Anticoagulation Episode Summary       Current INR goal:  2.0-3.0   TTR:  46.8% (1 y)   Target end date:  Indefinite   Send INR reminders to:  [facility]    Indications    Longstanding persistent atrial fibrillation (H) [I48.11]  Long term current use of anticoagulants with INR goal of 2.0-3.0 [Z79.01]  Atrial fibrillation  unspecified type (H) (Resolved) [I48.91]  Long-term (current) use of anticoagulants  [Z79.01] (Resolved) [Z79.01]             Comments:  --             Anticoagulation Care Providers       Provider Role Specialty Phone number    Dmitri Andres MD Referring Family Medicine [phone]    Drew Bravo PA-C Referring Family Medicine [phone]    Deborah Mackye PA-C Referring Family Medicine [phone]

## 2025-01-06 ENCOUNTER — TELEPHONE (OUTPATIENT)
Dept: FAMILY MEDICINE | Facility: CLINIC | Age: 80
End: 2025-01-06

## 2025-01-06 NOTE — TELEPHONE ENCOUNTER
----- Message from Quan Quezada sent at 1/6/2025  8:25 AM CST -----  Please notify patient that his kidney function panel looks to be relatively stable. Recommend he follow up w/ MTM referral for annual review.     RAFFI Doe CNP

## 2025-01-06 NOTE — TELEPHONE ENCOUNTER
Attempt x 1. Called pt and left a message to call back to [phone] and to ask to speak to a nurse.     When pt calls back,     Relay message from provider below.     Sasha DAVIES RN   Clinic RN  [facility]

## 2025-01-07 NOTE — TELEPHONE ENCOUNTER
RN spoke to patient     Reviewed message below from Quan NUGENT CNP    Patient states understanding and has no questions at this time     RN reminded of upcoming MTM visit with Diana Johnson MTM on 1/31/2025 phone visit, patient will attend     If any questions/concerns patient will return call     Eva Pulido Registered Nurse  [facility]

## 2025-01-20 ENCOUNTER — LAB (OUTPATIENT)
Dept: LAB | Facility: CLINIC | Age: 80
End: 2025-01-20
Payer: COMMERCIAL

## 2025-01-20 ENCOUNTER — ANTICOAGULATION THERAPY VISIT (OUTPATIENT)
Dept: ANTICOAGULATION | Facility: CLINIC | Age: 80
End: 2025-01-20

## 2025-01-20 DIAGNOSIS — N18.1 TYPE 2 DIABETES WITH STAGE 1 CHRONIC KIDNEY DISEASE GFR>90 (H): Primary | ICD-10-CM

## 2025-01-20 DIAGNOSIS — I48.11 LONGSTANDING PERSISTENT ATRIAL FIBRILLATION (H): Primary | ICD-10-CM

## 2025-01-20 DIAGNOSIS — E11.22 TYPE 2 DIABETES WITH STAGE 1 CHRONIC KIDNEY DISEASE GFR>90 (H): Primary | ICD-10-CM

## 2025-01-20 DIAGNOSIS — I48.11 LONGSTANDING PERSISTENT ATRIAL FIBRILLATION (H): ICD-10-CM

## 2025-01-20 DIAGNOSIS — Z79.01 LONG TERM CURRENT USE OF ANTICOAGULANTS WITH INR GOAL OF 2.0-3.0: ICD-10-CM

## 2025-01-20 LAB — INR BLD: 1.9 (ref 0.9–1.1)

## 2025-01-20 PROCEDURE — 36416 COLLJ CAPILLARY BLOOD SPEC: CPT

## 2025-01-20 PROCEDURE — 85610 PROTHROMBIN TIME: CPT

## 2025-01-20 NOTE — PROGRESS NOTES
ANTICOAGULATION MANAGEMENT     Cayetano Lunsford 80 year old male is on warfarin with subtherapeutic INR result. (Goal INR 2.0-3.0)    Recent labs: (last 7 days)     01/20/25  1313   INR 1.9*       ASSESSMENT     Source(s): Chart Review and Patient/Caregiver Call     Warfarin doses taken: Warfarin taken as instructed  Diet: No new diet changes identified  Medication/supplement changes: None noted  New illness, injury, or hospitalization: No  Signs or symptoms of bleeding or clotting: No  Previous result: Subtherapeutic  Additional findings:  Warfarin maintenance dose was decreased 12% at 1/3/25 INR check, no changes were made to the patient's warfarin dosing 1/10/25       PLAN     Recommended plan for no diet, medication or health factor changes affecting INR     Dosing Instructions: Increase your warfarin dose (7.1% change) with next INR in 2 weeks       Summary  As of 1/20/2025      Full warfarin instructions:  7.5 mg every Mon; 5 mg all other days   Next INR check:  2/3/2025               Telephone call with Leo who agrees to plan and repeated back plan correctly    Lab visit scheduled    Education provided: Please call back if any changes to your diet, medications or how you've been taking warfarin  Contact [phone] with any changes, questions or concerns.     Plan made per [facility] anticoagulation protocol    Dayanara Cuevas RN  1/20/2025  Anticoagulation Clinic  [employer] for routing messages: p [facility] [place]  [facility] patient phone line: [phone]        _______________________________________________________________________     Anticoagulation Episode Summary       Current INR goal:  2.0-3.0   TTR:  42.0% (1 y)   Target end date:  Indefinite   Send INR reminders to:  [facility]    Indications    Longstanding persistent atrial fibrillation (H) [I48.11]  Long term current use of anticoagulants with INR goal of 2.0-3.0 [Z79.01]  Atrial fibrillation  unspecified type (H) (Resolved)  [I48.91]  Long-term (current) use of anticoagulants [Z79.01] (Resolved) [Z79.01]             Comments:  --             Anticoagulation Care Providers       Provider Role Specialty Phone number    Dmitri Andres MD Referring Family Medicine [phone]    Drew Bravo PA-C Referring Family Medicine [phone]    Deborah Mackey PA-C Referring Family Medicine [phone]

## 2025-01-22 DIAGNOSIS — J44.9 CHRONIC OBSTRUCTIVE PULMONARY DISEASE, UNSPECIFIED COPD TYPE (H): ICD-10-CM

## 2025-01-22 RX ORDER — TIOTROPIUM BROMIDE AND OLODATEROL 3.124; 2.736 UG/1; UG/1
SPRAY, METERED RESPIRATORY (INHALATION)
Qty: 4 G | Refills: 2 | Status: SHIPPED | OUTPATIENT
Start: 2025-01-22

## 2025-01-23 DIAGNOSIS — J44.9 CHRONIC OBSTRUCTIVE PULMONARY DISEASE, UNSPECIFIED COPD TYPE (H): ICD-10-CM

## 2025-01-23 RX ORDER — ALBUTEROL SULFATE 90 UG/1
INHALANT RESPIRATORY (INHALATION)
Qty: 8.5 G | Refills: 1 | Status: SHIPPED | OUTPATIENT
Start: 2025-01-23

## 2025-01-30 NOTE — PROGRESS NOTES
ANTICOAGULATION FOLLOW-UP CLINIC VISIT    Patient Name:  Cayetano Lunsford  Date:  4/3/2020  Contact Type:  Telephone    SUBJECTIVE:  Patient Findings     Positives:   Change in diet/appetite (Pt eating greens 3-4 )    Comments:   The patient was assessed for diet, medication, and activity level changes, missed or extra doses, bruising or bleeding, with no problem findings.          Clinical Outcomes     Negatives:   Major bleeding event, Thromboembolic event, Anticoagulation-related hospital admission, Anticoagulation-related ED visit, Anticoagulation-related fatality    Comments:   The patient was assessed for diet, medication, and activity level changes, missed or extra doses, bruising or bleeding, with no problem findings.             OBJECTIVE    INR   Date Value Ref Range Status   2020 2.70 (H) 0.86 - 1.14 Final     Comment:     This test is intended for monitoring Coumadin therapy.  Results are not   accurate in patients with prolonged INR due to factor deficiency.         ASSESSMENT / PLAN  INR assessment THER    Recheck INR In: 4 WEEKS    INR Location Clinic      Anticoagulation Summary  As of 4/3/2020    INR goal:   2.0-3.0   TTR:   62.5 % (1 y)   INR used for dosin.7 (4/3/2020)   Warfarin maintenance plan:   7.5 mg (5 mg x 1.5) every day   Full warfarin instructions:   7.5 mg every day   Weekly warfarin total:   52.5 mg   No change documented:   Tatyana Akers RN   Plan last modified:   Tatyana Akers RN (2020)   Next INR check:   2020   Priority:   Maintenance   Target end date:   Indefinite    Indications    Long-term (current) use of anticoagulants [Z79.01] [Z79.01]  Atrial fibrillation (H) [I48.91]  Longstanding persistent atrial fibrillation [I48.11]             Anticoagulation Episode Summary     INR check location:       Preferred lab:       Send INR reminders to:   [facility]    Comments:    Prefers AVS printed.       Anticoagulation Care Providers     Provider Role  well developed, well nourished , in no acute distress , ambulating without difficulty , normal communication ability Specialty Phone number    Dmitri Andres MD Referring Family Practice [phone]            See the Encounter Report to view Anticoagulation Flowsheet and Dosing Calendar (Go to Encounters tab in chart review, and find the Anticoagulation Therapy Visit)        Tatyana Akers RN

## 2025-02-03 ENCOUNTER — LAB (OUTPATIENT)
Dept: LAB | Facility: CLINIC | Age: 80
End: 2025-02-03
Payer: COMMERCIAL

## 2025-02-03 ENCOUNTER — ANTICOAGULATION THERAPY VISIT (OUTPATIENT)
Dept: ANTICOAGULATION | Facility: CLINIC | Age: 80
End: 2025-02-03

## 2025-02-03 DIAGNOSIS — I48.11 LONGSTANDING PERSISTENT ATRIAL FIBRILLATION (H): ICD-10-CM

## 2025-02-03 DIAGNOSIS — Z79.01 LONG TERM CURRENT USE OF ANTICOAGULANTS WITH INR GOAL OF 2.0-3.0: ICD-10-CM

## 2025-02-03 DIAGNOSIS — I48.11 LONGSTANDING PERSISTENT ATRIAL FIBRILLATION (H): Primary | ICD-10-CM

## 2025-02-03 LAB — INR BLD: 2.3 (ref 0.9–1.1)

## 2025-02-03 PROCEDURE — 36416 COLLJ CAPILLARY BLOOD SPEC: CPT

## 2025-02-03 PROCEDURE — 85610 PROTHROMBIN TIME: CPT

## 2025-02-03 NOTE — PROGRESS NOTES
ANTICOAGULATION MANAGEMENT     Cayetano Lunsford 80 year old male is on warfarin with therapeutic INR result. (Goal INR 2.0-3.0)    Recent labs: (last 7 days)     02/03/25  1318   INR 2.3*       ASSESSMENT     Source(s): Chart Review and Patient/Caregiver Call     Warfarin doses taken: Warfarin taken as instructed  Diet: No new diet changes identified  Medication/supplement changes: None noted  New illness, injury, or hospitalization: No  Signs or symptoms of bleeding or clotting: No  Previous result: Subtherapeutic.  Warfarin dose was increased 7% at last visit.   Additional findings: None       PLAN     Recommended plan for no diet, medication or health factor changes affecting INR     Dosing Instructions: Continue your current warfarin dose with next INR in 3 weeks       Summary  As of 2/3/2025      Full warfarin instructions:  7.5 mg every Mon; 5 mg all other days   Next INR check:  2/24/2025               Telephone call with Leo who agrees to plan and repeated back plan correctly    Lab visit scheduled    Education provided: Please call back if any changes to your diet, medications or how you've been taking warfarin    Plan made per [facility] anticoagulation protocol    Estelle Starr RN  2/3/2025  Anticoagulation Clinic  [email or web address] for routing messages: an [employer]  [facility] patient phone line: [phone]        _______________________________________________________________________     Anticoagulation Episode Summary       Current INR goal:  2.0-3.0   TTR:  41.6% (1 y)   Target end date:  Indefinite   Send INR reminders to:  [facility] [place]    Indications    Longstanding persistent atrial fibrillation (H) [I48.11]  Long term current use of anticoagulants with INR goal of 2.0-3.0 [Z79.01]  Atrial fibrillation  unspecified type (H) (Resolved) [I48.91]  Long-term (current) use of anticoagulants [Z79.01] (Resolved) [Z79.01]             Comments:  --             Anticoagulation Care Providers        Provider Role Specialty Phone number    Dmitri Andres MD Referring Family Medicine [phone]    Drew Bravo PA-C Referring Family Medicine [phone]    Deborah Mackey PA-C Referring Family Medicine [phone]

## 2025-02-04 DIAGNOSIS — I48.0 PAROXYSMAL ATRIAL FIBRILLATION (H): ICD-10-CM

## 2025-02-04 RX ORDER — PROPAFENONE HYDROCHLORIDE 150 MG/1
150 TABLET, COATED ORAL 2 TIMES DAILY
Qty: 180 TABLET | Refills: 1 | Status: SHIPPED | OUTPATIENT
Start: 2025-02-04

## 2025-02-24 ENCOUNTER — ANTICOAGULATION THERAPY VISIT (OUTPATIENT)
Dept: ANTICOAGULATION | Facility: CLINIC | Age: 80
End: 2025-02-24

## 2025-02-24 ENCOUNTER — LAB (OUTPATIENT)
Dept: LAB | Facility: CLINIC | Age: 80
End: 2025-02-24
Payer: COMMERCIAL

## 2025-02-24 DIAGNOSIS — I48.11 LONGSTANDING PERSISTENT ATRIAL FIBRILLATION (H): ICD-10-CM

## 2025-02-24 DIAGNOSIS — Z79.01 LONG TERM CURRENT USE OF ANTICOAGULANTS WITH INR GOAL OF 2.0-3.0: ICD-10-CM

## 2025-02-24 DIAGNOSIS — I48.0 PAROXYSMAL ATRIAL FIBRILLATION (H): ICD-10-CM

## 2025-02-24 DIAGNOSIS — I48.11 LONGSTANDING PERSISTENT ATRIAL FIBRILLATION (H): Primary | ICD-10-CM

## 2025-02-24 DIAGNOSIS — E11.22 TYPE 2 DIABETES WITH STAGE 1 CHRONIC KIDNEY DISEASE GFR>90 (H): ICD-10-CM

## 2025-02-24 DIAGNOSIS — N18.1 TYPE 2 DIABETES WITH STAGE 1 CHRONIC KIDNEY DISEASE GFR>90 (H): ICD-10-CM

## 2025-02-24 DIAGNOSIS — I25.10 CAD IN NATIVE ARTERY: ICD-10-CM

## 2025-02-24 LAB
ERYTHROCYTE [DISTWIDTH] IN BLOOD BY AUTOMATED COUNT: 12.5 % (ref 10–15)
EST. AVERAGE GLUCOSE BLD GHB EST-MCNC: 169 MG/DL
HBA1C MFR BLD: 7.5 % (ref 0–5.6)
HCT VFR BLD AUTO: 32.7 % (ref 40–53)
HGB BLD-MCNC: 10.8 G/DL (ref 13.3–17.7)
INR BLD: 5.8 (ref 0.9–1.1)
MCH RBC QN AUTO: 33.5 PG (ref 26.5–33)
MCHC RBC AUTO-ENTMCNC: 33 G/DL (ref 31.5–36.5)
MCV RBC AUTO: 102 FL (ref 78–100)
PLATELET # BLD AUTO: 311 10E3/UL (ref 150–450)
RBC # BLD AUTO: 3.22 10E6/UL (ref 4.4–5.9)
WBC # BLD AUTO: 8.7 10E3/UL (ref 4–11)

## 2025-02-24 PROCEDURE — 85610 PROTHROMBIN TIME: CPT

## 2025-02-24 PROCEDURE — 85027 COMPLETE CBC AUTOMATED: CPT

## 2025-02-24 PROCEDURE — 83036 HEMOGLOBIN GLYCOSYLATED A1C: CPT

## 2025-02-24 PROCEDURE — 80061 LIPID PANEL: CPT

## 2025-02-24 PROCEDURE — 80048 BASIC METABOLIC PNL TOTAL CA: CPT

## 2025-02-24 NOTE — PROGRESS NOTES
ANTICOAGULATION MANAGEMENT     Cayetano Lunsford 80 year old male is on warfarin with supratherapeutic INR result. (Goal INR 2.0-3.0)    Recent labs: (last 7 days)     02/24/25  1324   INR 5.8*       ASSESSMENT     Source(s): Chart Review and Patient/Caregiver Call     Warfarin doses taken: Warfarin taken as instructed  Diet: Decreased greens/vitamin K in diet; plans to resume previous intake  Medication/supplement changes: None noted  New illness, injury, or hospitalization: nausea 2/21-2/22.  Denies diarrhea.  Was feeling better 2/23.  Signs or symptoms of bleeding or clotting: No  Previous result: Therapeutic last visit; previously outside of goal range  Additional findings: None       PLAN     Recommended plan for temporary change(s) affecting INR     Dosing Instructions: hold 2 doses then continue your current warfarin dose with next INR in 2 days       Summary  As of 2/24/2025      Full warfarin instructions:  2/24: Hold; 2/25: Hold; Otherwise 7.5 mg every Mon; 5 mg all other days   Next INR check:  2/26/2025               Telephone call with Leo who agrees to plan and repeated back plan correctly    Lab visit scheduled    Education provided: Please call back if any changes to your diet, medications or how you've been taking warfarin  Goal range and lab monitoring: goal range and significance of current result  Symptom monitoring: monitoring for bleeding signs and symptoms, when to seek medical attention/emergency care, and if you hit your head or have a bad fall seek emergency care    Plan made per [facility] anticoagulation protocol    Estelle Starr, RN  2/24/2025  Anticoagulation Clinic  [employer] for routing messages: an [employer] [facility] patient phone line: [phone]        _______________________________________________________________________     Anticoagulation Episode Summary       Current INR goal:  2.0-3.0   TTR:  39.3% (1 y)   Target end date:  Indefinite   Send INR reminders to:  [facility]  [place]    Indications    Longstanding persistent atrial fibrillation (H) [I48.11]  Long term current use of anticoagulants with INR goal of 2.0-3.0 [Z79.01]  Atrial fibrillation  unspecified type (H) (Resolved) [I48.91]  Long-term (current) use of anticoagulants [Z79.01] (Resolved) [Z79.01]             Comments:  --             Anticoagulation Care Providers       Provider Role Specialty Phone number    Dmitri Andres MD Referring Family Medicine [phone]    Drew Bravo PA-C Referring Family Medicine [phone]    Deborah Mackey PA-C Referring Family Medicine [phone]

## 2025-02-25 LAB
ANION GAP SERPL CALCULATED.3IONS-SCNC: 9 MMOL/L (ref 7–15)
BUN SERPL-MCNC: 22.1 MG/DL (ref 8–23)
CALCIUM SERPL-MCNC: 9 MG/DL (ref 8.8–10.4)
CHLORIDE SERPL-SCNC: 98 MMOL/L (ref 98–107)
CHOLEST SERPL-MCNC: 128 MG/DL
CREAT SERPL-MCNC: 0.76 MG/DL (ref 0.67–1.17)
EGFRCR SERPLBLD CKD-EPI 2021: 90 ML/MIN/1.73M2
FASTING STATUS PATIENT QL REPORTED: NO
FASTING STATUS PATIENT QL REPORTED: NO
GLUCOSE SERPL-MCNC: 399 MG/DL (ref 70–99)
HCO3 SERPL-SCNC: 28 MMOL/L (ref 22–29)
HDLC SERPL-MCNC: 50 MG/DL
LDLC SERPL CALC-MCNC: 56 MG/DL
NONHDLC SERPL-MCNC: 78 MG/DL
POTASSIUM SERPL-SCNC: 5.1 MMOL/L (ref 3.4–5.3)
SODIUM SERPL-SCNC: 135 MMOL/L (ref 135–145)
TRIGL SERPL-MCNC: 111 MG/DL

## 2025-02-26 ENCOUNTER — LAB (OUTPATIENT)
Dept: LAB | Facility: CLINIC | Age: 80
End: 2025-02-26
Payer: COMMERCIAL

## 2025-02-26 ENCOUNTER — ANTICOAGULATION THERAPY VISIT (OUTPATIENT)
Dept: ANTICOAGULATION | Facility: CLINIC | Age: 80
End: 2025-02-26

## 2025-02-26 DIAGNOSIS — I48.11 LONGSTANDING PERSISTENT ATRIAL FIBRILLATION (H): ICD-10-CM

## 2025-02-26 DIAGNOSIS — I48.11 LONGSTANDING PERSISTENT ATRIAL FIBRILLATION (H): Primary | ICD-10-CM

## 2025-02-26 DIAGNOSIS — Z79.01 LONG TERM CURRENT USE OF ANTICOAGULANTS WITH INR GOAL OF 2.0-3.0: ICD-10-CM

## 2025-02-26 LAB
CREAT UR-MCNC: 65.4 MG/DL
INR BLD: 2 (ref 0.9–1.1)
MICROALBUMIN UR-MCNC: 37.4 MG/L
MICROALBUMIN/CREAT UR: 57.19 MG/G CR (ref 0–17)

## 2025-02-26 PROCEDURE — 82043 UR ALBUMIN QUANTITATIVE: CPT

## 2025-02-26 PROCEDURE — 85610 PROTHROMBIN TIME: CPT

## 2025-02-26 PROCEDURE — 36416 COLLJ CAPILLARY BLOOD SPEC: CPT

## 2025-02-26 PROCEDURE — 82570 ASSAY OF URINE CREATININE: CPT

## 2025-02-26 NOTE — PROGRESS NOTES
ANTICOAGULATION MANAGEMENT     Cayetano Lunsford 80 year old male is on warfarin with therapeutic INR result. (Goal INR 2.0-3.0)    Recent labs: (last 7 days)     02/26/25  1320   INR 2.0*       ASSESSMENT     Source(s): Chart Review and Patient/Caregiver Call     Warfarin doses taken: Held for supratherapeutic INR  recently which may be affecting INR, Patient confirmed holding his warfarin doses on 2/24/25 and 2/25/25.  Diet: No new diet changes identified  Medication/supplement changes: None noted  New illness, injury, or hospitalization: No, patient reports he is feeling better  Signs or symptoms of bleeding or clotting: No  Previous result: Supratherapeutic  Additional findings: None       PLAN     Recommended plan for temporary change(s) affecting INR     Dosing Instructions: Continue your current warfarin dose with next INR in 1 week       Summary  As of 2/26/2025      Full warfarin instructions:  7.5 mg every Mon; 5 mg all other days   Next INR check:  3/5/2025               Telephone call with Leo who agrees to plan and repeated back plan correctly    Lab visit scheduled    Education provided: Please call back if any changes to your diet, medications or how you've been taking warfarin  Contact [phone] with any changes, questions or concerns.     Plan made per [facility] anticoagulation protocol    Dayanara Cuevas RN  2/26/2025  Anticoagulation Clinic  [facility] for routing messages: p [facility] [place]  [facility] patient phone line: [phone]        _______________________________________________________________________     Anticoagulation Episode Summary       Current INR goal:  2.0-3.0   TTR:  39.3% (1 y)   Target end date:  Indefinite   Send INR reminders to:  [facility] [facility]    Indications    Longstanding persistent atrial fibrillation (H) [I48.11]  Long term current use of anticoagulants with INR goal of 2.0-3.0 [Z79.01]  Atrial fibrillation  unspecified type (H) (Resolved) [I48.91]  Long-term  (current) use of anticoagulants [Z79.01] (Resolved) [Z79.01]             Comments:  --             Anticoagulation Care Providers       Provider Role Specialty Phone number    Dmitri Andres MD Referring Family Medicine [phone]    Drew Bravo PA-C Referring Family Medicine [phone]    Deborah Mackey PA-C Referring [facility] Medicine [phone]

## 2025-02-26 NOTE — PROGRESS NOTES
Patient called, returned call to patient. Patient requesting to change the time of his INR lab appointment. Assisted patient in rescheduling lab appointment.  Dayanara Cuevas RN, BSN  Anticoagulation Clinic

## 2025-03-03 ENCOUNTER — TELEPHONE (OUTPATIENT)
Dept: CARDIOLOGY | Facility: CLINIC | Age: 80
End: 2025-03-03
Payer: COMMERCIAL

## 2025-03-03 DIAGNOSIS — I48.0 PAROXYSMAL ATRIAL FIBRILLATION (H): Primary | ICD-10-CM

## 2025-03-03 NOTE — TELEPHONE ENCOUNTER
Spoke to patient to review results of labs and recommendations per provider.  Per AMARJIT Major:  Stable CBC and BMP (save for elevated glucose -- being addressed by PCP/pharmD). Pt overdue for annual follow up, currently scheduled to see Dr. Marroquin in July. Let's see if we can get an EKG and echo on him for routine propafenone follow up before then   Orders placed in epic to EKG and echo.  Sent to scheduling to get this set up.  MANOJ Phan

## 2025-03-05 ENCOUNTER — OFFICE VISIT (OUTPATIENT)
Dept: NEUROLOGY | Facility: CLINIC | Age: 80
End: 2025-03-05
Payer: COMMERCIAL

## 2025-03-05 ENCOUNTER — ANTICOAGULATION THERAPY VISIT (OUTPATIENT)
Dept: ANTICOAGULATION | Facility: CLINIC | Age: 80
End: 2025-03-05

## 2025-03-05 ENCOUNTER — LAB (OUTPATIENT)
Dept: LAB | Facility: CLINIC | Age: 80
End: 2025-03-05
Payer: COMMERCIAL

## 2025-03-05 VITALS
OXYGEN SATURATION: 94 % | BODY MASS INDEX: 22.11 KG/M2 | WEIGHT: 137 LBS | HEART RATE: 66 BPM | DIASTOLIC BLOOD PRESSURE: 69 MMHG | SYSTOLIC BLOOD PRESSURE: 176 MMHG

## 2025-03-05 DIAGNOSIS — G25.0 BENIGN FAMILIAL TREMOR: Primary | ICD-10-CM

## 2025-03-05 DIAGNOSIS — I48.11 LONGSTANDING PERSISTENT ATRIAL FIBRILLATION (H): Primary | ICD-10-CM

## 2025-03-05 DIAGNOSIS — Z79.01 LONG TERM CURRENT USE OF ANTICOAGULANTS WITH INR GOAL OF 2.0-3.0: ICD-10-CM

## 2025-03-05 DIAGNOSIS — I48.11 LONGSTANDING PERSISTENT ATRIAL FIBRILLATION (H): ICD-10-CM

## 2025-03-05 LAB — INR BLD: 5.8 (ref 0.9–1.1)

## 2025-03-05 PROCEDURE — 85610 PROTHROMBIN TIME: CPT

## 2025-03-05 PROCEDURE — 99214 OFFICE O/P EST MOD 30 MIN: CPT | Performed by: PSYCHIATRY & NEUROLOGY

## 2025-03-05 PROCEDURE — 36416 COLLJ CAPILLARY BLOOD SPEC: CPT

## 2025-03-05 PROCEDURE — 3078F DIAST BP <80 MM HG: CPT | Performed by: PSYCHIATRY & NEUROLOGY

## 2025-03-05 PROCEDURE — 3077F SYST BP >= 140 MM HG: CPT | Performed by: PSYCHIATRY & NEUROLOGY

## 2025-03-05 RX ORDER — PROPRANOLOL HYDROCHLORIDE 80 MG/1
80 TABLET ORAL 2 TIMES DAILY
Qty: 180 TABLET | Refills: 1 | Status: SHIPPED | OUTPATIENT
Start: 2025-03-05

## 2025-03-05 RX ORDER — PRIMIDONE 50 MG/1
TABLET ORAL
Qty: 1001 TABLET | Refills: 1 | Status: SHIPPED | OUTPATIENT
Start: 2025-03-05

## 2025-03-05 RX ORDER — PROPRANOLOL HYDROCHLORIDE 60 MG/1
60 TABLET ORAL 2 TIMES DAILY
Qty: 360 TABLET | Refills: 1 | Status: SHIPPED | OUTPATIENT
Start: 2025-03-05

## 2025-03-05 NOTE — PROGRESS NOTES
ANTICOAGULATION MANAGEMENT     Cayetano Lunsford 80 year old male is on warfarin with supratherapeutic INR result. (Goal INR 2.0-3.0)    Recent labs: (last 7 days)     03/05/25  1113   INR 5.8*       ASSESSMENT     Source(s): Chart Review and Patient/Caregiver Call     Warfarin doses taken: Held for 2 doses  recently which may be affecting INR  Diet: No new diet changes identified  Medication/supplement changes: Neurology office visit today--primidone dose NOT changed, propranolol increased to 140 mg twice daily   New illness, injury, or hospitalization: No; however, patient reports he has felt more fatigued recently possibly due to elevated blood sugars  Signs or symptoms of bleeding or clotting: No  Previous result: Therapeutic last visit; previously outside of goal range  Additional findings: None       PLAN     Recommended plan for temporary change(s) affecting INR     Dosing Instructions: hold 2 doses then decrease your warfarin dose (13% change) with next INR in 2 days       Summary  As of 3/5/2025      Full warfarin instructions:  3/5: Hold; 3/6: Hold; Otherwise 2.5 mg every Mon; 5 mg all other days   Next INR check:  3/7/2025               Telephone call with Leo who verbalizes understanding and agrees to plan and who agrees to plan and repeated back plan correctly    Lab visit scheduled    Education provided: Taking warfarin: Importance of taking warfarin as instructed  Symptom monitoring: monitoring for bleeding signs and symptoms and use caution when using knives and/or shaving    Plan made per [facility] anticoagulation protocol    Tatyana Akers, RN  3/5/2025  Anticoagulation Clinic  [email or web address] for routing messages: p [facility] patient phone line: [phone]        _______________________________________________________________________     Anticoagulation Episode Summary       Current INR goal:  2.0-3.0   TTR:  39.5% (1 y)   Target end date:  Indefinite   Send INR reminders to:  [facility] [place]    Indications    Longstanding persistent atrial fibrillation (H) [I48.11]  Long term current use of anticoagulants with INR goal of 2.0-3.0 [Z79.01]  Atrial fibrillation  unspecified type (H) (Resolved) [I48.91]  Long-term (current) use of anticoagulants [Z79.01] (Resolved) [Z79.01]             Comments:  --             Anticoagulation Care Providers       Provider Role Specialty Phone number    Dmitri Andres MD Referring Family Medicine [phone]    Drew Bravo PA-C Referring Family Medicine [phone]    Deborah Mackey PA-C Referring [facility] Medicine [phone]

## 2025-03-05 NOTE — PROGRESS NOTES
ESTABLISHED PATIENT NEUROLOGY NOTE    DATE OF VISIT: 3/5/2025  CLINIC LOCATION: [facility]  MRN: [id number]  PATIENT NAME: Cayetano Lunsford  YOB: 1945    REASON FOR VISIT:   Chief Complaint   Patient presents with    RECHECK     Feels like the tremors are a little worse - wrist weights make it difficult for him to write     SUBJECTIVE:                                                      HISTORY OF PRESENT ILLNESS: Patient is here to follow up regarding essential tremor.  He was last seen on 9/4/2024.  Please refer to my initial/other prior notes for further information.    Since the last visit, the patient reports mild tremor worsening.  He is on 120 mg of propranolol twice daily in addition to 200/200/150 mg of primidone daily.  No significant side effects interval development of new focal neurological symptoms.  EXAM:                                                    Physical Exam:   Vitals: BP (!) 151/72 (BP Location: Left arm, Patient Position: Sitting, Cuff Size: Adult Small)   Pulse 66   Wt 62.1 kg (137 lb)   SpO2 94%   BMI 22.11 kg/m      General: pt is in NAD, cooperative.  Skin: normal turgor, moist mucous membranes, no lesions/rashes noticed.  HEENT: ATNC, white sclera, normal conjunctiva.  Respiratory: Symmetric lung excursion, no accessory respiratory muscle use.  Abdomen: Non distended.  Neurological: awake, cooperative, follows commands, face is symmetric, moderate chronic dysarthria, mild to moderate bilateral positional and kinetic hand tremor, no resting tremor, no dysmetria bilaterally.  ASSESSMENT AND PLAN:                                                    Assessment: 80 year old male patient presents for follow-up of essential tremor.  He reports mild tremor worsening.  Previously we discussed that we could attempt to increase primidone or propranolol, but further increase of primidone unlikely to be beneficial, and further increase of propranolol might  lead to intolerable bradycardia.  Additional options include gabapentin and topiramate.  After careful review of all options, we decided to cautiously increase the dose of propranolol to 140 mg twice daily while he monitors for bradycardia.  The primidone dose was not changed.  I refilled his prescription.    Rich to follow up with Primary Care provider regarding elevated blood pressure.     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    We decided to increase the dose of propranolol while he monitors for bradycardia.  I recommended to take 140 mg (1 of 60 mg tablet and 1 of 80 mg tablet) twice daily in addition to his regular dose of primidone.  All 3 prescriptions were sent to the requested pharmacy.    We also discussed proper use of wrist weights.    Next follow-up appointment is in the next 6 months or earlier if needed.    Total Time: 25 minutes spent on the date of the encounter doing chart review, history and exam, documentation and further activities per the note.    Bong Yoo MD  [facility]  (Chart documentation was completed in part with Dragon voice-recognition software. Even though reviewed, some grammatical, spelling, and word errors may remain.)

## 2025-03-05 NOTE — PATIENT INSTRUCTIONS
AFTER VISIT SUMMARY (AVS):    At today's visit we thoroughly discussed current symptoms, available treatment options, and the plan.    We decided to increase the dose of propranolol while you monitor for low heart rate.  Please take 140 mg (1 of 60 mg tablet and 1 of 80 mg tablet) twice daily in addition to your regular dose of primidone.  All 3 prescriptions were sent to the requested pharmacy.    We also discussed proper use of wrist weights.    Next follow-up appointment is in the next 6 months or earlier if needed.    Please do not hesitate to call me with any questions or concerns.    Thanks.

## 2025-03-05 NOTE — LETTER
3/5/2025      Cayetano Lunsford  [address]  [place] [address]      Dear Colleague,    Thank you for referring your patient, Cayetano Lunsford, to the [facility] NEUROLOGY [facility]. Please see a copy of my visit note below.    ESTABLISHED PATIENT NEUROLOGY NOTE    DATE OF VISIT: 3/5/2025  CLINIC LOCATION: [facility]  MRN: [id number]  PATIENT NAME: Cayetano Lunsford  YOB: 1945    REASON FOR VISIT:   Chief Complaint   Patient presents with     RECHECK     Feels like the tremors are a little worse - wrist weights make it difficult for him to write     SUBJECTIVE:                                                      HISTORY OF PRESENT ILLNESS: Patient is here to follow up regarding essential tremor.  He was last seen on 9/4/2024.  Please refer to my initial/other prior notes for further information.    Since the last visit, the patient reports mild tremor worsening.  He is on 120 mg of propranolol twice daily in addition to 200/200/150 mg of primidone daily.  No significant side effects interval development of new focal neurological symptoms.  EXAM:                                                    Physical Exam:   Vitals: BP (!) 151/72 (BP Location: Left arm, Patient Position: Sitting, Cuff Size: Adult Small)   Pulse 66   Wt 62.1 kg (137 lb)   SpO2 94%   BMI 22.11 kg/m      General: pt is in NAD, cooperative.  Skin: normal turgor, moist mucous membranes, no lesions/rashes noticed.  HEENT: ATNC, white sclera, normal conjunctiva.  Respiratory: Symmetric lung excursion, no accessory respiratory muscle use.  Abdomen: Non distended.  Neurological: awake, cooperative, follows commands, face is symmetric, moderate chronic dysarthria, mild to moderate bilateral positional and kinetic hand tremor, no resting tremor, no dysmetria bilaterally.  ASSESSMENT AND PLAN:                                                    Assessment: 80 year old male patient  presents for follow-up of essential tremor.  He reports mild tremor worsening.  Previously we discussed that we could attempt to increase primidone or propranolol, but further increase of primidone unlikely to be beneficial, and further increase of propranolol might lead to intolerable bradycardia.  Additional options include gabapentin and topiramate.  After careful review of all options, we decided to cautiously increase the dose of propranolol to 140 mg twice daily while he monitors for bradycardia.  The primidone dose was not changed.  I refilled his prescription.    Rich to follow up with Primary Care provider regarding elevated blood pressure.     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    We decided to increase the dose of propranolol while he monitors for bradycardia.  I recommended to take 140 mg (1 of 60 mg tablet and 1 of 80 mg tablet) twice daily in addition to his regular dose of primidone.  All 3 prescriptions were sent to the requested pharmacy.    We also discussed proper use of wrist weights.    Next follow-up appointment is in the next 6 months or earlier if needed.    Total Time: 25 minutes spent on the date of the encounter doing chart review, history and exam, documentation and further activities per the note.    Bong Yoo MD  [facility]  (Chart documentation was completed in part with Dragon voice-recognition software. Even though reviewed, some grammatical, spelling, and word errors may remain.)      Again, thank you for allowing me to participate in the care of your patient.        Sincerely,        Bong Yoo MD    Electronically signed

## 2025-03-05 NOTE — PROGRESS NOTES
Patient called, states he is at an appointment and would like a call back around 2:30 PM.  Dayanara Cuevas RN, BSN  Anticoagulation Clinic

## 2025-03-05 NOTE — NURSING NOTE
"Cayetano Lunsford is a 80 year old male who presents for:  Chief Complaint   Patient presents with    RECHECK     Feels like the tremors are a little worse - wrist weights make it difficult for him to write        Initial Vitals:  BP (!) 151/72 (BP Location: Left arm, Patient Position: Sitting, Cuff Size: Adult Small)   Pulse 66   Wt 62.1 kg (137 lb)   SpO2 94%   BMI 22.11 kg/m   Estimated body mass index is 22.11 kg/m  as calculated from the following:    Height as of 1/3/25: 1.676 m (5' 6\").    Weight as of this encounter: 62.1 kg (137 lb).. Body surface area is 1.7 meters squared. BP completed using cuff size: leon Mensah    "

## 2025-03-07 ENCOUNTER — ANCILLARY PROCEDURE (OUTPATIENT)
Dept: GENERAL RADIOLOGY | Facility: CLINIC | Age: 80
End: 2025-03-07
Attending: PHYSICIAN ASSISTANT
Payer: COMMERCIAL

## 2025-03-07 DIAGNOSIS — M25.551 RIGHT HIP PAIN: ICD-10-CM

## 2025-03-07 DIAGNOSIS — M54.50 ACUTE MIDLINE LOW BACK PAIN WITHOUT SCIATICA: ICD-10-CM

## 2025-03-07 PROBLEM — L98.411 SKIN ULCER OF BUTTOCK, LIMITED TO BREAKDOWN OF SKIN (H): Status: ACTIVE | Noted: 2025-03-07

## 2025-03-07 PROCEDURE — 72220 X-RAY EXAM SACRUM TAILBONE: CPT | Mod: TC | Performed by: RADIOLOGY

## 2025-03-07 PROCEDURE — 73502 X-RAY EXAM HIP UNI 2-3 VIEWS: CPT | Mod: TC | Performed by: RADIOLOGY

## 2025-03-07 PROCEDURE — 72100 X-RAY EXAM L-S SPINE 2/3 VWS: CPT | Mod: TC | Performed by: RADIOLOGY

## 2025-03-10 ENCOUNTER — TELEPHONE (OUTPATIENT)
Dept: FAMILY MEDICINE | Facility: CLINIC | Age: 80
End: 2025-03-10
Payer: COMMERCIAL

## 2025-03-10 DIAGNOSIS — S32.2XXA CLOSED FRACTURE OF COCCYX, INITIAL ENCOUNTER (H): Primary | ICD-10-CM

## 2025-03-10 NOTE — TELEPHONE ENCOUNTER
Message #1 left to return call     Upon return call please review message below from Sarita Gunter, KAREEM Pulido, Registered Nurse  [facility]

## 2025-03-10 NOTE — TELEPHONE ENCOUNTER
"Please call Rich to inform him of the x-ray results     The x-ray of the tailbone (coccyx) shows a likely fracture (\"cortical irregularity and mild angulation of the coccyx in keeping with an age-indeterminate fracture\"). Given his fall and on going pain I think this represents a fracture. Given the pain is worsening I would recommend consult with the spine team to help us know next steps.     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 There is also some mild to moderate arthritis in the hip joint. No fractures other than the tailbone.    "

## 2025-03-11 ENCOUNTER — TELEPHONE (OUTPATIENT)
Dept: ORTHOPEDICS | Facility: CLINIC | Age: 80
End: 2025-03-11

## 2025-03-11 NOTE — TELEPHONE ENCOUNTER
Message #2 left to return call     Please relay message below from Sarita Gunter, PAC  when patient calls back     Eva Pulido, Registered Nurse  [facility]

## 2025-03-11 NOTE — TELEPHONE ENCOUNTER
RN spoke to patient advised that new referral was placed     Gave number to call scheduling to see if they can get him in sooner and in [place] vs 3/18/2025 in [place] [phone]     RN advised patient to return call if he needs additional assistance with this     Eva Pulido, Registered Nurse  [facility]

## 2025-03-11 NOTE — TELEPHONE ENCOUNTER
Patient calling and states he is scheduling in [place] for 3/18/25.  Patient states he wants [place].  He states he is barely able to walk and he can not wait a week.  Pain is worsening.  Has not fallen since 3/7/25 visit.  Referral is for routine.  He does not feel he can wait a week to be seen.  Please advise.  Mindi Agrawal RN

## 2025-03-11 NOTE — TELEPHONE ENCOUNTER
Unable to lvm. Patient answered the phone but would not say anything. Writer sent to letter for patient to call back and schedule the following:    Appointment type: NEW SURGICAL SPINE  Provider: Bishop Gena or Susan  Return date: Next Available  Specialty phone number: [phone]

## 2025-03-11 NOTE — TELEPHONE ENCOUNTER
Notified Patient  of provider's message.     Person was given an opportunity to ask questions, verbalized understanding of plan, and is agreeable.     Cady Cuevas RN

## 2025-03-12 ENCOUNTER — LAB (OUTPATIENT)
Dept: LAB | Facility: CLINIC | Age: 80
End: 2025-03-12
Payer: COMMERCIAL

## 2025-03-12 ENCOUNTER — ANTICOAGULATION THERAPY VISIT (OUTPATIENT)
Dept: ANTICOAGULATION | Facility: CLINIC | Age: 80
End: 2025-03-12

## 2025-03-12 ENCOUNTER — TELEPHONE (OUTPATIENT)
Dept: ANTICOAGULATION | Facility: CLINIC | Age: 80
End: 2025-03-12

## 2025-03-12 DIAGNOSIS — I48.11 LONGSTANDING PERSISTENT ATRIAL FIBRILLATION (H): ICD-10-CM

## 2025-03-12 DIAGNOSIS — I48.11 LONGSTANDING PERSISTENT ATRIAL FIBRILLATION (H): Primary | ICD-10-CM

## 2025-03-12 DIAGNOSIS — Z79.01 LONG TERM CURRENT USE OF ANTICOAGULANTS WITH INR GOAL OF 2.0-3.0: ICD-10-CM

## 2025-03-12 LAB — INR BLD: 2.3 (ref 0.9–1.1)

## 2025-03-12 PROCEDURE — 85610 PROTHROMBIN TIME: CPT

## 2025-03-12 PROCEDURE — 36416 COLLJ CAPILLARY BLOOD SPEC: CPT

## 2025-03-12 NOTE — TELEPHONE ENCOUNTER
ANTICOAGULATION     Cayetano Lunsford is overdue for an INR check.     Spoke with Leo and scheduled lab appointment on today, 3/12/25    Tatyana Akers, RN  3/12/2025  Anticoagulation Clinic  [facility] for routing messages: an [place]  [facility] patient phone line: [phone]

## 2025-03-12 NOTE — PROGRESS NOTES
ANTICOAGULATION MANAGEMENT     Cayetano Lunsford 80 year old male is on warfarin with therapeutic INR result. (Goal INR 2.0-3.0)    Recent labs: (last 7 days)     03/12/25  1349   INR 2.3*       ASSESSMENT     Source(s): Chart Review and Patient/Caregiver Call     Warfarin doses taken: Warfarin taken as instructed  Diet: No new diet changes identified  Medication/supplement changes: Taking Tylenol 1-2 times per day (3486-7578 mg daily).  New illness, injury, or hospitalization: continues with back pain.  Seeing Ortho spine on Monday.  Signs or symptoms of bleeding or clotting: No  Previous result: Therapeutic last visit; previously outside of goal range  Additional findings: None       PLAN     Recommended plan for no diet, medication or health factor changes affecting INR     Dosing Instructions: Continue your current warfarin dose with next INR in 1 week       Summary  As of 3/12/2025      Full warfarin instructions:  2.5 mg every Mon; 5 mg all other days   Next INR check:  3/19/2025               Telephone call with Leo who agrees to plan and repeated back plan correctly    Lab visit scheduled    Education provided: Please call back if any changes to your diet, medications or how you've been taking warfarin    Plan made per [facility] anticoagulation protocol    Estelle Starr RN  3/12/2025  Anticoagulation Clinic  [facility] for routing messages: an [facility] patient phone line: [phone]        _______________________________________________________________________     Anticoagulation Episode Summary       Current INR goal:  2.0-3.0   TTR:  39.1% (1 y)   Target end date:  Indefinite   Send INR reminders to:  [facility] [place]    Indications    Longstanding persistent atrial fibrillation (H) [I48.11]  Long term current use of anticoagulants with INR goal of 2.0-3.0 [Z79.01]  Atrial fibrillation  unspecified type (H) (Resolved) [I48.91]  Long-term (current) use of anticoagulants [Z79.01]  (Resolved) [Z79.01]             Comments:  --             Anticoagulation Care Providers       Provider Role Specialty Phone number    Dmitri Andres MD Referring Family Medicine [phone]    Drew Bravo PA-C Referring Family Medicine [phone]    Deborah Mackey PA-C Referring Family Medicine [phone]

## 2025-03-13 DIAGNOSIS — I10 HYPERTENSION GOAL BP (BLOOD PRESSURE) < 140/90: ICD-10-CM

## 2025-03-13 DIAGNOSIS — G25.0 BENIGN FAMILIAL TREMOR: ICD-10-CM

## 2025-03-13 DIAGNOSIS — I48.0 PAROXYSMAL ATRIAL FIBRILLATION (H): ICD-10-CM

## 2025-03-13 RX ORDER — CHLORTHALIDONE 25 MG/1
25 TABLET ORAL DAILY
Qty: 90 TABLET | Refills: 1 | Status: SHIPPED | OUTPATIENT
Start: 2025-03-13

## 2025-03-17 DIAGNOSIS — I48.0 PAROXYSMAL ATRIAL FIBRILLATION (H): ICD-10-CM

## 2025-03-17 RX ORDER — ATORVASTATIN CALCIUM 20 MG/1
20 TABLET, FILM COATED ORAL DAILY
Qty: 90 TABLET | Refills: 0 | Status: SHIPPED | OUTPATIENT
Start: 2025-03-17

## 2025-03-19 ENCOUNTER — ANTICOAGULATION THERAPY VISIT (OUTPATIENT)
Dept: ANTICOAGULATION | Facility: CLINIC | Age: 80
End: 2025-03-19

## 2025-03-19 ENCOUNTER — LAB (OUTPATIENT)
Dept: LAB | Facility: CLINIC | Age: 80
End: 2025-03-19
Payer: COMMERCIAL

## 2025-03-19 DIAGNOSIS — Z79.01 LONG TERM CURRENT USE OF ANTICOAGULANTS WITH INR GOAL OF 2.0-3.0: ICD-10-CM

## 2025-03-19 DIAGNOSIS — I48.11 LONGSTANDING PERSISTENT ATRIAL FIBRILLATION (H): ICD-10-CM

## 2025-03-19 DIAGNOSIS — I48.11 LONGSTANDING PERSISTENT ATRIAL FIBRILLATION (H): Primary | ICD-10-CM

## 2025-03-19 LAB — INR BLD: 2 (ref 0.9–1.1)

## 2025-03-19 PROCEDURE — 85610 PROTHROMBIN TIME: CPT

## 2025-03-19 PROCEDURE — 36416 COLLJ CAPILLARY BLOOD SPEC: CPT

## 2025-03-19 NOTE — PROGRESS NOTES
ANTICOAGULATION MANAGEMENT     Cayetano Lunsford 80 year old male is on warfarin with therapeutic INR result. (Goal INR 2.0-3.0)    Recent labs: (last 7 days)     03/19/25  1318   INR 2.0*       ASSESSMENT     Source(s): Chart Review and Patient/Caregiver Call     Warfarin doses taken: Warfarin taken as instructed  Diet:  patient reports less salad in the past week but drank more V8 to offset the diet change, patient plans to resume usual vitamin K regimen   Medication/supplement changes:  advised to take tylenol every 8 hours by neurology provider on 3/14/25  New illness, injury, or hospitalization: No; however, ongoing back pain, patient has been referred to physical therapy and will have an MRI in near future  Signs or symptoms of bleeding or clotting: No  Previous result: Therapeutic last 2(+) visits  Additional findings: None       PLAN     Recommended plan for temporary change(s) and ongoing change(s) affecting INR     Dosing Instructions: Continue your current warfarin dose with next INR in 2 weeks       Summary  As of 3/19/2025      Full warfarin instructions:  2.5 mg every Mon; 5 mg all other days   Next INR check:  4/2/2025               Telephone call with Leo who verbalizes understanding and agrees to plan    Lab visit scheduled    Education provided: Taking warfarin: Importance of taking warfarin as instructed  Dietary considerations: importance of consistent vitamin K intake    Plan made per [facility] anticoagulation protocol    Tatyana Akers RN  3/19/2025  Anticoagulation Clinic  [facility] for routing messages: p [facility] [place]  [facility] patient phone line: [phone]        _______________________________________________________________________     Anticoagulation Episode Summary       Current INR goal:  2.0-3.0   TTR:  39.1% (1 y)   Target end date:  Indefinite   Send INR reminders to:  [facility] [place]    Indications    Longstanding persistent atrial fibrillation (H) [I48.11]  Long term current  use of anticoagulants with INR goal of 2.0-3.0 [Z79.01]  Atrial fibrillation  unspecified type (H) (Resolved) [I48.91]  Long-term (current) use of anticoagulants [Z79.01] (Resolved) [Z79.01]             Comments:  --             Anticoagulation Care Providers       Provider Role Specialty Phone number    Dmitri Andres MD Referring Family Medicine [phone]    Drew Bravo PA-C Referring Family Medicine [phone]    Deborah Mackey PA-C Referring [facility] [phone]

## 2025-03-24 ENCOUNTER — THERAPY VISIT (OUTPATIENT)
Dept: PHYSICAL THERAPY | Facility: CLINIC | Age: 80
End: 2025-03-24
Attending: PHYSICIAN ASSISTANT
Payer: COMMERCIAL

## 2025-03-24 DIAGNOSIS — M25.551 RIGHT HIP PAIN: ICD-10-CM

## 2025-03-24 DIAGNOSIS — M54.50 ACUTE MIDLINE LOW BACK PAIN WITHOUT SCIATICA: Primary | ICD-10-CM

## 2025-03-24 PROCEDURE — 97110 THERAPEUTIC EXERCISES: CPT | Mod: GP | Performed by: PHYSICAL THERAPIST

## 2025-03-24 PROCEDURE — 97161 PT EVAL LOW COMPLEX 20 MIN: CPT | Mod: GP | Performed by: PHYSICAL THERAPIST

## 2025-03-24 PROCEDURE — 97112 NEUROMUSCULAR REEDUCATION: CPT | Mod: GP | Performed by: PHYSICAL THERAPIST

## 2025-03-24 ASSESSMENT — ACTIVITIES OF DAILY LIVING (ADL)
SEX_LIFE_(IF_APPLICABLE): MY SEX LIFE IS NORMAL BUT CAUSES SOME ADDITIONAL PAIN.
LIFTING: I CANNOT LIFT OR CARRY ANYTHING AT ALL.
SLEEPING: MY SLEEP IS OCCASIONALLY INTERRUPTED BY PAIN.
PLEASE_INDICATE_YOR_PRIMARY_REASON_FOR_REFERRAL_TO_THERAPY:: HIP
PAIN_INTENSITY: THE PAIN IS FAIRLY SEVERE AT THE MOMENT.
PLEASE_INDICATE_YOR_PRIMARY_REASON_FOR_REFERRAL_TO_THERAPY:: LOWER BACK, MID BACK, AND/OR SACRUM
PERSONAL_CARE: I NEED SOME HELP BUT MANAGE MOST OF MY PERSONAL CARE.

## 2025-03-24 NOTE — PROGRESS NOTES
PHYSICAL THERAPY EVALUATION  Type of Visit: Evaluation       Fall Risk Screen:  Fall screen completed by: PT  Have you fallen 2 or more times in the past year?: Yes  Have you fallen and had an injury in the past year?: Yes  Timed Up and Go score (seconds): 23.14  Is patient a fall risk?: Yes  Fall screen comments: Balance training to be part of PT POC    Subjective     Pt c/o R hip/LBP since falling while bringing in groceries into appartement (handle came off wagon and fell backwards) 1/2/2025.  Had to be helped off floor-911 called.   Few weeks later fell onto buttocks twisting while walking and noted significant pain increase. States had noted pain prior but worsened since falls.  Feels that weakness and pain lead to falling.  X-rays showed coccyx fx.  Had spine consult 3/14/2024 To have MRI in 4/17/2025.  PMH: Lx work injury leading to decompression surgery, neuropathy, diabetes.    X ray:There are 5 lumbar type vertebral bodies with hypoplastic T12 ribs. There is straightening of the normal lumbar lordosis. There is a slight retrolisthesis of L4 in relation to L5 by 3.8 mm. The rest the lumbar spine is good anatomic   alignment. The vertebral body heights are well-maintained throughout. There is diffuse degenerative disc disease throughout the lumbar region with a moderate loss of disc space height, endplate changes and anterior osteophyte formation. There is diffuse facet arthropathy. There are degenerative changes of the sacroiliac joints left greater than right and mild degenerative changes of the hips bilaterally.     Bones are demineralized. There is mild to moderate arthritic change at the right hip. No evidence of an acute displaced fracture or dislocation.   Mild bilateral SI joint arthrosis. On the lateral view there is cortical irregularity and mild angulation of the coccyx in keeping with an age-indeterminate fracture, possibly acute. Correlation with patient's symptoms is recommended.    Atherosclerotic vascular calcifications.        Presenting condition or subjective complaint: back pain  Date of onset: 01/02/25    Relevant medical history:     Dates & types of surgery:      Prior diagnostic imaging/testing results: X-ray     Prior therapy history for the same diagnosis, illness or injury: No      Prior Level of Function  Transfers: Independent  Ambulation: Assistive equipment  ADL: Assistive equipment  IADL: Driving, Finances, Meal preparation, Medication management    Living Environment  Social support: Alone   Type of home: Apartment/condo   Stairs to enter the home: No       Ramp:     Stairs inside the home: No       Help at home: Home management tasks (cooking, cleaning)  Equipment owned: Straight Cane; Grab bars     Employment: No retired  Hobbies/Interests:      Patient goals for therapy: walk, standing and sleep without pain    Pain assessment: Pain present     Objective   LUMBAR SPINE EVALUATION  PAIN: Pain Level at Rest: 2/10  Pain Level with Use: 8/10  Pain Location: lumbar spine  Pain Quality: Aching, Sharp, Shooting, and Stabbing  Pain Frequency: constant  Pain is Worst: daytime or nighttime  Pain is Exacerbated By: standing, walking, transfers, sleeping R sided  Pain is Relieved By: cold, heat, otc medications, and rest  Pain Progression: Unchanged  INTEGUMENTARY (edema, incisions):   POSTURE: Standing Posture: Rounded shoulders, Forward head, Lordosis decreased, Thoracic kyphosis increased  GAIT:   Weightbearing Status: WBAT  Assistive Device(s): Cane (single end)  Gait Deviations: Antalgic  Stride length decreased  Kala decreased  Trunk flexion, Decreased pelvic rotation  BALANCE/PROPRIOCEPTION: Single Leg Stance Eyes Open (seconds): unable R, L 1 sec.  TUG 23.14 sec  WEIGHTBEARING ALIGNMENT:   NON-WEIGHTBEARING ALIGNMENT:    ROM:   (Degrees) Left AROM Left PROM  Right AROM Right PROM   Hip Flexion       Hip Extension       Hip Abduction       Hip Adduction       Hip Internal  Rotation       Hip External Rotation       Knee Flexion       Knee Extension       Lumbar Side glide     Lumbar Flexion Mod loss +   Lumbar Extension Max loss ++ short of neutral     Lx ROM:  SB L mod loss +/-, R mod/max loss +    Pain:   End feel:   PELVIC/SI SCREEN:   STRENGTH:  poor core stab mm recruitment    MYOTOMES:  pain limited testing.  R L/E grossly 4-/5, L 4/5  DTR S:   CORD SIGNS:   DERMATOMES:  decreased B lower leg  NEURAL TENSION:   FLEXIBILITY:  decreased HS, piriformis R>L   LUMBAR/HIP Special Tests:    PELVIS/SI SPECIAL TESTS:   FUNCTIONAL TESTS:   PALPATION:  TTP R>L Lx paraspinals, coccyx,   SPINAL SEGMENTAL CONCLUSIONS:  NA       Assessment & Plan   CLINICAL IMPRESSIONS  Medical Diagnosis: Acute midline low back pain without sciatica, Right hip pain    Treatment Diagnosis: LBP, coxxy fx, decreased mobility, strength and impaired gait and balance   Impression/Assessment: Patient is a 80 year old male with LBP with coccyx fx complaints.  The following significant findings have been identified: Pain, Decreased ROM/flexibility, Decreased strength, Decreased proprioception, Impaired gait, Impaired muscle performance, Decreased activity tolerance, and Impaired posture. These impairments interfere with their ability to perform self care tasks, recreational activities, household chores, driving , household mobility, and community mobility as compared to previous level of function.     Clinical Decision Making (Complexity):  Clinical Presentation: Stable/Uncomplicated  Clinical Presentation Rationale: based on medical and personal factors listed in PT evaluation  Clinical Decision Making (Complexity): Low complexity    PLAN OF CARE  Treatment Interventions:  Interventions: Gait Training, Manual Therapy, Neuromuscular Re-education, Therapeutic Activity, Therapeutic Exercise    Long Term Goals     PT Goal 1  Goal Identifier: Balance  Goal Description: Reduce TUG to <12.5 sec  Rationale:  (for safe  household ambulation;;for safe community ambulation;for safe showering;for safe dressing;for safe standing;for safe homemaking tasks; for decreased risk for falls)  Target Date: 06/16/25  PT Goal 2  Goal Identifier: Ambulation  Goal Description: Be able to [id number]  minutes without deviation with SEC  Rationale:  (for safe household ambulation;for safe community ambulation;to maintain proper body mechanics/posture while ambulating to avoid additional compensatory injury due to improper gait mechanics;to promote a healthy and active lifestyle)  Target Date: 06/16/25      Frequency of Treatment: 1x/week  Duration of Treatment: 12 weeks    Recommended Referrals to Other Professionals:   Education Assessment:   Learner/Method: Patient;Demonstration;Pictures/Video  Education Comments: print    Risks and benefits of evaluation/treatment have been explained.   Patient/Family/caregiver agrees with Plan of Care.     Evaluation Time:     PT Eval, Low Complexity Minutes (44049): 15       Signing Clinician: Stpehane Lantigua PT        [facility]                                                                                   OUTPATIENT PHYSICAL THERAPY      PLAN OF TREATMENT FOR OUTPATIENT REHABILITATION   Patient's Last Name, First Name, Cayetano Borges YOB: 1945   Provider's Name   [facility]   Medical Record No.  [id number]     Onset Date: 01/02/25  Start of Care Date: 03/24/25     Medical Diagnosis:  Acute midline low back pain without sciatica, Right hip pain      PT Treatment Diagnosis:  LBP, coxxy fx, decreased mobility, strength and impaired gait and balance Plan of Treatment  Frequency/Duration: 1x/week/ 12 weeks    Certification date from 03/24/25 to 06/16/25         See note for plan of treatment details and functional goals     Stephane Lantigua, PT                         I CERTIFY THE NEED FOR THESE SERVICES FURNISHED UNDER         THIS PLAN OF TREATMENT AND WHILE UNDER MY CARE     (Physician attestation of this document indicates review and certification of the therapy plan).              Referring Provider:  Sarita Gunter    Initial Assessment  See Epic Evaluation- Start of Care Date: 03/24/25

## 2025-03-26 ENCOUNTER — HOSPITAL ENCOUNTER (OUTPATIENT)
Dept: MRI IMAGING | Facility: CLINIC | Age: 80
Discharge: HOME | End: 2025-03-26
Attending: NURSE PRACTITIONER
Payer: COMMERCIAL

## 2025-03-26 DIAGNOSIS — S32.2XXA CLOSED FRACTURE OF COCCYX, INITIAL ENCOUNTER (H): ICD-10-CM

## 2025-03-26 PROCEDURE — 72195 MRI PELVIS W/O DYE: CPT

## 2025-03-26 PROCEDURE — 72195 MRI PELVIS W/O DYE: CPT | Mod: 26 | Performed by: RADIOLOGY

## 2025-03-27 ENCOUNTER — TELEPHONE (OUTPATIENT)
Dept: PHYSICAL MEDICINE AND REHAB | Facility: CLINIC | Age: 80
End: 2025-03-27
Payer: COMMERCIAL

## 2025-03-27 NOTE — TELEPHONE ENCOUNTER
Call placed to patient with provider's results and recommendations.  Pt stated understanding.   Pt did ask this summary be mailed to him. Letter created and place in mail.

## 2025-03-27 NOTE — LETTER
"March 27, 2025      Leo Lunsford  [address]   [place] [place] [address]        Dear ,    We are writing to inform you of your test results.     \"Please let Leo know that his sacral MRI shows a recent nondisplaced fracture of S5. It looks subacute but not yet chronic. There are degenerative changes of both sacroiliac joints, disc height loss at L4-5 and L5-S1 with narrowing around the nerves at these levels. I would suggest giving the fracture a little more time to heal in case pain improves from that alone, and starting PT. If no relief following PT, we could consider injections.\"        If you have any questions or concerns, please call the clinic at the number listed above.       Sincerely,    [facility]    Electronically signed          "

## 2025-03-27 NOTE — TELEPHONE ENCOUNTER
----- Message from Susangerri Wheat sent at 3/27/2025  1:37 PM CDT -----  Please let Rich know that his sacral MRI shows a recent nondisplaced fracture of S5. It looks subacute but not yet chronic. There are degenerative changes of both sacroiliac joints, disc height loss at L4-5 and L5-S1 with narrowing around the nerves at these levels. I would suggest giving the fracture a little more time to heal in case pain improves from that alone, and starting PT. If no relief following PT, we could consider injections.

## 2025-03-31 ENCOUNTER — THERAPY VISIT (OUTPATIENT)
Dept: PHYSICAL THERAPY | Facility: CLINIC | Age: 80
End: 2025-03-31
Attending: PHYSICIAN ASSISTANT
Payer: COMMERCIAL

## 2025-03-31 DIAGNOSIS — M25.551 RIGHT HIP PAIN: ICD-10-CM

## 2025-03-31 DIAGNOSIS — M54.50 ACUTE MIDLINE LOW BACK PAIN WITHOUT SCIATICA: Primary | ICD-10-CM

## 2025-03-31 PROCEDURE — 97110 THERAPEUTIC EXERCISES: CPT | Mod: GP | Performed by: PHYSICAL THERAPIST

## 2025-04-02 ENCOUNTER — ANTICOAGULATION THERAPY VISIT (OUTPATIENT)
Dept: ANTICOAGULATION | Facility: CLINIC | Age: 80
End: 2025-04-02

## 2025-04-02 ENCOUNTER — LAB (OUTPATIENT)
Dept: LAB | Facility: CLINIC | Age: 80
End: 2025-04-02
Payer: COMMERCIAL

## 2025-04-02 DIAGNOSIS — Z79.01 LONG TERM CURRENT USE OF ANTICOAGULANTS WITH INR GOAL OF 2.0-3.0: ICD-10-CM

## 2025-04-02 DIAGNOSIS — I48.11 LONGSTANDING PERSISTENT ATRIAL FIBRILLATION (H): ICD-10-CM

## 2025-04-02 DIAGNOSIS — I48.11 LONGSTANDING PERSISTENT ATRIAL FIBRILLATION (H): Primary | ICD-10-CM

## 2025-04-02 LAB — INR BLD: 1.9 (ref 0.9–1.1)

## 2025-04-02 PROCEDURE — 36416 COLLJ CAPILLARY BLOOD SPEC: CPT

## 2025-04-02 PROCEDURE — 85610 PROTHROMBIN TIME: CPT

## 2025-04-02 NOTE — PROGRESS NOTES
AVS with dosing calendar printed and placed in out going mailbox.      Sussy Catalan RN  [facility] Anticoagulation Clinic.

## 2025-04-02 NOTE — PROGRESS NOTES
ANTICOAGULATION MANAGEMENT     Cayetano Lunsford 80 year old male is on warfarin with subtherapeutic INR result. (Goal INR 2.0-3.0)    Recent labs: (last 7 days)     04/02/25  1323   INR 1.9*       ASSESSMENT     Source(s): Chart Review and Patient/Caregiver Call     Warfarin doses taken: Warfarin taken as instructed  Diet: No new diet changes identified  Medication/supplement changes: Continues to take Tylenol twice daily for back pain.  New illness, injury, or hospitalization: No  Signs or symptoms of bleeding or clotting: No  Previous result: Therapeutic last 2(+) visits  Additional findings: None       PLAN     Recommended plan for ongoing change(s) affecting INR     Dosing Instructions: Increase your warfarin dose (8% change) with next INR in 2 weeks       Summary  As of 4/2/2025      Full warfarin instructions:  5 mg every day   Next INR check:  4/16/2025               Telephone call with Leo who agrees to plan and repeated back plan correctly    Lab visit scheduled    Education provided: Please call back if any changes to your diet, medications or how you've been taking warfarin  Goal range and lab monitoring: goal range and significance of current result  Dietary considerations: importance of consistent vitamin K intake    Plan made per [facility] anticoagulation protocol    Estelle Starr, RN  4/2/2025  Anticoagulation Clinic  [employer] for routing messages: an [facility] patient phone line: [phone]        _______________________________________________________________________     Anticoagulation Episode Summary       Current INR goal:  2.0-3.0   TTR:  35.9% (1 y)   Target end date:  Indefinite   Send INR reminders to:  [facility] [place]    Indications    Longstanding persistent atrial fibrillation (H) [I48.11]  Long term current use of anticoagulants with INR goal of 2.0-3.0 [Z79.01]  Atrial fibrillation  unspecified type (H) (Resolved) [I48.91]  Long-term (current) use of anticoagulants  [Z79.01] (Resolved) [Z79.01]             Comments:  --             Anticoagulation Care Providers       Provider Role Specialty Phone number    Dmitri Andres MD Referring Family Medicine [phone]    Drew Bravo PA-C Referring Family Medicine [phone]    Deborah Mackey PA-C Referring Family Medicine [phone]

## 2025-04-03 ENCOUNTER — OFFICE VISIT (OUTPATIENT)
Dept: PODIATRY | Facility: CLINIC | Age: 80
End: 2025-04-03
Payer: COMMERCIAL

## 2025-04-03 DIAGNOSIS — M19.072 ARTHRITIS OF BOTH FEET: ICD-10-CM

## 2025-04-03 DIAGNOSIS — M79.672 CHRONIC PAIN OF BOTH FEET: Primary | ICD-10-CM

## 2025-04-03 DIAGNOSIS — M21.612 BUNION, LEFT: ICD-10-CM

## 2025-04-03 DIAGNOSIS — M21.41 PES PLANUS OF BOTH FEET: ICD-10-CM

## 2025-04-03 DIAGNOSIS — N18.1 TYPE 2 DIABETES MELLITUS WITH STAGE 1 CHRONIC KIDNEY DISEASE, UNSPECIFIED WHETHER LONG TERM INSULIN USE (H): ICD-10-CM

## 2025-04-03 DIAGNOSIS — M21.42 PES PLANUS OF BOTH FEET: ICD-10-CM

## 2025-04-03 DIAGNOSIS — E11.22 TYPE 2 DIABETES MELLITUS WITH STAGE 1 CHRONIC KIDNEY DISEASE, UNSPECIFIED WHETHER LONG TERM INSULIN USE (H): ICD-10-CM

## 2025-04-03 DIAGNOSIS — M79.671 CHRONIC PAIN OF BOTH FEET: Primary | ICD-10-CM

## 2025-04-03 DIAGNOSIS — E11.9 ENCOUNTER FOR DIABETIC FOOT EXAM (H): ICD-10-CM

## 2025-04-03 DIAGNOSIS — M21.611 BUNION, RIGHT: ICD-10-CM

## 2025-04-03 DIAGNOSIS — M19.071 ARTHRITIS OF BOTH FEET: ICD-10-CM

## 2025-04-03 DIAGNOSIS — G89.29 CHRONIC PAIN OF BOTH FEET: Primary | ICD-10-CM

## 2025-04-03 NOTE — PROGRESS NOTES
ASSESSMENT:  Encounter Diagnoses   Name Primary?    Chronic pain of both feet Yes    Pes planus of both feet     Bunion, right     Bunion, left     Arthritis of both feet     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, unspecified whether long term insulin use (H)     Encounter for diabetic foot exam (H)      MEDICAL DECISION MAKING:  Chyna foot pain is likely multifactorial including pes planus, midfoot arthritis, heel pain from atrophy of the plantar fat pad, forefoot callusing from atrophy of the plantar fat pad, and hallux abductovalgus bilateral.  There is no surgical procedure that we will address all of these.  I would worry about significant deconditioning if you were to have foot surgery.  He is currently unsteady on his feet and ambulates with a cane.    I recommendation is a referral for new custom molded orthoses and diabetic shoes.    He is current choice of shoe and over-the-counter insert is excellent yet he is not finding them as comfortable as he had hoped.    Diabetic foot exam was performed.  No acute findings.  Specifically no ulceration or skin breakdown.  Pedal pulses are palpable.  Light touch sensation is intact.    He was provided a list of alternatives for nail care.    I encourage yearly follow-up for diabetic foot exam and referral for new custom orthoses and diabetic shoes.    Disclaimer: This note consists of symbols derived from keyboarding, dictation and/or voice recognition software. As a result, there may be errors in the script that have gone undetected. Please consider this when interpreting information found in this chart.    Brendon Chaves DPM, FACFAS, MS    [facility] & Ankle [facility]      ____________________________________________________________________    HPI:       I was asked by Susan Wheat, RAFFI HIGGINBOTHAM  to evaluate Cayetano Lunsford in consultation for chronic pain in both feet.       Cayetano Lunsford reporting  bilateral foot pain.  This is somewhat generalized.    He reports intermittent heel pain and some pain with the bunions.  Cayetano was told he is not a good surgical candidate.    He was last evaluated in podiatry by Dr. Biswas on 5/15/2024.  Diagnoses included bilateral bunions, bilateral foot pain, arthritis of both feet and venous insufficiency.  Discussion appears to be focused on the bunions.  Conservative and surgical options were reviewed.  However, Leo was not considered a good surgical candidate.    Type 2 diabetes  Last hemoglobin A1c 7.5%    Past Medical History:   Diagnosis Date    Allergic rhinitis, cause unspecified     Atrial fibrillation (H)     BPH (benign prostatic hyperplasia)     Chronic airway obstruction, not elsewhere classified     COPD (chronic obstructive pulmonary disease) (H)     Hyperlipidemia LDL goal <100 5/9/2010    Hypertension goal BP (blood pressure) < 130/80 10/19/2006    Obesity (BMI 30-39.9)     Perforation of tympanic membrane, unspecified     Right TM    Tachycardia, unspecified     Type 2 diabetes, HbA1C goal < 8% (H) 5/2/2010    Ulcerative colitis (H)     Unspecified cardiovascular disease    *  *  Past Surgical History:   Procedure Laterality Date    CARDIAC SURGERY      COLONOSCOPY  3/31/2011    COLONOSCOPY N/A 5/25/2018    Procedure: COMBINED COLONOSCOPY, SINGLE OR MULTIPLE BIOPSY/POLYPECTOMY BY BIOPSY;;  Surgeon: Juan Simon DO;  Location: [facility]    COLONOSCOPY N/A 9/6/2019    Procedure: COLONOSCOPY, WITH POLYPECTOMY AND BIOPSY;  Surgeon: Paradise Mims MD;  Location: [facility]    COLONOSCOPY N/A 7/8/2022    Procedure: COLONOSCOPY, FLEXIBLE, WITH LESION REMOVAL USING SNARE;  Surgeon: Juan Simon DO;  Location: [facility]    COLONOSCOPY N/A 7/8/2022    Procedure: COLONOSCOPY, WITH POLYPECTOMY AND BIOPSY;  Surgeon: Juan Simon DO;  Location: [facility]    CORONARY ANGIOGRAPHY ADULT ORDER  2001 and 2008    2001 at [facility]. 2008- No interventions     "ESOPHAGOSCOPY, GASTROSCOPY, DUODENOSCOPY (EGD), COMBINED N/A 7/8/2022    Procedure: ESOPHAGOGASTRODUODENOSCOPY, WITH BIOPSY;  Surgeon: Juan Simon DO;  Location:  GI    HERNIA REPAIR      left inguinal    SURGICAL HISTORY OF -   1987    right ear graft    SURGICAL HISTORY OF -   1992    right shoulder surgery    SURGICAL HISTORY OF -   1979    back surgery    SURGICAL HISTORY OF -   1978    vasectomy    ZZHC REMOVAL OF NAIL PLATE SIMPLE SINGLE  11/10/2011    Right 2nd Toenail   *  *  Current Outpatient Medications   Medication Sig Dispense Refill    acetaminophen (TYLENOL) 325 MG tablet Take 325-650 mg by mouth every 6 hours as needed for mild pain      albuterol (PROAIR HFA/PROVENTIL HFA/VENTOLIN HFA) 108 (90 Base) MCG/ACT inhaler SHAKE WELL AND INHALE ONE TO TWO PUFFS BY MOUTH EVERY 4  HOURS AS NEEDED 8.5 g 1    amLODIPine (NORVASC) 5 MG tablet TAKE 1 TABLET (5 MG) BY MOUTH DAILY 90 tablet 1    atorvastatin (LIPITOR) 20 MG tablet TAKE 1 TABLET (20 MG) BY MOUTH DAILY 90 tablet 0    blood glucose (ONETOUCH ULTRA) test strip USE TO TEST BLOOD SUGAR THREE TIMES A DAY OR AS DIRECTED 300 strip 2    chlorthalidone (HYGROTON) 25 MG tablet Take 1 tablet (25 mg) by mouth daily. 90 tablet 1    cyanocobalamin (VITAMIN B-12) 1000 MCG tablet Take 1,000 mcg by mouth daily      ferrous sulfate (FEROSUL) 325 (65 Fe) MG tablet Take 1 tablet (325 mg) by mouth daily (with breakfast)      fexofenadine (ALLEGRA) 180 MG tablet Take 180 mg by mouth daily      folic acid 0.8 MG CAPS Take 1 tablet by mouth daily 90 capsule 3    furosemide (LASIX) 20 MG tablet TAKE 1 TABLET (20 MG) BY MOUTH AS NEEDED FOR LEG SWELLING 90 tablet 3    insulin regular (NOVOLIN R VIAL) 100 UNIT/ML vial INJECT 2 UNITS AS NEEDED WHEN BLOOD GLUCOSE IS GREATER THAN 200 MG/DL.  IN-USE (OPENED) VIALS MUST BE USED WITHIN 42 DAYS OR BE DISCARDED. 30 mL 1    insulin syringe-needle U-100 (BD INSULIN SYRINGE ULTRAFINE) 31G X 5/16\" 0.3 ML miscellaneous USE 3 " SYRINGES DAILY OR AS DIRECTED. 300 each 3    ipratropium (ATROVENT) 0.03 % nasal spray Spray 2 sprays into both nostrils 2 times daily as needed for rhinitis. 30 mL 1    ipratropium - albuterol 0.5 mg/2.5 mg/3 mL (DUONEB) 0.5-2.5 (3) MG/3ML neb solution NEBULIZE CONTENTS OF ONE VIAL (3 MLS) EVERY 4 HOURS AS NEEDED FOR SHORTNESS OF BREATH OR DYSPNEA 360 mL 1    Lidocaine (LIDOCARE) 4 % Patch Place 2 patches over 12 hours onto the skin every 24 hours. To prevent lidocaine toxicity, patient should be patch free for 12 hrs daily. 60 patch 2    metFORMIN (GLUCOPHAGE) 1000 MG tablet TAKE ONE TABLET BY MOUTH TWICE A DAY WITH BREAKFAST  AND DINNER 180 tablet 1    mometasone (NASONEX) 50 MCG/ACT nasal spray Spray 2 sprays into both nostrils daily.      pantoprazole (PROTONIX) 40 MG enteric coated tablet Take 40 mg by mouth daily Started by Dr. Debbie Rico at [facility]      primidone (MYSOLINE) 50 MG tablet 4 tablets in am, 4 tablets afternoon, and 3 tablets every evening 1001 tablet 1    Probiotic Product (FLORAJEN3) CAPS Take 1 capsule by mouth 2 times daily 60 capsule 0    propafenone (RYTHMOL) 150 MG TABS tablet Take 1 tablet (150 mg) by mouth 2 times daily. 180 tablet 1    propranolol (INDERAL) 60 MG tablet Take 1 tablet (60 mg) by mouth 2 times daily. (Together with 80 mg of propranolol with each dose for the total dose of 140 mg 2 times per day). 360 tablet 1    propranolol (INDERAL) 80 MG tablet Take 1 tablet (80 mg) by mouth 2 times daily. (Together with 60 mg of propranolol with each dose for the total dose of 140 mg 2 times per day). 180 tablet 1    sulfaSALAzine (AZULFIDINE) 500 MG tablet TAKE ONE TABLET BY MOUTH THREE TIMES A DAY 90 tablet 11    tamsulosin (FLOMAX) 0.4 MG capsule TAKE ONE CAPSULE BY MOUTH ONCE DAILY 90 capsule 3    tiotropium-olodaterol (STIOLTO RESPIMAT) 2.5-2.5 MCG/ACT AERS INHALE TWO PUFFS BY MOUTH ONCE DAILY 4 g 2    Vitamin D3 (CHOLECALCIFEROL) 25 mcg (1000 units) tablet Take 1 tablet by  mouth daily.      warfarin ANTICOAGULANT (JANTOVEN ANTICOAGULANT) 2.5 MG tablet TAKE THREE TABLETS (7.5MG) BY MOUTH ON MONDAY AND TAKE TWO AND ONE-HALF TABLETS (6.25MG) BY MOUTH ON ALL OTHER DAYS OF THE WEEK PER INR CLINIC 220 tablet 1    zinc oxide (DESITIN) 40 % external ointment Apply topically as needed for skin protection. 397 g 0         EXAM:    Vitals: There were no vitals taken for this visit.  BMI: There is no height or weight on file to calculate BMI.    Diabetic Foot Exam (details below):  normal DP and PT pulses, no trophic changes or ulcerative lesions, and normal sensory exam    Vasc:      Pedal pulses are palpable for the dorsalis pedis posterior tibial artery, bilateral foot.  Capillary fill time </= 3 seconds  Pedal skin appears well-perfused  Neuro:      Light touch sensation intact to all sensory nerve distributions, bilateral foot.  No apparent spastic contractures or other deformity secondary to neurologic compromise.  Derm:      Atrophy of the plantar fat padding bilateral foot  Thin, generalized hyperkeratosis below the first metatarsal head bilateral  Nails are deformed, thickened, discolored and some elongated  No wounds   No worrisome lesions  MSK:      Pes planus bilateral  hallux abductovalgus bilateral -not fully reducible in the transverse plane.  The plantar fat pad feels thin both forefoot and heel

## 2025-04-03 NOTE — PATIENT INSTRUCTIONS
Thank you for choosing [facility] Podiatry / Foot & Ankle Surgery!    DR. NORTH'S CLINIC LOCATIONS:     [facility] TRIAGE LINE: [phone]   [address] APPOINTMENTS: [phone]   [place] [address] RADIOLOGY: [phone]   (Every other Tues - Wed - Fri PM) SET UP SURGERY: [phone]    PHYSICAL THERAPY: [phone]   [place] SPECIALTY BILLING QUESTIONS: [phone] [address] FAX: [phone]   [place] [address]    (Thurs & Fri AM)       What are Prescription Custom Orthotics?  Custom orthotics are specially-made devices designed to support and comfort your feet. Prescription orthotics are crafted for you and no one else. They match the contours of your feet precisely and are designed for the way you move. Orthotics are only manufactured after a podiatrist has conducted a complete evaluation of your feet, ankles, and legs, so the orthotic can accommodate your unique foot structure and pathology.  Prescription orthotics are divided into two categories:  Functional orthotics are designed to control abnormal motion. They may be used to treat foot pain caused by abnormal motion; they can also be used to treat injuries such as shin splints or tendinitis. Functional orthotics are usually crafted of a semi-rigid material such as plastic or graphite.  Accommodative orthotics are softer and meant to provide additional cushioning and support. They can be used to treat diabetic foot ulcers, painful calluses on the bottom of the foot, and other uncomfortable conditions.  Podiatrists use orthotics to treat foot problems such as plantar fasciitis, bursitis, tendinitis, diabetic foot ulcers, and foot, ankle, and heel pain. Clinical research studies have shown that podiatrist-prescribed foot orthotics decrease foot pain and improve function.  Orthotics typically cost more than shoe inserts purchased in a retail store, but the additional cost is usually well worth it. Unlike shoe  inserts, orthotics are molded to fit each individual foot, so you can be sure that your orthotics fit and do what they're supposed to do. Prescription orthotics are also made of top-notch materials and last many years when cared for properly. Insurance often helps pay for prescription orthotics.  What are Shoe Inserts?   You've seen them at the grocery store and at the mall. You've probably even seen them on TV and online. Shoe inserts are any kind of non-prescription foot support designed to be worn inside a shoe. Pre-packaged, mass produced, arch supports are shoe inserts. So are the  custom-made  insoles and foot supports that you can order online or at retail stores. Unless the device has been prescribed by a doctor and crafted for your specific foot, it's a shoe insert, not a custom orthotic device--despite what the ads might say.  Shoe inserts can be very helpful for a variety of foot ailments, including flat arches and foot and leg pain. They can cushion your feet, provide comfort, and support your arches, but they can't correct biomechanical foot problems or cure long-standing foot issues.  The most common types of shoe inserts are:  Arch supports: Some people have high arches. Others have low arches or flat feet. Arch supports generally have a  bumped-up  appearance and are designed to support the foot's natural arch.   Insoles: Insoles slip into your shoe to provide extra cushioning and support. Insoles are often made of gel, foam, or plastic.   Heel liners: Heel liners, sometimes called heel pads or heel cups, provide extra cushioning in the heel region. They may be especially useful for patients who have foot pain caused by age-related thinning of the heels' natural fat pads.   Foot cushions: Do your shoes rub against your heel or your toes? Foot cushions come in many different shapes and sizes and can be used as a barrier between you and your shoe.  Choosing an Over-the-Counter Shoe Insert  Selecting a  shoe insert from the wide variety of devices on the market can be overwhelming. Here are some podiatrist-tested tips to help you find the insert that best meets your needs:  Consider your health. Do you have diabetes? Problems with circulation? An over-the-counter insert may not be your best bet. Diabetes and poor circulation increase your risk of foot ulcers and infections, so schedule an appointment with a podiatrist. He or she can help you select a solution that won't cause additional health problems.   Think about the purpose. Are you planning to run a marathon, or do you just need a little arch support in your work shoes? Look for a product that fits your planned level of activity.   Bring your shoes. For the insert to be effective, it has to fit into your shoes. So bring your sneakers, dress shoes, or work boots--whatever you plan to wear with your insert. Look for an insert that will fit the contours of your shoe.   Try them on. If all possible, slip the insert into your shoe and try it out. Walk around a little. How does it feel? Don't assume that feelings of pressure will go away with continued wear. (If you can't try the inserts at the store, ask about the store's return policy and hold on to your receipt.)    Please call one of the [place] locations below to schedule an appointment. If you received a prescription please bring it with you to your appointment. Some locations are limited to what they carry.    Office Locations    [facility]  [address] [place] [address]  Home Medical Equipment, [address]   Phone: [phone]   Orthotics and Prosthetics, [address]   Phone: [phone]    [facility]  [address]   [place] [address]   Phone: [phone]    [facility]   [address]  [place] [address]  Phone: [phone]    [facility]  [address]  [place] [address]  Phone: [phone]    [facility]  [address]  [address]  [place] [address]  Phone: [phone]    [facility]  [address] [place] [address]   Phone: [phone]    [facility]  [address] [place] [address]  Phone: [phone]    [facility]  [address]  [place] [address]  Phone: [phone]  Here is a list of routine foot care resources, which includes toenail trimming and callus/corn management.     This is not a referral. It is your responsibility to contact the organization and your insurance to confirm cost and coverage.      ROUTINE FOOT CARE (NAIL TRIMMING / CALLUSES)      [facility]  [phone]   [facility]  [phone]  Multiple locations   [facility]  [phone] Dr. Del Valle and Dr. Hilliard  [address]  [place] [address] [phone]   [facility]  [phone]  [email or web address]

## 2025-04-03 NOTE — LETTER
4/3/2025      Cayetano Lunsford  [address]  [facility] [address]      Dear Colleague,    Thank you for referring your patient, Cayetano Lunsford, to the [facility] PODIATRY. Please see a copy of my visit note below.    ASSESSMENT:  Encounter Diagnoses   Name Primary?     Chronic pain of both feet Yes     Pes planus of both feet      Bunion, right      Bunion, left      Arthritis of both feet      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, unspecified whether long term insulin use (H)      Encounter for diabetic foot exam (H)      MEDICAL DECISION MAKING:  Chyna foot pain is likely multifactorial including pes planus, midfoot arthritis, heel pain from atrophy of the plantar fat pad, forefoot callusing from atrophy of the plantar fat pad, and hallux abductovalgus bilateral.  There is no surgical procedure that we will address all of these.  I would worry about significant deconditioning if you were to have foot surgery.  He is currently unsteady on his feet and ambulates with a cane.    I recommendation is a referral for new custom molded orthoses and diabetic shoes.    He is current choice of shoe and over-the-counter insert is excellent yet he is not finding them as comfortable as he had hoped.    Diabetic foot exam was performed.  No acute findings.  Specifically no ulceration or skin breakdown.  Pedal pulses are palpable.  Light touch sensation is intact.    He was provided a list of alternatives for nail care.    I encourage yearly follow-up for diabetic foot exam and referral for new custom orthoses and diabetic shoes.    Disclaimer: This note consists of symbols derived from keyboarding, dictation and/or voice recognition software. As a result, there may be errors in the script that have gone undetected. Please consider this when interpreting information found in this chart.    Brendon Chaves DPM, FACFAS, MS    [facility]le [facility]      ____________________________________________________________________    HPI:       I was asked by Susan Wheat APRN CNP  to evaluate Cayetano Lunsford in consultation for chronic pain in both feet.       Cayetano Lunsford reporting bilateral foot pain.  This is somewhat generalized.    He reports intermittent heel pain and some pain with the bunions.  Cayetano was told he is not a good surgical candidate.    He was last evaluated in podiatry by Dr. Biswas on 5/15/2024.  Diagnoses included bilateral bunions, bilateral foot pain, arthritis of both feet and venous insufficiency.  Discussion appears to be focused on the bunions.  Conservative and surgical options were reviewed.  However, Leo was not considered a good surgical candidate.    Type 2 diabetes  Last hemoglobin A1c 7.5%    Past Medical History:   Diagnosis Date     Allergic rhinitis, cause unspecified      Atrial fibrillation (H)      BPH (benign prostatic hyperplasia)      Chronic airway obstruction, not elsewhere classified      COPD (chronic obstructive pulmonary disease) (H)      Hyperlipidemia LDL goal <100 5/9/2010     Hypertension goal BP (blood pressure) < 130/80 10/19/2006     Obesity (BMI 30-39.9)      Perforation of tympanic membrane, unspecified     Right TM     Tachycardia, unspecified      Type 2 diabetes, HbA1C goal < 8% (H) 5/2/2010     Ulcerative colitis (H)      Unspecified cardiovascular disease    *  *  Past Surgical History:   Procedure Laterality Date     CARDIAC SURGERY       COLONOSCOPY  3/31/2011     COLONOSCOPY N/A 5/25/2018    Procedure: COMBINED COLONOSCOPY, SINGLE OR MULTIPLE BIOPSY/POLYPECTOMY BY BIOPSY;;  Surgeon: Juan Simon DO;  Location:  GI     COLONOSCOPY N/A 9/6/2019    Procedure: COLONOSCOPY, WITH POLYPECTOMY AND BIOPSY;  Surgeon: Paradise Mims MD;  Location:  GI     COLONOSCOPY N/A 7/8/2022    Procedure: COLONOSCOPY, FLEXIBLE, WITH LESION REMOVAL USING SNARE;   Surgeon: Juan Simon DO;  Location:  GI     COLONOSCOPY N/A 7/8/2022    Procedure: COLONOSCOPY, WITH POLYPECTOMY AND BIOPSY;  Surgeon: Juan Simon DO;  Location:  GI     CORONARY ANGIOGRAPHY ADULT ORDER  2001 and 2008 2001 at [facility]. 2008- No interventions     ESOPHAGOSCOPY, GASTROSCOPY, DUODENOSCOPY (EGD), COMBINED N/A 7/8/2022    Procedure: ESOPHAGOGASTRODUODENOSCOPY, WITH BIOPSY;  Surgeon: Juan Simon DO;  Location:  GI     HERNIA REPAIR      left inguinal     SURGICAL HISTORY OF -   1987    right ear graft     SURGICAL HISTORY OF -   1992    right shoulder surgery     SURGICAL HISTORY OF -   1979    back surgery     SURGICAL HISTORY OF -   1978    vasectomy     ZZHC REMOVAL OF NAIL PLATE SIMPLE SINGLE  11/10/2011    Right 2nd Toenail   *  *  Current Outpatient Medications   Medication Sig Dispense Refill     acetaminophen (TYLENOL) 325 MG tablet Take 325-650 mg by mouth every 6 hours as needed for mild pain       albuterol (PROAIR HFA/PROVENTIL HFA/VENTOLIN HFA) 108 (90 Base) MCG/ACT inhaler SHAKE WELL AND INHALE ONE TO TWO PUFFS BY MOUTH EVERY 4  HOURS AS NEEDED 8.5 g 1     amLODIPine (NORVASC) 5 MG tablet TAKE 1 TABLET (5 MG) BY MOUTH DAILY 90 tablet 1     atorvastatin (LIPITOR) 20 MG tablet TAKE 1 TABLET (20 MG) BY MOUTH DAILY 90 tablet 0     blood glucose (ONETOUCH ULTRA) test strip USE TO TEST BLOOD SUGAR THREE TIMES A DAY OR AS DIRECTED 300 strip 2     chlorthalidone (HYGROTON) 25 MG tablet Take 1 tablet (25 mg) by mouth daily. 90 tablet 1     cyanocobalamin (VITAMIN B-12) 1000 MCG tablet Take 1,000 mcg by mouth daily       ferrous sulfate (FEROSUL) 325 (65 Fe) MG tablet Take 1 tablet (325 mg) by mouth daily (with breakfast)       fexofenadine (ALLEGRA) 180 MG tablet Take 180 mg by mouth daily       folic acid 0.8 MG CAPS Take 1 tablet by mouth daily 90 capsule 3     furosemide (LASIX) 20 MG tablet TAKE 1 TABLET (20 MG) BY MOUTH AS NEEDED FOR LEG SWELLING 90 tablet 3      "insulin regular (NOVOLIN R VIAL) 100 UNIT/ML vial INJECT 2 UNITS AS NEEDED WHEN BLOOD GLUCOSE IS GREATER THAN 200 MG/DL.  IN-USE (OPENED) VIALS MUST BE USED WITHIN 42 DAYS OR BE DISCARDED. 30 mL 1     insulin syringe-needle U-100 (BD INSULIN SYRINGE ULTRAFINE) 31G X 5/16\" 0.3 ML miscellaneous USE 3 SYRINGES DAILY OR AS DIRECTED. 300 each 3     ipratropium (ATROVENT) 0.03 % nasal spray Spray 2 sprays into both nostrils 2 times daily as needed for rhinitis. 30 mL 1     ipratropium - albuterol 0.5 mg/2.5 mg/3 mL (DUONEB) 0.5-2.5 (3) MG/3ML neb solution NEBULIZE CONTENTS OF ONE VIAL (3 MLS) EVERY 4 HOURS AS NEEDED FOR SHORTNESS OF BREATH OR DYSPNEA 360 mL 1     Lidocaine (LIDOCARE) 4 % Patch Place 2 patches over 12 hours onto the skin every 24 hours. To prevent lidocaine toxicity, patient should be patch free for 12 hrs daily. 60 patch 2     metFORMIN (GLUCOPHAGE) 1000 MG tablet TAKE ONE TABLET BY MOUTH TWICE A DAY WITH BREAKFAST  AND DINNER 180 tablet 1     mometasone (NASONEX) 50 MCG/ACT nasal spray Spray 2 sprays into both nostrils daily.       pantoprazole (PROTONIX) 40 MG enteric coated tablet Take 40 mg by mouth daily Started by Dr. Debbie Rico at [facility]       primidone (MYSOLINE) 50 MG tablet 4 tablets in am, 4 tablets afternoon, and 3 tablets every evening 1001 tablet 1     Probiotic Product (FLORAJEN3) CAPS Take 1 capsule by mouth 2 times daily 60 capsule 0     propafenone (RYTHMOL) 150 MG TABS tablet Take 1 tablet (150 mg) by mouth 2 times daily. 180 tablet 1     propranolol (INDERAL) 60 MG tablet Take 1 tablet (60 mg) by mouth 2 times daily. (Together with 80 mg of propranolol with each dose for the total dose of 140 mg 2 times per day). 360 tablet 1     propranolol (INDERAL) 80 MG tablet Take 1 tablet (80 mg) by mouth 2 times daily. (Together with 60 mg of propranolol with each dose for the total dose of 140 mg 2 times per day). 180 tablet 1     sulfaSALAzine (AZULFIDINE) 500 MG tablet TAKE ONE TABLET BY " MOUTH THREE TIMES A DAY 90 tablet 11     tamsulosin (FLOMAX) 0.4 MG capsule TAKE ONE CAPSULE BY MOUTH ONCE DAILY 90 capsule 3     tiotropium-olodaterol (STIOLTO RESPIMAT) 2.5-2.5 MCG/ACT AERS INHALE TWO PUFFS BY MOUTH ONCE DAILY 4 g 2     Vitamin D3 (CHOLECALCIFEROL) 25 mcg (1000 units) tablet Take 1 tablet by mouth daily.       warfarin ANTICOAGULANT (JANTOVEN ANTICOAGULANT) 2.5 MG tablet TAKE THREE TABLETS (7.5MG) BY MOUTH ON MONDAY AND TAKE TWO AND ONE-HALF TABLETS (6.25MG) BY MOUTH ON ALL OTHER DAYS OF THE WEEK PER INR CLINIC 220 tablet 1     zinc oxide (DESITIN) 40 % external ointment Apply topically as needed for skin protection. 397 g 0         EXAM:    Vitals: There were no vitals taken for this visit.  BMI: There is no height or weight on file to calculate BMI.    Diabetic Foot Exam (details below):  normal DP and PT pulses, no trophic changes or ulcerative lesions, and normal sensory exam    Vasc:      Pedal pulses are palpable for the dorsalis pedis posterior tibial artery, bilateral foot.  Capillary fill time </= 3 seconds  Pedal skin appears well-perfused  Neuro:      Light touch sensation intact to all sensory nerve distributions, bilateral foot.  No apparent spastic contractures or other deformity secondary to neurologic compromise.  Derm:      Atrophy of the plantar fat padding bilateral foot  Thin, generalized hyperkeratosis below the first metatarsal head bilateral  Nails are deformed, thickened, discolored and some elongated  No wounds   No worrisome lesions  MSK:      Pes planus bilateral  hallux abductovalgus bilateral -not fully reducible in the transverse plane.  The plantar fat pad feels thin both forefoot and heel        Again, thank you for allowing me to participate in the care of your patient.        Sincerely,        Brendon Chaves DPM    Electronically signed

## 2025-04-07 ENCOUNTER — THERAPY VISIT (OUTPATIENT)
Dept: PHYSICAL THERAPY | Facility: CLINIC | Age: 80
End: 2025-04-07
Attending: PHYSICIAN ASSISTANT
Payer: COMMERCIAL

## 2025-04-07 DIAGNOSIS — E11.649 TYPE 2 DIABETES MELLITUS WITH HYPOGLYCEMIA WITHOUT COMA, WITH LONG-TERM CURRENT USE OF INSULIN (H): ICD-10-CM

## 2025-04-07 DIAGNOSIS — N18.1 TYPE 2 DIABETES MELLITUS WITH STAGE 1 CHRONIC KIDNEY DISEASE, WITH LONG-TERM CURRENT USE OF INSULIN (H): ICD-10-CM

## 2025-04-07 DIAGNOSIS — Z79.4 TYPE 2 DIABETES MELLITUS WITH STAGE 1 CHRONIC KIDNEY DISEASE, WITH LONG-TERM CURRENT USE OF INSULIN (H): ICD-10-CM

## 2025-04-07 DIAGNOSIS — Z79.4 TYPE 2 DIABETES MELLITUS WITH HYPOGLYCEMIA WITHOUT COMA, WITH LONG-TERM CURRENT USE OF INSULIN (H): ICD-10-CM

## 2025-04-07 DIAGNOSIS — M25.551 RIGHT HIP PAIN: ICD-10-CM

## 2025-04-07 DIAGNOSIS — M54.50 ACUTE MIDLINE LOW BACK PAIN WITHOUT SCIATICA: ICD-10-CM

## 2025-04-07 DIAGNOSIS — E11.22 TYPE 2 DIABETES MELLITUS WITH STAGE 1 CHRONIC KIDNEY DISEASE, WITH LONG-TERM CURRENT USE OF INSULIN (H): ICD-10-CM

## 2025-04-07 PROCEDURE — 97110 THERAPEUTIC EXERCISES: CPT | Mod: GP | Performed by: PHYSICAL THERAPIST

## 2025-04-07 PROCEDURE — 97112 NEUROMUSCULAR REEDUCATION: CPT | Mod: GP | Performed by: PHYSICAL THERAPIST

## 2025-04-14 ENCOUNTER — THERAPY VISIT (OUTPATIENT)
Dept: PHYSICAL THERAPY | Facility: CLINIC | Age: 80
End: 2025-04-14
Payer: COMMERCIAL

## 2025-04-14 DIAGNOSIS — M25.551 RIGHT HIP PAIN: ICD-10-CM

## 2025-04-14 DIAGNOSIS — M54.50 ACUTE MIDLINE LOW BACK PAIN WITHOUT SCIATICA: Primary | ICD-10-CM

## 2025-04-14 PROCEDURE — 97112 NEUROMUSCULAR REEDUCATION: CPT | Mod: GP | Performed by: PHYSICAL THERAPIST

## 2025-04-14 PROCEDURE — 97110 THERAPEUTIC EXERCISES: CPT | Mod: GP | Performed by: PHYSICAL THERAPIST

## 2025-04-16 ENCOUNTER — ANTICOAGULATION THERAPY VISIT (OUTPATIENT)
Dept: ANTICOAGULATION | Facility: CLINIC | Age: 80
End: 2025-04-16

## 2025-04-16 ENCOUNTER — LAB (OUTPATIENT)
Dept: LAB | Facility: CLINIC | Age: 80
End: 2025-04-16
Payer: COMMERCIAL

## 2025-04-16 DIAGNOSIS — I48.11 LONGSTANDING PERSISTENT ATRIAL FIBRILLATION (H): Primary | ICD-10-CM

## 2025-04-16 DIAGNOSIS — I48.11 LONGSTANDING PERSISTENT ATRIAL FIBRILLATION (H): ICD-10-CM

## 2025-04-16 DIAGNOSIS — Z79.01 LONG TERM CURRENT USE OF ANTICOAGULANTS WITH INR GOAL OF 2.0-3.0: ICD-10-CM

## 2025-04-16 LAB — INR BLD: 1.3 (ref 0.9–1.1)

## 2025-04-16 PROCEDURE — 36416 COLLJ CAPILLARY BLOOD SPEC: CPT

## 2025-04-16 PROCEDURE — 85610 PROTHROMBIN TIME: CPT

## 2025-04-16 NOTE — PROGRESS NOTES
ANTICOAGULATION MANAGEMENT     Cayetano Lunsford 80 year old male is on warfarin with subtherapeutic INR result. (Goal INR 2.0-3.0)    Recent labs: (last 7 days)     04/16/25  1352   INR 1.3*       ASSESSMENT     Source(s): Chart Review and Patient/Caregiver Call     Warfarin doses taken: Warfarin taken as instructed, patient denies any missed warfarin doses  Diet: No new diet changes identified  Medication/supplement changes: None noted  New illness, injury, or hospitalization: No  Signs or symptoms of bleeding or clotting: No  Previous result: Subtherapeutic at 1.9, warfarin maintenance dose was increased 8 %  Additional findings: None       PLAN     Recommended plan for no diet, medication or health factor changes affecting INR     Dosing Instructions: Increase your warfarin dose (14.3% change) with next INR in 1 week       Summary  As of 4/16/2025      Full warfarin instructions:  4/16: 10 mg; Otherwise 7.5 mg every Wed, Sat; 5 mg all other days   Next INR check:  4/23/2025               Telephone call with Leo who agrees to plan and repeated back plan correctly    Lab visit scheduled    Education provided: Please call back if any changes to your diet, medications or how you've been taking warfarin  Symptom monitoring: monitoring for clotting signs and symptoms  Contact [phone] with any changes, questions or concerns.     Plan made per [facility] anticoagulation protocol    Dayanara Cuevas RN  4/16/2025  Anticoagulation Clinic  [facility] for routing messages: p ANTICOAG [place]  [facility] patient phone line: [phone]        _______________________________________________________________________     Anticoagulation Episode Summary       Current INR goal:  2.0-3.0   TTR:  33.6% (1 y)   Target end date:  Indefinite   Send INR reminders to:  ANTICOAG [place]    Indications    Longstanding persistent atrial fibrillation (H) [I48.11]  Long term current use of anticoagulants with INR goal of 2.0-3.0  [Z79.01]  Atrial fibrillation  unspecified type (H) (Resolved) [I48.91]  Long-term (current) use of anticoagulants [Z79.01] (Resolved) [Z79.01]             Comments:  --             Anticoagulation Care Providers       Provider Role Specialty Phone number    Dmitri Andres MD Referring Family Medicine [phone]    Drew Bravo PA-C Referring Family Medicine [phone]    Deborah Mackey PA-C Referring Family Medicine [phone]

## 2025-04-17 ENCOUNTER — HOSPITAL ENCOUNTER (OUTPATIENT)
Dept: CARDIOLOGY | Facility: CLINIC | Age: 80
Discharge: HOME | End: 2025-04-17
Attending: PHYSICIAN ASSISTANT
Payer: COMMERCIAL

## 2025-04-17 ENCOUNTER — ALLIED HEALTH/NURSE VISIT (OUTPATIENT)
Dept: CARDIOLOGY | Facility: CLINIC | Age: 80
End: 2025-04-17
Payer: COMMERCIAL

## 2025-04-17 DIAGNOSIS — I48.0 PAROXYSMAL ATRIAL FIBRILLATION (H): ICD-10-CM

## 2025-04-17 LAB — LVEF ECHO: NORMAL

## 2025-04-17 PROCEDURE — 93306 TTE W/DOPPLER COMPLETE: CPT

## 2025-04-17 RX ORDER — AMLODIPINE BESYLATE 5 MG/1
5 TABLET ORAL DAILY
Qty: 30 TABLET | Refills: 0 | Status: SHIPPED | OUTPATIENT
Start: 2025-04-17

## 2025-04-20 DIAGNOSIS — J30.2 SEASONAL ALLERGIC RHINITIS, UNSPECIFIED TRIGGER: ICD-10-CM

## 2025-04-21 ENCOUNTER — THERAPY VISIT (OUTPATIENT)
Dept: PHYSICAL THERAPY | Facility: CLINIC | Age: 80
End: 2025-04-21
Payer: COMMERCIAL

## 2025-04-21 DIAGNOSIS — M54.50 ACUTE MIDLINE LOW BACK PAIN WITHOUT SCIATICA: Primary | ICD-10-CM

## 2025-04-21 DIAGNOSIS — M25.551 RIGHT HIP PAIN: ICD-10-CM

## 2025-04-21 PROCEDURE — 97110 THERAPEUTIC EXERCISES: CPT | Mod: GP | Performed by: PHYSICAL THERAPIST

## 2025-04-21 PROCEDURE — 97112 NEUROMUSCULAR REEDUCATION: CPT | Mod: GP | Performed by: PHYSICAL THERAPIST

## 2025-04-21 RX ORDER — IPRATROPIUM BROMIDE 21 UG/1
2 SPRAY, METERED NASAL 2 TIMES DAILY PRN
Qty: 30 ML | Refills: 1 | Status: SHIPPED | OUTPATIENT
Start: 2025-04-21

## 2025-04-23 ENCOUNTER — LAB (OUTPATIENT)
Dept: LAB | Facility: CLINIC | Age: 80
End: 2025-04-23
Payer: COMMERCIAL

## 2025-04-23 ENCOUNTER — ANTICOAGULATION THERAPY VISIT (OUTPATIENT)
Dept: ANTICOAGULATION | Facility: CLINIC | Age: 80
End: 2025-04-23

## 2025-04-23 DIAGNOSIS — I48.11 LONGSTANDING PERSISTENT ATRIAL FIBRILLATION (H): Primary | ICD-10-CM

## 2025-04-23 DIAGNOSIS — I48.11 LONGSTANDING PERSISTENT ATRIAL FIBRILLATION (H): ICD-10-CM

## 2025-04-23 DIAGNOSIS — Z79.01 LONG TERM CURRENT USE OF ANTICOAGULANTS WITH INR GOAL OF 2.0-3.0: ICD-10-CM

## 2025-04-23 LAB — INR BLD: 1.5 (ref 0.9–1.1)

## 2025-04-23 PROCEDURE — 85610 PROTHROMBIN TIME: CPT

## 2025-04-23 PROCEDURE — 36416 COLLJ CAPILLARY BLOOD SPEC: CPT

## 2025-04-23 NOTE — PROGRESS NOTES
ANTICOAGULATION MANAGEMENT     Cayetano Lunsford 80 year old male is on warfarin with subtherapeutic INR result. (Goal INR 2.0-3.0)    Recent labs: (last 7 days)     04/23/25  1340   INR 1.5*       ASSESSMENT     Source(s): Chart Review and Patient/Caregiver Call     Warfarin doses taken: Warfarin taken as instructed  Diet: No new diet changes identified  Medication/supplement changes: None noted  New illness, injury, or hospitalization: No  Signs or symptoms of bleeding or clotting: No  Previous result: Subtherapeutic  Additional findings: Maintenance dose increased at last 2 visits (~22%)  Wellness exam scheduled with PCP on 5/5/25  Patient declined a warfarin refill today       PLAN     Recommended plan for no diet, medication or health factor changes affecting INR     Dosing Instructions: booster dose then Increase your warfarin dose (12.5% change) with next INR in 12 days (patient not available in 9 days).    Summary  As of 4/23/2025      Full warfarin instructions:  4/23: 10 mg; Otherwise 5 mg every Tue, Thu, Sat; 7.5 mg all other days   Next INR check:  5/5/2025               Telephone call with Leo who verbalizes understanding and agrees to plan, who agrees to plan and repeated back plan correctly, and patient also wrote dosing instructions down and verbally recited back to writer correctly.    Check INR at provider office visit     Education provided: Taking warfarin: prescribed tablet strength and color and Importance of taking warfarin as instructed    Plan made per [facility] anticoagulation protocol    Tatyana Akers RN  4/23/2025  Anticoagulation Clinic  [facility] for routing messages: p ANTICOAG [place]  [facility] patient phone line: [phone]        _______________________________________________________________________     Anticoagulation Episode Summary       Current INR goal:  2.0-3.0   TTR:  33.6% (1 y)   Target end date:  Indefinite   Send INR reminders to:  [facility] [place]    Indications     Longstanding persistent atrial fibrillation (H) [I48.11]  Long term current use of anticoagulants with INR goal of 2.0-3.0 [Z79.01]  Atrial fibrillation  unspecified type (H) (Resolved) [I48.91]  Long-term (current) use of anticoagulants [Z79.01] (Resolved) [Z79.01]             Comments:  --             Anticoagulation Care Providers       Provider Role Specialty Phone number    Dmitri Andres MD Referring Family Medicine [phone]    Drew Bravo PA-C Referring Family Medicine [phone]    Deborah Mackey PA-C Referring [facility] Medicine [phone]

## 2025-04-29 ENCOUNTER — DOCUMENTATION ONLY (OUTPATIENT)
Dept: FAMILY MEDICINE | Facility: CLINIC | Age: 80
End: 2025-04-29
Payer: COMMERCIAL

## 2025-04-29 NOTE — PROGRESS NOTES
Can we please call and ask the questions in the order, and check it if patient qualify.  Then routed it back to me.

## 2025-04-29 NOTE — PROGRESS NOTES
Medicare requires that the MD/DO treating the patient for diabetes answers all of the questions and signs the pended order for the patient to qualify for diabetic shoes. Once the order is completed, please route the encounter back to sender.    Iliana Ott

## 2025-05-02 ENCOUNTER — TRANSFERRED RECORDS (OUTPATIENT)
Dept: MULTI SPECIALTY CLINIC | Facility: CLINIC | Age: 80
End: 2025-05-02
Payer: COMMERCIAL

## 2025-05-02 LAB
ALT SERPL-CCNC: 26 IU/L (ref 0–44)
AST SERPL-CCNC: 20 IU/L (ref 0–40)
CREATININE (EXTERNAL): 0.64 MG/DL (ref 0.76–1.27)
GFR ESTIMATED (EXTERNAL): 95 ML/MIN/1.73

## 2025-05-05 ENCOUNTER — ANTICOAGULATION THERAPY VISIT (OUTPATIENT)
Dept: ANTICOAGULATION | Facility: CLINIC | Age: 80
End: 2025-05-05

## 2025-05-05 ENCOUNTER — OFFICE VISIT (OUTPATIENT)
Dept: FAMILY MEDICINE | Facility: CLINIC | Age: 80
End: 2025-05-05
Payer: COMMERCIAL

## 2025-05-05 ENCOUNTER — TRANSFERRED RECORDS (OUTPATIENT)
Dept: ADMINISTRATIVE | Facility: CLINIC | Age: 80
End: 2025-05-05

## 2025-05-05 VITALS
DIASTOLIC BLOOD PRESSURE: 60 MMHG | HEIGHT: 66 IN | HEART RATE: 60 BPM | TEMPERATURE: 98 F | SYSTOLIC BLOOD PRESSURE: 178 MMHG | RESPIRATION RATE: 18 BRPM | BODY MASS INDEX: 22.98 KG/M2 | WEIGHT: 143 LBS | OXYGEN SATURATION: 94 %

## 2025-05-05 DIAGNOSIS — I48.11 LONGSTANDING PERSISTENT ATRIAL FIBRILLATION (H): ICD-10-CM

## 2025-05-05 DIAGNOSIS — I48.0 PAROXYSMAL ATRIAL FIBRILLATION (H): ICD-10-CM

## 2025-05-05 DIAGNOSIS — Z23 NEED FOR COVID-19 VACCINE: ICD-10-CM

## 2025-05-05 DIAGNOSIS — Z79.4 TYPE 2 DIABETES MELLITUS WITH STAGE 1 CHRONIC KIDNEY DISEASE, WITH LONG-TERM CURRENT USE OF INSULIN (H): ICD-10-CM

## 2025-05-05 DIAGNOSIS — Z79.01 LONG TERM CURRENT USE OF ANTICOAGULANTS WITH INR GOAL OF 2.0-3.0: ICD-10-CM

## 2025-05-05 DIAGNOSIS — E11.22 TYPE 2 DIABETES MELLITUS WITH STAGE 1 CHRONIC KIDNEY DISEASE, WITH LONG-TERM CURRENT USE OF INSULIN (H): ICD-10-CM

## 2025-05-05 DIAGNOSIS — N18.1 TYPE 2 DIABETES MELLITUS WITH STAGE 1 CHRONIC KIDNEY DISEASE, WITH LONG-TERM CURRENT USE OF INSULIN (H): ICD-10-CM

## 2025-05-05 DIAGNOSIS — Z00.00 ENCOUNTER FOR MEDICARE ANNUAL WELLNESS EXAM: Primary | ICD-10-CM

## 2025-05-05 DIAGNOSIS — I48.11 LONGSTANDING PERSISTENT ATRIAL FIBRILLATION (H): Primary | ICD-10-CM

## 2025-05-05 LAB — INR BLD: 1.9 (ref 0.9–1.1)

## 2025-05-05 PROCEDURE — 3077F SYST BP >= 140 MM HG: CPT

## 2025-05-05 PROCEDURE — 36416 COLLJ CAPILLARY BLOOD SPEC: CPT

## 2025-05-05 PROCEDURE — 91320 SARSCV2 VAC 30MCG TRS-SUC IM: CPT

## 2025-05-05 PROCEDURE — 90480 ADMN SARSCOV2 VAC 1/ONLY CMP: CPT

## 2025-05-05 PROCEDURE — 85610 PROTHROMBIN TIME: CPT

## 2025-05-05 PROCEDURE — G0439 PPPS, SUBSEQ VISIT: HCPCS

## 2025-05-05 PROCEDURE — 3078F DIAST BP <80 MM HG: CPT

## 2025-05-05 SDOH — HEALTH STABILITY: PHYSICAL HEALTH
ON AVERAGE, HOW MANY DAYS PER WEEK DO YOU ENGAGE IN MODERATE TO STRENUOUS EXERCISE (LIKE A BRISK WALK)?: PATIENT DECLINED

## 2025-05-05 SDOH — HEALTH STABILITY: PHYSICAL HEALTH: ON AVERAGE, HOW MANY MINUTES DO YOU ENGAGE IN EXERCISE AT THIS LEVEL?: PATIENT DECLINED

## 2025-05-05 ASSESSMENT — SOCIAL DETERMINANTS OF HEALTH (SDOH): HOW OFTEN DO YOU GET TOGETHER WITH FRIENDS OR RELATIVES?: ONCE A WEEK

## 2025-05-05 NOTE — PATIENT INSTRUCTIONS
[facility]: [phone] (Susan Wheat)    Tdap-Get through pharmacy (tetanus vaccination)    Patient Education   Preventive Care Advice   This is general advice given by our system to help you stay healthy. However, your care team may have specific advice just for you. Please talk to your care team about your preventive care needs.  Nutrition  Eat 5 or more servings of fruits and vegetables each day.  Try wheat bread, brown rice and whole grain pasta (instead of white bread, rice, and pasta).  Get enough calcium and vitamin D. Check the label on foods and aim for 100% of the RDA (recommended daily allowance).  Lifestyle  Exercise at least 150 minutes each week  (30 minutes a day, 5 days a week).  Do muscle strengthening activities 2 days a week. These help control your weight and prevent disease.  No smoking.  Wear sunscreen to prevent skin cancer.  Have a dental exam and cleaning every 6 months.  Yearly exams  See your health care team every year to talk about:  Any changes in your health.  Any medicines your care team has prescribed.  Preventive care, family planning, and ways to prevent chronic diseases.  Shots (vaccines)   HPV shots (up to age 26), if you've never had them before.  Hepatitis B shots (up to age 59), if you've never had them before.  COVID-19 shot: Get this shot when it's due.  Flu shot: Get a flu shot every year.  Tetanus shot: Get a tetanus shot every 10 years.  Pneumococcal, hepatitis A, and RSV shots: Ask your care team if you need these based on your risk.  Shingles shot (for age 50 and up)  General health tests  Diabetes screening:  Starting at age 35, Get screened for diabetes at least every 3 years.  If you are younger than age 35, ask your care team if you should be screened for diabetes.  Cholesterol test: At age 39, start having a cholesterol test every 5 years, or more often if advised.  Bone density scan (DEXA): At age 50, ask your care team if you should have this scan for  osteoporosis (brittle bones).  Hepatitis C: Get tested at least once in your life.  STIs (sexually transmitted infections)  Before age 24: Ask your care team if you should be screened for STIs.  After age 24: Get screened for STIs if you're at risk. You are at risk for STIs (including HIV) if:  You are sexually active with more than one person.  You don't use condoms every time.  You or a partner was diagnosed with a sexually transmitted infection.  If you are at risk for HIV, ask about PrEP medicine to prevent HIV.  Get tested for HIV at least once in your life, whether you are at risk for HIV or not.  Cancer screening tests  Cervical cancer screening: If you have a cervix, begin getting regular cervical cancer screening tests starting at age 21.  Breast cancer scan (mammogram): If you've ever had breasts, begin having regular mammograms starting at age 40. This is a scan to check for breast cancer.  Colon cancer screening: It is important to start screening for colon cancer at age 45.  Have a colonoscopy test every 10 years (or more often if you're at risk) Or, ask your provider about stool tests like a FIT test every year or Cologuard test every 3 years.  To learn more about your testing options, visit:   .  For help making a decision, visit:   https://bit.ly/uv05266.  Prostate cancer screening test: If you have a prostate, ask your care team if a prostate cancer screening test (PSA) at age 55 is right for you.  Lung cancer screening: If you are a current or former smoker ages 50 to 80, ask your care team if ongoing lung cancer screenings are right for you.  For informational purposes only. Not to replace the advice of your health care provider. Copyright   2023 [employer]. All rights reserved. Clinically reviewed by the [facility] Transitions Program. [employer] 133757 - REV 01/24.  Preventing Falls: Care Instructions  Injuries and health problems such as trouble walking or poor eyesight can  increase your risk of falling. So can some medicines. But there are things you can do to help prevent falls. You can exercise to get stronger. You can also arrange your home to make it safer.    Talk to your doctor about the medicines you take. Ask if any of them increase the risk of falls and whether they can be changed or stopped.   Try to exercise regularly. It can help improve your strength and balance. This can help lower your risk of falling.         Practice fall safety and prevention.   Wear low-heeled shoes that fit well and give your feet good support. Talk to your doctor if you have foot problems that make this hard.  Carry a cellphone or wear a medical alert device that you can use to call for help.  Use stepladders instead of chairs to reach high objects. Don't climb if you're at risk for falls. Ask for help, if needed.  Wear the correct eyeglasses, if you need them.        Make your home safer.   Remove rugs, cords, clutter, and furniture from walkways.  Keep your house well lit. Use night-lights in hallways and bathrooms.  Install and use sturdy handrails on stairways.  Wear nonskid footwear, even inside. Don't walk barefoot or in socks without shoes.        Be safe outside.   Use handrails, curb cuts, and ramps whenever possible.  Keep your hands free by using a shoulder bag or backpack.  Try to walk in well-lit areas. Watch out for uneven ground, changes in pavement, and debris.  Be careful in the winter. Walk on the grass or gravel when sidewalks are slippery. Use de-icer on steps and walkways. Add non-slip devices to shoes.    Put grab bars and nonskid mats in your shower or tub and near the toilet. Try to use a shower chair or bath bench when bathing.   Get into a tub or shower by putting in your weaker leg first. Get out with your strong side first. Have a phone or medical alert device in the bathroom with you.   Where can you learn more?  Go to https://www.healthwise.net/patiented  Enter C596  "in the search box to learn more about \"Preventing Falls: Care Instructions.\"  Current as of: July 31, 2024  Content Version: 14.4 2024-2025 [employer].   Care instructions adapted under license by your healthcare professional. If you have questions about a medical condition or this instruction, always ask your healthcare professional. [employer] disclaims any warranty or liability for your use of this information.       "

## 2025-05-05 NOTE — PROGRESS NOTES
ANTICOAGULATION MANAGEMENT     Cayetano Lunsford 80 year old male is on warfarin with subtherapeutic INR result. (Goal INR 2.0-3.0)    Recent labs: (last 7 days)     05/05/25  1413   INR 1.9*       ASSESSMENT     Source(s): Chart Review and Patient/Caregiver Call     Warfarin doses taken: Warfarin taken as instructed  Diet: No new diet changes identified  Medication/supplement changes: None noted  New illness, injury, or hospitalization: No  Signs or symptoms of bleeding or clotting: No  Previous result: Subtherapeutic, last few INR's have been subtherapeutic, patient has had several warfarin maintenance dose increases  Additional findings:  Per patient, no changes were made at PCP office visit today       PLAN     Recommended plan for no diet, medication or health factor changes affecting INR     Dosing Instructions: Increase your warfarin dose (5.6% change) with next INR in 2 weeks       Summary  As of 5/5/2025      Full warfarin instructions:  5 mg every Tue, Sat; 7.5 mg all other days   Next INR check:  5/20/2025               Telephone call with Leo who verbalizes understanding and agrees to plan    Lab visit scheduled    Education provided: Please call back if any changes to your diet, medications or how you've been taking warfarin  Contact [phone] with any changes, questions or concerns.     Plan made per [facility] anticoagulation protocol    Dayanara Cuevas RN  5/5/2025  Anticoagulation Clinic  [facility] for routing messages: p ANTICOAG [place]  [facility] patient phone line: [phone]        _______________________________________________________________________     Anticoagulation Episode Summary       Current INR goal:  2.0-3.0   TTR:  31.5% (1 y)   Target end date:  Indefinite   Send INR reminders to:  [facility] [place]    Indications    Longstanding persistent atrial fibrillation (H) [I48.11]  Long term current use of anticoagulants with INR goal of 2.0-3.0 [Z79.01]  Atrial  fibrillation  unspecified type (H) (Resolved) [I48.91]  Long-term (current) use of anticoagulants [Z79.01] (Resolved) [Z79.01]             Comments:  --             Anticoagulation Care Providers       Provider Role Specialty Phone number    Dmitri Andres MD Referring Family Medicine [phone]    Drew Bravo PA-C Referring Family Medicine [phone]    Deborah Mackey PA-C Referring Family Medicine [phone]

## 2025-05-05 NOTE — PROGRESS NOTES
ANTICOAGULATION MANAGEMENT     Cayetano Lunsford 80 year old male is on warfarin with subtherapeutic INR result. (Goal INR 2.0-3.0)    Recent labs: (last 7 days)     05/05/25  1413   INR 1.9*       ASSESSMENT     Source(s): Chart Review  Previous INR was Subtherapeutic  Medication, diet, health changes since last INR chart reviewed; none identified  Patient had annual wellness visit with PCP today         PLAN     Unable to reach Rich today.    Left message to continue current dose of warfarin 7.5 mg tonight. Request call back for assessment. Left message to call [phone].       Follow up required to confirm warfarin dose taken and assess for changes and discuss out of range result     Dayanara Cuevas RN  5/5/2025  Anticoagulation Clinic  [facility] for routing messages: an [employer]  [facility] patient phone line: [phone]

## 2025-05-06 ENCOUNTER — THERAPY VISIT (OUTPATIENT)
Dept: PHYSICAL THERAPY | Facility: CLINIC | Age: 80
End: 2025-05-06
Payer: COMMERCIAL

## 2025-05-06 ENCOUNTER — TELEPHONE (OUTPATIENT)
Dept: FAMILY MEDICINE | Facility: CLINIC | Age: 80
End: 2025-05-06

## 2025-05-06 DIAGNOSIS — M25.551 RIGHT HIP PAIN: ICD-10-CM

## 2025-05-06 DIAGNOSIS — M54.50 ACUTE MIDLINE LOW BACK PAIN WITHOUT SCIATICA: Primary | ICD-10-CM

## 2025-05-06 PROCEDURE — 97110 THERAPEUTIC EXERCISES: CPT | Mod: GP | Performed by: PHYSICAL THERAPIST

## 2025-05-06 PROCEDURE — 97112 NEUROMUSCULAR REEDUCATION: CPT | Mod: GP | Performed by: PHYSICAL THERAPIST

## 2025-05-06 NOTE — TELEPHONE ENCOUNTER
Outgoing call to patient - attempt #1.     Left  requesting call back to [phone] and ask to speak with a nurse. Awaiting call back at this time.     Steffany LOCKHART RN  [facility]

## 2025-05-06 NOTE — PROCEDURES
2025        Cayetano Lunsford (Rich)   [address]  [place] [address]      Cayetano Lunsford (Rich),  :  1945    I am writing to let you know the results of the tests that were done the other day.   Thank you for allowing [facility] the opportunity to take part in your healthcare.  At [facility] we strive to provide each patient with the finest gastroenterology care available.  We hope your experience was pleasant and informative.    Your labs are within acceptable limits. Your hemoglobin is still low, but stable from the last check. Please continue to follow-up with [facility] to monitor the low hemoglobin, as you have in the past.  Creatinine 2025 14:37   Description Result Units Flags Range   Creatinine 0.64 mg/dL L 0.76-1.27   eGFR 95 mL/min/1.73  >59   Comments   Performed At: DV, [facility] [address] [place], [place], [id number]  Miesha Canales MD, Phone: [phone]   BUN 2025 14:37   Description Result Units Flags Range   BUN 21 mg/dL  8-27   Comments   Performed At: DV, [facility] [address] [place], [place], [id number]  Miesha Canales MD, Phone: [phone]   Hepatic Function Panel (7) 2025 14:37   Description Result Units Flags Range   Bilirubin, Total <0.2 mg/dL  0.0-1.2   Bilirubin, Direct 0.11 mg/dL  0.00-0.40   AST (SGOT) 20 IU/L  0-40   ALT (SGPT) 26 IU/L  0-44   Albumin 3.8 g/dL  3.7-4.7   Alkaline Phosphatase 77 IU/L     Protein, Total 6.0 g/dL  6.0-8.5   Comments   Performed At: DV, [facility] [address], [place], [id number]  Miesha Canales MD, Phone: [phone]   CBC With Differential/Platelet 2025 14:37   Description Result Units Flags Range   Hemoglobin 10.9 g/dL L 13.0-17.7   Hematocrit 32.6 % L 37.5-51.0   MCV 99 fL H 79-97   MCHC 33.4 g/dL  31.5-35.7   MCH 33.2 pg H 26.6-33.0   RDW 11.6 %  11.6-15.4   Platelets 326 x10E3/uL  150-450   Neutrophils 75 %  Not Estab.   Lymphs 15 %  Not Estab.   Monocytes 7 %   Not Estab.   Eos 3 %  Not Estab.   Basos 0 %  Not Estab.   Neutrophils (Absolute) 7.5 x10E3/uL H 1.4-7.0   Lymphs (Absolute) 1.5 x10E3/uL  0.7-3.1   Monocytes(Absolute) 0.7 x10E3/uL  0.1-0.9   Eos (Absolute) 0.3 x10E3/uL  0.0-0.4   Baso (Absolute) 0.0 x10E3/uL  0.0-0.2   Immature Grans (Abs) 0.0 x10E3/uL  0.0-0.1   Immature Granulocytes 0 %  Not Estab.   RBC 3.28 x10E6/uL L 4.14-5.80   WBC 9.9 x10E3/uL  3.4-10.8   Comments   First Available              Performed At: , [facility] [address], [place], [place], [id number]  Miesha Canales MD, Phone: [phone]           Thank you.    Electronically signed by:  Antione Mccartney MD 05/05/2025 03:15 PM  Document generated by:  Antione Mccartney MD  05/05/2025  If your provider ordered multiple tests; the results may not become available at the same time.  If multiple test results are received within 14 days of one another, you may receive a duplicate.  cc:  Deborah Mackey PAC

## 2025-05-06 NOTE — TELEPHONE ENCOUNTER
Can we call patient with an update on blood pressure medication. Spoke with pharmacist and would like to increase Amlodipine from 5 mg to 10 mg daily and then follow up with Diana at the end of the month as scheduled.     Thanks!  Deborah Mackey PA-C

## 2025-05-07 NOTE — TELEPHONE ENCOUNTER
Pt returning call,     Relayed provider message below.     Pt verbalizes understanding and is agreeable with plan. Pt denies any further questions or concerns at this time.     Sasha DAVIES RN   Clinic RN  [facility]

## 2025-05-08 DIAGNOSIS — I48.91 ATRIAL FIBRILLATION, UNSPECIFIED TYPE (H): ICD-10-CM

## 2025-05-08 RX ORDER — WARFARIN SODIUM 2.5 MG/1
5-7.5 TABLET ORAL EVERY EVENING
Qty: 235 TABLET | Refills: 1 | Status: SHIPPED | OUTPATIENT
Start: 2025-05-08

## 2025-05-08 NOTE — TELEPHONE ENCOUNTER
ANTICOAGULATION MANAGEMENT:  Medication Refill    Anticoagulation Summary  As of 5/5/2025      Warfarin maintenance plan:  5 mg (2.5 mg x 2) every Tue, Sat; 7.5 mg (2.5 mg x 3) all other days   Next INR check:  5/20/2025   Target end date:  Indefinite    Indications    Longstanding persistent atrial fibrillation (H) [I48.11]  Long term current use of anticoagulants with INR goal of 2.0-3.0 [Z79.01]  Atrial fibrillation  unspecified type (H) (Resolved) [I48.91]  Long-term (current) use of anticoagulants [Z79.01] (Resolved) [Z79.01]                 Anticoagulation Care Providers       Provider Role Specialty Phone number    Dmitri Andres MD Referring [facility] Medicine [phone]    Drew Bravo PA-C Referring Family Medicine [phone]    Deborah Mackey PA-C Referring [facility] [phone]            Refill Criteria    Visit with referring provider/group: Meets criteria: visit within referring provider group in the last 15 months on 5/5/25    [facility] referral last signed: 10/25/2024; within last year:  Yes    Lab monitoring is up to date (not exceeding 2 weeks overdue): Yes    Cayetano meets all criteria for refill. Rx instructions and quantity supplied updated to match patient's current dosing plan. Warfarin 90 day supply with 1 refill granted per [facility] protocol     Estelle Starr RN  Anticoagulation Clinic

## 2025-05-09 ENCOUNTER — ANCILLARY PROCEDURE (OUTPATIENT)
Dept: GENERAL RADIOLOGY | Facility: CLINIC | Age: 80
End: 2025-05-09
Attending: NURSE PRACTITIONER
Payer: COMMERCIAL

## 2025-05-09 ENCOUNTER — OFFICE VISIT (OUTPATIENT)
Dept: NEUROSURGERY | Facility: CLINIC | Age: 80
End: 2025-05-09
Payer: COMMERCIAL

## 2025-05-09 VITALS — HEART RATE: 57 BPM | OXYGEN SATURATION: 95 % | SYSTOLIC BLOOD PRESSURE: 155 MMHG | DIASTOLIC BLOOD PRESSURE: 59 MMHG

## 2025-05-09 DIAGNOSIS — S32.2XXA CLOSED FRACTURE OF COCCYX, INITIAL ENCOUNTER (H): ICD-10-CM

## 2025-05-09 DIAGNOSIS — S32.2XXA CLOSED FRACTURE OF COCCYX, INITIAL ENCOUNTER (H): Primary | ICD-10-CM

## 2025-05-09 PROCEDURE — 99204 OFFICE O/P NEW MOD 45 MIN: CPT | Performed by: NURSE PRACTITIONER

## 2025-05-09 PROCEDURE — 1125F AMNT PAIN NOTED PAIN PRSNT: CPT | Performed by: NURSE PRACTITIONER

## 2025-05-09 PROCEDURE — 72220 X-RAY EXAM SACRUM TAILBONE: CPT | Mod: TC | Performed by: RADIOLOGY

## 2025-05-09 PROCEDURE — 3078F DIAST BP <80 MM HG: CPT | Performed by: NURSE PRACTITIONER

## 2025-05-09 PROCEDURE — 3077F SYST BP >= 140 MM HG: CPT | Performed by: NURSE PRACTITIONER

## 2025-05-09 ASSESSMENT — PAIN SCALES - GENERAL
PAINLEVEL_OUTOF10: MODERATE PAIN (5)
PAINLEVEL_OUTOF10: MODERATE PAIN (5)

## 2025-05-09 NOTE — PATIENT INSTRUCTIONS
Please go downstairs and have your xrays done at radiology    ~Please call our [facility] Nurse Navigation line [phone] with any questions or concerns about your treatment plan, if symptoms worsen and you would like to be seen urgently, or if you have any new or worsening numbness, weakness, or problems controlling bladder and bowel function.  ~You are also welcome to contact Susan Wheat via [employer], but please be aware that responses to [employer] message may take 2-3 days due to the high volume of patients seen in clinic.

## 2025-05-09 NOTE — LETTER
5/9/2025       RE: Cayetano Lunsford  [id number]  [facility] [id number]     Dear Colleague,    Thank you for referring your patient, Cayetano Lunsford, to the  [facility] at [facility]. Please see a copy of my visit note below.    ASSESSMENT: Cayetano Lunsford is a 80 year old male who presents for consultation at the request of PCP Deborah Mackey, who presents today for fu patient evaluation of:    -closed fracture of coccyx, initial encounter    Patient endorses tingling in both feet, more on the bottoms. Ongoing back pain mostly sacral and tailbone area. He still has tenderness over the sacrum. Recommend new sacral/coccyx XR today. If stable, we talked about the possibility of a coccyx injection. He is concerned that the steroid may worsen his other meication conditions and would prefer to hold off for now.   We talked about EMG as disc herniation L5-S1 causing moderate to severe vivian foraminal narrowing could be  a contributor to his symptoms. We also discussed the role of a steroid injection vivian L5-S1 TF KHUSHI. He is hoping this improves with PT and his new insoles he will be getting soon.           No data to display                     Diagnoses and all orders for this visit:  Closed fracture of coccyx, initial encounter (H)  -     XR Sacrum and Coccyx 2 Views; Future        PLAN:  Reviewed spine anatomy and disease process. Discussed diagnosis and treatment options with the patient today. A shared decision making model was used.  The patient's values and choices were respected. The following represents what was discussed and decided upon by the provider and the patient.      -DIAGNOSTIC TESTS:  Images were personally reviewed and interpreted and explained to patient today using a spine model.   -sacral/coccyx MRI without contrast orders placed      -PHYSICAL THERAPY:    --start PT as planned  Discussed the importance of core  strengthening, ROM, stretching exercises with the patient and how each of these entities is important in decreasing pain.  Explained to the patient that the purpose of physical therapy is to teach the patient a home exercise program.  These exercises need to be performed every day in order to decrease pain and prevent future occurrences of pain.        -MEDICATIONS:    --tylenol otc extra strength Q8hrs  -lidocaine patches sent  -not a po nsaid candidate given warfarin  -po steroids contraindicated in setting of acute fracture as they could slow fracture healing    Discussed multiple medication options today with patient. Discussed risks, side effects, and proper use of medications. Patient verbalized understanding.    -INTERVENTIONS:    -Discussed the role of injections with patient today. Patient would be a good candidate in the future for either caudal , coccygeal epidural steroid injections, lumbar KHUSHI, or medial branch blocks if indicated based on symptoms and supported by imaging results.    -PATIENT EDUCATION:  Total time of 40 minutes, on the day of service, spent with the patient, reviewing the chart, placing orders, and documenting.   -Today we also discussed the pros and cons of the current treatment plan.    -FOLLOW-UP:   we will call with imaging results    Advised patient to call the Spine Center if symptoms worsen, new numbness or weakness develops in the legs, or if they develop new or worsening problems controlling bladder or bowel function.   ______________________________________________________________________    SUBJECTIVE:    He has ongoing tailbone pain. Sometimes it's ok sometimes it's not. It is overall better than it was.  He notes pins and needles in his feet , feels like he is walking on two pieces of board  After PT one day he did have vivian lower thoracic pain which felt muscular. It was gone by the next day   He has swelling in the legs, has been wearing compression stockings and the  size he was given feels too small. He is getting inserts in his shoes soon.  He has balance trouble, he has to be really careful with turning  He has pain in the feet up        Per original visit with updated meds/inj    HPI:  Cayetano Lunsford  Is a 80 year old male on warfarin with hx afib, type 2 diabetes with stage 1 CKD, prior lumbar surgery, ulcerative colitis, COPD, GERD, BPH, iron deficiency anemia who presents today for new patient evaluation of coccyx fracture -    Rich had a fall on Jan 2nd at his assisted living in the hallway, could not get up. Immediate acute on chronic low back pain radiating down into the buttocks. Was evaluated by EMTs, but did not wish to go to the hospital. Then 2 days after that, the pain worsened. Then 8- 10 days later, he fell again in his living room onto his buttocks and it got even worse. Sometimes the pain moves from the R hip to the lower back. Sometimes even to the other side. He is not able to be on his feet long due to pain. He is not able to walk very far, and it is slow. Even sitting is not comfortable. He denies leg pain. Seen by PCP 3/7, referred to PT and lumbar, sacrum XR done which demonstrated an age indeterminate coccyx fracture with mild angulation. Referred for spine opinion based on results. He has PT set up next week. He has been taking 2 tylenols once or two per day for pain. It helps some.    He also has a separate problem with his feet. He has bunions and swelling of the lower legs and ankles. He wears compression stockings for this. He has tried 4 pairs of shoes from [email or web address], no relief. Has seen a podiatrist last year at [facility], would be interested in a 2nd opinion.    Way back in the early 80s he got hurt at work d/t lifting, but no traumatic fall. He tried everything outside of surgery first - was even in a full body plaster cast from his back and under his armpits. He wore it for 4 mos. He then had lumbar spine surgery and had calcium deposits  "on his lower two discs and they were removed. He did well eventually following surgery, and went back to work.    -Treatment to Date:     -Medications:  tylenol    Current Outpatient Medications   Medication Sig Dispense Refill     acetaminophen (TYLENOL) 325 MG tablet Take 325-650 mg by mouth every 6 hours as needed for mild pain       albuterol (PROAIR HFA/PROVENTIL HFA/VENTOLIN HFA) 108 (90 Base) MCG/ACT inhaler SHAKE WELL AND INHALE ONE TO TWO PUFFS BY MOUTH EVERY 4  HOURS AS NEEDED 8.5 g 1     amLODIPine (NORVASC) 5 MG tablet TAKE 1 TABLET (5 MG) BY MOUTH DAILY 30 tablet 0     atorvastatin (LIPITOR) 20 MG tablet TAKE 1 TABLET (20 MG) BY MOUTH DAILY 90 tablet 0     blood glucose (ONETOUCH ULTRA) test strip USE TO TEST BLOOD SUGAR THREE TIMES A DAY OR AS DIRECTED 300 strip 2     chlorthalidone (HYGROTON) 25 MG tablet Take 1 tablet (25 mg) by mouth daily. 90 tablet 1     cyanocobalamin (VITAMIN B-12) 1000 MCG tablet Take 1,000 mcg by mouth daily       ferrous sulfate (FEROSUL) 325 (65 Fe) MG tablet Take 1 tablet (325 mg) by mouth daily (with breakfast)       fexofenadine (ALLEGRA) 180 MG tablet Take 180 mg by mouth daily       folic acid 0.8 MG CAPS Take 1 tablet by mouth daily 90 capsule 3     furosemide (LASIX) 20 MG tablet TAKE 1 TABLET (20 MG) BY MOUTH AS NEEDED FOR LEG SWELLING 90 tablet 3     insulin regular (NOVOLIN R VIAL) 100 UNIT/ML vial INJECT 2 UNITS AS NEEDED WHEN BLOOD GLUCOSE IS GREATER THAN 200 MG/DL.  IN-USE (OPENED) VIALS MUST BE USED WITHIN 42 DAYS OR BE DISCARDED. 30 mL 1     insulin syringe-needle U-100 (BD INSULIN SYRINGE ULTRAFINE) 31G X 5/16\" 0.3 ML miscellaneous USE 3 SYRINGES DAILY OR AS DIRECTED. 300 each 3     ipratropium (ATROVENT) 0.03 % nasal spray SPRAY 2 SPRAYS INTO BOTH NOSTRILS 2 TIMES DAILY AS NEEDED FOR RHINITIS. 30 mL 1     ipratropium - albuterol 0.5 mg/2.5 mg/3 mL (DUONEB) 0.5-2.5 (3) MG/3ML neb solution NEBULIZE CONTENTS OF ONE VIAL (3 MLS) EVERY 4 HOURS AS NEEDED FOR " SHORTNESS OF BREATH OR DYSPNEA 360 mL 1     Lidocaine (LIDOCARE) 4 % Patch Place 2 patches over 12 hours onto the skin every 24 hours. To prevent lidocaine toxicity, patient should be patch free for 12 hrs daily. 60 patch 2     metFORMIN (GLUCOPHAGE) 1000 MG tablet TAKE ONE TABLET BY MOUTH TWICE A DAY WITH BREAKFAST  AND DINNER 180 tablet 1     mometasone (NASONEX) 50 MCG/ACT nasal spray Spray 2 sprays into both nostrils daily.       pantoprazole (PROTONIX) 40 MG enteric coated tablet Take 40 mg by mouth daily Started by Dr. Debbie Rico at [facility]       primidone (MYSOLINE) 50 MG tablet 4 tablets in am, 4 tablets afternoon, and 3 tablets every evening 1001 tablet 1     Probiotic Product (FLORAJEN3) CAPS Take 1 capsule by mouth 2 times daily 60 capsule 0     propafenone (RYTHMOL) 150 MG TABS tablet Take 1 tablet (150 mg) by mouth 2 times daily. 180 tablet 1     propranolol (INDERAL) 60 MG tablet Take 1 tablet (60 mg) by mouth 2 times daily. (Together with 80 mg of propranolol with each dose for the total dose of 140 mg 2 times per day). 360 tablet 1     propranolol (INDERAL) 80 MG tablet Take 1 tablet (80 mg) by mouth 2 times daily. (Together with 60 mg of propranolol with each dose for the total dose of 140 mg 2 times per day). 180 tablet 1     sulfaSALAzine (AZULFIDINE) 500 MG tablet TAKE ONE TABLET BY MOUTH THREE TIMES A DAY 90 tablet 11     tamsulosin (FLOMAX) 0.4 MG capsule TAKE ONE CAPSULE BY MOUTH ONCE DAILY 90 capsule 3     tiotropium-olodaterol (STIOLTO RESPIMAT) 2.5-2.5 MCG/ACT AERS INHALE TWO PUFFS BY MOUTH ONCE DAILY 4 g 2     Vitamin D3 (CHOLECALCIFEROL) 25 mcg (1000 units) tablet Take 1 tablet by mouth daily.       warfarin ANTICOAGULANT (JANTOVEN ANTICOAGULANT) 2.5 MG tablet Take 2-3 tablets (5-7.5 mg) by mouth every evening. Or as directed by anticoagulation clinic 235 tablet 1     zinc oxide (DESITIN) 40 % external ointment Apply topically as needed for skin protection. 397 g 0     No current  facility-administered medications for this visit.       Allergies   Allergen Reactions     Percodan [Oxycodone-Aspirin] Nausea and Vomiting     Aspirin        Past Medical History:   Diagnosis Date     Allergic rhinitis, cause unspecified      Atrial fibrillation (H)      BPH (benign prostatic hyperplasia)      Chronic airway obstruction, not elsewhere classified      COPD (chronic obstructive pulmonary disease) (H)      Hyperlipidemia LDL goal <100 5/9/2010     Hypertension goal BP (blood pressure) < 130/80 10/19/2006     Obesity (BMI 30-39.9)      Perforation of tympanic membrane, unspecified     Right TM     Tachycardia, unspecified      Type 2 diabetes, HbA1C goal < 8% (H) 5/2/2010     Ulcerative colitis (H)      Unspecified cardiovascular disease         Patient Active Problem List   Diagnosis     Ulcerative colitis without complications (HCC)     Chronic obstructive pulmonary disease (H)     Eczema     HYPERLIPIDEMIA LDL GOAL <100     Benign familial tremor     Hypertension goal BP (blood pressure) < 140/90     BPH (benign prostatic hyperplasia)     Pain in joint, lower leg     CAD in native artery     Hard of hearing     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)     Diverticulitis of colon     History of colonic polyps     Redundant colon     Bunion, left     Longstanding persistent atrial fibrillation (H)     Long term current use of anticoagulants with INR goal of 2.0-3.0     Overweight (BMI 25.0-29.9)     External hemorrhoids     Iron deficiency anemia     Gastroesophageal reflux disease without esophagitis     Esophagitis     Chronic ulcerative rectosigmoiditis (H)     Chronic ulcerative pancolitis (H)     Skin ulcer of buttock, limited to breakdown of skin (H)     Acute midline low back pain without sciatica     Right hip pain       Past Surgical History:   Procedure Laterality Date     CARDIAC SURGERY       COLONOSCOPY  3/31/2011     COLONOSCOPY N/A 5/25/2018    Procedure: COMBINED  COLONOSCOPY, SINGLE OR MULTIPLE BIOPSY/POLYPECTOMY BY BIOPSY;;  Surgeon: Juan Simon DO;  Location:  GI     COLONOSCOPY N/A 9/6/2019    Procedure: COLONOSCOPY, WITH POLYPECTOMY AND BIOPSY;  Surgeon: Paradise Mims MD;  Location:  GI     COLONOSCOPY N/A 7/8/2022    Procedure: COLONOSCOPY, FLEXIBLE, WITH LESION REMOVAL USING SNARE;  Surgeon: Juan Simon DO;  Location:  GI     COLONOSCOPY N/A 7/8/2022    Procedure: COLONOSCOPY, WITH POLYPECTOMY AND BIOPSY;  Surgeon: Juan Simon DO;  Location:  GI     CORONARY ANGIOGRAPHY ADULT ORDER  2001 and 2008 2001 at [facility]. 2008- No interventions     ESOPHAGOSCOPY, GASTROSCOPY, DUODENOSCOPY (EGD), COMBINED N/A 7/8/2022    Procedure: ESOPHAGOGASTRODUODENOSCOPY, WITH BIOPSY;  Surgeon: Juan Simon DO;  Location:  GI     HERNIA REPAIR      left inguinal     SURGICAL HISTORY OF -   1987    right ear graft     SURGICAL HISTORY OF -   1992    right shoulder surgery     SURGICAL HISTORY OF -   1979    back surgery     SURGICAL HISTORY OF -   1978    vasectomy     ZZHC REMOVAL OF NAIL PLATE SIMPLE SINGLE  11/10/2011    Right 2nd Toenail       Family History   Problem Relation Age of Onset     Circulatory Mother         blood clot in leg     Diabetes Mother         type 2     Allergies Mother      Arthritis Mother      Gastrointestinal Disease Mother      Neurologic Disorder Mother         Familiar tremors     Neurologic Disorder Father         brain tumor     Cerebrovascular Disease Paternal Grandfather      Hypertension Brother      Lipids Brother      Hypertension Sister      Diabetes Sister         Type 2     Allergies Sister      Lipids Sister      Glaucoma No family hx of      Macular Degeneration No family hx of        Reviewed past medical, surgical, and family history with patient found on new patient intake packet located in EMR Media tab.       OBJECTIVE:  BP (!) 155/59   Pulse 57   SpO2 95%     PHYSICAL  EXAMINATION:    --CONSTITUTIONAL:  Vital signs as above.  No acute distress.  The patient is well nourished and well groomed. [place]  --PSYCHIATRIC:  Appropriate mood and affect. The patient is awake, alert, oriented to person, place, time and answering questions appropriately with clear speech.    --SKIN:  Skin over the face, bilateral lower extremities, and posterior torso is clean, dry, intact without rashes.    --RESPIRATORY: Normal rhythm and effort. No abnormal accessory muscle breathing patterns noted.   --ABDOMINAL:  Non-distended.    --GROSS MOTOR: Gait not tested     --LOWER EXTREMITY MOTOR TESTING:  Hip flexion: right 5/5, left 5/5  Quads: right 5/5, left 5/5  Hamstrings: right 5/5, left 5/5  Dorsiflexion: right 5/5, left 5/5  Plantar flexion: right 5/5, left 5/5      --NEUROLOGICAL:  Has sensory loss vivian dorsal and plantar feet on exam     Straight leg raising is negative.      --SACROILIAC JOINT:  One finger point test was negative. Negative SI joint tenderness to palpation bilaterally.      RESULTS:   [facility]    Imaging: Spine imaging was personally reviewed and interpreted today. The images were shown to the patient and the findings were explained using a spine model.    Exam: MR SACRUM AND COCCYX W/O CONTRAST     History: Closed fracture of coccyx, initial encounter (H); fall,  tailbone pain. xray shows age indeterminate possible coccyx fracture;     Techniques: Multiplanar multisequence imaging through the  sacrum/coccyx was obtained without administration of intra-articular  or intravenous gadolinium contrast  using routine protocol.     Comparison: Radiograph 3/7/2025, CT abdomen and pelvis 1/9/2017.     Findings:     Bones: Nondisplaced fracture of the S5 (image 16 series 2, image 13  series 7). Given relative lack of regional edema, finding is likely  representing subacute/subchronic injury.     Faint subchondral edema  like marrow signal intensity and tiny  subchondral cystlike change at sacroiliac joint iliac aspects,  nonspecific presumably degenerative.     Nonspecific 4 mm T2 hyperintense focus in the right S1 (image 8 series  4), possibly small enchondroma.     Degenerative changes of the visualized lower lumbar spine.   Severe disc height loss at L4-L5 with trace retrolisthesis, ligamentum  flavum thickening and facet arthropathies result in moderate bilateral  neural foraminal stenosis. Moderate disc height loss at L5-S1 with  central disc extrusion, S1 superior endplate depression posteriorly  with trace anterolisthesis, and bilateral facet arthropathies result  in moderate to severe bilateral L5-S1 neural foraminal stenosis.     Other findings: Markedly distended bladder. Nonspecific edema deep to  the bilateral iliacus muscles.                                                                      IMPRESSION:  1. Subacute/subchronic nondisplaced S5 fracture.  2. Degenerative changes of visualized thoracolumbar spine with  resultant moderate to severe bilateral neural foraminal stenosis.     [facility]         SYSTEM ID:  [id number]    XR Lumbar Spine 2/3 Views    Result Date: 3/7/2025  EXAM: XR LUMBAR SPINE 2/3 VIEWS LOCATION: [facility] DATE: 3/7/2025 INDICATION: Fall 2 months ago, worsening pain lower back and right hip COMPARISON: None.     IMPRESSION: There are 5 lumbar type vertebral bodies with hypoplastic T12 ribs. There is straightening of the normal lumbar lordosis. There is a slight retrolisthesis of L4 in relation to L5 by 3.8 mm. The rest the lumbar spine is good anatomic alignment. The vertebral body heights are well-maintained throughout. There is diffuse degenerative disc disease throughout the lumbar region with a moderate loss of disc space height, endplate changes and anterior osteophyte formation. There is diffuse facet arthropathy. There are degenerative changes of the  sacroiliac joints left greater than right and mild degenerative changes of the hips bilaterally.    XR Sacrum and Coccyx 2 Views    Result Date: 3/7/2025  EXAM: XR SACRUM AND COCCYX 2 VIEWS, XR HIP RIGHT 2-3 VIEWS LOCATION: [facility] DATE: 03/07/2025 INDICATION: Fall 2 months ago, worsening pain lower back and right hip. COMPARISON: 02/10/2016     IMPRESSION: Bones are demineralized. There is mild to moderate arthritic change at the right hip. No evidence of an acute displaced fracture or dislocation. Mild bilateral SI joint arthrosis. On the lateral view there is cortical irregularity and mild angulation of the coccyx in keeping with an age-indeterminate fracture, possibly acute. Correlation with patient's symptoms is recommended. Atherosclerotic vascular calcifications.    XR Hip Right 2-3 Views    Result Date: 3/7/2025  EXAM: XR SACRUM AND COCCYX 2 VIEWS, XR HIP RIGHT 2-3 VIEWS LOCATION: [facility] DATE: 03/07/2025 INDICATION: Fall 2 months ago, worsening pain lower back and right hip. COMPARISON: 02/10/2016     IMPRESSION: Bones are demineralized. There is mild to moderate arthritic change at the right hip. No evidence of an acute displaced fracture or dislocation. Mild bilateral SI joint arthrosis. On the lateral view there is cortical irregularity and mild angulation of the coccyx in keeping with an age-indeterminate fracture, possibly acute. Correlation with patient's symptoms is recommended. Atherosclerotic vascular calcifications.        This note was dictated using voice recognition software. Any grammatical or context distortions are unintentional and inherent to the software.       Susan BARAJASP-C  [facility]  O. [phone]             Again, thank you for allowing me to participate in the care of your patient.      Sincerely,    Susan Wheat, RAFFI CNP

## 2025-05-09 NOTE — PROGRESS NOTES
ASSESSMENT: Cayetano Lunsford is a 80 year old male who presents for consultation at the request of PCP Deborah Mackey, who presents today for fu patient evaluation of:    -closed fracture of coccyx, initial encounter    Patient endorses tingling in both feet, more on the bottoms. Ongoing back pain mostly sacral and tailbone area. He still has tenderness over the sacrum. Recommend new sacral/coccyx XR today. If stable, we talked about the possibility of a coccyx injection. He is concerned that the steroid may worsen his other meication conditions and would prefer to hold off for now.   We talked about EMG as disc herniation L5-S1 causing moderate to severe vivian foraminal narrowing could be  a contributor to his symptoms. We also discussed the role of a steroid injection vivian L5-S1 TF KHUSHI. He is hoping this improves with PT and his new insoles he will be getting soon.           No data to display                     Diagnoses and all orders for this visit:  Closed fracture of coccyx, initial encounter (H)  -     XR Sacrum and Coccyx 2 Views; Future        PLAN:  Reviewed spine anatomy and disease process. Discussed diagnosis and treatment options with the patient today. A shared decision making model was used.  The patient's values and choices were respected. The following represents what was discussed and decided upon by the provider and the patient.      -DIAGNOSTIC TESTS:  Images were personally reviewed and interpreted and explained to patient today using a spine model.   -sacral/coccyx MRI without contrast orders placed      -PHYSICAL THERAPY:    --start PT as planned  Discussed the importance of core strengthening, ROM, stretching exercises with the patient and how each of these entities is important in decreasing pain.  Explained to the patient that the purpose of physical therapy is to teach the patient a home exercise program.  These exercises need to be performed every day in order to decrease pain and prevent  future occurrences of pain.        -MEDICATIONS:    --tylenol otc extra strength Q8hrs  -lidocaine patches sent  -not a po nsaid candidate given warfarin  -po steroids contraindicated in setting of acute fracture as they could slow fracture healing    Discussed multiple medication options today with patient. Discussed risks, side effects, and proper use of medications. Patient verbalized understanding.    -INTERVENTIONS:    -Discussed the role of injections with patient today. Patient would be a good candidate in the future for either caudal , coccygeal epidural steroid injections, lumbar KHUSHI, or medial branch blocks if indicated based on symptoms and supported by imaging results.    -PATIENT EDUCATION:  Total time of 40 minutes, on the day of service, spent with the patient, reviewing the chart, placing orders, and documenting.   -Today we also discussed the pros and cons of the current treatment plan.    -FOLLOW-UP:   we will call with imaging results    Advised patient to call the Spine Center if symptoms worsen, new numbness or weakness develops in the legs, or if they develop new or worsening problems controlling bladder or bowel function.   ______________________________________________________________________    SUBJECTIVE:    He has ongoing tailbone pain. Sometimes it's ok sometimes it's not. It is overall better than it was.  He notes pins and needles in his feet , feels like he is walking on two pieces of board  After PT one day he did have vivian lower thoracic pain which felt muscular. It was gone by the next day   He has swelling in the legs, has been wearing compression stockings and the size he was given feels too small. He is getting inserts in his shoes soon.  He has balance trouble, he has to be really careful with turning  He has pain in the feet up        Per original visit with updated meds/inj    HPI:  Cayetano Lunsford  Is a 80 year old male on warfarin with hx afib, type 2 diabetes with stage 1  CKD, prior lumbar surgery, ulcerative colitis, COPD, GERD, BPH, iron deficiency anemia who presents today for new patient evaluation of coccyx fracture -    Leo had a fall on Jan 2nd at his assisted living in the hallway, could not get up. Immediate acute on chronic low back pain radiating down into the buttocks. Was evaluated by EMTs, but did not wish to go to the hospital. Then 2 days after that, the pain worsened. Then 8- 10 days later, he fell again in his living room onto his buttocks and it got even worse. Sometimes the pain moves from the R hip to the lower back. Sometimes even to the other side. He is not able to be on his feet long due to pain. He is not able to walk very far, and it is slow. Even sitting is not comfortable. He denies leg pain. Seen by PCP 3/7, referred to PT and lumbar, sacrum XR done which demonstrated an age indeterminate coccyx fracture with mild angulation. Referred for spine opinion based on results. He has PT set up next week. He has been taking 2 tylenols once or two per day for pain. It helps some.    He also has a separate problem with his feet. He has bunions and swelling of the lower legs and ankles. He wears compression stockings for this. He has tried 4 pairs of shoes from [employer], no relief. Has seen a podiatrist last year at [facility], would be interested in a 2nd opinion.    Way back in the early 80s he got hurt at work d/t lifting, but no traumatic fall. He tried everything outside of surgery first - was even in a full body plaster cast from his back and under his armpits. He wore it for 4 mos. He then had lumbar spine surgery and had calcium deposits on his lower two discs and they were removed. He did well eventually following surgery, and went back to work.    -Treatment to Date:     -Medications:  tylenol    Current Outpatient Medications   Medication Sig Dispense Refill    acetaminophen (TYLENOL) 325 MG tablet Take 325-650 mg by mouth every 6 hours as needed for  "mild pain      albuterol (PROAIR HFA/PROVENTIL HFA/VENTOLIN HFA) 108 (90 Base) MCG/ACT inhaler SHAKE WELL AND INHALE ONE TO TWO PUFFS BY MOUTH EVERY 4  HOURS AS NEEDED 8.5 g 1    amLODIPine (NORVASC) 5 MG tablet TAKE 1 TABLET (5 MG) BY MOUTH DAILY 30 tablet 0    atorvastatin (LIPITOR) 20 MG tablet TAKE 1 TABLET (20 MG) BY MOUTH DAILY 90 tablet 0    blood glucose (ONETOUCH ULTRA) test strip USE TO TEST BLOOD SUGAR THREE TIMES A DAY OR AS DIRECTED 300 strip 2    chlorthalidone (HYGROTON) 25 MG tablet Take 1 tablet (25 mg) by mouth daily. 90 tablet 1    cyanocobalamin (VITAMIN B-12) 1000 MCG tablet Take 1,000 mcg by mouth daily      ferrous sulfate (FEROSUL) 325 (65 Fe) MG tablet Take 1 tablet (325 mg) by mouth daily (with breakfast)      fexofenadine (ALLEGRA) 180 MG tablet Take 180 mg by mouth daily      folic acid 0.8 MG CAPS Take 1 tablet by mouth daily 90 capsule 3    furosemide (LASIX) 20 MG tablet TAKE 1 TABLET (20 MG) BY MOUTH AS NEEDED FOR LEG SWELLING 90 tablet 3    insulin regular (NOVOLIN R VIAL) 100 UNIT/ML vial INJECT 2 UNITS AS NEEDED WHEN BLOOD GLUCOSE IS GREATER THAN 200 MG/DL.  IN-USE (OPENED) VIALS MUST BE USED WITHIN 42 DAYS OR BE DISCARDED. 30 mL 1    insulin syringe-needle U-100 (BD INSULIN SYRINGE ULTRAFINE) 31G X 5/16\" 0.3 ML miscellaneous USE 3 SYRINGES DAILY OR AS DIRECTED. 300 each 3    ipratropium (ATROVENT) 0.03 % nasal spray SPRAY 2 SPRAYS INTO BOTH NOSTRILS 2 TIMES DAILY AS NEEDED FOR RHINITIS. 30 mL 1    ipratropium - albuterol 0.5 mg/2.5 mg/3 mL (DUONEB) 0.5-2.5 (3) MG/3ML neb solution NEBULIZE CONTENTS OF ONE VIAL (3 MLS) EVERY 4 HOURS AS NEEDED FOR SHORTNESS OF BREATH OR DYSPNEA 360 mL 1    Lidocaine (LIDOCARE) 4 % Patch Place 2 patches over 12 hours onto the skin every 24 hours. To prevent lidocaine toxicity, patient should be patch free for 12 hrs daily. 60 patch 2    metFORMIN (GLUCOPHAGE) 1000 MG tablet TAKE ONE TABLET BY MOUTH TWICE A DAY WITH BREAKFAST  AND DINNER 180 tablet 1 "    mometasone (NASONEX) 50 MCG/ACT nasal spray Spray 2 sprays into both nostrils daily.      pantoprazole (PROTONIX) 40 MG enteric coated tablet Take 40 mg by mouth daily Started by Dr. Debbie Rico at [facility]      primidone (MYSOLINE) 50 MG tablet 4 tablets in am, 4 tablets afternoon, and 3 tablets every evening 1001 tablet 1    Probiotic Product (FLORAJEN3) CAPS Take 1 capsule by mouth 2 times daily 60 capsule 0    propafenone (RYTHMOL) 150 MG TABS tablet Take 1 tablet (150 mg) by mouth 2 times daily. 180 tablet 1    propranolol (INDERAL) 60 MG tablet Take 1 tablet (60 mg) by mouth 2 times daily. (Together with 80 mg of propranolol with each dose for the total dose of 140 mg 2 times per day). 360 tablet 1    propranolol (INDERAL) 80 MG tablet Take 1 tablet (80 mg) by mouth 2 times daily. (Together with 60 mg of propranolol with each dose for the total dose of 140 mg 2 times per day). 180 tablet 1    sulfaSALAzine (AZULFIDINE) 500 MG tablet TAKE ONE TABLET BY MOUTH THREE TIMES A DAY 90 tablet 11    tamsulosin (FLOMAX) 0.4 MG capsule TAKE ONE CAPSULE BY MOUTH ONCE DAILY 90 capsule 3    tiotropium-olodaterol (STIOLTO RESPIMAT) 2.5-2.5 MCG/ACT AERS INHALE TWO PUFFS BY MOUTH ONCE DAILY 4 g 2    Vitamin D3 (CHOLECALCIFEROL) 25 mcg (1000 units) tablet Take 1 tablet by mouth daily.      warfarin ANTICOAGULANT (JANTOVEN ANTICOAGULANT) 2.5 MG tablet Take 2-3 tablets (5-7.5 mg) by mouth every evening. Or as directed by anticoagulation clinic 235 tablet 1    zinc oxide (DESITIN) 40 % external ointment Apply topically as needed for skin protection. 397 g 0     No current facility-administered medications for this visit.       Allergies   Allergen Reactions    Percodan [Oxycodone-Aspirin] Nausea and Vomiting    Aspirin        Past Medical History:   Diagnosis Date    Allergic rhinitis, cause unspecified     Atrial fibrillation (H)     BPH (benign prostatic hyperplasia)     Chronic airway obstruction, not elsewhere classified      COPD (chronic obstructive pulmonary disease) (H)     Hyperlipidemia LDL goal <100 5/9/2010    Hypertension goal BP (blood pressure) < 130/80 10/19/2006    Obesity (BMI 30-39.9)     Perforation of tympanic membrane, unspecified     Right TM    Tachycardia, unspecified     Type 2 diabetes, HbA1C goal < 8% (H) 5/2/2010    Ulcerative colitis (H)     Unspecified cardiovascular disease         Patient Active Problem List   Diagnosis    Ulcerative colitis without complications (HCC)    Chronic obstructive pulmonary disease (H)    Eczema    HYPERLIPIDEMIA LDL GOAL <100    Benign familial tremor    Hypertension goal BP (blood pressure) < 140/90    BPH (benign prostatic hyperplasia)    Pain in joint, lower leg    CAD in native artery    Hard of hearing    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)    Diverticulitis of colon    History of colonic polyps    Redundant colon    Bunion, left    Longstanding persistent atrial fibrillation (H)    Long term current use of anticoagulants with INR goal of 2.0-3.0    Overweight (BMI 25.0-29.9)    External hemorrhoids    Iron deficiency anemia    Gastroesophageal reflux disease without esophagitis    Esophagitis    Chronic ulcerative rectosigmoiditis (H)    Chronic ulcerative pancolitis (H)    Skin ulcer of buttock, limited to breakdown of skin (H)    Acute midline low back pain without sciatica    Right hip pain       Past Surgical History:   Procedure Laterality Date    CARDIAC SURGERY      COLONOSCOPY  3/31/2011    COLONOSCOPY N/A 5/25/2018    Procedure: COMBINED COLONOSCOPY, SINGLE OR MULTIPLE BIOPSY/POLYPECTOMY BY BIOPSY;;  Surgeon: Juan Simon DO;  Location: [facility]    COLONOSCOPY N/A 9/6/2019    Procedure: COLONOSCOPY, WITH POLYPECTOMY AND BIOPSY;  Surgeon: Paradise Mims MD;  Location: [facility]    COLONOSCOPY N/A 7/8/2022    Procedure: COLONOSCOPY, FLEXIBLE, WITH LESION REMOVAL USING SNARE;  Surgeon: Juan Simon DO;  Location: [facility]     COLONOSCOPY N/A 7/8/2022    Procedure: COLONOSCOPY, WITH POLYPECTOMY AND BIOPSY;  Surgeon: Juan Simon DO;  Location:  GI    CORONARY ANGIOGRAPHY ADULT ORDER  2001 and 2008 2001 at [facility]. 2008- No interventions    ESOPHAGOSCOPY, GASTROSCOPY, DUODENOSCOPY (EGD), COMBINED N/A 7/8/2022    Procedure: ESOPHAGOGASTRODUODENOSCOPY, WITH BIOPSY;  Surgeon: Juan Simon DO;  Location:  GI    HERNIA REPAIR      left inguinal    SURGICAL HISTORY OF -   1987    right ear graft    SURGICAL HISTORY OF -   1992    right shoulder surgery    SURGICAL HISTORY OF -   1979    back surgery    SURGICAL HISTORY OF -   1978    vasectomy    ZZHC REMOVAL OF NAIL PLATE SIMPLE SINGLE  11/10/2011    Right 2nd Toenail       Family History   Problem Relation Age of Onset    Circulatory Mother         blood clot in leg    Diabetes Mother         type 2    Allergies Mother     Arthritis Mother     Gastrointestinal Disease Mother     Neurologic Disorder Mother         Familiar tremors    Neurologic Disorder Father         brain tumor    Cerebrovascular Disease Paternal Grandfather     Hypertension Brother     Lipids Brother     Hypertension Sister     Diabetes Sister         Type 2    Allergies Sister     Lipids Sister     Glaucoma No family hx of     Macular Degeneration No family hx of        Reviewed past medical, surgical, and family history with patient found on new patient intake packet located in EMR Media tab.       OBJECTIVE:  BP (!) 155/59   Pulse 57   SpO2 95%     PHYSICAL EXAMINATION:    --CONSTITUTIONAL:  Vital signs as above.  No acute distress.  The patient is well nourished and well groomed. [place]  --PSYCHIATRIC:  Appropriate mood and affect. The patient is awake, alert, oriented to person, place, time and answering questions appropriately with clear speech.    --SKIN:  Skin over the face, bilateral lower extremities, and posterior torso is clean, dry, intact without rashes.    --RESPIRATORY: Normal rhythm and  effort. No abnormal accessory muscle breathing patterns noted.   --ABDOMINAL:  Non-distended.    --GROSS MOTOR: Gait not tested     --LOWER EXTREMITY MOTOR TESTING:  Hip flexion: right 5/5, left 5/5  Quads: right 5/5, left 5/5  Hamstrings: right 5/5, left 5/5  Dorsiflexion: right 5/5, left 5/5  Plantar flexion: right 5/5, left 5/5      --NEUROLOGICAL:  Has sensory loss vivian dorsal and plantar feet on exam     Straight leg raising is negative.      --SACROILIAC JOINT:  One finger point test was negative. Negative SI joint tenderness to palpation bilaterally.      RESULTS:   Prior medical records from [facility] and [facility] Everywhere were reviewed today.    Imaging: Spine imaging was personally reviewed and interpreted today. The images were shown to the patient and the findings were explained using a spine model.    Exam: MR SACRUM AND COCCYX W/O CONTRAST     History: Closed fracture of coccyx, initial encounter (H); fall,  tailbone pain. xray shows age indeterminate possible coccyx fracture;     Techniques: Multiplanar multisequence imaging through the  sacrum/coccyx was obtained without administration of intra-articular  or intravenous gadolinium contrast  using routine protocol.     Comparison: Radiograph 3/7/2025, CT abdomen and pelvis 1/9/2017.     Findings:     Bones: Nondisplaced fracture of the S5 (image 16 series 2, image 13  series 7). Given relative lack of regional edema, finding is likely  representing subacute/subchronic injury.     Faint subchondral edema like marrow signal intensity and tiny  subchondral cystlike change at sacroiliac joint iliac aspects,  nonspecific presumably degenerative.     Nonspecific 4 mm T2 hyperintense focus in the right S1 (image 8 series  4), possibly small enchondroma.     Degenerative changes of the visualized lower lumbar spine.   Severe disc height loss at L4-L5 with trace retrolisthesis, ligamentum  flavum thickening and facet arthropathies result in moderate  bilateral  neural foraminal stenosis. Moderate disc height loss at L5-S1 with  central disc extrusion, S1 superior endplate depression posteriorly  with trace anterolisthesis, and bilateral facet arthropathies result  in moderate to severe bilateral L5-S1 neural foraminal stenosis.     Other findings: Markedly distended bladder. Nonspecific edema deep to  the bilateral iliacus muscles.                                                                      IMPRESSION:  1. Subacute/subchronic nondisplaced S5 fracture.  2. Degenerative changes of visualized thoracolumbar spine with  resultant moderate to severe bilateral neural foraminal stenosis.     JAKY MANI         SYSTEM ID:  [id number]    XR Lumbar Spine 2/3 Views    Result Date: 3/7/2025  EXAM: XR LUMBAR SPINE 2/3 VIEWS LOCATION: [facility] DATE: 3/7/2025 INDICATION: Fall 2 months ago, worsening pain lower back and right hip COMPARISON: None.     IMPRESSION: There are 5 lumbar type vertebral bodies with hypoplastic T12 ribs. There is straightening of the normal lumbar lordosis. There is a slight retrolisthesis of L4 in relation to L5 by 3.8 mm. The rest the lumbar spine is good anatomic alignment. The vertebral body heights are well-maintained throughout. There is diffuse degenerative disc disease throughout the lumbar region with a moderate loss of disc space height, endplate changes and anterior osteophyte formation. There is diffuse facet arthropathy. There are degenerative changes of the sacroiliac joints left greater than right and mild degenerative changes of the hips bilaterally.    XR Sacrum and Coccyx 2 Views    Result Date: 3/7/2025  EXAM: XR SACRUM AND COCCYX 2 VIEWS, XR HIP RIGHT 2-3 VIEWS LOCATION: [facility] DATE: 03/07/2025 INDICATION: Fall 2 months ago, worsening pain lower back and right hip. COMPARISON: 02/10/2016     IMPRESSION: Bones are demineralized. There is mild to moderate  arthritic change at the right hip. No evidence of an acute displaced fracture or dislocation. Mild bilateral SI joint arthrosis. On the lateral view there is cortical irregularity and mild angulation of the coccyx in keeping with an age-indeterminate fracture, possibly acute. Correlation with patient's symptoms is recommended. Atherosclerotic vascular calcifications.    XR Hip Right 2-3 Views    Result Date: 3/7/2025  EXAM: XR SACRUM AND COCCYX 2 VIEWS, XR HIP RIGHT 2-3 VIEWS LOCATION: [facility] DATE: 03/07/2025 INDICATION: Fall 2 months ago, worsening pain lower back and right hip. COMPARISON: 02/10/2016     IMPRESSION: Bones are demineralized. There is mild to moderate arthritic change at the right hip. No evidence of an acute displaced fracture or dislocation. Mild bilateral SI joint arthrosis. On the lateral view there is cortical irregularity and mild angulation of the coccyx in keeping with an age-indeterminate fracture, possibly acute. Correlation with patient's symptoms is recommended. Atherosclerotic vascular calcifications.        This note was dictated using voice recognition software. Any grammatical or context distortions are unintentional and inherent to the software.       Susan Wheat FNP-C  [facility]  O. [phone]

## 2025-05-10 DIAGNOSIS — I48.0 PAROXYSMAL ATRIAL FIBRILLATION (H): ICD-10-CM

## 2025-05-12 ENCOUNTER — RESULTS FOLLOW-UP (OUTPATIENT)
Dept: PHYSICAL MEDICINE AND REHAB | Facility: CLINIC | Age: 80
End: 2025-05-12

## 2025-05-12 RX ORDER — AMLODIPINE BESYLATE 5 MG/1
5 TABLET ORAL DAILY
Qty: 30 TABLET | Refills: 0 | Status: SHIPPED | OUTPATIENT
Start: 2025-05-12

## 2025-05-20 ENCOUNTER — THERAPY VISIT (OUTPATIENT)
Dept: PHYSICAL THERAPY | Facility: CLINIC | Age: 80
End: 2025-05-20
Payer: COMMERCIAL

## 2025-05-20 ENCOUNTER — RESULTS FOLLOW-UP (OUTPATIENT)
Dept: ANTICOAGULATION | Facility: CLINIC | Age: 80
End: 2025-05-20

## 2025-05-20 ENCOUNTER — ANTICOAGULATION THERAPY VISIT (OUTPATIENT)
Dept: ANTICOAGULATION | Facility: CLINIC | Age: 80
End: 2025-05-20

## 2025-05-20 ENCOUNTER — LAB (OUTPATIENT)
Dept: LAB | Facility: CLINIC | Age: 80
End: 2025-05-20
Payer: COMMERCIAL

## 2025-05-20 DIAGNOSIS — I48.11 LONGSTANDING PERSISTENT ATRIAL FIBRILLATION (H): Primary | ICD-10-CM

## 2025-05-20 DIAGNOSIS — Z79.01 LONG TERM CURRENT USE OF ANTICOAGULANTS WITH INR GOAL OF 2.0-3.0: ICD-10-CM

## 2025-05-20 DIAGNOSIS — M25.551 RIGHT HIP PAIN: ICD-10-CM

## 2025-05-20 DIAGNOSIS — I48.11 LONGSTANDING PERSISTENT ATRIAL FIBRILLATION (H): ICD-10-CM

## 2025-05-20 DIAGNOSIS — M54.50 ACUTE MIDLINE LOW BACK PAIN WITHOUT SCIATICA: Primary | ICD-10-CM

## 2025-05-20 LAB — INR BLD: 2.5 (ref 0.9–1.1)

## 2025-05-20 PROCEDURE — 85610 PROTHROMBIN TIME: CPT

## 2025-05-20 PROCEDURE — 97110 THERAPEUTIC EXERCISES: CPT | Mod: GP | Performed by: PHYSICAL THERAPIST

## 2025-05-20 PROCEDURE — 36416 COLLJ CAPILLARY BLOOD SPEC: CPT

## 2025-05-20 PROCEDURE — 97112 NEUROMUSCULAR REEDUCATION: CPT | Mod: GP | Performed by: PHYSICAL THERAPIST

## 2025-05-20 NOTE — PROGRESS NOTES
5:45 PM    AVS mailed to the patient.     Tito Carter RN, BSN, PHN  Anticoagulation Clinic   [phone]

## 2025-05-20 NOTE — PROGRESS NOTES
ANTICOAGULATION MANAGEMENT     Cayetano LEANN Jonn 80 year old male is on warfarin with therapeutic INR result. (Goal INR 2.0-3.0)    Recent labs: (last 7 days)     05/20/25  1321   INR 2.5*       ASSESSMENT     {accassessment:204170}       PLAN     {INRPLAN:[id number]}

## 2025-05-20 NOTE — PROGRESS NOTES
ANTICOAGULATION MANAGEMENT     Cayetano Lunsford 80 year old male is on warfarin with therapeutic INR result. (Goal INR 2.0-3.0)    Recent labs: (last 7 days)     05/20/25  1321   INR 2.5*       ASSESSMENT     Source(s): Chart Review  Previous INR was Subtherapeutic  Medication, diet, health changes since last INR chart reviewed - started PT for tailbone pain. Amlodipine dosage increased.  Unable to reach x3. Mailing AVS.         PLAN     Recommended plan for no diet, medication or health factor changes affecting INR     Dosing Instructions: Continue your current warfarin dose with next INR in 3 weeks       Summary  As of 5/20/2025      Full warfarin instructions:  5 mg every Tue, Sat; 7.5 mg all other days   Next INR check:  6/10/2025               Detailed voice message left for Rich with dosing instructions and follow up date.     Contact [phone] to schedule and with any changes, questions or concerns.     Education provided: Please call back if any changes to your diet, medications or how you've been taking warfarin  Written instructions provided    Plan made per [facility] anticoagulation protocol    Corina Cuevas RN  5/20/2025  Anticoagulation Clinic  [employer] for routing messages: an [employer]  [facility] patient phone line: [phone]        _______________________________________________________________________     Anticoagulation Episode Summary       Current INR goal:  2.0-3.0   TTR:  33.9% (1 y)   Target end date:  Indefinite   Send INR reminders to:  [facility]    Indications    Longstanding persistent atrial fibrillation (H) [I48.11]  Long term current use of anticoagulants with INR goal of 2.0-3.0 [Z79.01]  Atrial fibrillation  unspecified type (H) (Resolved) [I48.91]  Long-term (current) use of anticoagulants [Z79.01] (Resolved) [Z79.01]             Comments:  --             Anticoagulation Care Providers       Provider Role Specialty Phone number    Dmitri Andres  MD [place] Referring Family Medicine [phone]    Drew Bravo PA-C Referring Family Medicine [phone]    Deborah Mackey PA-C Referring Family Medicine [phone]

## 2025-05-20 NOTE — PROGRESS NOTES
ANTICOAGULATION MANAGEMENT     Cayetano Lunsford 80 year old male is on warfarin with therapeutic INR result. (Goal INR 2.0-3.0)    Recent labs: (last 7 days)     05/20/25  1321   INR 2.5*       ASSESSMENT     Source(s): Chart Review  Previous INR was Subtherapeutic.  Warfarin maintenance dose was increased 5.6% at last check.  Medication, diet, health changes since last INR chart reviewed; none identified         PLAN     Unable to reach Rich today.    Left message to continue current dose of warfarin 5 mg tonight. Request call back for assessment.    Follow up required to confirm warfarin dose taken and assess for changes    Estelle Starr, RN  5/20/2025  Anticoagulation Clinic  [employer] [place] for routing messages: an [employer]  [facility] patient phone line: [phone]

## 2025-05-20 NOTE — PATIENT INSTRUCTIONS
Kevyn Bailey,     Please give us a call to set up your next INR on/near 6/10/25. Thanks!    Corina Cuevas RN  [facility]ticoagulation [facility]  [phone]

## 2025-05-27 ENCOUNTER — OFFICE VISIT (OUTPATIENT)
Dept: PHARMACY | Facility: CLINIC | Age: 80
End: 2025-05-27
Payer: COMMERCIAL

## 2025-05-27 ENCOUNTER — LAB (OUTPATIENT)
Dept: LAB | Facility: CLINIC | Age: 80
End: 2025-05-27
Payer: COMMERCIAL

## 2025-05-27 VITALS — SYSTOLIC BLOOD PRESSURE: 122 MMHG | BODY MASS INDEX: 21.42 KG/M2 | DIASTOLIC BLOOD PRESSURE: 56 MMHG | WEIGHT: 131.7 LBS

## 2025-05-27 DIAGNOSIS — Z79.4 TYPE 2 DIABETES MELLITUS WITH STAGE 1 CHRONIC KIDNEY DISEASE, WITH LONG-TERM CURRENT USE OF INSULIN (H): ICD-10-CM

## 2025-05-27 DIAGNOSIS — N18.1 TYPE 2 DIABETES MELLITUS WITH STAGE 1 CHRONIC KIDNEY DISEASE, WITH LONG-TERM CURRENT USE OF INSULIN (H): Primary | ICD-10-CM

## 2025-05-27 DIAGNOSIS — E11.22 TYPE 2 DIABETES MELLITUS WITH STAGE 1 CHRONIC KIDNEY DISEASE, WITH LONG-TERM CURRENT USE OF INSULIN (H): ICD-10-CM

## 2025-05-27 DIAGNOSIS — I48.0 PAROXYSMAL ATRIAL FIBRILLATION (H): ICD-10-CM

## 2025-05-27 DIAGNOSIS — Z13.6 CARDIOVASCULAR SCREENING; LDL GOAL LESS THAN 70: ICD-10-CM

## 2025-05-27 DIAGNOSIS — I10 HYPERTENSION GOAL BP (BLOOD PRESSURE) < 140/90: ICD-10-CM

## 2025-05-27 DIAGNOSIS — Z79.4 TYPE 2 DIABETES MELLITUS WITH STAGE 1 CHRONIC KIDNEY DISEASE, WITH LONG-TERM CURRENT USE OF INSULIN (H): Primary | ICD-10-CM

## 2025-05-27 DIAGNOSIS — E11.22 TYPE 2 DIABETES MELLITUS WITH STAGE 1 CHRONIC KIDNEY DISEASE, WITH LONG-TERM CURRENT USE OF INSULIN (H): Primary | ICD-10-CM

## 2025-05-27 DIAGNOSIS — N18.1 TYPE 2 DIABETES MELLITUS WITH STAGE 1 CHRONIC KIDNEY DISEASE, WITH LONG-TERM CURRENT USE OF INSULIN (H): ICD-10-CM

## 2025-05-27 LAB
EST. AVERAGE GLUCOSE BLD GHB EST-MCNC: 123 MG/DL
HBA1C MFR BLD: 5.9 % (ref 0–5.6)

## 2025-05-27 PROCEDURE — 83036 HEMOGLOBIN GLYCOSYLATED A1C: CPT

## 2025-05-27 PROCEDURE — 36415 COLL VENOUS BLD VENIPUNCTURE: CPT

## 2025-05-27 PROCEDURE — 3074F SYST BP LT 130 MM HG: CPT | Performed by: PHARMACIST

## 2025-05-27 PROCEDURE — 3078F DIAST BP <80 MM HG: CPT | Performed by: PHARMACIST

## 2025-05-27 PROCEDURE — 99606 MTMS BY PHARM EST 15 MIN: CPT | Performed by: PHARMACIST

## 2025-05-27 RX ORDER — ATORVASTATIN CALCIUM 20 MG/1
20 TABLET, FILM COATED ORAL DAILY
Qty: 90 TABLET | Refills: 3 | Status: SHIPPED | OUTPATIENT
Start: 2025-05-27

## 2025-05-27 RX ORDER — FUROSEMIDE 20 MG/1
TABLET ORAL
Qty: 90 TABLET | Refills: 3 | Status: SHIPPED | OUTPATIENT
Start: 2025-05-27

## 2025-05-27 RX ORDER — AMLODIPINE BESYLATE 5 MG/1
5 TABLET ORAL 2 TIMES DAILY
Qty: 180 TABLET | Refills: 1 | Status: SHIPPED | OUTPATIENT
Start: 2025-05-27

## 2025-05-27 NOTE — PROGRESS NOTES
Medication Therapy Management (MTM) Encounter    ASSESSMENT:                            Medication Adherence/Access: No issues identified.    Diabetes  Patient is well below goal A1c goal of < 7% although may be falsely low due to lower hemoglobin levels.  Self monitoring of blood glucose is not at goal of fasting glucose <150mg/dL. Will start to check twice daily for next 2 weeks to more closely monitor before making medication changes and ordered new glucometer to check accuracy of readings. Next visit may consider switching Novolin R to longer acting basal insulin to assist with high fasting blood glucose.     Hypertension /Afib/Edema  Stable - future consider addition of ACEi/ARB if potassium lowers due to elevated urine albumin.      Hyperlipidemia   Stable, LDL close to goal <70.     PLAN:                            New blood glucometer sent to continue checking blood glucose twice daily before breakfast and dinner for the next 2 weeks. No medication changes today.     Follow-up: Return in about 2 weeks (around 6/10/2025) for Medication Therapy Management Pharmacist.    SUBJECTIVE/OBJECTIVE:                          Leo Lunsford is a 80 year old male seen for a follow-up visit.       Reason for visit: elevated blood glucose.    Allergies/ADRs: Reviewed in chart  Past Medical History: Reviewed in chart  Tobacco: He reports that he quit smoking about 33 years ago. His smoking use included cigarettes. He started smoking about 63 years ago. He has a 30 pack-year smoking history. He has been exposed to tobacco smoke. He has never used smokeless tobacco.  Alcohol: not currently using    Medication Adherence/Access: no issues reported.    Diabetes   Metformin 1000 mg twice daily  Novolin R 2 units when the reading is >200mg/dL - has been needing daily lately with higher fasting readings    Patient is not experiencing side effects.    Current diabetes symptoms: neuropathy, hypoglycemia once a month, less than previous    Diet/Exercise: Diet soda only. He's not sure why blood glucose are high since diet hasn't changed. Generally tries to stick with greens 3x a week for his warfarin to keep consistent. Some walking around the house.      Blood sugar monitorin-2 time(s) daily; Ranges: (patient reported) Fasting- 200, 235, 236, 206, 187, 186, 183, 172, 120, 157; post-prandial- 187, 120, 134, 138, 137, 147, 137, 146  Eye exam is up to date  Foot exam is up to date    Hypertension   /Afib/Edema  Amlodipine 5 mg twice daily - increased dose 3 weeks ago due to higher blood pressure per pcp  Warfarin (following INR clinic)  Propranolol 140 mg twice daily  Chlorthalidone 25 mg daily  Furosemide 20 mg as needed - uses every other day   Propafenone 150 mg twice daily    Patient reports no current medication side effects.  Patient does self-monitor blood pressure and has been 130-140's systolic.  Does use compression stockings for edema about 2-3x a week.          Hyperlipidemia   atorvastatin 20mg daily  Patient reports no significant myalgias or flushing symptoms.         Today's Vitals: /56   Wt 131 lb 11.2 oz (59.7 kg)   BMI 21.42 kg/m    ----------------      I spent 30 minutes with this patient today. All changes were made via collaborative practice agreement with Deborah Mackey PA-C.     A summary of these recommendations was declined by the patient.    Diana Johnson, PharmD, BCACP  Medication Therapy Management Provider, [facility]  [phone]         Medication Therapy Recommendations  No medication therapy recommendations to display

## 2025-05-27 NOTE — PATIENT INSTRUCTIONS
"Recommendations from today's MTM visit:                                                         New glucometer sent. Check blood glucose twice daily for 2 weeks until we chat next then will go back to once daily checking. Fasting and before dinner for now. Okay to take Novolin R 2 units when blood glucose is >200 mg/dL before breakfast and before dinner.     It was great speaking with you today.  I value your experience and would be very thankful for your time in providing feedback in our clinic survey. In the next few days, you may receive an email or text message from [email or web address] with a link to a survey related to your  clinical pharmacist.\"     To schedule another MTM appointment, please call the clinic directly or you may call the MTM scheduling line at [phone] or toll-free at [phone].     My Clinical Pharmacist's contact information:                                                      Please feel free to contact me with any questions or concerns you have.      Diana Johnson, PharmD, BCACP  Medication Therapy Management Provider, [facility]    "

## 2025-05-28 ENCOUNTER — TELEPHONE (OUTPATIENT)
Dept: FAMILY MEDICINE | Facility: CLINIC | Age: 80
End: 2025-05-28

## 2025-05-28 ENCOUNTER — OFFICE VISIT (OUTPATIENT)
Dept: FAMILY MEDICINE | Facility: CLINIC | Age: 80
End: 2025-05-28
Payer: COMMERCIAL

## 2025-05-28 VITALS
DIASTOLIC BLOOD PRESSURE: 66 MMHG | WEIGHT: 130 LBS | HEART RATE: 66 BPM | BODY MASS INDEX: 20.89 KG/M2 | OXYGEN SATURATION: 100 % | SYSTOLIC BLOOD PRESSURE: 135 MMHG | RESPIRATION RATE: 16 BRPM | HEIGHT: 66 IN | TEMPERATURE: 97.5 F

## 2025-05-28 DIAGNOSIS — L98.411 SKIN ULCER OF BUTTOCK, LIMITED TO BREAKDOWN OF SKIN (H): Primary | ICD-10-CM

## 2025-05-28 PROCEDURE — 3078F DIAST BP <80 MM HG: CPT | Performed by: PHYSICIAN ASSISTANT

## 2025-05-28 PROCEDURE — 3075F SYST BP GE 130 - 139MM HG: CPT | Performed by: PHYSICIAN ASSISTANT

## 2025-05-28 PROCEDURE — 99213 OFFICE O/P EST LOW 20 MIN: CPT | Performed by: PHYSICIAN ASSISTANT

## 2025-05-28 RX ORDER — MINERAL OIL/HYDROPHIL PETROLAT
OINTMENT (GRAM) TOPICAL 2 TIMES DAILY
Qty: 198 G | Refills: 1 | Status: SHIPPED | OUTPATIENT
Start: 2025-05-28

## 2025-05-28 NOTE — PROGRESS NOTES
DME (Durable Medical Equipment) Orders and Documentation  Orders Placed This Encounter   Procedures     Miscellaneous DME Order      The patient was assessed and it was determined the patient is in need of the following listed DME Supplies/Equipment. Please complete supporting documentation below to demonstrate medical necessity.

## 2025-05-28 NOTE — TELEPHONE ENCOUNTER
Please fax DME order (in [place]) to [facility]   S/P CABG (coronary artery bypass graft)    S/P primary angioplasty with coronary stent    Status post open reduction with internal fixation of fracture

## 2025-05-28 NOTE — PROGRESS NOTES
Assessment & Plan     Skin ulcer of buttock, limited to breakdown of skin (H)  Discussed importance of changing position. Will get him a cushion to see if this helps with positioning. He states Desitin caused irritation of the skin. Will try Aquaphor as a barrier cream instead.  No indication to send to wound care at this time but if progresses he may need to.  - Miscellaneous DME Order  - mineral oil-hydrophilic petrolatum (AQUAPHOR) external ointment; Apply topically 2 times daily. As needed for skin protection      DME (Durable Medical Equipment) Orders and Documentation  Orders Placed This Encounter   Procedures    Miscellaneous DME Order      The patient was assessed and it was determined the patient is in need of the following listed DME Supplies/Equipment. Please complete supporting documentation below to demonstrate medical necessity.        Fax DME order to [facility]    Review of external notes as documented elsewhere in note  Prescription drug management  21 minutes spent by me on the date of the encounter doing chart review, history and exam, documentation and further activities per the note    Randall Bailey is a 80 year old, presenting for the following health issues:  Derm Problem      5/28/2025     1:51 PM   Additional Questions   Roomed by Tiffanie VILLALOBOS      Rash  Onset/Duration: several months  Description  Location: bottom  Character: draining, red  Itching: no  Intensity:  moderate  Progression of Symptoms:  intermittent  Accompanying signs and symptoms:   Fever: No  Body aches or joint pain: No  Sore throat symptoms: No  Recent cold symptoms: No  History:           Previous episodes of similar rash: yes  New exposures:  None  Recent travel: No  Exposure to similar rash: No  Precipitating or alleviating factors: none  Therapies tried and outcome: antibiotic ointment, pillow, foam cushion        Objective    /66 (BP Location: Left arm, Patient Position: Chair, Cuff Size: Adult  "Regular)   Pulse 66   Temp 97.5  F (36.4  C) (Oral)   Resp 16   Ht 1.676 m (5' 6\")   Wt 59 kg (130 lb)   SpO2 100%   BMI 20.98 kg/m    Body mass index is 20.98 kg/m .  Physical Exam   GENERAL: No acute distress  HEENT: Normocephalic  SKIN: Left medial gluteus with open sore affecting first layer of skin and right medial gluteus with pressure injury- no open skin.  Extra skin without mass in the suprapubic area (patient worried if this is normal)  NEURO: Alert and non-focal          Signed Electronically by: Sarita Gunter PA-C    "

## 2025-05-29 ENCOUNTER — TELEPHONE (OUTPATIENT)
Dept: FAMILY MEDICINE | Facility: CLINIC | Age: 80
End: 2025-05-29
Payer: COMMERCIAL

## 2025-05-29 NOTE — TELEPHONE ENCOUNTER
Pt had AWV on 5/5/25 with Deborah Mackey.  Pt reports he was instructed to take 2 Amlodipine instead of the 1 that he was on.    During 5/28/25 appt, was told there was no record of dose change.    Reviewed chart.  see 5/6/25 telephone encounter, advised pt of Deborah Mackey's note:      Also advised pt of prescription sent to pharmacy on 5/27/25 of Amlodipine 5 mg tablet.  Take one table two times daily.   Pt verbalized understanding and agreeable to plan.    Routing to Deborah Mackey, please confirm this information is correct/most current.    Penny Bravo RN, BSN  [facility]

## 2025-05-29 NOTE — TELEPHONE ENCOUNTER
Called and spoke with pt,     Relayed provider message below.     Pt verbalizes understanding and is agreeable with plan. Pt denies any further questions or concerns at this time.     Sasha DAVIES, RN   Clinic RN  [facility]

## 2025-05-29 NOTE — TELEPHONE ENCOUNTER
Taking the Amlodipine 5 mg twice daily is the most accurate and based on his blood pressure at his visit yesterday his blood pressure is at goal. Please advise him to continue to take twice daily.     Thanks!  Deborah Mackey PA-C

## 2025-06-03 ENCOUNTER — THERAPY VISIT (OUTPATIENT)
Dept: PHYSICAL THERAPY | Facility: CLINIC | Age: 80
End: 2025-06-03
Payer: COMMERCIAL

## 2025-06-03 DIAGNOSIS — M54.50 ACUTE MIDLINE LOW BACK PAIN WITHOUT SCIATICA: Primary | ICD-10-CM

## 2025-06-03 DIAGNOSIS — M25.551 RIGHT HIP PAIN: ICD-10-CM

## 2025-06-03 PROCEDURE — 97110 THERAPEUTIC EXERCISES: CPT | Mod: GP | Performed by: PHYSICAL THERAPIST

## 2025-06-03 PROCEDURE — 97112 NEUROMUSCULAR REEDUCATION: CPT | Mod: GP | Performed by: PHYSICAL THERAPIST

## 2025-06-06 DIAGNOSIS — N18.1 TYPE 2 DIABETES MELLITUS WITH STAGE 1 CHRONIC KIDNEY DISEASE, WITH LONG-TERM CURRENT USE OF INSULIN (H): ICD-10-CM

## 2025-06-06 DIAGNOSIS — Z79.4 TYPE 2 DIABETES MELLITUS WITH STAGE 1 CHRONIC KIDNEY DISEASE, WITH LONG-TERM CURRENT USE OF INSULIN (H): ICD-10-CM

## 2025-06-06 DIAGNOSIS — E11.22 TYPE 2 DIABETES MELLITUS WITH STAGE 1 CHRONIC KIDNEY DISEASE, WITH LONG-TERM CURRENT USE OF INSULIN (H): ICD-10-CM

## 2025-06-09 RX ORDER — BLOOD SUGAR DIAGNOSTIC
STRIP MISCELLANEOUS
Qty: 300 STRIP | Refills: 2 | OUTPATIENT
Start: 2025-06-09

## 2025-06-10 ENCOUNTER — RESULTS FOLLOW-UP (OUTPATIENT)
Dept: ANTICOAGULATION | Facility: CLINIC | Age: 80
End: 2025-06-10

## 2025-06-10 ENCOUNTER — LAB (OUTPATIENT)
Dept: LAB | Facility: CLINIC | Age: 80
End: 2025-06-10
Payer: COMMERCIAL

## 2025-06-10 ENCOUNTER — ANTICOAGULATION THERAPY VISIT (OUTPATIENT)
Dept: ANTICOAGULATION | Facility: CLINIC | Age: 80
End: 2025-06-10

## 2025-06-10 DIAGNOSIS — I48.11 LONGSTANDING PERSISTENT ATRIAL FIBRILLATION (H): Primary | ICD-10-CM

## 2025-06-10 DIAGNOSIS — Z79.01 LONG TERM CURRENT USE OF ANTICOAGULANTS WITH INR GOAL OF 2.0-3.0: ICD-10-CM

## 2025-06-10 DIAGNOSIS — I48.11 LONGSTANDING PERSISTENT ATRIAL FIBRILLATION (H): ICD-10-CM

## 2025-06-10 LAB — INR BLD: 1.9 (ref 0.9–1.1)

## 2025-06-10 PROCEDURE — 36416 COLLJ CAPILLARY BLOOD SPEC: CPT

## 2025-06-10 PROCEDURE — 85610 PROTHROMBIN TIME: CPT

## 2025-06-10 NOTE — PROGRESS NOTES
ANTICOAGULATION MANAGEMENT     Cayetano Lunsford 80 year old male is on warfarin with subtherapeutic INR result. (Goal INR 2.0-3.0)    Recent labs: (last 7 days)     06/10/25  1320   INR 1.9*       ASSESSMENT     Source(s): Chart Review and Patient/Caregiver Call     Warfarin doses taken: Warfarin taken as instructed  Diet: No new diet changes identified  Medication/supplement changes: None noted  New illness, injury, or hospitalization: No  Signs or symptoms of bleeding or clotting: No  Previous result: Therapeutic last visit; previously outside of goal range.  Patient has had several sub therapeutic INRs recently so small warfarin maintenance dose was done today.  Additional findings: None       PLAN     Recommended plan for no diet, medication or health factor changes affecting INR     Dosing Instructions: Increase your warfarin dose (5.3% change) with next INR in 2 weeks       Summary  As of 6/10/2025      Full warfarin instructions:  5 mg every Sat; 7.5 mg all other days   Next INR check:  6/24/2025               Telephone call with Rich who agrees to plan and repeated back plan correctly.  Will have onsite staff mail AVS to patient per his request.    Lab visit scheduled    Education provided: Please call back if any changes to your diet, medications or how you've been taking warfarin    Plan made per [facility] anticoagulation protocol    Estelle Starr RN  6/10/2025  Anticoagulation Clinic  [facility] for routing messages: p [facility] [place]  [facility] patient phone line: [phone]        _______________________________________________________________________     Anticoagulation Episode Summary       Current INR goal:  2.0-3.0   TTR:  36.4% (1 y)   Target end date:  Indefinite   Send INR reminders to:  [facility]    Indications    Longstanding persistent atrial fibrillation (H) [I48.11]  Long term current use of anticoagulants with INR goal of 2.0-3.0 [Z79.01]  Atrial fibrillation  unspecified type (H)  (Resolved) [I48.91]  Long-term (current) use of anticoagulants [Z79.01] (Resolved) [Z79.01]             Comments:  --             Anticoagulation Care Providers       Provider Role Specialty Phone number    Dmitri Andres MD Referring Family Medicine [phone]    Drew Bravo PA-C Referring Family Medicine [phone]    Deborah Mackey PA-C Referring Family Medicine [phone]

## 2025-06-11 ENCOUNTER — TELEPHONE (OUTPATIENT)
Dept: FAMILY MEDICINE | Facility: CLINIC | Age: 80
End: 2025-06-11
Payer: COMMERCIAL

## 2025-06-11 ENCOUNTER — VIRTUAL VISIT (OUTPATIENT)
Dept: PHARMACY | Facility: CLINIC | Age: 80
End: 2025-06-11
Payer: COMMERCIAL

## 2025-06-11 DIAGNOSIS — Z79.4 TYPE 2 DIABETES MELLITUS WITHOUT COMPLICATION, WITH LONG-TERM CURRENT USE OF INSULIN (H): Primary | ICD-10-CM

## 2025-06-11 DIAGNOSIS — E11.9 TYPE 2 DIABETES MELLITUS WITHOUT COMPLICATION, WITH LONG-TERM CURRENT USE OF INSULIN (H): Primary | ICD-10-CM

## 2025-06-11 PROCEDURE — 99607 MTMS BY PHARM ADDL 15 MIN: CPT | Mod: 93 | Performed by: PHARMACIST

## 2025-06-11 PROCEDURE — 99606 MTMS BY PHARM EST 15 MIN: CPT | Mod: 93 | Performed by: PHARMACIST

## 2025-06-11 RX ORDER — KETOROLAC TROMETHAMINE 30 MG/ML
1 INJECTION, SOLUTION INTRAMUSCULAR; INTRAVENOUS ONCE
Qty: 1 EACH | Refills: 0 | OUTPATIENT
Start: 2025-06-11 | End: 2025-06-11

## 2025-06-11 RX ORDER — HYDROCHLOROTHIAZIDE 12.5 MG/1
CAPSULE ORAL
Qty: 2 EACH | Refills: 5 | OUTPATIENT
Start: 2025-06-11

## 2025-06-11 NOTE — PROGRESS NOTES
Medication Therapy Management (MTM) Encounter    ASSESSMENT:                            Medication Adherence/Access: No issues identified.    Diabetes  Patient is well below goal A1c goal of < 7% although may be falsely low due to lower hemoglobin levels.  Due to tremors, recommend CGM monitoring rather than manual glucometer. Next visit may consider switching Novolin R to longer acting basal insulin to assist with high fasting blood glucose after reviewing CGM data.     PLAN:                            Due to tremors, recommend CGM monitoring and ordered Freestyle Prince 3 plus sensors to start. Reviewed appropriate use, benefits, risks and monitoring at length.    Follow-up: Return in about 2 weeks (around 2025) for Medication Therapy Management Pharmacist. For assistance with placement of CGM sensor    SUBJECTIVE/OBJECTIVE:                          Leo Lunsford is a 80 year old male seen for a follow-up visit.       Reason for visit: blood glucose review.    Allergies/ADRs: Reviewed in chart  Past Medical History: Reviewed in chart  Tobacco: He reports that he quit smoking about 33 years ago. His smoking use included cigarettes. He started smoking about 63 years ago. He has a 30 pack-year smoking history. He has been exposed to tobacco smoke. He has never used smokeless tobacco.  Alcohol: not currently using    Medication Adherence/Access: no issues reported.    Diabetes   Metformin 1000 mg twice daily  Novolin R 2 units when the reading is >200mg/dL - has been needing daily lately with higher fasting readings    Patient is not experiencing side effects.    Current diabetes symptoms: neuropathy, hypoglycemia once a month, less than previous and none lately   Diet/Exercise: Diet soda only. Generally tries to stick with greens 3x a week for his warfarin to keep consistent. Some walking around the house. No changes.        Blood sugar monitorin time(s) daily; Ranges: (patient reported) none in the past 2  weeks because he can't work his new glucometer well with his tremors in hands. He will ask someone to help when someone comes to his house.    Eye exam is up to date  Foot exam is up to date      Today's Vitals: There were no vitals taken for this visit.  ----------------      I spent 10 minutes with this patient today. All changes were made via collaborative practice agreement with Deborah Mackey PA-C.     A summary of these recommendations was declined by the patient.    Diana Johnson, PharmD, BCACP  Medication Therapy Management Provider, [facility]  [phone]    Telemedicine Visit Details  The patient's medications can be safely assessed via a telemedicine encounter.  Type of service:  Telephone visit  Originating Location (pt. Location): Home    Distant Location (provider location):  Off-site  Start Time: 1:34 PM  End Time: 1:44 PM     Medication Therapy Recommendations  Type 2 diabetes mellitus without complication, with long-term current use of insulin (H)   1 Current Medication: insulin regular (NOVOLIN R VIAL) 100 UNIT/ML vial   Current Medication Sig: INJECT 2 UNITS AS NEEDED WHEN BLOOD GLUCOSE IS GREATER THAN 200 MG/DL.  IN-USE (OPENED) VIALS MUST BE USED WITHIN 42 DAYS OR BE DISCARDED.   Rationale: Medication requires monitoring - Needs additional monitoring   Recommendation: Self-Monitoring - FreeStyle Prince 3 Plus Sensor Misc   Status: Accepted per CPA   Identified Date: 6/11/2025 Completed Date: 6/11/2025

## 2025-06-11 NOTE — TELEPHONE ENCOUNTER
PA Initiation    Medication: FREESTYLE MELLISA 3 PLUS SENSOR MISC  Insurance Company: [employer] - Phone [phone] Fax [phone]  Pharmacy Filling the Rx:    Filling Pharmacy Phone:    Filling Pharmacy Fax:    Start Date: 6/11/2025     Key [id number]

## 2025-06-11 NOTE — PATIENT INSTRUCTIONS
"Recommendations from today's MTM visit:                                                         Freestyle Prince Instructions:  1. The first hour after you place the sensor is the warm up period (black out period).  You will need to carry your blood glucose meter and check blood glucose during this time.     2. Check blood sugar if feeling symptoms of hypoglycemia but meter is not displaying a low number- may be some variability between sensor and meter at times.     3. Change sensor every 14 days.     4. Read/scan blood sugars every 8 hours to ensure entirety of data is available.      5. Watch trend arrows- will let you know if blood sugars are rising, falling or stable at the time you check.     6. It is okay to shower, bathe, and swim (up to 3 feet deep for 30 minutes) with sensor. Do not get reader wet.     7. Remove the sensor if you need to have an MRI or CT scan.     8. Do not cover the sensor with extra adhesive (the small hole in the center of the sensor must remain uncovered).  If you have trouble with sensor falling off, these are approved products to help with sensor adhesion: Torbot Skin Tac, SKIN-PREP Protective Barrier Wipe and Mastisol Liquid Adhesive      It was great speaking with you today.  I value your experience and would be very thankful for your time in providing feedback in our clinic survey. In the next few days, you may receive an email or text message from [employer] with a link to a survey related to your  clinical pharmacist.\"     To schedule another MTM appointment, please call the clinic directly or you may call the MTM scheduling line at [phone] or toll-free at [phone].     My Clinical Pharmacist's contact information:                                                      Please feel free to contact me with any questions or concerns you have.      Diana Johnson, PharmD, BCACP  Medication Therapy Management Provider, [facility]    "

## 2025-06-16 NOTE — TELEPHONE ENCOUNTER
Prior Authorization Approval    Medication: FREESTYLE MELLISA 3 PLUS SENSOR MISC  Authorization Effective Date: 6/11/2025  Authorization Expiration Date: 6/11/2028  Approved Dose/Quantity: 2 per 30 days  Reference #: [id number]   Insurance Company: [employer] - Phone [phone] Fax [phone]  Expected CoPay: $ 30.5  CoPay Card Available:      Financial Assistance Needed: no  Which Pharmacy is filling the prescription:    Pharmacy Notified: yes  Patient Notified: no

## 2025-06-17 ENCOUNTER — THERAPY VISIT (OUTPATIENT)
Dept: PHYSICAL THERAPY | Facility: CLINIC | Age: 80
End: 2025-06-17
Payer: COMMERCIAL

## 2025-06-17 DIAGNOSIS — M54.50 ACUTE MIDLINE LOW BACK PAIN WITHOUT SCIATICA: Primary | ICD-10-CM

## 2025-06-17 DIAGNOSIS — M25.551 RIGHT HIP PAIN: ICD-10-CM

## 2025-06-17 PROCEDURE — 97112 NEUROMUSCULAR REEDUCATION: CPT | Mod: GP | Performed by: PHYSICAL THERAPIST

## 2025-06-17 PROCEDURE — 97110 THERAPEUTIC EXERCISES: CPT | Mod: GP | Performed by: PHYSICAL THERAPIST

## 2025-06-17 NOTE — PROGRESS NOTES
[facility]                                                                                   OUTPATIENT PHYSICAL THERAPY    PLAN OF TREATMENT FOR OUTPATIENT REHABILITATION   Patient's Last Name, First Name, Cayetano Borges YOB: 1945   Provider's Name   [facility]   Medical Record No.  [id number]     Onset Date: 01/02/25  Start of Care Date: 03/24/25     Medical Diagnosis:  Acute midline low back pain without sciatica, Right hip pain      PT Treatment Diagnosis:  LBP, coxxy fx, decreased mobility, strength and impaired gait and balance Plan of Treatment  Frequency/Duration: every other week/ 12 weeks    Certification date from 06/17/25 to 09/09/25         See note for plan of treatment details and functional goals     Stephane Lantigua PT                         I CERTIFY THE NEED FOR THESE SERVICES FURNISHED UNDER        THIS PLAN OF TREATMENT AND WHILE UNDER MY CARE     (Physician attestation of this document indicates review and certification of the therapy plan).              Referring Provider:  Sarita Gunter    Initial Assessment  See Epic Evaluation- Start of Care Date: 03/24/25            PLAN  Continue therapy per current plan of care at decreased frequency of every other week.    Beginning/End Dates of Progress Note Reporting Period:  03/24/25 to 06/17/2025    Referring Provider:  Sarita Gunter       06/17/25 0500   Appointment Info   Signing clinician's name / credentials Stephane Lantigua PT   Total/Authorized Visits 12   Visits Used 9   Medical Diagnosis Acute midline low back pain without sciatica, Right hip pain   PT Tx Diagnosis LBP, coxxy fx, decreased mobility, strength and impaired gait and balance   Progress Note/Certification   Start of Care Date 03/24/25   Onset of illness/injury or Date of Surgery 01/02/25   Therapy Frequency every other week   Predicted Duration 12 weeks   Certification date from  "25   Certification date to 25   Progress Note Completed Date 25   GOALS   PT Goals 2   PT Goal 1   Goal Identifier Balance   Goal Description Reduce TUG to <12.5 sec   Rationale   (for safe household ambulation;;for safe community ambulation;for safe showering;for safe dressing;for safe standing;for safe homemaking tasks; for decreased risk for falls)   Goal Progress Today 16.3   Target Date 25   PT Goal 2   Goal Identifier Ambulation   Goal Description Be able to ambulate 20  minutes without deviation with SEC   Rationale   (for safe household ambulation;for safe community ambulation;to maintain proper body mechanics/posture while ambulating to avoid additional compensatory injury due to improper gait mechanics;to promote a healthy and active lifestyle)   Goal Progress Walking upto 10' x 2  in halls with SEC   Target Date 25   Subjective Report   Subjective Report Feels new orthotics are too cushion.  Feels pain, strength and balance has improved over course of PT but still not where he wants to be at yet functionally   Objective Measures   Objective Measures Objective Measure 1;Objective Measure 2;Objective Measure 3   Objective Measure 1   Objective Measure Pain 8/10 at IE   Details pain 5-6/10   Objective Measure 2   Objective Measure Very guarded with dynamic balance drills, low endurance   Details TU.3sec   Objective Measure 3   Objective Measure Lx ROM: flex mod loss +/-, ext max loss (just past neutral) +/-,  SB R mod/max +/-, L mod loss   Treatment Interventions (PT)   Interventions Therapeutic Procedure/Exercise;Neuromuscular Re-education   Therapeutic Procedure/Exercise   Therapeutic Procedures: strength, endurance, ROM, flexibility minutes (06466) 15   Therapeutic Procedures Ther Proc 2   Ther Proc 1 4\" step up from back and side to side   Ther Proc 1 - Details 10x at table   Ther Proc 2 Lx ext back to table   Ther Proc 2 - Details 10x, increased ROM and pain unchanged " after   PTRx Ther Proc 1 Roll Ins   PTRx Ther Proc 1 - Details 10x 5 sec hold mod postural cuing   PTRx Ther Proc 2 Roll Outs   PTRx Ther Proc 2 - Details 10x RTB mod postural cuing   PTRx Ther Proc 3 Seated Hip Flexion   PTRx Ther Proc 3 - Details rev   PTRx Ther Proc 4 Short Arc Knee Extension   PTRx Ther Proc 4 - Details rev   PTRx Ther Proc 5 Hip AROM Standing Abduction   PTRx Ther Proc 5 - Details with side step   PTRx Ther Proc 6 Hip AROM Standing Extension   PTRx Ther Proc 6 - Details with retro walk   PTRx Ther Proc 7 Standing Hip Flexion   PTRx Ther Proc 7 - Details 10x B high knee march posutral cuing   Neuromuscular Re-education   Neuromuscular re-ed of mvmt, balance, coord, kinesthetic sense, posture, proprioception minutes (92117) 23   Neuromuscular Re-education Neuro Re-ed 2;Neuro Re-ed 3;Neuro Re-ed 4   Neuro Re-ed 1 postural ed: sitting, standing   Neuro Re-ed 2 high knee forward and side step, retro walk (clinic only), heel toe walking Done at table   Neuro Re-ed 2 - Details 2 LOT each   Neuro Re-ed 3 row/pulldown   Neuro Re-ed 3 - Details 10xRTB each with chair behind   PTRx Neuro Re-ed 1 Balance Single Leg Stance Supported and Unsupported   PTRx Neuro Re-ed 1 - Details 10sec x 3 B with hand support.  Mod postural cuing   PTRx Neuro Re-ed 2 Romberg Eyes Open   PTRx Neuro Re-ed 2 - Details 20 sec x 3, 1 set up/down and 1 set side to side looking   PTRx Neuro Re-ed 3 Walking with 180 Degree Turns   PTRx Neuro Re-ed 3 - Details 5x each way In place do 90* turns at counter   Education   Learner/Method Patient;Demonstration;Pictures/Video   Education Comments print   Plan   Home program See PTRx   Updates to plan of care recert written   Total Session Time   Timed Code Treatment Minutes 38   Total Treatment Time (sum of timed and untimed services) 38

## 2025-06-17 NOTE — TELEPHONE ENCOUNTER
Called patient and recommended to keep current glucometer as back up as well.    Diana Johnson, PharmD, BCACP  Medication Therapy Management Provider, [facility]  [phone]

## 2025-06-17 NOTE — TELEPHONE ENCOUNTER
Called and notified patient. He will bring to visit next week for instruction on use.     Diana Johnson, PharmD, [facility]  Medication Therapy Management Provider, [facility]  [phone]

## 2025-06-17 NOTE — TELEPHONE ENCOUNTER
Routing to Diana Johnson RPH-  I addressed patient's question, but he is still requesting for you to call him back     Patient calling back requesting to speak with Diana Johnson RPH.    He would like to know if he should return his glucometer since he will now be using the Freestyle Prince for blood sugar monitoring. RN advised patient to keep glucometer at home as back up. Further CGM education/discussion at MT visit next week 6/24/25.    Steffany LOCKHART RN  [facility]

## 2025-06-20 NOTE — PROGRESS NOTES
Medication Therapy Management (MTM) Encounter    ASSESSMENT:                            Medication Adherence/Access: No issues identified.    Diabetes   Patient is well below goal A1c goal of < 7% although may be falsely low due to lower hemoglobin levels.  Due to tremors, recommend CGM monitoring rather than manual glucometer. Educated on first CGM sensor placement today. Next visit may consider switching Novolin R to longer acting basal insulin to assist with high fasting blood glucose after reviewing CGM data and can order/educate on insulin pens instead of vials.     Hypertension /Afib/Edema   Anticoagulation clinic called patient to adjust warfarin dosing.     PLAN:                            No medication changes. CGM sensor application and education today to start using to monitor.     Follow-up: Return in about 2 weeks (around 7/8/2025) for Medication Therapy Management Pharmacist to review CGM data.    SUBJECTIVE/OBJECTIVE:                          Leo Lunsford is a 80 year old male seen for a follow-up visit.       Reason for visit: CGM education.    Allergies/ADRs: Reviewed in chart  Past Medical History: Reviewed in chart  Tobacco: He reports that he quit smoking about 33 years ago. His smoking use included cigarettes. He started smoking about 63 years ago. He has a 30 pack-year smoking history. He has been exposed to tobacco smoke. He has never used smokeless tobacco.  Alcohol: not currently using    Medication Adherence/Access: no issues reported.    Diabetes   Metformin 1000 mg twice daily  Novolin R 2 units when the reading is >200mg/dL - occasional, less frequent now compared to last month    Patient is not experiencing side effects.    Current diabetes symptoms: neuropathy, hypoglycemia once a month, less than previous and none lately   Diet/Exercise: Diet soda only. Generally tries to stick with greens 3x a week for his warfarin to keep consistent. Some walking around the house. No changes.           Blood sugar monitorin-2 time(s) daily; Ranges: (patient reported) pre-prandial 145, 163, post-prandial 293   Eye exam is up to date  Foot exam is up to date      Hypertension   /Afib/Edema  Amlodipine 5 mg twice daily   Warfarin (following INR clinic) - INR high today and he's not sure, waiting on call from INR nurse  Propranolol 140 mg twice daily  Chlorthalidone 25 mg daily  Furosemide 20 mg as needed - uses every other day   Propafenone 150 mg twice daily    Patient reports no current medication side effects.  Patient does self-monitor blood pressure and has been 130-140's systolic.  Does use compression stockings for edema about 2-3x a week.          Today's Vitals: /56   Wt 128 lb 9.6 oz (58.3 kg)   BMI 20.76 kg/m    ----------------      I spent 45 minutes with this patient today. All changes were made via collaborative practice agreement with Deborah Mackey PA-C.     A summary of these recommendations was given to the patient.    Diana Johnson, PharmD, BCACP  Medication Therapy Management Provider, [facility]  [phone]         Medication Therapy Recommendations  No medication therapy recommendations to display

## 2025-06-24 ENCOUNTER — ANTICOAGULATION THERAPY VISIT (OUTPATIENT)
Dept: ANTICOAGULATION | Facility: CLINIC | Age: 80
End: 2025-06-24

## 2025-06-24 ENCOUNTER — RESULTS FOLLOW-UP (OUTPATIENT)
Dept: ANTICOAGULATION | Facility: CLINIC | Age: 80
End: 2025-06-24

## 2025-06-24 ENCOUNTER — TELEPHONE (OUTPATIENT)
Dept: FAMILY MEDICINE | Facility: CLINIC | Age: 80
End: 2025-06-24

## 2025-06-24 ENCOUNTER — LAB (OUTPATIENT)
Dept: LAB | Facility: CLINIC | Age: 80
End: 2025-06-24
Payer: COMMERCIAL

## 2025-06-24 ENCOUNTER — OFFICE VISIT (OUTPATIENT)
Dept: PHARMACY | Facility: CLINIC | Age: 80
End: 2025-06-24
Payer: COMMERCIAL

## 2025-06-24 VITALS — DIASTOLIC BLOOD PRESSURE: 56 MMHG | BODY MASS INDEX: 20.76 KG/M2 | SYSTOLIC BLOOD PRESSURE: 114 MMHG | WEIGHT: 128.6 LBS

## 2025-06-24 DIAGNOSIS — R60.9 EDEMA, UNSPECIFIED TYPE: ICD-10-CM

## 2025-06-24 DIAGNOSIS — I48.11 LONGSTANDING PERSISTENT ATRIAL FIBRILLATION (H): Primary | ICD-10-CM

## 2025-06-24 DIAGNOSIS — G25.0 BENIGN FAMILIAL TREMOR: Primary | ICD-10-CM

## 2025-06-24 DIAGNOSIS — I10 HYPERTENSION GOAL BP (BLOOD PRESSURE) < 140/90: ICD-10-CM

## 2025-06-24 DIAGNOSIS — I48.11 LONGSTANDING PERSISTENT ATRIAL FIBRILLATION (H): ICD-10-CM

## 2025-06-24 DIAGNOSIS — I48.0 PAROXYSMAL ATRIAL FIBRILLATION (H): ICD-10-CM

## 2025-06-24 DIAGNOSIS — Z79.4 TYPE 2 DIABETES MELLITUS WITHOUT COMPLICATION, WITH LONG-TERM CURRENT USE OF INSULIN (H): Primary | ICD-10-CM

## 2025-06-24 DIAGNOSIS — Z79.01 LONG TERM CURRENT USE OF ANTICOAGULANTS WITH INR GOAL OF 2.0-3.0: ICD-10-CM

## 2025-06-24 DIAGNOSIS — E11.9 TYPE 2 DIABETES MELLITUS WITHOUT COMPLICATION, WITH LONG-TERM CURRENT USE OF INSULIN (H): Primary | ICD-10-CM

## 2025-06-24 LAB — INR BLD: 4.8 (ref 0.9–1.1)

## 2025-06-24 PROCEDURE — 99606 MTMS BY PHARM EST 15 MIN: CPT | Performed by: PHARMACIST

## 2025-06-24 PROCEDURE — 3074F SYST BP LT 130 MM HG: CPT | Performed by: PHARMACIST

## 2025-06-24 PROCEDURE — 36416 COLLJ CAPILLARY BLOOD SPEC: CPT

## 2025-06-24 PROCEDURE — 85610 PROTHROMBIN TIME: CPT

## 2025-06-24 PROCEDURE — 3078F DIAST BP <80 MM HG: CPT | Performed by: PHARMACIST

## 2025-06-24 NOTE — PROGRESS NOTES
ANTICOAGULATION MANAGEMENT     Cayetano Lunsford 80 year old male is on warfarin with supratherapeutic INR result. (Goal INR 2.0-3.0)    Recent labs: (last 7 days)     06/24/25  1042   INR 4.8*       ASSESSMENT     Source(s): Chart Review  Previous INR was Subtherapeutic.  Warfarin maintenance dose was increased 5% at last INR check.  Medication, diet, health changes since last INR: chart reviewed; none identified         PLAN     Unable to reach Rich today.    Left message to hold warfarin tonight. Request call back for assessment.    Follow up required to confirm warfarin dose taken and assess for changes    Estelle Starr, RN  6/24/2025  Anticoagulation Clinic  [employer] for routing messages: an [facility]  [facility] patient phone line: [phone]

## 2025-06-24 NOTE — PATIENT INSTRUCTIONS
"Recommendations from today's MTM visit:                                                         Freestyle Prince Instructions:  1. The first hour after you place the sensor is the warm up period (black out period).  You will need to carry your blood glucose meter and check blood glucose during this time.     2. Check blood sugar if feeling symptoms of hypoglycemia but meter is not displaying a low number- may be some variability between sensor and meter at times.     3. Change sensor every 14 days.     4. Read/scan blood sugars every 8 hours to ensure entirety of data is available.      5. Watch trend arrows- will let you know if blood sugars are rising, falling or stable at the time you check.     6. It is okay to shower, bathe, and swim (up to 3 feet deep for 30 minutes) with sensor. Do not get reader wet.     7. Remove the sensor if you need to have an MRI or CT scan.     8. Do not cover the sensor with extra adhesive (the small hole in the center of the sensor must remain uncovered).  If you have trouble with sensor falling off, these are approved products to help with sensor adhesion: Torbot Skin Tac, SKIN-PREP Protective Barrier Wipe and Mastisol Liquid Adhesive      It was great speaking with you today.  I value your experience and would be very thankful for your time in providing feedback in our clinic survey. In the next few days, you may receive an email or text message from [employer] with a link to a survey related to your  clinical pharmacist.\"     To schedule another MTM appointment, please call the clinic directly or you may call the MTM scheduling line at [phone] or toll-free at [phone].     My Clinical Pharmacist's contact information:                                                      Please feel free to contact me with any questions or concerns you have.      Diana Johnson, PharmD, BCACP  Medication Therapy Management Provider, [facility]  "

## 2025-06-24 NOTE — TELEPHONE ENCOUNTER
INR follow up  Patient is returning call to Estelle and would like a call back to discuss medication clarification. Thank you.    Yes

## 2025-06-24 NOTE — TELEPHONE ENCOUNTER
Patient would like to complete a POLST and/or advanced director. He has a lot of tremors so cannot write. He is wondering if he can come in to the clinic for a nurse visit to have someone help him fill out this paperwork and then PCP can sign at his next visit in the Fall?    Diana Johnson, PharmD, BCACP  Medication Therapy Management Provider, [facility]  [phone]

## 2025-06-24 NOTE — PROGRESS NOTES
ANTICOAGULATION MANAGEMENT     Cayetano Lunsford 80 year old male is on warfarin with supratherapeutic INR result. (Goal INR 2.0-3.0)    Recent labs: (last 7 days)     06/24/25  1042   INR 4.8*       ASSESSMENT     Source(s): Chart Review and Patient/Caregiver Call     Warfarin doses taken: Warfarin taken as instructed  Diet: No new diet changes identified  Medication/supplement changes: Increased dose of melatonin.  Per Up to Date, no interaction with warfarin.  New illness, injury, or hospitalization: No  Signs or symptoms of bleeding or clotting: No  Previous result: Subtherapeutic.  Warfarin maintenance dose was increased 5% at last visit due to INR previously being sub therapeutic.  Additional findings: None       PLAN     Recommended plan for no diet, medication or health factor changes affecting INR.  Conservative with warfarin maintenance dose adjustment since INR increased significantly after last check with no identifiable cause and was previously sub therapeutic.     Dosing Instructions: hold 1.5 doses then decrease your warfarin dose (5% change) with next INR in 1 week       Summary  As of 6/24/2025      Full warfarin instructions:  6/24: Hold; 6/25: 2.5 mg; Otherwise 5 mg every Tue, Sat; 7.5 mg all other days   Next INR check:  6/30/2025               Telephone call with Leo who agrees to plan and repeated back plan correctly    Lab visit scheduled    Education provided: Please call back if any changes to your diet, medications or how you've been taking warfarin    Plan made per [facility] anticoagulation protocol    Estelle Starr, RN  6/24/2025  Anticoagulation Clinic  [facility] for routing messages: p ANTICOAG [place]  [facility] patient phone line: [phone]        _______________________________________________________________________     Anticoagulation Episode Summary       Current INR goal:  2.0-3.0   TTR:  36.1% (1 y)   Target end date:  Indefinite   Send INR reminders to:  [facility] [place]     Indications    Longstanding persistent atrial fibrillation (H) [I48.11]  Long term current use of anticoagulants with INR goal of 2.0-3.0 [Z79.01]  Atrial fibrillation  unspecified type (H) (Resolved) [I48.91]  Long-term (current) use of anticoagulants [Z79.01] (Resolved) [Z79.01]             Comments:  --             Anticoagulation Care Providers       Provider Role Specialty Phone number    Dmitri Andres MD Referring Family Medicine [phone]    Drew Bravo PA-C Referring Family Medicine [phone]    Deborah Mackey PA-C Referring [facility] [phone]

## 2025-06-24 NOTE — TELEPHONE ENCOUNTER
RN discussed with patient     Patient is agreeable to care coordination referral to discuss advanced directives/POLST, see which one he would like and for assistance with filling out due to his tremors     Eva Pulido, Registered Nurse  [facility]

## 2025-06-25 ENCOUNTER — PATIENT OUTREACH (OUTPATIENT)
Dept: CARE COORDINATION | Facility: CLINIC | Age: 80
End: 2025-06-25

## 2025-06-25 NOTE — LETTER
[facility] CARE COORDINATION  [address]  [place] [address]    July 9, 2025    Cayetano Lunsford  [address]   [place] [address]      Dear Leo,    I am a clinic community health worker who works with Deborah Mackey PA-C with the [facility]. I wanted to thank you for spending the time to talk with me.  Below is a description of clinic care coordination and how I can further assist you.       The clinic care coordination team is made up of a registered nurse, , financial resource worker and community health worker who understand the health care system. The goal of clinic care coordination is to help you manage your health and improve access to the health care system. Our team works alongside your provider to assist you in determining your health and social needs. We can help you obtain health care and community resources, providing you with necessary information and education. We can work with you through any barriers and develop a care plan that helps coordinate and strengthen the communication between you and your care team.  Our services are voluntary and are offered without charge to you personally.    Please feel free to contact me with any questions or concerns regarding care coordination and what we can offer.      We are focused on providing you with the highest-quality healthcare experience possible.    Sincerely,     Ilsa Evans CHW, B.S. [facility]  Clinic Care Coordination  [facility]:  [place], [place] and [place]  [phone]  [email or web address]

## 2025-06-30 ENCOUNTER — THERAPY VISIT (OUTPATIENT)
Dept: PHYSICAL THERAPY | Facility: CLINIC | Age: 80
End: 2025-06-30
Payer: COMMERCIAL

## 2025-06-30 ENCOUNTER — ANTICOAGULATION THERAPY VISIT (OUTPATIENT)
Dept: ANTICOAGULATION | Facility: CLINIC | Age: 80
End: 2025-06-30

## 2025-06-30 ENCOUNTER — LAB (OUTPATIENT)
Dept: LAB | Facility: CLINIC | Age: 80
End: 2025-06-30
Payer: COMMERCIAL

## 2025-06-30 ENCOUNTER — RESULTS FOLLOW-UP (OUTPATIENT)
Dept: ANTICOAGULATION | Facility: CLINIC | Age: 80
End: 2025-06-30

## 2025-06-30 DIAGNOSIS — I48.11 LONGSTANDING PERSISTENT ATRIAL FIBRILLATION (H): ICD-10-CM

## 2025-06-30 DIAGNOSIS — Z79.01 LONG TERM CURRENT USE OF ANTICOAGULANTS WITH INR GOAL OF 2.0-3.0: ICD-10-CM

## 2025-06-30 DIAGNOSIS — M25.551 RIGHT HIP PAIN: ICD-10-CM

## 2025-06-30 DIAGNOSIS — I48.11 LONGSTANDING PERSISTENT ATRIAL FIBRILLATION (H): Primary | ICD-10-CM

## 2025-06-30 DIAGNOSIS — M54.50 ACUTE MIDLINE LOW BACK PAIN WITHOUT SCIATICA: Primary | ICD-10-CM

## 2025-06-30 LAB — INR BLD: 3.3 (ref 0.9–1.1)

## 2025-06-30 PROCEDURE — 36416 COLLJ CAPILLARY BLOOD SPEC: CPT

## 2025-06-30 PROCEDURE — 85610 PROTHROMBIN TIME: CPT

## 2025-06-30 PROCEDURE — 97112 NEUROMUSCULAR REEDUCATION: CPT | Mod: GP | Performed by: PHYSICAL THERAPIST

## 2025-06-30 PROCEDURE — 97110 THERAPEUTIC EXERCISES: CPT | Mod: GP | Performed by: PHYSICAL THERAPIST

## 2025-06-30 NOTE — PROGRESS NOTES
ANTICOAGULATION MANAGEMENT     Cayetano Lunsford 80 year old male is on warfarin with supratherapeutic INR result. (Goal INR 2.0-3.0)    Recent labs: (last 7 days)     06/30/25  1357   INR 3.3*       ASSESSMENT     Source(s): Chart Review and Patient/Caregiver Call     Warfarin doses taken: Warfarin taken as instructed - confirmed 6/24 hold and 6/25 partial, as well as advised dosing decrease. Advising additional decrease today  Diet: No new diet changes identified  Medication/supplement changes: None noted  New illness, injury, or hospitalization: No  Signs or symptoms of bleeding or clotting: No  Previous result: Supratherapeutic  Additional findings: None       PLAN     Recommended plan for no diet, medication or health factor changes affecting INR     Dosing Instructions: decrease your warfarin dose (5.3% change) with next INR in 10 days       Summary  As of 6/30/2025      Full warfarin instructions:  5 mg every Mon, Wed, Fri; 7.5 mg all other days   Next INR check:  7/10/2025               Telephone call with Leo who verbalizes understanding and agrees to plan    Check at provider office visit    Education provided: Please call back if any changes to your diet, medications or how you've been taking warfarin    Plan made per [facility] anticoagulation protocol    Corina Cuevas RN  6/30/2025  Anticoagulation Clinic  [employer] for routing messages: p ANTICOAG [place]  [facility] patient phone line: [phone]        _______________________________________________________________________     Anticoagulation Episode Summary       Current INR goal:  2.0-3.0   TTR:  35.7% (1 y)   Target end date:  Indefinite   Send INR reminders to:  [facility] [place]    Indications    Longstanding persistent atrial fibrillation (H) [I48.11]  Long term current use of anticoagulants with INR goal of 2.0-3.0 [Z79.01]  Atrial fibrillation  unspecified type (H) (Resolved) [I48.91]  Long-term (current) use of anticoagulants  [Z79.01] (Resolved) [Z79.01]             Comments:  --             Anticoagulation Care Providers       Provider Role Specialty Phone number    Dmitri Andres MD Referring Family Medicine [phone]    Drew Bravo PA-C Referring Family Medicine [phone]    Deborah Mackey PA-C Referring Family Medicine [phone]

## 2025-07-07 ENCOUNTER — OFFICE VISIT (OUTPATIENT)
Dept: PHARMACY | Facility: CLINIC | Age: 80
End: 2025-07-07
Payer: COMMERCIAL

## 2025-07-07 DIAGNOSIS — E11.9 TYPE 2 DIABETES MELLITUS WITHOUT COMPLICATION, WITH LONG-TERM CURRENT USE OF INSULIN (H): Primary | ICD-10-CM

## 2025-07-07 DIAGNOSIS — Z79.4 TYPE 2 DIABETES MELLITUS WITHOUT COMPLICATION, WITH LONG-TERM CURRENT USE OF INSULIN (H): Primary | ICD-10-CM

## 2025-07-07 PROCEDURE — 99606 MTMS BY PHARM EST 15 MIN: CPT

## 2025-07-07 PROCEDURE — 99607 MTMS BY PHARM ADDL 15 MIN: CPT

## 2025-07-07 NOTE — PROGRESS NOTES
Medication Therapy Management (MTM) Encounter    ASSESSMENT:                            Medication Adherence/Access: See below for considerations.    Diabetes   Patient is well below goal A1c goal of <7%, however, this may be falsely low due to lower hemoglobin levels. Discussed switching Novolin R (patient hasn't used recently) to insulin glargine (Lantus) to assist with high fasting and post-prandial blood sugar, as recommended at last MTM visit. Patient is willing to try. Will start at a low dose to minimize risk of hypoglycemia. Provided education on using Lantus pen and pen needles and advised patient to call me at my direct line for any readings <70 mg/dL.    PLAN:                            Start Lantus 5 units once daily (replaces Novolin R)  Continue monitoring your blood sugar with Prince    Follow-up: with MTM in clinic on:  Monday Jul 21, 2025   Appt at 2:30 PM (30 min)     SUBJECTIVE/OBJECTIVE:                          Leo Lunsford is a 80 year old male seen for a follow-up visit. Last MTM visit was 6/24/25 with Diana Johnson, PharmD.     Reason for visit: Diabetes follow up.    Allergies/ADRs: Reviewed in chart  Past Medical History: Reviewed in chart  Tobacco: He reports that he quit smoking about 33 years ago. His smoking use included cigarettes. He started smoking about 63 years ago. He has a 30 pack-year smoking history. He has been exposed to tobacco smoke. He has never used smokeless tobacco.  Alcohol: not currently using    Medication Adherence/Access: no issues reported.    Diabetes   Metformin 1000 mg twice daily  Novolin R 2 units when the reading is >200 mg/dL - reports he hasn't used at all recently  Patient is not experiencing side effects.  Blood sugar monitoring: CGM with Prince started ~2 weeks ago. Reviewed data with patient and unfortunately report was not properly saved/included in today's visit note due to patient not having a Prince account/email. Will attempt to help him make an  account at follow up. Fasting blood sugar has been 150-160 mg/dL and 2 hour post-prandial (lunch) is usually >180 mg/dL. No blood sugar readings <70 mg/dL or symptoms of hypoglycemia.  Current diabetes symptoms: None  Diet/Exercise: Diet soda only. Generally tries to stick with greens 3x a week for his warfarin to keep consistent. Some walking around the house. No changes.      Eye exam is up to date  Foot exam is up to date    Today's Vitals: There were no vitals taken for this visit.  ----------------    I spent 30 minutes with this patient today. All changes were made via collaborative practice agreement with Deborah Mackey PA-C.     A summary of these recommendations was given to the patient.    Amauri Quan (Hailie), PharmD, MPH  Medication Therapy Management Pharmacist      Medication Therapy Recommendations  Type 2 diabetes mellitus without complication, with long-term current use of insulin (H)   1 Current Medication: NOVOLIN R VIAL 100 UNIT/ML soln   Current Medication Sig: INJECT 2 UNITS AS NEEDED WHEN BLOOD GLUCOSE IS GREATER THAN 200 MG/DL.  IN-USE (OPENED) VIALS MUST BE USED WITHIN 42 DAYS OR BE DISCARDED.   Rationale: More effective medication available - Ineffective medication - Effectiveness   Recommendation: Change Medication - Lantus SoloStar 100 UNIT/ML soln   Status: Accepted per CPA   Identified Date: 7/7/2025 Completed Date: 7/7/2025

## 2025-07-07 NOTE — PATIENT INSTRUCTIONS
"Recommendations from today's MTM visit:                                                       Start Lantus 5 units once daily  Continue monitoring your blood sugar with Prince    Follow-up: with MTM in clinic on:  Monday Jul 21, 2025   Appt at 2:30 PM (30 min)     It was great speaking with you today.  I value your experience and would be very thankful for your time in providing feedback in our clinic survey. In the next few days, you may receive an email or text message from [employer] with a link to a survey related to your  clinical pharmacist.\"     To schedule another MTM appointment, please call the clinic directly or you may call the MTM scheduling line at [phone] or toll-free at [phone].     My Clinical Pharmacist's contact information:                                                      Please feel free to contact me with any questions or concerns you have.      Amauri Quan (Hailie), PharmD, MPH  Medication Therapy Management Pharmacist    "

## 2025-07-08 DIAGNOSIS — E11.9 TYPE 2 DIABETES MELLITUS WITHOUT COMPLICATION, WITH LONG-TERM CURRENT USE OF INSULIN (H): ICD-10-CM

## 2025-07-08 DIAGNOSIS — Z79.4 TYPE 2 DIABETES MELLITUS WITHOUT COMPLICATION, WITH LONG-TERM CURRENT USE OF INSULIN (H): ICD-10-CM

## 2025-07-08 DIAGNOSIS — J30.2 SEASONAL ALLERGIC RHINITIS, UNSPECIFIED TRIGGER: ICD-10-CM

## 2025-07-08 DIAGNOSIS — E11.9 TYPE 2 DIABETES, HBA1C GOAL < 8% (H): ICD-10-CM

## 2025-07-08 RX ORDER — IPRATROPIUM BROMIDE 21 UG/1
2 SPRAY, METERED NASAL 2 TIMES DAILY PRN
Qty: 30 ML | Refills: 1 | Status: SHIPPED | OUTPATIENT
Start: 2025-07-08

## 2025-07-08 RX ORDER — HUMAN INSULIN 100 [IU]/ML
INJECTION, SOLUTION SUBCUTANEOUS
Qty: 30 ML | Refills: 1 | Status: SHIPPED | OUTPATIENT
Start: 2025-07-08

## 2025-07-08 RX ORDER — KETOROLAC TROMETHAMINE 30 MG/ML
INJECTION, SOLUTION INTRAMUSCULAR; INTRAVENOUS
Qty: 1 EACH | Refills: 1 | Status: SHIPPED | OUTPATIENT
Start: 2025-07-08

## 2025-07-09 ENCOUNTER — ALLIED HEALTH/NURSE VISIT (OUTPATIENT)
Dept: NURSING | Facility: CLINIC | Age: 80
End: 2025-07-09
Payer: COMMERCIAL

## 2025-07-09 DIAGNOSIS — Z71.89 COUNSELING AND COORDINATION OF CARE: Primary | ICD-10-CM

## 2025-07-09 DIAGNOSIS — J44.9 CHRONIC OBSTRUCTIVE PULMONARY DISEASE, UNSPECIFIED COPD TYPE (H): ICD-10-CM

## 2025-07-09 PROCEDURE — 99207 PR NO CHARGE NURSE ONLY: CPT

## 2025-07-09 RX ORDER — ALBUTEROL SULFATE 90 UG/1
INHALANT RESPIRATORY (INHALATION)
Qty: 8.5 G | Refills: 1 | Status: SHIPPED | OUTPATIENT
Start: 2025-07-09

## 2025-07-09 NOTE — PROGRESS NOTES
CHW met with patient to assist with completing HCD, filled out as patient instructed as he is unable to write due to tremors.     Patient will get HCD notarized, CHW given address for [employer] off [address]. He sees provider tomorrow to complete DNR form and then will provide the clinic with a copy of both to be placed in his chart.   He has no further questions or concerns for CC at this time.     Ilsa SHULTZ, B.S. [facility] Care Coordination  [facility]:  [place], [place] and [place]  [phone]  [email or web address]

## 2025-07-10 ENCOUNTER — ANTICOAGULATION THERAPY VISIT (OUTPATIENT)
Dept: ANTICOAGULATION | Facility: CLINIC | Age: 80
End: 2025-07-10

## 2025-07-10 ENCOUNTER — OFFICE VISIT (OUTPATIENT)
Dept: FAMILY MEDICINE | Facility: CLINIC | Age: 80
End: 2025-07-10
Payer: COMMERCIAL

## 2025-07-10 ENCOUNTER — LAB (OUTPATIENT)
Dept: LAB | Facility: CLINIC | Age: 80
End: 2025-07-10
Payer: COMMERCIAL

## 2025-07-10 VITALS
SYSTOLIC BLOOD PRESSURE: 138 MMHG | OXYGEN SATURATION: 93 % | HEIGHT: 65 IN | TEMPERATURE: 98 F | BODY MASS INDEX: 22.11 KG/M2 | WEIGHT: 132.7 LBS | HEART RATE: 54 BPM | RESPIRATION RATE: 12 BRPM | DIASTOLIC BLOOD PRESSURE: 60 MMHG

## 2025-07-10 DIAGNOSIS — K51.319 CHRONIC ULCERATIVE RECTOSIGMOIDITIS WITH COMPLICATION (H): ICD-10-CM

## 2025-07-10 DIAGNOSIS — N18.1 TYPE 2 DIABETES MELLITUS WITH STAGE 1 CHRONIC KIDNEY DISEASE, WITH LONG-TERM CURRENT USE OF INSULIN (H): ICD-10-CM

## 2025-07-10 DIAGNOSIS — J44.9 CHRONIC OBSTRUCTIVE PULMONARY DISEASE, UNSPECIFIED COPD TYPE (H): ICD-10-CM

## 2025-07-10 DIAGNOSIS — K51.019 CHRONIC ULCERATIVE PANCOLITIS WITH COMPLICATION (H): ICD-10-CM

## 2025-07-10 DIAGNOSIS — Z79.4 TYPE 2 DIABETES MELLITUS WITH STAGE 1 CHRONIC KIDNEY DISEASE, WITH LONG-TERM CURRENT USE OF INSULIN (H): ICD-10-CM

## 2025-07-10 DIAGNOSIS — I48.11 LONGSTANDING PERSISTENT ATRIAL FIBRILLATION (H): ICD-10-CM

## 2025-07-10 DIAGNOSIS — Z79.01 LONG TERM CURRENT USE OF ANTICOAGULANTS WITH INR GOAL OF 2.0-3.0: ICD-10-CM

## 2025-07-10 DIAGNOSIS — E11.9 TYPE 2 DIABETES, HBA1C GOAL < 8% (H): ICD-10-CM

## 2025-07-10 DIAGNOSIS — E11.22 TYPE 2 DIABETES MELLITUS WITH STAGE 1 CHRONIC KIDNEY DISEASE, WITH LONG-TERM CURRENT USE OF INSULIN (H): ICD-10-CM

## 2025-07-10 DIAGNOSIS — I48.11 LONGSTANDING PERSISTENT ATRIAL FIBRILLATION (H): Primary | ICD-10-CM

## 2025-07-10 DIAGNOSIS — Z71.89 DNR (DO NOT RESUSCITATE) DISCUSSION: Primary | ICD-10-CM

## 2025-07-10 LAB — INR BLD: 1.8 (ref 0.9–1.1)

## 2025-07-10 ASSESSMENT — PAIN SCALES - GENERAL: PAINLEVEL_OUTOF10: NO PAIN (0)

## 2025-07-10 NOTE — PROGRESS NOTES
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 Patient neurologically intact and is in complete understanding of what this entails. Patient has filled out Advanced Care Directive as well and this has been scanned into his chart. DNR order placed. Patient is to follow up if he would like to discuss this further.  Plan: No CPR- Do NOT Intubate    30 minutes spent by me on the date of the encounter doing chart review, history and exam, documentation and further activities per the note    Randall Bailey is a 80 year old, presenting for the following health issues:  Forms (DNR Forms)        7/10/2025     3:38 PM   Additional Questions   Roomed by ivania cope         7/10/2025   Forms   Any forms needing to be completed Yes     HPI      -Patient wanting to be put as DNR  -Does not want to live as a \"vegetable\"  -Given his co morbidities he does not want to have something happen that would worsen  his autonomy/independence.     Review of Systems  Constitutional, HEENT, cardiovascular, pulmonary, GI, , musculoskeletal, neuro, skin, endocrine and psych systems are negative, except as otherwise noted.      Objective    BP (!) 171/66 (BP Location: Right arm, Patient Position: Sitting, Cuff Size: Adult Regular)   Pulse 54   Temp 98  F (36.7  C) (Oral)   Resp 12   Ht 1.651 m (5' 5\")   Wt 60.2 kg (132 lb 11.2 oz)   SpO2 93%   " BMI 22.08 kg/m    Body mass index is 22.08 kg/m .  Physical Exam  Vitals and nursing note reviewed.   Constitutional:       Appearance: Normal appearance. He is well-developed. He is not ill-appearing or toxic-appearing.   Cardiovascular:      Rate and Rhythm: Normal rate.   Pulmonary:      Effort: Pulmonary effort is normal.   Neurological:      Mental Status: He is alert.   Psychiatric:         Attention and Perception: Attention and perception normal.         Mood and Affect: Mood normal.         Speech: Speech normal.         Behavior: Behavior normal. Behavior is cooperative.         Thought Content: Thought content normal.         Judgment: Judgment normal.            Signed Electronically by: RAFFI Doe CNP

## 2025-07-10 NOTE — PROGRESS NOTES
ANTICOAGULATION MANAGEMENT     Cayetano Lunsford 80 year old male is on warfarin with subtherapeutic INR result. (Goal INR 2.0-3.0)    Recent labs: (last 7 days)     07/10/25  1519   INR 1.8*       ASSESSMENT     Source(s): Chart Review and Patient/Caregiver Call     Warfarin doses taken: Warfarin taken as instructed  Diet: No new diet changes identified  Medication/supplement changes: See chart review notes below  New illness, injury, or hospitalization: No  Signs or symptoms of bleeding or clotting: No  Previous result: Supratherapeutic  Additional findings: See chart review notes below regarding recent warfarin maintenance dose decreases       PLAN     Recommended plan for no diet, medication or health factor changes affecting INR     Dosing Instructions: Increase your warfarin dose (5.6% change) with next INR in 2 weeks       Summary  As of 7/10/2025      Full warfarin instructions:  5 mg every Mon, Fri; 7.5 mg all other days   Next INR check:  7/24/2025               Telephone call with Leo who verbalizes understanding and agrees to plan and who agrees to plan and repeated back plan correctly    Lab visit scheduled    Education provided: Taking warfarin: Importance of taking warfarin as instructed  Dietary considerations: importance of consistent vitamin K intake  Interaction IS NOT anticipated between warfarin and insulin change    Plan made per [facility] anticoagulation protocol    Tatyana Akers RN  7/10/2025  Anticoagulation Clinic  [employer] for routing messages: p ANTICO[facility] [place]  [facility] patient phone line: [phone]        _______________________________________________________________________     Anticoagulation Episode Summary       Current INR goal:  2.0-3.0   TTR:  37.6% (1 y)   Target end date:  Indefinite   Send INR reminders to:  [facility]    Indications    Longstanding persistent atrial fibrillation (H) [I48.11]  Long term current use of anticoagulants with INR goal of 2.0-3.0  [Z79.01]  Atrial fibrillation  unspecified type (H) (Resolved) [I48.91]  Long-term (current) use of anticoagulants [Z79.01] (Resolved) [Z79.01]             Comments:  --             Anticoagulation Care Providers       Provider Role Specialty Phone number    Dmitri Andres MD Referring Family Medicine [phone]    Drew Bravo PA-C Referring Family Medicine [phone]    Deborah Mackey PA-C Referring Family Medicine [phone]

## 2025-07-10 NOTE — PROGRESS NOTES
ANTICOAGULATION MANAGEMENT     Cayetano Lunsford 80 year old male is on warfarin with subtherapeutic INR result. (Goal INR 2.0-3.0)    Recent labs: (last 7 days)     07/10/25  1519   INR 1.8*       ASSESSMENT     Source(s): Chart Review  Previous INR was Subtherapeutic  Medication, diet, health changes since last INR:   Warfarin maintenance dose was decreased at last 2 visits (total of 10.3%).  7/7/25 MTM PharmD office visit--Lantus prescribed to replace novolin-no impact on INR  ACRN to consider increasing warfarin maintenance dose 5.6%         PLAN     Unable to reach Rich x 2 today.    Left message to continue current dose of warfarin 7.5 mg tonight. Request call back for assessment.    Follow up required to confirm warfarin dose taken and assess for changes and discuss out of range result     Tatyana Akers, RN  7/10/2025  Anticoagulation Clinic  [facility] for routing messages: an [employer]  [facility] patient phone line: [phone]

## 2025-07-14 ENCOUNTER — THERAPY VISIT (OUTPATIENT)
Dept: PHYSICAL THERAPY | Facility: CLINIC | Age: 80
End: 2025-07-14
Payer: COMMERCIAL

## 2025-07-14 DIAGNOSIS — M54.50 ACUTE MIDLINE LOW BACK PAIN WITHOUT SCIATICA: Primary | ICD-10-CM

## 2025-07-14 DIAGNOSIS — M25.551 RIGHT HIP PAIN: ICD-10-CM

## 2025-07-14 PROCEDURE — 97112 NEUROMUSCULAR REEDUCATION: CPT | Mod: GP | Performed by: PHYSICAL THERAPIST

## 2025-07-14 PROCEDURE — 97110 THERAPEUTIC EXERCISES: CPT | Mod: GP | Performed by: PHYSICAL THERAPIST

## 2025-07-21 ENCOUNTER — OFFICE VISIT (OUTPATIENT)
Dept: PHARMACY | Facility: CLINIC | Age: 80
End: 2025-07-21
Payer: COMMERCIAL

## 2025-07-21 DIAGNOSIS — E11.9 TYPE 2 DIABETES MELLITUS WITHOUT COMPLICATION, WITH LONG-TERM CURRENT USE OF INSULIN (H): Primary | ICD-10-CM

## 2025-07-21 DIAGNOSIS — Z79.4 TYPE 2 DIABETES MELLITUS WITHOUT COMPLICATION, WITH LONG-TERM CURRENT USE OF INSULIN (H): Primary | ICD-10-CM

## 2025-07-21 PROCEDURE — 99606 MTMS BY PHARM EST 15 MIN: CPT

## 2025-07-21 NOTE — PATIENT INSTRUCTIONS
"Recommendations from today's MTM visit:                                                       Please continue Lantus 5 units daily and monitoring blood sugar with Prince. Please contact me or Diana if having more blood sugar readings less than 70 mg/dL.  I will ask the [place] [facility] to contact you to schedule your annual eye exam.    Follow-up: with Johnnie HernandezD on:  Thursday Sep 4, 2025   Appt at 2:00 PM (30 min)     It was great speaking with you today.  I value your experience and would be very thankful for your time in providing feedback in our clinic survey. In the next few days, you may receive an email or text message from [employer] with a link to a survey related to your  clinical pharmacist.\"     To schedule another MTM appointment, please call the clinic directly or you may call the MTM scheduling line at [phone] or toll-free at [phone].     My Clinical Pharmacist's contact information:                                                      Please feel free to contact me with any questions or concerns you have.      Amauri Quan (Hailie), JohnnieD, MPH  Medication Therapy Management Pharmacist   "

## 2025-07-21 NOTE — Clinical Note
FYI
Hello,  I realize this probably isn't the correct pool, but do you know who I could ask to call this patient to schedule his annual eye exam (see optometry at [facility])?  Thanks! Amauri Brooks) Kadeem, PharmD, MPH Medication Therapy Management Pharmacist
Patient presents today complaining of right posterior heel pain.  Pain began about 9 months ago while playing golf and he stepped in a hole.  Pain today 8/10.  Patient presents today wearing [employer] tennis shoes walking unassisted.   
Not on file   Food Insecurity: Not on file   Transportation Needs: Not on file   Physical Activity: Not on file   Stress: Not on file   Social Connections: Not on file   Intimate Partner Violence: Not on file   Depression: Not on file   Housing Stability: Not on file   Interpersonal Safety: Not on file   Utilities: Not on file        CURRENT MEDICATIONS:   No current outpatient medications on file.     No current facility-administered medications for this visit.        DIAGNOSTIC LAB DATA      XR CHEST STANDARD TWO VW 10/23/2017    Narrative  Chest, 2 views    HISTORY: Cough for 3 weeks, short of breath, chest pain    FINDINGS: There are no prior images for comparison. The heart and mediastinum are within normal limits.  There is no evidence of pulmonary consolidation or interstitial edema.  No pleural effusions or pneumothorax.  The visualized soft tissues and skeleton are within normal limits.    Impression  IMPRESSION:  1. No acute pulmonary disease or pulmonary edema.    Signed By:Kingsley Coles MD on 10/23/2017 10:02 AM     No results found for this or any previous visit (from the past 4464 hour(s)).    No results found for: \"WBC\", \"HGB\", \"HCT\", \"MCV\", \"PLT\", \"MID\", \"GRAN\", \"LYMPHOPCT\", \"MIDPERCENT\", \"GRANULOCYTES\", \"RBC\", \"MCH\", \"MCHC\", \"RDW\"  No results found for: \"LABA1C\",  No results found for: \"NA\", \"K\", \"CL\", \"CO2\", \"BUN\", \"CREATININE\", \"GLUCOSE\", \"CALCIUM\", \"LABALBU\", \"BILITOT\", \"ALKPHOS\", \"AST\", \"ALT\", \"LABGLOM\", \"GFRAA\", \"AGRATIO\", \"GLOB\"  No results found for: \"VITD25\" ,No results found for: \"INR\", \"PROTIME\", No results found for: \"APTT\"       REVIEW OF SYSTEMS : 11/7/2024  ALL BELOW ARE Negative except : SEE HPI     All other systems reviewed and are negative.     14 point review of systems otherwise negative unless noted in HPI.          From AMA Steps Forward Documentation   re Medical Decision Making  MDM // AMA 2023  From  AAOS (E/M) Changes: What You need to know for 2021    E/M Coding: Each

## 2025-07-21 NOTE — PROGRESS NOTES
Medication Therapy Management (MTM) Encounter    ASSESSMENT:                            Medication Adherence/Access: no issues reported.    Diabetes   A1c is well below goal of <7%, however, this may be falsely low due to lower hemoglobin levels.  Self monitoring of blood glucose is at goal of fasting  mg/dL and > 50% time in target with CGM.  Patient would benefit from continuing current regimen, but advised patient to contact MTM pharmacist if having more episodes of hypoglycemia.    PLAN:                            Please continue Lantus 5 units daily and monitoring blood sugar with Prince. Please contact me or Diana if having more blood sugar readings less than 70 mg/dL.  I will ask the [place] [facility] to contact you to schedule your annual eye exam.    Follow-up: with Johnnie HernandezD on:  Thursday Sep 4, 2025   Appt at 2:00 PM (30 min)     SUBJECTIVE/OBJECTIVE:                          Leo Lunsford is a 80 year old male seen for a follow-up visit.       Reason for visit: Diabetes follow up.    Allergies/ADRs: Reviewed in chart  Past Medical History: Reviewed in chart  Tobacco: He reports that he quit smoking about 33 years ago. His smoking use included cigarettes. He started smoking about 63 years ago. He has a 30 pack-year smoking history. He has been exposed to tobacco smoke. He has never used smokeless tobacco.  Alcohol: not currently using    Medication Adherence/Access: no issues reported.    Diabetes   Metformin 1000 mg twice daily  Lantus 5 units daily - started 7/7/25  Patient is not experiencing side effects.  Blood sugar monitoring: CGM with Prince started ~1 month ago (see data below). Fasting blood sugar has improved since starting Lantus, is typically <150 mg/dL, but sometimes as high as 160 mg/dL. Patient has experienced 1 episode of low blood sugar (blood sugar was 67 mg/dL) in the past 2 weeks.  Current diabetes symptoms: None  Diet/Exercise: Diet soda only. Generally tries to  stick with greens 3x a week for his warfarin to keep consistent. Some walking around the house. No changes.         Eye exam in the last 12 months? Yes, but notes he's almost due  Foot exam is up to date    Today's Vitals: There were no vitals taken for this visit.  ----------------    I spent 30 minutes with this patient today. All changes were made via collaborative practice agreement with Deborah Mackey PA-C.     A summary of these recommendations was given to the patient.    Amauri Quan (Hailie), PharmD, MPH  Medication Therapy Management Pharmacist      Medication Therapy Recommendations  No medication therapy recommendations to display

## 2025-07-22 ENCOUNTER — OFFICE VISIT (OUTPATIENT)
Dept: CARDIOLOGY | Facility: CLINIC | Age: 80
End: 2025-07-22
Payer: COMMERCIAL

## 2025-07-22 VITALS
BODY MASS INDEX: 21.16 KG/M2 | DIASTOLIC BLOOD PRESSURE: 50 MMHG | SYSTOLIC BLOOD PRESSURE: 146 MMHG | WEIGHT: 127 LBS | HEART RATE: 50 BPM | HEIGHT: 65 IN

## 2025-07-22 DIAGNOSIS — I10 HYPERTENSION GOAL BP (BLOOD PRESSURE) < 140/90: ICD-10-CM

## 2025-07-22 DIAGNOSIS — G25.0 BENIGN FAMILIAL TREMOR: ICD-10-CM

## 2025-07-22 DIAGNOSIS — I48.0 PAROXYSMAL ATRIAL FIBRILLATION (H): ICD-10-CM

## 2025-07-22 PROCEDURE — 3078F DIAST BP <80 MM HG: CPT | Performed by: INTERNAL MEDICINE

## 2025-07-22 PROCEDURE — 99215 OFFICE O/P EST HI 40 MIN: CPT | Performed by: INTERNAL MEDICINE

## 2025-07-22 PROCEDURE — 3077F SYST BP >= 140 MM HG: CPT | Performed by: INTERNAL MEDICINE

## 2025-07-22 RX ORDER — PROPAFENONE HYDROCHLORIDE 150 MG/1
150 TABLET, COATED ORAL 2 TIMES DAILY
Qty: 180 TABLET | Refills: 3 | Status: SHIPPED | OUTPATIENT
Start: 2025-07-22

## 2025-07-22 RX ORDER — CHLORTHALIDONE 25 MG/1
25 TABLET ORAL DAILY
Qty: 90 TABLET | Refills: 3 | Status: SHIPPED | OUTPATIENT
Start: 2025-07-22

## 2025-07-22 NOTE — LETTER
7/22/2025    Deborah Mackey PA-C  [address] [place]  [facility] [address]    RE: Cayetano Lunsford       Dear Colleague,     I had the pleasure of seeing Cayetano Lunsford in the [facility].  CARDIOLOGY CLINIC CONSULTATION    PRIMARY CARE PHYSICIAN:  Deborah Mackey    HISTORY OF PRESENT ILLNESS:  This is a very pleasant 80-year-old gentleman who transferred care to me in 2019 after Dr. Dang retired from practice.  He has a history of paroxysmal atrial fibrillation on anticoagulation with Coumadin and rhythm control strategy with propafenone and propranolol (for tremors too) for many years.  No history of obstructive coronary disease.  No MI.  No heart failure symptoms.  Last nuclear stress test and echocardiogram showed normal LV function and no ischemia or infarction.  He is here for routine followup.        Denies any cardiovascular symptoms.  No chest pain, shortness of breath.  No edema, orthopnea or PND.  Unfortunately he says his health is decorticated quite a bit from a functional capacity standpoint.  He fell 2 times in the last 1 month.  Both of these were mechanical falls.  He injured his back 1 time.  He is still taking Coumadin.  His INR fluctuates quite a bit he says.    PAST MEDICAL HISTORY:  Past Medical History:   Diagnosis Date     Allergic rhinitis, cause unspecified      Atrial fibrillation (H)      BPH (benign prostatic hyperplasia)      Chronic airway obstruction, not elsewhere classified      COPD (chronic obstructive pulmonary disease) (H)      Hyperlipidemia LDL goal <100 5/9/2010     Hypertension goal BP (blood pressure) < 130/80 10/19/2006     Obesity (BMI 30-39.9)      Perforation of tympanic membrane, unspecified     Right TM     Tachycardia, unspecified      Type 2 diabetes, HbA1C goal < 8% (H) 5/2/2010     Ulcerative colitis (H)      Unspecified cardiovascular disease        MEDICATIONS:  Current Outpatient Medications   Medication Sig Dispense Refill     acetaminophen  (TYLENOL) 325 MG tablet Take 325-650 mg by mouth every 6 hours as needed for mild pain       albuterol (PROAIR HFA/PROVENTIL HFA/VENTOLIN HFA) 108 (90 Base) MCG/ACT inhaler SHAKE WELL AND INHALE ONE TO TWO PUFFS BY MOUTH EVERY 4 HOURS AS NEEDED 8.5 g 1     amLODIPine (NORVASC) 5 MG tablet Take 1 tablet (5 mg) by mouth 2 times daily. 180 tablet 1     atorvastatin (LIPITOR) 20 MG tablet Take 1 tablet (20 mg) by mouth daily. 90 tablet 3     blood glucose (NO BRAND SPECIFIED) lancets standard Use to test blood sugar 2 times daily or as directed. 200 Lancet 3     blood glucose (NO BRAND SPECIFIED) test strip Use to test blood sugar 2 times daily or as directed. 200 strip 3     blood glucose (ONETOUCH ULTRA) test strip USE TO TEST BLOOD SUGAR THREE TIMES A DAY OR AS DIRECTED 300 strip 2     blood glucose monitoring (NO BRAND SPECIFIED) meter device kit Use to test blood sugar 2 times daily or as directed. 1 kit 0     chlorthalidone (HYGROTON) 25 MG tablet Take 1 tablet (25 mg) by mouth daily. 90 tablet 3     Continuous Glucose  (FREESTYLE MELLISA 3 READER) NELSON USE TO READ BLOOD SUGARS PER 'S INSTRUCTIONS. 1 each 1     Continuous Glucose Sensor (FREESTYLE MELLISA 3 PLUS SENSOR) MISC Use 1 sensor every 15 days. Use to read blood sugars per 's instructions. 2 each 5     cyanocobalamin (VITAMIN B-12) 1000 MCG tablet Take 1,000 mcg by mouth daily       ferrous sulfate (FEROSUL) 325 (65 Fe) MG tablet Take 1 tablet (325 mg) by mouth daily (with breakfast)       fexofenadine (ALLEGRA) 180 MG tablet Take 180 mg by mouth daily       folic acid 0.8 MG CAPS Take 1 tablet by mouth daily 90 capsule 3     furosemide (LASIX) 20 MG tablet TAKE 1 TABLET (20 MG) BY MOUTH AS NEEDED FOR LEG SWELLING 90 tablet 3     insulin glargine (LANTUS PEN) 100 UNIT/ML pen Inject 5 Units subcutaneously daily. 15 mL 0     insulin pen needle (32G X 4 MM) 32G X 4 MM miscellaneous Use 1 pen needles daily or as directed. 100 each 3  "    insulin syringe-needle U-100 (BD INSULIN SYRINGE ULTRAFINE) 31G X 5/16\" 0.3 ML miscellaneous USE 3 SYRINGES DAILY OR AS DIRECTED. 300 each 3     ipratropium (ATROVENT) 0.03 % nasal spray SPRAY 2 SPRAYS INTO BOTH NOSTRILS 2 TIMES DAILY AS NEEDED FOR RHINITIS. 30 mL 1     ipratropium - albuterol 0.5 mg/2.5 mg/3 mL (DUONEB) 0.5-2.5 (3) MG/3ML neb solution NEBULIZE CONTENTS OF ONE VIAL (3 MLS) EVERY 4 HOURS AS NEEDED FOR SHORTNESS OF BREATH OR DYSPNEA 360 mL 1     Lidocaine (LIDOCARE) 4 % Patch Place 2 patches over 12 hours onto the skin every 24 hours. To prevent lidocaine toxicity, patient should be patch free for 12 hrs daily. 60 patch 2     metFORMIN (GLUCOPHAGE) 1000 MG tablet TAKE ONE TABLET BY MOUTH TWICE A DAY WITH BREAKFAST  AND DINNER 180 tablet 1     mineral oil-hydrophilic petrolatum (AQUAPHOR) external ointment Apply topically 2 times daily. As needed for skin protection 198 g 1     mometasone (NASONEX) 50 MCG/ACT nasal spray Spray 2 sprays into both nostrils daily.       pantoprazole (PROTONIX) 40 MG enteric coated tablet Take 40 mg by mouth daily Started by Dr. Debbie Rico at [facility]       primidone (MYSOLINE) 50 MG tablet 4 tablets in am, 4 tablets afternoon, and 3 tablets every evening 1001 tablet 1     Probiotic Product (FLORAJEN3) CAPS Take 1 capsule by mouth 2 times daily 60 capsule 0     propafenone (RYTHMOL) 150 MG TABS tablet Take 1 tablet (150 mg) by mouth 2 times daily. 180 tablet 3     propranolol (INDERAL) 60 MG tablet Take 1 tablet (60 mg) by mouth 2 times daily. (Together with 80 mg of propranolol with each dose for the total dose of 140 mg 2 times per day). 360 tablet 1     propranolol (INDERAL) 80 MG tablet Take 1 tablet (80 mg) by mouth 2 times daily. (Together with 60 mg of propranolol with each dose for the total dose of 140 mg 2 times per day). 180 tablet 1     sulfaSALAzine (AZULFIDINE) 500 MG tablet TAKE ONE TABLET BY MOUTH THREE TIMES A DAY 90 tablet 11     tamsulosin (FLOMAX) " 0.4 MG capsule TAKE ONE CAPSULE BY MOUTH ONCE DAILY 90 capsule 3     tiotropium-olodaterol (STIOLTO RESPIMAT) 2.5-2.5 MCG/ACT AERS INHALE TWO PUFFS BY MOUTH ONCE DAILY 4 g 2     Vitamin D3 (CHOLECALCIFEROL) 25 mcg (1000 units) tablet Take 1 tablet by mouth daily.       warfarin ANTICOAGULANT (JANTOVEN ANTICOAGULANT) 2.5 MG tablet Take 2-3 tablets (5-7.5 mg) by mouth every evening. Or as directed by anticoagulation clinic 235 tablet 1     NOVOLIN R VIAL 100 UNIT/ML soln INJECT 2 UNITS AS NEEDED WHEN BLOOD GLUCOSE IS GREATER THAN 200 MG/DL.  IN-USE (OPENED) VIALS MUST BE USED WITHIN 42 DAYS OR BE DISCARDED. (Patient not taking: Reported on 7/22/2025) 30 mL 1     No current facility-administered medications for this visit.       SOCIAL HISTORY:  I have reviewed this patient's social history and updated it with pertinent information if needed. Cayetano Lunsford  reports that he quit smoking about 33 years ago. His smoking use included cigarettes. He started smoking about 63 years ago. He has a 30 pack-year smoking history. He has been exposed to tobacco smoke. He has never used smokeless tobacco. He reports that he does not currently use alcohol. He reports that he does not use drugs.    PHYSICAL EXAM:  Pulse:  [50] 50  BP: (146)/(50) 146/50  127 lbs 0 oz    Constitutional: alert, no distress  Respiratory: Good bilateral air entry  Cardiovascular: Regular heart sounds no edema  GI: nondistended  Neuropsychiatric: appropriate affact    ASSESSMENT: Pertinent issues addressed/ reviewed during this cardiology visit  Paroxysmal atrial fibrillation  Chronic anticoagulation and fall risk    RECOMMENDATIONS:  Overall Cayetano is doing okay from a cardiac standpoint however he has been having more falls and overall his health seems to be somewhat deteriorating from a functional capacity standpoint.  His INR is fluctuating quite a bit.  I do not think he is an ideal candidate for long-term anticoagulation.  I recommend  consideration for left atrial appendage occlusion.  He is open to the idea and will think about it more.    I have referred my patient for an evaluation for percutaneous left atrial appendage occlusion (pLAAO). This patient is a poor candidate for long-term oral-anticoagulation (OAC). This patient has a RSG0LL2-GHDC score of 4 and a HAS-BLED score of 2.     Per the 2023 ACC AHA cardiology practice guidelines, pLAAO has a class 2A recommendation for patients who have a contraindication to OAC; and a class 2B recommendation for patients considered at high risk of bleeding with OAC - in these patients pLAAO may be a reasonable alternative to OAC based on patient preference, with careful consideration of procedural risk and with the understanding that the evidence for OAC is more extensive.     Based on both stroke and bleeding risk, a shared decision has been made with the patient to pursue pLAAO as an alternative strategy to OAC therapy.     It was a pleasure seeing this patient in clinic today. Please do not hesitate to contact me with any future questions.     JOCY Almaraz, [facility]  Cardiology - [facility]  July 22, 2025    Review of the result(s) of each unique test - Last CBC BMP lipids echo. Last echo shows normal EF     The level of medical decision making during this visit was of high complexity. Condition for which patient was treated that is considered severe and would require intensive monitoring or urgent treatment to facilitate care -left atrial appendage occlusion for increasing bleeding risk.  Evaluation for Watchman device or amulet device implantation.    This note was completed in part using dictation via the Dragon voice recognition software. Some word and grammatical errors may occur and must be interpreted in the appropriate clinical context.  If there are any questions pertaining to this issue, please contact me for further clarification.    Thank you for allowing me to participate in the care  of your patient.      Sincerely,     Daniel Marroquin MD     [facility]  cc:   Daniel Marroquin MD  [address] [place] [address] [place] [id number]

## 2025-07-22 NOTE — PROGRESS NOTES
CARDIOLOGY CLINIC CONSULTATION    PRIMARY CARE PHYSICIAN:  Deborah Mackey    HISTORY OF PRESENT ILLNESS:  This is a very pleasant 80-year-old gentleman who transferred care to me in 2019 after Dr. Dang retired from practice.  He has a history of paroxysmal atrial fibrillation on anticoagulation with Coumadin and rhythm control strategy with propafenone and propranolol (for tremors too) for many years.  No history of obstructive coronary disease.  No MI.  No heart failure symptoms.  Last nuclear stress test and echocardiogram showed normal LV function and no ischemia or infarction.  He is here for routine followup.        Denies any cardiovascular symptoms.  No chest pain, shortness of breath.  No edema, orthopnea or PND.  Unfortunately he says his health is decorticated quite a bit from a functional capacity standpoint.  He fell 2 times in the last 1 month.  Both of these were mechanical falls.  He injured his back 1 time.  He is still taking Coumadin.  His INR fluctuates quite a bit he says.    PAST MEDICAL HISTORY:  Past Medical History:   Diagnosis Date    Allergic rhinitis, cause unspecified     Atrial fibrillation (H)     BPH (benign prostatic hyperplasia)     Chronic airway obstruction, not elsewhere classified     COPD (chronic obstructive pulmonary disease) (H)     Hyperlipidemia LDL goal <100 5/9/2010    Hypertension goal BP (blood pressure) < 130/80 10/19/2006    Obesity (BMI 30-39.9)     Perforation of tympanic membrane, unspecified     Right TM    Tachycardia, unspecified     Type 2 diabetes, HbA1C goal < 8% (H) 5/2/2010    Ulcerative colitis (H)     Unspecified cardiovascular disease        MEDICATIONS:  Current Outpatient Medications   Medication Sig Dispense Refill    acetaminophen (TYLENOL) 325 MG tablet Take 325-650 mg by mouth every 6 hours as needed for mild pain      albuterol (PROAIR HFA/PROVENTIL HFA/VENTOLIN HFA) 108 (90 Base) MCG/ACT inhaler SHAKE WELL AND INHALE ONE TO TWO PUFFS BY MOUTH EVERY  "4 HOURS AS NEEDED 8.5 g 1    amLODIPine (NORVASC) 5 MG tablet Take 1 tablet (5 mg) by mouth 2 times daily. 180 tablet 1    atorvastatin (LIPITOR) 20 MG tablet Take 1 tablet (20 mg) by mouth daily. 90 tablet 3    blood glucose (NO BRAND SPECIFIED) lancets standard Use to test blood sugar 2 times daily or as directed. 200 Lancet 3    blood glucose (NO BRAND SPECIFIED) test strip Use to test blood sugar 2 times daily or as directed. 200 strip 3    blood glucose (ONETOUCH ULTRA) test strip USE TO TEST BLOOD SUGAR THREE TIMES A DAY OR AS DIRECTED 300 strip 2    blood glucose monitoring (NO BRAND SPECIFIED) meter device kit Use to test blood sugar 2 times daily or as directed. 1 kit 0    chlorthalidone (HYGROTON) 25 MG tablet Take 1 tablet (25 mg) by mouth daily. 90 tablet 3    Continuous Glucose  (FREESTYLE MELLISA 3 READER) NELSON USE TO READ BLOOD SUGARS PER 'S INSTRUCTIONS. 1 each 1    Continuous Glucose Sensor (FREESTYLE MELLISA 3 PLUS SENSOR) MISC Use 1 sensor every 15 days. Use to read blood sugars per 's instructions. 2 each 5    cyanocobalamin (VITAMIN B-12) 1000 MCG tablet Take 1,000 mcg by mouth daily      ferrous sulfate (FEROSUL) 325 (65 Fe) MG tablet Take 1 tablet (325 mg) by mouth daily (with breakfast)      fexofenadine (ALLEGRA) 180 MG tablet Take 180 mg by mouth daily      folic acid 0.8 MG CAPS Take 1 tablet by mouth daily 90 capsule 3    furosemide (LASIX) 20 MG tablet TAKE 1 TABLET (20 MG) BY MOUTH AS NEEDED FOR LEG SWELLING 90 tablet 3    insulin glargine (LANTUS PEN) 100 UNIT/ML pen Inject 5 Units subcutaneously daily. 15 mL 0    insulin pen needle (32G X 4 MM) 32G X 4 MM miscellaneous Use 1 pen needles daily or as directed. 100 each 3    insulin syringe-needle U-100 (BD INSULIN SYRINGE ULTRAFINE) 31G X 5/16\" 0.3 ML miscellaneous USE 3 SYRINGES DAILY OR AS DIRECTED. 300 each 3    ipratropium (ATROVENT) 0.03 % nasal spray SPRAY 2 SPRAYS INTO BOTH NOSTRILS 2 TIMES DAILY AS " NEEDED FOR RHINITIS. 30 mL 1    ipratropium - albuterol 0.5 mg/2.5 mg/3 mL (DUONEB) 0.5-2.5 (3) MG/3ML neb solution NEBULIZE CONTENTS OF ONE VIAL (3 MLS) EVERY 4 HOURS AS NEEDED FOR SHORTNESS OF BREATH OR DYSPNEA 360 mL 1    Lidocaine (LIDOCARE) 4 % Patch Place 2 patches over 12 hours onto the skin every 24 hours. To prevent lidocaine toxicity, patient should be patch free for 12 hrs daily. 60 patch 2    metFORMIN (GLUCOPHAGE) 1000 MG tablet TAKE ONE TABLET BY MOUTH TWICE A DAY WITH BREAKFAST  AND DINNER 180 tablet 1    mineral oil-hydrophilic petrolatum (AQUAPHOR) external ointment Apply topically 2 times daily. As needed for skin protection 198 g 1    mometasone (NASONEX) 50 MCG/ACT nasal spray Spray 2 sprays into both nostrils daily.      pantoprazole (PROTONIX) 40 MG enteric coated tablet Take 40 mg by mouth daily Started by Dr. Debbie Rico at [facility]      primidone (MYSOLINE) 50 MG tablet 4 tablets in am, 4 tablets afternoon, and 3 tablets every evening 1001 tablet 1    Probiotic Product (FLORAJEN3) CAPS Take 1 capsule by mouth 2 times daily 60 capsule 0    propafenone (RYTHMOL) 150 MG TABS tablet Take 1 tablet (150 mg) by mouth 2 times daily. 180 tablet 3    propranolol (INDERAL) 60 MG tablet Take 1 tablet (60 mg) by mouth 2 times daily. (Together with 80 mg of propranolol with each dose for the total dose of 140 mg 2 times per day). 360 tablet 1    propranolol (INDERAL) 80 MG tablet Take 1 tablet (80 mg) by mouth 2 times daily. (Together with 60 mg of propranolol with each dose for the total dose of 140 mg 2 times per day). 180 tablet 1    sulfaSALAzine (AZULFIDINE) 500 MG tablet TAKE ONE TABLET BY MOUTH THREE TIMES A DAY 90 tablet 11    tamsulosin (FLOMAX) 0.4 MG capsule TAKE ONE CAPSULE BY MOUTH ONCE DAILY 90 capsule 3    tiotropium-olodaterol (STIOLTO RESPIMAT) 2.5-2.5 MCG/ACT AERS INHALE TWO PUFFS BY MOUTH ONCE DAILY 4 g 2    Vitamin D3 (CHOLECALCIFEROL) 25 mcg (1000 units) tablet Take 1 tablet by mouth  daily.      warfarin ANTICOAGULANT (JANTOVEN ANTICOAGULANT) 2.5 MG tablet Take 2-3 tablets (5-7.5 mg) by mouth every evening. Or as directed by anticoagulation clinic 235 tablet 1    NOVOLIN R VIAL 100 UNIT/ML soln INJECT 2 UNITS AS NEEDED WHEN BLOOD GLUCOSE IS GREATER THAN 200 MG/DL.  IN-USE (OPENED) VIALS MUST BE USED WITHIN 42 DAYS OR BE DISCARDED. (Patient not taking: Reported on 7/22/2025) 30 mL 1     No current facility-administered medications for this visit.       SOCIAL HISTORY:  I have reviewed this patient's social history and updated it with pertinent information if needed. Cayetano Lunsford  reports that he quit smoking about 33 years ago. His smoking use included cigarettes. He started smoking about 63 years ago. He has a 30 pack-year smoking history. He has been exposed to tobacco smoke. He has never used smokeless tobacco. He reports that he does not currently use alcohol. He reports that he does not use drugs.    PHYSICAL EXAM:  Pulse:  [50] 50  BP: (146)/(50) 146/50  127 lbs 0 oz    Constitutional: alert, no distress  Respiratory: Good bilateral air entry  Cardiovascular: Regular heart sounds no edema  GI: nondistended  Neuropsychiatric: appropriate affact    ASSESSMENT: Pertinent issues addressed/ reviewed during this cardiology visit  Paroxysmal atrial fibrillation  Chronic anticoagulation and fall risk    RECOMMENDATIONS:  Overall Cayetano is doing okay from a cardiac standpoint however he has been having more falls and overall his health seems to be somewhat deteriorating from a functional capacity standpoint.  His INR is fluctuating quite a bit.  I do not think he is an ideal candidate for long-term anticoagulation.  I recommend consideration for left atrial appendage occlusion.  He is open to the idea and will think about it more.    I have referred my patient for an evaluation for percutaneous left atrial appendage occlusion (pLAAO). This patient is a poor candidate for long-term  oral-anticoagulation (OAC). This patient has a [id number] score of 4 and a HAS-BLED score of 2.     Per the 2023 ACC AHA cardiology practice guidelines, pLAAO has a class 2A recommendation for patients who have a contraindication to OAC; and a class 2B recommendation for patients considered at high risk of bleeding with OAC - in these patients pLAAO may be a reasonable alternative to OAC based on patient preference, with careful consideration of procedural risk and with the understanding that the evidence for OAC is more extensive.     Based on both stroke and bleeding risk, a shared decision has been made with the patient to pursue pLAAO as an alternative strategy to OAC therapy.     It was a pleasure seeing this patient in clinic today. Please do not hesitate to contact me with any future questions.     JOCY Almaraz, [facility]  Cardiology - [facility] Heart  July 22, 2025    Review of the result(s) of each unique test - Last CBC BMP lipids echo. Last echo shows normal EF     The level of medical decision making during this visit was of high complexity. Condition for which patient was treated that is considered severe and would require intensive monitoring or urgent treatment to facilitate care -left atrial appendage occlusion for increasing bleeding risk.  Evaluation for Watchman device or amulet device implantation.    This note was completed in part using dictation via the Dragon voice recognition software. Some word and grammatical errors may occur and must be interpreted in the appropriate clinical context.  If there are any questions pertaining to this issue, please contact me for further clarification.   [Negative] : Genitourinary

## 2025-07-24 ENCOUNTER — ANTICOAGULATION THERAPY VISIT (OUTPATIENT)
Dept: ANTICOAGULATION | Facility: CLINIC | Age: 80
End: 2025-07-24

## 2025-07-24 ENCOUNTER — LAB (OUTPATIENT)
Dept: LAB | Facility: CLINIC | Age: 80
End: 2025-07-24
Payer: COMMERCIAL

## 2025-07-24 DIAGNOSIS — Z79.01 LONG TERM CURRENT USE OF ANTICOAGULANTS WITH INR GOAL OF 2.0-3.0: ICD-10-CM

## 2025-07-24 DIAGNOSIS — I48.11 LONGSTANDING PERSISTENT ATRIAL FIBRILLATION (H): ICD-10-CM

## 2025-07-24 DIAGNOSIS — I48.11 LONGSTANDING PERSISTENT ATRIAL FIBRILLATION (H): Primary | ICD-10-CM

## 2025-07-24 LAB — INR BLD: 3.4 (ref 0.9–1.1)

## 2025-07-24 NOTE — PROGRESS NOTES
ANTICOAGULATION MANAGEMENT     Cayetano Lunsford 80 year old male is on warfarin with supratherapeutic INR result. (Goal INR 2.0-3.0)    Recent labs: (last 7 days)     07/24/25  1327   INR 3.4*       ASSESSMENT     Source(s): Chart Review and Patient/Caregiver Call     Warfarin doses taken: Warfarin taken as instructed  Diet: No new diet changes identified - continues greens 3x/week and V8 4x/week  Medication/supplement changes: None noted  New illness, injury, or hospitalization: No, recent cardiology OV; considering LAAO  Signs or symptoms of bleeding or clotting: No  Previous result: Subtherapeutic - several recent dosing changes. Leo realized he missed a single day of V8 last week which may be contributing to small increase in INR. Patient preference to maintain current dosing at this time. Discussed the potential of splitting tabs in the future to achieve weekly dose between 45 and 47.5 mg. Leo wasn't sure he'd be able to do this with his tremors, but would be open to trying with a pill cutter if needed. Otherwise, may need to change tablet strength again.  Additional findings: None       PLAN     Recommended plan for temporary change(s) affecting INR     Dosing Instructions: partial hold then continue your current warfarin dose with next INR in 2 weeks       Summary  As of 7/24/2025      Full warfarin instructions:  7/24: 5 mg; Otherwise 5 mg every Mon, Fri; 7.5 mg all other days   Next INR check:  8/7/2025               Telephone call with Leo who verbalizes understanding and agrees to plan + mailing AVS    Lab visit scheduled    Education provided: Please call back if any changes to your diet, medications or how you've been taking warfarin  Written instructions provided    Plan made per ACC anticoagulation protocol    Corina Cuevas RN  7/24/2025  Anticoagulation Clinic  [employer] for routing messages: an [employer]  [facility] patient phone line:  [phone]        _______________________________________________________________________     Anticoagulation Episode Summary       Current INR goal:  2.0-3.0   TTR:  40.0% (1 y)   Target end date:  Indefinite   Send INR reminders to:  [facility]    Indications    Longstanding persistent atrial fibrillation (H) [I48.11]  Long term current use of anticoagulants with INR goal of 2.0-3.0 [Z79.01]  Atrial fibrillation  unspecified type (H) (Resolved) [I48.91]  Long-term (current) use of anticoagulants [Z79.01] (Resolved) [Z79.01]             Comments:  --             Anticoagulation Care Providers       Provider Role Specialty Phone number    Dmitri Andres MD Referring Family Medicine [phone]    Drew Bravo PA-C Referring Family Medicine [phone]    Deborah Mackey PA-C Referring Family Medicine [phone]

## 2025-07-28 ENCOUNTER — TELEPHONE (OUTPATIENT)
Dept: CARDIOLOGY | Facility: CLINIC | Age: 80
End: 2025-07-28
Payer: COMMERCIAL

## 2025-07-28 DIAGNOSIS — I48.0 PAROXYSMAL ATRIAL FIBRILLATION (H): Primary | ICD-10-CM

## 2025-07-28 NOTE — TELEPHONE ENCOUNTER
[facility] Call Center    Phone Message    May a detailed message be left on voicemail: yes     Reason for Call: Other: Pt is returning call, please call back     Action Taken: Other: cardio    Travel Screening: Not Applicable    Thank you!  Specialty [facility] Center       Date of Service:

## 2025-07-28 NOTE — TELEPHONE ENCOUNTER
Returned pt call to schedule LAAO consult     LVM and callback information    [phone]    Shanelle Davila  Structural Heart Procedure   [facility]/ [facility]

## 2025-07-28 NOTE — TELEPHONE ENCOUNTER
Called to schedule new LAAO consult     LVM and callback information      [phone]    Shanelle Davila  Structural Heart Procedure   [facility]/ [facility]

## 2025-07-30 ENCOUNTER — THERAPY VISIT (OUTPATIENT)
Dept: PHYSICAL THERAPY | Facility: CLINIC | Age: 80
End: 2025-07-30
Payer: COMMERCIAL

## 2025-07-30 DIAGNOSIS — M54.50 ACUTE MIDLINE LOW BACK PAIN WITHOUT SCIATICA: Primary | ICD-10-CM

## 2025-07-30 DIAGNOSIS — M25.551 RIGHT HIP PAIN: ICD-10-CM

## 2025-07-30 PROCEDURE — 97110 THERAPEUTIC EXERCISES: CPT | Mod: GP | Performed by: PHYSICAL THERAPIST

## 2025-07-30 PROCEDURE — 97112 NEUROMUSCULAR REEDUCATION: CPT | Mod: GP | Performed by: PHYSICAL THERAPIST

## 2025-08-04 ENCOUNTER — TELEPHONE (OUTPATIENT)
Dept: FAMILY MEDICINE | Facility: CLINIC | Age: 80
End: 2025-08-04
Payer: COMMERCIAL

## 2025-08-07 ENCOUNTER — ANTICOAGULATION THERAPY VISIT (OUTPATIENT)
Dept: ANTICOAGULATION | Facility: CLINIC | Age: 80
End: 2025-08-07

## 2025-08-07 ENCOUNTER — RESULTS FOLLOW-UP (OUTPATIENT)
Dept: ANTICOAGULATION | Facility: CLINIC | Age: 80
End: 2025-08-07

## 2025-08-07 ENCOUNTER — TELEPHONE (OUTPATIENT)
Dept: FAMILY MEDICINE | Facility: CLINIC | Age: 80
End: 2025-08-07

## 2025-08-07 ENCOUNTER — LAB (OUTPATIENT)
Dept: LAB | Facility: CLINIC | Age: 80
End: 2025-08-07
Payer: COMMERCIAL

## 2025-08-07 DIAGNOSIS — I48.11 LONGSTANDING PERSISTENT ATRIAL FIBRILLATION (H): ICD-10-CM

## 2025-08-07 DIAGNOSIS — I48.11 LONGSTANDING PERSISTENT ATRIAL FIBRILLATION (H): Primary | ICD-10-CM

## 2025-08-07 DIAGNOSIS — Z79.01 LONG TERM CURRENT USE OF ANTICOAGULANTS WITH INR GOAL OF 2.0-3.0: ICD-10-CM

## 2025-08-07 LAB — INR BLD: 1.5 (ref 0.9–1.1)

## 2025-08-13 ENCOUNTER — THERAPY VISIT (OUTPATIENT)
Dept: PHYSICAL THERAPY | Facility: CLINIC | Age: 80
End: 2025-08-13
Payer: COMMERCIAL

## 2025-08-13 DIAGNOSIS — J44.9 CHRONIC OBSTRUCTIVE PULMONARY DISEASE, UNSPECIFIED COPD TYPE (H): ICD-10-CM

## 2025-08-13 DIAGNOSIS — M25.551 RIGHT HIP PAIN: ICD-10-CM

## 2025-08-13 DIAGNOSIS — M54.50 ACUTE MIDLINE LOW BACK PAIN WITHOUT SCIATICA: Primary | ICD-10-CM

## 2025-08-13 PROCEDURE — 97110 THERAPEUTIC EXERCISES: CPT | Mod: GP | Performed by: PHYSICAL THERAPIST

## 2025-08-13 PROCEDURE — 97112 NEUROMUSCULAR REEDUCATION: CPT | Mod: GP | Performed by: PHYSICAL THERAPIST

## 2025-08-13 RX ORDER — TIOTROPIUM BROMIDE AND OLODATEROL 3.124; 2.736 UG/1; UG/1
SPRAY, METERED RESPIRATORY (INHALATION)
Qty: 4 G | Refills: 2 | Status: SHIPPED | OUTPATIENT
Start: 2025-08-13

## 2025-08-14 ENCOUNTER — VIRTUAL VISIT (OUTPATIENT)
Dept: FAMILY MEDICINE | Facility: CLINIC | Age: 80
End: 2025-08-14
Payer: COMMERCIAL

## 2025-08-14 DIAGNOSIS — I48.0 PAROXYSMAL ATRIAL FIBRILLATION (H): Primary | ICD-10-CM

## 2025-08-18 DIAGNOSIS — R35.0 BENIGN PROSTATIC HYPERPLASIA WITH URINARY FREQUENCY: ICD-10-CM

## 2025-08-18 DIAGNOSIS — N40.1 BENIGN PROSTATIC HYPERPLASIA WITH URINARY FREQUENCY: ICD-10-CM

## 2025-08-18 RX ORDER — TAMSULOSIN HYDROCHLORIDE 0.4 MG/1
CAPSULE ORAL
Qty: 90 CAPSULE | Refills: 3 | Status: SHIPPED | OUTPATIENT
Start: 2025-08-18

## 2025-08-19 DIAGNOSIS — N18.1 TYPE 2 DIABETES MELLITUS WITH STAGE 1 CHRONIC KIDNEY DISEASE, WITH LONG-TERM CURRENT USE OF INSULIN (H): ICD-10-CM

## 2025-08-19 DIAGNOSIS — Z79.4 TYPE 2 DIABETES MELLITUS WITH STAGE 1 CHRONIC KIDNEY DISEASE, WITH LONG-TERM CURRENT USE OF INSULIN (H): ICD-10-CM

## 2025-08-19 DIAGNOSIS — E11.22 TYPE 2 DIABETES MELLITUS WITH STAGE 1 CHRONIC KIDNEY DISEASE, WITH LONG-TERM CURRENT USE OF INSULIN (H): ICD-10-CM

## 2025-08-19 RX ORDER — BLOOD-GLUCOSE METER
EACH MISCELLANEOUS
Qty: 1 KIT | Refills: 0 | Status: SHIPPED | OUTPATIENT
Start: 2025-08-19

## 2025-08-21 ENCOUNTER — LAB (OUTPATIENT)
Dept: LAB | Facility: CLINIC | Age: 80
End: 2025-08-21
Payer: COMMERCIAL

## 2025-08-21 ENCOUNTER — ANTICOAGULATION THERAPY VISIT (OUTPATIENT)
Dept: ANTICOAGULATION | Facility: CLINIC | Age: 80
End: 2025-08-21

## 2025-08-21 DIAGNOSIS — I48.11 LONGSTANDING PERSISTENT ATRIAL FIBRILLATION (H): ICD-10-CM

## 2025-08-21 DIAGNOSIS — I48.11 LONGSTANDING PERSISTENT ATRIAL FIBRILLATION (H): Primary | ICD-10-CM

## 2025-08-21 DIAGNOSIS — Z79.01 LONG TERM CURRENT USE OF ANTICOAGULANTS WITH INR GOAL OF 2.0-3.0: ICD-10-CM

## 2025-08-21 LAB — INR BLD: 1.8 (ref 0.9–1.1)

## 2025-08-27 ENCOUNTER — THERAPY VISIT (OUTPATIENT)
Dept: PHYSICAL THERAPY | Facility: CLINIC | Age: 80
End: 2025-08-27
Payer: COMMERCIAL

## 2025-08-27 DIAGNOSIS — M25.551 RIGHT HIP PAIN: ICD-10-CM

## 2025-08-27 DIAGNOSIS — M54.50 ACUTE MIDLINE LOW BACK PAIN WITHOUT SCIATICA: Primary | ICD-10-CM

## 2025-08-27 PROCEDURE — 97112 NEUROMUSCULAR REEDUCATION: CPT | Mod: GP | Performed by: PHYSICAL THERAPIST

## 2025-08-27 PROCEDURE — 97110 THERAPEUTIC EXERCISES: CPT | Mod: GP | Performed by: PHYSICAL THERAPIST

## 2025-09-02 ENCOUNTER — TELEPHONE (OUTPATIENT)
Dept: NEUROLOGY | Facility: CLINIC | Age: 80
End: 2025-09-02
Payer: COMMERCIAL

## 2025-09-03 ENCOUNTER — OFFICE VISIT (OUTPATIENT)
Dept: NEUROLOGY | Facility: CLINIC | Age: 80
End: 2025-09-03
Payer: COMMERCIAL

## 2025-09-03 VITALS
DIASTOLIC BLOOD PRESSURE: 66 MMHG | HEART RATE: 47 BPM | SYSTOLIC BLOOD PRESSURE: 151 MMHG | BODY MASS INDEX: 22.63 KG/M2 | OXYGEN SATURATION: 96 % | WEIGHT: 136 LBS

## 2025-09-03 DIAGNOSIS — G25.0 BENIGN FAMILIAL TREMOR: Primary | ICD-10-CM

## 2025-09-03 PROCEDURE — 99214 OFFICE O/P EST MOD 30 MIN: CPT | Performed by: PSYCHIATRY & NEUROLOGY

## 2025-09-03 PROCEDURE — 3077F SYST BP >= 140 MM HG: CPT | Performed by: PSYCHIATRY & NEUROLOGY

## 2025-09-03 PROCEDURE — G2211 COMPLEX E/M VISIT ADD ON: HCPCS | Performed by: PSYCHIATRY & NEUROLOGY

## 2025-09-03 PROCEDURE — 3078F DIAST BP <80 MM HG: CPT | Performed by: PSYCHIATRY & NEUROLOGY

## 2025-09-03 RX ORDER — PRIMIDONE 50 MG/1
TABLET ORAL
Qty: 1001 TABLET | Refills: 1 | Status: SHIPPED | OUTPATIENT
Start: 2025-09-03

## 2025-09-03 RX ORDER — PROPRANOLOL HYDROCHLORIDE 60 MG/1
120 TABLET ORAL 2 TIMES DAILY
Qty: 360 TABLET | Refills: 1 | Status: SHIPPED | OUTPATIENT
Start: 2025-09-03

## 2025-09-04 ENCOUNTER — ANTICOAGULATION THERAPY VISIT (OUTPATIENT)
Dept: ANTICOAGULATION | Facility: CLINIC | Age: 80
End: 2025-09-04

## 2025-09-04 ENCOUNTER — OFFICE VISIT (OUTPATIENT)
Dept: PHARMACY | Facility: CLINIC | Age: 80
End: 2025-09-04
Payer: COMMERCIAL

## 2025-09-04 ENCOUNTER — TELEPHONE (OUTPATIENT)
Dept: FAMILY MEDICINE | Facility: CLINIC | Age: 80
End: 2025-09-04

## 2025-09-04 ENCOUNTER — LAB (OUTPATIENT)
Dept: LAB | Facility: CLINIC | Age: 80
End: 2025-09-04
Payer: COMMERCIAL

## 2025-09-04 VITALS — DIASTOLIC BLOOD PRESSURE: 50 MMHG | BODY MASS INDEX: 23.06 KG/M2 | WEIGHT: 138.6 LBS | SYSTOLIC BLOOD PRESSURE: 148 MMHG

## 2025-09-04 DIAGNOSIS — I48.11 LONGSTANDING PERSISTENT ATRIAL FIBRILLATION (H): ICD-10-CM

## 2025-09-04 DIAGNOSIS — E11.9 TYPE 2 DIABETES MELLITUS WITHOUT COMPLICATION, WITH LONG-TERM CURRENT USE OF INSULIN (H): Primary | ICD-10-CM

## 2025-09-04 DIAGNOSIS — Z79.01 LONG TERM CURRENT USE OF ANTICOAGULANTS WITH INR GOAL OF 2.0-3.0: ICD-10-CM

## 2025-09-04 DIAGNOSIS — Z79.4 TYPE 2 DIABETES MELLITUS WITHOUT COMPLICATION, WITH LONG-TERM CURRENT USE OF INSULIN (H): ICD-10-CM

## 2025-09-04 DIAGNOSIS — I48.11 LONGSTANDING PERSISTENT ATRIAL FIBRILLATION (H): Primary | ICD-10-CM

## 2025-09-04 DIAGNOSIS — R60.9 EDEMA, UNSPECIFIED TYPE: ICD-10-CM

## 2025-09-04 DIAGNOSIS — Z79.4 TYPE 2 DIABETES MELLITUS WITHOUT COMPLICATION, WITH LONG-TERM CURRENT USE OF INSULIN (H): Primary | ICD-10-CM

## 2025-09-04 DIAGNOSIS — I48.0 PAROXYSMAL ATRIAL FIBRILLATION (H): ICD-10-CM

## 2025-09-04 DIAGNOSIS — E11.9 TYPE 2 DIABETES MELLITUS WITHOUT COMPLICATION, WITH LONG-TERM CURRENT USE OF INSULIN (H): ICD-10-CM

## 2025-09-04 DIAGNOSIS — I10 HYPERTENSION GOAL BP (BLOOD PRESSURE) < 140/90: ICD-10-CM

## 2025-09-04 DIAGNOSIS — G25.0 BENIGN FAMILIAL TREMOR: ICD-10-CM

## 2025-09-04 LAB
EST. AVERAGE GLUCOSE BLD GHB EST-MCNC: 100 MG/DL
HBA1C MFR BLD: 5.1 % (ref 0–5.6)
INR BLD: 2 (ref 0.9–1.1)

## 2025-09-04 RX ORDER — HYDRALAZINE HYDROCHLORIDE 10 MG/1
10 TABLET, FILM COATED ORAL 3 TIMES DAILY
Qty: 270 TABLET | Refills: 0 | Status: SHIPPED | OUTPATIENT
Start: 2025-09-04